# Patient Record
Sex: FEMALE | Race: WHITE | NOT HISPANIC OR LATINO | Employment: OTHER | URBAN - METROPOLITAN AREA
[De-identification: names, ages, dates, MRNs, and addresses within clinical notes are randomized per-mention and may not be internally consistent; named-entity substitution may affect disease eponyms.]

---

## 2017-03-09 ENCOUNTER — HOSPITAL ENCOUNTER (OUTPATIENT)
Dept: OTHER | Facility: HOSPITAL | Age: 67
Discharge: HOME | End: 2017-03-09
Attending: ANESTHESIOLOGY | Admitting: ANESTHESIOLOGY

## 2017-03-22 ENCOUNTER — HOSPITAL ENCOUNTER (OUTPATIENT)
Dept: OTHER | Facility: HOSPITAL | Age: 67
Setting detail: SPECIMEN
Discharge: HOME | End: 2017-03-22
Attending: PODIATRIST | Admitting: PODIATRIST

## 2017-04-03 ENCOUNTER — HOSPITAL ENCOUNTER (OUTPATIENT)
Dept: OTHER | Facility: HOSPITAL | Age: 67
Setting detail: SPECIMEN
Discharge: HOME | End: 2017-04-03
Attending: PODIATRIST | Admitting: PODIATRIST

## 2017-04-03 LAB
BACTERIA SPEC AEROBE CULT: NORMAL
GRAM STN SPEC: NORMAL
Lab: NORMAL
MICRO REPORT STATUS: NORMAL
SPECIMEN SOURCE: NORMAL

## 2019-06-07 ENCOUNTER — TRANSCRIBE ORDERS (OUTPATIENT)
Dept: ADMINISTRATIVE | Facility: HOSPITAL | Age: 69
End: 2019-06-07

## 2019-06-07 DIAGNOSIS — Z82.5 FAMILY HISTORY OF ASTHMA: ICD-10-CM

## 2019-06-07 DIAGNOSIS — R06.09 DOE (DYSPNEA ON EXERTION): Primary | ICD-10-CM

## 2019-06-07 DIAGNOSIS — F17.200 SMOKER: ICD-10-CM

## 2019-06-07 DIAGNOSIS — I35.8 AORTIC HEART MURMUR: ICD-10-CM

## 2019-06-21 ENCOUNTER — HOSPITAL ENCOUNTER (OUTPATIENT)
Dept: CARDIOLOGY | Facility: HOSPITAL | Age: 69
Discharge: HOME | End: 2019-06-21

## 2019-06-21 ENCOUNTER — HOSPITAL ENCOUNTER (OUTPATIENT)
Dept: RESPIRATORY THERAPY | Facility: HOSPITAL | Age: 69
Discharge: HOME | End: 2019-06-21
Admitting: NURSE PRACTITIONER

## 2019-06-21 VITALS
BODY MASS INDEX: 49.51 KG/M2 | WEIGHT: 290 LBS | HEIGHT: 64 IN | SYSTOLIC BLOOD PRESSURE: 125 MMHG | DIASTOLIC BLOOD PRESSURE: 78 MMHG

## 2019-06-21 VITALS
WEIGHT: 290 LBS | BODY MASS INDEX: 49.51 KG/M2 | RESPIRATION RATE: 18 BRPM | OXYGEN SATURATION: 99 % | HEIGHT: 64 IN | HEART RATE: 67 BPM

## 2019-06-21 DIAGNOSIS — F17.200 SMOKER: ICD-10-CM

## 2019-06-21 DIAGNOSIS — Z82.5 FAMILY HISTORY OF ASTHMA: ICD-10-CM

## 2019-06-21 DIAGNOSIS — R06.09 DOE (DYSPNEA ON EXERTION): ICD-10-CM

## 2019-06-21 DIAGNOSIS — I35.8 AORTIC HEART MURMUR: ICD-10-CM

## 2019-06-21 LAB
BH CV ECHO MEAS - ACS: 1.4 CM
BH CV ECHO MEAS - AO MAX PG (FULL): 62.5 MMHG
BH CV ECHO MEAS - AO MAX PG: 67.9 MMHG
BH CV ECHO MEAS - AO MEAN PG (FULL): 32.7 MMHG
BH CV ECHO MEAS - AO MEAN PG: 35.9 MMHG
BH CV ECHO MEAS - AO ROOT AREA (BSA CORRECTED): 1.3
BH CV ECHO MEAS - AO ROOT AREA: 7.1 CM^2
BH CV ECHO MEAS - AO ROOT DIAM: 3 CM
BH CV ECHO MEAS - AO V2 MAX: 412.1 CM/SEC
BH CV ECHO MEAS - AO V2 MEAN: 269.8 CM/SEC
BH CV ECHO MEAS - AO V2 VTI: 100.8 CM
BH CV ECHO MEAS - ASC AORTA: 3 CM
BH CV ECHO MEAS - AVA(I,A): 1.3 CM^2
BH CV ECHO MEAS - AVA(I,D): 1.3 CM^2
BH CV ECHO MEAS - AVA(V,A): 1.1 CM^2
BH CV ECHO MEAS - AVA(V,D): 1.1 CM^2
BH CV ECHO MEAS - BSA(HAYCOCK): 2.5 M^2
BH CV ECHO MEAS - BSA: 2.3 M^2
BH CV ECHO MEAS - BZI_BMI: 49.8 KILOGRAMS/M^2
BH CV ECHO MEAS - BZI_METRIC_HEIGHT: 162.6 CM
BH CV ECHO MEAS - BZI_METRIC_WEIGHT: 131.5 KG
BH CV ECHO MEAS - EDV(CUBED): 172.9 ML
BH CV ECHO MEAS - EDV(MOD-SP4): 87.2 ML
BH CV ECHO MEAS - EDV(TEICH): 151.8 ML
BH CV ECHO MEAS - EF(CUBED): 78.4 %
BH CV ECHO MEAS - EF(MOD-BP): 68 %
BH CV ECHO MEAS - EF(MOD-SP4): 67.9 %
BH CV ECHO MEAS - EF(TEICH): 70 %
BH CV ECHO MEAS - ESV(CUBED): 37.4 ML
BH CV ECHO MEAS - ESV(MOD-SP4): 28 ML
BH CV ECHO MEAS - ESV(TEICH): 45.5 ML
BH CV ECHO MEAS - FS: 40 %
BH CV ECHO MEAS - IVS/LVPW: 1.2
BH CV ECHO MEAS - IVSD: 1.4 CM
BH CV ECHO MEAS - LA DIMENSION(2D): 4.1 CM
BH CV ECHO MEAS - LV DIASTOLIC VOL/BSA (35-75): 38.1 ML/M^2
BH CV ECHO MEAS - LV MASS(C)D: 301.8 GRAMS
BH CV ECHO MEAS - LV MASS(C)DI: 131.7 GRAMS/M^2
BH CV ECHO MEAS - LV MAX PG: 5.4 MMHG
BH CV ECHO MEAS - LV MEAN PG: 3.3 MMHG
BH CV ECHO MEAS - LV SYSTOLIC VOL/BSA (12-30): 12.2 ML/M^2
BH CV ECHO MEAS - LV V1 MAX: 116.5 CM/SEC
BH CV ECHO MEAS - LV V1 MEAN: 86.3 CM/SEC
BH CV ECHO MEAS - LV V1 VTI: 33.8 CM
BH CV ECHO MEAS - LVIDD: 5.6 CM
BH CV ECHO MEAS - LVIDS: 3.3 CM
BH CV ECHO MEAS - LVOT AREA: 3.8 CM^2
BH CV ECHO MEAS - LVOT DIAM: 2.2 CM
BH CV ECHO MEAS - LVPWD: 1.1 CM
BH CV ECHO MEAS - MV A MAX VEL: 150.8 CM/SEC
BH CV ECHO MEAS - MV DEC SLOPE: 459.2 CM/SEC^2
BH CV ECHO MEAS - MV DEC TIME: 0.26 SEC
BH CV ECHO MEAS - MV E MAX VEL: 120.7 CM/SEC
BH CV ECHO MEAS - MV E/A: 0.8
BH CV ECHO MEAS - MV MAX PG: 11.7 MMHG
BH CV ECHO MEAS - MV MEAN PG: 4.6 MMHG
BH CV ECHO MEAS - MV V2 MAX: 171.3 CM/SEC
BH CV ECHO MEAS - MV V2 MEAN: 100.8 CM/SEC
BH CV ECHO MEAS - MV V2 VTI: 42.3 CM
BH CV ECHO MEAS - MVA(VTI): 3.1 CM^2
BH CV ECHO MEAS - PA ACC TIME: 0.08 SEC
BH CV ECHO MEAS - PA MAX PG (FULL): 1.5 MMHG
BH CV ECHO MEAS - PA MAX PG: 3.5 MMHG
BH CV ECHO MEAS - PA MEAN PG (FULL): 1 MMHG
BH CV ECHO MEAS - PA MEAN PG: 2.1 MMHG
BH CV ECHO MEAS - PA PR(ACCEL): 43.7 MMHG
BH CV ECHO MEAS - PA V2 MAX: 93.5 CM/SEC
BH CV ECHO MEAS - PA V2 MEAN: 68.8 CM/SEC
BH CV ECHO MEAS - PA V2 VTI: 25.1 CM
BH CV ECHO MEAS - PULM A REVS DUR: 0.11 SEC
BH CV ECHO MEAS - PULM A REVS VEL: 33.3 CM/SEC
BH CV ECHO MEAS - PULM DIAS VEL: 42.8 CM/SEC
BH CV ECHO MEAS - PULM S/D: 1.7
BH CV ECHO MEAS - PULM SYS VEL: 71.6 CM/SEC
BH CV ECHO MEAS - PVA(I,A): 5.5 CM^2
BH CV ECHO MEAS - PVA(I,D): 5.5 CM^2
BH CV ECHO MEAS - PVA(V,A): 5.1 CM^2
BH CV ECHO MEAS - PVA(V,D): 5.1 CM^2
BH CV ECHO MEAS - QP/QS: 1.1
BH CV ECHO MEAS - RAP SYSTOLE: 3 MMHG
BH CV ECHO MEAS - RV MAX PG: 2 MMHG
BH CV ECHO MEAS - RV MEAN PG: 1.1 MMHG
BH CV ECHO MEAS - RV V1 MAX: 70 CM/SEC
BH CV ECHO MEAS - RV V1 MEAN: 48.4 CM/SEC
BH CV ECHO MEAS - RV V1 VTI: 20.6 CM
BH CV ECHO MEAS - RVDD: 2.6 CM
BH CV ECHO MEAS - RVOT AREA: 6.8 CM^2
BH CV ECHO MEAS - RVOT DIAM: 2.9 CM
BH CV ECHO MEAS - RVSP: 35.7 MMHG
BH CV ECHO MEAS - SI(AO): 311 ML/M^2
BH CV ECHO MEAS - SI(CUBED): 59.2 ML/M^2
BH CV ECHO MEAS - SI(LVOT): 56.9 ML/M^2
BH CV ECHO MEAS - SI(MOD-SP4): 25.8 ML/M^2
BH CV ECHO MEAS - SI(TEICH): 46.4 ML/M^2
BH CV ECHO MEAS - SV(AO): 712.3 ML
BH CV ECHO MEAS - SV(CUBED): 135.5 ML
BH CV ECHO MEAS - SV(LVOT): 130.3 ML
BH CV ECHO MEAS - SV(MOD-SP4): 59.2 ML
BH CV ECHO MEAS - SV(RVOT): 138.8 ML
BH CV ECHO MEAS - SV(TEICH): 106.3 ML
BH CV ECHO MEAS - TR MAX VEL: 285.8 CM/SEC

## 2019-06-21 PROCEDURE — 93306 TTE W/DOPPLER COMPLETE: CPT | Performed by: INTERNAL MEDICINE

## 2019-06-21 PROCEDURE — 94729 DIFFUSING CAPACITY: CPT

## 2019-06-21 PROCEDURE — 94726 PLETHYSMOGRAPHY LUNG VOLUMES: CPT

## 2019-06-21 PROCEDURE — 93306 TTE W/DOPPLER COMPLETE: CPT

## 2019-06-21 PROCEDURE — 94060 EVALUATION OF WHEEZING: CPT

## 2019-06-21 RX ORDER — ALBUTEROL SULFATE 2.5 MG/3ML
2.5 SOLUTION RESPIRATORY (INHALATION) ONCE
Status: DISCONTINUED | OUTPATIENT
Start: 2019-06-21 | End: 2019-06-22 | Stop reason: HOSPADM

## 2019-06-21 RX ORDER — ALBUTEROL SULFATE 2.5 MG/3ML
2.5 SOLUTION RESPIRATORY (INHALATION) ONCE
Status: COMPLETED | OUTPATIENT
Start: 2019-06-21 | End: 2019-06-21

## 2019-06-21 RX ADMIN — ALBUTEROL SULFATE 2.5 MG: 2.5 SOLUTION RESPIRATORY (INHALATION) at 11:12

## 2019-07-22 ENCOUNTER — OFFICE VISIT (OUTPATIENT)
Dept: CARDIOLOGY | Facility: CLINIC | Age: 69
End: 2019-07-22

## 2019-07-22 VITALS
HEART RATE: 65 BPM | WEIGHT: 293 LBS | SYSTOLIC BLOOD PRESSURE: 140 MMHG | DIASTOLIC BLOOD PRESSURE: 82 MMHG | BODY MASS INDEX: 50.38 KG/M2 | OXYGEN SATURATION: 95 %

## 2019-07-22 DIAGNOSIS — R06.09 DYSPNEA ON EXERTION: Primary | ICD-10-CM

## 2019-07-22 DIAGNOSIS — E78.5 DYSLIPIDEMIA: ICD-10-CM

## 2019-07-22 DIAGNOSIS — R73.03 PREDIABETES: ICD-10-CM

## 2019-07-22 DIAGNOSIS — I10 ESSENTIAL HYPERTENSION: ICD-10-CM

## 2019-07-22 DIAGNOSIS — E66.01 MORBIDLY OBESE (HCC): ICD-10-CM

## 2019-07-22 DIAGNOSIS — I38 ACUTE SYSTOLIC CONGESTIVE HEART FAILURE DUE TO VALVULAR DISEASE (HCC): ICD-10-CM

## 2019-07-22 DIAGNOSIS — I50.21 ACUTE SYSTOLIC CONGESTIVE HEART FAILURE DUE TO VALVULAR DISEASE (HCC): ICD-10-CM

## 2019-07-22 PROCEDURE — 99204 OFFICE O/P NEW MOD 45 MIN: CPT | Performed by: INTERNAL MEDICINE

## 2019-07-22 PROCEDURE — 93000 ELECTROCARDIOGRAM COMPLETE: CPT | Performed by: INTERNAL MEDICINE

## 2019-07-22 NOTE — PROGRESS NOTES
Subjective:     Encounter Date:07/22/2019      Patient ID: Claudia Rust is a 68 y.o. female.    Chief Complaint: New patient here for evaluation of dyspnea  History of Present Illness see below      Past Medical History:  Past Medical History:   Diagnosis Date   • Asthma    • COPD (chronic obstructive pulmonary disease) (CMS/HCC)      Past Surgical History:  Past Surgical History:   Procedure Laterality Date   • CARDIAC CATHETERIZATION  2009   • CYSTOCELE REPAIR  1984   • FOOT SURGERY     • GALLBLADDER SURGERY  2002   • VAGINAL HYSTERECTOMY  1972   • WRIST SURGERY  2011      Allergies:  Allergies   Allergen Reactions   • Adhesive Tape Unknown (See Comments)   • Butorphanol Unknown (See Comments)   • Garlic Unknown (See Comments)   • Tetanus-Diphtheria Toxoids Td Unknown (See Comments)   • Aloe Itching   • Bee Pollen Unknown (See Comments)   • Latex Unknown (See Comments)   • Macadamia Nut Oil Unknown (See Comments)   • Tuberculin Unknown (See Comments)   • Wasp Venom Unknown (See Comments)     Home Meds:  Current Meds:     Current Outpatient Medications:   •  albuterol (PROVENTIL) (2.5 MG/3ML) 0.083% nebulizer solution, Inhale 2.5 mg., Disp: , Rfl:   •  albuterol sulfate HFA (PROVENTIL HFA) 108 (90 Base) MCG/ACT inhaler, Inhale 2 puffs., Disp: , Rfl:   •  aspirin 81 MG tablet, Take 81 mg by mouth Daily., Disp: , Rfl:   •  B Complex Vitamins (B COMPLEX PO), Take  by mouth., Disp: , Rfl:   •  Cholecalciferol (VITAMIN D-1000 MAX ST) 1000 units tablet, Take 2,000 Units by mouth Daily., Disp: , Rfl:   •  diphenhydrAMINE (BENADRYL) 25 mg capsule, BENADRYL 25 MG ORAL TABLET, Disp: , Rfl:   •  FLUoxetine (PROzac) 60 MG tablet, TAKE 1 TABLET BY MOUTH EVERY DAY, Disp: , Rfl:   •  fluticasone (FLONASE) 50 MCG/ACT nasal spray, INHALE 1 SPRAY IN EACH NOSTRIL 2 TIMES DAILY, Disp: , Rfl:   •  furosemide (LASIX) 40 MG tablet, TAKE 1 TABLET BY MOUTH TWICE A DAY, Disp: , Rfl:   •  gabapentin (NEURONTIN) 400 MG capsule, Take 400  mg by mouth 3 (Three) Times a Day., Disp: , Rfl:   •  HYDROcodone-acetaminophen (NORCO)  MG per tablet, TAKE 1 TABLET BY MOUTH 2 (TWO) TIMES DAILY FOR 30 DAYS, Disp: , Rfl:   •  hydrOXYzine pamoate (VISTARIL) 25 MG capsule, VISTARIL 25 MG CAPS, Disp: , Rfl:   •  levothyroxine (SYNTHROID, LEVOTHROID) 100 MCG tablet, TAKE 1 TABLET BY MOUTH EVERY DAY, Disp: , Rfl:   •  meclizine (ANTIVERT) 25 MG tablet, Take 25 mg by mouth., Disp: , Rfl:   •  MULTIPLE VITAMINS PO, Take  by mouth., Disp: , Rfl:   •  ondansetron ODT (ZOFRAN-ODT) 4 MG disintegrating tablet, TAKE 1-2 TABLETS BY MOUTH EVERY 8 HOURS AS NEEDED FOR NAUSEA, Disp: , Rfl:   •  pantoprazole (PROTONIX) 20 MG EC tablet, PANTOPRAZOLE SODIUM TBEC, Disp: , Rfl:   •  polyethylene glycol (MIRALAX) powder, Take 17 g by mouth., Disp: , Rfl:   •  potassium chloride (KLOR-CON) 20 MEQ CR tablet, TAKE 1 TABLET BY MOUTH TWICE A DAY, Disp: , Rfl:   •  rizatriptan MLT (MAXALT-MLT) 10 MG disintegrating tablet, TAKE 1 TABLET BY MOUTH ONCE AS NEEDED FOR MIGRAINE , MAY REPEAT ONCE IN 2HRS-MAX 30MG/24 HRS, Disp: , Rfl:   •  simvastatin (ZOCOR) 20 MG tablet, Take 20 mg by mouth Daily., Disp: , Rfl:   •  tiZANidine (ZANAFLEX) 2 MG tablet, TAKE 1 TO 2 TABLETS BY MOUTH EVERY 8 HOURS AS NEEDED, Disp: , Rfl:   Social History:   Social History     Tobacco Use   • Smoking status: Current Every Day Smoker     Types: Cigarettes   • Smokeless tobacco: Never Used   • Tobacco comment: patient is trying to quit, has patch on    Substance Use Topics   • Alcohol use: No     Frequency: Never      Family History:  History reviewed. No pertinent family history.     The following portions of the patient's history were reviewed and updated as appropriate: allergies, current medications, past family history, past medical history, past social history, past surgical history and problem list.    Review of Systems   Constitution: Positive for malaise/fatigue.   Cardiovascular: Positive for chest pain and  leg swelling. Negative for palpitations and syncope.   Respiratory: Positive for shortness of breath.    Skin: Negative for rash.   Gastrointestinal: Positive for nausea. Negative for vomiting.   Neurological: Positive for dizziness and light-headedness. Negative for numbness.         ECG 12 Lead  Date/Time: 7/22/2019 3:14 PM  Performed by: Jose Levi MD  Authorized by: Jose Levi MD   Comparison: compared with previous ECG   Comparison to previous ECG: EKG done today reviewed by me shows sinus rhythm at the rate of 60 bpm with low QRS voltage in precordial leads, prolonged QTC at 472 ms new compared to old EKG                 Objective:     Physical Exam   /82 (BP Location: Left arm, Patient Position: Sitting, Cuff Size: Large Adult)   Pulse 65   Wt 133 kg (293 lb 8 oz)   SpO2 95%   BMI 50.38 kg/m²   General:  Appears in no acute distress  Eyes: Sclera is anicteric,  conjunctiva is clear   HEENT:  No JVD. Thyroid not visibly enlarged. No mucosal pallor or cyanosis  Respiratory: Respirations regular and unlabored at rest.  Bilaterally good breath sounds, with good air entry in all fields. No crackles, rubs or wheezes auscultated  Cardiovascular: S1,S2 Regular rate and rhythm.,  2/6 systolic ejection murmur at the base, no  rub or gallop auscultated. No carotid bruits. DP/PT pulses    . No pretibial pitting edema  Gastrointestinal: Abdomen soft, flat, non tender. Bowel sounds present. No hepatosplenomegaly. No ascites  Musculoskeletal:  No abnormal movements  Extremities: No digital clubbing or cyanosis  Skin: Color pink. Skin warm and dry to touch. No rashes  No xanthoma  Neuro: Alert and awake, no lateralizing deficits appreciated    Lab Review:       Assessment:          Diagnosis Plan   1. Dyspnea on exertion  Stress Test With Myocardial Perfusion One Day    BNP   2. Acute systolic congestive heart failure due to valvular disease (CMS/HCC)  Stress Test With Myocardial  Perfusion One Day    BNP   3. Essential hypertension  Stress Test With Myocardial Perfusion One Day    BNP   4. Dyslipidemia  Stress Test With Myocardial Perfusion One Day    BNP   5. Prediabetes  Stress Test With Myocardial Perfusion One Day    BNP   6. Morbidly obese (CMS/HCC)  Stress Test With Myocardial Perfusion One Day    BNP      CC : New patient for dyspnea evaluate    HPI  :  This is a 68-year-old with PMH of    # Hyperlipidemia,  Hypertension  #COPD  #Prediabetes  # Hypothyroidism, Rheumatoid Arthritis, spinal stenosis, hiatal hernia, chronic depression, bipolar, GERD, IBS, chronic migraine, vertigo, herniated disc, compression lumbar fractures  #Allergies/intolerance to Stadol  #Hysterectomy, bladder reconstruction, cholecystectomy, right hand surgery  #Family history of strokes and grandfather.  Mother's cancer, father on  # Active cigarette smoker trying to quit       Here for evaluation of shortness of breath.  Patient has been noticing shortness of breath for last few months and has been progressively worse and now has progressively worsening fatigue and chronic dizziness.  Patient reportedly had syncope for years ago and had her right arm crushed requiring surgery.  Patient states that she has chronic vertigo.  Patient's arterial blood pressure is 140/82 heart rate 65 O2 sat of 95% on room air      Assessment:  Shortness of breath, dyspnea on exertion  Aortic stenosis  Chronic congestive heart failure probably due to valvular heart disease and aortic stenosis  Essential hypertension  Dyslipidemia  Prediabetes  Morbid obesity    Plan:      Counseled on diet exercise weight loss  We will check Lexiscan Cardiolite since patient cannot walk due to dyspnea risk benefits alternatives except  Patient has echo showing severe left ear will consider CHRISTIANO  We will check a BNP level  We will follow-up after testing and consider further evaluation treatment  Counseled on smoking cessation  Reviewed EKG results  with patient  Plan:

## 2019-07-30 ENCOUNTER — HOSPITAL ENCOUNTER (OUTPATIENT)
Dept: CARDIOLOGY | Facility: HOSPITAL | Age: 69
Discharge: HOME | End: 2019-07-30

## 2019-07-30 ENCOUNTER — LAB (OUTPATIENT)
Dept: LAB | Facility: HOSPITAL | Age: 69
End: 2019-07-30

## 2019-07-30 DIAGNOSIS — I50.21 ACUTE SYSTOLIC CONGESTIVE HEART FAILURE DUE TO VALVULAR DISEASE (HCC): ICD-10-CM

## 2019-07-30 DIAGNOSIS — R73.03 PREDIABETES: ICD-10-CM

## 2019-07-30 DIAGNOSIS — I10 ESSENTIAL HYPERTENSION: ICD-10-CM

## 2019-07-30 DIAGNOSIS — R06.09 DYSPNEA ON EXERTION: ICD-10-CM

## 2019-07-30 DIAGNOSIS — E78.5 DYSLIPIDEMIA: ICD-10-CM

## 2019-07-30 DIAGNOSIS — I38 ACUTE SYSTOLIC CONGESTIVE HEART FAILURE DUE TO VALVULAR DISEASE (HCC): ICD-10-CM

## 2019-07-30 DIAGNOSIS — E66.01 MORBIDLY OBESE (HCC): ICD-10-CM

## 2019-07-30 LAB — BNP SERPL-MCNC: 72 PG/ML

## 2019-07-30 PROCEDURE — 78452 HT MUSCLE IMAGE SPECT MULT: CPT

## 2019-07-30 PROCEDURE — A9500 TC99M SESTAMIBI: HCPCS | Performed by: INTERNAL MEDICINE

## 2019-07-30 PROCEDURE — 0 TECHNETIUM SESTAMIBI: Performed by: INTERNAL MEDICINE

## 2019-07-30 PROCEDURE — 36415 COLL VENOUS BLD VENIPUNCTURE: CPT

## 2019-07-30 PROCEDURE — 25010000002 REGADENOSON 0.4 MG/5ML SOLUTION: Performed by: INTERNAL MEDICINE

## 2019-07-30 PROCEDURE — 93017 CV STRESS TEST TRACING ONLY: CPT

## 2019-07-30 PROCEDURE — 93016 CV STRESS TEST SUPVJ ONLY: CPT | Performed by: INTERNAL MEDICINE

## 2019-07-30 PROCEDURE — 83880 ASSAY OF NATRIURETIC PEPTIDE: CPT

## 2019-07-30 RX ADMIN — TECHNETIUM TC 99M SESTAMIBI 1 DOSE: 1 INJECTION INTRAVENOUS at 13:30

## 2019-07-30 RX ADMIN — TECHNETIUM TC 99M SESTAMIBI 1 DOSE: 1 INJECTION INTRAVENOUS at 12:10

## 2019-07-30 RX ADMIN — REGADENOSON 0.4 MG: 0.08 INJECTION, SOLUTION INTRAVENOUS at 13:30

## 2019-08-01 LAB
BH CV NUCLEAR PRIOR STUDY: 3
BH CV STRESS COMMENTS STAGE 1: NORMAL
BH CV STRESS DOSE REGADENOSON STAGE 1: 0.4
BH CV STRESS DURATION MIN STAGE 1: 0
BH CV STRESS DURATION SEC STAGE 1: 10
BH CV STRESS PROTOCOL 1: NORMAL
BH CV STRESS RECOVERY BP: NORMAL MMHG
BH CV STRESS RECOVERY HR: 68 BPM
BH CV STRESS STAGE 1: 1
MAXIMAL PREDICTED HEART RATE: 152 BPM
PERCENT MAX PREDICTED HR: 48.03 %
STRESS BASELINE BP: NORMAL MMHG
STRESS BASELINE HR: 66 BPM
STRESS PERCENT HR: 57 %
STRESS POST PEAK BP: NORMAL MMHG
STRESS POST PEAK HR: 73 BPM
STRESS TARGET HR: 129 BPM

## 2019-08-01 PROCEDURE — 93018 CV STRESS TEST I&R ONLY: CPT | Performed by: INTERNAL MEDICINE

## 2019-08-01 PROCEDURE — 78452 HT MUSCLE IMAGE SPECT MULT: CPT | Performed by: INTERNAL MEDICINE

## 2019-08-02 ENCOUNTER — OFFICE VISIT (OUTPATIENT)
Dept: CARDIOLOGY | Facility: CLINIC | Age: 69
End: 2019-08-02

## 2019-08-02 VITALS
OXYGEN SATURATION: 96 % | HEART RATE: 70 BPM | HEIGHT: 64 IN | DIASTOLIC BLOOD PRESSURE: 85 MMHG | BODY MASS INDEX: 49.34 KG/M2 | WEIGHT: 289 LBS | SYSTOLIC BLOOD PRESSURE: 137 MMHG

## 2019-08-02 DIAGNOSIS — R94.39 ABNORMAL NUCLEAR STRESS TEST: ICD-10-CM

## 2019-08-02 DIAGNOSIS — E66.01 MORBIDLY OBESE (HCC): ICD-10-CM

## 2019-08-02 DIAGNOSIS — E78.5 DYSLIPIDEMIA: ICD-10-CM

## 2019-08-02 DIAGNOSIS — R06.09 DYSPNEA ON EXERTION: Primary | ICD-10-CM

## 2019-08-02 DIAGNOSIS — R73.03 PREDIABETES: ICD-10-CM

## 2019-08-02 DIAGNOSIS — I35.0 NONRHEUMATIC AORTIC VALVE STENOSIS: ICD-10-CM

## 2019-08-02 DIAGNOSIS — I10 ESSENTIAL HYPERTENSION: ICD-10-CM

## 2019-08-02 PROCEDURE — 99213 OFFICE O/P EST LOW 20 MIN: CPT | Performed by: INTERNAL MEDICINE

## 2019-08-02 RX ORDER — ISOSORBIDE MONONITRATE 30 MG/1
30 TABLET, EXTENDED RELEASE ORAL EVERY MORNING
Qty: 90 TABLET | Refills: 3 | Status: SHIPPED | OUTPATIENT
Start: 2019-08-02 | End: 2019-08-05

## 2019-08-02 RX ORDER — METOPROLOL SUCCINATE 25 MG/1
25 TABLET, EXTENDED RELEASE ORAL DAILY
Qty: 30 TABLET | Refills: 11 | Status: SHIPPED | OUTPATIENT
Start: 2019-08-02 | End: 2020-05-26

## 2019-08-02 NOTE — PROGRESS NOTES
Subjective:     Encounter Date:08/02/2019      Patient ID: Claudia Rust is a 68 y.o. female.    Chief Complaint: Stress test follow-up  History of Present Illness see below      Past Medical History:  Past Medical History:   Diagnosis Date   • Acute congestive heart failure (CMS/HCC)    • Asthma    • COPD (chronic obstructive pulmonary disease) (CMS/HCC)    • Dyslipidemia    • Hypertension    • Morbid obesity (CMS/HCC)      Past Surgical History:  Past Surgical History:   Procedure Laterality Date   • CARDIAC CATHETERIZATION  2009   • CYSTOCELE REPAIR  1984   • FOOT SURGERY     • GALLBLADDER SURGERY  2002   • VAGINAL HYSTERECTOMY  1972   • WRIST SURGERY  2011      Allergies:  Allergies   Allergen Reactions   • Adhesive Tape Unknown (See Comments)   • Butorphanol Unknown (See Comments)   • Garlic Unknown (See Comments)   • Tetanus-Diphtheria Toxoids Td Unknown (See Comments)   • Aloe Itching   • Bee Pollen Unknown (See Comments)   • Latex Unknown (See Comments)   • Macadamia Nut Oil Unknown (See Comments)   • Tuberculin Unknown (See Comments)   • Wasp Venom Unknown (See Comments)     Home Meds:  Current Meds:     Current Outpatient Medications:   •  albuterol (PROVENTIL) (2.5 MG/3ML) 0.083% nebulizer solution, Inhale 2.5 mg., Disp: , Rfl:   •  albuterol sulfate HFA (PROVENTIL HFA) 108 (90 Base) MCG/ACT inhaler, Inhale 2 puffs., Disp: , Rfl:   •  aspirin 81 MG tablet, Take 81 mg by mouth Daily., Disp: , Rfl:   •  B Complex Vitamins (B COMPLEX PO), Take  by mouth., Disp: , Rfl:   •  Cholecalciferol (VITAMIN D-1000 MAX ST) 1000 units tablet, Take 2,000 Units by mouth Daily., Disp: , Rfl:   •  diphenhydrAMINE (BENADRYL) 25 mg capsule, BENADRYL 25 MG ORAL TABLET, Disp: , Rfl:   •  FLUoxetine (PROzac) 60 MG tablet, TAKE 1 TABLET BY MOUTH EVERY DAY, Disp: , Rfl:   •  fluticasone (FLONASE) 50 MCG/ACT nasal spray, INHALE 1 SPRAY IN EACH NOSTRIL 2 TIMES DAILY, Disp: , Rfl:   •  furosemide (LASIX) 40 MG tablet, TAKE 1  TABLET BY MOUTH TWICE A DAY, Disp: , Rfl:   •  gabapentin (NEURONTIN) 400 MG capsule, Take 400 mg by mouth 3 (Three) Times a Day., Disp: , Rfl:   •  HYDROcodone-acetaminophen (NORCO)  MG per tablet, TAKE 1 TABLET BY MOUTH 2 (TWO) TIMES DAILY FOR 30 DAYS, Disp: , Rfl:   •  hydrOXYzine pamoate (VISTARIL) 25 MG capsule, VISTARIL 25 MG CAPS, Disp: , Rfl:   •  levothyroxine (SYNTHROID, LEVOTHROID) 100 MCG tablet, TAKE 1 TABLET BY MOUTH EVERY DAY, Disp: , Rfl:   •  meclizine (ANTIVERT) 25 MG tablet, Take 25 mg by mouth., Disp: , Rfl:   •  MULTIPLE VITAMINS PO, Take  by mouth., Disp: , Rfl:   •  ondansetron ODT (ZOFRAN-ODT) 4 MG disintegrating tablet, TAKE 1-2 TABLETS BY MOUTH EVERY 8 HOURS AS NEEDED FOR NAUSEA, Disp: , Rfl:   •  pantoprazole (PROTONIX) 20 MG EC tablet, PANTOPRAZOLE SODIUM TBEC, Disp: , Rfl:   •  polyethylene glycol (MIRALAX) powder, Take 17 g by mouth., Disp: , Rfl:   •  potassium chloride (KLOR-CON) 20 MEQ CR tablet, TAKE 1 TABLET BY MOUTH TWICE A DAY, Disp: , Rfl:   •  rizatriptan MLT (MAXALT-MLT) 10 MG disintegrating tablet, TAKE 1 TABLET BY MOUTH ONCE AS NEEDED FOR MIGRAINE , MAY REPEAT ONCE IN 2HRS-MAX 30MG/24 HRS, Disp: , Rfl:   •  simvastatin (ZOCOR) 20 MG tablet, Take 20 mg by mouth Daily., Disp: , Rfl:   •  tiZANidine (ZANAFLEX) 2 MG tablet, 2 at night, Disp: , Rfl:   Social History:   Social History     Tobacco Use   • Smoking status: Current Every Day Smoker     Types: Cigarettes   • Smokeless tobacco: Never Used   • Tobacco comment: patient is trying to quit, has patch on    Substance Use Topics   • Alcohol use: No     Frequency: Never      Family History:  History reviewed. No pertinent family history.     The following portions of the patient's history were reviewed and updated as appropriate: allergies, current medications, past family history, past medical history, past social history, past surgical history and problem list.    Review of Systems   Cardiovascular: Positive for chest  "pain and leg swelling.   Respiratory: Positive for shortness of breath.    Neurological: Positive for light-headedness.       Procedures       Objective:     Physical Exam   /85 (BP Location: Left arm, Patient Position: Sitting, Cuff Size: Large Adult)   Pulse 70   Ht 162.6 cm (64\")   Wt 131 kg (289 lb)   SpO2 96%   BMI 49.61 kg/m²   General:  Appears in no acute distress  Eyes: Sclera is anicteric,  conjunctiva is clear   HEENT:  No JVD. Thyroid not visibly enlarged. No mucosal pallor or cyanosis  Respiratory: Respirations regular and unlabored at rest.  Bilaterally good breath sounds, with good air entry in all fields. No crackles, rubs or wheezes auscultated  Cardiovascular: S1,S2 Regular rate and rhythm. No murmur, rub or gallop auscultated. No carotid bruits. DP/PT pulses    . No pretibial pitting edema  Gastrointestinal: Abdomen soft, flat, non tender. Bowel sounds present. No hepatosplenomegaly. No ascites  Musculoskeletal:  No abnormal movements  Extremities: No digital clubbing or cyanosis  Skin: Color pink. Skin warm and dry to touch. No rashes  No xanthoma  Neuro: Alert and awake, no lateralizing deficits appreciated    Lab Review:       Assessment:          Diagnosis Plan   1. Dyspnea on exertion     2. Abnormal nuclear stress test     3. Nonrheumatic aortic valve stenosis     4. Prediabetes     5. Dyslipidemia     6. Essential hypertension     7. Morbidly obese (CMS/HCC)         CC : Stress test follow-up    HPI  :  This is a 68-year-old with PMH of    # Hyperlipidemia,  Hypertension  #COPD  #Prediabetes  # Hypothyroidism, Rheumatoid Arthritis, spinal stenosis, hiatal hernia, chronic depression, bipolar, GERD, IBS, chronic migraine, vertigo, herniated disc, compression lumbar fractures  #Allergies/intolerance to Stadol  #Hysterectomy, bladder reconstruction, cholecystectomy, right hand surgery  #Family history of strokes and grandfather.  Mother's cancer, father on  # Active cigarette smoker " trying to quit       Here for evaluation of shortness of breath.  Patient has been noticing shortness of breath for last few months and has been progressively worse and now has progressively worsening fatigue and chronic dizziness.  Patient reportedly had syncope for years ago and had her right arm crushed requiring surgery.  Patient states that she has chronic vertigo.  Patient's arterial blood pressure is 140/82 heart rate 65 O2 sat of 95% on room air      Assessment:  Shortness of breath, dyspnea on exertion, abnormal stress test  Aortic stenosis  Chronic congestive heart failure probably due to valvular heart disease and aortic stenosis  Essential hypertension  Dyslipidemia  Prediabetes  Morbid obesity    Plan:  We will proceed with cardiac catheterization   risk benefits alternatives except  Patient has echo showing severe aortic stenosis  We will follow-up after testing and consider further evaluation treatment  Counseled on smoking cessation  Reviewed stress test results with patient       Plan:

## 2019-08-05 ENCOUNTER — TELEPHONE (OUTPATIENT)
Dept: CARDIOLOGY | Facility: CLINIC | Age: 69
End: 2019-08-05

## 2019-08-05 NOTE — TELEPHONE ENCOUNTER
Pt seen last week, called said she was started on isosorbide. C/O severe headache.  Tylenol not helping.  Pt wants to take her migraine Rx pill Maxalt.   Is that okay, sense she took Isosorbide this morning. ?   Please advise regarding Isosorbide

## 2019-08-13 RX ORDER — TIZANIDINE 4 MG/1
4 TABLET ORAL EVERY 8 HOURS PRN
COMMUNITY

## 2019-08-13 RX ORDER — HYDROCODONE BITARTRATE AND ACETAMINOPHEN 10; 325 MG/1; MG/1
1 TABLET ORAL EVERY 6 HOURS PRN
COMMUNITY
End: 2020-02-16

## 2019-08-13 RX ORDER — PROMETHAZINE HYDROCHLORIDE 25 MG/1
25 TABLET ORAL EVERY 6 HOURS PRN
COMMUNITY
End: 2021-08-06 | Stop reason: ALTCHOICE

## 2019-08-13 RX ORDER — DIPHENHYDRAMINE HCL 50 MG
50 CAPSULE ORAL
COMMUNITY
End: 2021-08-06 | Stop reason: ALTCHOICE

## 2019-08-13 RX ORDER — MAGNESIUM HYDROXIDE/ALUMINUM HYDROXICE/SIMETHICONE 120; 1200; 1200 MG/30ML; MG/30ML; MG/30ML
15 SUSPENSION ORAL EVERY 6 HOURS PRN
COMMUNITY
End: 2023-01-07

## 2019-08-13 RX ORDER — PANTOPRAZOLE SODIUM 40 MG/1
40 TABLET, DELAYED RELEASE ORAL EVERY EVENING
COMMUNITY

## 2019-08-13 RX ORDER — LANOLIN ALCOHOL/MO/W.PET/CERES
1000 CREAM (GRAM) TOPICAL DAILY
COMMUNITY
End: 2020-12-17 | Stop reason: SDUPTHER

## 2019-08-13 RX ORDER — ACETAMINOPHEN 325 MG/1
650 TABLET ORAL EVERY 6 HOURS PRN
Status: ON HOLD | COMMUNITY
End: 2021-01-20

## 2019-08-14 ENCOUNTER — HOSPITAL ENCOUNTER (OUTPATIENT)
Facility: HOSPITAL | Age: 69
Setting detail: HOSPITAL OUTPATIENT SURGERY
Discharge: HOME | End: 2019-08-14
Attending: INTERNAL MEDICINE | Admitting: INTERNAL MEDICINE

## 2019-08-14 ENCOUNTER — LAB (OUTPATIENT)
Dept: LAB | Facility: HOSPITAL | Age: 69
End: 2019-08-14

## 2019-08-14 ENCOUNTER — HOSPITAL ENCOUNTER (OUTPATIENT)
Dept: GENERAL RADIOLOGY | Facility: HOSPITAL | Age: 69
Setting detail: HOSPITAL OUTPATIENT SURGERY
Discharge: HOME | End: 2019-08-14

## 2019-08-14 VITALS
BODY MASS INDEX: 48.85 KG/M2 | RESPIRATION RATE: 18 BRPM | TEMPERATURE: 98.2 F | HEIGHT: 64 IN | HEART RATE: 58 BPM | OXYGEN SATURATION: 94 % | DIASTOLIC BLOOD PRESSURE: 88 MMHG | SYSTOLIC BLOOD PRESSURE: 163 MMHG | WEIGHT: 286.16 LBS

## 2019-08-14 DIAGNOSIS — E66.01 MORBIDLY OBESE (HCC): ICD-10-CM

## 2019-08-14 DIAGNOSIS — R94.39 ABNORMAL NUCLEAR STRESS TEST: ICD-10-CM

## 2019-08-14 DIAGNOSIS — R06.09 DYSPNEA ON EXERTION: ICD-10-CM

## 2019-08-14 DIAGNOSIS — I35.0 NONRHEUMATIC AORTIC VALVE STENOSIS: ICD-10-CM

## 2019-08-14 DIAGNOSIS — R73.03 PREDIABETES: ICD-10-CM

## 2019-08-14 DIAGNOSIS — E78.5 DYSLIPIDEMIA: ICD-10-CM

## 2019-08-14 DIAGNOSIS — I10 ESSENTIAL HYPERTENSION: ICD-10-CM

## 2019-08-14 LAB
ANION GAP SERPL CALCULATED.3IONS-SCNC: 14.5 MMOL/L (ref 5–15)
BUN BLD-MCNC: 8 MG/DL (ref 8–20)
BUN/CREAT SERPL: 8.9 (ref 5.4–26.2)
CALCIUM SPEC-SCNC: 8.9 MG/DL (ref 8.9–10.3)
CHLORIDE SERPL-SCNC: 103 MMOL/L (ref 101–111)
CO2 SERPL-SCNC: 24 MMOL/L (ref 22–32)
CREAT BLD-MCNC: 0.9 MG/DL (ref 0.4–1)
DEPRECATED RDW RBC AUTO: 44.2 FL (ref 37–54)
ERYTHROCYTE [DISTWIDTH] IN BLOOD BY AUTOMATED COUNT: 14.2 % (ref 12.3–15.4)
GFR SERPL CREATININE-BSD FRML MDRD: 62 ML/MIN/1.73
GLUCOSE BLD-MCNC: 103 MG/DL (ref 65–99)
HCT VFR BLD AUTO: 41.8 % (ref 34–46.6)
HGB BLD-MCNC: 13.9 G/DL (ref 12–15.9)
INR PPP: 1.09 (ref 0.9–1.1)
MCH RBC QN AUTO: 29.8 PG (ref 26.6–33)
MCHC RBC AUTO-ENTMCNC: 33.3 G/DL (ref 31.5–35.7)
MCV RBC AUTO: 89.4 FL (ref 79–97)
PLATELET # BLD AUTO: 209 10*3/MM3 (ref 140–450)
PMV BLD AUTO: 9 FL (ref 6–12)
POTASSIUM BLD-SCNC: 4.5 MMOL/L (ref 3.6–5.1)
PROTHROMBIN TIME: 11.1 SECONDS (ref 9.6–11.7)
RBC # BLD AUTO: 4.68 10*6/MM3 (ref 3.77–5.28)
SODIUM BLD-SCNC: 137 MMOL/L (ref 136–144)
WBC NRBC COR # BLD: 7 10*3/MM3 (ref 3.4–10.8)

## 2019-08-14 PROCEDURE — 25010000002 MIDAZOLAM PER 1 MG: Performed by: INTERNAL MEDICINE

## 2019-08-14 PROCEDURE — C1769 GUIDE WIRE: HCPCS | Performed by: INTERNAL MEDICINE

## 2019-08-14 PROCEDURE — 85027 COMPLETE CBC AUTOMATED: CPT

## 2019-08-14 PROCEDURE — 99153 MOD SED SAME PHYS/QHP EA: CPT | Performed by: INTERNAL MEDICINE

## 2019-08-14 PROCEDURE — 93567 NJX CAR CTH SPRVLV AORTGRPHY: CPT | Performed by: INTERNAL MEDICINE

## 2019-08-14 PROCEDURE — 99152 MOD SED SAME PHYS/QHP 5/>YRS: CPT | Performed by: INTERNAL MEDICINE

## 2019-08-14 PROCEDURE — 85610 PROTHROMBIN TIME: CPT

## 2019-08-14 PROCEDURE — 80048 BASIC METABOLIC PNL TOTAL CA: CPT

## 2019-08-14 PROCEDURE — 71046 X-RAY EXAM CHEST 2 VIEWS: CPT

## 2019-08-14 PROCEDURE — 0 IOPAMIDOL PER 1 ML: Performed by: INTERNAL MEDICINE

## 2019-08-14 PROCEDURE — C1894 INTRO/SHEATH, NON-LASER: HCPCS | Performed by: INTERNAL MEDICINE

## 2019-08-14 PROCEDURE — 25010000002 FENTANYL CITRATE (PF) 100 MCG/2ML SOLUTION: Performed by: INTERNAL MEDICINE

## 2019-08-14 PROCEDURE — C1887 CATHETER, GUIDING: HCPCS | Performed by: INTERNAL MEDICINE

## 2019-08-14 PROCEDURE — 93460 R&L HRT ART/VENTRICLE ANGIO: CPT | Performed by: INTERNAL MEDICINE

## 2019-08-14 PROCEDURE — 36415 COLL VENOUS BLD VENIPUNCTURE: CPT

## 2019-08-14 RX ORDER — SODIUM CHLORIDE 9 MG/ML
100 INJECTION, SOLUTION INTRAVENOUS CONTINUOUS
Status: CANCELLED | OUTPATIENT
Start: 2019-08-14

## 2019-08-14 RX ORDER — SODIUM CHLORIDE 9 MG/ML
100 INJECTION, SOLUTION INTRAVENOUS CONTINUOUS
Status: DISCONTINUED | OUTPATIENT
Start: 2019-08-14 | End: 2019-08-15 | Stop reason: HOSPADM

## 2019-08-14 RX ORDER — SODIUM CHLORIDE 9 MG/ML
250 INJECTION, SOLUTION INTRAVENOUS ONCE AS NEEDED
Status: CANCELLED | OUTPATIENT
Start: 2019-08-14

## 2019-08-14 RX ORDER — MIDAZOLAM HYDROCHLORIDE 1 MG/ML
INJECTION INTRAMUSCULAR; INTRAVENOUS AS NEEDED
Status: DISCONTINUED | OUTPATIENT
Start: 2019-08-14 | End: 2019-08-14 | Stop reason: HOSPADM

## 2019-08-14 RX ORDER — ATROPINE SULFATE 1 MG/ML
.5-1 INJECTION, SOLUTION INTRAMUSCULAR; INTRAVENOUS; SUBCUTANEOUS
Status: CANCELLED | OUTPATIENT
Start: 2019-08-14

## 2019-08-14 RX ORDER — FENTANYL CITRATE 50 UG/ML
INJECTION, SOLUTION INTRAMUSCULAR; INTRAVENOUS AS NEEDED
Status: DISCONTINUED | OUTPATIENT
Start: 2019-08-14 | End: 2019-08-14 | Stop reason: HOSPADM

## 2019-08-14 RX ORDER — METOPROLOL SUCCINATE 25 MG/1
25 TABLET, EXTENDED RELEASE ORAL
Status: DISCONTINUED | OUTPATIENT
Start: 2019-08-14 | End: 2019-08-15 | Stop reason: HOSPADM

## 2019-08-14 RX ADMIN — SODIUM CHLORIDE 100 ML/HR: 900 INJECTION, SOLUTION INTRAVENOUS at 11:43

## 2019-08-14 RX ADMIN — METOPROLOL SUCCINATE 25 MG: 25 TABLET, EXTENDED RELEASE ORAL at 17:08

## 2019-08-14 NOTE — DISCHARGE INSTRUCTIONS
Post Cath Instructions    Prescriptions given?  ***   Admission Meds Returned? ***  Medication Sheets given? ***   Smoking Cessation Info Given ***  Education Booklet *** given? ***    Call  ***’s office to schedule a follow up appointment in *** days/weeks at ***.  Specific Physician Instructions: ***    1) Drink plenty of fluids for the next 24 hours.  This helps to eliminate the dye used in your procedure through urination.  You may resume a normal diet; however, try to avoid foods that would cause gas or constipation.    2) Sedative medication given to you during your catheterization may decrease your judgement and reaction time for up to 24-48 hours.  Therefore:  a. DO NOT drive or operate hazardous machinery (48 hours)  b. DO NOT consume alcoholic beverages  c. DO NOT make any important/legal decisions  d. Have someone stay with you for at least 24 hours    3) To allow proper healing and prevent bleeding, the following activities are to be strictly avoided for the next 24-48 hours:  a. Excessive bending at wound site  b. Straining (anything that would tense up muscles around the affected puncture site)  c. Lifting objects greater than 5 pounds, pushing, or pulling for 5 days  i. For Arm Cases:  1. No flexing at the puncture site, such as hammering, golfing, bowling, or swinging any objects  ii. For Groin Cases:  1. Refrain from sexual activity  2. Refrain from running or vigorous walking  3. No prolonged sitting or standing  4. Limit stair climbing as much as possible    4) Keep the puncture site clean and dry.  You may remove the dressing tomorrow and replace it with a band-aid for at least one additional day.  Gently clean the site with mild soap and water.  No scrubbing/rubbing and lightly pat the area dry.  Showers are acceptable; however, avoid submerging in water (tub baths, hot tubs, swimming pools, dishwater, etc…) for at least one week.  The site should be completely healed before resuming these  activities to reduce the risk of infection.  Check the site often.  Watch for signs and symptoms of infection and notify your physician if any of the following occur:  a. Bleeding or an increase in swelling at the puncture site  b. Fever  c. Increased soreness around puncture site  d. Foul odor or significant drainage from the puncture site  e. Swelling, redness, or warmth at the puncture site    **A bruise or small “pea sized” lump under the skin at the puncture site is not unusual.  This should disappear within 3-4 weeks.**  5) CONTACT YOUR PHYSICIAN OR CALL 911 IF YOU EXPERIENCE ANY OF THE FOLLOWING:  a. Increased angina (chest pain) or frequent sensations of pressure, burning, pain, or other discomfort in the chest, arm, jaws, or stomach  b. Lightheadedness, dizziness, faint feeling, sweating, or difficulty breathing  c. Odd sensation changes like numbness, tingling, coldness, or pain in the arm or leg in which the catheter was inserted  d. Limb in which the catheter was inserted becomes pale/bluish in color    IMPORTANT:  Although this occurs very rarely, if you should develop bright red or excessive bleeding, feel a “pop” inside at the insertion site, or notice a sudden increase in swelling larger than a walnut, you should call 911.  Hold continuous firm pressure to the access site until emergency personnel arrive.  It is best if someone else can do this for you.

## 2019-08-29 ENCOUNTER — APPOINTMENT (OUTPATIENT)
Dept: CARDIAC REHAB | Facility: HOSPITAL | Age: 69
End: 2019-08-29

## 2019-08-29 ENCOUNTER — TRANSCRIBE ORDERS (OUTPATIENT)
Dept: CARDIAC REHAB | Facility: HOSPITAL | Age: 69
End: 2019-08-29

## 2019-08-29 DIAGNOSIS — I38 VALVULAR DISEASE: Primary | ICD-10-CM

## 2019-08-29 DIAGNOSIS — R06.09 DYSPNEA ON EXERTION: ICD-10-CM

## 2019-09-03 ENCOUNTER — APPOINTMENT (OUTPATIENT)
Dept: CARDIAC REHAB | Facility: HOSPITAL | Age: 69
End: 2019-09-03

## 2019-09-05 ENCOUNTER — APPOINTMENT (OUTPATIENT)
Dept: CARDIAC REHAB | Facility: HOSPITAL | Age: 69
End: 2019-09-05

## 2019-09-10 ENCOUNTER — APPOINTMENT (OUTPATIENT)
Dept: CARDIAC REHAB | Facility: HOSPITAL | Age: 69
End: 2019-09-10

## 2019-09-12 ENCOUNTER — APPOINTMENT (OUTPATIENT)
Dept: CARDIAC REHAB | Facility: HOSPITAL | Age: 69
End: 2019-09-12

## 2019-09-12 RX ORDER — GABAPENTIN 600 MG/1
400 TABLET ORAL DAILY
COMMUNITY
Start: 2019-07-30 | End: 2022-01-14 | Stop reason: SDUPTHER

## 2019-09-13 ENCOUNTER — OFFICE VISIT (OUTPATIENT)
Dept: CARDIOLOGY | Facility: CLINIC | Age: 69
End: 2019-09-13

## 2019-09-13 VITALS
WEIGHT: 284 LBS | DIASTOLIC BLOOD PRESSURE: 76 MMHG | HEIGHT: 64 IN | OXYGEN SATURATION: 96 % | HEART RATE: 60 BPM | SYSTOLIC BLOOD PRESSURE: 125 MMHG | BODY MASS INDEX: 48.49 KG/M2

## 2019-09-13 DIAGNOSIS — I11.0 HYPERTENSIVE HEART DISEASE WITH CHRONIC DIASTOLIC CONGESTIVE HEART FAILURE (HCC): Primary | ICD-10-CM

## 2019-09-13 DIAGNOSIS — I50.32 HYPERTENSIVE HEART DISEASE WITH CHRONIC DIASTOLIC CONGESTIVE HEART FAILURE (HCC): Primary | ICD-10-CM

## 2019-09-13 DIAGNOSIS — E66.01 MORBIDLY OBESE (HCC): Chronic | ICD-10-CM

## 2019-09-13 DIAGNOSIS — I10 ESSENTIAL HYPERTENSION: Chronic | ICD-10-CM

## 2019-09-13 DIAGNOSIS — R73.03 PREDIABETES: Chronic | ICD-10-CM

## 2019-09-13 DIAGNOSIS — E78.5 DYSLIPIDEMIA: Chronic | ICD-10-CM

## 2019-09-13 DIAGNOSIS — I35.0 NONRHEUMATIC AORTIC VALVE STENOSIS: Chronic | ICD-10-CM

## 2019-09-13 DIAGNOSIS — R09.89 BILATERAL CAROTID BRUITS: ICD-10-CM

## 2019-09-13 PROCEDURE — 99214 OFFICE O/P EST MOD 30 MIN: CPT | Performed by: INTERNAL MEDICINE

## 2019-09-13 NOTE — PROGRESS NOTES
Subjective:     Encounter Date:09/13/2019      Patient ID: Claudia Rust is a 69 y.o. female.    Chief Complaint: Follow-up for hypertensive cardiovascular disease with CHF, aortic stenosis  History of Present Illness See below      Past Medical History:  Past Medical History:   Diagnosis Date   • Acute congestive heart failure (CMS/HCC)    • Asthma    • Bipolar 1 disorder (CMS/[facility])    • COPD (chronic obstructive pulmonary disease) (CMS/[facility])    • Depression    • Dyslipidemia    • Fibromyalgia    • Hypertension    • Hypothyroid    • IBS (irritable bowel syndrome)    • Migraines    • Morbid obesity (CMS/[facility])    • Neuropathy    • Sleep apnea    • Spinal stenosis    • Vertigo      Past Surgical History:  Past Surgical History:   Procedure Laterality Date   • CARDIAC CATHETERIZATION  2009   • CARDIAC CATHETERIZATION N/A 8/14/2019    Procedure: Left Heart Cath;  Surgeon: Jose Levi MD;  Location: [facility] CATH INVASIVE LOCATION;  Service: Cardiovascular   • CARDIAC CATHETERIZATION N/A 8/14/2019    Procedure: Right Heart Cath;  Surgeon: Jose Levi MD;  Location: [facility] CATH INVASIVE LOCATION;  Service: Cardiovascular   • CARDIAC CATHETERIZATION N/A 8/14/2019    Procedure: Aortic root aortogram;  Surgeon: Jose Levi MD;  Location: [facility] CATH INVASIVE LOCATION;  Service: Cardiovascular   • CYSTOCELE REPAIR  1984   • FOOT SURGERY     • GALLBLADDER SURGERY  2002   • VAGINAL HYSTERECTOMY  1972   • WRIST SURGERY  2011      Allergies:  Allergies   Allergen Reactions   • Adhesive Tape Unknown (See Comments)     hives   • Butorphanol Unknown (See Comments)     unknown   • Garlic Unknown (See Comments)   • Tetanus-Diphtheria Toxoids Td Unknown (See Comments)   • Aloe Itching   • Bee Pollen Unknown (See Comments)   • Latex Unknown (See Comments)   • Macadamia Nut Oil Unknown (See Comments)   • Tuberculin Unknown (See Comments)   • Wasp Venom Unknown (See Comments)     Home  Meds:  Current Meds:     Current Outpatient Medications:   •  acetaminophen (TYLENOL) 325 MG tablet, Take 650 mg by mouth Every 6 (Six) Hours As Needed for Mild Pain ., Disp: , Rfl:   •  albuterol (PROVENTIL) (2.5 MG/3ML) 0.083% nebulizer solution, Inhale 2.5 mg., Disp: , Rfl:   •  albuterol sulfate HFA (PROVENTIL HFA) 108 (90 Base) MCG/ACT inhaler, Inhale 2 puffs., Disp: , Rfl:   •  aluminum-magnesium hydroxide-simethicone (MAALOX/MYLANTA) 200-200-20 MG/5ML suspension, Take 15 mL by mouth Every 6 (Six) Hours As Needed for Indigestion or Heartburn., Disp: , Rfl:   •  aspirin 81 MG tablet, Take 81 mg by mouth Daily., Disp: , Rfl:   •  B Complex Vitamins (B COMPLEX PO), Take  by mouth., Disp: , Rfl:   •  Calcium Carbonate ([employer] CALCIUM) 1500 (600 Ca) MG tablet, Take 600 mg by mouth Daily., Disp: , Rfl:   •  Cholecalciferol (VITAMIN D-1000 MAX ST) 1000 units tablet, Take 2,000 Units by mouth Daily., Disp: , Rfl:   •  diphenhydrAMINE (BENADRYL) 50 MG capsule, Take 50 mg by mouth every night at bedtime., Disp: , Rfl:   •  FLUoxetine (PROzac) 60 MG tablet, TAKE 1 TABLET BY MOUTH EVERY DAY, Disp: , Rfl:   •  fluticasone (FLONASE) 50 MCG/ACT nasal spray, INHALE 1 SPRAY IN EACH NOSTRIL 2 TIMES DAILY, Disp: , Rfl:   •  furosemide (LASIX) 40 MG tablet, TAKE 1 TABLET BY MOUTH TWICE A DAY, Disp: , Rfl:   •  gabapentin (NEURONTIN) 400 MG capsule, TAKE 1 CAPSULE BY MOUTH THREE TIMES A DAY, Disp: , Rfl:   •  levothyroxine (SYNTHROID, LEVOTHROID) 100 MCG tablet, TAKE 1 TABLET BY MOUTH EVERY DAY, Disp: , Rfl:   •  meclizine (ANTIVERT) 25 MG tablet, Take 25 mg by mouth 3 (Three) Times a Day As Needed., Disp: , Rfl:   •  metoprolol succinate XL (TOPROL-XL) 25 MG 24 hr tablet, Take 1 tablet by mouth Daily., Disp: 30 tablet, Rfl: 11  •  MULTIPLE VITAMINS PO, Take  by mouth., Disp: , Rfl:   •  ondansetron ODT (ZOFRAN-ODT) 4 MG disintegrating tablet, TAKE 1-2 TABLETS BY MOUTH EVERY 8 HOURS AS NEEDED FOR NAUSEA, Disp: , Rfl:   •   pantoprazole (PROTONIX) 40 MG EC tablet, Take 40 mg by mouth Daily., Disp: , Rfl:   •  polyethylene glycol (MIRALAX) powder, Take 17 g by mouth Daily As Needed., Disp: , Rfl:   •  potassium chloride (KLOR-CON) 20 MEQ CR tablet, TAKE 1 TABLET BY MOUTH TWICE A DAY, Disp: , Rfl:   •  promethazine (PHENERGAN) 25 MG tablet, Take 25 mg by mouth Every 6 (Six) Hours As Needed for Nausea or Vomiting., Disp: , Rfl:   •  rizatriptan MLT (MAXALT-MLT) 10 MG disintegrating tablet, TAKE 1 TABLET BY MOUTH ONCE AS NEEDED FOR MIGRAINE , MAY REPEAT ONCE IN 2HRS-MAX 30MG/24 HRS, Disp: , Rfl:   •  simvastatin (ZOCOR) 20 MG tablet, Take 20 mg by mouth Daily., Disp: , Rfl:   •  tiZANidine (ZANAFLEX) 4 MG tablet, Take 4 mg by mouth Every 6 (Six) Hours As Needed for Muscle Spasms., Disp: , Rfl:   •  vitamin B-12 (CYANOCOBALAMIN) 1000 MCG tablet, Take 1,000 mcg by mouth Daily., Disp: , Rfl:   •  HYDROcodone-acetaminophen (NORCO)  MG per tablet, Take 1 tablet by mouth Every 6 (Six) Hours As Needed for Moderate Pain ., Disp: , Rfl:   Social History:   Social History     Tobacco Use   • Smoking status: Current Every Day Smoker     Types: Cigarettes   • Smokeless tobacco: Never Used   • Tobacco comment: patient is trying to quit, has patch on    Substance Use Topics   • Alcohol use: No     Frequency: Never      Family History:  History reviewed. No pertinent family history.     The following portions of the patient's history were reviewed and updated as appropriate: allergies, current medications, past family history, past medical history, past social history, past surgical history and problem list.    Review of Systems   Cardiovascular: Positive for chest pain and leg swelling. Negative for palpitations.   Respiratory: Positive for shortness of breath.    Neurological: Positive for dizziness and light-headedness. Negative for numbness.       Procedures EKG from 7/22/2019 reveals sinus rhythm with a rate of 60 bpm     Objective:      "Physical Exam   /76 (BP Location: Left arm, Patient Position: Sitting, Cuff Size: Large Adult)   Pulse 60   Ht 162.6 cm (64\")   Wt 129 kg (284 lb)   SpO2 96%   BMI 48.75 kg/m²   General:  Appears in no acute distress  Eyes: Sclera is anicteric,  conjunctiva is clear   HEENT:  No JVD. Thyroid not visibly enlarged. No mucosal pallor or cyanosis  Respiratory: Respirations regular and unlabored at rest.  Bilaterally good breath sounds, with good air entry in all fields. No crackles, rubs or wheezes auscultated  Cardiovascular: S1,S2 Regular rate and rhythm.  Her 6 systolic ejection murmur at the base, no  rub or gallop auscultated.  Bilateral carotid bruits++, 1+ edema with stasis changes.  Gastrointestinal: Abdomen soft, flat, non tender. Bowel sounds present. No hepatosplenomegaly. No ascites  Musculoskeletal:  No abnormal movements  Extremities: No digital clubbing or cyanosis  Skin: Stasis changes seen bilateral shin  Neuro: Alert and awake, no lateralizing deficits appreciated    Lab Reviewed:       Assessment:          Diagnosis Plan   1. Hypertensive heart disease with chronic diastolic congestive heart failure (CMS/HCC)  Duplex Carotid Ultrasound CAR    Comprehensive Metabolic Panel   2. Nonrheumatic aortic valve stenosis  Duplex Carotid Ultrasound CAR    Comprehensive Metabolic Panel   3. Essential hypertension  Duplex Carotid Ultrasound CAR    Comprehensive Metabolic Panel   4. Morbidly obese (CMS/HCC)  Duplex Carotid Ultrasound CAR    Comprehensive Metabolic Panel   5. Prediabetes  Duplex Carotid Ultrasound CAR    Comprehensive Metabolic Panel   6. Dyslipidemia  Duplex Carotid Ultrasound CAR    Comprehensive Metabolic Panel   7. Bilateral carotid bruits  Duplex Carotid Ultrasound CAR    Comprehensive Metabolic Panel       CC : Follow-up for hypertensive cardiovascular disease with CHF, aortic stenosis    HPI  :  This is a 69-year-old with PMH of    #Lower heart disease with moderate to severe " aortic stenosis  Cardiac cath 8/14/2019 revealing moderate left ear with elevated LVEDP, moderate pulmonary hypertension mildly dilated proximal aorta  # Hyperlipidemia,  Hypertension  #COPD, KARLY on CPAP  #Prediabetes  # Hypothyroidism, Rheumatoid Arthritis, spinal stenosis, hiatal hernia, chronic depression, bipolar, GERD, IBS, chronic migraine, vertigo, herniated disc, compression lumbar fractures  #Allergies/intolerance to Stadol  #Hysterectomy, bladder reconstruction, cholecystectomy, right hand surgery  #Family history of strokes and grandfather.  Mother's cancer, father on  # Active cigarette smoker trying to quit       Here for Follow-up.  Patient has been noticing shortness of breath for last few months and has been progressively worse and now has progressively worsening fatigue and chronic dizziness.  Patient reportedly had syncope for years ago and had her right arm crushed requiring surgery.  Patient states that she has chronic vertigo.  Patient's arterial blood pressure is 125/76, heart rate 60, O2 sat of 96% on room air.  Results from 5/10/2019 revealed cholesterol 160 HDL 49 LDL 74, triglycerides 185    Assessment:  Shortness of breath, dyspnea on exertion  Aortic stenosis  Chronic congestive heart failure  due to valvular heart disease and aortic stenosis hypertensive cardiovascular disease essential hypertension/hypertensive cardiovascular disease   dyslipidemia  Prediabetes  Morbid obesity  Carotid bruit    Plan:  Advised patient to increase Lasix for 1 week  It is concerned that she gets dehydrated when she increases her diuretics.  Continues to have edema and dyspnea  Daily weights   risk benefits alternatives explained  Patient has echo showing severe aortic stenosis but catheter showing moderate left ear we will continue to monitor  We will check CMP before visit  Reviewed labs and EKG results with patient and   Counseled on smoking cessation  Spent 40 minutes face-to-face time with  patient and family explaining CHF aortic stenosis carotid bruit, daily weights, fluid restriction.  counselled on diet exercise weight loss  May need to repeat echo in 6 months we will follow-up and consider the same     Plan:

## 2019-09-17 ENCOUNTER — APPOINTMENT (OUTPATIENT)
Dept: CARDIAC REHAB | Facility: HOSPITAL | Age: 69
End: 2019-09-17

## 2019-09-19 ENCOUNTER — APPOINTMENT (OUTPATIENT)
Dept: CARDIAC REHAB | Facility: HOSPITAL | Age: 69
End: 2019-09-19

## 2019-09-24 ENCOUNTER — APPOINTMENT (OUTPATIENT)
Dept: CARDIAC REHAB | Facility: HOSPITAL | Age: 69
End: 2019-09-24

## 2019-09-26 ENCOUNTER — APPOINTMENT (OUTPATIENT)
Dept: CARDIAC REHAB | Facility: HOSPITAL | Age: 69
End: 2019-09-26

## 2019-10-01 ENCOUNTER — APPOINTMENT (OUTPATIENT)
Dept: CARDIAC REHAB | Facility: HOSPITAL | Age: 69
End: 2019-10-01

## 2019-10-03 ENCOUNTER — APPOINTMENT (OUTPATIENT)
Dept: CARDIAC REHAB | Facility: HOSPITAL | Age: 69
End: 2019-10-03

## 2019-10-08 ENCOUNTER — APPOINTMENT (OUTPATIENT)
Dept: CARDIAC REHAB | Facility: HOSPITAL | Age: 69
End: 2019-10-08

## 2020-02-15 ENCOUNTER — APPOINTMENT (OUTPATIENT)
Dept: CT IMAGING | Facility: HOSPITAL | Age: 70
End: 2020-02-15

## 2020-02-15 ENCOUNTER — APPOINTMENT (OUTPATIENT)
Dept: GENERAL RADIOLOGY | Facility: HOSPITAL | Age: 70
End: 2020-02-15

## 2020-02-15 ENCOUNTER — HOSPITAL ENCOUNTER (OUTPATIENT)
Facility: HOSPITAL | Age: 70
Setting detail: OBSERVATION
Discharge: HOME | End: 2020-02-17
Attending: EMERGENCY MEDICINE | Admitting: INTERNAL MEDICINE

## 2020-02-15 DIAGNOSIS — N39.0 ACUTE UTI: Primary | ICD-10-CM

## 2020-02-15 DIAGNOSIS — J01.90 ACUTE SINUSITIS, RECURRENCE NOT SPECIFIED, UNSPECIFIED LOCATION: ICD-10-CM

## 2020-02-15 DIAGNOSIS — R41.82 ALTERED MENTAL STATUS, UNSPECIFIED ALTERED MENTAL STATUS TYPE: ICD-10-CM

## 2020-02-15 LAB
ALBUMIN SERPL-MCNC: 3.9 G/DL (ref 3.5–5.2)
ALBUMIN/GLOB SERPL: 1.3 G/DL
ALP SERPL-CCNC: 89 U/L (ref 39–117)
ALT SERPL W P-5'-P-CCNC: 16 U/L (ref 1–33)
AMMONIA BLD-SCNC: 21 UMOL/L (ref 11–51)
ANION GAP SERPL CALCULATED.3IONS-SCNC: 13 MMOL/L (ref 5–15)
ARTERIAL PATENCY WRIST A: POSITIVE
AST SERPL-CCNC: 21 U/L (ref 1–32)
ATMOSPHERIC PRESS: ABNORMAL MM[HG]
BASE EXCESS BLDA CALC-SCNC: 2.3 MMOL/L (ref 0–3)
BASOPHILS # BLD AUTO: 0.1 10*3/MM3 (ref 0–0.2)
BASOPHILS NFR BLD AUTO: 0.7 % (ref 0–1.5)
BDY SITE: ABNORMAL
BILIRUB SERPL-MCNC: 0.3 MG/DL (ref 0.2–1.2)
BUN BLD-MCNC: 10 MG/DL (ref 8–23)
BUN/CREAT SERPL: 11.2 (ref 7–25)
CALCIUM SPEC-SCNC: 8.8 MG/DL (ref 8.6–10.5)
CHLORIDE SERPL-SCNC: 97 MMOL/L (ref 98–107)
CO2 BLDA-SCNC: 27.8 MMOL/L (ref 22–29)
CO2 SERPL-SCNC: 25 MMOL/L (ref 22–29)
CREAT BLD-MCNC: 0.89 MG/DL (ref 0.57–1)
DEPRECATED RDW RBC AUTO: 43.3 FL (ref 37–54)
EOSINOPHIL # BLD AUTO: 0.2 10*3/MM3 (ref 0–0.4)
EOSINOPHIL NFR BLD AUTO: 2.1 % (ref 0.3–6.2)
ERYTHROCYTE [DISTWIDTH] IN BLOOD BY AUTOMATED COUNT: 13.6 % (ref 12.3–15.4)
FLUAV SUBTYP SPEC NAA+PROBE: NOT DETECTED
FLUBV RNA ISLT QL NAA+PROBE: NOT DETECTED
GFR SERPL CREATININE-BSD FRML MDRD: 63 ML/MIN/1.73
GLOBULIN UR ELPH-MCNC: 3 GM/DL
GLUCOSE BLD-MCNC: 103 MG/DL (ref 65–99)
GLUCOSE BLDC GLUCOMTR-MCNC: 95 MG/DL (ref 70–105)
HCO3 BLDA-SCNC: 26.6 MMOL/L (ref 21–28)
HCT VFR BLD AUTO: 37.8 % (ref 34–46.6)
HEMODILUTION: NO
HGB BLD-MCNC: 13.1 G/DL (ref 12–15.9)
HOROWITZ INDEX BLD+IHG-RTO: 21 %
LYMPHOCYTES # BLD AUTO: 3.4 10*3/MM3 (ref 0.7–3.1)
LYMPHOCYTES NFR BLD AUTO: 36.8 % (ref 19.6–45.3)
MCH RBC QN AUTO: 31.8 PG (ref 26.6–33)
MCHC RBC AUTO-ENTMCNC: 34.7 G/DL (ref 31.5–35.7)
MCV RBC AUTO: 91.7 FL (ref 79–97)
MODALITY: ABNORMAL
MONOCYTES # BLD AUTO: 1.2 10*3/MM3 (ref 0.1–0.9)
MONOCYTES NFR BLD AUTO: 13.4 % (ref 5–12)
NEUTROPHILS # BLD AUTO: 4.3 10*3/MM3 (ref 1.7–7)
NEUTROPHILS NFR BLD AUTO: 47 % (ref 42.7–76)
NRBC BLD AUTO-RTO: 0.1 /100 WBC (ref 0–0.2)
NT-PROBNP SERPL-MCNC: 346.1 PG/ML (ref 5–900)
PCO2 BLDA: 39 MM HG (ref 35–48)
PH BLDA: 7.44 PH UNITS (ref 7.35–7.45)
PLATELET # BLD AUTO: 186 10*3/MM3 (ref 140–450)
PMV BLD AUTO: 8.8 FL (ref 6–12)
PO2 BLDA: 75 MM HG (ref 83–108)
POTASSIUM BLD-SCNC: 3.7 MMOL/L (ref 3.5–5.2)
PROT SERPL-MCNC: 6.9 G/DL (ref 6–8.5)
RBC # BLD AUTO: 4.12 10*6/MM3 (ref 3.77–5.28)
SAO2 % BLDCOA: 95.4 % (ref 94–98)
SODIUM BLD-SCNC: 135 MMOL/L (ref 136–145)
TROPONIN T SERPL-MCNC: <0.01 NG/ML (ref 0–0.03)
WBC NRBC COR # BLD: 9.2 10*3/MM3 (ref 3.4–10.8)

## 2020-02-15 PROCEDURE — 87086 URINE CULTURE/COLONY COUNT: CPT | Performed by: EMERGENCY MEDICINE

## 2020-02-15 PROCEDURE — 36600 WITHDRAWAL OF ARTERIAL BLOOD: CPT

## 2020-02-15 PROCEDURE — 70450 CT HEAD/BRAIN W/O DYE: CPT

## 2020-02-15 PROCEDURE — 83036 HEMOGLOBIN GLYCOSYLATED A1C: CPT | Performed by: NURSE PRACTITIONER

## 2020-02-15 PROCEDURE — 83880 ASSAY OF NATRIURETIC PEPTIDE: CPT | Performed by: EMERGENCY MEDICINE

## 2020-02-15 PROCEDURE — 82140 ASSAY OF AMMONIA: CPT | Performed by: EMERGENCY MEDICINE

## 2020-02-15 PROCEDURE — 96361 HYDRATE IV INFUSION ADD-ON: CPT

## 2020-02-15 PROCEDURE — 82962 GLUCOSE BLOOD TEST: CPT

## 2020-02-15 PROCEDURE — 87502 INFLUENZA DNA AMP PROBE: CPT | Performed by: EMERGENCY MEDICINE

## 2020-02-15 PROCEDURE — 71045 X-RAY EXAM CHEST 1 VIEW: CPT

## 2020-02-15 PROCEDURE — 87077 CULTURE AEROBIC IDENTIFY: CPT | Performed by: EMERGENCY MEDICINE

## 2020-02-15 PROCEDURE — 81001 URINALYSIS AUTO W/SCOPE: CPT | Performed by: EMERGENCY MEDICINE

## 2020-02-15 PROCEDURE — 71250 CT THORAX DX C-: CPT

## 2020-02-15 PROCEDURE — 80053 COMPREHEN METABOLIC PANEL: CPT | Performed by: EMERGENCY MEDICINE

## 2020-02-15 PROCEDURE — 87186 SC STD MICRODIL/AGAR DIL: CPT | Performed by: EMERGENCY MEDICINE

## 2020-02-15 PROCEDURE — 87040 BLOOD CULTURE FOR BACTERIA: CPT | Performed by: EMERGENCY MEDICINE

## 2020-02-15 PROCEDURE — 82803 BLOOD GASES ANY COMBINATION: CPT

## 2020-02-15 PROCEDURE — 99285 EMERGENCY DEPT VISIT HI MDM: CPT

## 2020-02-15 PROCEDURE — 93005 ELECTROCARDIOGRAM TRACING: CPT | Performed by: EMERGENCY MEDICINE

## 2020-02-15 PROCEDURE — 84484 ASSAY OF TROPONIN QUANT: CPT | Performed by: EMERGENCY MEDICINE

## 2020-02-15 PROCEDURE — 96374 THER/PROPH/DIAG INJ IV PUSH: CPT

## 2020-02-15 PROCEDURE — 85025 COMPLETE CBC W/AUTO DIFF WBC: CPT | Performed by: EMERGENCY MEDICINE

## 2020-02-16 PROBLEM — N39.0 ACUTE UTI: Status: ACTIVE | Noted: 2020-02-16

## 2020-02-16 LAB
BACTERIA UR QL AUTO: ABNORMAL /HPF
BILIRUB UR QL STRIP: NEGATIVE
CLARITY UR: CLEAR
COLOR UR: YELLOW
GLUCOSE BLDC GLUCOMTR-MCNC: 102 MG/DL (ref 70–105)
GLUCOSE BLDC GLUCOMTR-MCNC: 125 MG/DL (ref 70–105)
GLUCOSE BLDC GLUCOMTR-MCNC: 125 MG/DL (ref 70–105)
GLUCOSE BLDC GLUCOMTR-MCNC: 128 MG/DL (ref 70–105)
GLUCOSE UR STRIP-MCNC: NEGATIVE MG/DL
HGB UR QL STRIP.AUTO: NEGATIVE
HYALINE CASTS UR QL AUTO: ABNORMAL /LPF
KETONES UR QL STRIP: NEGATIVE
LEUKOCYTE ESTERASE UR QL STRIP.AUTO: ABNORMAL
NITRITE UR QL STRIP: POSITIVE
PH UR STRIP.AUTO: 6 [PH] (ref 5–8)
PROT UR QL STRIP: NEGATIVE
RBC # UR: ABNORMAL /HPF
REF LAB TEST METHOD: ABNORMAL
SP GR UR STRIP: 1.01 (ref 1–1.03)
SQUAMOUS #/AREA URNS HPF: ABNORMAL /HPF
UROBILINOGEN UR QL STRIP: ABNORMAL
WBC UR QL AUTO: ABNORMAL /HPF

## 2020-02-16 PROCEDURE — 96375 TX/PRO/DX INJ NEW DRUG ADDON: CPT

## 2020-02-16 PROCEDURE — G0378 HOSPITAL OBSERVATION PER HR: HCPCS

## 2020-02-16 PROCEDURE — 96361 HYDRATE IV INFUSION ADD-ON: CPT

## 2020-02-16 PROCEDURE — 96374 THER/PROPH/DIAG INJ IV PUSH: CPT

## 2020-02-16 PROCEDURE — 94799 UNLISTED PULMONARY SVC/PX: CPT

## 2020-02-16 PROCEDURE — 25010000002 ONDANSETRON PER 1 MG: Performed by: NURSE PRACTITIONER

## 2020-02-16 PROCEDURE — 94640 AIRWAY INHALATION TREATMENT: CPT

## 2020-02-16 PROCEDURE — 25010000002 CEFTRIAXONE PER 250 MG: Performed by: EMERGENCY MEDICINE

## 2020-02-16 PROCEDURE — 82962 GLUCOSE BLOOD TEST: CPT

## 2020-02-16 PROCEDURE — 99219 PR INITIAL OBSERVATION CARE/DAY 50 MINUTES: CPT | Performed by: HOSPITALIST

## 2020-02-16 RX ORDER — LANOLIN ALCOHOL/MO/W.PET/CERES
1000 CREAM (GRAM) TOPICAL DAILY
Status: DISCONTINUED | OUTPATIENT
Start: 2020-02-16 | End: 2020-02-17 | Stop reason: HOSPADM

## 2020-02-16 RX ORDER — TIZANIDINE 4 MG/1
2 TABLET ORAL EVERY 8 HOURS PRN
Status: DISCONTINUED | OUTPATIENT
Start: 2020-02-16 | End: 2020-02-17 | Stop reason: HOSPADM

## 2020-02-16 RX ORDER — NICOTINE POLACRILEX 4 MG
15 LOZENGE BUCCAL
Status: DISCONTINUED | OUTPATIENT
Start: 2020-02-16 | End: 2020-02-17 | Stop reason: HOSPADM

## 2020-02-16 RX ORDER — DEXTROSE MONOHYDRATE 25 G/50ML
25 INJECTION, SOLUTION INTRAVENOUS
Status: DISCONTINUED | OUTPATIENT
Start: 2020-02-16 | End: 2020-02-17 | Stop reason: HOSPADM

## 2020-02-16 RX ORDER — METOPROLOL SUCCINATE 25 MG/1
25 TABLET, EXTENDED RELEASE ORAL DAILY
Status: DISCONTINUED | OUTPATIENT
Start: 2020-02-16 | End: 2020-02-17 | Stop reason: HOSPADM

## 2020-02-16 RX ORDER — MULTIPLE VITAMINS W/ MINERALS TAB 9MG-400MCG
1 TAB ORAL DAILY
COMMUNITY

## 2020-02-16 RX ORDER — MECLIZINE HYDROCHLORIDE 25 MG/1
25 TABLET ORAL 3 TIMES DAILY PRN
Status: DISCONTINUED | OUTPATIENT
Start: 2020-02-16 | End: 2020-02-17 | Stop reason: HOSPADM

## 2020-02-16 RX ORDER — MULTIPLE VITAMINS W/ MINERALS TAB 2148-113
1 TAB ORAL DAILY
Status: DISCONTINUED | OUTPATIENT
Start: 2020-02-16 | End: 2020-02-17 | Stop reason: HOSPADM

## 2020-02-16 RX ORDER — SUMATRIPTAN 25 MG/1
25 TABLET, FILM COATED ORAL DAILY PRN
Status: DISCONTINUED | OUTPATIENT
Start: 2020-02-16 | End: 2020-02-17 | Stop reason: HOSPADM

## 2020-02-16 RX ORDER — VITAMIN B COMPLEX
1 CAPSULE ORAL DAILY
COMMUNITY

## 2020-02-16 RX ORDER — CALCIUM CARBONATE 200(500)MG
2 TABLET,CHEWABLE ORAL DAILY PRN
Status: DISCONTINUED | OUTPATIENT
Start: 2020-02-16 | End: 2020-02-17 | Stop reason: HOSPADM

## 2020-02-16 RX ORDER — FLUTICASONE PROPIONATE 50 MCG
1 SPRAY, SUSPENSION (ML) NASAL DAILY
Status: DISCONTINUED | OUTPATIENT
Start: 2020-02-16 | End: 2020-02-17 | Stop reason: HOSPADM

## 2020-02-16 RX ORDER — GUAIFENESIN/DEXTROMETHORPHAN 100-10MG/5
5 SYRUP ORAL EVERY 4 HOURS PRN
Status: DISCONTINUED | OUTPATIENT
Start: 2020-02-16 | End: 2020-02-17 | Stop reason: HOSPADM

## 2020-02-16 RX ORDER — FLUOXETINE HYDROCHLORIDE 20 MG/1
60 CAPSULE ORAL DAILY
Status: DISCONTINUED | OUTPATIENT
Start: 2020-02-16 | End: 2020-02-17 | Stop reason: HOSPADM

## 2020-02-16 RX ORDER — ALBUTEROL SULFATE 2.5 MG/3ML
2.5 SOLUTION RESPIRATORY (INHALATION) EVERY 6 HOURS PRN
Status: DISCONTINUED | OUTPATIENT
Start: 2020-02-16 | End: 2020-02-17 | Stop reason: HOSPADM

## 2020-02-16 RX ORDER — ONDANSETRON 4 MG/1
4 TABLET, FILM COATED ORAL EVERY 6 HOURS PRN
Status: DISCONTINUED | OUTPATIENT
Start: 2020-02-16 | End: 2020-02-17 | Stop reason: HOSPADM

## 2020-02-16 RX ORDER — FLUTICASONE PROPIONATE 50 MCG
2 SPRAY, SUSPENSION (ML) NASAL 2 TIMES DAILY PRN
COMMUNITY
End: 2021-08-06 | Stop reason: ALTCHOICE

## 2020-02-16 RX ORDER — ASPIRIN 81 MG/1
81 TABLET ORAL DAILY
Status: DISCONTINUED | OUTPATIENT
Start: 2020-02-16 | End: 2020-02-17 | Stop reason: HOSPADM

## 2020-02-16 RX ORDER — RIZATRIPTAN BENZOATE 10 MG/1
10 TABLET, ORALLY DISINTEGRATING ORAL ONCE AS NEEDED
COMMUNITY

## 2020-02-16 RX ORDER — PANTOPRAZOLE SODIUM 40 MG/1
40 TABLET, DELAYED RELEASE ORAL EVERY MORNING
Status: DISCONTINUED | OUTPATIENT
Start: 2020-02-16 | End: 2020-02-17 | Stop reason: HOSPADM

## 2020-02-16 RX ORDER — PROMETHAZINE HYDROCHLORIDE 25 MG/1
25 TABLET ORAL EVERY 6 HOURS PRN
Status: DISCONTINUED | OUTPATIENT
Start: 2020-02-16 | End: 2020-02-17 | Stop reason: HOSPADM

## 2020-02-16 RX ORDER — DIPHENHYDRAMINE HCL 25 MG
25 TABLET ORAL EVERY 6 HOURS PRN
Status: DISCONTINUED | OUTPATIENT
Start: 2020-02-16 | End: 2020-02-17 | Stop reason: HOSPADM

## 2020-02-16 RX ORDER — ONDANSETRON 2 MG/ML
4 INJECTION INTRAMUSCULAR; INTRAVENOUS EVERY 6 HOURS PRN
Status: DISCONTINUED | OUTPATIENT
Start: 2020-02-16 | End: 2020-02-17 | Stop reason: HOSPADM

## 2020-02-16 RX ORDER — LEVOTHYROXINE SODIUM 0.1 MG/1
100 TABLET ORAL
Status: DISCONTINUED | OUTPATIENT
Start: 2020-02-16 | End: 2020-02-17 | Stop reason: HOSPADM

## 2020-02-16 RX ORDER — SODIUM CHLORIDE 9 MG/ML
100 INJECTION, SOLUTION INTRAVENOUS CONTINUOUS
Status: DISCONTINUED | OUTPATIENT
Start: 2020-02-16 | End: 2020-02-17 | Stop reason: HOSPADM

## 2020-02-16 RX ORDER — ACETAMINOPHEN 650 MG/1
650 SUPPOSITORY RECTAL EVERY 4 HOURS PRN
Status: DISCONTINUED | OUTPATIENT
Start: 2020-02-16 | End: 2020-02-17 | Stop reason: HOSPADM

## 2020-02-16 RX ORDER — ATORVASTATIN CALCIUM 10 MG/1
10 TABLET, FILM COATED ORAL DAILY
Status: DISCONTINUED | OUTPATIENT
Start: 2020-02-16 | End: 2020-02-17 | Stop reason: HOSPADM

## 2020-02-16 RX ORDER — BENZONATATE 100 MG/1
100 CAPSULE ORAL 3 TIMES DAILY PRN
Status: DISCONTINUED | OUTPATIENT
Start: 2020-02-16 | End: 2020-02-17 | Stop reason: HOSPADM

## 2020-02-16 RX ORDER — ACETAMINOPHEN 325 MG/1
650 TABLET ORAL EVERY 4 HOURS PRN
Status: DISCONTINUED | OUTPATIENT
Start: 2020-02-16 | End: 2020-02-17 | Stop reason: HOSPADM

## 2020-02-16 RX ORDER — GABAPENTIN 400 MG/1
400 CAPSULE ORAL 3 TIMES DAILY
Status: DISCONTINUED | OUTPATIENT
Start: 2020-02-16 | End: 2020-02-17 | Stop reason: HOSPADM

## 2020-02-16 RX ORDER — ACETAMINOPHEN 160 MG/5ML
650 SOLUTION ORAL EVERY 4 HOURS PRN
Status: DISCONTINUED | OUTPATIENT
Start: 2020-02-16 | End: 2020-02-17 | Stop reason: HOSPADM

## 2020-02-16 RX ORDER — IPRATROPIUM BROMIDE AND ALBUTEROL SULFATE 2.5; .5 MG/3ML; MG/3ML
3 SOLUTION RESPIRATORY (INHALATION)
Status: DISCONTINUED | OUTPATIENT
Start: 2020-02-16 | End: 2020-02-17 | Stop reason: HOSPADM

## 2020-02-16 RX ORDER — MAGNESIUM HYDROXIDE/ALUMINUM HYDROXICE/SIMETHICONE 120; 1200; 1200 MG/30ML; MG/30ML; MG/30ML
15 SUSPENSION ORAL EVERY 6 HOURS PRN
Status: DISCONTINUED | OUTPATIENT
Start: 2020-02-16 | End: 2020-02-17 | Stop reason: HOSPADM

## 2020-02-16 RX ADMIN — GUAIFENESIN AND DEXTROMETHORPHAN 5 ML: 100; 10 SYRUP ORAL at 22:06

## 2020-02-16 RX ADMIN — FLUOXETINE 60 MG: 20 CAPSULE ORAL at 08:11

## 2020-02-16 RX ADMIN — BENZONATATE 100 MG: 100 CAPSULE ORAL at 05:48

## 2020-02-16 RX ADMIN — ATORVASTATIN CALCIUM 10 MG: 10 TABLET, FILM COATED ORAL at 08:10

## 2020-02-16 RX ADMIN — CEFTRIAXONE SODIUM 1 G: 10 INJECTION, POWDER, FOR SOLUTION INTRAVENOUS at 01:04

## 2020-02-16 RX ADMIN — BENZONATATE 100 MG: 100 CAPSULE ORAL at 17:28

## 2020-02-16 RX ADMIN — LEVOTHYROXINE SODIUM 100 MCG: 100 TABLET ORAL at 05:48

## 2020-02-16 RX ADMIN — PANTOPRAZOLE SODIUM 40 MG: 40 TABLET, DELAYED RELEASE ORAL at 08:14

## 2020-02-16 RX ADMIN — BENZONATATE 100 MG: 100 CAPSULE ORAL at 14:38

## 2020-02-16 RX ADMIN — MULTIPLE VITAMINS W/ MINERALS TAB 1 TABLET: TAB at 08:10

## 2020-02-16 RX ADMIN — CYANOCOBALAMIN TAB 1000 MCG 1000 MCG: 1000 TAB at 08:11

## 2020-02-16 RX ADMIN — BENZONATATE 100 MG: 100 CAPSULE ORAL at 22:08

## 2020-02-16 RX ADMIN — GABAPENTIN 400 MG: 400 CAPSULE ORAL at 17:26

## 2020-02-16 RX ADMIN — METOPROLOL SUCCINATE 25 MG: 25 TABLET, EXTENDED RELEASE ORAL at 08:10

## 2020-02-16 RX ADMIN — ASPIRIN 81 MG: 81 TABLET, DELAYED RELEASE ORAL at 08:11

## 2020-02-16 RX ADMIN — GABAPENTIN 400 MG: 400 CAPSULE ORAL at 20:24

## 2020-02-16 RX ADMIN — ONDANSETRON 4 MG: 2 INJECTION INTRAMUSCULAR; INTRAVENOUS at 15:04

## 2020-02-16 RX ADMIN — IPRATROPIUM BROMIDE AND ALBUTEROL SULFATE 3 ML: .5; 3 SOLUTION RESPIRATORY (INHALATION) at 06:39

## 2020-02-16 RX ADMIN — SODIUM CHLORIDE 100 ML/HR: 0.9 INJECTION, SOLUTION INTRAVENOUS at 01:59

## 2020-02-16 RX ADMIN — SODIUM CHLORIDE 1000 ML: 900 INJECTION, SOLUTION INTRAVENOUS at 01:04

## 2020-02-16 RX ADMIN — GABAPENTIN 400 MG: 400 CAPSULE ORAL at 08:11

## 2020-02-16 RX ADMIN — IPRATROPIUM BROMIDE AND ALBUTEROL SULFATE 3 ML: .5; 3 SOLUTION RESPIRATORY (INHALATION) at 23:21

## 2020-02-16 NOTE — PLAN OF CARE
Problem: Patient Care Overview  Goal: Plan of Care Review  Outcome: Ongoing (interventions implemented as appropriate)  Flowsheets (Taken 2/16/2020 0257)  Progress: no change  Plan of Care Reviewed With: patient  Outcome Summary: Pt refusing fluids due to IV placement. Pt has no complaints of pain but complains of a dry hacky cough.  Goal: Individualization and Mutuality  Outcome: Ongoing (interventions implemented as appropriate)  Goal: Discharge Needs Assessment  Outcome: Ongoing (interventions implemented as appropriate)  Goal: Interprofessional Rounds/Family Conf  Outcome: Ongoing (interventions implemented as appropriate)     Problem: Fall Risk (Adult)  Goal: Identify Related Risk Factors and Signs and Symptoms  Outcome: Ongoing (interventions implemented as appropriate)  Goal: Absence of Fall  Outcome: Ongoing (interventions implemented as appropriate)

## 2020-02-16 NOTE — PROGRESS NOTES
Continued Stay Note  ISAAK Rush     Patient Name: Claudia Rust  MRN: [id number]  Today's Date: 2/16/2020    Admit Date: 2/15/2020    Discharge Plan     Row Name 02/16/20 1500       Plan    Plan  Anticipate routine home.            Elena Martinez RN

## 2020-02-16 NOTE — ED NOTES
Patient reports frequent productive cough and congestion. Spouse also reports increased confusion x 3 hours. Patient also reports feeling like tongue feels thick causing slurred speech. Speech seems normal at this time.      Nyla Cottrell RN  02/15/20 8229

## 2020-02-16 NOTE — ED PROVIDER NOTES
Subjective   69-year-old female presents with productive cough, intermittent shortness of air and wheezing for 2 days, subjective fevers.  Patient had a mechanical fall this morning the bathroom but denies hitting her head.  Patient is taking over-the-counter Delsym and Mucinex.  Patient also complains of fatigue difficulty concentrating with perceived confusion per spouse over the past 3 hours.  No focal weakness or numbness, no headache or dizziness.  Family was concerned regarding slurred speech which he tells me is present during my interview.  Patient's tongue is dry but speech is clear discernible and appropriate.          Review of Systems   Constitutional: Positive for fatigue and fever.   HENT: Positive for congestion.    Respiratory: Positive for cough, shortness of breath and wheezing.    Gastrointestinal: Positive for nausea.   Neurological:        As per HPI   Psychiatric/Behavioral: Positive for confusion.   All other systems reviewed and are negative.      Past Medical History:   Diagnosis Date   • Acute congestive heart failure (CMS/HCC)    • Asthma    • Bipolar 1 disorder (CMS/HCC)    • COPD (chronic obstructive pulmonary disease) (CMS/HCC)    • Depression    • Dyslipidemia    • Fibromyalgia    • Hypertension    • Hypothyroid    • IBS (irritable bowel syndrome)    • Migraines    • Morbid obesity (CMS/HCC)    • Neuropathy    • Sleep apnea    • Spinal stenosis    • Vertigo        Allergies   Allergen Reactions   • Adhesive Tape Unknown (See Comments)     hives   • Butorphanol Unknown (See Comments)     unknown   • Garlic Unknown (See Comments)   • Tetanus-Diphtheria Toxoids Td Unknown (See Comments)   • Aloe Itching   • Bee Pollen Unknown (See Comments)   • Latex Unknown (See Comments)   • Macadamia Nut Oil Unknown (See Comments)   • Tuberculin Unknown (See Comments)   • Wasp Venom Unknown (See Comments)       Past Surgical History:   Procedure Laterality Date   • CARDIAC CATHETERIZATION  2009   •  CARDIAC CATHETERIZATION N/A 8/14/2019    Procedure: Left Heart Cath;  Surgeon: Jose Levi MD;  Location: [facility] CATH INVASIVE LOCATION;  Service: Cardiovascular   • CARDIAC CATHETERIZATION N/A 8/14/2019    Procedure: Right Heart Cath;  Surgeon: Jose Levi MD;  Location: [facility] CATH INVASIVE LOCATION;  Service: Cardiovascular   • CARDIAC CATHETERIZATION N/A 8/14/2019    Procedure: Aortic root aortogram;  Surgeon: Jose Levi MD;  Location: [facility] CATH INVASIVE LOCATION;  Service: Cardiovascular   • CYSTOCELE REPAIR  1984   • FOOT SURGERY     • GALLBLADDER SURGERY  2002   • VAGINAL HYSTERECTOMY  1972   • WRIST SURGERY  2011       No family history on file.    Social History     Socioeconomic History   • Marital status:      Spouse name: Not on file   • Number of children: Not on file   • Years of education: Not on file   • Highest education level: Not on file   Tobacco Use   • Smoking status: Current Every Day Smoker     Types: Cigarettes   • Smokeless tobacco: Never Used   • Tobacco comment: patient is trying to quit, has patch on    Substance and Sexual Activity   • Alcohol use: No     Frequency: Never           Objective   Physical Exam   Constitutional: She is oriented to person, place, and time. She appears well-developed and well-nourished.   HENT:   Head: Normocephalic and atraumatic.   Mildly dry mucous membranes   Eyes: Pupils are equal, round, and reactive to light. EOM are normal.   Neck: Normal range of motion. Neck supple.   Cardiovascular: Normal rate, regular rhythm and intact distal pulses.   3/6 murmur   Pulmonary/Chest: Effort normal and breath sounds normal.   Abdominal: Soft. Bowel sounds are normal. She exhibits no distension. There is no tenderness.   Musculoskeletal: Normal range of motion.   Bilateral lower extremity lymphedema without cellulitis, chronic per patient   Neurological: She is alert and oriented to person, place, and time. She has  normal strength. No cranial nerve deficit or sensory deficit.   No drift, gait at baseline, patient is walker/cane dependent.   Skin: Skin is warm and dry. Capillary refill takes less than 2 seconds.   Psychiatric: She has a normal mood and affect. Her behavior is normal.       Procedures           ED Course  ED Course as of Feb 16 0030   Sun Feb 16, 2020   0020 EKG interpretation: Normal sinus rhythm, rate 63, no acute ST elevations    [JR]      ED Course User Index  [JR] Darius Mcadams MD                                           [facility]  Number of Diagnoses or Management Options  Acute sinusitis, recurrence not specified, unspecified location:   Acute UTI:   Altered mental status, unspecified altered mental status type:   Diagnosis management comments: Results for orders placed or performed during the hospital encounter of 02/15/20  -Influenza Antigen, Rapid - Swab, Nasopharynx       Result                      Value             Ref Range           Influenza A PCR             Not Detected      Not Detected        Influenza B PCR             Not Detected      Not Detected   -Comprehensive Metabolic Panel       Result                      Value             Ref Range           Glucose                     103 (H)           65 - 99 mg/dL       BUN                         10                8 - 23 mg/dL        Creatinine                  0.89              0.57 - 1.00 *       Sodium                      135 (L)           136 - 145 mm*       Potassium                   3.7               3.5 - 5.2 mm*       Chloride                    97 (L)            98 - 107 mmo*       CO2                         25.0              22.0 - 29.0 *       Calcium                     8.8               8.6 - 10.5 m*       Total Protein               6.9               6.0 - 8.5 g/*       Albumin                     3.90              3.50 - 5.20 *       ALT (SGPT)                  16                1 - 33 U/L          AST (SGOT)                  21                 1 - 32 U/L          Alkaline Phosphatase        89                39 - 117 U/L        Total Bilirubin             0.3               0.2 - 1.2 mg*       eGFR Non  Amer       63                >60 mL/min/1*       Globulin                    3.0               gm/dL               A/G Ratio                   1.3               g/dL                BUN/Creatinine Ratio        11.2              7.0 - 25.0          Anion Gap                   13.0              5.0 - 15.0 m*  -Urinalysis With Culture If Indicated - Urine, Clean Catch       Result                      Value             Ref Range           Color, UA                   Yellow            Yellow, Straw       Appearance, UA              Clear             Clear               pH, UA                      6.0               5.0 - 8.0           Specific [place], UA        1.011             1.005 - 1.030       Glucose, UA                 Negative          Negative            Ketones, UA                 Negative          Negative            Bilirubin, UA               Negative          Negative            Blood, UA                   Negative          Negative            Protein, UA                 Negative          Negative            Leuk Esterase, UA           Trace (A)         Negative            Nitrite, UA                 Positive (A)      Negative            Urobilinogen, UA            0.2 E.U./dL       0.2 - 1.0 E.*  -Troponin       Result                      Value             Ref Range           Troponin T                  <0.010            0.000 - 0.03*  -BNP       Result                      Value             Ref Range           proBNP                      346.1             5.0 - 900.0 *  -Ammonia       Result                      Value             Ref Range           Ammonia                     21                11 - 51 umol*  -CBC Auto Differential       Result                      Value             Ref Range           WBC                          9.20              3.40 - 10.80*       RBC                         4.12              3.77 - 5.28 *       Hemoglobin                  13.1              12.0 - 15.9 *       Hematocrit                  37.8              34.0 - 46.6 %       MCV                         91.7              79.0 - 97.0 *       MCH                         31.8              26.6 - 33.0 *       MCHC                        34.7              31.5 - 35.7 *       RDW                         13.6              12.3 - 15.4 %       RDW-SD                      43.3              37.0 - 54.0 *       MPV                         8.8               6.0 - 12.0 fL       Platelets                   186               140 - 450 10*       Neutrophil %                47.0              42.7 - 76.0 %       Lymphocyte %                36.8              19.6 - 45.3 %       Monocyte %                  13.4 (H)          5.0 - 12.0 %        Eosinophil %                2.1               0.3 - 6.2 %         Basophil %                  0.7               0.0 - 1.5 %         Neutrophils, Absolute       4.30              1.70 - 7.00 *       Lymphocytes, Absolute       3.40 (H)          0.70 - 3.10 *       Monocytes, Absolute         1.20 (H)          0.10 - 0.90 *       Eosinophils, Absolute       0.20              0.00 - 0.40 *       Basophils, Absolute         0.10              0.00 - 0.20 *       nRBC                        0.1               0.0 - 0.2 /1*  -Blood Gas, Arterial       Result                      Value             Ref Range           Site                        Right Radial                          Thiago's Test                Positive                              pH, Arterial                7.441             7.350 - 7.45*       pCO2, Arterial              39.0              35.0 - 48.0 *       pO2, Arterial               75.0 (L)          83.0 - 108.0*       HCO3, Arterial              26.6              21.0 - 28.0 *       Base Excess, Arterial       2.3                0.0 - 3.0 mm*       O2 Saturation, Arterial     95.4              94.0 - 98.0 %       CO2 Content                 27.8              22 - 29 mmol*       Barometric Pressure fo*                                           Modality                    Room Air                              FIO2                        21                %                   Hemodilution                No                               -Urinalysis, Microscopic Only - Urine, Clean Catch       Result                      Value             Ref Range           RBC, UA                     0-2 (A)           None Seen /H*       WBC, UA                     6-12 (A)          None Seen /H*       Bacteria, UA                3+ (A)            None Seen /H*       Squamous Epithelial Ce*     0-2               None Seen, 0*       Hyaline Casts, UA           None Seen         None Seen /L*       Methodology                                                   Automated Microscopy  -POC Glucose Once       Result                      Value             Ref Range           Glucose                     95                70 - 105 mg/*  Ct Head Without Contrast    Result Date: 2/15/2020  1. No acute intracranial process. MRI is more sensitive for the detection of acute nonhemorrhagic infarct. 2. Minor volume loss. Atherosclerosis. 3. Paranasal sinus disease. Fluid could represent acute sinusitis.  Electronically signed by:  James Barnes M.D.  2/15/2020 9:46 PM    Ct Chest Without Contrast    Result Date: 2/15/2020  No acute cardiopulmonary abnormality. There is some scattered linear atelectasis of the lungs but no consolidation or other acute abnormality. Electronically signed by:  Denver Warren M.D.  2/15/2020 9:48 PM      Patient appears well, vital signs stable.   tells me confusion has recurred in the emergency department.  Patient lucid at present, is very anxious about going home and verbalized earlier in the day she thought she was  going to die.  Will admit for IV antibiotics and observation.       Amount and/or Complexity of Data Reviewed  Clinical lab tests: reviewed  Tests in the radiology section of CPT®: reviewed        Final diagnoses:   Acute UTI   Altered mental status, unspecified altered mental status type   Acute sinusitis, recurrence not specified, unspecified location            Darius Mcadams MD  02/16/20 0030

## 2020-02-16 NOTE — H&P
[facility]      Patient Name: Claudia Rust  : 1950  MRN: [id number]  Primary Care Physician: Mary Wilde APRN  Date of admission: 2/15/2020    Patient Care Team:  Mary Wilde APRN as PCP - General  Mary Wilde APRN as PCP - Family Medicine          Subjective   History Present Illness     Chief Complaint:   Chief Complaint   Patient presents with   • Altered Mental Status       Ms. Rust is a 69 y.o.  presents to [facility] complaining of altered mental status.         69-year-old female presents to the ER with a chief complaint of not feeling well and altered mental status noted by her .  The patient's  also thought she had some slurred speech but it was not appreciated by the emergency room physician nor at time of interview.  The patient reports yesterday was the worst day of her life because she was having so much difficulty getting around.  She normally has immobility secondary to chronic pain, need for cane or walker, and morbid obesity.  However, she reports yesterday she was having significantly more trouble.  She has had upper respiratory symptoms x3 to 4 days and thought that was what was making her so miserable.  She did not have any noted dysuria but had noticed increased urinary frequency.      Review of Systems   Constitution: Negative for chills and fever.   HENT: Positive for congestion and hoarse voice.    Cardiovascular: Negative for chest pain.   Respiratory: Negative for cough and shortness of breath.    Gastrointestinal: Negative for abdominal pain, nausea and vomiting.   Genitourinary: Positive for frequency. Negative for dysuria.   Neurological: Positive for difficulty with concentration. Negative for dizziness.   All other systems reviewed and are negative.          Personal History     Past Medical History:   Past Medical History:   Diagnosis Date   • Acute congestive heart failure (CMS/HCC)    •  Asthma    • Bipolar 1 disorder (CMS/[facility])    • COPD (chronic obstructive pulmonary disease) (CMS/[facility])    • Depression    • Dyslipidemia    • Fibromyalgia    • Hypertension    • Hypothyroid    • IBS (irritable bowel syndrome)    • Migraines    • Morbid obesity (CMS/[facility])    • Neuropathy    • Sleep apnea    • Spinal stenosis    • Vertigo        Surgical History:      Past Surgical History:   Procedure Laterality Date   • CARDIAC CATHETERIZATION  2009   • CARDIAC CATHETERIZATION N/A 8/14/2019    Procedure: Left Heart Cath;  Surgeon: Jose Levi MD;  Location: [facility] CATH INVASIVE LOCATION;  Service: Cardiovascular   • CARDIAC CATHETERIZATION N/A 8/14/2019    Procedure: Right Heart Cath;  Surgeon: Jose Levi MD;  Location: [facility] CATH INVASIVE LOCATION;  Service: Cardiovascular   • CARDIAC CATHETERIZATION N/A 8/14/2019    Procedure: Aortic root aortogram;  Surgeon: Jose Levi MD;  Location: [facility] CATH INVASIVE LOCATION;  Service: Cardiovascular   • CYSTOCELE REPAIR  1984   • FOOT SURGERY     • GALLBLADDER SURGERY  2002   • VAGINAL HYSTERECTOMY  1972   • WRIST SURGERY  2011           Family History: family history is not on file. Otherwise pertinent FHx was reviewed and unremarkable.     Social History:  reports that she has been smoking cigarettes. She has never used smokeless tobacco. She reports that she does not drink alcohol.      Medications:  Prior to Admission medications    Medication Sig Start Date End Date Taking? Authorizing Provider   acetaminophen (TYLENOL) 325 MG tablet Take 650 mg by mouth Every 6 (Six) Hours As Needed for Mild Pain .    ProviderKrissy MD   albuterol (PROVENTIL) (2.5 MG/3ML) 0.083% nebulizer solution Inhale 2.5 mg. 2/3/15   Krissy Hodgson MD   albuterol sulfate HFA (PROVENTIL HFA) 108 (90 Base) MCG/ACT inhaler Inhale 2 puffs. 2/3/15 5/9/20  Krissy Hodgson MD   aluminum-magnesium hydroxide-simethicone (MAALOX/MYLANTA)  200-200-20 MG/5ML suspension Take 15 mL by mouth Every 6 (Six) Hours As Needed for Indigestion or Heartburn.    Krissy Hodgson MD   aspirin 81 MG tablet Take 81 mg by mouth Daily. 2/3/15   Krissy Hodgson MD   Calcium Carbonate (CVS CALCIUM) 1500 (600 Ca) MG tablet Take 600 mg by mouth Daily.    Krissy Hodgson MD   Cholecalciferol (VITAMIN D-1000 MAX ST) 1000 units tablet Take 2,000 Units by mouth Daily. 9/16/16   Krissy Hodgson MD   diphenhydrAMINE (BENADRYL) 50 MG capsule Take 50 mg by mouth every night at bedtime.    Krissy Hodgson MD   FLUoxetine (PROzac) 60 MG tablet TAKE 1 TABLET BY MOUTH EVERY DAY 4/19/19   Krissy Hodgson MD   furosemide (LASIX) 40 MG tablet TAKE 1 TABLET BY MOUTH TWICE A DAY 2/5/15   Krissy Hodgson MD   gabapentin (NEURONTIN) 400 MG capsule TAKE 1 CAPSULE BY MOUTH THREE TIMES A DAY 7/30/19   Krissy Hodgson MD   HYDROcodone-acetaminophen (NORCO)  MG per tablet Take 1 tablet by mouth Every 6 (Six) Hours As Needed for Moderate Pain .    Krissy Hodgson MD   levothyroxine (SYNTHROID, LEVOTHROID) 100 MCG tablet TAKE 1 TABLET BY MOUTH EVERY DAY 7/30/15   Krissy Hodgson MD   meclizine (ANTIVERT) 25 MG tablet Take 25 mg by mouth 3 (Three) Times a Day As Needed. 7/30/15   Krissy Hodgson MD   metoprolol succinate XL (TOPROL-XL) 25 MG 24 hr tablet Take 1 tablet by mouth Daily. 8/2/19   Jose Levi MD   MULTIPLE VITAMINS PO Take  by mouth.    Krissy Hodgson MD   ondansetron ODT (ZOFRAN-ODT) 4 MG disintegrating tablet TAKE 1-2 TABLETS BY MOUTH EVERY 8 HOURS AS NEEDED FOR NAUSEA 10/26/17   Krissy Hodgson MD   pantoprazole (PROTONIX) 40 MG EC tablet Take 40 mg by mouth Daily.    Krissy Hodgson MD   polyethylene glycol (MIRALAX) powder Take 17 g by mouth Daily As Needed. 11/9/16   Krissy Hodgson MD   potassium chloride (KLOR-CON) 20 MEQ CR tablet TAKE 1 TABLET BY MOUTH TWICE A DAY  7/30/15   Krissy Hodgson MD   promethazine (PHENERGAN) 25 MG tablet Take 25 mg by mouth Every 6 (Six) Hours As Needed for Nausea or Vomiting.    Krissy Hodgson MD   rizatriptan MLT (MAXALT-MLT) 10 MG disintegrating tablet TAKE 1 TABLET BY MOUTH ONCE AS NEEDED FOR MIGRAINE , MAY REPEAT ONCE IN 2HRS-MAX 30MG/24 HRS 3/12/19   Krissy Hodgson MD   simvastatin (ZOCOR) 20 MG tablet Take 20 mg by mouth Daily. 2/5/15   Krsisy Hodgson MD   tiZANidine (ZANAFLEX) 4 MG tablet Take 4 mg by mouth Every 6 (Six) Hours As Needed for Muscle Spasms.    Krissy Hodgson MD   vitamin B-12 (CYANOCOBALAMIN) 1000 MCG tablet Take 1,000 mcg by mouth Daily.    Krissy Hodgson MD   B Complex Vitamins (B COMPLEX PO) Take  by mouth.  2/16/20  Krissy Hodgson MD   fluticasone (FLONASE) 50 MCG/ACT nasal spray INHALE 1 SPRAY IN EACH NOSTRIL 2 TIMES DAILY 7/30/15 2/16/20  Krissy Hodgson MD       Allergies:    Allergies   Allergen Reactions   • Adhesive Tape Unknown (See Comments)     hives   • Butorphanol Unknown (See Comments)     unknown   • Garlic Unknown (See Comments)   • Tetanus-Diphtheria Toxoids Td Unknown (See Comments)   • Aloe Itching   • Bee Pollen Unknown (See Comments)   • Latex Unknown (See Comments)   • Macadamia Nut Oil Unknown (See Comments)   • Tuberculin Unknown (See Comments)   • Wasp Venom Unknown (See Comments)       Objective   Objective     Vital Signs  Temp:  [98.9 °F (37.2 °C)] 98.9 °F (37.2 °C)  Heart Rate:  [61-65] 62  Resp:  [20] 20  BP: (136-160)/() 136/75  SpO2:  [94 %-97 %] 95 %  on   ;      Body mass index is 49.72 kg/m².    Physical Exam   Constitutional: She is oriented to person, place, and time. She appears well-developed.   Morbidly obese   HENT:   Head: Normocephalic and atraumatic.   Eyes: Pupils are equal, round, and reactive to light. Conjunctivae and EOM are normal. No scleral icterus.   Neck: Normal range of motion. Neck supple. No JVD present. No  tracheal deviation present. No thyromegaly present.   Cardiovascular: Normal rate, regular rhythm and intact distal pulses.   Murmur heard.  Grade 3/6 systolic murmur   Pulmonary/Chest: Effort normal and breath sounds normal. No respiratory distress.   Abdominal: Soft. Bowel sounds are normal. She exhibits no distension. There is no tenderness.   Musculoskeletal: Normal range of motion. She exhibits no edema.   Lymphadenopathy:     She has no cervical adenopathy.   Neurological: She is alert and oriented to person, place, and time. No cranial nerve deficit or sensory deficit. She exhibits normal muscle tone. Coordination normal.   Patient is alert and oriented at time of interview with clear speech and normal thought process   Skin: Skin is warm. Capillary refill takes less than 2 seconds.   Psychiatric: She has a normal mood and affect. Her behavior is normal. Judgment and thought content normal.   Nursing note and vitals reviewed.      Results Review:  I have personally reviewed most recent cardiac tracings, lab results and radiology images and interpretations and agree with findings, most notably: UTI.    Results from last 7 days   Lab Units 02/15/20  2313   WBC 10*3/mm3 9.20   HEMOGLOBIN g/dL 13.1   HEMATOCRIT % 37.8   PLATELETS 10*3/mm3 186     Results from last 7 days   Lab Units 02/15/20  2313   SODIUM mmol/L 135*   POTASSIUM mmol/L 3.7   CHLORIDE mmol/L 97*   CO2 mmol/L 25.0   BUN mg/dL 10   CREATININE mg/dL 0.89   GLUCOSE mg/dL 103*   CALCIUM mg/dL 8.8   ALT (SGPT) U/L 16   AST (SGOT) U/L 21   TROPONIN T ng/mL <0.010   PROBNP pg/mL 346.1     Estimated Creatinine Clearance: 80.2 mL/min (by C-G formula based on SCr of 0.89 mg/dL).  Brief Urine Lab Results  (Last result in the past 365 days)      Color   Clarity   Blood   Leuk Est   Nitrite   Protein   CREAT   Urine HCG        02/15/20 2353 Yellow Clear Negative Trace Positive Negative               Microbiology Results (last 10 days)     Procedure Component  Value - Date/Time    Influenza Antigen, Rapid - Swab, Nasopharynx [[id number]]  (Normal) Collected:  02/15/20 2248    Lab Status:  Final result Specimen:  Swab from Nasopharynx Updated:  02/15/20 2321     Influenza A PCR Not Detected     Influenza B PCR Not Detected          ECG/EMG Results (most recent)     Procedure Component Value Units Date/Time    ECG 12 Lead [[id number]] Collected:  02/15/20 2250     Updated:  02/15/20 2251    Narrative:       HEART RATE= 63  bpm  RR Interval= 972  ms  MA Interval= 168  ms  P Horizontal Axis= 7  deg  P Front Axis= 30  deg  QRSD Interval= 94  ms  QT Interval= 484  ms  QRS Axis= 19  deg  T Wave Axis= 50  deg  - OTHERWISE NORMAL ECG -  Sinus rhythm  Atrial premature complex  Electronically Signed By:   Date and Time of Study: 2020-02-15 22:50:09               Results for orders placed during the hospital encounter of 06/21/19   Adult Transthoracic Echo Complete W/ Cont if Necessary Per Protocol    Narrative · Left ventricular wall thickness is consistent with mild concentric   hypertrophy.  · Left ventricular systolic function is normal.  · Mild tricuspid valve regurgitation is present.  · Mild mitral valve regurgitation is present  · Moderate to severe aortic valve stenosis is present.  · Mild aortic valve regurgitation is present.  · Left ventricular diastolic dysfunction (grade I) consistent with   impaired relaxation.  · There is a discrepancy in the findings to assess the severity of the   aortic stenosis between the Doppler gradients and calculated aortic valve   area. If clinically indicated consider a CHRISTIANO to further assess the   severity of the aortic stenosis.          Ct Head Without Contrast    Result Date: 2/15/2020  1. No acute intracranial process. MRI is more sensitive for the detection of acute nonhemorrhagic infarct. 2. Minor volume loss. Atherosclerosis. 3. Paranasal sinus disease. Fluid could represent acute sinusitis.  Electronically signed by:  James ACEVEDO  CONCETTA Barnes  2/15/2020 9:46 PM    Ct Chest Without Contrast    Result Date: 2/15/2020  No acute cardiopulmonary abnormality. There is some scattered linear atelectasis of the lungs but no consolidation or other acute abnormality. Electronically signed by:  Denver Warren M.D.  2/15/2020 9:48 PM        Estimated Creatinine Clearance: 80.2 mL/min (by C-G formula based on SCr of 0.89 mg/dL).    Assessment/Plan   Assessment/Plan       Active Hospital Problems    Diagnosis  POA   • Acute UTI [N39.0]  Yes      Resolved Hospital Problems   No resolved problems to display.     UTI, acute: Continue IV Rocephin    Altered mental status, improving--likely secondary to metabolic encephalopathy related to UTI; hyponatremia; dehydration: Monitor neuro status    Hyponatremia, mild, sodium 135: IV fluids; repeat BMP    Asthma with COPD: Continue albuterol mini nebs; continue Anoro Ellipta substitution    GERD, chronic: Continue Maalox    Continue aspirin    Dietary supplementation, chronic: Continue calcium; hold vitamin B vitamin; continue multivitamin     depression, chronic: Continue Prozac    Allergies, chronic: Continue Flonase; continue Benadryl as needed    Migraine headaches: Continue Imitrex substitution for Maxalt    HLD, chronic: Continue Zocor substitution atorvastatin    Hypertension, chronic: Continue metoprolol, Lasix as needed    Hypothyroidism, chronic: Continue Synthroid    Chronic pain with fibromyalgia: Continue gabapentin, Zanaflex    Chronic constipation: Continue MiraLAX      VTE Prophylaxis -   Mechanical Order History:     None      Pharmalogical Order History:     Ordered     Dose Route Frequency Stop    02/16/20 0056  enoxaparin (LOVENOX) syringe 40 mg      40 mg SC Every 24 Hours --          CODE STATUS:    Code Status and Medical Interventions:   Ordered at: 02/16/20 0056     Code Status:    CPR     Medical Interventions (Level of Support Prior to Arrest):    Full       Admission Status:  I believe  this patient meets observation criteria.      I discussed the patient's findings and my recommendations with patient.        Electronically signed by GRISELDA Jaramillo, 02/16/20, 1:01 AM.  [facility] Hospitalist Team    Hospitalist / Attending note.    Patient seen and examined, chart reviewed, agree with above. Patient coming in with confusion now resolved, workup including ct scan head without contrast came out negative. UA revealing likely UTI. Pt received iv fluids and iv antibiotics. Following this asked to admit the patient for further care.     Vital reviewed.  Chest ... Bilateral entry, NVB.  CVS .... s1s2 no murmur.  Abdomen ... Soft non tender gut sound audible.    Impression  Acute metabolic encephalopathy   Acute UTI    Plan  Continue iv rocephin  Follow urine culture  Monitor clinical progress.  Can be discharge am based on clinical progress.     Suly worthington MD.

## 2020-02-17 VITALS
SYSTOLIC BLOOD PRESSURE: 131 MMHG | HEIGHT: 64 IN | OXYGEN SATURATION: 94 % | RESPIRATION RATE: 16 BRPM | WEIGHT: 276.6 LBS | TEMPERATURE: 98.3 F | DIASTOLIC BLOOD PRESSURE: 75 MMHG | BODY MASS INDEX: 47.22 KG/M2 | HEART RATE: 66 BPM

## 2020-02-17 LAB
BACTERIA SPEC AEROBE CULT: ABNORMAL
GLUCOSE BLDC GLUCOMTR-MCNC: 131 MG/DL (ref 70–105)
HBA1C MFR BLD: 5.2 % (ref 3.5–5.6)

## 2020-02-17 PROCEDURE — 99217 PR OBSERVATION CARE DISCHARGE MANAGEMENT: CPT | Performed by: INTERNAL MEDICINE

## 2020-02-17 PROCEDURE — 82962 GLUCOSE BLOOD TEST: CPT

## 2020-02-17 PROCEDURE — 25010000002 ONDANSETRON PER 1 MG: Performed by: NURSE PRACTITIONER

## 2020-02-17 PROCEDURE — 25010000002 CEFTRIAXONE PER 250 MG: Performed by: NURSE PRACTITIONER

## 2020-02-17 PROCEDURE — 94799 UNLISTED PULMONARY SVC/PX: CPT

## 2020-02-17 PROCEDURE — 96376 TX/PRO/DX INJ SAME DRUG ADON: CPT

## 2020-02-17 PROCEDURE — G0378 HOSPITAL OBSERVATION PER HR: HCPCS

## 2020-02-17 RX ORDER — CEFDINIR 300 MG/1
300 CAPSULE ORAL 2 TIMES DAILY
Qty: 10 CAPSULE | Refills: 0 | Status: SHIPPED | OUTPATIENT
Start: 2020-02-17 | End: 2020-02-22

## 2020-02-17 RX ADMIN — GABAPENTIN 400 MG: 400 CAPSULE ORAL at 08:41

## 2020-02-17 RX ADMIN — ONDANSETRON 4 MG: 2 INJECTION INTRAMUSCULAR; INTRAVENOUS at 08:41

## 2020-02-17 RX ADMIN — MULTIPLE VITAMINS W/ MINERALS TAB 1 TABLET: TAB at 08:41

## 2020-02-17 RX ADMIN — ASPIRIN 81 MG: 81 TABLET, DELAYED RELEASE ORAL at 08:41

## 2020-02-17 RX ADMIN — FLUOXETINE 60 MG: 20 CAPSULE ORAL at 08:41

## 2020-02-17 RX ADMIN — IPRATROPIUM BROMIDE AND ALBUTEROL SULFATE 3 ML: .5; 3 SOLUTION RESPIRATORY (INHALATION) at 06:42

## 2020-02-17 RX ADMIN — PANTOPRAZOLE SODIUM 40 MG: 40 TABLET, DELAYED RELEASE ORAL at 06:04

## 2020-02-17 RX ADMIN — METOPROLOL SUCCINATE 25 MG: 25 TABLET, EXTENDED RELEASE ORAL at 08:41

## 2020-02-17 RX ADMIN — GUAIFENESIN AND DEXTROMETHORPHAN 5 ML: 100; 10 SYRUP ORAL at 06:04

## 2020-02-17 RX ADMIN — BENZONATATE 100 MG: 100 CAPSULE ORAL at 06:04

## 2020-02-17 RX ADMIN — LEVOTHYROXINE SODIUM 100 MCG: 100 TABLET ORAL at 06:04

## 2020-02-17 RX ADMIN — CYANOCOBALAMIN TAB 1000 MCG 1000 MCG: 1000 TAB at 08:41

## 2020-02-17 RX ADMIN — CEFTRIAXONE SODIUM 1 G: 10 INJECTION, POWDER, FOR SOLUTION INTRAVENOUS at 00:48

## 2020-02-17 RX ADMIN — ATORVASTATIN CALCIUM 10 MG: 10 TABLET, FILM COATED ORAL at 08:41

## 2020-02-17 NOTE — PLAN OF CARE
Patient being discharged home. Spouse at bedside and transferring patient home. No needs noted at this time

## 2020-02-17 NOTE — PLAN OF CARE
Problem: Patient Care Overview  Goal: Plan of Care Review  Outcome: Ongoing (interventions implemented as appropriate)  Flowsheets  Taken 2/17/2020 0231  Progress: improving  Outcome Summary: patient alert and oriented through out the night, patient still have a dry cough and PRN medication given. on IV abx.  Taken 2/16/2020 1920  Plan of Care Reviewed With: patient  Goal: Individualization and Mutuality  Outcome: Ongoing (interventions implemented as appropriate)  Goal: Discharge Needs Assessment  Outcome: Ongoing (interventions implemented as appropriate)  Goal: Interprofessional Rounds/Family Conf  Outcome: Ongoing (interventions implemented as appropriate)     Problem: Fall Risk (Adult)  Goal: Identify Related Risk Factors and Signs and Symptoms  Outcome: Ongoing (interventions implemented as appropriate)  Goal: Absence of Fall  Outcome: Ongoing (interventions implemented as appropriate)

## 2020-02-17 NOTE — DISCHARGE SUMMARY
[facility]   DISCHARGE SUMMARY    Patient Name: Claudia Rust  : 1950  MRN: [id number]    Date of Admission: 2/15/2020  Date of Discharge:  2020    Primary Care Physician: Mary Wilde APRN    Consults     Date and Time Order Name Status Description    2020 0027 Inpatient Hospitalist Consult Completed           Hospital Course     Presenting Problem:   Acute UTI [N39.0]  Acute sinusitis, recurrence not specified, unspecified location [J01.90]  Altered mental status, unspecified altered mental status type [R41.82]    Active Hospital Problems:  No notes have been filed under this hospital service.  Service: Hospitalist      Resolved Hospital Problems:  No notes have been filed under this hospital service.  Service: Hospitalist        Hospital Course:  Claudia Rust is a 69 y.o. female     Rocephin ws started.  Her mental function improved quickly. No focal weakness.  Urine Culture revealed E Coli, sensitive to most antibiotics except Cefazolin.  She is eating good walking well, everything to be close to her baseline.  She is discharged with 5 more days course of Omnicef.  She needs to be followed by PCP within a wekk.      Discharge Follow Up Recommendations for labs/diagnostics:  PCP      Day of Discharge     HPI: 69-year-old female presents to the ER with a chief complaint of not feeling well and altered mental status noted by her .  The patient's  also thought she had some slurred speech but it was not appreciated by the emergency room physician nor at time of interview.  The patient reports a day before the admission was the worst day of her life because she was having so much difficulty getting around.  She normally has immobility secondary to chronic pain, need for cane or walker, and morbid obesity.  However, she reports yesterday she was having significantly more trouble.  She has had upper respiratory symptoms x3 to 4 days and thought that was what was making her so  miserable.  She did not have any noted dysuria but had noticed increased urinary frequency.         Vital Signs:   Temp:  [98.3 °F (36.8 °C)] 98.3 °F (36.8 °C)  Heart Rate:  [62-66] 66  Resp:  [16-18] 16  BP: (130-144)/(70-84) 131/75     Physical Exam:    She is alert, no distress  Heart sound normal  ;lung sound clear  Abdomen - soft not tender  Mental alert, no focal neuro deficit  She is obese    Pertinent  and/or Most Recent Results     Results from last 7 days   Lab Units 02/15/20  2313   WBC 10*3/mm3 9.20   HEMOGLOBIN g/dL 13.1   HEMATOCRIT % 37.8   PLATELETS 10*3/mm3 186   SODIUM mmol/L 135*   POTASSIUM mmol/L 3.7   CHLORIDE mmol/L 97*   CO2 mmol/L 25.0   BUN mg/dL 10   CREATININE mg/dL 0.89   GLUCOSE mg/dL 103*   CALCIUM mg/dL 8.8     Results from last 7 days   Lab Units 02/15/20  2313   BILIRUBIN mg/dL 0.3   ALK PHOS U/L 89   ALT (SGPT) U/L 16   AST (SGOT) U/L 21           Invalid input(s): TG, LDLCALC, LDLREALC  Results from last 7 days   Lab Units 02/15/20  2313   HEMOGLOBIN A1C % 5.2   PROBNP pg/mL 346.1   TROPONIN T ng/mL <0.010       Brief Urine Lab Results  (Last result in the past 365 days)      Color   Clarity   Blood   Leuk Est   Nitrite   Protein   CREAT   Urine HCG        02/15/20 2353 Yellow Clear Negative Trace Positive Negative               Microbiology Results Abnormal     Procedure Component Value - Date/Time    Urine Culture - Urine, Urine, Clean Catch [[id number]]  (Abnormal)  (Susceptibility) Collected:  02/15/20 2353    Lab Status:  Final result Specimen:  Urine, Clean Catch Updated:  02/17/20 0300     Urine Culture >100,000 CFU/mL Enterobacter cloacae complex    Susceptibility      Enterobacter cloacae complex     RICKY     Cefazolin Resistant     Cefepime Susceptible     Ceftazidime Susceptible     Ceftriaxone Susceptible     Gentamicin Susceptible     Levofloxacin Susceptible     Nitrofurantoin Susceptible     Piperacillin + Tazobactam Susceptible     Tetracycline Susceptible      Trimethoprim + Sulfamethoxazole Susceptible                    Blood Culture - Blood, Arm, Right [[id number]] Collected:  02/15/20 2313    Lab Status:  Preliminary result Specimen:  Blood from Arm, Right Updated:  02/16/20 2330     Blood Culture No growth at 24 hours    Blood Culture - Blood, Arm, Left [[id number]] Collected:  02/15/20 2305    Lab Status:  Preliminary result Specimen:  Blood from Arm, Left Updated:  02/16/20 2330     Blood Culture No growth at 24 hours    Influenza Antigen, Rapid - Swab, Nasopharynx [[id number]]  (Normal) Collected:  02/15/20 2248    Lab Status:  Final result Specimen:  Swab from Nasopharynx Updated:  02/15/20 2321     Influenza A PCR Not Detected     Influenza B PCR Not Detected          Ct Head Without Contrast    Result Date: 2/15/2020  Impression: 1. No acute intracranial process. MRI is more sensitive for the detection of acute nonhemorrhagic infarct. 2. Minor volume loss. Atherosclerosis. 3. Paranasal sinus disease. Fluid could represent acute sinusitis.  Electronically signed by:  James Barnes M.D.  2/15/2020 9:46 PM    Ct Chest Without Contrast    Result Date: 2/15/2020  Impression: No acute cardiopulmonary abnormality. There is some scattered linear atelectasis of the lungs but no consolidation or other acute abnormality. Electronically signed by:  Denver Wraren M.D.  2/15/2020 9:48 PM    Xr Chest 1 View    Result Date: 2/16/2020  Impression: Probably no acute infiltrate is seen. Probably no significant interval change is identified when compared with 08/14/2019 study, allowing for technique differences.  Electronically Signed By-Dr. Fernando Diaz MD On:2/16/2020 6:49 AM This report was finalized on [id number] by Dr. Fernando Diaz MD.                Results for orders placed during the hospital encounter of 06/21/19   Adult Transthoracic Echo Complete W/ Cont if Necessary Per Protocol    Narrative · Left ventricular wall thickness is consistent with mild  concentric   hypertrophy.  · Left ventricular systolic function is normal.  · Mild tricuspid valve regurgitation is present.  · Mild mitral valve regurgitation is present  · Moderate to severe aortic valve stenosis is present.  · Mild aortic valve regurgitation is present.  · Left ventricular diastolic dysfunction (grade I) consistent with   impaired relaxation.  · There is a discrepancy in the findings to assess the severity of the   aortic stenosis between the Doppler gradients and calculated aortic valve   area. If clinically indicated consider a CHRISTIANO to further assess the   severity of the aortic stenosis.           Order Current Status    Blood Culture - Blood, Arm, Left Preliminary result    Blood Culture - Blood, Arm, Right Preliminary result        Discharge Details        Discharge Medications      New Medications      Instructions Start Date   cefdinir 300 MG capsule  Commonly known as:  OMNICEF   300 mg, Oral, 2 Times Daily         Continue These Medications      Instructions Start Date   acetaminophen 325 MG tablet  Commonly known as:  TYLENOL   650 mg, Oral, Every 6 Hours PRN      albuterol (2.5 MG/3ML) 0.083% nebulizer solution  Commonly known as:  PROVENTIL   2.5 mg, Inhalation, Every 6 Hours PRN      PROVENTIL  (90 Base) MCG/ACT inhaler  Generic drug:  albuterol sulfate HFA   2 puffs, Inhalation      aluminum-magnesium hydroxide-simethicone 200-200-20 MG/5ML suspension  Commonly known as:  MAALOX/MYLANTA   15 mL, Oral, Every 6 Hours PRN      ANORO ELLIPTA 62.5-25 MCG/INH aerosol powder  inhaler  Generic drug:  umeclidinium-vilanterol   1 puff, Inhalation, Daily - RT      aspirin 81 MG tablet   81 mg, Oral, Daily      [facility] CALCIUM 1500 (600 Ca) MG tablet  Generic drug:  Calcium Carbonate   600 mg, Oral, Daily      diphenhydrAMINE 50 MG capsule  Commonly known as:  BENADRYL   50 mg, Oral, Every Night at Bedtime      FLUoxetine 60 MG tablet  Commonly known as:  PROzac   60 mg, Oral, Daily         fluticasone 50 MCG/ACT nasal spray  Commonly known as:  FLONASE   1 spray, Nasal, Daily      furosemide 40 MG tablet  Commonly known as:  LASIX   40 mg, Oral, Daily      gabapentin 400 MG capsule  Commonly known as:  NEURONTIN   400 mg, Oral, 3 Times Daily      KLOR-CON 20 MEQ CR tablet  Generic drug:  potassium chloride   20 mEq, Oral, 2 Times Daily      levothyroxine 100 MCG tablet  Commonly known as:  SYNTHROID, LEVOTHROID   100 mcg, Oral, Daily      meclizine 25 MG tablet  Commonly known as:  ANTIVERT   25 mg, Oral, 3 Times Daily PRN      metoprolol succinate XL 25 MG 24 hr tablet  Commonly known as:  TOPROL-XL   25 mg, Oral, Daily      multivitamin with minerals tablet tablet   1 tablet, Oral, Daily      pantoprazole 40 MG EC tablet  Commonly known as:  PROTONIX   40 mg, Oral, Daily      polyethylene glycol powder  Commonly known as:  MIRALAX   17 g, Oral, Daily PRN      promethazine 25 MG tablet  Commonly known as:  PHENERGAN   25 mg, Oral, Every 6 Hours PRN      rizatriptan MLT 10 MG disintegrating tablet  Commonly known as:  MAXALT-MLT   10 mg, Oral, Once As Needed, May repeat in 2 hours if needed       simvastatin 20 MG tablet  Commonly known as:  ZOCOR   20 mg, Oral, Daily      tiZANidine 4 MG tablet  Commonly known as:  ZANAFLEX   2 mg, Oral, Every 8 Hours PRN      vitamin b complex capsule capsule   1 capsule, Oral, Daily      vitamin B-12 1000 MCG tablet  Commonly known as:  CYANOCOBALAMIN   1,000 mcg, Oral, Daily      VITAMIN D-1000 MAX ST 25 MCG (1000 UT) tablet  Generic drug:  Cholecalciferol   2,000 Units, Oral, Daily             Allergies   Allergen Reactions   • Adhesive Tape Unknown (See Comments)     hives   • Butorphanol Unknown (See Comments)     unknown   • Garlic Unknown (See Comments)   • Tetanus-Diphtheria Toxoids Td Unknown (See Comments)   • Aloe Itching   • Bee Pollen Unknown (See Comments)   • Latex Unknown (See Comments)   • Macadamia Nut Oil Unknown (See Comments)   • Tuberculin  Unknown (See Comments)   • Wasp Venom Unknown (See Comments)         Discharge Disposition:  Home or Self Care    Diet:  Hospital:No active diet order        Discharge Activity:   Activity Instructions     As tolerated                 CODE STATUS:    Code Status and Medical Interventions:   Ordered at: 02/16/20 0056     Code Status:    CPR     Medical Interventions (Level of Support Prior to Arrest):    Full         Future Appointments   Date Time Provider Department Center   3/20/2020 11:50 AM Jose Levi MD [facility]           Time spent on Discharge including face to face service:  35 minutes    Electronically signed by Alberto Mojica MD, 02/17/20, 6:26 PM.

## 2020-02-18 ENCOUNTER — HOSPITAL ENCOUNTER (EMERGENCY)
Facility: HOSPITAL | Age: 70
Discharge: HOME | End: 2020-02-18
Attending: EMERGENCY MEDICINE | Admitting: EMERGENCY MEDICINE

## 2020-02-18 ENCOUNTER — READMISSION MANAGEMENT (OUTPATIENT)
Dept: CALL CENTER | Facility: HOSPITAL | Age: 70
End: 2020-02-18

## 2020-02-18 ENCOUNTER — APPOINTMENT (OUTPATIENT)
Dept: GENERAL RADIOLOGY | Facility: HOSPITAL | Age: 70
End: 2020-02-18

## 2020-02-18 VITALS
RESPIRATION RATE: 16 BRPM | SYSTOLIC BLOOD PRESSURE: 132 MMHG | TEMPERATURE: 98.3 F | DIASTOLIC BLOOD PRESSURE: 59 MMHG | OXYGEN SATURATION: 94 % | HEART RATE: 67 BPM | BODY MASS INDEX: 47.65 KG/M2 | HEIGHT: 65 IN | WEIGHT: 286 LBS

## 2020-02-18 DIAGNOSIS — R06.00 DYSPNEA, UNSPECIFIED TYPE: Primary | ICD-10-CM

## 2020-02-18 DIAGNOSIS — R53.1 WEAKNESS: ICD-10-CM

## 2020-02-18 DIAGNOSIS — I50.9 ACUTE ON CHRONIC CONGESTIVE HEART FAILURE, UNSPECIFIED HEART FAILURE TYPE (HCC): ICD-10-CM

## 2020-02-18 LAB
ANION GAP SERPL CALCULATED.3IONS-SCNC: 11 MMOL/L (ref 5–15)
BASOPHILS # BLD AUTO: 0.1 10*3/MM3 (ref 0–0.2)
BASOPHILS NFR BLD AUTO: 0.9 % (ref 0–1.5)
BUN BLD-MCNC: 7 MG/DL (ref 8–23)
BUN/CREAT SERPL: 10.3 (ref 7–25)
CALCIUM SPEC-SCNC: 8.9 MG/DL (ref 8.6–10.5)
CHLORIDE SERPL-SCNC: 101 MMOL/L (ref 98–107)
CO2 SERPL-SCNC: 26 MMOL/L (ref 22–29)
CREAT BLD-MCNC: 0.68 MG/DL (ref 0.57–1)
DEPRECATED RDW RBC AUTO: 42.9 FL (ref 37–54)
EOSINOPHIL # BLD AUTO: 0 10*3/MM3 (ref 0–0.4)
EOSINOPHIL NFR BLD AUTO: 0.4 % (ref 0.3–6.2)
ERYTHROCYTE [DISTWIDTH] IN BLOOD BY AUTOMATED COUNT: 13.2 % (ref 12.3–15.4)
FLUAV SUBTYP SPEC NAA+PROBE: NOT DETECTED
FLUBV RNA ISLT QL NAA+PROBE: NOT DETECTED
GFR SERPL CREATININE-BSD FRML MDRD: 86 ML/MIN/1.73
GLUCOSE BLD-MCNC: 137 MG/DL (ref 65–99)
HCT VFR BLD AUTO: 36.9 % (ref 34–46.6)
HGB BLD-MCNC: 12.4 G/DL (ref 12–15.9)
LYMPHOCYTES # BLD AUTO: 1.5 10*3/MM3 (ref 0.7–3.1)
LYMPHOCYTES NFR BLD AUTO: 20.2 % (ref 19.6–45.3)
MCH RBC QN AUTO: 30.8 PG (ref 26.6–33)
MCHC RBC AUTO-ENTMCNC: 33.7 G/DL (ref 31.5–35.7)
MCV RBC AUTO: 91.5 FL (ref 79–97)
MONOCYTES # BLD AUTO: 0.6 10*3/MM3 (ref 0.1–0.9)
MONOCYTES NFR BLD AUTO: 8.3 % (ref 5–12)
NEUTROPHILS # BLD AUTO: 5.1 10*3/MM3 (ref 1.7–7)
NEUTROPHILS NFR BLD AUTO: 70.2 % (ref 42.7–76)
NRBC BLD AUTO-RTO: 0.1 /100 WBC (ref 0–0.2)
NT-PROBNP SERPL-MCNC: 1271 PG/ML (ref 5–900)
PLATELET # BLD AUTO: 205 10*3/MM3 (ref 140–450)
PMV BLD AUTO: 8.9 FL (ref 6–12)
POTASSIUM BLD-SCNC: 3.8 MMOL/L (ref 3.5–5.2)
RBC # BLD AUTO: 4.04 10*6/MM3 (ref 3.77–5.28)
SODIUM BLD-SCNC: 138 MMOL/L (ref 136–145)
TROPONIN T SERPL-MCNC: <0.01 NG/ML (ref 0–0.03)
WBC NRBC COR # BLD: 7.3 10*3/MM3 (ref 3.4–10.8)

## 2020-02-18 PROCEDURE — 85025 COMPLETE CBC W/AUTO DIFF WBC: CPT | Performed by: EMERGENCY MEDICINE

## 2020-02-18 PROCEDURE — 87502 INFLUENZA DNA AMP PROBE: CPT | Performed by: EMERGENCY MEDICINE

## 2020-02-18 PROCEDURE — 83880 ASSAY OF NATRIURETIC PEPTIDE: CPT | Performed by: EMERGENCY MEDICINE

## 2020-02-18 PROCEDURE — 84484 ASSAY OF TROPONIN QUANT: CPT | Performed by: EMERGENCY MEDICINE

## 2020-02-18 PROCEDURE — 99283 EMERGENCY DEPT VISIT LOW MDM: CPT

## 2020-02-18 PROCEDURE — 80048 BASIC METABOLIC PNL TOTAL CA: CPT | Performed by: EMERGENCY MEDICINE

## 2020-02-18 PROCEDURE — 71045 X-RAY EXAM CHEST 1 VIEW: CPT

## 2020-02-18 RX ORDER — SODIUM CHLORIDE 0.9 % (FLUSH) 0.9 %
10 SYRINGE (ML) INJECTION AS NEEDED
Status: DISCONTINUED | OUTPATIENT
Start: 2020-02-18 | End: 2020-02-18 | Stop reason: HOSPADM

## 2020-02-18 NOTE — OUTREACH NOTE
Prep Survey      Responses   Facility patient discharged from?  [facility]   Is patient eligible?  Yes   Discharge diagnosis  UTI   Does the patient have one of the following disease processes/diagnoses(primary or secondary)?  Other   Does the patient have Home health ordered?  No   Is there a DME ordered?  No   Comments regarding appointments  see AVS   Prep survey completed?  Yes          Kaci Mora RN

## 2020-02-20 ENCOUNTER — READMISSION MANAGEMENT (OUTPATIENT)
Dept: CALL CENTER | Facility: HOSPITAL | Age: 70
End: 2020-02-20

## 2020-02-20 LAB
BACTERIA SPEC AEROBE CULT: NORMAL
BACTERIA SPEC AEROBE CULT: NORMAL

## 2020-02-20 NOTE — OUTREACH NOTE
Medical Week 1 Survey      Responses   Facility patient discharged from?  [facility]   Does the patient have one of the following disease processes/diagnoses(primary or secondary)?  Other   Is there a successful TCM telephone encounter documented?  No   Week 1 attempt successful?  No   Rescheduled  Revoked   Revoke  Decline to participate [NO ANSWER, NO VM]          Aggie Petty LPN

## 2020-03-06 ENCOUNTER — LAB (OUTPATIENT)
Dept: LAB | Facility: HOSPITAL | Age: 70
End: 2020-03-06

## 2020-03-06 ENCOUNTER — HOSPITAL ENCOUNTER (OUTPATIENT)
Dept: CARDIOLOGY | Facility: HOSPITAL | Age: 70
Discharge: HOME | End: 2020-03-06
Admitting: INTERNAL MEDICINE

## 2020-03-06 DIAGNOSIS — E78.5 DYSLIPIDEMIA: Chronic | ICD-10-CM

## 2020-03-06 DIAGNOSIS — E78.5 DYSLIPIDEMIA: ICD-10-CM

## 2020-03-06 DIAGNOSIS — R73.03 PREDIABETES: ICD-10-CM

## 2020-03-06 DIAGNOSIS — R73.03 PREDIABETES: Chronic | ICD-10-CM

## 2020-03-06 DIAGNOSIS — I11.0 HYPERTENSIVE HEART DISEASE WITH CHRONIC DIASTOLIC CONGESTIVE HEART FAILURE (HCC): ICD-10-CM

## 2020-03-06 DIAGNOSIS — I50.32 HYPERTENSIVE HEART DISEASE WITH CHRONIC DIASTOLIC CONGESTIVE HEART FAILURE (HCC): ICD-10-CM

## 2020-03-06 DIAGNOSIS — I35.0 NONRHEUMATIC AORTIC VALVE STENOSIS: Chronic | ICD-10-CM

## 2020-03-06 DIAGNOSIS — R09.89 BILATERAL CAROTID BRUITS: ICD-10-CM

## 2020-03-06 DIAGNOSIS — I10 ESSENTIAL HYPERTENSION: Chronic | ICD-10-CM

## 2020-03-06 DIAGNOSIS — I10 ESSENTIAL HYPERTENSION: ICD-10-CM

## 2020-03-06 DIAGNOSIS — I35.0 NONRHEUMATIC AORTIC VALVE STENOSIS: ICD-10-CM

## 2020-03-06 DIAGNOSIS — E66.01 MORBIDLY OBESE (HCC): ICD-10-CM

## 2020-03-06 DIAGNOSIS — E66.01 MORBIDLY OBESE (HCC): Chronic | ICD-10-CM

## 2020-03-06 LAB
ALBUMIN SERPL-MCNC: 3.9 G/DL (ref 3.5–5.2)
ALBUMIN/GLOB SERPL: 1.4 G/DL
ALP SERPL-CCNC: 85 U/L (ref 39–117)
ALT SERPL W P-5'-P-CCNC: 20 U/L (ref 1–33)
ANION GAP SERPL CALCULATED.3IONS-SCNC: 13 MMOL/L (ref 5–15)
AST SERPL-CCNC: 24 U/L (ref 1–32)
BH CV XLRA MEAS LEFT DIST CCA EDV: -21.2 CM/SEC
BH CV XLRA MEAS LEFT DIST CCA PSV: -58.8 CM/SEC
BH CV XLRA MEAS LEFT DIST ICA EDV: -22.3 CM/SEC
BH CV XLRA MEAS LEFT DIST ICA PSV: -68.8 CM/SEC
BH CV XLRA MEAS LEFT ICA/CCA RATIO: 1.1
BH CV XLRA MEAS LEFT PROX CCA EDV: 18 CM/SEC
BH CV XLRA MEAS LEFT PROX CCA PSV: 64.3 CM/SEC
BH CV XLRA MEAS LEFT PROX ECA PSV: -81.4 CM/SEC
BH CV XLRA MEAS LEFT PROX ICA EDV: -12.3 CM/SEC
BH CV XLRA MEAS LEFT PROX ICA PSV: -33.4 CM/SEC
BH CV XLRA MEAS LEFT PROX SCLA PSV: -81.8 CM/SEC
BH CV XLRA MEAS LEFT VERTEBRAL A PSV: -45.1 CM/SEC
BH CV XLRA MEAS RIGHT DIST CCA EDV: 14.3 CM/SEC
BH CV XLRA MEAS RIGHT DIST CCA PSV: 54.7 CM/SEC
BH CV XLRA MEAS RIGHT DIST ICA EDV: -21.9 CM/SEC
BH CV XLRA MEAS RIGHT DIST ICA PSV: -72 CM/SEC
BH CV XLRA MEAS RIGHT ICA/CCA RATIO: 1.3
BH CV XLRA MEAS RIGHT PROX CCA EDV: 14.9 CM/SEC
BH CV XLRA MEAS RIGHT PROX CCA PSV: 54.7 CM/SEC
BH CV XLRA MEAS RIGHT PROX ECA PSV: -91.9 CM/SEC
BH CV XLRA MEAS RIGHT PROX ICA EDV: -13.7 CM/SEC
BH CV XLRA MEAS RIGHT PROX ICA PSV: -46 CM/SEC
BH CV XLRA MEAS RIGHT PROX SCLA PSV: -103 CM/SEC
BH CV XLRA MEAS RIGHT VERTEBRAL A PSV: -47 CM/SEC
BILIRUB SERPL-MCNC: 0.4 MG/DL (ref 0.2–1.2)
BUN BLD-MCNC: 12 MG/DL (ref 8–23)
BUN/CREAT SERPL: 11.9 (ref 7–25)
CALCIUM SPEC-SCNC: 9.1 MG/DL (ref 8.6–10.5)
CHLORIDE SERPL-SCNC: 99 MMOL/L (ref 98–107)
CO2 SERPL-SCNC: 28 MMOL/L (ref 22–29)
CREAT BLD-MCNC: 1.01 MG/DL (ref 0.57–1)
GFR SERPL CREATININE-BSD FRML MDRD: 54 ML/MIN/1.73
GLOBULIN UR ELPH-MCNC: 2.7 GM/DL
GLUCOSE BLD-MCNC: 112 MG/DL (ref 65–99)
POTASSIUM BLD-SCNC: 4.4 MMOL/L (ref 3.5–5.2)
PROT SERPL-MCNC: 6.6 G/DL (ref 6–8.5)
SODIUM BLD-SCNC: 140 MMOL/L (ref 136–145)

## 2020-03-06 PROCEDURE — 80053 COMPREHEN METABOLIC PANEL: CPT

## 2020-03-06 PROCEDURE — 93880 EXTRACRANIAL BILAT STUDY: CPT

## 2020-03-06 PROCEDURE — 36415 COLL VENOUS BLD VENIPUNCTURE: CPT

## 2020-04-24 ENCOUNTER — TELEMEDICINE - AUDIO (OUTPATIENT)
Dept: CARDIOLOGY | Facility: CLINIC | Age: 70
End: 2020-04-24

## 2020-04-24 VITALS
WEIGHT: 290 LBS | BODY MASS INDEX: 48.32 KG/M2 | SYSTOLIC BLOOD PRESSURE: 143 MMHG | HEART RATE: 61 BPM | HEIGHT: 65 IN | DIASTOLIC BLOOD PRESSURE: 65 MMHG

## 2020-04-24 DIAGNOSIS — R06.09 DYSPNEA ON EXERTION: ICD-10-CM

## 2020-04-24 DIAGNOSIS — I10 ESSENTIAL HYPERTENSION: ICD-10-CM

## 2020-04-24 DIAGNOSIS — I50.32 HYPERTENSIVE HEART DISEASE WITH CHRONIC DIASTOLIC CONGESTIVE HEART FAILURE (HCC): Primary | ICD-10-CM

## 2020-04-24 DIAGNOSIS — E78.5 DYSLIPIDEMIA: ICD-10-CM

## 2020-04-24 DIAGNOSIS — I11.0 HYPERTENSIVE HEART DISEASE WITH CHRONIC DIASTOLIC CONGESTIVE HEART FAILURE (HCC): Primary | ICD-10-CM

## 2020-04-24 DIAGNOSIS — R73.03 PREDIABETES: ICD-10-CM

## 2020-04-24 DIAGNOSIS — E66.01 MORBIDLY OBESE (HCC): ICD-10-CM

## 2020-04-24 DIAGNOSIS — I35.0 NONRHEUMATIC AORTIC VALVE STENOSIS: ICD-10-CM

## 2020-04-24 PROCEDURE — 99213 OFFICE O/P EST LOW 20 MIN: CPT | Performed by: INTERNAL MEDICINE

## 2020-04-24 NOTE — PROGRESS NOTES
You have chosen to receive care through a video visit. Do you consent to use a video visit for your medical care today? Yes    Subjective:     Encounter Date:04/24/2020      Patient ID: Claudia Rust is a 69 y.o. female.    Chief Complaint :Follow-up for hypertensive cardiovascular disease with CHF, aortic stenosis  History of Present Illness      This is a 69-year-old with PMH of    #Valvular heart disease with moderate to severe aortic stenosis  Cardiac cath 8/14/2019 revealing moderate left   with elevated LVEDP, moderate pulmonary hypertension mildly dilated proximal aorta  # Hyperlipidemia,  Hypertension  #COPD, KARLY on CPAP  #Prediabetes  # Hypothyroidism, Rheumatoid Arthritis, spinal stenosis, hiatal hernia, chronic depression, bipolar, GERD, IBS, chronic migraine, vertigo, herniated disc, compression lumbar fractures  #Allergies/intolerance to Stadol  #Hysterectomy, bladder reconstruction, cholecystectomy, right hand surgery  #Family history of strokes and grandfather.  Mother's cancer, father on  # Active cigarette smoker trying to quit       Here for Follow-up.  Patient has been noticing dyspnea on exertion class II, relieved with rest.  Patient is complaining of intermittent edema with no aggravating or relieving factors.  Has occasional dizziness patient has chronic vertigo.  Denies any loss of consciousness chest pain.    Patient reportedly had syncope for years ago and had her right arm crushed requiring surgery. Patient was in the hospital in February and subsequently went to urgent care center March 6.  Patient has been dealing with knee pain.  Patient's arterial blood pressure is 143/65.  Results from 5/10/2019 revealed cholesterol 160 HDL 49 LDL 74, triglycerides 185.  Labs from 5/10/2019 reveal hemoglobin A1c of five-point 6 repeat on 2/15/2020 was 5.2.  Cholesterol 5/10/2019 was 160 HDL 49 triglycerides 185 LDL 74.  Patient was recently admitted labs from 2/18/2020 reveal proBNP of 1271.  Labs  from 3/6/2020 ER visit revealed normal CMP and negative flu  Carotid Dopplers 9/13/2019 normal    Assessment:  Shortness of breath, dyspnea on exertion  Aortic stenosis  Chronic congestive heart failure  due to valvular heart disease and aortic stenosis hypertensive cardiovascular disease essential hypertension/hypertensive cardiovascular disease   dyslipidemia  Prediabetes  Morbid obesity  Carotid bruit    Plan:    Reviewed labs with patient  Counseled on CHF care  Daily weights, fluid restrictions and avoiding NSAIDs   risk benefits alternatives explained  Patient has echo showing severe aortic stenosis but catheter showing moderate left ear we will continue to monitor  Reviewed   EKG results with patient and   Counseled on smoking cessation  Spent 21 minutes time with patient and family explaining CHF aortic stenosis carotid bruit, daily weights, fluid restriction.  counselled on diet exercise weight loss  May need to repeat echo in in few months we will follow-up and consider the same  Today's visit was done with telephone visit due to coronavirus pandemic.  Patient initially was going to video visit but had technical difficulties, for change to video visit   This visit has been rescheduled as a phone visit to comply with patient safety concerns in accordance with CDC recommendations. Total time of discussion was 21 minutes.        Assessment:          Diagnosis Plan   1. Hypertensive heart disease with chronic diastolic congestive heart failure (CMS/HCC)     2. Nonrheumatic aortic valve stenosis     3. Essential hypertension     4. Morbidly obese (CMS/[facility])     5. Prediabetes     6. Dyslipidemia     7. Dyspnea on exertion            Plan:         Past Medical History:  Past Medical History:   Diagnosis Date   • Acute congestive heart failure (CMS/[facility])    • Asthma    • Bipolar 1 disorder (CMS/HCC)    • COPD (chronic obstructive pulmonary disease) (CMS/[facility])    • Depression    • Dyslipidemia    •  Fibromyalgia    • Hypertension    • Hypothyroid    • IBS (irritable bowel syndrome)    • Migraines    • Morbid obesity (CMS/HCC)    • Neuropathy    • Sleep apnea    • Spinal stenosis    • Vertigo      Past Surgical History:  Past Surgical History:   Procedure Laterality Date   • CARDIAC CATHETERIZATION  2009   • CARDIAC CATHETERIZATION N/A 8/14/2019    Procedure: Left Heart Cath;  Surgeon: Jose Levi MD;  Location:  [facility] CATH INVASIVE LOCATION;  Service: Cardiovascular   • CARDIAC CATHETERIZATION N/A 8/14/2019    Procedure: Right Heart Cath;  Surgeon: Jose Levi MD;  Location:  [facility] CATH INVASIVE LOCATION;  Service: Cardiovascular   • CARDIAC CATHETERIZATION N/A 8/14/2019    Procedure: Aortic root aortogram;  Surgeon: Jose Levi MD;  Location: [facility] CATH INVAS[facility];  Service: Cardiovascular   • CYSTOCELE REPAIR  1984   • FOOT SURGERY     • GALLBLADDER SURGERY  2002   • VAGINAL HYSTERECTOMY  1972   • WRIST SURGERY  2011      Allergies:  Allergies   Allergen Reactions   • Adhesive Tape Unknown (See Comments)     hives   • Butorphanol Unknown (See Comments)     unknown   • Garlic Unknown (See Comments)   • Tetanus-Diphtheria Toxoids Td Unknown (See Comments)   • Aloe Itching   • Bee Pollen Unknown (See Comments)   • Latex Unknown (See Comments)   • Macadamia Nut Oil Unknown (See Comments)   • Tuberculin Unknown (See Comments)   • Wasp Venom Unknown (See Comments)     Home Meds:  Current Meds:     Current Outpatient Medications:   •  acetaminophen (TYLENOL) 325 MG tablet, Take 650 mg by mouth Every 6 (Six) Hours As Needed for Mild Pain ., Disp: , Rfl:   •  albuterol (PROVENTIL) (2.5 MG/3ML) 0.083% nebulizer solution, Inhale 2.5 mg Every 6 (Six) Hours As Needed., Disp: , Rfl:   •  albuterol sulfate HFA (PROVENTIL HFA) 108 (90 Base) MCG/ACT inhaler, Inhale 2 puffs., Disp: , Rfl:   •  aluminum-magnesium hydroxide-simethicone (MAALOX/MYLANTA) 200-200-20 MG/5ML  suspension, Take 15 mL by mouth Every 6 (Six) Hours As Needed for Indigestion or Heartburn., Disp: , Rfl:   •  aspirin 81 MG tablet, Take 81 mg by mouth Daily., Disp: , Rfl:   •  B Complex Vitamins (VITAMIN B COMPLEX) capsule capsule, Take 1 capsule by mouth Daily., Disp: , Rfl:   •  Calcium Carbonate ([facility] CALCIUM) 1500 (600 Ca) MG tablet, Take 600 mg by mouth Daily., Disp: , Rfl:   •  Cholecalciferol (VITAMIN D-1000 MAX ST) 1000 units tablet, Take 2,000 Units by mouth Daily., Disp: , Rfl:   •  diphenhydrAMINE (BENADRYL) 50 MG capsule, Take 50 mg by mouth every night at bedtime., Disp: , Rfl:   •  FLUoxetine (PROzac) 60 MG tablet, Take 60 mg by mouth Daily., Disp: , Rfl:   •  fluticasone (FLONASE) 50 MCG/ACT nasal spray, 1 spray into the nostril(s) as directed by provider Daily., Disp: , Rfl:   •  furosemide (LASIX) 40 MG tablet, Take 40 mg by mouth Daily., Disp: , Rfl:   •  gabapentin (NEURONTIN) 400 MG capsule, Take 400 mg by mouth 3 (Three) Times a Day., Disp: , Rfl:   •  levothyroxine (SYNTHROID, LEVOTHROID) 100 MCG tablet, Take 100 mcg by mouth Daily., Disp: , Rfl:   •  meclizine (ANTIVERT) 25 MG tablet, Take 25 mg by mouth 3 (Three) Times a Day As Needed., Disp: , Rfl:   •  metoprolol succinate XL (TOPROL-XL) 25 MG 24 hr tablet, Take 1 tablet by mouth Daily., Disp: 30 tablet, Rfl: 11  •  Multiple Vitamins-Minerals (MULTIVITAMIN WITH MINERALS) tablet tablet, Take 1 tablet by mouth Daily., Disp: , Rfl:   •  pantoprazole (PROTONIX) 40 MG EC tablet, Take 40 mg by mouth Daily., Disp: , Rfl:   •  polyethylene glycol (MIRALAX) powder, Take 17 g by mouth Daily As Needed., Disp: , Rfl:   •  potassium chloride (KLOR-CON) 20 MEQ CR tablet, Take 20 mEq by mouth 2 (Two) Times a Day., Disp: , Rfl:   •  promethazine (PHENERGAN) 25 MG tablet, Take 25 mg by mouth Every 6 (Six) Hours As Needed for Nausea or Vomiting., Disp: , Rfl:   •  rizatriptan MLT (MAXALT-MLT) 10 MG disintegrating tablet, Take 10 mg by mouth 1 (One) Time As  Needed for Migraine. May repeat in 2 hours if needed, Disp: , Rfl:   •  simvastatin (ZOCOR) 20 MG tablet, Take 20 mg by mouth Daily., Disp: , Rfl:   •  tiZANidine (ZANAFLEX) 4 MG tablet, Take 2 mg by mouth Every 8 (Eight) Hours As Needed for Muscle Spasms., Disp: , Rfl:   •  umeclidinium-vilanterol (ANORO ELLIPTA) 62.5-25 MCG/INH aerosol powder  inhaler, Inhale 1 puff Daily., Disp: , Rfl:   •  vitamin B-12 (CYANOCOBALAMIN) 1000 MCG tablet, Take 1,000 mcg by mouth Daily., Disp: , Rfl:   Social History:   Social History     Tobacco Use   • Smoking status: Current Every Day Smoker     Types: Cigarettes   • Smokeless tobacco: Never Used   • Tobacco comment: patient is trying to quit, has patch on    Substance Use Topics   • Alcohol use: No     Frequency: Never      Family History:  No family history on file.     The following portions of the patient's history were reviewed and updated as appropriate: allergies, current medications, past family history, past medical history, past social history, past surgical history and problem list.      Review of Systems   Constitution: Negative for weight gain and weight loss.   HENT: Negative for nosebleeds.    Cardiovascular: Positive for leg swelling. Negative for chest pain, dyspnea on exertion, near-syncope, palpitations and syncope.   Respiratory: Positive for shortness of breath. Negative for cough and hemoptysis.    Endocrine: Negative for cold intolerance, heat intolerance, polydipsia and polyphagia.   Hematologic/Lymphatic: Does not bruise/bleed easily.   Skin: Negative for rash.   Musculoskeletal: Negative for arthritis and back pain.   Gastrointestinal: Negative for diarrhea, hematochezia, melena, nausea and vomiting.   Genitourinary: Negative for dysuria and hematuria.   Neurological: Positive for dizziness. Negative for numbness and seizures.   Psychiatric/Behavioral: Negative for altered mental status.     Comprehensive review of systems were reviewed and all others  "review of systems were found to be negative other than HPI    Procedures EKG done 2/15/2020 reviewed by me shows sinus rhythm at the rate of 63 bpm with PACs       Objective:     Physical Exam  /65 (BP Location: Left arm, Patient Position: Sitting, Cuff Size: Large Adult)   Pulse 61   Ht 165.1 cm (65\")   Wt 132 kg (290 lb)   BMI 48.26 kg/m²       Lab Reviewed:                  "

## 2020-05-26 RX ORDER — METOPROLOL SUCCINATE 25 MG/1
TABLET, EXTENDED RELEASE ORAL
Qty: 90 TABLET | Refills: 1 | Status: SHIPPED | OUTPATIENT
Start: 2020-05-26 | End: 2020-11-17

## 2020-11-17 RX ORDER — METOPROLOL SUCCINATE 25 MG/1
TABLET, EXTENDED RELEASE ORAL
Qty: 90 TABLET | Refills: 1 | Status: ON HOLD | OUTPATIENT
Start: 2020-11-17 | End: 2021-01-20

## 2020-12-04 ENCOUNTER — OFFICE VISIT (OUTPATIENT)
Dept: CARDIOLOGY | Facility: CLINIC | Age: 70
End: 2020-12-04

## 2020-12-04 VITALS
WEIGHT: 292 LBS | DIASTOLIC BLOOD PRESSURE: 80 MMHG | SYSTOLIC BLOOD PRESSURE: 151 MMHG | TEMPERATURE: 96.9 F | BODY MASS INDEX: 48.65 KG/M2 | HEART RATE: 63 BPM | HEIGHT: 65 IN | OXYGEN SATURATION: 95 %

## 2020-12-04 DIAGNOSIS — R09.89 BILATERAL CAROTID BRUITS: ICD-10-CM

## 2020-12-04 DIAGNOSIS — E66.01 MORBIDLY OBESE (HCC): ICD-10-CM

## 2020-12-04 DIAGNOSIS — R73.03 PREDIABETES: ICD-10-CM

## 2020-12-04 DIAGNOSIS — I35.0 NONRHEUMATIC AORTIC VALVE STENOSIS: ICD-10-CM

## 2020-12-04 DIAGNOSIS — I11.0 HYPERTENSIVE HEART DISEASE WITH CHRONIC DIASTOLIC CONGESTIVE HEART FAILURE (HCC): ICD-10-CM

## 2020-12-04 DIAGNOSIS — I50.32 HYPERTENSIVE HEART DISEASE WITH CHRONIC DIASTOLIC CONGESTIVE HEART FAILURE (HCC): ICD-10-CM

## 2020-12-04 DIAGNOSIS — E78.5 DYSLIPIDEMIA: ICD-10-CM

## 2020-12-04 DIAGNOSIS — I50.32 CHRONIC HEART FAILURE WITH PRESERVED EJECTION FRACTION (HFPEF) (HCC): Primary | ICD-10-CM

## 2020-12-04 DIAGNOSIS — I10 ESSENTIAL HYPERTENSION: ICD-10-CM

## 2020-12-04 PROCEDURE — 99214 OFFICE O/P EST MOD 30 MIN: CPT | Performed by: INTERNAL MEDICINE

## 2020-12-04 PROCEDURE — 93000 ELECTROCARDIOGRAM COMPLETE: CPT | Performed by: INTERNAL MEDICINE

## 2020-12-04 NOTE — PROGRESS NOTES
Subjective:     Encounter Date:12/04/2020      Patient ID: Claudia Rust is a 70 y.o. female.    Chief Complaint : Follow-up for hypertensive cardiovascular disease and CHF and AS.  History of Present Illness      This is a 70-year-old with PMH of    #Valvular heart disease with moderate to severe aortic stenosis  Cardiac cath 8/14/2019 revealing moderate left   with elevated LVEDP, moderate pulmonary hypertension mildly dilated proximal aorta  # Hyperlipidemia,  Hypertension  #COPD, KARLY on CPAP  #Prediabetes  # Hypothyroidism, Rheumatoid Arthritis, spinal stenosis, hiatal hernia, chronic depression, bipolar, GERD, IBS, chronic migraine, vertigo, herniated disc, compression lumbar fractures  #Allergies/intolerance to Stadol  #Hysterectomy, bladder reconstruction, cholecystectomy, right hand surgery  #Family history of strokes and grandfather.  Mother's cancer, father on  # Active cigarette smoker trying to quit       Here for Follow-up.  Patient is complaining of dyspnea on exertion relieved with rest with no aggravating or relieving factors.  Has leg edema has gained 7 to 8 pounds.  Patient's arterial blood pressure is 151/80, heart rate 63, O2 sat of 95% on room air.  Results from 5/10/2019 revealed cholesterol 160 HDL 49 LDL 74, triglycerides 185.  Labs from 5/10/2019 reveal hemoglobin A1c of five-point 6 repeat on 2/15/2020 was 5.2.  Cholesterol 5/10/2019 was 160 HDL 49 triglycerides 185 LDL 74.  labs from 2/18/2020 reveal proBNP of 1271.  Labs 5/29/2020 revealed normal CBC Chem-7, A1c 5.6, cholesterol 154, triglycerides 110, HDL 50, LDL 84.  BNP from 2/18/2020 was elevated at 1271.  Carotid Dopplers 9/13/2019 normal    Assessment:  Shortness of breath, dyspnea on exertion  Aortic stenosis  Chronic congestive heart failure  due to valvular heart disease and aortic stenosis hypertensive cardiovascular disease essential hypertension/hypertensive cardiovascular disease   dyslipidemia  Prediabetes  Morbid  obesity  Carotid bruit    Plan:    Reviewed EKG and lab results with patient  We will check an echocardiogram to assess failure and aortic stenosis.  Reviewed CHF care with patient including daily weights fluid restrictions and avoiding NSAIDs.  Counseled on smoking cessation.  We will continue medical management with aspirin, furosemide, ACL, metoprolol, simvastatin as tolerated  We will check BNP level    Assessment:          Diagnosis Plan   1. Chronic heart failure with preserved ejection fraction (HFpEF) (CMS/[facility])  Adult Transthoracic Echo Complete W/ Cont if Necessary Per Protocol    BNP   2. Hypertensive heart disease with chronic diastolic congestive heart failure (CMS/[facility])  Adult Transthoracic Echo Complete W/ Cont if Necessary Per Protocol    BNP   3. Nonrheumatic aortic valve stenosis  Adult Transthoracic Echo Complete W/ Cont if Necessary Per Protocol    BNP   4. Essential hypertension  Adult Transthoracic Echo Complete W/ Cont if Necessary Per Protocol    BNP   5. Morbidly obese (CMS/[facility])  Adult Transthoracic Echo Complete W/ Cont if Necessary Per Protocol    BNP   6. Prediabetes  Adult Transthoracic Echo Complete W/ Cont if Necessary Per Protocol    BNP   7. Dyslipidemia  Adult Transthoracic Echo Complete W/ Cont if Necessary Per Protocol    BNP   8. Bilateral carotid bruits  Adult Transthoracic Echo Complete W/ Cont if Necessary Per Protocol    BNP          Plan:         Past Medical History:  Past Medical History:   Diagnosis Date   • Acute congestive heart failure (CMS/[facility])    • Asthma    • Bipolar 1 disorder (CMS/[facility])    • COPD (chronic obstructive pulmonary disease) (CMS/[facility])    • Depression    • Dyslipidemia    • Fibromyalgia    • Hypertension    • Hypothyroid    • IBS (irritable bowel syndrome)    • Migraines    • Morbid obesity (CMS/[facility])    • Neuropathy    • Sleep apnea    • Spinal stenosis    • Vertigo      Past Surgical History:  Past Surgical History:   Procedure Laterality Date   • CARDIAC  CATHETERIZATION  2009   • CARDIAC CATHETERIZATION N/A 8/14/2019    Procedure: Left Heart Cath;  Surgeon: Jose Levi MD;  Location: [facility] CATH INVASIVE LOCATION;  Service: Cardiovascular   • CARDIAC CATHETERIZATION N/A 8/14/2019    Procedure: Right Heart Cath;  Surgeon: Jose Levi MD;  Location: [facility] CATH INVASIVE LOCATION;  Service: Cardiovascular   • CARDIAC CATHETERIZATION N/A 8/14/2019    Procedure: Aortic root aortogram;  Surgeon: Jose Levi MD;  Location: [facility] CATH INVASIVE LOCATION;  Service: Cardiovascular   • CYSTOCELE REPAIR  1984   • FOOT SURGERY     • GALLBLADDER SURGERY  2002   • VAGINAL HYSTERECTOMY  1972   • WRIST SURGERY  2011      Allergies:  Allergies   Allergen Reactions   • Adhesive Tape Unknown (See Comments)     hives   • Butorphanol Unknown (See Comments)     unknown   • Garlic Unknown (See Comments)   • Tetanus-Diphtheria Toxoids Td Unknown (See Comments)   • Aloe Itching   • Bee Pollen Unknown (See Comments)   • Latex Unknown (See Comments)   • Macadamia Nut Oil Unknown (See Comments)   • Tuberculin Unknown (See Comments)   • Wasp Venom Unknown (See Comments)     Home Meds:  Current Meds:     Current Outpatient Medications:   •  acetaminophen (TYLENOL) 325 MG tablet, Take 650 mg by mouth Every 6 (Six) Hours As Needed for Mild Pain ., Disp: , Rfl:   •  albuterol (PROVENTIL) (2.5 MG/3ML) 0.083% nebulizer solution, Inhale 2.5 mg Every 6 (Six) Hours As Needed., Disp: , Rfl:   •  aluminum-magnesium hydroxide-simethicone (MAALOX/MYLANTA) 200-200-20 MG/5ML suspension, Take 15 mL by mouth Every 6 (Six) Hours As Needed for Indigestion or Heartburn., Disp: , Rfl:   •  aspirin 81 MG tablet, Take 81 mg by mouth Daily., Disp: , Rfl:   •  B Complex Vitamins (VITAMIN B COMPLEX) capsule capsule, Take 1 capsule by mouth Daily., Disp: , Rfl:   •  Calcium Carbonate ([employer] CALCIUM) 1500 (600 Ca) MG tablet, Take 600 mg by mouth Daily., Disp: , Rfl:   •   Cholecalciferol (VITAMIN D-1000 MAX ST) 1000 units tablet, Take 2,000 Units by mouth Daily., Disp: , Rfl:   •  diphenhydrAMINE (BENADRYL) 50 MG capsule, Take 50 mg by mouth every night at bedtime., Disp: , Rfl:   •  FLUoxetine (PROzac) 60 MG tablet, Take 60 mg by mouth Daily., Disp: , Rfl:   •  fluticasone (FLONASE) 50 MCG/ACT nasal spray, 1 spray into the nostril(s) as directed by provider Daily., Disp: , Rfl:   •  furosemide (LASIX) 40 MG tablet, Take 40 mg by mouth Daily., Disp: , Rfl:   •  gabapentin (NEURONTIN) 600 MG tablet, Take 400 mg by mouth 3 (Three) Times a Day., Disp: , Rfl:   •  levothyroxine (SYNTHROID, LEVOTHROID) 100 MCG tablet, Take 100 mcg by mouth Daily., Disp: , Rfl:   •  meclizine (ANTIVERT) 25 MG tablet, Take 25 mg by mouth 3 (Three) Times a Day As Needed., Disp: , Rfl:   •  Multiple Vitamins-Minerals (MULTIVITAMIN WITH MINERALS) tablet tablet, Take 1 tablet by mouth Daily., Disp: , Rfl:   •  pantoprazole (PROTONIX) 40 MG EC tablet, Take 40 mg by mouth Daily., Disp: , Rfl:   •  potassium chloride (KLOR-CON) 20 MEQ CR tablet, Take 20 mEq by mouth 2 (Two) Times a Day., Disp: , Rfl:   •  promethazine (PHENERGAN) 25 MG tablet, Take 25 mg by mouth Every 6 (Six) Hours As Needed for Nausea or Vomiting., Disp: , Rfl:   •  rizatriptan MLT (MAXALT-MLT) 10 MG disintegrating tablet, Take 10 mg by mouth 1 (One) Time As Needed for Migraine. May repeat in 2 hours if needed, Disp: , Rfl:   •  simvastatin (ZOCOR) 20 MG tablet, Take 20 mg by mouth Daily., Disp: , Rfl:   •  tiZANidine (ZANAFLEX) 4 MG tablet, Take 2 mg by mouth Every 8 (Eight) Hours As Needed for Muscle Spasms., Disp: , Rfl:   •  vitamin B-12 (CYANOCOBALAMIN) 1000 MCG tablet, Take 1,000 mcg by mouth Daily., Disp: , Rfl:   •  metoprolol succinate XL (TOPROL-XL) 25 MG 24 hr tablet, TAKE 1 TABLET BY MOUTH EVERY DAY, Disp: 90 tablet, Rfl: 1  •  polyethylene glycol (MIRALAX) powder, Take 17 g by mouth Daily As Needed., Disp: , Rfl:   Social History:  "  Social History     Tobacco Use   • Smoking status: Current Every Day Smoker     Types: Cigarettes   • Smokeless tobacco: Never Used   Substance Use Topics   • Alcohol use: No     Frequency: Never      Family History:  History reviewed. No pertinent family history.     The following portions of the patient's history were reviewed and updated as appropriate: allergies, current medications, past family history, past medical history, past social history, past surgical history and problem list.      Review of Systems   Constitution: Negative for malaise/fatigue.   Cardiovascular: Negative for chest pain, leg swelling and palpitations.   Respiratory: Negative for shortness of breath.    Skin: Negative for rash.   Neurological: Negative for dizziness, light-headedness and numbness.     All other systems are negative      ECG 12 Lead    Date/Time: 12/4/2020 3:23 PM  Performed by: Jose Levi MD  Authorized by: Jose Levi MD   Comparison: compared with previous ECG from 2/15/2020  Comparison to previous ECG: EKG done today reviewed by me shows sinus rhythm with rate of 61 bpm with nonspecific ST-T changes, no new change compared to EKG from 2/15/2020                 Objective:     Physical Exam  /80   Pulse 63   Temp 96.9 °F (36.1 °C)   Ht 165.1 cm (65\")   Wt 132 kg (292 lb)   SpO2 95%   BMI 48.59 kg/m²   General:  Appears in no acute distress pleasant obese  Eyes: Sclera is anicteric,  conjunctiva is clear   HEENT:  No JVD. Thyroid not visibly enlarged. No mucosal pallor or cyanosis  Respiratory: Respirations regular and unlabored at rest.  There to auscultation  Cardiovascular: S1,S2 Regular rate and rhythm.  3/6 systolic ejection murmur at the base, no  rub or gallop auscultated. No pretibial pitting edema  Gastrointestinal: Abdomen obese, soft  Musculoskeletal:  No abnormal movements  Extremities: No digital clubbing or cyanosis  Skin: Color pink. Skin warm and dry to touch. No " rashes  No xanthoma  Neuro: Alert and awake, no lateralizing deficits appreciated  Physical exam reviewed    Lab Reviewed:

## 2020-12-11 ENCOUNTER — HOSPITAL ENCOUNTER (OUTPATIENT)
Dept: CARDIOLOGY | Facility: HOSPITAL | Age: 70
Discharge: HOME | End: 2020-12-11
Admitting: INTERNAL MEDICINE

## 2020-12-11 ENCOUNTER — TELEPHONE (OUTPATIENT)
Dept: CARDIOLOGY | Facility: CLINIC | Age: 70
End: 2020-12-11

## 2020-12-11 VITALS
SYSTOLIC BLOOD PRESSURE: 130 MMHG | HEIGHT: 65 IN | BODY MASS INDEX: 48.65 KG/M2 | WEIGHT: 292 LBS | DIASTOLIC BLOOD PRESSURE: 75 MMHG

## 2020-12-11 DIAGNOSIS — R09.89 BILATERAL CAROTID BRUITS: ICD-10-CM

## 2020-12-11 DIAGNOSIS — I35.0 NONRHEUMATIC AORTIC VALVE STENOSIS: ICD-10-CM

## 2020-12-11 DIAGNOSIS — E78.5 DYSLIPIDEMIA: ICD-10-CM

## 2020-12-11 DIAGNOSIS — I10 ESSENTIAL HYPERTENSION: ICD-10-CM

## 2020-12-11 DIAGNOSIS — I11.0 HYPERTENSIVE HEART DISEASE WITH CHRONIC DIASTOLIC CONGESTIVE HEART FAILURE (HCC): ICD-10-CM

## 2020-12-11 DIAGNOSIS — I50.32 HYPERTENSIVE HEART DISEASE WITH CHRONIC DIASTOLIC CONGESTIVE HEART FAILURE (HCC): ICD-10-CM

## 2020-12-11 DIAGNOSIS — R73.03 PREDIABETES: ICD-10-CM

## 2020-12-11 DIAGNOSIS — I50.32 CHRONIC HEART FAILURE WITH PRESERVED EJECTION FRACTION (HFPEF) (HCC): ICD-10-CM

## 2020-12-11 DIAGNOSIS — E66.01 MORBIDLY OBESE (HCC): ICD-10-CM

## 2020-12-11 LAB
BH CV ECHO MEAS - AI DEC SLOPE: 281.5 CM/SEC^2
BH CV ECHO MEAS - AI DEC TIME: 1.5 SEC
BH CV ECHO MEAS - AI MAX PG: 67.7 MMHG
BH CV ECHO MEAS - AI MAX VEL: 411.2 CM/SEC
BH CV ECHO MEAS - AI P1/2T: 427.9 MSEC
BH CV ECHO MEAS - AO MAX PG (FULL): 71.4 MMHG
BH CV ECHO MEAS - AO MAX PG: 74.2 MMHG
BH CV ECHO MEAS - AO MEAN PG (FULL): 35.6 MMHG
BH CV ECHO MEAS - AO MEAN PG: 37.1 MMHG
BH CV ECHO MEAS - AO ROOT AREA (BSA CORRECTED): 1.1
BH CV ECHO MEAS - AO ROOT AREA: 4.9 CM^2
BH CV ECHO MEAS - AO ROOT DIAM: 2.5 CM
BH CV ECHO MEAS - AO V2 MAX: 429.3 CM/SEC
BH CV ECHO MEAS - AO V2 MEAN: 287.9 CM/SEC
BH CV ECHO MEAS - AO V2 VTI: 108.6 CM
BH CV ECHO MEAS - ASC AORTA: 3 CM
BH CV ECHO MEAS - AVA(I,A): 0.61 CM^2
BH CV ECHO MEAS - AVA(I,D): 0.61 CM^2
BH CV ECHO MEAS - AVA(V,A): 0.51 CM^2
BH CV ECHO MEAS - AVA(V,D): 0.51 CM^2
BH CV ECHO MEAS - BSA(HAYCOCK): 2.5 M^2
BH CV ECHO MEAS - BSA: 2.3 M^2
BH CV ECHO MEAS - BZI_BMI: 48.6 KILOGRAMS/M^2
BH CV ECHO MEAS - BZI_METRIC_HEIGHT: 165.1 CM
BH CV ECHO MEAS - BZI_METRIC_WEIGHT: 132.4 KG
BH CV ECHO MEAS - EDV(CUBED): 113 ML
BH CV ECHO MEAS - EDV(MOD-SP4): 33.7 ML
BH CV ECHO MEAS - EDV(TEICH): 109.3 ML
BH CV ECHO MEAS - EF(CUBED): 90.6 %
BH CV ECHO MEAS - EF(MOD-SP4): 72.4 %
BH CV ECHO MEAS - EF(TEICH): 85.2 %
BH CV ECHO MEAS - ESV(CUBED): 10.7 ML
BH CV ECHO MEAS - ESV(MOD-SP4): 9.3 ML
BH CV ECHO MEAS - ESV(TEICH): 16.2 ML
BH CV ECHO MEAS - FS: 54.5 %
BH CV ECHO MEAS - IVS/LVPW: 1.3
BH CV ECHO MEAS - IVSD: 1.4 CM
BH CV ECHO MEAS - LA DIMENSION: 4.8 CM
BH CV ECHO MEAS - LA/AO: 1.9
BH CV ECHO MEAS - LV DIASTOLIC VOL/BSA (35-75): 14.5 ML/M^2
BH CV ECHO MEAS - LV MASS(C)D: 224 GRAMS
BH CV ECHO MEAS - LV MASS(C)DI: 96.4 GRAMS/M^2
BH CV ECHO MEAS - LV MAX PG: 2.8 MMHG
BH CV ECHO MEAS - LV MEAN PG: 1.5 MMHG
BH CV ECHO MEAS - LV SYSTOLIC VOL/BSA (12-30): 4 ML/M^2
BH CV ECHO MEAS - LV V1 MAX: 84 CM/SEC
BH CV ECHO MEAS - LV V1 MEAN: 57.3 CM/SEC
BH CV ECHO MEAS - LV V1 VTI: 25.6 CM
BH CV ECHO MEAS - LVIDD: 4.8 CM
BH CV ECHO MEAS - LVIDS: 2.2 CM
BH CV ECHO MEAS - LVOT AREA: 2.6 CM^2
BH CV ECHO MEAS - LVOT DIAM: 1.8 CM
BH CV ECHO MEAS - LVPWD: 1 CM
BH CV ECHO MEAS - MV MAX PG: 8.6 MMHG
BH CV ECHO MEAS - MV MEAN PG: 2.6 MMHG
BH CV ECHO MEAS - MV V2 MAX: 146.8 CM/SEC
BH CV ECHO MEAS - MV V2 MEAN: 73 CM/SEC
BH CV ECHO MEAS - MV V2 VTI: 35.9 CM
BH CV ECHO MEAS - MVA(VTI): 1.8 CM^2
BH CV ECHO MEAS - RAP SYSTOLE: 3 MMHG
BH CV ECHO MEAS - RVDD: 2.8 CM
BH CV ECHO MEAS - RVSP: 36.8 MMHG
BH CV ECHO MEAS - SI(AO): 228.4 ML/M^2
BH CV ECHO MEAS - SI(CUBED): 44 ML/M^2
BH CV ECHO MEAS - SI(LVOT): 28.5 ML/M^2
BH CV ECHO MEAS - SI(MOD-SP4): 10.5 ML/M^2
BH CV ECHO MEAS - SI(TEICH): 40.1 ML/M^2
BH CV ECHO MEAS - SV(AO): 530.6 ML
BH CV ECHO MEAS - SV(CUBED): 102.3 ML
BH CV ECHO MEAS - SV(LVOT): 66.2 ML
BH CV ECHO MEAS - SV(MOD-SP4): 24.4 ML
BH CV ECHO MEAS - SV(TEICH): 93.1 ML
BH CV ECHO MEAS - TR MAX VEL: 289.6 CM/SEC
MAXIMAL PREDICTED HEART RATE: 150 BPM
STRESS TARGET HR: 128 BPM

## 2020-12-11 PROCEDURE — 93306 TTE W/DOPPLER COMPLETE: CPT | Performed by: INTERNAL MEDICINE

## 2020-12-11 PROCEDURE — 93306 TTE W/DOPPLER COMPLETE: CPT

## 2020-12-17 ENCOUNTER — OFFICE VISIT (OUTPATIENT)
Dept: CARDIOLOGY | Facility: CLINIC | Age: 70
End: 2020-12-17

## 2020-12-17 VITALS
HEIGHT: 65 IN | OXYGEN SATURATION: 97 % | TEMPERATURE: 97.1 F | DIASTOLIC BLOOD PRESSURE: 82 MMHG | WEIGHT: 291 LBS | BODY MASS INDEX: 48.48 KG/M2 | SYSTOLIC BLOOD PRESSURE: 133 MMHG | HEART RATE: 65 BPM

## 2020-12-17 DIAGNOSIS — E78.5 DYSLIPIDEMIA: ICD-10-CM

## 2020-12-17 DIAGNOSIS — I35.0 AORTIC STENOSIS, SEVERE: Primary | ICD-10-CM

## 2020-12-17 DIAGNOSIS — E66.01 MORBIDLY OBESE (HCC): ICD-10-CM

## 2020-12-17 DIAGNOSIS — I50.32 CHRONIC HEART FAILURE WITH PRESERVED EJECTION FRACTION (HFPEF) (HCC): ICD-10-CM

## 2020-12-17 DIAGNOSIS — I10 ESSENTIAL HYPERTENSION: ICD-10-CM

## 2020-12-17 DIAGNOSIS — I25.119 CORONARY ARTERY DISEASE INVOLVING NATIVE CORONARY ARTERY OF NATIVE HEART WITH ANGINA PECTORIS (HCC): ICD-10-CM

## 2020-12-17 DIAGNOSIS — R73.03 PREDIABETES: ICD-10-CM

## 2020-12-17 PROCEDURE — 99214 OFFICE O/P EST MOD 30 MIN: CPT | Performed by: INTERNAL MEDICINE

## 2020-12-17 NOTE — PROGRESS NOTES
Subjective:     Encounter Date:12/17/2020      Patient ID: Claudia Rust is a 70 y.o. female.    Chief Complaint : Follow-up for abnormal echo, history of CHF and aortic stenosis  History of Present Illness        This is a 70-year-old with PMH of    #Valvular heart disease with moderate to severe aortic stenosis  Cardiac cath 8/14/2019 revealing moderate left   with elevated LVEDP, moderate pulmonary hypertension mildly dilated proximal aorta  # Hyperlipidemia,  Hypertension  #COPD, KARLY on CPAP  #Prediabetes  # Hypothyroidism, Rheumatoid Arthritis, spinal stenosis, hiatal hernia, chronic depression, bipolar, GERD, IBS, chronic migraine, vertigo, herniated disc, compression lumbar fractures  #Allergies/intolerance to Stadol  #Hysterectomy, bladder reconstruction, cholecystectomy, right hand surgery  #Family history of strokes and grandfather.  Mother's cancer, father on  # Active cigarette smoker trying to quit       Here for Follow-up.  Patient was recently seen with a complaint of dyspnea on exertion edema and weight gain.  Underwent an echocardiogram which is revealing severe aortic stenosis on 2/11/2020 is here for follow-up.  Patient's arterial blood pressure is 133/82, heart rate 65, O2 sat of 97% on room air.  Results from 5/10/2019 revealed cholesterol 160 HDL 49 LDL 74, triglycerides 185.  Labs from 5/10/2019 reveal hemoglobin A1c of five-point 6 repeat on 2/15/2020 was 5.2.  Cholesterol 5/10/2019 was 160 HDL 49 triglycerides 185 LDL 74.  labs from 2/18/2020 reveal proBNP of 1271.  Labs 5/29/2020 revealed normal CBC Chem-7, A1c 5.6, cholesterol 154, triglycerides 110, HDL 50, LDL 84.  BNP from 2/18/2020 was elevated at 1271.  12/4/2020 reveal hemoglobin A1c of 5.7, cholesterol 180, triglycerides 257, HDL 42, LDL 87, CMP with a glucose of 129  Carotid Dopplers 9/13/2019 normal.  Echocardiogram 2/11/2020 is revealing severe aortic stenosis    Assessment:  Shortness of breath, dyspnea on exertion  Aortic  stenosis, severe  Chronic congestive heart failure  due to valvular heart disease and aortic stenosis hypertensive cardiovascular disease essential hypertension/hypertensive cardiovascular disease   dyslipidemia  Prediabetes  Morbid obesity  Carotid bruit    Plan:    Reviewed echo results with patient and family.  We will schedule coronary arteriogram and CT surgery consultation for valve replacement surgery.  Reviewed CHF care with patient including daily weights fluid restrictions and avoiding NSAIDs.  Counseled on smoking cessation.  We will continue medical management with aspirin, furosemide, ACL, metoprolol, simvastatin as tolerated  Discussed at length with patient and family about aortic stenosis, various therapeutic options including surgery.        Assessment:          Diagnosis Plan   1. Aortic stenosis, severe  COVID PRE-OP / PRE-PROCEDURE SCREENING ORDER (NO ISOLATION) - Swab, Nasopharynx    Case Request Cath Lab: Left Heart Cath    CBC (No Diff)    Basic Metabolic Panel    Protime-INR    XR Chest 2 View    Ambulatory Referral to Cardiothoracic Surgery   2. Chronic heart failure with preserved ejection fraction (HFpEF) (CMS/HCC)  COVID PRE-OP / PRE-PROCEDURE SCREENING ORDER (NO ISOLATION) - Swab, Nasopharynx    Case Request Cath Lab: Left Heart Cath    CBC (No Diff)    Basic Metabolic Panel    Protime-INR    XR Chest 2 View   3. Essential hypertension  COVID PRE-OP / PRE-PROCEDURE SCREENING ORDER (NO ISOLATION) - Swab, Nasopharynx    Case Request Cath Lab: Left Heart Cath    CBC (No Diff)    Basic Metabolic Panel    Protime-INR    XR Chest 2 View   4. Morbidly obese (CMS/HCC)  COVID PRE-OP / PRE-PROCEDURE SCREENING ORDER (NO ISOLATION) - Swab, Nasopharynx    Case Request Cath Lab: Left Heart Cath    CBC (No Diff)    Basic Metabolic Panel    Protime-INR    XR Chest 2 View   5. Prediabetes  COVID PRE-OP / PRE-PROCEDURE SCREENING ORDER (NO ISOLATION) - Swab, Nasopharynx    Case Request Cath Lab: Left Heart  Cath    CBC (No Diff)    Basic Metabolic Panel    Protime-INR    XR Chest 2 View   6. Dyslipidemia  COVID PRE-OP / PRE-PROCEDURE SCREENING ORDER (NO ISOLATION) - Swab, Nasopharynx    Case Request Cath Lab: Left Heart Cath    CBC (No Diff)    Basic Metabolic Panel    Protime-INR    XR Chest 2 View   7. Coronary artery disease involving native coronary artery of native heart with angina pectoris (CMS/[facility])            Plan:         Past Medical History:  Past Medical History:   Diagnosis Date   • Acute congestive heart failure (CMS/[facility])    • Asthma    • Bipolar 1 disorder (CMS/[facility])    • COPD (chronic obstructive pulmonary disease) (CMS/[facility])    • Depression    • Dyslipidemia    • Fibromyalgia    • Hypertension    • Hypothyroid    • IBS (irritable bowel syndrome)    • Migraines    • Morbid obesity (CMS/[facility])    • Neuropathy    • Sleep apnea    • Spinal stenosis    • Vertigo      Past Surgical History:  Past Surgical History:   Procedure Laterality Date   • CARDIAC CATHETERIZATION  2009   • CARDIAC CATHETERIZATION N/A 8/14/2019    Procedure: Left Heart Cath;  Surgeon: Jose Levi MD;  Location: [facility] CATH INVASIVE LOCATION;  Service: Cardiovascular   • CARDIAC CATHETERIZATION N/A 8/14/2019    Procedure: Right Heart Cath;  Surgeon: Jose Levi MD;  Location: [facility] CATH INVASIVE LOCATION;  Service: Cardiovascular   • CARDIAC CATHETERIZATION N/A 8/14/2019    Procedure: Aortic root aortogram;  Surgeon: Jose Levi MD;  Location: [facility] CATH INVASIVE LOCATION;  Service: Cardiovascular   • CYSTOCELE REPAIR  1984   • FOOT SURGERY     • GALLBLADDER SURGERY  2002   • VAGINAL HYSTERECTOMY  1972   • WRIST SURGERY  2011      Allergies:  Allergies   Allergen Reactions   • Adhesive Tape Unknown (See Comments)     hives   • Butorphanol Unknown (See Comments)     unknown   • Garlic Unknown (See Comments)   • Tetanus-Diphtheria Toxoids Td Unknown (See Comments)   • Aloe Itching   • Bee Pollen  Unknown (See Comments)   • Latex Unknown (See Comments)   • Macadamia Nut Oil Unknown (See Comments)   • Tuberculin Unknown (See Comments)   • Wasp Venom Unknown (See Comments)     Home Meds:  Current Meds:     Current Outpatient Medications:   •  acetaminophen (TYLENOL) 325 MG tablet, Take 650 mg by mouth Every 6 (Six) Hours As Needed for Mild Pain ., Disp: , Rfl:   •  albuterol (PROVENTIL) (2.5 MG/3ML) 0.083% nebulizer solution, Inhale 2.5 mg Every 6 (Six) Hours As Needed., Disp: , Rfl:   •  aluminum-magnesium hydroxide-simethicone (MAALOX/MYLANTA) 200-200-20 MG/5ML suspension, Take 15 mL by mouth Every 6 (Six) Hours As Needed for Indigestion or Heartburn., Disp: , Rfl:   •  aspirin 81 MG tablet, Take 81 mg by mouth Daily., Disp: , Rfl:   •  B Complex Vitamins (VITAMIN B COMPLEX) capsule capsule, Take 1 capsule by mouth Daily., Disp: , Rfl:   •  Calcium Carbonate (CVS CALCIUM) 1500 (600 Ca) MG tablet, Take 600 mg by mouth Daily., Disp: , Rfl:   •  Cholecalciferol (VITAMIN D-1000 MAX ST) 1000 units tablet, Take 2,000 Units by mouth Daily., Disp: , Rfl:   •  diphenhydrAMINE (BENADRYL) 50 MG capsule, Take 50 mg by mouth every night at bedtime., Disp: , Rfl:   •  FLUoxetine (PROzac) 60 MG tablet, Take 60 mg by mouth Daily., Disp: , Rfl:   •  fluticasone (FLONASE) 50 MCG/ACT nasal spray, 1 spray into the nostril(s) as directed by provider Daily., Disp: , Rfl:   •  furosemide (LASIX) 40 MG tablet, Take 40 mg by mouth Daily., Disp: , Rfl:   •  gabapentin (NEURONTIN) 600 MG tablet, Take 400 mg by mouth 3 (Three) Times a Day., Disp: , Rfl:   •  levothyroxine (SYNTHROID, LEVOTHROID) 100 MCG tablet, Take 100 mcg by mouth Daily., Disp: , Rfl:   •  meclizine (ANTIVERT) 25 MG tablet, Take 25 mg by mouth 3 (Three) Times a Day As Needed., Disp: , Rfl:   •  metoprolol succinate XL (TOPROL-XL) 25 MG 24 hr tablet, TAKE 1 TABLET BY MOUTH EVERY DAY, Disp: 90 tablet, Rfl: 1  •  Multiple Vitamins-Minerals (MULTIVITAMIN WITH MINERALS)  "tablet tablet, Take 1 tablet by mouth Daily., Disp: , Rfl:   •  pantoprazole (PROTONIX) 40 MG EC tablet, Take 40 mg by mouth Daily., Disp: , Rfl:   •  polyethylene glycol (MIRALAX) powder, Take 17 g by mouth Daily As Needed., Disp: , Rfl:   •  potassium chloride (KLOR-CON) 20 MEQ CR tablet, Take 20 mEq by mouth 2 (Two) Times a Day., Disp: , Rfl:   •  rizatriptan MLT (MAXALT-MLT) 10 MG disintegrating tablet, Take 10 mg by mouth 1 (One) Time As Needed for Migraine. May repeat in 2 hours if needed, Disp: , Rfl:   •  simvastatin (ZOCOR) 20 MG tablet, Take 20 mg by mouth Daily., Disp: , Rfl:   •  tiZANidine (ZANAFLEX) 4 MG tablet, Take 2 mg by mouth Every 8 (Eight) Hours As Needed for Muscle Spasms., Disp: , Rfl:   •  promethazine (PHENERGAN) 25 MG tablet, Take 25 mg by mouth Every 6 (Six) Hours As Needed for Nausea or Vomiting., Disp: , Rfl:   Social History:   Social History     Tobacco Use   • Smoking status: Current Every Day Smoker     Types: Cigarettes   • Smokeless tobacco: Never Used   Substance Use Topics   • Alcohol use: No     Frequency: Never      Family History:  History reviewed. No pertinent family history.     The following portions of the patient's history were reviewed and updated as appropriate: allergies, current medications, past family history, past medical history, past social history, past surgical history and problem list.      Review of Systems   Constitution: Positive for malaise/fatigue.   Cardiovascular: Positive for leg swelling. Negative for chest pain and palpitations.   Respiratory: Positive for shortness of breath.    Skin: Negative for rash.   Neurological: Positive for dizziness and light-headedness. Negative for numbness.     All other systems are negative    Procedures       Objective:     Physical Exam  /82   Pulse 65   Temp 97.1 °F (36.2 °C)   Ht 165.1 cm (65\")   Wt 132 kg (291 lb)   SpO2 97%   BMI 48.42 kg/m²   General:  Appears in no acute distress  Eyes: Sclera is " anicteric,  conjunctiva is clear   HEENT:  No JVD. Thyroid not visibly enlarged. No mucosal pallor or cyanosis  Respiratory: Respirations regular and unlabored at rest.  Bilaterally good breath sounds, with good air entry in all fields. No crackles, rubs or wheezes auscultated  Cardiovascular: S1,S2 Regular rate and rhythm.  3/6 systolic ejection murmur ,no  rub or gallop auscultated. No pretibial pitting edema  Gastrointestinal: Abdomen soft, flat, non tender. Bowel sounds present.   Musculoskeletal:  No abnormal movements  Extremities: No digital clubbing or cyanosis  Skin: Color pink. Skin warm and dry to touch. No rashes  No xanthoma  Neuro: Alert and awake, no lateralizing deficits appreciated    Lab Reviewed:

## 2021-01-14 ENCOUNTER — OFFICE VISIT (OUTPATIENT)
Dept: CARDIAC SURGERY | Facility: CLINIC | Age: 71
End: 2021-01-14

## 2021-01-14 VITALS
HEIGHT: 64 IN | SYSTOLIC BLOOD PRESSURE: 132 MMHG | BODY MASS INDEX: 49.6 KG/M2 | RESPIRATION RATE: 20 BRPM | TEMPERATURE: 97.1 F | DIASTOLIC BLOOD PRESSURE: 79 MMHG | WEIGHT: 290.5 LBS | OXYGEN SATURATION: 93 % | HEART RATE: 61 BPM

## 2021-01-14 DIAGNOSIS — I10 ESSENTIAL HYPERTENSION: Chronic | ICD-10-CM

## 2021-01-14 DIAGNOSIS — J44.9 CHRONIC OBSTRUCTIVE PULMONARY DISEASE, UNSPECIFIED COPD TYPE (HCC): ICD-10-CM

## 2021-01-14 DIAGNOSIS — E66.01 MORBIDLY OBESE (HCC): Primary | Chronic | ICD-10-CM

## 2021-01-14 DIAGNOSIS — M06.9 RHEUMATOID ARTHRITIS, INVOLVING UNSPECIFIED SITE, UNSPECIFIED WHETHER RHEUMATOID FACTOR PRESENT (HCC): ICD-10-CM

## 2021-01-14 DIAGNOSIS — K21.9 GASTROESOPHAGEAL REFLUX DISEASE, UNSPECIFIED WHETHER ESOPHAGITIS PRESENT: ICD-10-CM

## 2021-01-14 DIAGNOSIS — I50.32 CHRONIC HEART FAILURE WITH PRESERVED EJECTION FRACTION (HFPEF) (HCC): ICD-10-CM

## 2021-01-14 DIAGNOSIS — R94.39 ABNORMAL NUCLEAR STRESS TEST: Chronic | ICD-10-CM

## 2021-01-14 DIAGNOSIS — I35.0 NONRHEUMATIC AORTIC VALVE STENOSIS: Chronic | ICD-10-CM

## 2021-01-14 DIAGNOSIS — I35.0 AORTIC STENOSIS, SEVERE: ICD-10-CM

## 2021-01-14 DIAGNOSIS — E78.5 DYSLIPIDEMIA: Chronic | ICD-10-CM

## 2021-01-14 PROCEDURE — 99204 OFFICE O/P NEW MOD 45 MIN: CPT | Performed by: THORACIC SURGERY (CARDIOTHORACIC VASCULAR SURGERY)

## 2021-01-14 RX ORDER — ONDANSETRON 4 MG/1
4 TABLET, ORALLY DISINTEGRATING ORAL EVERY 8 HOURS PRN
Status: ON HOLD | COMMUNITY
Start: 2021-01-06 | End: 2021-01-20

## 2021-01-16 NOTE — PROGRESS NOTES
1/16/2021    Seen on 1/14/2021    Chief Complaint     Dyspnea of exertion, evaluation of aortic stenosis    History of Present Illness:       Dear Dr. Levi, Jose Allison* and Colleagues,  It was nice to see Claudia LA Rust in consultation at your request. She is a 70 y.o. female with a history of multiple medical problems including COPD, obesity, rheumatoid arthritis, hypertension, prediabetes, bipolar disorder who has developed progressive symptoms of shortness of breath and fatigue.  She has poor mobility because of hip and knee problems and excessive weight.  The shortness of breath has become more progressive over the last few months and she fatigues now very easily and she has noted a decline.  She has not been recently edema in the lower extremities and weight gain.  At that time she had a 2D echocardiogram that showed severe aortic stenosis with a peak gradient of 84 mmHg, mean gradient of 40 mmHg and a peak velocity of 4.6 m/s.  In the right sternal border a peak velocity of 5 m/s and the peak gradient 95 mmHg with a mean gradient of 48 mmHg.  She has mild aortic regurgitation.  She has mild mitral regurgitation and the aorta appears normal in size.  She is an active cigarette smoker and she is trying to quit.  She has COPD and she uses inhalers.  She has history of UTIs.  She has rheumatoid arthritis but she is not taking immunotherapy as far as I can find in the chart or she can tell me. She denies orthopnea, PND, syncope, TIA or chest pain.  She has no family history of aneurysms, dissections or connective tissue disorders.  She is here for a surgical opinion.      Patient Active Problem List   Diagnosis   • Morbidly obese (CMS/HCC)   • Dyspnea on exertion   • Abnormal nuclear stress test   • Nonrheumatic aortic valve stenosis   • Prediabetes   • Dyslipidemia   • Essential hypertension   • Acute UTI   • Bipolar disorder (CMS/HCC)   • COPD (chronic obstructive pulmonary disease) (CMS/HCC)   • High  triglycerides   • Gastroesophageal reflux disease   • Rheumatoid arthritis (CMS/[facility])   • Aortic stenosis, severe   • Chronic heart failure with preserved ejection fraction (HFpEF) (CMS/[facility])       Past Medical History:   Diagnosis Date   • Acute congestive heart failure (CMS/[facility])    • Asthma    • Bipolar 1 disorder (CMS/[facility])    • COPD (chronic obstructive pulmonary disease) (CMS/[facility])    • Depression    • Dyslipidemia    • Fibromyalgia    • Hypertension    • Hypothyroid    • IBS (irritable bowel syndrome)    • Migraines    • Morbid obesity (CMS/[facility])    • Neuropathy    • Sleep apnea    • Spinal stenosis    • Vertigo        Past Surgical History:   Procedure Laterality Date   • CARDIAC CATHETERIZATION  2009   • CARDIAC CATHETERIZATION N/A 8/14/2019    Procedure: Left Heart Cath;  Surgeon: Jose Levi MD;  Location:  [facility] CATH INVASIVE LOCATION;  Service: Cardiovascular   • CARDIAC CATHETERIZATION N/A 8/14/2019    Procedure: Right Heart Cath;  Surgeon: Jose Levi MD;  Location:  [facility] CATH INVASIVE LOCATION;  Service: Cardiovascular   • CARDIAC CATHETERIZATION N/A 8/14/2019    Procedure: Aortic root aortogram;  Surgeon: Jose Levi MD;  Location: [facility] CATH INVASIVE LOCATION;  Service: Cardiovascular   • CYSTOCELE REPAIR  1984   • FOOT SURGERY     • GALLBLADDER SURGERY  2002   • VAGINAL HYSTERECTOMY  1972   • WRIST SURGERY  2011       Allergies   Allergen Reactions   • Adhesive Tape Unknown (See Comments)     hives   • Butorphanol Unknown (See Comments)     unknown   • Garlic Unknown (See Comments)   • Tetanus-Diphtheria Toxoids Td Unknown (See Comments)   • Aloe Itching   • Bee Pollen Unknown (See Comments)   • Latex Unknown (See Comments)   • Macadamia Nut Oil Unknown (See Comments)   • Tuberculin Unknown (See Comments)   • Wasp Venom Unknown (See Comments)         Current Outpatient Medications:   •  acetaminophen (TYLENOL) 325 MG tablet, Take 650 mg by mouth Every 6 (Six)  Hours As Needed for Mild Pain ., Disp: , Rfl:   •  albuterol (PROVENTIL) (2.5 MG/3ML) 0.083% nebulizer solution, Inhale 2.5 mg Every 6 (Six) Hours As Needed., Disp: , Rfl:   •  aluminum-magnesium hydroxide-simethicone (MAALOX/MYLANTA) 200-200-20 MG/5ML suspension, Take 15 mL by mouth Every 6 (Six) Hours As Needed for Indigestion or Heartburn., Disp: , Rfl:   •  aspirin 81 MG tablet, Take 81 mg by mouth Daily., Disp: , Rfl:   •  B Complex Vitamins (VITAMIN B COMPLEX) capsule capsule, Take 1 capsule by mouth Daily., Disp: , Rfl:   •  Calcium Carbonate ([employer] CALCIUM) 1500 (600 Ca) MG tablet, Take 600 mg by mouth Daily., Disp: , Rfl:   •  Cholecalciferol (VITAMIN D-1000 MAX ST) 1000 units tablet, Take 2,000 Units by mouth Daily., Disp: , Rfl:   •  diphenhydrAMINE (BENADRYL) 50 MG capsule, Take 50 mg by mouth every night at bedtime., Disp: , Rfl:   •  FLUoxetine (PROzac) 60 MG tablet, Take 60 mg by mouth Daily., Disp: , Rfl:   •  fluticasone (FLONASE) 50 MCG/ACT nasal spray, 1 spray into the nostril(s) as directed by provider Daily., Disp: , Rfl:   •  furosemide (LASIX) 40 MG tablet, Take 40 mg by mouth Daily., Disp: , Rfl:   •  gabapentin (NEURONTIN) 600 MG tablet, Take 400 mg by mouth 3 (Three) Times a Day., Disp: , Rfl:   •  levothyroxine (SYNTHROID, LEVOTHROID) 100 MCG tablet, Take 100 mcg by mouth Daily., Disp: , Rfl:   •  meclizine (ANTIVERT) 25 MG tablet, Take 25 mg by mouth 3 (Three) Times a Day As Needed., Disp: , Rfl:   •  metoprolol succinate XL (TOPROL-XL) 25 MG 24 hr tablet, TAKE 1 TABLET BY MOUTH EVERY DAY, Disp: 90 tablet, Rfl: 1  •  Multiple Vitamins-Minerals (MULTIVITAMIN WITH MINERALS) tablet tablet, Take 1 tablet by mouth Daily., Disp: , Rfl:   •  ondansetron ODT (ZOFRAN-ODT) 4 MG disintegrating tablet, TAKE 1 2 TABLETS BY MOUTH EVERY 8 HOURS AS NEEDED FOR NAUSEA, Disp: , Rfl:   •  pantoprazole (PROTONIX) 40 MG EC tablet, Take 40 mg by mouth Daily., Disp: , Rfl:   •  polyethylene glycol (MIRALAX)  powder, Take 17 g by mouth Daily As Needed., Disp: , Rfl:   •  potassium chloride (KLOR-CON) 20 MEQ CR tablet, Take 20 mEq by mouth 2 (Two) Times a Day., Disp: , Rfl:   •  promethazine (PHENERGAN) 25 MG tablet, Take 25 mg by mouth Every 6 (Six) Hours As Needed for Nausea or Vomiting., Disp: , Rfl:   •  rizatriptan MLT (MAXALT-MLT) 10 MG disintegrating tablet, Take 10 mg by mouth 1 (One) Time As Needed for Migraine. May repeat in 2 hours if needed, Disp: , Rfl:   •  simvastatin (ZOCOR) 20 MG tablet, Take 20 mg by mouth Daily., Disp: , Rfl:   •  tiZANidine (ZANAFLEX) 4 MG tablet, Take 2 mg by mouth Every 8 (Eight) Hours As Needed for Muscle Spasms., Disp: , Rfl:     Social History     Socioeconomic History   • Marital status:      Spouse name: Not on file   • Number of children: Not on file   • Years of education: Not on file   • Highest education level: Not on file   Tobacco Use   • Smoking status: Current Every Day Smoker     Types: Cigarettes   • Smokeless tobacco: Never Used   • Tobacco comment: half pack per day   Substance and Sexual Activity   • Alcohol use: No     Frequency: Never     FMH:  As HPI.  Her father had a stroke.  Coronary artery disease is unknown    Review of Systems   Constitutional: Positive for fatigue.   Respiratory: Positive for shortness of breath. Negative for wheezing.    Cardiovascular: Positive for leg swelling. Negative for chest pain and palpitations.   Genitourinary: Negative for dysuria and flank pain.   Musculoskeletal: Positive for gait problem.   Neurological: Positive for weakness. Negative for dizziness and numbness.   All other systems reviewed and are negative.      Physical Exam:    Vital Signs:  Weight: 132 kg (290 lb 8 oz)   Body mass index is 49.86 kg/m².  Temp: 97.1 °F (36.2 °C)   Heart Rate: 61   BP: 132/79     Constitutional:       Appearance: Well-developed.   Eyes:      Conjunctiva/sclera: Conjunctivae normal.      Pupils: Pupils are equal, round, and reactive  to light.   HENT:      Head: Normocephalic and atraumatic.      Nose: Nose normal.   Neck:      Musculoskeletal: Normal range of motion and neck supple.      Thyroid: No thyromegaly.      Vascular: No JVD.      Lymphadenopathy: No cervical adenopathy.   Pulmonary:      Effort: Pulmonary effort is normal.      Breath sounds: Normal breath sounds. No rales.   Cardiovascular:      PMI at left midclavicular line. Normal rate. Regular rhythm.      Murmurs: There is a harsh midsystolic murmur at the URSB, radiating to the neck.      No gallop. No rub.   Pulses:     No decreased pulses.      Carotid: 3+ bilaterally.     Radial: 2+ bilaterally.     Dorsalis pedis: 1+ bilaterally.  Edema:     Peripheral edema present.     Ankle: bilateral 1+ edema of the ankle.  Abdominal:      General: Bowel sounds are normal. There is no distension.      Palpations: Abdomen is soft. There is no abdominal mass.      Tenderness: There is no abdominal tenderness.   Musculoskeletal: Normal range of motion.         General: No tenderness or deformity.   Skin:     General: Skin is warm and dry.      Findings: No erythema or rash.   Neurological:      Mental Status: Alert and oriented to person, place, and time.      Deep Tendon Reflexes: Reflexes are normal and symmetric.   Psychiatric:         Behavior: Behavior normal.          Assessment:     Problems Addressed this Visit        Other    Morbidly obese (CMS/[facility]) - Primary (Chronic)    Abnormal nuclear stress test (Chronic)    Nonrheumatic aortic valve stenosis (Chronic)    Dyslipidemia (Chronic)    Essential hypertension (Chronic)    COPD (chronic obstructive pulmonary disease) (CMS/HCC)    Gastroesophageal reflux disease    Rheumatoid arthritis (CMS/HCC)    Aortic stenosis, severe    Chronic heart failure with preserved ejection fraction (HFpEF) (CMS/[facility])      Diagnoses       Codes Comments    Morbidly obese (CMS/HCC)    -  Primary ICD-10-CM: E66.01  ICD-9-CM: 278.01     Nonrheumatic aortic  valve stenosis     ICD-10-CM: I35.0  ICD-9-CM: 424.1     Abnormal nuclear stress test     ICD-10-CM: R94.39  ICD-9-CM: 794.39     Dyslipidemia     ICD-10-CM: E78.5  ICD-9-CM: 272.4     Essential hypertension     ICD-10-CM: I10  ICD-9-CM: 401.9     Chronic obstructive pulmonary disease, unspecified COPD type (CMS/[facility])     ICD-10-CM: J44.9  ICD-9-CM: 496     Gastroesophageal reflux disease, unspecified whether esophagitis present     ICD-10-CM: K21.9  ICD-9-CM: 530.81     Rheumatoid arthritis, involving unspecified site, unspecified whether rheumatoid factor present (CMS/[facility])     ICD-10-CM: M06.9  ICD-9-CM: 714.0     Aortic stenosis, severe     ICD-10-CM: I35.0  ICD-9-CM: 424.1     Chronic heart failure with preserved ejection fraction (HFpEF) (CMS/[facility])     ICD-10-CM: I50.32  ICD-9-CM: 428.9           1. Nonrheumatic symptomatic aortic stenosis  2. Heart failure class II/III symptoms  3. Poor mobility  4. Rule out coronary artery disease  5. COPD and known severity  6. Morbid obesity  7. Rheumatoid arthritis    Recommendation/Plan:     1. She has severe aortic stenosis which is nonrheumatic degenerative.  Her heart failure symptoms are most likely related to it.  Symptoms most likely are worse because she is not able to ambulate normally.  Her STS surgical risk is borderline, on the lower end of low therefore I will wait for the cardiac cath to further evaluate her risk.  Due to age, I would favor SAVR or TAVR unless once pulmonary risk is calculated and the cath performed and use different results.  In that case I will further evaluate if TAVR is an option.  2. COPD, she will need PFTs and room air ABG.  To stratify the risk  3. She has moderate obesity and poor mobility secondary to lower extremity joint arthrosis  4. Heart failure class II/III symptoms are related to above  We will complete status, complete the STS risk evaluation and findings recommendation once all the studies are completed.  Explained the  patient the situation and she verbalized understanding    Thank you for allowing me to participate in her care.    Regards,    Olaf Mcgill M.D.

## 2021-01-18 ENCOUNTER — HOSPITAL ENCOUNTER (OUTPATIENT)
Dept: GENERAL RADIOLOGY | Facility: HOSPITAL | Age: 71
Discharge: HOME | End: 2021-01-18

## 2021-01-18 ENCOUNTER — LAB (OUTPATIENT)
Dept: LAB | Facility: HOSPITAL | Age: 71
End: 2021-01-18

## 2021-01-18 DIAGNOSIS — I35.0 NONRHEUMATIC AORTIC VALVE STENOSIS: ICD-10-CM

## 2021-01-18 DIAGNOSIS — R73.03 PREDIABETES: ICD-10-CM

## 2021-01-18 DIAGNOSIS — E78.5 DYSLIPIDEMIA: ICD-10-CM

## 2021-01-18 DIAGNOSIS — I10 ESSENTIAL HYPERTENSION: ICD-10-CM

## 2021-01-18 DIAGNOSIS — I50.32 CHRONIC HEART FAILURE WITH PRESERVED EJECTION FRACTION (HFPEF) (HCC): ICD-10-CM

## 2021-01-18 DIAGNOSIS — I35.0 AORTIC STENOSIS, SEVERE: ICD-10-CM

## 2021-01-18 DIAGNOSIS — I11.0 HYPERTENSIVE HEART DISEASE WITH CHRONIC DIASTOLIC CONGESTIVE HEART FAILURE (HCC): ICD-10-CM

## 2021-01-18 DIAGNOSIS — R09.89 BILATERAL CAROTID BRUITS: ICD-10-CM

## 2021-01-18 DIAGNOSIS — I50.32 HYPERTENSIVE HEART DISEASE WITH CHRONIC DIASTOLIC CONGESTIVE HEART FAILURE (HCC): ICD-10-CM

## 2021-01-18 DIAGNOSIS — E66.01 MORBIDLY OBESE (HCC): ICD-10-CM

## 2021-01-18 LAB
ANION GAP SERPL CALCULATED.3IONS-SCNC: 8.8 MMOL/L (ref 5–15)
BUN SERPL-MCNC: 9 MG/DL (ref 8–23)
BUN/CREAT SERPL: 10.7 (ref 7–25)
CALCIUM SPEC-SCNC: 8.8 MG/DL (ref 8.6–10.5)
CHLORIDE SERPL-SCNC: 103 MMOL/L (ref 98–107)
CO2 SERPL-SCNC: 28.2 MMOL/L (ref 22–29)
CREAT SERPL-MCNC: 0.84 MG/DL (ref 0.57–1)
DEPRECATED RDW RBC AUTO: 39.4 FL (ref 37–54)
ERYTHROCYTE [DISTWIDTH] IN BLOOD BY AUTOMATED COUNT: 11.9 % (ref 12.3–15.4)
GFR SERPL CREATININE-BSD FRML MDRD: 67 ML/MIN/1.73
GLUCOSE SERPL-MCNC: 95 MG/DL (ref 65–99)
HCT VFR BLD AUTO: 40.7 % (ref 34–46.6)
HGB BLD-MCNC: 13.7 G/DL (ref 12–15.9)
INR PPP: 1.05 (ref 0.93–1.1)
MCH RBC QN AUTO: 30.7 PG (ref 26.6–33)
MCHC RBC AUTO-ENTMCNC: 33.7 G/DL (ref 31.5–35.7)
MCV RBC AUTO: 91.3 FL (ref 79–97)
NT-PROBNP SERPL-MCNC: 611.8 PG/ML (ref 0–900)
PLATELET # BLD AUTO: 210 10*3/MM3 (ref 140–450)
PMV BLD AUTO: 11.9 FL (ref 6–12)
POTASSIUM SERPL-SCNC: 4.2 MMOL/L (ref 3.5–5.2)
PROTHROMBIN TIME: 11.5 SECONDS (ref 9.6–11.7)
RBC # BLD AUTO: 4.46 10*6/MM3 (ref 3.77–5.28)
SARS-COV-2 ORF1AB RESP QL NAA+PROBE: NOT DETECTED
SODIUM SERPL-SCNC: 140 MMOL/L (ref 136–145)
WBC # BLD AUTO: 7.5 10*3/MM3 (ref 3.4–10.8)

## 2021-01-18 PROCEDURE — U0004 COV-19 TEST NON-CDC HGH THRU: HCPCS

## 2021-01-18 PROCEDURE — 71046 X-RAY EXAM CHEST 2 VIEWS: CPT

## 2021-01-18 PROCEDURE — 83880 ASSAY OF NATRIURETIC PEPTIDE: CPT

## 2021-01-18 PROCEDURE — 85027 COMPLETE CBC AUTOMATED: CPT

## 2021-01-18 PROCEDURE — 85610 PROTHROMBIN TIME: CPT

## 2021-01-18 PROCEDURE — 80048 BASIC METABOLIC PNL TOTAL CA: CPT

## 2021-01-18 PROCEDURE — 36415 COLL VENOUS BLD VENIPUNCTURE: CPT

## 2021-01-18 PROCEDURE — C9803 HOPD COVID-19 SPEC COLLECT: HCPCS

## 2021-01-20 ENCOUNTER — HOSPITAL ENCOUNTER (OUTPATIENT)
Facility: HOSPITAL | Age: 71
Setting detail: HOSPITAL OUTPATIENT SURGERY
Discharge: HOME | End: 2021-01-20
Attending: INTERNAL MEDICINE | Admitting: INTERNAL MEDICINE

## 2021-01-20 VITALS
WEIGHT: 286.6 LBS | SYSTOLIC BLOOD PRESSURE: 136 MMHG | RESPIRATION RATE: 16 BRPM | HEART RATE: 70 BPM | OXYGEN SATURATION: 97 % | TEMPERATURE: 97.2 F | HEIGHT: 63 IN | BODY MASS INDEX: 50.78 KG/M2 | DIASTOLIC BLOOD PRESSURE: 86 MMHG

## 2021-01-20 DIAGNOSIS — E66.01 MORBIDLY OBESE (HCC): ICD-10-CM

## 2021-01-20 DIAGNOSIS — E78.5 DYSLIPIDEMIA: ICD-10-CM

## 2021-01-20 DIAGNOSIS — R73.03 PREDIABETES: ICD-10-CM

## 2021-01-20 DIAGNOSIS — I35.0 AORTIC STENOSIS, SEVERE: ICD-10-CM

## 2021-01-20 DIAGNOSIS — I10 ESSENTIAL HYPERTENSION: ICD-10-CM

## 2021-01-20 DIAGNOSIS — I50.32 CHRONIC HEART FAILURE WITH PRESERVED EJECTION FRACTION (HFPEF) (HCC): ICD-10-CM

## 2021-01-20 PROCEDURE — 25010000002 MIDAZOLAM PER 1 MG: Performed by: INTERNAL MEDICINE

## 2021-01-20 PROCEDURE — 0 IOPAMIDOL PER 1 ML: Performed by: INTERNAL MEDICINE

## 2021-01-20 PROCEDURE — 93454 CORONARY ARTERY ANGIO S&I: CPT | Performed by: INTERNAL MEDICINE

## 2021-01-20 PROCEDURE — C1769 GUIDE WIRE: HCPCS | Performed by: INTERNAL MEDICINE

## 2021-01-20 PROCEDURE — C1894 INTRO/SHEATH, NON-LASER: HCPCS | Performed by: INTERNAL MEDICINE

## 2021-01-20 PROCEDURE — 25010000002 FENTANYL CITRATE (PF) 100 MCG/2ML SOLUTION: Performed by: INTERNAL MEDICINE

## 2021-01-20 RX ORDER — SODIUM CHLORIDE 9 MG/ML
100 INJECTION, SOLUTION INTRAVENOUS CONTINUOUS
Status: DISCONTINUED | OUTPATIENT
Start: 2021-01-20 | End: 2021-01-20 | Stop reason: HOSPADM

## 2021-01-20 RX ORDER — SODIUM CHLORIDE 9 MG/ML
250 INJECTION, SOLUTION INTRAVENOUS ONCE AS NEEDED
Status: DISCONTINUED | OUTPATIENT
Start: 2021-01-20 | End: 2021-01-20 | Stop reason: HOSPADM

## 2021-01-20 RX ORDER — METOPROLOL SUCCINATE 25 MG/1
25 TABLET, EXTENDED RELEASE ORAL EVERY MORNING
COMMUNITY
End: 2021-04-13 | Stop reason: HOSPADM

## 2021-01-20 RX ORDER — HYDROCODONE BITARTRATE AND ACETAMINOPHEN 10; 325 MG/1; MG/1
1 TABLET ORAL DAILY
COMMUNITY
End: 2021-04-13 | Stop reason: HOSPADM

## 2021-01-20 RX ORDER — SODIUM CHLORIDE 9 MG/ML
30 INJECTION, SOLUTION INTRAVENOUS CONTINUOUS
Status: DISCONTINUED | OUTPATIENT
Start: 2021-01-20 | End: 2021-01-20 | Stop reason: HOSPADM

## 2021-01-20 RX ORDER — FENTANYL CITRATE 50 UG/ML
INJECTION, SOLUTION INTRAMUSCULAR; INTRAVENOUS AS NEEDED
Status: DISCONTINUED | OUTPATIENT
Start: 2021-01-20 | End: 2021-01-20 | Stop reason: HOSPADM

## 2021-01-20 RX ORDER — TIZANIDINE 4 MG/1
4 TABLET ORAL ONCE
Status: COMPLETED | OUTPATIENT
Start: 2021-01-20 | End: 2021-01-20

## 2021-01-20 RX ORDER — ACETAMINOPHEN 325 MG/1
650 TABLET ORAL EVERY 4 HOURS PRN
Status: DISCONTINUED | OUTPATIENT
Start: 2021-01-20 | End: 2021-01-20 | Stop reason: HOSPADM

## 2021-01-20 RX ORDER — ONDANSETRON 4 MG/1
4 TABLET, FILM COATED ORAL EVERY 8 HOURS PRN
COMMUNITY
End: 2022-03-11

## 2021-01-20 RX ORDER — GABAPENTIN 400 MG/1
400 CAPSULE ORAL ONCE
Status: COMPLETED | OUTPATIENT
Start: 2021-01-20 | End: 2021-01-20

## 2021-01-20 RX ORDER — LIDOCAINE HYDROCHLORIDE 20 MG/ML
INJECTION, SOLUTION INFILTRATION; PERINEURAL AS NEEDED
Status: DISCONTINUED | OUTPATIENT
Start: 2021-01-20 | End: 2021-01-20 | Stop reason: HOSPADM

## 2021-01-20 RX ORDER — MIDAZOLAM HYDROCHLORIDE 1 MG/ML
INJECTION INTRAMUSCULAR; INTRAVENOUS AS NEEDED
Status: DISCONTINUED | OUTPATIENT
Start: 2021-01-20 | End: 2021-01-20 | Stop reason: HOSPADM

## 2021-01-20 RX ADMIN — SODIUM CHLORIDE 30 ML/HR: 9 INJECTION, SOLUTION INTRAVENOUS at 10:37

## 2021-01-20 RX ADMIN — TIZANIDINE 4 MG: 4 TABLET ORAL at 14:46

## 2021-01-20 RX ADMIN — GABAPENTIN 400 MG: 400 CAPSULE ORAL at 14:46

## 2021-01-20 NOTE — DISCHARGE INSTRUCTIONS
Post Cath Instructions      Call Dr. Levi’s office to schedule a follow up appointment in 4 weeks at [phone].  Specific Physician Instructions:     1) Drink plenty of fluids for the next 24 hours.  This helps to eliminate the dye used in your procedure through urination.  You may resume a normal diet; however, try to avoid foods that would cause gas or constipation.    2) Sedative medication given to you during your catheterization may decrease your judgement and reaction time for up to 24-48 hours.  Therefore:  a. DO NOT drive or operate hazardous machinery (48 hours)  b. DO NOT consume alcoholic beverages  c. DO NOT make any important/legal decisions  d. Have someone stay with you for at least 24 hours    3) To allow proper healing and prevent bleeding, the following activities are to be strictly avoided for the next 24-48 hours:  a. Excessive bending at wound site  b. Straining (anything that would tense up muscles around the affected puncture site)  c. Lifting objects greater than 5 pounds, pushing, or pulling for 5 days  i. For Groin Cases:  1. Refrain from sexual activity  2. Refrain from running or vigorous walking  3. No prolonged sitting or standing  4. Limit stair climbing as much as possible    4) Keep the puncture site clean and dry.  You may remove the dressing tomorrow and replace it with a band-aid for at least one additional day.  Gently clean the site with mild soap and water.  No scrubbing/rubbing and lightly pat the area dry.  Showers are acceptable; however, avoid submerging in water (tub baths, hot tubs, swimming pools, dishwater, etc…) for at least one week.  The site should be completely healed before resuming these activities to reduce the risk of infection.  Check the site often.  Watch for signs and symptoms of infection and notify your physician if any of the following occur:  a. Bleeding or an increase in swelling at the puncture site  b. Fever  c. Increased soreness around  puncture site  d. Foul odor or significant drainage from the puncture site  e. Swelling, redness, or warmth at the puncture site    **A bruise or small “pea sized” lump under the skin at the puncture site is not unusual.  This should disappear within 3-4 weeks.**  5) CONTACT YOUR PHYSICIAN OR CALL 911 IF YOU EXPERIENCE ANY OF THE FOLLOWING:  a. Increased angina (chest pain) or frequent sensations of pressure, burning, pain, or other discomfort in the chest, arm, jaws, or stomach  b. Lightheadedness, dizziness, faint feeling, sweating, or difficulty breathing  c. Odd sensation changes like numbness, tingling, coldness, or pain in the arm or leg in which the catheter was inserted  d. Limb in which the catheter was inserted becomes pale/bluish in color    IMPORTANT:  Although this occurs very rarely, if you should develop bright red or excessive bleeding, feel a “pop” inside at the insertion site, or notice a sudden increase in swelling larger than a walnut, you should call 911.  Hold continuous firm pressure to the access site until emergency personnel arrive.  It is best if someone else can do this for you.

## 2021-01-20 NOTE — NURSING NOTE
Addended by: CHRIS WHALEY on: 4/24/2017 10:59 AM     Modules accepted: Orders     Pt asked me to call Dr. Levi because she cannot lay flat any longer her back is hurting.  Pt is also requesting pain medication for her back pain.  New orders received.

## 2021-01-20 NOTE — H&P
Patient Care Team:  Mary Wilde APRN as PCP - General  Mary Wilde APRN as PCP - Family Medicine  Jose Levi MD as Consulting Physician (Cardiology)    Chief complaint here for cardiac cath.    Subjective   Patient has severe aortic stenosis with failure here for coronary arteriogram for preaortic valve replacement surgery.    History of Present Illness  This is a 70-year-old with PMH of     #Valvular heart disease with moderate to severe aortic stenosis  Cardiac cath 8/14/2019 revealing moderate left   with elevated LVEDP, moderate pulmonary hypertension mildly dilated proximal aorta  # Hyperlipidemia,  Hypertension  #COPD, KARLY on CPAP  #Prediabetes  # Hypothyroidism, Rheumatoid Arthritis, spinal stenosis, hiatal hernia, chronic depression, bipolar, GERD, IBS, chronic migraine, vertigo, herniated disc, compression lumbar fractures  #Allergies/intolerance to Stadol  #Hysterectomy, bladder reconstruction, cholecystectomy, right hand surgery  #Family history of strokes and grandfather.  Mother's cancer, father on  # Active cigarette smoker trying to quit         Here for Follow-up.  Patient was recently seen with a complaint of dyspnea on exertion edema and weight gain.  Underwent an echocardiogram which is revealing severe aortic stenosis on 2/11/2020 is here for follow-up.  Results from 5/10/2019 revealed cholesterol 160 HDL 49 LDL 74, triglycerides 185.  Labs from 5/10/2019 reveal hemoglobin A1c of five-point 6 repeat on 2/15/2020 was 5.2.  Cholesterol 5/10/2019 was 160 HDL 49 triglycerides 185 LDL 74.  labs from 2/18/2020 reveal proBNP of 1271.  Labs 5/29/2020 revealed normal CBC Chem-7, A1c 5.6, cholesterol 154, triglycerides 110, HDL 50, LDL 84.  BNP from 2/18/2020 was elevated at 1271.  12/4/2020 reveal hemoglobin A1c of 5.7, cholesterol 180, triglycerides 257, HDL 42, LDL 87, CMP with a glucose of 129  Carotid Dopplers 9/13/2019 normal.  Echocardiogram 2/11/2020 is revealing severe  aortic stenosis     Assessment:  Shortness of breath, dyspnea on exertion  Aortic stenosis, severe  Chronic congestive heart failure  due to valvular heart disease and aortic stenosis hypertensive cardiovascular disease essential hypertension/hypertensive cardiovascular disease   dyslipidemia  Prediabetes  Morbid obesity  Carotid bruit     Plan:     Reviewed echo results with patient and family.  We will schedule coronary arteriogram and CT surgery consultation for valve replacement surgery.  Patient was seen by CT VS .  Reviewed CHF care with patient including daily weights fluid restrictions and avoiding NSAIDs.  Counseled on smoking cessation.  We will continue medical management with aspirin, furosemide, ACL, metoprolol, simvastatin as tolerated  Discussed at length with patient and family about aortic stenosis, various therapeutic options including surgery.           Past Medical History:   Diagnosis Date   • Acute congestive heart failure (CMS/HCC)    • Asthma    • Bipolar 1 disorder (CMS/HCC)    • COPD (chronic obstructive pulmonary disease) (CMS/[facility])    • Depression    • Dyslipidemia    • Fibromyalgia    • Hypertension    • Hypothyroid    • IBS (irritable bowel syndrome)    • Migraines    • Morbid obesity (CMS/[facility])    • Neuropathy    • Sleep apnea    • Spinal stenosis    • Vertigo      Past Surgical History:   Procedure Laterality Date   • CARDIAC CATHETERIZATION  2009   • CARDIAC CATHETERIZATION N/A 8/14/2019    Procedure: Left Heart Cath;  Surgeon: Jose Levi MD;  Location: [facility] CATH INVASIVE LOCATION;  Service: Cardiovascular   • CARDIAC CATHETERIZATION N/A 8/14/2019    Procedure: Right Heart Cath;  Surgeon: Jose Levi MD;  Location: [facility] CATH INVASIVE LOCATION;  Service: Cardiovascular   • CARDIAC CATHETERIZATION N/A 8/14/2019    Procedure: Aortic root aortogram;  Surgeon: Jose Levi MD;  Location: [facility] CATH INVASIVE LOCATION;  Service:  Cardiovascular   • CYSTOCELE REPAIR  1984   • FOOT SURGERY     • GALLBLADDER SURGERY  2002   • VAGINAL HYSTERECTOMY  1972   • WRIST SURGERY  2011     No family history on file.  Social History     Tobacco Use   • Smoking status: Current Every Day Smoker     Types: Cigarettes   • Smokeless tobacco: Never Used   • Tobacco comment: half pack per day   Substance Use Topics   • Alcohol use: No     Frequency: Never   • Drug use: Not on file     E-cigarette/Vaping     E-cigarette/Vaping Substances     Medications Prior to Admission   Medication Sig Dispense Refill Last Dose   • acetaminophen (TYLENOL) 325 MG tablet Take 650 mg by mouth Every 6 (Six) Hours As Needed for Mild Pain .      • albuterol (PROVENTIL) (2.5 MG/3ML) 0.083% nebulizer solution Inhale 2.5 mg Every 6 (Six) Hours As Needed.      • aluminum-magnesium hydroxide-simethicone (MAALOX/MYLANTA) 200-200-20 MG/5ML suspension Take 15 mL by mouth Every 6 (Six) Hours As Needed for Indigestion or Heartburn.      • aspirin 81 MG tablet Take 81 mg by mouth Daily.      • B Complex Vitamins (VITAMIN B COMPLEX) capsule capsule Take 1 capsule by mouth Daily.      • Calcium Carbonate ([employer] CALCIUM) 1500 (600 Ca) MG tablet Take 600 mg by mouth Daily.      • Cholecalciferol (VITAMIN D-1000 MAX ST) 1000 units tablet Take 2,000 Units by mouth Daily.      • diphenhydrAMINE (BENADRYL) 50 MG capsule Take 50 mg by mouth every night at bedtime.      • FLUoxetine (PROzac) 60 MG tablet Take 60 mg by mouth Daily.      • fluticasone (FLONASE) 50 MCG/ACT nasal spray 1 spray into the nostril(s) as directed by provider Daily.      • furosemide (LASIX) 40 MG tablet Take 40 mg by mouth Daily.      • gabapentin (NEURONTIN) 600 MG tablet Take 400 mg by mouth 3 (Three) Times a Day.      • levothyroxine (SYNTHROID, LEVOTHROID) 100 MCG tablet Take 100 mcg by mouth Daily.      • meclizine (ANTIVERT) 25 MG tablet Take 25 mg by mouth 3 (Three) Times a Day As Needed.      • metoprolol succinate XL  "(TOPROL-XL) 25 MG 24 hr tablet TAKE 1 TABLET BY MOUTH EVERY DAY 90 tablet 1    • Multiple Vitamins-Minerals (MULTIVITAMIN WITH MINERALS) tablet tablet Take 1 tablet by mouth Daily.      • ondansetron ODT (ZOFRAN-ODT) 4 MG disintegrating tablet TAKE 1 2 TABLETS BY MOUTH EVERY 8 HOURS AS NEEDED FOR NAUSEA      • pantoprazole (PROTONIX) 40 MG EC tablet Take 40 mg by mouth Daily.      • polyethylene glycol (MIRALAX) powder Take 17 g by mouth Daily As Needed.      • potassium chloride (KLOR-CON) 20 MEQ CR tablet Take 20 mEq by mouth 2 (Two) Times a Day.      • promethazine (PHENERGAN) 25 MG tablet Take 25 mg by mouth Every 6 (Six) Hours As Needed for Nausea or Vomiting.      • rizatriptan MLT (MAXALT-MLT) 10 MG disintegrating tablet Take 10 mg by mouth 1 (One) Time As Needed for Migraine. May repeat in 2 hours if needed      • simvastatin (ZOCOR) 20 MG tablet Take 20 mg by mouth Daily.      • tiZANidine (ZANAFLEX) 4 MG tablet Take 2 mg by mouth Every 8 (Eight) Hours As Needed for Muscle Spasms.        Allergies:  Adhesive tape, Butorphanol, Garlic, Tetanus-diphtheria toxoids td, Aloe, Bee pollen, Latex, Macadamia nut oil, Tuberculin, and Wasp venom    Objective      Vital Signs  Temp:  [97.2 °F (36.2 °C)] 97.2 °F (36.2 °C)  Heart Rate:  [58] 58  Resp:  [20] 20  BP: (147)/(70) 147/70    Physical Exam  Physical Exam   /70 (BP Location: Right arm, Patient Position: Lying)   Pulse 58   Temp 97.2 °F (36.2 °C) (Infrared)   Resp 20   Ht 159.3 cm (62.7\")   Wt 130 kg (286 lb 9.6 oz)   SpO2 98%   BMI 51.26 kg/m²   General:  Appears in no acute distress  Eyes: Sclera is anicteric,  conjunctiva is clear   HEENT:  No JVD. Thyroid not visibly enlarged. No mucosal pallor or cyanosis  Respiratory: Respirations regular and unlabored at rest.  Bilaterally good breath sounds, with good air entry in all fields. No crackles, rubs or wheezes auscultated  Cardiovascular: S1,S2 Regular rate and rhythm.  3/6 systolic ejection murmur " no  rub or gallop auscultated.  . No pretibial pitting edema  Gastrointestinal: Abdomen nondistended, soft  Musculoskeletal:  No abnormal movements  Extremities: No digital clubbing or cyanosis  Skin: Color pink. Skin warm and dry to touch. No rashes  No xanthoma  Neuro: Alert and awake, no lateralizing deficits appreciated  Results Review:    I reviewed the patient's new clinical results.      Assessment/Plan       Prediabetes    Dyslipidemia    Essential hypertension    Aortic stenosis, severe    Chronic heart failure with preserved ejection fraction (HFpEF) (CMS/[facility])    Morbidly obese (CMS/[facility])      Assessment & Plan    I discussed the patients findings and my recommendations with patient    Jose Levi MD  01/20/21  09:29 EST

## 2021-01-28 ENCOUNTER — TRANSCRIBE ORDERS (OUTPATIENT)
Dept: ADMINISTRATIVE | Facility: HOSPITAL | Age: 71
End: 2021-01-28

## 2021-01-28 ENCOUNTER — TELEPHONE (OUTPATIENT)
Dept: CARDIAC SURGERY | Facility: CLINIC | Age: 71
End: 2021-01-28

## 2021-01-28 DIAGNOSIS — Z01.818 OTHER SPECIFIED PRE-OPERATIVE EXAMINATION: Primary | ICD-10-CM

## 2021-01-28 NOTE — TELEPHONE ENCOUNTER
Spoke with patient, gave pt K[facility] Rush Scheduling number to schedule PFT at her convenience       ----- Message from Nieves Ruiz sent at 1/28/2021  9:44 AM EST -----  Pt still hasn't heard from pulmonary about an appt. She said she was told to call you back today if she hadn't heard from them. [phone]     Thanks

## 2021-02-05 ENCOUNTER — LAB (OUTPATIENT)
Dept: LAB | Facility: HOSPITAL | Age: 71
End: 2021-02-05

## 2021-02-05 DIAGNOSIS — Z01.818 OTHER SPECIFIED PRE-OPERATIVE EXAMINATION: ICD-10-CM

## 2021-02-05 LAB — SARS-COV-2 ORF1AB RESP QL NAA+PROBE: NOT DETECTED

## 2021-02-05 PROCEDURE — C9803 HOPD COVID-19 SPEC COLLECT: HCPCS

## 2021-02-05 PROCEDURE — U0004 COV-19 TEST NON-CDC HGH THRU: HCPCS

## 2021-02-08 ENCOUNTER — HOSPITAL ENCOUNTER (OUTPATIENT)
Dept: RESPIRATORY THERAPY | Facility: HOSPITAL | Age: 71
Discharge: HOME | End: 2021-02-08
Admitting: THORACIC SURGERY (CARDIOTHORACIC VASCULAR SURGERY)

## 2021-02-08 DIAGNOSIS — J44.9 CHRONIC OBSTRUCTIVE PULMONARY DISEASE, UNSPECIFIED COPD TYPE (HCC): ICD-10-CM

## 2021-02-08 LAB
ARTERIAL PATENCY WRIST A: POSITIVE
ATMOSPHERIC PRESS: ABNORMAL MM[HG]
BASE EXCESS BLDA CALC-SCNC: 1.4 MMOL/L (ref 0–3)
BDY SITE: ABNORMAL
CO2 BLDA-SCNC: 27.1 MMOL/L (ref 22–29)
HCO3 BLDA-SCNC: 25.8 MMOL/L (ref 21–28)
HEMODILUTION: NO
INHALED O2 CONCENTRATION: 21 %
MODALITY: ABNORMAL
PCO2 BLDA: 39.5 MM HG (ref 35–48)
PH BLDA: 7.42 PH UNITS (ref 7.35–7.45)
PO2 BLDA: 80.1 MM HG (ref 83–108)
SAO2 % BLDCOA: 96 % (ref 94–98)

## 2021-02-08 PROCEDURE — 94729 DIFFUSING CAPACITY: CPT

## 2021-02-08 PROCEDURE — 63710000001 ALBUTEROL SULFATE HFA 108 (90 BASE) MCG/ACT AEROSOL SOLUTION 6.7 G INHALER: Performed by: THORACIC SURGERY (CARDIOTHORACIC VASCULAR SURGERY)

## 2021-02-08 PROCEDURE — 82803 BLOOD GASES ANY COMBINATION: CPT

## 2021-02-08 PROCEDURE — 94726 PLETHYSMOGRAPHY LUNG VOLUMES: CPT

## 2021-02-08 PROCEDURE — A9270 NON-COVERED ITEM OR SERVICE: HCPCS | Performed by: THORACIC SURGERY (CARDIOTHORACIC VASCULAR SURGERY)

## 2021-02-08 PROCEDURE — 94060 EVALUATION OF WHEEZING: CPT

## 2021-02-08 PROCEDURE — 36600 WITHDRAWAL OF ARTERIAL BLOOD: CPT

## 2021-02-08 RX ORDER — ALBUTEROL SULFATE 90 UG/1
2 AEROSOL, METERED RESPIRATORY (INHALATION) ONCE
Status: COMPLETED | OUTPATIENT
Start: 2021-02-08 | End: 2021-02-08

## 2021-02-08 RX ADMIN — ALBUTEROL SULFATE 2 PUFF: 90 AEROSOL, METERED RESPIRATORY (INHALATION) at 08:58

## 2021-02-15 ENCOUNTER — TELEPHONE (OUTPATIENT)
Dept: CARDIAC SURGERY | Facility: CLINIC | Age: 71
End: 2021-02-15

## 2021-02-15 NOTE — TELEPHONE ENCOUNTER
Pt calling for results of PFTs completed earlier this month.  Still no official read.  I have reached out to Dr. Reyes for them to be read.  Surgery scheduling pending those results.  Pt aware.

## 2021-02-16 ENCOUNTER — TELEPHONE (OUTPATIENT)
Dept: CARDIAC SURGERY | Facility: CLINIC | Age: 71
End: 2021-02-16

## 2021-02-16 NOTE — TELEPHONE ENCOUNTER
----- Message from SAAR Guerin sent at 2/15/2021  1:12 PM EST -----  Can we check up on PFT reading.  I asked Dr. Reyes today to read them.  She is pending surgery scheduling on this test.

## 2021-02-16 NOTE — TELEPHONE ENCOUNTER
Called 's office spoke with Cele, transferred to Mikael. Per Mikael,  was given pt's pft to read last week, he will text  and see if he can read it today.

## 2021-02-19 ENCOUNTER — TELEPHONE (OUTPATIENT)
Dept: CARDIAC SURGERY | Facility: CLINIC | Age: 71
End: 2021-02-19

## 2021-02-19 ENCOUNTER — PREP FOR SURGERY (OUTPATIENT)
Dept: OTHER | Facility: HOSPITAL | Age: 71
End: 2021-02-19

## 2021-02-19 DIAGNOSIS — I35.0 AORTIC VALVE STENOSIS, ETIOLOGY OF CARDIAC VALVE DISEASE UNSPECIFIED: Primary | ICD-10-CM

## 2021-02-19 DIAGNOSIS — R79.1 ABNORMAL COAGULATION PROFILE: ICD-10-CM

## 2021-02-19 DIAGNOSIS — I11.0 HYPERTENSIVE HEART DISEASE WITH HEART FAILURE (HCC): ICD-10-CM

## 2021-02-19 DIAGNOSIS — E11.9 TYPE 2 DIABETES MELLITUS WITHOUT COMPLICATIONS (HCC): ICD-10-CM

## 2021-02-19 RX ORDER — CHLORHEXIDINE GLUCONATE 500 MG/1
1 CLOTH TOPICAL EVERY 12 HOURS PRN
Status: CANCELLED | OUTPATIENT
Start: 2021-02-19

## 2021-02-19 RX ORDER — CHLORHEXIDINE GLUCONATE 0.12 MG/ML
15 RINSE ORAL EVERY 12 HOURS
Status: CANCELLED | OUTPATIENT
Start: 2021-02-19 | End: 2021-02-20

## 2021-02-19 RX ORDER — CHLORHEXIDINE GLUCONATE 0.12 MG/ML
15 RINSE ORAL ONCE
Status: CANCELLED | OUTPATIENT
Start: 2021-02-19 | End: 2021-02-19

## 2021-02-19 NOTE — TELEPHONE ENCOUNTER
Per , pft look ok, recommends proceeding with SAVR.    Spoke with pt, advised pt of 's recommendation for surgical AVR, pt verbalized understanding, this was agreeable to the patient. Pt is ready to schedule surgery. Advised pt, surgery scheduler Malathi would be reaching out to her to set up date and time.

## 2021-02-22 ENCOUNTER — TELEPHONE (OUTPATIENT)
Dept: CARDIAC SURGERY | Facility: CLINIC | Age: 71
End: 2021-02-22

## 2021-02-22 NOTE — TELEPHONE ENCOUNTER
Spoke to patient with PAT and surgery times.  PAT is on 3- with an 0800 arrival time with all testing to follow.  Surgery is scheduled for 3- at 0730 with an 0500 arrival time. She expressed a verbal understanding of these instructions.  She was instructed to call the office with any further questions.

## 2021-03-16 ENCOUNTER — HOSPITAL ENCOUNTER (OUTPATIENT)
Dept: GENERAL RADIOLOGY | Facility: HOSPITAL | Age: 71
Discharge: HOME | End: 2021-03-16

## 2021-03-16 ENCOUNTER — LAB (OUTPATIENT)
Dept: LAB | Facility: HOSPITAL | Age: 71
End: 2021-03-16

## 2021-03-16 ENCOUNTER — APPOINTMENT (OUTPATIENT)
Dept: PREADMISSION TESTING | Facility: HOSPITAL | Age: 71
End: 2021-03-16

## 2021-03-16 ENCOUNTER — HOSPITAL ENCOUNTER (OUTPATIENT)
Dept: CARDIOLOGY | Facility: HOSPITAL | Age: 71
Discharge: HOME | End: 2021-03-16

## 2021-03-16 VITALS
HEART RATE: 59 BPM | OXYGEN SATURATION: 95 % | WEIGHT: 283.38 LBS | BODY MASS INDEX: 48.38 KG/M2 | SYSTOLIC BLOOD PRESSURE: 122 MMHG | RESPIRATION RATE: 20 BRPM | TEMPERATURE: 98.4 F | HEIGHT: 64 IN | DIASTOLIC BLOOD PRESSURE: 71 MMHG

## 2021-03-16 DIAGNOSIS — I35.0 AORTIC VALVE STENOSIS, ETIOLOGY OF CARDIAC VALVE DISEASE UNSPECIFIED: ICD-10-CM

## 2021-03-16 DIAGNOSIS — E11.9 TYPE 2 DIABETES MELLITUS WITHOUT COMPLICATIONS (HCC): ICD-10-CM

## 2021-03-16 DIAGNOSIS — I11.0 HYPERTENSIVE HEART DISEASE WITH HEART FAILURE (HCC): ICD-10-CM

## 2021-03-16 DIAGNOSIS — R79.1 ABNORMAL COAGULATION PROFILE: ICD-10-CM

## 2021-03-16 LAB
ABO GROUP BLD: NORMAL
ALBUMIN SERPL-MCNC: 3.9 G/DL (ref 3.5–5.2)
ALBUMIN/GLOB SERPL: 1.7 G/DL
ALP SERPL-CCNC: 76 U/L (ref 39–117)
ALT SERPL W P-5'-P-CCNC: 22 U/L (ref 1–33)
ANION GAP SERPL CALCULATED.3IONS-SCNC: 8 MMOL/L (ref 5–15)
APTT PPP: 25.3 SECONDS (ref 24–31)
AST SERPL-CCNC: 23 U/L (ref 1–32)
BASOPHILS # BLD AUTO: 0 10*3/MM3 (ref 0–0.2)
BASOPHILS NFR BLD AUTO: 0.7 % (ref 0–1.5)
BILIRUB SERPL-MCNC: 0.3 MG/DL (ref 0–1.2)
BILIRUB UR QL STRIP: NEGATIVE
BLD GP AB SCN SERPL QL: NEGATIVE
BUN SERPL-MCNC: 10 MG/DL (ref 8–23)
BUN/CREAT SERPL: 12.5 (ref 7–25)
CALCIUM SPEC-SCNC: 8.9 MG/DL (ref 8.6–10.5)
CHLORIDE SERPL-SCNC: 103 MMOL/L (ref 98–107)
CLARITY UR: CLEAR
CLOSE TME COLL+ADP + EPINEP PNL BLD: 97 % (ref 86–100)
CO2 SERPL-SCNC: 28 MMOL/L (ref 22–29)
COLOR UR: YELLOW
CREAT SERPL-MCNC: 0.8 MG/DL (ref 0.57–1)
DEPRECATED RDW RBC AUTO: 44.6 FL (ref 37–54)
EOSINOPHIL # BLD AUTO: 0.6 10*3/MM3 (ref 0–0.4)
EOSINOPHIL NFR BLD AUTO: 9 % (ref 0.3–6.2)
ERYTHROCYTE [DISTWIDTH] IN BLOOD BY AUTOMATED COUNT: 13.7 % (ref 12.3–15.4)
GFR SERPL CREATININE-BSD FRML MDRD: 71 ML/MIN/1.73
GLOBULIN UR ELPH-MCNC: 2.3 GM/DL
GLUCOSE SERPL-MCNC: 91 MG/DL (ref 65–99)
GLUCOSE UR STRIP-MCNC: NEGATIVE MG/DL
HBA1C MFR BLD: 5.7 % (ref 3.5–5.6)
HCT VFR BLD AUTO: 41 % (ref 34–46.6)
HGB BLD-MCNC: 13.7 G/DL (ref 12–15.9)
HGB UR QL STRIP.AUTO: NEGATIVE
INR PPP: 1.01 (ref 0.93–1.1)
KETONES UR QL STRIP: NEGATIVE
LEUKOCYTE ESTERASE UR QL STRIP.AUTO: NEGATIVE
LYMPHOCYTES # BLD AUTO: 2.9 10*3/MM3 (ref 0.7–3.1)
LYMPHOCYTES NFR BLD AUTO: 41.6 % (ref 19.6–45.3)
MAGNESIUM SERPL-MCNC: 2 MG/DL (ref 1.6–2.4)
MCH RBC QN AUTO: 31.1 PG (ref 26.6–33)
MCHC RBC AUTO-ENTMCNC: 33.4 G/DL (ref 31.5–35.7)
MCV RBC AUTO: 93.1 FL (ref 79–97)
MONOCYTES # BLD AUTO: 0.6 10*3/MM3 (ref 0.1–0.9)
MONOCYTES NFR BLD AUTO: 8.7 % (ref 5–12)
MRSA DNA SPEC QL NAA+PROBE: NORMAL
NEUTROPHILS NFR BLD AUTO: 2.8 10*3/MM3 (ref 1.7–7)
NEUTROPHILS NFR BLD AUTO: 40 % (ref 42.7–76)
NITRITE UR QL STRIP: NEGATIVE
NRBC BLD AUTO-RTO: 0.2 /100 WBC (ref 0–0.2)
NT-PROBNP SERPL-MCNC: 733.6 PG/ML (ref 0–900)
PH UR STRIP.AUTO: 7.5 [PH] (ref 5–8)
PLATELET # BLD AUTO: 187 10*3/MM3 (ref 140–450)
PMV BLD AUTO: 9.6 FL (ref 6–12)
POTASSIUM SERPL-SCNC: 4.1 MMOL/L (ref 3.5–5.2)
PROT SERPL-MCNC: 6.2 G/DL (ref 6–8.5)
PROT UR QL STRIP: NEGATIVE
PROTHROMBIN TIME: 11.1 SECONDS (ref 9.6–11.7)
RBC # BLD AUTO: 4.41 10*6/MM3 (ref 3.77–5.28)
RH BLD: POSITIVE
SARS-COV-2 ORF1AB RESP QL NAA+PROBE: NOT DETECTED
SODIUM SERPL-SCNC: 139 MMOL/L (ref 136–145)
SP GR UR STRIP: 1.01 (ref 1–1.03)
T&S EXPIRATION DATE: NORMAL
UROBILINOGEN UR QL STRIP: NORMAL
WBC # BLD AUTO: 7 10*3/MM3 (ref 3.4–10.8)

## 2021-03-16 PROCEDURE — 86901 BLOOD TYPING SEROLOGIC RH(D): CPT

## 2021-03-16 PROCEDURE — U0005 INFEC AGEN DETEC AMPLI PROBE: HCPCS

## 2021-03-16 PROCEDURE — 71046 X-RAY EXAM CHEST 2 VIEWS: CPT

## 2021-03-16 PROCEDURE — 80053 COMPREHEN METABOLIC PANEL: CPT

## 2021-03-16 PROCEDURE — 81003 URINALYSIS AUTO W/O SCOPE: CPT

## 2021-03-16 PROCEDURE — 86900 BLOOD TYPING SEROLOGIC ABO: CPT

## 2021-03-16 PROCEDURE — 85025 COMPLETE CBC W/AUTO DIFF WBC: CPT

## 2021-03-16 PROCEDURE — 93010 ELECTROCARDIOGRAM REPORT: CPT | Performed by: INTERNAL MEDICINE

## 2021-03-16 PROCEDURE — C9803 HOPD COVID-19 SPEC COLLECT: HCPCS

## 2021-03-16 PROCEDURE — U0004 COV-19 TEST NON-CDC HGH THRU: HCPCS

## 2021-03-16 PROCEDURE — 83880 ASSAY OF NATRIURETIC PEPTIDE: CPT

## 2021-03-16 PROCEDURE — 36415 COLL VENOUS BLD VENIPUNCTURE: CPT

## 2021-03-16 PROCEDURE — 83735 ASSAY OF MAGNESIUM: CPT

## 2021-03-16 PROCEDURE — 93005 ELECTROCARDIOGRAM TRACING: CPT | Performed by: NURSE PRACTITIONER

## 2021-03-16 PROCEDURE — 94799 UNLISTED PULMONARY SVC/PX: CPT

## 2021-03-16 PROCEDURE — 85610 PROTHROMBIN TIME: CPT

## 2021-03-16 PROCEDURE — 83036 HEMOGLOBIN GLYCOSYLATED A1C: CPT

## 2021-03-16 PROCEDURE — 87641 MR-STAPH DNA AMP PROBE: CPT

## 2021-03-16 PROCEDURE — 86850 RBC ANTIBODY SCREEN: CPT

## 2021-03-16 PROCEDURE — 85576 BLOOD PLATELET AGGREGATION: CPT

## 2021-03-16 PROCEDURE — 86923 COMPATIBILITY TEST ELECTRIC: CPT

## 2021-03-16 PROCEDURE — 85730 THROMBOPLASTIN TIME PARTIAL: CPT

## 2021-03-16 RX ORDER — LANOLIN ALCOHOL/MO/W.PET/CERES
1000 CREAM (GRAM) TOPICAL DAILY
COMMUNITY

## 2021-03-18 ENCOUNTER — APPOINTMENT (OUTPATIENT)
Dept: GENERAL RADIOLOGY | Facility: HOSPITAL | Age: 71
End: 2021-03-18

## 2021-03-18 ENCOUNTER — HOSPITAL ENCOUNTER (INPATIENT)
Facility: HOSPITAL | Age: 71
LOS: 26 days | Discharge: NURSING, REHAB OR LONG-TERM CARE | End: 2021-04-13
Attending: THORACIC SURGERY (CARDIOTHORACIC VASCULAR SURGERY) | Admitting: THORACIC SURGERY (CARDIOTHORACIC VASCULAR SURGERY)

## 2021-03-18 ENCOUNTER — ANESTHESIA (OUTPATIENT)
Dept: PERIOP | Facility: HOSPITAL | Age: 71
End: 2021-03-18

## 2021-03-18 ENCOUNTER — ANESTHESIA EVENT (OUTPATIENT)
Dept: PERIOP | Facility: HOSPITAL | Age: 71
End: 2021-03-18

## 2021-03-18 DIAGNOSIS — I35.0 AORTIC VALVE STENOSIS, ETIOLOGY OF CARDIAC VALVE DISEASE UNSPECIFIED: ICD-10-CM

## 2021-03-18 DIAGNOSIS — M06.9 RHEUMATOID ARTHRITIS, INVOLVING UNSPECIFIED SITE, UNSPECIFIED WHETHER RHEUMATOID FACTOR PRESENT (HCC): Primary | ICD-10-CM

## 2021-03-18 DIAGNOSIS — J44.9 CHRONIC OBSTRUCTIVE PULMONARY DISEASE, UNSPECIFIED COPD TYPE (HCC): ICD-10-CM

## 2021-03-18 DIAGNOSIS — J96.01 ACUTE RESPIRATORY FAILURE WITH HYPOXIA (HCC): ICD-10-CM

## 2021-03-18 DIAGNOSIS — I82.621 ACUTE DEEP VEIN THROMBOSIS (DVT) OF BRACHIAL VEIN OF RIGHT UPPER EXTREMITY (HCC): ICD-10-CM

## 2021-03-18 DIAGNOSIS — Z95.2 S/P AVR: ICD-10-CM

## 2021-03-18 LAB
ACT BLD: 103 SECONDS (ref 89–137)
ACT BLD: 120 SECONDS (ref 89–137)
ACT BLD: 378 SECONDS (ref 89–137)
ACT BLD: 422 SECONDS (ref 89–137)
ACT BLD: 423 SECONDS (ref 89–137)
ALBUMIN SERPL-MCNC: 3.5 G/DL (ref 3.5–5.2)
ANION GAP SERPL CALCULATED.3IONS-SCNC: 10 MMOL/L (ref 5–15)
APTT PPP: 28.8 SECONDS (ref 24–31)
APTT PPP: 28.8 SECONDS (ref 24–31)
ARTERIAL PATENCY WRIST A: ABNORMAL
ARTERIAL PATENCY WRIST A: NEGATIVE
ATMOSPHERIC PRESS: ABNORMAL MM[HG]
BASE DEFICIT: -2.7 MEQ/LITER
BASE DEFICIT: ABNORMAL
BASE EXCESS BLDA CALC-SCNC: -1 MMOL/L (ref 0–3)
BASE EXCESS BLDA CALC-SCNC: -1.1 MMOL/L (ref 0–3)
BASE EXCESS BLDA CALC-SCNC: -1.2 MMOL/L (ref 0–3)
BASE EXCESS BLDA CALC-SCNC: -1.3 MMOL/L (ref 0–3)
BASE EXCESS BLDA CALC-SCNC: -1.4 MMOL/L (ref 0–3)
BASE EXCESS BLDA CALC-SCNC: -1.4 MMOL/L (ref 0–3)
BASE EXCESS BLDA CALC-SCNC: -1.6 MMOL/L (ref 0–3)
BASE EXCESS BLDA CALC-SCNC: -1.8 MMOL/L (ref 0–3)
BASE EXCESS BLDA CALC-SCNC: -1.9 MMOL/L (ref 0–3)
BASE EXCESS BLDA CALC-SCNC: -1.9 MMOL/L (ref 0–3)
BASE EXCESS BLDA CALC-SCNC: -2 MMOL/L (ref 0–3)
BASE EXCESS BLDA CALC-SCNC: -2.1 MMOL/L (ref 0–3)
BASE EXCESS BLDA CALC-SCNC: -2.1 MMOL/L (ref 0–3)
BASE EXCESS BLDA CALC-SCNC: -2.7 MMOL/L (ref 0–3)
BASE EXCESS BLDA CALC-SCNC: 4 MMOL/L (ref 0–3)
BASE EXCESS BLDA CALC-SCNC: 5 MMOL/L (ref 0–3)
BASE EXCESS BLDV CALC-SCNC: ABNORMAL MMOL/L
BASOPHILS # BLD AUTO: 0 10*3/MM3 (ref 0–0.2)
BASOPHILS NFR BLD AUTO: 0.5 % (ref 0–1.5)
BDY SITE: ABNORMAL
BH BB BLOOD EXPIRATION DATE: NORMAL
BH BB BLOOD TYPE BARCODE: 5100
BH BB DISPENSE STATUS: NORMAL
BH BB PRODUCT CODE: NORMAL
BH BB UNIT NUMBER: NORMAL
BUN SERPL-MCNC: 10 MG/DL (ref 8–23)
BUN/CREAT SERPL: 10.5 (ref 7–25)
CA-I BLDA-SCNC: 0.94 MMOL/L (ref 1.15–1.33)
CA-I BLDA-SCNC: 0.96 MMOL/L (ref 1.15–1.33)
CA-I BLDA-SCNC: 0.97 MMOL/L (ref 1.15–1.33)
CA-I BLDA-SCNC: 0.98 MMOL/L (ref 1.15–1.33)
CA-I BLDA-SCNC: 0.99 MMOL/L (ref 1.15–1.33)
CA-I BLDA-SCNC: 1.01 MMOL/L (ref 1.15–1.33)
CA-I BLDA-SCNC: 1.02 MMOL/L (ref 1.12–1.32)
CA-I BLDA-SCNC: 1.06 MMOL/L (ref 1.15–1.33)
CA-I BLDA-SCNC: 1.07 MMOL/L (ref 1.12–1.32)
CA-I BLDA-SCNC: 1.07 MMOL/L (ref 1.12–1.32)
CA-I BLDA-SCNC: 1.14 MMOL/L (ref 1.15–1.33)
CA-I BLDA-SCNC: 1.18 MMOL/L (ref 1.15–1.33)
CA-I BLDA-SCNC: 1.2 MMOL/L (ref 1.12–1.32)
CA-I BLDA-SCNC: 1.31 MMOL/L (ref 1.12–1.32)
CA-I SERPL ISE-MCNC: 1.2 MMOL/L (ref 1.2–1.3)
CALCIUM SPEC-SCNC: 8.4 MG/DL (ref 8.6–10.5)
CHLORIDE SERPL-SCNC: 106 MMOL/L (ref 98–107)
CLOSE TME COLL+ADP + EPINEP PNL BLD: 96 % (ref 86–100)
CO2 BLDA-SCNC: 23.6 MMOL/L (ref 22–29)
CO2 BLDA-SCNC: 23.8 MMOL/L (ref 22–29)
CO2 BLDA-SCNC: 24.1 MMOL/L (ref 22–29)
CO2 BLDA-SCNC: 24.1 MMOL/L (ref 22–29)
CO2 BLDA-SCNC: 24.2 MMOL/L (ref 22–29)
CO2 BLDA-SCNC: 24.3 MMOL/L (ref 22–29)
CO2 BLDA-SCNC: 24.5 MMOL/L (ref 22–29)
CO2 BLDA-SCNC: 24.6 MMOL/L (ref 22–29)
CO2 BLDA-SCNC: 24.6 MMOL/L (ref 22–29)
CO2 BLDA-SCNC: 24.7 MMOL/L (ref 22–29)
CO2 BLDA-SCNC: 24.9 MMOL/L (ref 22–29)
CO2 BLDA-SCNC: 25.4 MMOL/L (ref 22–29)
CO2 BLDA-SCNC: 26.8 MMOL/L (ref 22–29)
CO2 BLDA-SCNC: 28.2 MMOL/L (ref 22–29)
CO2 BLDA-SCNC: 31 MMOL/L (ref 23–27)
CO2 BLDA-SCNC: 32 MMOL/L (ref 23–27)
CO2 CONTENT VENOUS: ABNORMAL
CO2 SERPL-SCNC: 24 MMOL/L (ref 22–29)
CREAT SERPL-MCNC: 0.95 MG/DL (ref 0.57–1)
CROSSMATCH INTERPRETATION: NORMAL
CROSSMATCH INTERPRETATION: NORMAL
DEPRECATED RDW RBC AUTO: 43.3 FL (ref 37–54)
DEPRECATED RDW RBC AUTO: 44.2 FL (ref 37–54)
EOSINOPHIL # BLD AUTO: 0.4 10*3/MM3 (ref 0–0.4)
EOSINOPHIL NFR BLD AUTO: 3.8 % (ref 0.3–6.2)
ERYTHROCYTE [DISTWIDTH] IN BLOOD BY AUTOMATED COUNT: 13.5 % (ref 12.3–15.4)
ERYTHROCYTE [DISTWIDTH] IN BLOOD BY AUTOMATED COUNT: 13.6 % (ref 12.3–15.4)
FIBRINOGEN PPP-MCNC: 139 MG/DL (ref 210–450)
GFR SERPL CREATININE-BSD FRML MDRD: 58 ML/MIN/1.73
GLUCOSE BLDC GLUCOMTR-MCNC: 104 MG/DL (ref 70–105)
GLUCOSE BLDC GLUCOMTR-MCNC: 106 MG/DL (ref 70–105)
GLUCOSE BLDC GLUCOMTR-MCNC: 108 MG/DL (ref 70–105)
GLUCOSE BLDC GLUCOMTR-MCNC: 108 MG/DL (ref 70–105)
GLUCOSE BLDC GLUCOMTR-MCNC: 114 MG/DL (ref 74–100)
GLUCOSE BLDC GLUCOMTR-MCNC: 114 MG/DL (ref 74–100)
GLUCOSE BLDC GLUCOMTR-MCNC: 125 MG/DL (ref 74–100)
GLUCOSE BLDC GLUCOMTR-MCNC: 128 MG/DL (ref 70–105)
GLUCOSE BLDC GLUCOMTR-MCNC: 128 MG/DL (ref 70–105)
GLUCOSE BLDC GLUCOMTR-MCNC: 131 MG/DL (ref 74–100)
GLUCOSE BLDC GLUCOMTR-MCNC: 131 MG/DL (ref 74–100)
GLUCOSE BLDC GLUCOMTR-MCNC: 133 MG/DL (ref 74–100)
GLUCOSE BLDC GLUCOMTR-MCNC: 133 MG/DL (ref 74–100)
GLUCOSE BLDC GLUCOMTR-MCNC: 136 MG/DL (ref 74–100)
GLUCOSE BLDC GLUCOMTR-MCNC: 142 MG/DL (ref 74–100)
GLUCOSE BLDC GLUCOMTR-MCNC: 142 MG/DL (ref 74–100)
GLUCOSE BLDC GLUCOMTR-MCNC: 143 MG/DL (ref 70–105)
GLUCOSE BLDC GLUCOMTR-MCNC: 143 MG/DL (ref 74–100)
GLUCOSE BLDC GLUCOMTR-MCNC: 144 MG/DL (ref 70–105)
GLUCOSE BLDC GLUCOMTR-MCNC: 144 MG/DL (ref 74–100)
GLUCOSE BLDC GLUCOMTR-MCNC: 145 MG/DL (ref 74–100)
GLUCOSE BLDC GLUCOMTR-MCNC: 145 MG/DL (ref 74–100)
GLUCOSE BLDC GLUCOMTR-MCNC: 147 MG/DL (ref 74–100)
GLUCOSE BLDC GLUCOMTR-MCNC: 147 MG/DL (ref 74–100)
GLUCOSE SERPL-MCNC: 114 MG/DL (ref 65–99)
HCO3 BLDA-SCNC: 22.5 MMOL/L (ref 21–28)
HCO3 BLDA-SCNC: 22.7 MMOL/L (ref 21–28)
HCO3 BLDA-SCNC: 23 MMOL/L (ref 21–28)
HCO3 BLDA-SCNC: 23.2 MMOL/L (ref 21–28)
HCO3 BLDA-SCNC: 23.3 MMOL/L (ref 21–28)
HCO3 BLDA-SCNC: 23.4 MMOL/L (ref 21–28)
HCO3 BLDA-SCNC: 23.6 MMOL/L (ref 21–28)
HCO3 BLDA-SCNC: 23.6 MMOL/L (ref 21–28)
HCO3 BLDA-SCNC: 24.1 MMOL/L (ref 21–28)
HCO3 BLDA-SCNC: 25.2 MMOL/L (ref 21–28)
HCO3 BLDA-SCNC: 26.4 MMOL/L (ref 21–28)
HCO3 BLDA-SCNC: 29.6 MMOL/L (ref 22–26)
HCO3 BLDA-SCNC: 30.4 MMOL/L (ref 22–26)
HCO3 BLDA-SCNC: 30.5 MMOL/L (ref 22–26)
HCO3 BLDA-SCNC: 30.7 MMOL/L (ref 22–26)
HCO3 BLDV-SCNC: 29.8 MMOL/L (ref 23–28)
HCO3 MIXED: 23.3 MMOL/L (ref 21–29)
HCT VFR BLD AUTO: 26.5 % (ref 34–46.6)
HCT VFR BLD AUTO: 29.5 % (ref 34–46.6)
HCT VFR BLDA CALC: 27 % (ref 38–51)
HCT VFR BLDA CALC: 28 % (ref 38–51)
HCT VFR BLDA CALC: 28 % (ref 38–51)
HCT VFR BLDA CALC: 30 % (ref 38–51)
HCT VFR BLDA CALC: 32 % (ref 38–51)
HCT VFR BLDA CALC: 32 % (ref 38–51)
HCT VFR BLDA CALC: 33 % (ref 38–51)
HCT VFR BLDA CALC: 34 % (ref 38–51)
HCT VFR BLDA CALC: 36 % (ref 38–51)
HCT VFR BLDA CALC: 36 % (ref 38–51)
HEMODILUTION: NO
HEMODILUTION: YES
HGB BLD-MCNC: 8.9 G/DL (ref 12–15.9)
HGB BLD-MCNC: 9.8 G/DL (ref 12–15.9)
HGB BLDA-MCNC: 10.2 G/DL (ref 12–17)
HGB BLDA-MCNC: 10.8 G/DL (ref 12–17)
HGB BLDA-MCNC: 11 G/DL (ref 12–17)
HGB BLDA-MCNC: 11.2 G/DL (ref 12–17)
HGB BLDA-MCNC: 11.2 G/DL (ref 12–17)
HGB BLDA-MCNC: 11.3 G/DL (ref 12–17)
HGB BLDA-MCNC: 11.3 G/DL (ref 12–17)
HGB BLDA-MCNC: 11.4 G/DL (ref 12–17)
HGB BLDA-MCNC: 11.5 G/DL (ref 12–17)
HGB BLDA-MCNC: 11.5 G/DL (ref 12–17)
HGB BLDA-MCNC: 11.7 G/DL (ref 12–17)
HGB BLDA-MCNC: 12.1 G/DL (ref 12–17)
HGB BLDA-MCNC: 12.2 G/DL (ref 12–17)
HGB BLDA-MCNC: 9.2 G/DL (ref 12–17)
HGB BLDA-MCNC: 9.5 G/DL (ref 12–17)
HGB BLDA-MCNC: 9.5 G/DL (ref 12–17)
INHALED O2 CONCENTRATION: 40 %
INHALED O2 CONCENTRATION: 50 %
INHALED O2 CONCENTRATION: 60 %
INHALED O2 CONCENTRATION: 60 %
INHALED O2 CONCENTRATION: 70 %
INR PPP: 1.21 (ref 0.93–1.1)
INR PPP: 1.37 (ref 0.93–1.1)
LYMPHOCYTES # BLD AUTO: 2 10*3/MM3 (ref 0.7–3.1)
LYMPHOCYTES NFR BLD AUTO: 18.8 % (ref 19.6–45.3)
MCH RBC QN AUTO: 31.1 PG (ref 26.6–33)
MCH RBC QN AUTO: 31.1 PG (ref 26.6–33)
MCHC RBC AUTO-ENTMCNC: 33.3 G/DL (ref 31.5–35.7)
MCHC RBC AUTO-ENTMCNC: 33.6 G/DL (ref 31.5–35.7)
MCV RBC AUTO: 92.7 FL (ref 79–97)
MCV RBC AUTO: 93.2 FL (ref 79–97)
MODALITY: ABNORMAL
MONOCYTES # BLD AUTO: 0.6 10*3/MM3 (ref 0.1–0.9)
MONOCYTES NFR BLD AUTO: 5.8 % (ref 5–12)
NEUTROPHILS NFR BLD AUTO: 7.6 10*3/MM3 (ref 1.7–7)
NEUTROPHILS NFR BLD AUTO: 71.1 % (ref 42.7–76)
NRBC BLD AUTO-RTO: 0.1 /100 WBC (ref 0–0.2)
O2 SATURATION MIXED: 68.4 %
PCO2 BLDA: 36.1 MM HG (ref 35–48)
PCO2 BLDA: 36.5 MM HG (ref 35–48)
PCO2 BLDA: 36.6 MM HG (ref 35–48)
PCO2 BLDA: 36.7 MM HG (ref 35–48)
PCO2 BLDA: 37.9 MM HG (ref 35–48)
PCO2 BLDA: 38.6 MM HG (ref 35–48)
PCO2 BLDA: 38.7 MM HG (ref 35–48)
PCO2 BLDA: 38.8 MM HG (ref 35–48)
PCO2 BLDA: 39.4 MM HG (ref 35–48)
PCO2 BLDA: 43 MM HG (ref 35–48)
PCO2 BLDA: 44.7 MM HG (ref 35–48)
PCO2 BLDA: 50.9 MM HG (ref 35–45)
PCO2 BLDA: 51 MM HG (ref 35–45)
PCO2 BLDA: 53 MM HG (ref 35–48)
PCO2 BLDA: 55.6 MM HG (ref 35–48)
PCO2 BLDA: 55.7 MM HG (ref 35–45)
PCO2 BLDA: 59.6 MM HG (ref 35–45)
PCO2 BLDV: 61.7 MM HG (ref 41–51)
PCO2 MIXED: 44.3 MMHG (ref 35–51)
PEEP RESPIRATORY: 5 CM[H2O]
PEEP RESPIRATORY: 7 CM[H2O]
PH BLDA: 7.29 PH UNITS (ref 7.35–7.45)
PH BLDA: 7.29 PH UNITS (ref 7.35–7.45)
PH BLDA: 7.32 PH UNITS (ref 7.35–7.45)
PH BLDA: 7.34 PH UNITS (ref 7.35–7.45)
PH BLDA: 7.35 PH UNITS (ref 7.35–7.45)
PH BLDA: 7.35 PH UNITS (ref 7.35–7.45)
PH BLDA: 7.37 PH UNITS (ref 7.35–7.45)
PH BLDA: 7.38 PH UNITS (ref 7.35–7.45)
PH BLDA: 7.39 PH UNITS (ref 7.35–7.45)
PH BLDA: 7.39 PH UNITS (ref 7.35–7.45)
PH BLDA: 7.4 PH UNITS (ref 7.35–7.45)
PH BLDA: 7.4 PH UNITS (ref 7.35–7.45)
PH BLDA: 7.41 PH UNITS (ref 7.35–7.45)
PH BLDA: 7.41 PH UNITS (ref 7.35–7.45)
PH BLDV: 7.29 PH UNITS (ref 7.31–7.41)
PH MIXED: 7.33 PH UNITS (ref 7.32–7.45)
PHOSPHATE SERPL-MCNC: 2.6 MG/DL (ref 2.5–4.5)
PLATELET # BLD AUTO: 113 10*3/MM3 (ref 140–450)
PLATELET # BLD AUTO: 113 10*3/MM3 (ref 140–450)
PLATELET # BLD AUTO: 114 10*3/MM3 (ref 140–450)
PMV BLD AUTO: 8.9 FL (ref 6–12)
PMV BLD AUTO: 9.1 FL (ref 6–12)
PO2 BLDA: 302 MM HG (ref 80–105)
PO2 BLDA: 313 MM HG (ref 80–105)
PO2 BLDA: 363 MM HG (ref 80–105)
PO2 BLDA: 372 MM HG (ref 80–105)
PO2 BLDA: 68.8 MM HG (ref 83–108)
PO2 BLDA: 69.6 MM HG (ref 83–108)
PO2 BLDA: 71.1 MM HG (ref 83–108)
PO2 BLDA: 71.3 MM HG (ref 83–108)
PO2 BLDA: 71.7 MM HG (ref 83–108)
PO2 BLDA: 73 MM HG (ref 83–108)
PO2 BLDA: 73.6 MM HG (ref 83–108)
PO2 BLDA: 75.7 MM HG (ref 83–108)
PO2 BLDA: 76.3 MM HG (ref 83–108)
PO2 BLDA: 77.6 MM HG (ref 83–108)
PO2 BLDA: 78.6 MM HG (ref 83–108)
PO2 BLDA: 84.5 MM HG (ref 83–108)
PO2 BLDA: 92.3 MM HG (ref 83–108)
PO2 BLDV: 49 MM HG (ref 35–42)
PO2 MIXED: 38.4 MMHG
POTASSIUM BLDA-SCNC: 3.7 MMOL/L (ref 3.5–4.5)
POTASSIUM BLDA-SCNC: 3.8 MMOL/L (ref 3.5–4.5)
POTASSIUM BLDA-SCNC: 3.8 MMOL/L (ref 3.5–4.9)
POTASSIUM BLDA-SCNC: 3.8 MMOL/L (ref 3.5–4.9)
POTASSIUM BLDA-SCNC: 3.9 MMOL/L (ref 3.5–4.5)
POTASSIUM BLDA-SCNC: 4 MMOL/L (ref 3.5–4.5)
POTASSIUM BLDA-SCNC: 4.1 MMOL/L (ref 3.5–4.5)
POTASSIUM BLDA-SCNC: 4.2 MMOL/L (ref 3.5–4.9)
POTASSIUM BLDA-SCNC: 4.3 MMOL/L (ref 3.5–4.9)
POTASSIUM BLDA-SCNC: 4.6 MMOL/L (ref 3.5–4.9)
POTASSIUM SERPL-SCNC: 4 MMOL/L (ref 3.5–5.2)
PROTHROMBIN TIME: 13.2 SECONDS (ref 9.6–11.7)
PROTHROMBIN TIME: 14.8 SECONDS (ref 9.6–11.7)
PSV: 10 CMH2O
PSV: 10 CMH2O
QT INTERVAL: 516 MS
RBC # BLD AUTO: 2.86 10*6/MM3 (ref 3.77–5.28)
RBC # BLD AUTO: 3.16 10*6/MM3 (ref 3.77–5.28)
RESPIRATORY RATE: 16
RESPIRATORY RATE: 16
RESPIRATORY RATE: 18
SAO2 % BLDCOA: 100 % (ref 95–98)
SAO2 % BLDCOA: 91.7 % (ref 94–98)
SAO2 % BLDCOA: 92.1 % (ref 94–98)
SAO2 % BLDCOA: 93.5 % (ref 94–98)
SAO2 % BLDCOA: 93.7 % (ref 94–98)
SAO2 % BLDCOA: 94 % (ref 94–98)
SAO2 % BLDCOA: 94 % (ref 94–98)
SAO2 % BLDCOA: 94.3 % (ref 94–98)
SAO2 % BLDCOA: 94.4 % (ref 94–98)
SAO2 % BLDCOA: 95.2 % (ref 94–98)
SAO2 % BLDCOA: 95.3 % (ref 94–98)
SAO2 % BLDCOA: 95.4 % (ref 94–98)
SAO2 % BLDCOA: 96.2 % (ref 94–98)
SAO2 % BLDCOA: 97 % (ref 94–98)
SAO2 % BLDCOV: 32 % (ref 95–99)
SET MECH RESP RATE: 16
SODIUM BLD-SCNC: 135 MMOL/L (ref 138–146)
SODIUM BLD-SCNC: 137 MMOL/L (ref 138–146)
SODIUM BLD-SCNC: 137 MMOL/L (ref 138–146)
SODIUM BLD-SCNC: 138 MMOL/L (ref 138–146)
SODIUM BLD-SCNC: 138 MMOL/L (ref 138–146)
SODIUM BLD-SCNC: 140 MMOL/L (ref 138–146)
SODIUM BLD-SCNC: 140 MMOL/L (ref 138–146)
SODIUM BLD-SCNC: 141 MMOL/L (ref 138–146)
SODIUM BLD-SCNC: 142 MMOL/L (ref 138–146)
SODIUM BLD-SCNC: 143 MMOL/L (ref 138–146)
SODIUM BLD-SCNC: 143 MMOL/L (ref 138–146)
SODIUM SERPL-SCNC: 140 MMOL/L (ref 136–145)
UNIT  ABO: NORMAL
UNIT  RH: NORMAL
VENTILATOR MODE: ABNORMAL
VENTILATOR MODE: AC
VT ON VENT VENT: 600 ML
WBC # BLD AUTO: 10.7 10*3/MM3 (ref 3.4–10.8)
WBC # BLD AUTO: 9.7 10*3/MM3 (ref 3.4–10.8)

## 2021-03-18 PROCEDURE — 82803 BLOOD GASES ANY COMBINATION: CPT

## 2021-03-18 PROCEDURE — 84295 ASSAY OF SERUM SODIUM: CPT

## 2021-03-18 PROCEDURE — 85610 PROTHROMBIN TIME: CPT | Performed by: THORACIC SURGERY (CARDIOTHORACIC VASCULAR SURGERY)

## 2021-03-18 PROCEDURE — 33405 REPLACEMENT AORTIC VALVE OPN: CPT | Performed by: THORACIC SURGERY (CARDIOTHORACIC VASCULAR SURGERY)

## 2021-03-18 PROCEDURE — 94003 VENT MGMT INPAT SUBQ DAY: CPT

## 2021-03-18 PROCEDURE — 25010000002 MAGNESIUM SULFATE IN D5W 1G/100ML (PREMIX) 1-5 GM/100ML-% SOLUTION: Performed by: NURSE PRACTITIONER

## 2021-03-18 PROCEDURE — 25010000003 CEFAZOLIN PER 500 MG: Performed by: ANESTHESIOLOGY

## 2021-03-18 PROCEDURE — 93318 ECHO TRANSESOPHAGEAL INTRAOP: CPT | Performed by: ANESTHESIOLOGY

## 2021-03-18 PROCEDURE — 0B9F8ZX DRAINAGE OF RIGHT LOWER LUNG LOBE, VIA NATURAL OR ARTIFICIAL OPENING ENDOSCOPIC, DIAGNOSTIC: ICD-10-PCS | Performed by: INTERNAL MEDICINE

## 2021-03-18 PROCEDURE — 80069 RENAL FUNCTION PANEL: CPT | Performed by: NURSE PRACTITIONER

## 2021-03-18 PROCEDURE — 25010000002 MAGNESIUM SULFATE PER 500 MG OF MAGNESIUM: Performed by: ANESTHESIOLOGY

## 2021-03-18 PROCEDURE — 85014 HEMATOCRIT: CPT

## 2021-03-18 PROCEDURE — 25010000002 CEFAZOLIN PER 500 MG: Performed by: NURSE PRACTITIONER

## 2021-03-18 PROCEDURE — C1729 CATH, DRAINAGE: HCPCS | Performed by: THORACIC SURGERY (CARDIOTHORACIC VASCULAR SURGERY)

## 2021-03-18 PROCEDURE — 82330 ASSAY OF CALCIUM: CPT

## 2021-03-18 PROCEDURE — 5A1955Z RESPIRATORY VENTILATION, GREATER THAN 96 CONSECUTIVE HOURS: ICD-10-PCS | Performed by: INTERNAL MEDICINE

## 2021-03-18 PROCEDURE — 85018 HEMOGLOBIN: CPT

## 2021-03-18 PROCEDURE — 80051 ELECTROLYTE PANEL: CPT

## 2021-03-18 PROCEDURE — P9012 CRYOPRECIPITATE EACH UNIT: HCPCS

## 2021-03-18 PROCEDURE — 84132 ASSAY OF SERUM POTASSIUM: CPT

## 2021-03-18 PROCEDURE — 93010 ELECTROCARDIOGRAM REPORT: CPT | Performed by: INTERNAL MEDICINE

## 2021-03-18 PROCEDURE — 94799 UNLISTED PULMONARY SVC/PX: CPT

## 2021-03-18 PROCEDURE — C1889 IMPLANT/INSERT DEVICE, NOC: HCPCS | Performed by: THORACIC SURGERY (CARDIOTHORACIC VASCULAR SURGERY)

## 2021-03-18 PROCEDURE — 25010000003 POTASSIUM CHLORIDE 10 MEQ/100ML SOLUTION: Performed by: NURSE PRACTITIONER

## 2021-03-18 PROCEDURE — 88305 TISSUE EXAM BY PATHOLOGIST: CPT | Performed by: THORACIC SURGERY (CARDIOTHORACIC VASCULAR SURGERY)

## 2021-03-18 PROCEDURE — 02RF0KZ REPLACEMENT OF AORTIC VALVE WITH NONAUTOLOGOUS TISSUE SUBSTITUTE, OPEN APPROACH: ICD-10-PCS | Performed by: THORACIC SURGERY (CARDIOTHORACIC VASCULAR SURGERY)

## 2021-03-18 PROCEDURE — 25010000002 HEPARIN (PORCINE) PER 1000 UNITS: Performed by: THORACIC SURGERY (CARDIOTHORACIC VASCULAR SURGERY)

## 2021-03-18 PROCEDURE — 85730 THROMBOPLASTIN TIME PARTIAL: CPT | Performed by: NURSE PRACTITIONER

## 2021-03-18 PROCEDURE — 25010000002 ONDANSETRON PER 1 MG: Performed by: NURSE PRACTITIONER

## 2021-03-18 PROCEDURE — 71045 X-RAY EXAM CHEST 1 VIEW: CPT

## 2021-03-18 PROCEDURE — 85576 BLOOD PLATELET AGGREGATION: CPT | Performed by: THORACIC SURGERY (CARDIOTHORACIC VASCULAR SURGERY)

## 2021-03-18 PROCEDURE — 82962 GLUCOSE BLOOD TEST: CPT

## 2021-03-18 PROCEDURE — 25010000002 MIDAZOLAM PER 1 MG: Performed by: ANESTHESIOLOGY

## 2021-03-18 PROCEDURE — 85730 THROMBOPLASTIN TIME PARTIAL: CPT | Performed by: THORACIC SURGERY (CARDIOTHORACIC VASCULAR SURGERY)

## 2021-03-18 PROCEDURE — C1751 CATH, INF, PER/CENT/MIDLINE: HCPCS | Performed by: ANESTHESIOLOGY

## 2021-03-18 PROCEDURE — 85384 FIBRINOGEN ACTIVITY: CPT | Performed by: THORACIC SURGERY (CARDIOTHORACIC VASCULAR SURGERY)

## 2021-03-18 PROCEDURE — 94640 AIRWAY INHALATION TREATMENT: CPT

## 2021-03-18 PROCEDURE — 3E080GC INTRODUCTION OF OTHER THERAPEUTIC SUBSTANCE INTO HEART, OPEN APPROACH: ICD-10-PCS | Performed by: THORACIC SURGERY (CARDIOTHORACIC VASCULAR SURGERY)

## 2021-03-18 PROCEDURE — 85049 AUTOMATED PLATELET COUNT: CPT | Performed by: THORACIC SURGERY (CARDIOTHORACIC VASCULAR SURGERY)

## 2021-03-18 PROCEDURE — 99223 1ST HOSP IP/OBS HIGH 75: CPT | Performed by: INTERNAL MEDICINE

## 2021-03-18 PROCEDURE — 85347 COAGULATION TIME ACTIVATED: CPT

## 2021-03-18 PROCEDURE — 0BH17EZ INSERTION OF ENDOTRACHEAL AIRWAY INTO TRACHEA, VIA NATURAL OR ARTIFICIAL OPENING: ICD-10-PCS | Performed by: INTERNAL MEDICINE

## 2021-03-18 PROCEDURE — 25010000002 PROTAMINE SULFATE PER 10 MG: Performed by: ANESTHESIOLOGY

## 2021-03-18 PROCEDURE — 5A1221Z PERFORMANCE OF CARDIAC OUTPUT, CONTINUOUS: ICD-10-PCS | Performed by: THORACIC SURGERY (CARDIOTHORACIC VASCULAR SURGERY)

## 2021-03-18 PROCEDURE — 85610 PROTHROMBIN TIME: CPT | Performed by: NURSE PRACTITIONER

## 2021-03-18 PROCEDURE — 25010000002 HEPARIN (PORCINE) PER 1000 UNITS: Performed by: ANESTHESIOLOGY

## 2021-03-18 PROCEDURE — 88311 DECALCIFY TISSUE: CPT | Performed by: THORACIC SURGERY (CARDIOTHORACIC VASCULAR SURGERY)

## 2021-03-18 PROCEDURE — B24BZZ4 ULTRASONOGRAPHY OF HEART WITH AORTA, TRANSESOPHAGEAL: ICD-10-PCS | Performed by: THORACIC SURGERY (CARDIOTHORACIC VASCULAR SURGERY)

## 2021-03-18 PROCEDURE — 85027 COMPLETE CBC AUTOMATED: CPT | Performed by: THORACIC SURGERY (CARDIOTHORACIC VASCULAR SURGERY)

## 2021-03-18 PROCEDURE — C1713 ANCHOR/SCREW BN/BN,TIS/BN: HCPCS | Performed by: THORACIC SURGERY (CARDIOTHORACIC VASCULAR SURGERY)

## 2021-03-18 PROCEDURE — 25010000002 FENTANYL CITRATE (PF) 250 MCG/5ML SOLUTION: Performed by: ANESTHESIOLOGY

## 2021-03-18 PROCEDURE — 85025 COMPLETE CBC W/AUTO DIFF WBC: CPT | Performed by: NURSE PRACTITIONER

## 2021-03-18 PROCEDURE — 82330 ASSAY OF CALCIUM: CPT | Performed by: NURSE PRACTITIONER

## 2021-03-18 PROCEDURE — 82947 ASSAY GLUCOSE BLOOD QUANT: CPT

## 2021-03-18 PROCEDURE — 93005 ELECTROCARDIOGRAM TRACING: CPT | Performed by: NURSE PRACTITIONER

## 2021-03-18 PROCEDURE — 36430 TRANSFUSION BLD/BLD COMPNT: CPT

## 2021-03-18 PROCEDURE — 86927 PLASMA FRESH FROZEN: CPT

## 2021-03-18 PROCEDURE — 94002 VENT MGMT INPAT INIT DAY: CPT

## 2021-03-18 PROCEDURE — 63710000001 INSULIN REGULAR HUMAN PER 5 UNITS: Performed by: ANESTHESIOLOGY

## 2021-03-18 DEVICE — SS SUTURE, 3 PER SLEEVE
Type: IMPLANTABLE DEVICE | Site: STERNUM | Status: FUNCTIONAL
Brand: MYO/WIRE II

## 2021-03-18 DEVICE — INCISIONLINE PLEDGET TFLN SFT LG: Type: IMPLANTABLE DEVICE | Site: CHEST | Status: FUNCTIONAL

## 2021-03-18 DEVICE — VLV PERICARD PERIMOUNT MAGNA EASE 23: Type: IMPLANTABLE DEVICE | Site: CHEST | Status: FUNCTIONAL

## 2021-03-18 DEVICE — SS SUTURE, 6 PER SLEEVE
Type: IMPLANTABLE DEVICE | Site: STERNUM | Status: FUNCTIONAL
Brand: MYO/WIRE II

## 2021-03-18 DEVICE — WAX,BONE,NATURAL
Type: IMPLANTABLE DEVICE | Site: STERNUM | Status: FUNCTIONAL
Brand: MEDLINE INDUSTRIES

## 2021-03-18 DEVICE — DEV CONTRL TISS STRATAFIX SPIRAL MNCRYL UD 3/0 PLS 30CM: Type: IMPLANTABLE DEVICE | Site: CHEST | Status: FUNCTIONAL

## 2021-03-18 RX ORDER — NITROGLYCERIN 10 MG/100ML
10-50 INJECTION INTRAVENOUS CONTINUOUS PRN
Status: DISCONTINUED | OUTPATIENT
Start: 2021-03-18 | End: 2021-03-20

## 2021-03-18 RX ORDER — POLYETHYLENE GLYCOL 3350 17 G/17G
17 POWDER, FOR SOLUTION ORAL 2 TIMES DAILY
Status: DISCONTINUED | OUTPATIENT
Start: 2021-03-19 | End: 2021-03-28

## 2021-03-18 RX ORDER — AMINOCAPROIC ACID 250 MG/ML
INJECTION, SOLUTION INTRAVENOUS AS NEEDED
Status: DISCONTINUED | OUTPATIENT
Start: 2021-03-18 | End: 2021-03-18 | Stop reason: SURG

## 2021-03-18 RX ORDER — POTASSIUM CHLORIDE 1.5 G/1.77G
20 POWDER, FOR SOLUTION ORAL AS NEEDED
Status: DISCONTINUED | OUTPATIENT
Start: 2021-03-19 | End: 2021-04-13 | Stop reason: HOSPADM

## 2021-03-18 RX ORDER — NOREPINEPHRINE BIT/0.9 % NACL 8 MG/250ML
INFUSION BOTTLE (ML) INTRAVENOUS CONTINUOUS PRN
Status: DISCONTINUED | OUTPATIENT
Start: 2021-03-18 | End: 2021-03-18

## 2021-03-18 RX ORDER — VECURONIUM BROMIDE 1 MG/ML
INJECTION, POWDER, LYOPHILIZED, FOR SOLUTION INTRAVENOUS AS NEEDED
Status: DISCONTINUED | OUTPATIENT
Start: 2021-03-18 | End: 2021-03-18 | Stop reason: SURG

## 2021-03-18 RX ORDER — SODIUM CHLORIDE 9 MG/ML
INJECTION, SOLUTION INTRAVENOUS CONTINUOUS PRN
Status: DISCONTINUED | OUTPATIENT
Start: 2021-03-18 | End: 2021-03-18 | Stop reason: SURG

## 2021-03-18 RX ORDER — NOREPINEPHRINE BIT/0.9 % NACL 8 MG/250ML
.02-.06 INFUSION BOTTLE (ML) INTRAVENOUS CONTINUOUS PRN
Status: DISCONTINUED | OUTPATIENT
Start: 2021-03-18 | End: 2021-03-20

## 2021-03-18 RX ORDER — ACETAMINOPHEN 650 MG/1
650 SUPPOSITORY RECTAL EVERY 4 HOURS PRN
Status: DISCONTINUED | OUTPATIENT
Start: 2021-03-19 | End: 2021-04-13 | Stop reason: HOSPADM

## 2021-03-18 RX ORDER — ONDANSETRON 2 MG/ML
4 INJECTION INTRAMUSCULAR; INTRAVENOUS EVERY 6 HOURS PRN
Status: DISCONTINUED | OUTPATIENT
Start: 2021-03-18 | End: 2021-04-13 | Stop reason: HOSPADM

## 2021-03-18 RX ORDER — ACETAMINOPHEN 160 MG/5ML
650 SOLUTION ORAL EVERY 6 HOURS
Status: DISPENSED | OUTPATIENT
Start: 2021-03-18 | End: 2021-03-19

## 2021-03-18 RX ORDER — HYDROCODONE BITARTRATE AND ACETAMINOPHEN 5; 325 MG/1; MG/1
1 TABLET ORAL EVERY 4 HOURS PRN
Status: DISPENSED | OUTPATIENT
Start: 2021-03-18 | End: 2021-04-05

## 2021-03-18 RX ORDER — IPRATROPIUM BROMIDE AND ALBUTEROL SULFATE 2.5; .5 MG/3ML; MG/3ML
3 SOLUTION RESPIRATORY (INHALATION) EVERY 4 HOURS PRN
Status: DISCONTINUED | OUTPATIENT
Start: 2021-03-18 | End: 2021-04-13 | Stop reason: HOSPADM

## 2021-03-18 RX ORDER — PANTOPRAZOLE SODIUM 40 MG/1
40 TABLET, DELAYED RELEASE ORAL
Status: DISCONTINUED | OUTPATIENT
Start: 2021-03-19 | End: 2021-03-22

## 2021-03-18 RX ORDER — ASPIRIN 81 MG/1
81 TABLET ORAL DAILY
Status: DISCONTINUED | OUTPATIENT
Start: 2021-03-19 | End: 2021-04-13 | Stop reason: HOSPADM

## 2021-03-18 RX ORDER — LEVOTHYROXINE SODIUM 0.1 MG/1
100 TABLET ORAL
Status: DISCONTINUED | OUTPATIENT
Start: 2021-03-18 | End: 2021-03-18

## 2021-03-18 RX ORDER — ACETAMINOPHEN 650 MG/1
650 SUPPOSITORY RECTAL EVERY 6 HOURS
Status: ACTIVE | OUTPATIENT
Start: 2021-03-18 | End: 2021-03-19

## 2021-03-18 RX ORDER — CEFAZOLIN SODIUM 1 G/3ML
INJECTION, POWDER, FOR SOLUTION INTRAMUSCULAR; INTRAVENOUS AS NEEDED
Status: DISCONTINUED | OUTPATIENT
Start: 2021-03-18 | End: 2021-03-18 | Stop reason: SURG

## 2021-03-18 RX ORDER — HEPARIN SODIUM 1000 [USP'U]/ML
INJECTION, SOLUTION INTRAVENOUS; SUBCUTANEOUS AS NEEDED
Status: DISCONTINUED | OUTPATIENT
Start: 2021-03-18 | End: 2021-03-18 | Stop reason: SURG

## 2021-03-18 RX ORDER — SODIUM CHLORIDE 9 MG/ML
30 INJECTION, SOLUTION INTRAVENOUS CONTINUOUS
Status: DISPENSED | OUTPATIENT
Start: 2021-03-18 | End: 2021-03-21

## 2021-03-18 RX ORDER — PANTOPRAZOLE SODIUM 40 MG/10ML
40 INJECTION, POWDER, LYOPHILIZED, FOR SOLUTION INTRAVENOUS ONCE
Status: COMPLETED | OUTPATIENT
Start: 2021-03-18 | End: 2021-03-18

## 2021-03-18 RX ORDER — NICARDIPINE HYDROCHLORIDE 2.5 MG/ML
INJECTION INTRAVENOUS AS NEEDED
Status: DISCONTINUED | OUTPATIENT
Start: 2021-03-18 | End: 2021-03-18 | Stop reason: SURG

## 2021-03-18 RX ORDER — NITROGLYCERIN 5 MG/ML
INJECTION, SOLUTION INTRAVENOUS AS NEEDED
Status: DISCONTINUED | OUTPATIENT
Start: 2021-03-18 | End: 2021-03-18

## 2021-03-18 RX ORDER — NITROGLYCERIN 10 MG/100ML
INJECTION INTRAVENOUS CONTINUOUS PRN
Status: DISCONTINUED | OUTPATIENT
Start: 2021-03-18 | End: 2021-03-18

## 2021-03-18 RX ORDER — PANTOPRAZOLE SODIUM 40 MG/10ML
40 INJECTION, POWDER, LYOPHILIZED, FOR SOLUTION INTRAVENOUS ONCE
Status: DISCONTINUED | OUTPATIENT
Start: 2021-03-18 | End: 2021-03-18

## 2021-03-18 RX ORDER — ACETAMINOPHEN 325 MG/1
650 TABLET ORAL EVERY 6 HOURS
Status: DISPENSED | OUTPATIENT
Start: 2021-03-18 | End: 2021-03-19

## 2021-03-18 RX ORDER — ALBUMIN, HUMAN INJ 5% 5 %
12.5 SOLUTION INTRAVENOUS AS NEEDED
Status: DISCONTINUED | OUTPATIENT
Start: 2021-03-18 | End: 2021-03-20

## 2021-03-18 RX ORDER — POTASSIUM CHLORIDE 7.45 MG/ML
10 INJECTION INTRAVENOUS
Status: DISCONTINUED | OUTPATIENT
Start: 2021-03-18 | End: 2021-04-13 | Stop reason: HOSPADM

## 2021-03-18 RX ORDER — XYLITOL/YERBA SANTA
2 AEROSOL, SPRAY WITH PUMP (ML) MUCOUS MEMBRANE EVERY 4 HOURS PRN
Status: DISCONTINUED | OUTPATIENT
Start: 2021-03-18 | End: 2021-03-20

## 2021-03-18 RX ORDER — CHLORHEXIDINE GLUCONATE 500 MG/1
1 CLOTH TOPICAL EVERY 12 HOURS PRN
Status: DISCONTINUED | OUTPATIENT
Start: 2021-03-18 | End: 2021-03-18 | Stop reason: HOSPADM

## 2021-03-18 RX ORDER — MAGNESIUM SULFATE 1 G/100ML
1 INJECTION INTRAVENOUS EVERY 8 HOURS
Status: COMPLETED | OUTPATIENT
Start: 2021-03-18 | End: 2021-03-19

## 2021-03-18 RX ORDER — ATORVASTATIN CALCIUM 40 MG/1
40 TABLET, FILM COATED ORAL NIGHTLY
Status: DISCONTINUED | OUTPATIENT
Start: 2021-03-19 | End: 2021-04-13 | Stop reason: HOSPADM

## 2021-03-18 RX ORDER — ACETAMINOPHEN 650 MG
TABLET, EXTENDED RELEASE ORAL AS NEEDED
Status: DISCONTINUED | OUTPATIENT
Start: 2021-03-18 | End: 2021-03-18 | Stop reason: HOSPADM

## 2021-03-18 RX ORDER — CHLORHEXIDINE GLUCONATE 0.12 MG/ML
15 RINSE ORAL ONCE
Status: DISCONTINUED | OUTPATIENT
Start: 2021-03-18 | End: 2021-03-18 | Stop reason: HOSPADM

## 2021-03-18 RX ORDER — FENTANYL CITRATE 50 UG/ML
INJECTION, SOLUTION INTRAMUSCULAR; INTRAVENOUS AS NEEDED
Status: DISCONTINUED | OUTPATIENT
Start: 2021-03-18 | End: 2021-03-18 | Stop reason: SURG

## 2021-03-18 RX ORDER — DEXMEDETOMIDINE HYDROCHLORIDE 4 UG/ML
.2-1.5 INJECTION, SOLUTION INTRAVENOUS
Status: DISCONTINUED | OUTPATIENT
Start: 2021-03-18 | End: 2021-03-19

## 2021-03-18 RX ORDER — ACETAMINOPHEN 160 MG/5ML
650 SOLUTION ORAL EVERY 4 HOURS PRN
Status: DISCONTINUED | OUTPATIENT
Start: 2021-03-19 | End: 2021-04-13 | Stop reason: HOSPADM

## 2021-03-18 RX ORDER — CHLORHEXIDINE GLUCONATE 0.12 MG/ML
15 RINSE ORAL EVERY 12 HOURS
Status: DISCONTINUED | OUTPATIENT
Start: 2021-03-18 | End: 2021-04-13 | Stop reason: HOSPADM

## 2021-03-18 RX ORDER — LEVOTHYROXINE SODIUM 0.1 MG/1
100 TABLET ORAL
Status: DISCONTINUED | OUTPATIENT
Start: 2021-03-19 | End: 2021-04-13 | Stop reason: HOSPADM

## 2021-03-18 RX ORDER — CEFAZOLIN SODIUM IN 0.9 % NACL 3 G/100 ML
3 INTRAVENOUS SOLUTION, PIGGYBACK (ML) INTRAVENOUS
Status: COMPLETED | OUTPATIENT
Start: 2021-03-18 | End: 2021-03-18

## 2021-03-18 RX ORDER — MAGNESIUM HYDROXIDE 1200 MG/15ML
LIQUID ORAL AS NEEDED
Status: DISCONTINUED | OUTPATIENT
Start: 2021-03-18 | End: 2021-03-18 | Stop reason: HOSPADM

## 2021-03-18 RX ORDER — ACETAMINOPHEN 325 MG/1
650 TABLET ORAL EVERY 4 HOURS PRN
Status: DISCONTINUED | OUTPATIENT
Start: 2021-03-19 | End: 2021-04-13 | Stop reason: HOSPADM

## 2021-03-18 RX ORDER — DOCUSATE SODIUM 100 MG/1
100 CAPSULE, LIQUID FILLED ORAL 2 TIMES DAILY
Status: DISCONTINUED | OUTPATIENT
Start: 2021-03-19 | End: 2021-03-24

## 2021-03-18 RX ORDER — HYDROMORPHONE HCL 110MG/55ML
0.5 PATIENT CONTROLLED ANALGESIA SYRINGE INTRAVENOUS
Status: DISPENSED | OUTPATIENT
Start: 2021-03-18 | End: 2021-03-25

## 2021-03-18 RX ORDER — MIDAZOLAM HYDROCHLORIDE 1 MG/ML
INJECTION INTRAMUSCULAR; INTRAVENOUS AS NEEDED
Status: DISCONTINUED | OUTPATIENT
Start: 2021-03-18 | End: 2021-03-18 | Stop reason: SURG

## 2021-03-18 RX ORDER — SENNA PLUS 8.6 MG/1
2 TABLET ORAL NIGHTLY
Status: DISCONTINUED | OUTPATIENT
Start: 2021-03-19 | End: 2021-03-28

## 2021-03-18 RX ORDER — POTASSIUM CHLORIDE 20 MEQ/1
20 TABLET, EXTENDED RELEASE ORAL AS NEEDED
Status: DISCONTINUED | OUTPATIENT
Start: 2021-03-19 | End: 2021-04-13 | Stop reason: HOSPADM

## 2021-03-18 RX ORDER — NALOXONE HCL 0.4 MG/ML
0.4 VIAL (ML) INJECTION
Status: DISCONTINUED | OUTPATIENT
Start: 2021-03-18 | End: 2021-04-13 | Stop reason: HOSPADM

## 2021-03-18 RX ORDER — ASPIRIN 325 MG
325 TABLET ORAL ONCE
Status: COMPLETED | OUTPATIENT
Start: 2021-03-18 | End: 2021-03-18

## 2021-03-18 RX ORDER — BUDESONIDE 0.5 MG/2ML
0.5 INHALANT ORAL
Status: DISCONTINUED | OUTPATIENT
Start: 2021-03-18 | End: 2021-03-31

## 2021-03-18 RX ADMIN — NICARDIPINE HYDROCHLORIDE 1 MG: 2.5 INJECTION INTRAVENOUS at 07:49

## 2021-03-18 RX ADMIN — FENTANYL CITRATE 250 MCG: 0.05 INJECTION, SOLUTION INTRAMUSCULAR; INTRAVENOUS at 08:54

## 2021-03-18 RX ADMIN — POTASSIUM CHLORIDE 10 MEQ: 7.46 INJECTION, SOLUTION INTRAVENOUS at 18:33

## 2021-03-18 RX ADMIN — DEXMEDETOMIDINE HYDROCHLORIDE 0.4 MCG/HR: 100 INJECTION, SOLUTION INTRAVENOUS at 07:18

## 2021-03-18 RX ADMIN — MAGNESIUM SULFATE 1 G: 1 INJECTION INTRAVENOUS at 16:05

## 2021-03-18 RX ADMIN — SODIUM CHLORIDE 2.5 MG/HR: 9 INJECTION, SOLUTION INTRAVENOUS at 10:39

## 2021-03-18 RX ADMIN — FENTANYL CITRATE 250 MCG: 0.05 INJECTION, SOLUTION INTRAMUSCULAR; INTRAVENOUS at 09:18

## 2021-03-18 RX ADMIN — VECURONIUM BROMIDE 2 MG: 10 INJECTION, POWDER, LYOPHILIZED, FOR SOLUTION INTRAVENOUS at 08:08

## 2021-03-18 RX ADMIN — HEPARIN SODIUM 30000 UNITS: 1000 INJECTION, SOLUTION INTRAVENOUS; SUBCUTANEOUS at 07:58

## 2021-03-18 RX ADMIN — ONDANSETRON 4 MG: 2 INJECTION INTRAMUSCULAR; INTRAVENOUS at 23:28

## 2021-03-18 RX ADMIN — CHLORHEXIDINE GLUCONATE 15 ML: 1.2 RINSE ORAL at 21:52

## 2021-03-18 RX ADMIN — AMINOCAPROIC ACID 10 G: 250 INJECTION, SOLUTION INTRAVENOUS at 07:20

## 2021-03-18 RX ADMIN — SODIUM CHLORIDE: 0.9 INJECTION, SOLUTION INTRAVENOUS at 06:44

## 2021-03-18 RX ADMIN — SODIUM CHLORIDE 30 ML/HR: 9 INJECTION, SOLUTION INTRAVENOUS at 12:00

## 2021-03-18 RX ADMIN — CEFAZOLIN SODIUM 2 G: 10 INJECTION, POWDER, FOR SOLUTION INTRAVENOUS at 14:58

## 2021-03-18 RX ADMIN — ONDANSETRON 4 MG: 2 INJECTION INTRAMUSCULAR; INTRAVENOUS at 14:58

## 2021-03-18 RX ADMIN — CEFAZOLIN SODIUM 2 G: 10 INJECTION, POWDER, FOR SOLUTION INTRAVENOUS at 23:28

## 2021-03-18 RX ADMIN — VECURONIUM BROMIDE 12 MG: 10 INJECTION, POWDER, LYOPHILIZED, FOR SOLUTION INTRAVENOUS at 06:59

## 2021-03-18 RX ADMIN — FENTANYL CITRATE 250 MCG: 0.05 INJECTION, SOLUTION INTRAMUSCULAR; INTRAVENOUS at 08:07

## 2021-03-18 RX ADMIN — NICARDIPINE HYDROCHLORIDE 1 MG: 2.5 INJECTION INTRAVENOUS at 07:47

## 2021-03-18 RX ADMIN — BUDESONIDE 0.5 MG: 0.5 SUSPENSION RESPIRATORY (INHALATION) at 18:35

## 2021-03-18 RX ADMIN — MAGNESIUM SULFATE HEPTAHYDRATE 2 G: 500 INJECTION, SOLUTION INTRAMUSCULAR; INTRAVENOUS at 08:54

## 2021-03-18 RX ADMIN — SODIUM CHLORIDE 2 UNITS/HR: 900 INJECTION INTRAVENOUS at 07:18

## 2021-03-18 RX ADMIN — FENTANYL CITRATE 250 MCG: 0.05 INJECTION, SOLUTION INTRAMUSCULAR; INTRAVENOUS at 09:46

## 2021-03-18 RX ADMIN — IPRATROPIUM BROMIDE AND ALBUTEROL SULFATE 3 ML: 2.5; .5 SOLUTION RESPIRATORY (INHALATION) at 15:22

## 2021-03-18 RX ADMIN — ACETAMINOPHEN ORAL SOLUTION 649.6 MG: 650 SOLUTION ORAL at 21:50

## 2021-03-18 RX ADMIN — PANTOPRAZOLE SODIUM 40 MG: 40 INJECTION, POWDER, FOR SOLUTION INTRAVENOUS at 16:05

## 2021-03-18 RX ADMIN — CEFAZOLIN 3 G: 1 INJECTION, POWDER, FOR SOLUTION INTRAMUSCULAR; INTRAVENOUS; PARENTERAL at 06:59

## 2021-03-18 RX ADMIN — AMINOCAPROIC ACID 10 G: 250 INJECTION, SOLUTION INTRAVENOUS at 09:22

## 2021-03-18 RX ADMIN — Medication 0.01 MCG/KG/MIN: at 07:44

## 2021-03-18 RX ADMIN — ASPIRIN 325 MG ORAL TABLET 325 MG: 325 PILL ORAL at 15:52

## 2021-03-18 RX ADMIN — MUPIROCIN 1 APPLICATION: 20 OINTMENT TOPICAL at 21:50

## 2021-03-18 RX ADMIN — METOPROLOL TARTRATE 12.5 MG: 25 TABLET, FILM COATED ORAL at 06:22

## 2021-03-18 RX ADMIN — FENTANYL CITRATE 500 MCG: 0.05 INJECTION, SOLUTION INTRAMUSCULAR; INTRAVENOUS at 06:44

## 2021-03-18 RX ADMIN — MIDAZOLAM 6 MG: 1 INJECTION INTRAMUSCULAR; INTRAVENOUS at 06:44

## 2021-03-18 RX ADMIN — PROTAMINE SULFATE 300 MG: 10 INJECTION, SOLUTION INTRAVENOUS at 09:07

## 2021-03-18 RX ADMIN — CEFAZOLIN 2 G: 1 INJECTION, POWDER, FOR SOLUTION INTRAMUSCULAR; INTRAVENOUS at 09:15

## 2021-03-18 RX ADMIN — NITROGLYCERIN 40 MCG/MIN: 10 INJECTION INTRAVENOUS at 07:18

## 2021-03-18 NOTE — ANESTHESIA PROCEDURE NOTES
Central Line      Patient reassessed immediately prior to procedure    Patient location during procedure: OR  Start time: 3/18/2021 7:03 AM  Stop Time:3/18/2021 7:10 AM  Indications: vascular access and MD/Surgeon request  Staff  Anesthesiologist: Adrian Page MD  Preanesthetic Checklist  Completed: patient identified, IV checked, site marked, risks and benefits discussed, surgical consent, monitors and equipment checked, pre-op evaluation and timeout performed  Central Line Prep  Sterile Tech:cap, gloves, gown, mask and sterile barriers  Prep: chloraprep  Patient monitoring: blood pressure monitoring, continuous pulse oximetry and EKG  Central Line Procedure  Laterality:right  Location:internal jugular  Catheter Type:triple lumen and Cordis  Catheter Size:8.5 Fr  Guidance:ultrasound guided  PROCEDURE NOTE/ULTRASOUND INTERPRETATION.  Using ultrasound guidance the potential vascular sites for insertion of the catheter were visualized to determine the patency of the vessel to be used for vascular access.  After selecting the appropriate site for insertion, the needle was visualized under ultrasound being inserted into the internal jugular vein, followed by ultrasound confirmation of wire and catheter placement. There were no abnormalities seen on ultrasound; an image was taken; and the patient tolerated the procedure with no complications. Images: still images obtained, printed/placed on chart  Assessment  Post procedure:biopatch applied, line sutured and occlusive dressing applied  Assessement:blood return through all ports, free fluid flow and Ashkan Test  Complications:no  Patient Tolerance:patient tolerated the procedure well with no apparent complications

## 2021-03-18 NOTE — ANESTHESIA PROCEDURE NOTES
Airway  Urgency: elective    Date/Time: 3/18/2021 7:00 AM  Airway not difficult    General Information and Staff    Patient location during procedure: OR  Anesthesiologist: Adrian Page MD    Indications and Patient Condition  Indications for airway management: airway protection    Preoxygenated: yes  MILS maintained throughout  Mask difficulty assessment: 1 - vent by mask    Final Airway Details  Final airway type: endotracheal airway      Successful airway: ETT  Cuffed: yes   Successful intubation technique: direct laryngoscopy  Endotracheal tube insertion site: oral  Blade: Gena  Blade size: 4  ETT size (mm): 7.5  Cormack-Lehane Classification: grade IIa - partial view of glottis  Placement verified by: chest auscultation and capnometry   Measured from: gums  ETT/EBT to gums (cm): 23  Number of attempts at approach: 1  Assessment: lips, teeth, and gum same as pre-op and atraumatic intubation

## 2021-03-18 NOTE — ANESTHESIA PROCEDURE NOTES
Arterial Line      Patient location during procedure: OR  Start time: 3/18/2021 6:50 AM  Stop Time:3/18/2021 6:55 AM       Line placed for hemodynamic monitoring.  Performed By   Anesthesiologist: Adrian Page MD  Preanesthetic Checklist  Completed: patient identified, IV checked, site marked, risks and benefits discussed, surgical consent, monitors and equipment checked, pre-op evaluation and timeout performed  Arterial Line Prep   Sterile Tech: cap, gloves, gown, mask and sterile barriers  Prep: ChloraPrep  Patient monitoring: blood pressure monitoring, continuous pulse oximetry and EKG  Arterial Line Procedure   Laterality:left  Location:  radial artery  Catheter size: 20 G   Guidance: landmark technique and palpation technique  Number of attempts: 1  Successful placement: yes  Post Assessment   Dressing Type: secured with tape and wrist guard applied.   Complications no  Circ/Move/Sens Assessment: normal.   Patient Tolerance: patient tolerated the procedure well with no apparent complications

## 2021-03-18 NOTE — ANESTHESIA PROCEDURE NOTES
Central Line      Patient reassessed immediately prior to procedure    Patient location during procedure: OR  Start time: 3/18/2021 7:10 AM  Stop Time:3/18/2021 7:15 AM  Indications: vascular access and MD/Surgeon request  Staff  Anesthesiologist: Adrian Page MD  Preanesthetic Checklist  Completed: patient identified, IV checked, site marked, risks and benefits discussed, surgical consent, monitors and equipment checked, pre-op evaluation and timeout performed  Central Line Prep  Sterile Tech:cap, gloves, gown, mask and sterile barriers  Prep: chloraprep  Patient monitoring: blood pressure monitoring, continuous pulse oximetry and EKG  Central Line Procedure  Laterality:right  Location:internal jugular  Catheter Type:[place]-Greer  Assessment  Post procedure:biopatch applied, line sutured and occlusive dressing applied  Assessement:blood return through all ports, free fluid flow and Ashkan Test  Complications:no  Patient Tolerance:patient tolerated the procedure well with no apparent complications

## 2021-03-18 NOTE — ANESTHESIA PREPROCEDURE EVALUATION
Anesthesia Evaluation     Patient summary reviewed and Nursing notes reviewed   history of anesthetic complications:  NPO Solid Status: > 6 hours  NPO Liquid Status: > 6 hours           Airway   Mallampati: II  TM distance: >3 FB  Neck ROM: full  No difficulty expected  Dental - normal exam     Pulmonary - normal exam    breath sounds clear to auscultation  (+) COPD, asthma,shortness of breath,   Cardiovascular - normal exam    ECG reviewed  Rhythm: regular  Rate: normal    (+) hypertension, valvular problems/murmurs AS and murmur, CHF , PVD, hyperlipidemia,       Neuro/Psych  (+) headaches, dizziness/light headedness, psychiatric history,     GI/Hepatic/Renal/Endo    (+) obesity, morbid obesity, hiatal hernia, GERD,      Musculoskeletal     Abdominal  - normal exam    Abdomen: soft.  Bowel sounds: normal.   Substance History - negative use     OB/GYN negative ob/gyn ROS         Other   arthritis,    history of cancer                    Anesthesia Plan    ASA 4     general     intravenous induction   Postoperative Plan: Expected vent after surgery  Anesthetic plan, all risks, benefits, and alternatives have been provided, discussed and informed consent has been obtained with: patient.  Use of blood products discussed with patient .

## 2021-03-18 NOTE — ANESTHESIA POSTPROCEDURE EVALUATION
Patient: Claudia Rust    Procedure Summary     Date: 03/18/21 Room / Location: [facility]    Anesthesia Start: 0644 Anesthesia Stop: 1030    Procedure: AORTIC VALVE REPLACEMENT (N/A Chest) Diagnosis:       Aortic valve stenosis, etiology of cardiac valve disease unspecified      (Aortic valve stenosis, etiology of cardiac valve disease unspecified [I35.0])    Surgeons: Olaf Mcgill MD Provider: Adrian Page MD    Anesthesia Type: general ASA Status: 4          Anesthesia Type: general    Vitals  Vitals Value Taken Time   BP     Temp     Pulse 80 03/18/21 1041   Resp     SpO2     Vitals shown include unvalidated device data.        Post Anesthesia Care and Evaluation    Patient location during evaluation: ICU  Patient participation: complete - patient cannot participate  Level of consciousness: obtunded/minimal responses  Pain scale: See nurse's notes for pain score.  Pain management: adequate  Airway patency: patent  Anesthetic complications: No anesthetic complications  PONV Status: none  Cardiovascular status: acceptable  Respiratory status: acceptable  Hydration status: acceptable    Comments: Patient seen and examined postoperatively; vital signs stable; SpO2 greater than or equal to 90%; cardiopulmonary status stable; nausea/vomiting adequately controlled; pain adequately controlled; no apparent anesthesia complications; patient discharged from anesthesia care when discharge criteria were met

## 2021-03-18 NOTE — ANESTHESIA PROCEDURE NOTES
Preanesthesia Checklist:  Patient identified, IV assessed, risks and benefits discussed, monitors and equipment assessed, procedure being performed at surgeon's request and anesthesia consent obtained.    General Procedure Information  CHRISTIANO Placed for monitoring purposes only -- This is not a diagnostic CHRISTIANO

## 2021-03-19 ENCOUNTER — APPOINTMENT (OUTPATIENT)
Dept: GENERAL RADIOLOGY | Facility: HOSPITAL | Age: 71
End: 2021-03-19

## 2021-03-19 LAB
ALBUMIN SERPL-MCNC: 3.9 G/DL (ref 3.5–5.2)
ANION GAP SERPL CALCULATED.3IONS-SCNC: 11 MMOL/L (ref 5–15)
ARTERIAL PATENCY WRIST A: ABNORMAL
ATMOSPHERIC PRESS: ABNORMAL MM[HG]
BASE DEFICIT: -2.6 MEQ/LITER
BASE EXCESS BLDA CALC-SCNC: -1.5 MMOL/L (ref 0–3)
BASE EXCESS BLDA CALC-SCNC: -1.9 MMOL/L (ref 0–3)
BASE EXCESS BLDA CALC-SCNC: -2 MMOL/L (ref 0–3)
BASE EXCESS BLDA CALC-SCNC: -2 MMOL/L (ref 0–3)
BASE EXCESS BLDA CALC-SCNC: -2.2 MMOL/L (ref 0–3)
BASE EXCESS BLDA CALC-SCNC: -2.3 MMOL/L (ref 0–3)
BASE EXCESS BLDA CALC-SCNC: -2.6 MMOL/L (ref 0–3)
BASE EXCESS BLDA CALC-SCNC: -3.3 MMOL/L (ref 0–3)
BASE EXCESS BLDA CALC-SCNC: -3.5 MMOL/L (ref 0–3)
BASOPHILS # BLD AUTO: 0 10*3/MM3 (ref 0–0.2)
BASOPHILS NFR BLD AUTO: 0.3 % (ref 0–1.5)
BDY SITE: ABNORMAL
BH BB BLOOD EXPIRATION DATE: NORMAL
BH BB BLOOD TYPE BARCODE: 5100
BH BB DISPENSE STATUS: NORMAL
BH BB PRODUCT CODE: NORMAL
BH BB UNIT NUMBER: NORMAL
BUN SERPL-MCNC: 13 MG/DL (ref 8–23)
BUN/CREAT SERPL: 15.9 (ref 7–25)
CA-I BLDA-SCNC: 0.96 MMOL/L (ref 1.15–1.33)
CA-I BLDA-SCNC: 0.96 MMOL/L (ref 1.15–1.33)
CA-I BLDA-SCNC: 1.04 MMOL/L (ref 1.15–1.33)
CA-I SERPL ISE-MCNC: 1.19 MMOL/L (ref 1.2–1.3)
CALCIUM SPEC-SCNC: 8.4 MG/DL (ref 8.6–10.5)
CHLORIDE SERPL-SCNC: 111 MMOL/L (ref 98–107)
CO2 BLDA-SCNC: 22.6 MMOL/L (ref 22–29)
CO2 BLDA-SCNC: 22.9 MMOL/L (ref 22–29)
CO2 BLDA-SCNC: 23.3 MMOL/L (ref 22–29)
CO2 BLDA-SCNC: 23.5 MMOL/L (ref 22–29)
CO2 BLDA-SCNC: 24.4 MMOL/L (ref 22–29)
CO2 BLDA-SCNC: 25.2 MMOL/L (ref 22–29)
CO2 BLDA-SCNC: 25.6 MMOL/L (ref 22–29)
CO2 BLDA-SCNC: 25.7 MMOL/L (ref 22–29)
CO2 BLDA-SCNC: 26 MMOL/L (ref 22–29)
CO2 SERPL-SCNC: 21 MMOL/L (ref 22–29)
CREAT SERPL-MCNC: 0.82 MG/DL (ref 0.57–1)
D-LACTATE SERPL-SCNC: 1.7 MMOL/L (ref 0.5–2)
DEPRECATED RDW RBC AUTO: 42.9 FL (ref 37–54)
EOSINOPHIL # BLD AUTO: 0 10*3/MM3 (ref 0–0.4)
EOSINOPHIL NFR BLD AUTO: 0.2 % (ref 0.3–6.2)
ERYTHROCYTE [DISTWIDTH] IN BLOOD BY AUTOMATED COUNT: 13.5 % (ref 12.3–15.4)
GFR SERPL CREATININE-BSD FRML MDRD: 69 ML/MIN/1.73
GLUCOSE BLDC GLUCOMTR-MCNC: 111 MG/DL (ref 70–105)
GLUCOSE BLDC GLUCOMTR-MCNC: 122 MG/DL (ref 74–100)
GLUCOSE BLDC GLUCOMTR-MCNC: 122 MG/DL (ref 74–100)
GLUCOSE BLDC GLUCOMTR-MCNC: 126 MG/DL (ref 74–100)
GLUCOSE BLDC GLUCOMTR-MCNC: 131 MG/DL (ref 70–105)
GLUCOSE BLDC GLUCOMTR-MCNC: 137 MG/DL (ref 74–100)
GLUCOSE BLDC GLUCOMTR-MCNC: 138 MG/DL (ref 70–105)
GLUCOSE BLDC GLUCOMTR-MCNC: 139 MG/DL (ref 74–100)
GLUCOSE BLDC GLUCOMTR-MCNC: 139 MG/DL (ref 74–100)
GLUCOSE BLDC GLUCOMTR-MCNC: 141 MG/DL (ref 70–105)
GLUCOSE BLDC GLUCOMTR-MCNC: 141 MG/DL (ref 74–100)
GLUCOSE BLDC GLUCOMTR-MCNC: 145 MG/DL (ref 70–105)
GLUCOSE BLDC GLUCOMTR-MCNC: 149 MG/DL (ref 70–105)
GLUCOSE SERPL-MCNC: 119 MG/DL (ref 65–99)
HCO3 BLDA-SCNC: 21.5 MMOL/L (ref 21–28)
HCO3 BLDA-SCNC: 21.7 MMOL/L (ref 21–28)
HCO3 BLDA-SCNC: 22.2 MMOL/L (ref 21–28)
HCO3 BLDA-SCNC: 23.3 MMOL/L (ref 21–28)
HCO3 BLDA-SCNC: 23.7 MMOL/L (ref 21–28)
HCO3 BLDA-SCNC: 24.2 MMOL/L (ref 21–28)
HCO3 BLDA-SCNC: 24.2 MMOL/L (ref 21–28)
HCO3 BLDA-SCNC: 24.5 MMOL/L (ref 21–28)
HCO3 MIXED: 22.4 MMOL/L (ref 21–29)
HCT VFR BLD AUTO: 31.4 % (ref 34–46.6)
HCT VFR BLDA CALC: 30 % (ref 38–51)
HCT VFR BLDA CALC: 32 % (ref 38–51)
HCT VFR BLDA CALC: 32 % (ref 38–51)
HEMODILUTION: NO
HEMODILUTION: YES
HGB BLD-MCNC: 10.5 G/DL (ref 12–15.9)
HGB BLDA-MCNC: 10.1 G/DL (ref 12–17)
HGB BLDA-MCNC: 10.8 G/DL (ref 12–17)
HGB BLDA-MCNC: 10.9 G/DL (ref 12–17)
INHALED O2 CONCENTRATION: 40 %
INHALED O2 CONCENTRATION: 44 %
INHALED O2 CONCENTRATION: 44 %
INHALED O2 CONCENTRATION: 50 %
INR PPP: 1.09 (ref 0.93–1.1)
LAB AP CASE REPORT: NORMAL
LYMPHOCYTES # BLD AUTO: 0.8 10*3/MM3 (ref 0.7–3.1)
LYMPHOCYTES NFR BLD AUTO: 7.2 % (ref 19.6–45.3)
MAGNESIUM SERPL-MCNC: 2.5 MG/DL (ref 1.6–2.4)
MCH RBC QN AUTO: 30.7 PG (ref 26.6–33)
MCHC RBC AUTO-ENTMCNC: 33.4 G/DL (ref 31.5–35.7)
MCV RBC AUTO: 92.1 FL (ref 79–97)
MODALITY: ABNORMAL
MONOCYTES # BLD AUTO: 0.9 10*3/MM3 (ref 0.1–0.9)
MONOCYTES NFR BLD AUTO: 7.9 % (ref 5–12)
NEUTROPHILS NFR BLD AUTO: 84.4 % (ref 42.7–76)
NEUTROPHILS NFR BLD AUTO: 9.5 10*3/MM3 (ref 1.7–7)
NRBC BLD AUTO-RTO: 0 /100 WBC (ref 0–0.2)
O2 SATURATION MIXED: 60.3 %
PATH REPORT.FINAL DX SPEC: NORMAL
PATH REPORT.GROSS SPEC: NORMAL
PCO2 BLDA: 34.2 MM HG (ref 35–48)
PCO2 BLDA: 35.3 MM HG (ref 35–48)
PCO2 BLDA: 38.5 MM HG (ref 35–48)
PCO2 BLDA: 38.9 MM HG (ref 35–48)
PCO2 BLDA: 46.3 MM HG (ref 35–48)
PCO2 BLDA: 46.8 MM HG (ref 35–48)
PCO2 BLDA: 49.2 MM HG (ref 35–48)
PCO2 BLDA: 50 MM HG (ref 35–48)
PCO2 MIXED: 36.9 MMHG (ref 35–51)
PEEP RESPIRATORY: 5 CM[H2O]
PH BLDA: 7.28 PH UNITS (ref 7.35–7.45)
PH BLDA: 7.3 PH UNITS (ref 7.35–7.45)
PH BLDA: 7.32 PH UNITS (ref 7.35–7.45)
PH BLDA: 7.33 PH UNITS (ref 7.35–7.45)
PH BLDA: 7.36 PH UNITS (ref 7.35–7.45)
PH BLDA: 7.39 PH UNITS (ref 7.35–7.45)
PH BLDA: 7.41 PH UNITS (ref 7.35–7.45)
PH BLDA: 7.41 PH UNITS (ref 7.35–7.45)
PH MIXED: 7.39 PH UNITS (ref 7.32–7.45)
PHOSPHATE SERPL-MCNC: 3.3 MG/DL (ref 2.5–4.5)
PLATELET # BLD AUTO: 86 10*3/MM3 (ref 140–450)
PMV BLD AUTO: 9.9 FL (ref 6–12)
PO2 BLDA: 62.3 MM HG (ref 83–108)
PO2 BLDA: 64.7 MM HG (ref 83–108)
PO2 BLDA: 65.3 MM HG (ref 83–108)
PO2 BLDA: 67.1 MM HG (ref 83–108)
PO2 BLDA: 67.1 MM HG (ref 83–108)
PO2 BLDA: 67.8 MM HG (ref 83–108)
PO2 BLDA: 68.1 MM HG (ref 83–108)
PO2 BLDA: 72.2 MM HG (ref 83–108)
PO2 MIXED: 31.4 MMHG
POTASSIUM BLDA-SCNC: 3.3 MMOL/L (ref 3.5–4.5)
POTASSIUM BLDA-SCNC: 3.4 MMOL/L (ref 3.5–4.5)
POTASSIUM BLDA-SCNC: 3.6 MMOL/L (ref 3.5–4.5)
POTASSIUM SERPL-SCNC: 3.6 MMOL/L (ref 3.5–5.2)
POTASSIUM SERPL-SCNC: 3.7 MMOL/L (ref 3.5–5.2)
PROTHROMBIN TIME: 11.9 SECONDS (ref 9.6–11.7)
PSV: 10 CMH2O
PSV: 8 CMH2O
QT INTERVAL: 434 MS
RBC # BLD AUTO: 3.41 10*6/MM3 (ref 3.77–5.28)
RESPIRATORY RATE: 18
RESPIRATORY RATE: 22
RESPIRATORY RATE: 28
SAO2 % BLDCOA: 90.8 % (ref 94–98)
SAO2 % BLDCOA: 91.5 % (ref 94–98)
SAO2 % BLDCOA: 91.6 % (ref 94–98)
SAO2 % BLDCOA: 91.6 % (ref 94–98)
SAO2 % BLDCOA: 91.9 % (ref 94–98)
SAO2 % BLDCOA: 92 % (ref 94–98)
SAO2 % BLDCOA: 92.3 % (ref 94–98)
SAO2 % BLDCOA: 93.3 % (ref 94–98)
SET MECH RESP RATE: 28
SODIUM BLD-SCNC: 143 MMOL/L (ref 138–146)
SODIUM BLD-SCNC: 144 MMOL/L (ref 138–146)
SODIUM BLD-SCNC: 144 MMOL/L (ref 138–146)
SODIUM SERPL-SCNC: 143 MMOL/L (ref 136–145)
UNIT  ABO: NORMAL
UNIT  RH: NORMAL
VENTILATOR MODE: ABNORMAL
VT ON VENT VENT: 600 ML
WBC # BLD AUTO: 11.2 10*3/MM3 (ref 3.4–10.8)

## 2021-03-19 PROCEDURE — 25010000002 ONDANSETRON PER 1 MG: Performed by: NURSE PRACTITIONER

## 2021-03-19 PROCEDURE — 99024 POSTOP FOLLOW-UP VISIT: CPT | Performed by: NURSE PRACTITIONER

## 2021-03-19 PROCEDURE — 84132 ASSAY OF SERUM POTASSIUM: CPT | Performed by: THORACIC SURGERY (CARDIOTHORACIC VASCULAR SURGERY)

## 2021-03-19 PROCEDURE — 25010000002 CEFAZOLIN PER 500 MG: Performed by: NURSE PRACTITIONER

## 2021-03-19 PROCEDURE — 93010 ELECTROCARDIOGRAM REPORT: CPT | Performed by: INTERNAL MEDICINE

## 2021-03-19 PROCEDURE — 80069 RENAL FUNCTION PANEL: CPT | Performed by: NURSE PRACTITIONER

## 2021-03-19 PROCEDURE — 99232 SBSQ HOSP IP/OBS MODERATE 35: CPT | Performed by: INTERNAL MEDICINE

## 2021-03-19 PROCEDURE — 94799 UNLISTED PULMONARY SVC/PX: CPT

## 2021-03-19 PROCEDURE — 82962 GLUCOSE BLOOD TEST: CPT

## 2021-03-19 PROCEDURE — 93005 ELECTROCARDIOGRAM TRACING: CPT | Performed by: NURSE PRACTITIONER

## 2021-03-19 PROCEDURE — 25010000002 ALBUMIN HUMAN 5% PER 50 ML: Performed by: NURSE PRACTITIONER

## 2021-03-19 PROCEDURE — 97165 OT EVAL LOW COMPLEX 30 MIN: CPT

## 2021-03-19 PROCEDURE — 80051 ELECTROLYTE PANEL: CPT

## 2021-03-19 PROCEDURE — 85025 COMPLETE CBC W/AUTO DIFF WBC: CPT | Performed by: NURSE PRACTITIONER

## 2021-03-19 PROCEDURE — 83735 ASSAY OF MAGNESIUM: CPT | Performed by: NURSE PRACTITIONER

## 2021-03-19 PROCEDURE — 25010000002 MAGNESIUM SULFATE IN D5W 1G/100ML (PREMIX) 1-5 GM/100ML-% SOLUTION: Performed by: NURSE PRACTITIONER

## 2021-03-19 PROCEDURE — 94003 VENT MGMT INPAT SUBQ DAY: CPT

## 2021-03-19 PROCEDURE — 25010000002 CALCIUM GLUCONATE 2-0.675 GM/100ML-% SOLUTION: Performed by: NURSE PRACTITIONER

## 2021-03-19 PROCEDURE — 82330 ASSAY OF CALCIUM: CPT | Performed by: NURSE PRACTITIONER

## 2021-03-19 PROCEDURE — 85018 HEMOGLOBIN: CPT

## 2021-03-19 PROCEDURE — P9041 ALBUMIN (HUMAN),5%, 50ML: HCPCS | Performed by: NURSE PRACTITIONER

## 2021-03-19 PROCEDURE — 71045 X-RAY EXAM CHEST 1 VIEW: CPT

## 2021-03-19 PROCEDURE — 25010000002 FUROSEMIDE PER 20 MG: Performed by: THORACIC SURGERY (CARDIOTHORACIC VASCULAR SURGERY)

## 2021-03-19 PROCEDURE — 25010000003 POTASSIUM CHLORIDE 10 MEQ/100ML SOLUTION: Performed by: NURSE PRACTITIONER

## 2021-03-19 PROCEDURE — 97110 THERAPEUTIC EXERCISES: CPT

## 2021-03-19 PROCEDURE — 83605 ASSAY OF LACTIC ACID: CPT

## 2021-03-19 PROCEDURE — 82330 ASSAY OF CALCIUM: CPT

## 2021-03-19 PROCEDURE — 25010000002 HYDROMORPHONE PER 4 MG: Performed by: NURSE PRACTITIONER

## 2021-03-19 PROCEDURE — 97161 PT EVAL LOW COMPLEX 20 MIN: CPT

## 2021-03-19 PROCEDURE — 82803 BLOOD GASES ANY COMBINATION: CPT

## 2021-03-19 PROCEDURE — 85610 PROTHROMBIN TIME: CPT | Performed by: NURSE PRACTITIONER

## 2021-03-19 PROCEDURE — 97530 THERAPEUTIC ACTIVITIES: CPT

## 2021-03-19 RX ORDER — FUROSEMIDE 10 MG/ML
40 INJECTION INTRAMUSCULAR; INTRAVENOUS ONCE
Status: COMPLETED | OUTPATIENT
Start: 2021-03-19 | End: 2021-03-19

## 2021-03-19 RX ORDER — FLUOXETINE HYDROCHLORIDE 20 MG/1
60 CAPSULE ORAL DAILY
Status: DISCONTINUED | OUTPATIENT
Start: 2021-03-19 | End: 2021-04-06

## 2021-03-19 RX ORDER — ARFORMOTEROL TARTRATE 15 UG/2ML
15 SOLUTION RESPIRATORY (INHALATION)
Status: DISCONTINUED | OUTPATIENT
Start: 2021-03-19 | End: 2021-04-13 | Stop reason: HOSPADM

## 2021-03-19 RX ORDER — CALCIUM GLUCONATE 20 MG/ML
2 INJECTION, SOLUTION INTRAVENOUS ONCE
Status: COMPLETED | OUTPATIENT
Start: 2021-03-19 | End: 2021-03-19

## 2021-03-19 RX ORDER — MECLIZINE HYDROCHLORIDE 25 MG/1
25 TABLET ORAL 3 TIMES DAILY PRN
Status: DISCONTINUED | OUTPATIENT
Start: 2021-03-19 | End: 2021-04-13 | Stop reason: HOSPADM

## 2021-03-19 RX ORDER — IPRATROPIUM BROMIDE AND ALBUTEROL SULFATE 2.5; .5 MG/3ML; MG/3ML
3 SOLUTION RESPIRATORY (INHALATION)
Status: DISCONTINUED | OUTPATIENT
Start: 2021-03-19 | End: 2021-03-22

## 2021-03-19 RX ADMIN — ONDANSETRON 4 MG: 2 INJECTION INTRAMUSCULAR; INTRAVENOUS at 21:36

## 2021-03-19 RX ADMIN — BUDESONIDE 0.5 MG: 0.5 SUSPENSION RESPIRATORY (INHALATION) at 06:40

## 2021-03-19 RX ADMIN — MUPIROCIN 1 APPLICATION: 20 OINTMENT TOPICAL at 20:30

## 2021-03-19 RX ADMIN — MAGNESIUM SULFATE 1 G: 1 INJECTION INTRAVENOUS at 10:29

## 2021-03-19 RX ADMIN — ALBUMIN HUMAN 12.5 G: 0.05 INJECTION, SOLUTION INTRAVENOUS at 00:46

## 2021-03-19 RX ADMIN — LEVOTHYROXINE SODIUM 100 MCG: 0.1 TABLET ORAL at 16:38

## 2021-03-19 RX ADMIN — ACETAMINOPHEN ORAL SOLUTION 649.6 MG: 650 SOLUTION ORAL at 04:54

## 2021-03-19 RX ADMIN — SENNOSIDES 2 TABLET: 8.6 TABLET, FILM COATED ORAL at 20:30

## 2021-03-19 RX ADMIN — HYDROMORPHONE HYDROCHLORIDE 0.25 MG: 2 INJECTION, SOLUTION INTRAMUSCULAR; INTRAVENOUS; SUBCUTANEOUS at 02:58

## 2021-03-19 RX ADMIN — MUPIROCIN 1 APPLICATION: 20 OINTMENT TOPICAL at 13:25

## 2021-03-19 RX ADMIN — CEFAZOLIN SODIUM 2 G: 10 INJECTION, POWDER, FOR SOLUTION INTRAVENOUS at 06:15

## 2021-03-19 RX ADMIN — HYDROCODONE BITARTRATE AND ACETAMINOPHEN 1 TABLET: 5; 325 TABLET ORAL at 20:31

## 2021-03-19 RX ADMIN — CEFAZOLIN SODIUM 2 G: 10 INJECTION, POWDER, FOR SOLUTION INTRAVENOUS at 16:38

## 2021-03-19 RX ADMIN — FUROSEMIDE 40 MG: 10 INJECTION, SOLUTION INTRAMUSCULAR; INTRAVENOUS at 07:26

## 2021-03-19 RX ADMIN — CHLORHEXIDINE GLUCONATE 15 ML: 1.2 RINSE ORAL at 13:25

## 2021-03-19 RX ADMIN — HYDROCODONE BITARTRATE AND ACETAMINOPHEN 1 TABLET: 5; 325 TABLET ORAL at 12:37

## 2021-03-19 RX ADMIN — IPRATROPIUM BROMIDE AND ALBUTEROL SULFATE 3 ML: 2.5; .5 SOLUTION RESPIRATORY (INHALATION) at 19:18

## 2021-03-19 RX ADMIN — CALCIUM GLUCONATE 2 G: 20 INJECTION, SOLUTION INTRAVENOUS at 10:29

## 2021-03-19 RX ADMIN — POTASSIUM CHLORIDE 20 MEQ: 1500 TABLET, EXTENDED RELEASE ORAL at 20:31

## 2021-03-19 RX ADMIN — ARFORMOTEROL TARTRATE 15 MCG: 15 SOLUTION RESPIRATORY (INHALATION) at 19:18

## 2021-03-19 RX ADMIN — ARFORMOTEROL TARTRATE 15 MCG: 15 SOLUTION RESPIRATORY (INHALATION) at 11:55

## 2021-03-19 RX ADMIN — MAGNESIUM SULFATE 1 G: 1 INJECTION INTRAVENOUS at 00:46

## 2021-03-19 RX ADMIN — ASPIRIN 81 MG: 81 TABLET, COATED ORAL at 13:23

## 2021-03-19 RX ADMIN — POLYETHYLENE GLYCOL 3350 17 G: 17 POWDER, FOR SOLUTION ORAL at 20:30

## 2021-03-19 RX ADMIN — CHLORHEXIDINE GLUCONATE 15 ML: 1.2 RINSE ORAL at 20:30

## 2021-03-19 RX ADMIN — BUDESONIDE 0.5 MG: 0.5 SUSPENSION RESPIRATORY (INHALATION) at 19:18

## 2021-03-19 RX ADMIN — DOCUSATE SODIUM 100 MG: 100 CAPSULE, LIQUID FILLED ORAL at 20:30

## 2021-03-19 RX ADMIN — IPRATROPIUM BROMIDE AND ALBUTEROL SULFATE 3 ML: 2.5; .5 SOLUTION RESPIRATORY (INHALATION) at 11:56

## 2021-03-19 RX ADMIN — POTASSIUM CHLORIDE 10 MEQ: 7.46 INJECTION, SOLUTION INTRAVENOUS at 07:26

## 2021-03-19 RX ADMIN — FLUOXETINE 60 MG: 20 CAPSULE ORAL at 13:23

## 2021-03-19 RX ADMIN — ATORVASTATIN CALCIUM 40 MG: 40 TABLET, FILM COATED ORAL at 20:30

## 2021-03-19 RX ADMIN — IPRATROPIUM BROMIDE AND ALBUTEROL SULFATE 3 ML: 2.5; .5 SOLUTION RESPIRATORY (INHALATION) at 14:59

## 2021-03-19 RX ADMIN — CEFAZOLIN SODIUM 2 G: 10 INJECTION, POWDER, FOR SOLUTION INTRAVENOUS at 22:44

## 2021-03-19 RX ADMIN — ACETAMINOPHEN 650 MG: 325 TABLET ORAL at 16:38

## 2021-03-20 ENCOUNTER — APPOINTMENT (OUTPATIENT)
Dept: GENERAL RADIOLOGY | Facility: HOSPITAL | Age: 71
End: 2021-03-20

## 2021-03-20 LAB
ANION GAP SERPL CALCULATED.3IONS-SCNC: 12 MMOL/L (ref 5–15)
ANION GAP SERPL CALCULATED.3IONS-SCNC: 9 MMOL/L (ref 5–15)
ARTERIAL PATENCY WRIST A: ABNORMAL
ARTERIAL PATENCY WRIST A: ABNORMAL
ATMOSPHERIC PRESS: ABNORMAL MM[HG]
ATMOSPHERIC PRESS: ABNORMAL MM[HG]
BASE EXCESS BLDA CALC-SCNC: -0.5 MMOL/L (ref 0–3)
BASE EXCESS BLDA CALC-SCNC: -1 MMOL/L (ref 0–3)
BASOPHILS # BLD AUTO: 0 10*3/MM3 (ref 0–0.2)
BASOPHILS NFR BLD AUTO: 0.3 % (ref 0–1.5)
BDY SITE: ABNORMAL
BDY SITE: ABNORMAL
BUN SERPL-MCNC: 17 MG/DL (ref 8–23)
BUN SERPL-MCNC: 19 MG/DL (ref 8–23)
BUN/CREAT SERPL: 25.8 (ref 7–25)
BUN/CREAT SERPL: 27.5 (ref 7–25)
CALCIUM SPEC-SCNC: 9 MG/DL (ref 8.6–10.5)
CALCIUM SPEC-SCNC: 9.2 MG/DL (ref 8.6–10.5)
CHLORIDE SERPL-SCNC: 106 MMOL/L (ref 98–107)
CHLORIDE SERPL-SCNC: 107 MMOL/L (ref 98–107)
CO2 BLDA-SCNC: 26.2 MMOL/L (ref 22–29)
CO2 BLDA-SCNC: 26.5 MMOL/L (ref 22–29)
CO2 SERPL-SCNC: 21 MMOL/L (ref 22–29)
CO2 SERPL-SCNC: 24 MMOL/L (ref 22–29)
CREAT SERPL-MCNC: 0.66 MG/DL (ref 0.57–1)
CREAT SERPL-MCNC: 0.69 MG/DL (ref 0.57–1)
DEPRECATED RDW RBC AUTO: 46.4 FL (ref 37–54)
EOSINOPHIL # BLD AUTO: 0 10*3/MM3 (ref 0–0.4)
EOSINOPHIL NFR BLD AUTO: 0.2 % (ref 0.3–6.2)
ERYTHROCYTE [DISTWIDTH] IN BLOOD BY AUTOMATED COUNT: 14.2 % (ref 12.3–15.4)
GFR SERPL CREATININE-BSD FRML MDRD: 84 ML/MIN/1.73
GFR SERPL CREATININE-BSD FRML MDRD: 89 ML/MIN/1.73
GLUCOSE BLDC GLUCOMTR-MCNC: 106 MG/DL (ref 70–105)
GLUCOSE BLDC GLUCOMTR-MCNC: 114 MG/DL (ref 70–105)
GLUCOSE BLDC GLUCOMTR-MCNC: 116 MG/DL (ref 70–105)
GLUCOSE BLDC GLUCOMTR-MCNC: 120 MG/DL (ref 70–105)
GLUCOSE BLDC GLUCOMTR-MCNC: 140 MG/DL (ref 70–105)
GLUCOSE SERPL-MCNC: 112 MG/DL (ref 65–99)
GLUCOSE SERPL-MCNC: 142 MG/DL (ref 65–99)
HCO3 BLDA-SCNC: 24.9 MMOL/L (ref 21–28)
HCO3 BLDA-SCNC: 25.1 MMOL/L (ref 21–28)
HCT VFR BLD AUTO: 32.1 % (ref 34–46.6)
HEMODILUTION: NO
HEMODILUTION: NO
HGB BLD-MCNC: 10.6 G/DL (ref 12–15.9)
INHALED O2 CONCENTRATION: <21 %
INHALED O2 CONCENTRATION: <21 %
LYMPHOCYTES # BLD AUTO: 1.3 10*3/MM3 (ref 0.7–3.1)
LYMPHOCYTES NFR BLD AUTO: 8.7 % (ref 19.6–45.3)
MCH RBC QN AUTO: 30.8 PG (ref 26.6–33)
MCHC RBC AUTO-ENTMCNC: 32.9 G/DL (ref 31.5–35.7)
MCV RBC AUTO: 93.5 FL (ref 79–97)
MODALITY: ABNORMAL
MODALITY: ABNORMAL
MONOCYTES # BLD AUTO: 1.4 10*3/MM3 (ref 0.1–0.9)
MONOCYTES NFR BLD AUTO: 9.6 % (ref 5–12)
NEUTROPHILS NFR BLD AUTO: 11.9 10*3/MM3 (ref 1.7–7)
NEUTROPHILS NFR BLD AUTO: 81.2 % (ref 42.7–76)
NRBC BLD AUTO-RTO: 0.1 /100 WBC (ref 0–0.2)
PCO2 BLDA: 43 MM HG (ref 35–48)
PCO2 BLDA: 46.5 MM HG (ref 35–48)
PH BLDA: 7.34 PH UNITS (ref 7.35–7.45)
PH BLDA: 7.37 PH UNITS (ref 7.35–7.45)
PLATELET # BLD AUTO: 90 10*3/MM3 (ref 140–450)
PMV BLD AUTO: 10 FL (ref 6–12)
PO2 BLDA: 62.3 MM HG (ref 83–108)
PO2 BLDA: 65.2 MM HG (ref 83–108)
POTASSIUM SERPL-SCNC: 4 MMOL/L (ref 3.5–5.2)
POTASSIUM SERPL-SCNC: 4.7 MMOL/L (ref 3.5–5.2)
QT INTERVAL: 419 MS
QT INTERVAL: 507 MS
RBC # BLD AUTO: 3.44 10*6/MM3 (ref 3.77–5.28)
SAO2 % BLDCOA: 89.8 % (ref 94–98)
SAO2 % BLDCOA: 91.8 % (ref 94–98)
SODIUM SERPL-SCNC: 139 MMOL/L (ref 136–145)
SODIUM SERPL-SCNC: 140 MMOL/L (ref 136–145)
WBC # BLD AUTO: 14.6 10*3/MM3 (ref 3.4–10.8)

## 2021-03-20 PROCEDURE — 71045 X-RAY EXAM CHEST 1 VIEW: CPT

## 2021-03-20 PROCEDURE — 94799 UNLISTED PULMONARY SVC/PX: CPT

## 2021-03-20 PROCEDURE — 25010000002 ONDANSETRON PER 1 MG: Performed by: NURSE PRACTITIONER

## 2021-03-20 PROCEDURE — 82803 BLOOD GASES ANY COMBINATION: CPT

## 2021-03-20 PROCEDURE — 97112 NEUROMUSCULAR REEDUCATION: CPT

## 2021-03-20 PROCEDURE — 82962 GLUCOSE BLOOD TEST: CPT

## 2021-03-20 PROCEDURE — 25010000002 MAGNESIUM SULFATE 2 GM/50ML SOLUTION: Performed by: NURSE PRACTITIONER

## 2021-03-20 PROCEDURE — 97110 THERAPEUTIC EXERCISES: CPT

## 2021-03-20 PROCEDURE — 99024 POSTOP FOLLOW-UP VISIT: CPT | Performed by: NURSE PRACTITIONER

## 2021-03-20 PROCEDURE — 93005 ELECTROCARDIOGRAM TRACING: CPT | Performed by: NURSE PRACTITIONER

## 2021-03-20 PROCEDURE — 85025 COMPLETE CBC W/AUTO DIFF WBC: CPT | Performed by: INTERNAL MEDICINE

## 2021-03-20 PROCEDURE — 99231 SBSQ HOSP IP/OBS SF/LOW 25: CPT | Performed by: INTERNAL MEDICINE

## 2021-03-20 PROCEDURE — 94660 CPAP INITIATION&MGMT: CPT

## 2021-03-20 PROCEDURE — 80048 BASIC METABOLIC PNL TOTAL CA: CPT | Performed by: NURSE PRACTITIONER

## 2021-03-20 PROCEDURE — 97530 THERAPEUTIC ACTIVITIES: CPT

## 2021-03-20 PROCEDURE — 25010000002 ENOXAPARIN PER 10 MG: Performed by: NURSE PRACTITIONER

## 2021-03-20 RX ORDER — NICOTINE POLACRILEX 4 MG
15 LOZENGE BUCCAL
Status: DISCONTINUED | OUTPATIENT
Start: 2021-03-20 | End: 2021-04-13 | Stop reason: HOSPADM

## 2021-03-20 RX ORDER — POTASSIUM CHLORIDE 20 MEQ/1
20 TABLET, EXTENDED RELEASE ORAL 2 TIMES DAILY WITH MEALS
Status: DISCONTINUED | OUTPATIENT
Start: 2021-03-20 | End: 2021-03-20

## 2021-03-20 RX ORDER — CHLORHEXIDINE GLUCONATE 500 MG/1
1 CLOTH TOPICAL EVERY 12 HOURS PRN
Status: DISCONTINUED | OUTPATIENT
Start: 2021-03-20 | End: 2021-04-13 | Stop reason: HOSPADM

## 2021-03-20 RX ORDER — INSULIN LISPRO 100 [IU]/ML
0-9 INJECTION, SOLUTION INTRAVENOUS; SUBCUTANEOUS AS NEEDED
Status: DISCONTINUED | OUTPATIENT
Start: 2021-03-21 | End: 2021-03-22

## 2021-03-20 RX ORDER — POTASSIUM CHLORIDE 20 MEQ/1
20 TABLET, EXTENDED RELEASE ORAL
Status: DISPENSED | OUTPATIENT
Start: 2021-03-20 | End: 2021-03-21

## 2021-03-20 RX ORDER — BUMETANIDE 0.25 MG/ML
1 INJECTION INTRAMUSCULAR; INTRAVENOUS EVERY 8 HOURS
Status: DISCONTINUED | OUTPATIENT
Start: 2021-03-20 | End: 2021-03-21

## 2021-03-20 RX ORDER — INSULIN LISPRO 100 [IU]/ML
0-9 INJECTION, SOLUTION INTRAVENOUS; SUBCUTANEOUS
Status: DISCONTINUED | OUTPATIENT
Start: 2021-03-21 | End: 2021-03-22

## 2021-03-20 RX ORDER — DEXTROSE MONOHYDRATE 25 G/50ML
25 INJECTION, SOLUTION INTRAVENOUS
Status: DISCONTINUED | OUTPATIENT
Start: 2021-03-20 | End: 2021-04-13 | Stop reason: HOSPADM

## 2021-03-20 RX ORDER — GABAPENTIN 100 MG/1
200 CAPSULE ORAL EVERY 12 HOURS SCHEDULED
Status: DISCONTINUED | OUTPATIENT
Start: 2021-03-20 | End: 2021-04-13 | Stop reason: HOSPADM

## 2021-03-20 RX ORDER — SCOLOPAMINE TRANSDERMAL SYSTEM 1 MG/1
1 PATCH, EXTENDED RELEASE TRANSDERMAL
Status: DISCONTINUED | OUTPATIENT
Start: 2021-03-20 | End: 2021-03-28

## 2021-03-20 RX ORDER — BUMETANIDE 0.25 MG/ML
1 INJECTION INTRAMUSCULAR; INTRAVENOUS EVERY 12 HOURS
Status: DISCONTINUED | OUTPATIENT
Start: 2021-03-20 | End: 2021-03-20

## 2021-03-20 RX ORDER — CHLORHEXIDINE GLUCONATE 0.12 MG/ML
15 RINSE ORAL EVERY 12 HOURS
Status: DISCONTINUED | OUTPATIENT
Start: 2021-03-20 | End: 2021-03-20 | Stop reason: SDUPTHER

## 2021-03-20 RX ORDER — MAGNESIUM SULFATE HEPTAHYDRATE 40 MG/ML
2 INJECTION, SOLUTION INTRAVENOUS ONCE
Status: COMPLETED | OUTPATIENT
Start: 2021-03-20 | End: 2021-03-20

## 2021-03-20 RX ADMIN — MUPIROCIN 1 APPLICATION: 20 OINTMENT TOPICAL at 08:02

## 2021-03-20 RX ADMIN — ARFORMOTEROL TARTRATE 15 MCG: 15 SOLUTION RESPIRATORY (INHALATION) at 18:41

## 2021-03-20 RX ADMIN — SENNOSIDES 2 TABLET: 8.6 TABLET, FILM COATED ORAL at 20:02

## 2021-03-20 RX ADMIN — DOCUSATE SODIUM 100 MG: 100 CAPSULE, LIQUID FILLED ORAL at 20:02

## 2021-03-20 RX ADMIN — BUDESONIDE 0.5 MG: 0.5 SUSPENSION RESPIRATORY (INHALATION) at 08:09

## 2021-03-20 RX ADMIN — LEVOTHYROXINE SODIUM 100 MCG: 0.1 TABLET ORAL at 14:29

## 2021-03-20 RX ADMIN — SCOPALAMINE 1 PATCH: 1 PATCH, EXTENDED RELEASE TRANSDERMAL at 10:27

## 2021-03-20 RX ADMIN — IPRATROPIUM BROMIDE AND ALBUTEROL SULFATE 3 ML: 2.5; .5 SOLUTION RESPIRATORY (INHALATION) at 18:41

## 2021-03-20 RX ADMIN — CHLORHEXIDINE GLUCONATE 15 ML: 1.2 RINSE ORAL at 10:09

## 2021-03-20 RX ADMIN — METOPROLOL TARTRATE 12.5 MG: 25 TABLET, FILM COATED ORAL at 10:08

## 2021-03-20 RX ADMIN — IPRATROPIUM BROMIDE AND ALBUTEROL SULFATE 3 ML: 2.5; .5 SOLUTION RESPIRATORY (INHALATION) at 16:01

## 2021-03-20 RX ADMIN — BUMETANIDE 1 MG: 0.25 INJECTION, SOLUTION INTRAMUSCULAR; INTRAVENOUS at 10:08

## 2021-03-20 RX ADMIN — MUPIROCIN 1 APPLICATION: 20 OINTMENT TOPICAL at 20:03

## 2021-03-20 RX ADMIN — HYDROCODONE BITARTRATE AND ACETAMINOPHEN 1 TABLET: 5; 325 TABLET ORAL at 12:15

## 2021-03-20 RX ADMIN — POTASSIUM CHLORIDE 20 MEQ: 1500 TABLET, EXTENDED RELEASE ORAL at 19:11

## 2021-03-20 RX ADMIN — PANTOPRAZOLE SODIUM 40 MG: 40 TABLET, DELAYED RELEASE ORAL at 05:13

## 2021-03-20 RX ADMIN — POLYETHYLENE GLYCOL 3350 17 G: 17 POWDER, FOR SOLUTION ORAL at 20:03

## 2021-03-20 RX ADMIN — ASPIRIN 81 MG: 81 TABLET, COATED ORAL at 08:01

## 2021-03-20 RX ADMIN — HYDROCODONE BITARTRATE AND ACETAMINOPHEN 1 TABLET: 5; 325 TABLET ORAL at 08:01

## 2021-03-20 RX ADMIN — ONDANSETRON 4 MG: 2 INJECTION INTRAMUSCULAR; INTRAVENOUS at 04:06

## 2021-03-20 RX ADMIN — METOPROLOL TARTRATE 12.5 MG: 25 TABLET, FILM COATED ORAL at 20:02

## 2021-03-20 RX ADMIN — MAGNESIUM SULFATE HEPTAHYDRATE 2 G: 40 INJECTION, SOLUTION INTRAVENOUS at 14:29

## 2021-03-20 RX ADMIN — FLUOXETINE 60 MG: 20 CAPSULE ORAL at 08:01

## 2021-03-20 RX ADMIN — ENOXAPARIN SODIUM 40 MG: 40 INJECTION SUBCUTANEOUS at 20:01

## 2021-03-20 RX ADMIN — BUDESONIDE 0.5 MG: 0.5 SUSPENSION RESPIRATORY (INHALATION) at 18:41

## 2021-03-20 RX ADMIN — ONDANSETRON 4 MG: 2 INJECTION INTRAMUSCULAR; INTRAVENOUS at 10:08

## 2021-03-20 RX ADMIN — ATORVASTATIN CALCIUM 40 MG: 40 TABLET, FILM COATED ORAL at 20:02

## 2021-03-20 RX ADMIN — BUMETANIDE 1 MG: 0.25 INJECTION, SOLUTION INTRAMUSCULAR; INTRAVENOUS at 19:11

## 2021-03-20 RX ADMIN — CHLORHEXIDINE GLUCONATE 15 ML: 1.2 RINSE ORAL at 20:01

## 2021-03-20 RX ADMIN — ARFORMOTEROL TARTRATE 15 MCG: 15 SOLUTION RESPIRATORY (INHALATION) at 08:09

## 2021-03-20 RX ADMIN — POLYETHYLENE GLYCOL 3350 17 G: 17 POWDER, FOR SOLUTION ORAL at 10:09

## 2021-03-20 RX ADMIN — POTASSIUM CHLORIDE 20 MEQ: 1500 TABLET, EXTENDED RELEASE ORAL at 10:08

## 2021-03-20 RX ADMIN — GABAPENTIN 200 MG: 100 CAPSULE ORAL at 20:02

## 2021-03-20 RX ADMIN — MECLIZINE HYDROCHLORIDE 25 MG: 25 TABLET ORAL at 08:36

## 2021-03-20 RX ADMIN — GABAPENTIN 200 MG: 100 CAPSULE ORAL at 10:08

## 2021-03-20 RX ADMIN — IPRATROPIUM BROMIDE AND ALBUTEROL SULFATE 3 ML: 2.5; .5 SOLUTION RESPIRATORY (INHALATION) at 08:08

## 2021-03-20 RX ADMIN — HYDROCODONE BITARTRATE AND ACETAMINOPHEN 1 TABLET: 5; 325 TABLET ORAL at 01:15

## 2021-03-20 RX ADMIN — IPRATROPIUM BROMIDE AND ALBUTEROL SULFATE 3 ML: 2.5; .5 SOLUTION RESPIRATORY (INHALATION) at 11:54

## 2021-03-20 RX ADMIN — DOCUSATE SODIUM 100 MG: 100 CAPSULE, LIQUID FILLED ORAL at 08:01

## 2021-03-21 ENCOUNTER — APPOINTMENT (OUTPATIENT)
Dept: CARDIOLOGY | Facility: HOSPITAL | Age: 71
End: 2021-03-21

## 2021-03-21 ENCOUNTER — APPOINTMENT (OUTPATIENT)
Dept: GENERAL RADIOLOGY | Facility: HOSPITAL | Age: 71
End: 2021-03-21

## 2021-03-21 LAB
ANION GAP SERPL CALCULATED.3IONS-SCNC: 11 MMOL/L (ref 5–15)
ARTERIAL PATENCY WRIST A: ABNORMAL
ATMOSPHERIC PRESS: ABNORMAL MM[HG]
BASE EXCESS BLDA CALC-SCNC: -0.5 MMOL/L (ref 0–3)
BASE EXCESS BLDA CALC-SCNC: -1.1 MMOL/L (ref 0–3)
BASE EXCESS BLDA CALC-SCNC: -1.3 MMOL/L (ref 0–3)
BASE EXCESS BLDA CALC-SCNC: 1 MMOL/L (ref 0–3)
BDY SITE: ABNORMAL
BH CV ECHO MEAS - ACS: 1.7 CM
BH CV ECHO MEAS - AO MAX PG (FULL): 8 MMHG
BH CV ECHO MEAS - AO MAX PG: 22.2 MMHG
BH CV ECHO MEAS - AO MEAN PG (FULL): 6.2 MMHG
BH CV ECHO MEAS - AO MEAN PG: 12 MMHG
BH CV ECHO MEAS - AO ROOT AREA (BSA CORRECTED): 1.2
BH CV ECHO MEAS - AO ROOT AREA: 6.1 CM^2
BH CV ECHO MEAS - AO ROOT DIAM: 2.8 CM
BH CV ECHO MEAS - AO V2 MAX: 235.6 CM/SEC
BH CV ECHO MEAS - AO V2 MEAN: 159.9 CM/SEC
BH CV ECHO MEAS - AO V2 VTI: 41.5 CM
BH CV ECHO MEAS - AORTIC HR: 74.6 BPM
BH CV ECHO MEAS - AORTIC R-R: 0.8 SEC
BH CV ECHO MEAS - AVA(I,A): 1.8 CM^2
BH CV ECHO MEAS - AVA(I,D): 1.8 CM^2
BH CV ECHO MEAS - AVA(V,A): 1.8 CM^2
BH CV ECHO MEAS - AVA(V,D): 1.8 CM^2
BH CV ECHO MEAS - BSA(HAYCOCK): 2.5 M^2
BH CV ECHO MEAS - BSA: 2.3 M^2
BH CV ECHO MEAS - BZI_BMI: 50.1 KILOGRAMS/M^2
BH CV ECHO MEAS - BZI_METRIC_HEIGHT: 162.6 CM
BH CV ECHO MEAS - BZI_METRIC_WEIGHT: 132.4 KG
BH CV ECHO MEAS - CI(AO): 8.3 L/MIN/M^2
BH CV ECHO MEAS - CI(LVOT): 2.4 L/MIN/M^2
BH CV ECHO MEAS - CO(AO): 19 L/MIN
BH CV ECHO MEAS - CO(LVOT): 5.6 L/MIN
BH CV ECHO MEAS - EDV(CUBED): 92.6 ML
BH CV ECHO MEAS - EDV(MOD-SP4): 64.2 ML
BH CV ECHO MEAS - EDV(TEICH): 93.6 ML
BH CV ECHO MEAS - EF(CUBED): 70 %
BH CV ECHO MEAS - EF(MOD-BP): 65 %
BH CV ECHO MEAS - EF(MOD-SP4): 65 %
BH CV ECHO MEAS - EF(TEICH): 61.8 %
BH CV ECHO MEAS - ESV(CUBED): 27.8 ML
BH CV ECHO MEAS - ESV(MOD-SP4): 22.5 ML
BH CV ECHO MEAS - ESV(TEICH): 35.8 ML
BH CV ECHO MEAS - FS: 33.1 %
BH CV ECHO MEAS - IVS/LVPW: 1.1
BH CV ECHO MEAS - IVSD: 1.3 CM
BH CV ECHO MEAS - LA DIMENSION(2D): 2.7 CM
BH CV ECHO MEAS - LV DIASTOLIC VOL/BSA (35-75): 27.9 ML/M^2
BH CV ECHO MEAS - LV MASS(C)D: 203 GRAMS
BH CV ECHO MEAS - LV MASS(C)DI: 88.4 GRAMS/M^2
BH CV ECHO MEAS - LV MAX PG: 14.3 MMHG
BH CV ECHO MEAS - LV MEAN PG: 5.7 MMHG
BH CV ECHO MEAS - LV SYSTOLIC VOL/BSA (12-30): 9.8 ML/M^2
BH CV ECHO MEAS - LV V1 MAX: 182.2 CM/SEC
BH CV ECHO MEAS - LV V1 MEAN: 115.3 CM/SEC
BH CV ECHO MEAS - LV V1 VTI: 32.5 CM
BH CV ECHO MEAS - LVIDD: 4.5 CM
BH CV ECHO MEAS - LVIDS: 3 CM
BH CV ECHO MEAS - LVOT AREA: 2.3 CM^2
BH CV ECHO MEAS - LVOT DIAM: 1.7 CM
BH CV ECHO MEAS - LVPWD: 1.2 CM
BH CV ECHO MEAS - MV A MAX VEL: 119.6 CM/SEC
BH CV ECHO MEAS - MV DEC SLOPE: 399.3 CM/SEC^2
BH CV ECHO MEAS - MV DEC TIME: 0.25 SEC
BH CV ECHO MEAS - MV E MAX VEL: 97.9 CM/SEC
BH CV ECHO MEAS - MV E/A: 0.82
BH CV ECHO MEAS - MV MAX PG: 5.1 MMHG
BH CV ECHO MEAS - MV MEAN PG: 2.3 MMHG
BH CV ECHO MEAS - MV V2 MAX: 113.2 CM/SEC
BH CV ECHO MEAS - MV V2 MEAN: 69.7 CM/SEC
BH CV ECHO MEAS - MV V2 VTI: 31.8 CM
BH CV ECHO MEAS - MVA(VTI): 2.3 CM^2
BH CV ECHO MEAS - PA MAX PG (FULL): 0.82 MMHG
BH CV ECHO MEAS - PA MAX PG: 2.3 MMHG
BH CV ECHO MEAS - PA V2 MAX: 76 CM/SEC
BH CV ECHO MEAS - PVA(V,A): 5.1 CM^2
BH CV ECHO MEAS - PVA(V,D): 5.1 CM^2
BH CV ECHO MEAS - QP/QS: 1.2
BH CV ECHO MEAS - RAP SYSTOLE: 8 MMHG
BH CV ECHO MEAS - RV MAX PG: 1.5 MMHG
BH CV ECHO MEAS - RV MEAN PG: 0.79 MMHG
BH CV ECHO MEAS - RV V1 MAX: 61.2 CM/SEC
BH CV ECHO MEAS - RV V1 MEAN: 42.3 CM/SEC
BH CV ECHO MEAS - RV V1 VTI: 13.5 CM
BH CV ECHO MEAS - RVDD: 3.1 CM
BH CV ECHO MEAS - RVOT AREA: 6.3 CM^2
BH CV ECHO MEAS - RVOT DIAM: 2.8 CM
BH CV ECHO MEAS - RVSP: 59.7 MMHG
BH CV ECHO MEAS - SI(AO): 111 ML/M^2
BH CV ECHO MEAS - SI(CUBED): 28.2 ML/M^2
BH CV ECHO MEAS - SI(LVOT): 32.4 ML/M^2
BH CV ECHO MEAS - SI(MOD-SP4): 18.2 ML/M^2
BH CV ECHO MEAS - SI(TEICH): 25.2 ML/M^2
BH CV ECHO MEAS - SV(AO): 254.9 ML
BH CV ECHO MEAS - SV(CUBED): 64.9 ML
BH CV ECHO MEAS - SV(LVOT): 74.4 ML
BH CV ECHO MEAS - SV(MOD-SP4): 41.7 ML
BH CV ECHO MEAS - SV(RVOT): 85.7 ML
BH CV ECHO MEAS - SV(TEICH): 57.9 ML
BH CV ECHO MEAS - TR MAX VEL: 355.6 CM/SEC
BUN SERPL-MCNC: 22 MG/DL (ref 8–23)
BUN/CREAT SERPL: 30.1 (ref 7–25)
CA-I SERPL ISE-MCNC: 1.19 MMOL/L (ref 1.2–1.3)
CA-I SERPL ISE-MCNC: 1.22 MMOL/L (ref 1.2–1.3)
CALCIUM SPEC-SCNC: 9.1 MG/DL (ref 8.6–10.5)
CHLORIDE SERPL-SCNC: 106 MMOL/L (ref 98–107)
CO2 BLDA-SCNC: 24.3 MMOL/L (ref 22–29)
CO2 BLDA-SCNC: 25 MMOL/L (ref 22–29)
CO2 BLDA-SCNC: 26.5 MMOL/L (ref 22–29)
CO2 BLDA-SCNC: 27.8 MMOL/L (ref 22–29)
CO2 SERPL-SCNC: 23 MMOL/L (ref 22–29)
CREAT SERPL-MCNC: 0.73 MG/DL (ref 0.57–1)
DEPRECATED RDW RBC AUTO: 47.3 FL (ref 37–54)
ERYTHROCYTE [DISTWIDTH] IN BLOOD BY AUTOMATED COUNT: 14.3 % (ref 12.3–15.4)
GFR SERPL CREATININE-BSD FRML MDRD: 79 ML/MIN/1.73
GLUCOSE BLDC GLUCOMTR-MCNC: 110 MG/DL (ref 70–105)
GLUCOSE BLDC GLUCOMTR-MCNC: 122 MG/DL (ref 70–105)
GLUCOSE BLDC GLUCOMTR-MCNC: 128 MG/DL (ref 70–105)
GLUCOSE BLDC GLUCOMTR-MCNC: 149 MG/DL (ref 70–105)
GLUCOSE SERPL-MCNC: 133 MG/DL (ref 65–99)
HCO3 BLDA-SCNC: 23.2 MMOL/L (ref 21–28)
HCO3 BLDA-SCNC: 23.8 MMOL/L (ref 21–28)
HCO3 BLDA-SCNC: 25.2 MMOL/L (ref 21–28)
HCO3 BLDA-SCNC: 26.4 MMOL/L (ref 21–28)
HCT VFR BLD AUTO: 33.6 % (ref 34–46.6)
HEMODILUTION: NO
HGB BLD-MCNC: 11.1 G/DL (ref 12–15.9)
INHALED O2 CONCENTRATION: 100 %
MAGNESIUM SERPL-MCNC: 1.9 MG/DL (ref 1.6–2.4)
MAGNESIUM SERPL-MCNC: 1.9 MG/DL (ref 1.6–2.4)
MAGNESIUM SERPL-MCNC: 2.2 MG/DL (ref 1.6–2.4)
MCH RBC QN AUTO: 30.7 PG (ref 26.6–33)
MCHC RBC AUTO-ENTMCNC: 33 G/DL (ref 31.5–35.7)
MCV RBC AUTO: 92.9 FL (ref 79–97)
MODALITY: ABNORMAL
NT-PROBNP SERPL-MCNC: 3115 PG/ML (ref 0–900)
PCO2 BLDA: 36.7 MM HG (ref 35–48)
PCO2 BLDA: 39.6 MM HG (ref 35–48)
PCO2 BLDA: 44.4 MM HG (ref 35–48)
PCO2 BLDA: 44.6 MM HG (ref 35–48)
PH BLDA: 7.36 PH UNITS (ref 7.35–7.45)
PH BLDA: 7.38 PH UNITS (ref 7.35–7.45)
PH BLDA: 7.39 PH UNITS (ref 7.35–7.45)
PH BLDA: 7.41 PH UNITS (ref 7.35–7.45)
PLATELET # BLD AUTO: 121 10*3/MM3 (ref 140–450)
PMV BLD AUTO: 10.2 FL (ref 6–12)
PO2 BLDA: 53.5 MM HG (ref 83–108)
PO2 BLDA: 55.1 MM HG (ref 83–108)
PO2 BLDA: 74.5 MM HG (ref 83–108)
PO2 BLDA: 88.6 MM HG (ref 83–108)
POTASSIUM SERPL-SCNC: 3.7 MMOL/L (ref 3.5–5.2)
POTASSIUM SERPL-SCNC: 4.5 MMOL/L (ref 3.5–5.2)
POTASSIUM SERPL-SCNC: 4.6 MMOL/L (ref 3.5–5.2)
RBC # BLD AUTO: 3.62 10*6/MM3 (ref 3.77–5.28)
SAO2 % BLDCOA: 87 % (ref 94–98)
SAO2 % BLDCOA: 87.5 % (ref 94–98)
SAO2 % BLDCOA: 94.1 % (ref 94–98)
SAO2 % BLDCOA: 96.9 % (ref 94–98)
SODIUM SERPL-SCNC: 140 MMOL/L (ref 136–145)
WBC # BLD AUTO: 17.2 10*3/MM3 (ref 3.4–10.8)

## 2021-03-21 PROCEDURE — 84132 ASSAY OF SERUM POTASSIUM: CPT | Performed by: NURSE PRACTITIONER

## 2021-03-21 PROCEDURE — 99231 SBSQ HOSP IP/OBS SF/LOW 25: CPT | Performed by: INTERNAL MEDICINE

## 2021-03-21 PROCEDURE — 25010000002 ONDANSETRON PER 1 MG: Performed by: NURSE PRACTITIONER

## 2021-03-21 PROCEDURE — 93306 TTE W/DOPPLER COMPLETE: CPT

## 2021-03-21 PROCEDURE — 99024 POSTOP FOLLOW-UP VISIT: CPT | Performed by: NURSE PRACTITIONER

## 2021-03-21 PROCEDURE — 83880 ASSAY OF NATRIURETIC PEPTIDE: CPT | Performed by: THORACIC SURGERY (CARDIOTHORACIC VASCULAR SURGERY)

## 2021-03-21 PROCEDURE — 25010000002 ENOXAPARIN PER 10 MG: Performed by: NURSE PRACTITIONER

## 2021-03-21 PROCEDURE — 94799 UNLISTED PULMONARY SVC/PX: CPT

## 2021-03-21 PROCEDURE — 94660 CPAP INITIATION&MGMT: CPT

## 2021-03-21 PROCEDURE — 83735 ASSAY OF MAGNESIUM: CPT | Performed by: NURSE PRACTITIONER

## 2021-03-21 PROCEDURE — 82962 GLUCOSE BLOOD TEST: CPT

## 2021-03-21 PROCEDURE — 80048 BASIC METABOLIC PNL TOTAL CA: CPT | Performed by: NURSE PRACTITIONER

## 2021-03-21 PROCEDURE — 85027 COMPLETE CBC AUTOMATED: CPT | Performed by: NURSE PRACTITIONER

## 2021-03-21 PROCEDURE — 93306 TTE W/DOPPLER COMPLETE: CPT | Performed by: INTERNAL MEDICINE

## 2021-03-21 PROCEDURE — 82803 BLOOD GASES ANY COMBINATION: CPT

## 2021-03-21 PROCEDURE — 82330 ASSAY OF CALCIUM: CPT | Performed by: NURSE PRACTITIONER

## 2021-03-21 PROCEDURE — 71045 X-RAY EXAM CHEST 1 VIEW: CPT

## 2021-03-21 RX ORDER — POTASSIUM CHLORIDE 20 MEQ/1
20 TABLET, EXTENDED RELEASE ORAL
Status: DISCONTINUED | OUTPATIENT
Start: 2021-03-21 | End: 2021-04-09

## 2021-03-21 RX ORDER — BUMETANIDE 0.25 MG/ML
1 INJECTION INTRAMUSCULAR; INTRAVENOUS ONCE
Status: COMPLETED | OUTPATIENT
Start: 2021-03-21 | End: 2021-03-21

## 2021-03-21 RX ADMIN — POTASSIUM CHLORIDE 20 MEQ: 1500 TABLET, EXTENDED RELEASE ORAL at 23:58

## 2021-03-21 RX ADMIN — METOPROLOL TARTRATE 12.5 MG: 25 TABLET, FILM COATED ORAL at 10:51

## 2021-03-21 RX ADMIN — BUDESONIDE 0.5 MG: 0.5 SUSPENSION RESPIRATORY (INHALATION) at 20:43

## 2021-03-21 RX ADMIN — BUMETANIDE 1 MG: 0.25 INJECTION, SOLUTION INTRAMUSCULAR; INTRAVENOUS at 01:54

## 2021-03-21 RX ADMIN — BUMETANIDE 1 MG/HR: 0.25 INJECTION INTRAMUSCULAR; INTRAVENOUS at 11:33

## 2021-03-21 RX ADMIN — IPRATROPIUM BROMIDE AND ALBUTEROL SULFATE 3 ML: 2.5; .5 SOLUTION RESPIRATORY (INHALATION) at 20:37

## 2021-03-21 RX ADMIN — IPRATROPIUM BROMIDE AND ALBUTEROL SULFATE 3 ML: 2.5; .5 SOLUTION RESPIRATORY (INHALATION) at 12:39

## 2021-03-21 RX ADMIN — DOCUSATE SODIUM 100 MG: 100 CAPSULE, LIQUID FILLED ORAL at 21:25

## 2021-03-21 RX ADMIN — GABAPENTIN 200 MG: 100 CAPSULE ORAL at 21:25

## 2021-03-21 RX ADMIN — HYDROCODONE BITARTRATE AND ACETAMINOPHEN 1 TABLET: 5; 325 TABLET ORAL at 10:51

## 2021-03-21 RX ADMIN — ASPIRIN 81 MG: 81 TABLET, COATED ORAL at 10:51

## 2021-03-21 RX ADMIN — ATORVASTATIN CALCIUM 40 MG: 40 TABLET, FILM COATED ORAL at 21:25

## 2021-03-21 RX ADMIN — FLUOXETINE 60 MG: 20 CAPSULE ORAL at 10:50

## 2021-03-21 RX ADMIN — MUPIROCIN 1 APPLICATION: 20 OINTMENT TOPICAL at 10:52

## 2021-03-21 RX ADMIN — ARFORMOTEROL TARTRATE 15 MCG: 15 SOLUTION RESPIRATORY (INHALATION) at 20:49

## 2021-03-21 RX ADMIN — POLYETHYLENE GLYCOL 3350 17 G: 17 POWDER, FOR SOLUTION ORAL at 21:25

## 2021-03-21 RX ADMIN — ARFORMOTEROL TARTRATE 15 MCG: 15 SOLUTION RESPIRATORY (INHALATION) at 07:34

## 2021-03-21 RX ADMIN — IPRATROPIUM BROMIDE AND ALBUTEROL SULFATE 3 ML: 2.5; .5 SOLUTION RESPIRATORY (INHALATION) at 07:34

## 2021-03-21 RX ADMIN — SENNOSIDES 2 TABLET: 8.6 TABLET, FILM COATED ORAL at 21:25

## 2021-03-21 RX ADMIN — BUDESONIDE 0.5 MG: 0.5 SUSPENSION RESPIRATORY (INHALATION) at 07:34

## 2021-03-21 RX ADMIN — POTASSIUM CHLORIDE 20 MEQ: 1500 TABLET, EXTENDED RELEASE ORAL at 10:51

## 2021-03-21 RX ADMIN — IPRATROPIUM BROMIDE AND ALBUTEROL SULFATE 3 ML: 2.5; .5 SOLUTION RESPIRATORY (INHALATION) at 15:33

## 2021-03-21 RX ADMIN — ENOXAPARIN SODIUM 40 MG: 40 INJECTION SUBCUTANEOUS at 10:50

## 2021-03-21 RX ADMIN — CHLORHEXIDINE GLUCONATE 15 ML: 1.2 RINSE ORAL at 21:25

## 2021-03-21 RX ADMIN — GABAPENTIN 200 MG: 100 CAPSULE ORAL at 10:50

## 2021-03-21 RX ADMIN — ENOXAPARIN SODIUM 40 MG: 40 INJECTION SUBCUTANEOUS at 21:25

## 2021-03-21 RX ADMIN — MUPIROCIN 1 APPLICATION: 20 OINTMENT TOPICAL at 21:25

## 2021-03-21 RX ADMIN — PANTOPRAZOLE SODIUM 40 MG: 40 TABLET, DELAYED RELEASE ORAL at 05:32

## 2021-03-21 RX ADMIN — METOPROLOL TARTRATE 12.5 MG: 25 TABLET, FILM COATED ORAL at 21:25

## 2021-03-21 RX ADMIN — BUMETANIDE 1 MG: 0.25 INJECTION, SOLUTION INTRAMUSCULAR; INTRAVENOUS at 10:52

## 2021-03-21 RX ADMIN — ONDANSETRON 4 MG: 2 INJECTION INTRAMUSCULAR; INTRAVENOUS at 14:19

## 2021-03-21 RX ADMIN — CHLORHEXIDINE GLUCONATE 15 ML: 1.2 RINSE ORAL at 10:49

## 2021-03-21 RX ADMIN — POLYETHYLENE GLYCOL 3350 17 G: 17 POWDER, FOR SOLUTION ORAL at 10:52

## 2021-03-21 RX ADMIN — DOCUSATE SODIUM 100 MG: 100 CAPSULE, LIQUID FILLED ORAL at 10:51

## 2021-03-22 ENCOUNTER — APPOINTMENT (OUTPATIENT)
Dept: CARDIOLOGY | Facility: HOSPITAL | Age: 71
End: 2021-03-22

## 2021-03-22 ENCOUNTER — APPOINTMENT (OUTPATIENT)
Dept: CT IMAGING | Facility: HOSPITAL | Age: 71
End: 2021-03-22

## 2021-03-22 ENCOUNTER — APPOINTMENT (OUTPATIENT)
Dept: GENERAL RADIOLOGY | Facility: HOSPITAL | Age: 71
End: 2021-03-22

## 2021-03-22 LAB
ANION GAP SERPL CALCULATED.3IONS-SCNC: 11 MMOL/L (ref 5–15)
ARTERIAL PATENCY WRIST A: ABNORMAL
ATMOSPHERIC PRESS: ABNORMAL MM[HG]
BASE EXCESS BLDA CALC-SCNC: 3.6 MMOL/L (ref 0–3)
BASE EXCESS BLDA CALC-SCNC: 4 MMOL/L (ref 0–3)
BASE EXCESS BLDA CALC-SCNC: 4.8 MMOL/L (ref 0–3)
BASE EXCESS BLDA CALC-SCNC: 5.2 MMOL/L (ref 0–3)
BDY SITE: ABNORMAL
BH CV LOWER VASCULAR LEFT COMMON FEMORAL AUGMENT: NORMAL
BH CV LOWER VASCULAR LEFT COMMON FEMORAL COMPETENT: NORMAL
BH CV LOWER VASCULAR LEFT COMMON FEMORAL COMPRESS: NORMAL
BH CV LOWER VASCULAR LEFT COMMON FEMORAL PHASIC: NORMAL
BH CV LOWER VASCULAR LEFT COMMON FEMORAL SPONT: NORMAL
BH CV LOWER VASCULAR LEFT DISTAL FEMORAL SPONT: NORMAL
BH CV LOWER VASCULAR LEFT GASTRONEMIUS COMPRESS: NORMAL
BH CV LOWER VASCULAR LEFT GREATER SAPH AK COMPRESS: NORMAL
BH CV LOWER VASCULAR LEFT GREATER SAPH BK COMPRESS: NORMAL
BH CV LOWER VASCULAR LEFT LESSER SAPH COMPRESS: NORMAL
BH CV LOWER VASCULAR LEFT MID FEMORAL AUGMENT: NORMAL
BH CV LOWER VASCULAR LEFT MID FEMORAL COMPETENT: NORMAL
BH CV LOWER VASCULAR LEFT MID FEMORAL COMPRESS: NORMAL
BH CV LOWER VASCULAR LEFT MID FEMORAL PHASIC: NORMAL
BH CV LOWER VASCULAR LEFT MID FEMORAL SPONT: NORMAL
BH CV LOWER VASCULAR LEFT POPLITEAL AUGMENT: NORMAL
BH CV LOWER VASCULAR LEFT POPLITEAL COMPETENT: NORMAL
BH CV LOWER VASCULAR LEFT POPLITEAL COMPRESS: NORMAL
BH CV LOWER VASCULAR LEFT POPLITEAL PHASIC: NORMAL
BH CV LOWER VASCULAR LEFT POPLITEAL SPONT: NORMAL
BH CV LOWER VASCULAR LEFT POSTERIOR TIBIAL COMPRESS: NORMAL
BH CV LOWER VASCULAR LEFT PROXIMAL FEMORAL COMPRESS: NORMAL
BH CV LOWER VASCULAR LEFT SAPHENOFEMORAL JUNCTION COMPRESS: NORMAL
BH CV LOWER VASCULAR RIGHT COMMON FEMORAL AUGMENT: NORMAL
BH CV LOWER VASCULAR RIGHT COMMON FEMORAL COMPETENT: NORMAL
BH CV LOWER VASCULAR RIGHT COMMON FEMORAL COMPRESS: NORMAL
BH CV LOWER VASCULAR RIGHT COMMON FEMORAL PHASIC: NORMAL
BH CV LOWER VASCULAR RIGHT COMMON FEMORAL SPONT: NORMAL
BH CV LOWER VASCULAR RIGHT DISTAL FEMORAL COMPRESS: NORMAL
BH CV LOWER VASCULAR RIGHT GASTRONEMIUS COMPRESS: NORMAL
BH CV LOWER VASCULAR RIGHT GREATER SAPH AK COMPRESS: NORMAL
BH CV LOWER VASCULAR RIGHT GREATER SAPH BK COMPRESS: NORMAL
BH CV LOWER VASCULAR RIGHT LESSER SAPH COMPRESS: NORMAL
BH CV LOWER VASCULAR RIGHT MID FEMORAL AUGMENT: NORMAL
BH CV LOWER VASCULAR RIGHT MID FEMORAL COMPETENT: NORMAL
BH CV LOWER VASCULAR RIGHT MID FEMORAL COMPRESS: NORMAL
BH CV LOWER VASCULAR RIGHT MID FEMORAL PHASIC: NORMAL
BH CV LOWER VASCULAR RIGHT MID FEMORAL SPONT: NORMAL
BH CV LOWER VASCULAR RIGHT PERONEAL COMPRESS: NORMAL
BH CV LOWER VASCULAR RIGHT POPLITEAL AUGMENT: NORMAL
BH CV LOWER VASCULAR RIGHT POPLITEAL COMPETENT: NORMAL
BH CV LOWER VASCULAR RIGHT POPLITEAL COMPRESS: NORMAL
BH CV LOWER VASCULAR RIGHT POPLITEAL PHASIC: NORMAL
BH CV LOWER VASCULAR RIGHT POPLITEAL SPONT: NORMAL
BH CV LOWER VASCULAR RIGHT POSTERIOR TIBIAL COMPRESS: NORMAL
BH CV LOWER VASCULAR RIGHT PROXIMAL FEMORAL COMPRESS: NORMAL
BH CV LOWER VASCULAR RIGHT SAPHENOFEMORAL JUNCTION COMPRESS: NORMAL
BUN SERPL-MCNC: 25 MG/DL (ref 8–23)
BUN/CREAT SERPL: 34.2 (ref 7–25)
CA-I SERPL ISE-MCNC: 1.15 MMOL/L (ref 1.2–1.3)
CALCIUM SPEC-SCNC: 8.8 MG/DL (ref 8.6–10.5)
CHLORIDE SERPL-SCNC: 105 MMOL/L (ref 98–107)
CO2 BLDA-SCNC: 29.5 MMOL/L (ref 22–29)
CO2 BLDA-SCNC: 30.3 MMOL/L (ref 22–29)
CO2 BLDA-SCNC: 31.3 MMOL/L (ref 22–29)
CO2 BLDA-SCNC: 31.7 MMOL/L (ref 22–29)
CO2 SERPL-SCNC: 29 MMOL/L (ref 22–29)
CREAT SERPL-MCNC: 0.73 MG/DL (ref 0.57–1)
DEPRECATED RDW RBC AUTO: 43.8 FL (ref 37–54)
ERYTHROCYTE [DISTWIDTH] IN BLOOD BY AUTOMATED COUNT: 13.7 % (ref 12.3–15.4)
GFR SERPL CREATININE-BSD FRML MDRD: 79 ML/MIN/1.73
GLUCOSE BLDC GLUCOMTR-MCNC: 119 MG/DL (ref 70–105)
GLUCOSE BLDC GLUCOMTR-MCNC: 123 MG/DL (ref 70–105)
GLUCOSE SERPL-MCNC: 122 MG/DL (ref 65–99)
HCO3 BLDA-SCNC: 28.2 MMOL/L (ref 21–28)
HCO3 BLDA-SCNC: 28.9 MMOL/L (ref 21–28)
HCO3 BLDA-SCNC: 29.9 MMOL/L (ref 21–28)
HCO3 BLDA-SCNC: 30.3 MMOL/L (ref 21–28)
HCT VFR BLD AUTO: 32.4 % (ref 34–46.6)
HEMODILUTION: NO
HGB BLD-MCNC: 10.8 G/DL (ref 12–15.9)
INHALED O2 CONCENTRATION: 100 %
MAGNESIUM SERPL-MCNC: 1.9 MG/DL (ref 1.6–2.4)
MCH RBC QN AUTO: 30.9 PG (ref 26.6–33)
MCHC RBC AUTO-ENTMCNC: 33.3 G/DL (ref 31.5–35.7)
MCV RBC AUTO: 92.9 FL (ref 79–97)
MODALITY: ABNORMAL
PCO2 BLDA: 40.3 MM HG (ref 35–48)
PCO2 BLDA: 45.6 MM HG (ref 35–48)
PCO2 BLDA: 46 MM HG (ref 35–48)
PCO2 BLDA: 46.1 MM HG (ref 35–48)
PEEP RESPIRATORY: 10 CM[H2O]
PEEP RESPIRATORY: 5 CM[H2O]
PEEP RESPIRATORY: 5 CM[H2O]
PEEP RESPIRATORY: 8 CM[H2O]
PH BLDA: 7.41 PH UNITS (ref 7.35–7.45)
PH BLDA: 7.42 PH UNITS (ref 7.35–7.45)
PH BLDA: 7.43 PH UNITS (ref 7.35–7.45)
PH BLDA: 7.45 PH UNITS (ref 7.35–7.45)
PLATELET # BLD AUTO: 134 10*3/MM3 (ref 140–450)
PMV BLD AUTO: 10 FL (ref 6–12)
PO2 BLDA: 46.7 MM HG (ref 83–108)
PO2 BLDA: 48.4 MM HG (ref 83–108)
PO2 BLDA: 51.9 MM HG (ref 83–108)
PO2 BLDA: 56.5 MM HG (ref 83–108)
POTASSIUM SERPL-SCNC: 3.7 MMOL/L (ref 3.5–5.2)
POTASSIUM SERPL-SCNC: 3.7 MMOL/L (ref 3.5–5.2)
PSV: 1525 CMH2O
RBC # BLD AUTO: 3.49 10*6/MM3 (ref 3.77–5.28)
RESPIRATORY RATE: 18
RESPIRATORY RATE: 22
SAO2 % BLDCOA: 82.1 % (ref 94–98)
SAO2 % BLDCOA: 84.4 % (ref 94–98)
SAO2 % BLDCOA: 86.6 % (ref 94–98)
SAO2 % BLDCOA: 90.3 % (ref 94–98)
SODIUM SERPL-SCNC: 145 MMOL/L (ref 136–145)
VENTILATOR MODE: ABNORMAL
VT ON VENT VENT: 500 ML
VT ON VENT VENT: 550 ML
WBC # BLD AUTO: 14.7 10*3/MM3 (ref 3.4–10.8)

## 2021-03-22 PROCEDURE — 25010000002 EPOPROSTENOL PER 0.5 MG: Performed by: NURSE PRACTITIONER

## 2021-03-22 PROCEDURE — 83735 ASSAY OF MAGNESIUM: CPT | Performed by: NURSE PRACTITIONER

## 2021-03-22 PROCEDURE — 84132 ASSAY OF SERUM POTASSIUM: CPT | Performed by: NURSE PRACTITIONER

## 2021-03-22 PROCEDURE — 82803 BLOOD GASES ANY COMBINATION: CPT

## 2021-03-22 PROCEDURE — 82330 ASSAY OF CALCIUM: CPT | Performed by: NURSE PRACTITIONER

## 2021-03-22 PROCEDURE — 94799 UNLISTED PULMONARY SVC/PX: CPT

## 2021-03-22 PROCEDURE — 71275 CT ANGIOGRAPHY CHEST: CPT

## 2021-03-22 PROCEDURE — 25010000002 PROPOFOL 10 MG/ML EMULSION

## 2021-03-22 PROCEDURE — 94002 VENT MGMT INPAT INIT DAY: CPT

## 2021-03-22 PROCEDURE — 02HV33Z INSERTION OF INFUSION DEVICE INTO SUPERIOR VENA CAVA, PERCUTANEOUS APPROACH: ICD-10-PCS | Performed by: INTERNAL MEDICINE

## 2021-03-22 PROCEDURE — C1751 CATH, INF, PER/CENT/MIDLINE: HCPCS

## 2021-03-22 PROCEDURE — 85027 COMPLETE CBC AUTOMATED: CPT | Performed by: NURSE PRACTITIONER

## 2021-03-22 PROCEDURE — 25010000002 MAGNESIUM SULFATE 2 GM/50ML SOLUTION: Performed by: NURSE PRACTITIONER

## 2021-03-22 PROCEDURE — 0 IOPAMIDOL PER 1 ML: Performed by: THORACIC SURGERY (CARDIOTHORACIC VASCULAR SURGERY)

## 2021-03-22 PROCEDURE — 25010000002 CALCIUM GLUCONATE 2-0.675 GM/100ML-% SOLUTION: Performed by: NURSE PRACTITIONER

## 2021-03-22 PROCEDURE — 99233 SBSQ HOSP IP/OBS HIGH 50: CPT | Performed by: INTERNAL MEDICINE

## 2021-03-22 PROCEDURE — 71045 X-RAY EXAM CHEST 1 VIEW: CPT

## 2021-03-22 PROCEDURE — 25010000002 PROPOFOL 10 MG/ML EMULSION: Performed by: INTERNAL MEDICINE

## 2021-03-22 PROCEDURE — 93970 EXTREMITY STUDY: CPT

## 2021-03-22 PROCEDURE — 82962 GLUCOSE BLOOD TEST: CPT

## 2021-03-22 PROCEDURE — 99024 POSTOP FOLLOW-UP VISIT: CPT | Performed by: NURSE PRACTITIONER

## 2021-03-22 PROCEDURE — 74018 RADEX ABDOMEN 1 VIEW: CPT

## 2021-03-22 PROCEDURE — 25010000002 PHENYLEPHRINE 10 MG/ML SOLUTION: Performed by: INTERNAL MEDICINE

## 2021-03-22 PROCEDURE — 25010000002 HYDROMORPHONE PER 4 MG: Performed by: NURSE PRACTITIONER

## 2021-03-22 PROCEDURE — 25010000002 ENOXAPARIN PER 10 MG: Performed by: NURSE PRACTITIONER

## 2021-03-22 PROCEDURE — 94640 AIRWAY INHALATION TREATMENT: CPT

## 2021-03-22 PROCEDURE — 80048 BASIC METABOLIC PNL TOTAL CA: CPT | Performed by: NURSE PRACTITIONER

## 2021-03-22 RX ORDER — PHENYLEPHRINE HCL IN 0.9% NACL 0.5 MG/5ML
.5-3 SYRINGE (ML) INTRAVENOUS
Status: DISCONTINUED | OUTPATIENT
Start: 2021-03-22 | End: 2021-03-30

## 2021-03-22 RX ORDER — EPOPROSTENOL SODIUM 1.5 MG/1
50 INJECTION, POWDER, FOR SOLUTION INTRAVENOUS CONTINUOUS
Status: DISCONTINUED | OUTPATIENT
Start: 2021-03-22 | End: 2021-03-26

## 2021-03-22 RX ORDER — CALCIUM GLUCONATE 20 MG/ML
2 INJECTION, SOLUTION INTRAVENOUS ONCE
Status: DISCONTINUED | OUTPATIENT
Start: 2021-03-22 | End: 2021-03-29 | Stop reason: SDUPTHER

## 2021-03-22 RX ORDER — SODIUM CHLORIDE 0.9 % (FLUSH) 0.9 %
10 SYRINGE (ML) INJECTION AS NEEDED
Status: DISCONTINUED | OUTPATIENT
Start: 2021-03-22 | End: 2021-03-29

## 2021-03-22 RX ORDER — LANSOPRAZOLE
30 KIT EVERY MORNING
Status: DISCONTINUED | OUTPATIENT
Start: 2021-03-23 | End: 2021-04-10

## 2021-03-22 RX ORDER — INSULIN LISPRO 100 [IU]/ML
0-9 INJECTION, SOLUTION INTRAVENOUS; SUBCUTANEOUS AS NEEDED
Status: DISCONTINUED | OUTPATIENT
Start: 2021-03-22 | End: 2021-03-26

## 2021-03-22 RX ORDER — SODIUM CHLORIDE 0.9 % (FLUSH) 0.9 %
10 SYRINGE (ML) INJECTION EVERY 12 HOURS SCHEDULED
Status: DISCONTINUED | OUTPATIENT
Start: 2021-03-22 | End: 2021-03-29

## 2021-03-22 RX ORDER — PROPOFOL 10 MG/ML
VIAL (ML) INTRAVENOUS
Status: COMPLETED
Start: 2021-03-22 | End: 2021-03-22

## 2021-03-22 RX ORDER — PROPOFOL 10 MG/ML
110 VIAL (ML) INTRAVENOUS ONCE
Status: COMPLETED | OUTPATIENT
Start: 2021-03-22 | End: 2021-03-22

## 2021-03-22 RX ORDER — DEXMEDETOMIDINE HYDROCHLORIDE 4 UG/ML
.2-1.5 INJECTION, SOLUTION INTRAVENOUS
Status: DISCONTINUED | OUTPATIENT
Start: 2021-03-22 | End: 2021-03-30

## 2021-03-22 RX ORDER — MAGNESIUM SULFATE HEPTAHYDRATE 40 MG/ML
2 INJECTION, SOLUTION INTRAVENOUS ONCE
Status: COMPLETED | OUTPATIENT
Start: 2021-03-22 | End: 2021-03-22

## 2021-03-22 RX ORDER — IPRATROPIUM BROMIDE AND ALBUTEROL SULFATE 2.5; .5 MG/3ML; MG/3ML
3 SOLUTION RESPIRATORY (INHALATION)
Status: DISCONTINUED | OUTPATIENT
Start: 2021-03-22 | End: 2021-04-13 | Stop reason: HOSPADM

## 2021-03-22 RX ORDER — CALCIUM GLUCONATE 20 MG/ML
2 INJECTION, SOLUTION INTRAVENOUS ONCE
Status: COMPLETED | OUTPATIENT
Start: 2021-03-22 | End: 2021-03-22

## 2021-03-22 RX ORDER — INSULIN LISPRO 100 [IU]/ML
0-9 INJECTION, SOLUTION INTRAVENOUS; SUBCUTANEOUS EVERY 6 HOURS SCHEDULED
Status: DISCONTINUED | OUTPATIENT
Start: 2021-03-22 | End: 2021-03-26

## 2021-03-22 RX ORDER — SODIUM CHLORIDE 0.9 % (FLUSH) 0.9 %
20 SYRINGE (ML) INJECTION AS NEEDED
Status: DISCONTINUED | OUTPATIENT
Start: 2021-03-22 | End: 2021-03-29

## 2021-03-22 RX ADMIN — ACETAMINOPHEN ORAL SOLUTION 650 MG: 650 SOLUTION ORAL at 17:04

## 2021-03-22 RX ADMIN — MUPIROCIN 1 APPLICATION: 20 OINTMENT TOPICAL at 20:28

## 2021-03-22 RX ADMIN — GABAPENTIN 200 MG: 100 CAPSULE ORAL at 08:54

## 2021-03-22 RX ADMIN — PHENYLEPHRINE HYDROCHLORIDE 0.5 MCG/KG/MIN: 10 INJECTION INTRAVENOUS at 11:52

## 2021-03-22 RX ADMIN — POTASSIUM CHLORIDE 20 MEQ: 1500 TABLET, EXTENDED RELEASE ORAL at 13:43

## 2021-03-22 RX ADMIN — DEXMEDETOMIDINE HYDROCHLORIDE 0.5 MCG/KG/HR: 100 INJECTION, SOLUTION INTRAVENOUS at 13:15

## 2021-03-22 RX ADMIN — ENOXAPARIN SODIUM 40 MG: 40 INJECTION SUBCUTANEOUS at 08:54

## 2021-03-22 RX ADMIN — BUMETANIDE 1 MG/HR: 0.25 INJECTION INTRAMUSCULAR; INTRAVENOUS at 11:53

## 2021-03-22 RX ADMIN — Medication 10 ML: at 20:27

## 2021-03-22 RX ADMIN — IPRATROPIUM BROMIDE AND ALBUTEROL SULFATE 3 ML: 2.5; .5 SOLUTION RESPIRATORY (INHALATION) at 14:50

## 2021-03-22 RX ADMIN — DEXMEDETOMIDINE HYDROCHLORIDE 1 MCG/KG/HR: 100 INJECTION, SOLUTION INTRAVENOUS at 16:28

## 2021-03-22 RX ADMIN — Medication 10 ML: at 13:45

## 2021-03-22 RX ADMIN — IPRATROPIUM BROMIDE AND ALBUTEROL SULFATE 3 ML: 2.5; .5 SOLUTION RESPIRATORY (INHALATION) at 06:35

## 2021-03-22 RX ADMIN — DOCUSATE SODIUM 100 MG: 100 CAPSULE, LIQUID FILLED ORAL at 08:54

## 2021-03-22 RX ADMIN — DEXMEDETOMIDINE HYDROCHLORIDE 1.5 MCG/KG/HR: 100 INJECTION, SOLUTION INTRAVENOUS at 19:12

## 2021-03-22 RX ADMIN — PROPOFOL 30 MG: 10 INJECTION, EMULSION INTRAVENOUS at 09:33

## 2021-03-22 RX ADMIN — METOPROLOL TARTRATE 12.5 MG: 25 TABLET, FILM COATED ORAL at 20:25

## 2021-03-22 RX ADMIN — ASPIRIN 81 MG: 81 TABLET, COATED ORAL at 08:54

## 2021-03-22 RX ADMIN — BUDESONIDE 0.5 MG: 0.5 SUSPENSION RESPIRATORY (INHALATION) at 20:44

## 2021-03-22 RX ADMIN — PROPOFOL 20 MG: 10 INJECTION, EMULSION INTRAVENOUS at 09:54

## 2021-03-22 RX ADMIN — METOPROLOL TARTRATE 12.5 MG: 25 TABLET, FILM COATED ORAL at 08:54

## 2021-03-22 RX ADMIN — CHLORHEXIDINE GLUCONATE 15 ML: 1.2 RINSE ORAL at 20:24

## 2021-03-22 RX ADMIN — LEVOTHYROXINE SODIUM 100 MCG: 0.1 TABLET ORAL at 17:05

## 2021-03-22 RX ADMIN — ARFORMOTEROL TARTRATE 15 MCG: 15 SOLUTION RESPIRATORY (INHALATION) at 20:50

## 2021-03-22 RX ADMIN — POTASSIUM CHLORIDE 20 MEQ: 1500 TABLET, EXTENDED RELEASE ORAL at 05:50

## 2021-03-22 RX ADMIN — IPRATROPIUM BROMIDE AND ALBUTEROL SULFATE 3 ML: 2.5; .5 SOLUTION RESPIRATORY (INHALATION) at 10:38

## 2021-03-22 RX ADMIN — EPOPROSTENOL 50 NG/KG/MIN: 1.5 INJECTION, POWDER, LYOPHILIZED, FOR SOLUTION INTRAVENOUS at 23:11

## 2021-03-22 RX ADMIN — IPRATROPIUM BROMIDE AND ALBUTEROL SULFATE 3 ML: 2.5; .5 SOLUTION RESPIRATORY (INHALATION) at 23:48

## 2021-03-22 RX ADMIN — HYDROMORPHONE HYDROCHLORIDE 0.5 MG: 2 INJECTION, SOLUTION INTRAMUSCULAR; INTRAVENOUS; SUBCUTANEOUS at 17:38

## 2021-03-22 RX ADMIN — Medication 10 ML: at 13:44

## 2021-03-22 RX ADMIN — MAGNESIUM SULFATE HEPTAHYDRATE 2 G: 40 INJECTION, SOLUTION INTRAVENOUS at 08:54

## 2021-03-22 RX ADMIN — DEXMEDETOMIDINE HYDROCHLORIDE 1.2 MCG/KG/HR: 100 INJECTION, SOLUTION INTRAVENOUS at 21:39

## 2021-03-22 RX ADMIN — ATORVASTATIN CALCIUM 40 MG: 40 TABLET, FILM COATED ORAL at 20:25

## 2021-03-22 RX ADMIN — BUDESONIDE 0.5 MG: 0.5 SUSPENSION RESPIRATORY (INHALATION) at 06:40

## 2021-03-22 RX ADMIN — PROPOFOL 20 MG: 10 INJECTION, EMULSION INTRAVENOUS at 09:58

## 2021-03-22 RX ADMIN — EPOPROSTENOL 50 NG/KG/MIN: 1.5 INJECTION, POWDER, LYOPHILIZED, FOR SOLUTION INTRAVENOUS at 10:15

## 2021-03-22 RX ADMIN — PROPOFOL 110 MG: 10 INJECTION, EMULSION INTRAVENOUS at 10:02

## 2021-03-22 RX ADMIN — ENOXAPARIN SODIUM 40 MG: 40 INJECTION SUBCUTANEOUS at 20:24

## 2021-03-22 RX ADMIN — POTASSIUM CHLORIDE 20 MEQ: 1500 TABLET, EXTENDED RELEASE ORAL at 09:00

## 2021-03-22 RX ADMIN — IPRATROPIUM BROMIDE AND ALBUTEROL SULFATE 3 ML: 2.5; .5 SOLUTION RESPIRATORY (INHALATION) at 20:38

## 2021-03-22 RX ADMIN — CHLORHEXIDINE GLUCONATE 15 ML: 1.2 RINSE ORAL at 08:55

## 2021-03-22 RX ADMIN — MUPIROCIN 1 APPLICATION: 20 OINTMENT TOPICAL at 08:55

## 2021-03-22 RX ADMIN — POTASSIUM CHLORIDE 20 MEQ: 1500 TABLET, EXTENDED RELEASE ORAL at 17:06

## 2021-03-22 RX ADMIN — PROPOFOL 20 MCG/KG/MIN: 10 INJECTION, EMULSION INTRAVENOUS at 11:52

## 2021-03-22 RX ADMIN — IOPAMIDOL 100 ML: 755 INJECTION, SOLUTION INTRAVENOUS at 18:36

## 2021-03-22 RX ADMIN — PANTOPRAZOLE SODIUM 40 MG: 40 TABLET, DELAYED RELEASE ORAL at 05:24

## 2021-03-22 RX ADMIN — FLUOXETINE 60 MG: 20 CAPSULE ORAL at 08:54

## 2021-03-22 RX ADMIN — PROPOFOL 20 MG: 10 INJECTION, EMULSION INTRAVENOUS at 10:00

## 2021-03-22 RX ADMIN — CALCIUM GLUCONATE 2 G: 20 INJECTION, SOLUTION INTRAVENOUS at 08:53

## 2021-03-22 RX ADMIN — POLYETHYLENE GLYCOL 3350 17 G: 17 POWDER, FOR SOLUTION ORAL at 08:56

## 2021-03-22 RX ADMIN — GABAPENTIN 200 MG: 100 CAPSULE ORAL at 20:25

## 2021-03-23 ENCOUNTER — APPOINTMENT (OUTPATIENT)
Dept: GENERAL RADIOLOGY | Facility: HOSPITAL | Age: 71
End: 2021-03-23

## 2021-03-23 LAB
ANION GAP SERPL CALCULATED.3IONS-SCNC: 10 MMOL/L (ref 5–15)
ANION GAP SERPL CALCULATED.3IONS-SCNC: 12 MMOL/L (ref 5–15)
ANION GAP SERPL CALCULATED.3IONS-SCNC: 14 MMOL/L (ref 5–15)
ARTERIAL PATENCY WRIST A: ABNORMAL
ARTERIAL PATENCY WRIST A: POSITIVE
ATMOSPHERIC PRESS: ABNORMAL MM[HG]
B PARAPERT DNA SPEC QL NAA+PROBE: NOT DETECTED
B PERT DNA SPEC QL NAA+PROBE: NOT DETECTED
BASE EXCESS BLDA CALC-SCNC: 6.6 MMOL/L (ref 0–3)
BASE EXCESS BLDA CALC-SCNC: 8.2 MMOL/L (ref 0–3)
BASE EXCESS BLDC CALC-SCNC: 6.6 MMOL/L (ref -2–2)
BASOPHILS # BLD AUTO: 0.1 10*3/MM3 (ref 0–0.2)
BASOPHILS NFR BLD AUTO: 0.8 % (ref 0–1.5)
BDY SITE: ABNORMAL
BUN SERPL-MCNC: 30 MG/DL (ref 8–23)
BUN SERPL-MCNC: 34 MG/DL (ref 8–23)
BUN SERPL-MCNC: 34 MG/DL (ref 8–23)
BUN/CREAT SERPL: 31.6 (ref 7–25)
BUN/CREAT SERPL: 35.8 (ref 7–25)
BUN/CREAT SERPL: 37 (ref 7–25)
C PNEUM DNA NPH QL NAA+NON-PROBE: NOT DETECTED
CA-I SERPL ISE-MCNC: 1.13 MMOL/L (ref 1.2–1.3)
CA-I SERPL ISE-MCNC: 1.14 MMOL/L (ref 1.2–1.3)
CA-I SERPL ISE-MCNC: 1.15 MMOL/L (ref 1.2–1.3)
CA-I SERPL ISE-MCNC: 1.15 MMOL/L (ref 1.2–1.3)
CALCIUM SPEC-SCNC: 8.7 MG/DL (ref 8.6–10.5)
CALCIUM SPEC-SCNC: 8.8 MG/DL (ref 8.6–10.5)
CALCIUM SPEC-SCNC: 8.9 MG/DL (ref 8.6–10.5)
CHLORIDE SERPL-SCNC: 111 MMOL/L (ref 98–107)
CHLORIDE SERPL-SCNC: 113 MMOL/L (ref 98–107)
CHLORIDE SERPL-SCNC: 113 MMOL/L (ref 98–107)
CO2 BLDA-SCNC: 32.3 MMOL/L (ref 22–29)
CO2 BLDA-SCNC: 32.4 MMOL/L (ref 22–29)
CO2 BLDA-SCNC: 33.1 MMOL/L (ref 22–29)
CO2 SERPL-SCNC: 29 MMOL/L (ref 22–29)
CO2 SERPL-SCNC: 30 MMOL/L (ref 22–29)
CO2 SERPL-SCNC: 31 MMOL/L (ref 22–29)
CREAT SERPL-MCNC: 0.92 MG/DL (ref 0.57–1)
CREAT SERPL-MCNC: 0.95 MG/DL (ref 0.57–1)
CREAT SERPL-MCNC: 0.95 MG/DL (ref 0.57–1)
CRP SERPL-MCNC: 41.25 MG/DL (ref 0–0.5)
DEPRECATED RDW RBC AUTO: 44.6 FL (ref 37–54)
DEPRECATED RDW RBC AUTO: 45.5 FL (ref 37–54)
EOSINOPHIL # BLD AUTO: 0 10*3/MM3 (ref 0–0.4)
EOSINOPHIL NFR BLD AUTO: 0.4 % (ref 0.3–6.2)
ERYTHROCYTE [DISTWIDTH] IN BLOOD BY AUTOMATED COUNT: 13.8 % (ref 12.3–15.4)
ERYTHROCYTE [DISTWIDTH] IN BLOOD BY AUTOMATED COUNT: 13.9 % (ref 12.3–15.4)
ERYTHROCYTE [SEDIMENTATION RATE] IN BLOOD: 92 MM/HR (ref 0–30)
FLUAV SUBTYP SPEC NAA+PROBE: NOT DETECTED
FLUBV RNA ISLT QL NAA+PROBE: NOT DETECTED
GFR SERPL CREATININE-BSD FRML MDRD: 58 ML/MIN/1.73
GFR SERPL CREATININE-BSD FRML MDRD: 58 ML/MIN/1.73
GFR SERPL CREATININE-BSD FRML MDRD: 60 ML/MIN/1.73
GLUCOSE BLDC GLUCOMTR-MCNC: 120 MG/DL (ref 70–105)
GLUCOSE BLDC GLUCOMTR-MCNC: 127 MG/DL (ref 70–105)
GLUCOSE BLDC GLUCOMTR-MCNC: 140 MG/DL (ref 70–105)
GLUCOSE BLDC GLUCOMTR-MCNC: 157 MG/DL (ref 70–105)
GLUCOSE SERPL-MCNC: 127 MG/DL (ref 65–99)
GLUCOSE SERPL-MCNC: 127 MG/DL (ref 65–99)
GLUCOSE SERPL-MCNC: 138 MG/DL (ref 65–99)
HADV DNA SPEC NAA+PROBE: NOT DETECTED
HCO3 BLDA-SCNC: 31 MMOL/L (ref 21–28)
HCO3 BLDA-SCNC: 31.3 MMOL/L (ref 21–28)
HCO3 BLDC-SCNC: 31.7 MMOL/L (ref 22–26)
HCOV 229E RNA SPEC QL NAA+PROBE: NOT DETECTED
HCOV HKU1 RNA SPEC QL NAA+PROBE: NOT DETECTED
HCOV NL63 RNA SPEC QL NAA+PROBE: NOT DETECTED
HCOV OC43 RNA SPEC QL NAA+PROBE: NOT DETECTED
HCT VFR BLD AUTO: 30.1 % (ref 34–46.6)
HCT VFR BLD AUTO: 30.6 % (ref 34–46.6)
HEMODILUTION: NO
HEMODILUTION: NO
HGB BLD-MCNC: 10.1 G/DL (ref 12–15.9)
HGB BLD-MCNC: 9.7 G/DL (ref 12–15.9)
HMPV RNA NPH QL NAA+NON-PROBE: NOT DETECTED
HPIV1 RNA SPEC QL NAA+PROBE: NOT DETECTED
HPIV2 RNA SPEC QL NAA+PROBE: NOT DETECTED
HPIV3 RNA NPH QL NAA+PROBE: NOT DETECTED
HPIV4 P GENE NPH QL NAA+PROBE: NOT DETECTED
INHALED O2 CONCENTRATION: 100 %
INR PPP: 1.21 (ref 0.93–1.1)
LYMPHOCYTES # BLD AUTO: 1.2 10*3/MM3 (ref 0.7–3.1)
LYMPHOCYTES NFR BLD AUTO: 10.5 % (ref 19.6–45.3)
M PNEUMO IGG SER IA-ACNC: NOT DETECTED
MAGNESIUM SERPL-MCNC: 2.2 MG/DL (ref 1.6–2.4)
MAGNESIUM SERPL-MCNC: 2.2 MG/DL (ref 1.6–2.4)
MAGNESIUM SERPL-MCNC: 2.3 MG/DL (ref 1.6–2.4)
MCH RBC QN AUTO: 30.3 PG (ref 26.6–33)
MCH RBC QN AUTO: 30.6 PG (ref 26.6–33)
MCHC RBC AUTO-ENTMCNC: 32.4 G/DL (ref 31.5–35.7)
MCHC RBC AUTO-ENTMCNC: 33 G/DL (ref 31.5–35.7)
MCV RBC AUTO: 92.7 FL (ref 79–97)
MCV RBC AUTO: 93.4 FL (ref 79–97)
MODALITY: ABNORMAL
MONOCYTES # BLD AUTO: 1.4 10*3/MM3 (ref 0.1–0.9)
MONOCYTES NFR BLD AUTO: 12.8 % (ref 5–12)
NEUTROPHILS NFR BLD AUTO: 75.5 % (ref 42.7–76)
NEUTROPHILS NFR BLD AUTO: 8.4 10*3/MM3 (ref 1.7–7)
NOTE: ABNORMAL
NRBC BLD AUTO-RTO: 0.1 /100 WBC (ref 0–0.2)
PCO2 BLDA: 36.4 MM HG (ref 35–48)
PCO2 BLDA: 42.5 MM HG (ref 35–48)
PCO2 BLDC: 47.4 MM HG (ref 26–40)
PEEP RESPIRATORY: 7 CM[H2O]
PEEP RESPIRATORY: 8 CM[H2O]
PEEP RESPIRATORY: 8 CM[H2O]
PH BLDA: 7.47 PH UNITS (ref 7.35–7.45)
PH BLDA: 7.54 PH UNITS (ref 7.35–7.45)
PH BLDC: 7.43 PH UNITS (ref 7.27–7.47)
PHOSPHATE SERPL-MCNC: 3.2 MG/DL (ref 2.5–4.5)
PLATELET # BLD AUTO: 138 10*3/MM3 (ref 140–450)
PLATELET # BLD AUTO: 150 10*3/MM3 (ref 140–450)
PMV BLD AUTO: 10.1 FL (ref 6–12)
PMV BLD AUTO: 10.2 FL (ref 6–12)
PO2 BLDA: 52.9 MM HG (ref 83–108)
PO2 BLDA: 65.1 MM HG (ref 83–108)
PO2 BLDC: 30.7 MM HG (ref 40–65)
POTASSIUM SERPL-SCNC: 3.3 MMOL/L (ref 3.5–5.2)
POTASSIUM SERPL-SCNC: 3.3 MMOL/L (ref 3.5–5.2)
POTASSIUM SERPL-SCNC: 3.4 MMOL/L (ref 3.5–5.2)
POTASSIUM SERPL-SCNC: 3.9 MMOL/L (ref 3.5–5.2)
PROTHROMBIN TIME: 13.2 SECONDS (ref 9.6–11.7)
RBC # BLD AUTO: 3.22 10*6/MM3 (ref 3.77–5.28)
RBC # BLD AUTO: 3.3 10*6/MM3 (ref 3.77–5.28)
RESPIRATORY RATE: 22
RESPIRATORY RATE: 22
RHINOVIRUS RNA SPEC NAA+PROBE: NOT DETECTED
RSV RNA NPH QL NAA+NON-PROBE: NOT DETECTED
SAO2 % BLDC FROM PO2: 59.9 % (ref 95–99)
SAO2 % BLDCOA: 90.9 % (ref 94–98)
SAO2 % BLDCOA: 93.6 % (ref 94–98)
SARS-COV-2 RNA NPH QL NAA+NON-PROBE: NOT DETECTED
SODIUM SERPL-SCNC: 154 MMOL/L (ref 136–145)
SODIUM SERPL-SCNC: 154 MMOL/L (ref 136–145)
SODIUM SERPL-SCNC: 155 MMOL/L (ref 136–145)
VENTILATOR MODE: ABNORMAL
VT ON VENT VENT: 500 ML
WBC # BLD AUTO: 11.1 10*3/MM3 (ref 3.4–10.8)
WBC # BLD AUTO: 11.1 10*3/MM3 (ref 3.4–10.8)

## 2021-03-23 PROCEDURE — 82803 BLOOD GASES ANY COMBINATION: CPT

## 2021-03-23 PROCEDURE — 94799 UNLISTED PULMONARY SVC/PX: CPT

## 2021-03-23 PROCEDURE — 83735 ASSAY OF MAGNESIUM: CPT | Performed by: NURSE PRACTITIONER

## 2021-03-23 PROCEDURE — 94003 VENT MGMT INPAT SUBQ DAY: CPT

## 2021-03-23 PROCEDURE — 0202U NFCT DS 22 TRGT SARS-COV-2: CPT | Performed by: INTERNAL MEDICINE

## 2021-03-23 PROCEDURE — 93005 ELECTROCARDIOGRAM TRACING: CPT | Performed by: INTERNAL MEDICINE

## 2021-03-23 PROCEDURE — 80048 BASIC METABOLIC PNL TOTAL CA: CPT | Performed by: INTERNAL MEDICINE

## 2021-03-23 PROCEDURE — 36600 WITHDRAWAL OF ARTERIAL BLOOD: CPT

## 2021-03-23 PROCEDURE — 25010000002 PROPOFOL 10 MG/ML EMULSION

## 2021-03-23 PROCEDURE — 85652 RBC SED RATE AUTOMATED: CPT | Performed by: NURSE PRACTITIONER

## 2021-03-23 PROCEDURE — 99024 POSTOP FOLLOW-UP VISIT: CPT | Performed by: NURSE PRACTITIONER

## 2021-03-23 PROCEDURE — 25010000002 EPOPROSTENOL PER 0.5 MG: Performed by: NURSE PRACTITIONER

## 2021-03-23 PROCEDURE — 86140 C-REACTIVE PROTEIN: CPT | Performed by: NURSE PRACTITIONER

## 2021-03-23 PROCEDURE — 80048 BASIC METABOLIC PNL TOTAL CA: CPT | Performed by: NURSE PRACTITIONER

## 2021-03-23 PROCEDURE — 25010000002 ENOXAPARIN PER 10 MG: Performed by: NURSE PRACTITIONER

## 2021-03-23 PROCEDURE — 25010000003 LIDOCAINE 1 % SOLUTION

## 2021-03-23 PROCEDURE — 85025 COMPLETE CBC W/AUTO DIFF WBC: CPT | Performed by: NURSE PRACTITIONER

## 2021-03-23 PROCEDURE — 86038 ANTINUCLEAR ANTIBODIES: CPT | Performed by: NURSE PRACTITIONER

## 2021-03-23 PROCEDURE — 85027 COMPLETE CBC AUTOMATED: CPT | Performed by: NURSE PRACTITIONER

## 2021-03-23 PROCEDURE — 84132 ASSAY OF SERUM POTASSIUM: CPT | Performed by: NURSE PRACTITIONER

## 2021-03-23 PROCEDURE — 99233 SBSQ HOSP IP/OBS HIGH 50: CPT | Performed by: INTERNAL MEDICINE

## 2021-03-23 PROCEDURE — 82330 ASSAY OF CALCIUM: CPT | Performed by: NURSE PRACTITIONER

## 2021-03-23 PROCEDURE — 71045 X-RAY EXAM CHEST 1 VIEW: CPT

## 2021-03-23 PROCEDURE — 84100 ASSAY OF PHOSPHORUS: CPT

## 2021-03-23 PROCEDURE — 85610 PROTHROMBIN TIME: CPT | Performed by: INTERNAL MEDICINE

## 2021-03-23 PROCEDURE — 93010 ELECTROCARDIOGRAM REPORT: CPT | Performed by: INTERNAL MEDICINE

## 2021-03-23 PROCEDURE — 82962 GLUCOSE BLOOD TEST: CPT

## 2021-03-23 PROCEDURE — 80069 RENAL FUNCTION PANEL: CPT | Performed by: INTERNAL MEDICINE

## 2021-03-23 RX ORDER — DEXTROSE MONOHYDRATE 50 MG/ML
125 INJECTION, SOLUTION INTRAVENOUS CONTINUOUS
Status: DISPENSED | OUTPATIENT
Start: 2021-03-23 | End: 2021-03-24

## 2021-03-23 RX ORDER — PROPOFOL 10 MG/ML
VIAL (ML) INTRAVENOUS
Status: COMPLETED
Start: 2021-03-23 | End: 2021-03-23

## 2021-03-23 RX ORDER — LIDOCAINE HYDROCHLORIDE 10 MG/ML
INJECTION, SOLUTION INFILTRATION; PERINEURAL
Status: COMPLETED
Start: 2021-03-23 | End: 2021-03-23

## 2021-03-23 RX ADMIN — PROPOFOL 15 MCG/KG/MIN: 10 INJECTION, EMULSION INTRAVENOUS at 09:45

## 2021-03-23 RX ADMIN — Medication 10 ML: at 21:15

## 2021-03-23 RX ADMIN — BUDESONIDE 0.5 MG: 0.5 SUSPENSION RESPIRATORY (INHALATION) at 18:28

## 2021-03-23 RX ADMIN — ATORVASTATIN CALCIUM 40 MG: 40 TABLET, FILM COATED ORAL at 21:12

## 2021-03-23 RX ADMIN — ENOXAPARIN SODIUM 40 MG: 40 INJECTION SUBCUTANEOUS at 08:14

## 2021-03-23 RX ADMIN — Medication 10 ML: at 08:15

## 2021-03-23 RX ADMIN — IPRATROPIUM BROMIDE AND ALBUTEROL SULFATE 3 ML: 2.5; .5 SOLUTION RESPIRATORY (INHALATION) at 11:37

## 2021-03-23 RX ADMIN — LEVOTHYROXINE SODIUM 100 MCG: 0.1 TABLET ORAL at 16:30

## 2021-03-23 RX ADMIN — CHLORHEXIDINE GLUCONATE 15 ML: 1.2 RINSE ORAL at 21:11

## 2021-03-23 RX ADMIN — LANSOPRAZOLE 30 MG: KIT at 06:25

## 2021-03-23 RX ADMIN — ARFORMOTEROL TARTRATE 15 MCG: 15 SOLUTION RESPIRATORY (INHALATION) at 06:43

## 2021-03-23 RX ADMIN — BUDESONIDE 0.5 MG: 0.5 SUSPENSION RESPIRATORY (INHALATION) at 06:43

## 2021-03-23 RX ADMIN — IPRATROPIUM BROMIDE AND ALBUTEROL SULFATE 3 ML: 2.5; .5 SOLUTION RESPIRATORY (INHALATION) at 04:11

## 2021-03-23 RX ADMIN — METOPROLOL TARTRATE 12.5 MG: 25 TABLET, FILM COATED ORAL at 08:15

## 2021-03-23 RX ADMIN — DEXMEDETOMIDINE HYDROCHLORIDE 1 MCG/KG/HR: 100 INJECTION, SOLUTION INTRAVENOUS at 01:32

## 2021-03-23 RX ADMIN — EPOPROSTENOL 50 NG/KG/MIN: 1.5 INJECTION, POWDER, LYOPHILIZED, FOR SOLUTION INTRAVENOUS at 17:59

## 2021-03-23 RX ADMIN — IPRATROPIUM BROMIDE AND ALBUTEROL SULFATE 3 ML: 2.5; .5 SOLUTION RESPIRATORY (INHALATION) at 22:01

## 2021-03-23 RX ADMIN — IPRATROPIUM BROMIDE AND ALBUTEROL SULFATE 3 ML: 2.5; .5 SOLUTION RESPIRATORY (INHALATION) at 15:10

## 2021-03-23 RX ADMIN — IPRATROPIUM BROMIDE AND ALBUTEROL SULFATE 3 ML: 2.5; .5 SOLUTION RESPIRATORY (INHALATION) at 18:28

## 2021-03-23 RX ADMIN — CHLORHEXIDINE GLUCONATE 15 ML: 1.2 RINSE ORAL at 08:13

## 2021-03-23 RX ADMIN — Medication 10 ML: at 08:13

## 2021-03-23 RX ADMIN — DEXMEDETOMIDINE HYDROCHLORIDE 0.5 MCG/KG/HR: 100 INJECTION, SOLUTION INTRAVENOUS at 08:13

## 2021-03-23 RX ADMIN — POTASSIUM CHLORIDE 20 MEQ: 1.5 POWDER, FOR SOLUTION ORAL at 21:12

## 2021-03-23 RX ADMIN — GABAPENTIN 200 MG: 100 CAPSULE ORAL at 21:12

## 2021-03-23 RX ADMIN — IPRATROPIUM BROMIDE AND ALBUTEROL SULFATE 3 ML: 2.5; .5 SOLUTION RESPIRATORY (INHALATION) at 06:43

## 2021-03-23 RX ADMIN — FLUOXETINE 60 MG: 20 CAPSULE ORAL at 08:15

## 2021-03-23 RX ADMIN — GABAPENTIN 200 MG: 100 CAPSULE ORAL at 08:14

## 2021-03-23 RX ADMIN — EPOPROSTENOL 50 NG/KG/MIN: 1.5 INJECTION, POWDER, LYOPHILIZED, FOR SOLUTION INTRAVENOUS at 09:30

## 2021-03-23 RX ADMIN — ENOXAPARIN SODIUM 40 MG: 40 INJECTION SUBCUTANEOUS at 21:11

## 2021-03-23 RX ADMIN — MUPIROCIN 1 APPLICATION: 20 OINTMENT TOPICAL at 08:16

## 2021-03-23 RX ADMIN — POTASSIUM CHLORIDE 20 MEQ: 1.5 POWDER, FOR SOLUTION ORAL at 19:05

## 2021-03-23 RX ADMIN — ASPIRIN 81 MG: 81 TABLET, COATED ORAL at 08:15

## 2021-03-23 RX ADMIN — POLYETHYLENE GLYCOL 3350 17 G: 17 POWDER, FOR SOLUTION ORAL at 08:15

## 2021-03-23 RX ADMIN — POTASSIUM CHLORIDE 20 MEQ: 1.5 POWDER, FOR SOLUTION ORAL at 01:43

## 2021-03-23 RX ADMIN — POTASSIUM CHLORIDE 20 MEQ: 1.5 POWDER, FOR SOLUTION ORAL at 04:46

## 2021-03-23 RX ADMIN — DEXTROSE MONOHYDRATE 125 ML/HR: 50 INJECTION, SOLUTION INTRAVENOUS at 17:58

## 2021-03-23 RX ADMIN — SCOPALAMINE 1 PATCH: 1 PATCH, EXTENDED RELEASE TRANSDERMAL at 11:08

## 2021-03-23 RX ADMIN — DEXMEDETOMIDINE HYDROCHLORIDE 1 MCG/KG/HR: 100 INJECTION, SOLUTION INTRAVENOUS at 17:58

## 2021-03-23 RX ADMIN — LIDOCAINE HYDROCHLORIDE: 10 INJECTION, SOLUTION INFILTRATION; PERINEURAL at 10:21

## 2021-03-23 RX ADMIN — DEXMEDETOMIDINE HYDROCHLORIDE 0.8 MCG/KG/HR: 100 INJECTION, SOLUTION INTRAVENOUS at 13:44

## 2021-03-23 RX ADMIN — BUMETANIDE 1 MG/HR: 0.25 INJECTION INTRAMUSCULAR; INTRAVENOUS at 06:25

## 2021-03-23 RX ADMIN — ARFORMOTEROL TARTRATE 15 MCG: 15 SOLUTION RESPIRATORY (INHALATION) at 18:28

## 2021-03-24 ENCOUNTER — APPOINTMENT (OUTPATIENT)
Dept: GENERAL RADIOLOGY | Facility: HOSPITAL | Age: 71
End: 2021-03-24

## 2021-03-24 LAB
ALBUMIN SERPL-MCNC: 2.5 G/DL (ref 3.5–5.2)
ANA SER QL: NEGATIVE
ANION GAP SERPL CALCULATED.3IONS-SCNC: 9 MMOL/L (ref 5–15)
ANION GAP SERPL CALCULATED.3IONS-SCNC: 9 MMOL/L (ref 5–15)
ARTERIAL PATENCY WRIST A: ABNORMAL
ATMOSPHERIC PRESS: ABNORMAL MM[HG]
ATMOSPHERIC PRESS: ABNORMAL MM[HG]
BASE DEFICIT: 3.7 MEQ/LITER
BASE EXCESS BLDA CALC-SCNC: 3.3 MMOL/L (ref 0–3)
BASE EXCESS BLDA CALC-SCNC: 3.9 MMOL/L (ref 0–3)
BASE EXCESS BLDA CALC-SCNC: 7.3 MMOL/L (ref 0–3)
BDY SITE: ABNORMAL
BUN SERPL-MCNC: 33 MG/DL (ref 8–23)
BUN SERPL-MCNC: 35 MG/DL (ref 8–23)
BUN/CREAT SERPL: 37.1 (ref 7–25)
BUN/CREAT SERPL: 40.2 (ref 7–25)
CA-I SERPL ISE-MCNC: 1.14 MMOL/L (ref 1.2–1.3)
CA-I SERPL ISE-MCNC: 1.15 MMOL/L (ref 1.2–1.3)
CA-I SERPL ISE-MCNC: 1.16 MMOL/L (ref 1.2–1.3)
CALCIUM SPEC-SCNC: 8.5 MG/DL (ref 8.6–10.5)
CALCIUM SPEC-SCNC: 8.8 MG/DL (ref 8.6–10.5)
CHLORIDE SERPL-SCNC: 113 MMOL/L (ref 98–107)
CHLORIDE SERPL-SCNC: 113 MMOL/L (ref 98–107)
CO2 BLDA-SCNC: 28.5 MMOL/L (ref 22–29)
CO2 BLDA-SCNC: 29.3 MMOL/L (ref 22–29)
CO2 BLDA-SCNC: 32 MMOL/L (ref 22–29)
CO2 SERPL-SCNC: 30 MMOL/L (ref 22–29)
CO2 SERPL-SCNC: 30 MMOL/L (ref 22–29)
CREAT SERPL-MCNC: 0.87 MG/DL (ref 0.57–1)
CREAT SERPL-MCNC: 0.89 MG/DL (ref 0.57–1)
DEPRECATED RDW RBC AUTO: 47.3 FL (ref 37–54)
ERYTHROCYTE [DISTWIDTH] IN BLOOD BY AUTOMATED COUNT: 14.5 % (ref 12.3–15.4)
GFR SERPL CREATININE-BSD FRML MDRD: 63 ML/MIN/1.73
GFR SERPL CREATININE-BSD FRML MDRD: 64 ML/MIN/1.73
GLUCOSE BLDC GLUCOMTR-MCNC: 139 MG/DL (ref 70–105)
GLUCOSE BLDC GLUCOMTR-MCNC: 146 MG/DL (ref 70–105)
GLUCOSE BLDC GLUCOMTR-MCNC: 146 MG/DL (ref 70–105)
GLUCOSE BLDC GLUCOMTR-MCNC: 164 MG/DL (ref 70–105)
GLUCOSE BLDC GLUCOMTR-MCNC: 169 MG/DL (ref 70–105)
GLUCOSE SERPL-MCNC: 134 MG/DL (ref 65–99)
GLUCOSE SERPL-MCNC: 134 MG/DL (ref 65–99)
HCO3 BLDA-SCNC: 27.4 MMOL/L (ref 21–28)
HCO3 BLDA-SCNC: 30.8 MMOL/L (ref 21–28)
HCO3 MIXED: 28 MMOL/L (ref 21–29)
HCT VFR BLD AUTO: 27.9 % (ref 34–46.6)
HEMODILUTION: NO
HGB BLD-MCNC: 9.4 G/DL (ref 12–15.9)
INHALED O2 CONCENTRATION: 100 %
MAGNESIUM SERPL-MCNC: 2.5 MG/DL (ref 1.6–2.4)
MCH RBC QN AUTO: 31 PG (ref 26.6–33)
MCHC RBC AUTO-ENTMCNC: 33.5 G/DL (ref 31.5–35.7)
MCV RBC AUTO: 92.5 FL (ref 79–97)
MODALITY: ABNORMAL
O2 SATURATION MIXED: 54.6 %
PCO2 BLDA: 35.9 MM HG (ref 35–48)
PCO2 BLDA: 38.5 MM HG (ref 35–48)
PCO2 MIXED: 42.3 MMHG (ref 35–51)
PEEP RESPIRATORY: 10 CM[H2O]
PEEP RESPIRATORY: 10 CM[H2O]
PEEP RESPIRATORY: 7 CM[H2O]
PH BLDA: 7.49 PH UNITS (ref 7.35–7.45)
PH BLDA: 7.51 PH UNITS (ref 7.35–7.45)
PH MIXED: 7.43 PH UNITS (ref 7.32–7.45)
PHOSPHATE SERPL-MCNC: 2.9 MG/DL (ref 2.5–4.5)
PHOSPHATE SERPL-MCNC: 3.3 MG/DL (ref 2.5–4.5)
PLATELET # BLD AUTO: 127 10*3/MM3 (ref 140–450)
PMV BLD AUTO: 9.9 FL (ref 6–12)
PO2 BLDA: 51.4 MM HG (ref 83–108)
PO2 BLDA: 73.2 MM HG (ref 83–108)
PO2 MIXED: 28.1 MMHG
POTASSIUM SERPL-SCNC: 3.6 MMOL/L (ref 3.5–5.2)
POTASSIUM SERPL-SCNC: 3.7 MMOL/L (ref 3.5–5.2)
POTASSIUM SERPL-SCNC: 4 MMOL/L (ref 3.5–5.2)
POTASSIUM SERPL-SCNC: 4.5 MMOL/L (ref 3.5–5.2)
RBC # BLD AUTO: 3.01 10*6/MM3 (ref 3.77–5.28)
RESPIRATORY RATE: 22
RESPIRATORY RATE: 22
SAO2 % BLDCOA: 89.3 % (ref 94–98)
SAO2 % BLDCOA: 95.8 % (ref 94–98)
SARS-COV-2 ORF1AB RESP QL NAA+PROBE: NOT DETECTED
SET MECH RESP RATE: 22
SODIUM SERPL-SCNC: 152 MMOL/L (ref 136–145)
SODIUM SERPL-SCNC: 152 MMOL/L (ref 136–145)
TRIGL SERPL-MCNC: 156 MG/DL (ref 0–150)
VENTILATOR MODE: ABNORMAL
VT ON VENT VENT: 500 ML
VT ON VENT VENT: 500 ML
WBC # BLD AUTO: 10.5 10*3/MM3 (ref 3.4–10.8)

## 2021-03-24 PROCEDURE — U0005 INFEC AGEN DETEC AMPLI PROBE: HCPCS | Performed by: INTERNAL MEDICINE

## 2021-03-24 PROCEDURE — 82330 ASSAY OF CALCIUM: CPT | Performed by: NURSE PRACTITIONER

## 2021-03-24 PROCEDURE — 84132 ASSAY OF SERUM POTASSIUM: CPT | Performed by: NURSE PRACTITIONER

## 2021-03-24 PROCEDURE — 80048 BASIC METABOLIC PNL TOTAL CA: CPT | Performed by: NURSE PRACTITIONER

## 2021-03-24 PROCEDURE — 94799 UNLISTED PULMONARY SVC/PX: CPT

## 2021-03-24 PROCEDURE — 25010000002 ENOXAPARIN PER 10 MG: Performed by: NURSE PRACTITIONER

## 2021-03-24 PROCEDURE — 85027 COMPLETE CBC AUTOMATED: CPT | Performed by: NURSE PRACTITIONER

## 2021-03-24 PROCEDURE — 71045 X-RAY EXAM CHEST 1 VIEW: CPT

## 2021-03-24 PROCEDURE — 82803 BLOOD GASES ANY COMBINATION: CPT

## 2021-03-24 PROCEDURE — 84100 ASSAY OF PHOSPHORUS: CPT

## 2021-03-24 PROCEDURE — 94003 VENT MGMT INPAT SUBQ DAY: CPT

## 2021-03-24 PROCEDURE — 25010000002 METHYLPREDNISOLONE PER 40 MG: Performed by: INTERNAL MEDICINE

## 2021-03-24 PROCEDURE — 83735 ASSAY OF MAGNESIUM: CPT | Performed by: NURSE PRACTITIONER

## 2021-03-24 PROCEDURE — 63710000001 INSULIN LISPRO (HUMAN) PER 5 UNITS: Performed by: NURSE PRACTITIONER

## 2021-03-24 PROCEDURE — 84478 ASSAY OF TRIGLYCERIDES: CPT | Performed by: THORACIC SURGERY (CARDIOTHORACIC VASCULAR SURGERY)

## 2021-03-24 PROCEDURE — 25010000002 EPOPROSTENOL PER 0.5 MG: Performed by: NURSE PRACTITIONER

## 2021-03-24 PROCEDURE — 99232 SBSQ HOSP IP/OBS MODERATE 35: CPT | Performed by: INTERNAL MEDICINE

## 2021-03-24 PROCEDURE — 82962 GLUCOSE BLOOD TEST: CPT

## 2021-03-24 PROCEDURE — 99024 POSTOP FOLLOW-UP VISIT: CPT | Performed by: NURSE PRACTITIONER

## 2021-03-24 PROCEDURE — U0004 COV-19 TEST NON-CDC HGH THRU: HCPCS | Performed by: INTERNAL MEDICINE

## 2021-03-24 RX ORDER — METHYLPREDNISOLONE SODIUM SUCCINATE 40 MG/ML
20 INJECTION, POWDER, LYOPHILIZED, FOR SOLUTION INTRAMUSCULAR; INTRAVENOUS EVERY 12 HOURS
Status: DISCONTINUED | OUTPATIENT
Start: 2021-03-24 | End: 2021-03-27

## 2021-03-24 RX ADMIN — Medication 10 ML: at 20:42

## 2021-03-24 RX ADMIN — IPRATROPIUM BROMIDE AND ALBUTEROL SULFATE 3 ML: 2.5; .5 SOLUTION RESPIRATORY (INHALATION) at 18:40

## 2021-03-24 RX ADMIN — EPOPROSTENOL 50 NG/KG/MIN: 1.5 INJECTION, POWDER, LYOPHILIZED, FOR SOLUTION INTRAVENOUS at 02:56

## 2021-03-24 RX ADMIN — METHYLPREDNISOLONE SODIUM SUCCINATE 20 MG: 40 INJECTION, POWDER, FOR SOLUTION INTRAMUSCULAR; INTRAVENOUS at 10:10

## 2021-03-24 RX ADMIN — METHYLPREDNISOLONE SODIUM SUCCINATE 20 MG: 40 INJECTION, POWDER, FOR SOLUTION INTRAMUSCULAR; INTRAVENOUS at 23:45

## 2021-03-24 RX ADMIN — BUDESONIDE 0.5 MG: 0.5 SUSPENSION RESPIRATORY (INHALATION) at 18:40

## 2021-03-24 RX ADMIN — LEVOTHYROXINE SODIUM 100 MCG: 0.1 TABLET ORAL at 14:02

## 2021-03-24 RX ADMIN — IPRATROPIUM BROMIDE AND ALBUTEROL SULFATE 3 ML: 2.5; .5 SOLUTION RESPIRATORY (INHALATION) at 14:38

## 2021-03-24 RX ADMIN — GABAPENTIN 200 MG: 100 CAPSULE ORAL at 08:25

## 2021-03-24 RX ADMIN — ASPIRIN 81 MG: 81 TABLET, COATED ORAL at 08:25

## 2021-03-24 RX ADMIN — DEXMEDETOMIDINE HYDROCHLORIDE 0.8 MCG/KG/HR: 100 INJECTION, SOLUTION INTRAVENOUS at 13:57

## 2021-03-24 RX ADMIN — ARFORMOTEROL TARTRATE 15 MCG: 15 SOLUTION RESPIRATORY (INHALATION) at 06:52

## 2021-03-24 RX ADMIN — DEXMEDETOMIDINE HYDROCHLORIDE 1 MCG/KG/HR: 100 INJECTION, SOLUTION INTRAVENOUS at 02:15

## 2021-03-24 RX ADMIN — GABAPENTIN 200 MG: 100 CAPSULE ORAL at 20:41

## 2021-03-24 RX ADMIN — POTASSIUM CHLORIDE 20 MEQ: 1500 TABLET, EXTENDED RELEASE ORAL at 08:24

## 2021-03-24 RX ADMIN — Medication 10 ML: at 08:26

## 2021-03-24 RX ADMIN — ENOXAPARIN SODIUM 40 MG: 40 INJECTION SUBCUTANEOUS at 20:40

## 2021-03-24 RX ADMIN — POLYETHYLENE GLYCOL 3350 17 G: 17 POWDER, FOR SOLUTION ORAL at 20:41

## 2021-03-24 RX ADMIN — ARFORMOTEROL TARTRATE 15 MCG: 15 SOLUTION RESPIRATORY (INHALATION) at 18:40

## 2021-03-24 RX ADMIN — POTASSIUM CHLORIDE 20 MEQ: 1500 TABLET, EXTENDED RELEASE ORAL at 12:29

## 2021-03-24 RX ADMIN — ATORVASTATIN CALCIUM 40 MG: 40 TABLET, FILM COATED ORAL at 20:41

## 2021-03-24 RX ADMIN — Medication 10 ML: at 20:41

## 2021-03-24 RX ADMIN — DOCUSATE SODIUM 100 MG: 50 LIQUID ORAL at 21:13

## 2021-03-24 RX ADMIN — IPRATROPIUM BROMIDE AND ALBUTEROL SULFATE 3 ML: 2.5; .5 SOLUTION RESPIRATORY (INHALATION) at 02:05

## 2021-03-24 RX ADMIN — SENNOSIDES 2 TABLET: 8.6 TABLET, FILM COATED ORAL at 20:40

## 2021-03-24 RX ADMIN — DEXMEDETOMIDINE HYDROCHLORIDE 0.8 MCG/KG/HR: 100 INJECTION, SOLUTION INTRAVENOUS at 17:41

## 2021-03-24 RX ADMIN — IPRATROPIUM BROMIDE AND ALBUTEROL SULFATE 3 ML: 2.5; .5 SOLUTION RESPIRATORY (INHALATION) at 11:29

## 2021-03-24 RX ADMIN — POLYETHYLENE GLYCOL 3350 17 G: 17 POWDER, FOR SOLUTION ORAL at 08:25

## 2021-03-24 RX ADMIN — ENOXAPARIN SODIUM 40 MG: 40 INJECTION SUBCUTANEOUS at 08:25

## 2021-03-24 RX ADMIN — DEXMEDETOMIDINE HYDROCHLORIDE 0.8 MCG/KG/HR: 100 INJECTION, SOLUTION INTRAVENOUS at 10:09

## 2021-03-24 RX ADMIN — POTASSIUM CHLORIDE 20 MEQ: 1500 TABLET, EXTENDED RELEASE ORAL at 17:40

## 2021-03-24 RX ADMIN — BUDESONIDE 0.5 MG: 0.5 SUSPENSION RESPIRATORY (INHALATION) at 06:52

## 2021-03-24 RX ADMIN — LANSOPRAZOLE 30 MG: KIT at 06:57

## 2021-03-24 RX ADMIN — Medication 10 ML: at 08:24

## 2021-03-24 RX ADMIN — HYDROCODONE BITARTRATE AND ACETAMINOPHEN 1 TABLET: 5; 325 TABLET ORAL at 20:51

## 2021-03-24 RX ADMIN — EPOPROSTENOL 50 NG/KG/MIN: 1.5 INJECTION, POWDER, LYOPHILIZED, FOR SOLUTION INTRAVENOUS at 14:42

## 2021-03-24 RX ADMIN — INSULIN LISPRO 2 UNITS: 100 INJECTION, SOLUTION INTRAVENOUS; SUBCUTANEOUS at 17:42

## 2021-03-24 RX ADMIN — IPRATROPIUM BROMIDE AND ALBUTEROL SULFATE 3 ML: 2.5; .5 SOLUTION RESPIRATORY (INHALATION) at 23:02

## 2021-03-24 RX ADMIN — DEXMEDETOMIDINE HYDROCHLORIDE 1 MCG/KG/HR: 100 INJECTION, SOLUTION INTRAVENOUS at 05:38

## 2021-03-24 RX ADMIN — DEXMEDETOMIDINE HYDROCHLORIDE 0.8 MCG/KG/HR: 100 INJECTION, SOLUTION INTRAVENOUS at 23:04

## 2021-03-24 RX ADMIN — CHLORHEXIDINE GLUCONATE 15 ML: 1.2 RINSE ORAL at 08:25

## 2021-03-24 RX ADMIN — FLUOXETINE 60 MG: 20 CAPSULE ORAL at 08:24

## 2021-03-24 RX ADMIN — METOPROLOL TARTRATE 12.5 MG: 25 TABLET, FILM COATED ORAL at 20:40

## 2021-03-24 RX ADMIN — IPRATROPIUM BROMIDE AND ALBUTEROL SULFATE 3 ML: 2.5; .5 SOLUTION RESPIRATORY (INHALATION) at 06:52

## 2021-03-24 RX ADMIN — POTASSIUM CHLORIDE 20 MEQ: 1.5 POWDER, FOR SOLUTION ORAL at 06:57

## 2021-03-24 RX ADMIN — CHLORHEXIDINE GLUCONATE 15 ML: 1.2 RINSE ORAL at 20:41

## 2021-03-25 ENCOUNTER — APPOINTMENT (OUTPATIENT)
Dept: CARDIOLOGY | Facility: HOSPITAL | Age: 71
End: 2021-03-25

## 2021-03-25 ENCOUNTER — APPOINTMENT (OUTPATIENT)
Dept: GENERAL RADIOLOGY | Facility: HOSPITAL | Age: 71
End: 2021-03-25

## 2021-03-25 LAB
ALBUMIN SERPL-MCNC: 2.4 G/DL (ref 3.5–5.2)
ANION GAP SERPL CALCULATED.3IONS-SCNC: 10 MMOL/L (ref 5–15)
ARTERIAL PATENCY WRIST A: ABNORMAL
ATMOSPHERIC PRESS: ABNORMAL MM[HG]
B PARAPERT DNA SPEC QL NAA+PROBE: NOT DETECTED
B PERT DNA SPEC QL NAA+PROBE: NOT DETECTED
BASE DEFICIT: -10 MEQ/LITER
BASE EXCESS BLDA CALC-SCNC: -9.1 MMOL/L (ref 0–3)
BASE EXCESS BLDA CALC-SCNC: 1.6 MMOL/L (ref 0–3)
BASE EXCESS BLDA CALC-SCNC: 3 MMOL/L (ref 0–3)
BASE EXCESS BLDA CALC-SCNC: 3.8 MMOL/L (ref 0–3)
BDY SITE: ABNORMAL
BUN SERPL-MCNC: 41 MG/DL (ref 8–23)
BUN/CREAT SERPL: 48.2 (ref 7–25)
C PNEUM DNA NPH QL NAA+NON-PROBE: NOT DETECTED
CA-I SERPL ISE-MCNC: 1.14 MMOL/L (ref 1.2–1.3)
CA-I SERPL ISE-MCNC: 1.16 MMOL/L (ref 1.2–1.3)
CA-I SERPL ISE-MCNC: 1.17 MMOL/L (ref 1.2–1.3)
CALCIUM SPEC-SCNC: 8.6 MG/DL (ref 8.6–10.5)
CHLORIDE SERPL-SCNC: 115 MMOL/L (ref 98–107)
CO2 BLDA-SCNC: 15.7 MMOL/L (ref 22–29)
CO2 BLDA-SCNC: 27 MMOL/L (ref 22–29)
CO2 BLDA-SCNC: 27.6 MMOL/L (ref 22–29)
CO2 BLDA-SCNC: 28.2 MMOL/L (ref 22–29)
CO2 SERPL-SCNC: 25 MMOL/L (ref 22–29)
CREAT SERPL-MCNC: 0.85 MG/DL (ref 0.57–1)
DEPRECATED RDW RBC AUTO: 47.7 FL (ref 37–54)
ERYTHROCYTE [DISTWIDTH] IN BLOOD BY AUTOMATED COUNT: 14.6 % (ref 12.3–15.4)
FLUAV SUBTYP SPEC NAA+PROBE: NOT DETECTED
FLUBV RNA ISLT QL NAA+PROBE: NOT DETECTED
GFR SERPL CREATININE-BSD FRML MDRD: 66 ML/MIN/1.73
GLUCOSE BLDC GLUCOMTR-MCNC: 156 MG/DL (ref 70–105)
GLUCOSE BLDC GLUCOMTR-MCNC: 162 MG/DL (ref 74–100)
GLUCOSE BLDC GLUCOMTR-MCNC: 164 MG/DL (ref 70–105)
GLUCOSE BLDC GLUCOMTR-MCNC: 174 MG/DL (ref 70–105)
GLUCOSE BLDC GLUCOMTR-MCNC: 181 MG/DL (ref 70–105)
GLUCOSE SERPL-MCNC: 165 MG/DL (ref 65–99)
HADV DNA SPEC NAA+PROBE: NOT DETECTED
HCO3 BLDA-SCNC: 26 MMOL/L (ref 21–28)
HCO3 BLDA-SCNC: 26.6 MMOL/L (ref 21–28)
HCO3 BLDA-SCNC: 26.9 MMOL/L (ref 21–28)
HCO3 MIXED: 14.9 MMOL/L (ref 21–29)
HCOV 229E RNA SPEC QL NAA+PROBE: NOT DETECTED
HCOV HKU1 RNA SPEC QL NAA+PROBE: NOT DETECTED
HCOV NL63 RNA SPEC QL NAA+PROBE: NOT DETECTED
HCOV OC43 RNA SPEC QL NAA+PROBE: NOT DETECTED
HCT VFR BLD AUTO: 26.3 % (ref 34–46.6)
HEMODILUTION: NO
HGB BLD-MCNC: 8.8 G/DL (ref 12–15.9)
HMPV RNA NPH QL NAA+NON-PROBE: NOT DETECTED
HPIV1 RNA SPEC QL NAA+PROBE: NOT DETECTED
HPIV2 RNA SPEC QL NAA+PROBE: NOT DETECTED
HPIV3 RNA NPH QL NAA+PROBE: NOT DETECTED
HPIV4 P GENE NPH QL NAA+PROBE: NOT DETECTED
INHALED O2 CONCENTRATION: 100 %
INHALED O2 CONCENTRATION: 100 %
INHALED O2 CONCENTRATION: 60 %
INHALED O2 CONCENTRATION: 70 %
M PNEUMO IGG SER IA-ACNC: NOT DETECTED
MAGNESIUM SERPL-MCNC: 2.5 MG/DL (ref 1.6–2.4)
MAGNESIUM SERPL-MCNC: 2.6 MG/DL (ref 1.6–2.4)
MAGNESIUM SERPL-MCNC: 2.7 MG/DL (ref 1.6–2.4)
MCH RBC QN AUTO: 31.1 PG (ref 26.6–33)
MCHC RBC AUTO-ENTMCNC: 33.4 G/DL (ref 31.5–35.7)
MCV RBC AUTO: 92.9 FL (ref 79–97)
MODALITY: ABNORMAL
O2 SATURATION MIXED: 66.8 %
PCO2 BLDA: 32.2 MM HG (ref 35–48)
PCO2 BLDA: 33 MM HG (ref 35–48)
PCO2 BLDA: 44.4 MM HG (ref 35–48)
PCO2 MIXED: 24.5 MMHG (ref 35–51)
PEEP RESPIRATORY: 10 CM[H2O]
PEEP RESPIRATORY: 8 CM[H2O]
PH BLDA: 7.39 PH UNITS (ref 7.35–7.45)
PH BLDA: 7.5 PH UNITS (ref 7.35–7.45)
PH BLDA: 7.53 PH UNITS (ref 7.35–7.45)
PH MIXED: 7.39 PH UNITS (ref 7.32–7.45)
PHOSPHATE SERPL-MCNC: 4 MG/DL (ref 2.5–4.5)
PLATELET # BLD AUTO: 98 10*3/MM3 (ref 140–450)
PMV BLD AUTO: 10.1 FL (ref 6–12)
PO2 BLDA: 173.7 MM HG (ref 83–108)
PO2 BLDA: 81.4 MM HG (ref 83–108)
PO2 BLDA: 99.6 MM HG (ref 83–108)
PO2 MIXED: 34 MMHG
POTASSIUM SERPL-SCNC: 4.3 MMOL/L (ref 3.5–5.2)
POTASSIUM SERPL-SCNC: 4.3 MMOL/L (ref 3.5–5.2)
POTASSIUM SERPL-SCNC: 4.4 MMOL/L (ref 3.5–5.2)
POTASSIUM SERPL-SCNC: 4.6 MMOL/L (ref 3.5–5.2)
RBC # BLD AUTO: 2.83 10*6/MM3 (ref 3.77–5.28)
RESPIRATORY RATE: 22
RHINOVIRUS RNA SPEC NAA+PROBE: NOT DETECTED
RSV RNA NPH QL NAA+NON-PROBE: NOT DETECTED
SAO2 % BLDCOA: 97.1 % (ref 94–98)
SAO2 % BLDCOA: 98.5 % (ref 94–98)
SAO2 % BLDCOA: 99.5 % (ref 94–98)
SET MECH RESP RATE: 22
SODIUM SERPL-SCNC: 150 MMOL/L (ref 136–145)
VENTILATOR MODE: ABNORMAL
VT ON VENT VENT: 500 ML
WBC # BLD AUTO: 12.9 10*3/MM3 (ref 3.4–10.8)

## 2021-03-25 PROCEDURE — 87102 FUNGUS ISOLATION CULTURE: CPT | Performed by: INTERNAL MEDICINE

## 2021-03-25 PROCEDURE — 87205 SMEAR GRAM STAIN: CPT | Performed by: INTERNAL MEDICINE

## 2021-03-25 PROCEDURE — 82330 ASSAY OF CALCIUM: CPT | Performed by: NURSE PRACTITIONER

## 2021-03-25 PROCEDURE — 85027 COMPLETE CBC AUTOMATED: CPT | Performed by: NURSE PRACTITIONER

## 2021-03-25 PROCEDURE — 82803 BLOOD GASES ANY COMBINATION: CPT

## 2021-03-25 PROCEDURE — 99232 SBSQ HOSP IP/OBS MODERATE 35: CPT | Performed by: INTERNAL MEDICINE

## 2021-03-25 PROCEDURE — 25010000002 METHYLPREDNISOLONE PER 40 MG: Performed by: INTERNAL MEDICINE

## 2021-03-25 PROCEDURE — 83735 ASSAY OF MAGNESIUM: CPT | Performed by: NURSE PRACTITIONER

## 2021-03-25 PROCEDURE — 93312 ECHO TRANSESOPHAGEAL: CPT | Performed by: INTERNAL MEDICINE

## 2021-03-25 PROCEDURE — 94799 UNLISTED PULMONARY SVC/PX: CPT

## 2021-03-25 PROCEDURE — 87206 SMEAR FLUORESCENT/ACID STAI: CPT | Performed by: INTERNAL MEDICINE

## 2021-03-25 PROCEDURE — 25010000002 ENOXAPARIN PER 10 MG: Performed by: NURSE PRACTITIONER

## 2021-03-25 PROCEDURE — 87798 DETECT AGENT NOS DNA AMP: CPT | Performed by: THORACIC SURGERY (CARDIOTHORACIC VASCULAR SURGERY)

## 2021-03-25 PROCEDURE — 94003 VENT MGMT INPAT SUBQ DAY: CPT

## 2021-03-25 PROCEDURE — 87496 CYTOMEG DNA AMP PROBE: CPT | Performed by: INTERNAL MEDICINE

## 2021-03-25 PROCEDURE — 87116 MYCOBACTERIA CULTURE: CPT | Performed by: INTERNAL MEDICINE

## 2021-03-25 PROCEDURE — 93312 ECHO TRANSESOPHAGEAL: CPT

## 2021-03-25 PROCEDURE — 63710000001 INSULIN LISPRO (HUMAN) PER 5 UNITS: Performed by: NURSE PRACTITIONER

## 2021-03-25 PROCEDURE — 25010000002 ONDANSETRON PER 1 MG: Performed by: NURSE PRACTITIONER

## 2021-03-25 PROCEDURE — 87071 CULTURE AEROBIC QUANT OTHER: CPT | Performed by: INTERNAL MEDICINE

## 2021-03-25 PROCEDURE — 84132 ASSAY OF SERUM POTASSIUM: CPT | Performed by: NURSE PRACTITIONER

## 2021-03-25 PROCEDURE — 88108 CYTOPATH CONCENTRATE TECH: CPT | Performed by: INTERNAL MEDICINE

## 2021-03-25 PROCEDURE — 99024 POSTOP FOLLOW-UP VISIT: CPT | Performed by: NURSE PRACTITIONER

## 2021-03-25 PROCEDURE — 80069 RENAL FUNCTION PANEL: CPT | Performed by: INTERNAL MEDICINE

## 2021-03-25 PROCEDURE — 0100U HC BIOFIRE FILMARRAY RESP PANEL 2: CPT | Performed by: INTERNAL MEDICINE

## 2021-03-25 PROCEDURE — 93325 DOPPLER ECHO COLOR FLOW MAPG: CPT | Performed by: INTERNAL MEDICINE

## 2021-03-25 PROCEDURE — 25010000002 PROPOFOL 10 MG/ML EMULSION: Performed by: THORACIC SURGERY (CARDIOTHORACIC VASCULAR SURGERY)

## 2021-03-25 PROCEDURE — 82962 GLUCOSE BLOOD TEST: CPT

## 2021-03-25 PROCEDURE — 87252 VIRUS INOCULATION TISSUE: CPT | Performed by: INTERNAL MEDICINE

## 2021-03-25 PROCEDURE — 93325 DOPPLER ECHO COLOR FLOW MAPG: CPT

## 2021-03-25 PROCEDURE — B24BZZ4 ULTRASONOGRAPHY OF HEART WITH AORTA, TRANSESOPHAGEAL: ICD-10-PCS | Performed by: THORACIC SURGERY (CARDIOTHORACIC VASCULAR SURGERY)

## 2021-03-25 PROCEDURE — 71045 X-RAY EXAM CHEST 1 VIEW: CPT

## 2021-03-25 PROCEDURE — 25010000002 HYDROMORPHONE PER 4 MG: Performed by: NURSE PRACTITIONER

## 2021-03-25 RX ORDER — BUMETANIDE 0.25 MG/ML
2 INJECTION INTRAMUSCULAR; INTRAVENOUS EVERY 12 HOURS
Status: DISCONTINUED | OUTPATIENT
Start: 2021-03-25 | End: 2021-03-28

## 2021-03-25 RX ORDER — LIDOCAINE HYDROCHLORIDE 20 MG/ML
INJECTION, SOLUTION INFILTRATION; PERINEURAL AS NEEDED
Status: DISCONTINUED | OUTPATIENT
Start: 2021-03-25 | End: 2021-04-13 | Stop reason: HOSPADM

## 2021-03-25 RX ORDER — LIDOCAINE 50 MG/G
OINTMENT TOPICAL AS NEEDED
Status: DISCONTINUED | OUTPATIENT
Start: 2021-03-25 | End: 2021-04-13 | Stop reason: HOSPADM

## 2021-03-25 RX ORDER — DEXTROSE MONOHYDRATE 50 MG/ML
100 INJECTION, SOLUTION INTRAVENOUS CONTINUOUS
Status: DISCONTINUED | OUTPATIENT
Start: 2021-03-25 | End: 2021-03-30

## 2021-03-25 RX ADMIN — Medication 10 ML: at 21:00

## 2021-03-25 RX ADMIN — POLYETHYLENE GLYCOL 3350 17 G: 17 POWDER, FOR SOLUTION ORAL at 15:45

## 2021-03-25 RX ADMIN — CHLORHEXIDINE GLUCONATE 15 ML: 1.2 RINSE ORAL at 09:35

## 2021-03-25 RX ADMIN — DOCUSATE SODIUM 100 MG: 50 LIQUID ORAL at 21:10

## 2021-03-25 RX ADMIN — ASPIRIN 81 MG: 81 TABLET, COATED ORAL at 09:35

## 2021-03-25 RX ADMIN — IPRATROPIUM BROMIDE AND ALBUTEROL SULFATE 3 ML: 2.5; .5 SOLUTION RESPIRATORY (INHALATION) at 20:19

## 2021-03-25 RX ADMIN — DOCUSATE SODIUM 100 MG: 50 LIQUID ORAL at 09:35

## 2021-03-25 RX ADMIN — INSULIN LISPRO 2 UNITS: 100 INJECTION, SOLUTION INTRAVENOUS; SUBCUTANEOUS at 23:54

## 2021-03-25 RX ADMIN — HYDROCODONE BITARTRATE AND ACETAMINOPHEN 1 TABLET: 5; 325 TABLET ORAL at 21:11

## 2021-03-25 RX ADMIN — LEVOTHYROXINE SODIUM 100 MCG: 0.1 TABLET ORAL at 15:34

## 2021-03-25 RX ADMIN — BUDESONIDE 0.5 MG: 0.5 SUSPENSION RESPIRATORY (INHALATION) at 06:39

## 2021-03-25 RX ADMIN — CHLORHEXIDINE GLUCONATE 15 ML: 1.2 RINSE ORAL at 21:12

## 2021-03-25 RX ADMIN — Medication 10 ML: at 11:33

## 2021-03-25 RX ADMIN — BUDESONIDE 0.5 MG: 0.5 SUSPENSION RESPIRATORY (INHALATION) at 20:19

## 2021-03-25 RX ADMIN — DEXTROSE MONOHYDRATE 100 ML/HR: 50 INJECTION, SOLUTION INTRAVENOUS at 12:13

## 2021-03-25 RX ADMIN — GABAPENTIN 200 MG: 100 CAPSULE ORAL at 09:34

## 2021-03-25 RX ADMIN — GABAPENTIN 200 MG: 100 CAPSULE ORAL at 21:11

## 2021-03-25 RX ADMIN — DEXMEDETOMIDINE HYDROCHLORIDE 0.6 MCG/KG/HR: 100 INJECTION, SOLUTION INTRAVENOUS at 02:50

## 2021-03-25 RX ADMIN — METHYLPREDNISOLONE SODIUM SUCCINATE 20 MG: 40 INJECTION, POWDER, FOR SOLUTION INTRAMUSCULAR; INTRAVENOUS at 23:54

## 2021-03-25 RX ADMIN — POLYETHYLENE GLYCOL 3350 17 G: 17 POWDER, FOR SOLUTION ORAL at 21:10

## 2021-03-25 RX ADMIN — SENNOSIDES 2 TABLET: 8.6 TABLET, FILM COATED ORAL at 21:11

## 2021-03-25 RX ADMIN — METOPROLOL TARTRATE 12.5 MG: 25 TABLET, FILM COATED ORAL at 09:35

## 2021-03-25 RX ADMIN — BUMETANIDE 2 MG: 0.25 INJECTION INTRAMUSCULAR; INTRAVENOUS at 12:31

## 2021-03-25 RX ADMIN — ENOXAPARIN SODIUM 40 MG: 40 INJECTION SUBCUTANEOUS at 09:34

## 2021-03-25 RX ADMIN — PROPOFOL 10 MCG/KG/MIN: 10 INJECTION, EMULSION INTRAVENOUS at 10:51

## 2021-03-25 RX ADMIN — METOPROLOL TARTRATE 12.5 MG: 25 TABLET, FILM COATED ORAL at 21:12

## 2021-03-25 RX ADMIN — HYDROMORPHONE HYDROCHLORIDE 0.5 MG: 2 INJECTION, SOLUTION INTRAMUSCULAR; INTRAVENOUS; SUBCUTANEOUS at 08:25

## 2021-03-25 RX ADMIN — ONDANSETRON 4 MG: 2 INJECTION INTRAMUSCULAR; INTRAVENOUS at 21:12

## 2021-03-25 RX ADMIN — IPRATROPIUM BROMIDE AND ALBUTEROL SULFATE 3 ML: 2.5; .5 SOLUTION RESPIRATORY (INHALATION) at 14:33

## 2021-03-25 RX ADMIN — INSULIN LISPRO 2 UNITS: 100 INJECTION, SOLUTION INTRAVENOUS; SUBCUTANEOUS at 11:32

## 2021-03-25 RX ADMIN — IPRATROPIUM BROMIDE AND ALBUTEROL SULFATE 3 ML: 2.5; .5 SOLUTION RESPIRATORY (INHALATION) at 23:38

## 2021-03-25 RX ADMIN — DEXMEDETOMIDINE HYDROCHLORIDE 0.8 MCG/KG/HR: 100 INJECTION, SOLUTION INTRAVENOUS at 16:46

## 2021-03-25 RX ADMIN — HYDROMORPHONE HYDROCHLORIDE 0.5 MG: 2 INJECTION, SOLUTION INTRAMUSCULAR; INTRAVENOUS; SUBCUTANEOUS at 00:05

## 2021-03-25 RX ADMIN — DEXMEDETOMIDINE HYDROCHLORIDE 0.8 MCG/KG/HR: 100 INJECTION, SOLUTION INTRAVENOUS at 12:09

## 2021-03-25 RX ADMIN — ARFORMOTEROL TARTRATE 15 MCG: 15 SOLUTION RESPIRATORY (INHALATION) at 20:19

## 2021-03-25 RX ADMIN — ARFORMOTEROL TARTRATE 15 MCG: 15 SOLUTION RESPIRATORY (INHALATION) at 06:39

## 2021-03-25 RX ADMIN — POTASSIUM CHLORIDE 20 MEQ: 1500 TABLET, EXTENDED RELEASE ORAL at 17:00

## 2021-03-25 RX ADMIN — ATORVASTATIN CALCIUM 40 MG: 40 TABLET, FILM COATED ORAL at 21:11

## 2021-03-25 RX ADMIN — INSULIN LISPRO 2 UNITS: 100 INJECTION, SOLUTION INTRAVENOUS; SUBCUTANEOUS at 00:05

## 2021-03-25 RX ADMIN — METHYLPREDNISOLONE SODIUM SUCCINATE 20 MG: 40 INJECTION, POWDER, FOR SOLUTION INTRAMUSCULAR; INTRAVENOUS at 11:32

## 2021-03-25 RX ADMIN — DEXMEDETOMIDINE HYDROCHLORIDE 0.8 MCG/KG/HR: 100 INJECTION, SOLUTION INTRAVENOUS at 07:38

## 2021-03-25 RX ADMIN — IPRATROPIUM BROMIDE AND ALBUTEROL SULFATE 3 ML: 2.5; .5 SOLUTION RESPIRATORY (INHALATION) at 04:00

## 2021-03-25 RX ADMIN — FLUOXETINE 60 MG: 20 CAPSULE ORAL at 09:35

## 2021-03-25 RX ADMIN — INSULIN LISPRO 2 UNITS: 100 INJECTION, SOLUTION INTRAVENOUS; SUBCUTANEOUS at 17:00

## 2021-03-25 RX ADMIN — POTASSIUM CHLORIDE 20 MEQ: 1500 TABLET, EXTENDED RELEASE ORAL at 13:56

## 2021-03-25 RX ADMIN — ENOXAPARIN SODIUM 40 MG: 40 INJECTION SUBCUTANEOUS at 21:12

## 2021-03-25 RX ADMIN — DEXMEDETOMIDINE HYDROCHLORIDE 1 MCG/KG/HR: 100 INJECTION, SOLUTION INTRAVENOUS at 20:53

## 2021-03-25 RX ADMIN — IPRATROPIUM BROMIDE AND ALBUTEROL SULFATE 3 ML: 2.5; .5 SOLUTION RESPIRATORY (INHALATION) at 06:39

## 2021-03-25 RX ADMIN — Medication 10 ML: at 12:32

## 2021-03-25 RX ADMIN — INSULIN LISPRO 2 UNITS: 100 INJECTION, SOLUTION INTRAVENOUS; SUBCUTANEOUS at 06:21

## 2021-03-25 RX ADMIN — LANSOPRAZOLE 30 MG: KIT at 06:22

## 2021-03-26 ENCOUNTER — APPOINTMENT (OUTPATIENT)
Dept: GENERAL RADIOLOGY | Facility: HOSPITAL | Age: 71
End: 2021-03-26

## 2021-03-26 ENCOUNTER — APPOINTMENT (OUTPATIENT)
Dept: CARDIOLOGY | Facility: HOSPITAL | Age: 71
End: 2021-03-26

## 2021-03-26 LAB
ALBUMIN SERPL-MCNC: 2.6 G/DL (ref 3.5–5.2)
ANION GAP SERPL CALCULATED.3IONS-SCNC: 13 MMOL/L (ref 5–15)
ARTERIAL PATENCY WRIST A: ABNORMAL
AT III PPP CHRO-ACNC: 97 % (ref 75–120)
ATMOSPHERIC PRESS: ABNORMAL MM[HG]
BASE EXCESS BLDA CALC-SCNC: -0.2 MMOL/L (ref 0–3)
BASE EXCESS BLDA CALC-SCNC: -0.4 MMOL/L (ref 0–3)
BASE EXCESS BLDA CALC-SCNC: -0.8 MMOL/L (ref 0–3)
BASE EXCESS BLDA CALC-SCNC: 0.3 MMOL/L (ref 0–3)
BDY SITE: ABNORMAL
BH CV UPPER VENOUS LEFT AXILLARY AUGMENT: NORMAL
BH CV UPPER VENOUS LEFT AXILLARY COMPETENT: NORMAL
BH CV UPPER VENOUS LEFT AXILLARY COMPRESS: NORMAL
BH CV UPPER VENOUS LEFT AXILLARY PHASIC: NORMAL
BH CV UPPER VENOUS LEFT AXILLARY SPONT: NORMAL
BH CV UPPER VENOUS LEFT BASILIC FOREARM COMPRESS: NORMAL
BH CV UPPER VENOUS LEFT BASILIC UPPER COMPRESS: NORMAL
BH CV UPPER VENOUS LEFT BRACHIAL COMPRESS: NORMAL
BH CV UPPER VENOUS LEFT CEPHALIC FOREARM COMPRESS: NORMAL
BH CV UPPER VENOUS LEFT CEPHALIC UPPER COMPRESS: NORMAL
BH CV UPPER VENOUS LEFT INTERNAL JUGULAR AUGMENT: NORMAL
BH CV UPPER VENOUS LEFT INTERNAL JUGULAR COMPETENT: NORMAL
BH CV UPPER VENOUS LEFT INTERNAL JUGULAR COMPRESS: NORMAL
BH CV UPPER VENOUS LEFT INTERNAL JUGULAR PHASIC: NORMAL
BH CV UPPER VENOUS LEFT INTERNAL JUGULAR SPONT: NORMAL
BH CV UPPER VENOUS LEFT RADIAL COMPRESS: NORMAL
BH CV UPPER VENOUS LEFT SUBCLAVIAN AUGMENT: NORMAL
BH CV UPPER VENOUS LEFT SUBCLAVIAN COMPETENT: NORMAL
BH CV UPPER VENOUS LEFT SUBCLAVIAN COMPRESS: NORMAL
BH CV UPPER VENOUS LEFT SUBCLAVIAN PHASIC: NORMAL
BH CV UPPER VENOUS LEFT SUBCLAVIAN SPONT: NORMAL
BH CV UPPER VENOUS LEFT ULNAR COMPRESS: NORMAL
BH CV UPPER VENOUS RIGHT AXILLARY AUGMENT: NORMAL
BH CV UPPER VENOUS RIGHT AXILLARY COLOR: 1
BH CV UPPER VENOUS RIGHT AXILLARY COMPETENT: NORMAL
BH CV UPPER VENOUS RIGHT AXILLARY COMPRESS: NORMAL
BH CV UPPER VENOUS RIGHT AXILLARY PHASIC: NORMAL
BH CV UPPER VENOUS RIGHT AXILLARY SPONT: NORMAL
BH CV UPPER VENOUS RIGHT AXILLARY THROMBUS: NORMAL
BH CV UPPER VENOUS RIGHT BASILIC FOREARM COMPRESS: NORMAL
BH CV UPPER VENOUS RIGHT BASILIC UPPER COMPRESS: NORMAL
BH CV UPPER VENOUS RIGHT BRACHIAL COLOR: 1
BH CV UPPER VENOUS RIGHT BRACHIAL COMPRESS: NORMAL
BH CV UPPER VENOUS RIGHT BRACHIAL THROMBUS: NORMAL
BH CV UPPER VENOUS RIGHT CEPHALIC FOREARM COMPRESS: NORMAL
BH CV UPPER VENOUS RIGHT CEPHALIC UPPER COMPRESS: NORMAL
BH CV UPPER VENOUS RIGHT RADIAL COMPRESS: NORMAL
BH CV UPPER VENOUS RIGHT SUBCLAVIAN AUGMENT: NORMAL
BH CV UPPER VENOUS RIGHT SUBCLAVIAN COLOR: 1
BH CV UPPER VENOUS RIGHT SUBCLAVIAN COMPETENT: NORMAL
BH CV UPPER VENOUS RIGHT SUBCLAVIAN COMPRESS: NORMAL
BH CV UPPER VENOUS RIGHT SUBCLAVIAN PHASIC: NORMAL
BH CV UPPER VENOUS RIGHT SUBCLAVIAN SPONT: NORMAL
BH CV UPPER VENOUS RIGHT SUBCLAVIAN THROMBUS: NORMAL
BH CV UPPER VENOUS RIGHT ULNAR COMPRESS: NORMAL
BUN SERPL-MCNC: 49 MG/DL (ref 8–23)
BUN/CREAT SERPL: 46.7 (ref 7–25)
CA-I SERPL ISE-MCNC: 1.14 MMOL/L (ref 1.2–1.3)
CA-I SERPL ISE-MCNC: 1.15 MMOL/L (ref 1.2–1.3)
CALCIUM SPEC-SCNC: 8.2 MG/DL (ref 8.6–10.5)
CHLORIDE SERPL-SCNC: 110 MMOL/L (ref 98–107)
CO2 BLDA-SCNC: 24.5 MMOL/L (ref 22–29)
CO2 BLDA-SCNC: 24.6 MMOL/L (ref 22–29)
CO2 BLDA-SCNC: 24.9 MMOL/L (ref 22–29)
CO2 BLDA-SCNC: 25.8 MMOL/L (ref 22–29)
CO2 SERPL-SCNC: 24 MMOL/L (ref 22–29)
CREAT SERPL-MCNC: 1.05 MG/DL (ref 0.57–1)
DEPRECATED RDW RBC AUTO: 48.6 FL (ref 37–54)
ERYTHROCYTE [DISTWIDTH] IN BLOOD BY AUTOMATED COUNT: 14.8 % (ref 12.3–15.4)
GFR SERPL CREATININE-BSD FRML MDRD: 52 ML/MIN/1.73
GLUCOSE BLDC GLUCOMTR-MCNC: 142 MG/DL (ref 70–105)
GLUCOSE BLDC GLUCOMTR-MCNC: 177 MG/DL (ref 70–105)
GLUCOSE BLDC GLUCOMTR-MCNC: 182 MG/DL (ref 70–105)
GLUCOSE BLDC GLUCOMTR-MCNC: 187 MG/DL (ref 70–105)
GLUCOSE BLDC GLUCOMTR-MCNC: 187 MG/DL (ref 70–105)
GLUCOSE SERPL-MCNC: 182 MG/DL (ref 65–99)
HCO3 BLDA-SCNC: 23.4 MMOL/L (ref 21–28)
HCO3 BLDA-SCNC: 23.5 MMOL/L (ref 21–28)
HCO3 BLDA-SCNC: 23.8 MMOL/L (ref 21–28)
HCO3 BLDA-SCNC: 24.7 MMOL/L (ref 21–28)
HCT VFR BLD AUTO: 27.9 % (ref 34–46.6)
HCYS SERPL-MCNC: 8.7 UMOL/L (ref 0–15)
HEMODILUTION: NO
HGB BLD-MCNC: 9 G/DL (ref 12–15.9)
INHALED O2 CONCENTRATION: 70 %
MAGNESIUM SERPL-MCNC: 2.6 MG/DL (ref 1.6–2.4)
MAGNESIUM SERPL-MCNC: 2.6 MG/DL (ref 1.6–2.4)
MCH RBC QN AUTO: 30 PG (ref 26.6–33)
MCHC RBC AUTO-ENTMCNC: 32.2 G/DL (ref 31.5–35.7)
MCV RBC AUTO: 93.1 FL (ref 79–97)
MODALITY: ABNORMAL
PCO2 BLDA: 35 MM HG (ref 35–48)
PCO2 BLDA: 35.4 MM HG (ref 35–48)
PCO2 BLDA: 36.1 MM HG (ref 35–48)
PCO2 BLDA: 38 MM HG (ref 35–48)
PEEP RESPIRATORY: 10 CM[H2O]
PEEP RESPIRATORY: 10 CM[H2O]
PEEP RESPIRATORY: 8 CM[H2O]
PEEP RESPIRATORY: 8 CM[H2O]
PH BLDA: 7.42 PH UNITS (ref 7.35–7.45)
PH BLDA: 7.42 PH UNITS (ref 7.35–7.45)
PH BLDA: 7.43 PH UNITS (ref 7.35–7.45)
PH BLDA: 7.44 PH UNITS (ref 7.35–7.45)
PHOSPHATE SERPL-MCNC: 4.8 MG/DL (ref 2.5–4.5)
PLATELET # BLD AUTO: 61 10*3/MM3 (ref 140–450)
PMV BLD AUTO: 9.9 FL (ref 6–12)
PO2 BLDA: 59 MM HG (ref 83–108)
PO2 BLDA: 71.4 MM HG (ref 83–108)
PO2 BLDA: 71.9 MM HG (ref 83–108)
PO2 BLDA: 81.9 MM HG (ref 83–108)
POTASSIUM SERPL-SCNC: 3.9 MMOL/L (ref 3.5–5.2)
POTASSIUM SERPL-SCNC: 3.9 MMOL/L (ref 3.5–5.2)
POTASSIUM SERPL-SCNC: 4.1 MMOL/L (ref 3.5–5.2)
PROT C ACT/NOR PPP: 117 % (ref 70–130)
RBC # BLD AUTO: 2.99 10*6/MM3 (ref 3.77–5.28)
RESPIRATORY RATE: 22
SAO2 % BLDCOA: 90.9 % (ref 94–98)
SAO2 % BLDCOA: 94.8 % (ref 94–98)
SAO2 % BLDCOA: 94.9 % (ref 94–98)
SAO2 % BLDCOA: 96.3 % (ref 94–98)
SODIUM SERPL-SCNC: 147 MMOL/L (ref 136–145)
VENTILATOR MODE: ABNORMAL
VT ON VENT VENT: 500 ML
WBC # BLD AUTO: 14.5 10*3/MM3 (ref 3.4–10.8)

## 2021-03-26 PROCEDURE — 85613 RUSSELL VIPER VENOM DILUTED: CPT | Performed by: THORACIC SURGERY (CARDIOTHORACIC VASCULAR SURGERY)

## 2021-03-26 PROCEDURE — 84132 ASSAY OF SERUM POTASSIUM: CPT | Performed by: NURSE PRACTITIONER

## 2021-03-26 PROCEDURE — 80069 RENAL FUNCTION PANEL: CPT | Performed by: INTERNAL MEDICINE

## 2021-03-26 PROCEDURE — 85300 ANTITHROMBIN III ACTIVITY: CPT | Performed by: THORACIC SURGERY (CARDIOTHORACIC VASCULAR SURGERY)

## 2021-03-26 PROCEDURE — 85305 CLOT INHIBIT PROT S TOTAL: CPT | Performed by: THORACIC SURGERY (CARDIOTHORACIC VASCULAR SURGERY)

## 2021-03-26 PROCEDURE — 85705 THROMBOPLASTIN INHIBITION: CPT | Performed by: THORACIC SURGERY (CARDIOTHORACIC VASCULAR SURGERY)

## 2021-03-26 PROCEDURE — 82330 ASSAY OF CALCIUM: CPT | Performed by: NURSE PRACTITIONER

## 2021-03-26 PROCEDURE — 25010000002 CALCIUM GLUCONATE 2-0.675 GM/100ML-% SOLUTION: Performed by: NURSE PRACTITIONER

## 2021-03-26 PROCEDURE — 94799 UNLISTED PULMONARY SVC/PX: CPT

## 2021-03-26 PROCEDURE — 82803 BLOOD GASES ANY COMBINATION: CPT

## 2021-03-26 PROCEDURE — 85670 THROMBIN TIME PLASMA: CPT | Performed by: THORACIC SURGERY (CARDIOTHORACIC VASCULAR SURGERY)

## 2021-03-26 PROCEDURE — 85220 BLOOC CLOT FACTOR V TEST: CPT | Performed by: THORACIC SURGERY (CARDIOTHORACIC VASCULAR SURGERY)

## 2021-03-26 PROCEDURE — 25010000002 METHYLPREDNISOLONE PER 40 MG

## 2021-03-26 PROCEDURE — 99024 POSTOP FOLLOW-UP VISIT: CPT | Performed by: NURSE PRACTITIONER

## 2021-03-26 PROCEDURE — 85303 CLOT INHIBIT PROT C ACTIVITY: CPT | Performed by: THORACIC SURGERY (CARDIOTHORACIC VASCULAR SURGERY)

## 2021-03-26 PROCEDURE — 85027 COMPLETE CBC AUTOMATED: CPT | Performed by: NURSE PRACTITIONER

## 2021-03-26 PROCEDURE — 83090 ASSAY OF HOMOCYSTEINE: CPT | Performed by: THORACIC SURGERY (CARDIOTHORACIC VASCULAR SURGERY)

## 2021-03-26 PROCEDURE — 85306 CLOT INHIBIT PROT S FREE: CPT | Performed by: THORACIC SURGERY (CARDIOTHORACIC VASCULAR SURGERY)

## 2021-03-26 PROCEDURE — 86147 CARDIOLIPIN ANTIBODY EA IG: CPT | Performed by: THORACIC SURGERY (CARDIOTHORACIC VASCULAR SURGERY)

## 2021-03-26 PROCEDURE — 82962 GLUCOSE BLOOD TEST: CPT

## 2021-03-26 PROCEDURE — 83735 ASSAY OF MAGNESIUM: CPT | Performed by: NURSE PRACTITIONER

## 2021-03-26 PROCEDURE — 25010000002 FONDAPARINUX PER 0.5 MG: Performed by: NURSE PRACTITIONER

## 2021-03-26 PROCEDURE — 85041 AUTOMATED RBC COUNT: CPT | Performed by: THORACIC SURGERY (CARDIOTHORACIC VASCULAR SURGERY)

## 2021-03-26 PROCEDURE — 94003 VENT MGMT INPAT SUBQ DAY: CPT

## 2021-03-26 PROCEDURE — 63710000001 INSULIN LISPRO (HUMAN) PER 5 UNITS: Performed by: NURSE PRACTITIONER

## 2021-03-26 PROCEDURE — 85302 CLOT INHIBIT PROT C ANTIGEN: CPT | Performed by: THORACIC SURGERY (CARDIOTHORACIC VASCULAR SURGERY)

## 2021-03-26 PROCEDURE — 86022 PLATELET ANTIBODIES: CPT | Performed by: INTERNAL MEDICINE

## 2021-03-26 PROCEDURE — 82955 ASSAY OF G6PD ENZYME: CPT | Performed by: THORACIC SURGERY (CARDIOTHORACIC VASCULAR SURGERY)

## 2021-03-26 PROCEDURE — 99233 SBSQ HOSP IP/OBS HIGH 50: CPT | Performed by: INTERNAL MEDICINE

## 2021-03-26 PROCEDURE — 93970 EXTREMITY STUDY: CPT

## 2021-03-26 PROCEDURE — 25010000002 PHENYLEPHRINE 10 MG/ML SOLUTION: Performed by: INTERNAL MEDICINE

## 2021-03-26 PROCEDURE — 63710000001 INSULIN LISPRO (HUMAN) PER 5 UNITS: Performed by: THORACIC SURGERY (CARDIOTHORACIC VASCULAR SURGERY)

## 2021-03-26 PROCEDURE — 71045 X-RAY EXAM CHEST 1 VIEW: CPT

## 2021-03-26 PROCEDURE — 25010000002 METHYLPREDNISOLONE PER 40 MG: Performed by: INTERNAL MEDICINE

## 2021-03-26 PROCEDURE — 85732 THROMBOPLASTIN TIME PARTIAL: CPT | Performed by: THORACIC SURGERY (CARDIOTHORACIC VASCULAR SURGERY)

## 2021-03-26 PROCEDURE — 03HY32Z INSERTION OF MONITORING DEVICE INTO UPPER ARTERY, PERCUTANEOUS APPROACH: ICD-10-PCS | Performed by: NURSE PRACTITIONER

## 2021-03-26 RX ORDER — METHYLPREDNISOLONE SODIUM SUCCINATE 40 MG/ML
INJECTION, POWDER, LYOPHILIZED, FOR SOLUTION INTRAMUSCULAR; INTRAVENOUS
Status: COMPLETED
Start: 2021-03-26 | End: 2021-03-26

## 2021-03-26 RX ORDER — CALCIUM GLUCONATE 20 MG/ML
2 INJECTION, SOLUTION INTRAVENOUS ONCE
Status: COMPLETED | OUTPATIENT
Start: 2021-03-26 | End: 2021-03-26

## 2021-03-26 RX ORDER — AMOXICILLIN AND CLAVULANATE POTASSIUM 500; 125 MG/1; MG/1
1 TABLET, FILM COATED ORAL EVERY 8 HOURS SCHEDULED
Status: DISCONTINUED | OUTPATIENT
Start: 2021-03-26 | End: 2021-03-30

## 2021-03-26 RX ORDER — INSULIN LISPRO 100 [IU]/ML
0-14 INJECTION, SOLUTION INTRAVENOUS; SUBCUTANEOUS AS NEEDED
Status: DISCONTINUED | OUTPATIENT
Start: 2021-03-26 | End: 2021-03-28

## 2021-03-26 RX ORDER — FONDAPARINUX SODIUM 10 MG/.8ML
10 INJECTION SUBCUTANEOUS EVERY 24 HOURS
Status: DISCONTINUED | OUTPATIENT
Start: 2021-03-26 | End: 2021-03-27

## 2021-03-26 RX ORDER — LACTULOSE 10 G/15ML
30 SOLUTION ORAL DAILY
Status: DISCONTINUED | OUTPATIENT
Start: 2021-03-26 | End: 2021-03-26

## 2021-03-26 RX ORDER — FONDAPARINUX SODIUM 2.5 MG/.5ML
2.5 INJECTION SUBCUTANEOUS
Status: DISCONTINUED | OUTPATIENT
Start: 2021-03-26 | End: 2021-03-26

## 2021-03-26 RX ORDER — BISACODYL 10 MG
10 SUPPOSITORY, RECTAL RECTAL DAILY PRN
Status: DISCONTINUED | OUTPATIENT
Start: 2021-03-26 | End: 2021-04-13 | Stop reason: HOSPADM

## 2021-03-26 RX ORDER — INSULIN LISPRO 100 [IU]/ML
0-14 INJECTION, SOLUTION INTRAVENOUS; SUBCUTANEOUS EVERY 6 HOURS SCHEDULED
Status: DISCONTINUED | OUTPATIENT
Start: 2021-03-26 | End: 2021-03-28

## 2021-03-26 RX ADMIN — DEXMEDETOMIDINE HYDROCHLORIDE 0.6 MCG/KG/HR: 100 INJECTION, SOLUTION INTRAVENOUS at 22:33

## 2021-03-26 RX ADMIN — INSULIN LISPRO 3 UNITS: 100 INJECTION, SOLUTION INTRAVENOUS; SUBCUTANEOUS at 17:17

## 2021-03-26 RX ADMIN — DEXMEDETOMIDINE HYDROCHLORIDE 0.6 MCG/KG/HR: 100 INJECTION, SOLUTION INTRAVENOUS at 17:18

## 2021-03-26 RX ADMIN — CHLORHEXIDINE GLUCONATE 15 ML: 1.2 RINSE ORAL at 20:49

## 2021-03-26 RX ADMIN — INSULIN LISPRO 2 UNITS: 100 INJECTION, SOLUTION INTRAVENOUS; SUBCUTANEOUS at 11:32

## 2021-03-26 RX ADMIN — AMOXICILLIN AND CLAVULANATE POTASSIUM 500 MG: 500; 125 TABLET, FILM COATED ORAL at 22:34

## 2021-03-26 RX ADMIN — IPRATROPIUM BROMIDE AND ALBUTEROL SULFATE 3 ML: 2.5; .5 SOLUTION RESPIRATORY (INHALATION) at 10:37

## 2021-03-26 RX ADMIN — DOCUSATE SODIUM 100 MG: 50 LIQUID ORAL at 20:49

## 2021-03-26 RX ADMIN — IPRATROPIUM BROMIDE AND ALBUTEROL SULFATE 3 ML: 2.5; .5 SOLUTION RESPIRATORY (INHALATION) at 15:17

## 2021-03-26 RX ADMIN — BUMETANIDE 2 MG: 0.25 INJECTION INTRAMUSCULAR; INTRAVENOUS at 12:34

## 2021-03-26 RX ADMIN — Medication 10 ML: at 08:03

## 2021-03-26 RX ADMIN — POLYETHYLENE GLYCOL 3350 17 G: 17 POWDER, FOR SOLUTION ORAL at 20:43

## 2021-03-26 RX ADMIN — ACETAMINOPHEN ORAL SOLUTION 650 MG: 650 SOLUTION ORAL at 08:01

## 2021-03-26 RX ADMIN — POLYETHYLENE GLYCOL 3350 17 G: 17 POWDER, FOR SOLUTION ORAL at 08:03

## 2021-03-26 RX ADMIN — ATORVASTATIN CALCIUM 40 MG: 40 TABLET, FILM COATED ORAL at 20:43

## 2021-03-26 RX ADMIN — SCOPALAMINE 1 PATCH: 1 PATCH, EXTENDED RELEASE TRANSDERMAL at 10:30

## 2021-03-26 RX ADMIN — DOCUSATE SODIUM 100 MG: 50 LIQUID ORAL at 08:02

## 2021-03-26 RX ADMIN — FLUOXETINE 60 MG: 20 CAPSULE ORAL at 08:01

## 2021-03-26 RX ADMIN — SENNOSIDES 2 TABLET: 8.6 TABLET, FILM COATED ORAL at 20:43

## 2021-03-26 RX ADMIN — BUDESONIDE 0.5 MG: 0.5 SUSPENSION RESPIRATORY (INHALATION) at 18:30

## 2021-03-26 RX ADMIN — BUDESONIDE 0.5 MG: 0.5 SUSPENSION RESPIRATORY (INHALATION) at 06:55

## 2021-03-26 RX ADMIN — LEVOTHYROXINE SODIUM 100 MCG: 0.1 TABLET ORAL at 14:45

## 2021-03-26 RX ADMIN — Medication 10 ML: at 20:50

## 2021-03-26 RX ADMIN — DEXMEDETOMIDINE HYDROCHLORIDE 1 MCG/KG/HR: 100 INJECTION, SOLUTION INTRAVENOUS at 04:07

## 2021-03-26 RX ADMIN — HYDROCODONE BITARTRATE AND ACETAMINOPHEN 1 TABLET: 5; 325 TABLET ORAL at 18:05

## 2021-03-26 RX ADMIN — POTASSIUM CHLORIDE 20 MEQ: 1500 TABLET, EXTENDED RELEASE ORAL at 11:33

## 2021-03-26 RX ADMIN — CALCIUM GLUCONATE 2 G: 20 INJECTION, SOLUTION INTRAVENOUS at 10:14

## 2021-03-26 RX ADMIN — POTASSIUM CHLORIDE 20 MEQ: 1500 TABLET, EXTENDED RELEASE ORAL at 08:01

## 2021-03-26 RX ADMIN — GABAPENTIN 200 MG: 100 CAPSULE ORAL at 20:49

## 2021-03-26 RX ADMIN — METHYLPREDNISOLONE SODIUM SUCCINATE 20 MG: 40 INJECTION, POWDER, FOR SOLUTION INTRAMUSCULAR; INTRAVENOUS at 22:34

## 2021-03-26 RX ADMIN — POTASSIUM CHLORIDE 20 MEQ: 1500 TABLET, EXTENDED RELEASE ORAL at 17:18

## 2021-03-26 RX ADMIN — BISACODYL 10 MG: 10 SUPPOSITORY RECTAL at 12:34

## 2021-03-26 RX ADMIN — DEXMEDETOMIDINE HYDROCHLORIDE 0.6 MCG/KG/HR: 100 INJECTION, SOLUTION INTRAVENOUS at 12:10

## 2021-03-26 RX ADMIN — FONDAPARINUX SODIUM 10 MG: 10 INJECTION, SOLUTION SUBCUTANEOUS at 13:48

## 2021-03-26 RX ADMIN — DEXMEDETOMIDINE HYDROCHLORIDE 1 MCG/KG/HR: 100 INJECTION, SOLUTION INTRAVENOUS at 00:00

## 2021-03-26 RX ADMIN — IPRATROPIUM BROMIDE AND ALBUTEROL SULFATE 3 ML: 2.5; .5 SOLUTION RESPIRATORY (INHALATION) at 03:19

## 2021-03-26 RX ADMIN — CHLORHEXIDINE GLUCONATE 15 ML: 1.2 RINSE ORAL at 08:02

## 2021-03-26 RX ADMIN — METOPROLOL TARTRATE 12.5 MG: 25 TABLET, FILM COATED ORAL at 08:00

## 2021-03-26 RX ADMIN — IPRATROPIUM BROMIDE AND ALBUTEROL SULFATE 3 ML: 2.5; .5 SOLUTION RESPIRATORY (INHALATION) at 23:16

## 2021-03-26 RX ADMIN — LANSOPRAZOLE 30 MG: KIT at 06:16

## 2021-03-26 RX ADMIN — BUMETANIDE 2 MG: 0.25 INJECTION INTRAMUSCULAR; INTRAVENOUS at 00:00

## 2021-03-26 RX ADMIN — ARFORMOTEROL TARTRATE 15 MCG: 15 SOLUTION RESPIRATORY (INHALATION) at 18:30

## 2021-03-26 RX ADMIN — ARFORMOTEROL TARTRATE 15 MCG: 15 SOLUTION RESPIRATORY (INHALATION) at 06:55

## 2021-03-26 RX ADMIN — IPRATROPIUM BROMIDE AND ALBUTEROL SULFATE 3 ML: 2.5; .5 SOLUTION RESPIRATORY (INHALATION) at 18:30

## 2021-03-26 RX ADMIN — GABAPENTIN 200 MG: 100 CAPSULE ORAL at 08:01

## 2021-03-26 RX ADMIN — Medication 10 ML: at 20:49

## 2021-03-26 RX ADMIN — IPRATROPIUM BROMIDE AND ALBUTEROL SULFATE 3 ML: 2.5; .5 SOLUTION RESPIRATORY (INHALATION) at 06:55

## 2021-03-26 RX ADMIN — ASPIRIN 81 MG: 81 TABLET, COATED ORAL at 08:01

## 2021-03-26 RX ADMIN — AMOXICILLIN AND CLAVULANATE POTASSIUM 500 MG: 500; 125 TABLET, FILM COATED ORAL at 13:48

## 2021-03-26 RX ADMIN — INSULIN LISPRO 2 UNITS: 100 INJECTION, SOLUTION INTRAVENOUS; SUBCUTANEOUS at 06:16

## 2021-03-26 RX ADMIN — DEXMEDETOMIDINE HYDROCHLORIDE 1 MCG/KG/HR: 100 INJECTION, SOLUTION INTRAVENOUS at 06:45

## 2021-03-26 RX ADMIN — PHENYLEPHRINE HYDROCHLORIDE 0.5 MCG/KG/MIN: 10 INJECTION INTRAVENOUS at 09:50

## 2021-03-26 RX ADMIN — METHYLPREDNISOLONE SODIUM SUCCINATE 20 MG: 40 INJECTION, POWDER, FOR SOLUTION INTRAMUSCULAR; INTRAVENOUS at 10:14

## 2021-03-27 ENCOUNTER — APPOINTMENT (OUTPATIENT)
Dept: GENERAL RADIOLOGY | Facility: HOSPITAL | Age: 71
End: 2021-03-27

## 2021-03-27 LAB
ABO GROUP BLD: NORMAL
ALBUMIN SERPL-MCNC: 2.5 G/DL (ref 3.5–5.2)
ANION GAP SERPL CALCULATED.3IONS-SCNC: 12 MMOL/L (ref 5–15)
ARTERIAL PATENCY WRIST A: ABNORMAL
ARTERIAL PATENCY WRIST A: POSITIVE
ATMOSPHERIC PRESS: ABNORMAL MM[HG]
ATMOSPHERIC PRESS: ABNORMAL MM[HG]
BACTERIA SPEC AEROBE CULT: NORMAL
BASE EXCESS BLDA CALC-SCNC: -0.5 MMOL/L (ref 0–3)
BASE EXCESS BLDA CALC-SCNC: 0 MMOL/L (ref 0–3)
BDY SITE: ABNORMAL
BDY SITE: ABNORMAL
BLD GP AB SCN SERPL QL: NEGATIVE
BUN SERPL-MCNC: 48 MG/DL (ref 8–23)
BUN/CREAT SERPL: 58.5 (ref 7–25)
CALCIUM SPEC-SCNC: 7.9 MG/DL (ref 8.6–10.5)
CARDIOLIPIN IGG SER IA-ACNC: <9 GPL U/ML (ref 0–14)
CARDIOLIPIN IGM SER IA-ACNC: 12 MPL U/ML (ref 0–12)
CHLORIDE SERPL-SCNC: 109 MMOL/L (ref 98–107)
CO2 BLDA-SCNC: 24.8 MMOL/L (ref 22–29)
CO2 BLDA-SCNC: 25.3 MMOL/L (ref 22–29)
CO2 SERPL-SCNC: 23 MMOL/L (ref 22–29)
CREAT SERPL-MCNC: 0.82 MG/DL (ref 0.57–1)
DEPRECATED RDW RBC AUTO: 47.7 FL (ref 37–54)
ERYTHROCYTE [DISTWIDTH] IN BLOOD BY AUTOMATED COUNT: 14.6 % (ref 12.3–15.4)
GFR SERPL CREATININE-BSD FRML MDRD: 69 ML/MIN/1.73
GLUCOSE BLDC GLUCOMTR-MCNC: 173 MG/DL (ref 70–105)
GLUCOSE BLDC GLUCOMTR-MCNC: 193 MG/DL (ref 70–105)
GLUCOSE BLDC GLUCOMTR-MCNC: 197 MG/DL (ref 74–100)
GLUCOSE BLDC GLUCOMTR-MCNC: 207 MG/DL (ref 70–105)
GLUCOSE SERPL-MCNC: 206 MG/DL (ref 65–99)
GRAM STN SPEC: NORMAL
HCO3 BLDA-SCNC: 23.8 MMOL/L (ref 21–28)
HCO3 BLDA-SCNC: 24.1 MMOL/L (ref 21–28)
HCT VFR BLD AUTO: 26.5 % (ref 34–46.6)
HEMODILUTION: NO
HEMODILUTION: NO
HGB BLD-MCNC: 8.6 G/DL (ref 12–15.9)
INHALED O2 CONCENTRATION: 70 %
INHALED O2 CONCENTRATION: 85 %
MCH RBC QN AUTO: 30.2 PG (ref 26.6–33)
MCHC RBC AUTO-ENTMCNC: 32.5 G/DL (ref 31.5–35.7)
MCV RBC AUTO: 92.8 FL (ref 79–97)
MODALITY: ABNORMAL
MODALITY: ABNORMAL
PCO2 BLDA: 34 MM HG (ref 35–48)
PCO2 BLDA: 38.2 MM HG (ref 35–48)
PEEP RESPIRATORY: 10 CM[H2O]
PEEP RESPIRATORY: 10 CM[H2O]
PH BLDA: 7.41 PH UNITS (ref 7.35–7.45)
PH BLDA: 7.45 PH UNITS (ref 7.35–7.45)
PHOSPHATE SERPL-MCNC: 4.2 MG/DL (ref 2.5–4.5)
PLATELET # BLD AUTO: 48 10*3/MM3 (ref 140–450)
PMV BLD AUTO: 11 FL (ref 6–12)
PO2 BLDA: 102.1 MM HG (ref 83–108)
PO2 BLDA: 76 MM HG (ref 83–108)
POTASSIUM SERPL-SCNC: 4.1 MMOL/L (ref 3.5–5.2)
RBC # BLD AUTO: 2.86 10*6/MM3 (ref 3.77–5.28)
RESPIRATORY RATE: 22
RESPIRATORY RATE: 22
RH BLD: POSITIVE
SAO2 % BLDCOA: 95.8 % (ref 94–98)
SAO2 % BLDCOA: 97.9 % (ref 94–98)
SODIUM SERPL-SCNC: 144 MMOL/L (ref 136–145)
T&S EXPIRATION DATE: NORMAL
VENTILATOR MODE: ABNORMAL
VENTILATOR MODE: ABNORMAL
VT ON VENT VENT: 500 ML
VT ON VENT VENT: 500 ML
WBC # BLD AUTO: 15.9 10*3/MM3 (ref 3.4–10.8)

## 2021-03-27 PROCEDURE — 25010000002 FONDAPARINUX PER 0.5 MG: Performed by: THORACIC SURGERY (CARDIOTHORACIC VASCULAR SURGERY)

## 2021-03-27 PROCEDURE — 94799 UNLISTED PULMONARY SVC/PX: CPT

## 2021-03-27 PROCEDURE — 36430 TRANSFUSION BLD/BLD COMPNT: CPT

## 2021-03-27 PROCEDURE — 82962 GLUCOSE BLOOD TEST: CPT

## 2021-03-27 PROCEDURE — 25010000002 HYDRALAZINE PER 20 MG: Performed by: INTERNAL MEDICINE

## 2021-03-27 PROCEDURE — 99233 SBSQ HOSP IP/OBS HIGH 50: CPT | Performed by: INTERNAL MEDICINE

## 2021-03-27 PROCEDURE — 36600 WITHDRAWAL OF ARTERIAL BLOOD: CPT

## 2021-03-27 PROCEDURE — P9016 RBC LEUKOCYTES REDUCED: HCPCS

## 2021-03-27 PROCEDURE — 63710000001 INSULIN LISPRO (HUMAN) PER 5 UNITS: Performed by: THORACIC SURGERY (CARDIOTHORACIC VASCULAR SURGERY)

## 2021-03-27 PROCEDURE — 71045 X-RAY EXAM CHEST 1 VIEW: CPT

## 2021-03-27 PROCEDURE — 86923 COMPATIBILITY TEST ELECTRIC: CPT

## 2021-03-27 PROCEDURE — 82803 BLOOD GASES ANY COMBINATION: CPT

## 2021-03-27 PROCEDURE — 86850 RBC ANTIBODY SCREEN: CPT | Performed by: THORACIC SURGERY (CARDIOTHORACIC VASCULAR SURGERY)

## 2021-03-27 PROCEDURE — 80069 RENAL FUNCTION PANEL: CPT | Performed by: INTERNAL MEDICINE

## 2021-03-27 PROCEDURE — 86901 BLOOD TYPING SEROLOGIC RH(D): CPT | Performed by: THORACIC SURGERY (CARDIOTHORACIC VASCULAR SURGERY)

## 2021-03-27 PROCEDURE — 25010000002 METHYLPREDNISOLONE PER 40 MG: Performed by: THORACIC SURGERY (CARDIOTHORACIC VASCULAR SURGERY)

## 2021-03-27 PROCEDURE — 85027 COMPLETE CBC AUTOMATED: CPT | Performed by: NURSE PRACTITIONER

## 2021-03-27 PROCEDURE — 99024 POSTOP FOLLOW-UP VISIT: CPT | Performed by: THORACIC SURGERY (CARDIOTHORACIC VASCULAR SURGERY)

## 2021-03-27 PROCEDURE — 86900 BLOOD TYPING SEROLOGIC ABO: CPT

## 2021-03-27 PROCEDURE — 86900 BLOOD TYPING SEROLOGIC ABO: CPT | Performed by: THORACIC SURGERY (CARDIOTHORACIC VASCULAR SURGERY)

## 2021-03-27 RX ORDER — AMLODIPINE BESYLATE 5 MG/1
5 TABLET ORAL ONCE
Status: COMPLETED | OUTPATIENT
Start: 2021-03-27 | End: 2021-03-27

## 2021-03-27 RX ORDER — AMLODIPINE BESYLATE 5 MG/1
5 TABLET ORAL
Status: DISCONTINUED | OUTPATIENT
Start: 2021-03-28 | End: 2021-03-30

## 2021-03-27 RX ORDER — METHYLPREDNISOLONE SODIUM SUCCINATE 40 MG/ML
40 INJECTION, POWDER, LYOPHILIZED, FOR SOLUTION INTRAMUSCULAR; INTRAVENOUS EVERY 8 HOURS
Status: DISCONTINUED | OUTPATIENT
Start: 2021-03-27 | End: 2021-03-31

## 2021-03-27 RX ORDER — HYDRALAZINE HYDROCHLORIDE 20 MG/ML
10 INJECTION INTRAMUSCULAR; INTRAVENOUS EVERY 6 HOURS PRN
Status: DISCONTINUED | OUTPATIENT
Start: 2021-03-27 | End: 2021-04-13 | Stop reason: HOSPADM

## 2021-03-27 RX ORDER — BUMETANIDE 0.25 MG/ML
2 INJECTION INTRAMUSCULAR; INTRAVENOUS ONCE
Status: COMPLETED | OUTPATIENT
Start: 2021-03-27 | End: 2021-03-27

## 2021-03-27 RX ORDER — FONDAPARINUX SODIUM 5 MG/.4ML
5 INJECTION SUBCUTANEOUS EVERY 24 HOURS
Status: DISCONTINUED | OUTPATIENT
Start: 2021-03-27 | End: 2021-04-06

## 2021-03-27 RX ADMIN — DEXMEDETOMIDINE HYDROCHLORIDE 0.6 MCG/KG/HR: 100 INJECTION, SOLUTION INTRAVENOUS at 06:30

## 2021-03-27 RX ADMIN — IPRATROPIUM BROMIDE AND ALBUTEROL SULFATE 3 ML: 2.5; .5 SOLUTION RESPIRATORY (INHALATION) at 03:05

## 2021-03-27 RX ADMIN — CHLORHEXIDINE GLUCONATE 15 ML: 1.2 RINSE ORAL at 08:54

## 2021-03-27 RX ADMIN — FLUOXETINE 60 MG: 20 CAPSULE ORAL at 08:55

## 2021-03-27 RX ADMIN — AMOXICILLIN AND CLAVULANATE POTASSIUM 500 MG: 500; 125 TABLET, FILM COATED ORAL at 06:31

## 2021-03-27 RX ADMIN — INSULIN LISPRO 3 UNITS: 100 INJECTION, SOLUTION INTRAVENOUS; SUBCUTANEOUS at 12:02

## 2021-03-27 RX ADMIN — INSULIN LISPRO 5 UNITS: 100 INJECTION, SOLUTION INTRAVENOUS; SUBCUTANEOUS at 06:31

## 2021-03-27 RX ADMIN — LEVOTHYROXINE SODIUM 100 MCG: 0.1 TABLET ORAL at 14:43

## 2021-03-27 RX ADMIN — LANSOPRAZOLE 30 MG: KIT at 06:31

## 2021-03-27 RX ADMIN — BUMETANIDE 2 MG: 0.25 INJECTION INTRAMUSCULAR; INTRAVENOUS at 12:01

## 2021-03-27 RX ADMIN — AMOXICILLIN AND CLAVULANATE POTASSIUM 500 MG: 500; 125 TABLET, FILM COATED ORAL at 22:37

## 2021-03-27 RX ADMIN — AMLODIPINE BESYLATE 5 MG: 5 TABLET ORAL at 22:37

## 2021-03-27 RX ADMIN — ARFORMOTEROL TARTRATE 15 MCG: 15 SOLUTION RESPIRATORY (INHALATION) at 07:55

## 2021-03-27 RX ADMIN — AMOXICILLIN AND CLAVULANATE POTASSIUM 500 MG: 500; 125 TABLET, FILM COATED ORAL at 14:43

## 2021-03-27 RX ADMIN — POTASSIUM CHLORIDE 20 MEQ: 1500 TABLET, EXTENDED RELEASE ORAL at 17:41

## 2021-03-27 RX ADMIN — METHYLPREDNISOLONE SODIUM SUCCINATE 40 MG: 40 INJECTION, POWDER, FOR SOLUTION INTRAMUSCULAR; INTRAVENOUS at 16:36

## 2021-03-27 RX ADMIN — INSULIN LISPRO 5 UNITS: 100 INJECTION, SOLUTION INTRAVENOUS; SUBCUTANEOUS at 17:45

## 2021-03-27 RX ADMIN — ATORVASTATIN CALCIUM 40 MG: 40 TABLET, FILM COATED ORAL at 20:14

## 2021-03-27 RX ADMIN — BUMETANIDE 2 MG: 0.25 INJECTION INTRAMUSCULAR; INTRAVENOUS at 01:24

## 2021-03-27 RX ADMIN — GABAPENTIN 200 MG: 100 CAPSULE ORAL at 20:14

## 2021-03-27 RX ADMIN — HYDROCODONE BITARTRATE AND ACETAMINOPHEN 1 TABLET: 5; 325 TABLET ORAL at 03:14

## 2021-03-27 RX ADMIN — ARFORMOTEROL TARTRATE 15 MCG: 15 SOLUTION RESPIRATORY (INHALATION) at 18:45

## 2021-03-27 RX ADMIN — GABAPENTIN 200 MG: 100 CAPSULE ORAL at 08:55

## 2021-03-27 RX ADMIN — HYDROCODONE BITARTRATE AND ACETAMINOPHEN 1 TABLET: 5; 325 TABLET ORAL at 08:55

## 2021-03-27 RX ADMIN — FONDAPARINUX SODIUM 5 MG: 5 INJECTION, SOLUTION SUBCUTANEOUS at 12:04

## 2021-03-27 RX ADMIN — Medication 10 ML: at 08:56

## 2021-03-27 RX ADMIN — IPRATROPIUM BROMIDE AND ALBUTEROL SULFATE 3 ML: 2.5; .5 SOLUTION RESPIRATORY (INHALATION) at 23:12

## 2021-03-27 RX ADMIN — HYDROCODONE BITARTRATE AND ACETAMINOPHEN 1 TABLET: 5; 325 TABLET ORAL at 14:43

## 2021-03-27 RX ADMIN — IPRATROPIUM BROMIDE AND ALBUTEROL SULFATE 3 ML: 2.5; .5 SOLUTION RESPIRATORY (INHALATION) at 07:46

## 2021-03-27 RX ADMIN — Medication 10 ML: at 20:18

## 2021-03-27 RX ADMIN — POTASSIUM CHLORIDE 20 MEQ: 1500 TABLET, EXTENDED RELEASE ORAL at 08:54

## 2021-03-27 RX ADMIN — METHYLPREDNISOLONE SODIUM SUCCINATE 40 MG: 40 INJECTION, POWDER, FOR SOLUTION INTRAMUSCULAR; INTRAVENOUS at 08:54

## 2021-03-27 RX ADMIN — BUMETANIDE 2 MG: 0.25 INJECTION INTRAMUSCULAR; INTRAVENOUS at 16:36

## 2021-03-27 RX ADMIN — HYDRALAZINE HYDROCHLORIDE 10 MG: 20 INJECTION INTRAMUSCULAR; INTRAVENOUS at 22:51

## 2021-03-27 RX ADMIN — DEXMEDETOMIDINE HYDROCHLORIDE 0.6 MCG/KG/HR: 100 INJECTION, SOLUTION INTRAVENOUS at 12:02

## 2021-03-27 RX ADMIN — DEXMEDETOMIDINE HYDROCHLORIDE 0.6 MCG/KG/HR: 100 INJECTION, SOLUTION INTRAVENOUS at 16:35

## 2021-03-27 RX ADMIN — ASPIRIN 81 MG: 81 TABLET, COATED ORAL at 08:55

## 2021-03-27 RX ADMIN — IPRATROPIUM BROMIDE AND ALBUTEROL SULFATE 3 ML: 2.5; .5 SOLUTION RESPIRATORY (INHALATION) at 18:45

## 2021-03-27 RX ADMIN — IPRATROPIUM BROMIDE AND ALBUTEROL SULFATE 3 ML: 2.5; .5 SOLUTION RESPIRATORY (INHALATION) at 11:17

## 2021-03-27 RX ADMIN — BUDESONIDE 0.5 MG: 0.5 SUSPENSION RESPIRATORY (INHALATION) at 07:53

## 2021-03-27 RX ADMIN — DEXMEDETOMIDINE HYDROCHLORIDE 1.5 MCG/KG/HR: 100 INJECTION, SOLUTION INTRAVENOUS at 22:51

## 2021-03-27 RX ADMIN — IPRATROPIUM BROMIDE AND ALBUTEROL SULFATE 3 ML: 2.5; .5 SOLUTION RESPIRATORY (INHALATION) at 15:32

## 2021-03-27 RX ADMIN — BUDESONIDE 0.5 MG: 0.5 SUSPENSION RESPIRATORY (INHALATION) at 18:45

## 2021-03-27 RX ADMIN — Medication 10 ML: at 20:17

## 2021-03-27 RX ADMIN — CHLORHEXIDINE GLUCONATE 15 ML: 1.2 RINSE ORAL at 20:14

## 2021-03-27 RX ADMIN — HYDROCODONE BITARTRATE AND ACETAMINOPHEN 1 TABLET: 5; 325 TABLET ORAL at 19:30

## 2021-03-27 RX ADMIN — POTASSIUM CHLORIDE 20 MEQ: 1500 TABLET, EXTENDED RELEASE ORAL at 12:01

## 2021-03-27 RX ADMIN — DEXMEDETOMIDINE HYDROCHLORIDE 1.5 MCG/KG/HR: 100 INJECTION, SOLUTION INTRAVENOUS at 20:14

## 2021-03-28 ENCOUNTER — APPOINTMENT (OUTPATIENT)
Dept: GENERAL RADIOLOGY | Facility: HOSPITAL | Age: 71
End: 2021-03-28

## 2021-03-28 LAB
ALBUMIN SERPL-MCNC: 2.7 G/DL (ref 3.5–5.2)
ANION GAP SERPL CALCULATED.3IONS-SCNC: 13 MMOL/L (ref 5–15)
ARTERIAL PATENCY WRIST A: POSITIVE
ATMOSPHERIC PRESS: ABNORMAL MM[HG]
BASE EXCESS BLDA CALC-SCNC: 3.6 MMOL/L (ref 0–3)
BDY SITE: ABNORMAL
BH BB BLOOD EXPIRATION DATE: NORMAL
BH BB BLOOD TYPE BARCODE: 5100
BH BB DISPENSE STATUS: NORMAL
BH BB PRODUCT CODE: NORMAL
BH BB UNIT NUMBER: NORMAL
BUN SERPL-MCNC: 45 MG/DL (ref 8–23)
BUN/CREAT SERPL: 57.7 (ref 7–25)
CALCIUM SPEC-SCNC: 8.1 MG/DL (ref 8.6–10.5)
CHLORIDE SERPL-SCNC: 111 MMOL/L (ref 98–107)
CO2 BLDA-SCNC: 27.2 MMOL/L (ref 22–29)
CO2 SERPL-SCNC: 23 MMOL/L (ref 22–29)
CREAT SERPL-MCNC: 0.78 MG/DL (ref 0.57–1)
CROSSMATCH INTERPRETATION: NORMAL
DEPRECATED RDW RBC AUTO: 49.4 FL (ref 37–54)
ERYTHROCYTE [DISTWIDTH] IN BLOOD BY AUTOMATED COUNT: 15.3 % (ref 12.3–15.4)
FACT V ACT/NOR PPP: 91 % (ref 70–150)
G6PD BLD QN: 336 U/10E12 RBC (ref 127–427)
GFR SERPL CREATININE-BSD FRML MDRD: 73 ML/MIN/1.73
GLUCOSE BLDC GLUCOMTR-MCNC: 197 MG/DL (ref 70–105)
GLUCOSE BLDC GLUCOMTR-MCNC: 204 MG/DL (ref 70–105)
GLUCOSE SERPL-MCNC: 293 MG/DL (ref 65–99)
HCO3 BLDA-SCNC: 26.2 MMOL/L (ref 21–28)
HCT VFR BLD AUTO: 33.9 % (ref 34–46.6)
HEMODILUTION: NO
HGB BLD-MCNC: 11.3 G/DL (ref 12–15.9)
INHALED O2 CONCENTRATION: 70 %
MCH RBC QN AUTO: 30.4 PG (ref 26.6–33)
MCHC RBC AUTO-ENTMCNC: 33.2 G/DL (ref 31.5–35.7)
MCV RBC AUTO: 91.3 FL (ref 79–97)
MODALITY: ABNORMAL
PCO2 BLDA: 32.3 MM HG (ref 35–48)
PEEP RESPIRATORY: 10 CM[H2O]
PF4 HEPARIN CMPLX IGG SERPL IA: 3.08 OD (ref 0–0.4)
PH BLDA: 7.52 PH UNITS (ref 7.35–7.45)
PHOSPHATE SERPL-MCNC: 2.6 MG/DL (ref 2.5–4.5)
PLATELET # BLD AUTO: 55 10*3/MM3 (ref 140–450)
PMV BLD AUTO: 11.7 FL (ref 6–12)
PO2 BLDA: 63.5 MM HG (ref 83–108)
POTASSIUM SERPL-SCNC: 3.9 MMOL/L (ref 3.5–5.2)
PROT C AG ACT/NOR PPP IA: 110 % (ref 60–150)
PROT S ACT/NOR PPP: 99 % (ref 63–140)
PROT S AG ACT/NOR PPP IA: 98 % (ref 60–150)
PROT S FREE AG ACT/NOR PPP IA: 124 % (ref 57–157)
RBC # BLD AUTO: 2.99 X10E6/UL (ref 3.77–5.28)
RBC # BLD AUTO: 3.72 10*6/MM3 (ref 3.77–5.28)
RESPIRATORY RATE: 22
SAO2 % BLDCOA: 94.2 % (ref 94–98)
SODIUM SERPL-SCNC: 147 MMOL/L (ref 136–145)
UNIT  ABO: NORMAL
UNIT  RH: NORMAL
VENTILATOR MODE: ABNORMAL
VT ON VENT VENT: 500 ML
WBC # BLD AUTO: 21.7 10*3/MM3 (ref 3.4–10.8)

## 2021-03-28 PROCEDURE — 25010000002 FONDAPARINUX PER 0.5 MG: Performed by: THORACIC SURGERY (CARDIOTHORACIC VASCULAR SURGERY)

## 2021-03-28 PROCEDURE — 25010000002 PROPOFOL 10 MG/ML EMULSION: Performed by: THORACIC SURGERY (CARDIOTHORACIC VASCULAR SURGERY)

## 2021-03-28 PROCEDURE — 63710000001 INSULIN LISPRO (HUMAN) PER 5 UNITS: Performed by: INTERNAL MEDICINE

## 2021-03-28 PROCEDURE — 94799 UNLISTED PULMONARY SVC/PX: CPT

## 2021-03-28 PROCEDURE — 99024 POSTOP FOLLOW-UP VISIT: CPT | Performed by: THORACIC SURGERY (CARDIOTHORACIC VASCULAR SURGERY)

## 2021-03-28 PROCEDURE — 80069 RENAL FUNCTION PANEL: CPT | Performed by: INTERNAL MEDICINE

## 2021-03-28 PROCEDURE — 99233 SBSQ HOSP IP/OBS HIGH 50: CPT | Performed by: INTERNAL MEDICINE

## 2021-03-28 PROCEDURE — 82803 BLOOD GASES ANY COMBINATION: CPT

## 2021-03-28 PROCEDURE — 25010000002 MORPHINE PER 10 MG: Performed by: STUDENT IN AN ORGANIZED HEALTH CARE EDUCATION/TRAINING PROGRAM

## 2021-03-28 PROCEDURE — 71045 X-RAY EXAM CHEST 1 VIEW: CPT

## 2021-03-28 PROCEDURE — 36600 WITHDRAWAL OF ARTERIAL BLOOD: CPT

## 2021-03-28 PROCEDURE — 82962 GLUCOSE BLOOD TEST: CPT

## 2021-03-28 PROCEDURE — 25010000002 METHYLPREDNISOLONE PER 40 MG: Performed by: THORACIC SURGERY (CARDIOTHORACIC VASCULAR SURGERY)

## 2021-03-28 PROCEDURE — 63710000001 INSULIN LISPRO (HUMAN) PER 5 UNITS: Performed by: THORACIC SURGERY (CARDIOTHORACIC VASCULAR SURGERY)

## 2021-03-28 PROCEDURE — 85027 COMPLETE CBC AUTOMATED: CPT | Performed by: NURSE PRACTITIONER

## 2021-03-28 RX ORDER — NYSTATIN 100000 [USP'U]/G
POWDER TOPICAL EVERY 12 HOURS SCHEDULED
Status: DISCONTINUED | OUTPATIENT
Start: 2021-03-28 | End: 2021-04-13 | Stop reason: HOSPADM

## 2021-03-28 RX ORDER — BUMETANIDE 0.25 MG/ML
2 INJECTION INTRAMUSCULAR; INTRAVENOUS 3 TIMES DAILY
Status: DISCONTINUED | OUTPATIENT
Start: 2021-03-28 | End: 2021-03-29

## 2021-03-28 RX ORDER — QUETIAPINE FUMARATE 25 MG/1
25 TABLET, FILM COATED ORAL NIGHTLY
Status: DISCONTINUED | OUTPATIENT
Start: 2021-03-28 | End: 2021-04-06

## 2021-03-28 RX ORDER — MORPHINE SULFATE 4 MG/ML
4 INJECTION, SOLUTION INTRAMUSCULAR; INTRAVENOUS ONCE
Status: COMPLETED | OUTPATIENT
Start: 2021-03-28 | End: 2021-03-28

## 2021-03-28 RX ORDER — LORAZEPAM 2 MG/ML
1 INJECTION INTRAMUSCULAR
Status: DISCONTINUED | OUTPATIENT
Start: 2021-03-28 | End: 2021-03-28

## 2021-03-28 RX ORDER — INSULIN LISPRO 100 [IU]/ML
0-24 INJECTION, SOLUTION INTRAVENOUS; SUBCUTANEOUS AS NEEDED
Status: DISCONTINUED | OUTPATIENT
Start: 2021-03-28 | End: 2021-04-10

## 2021-03-28 RX ORDER — INSULIN LISPRO 100 [IU]/ML
0-24 INJECTION, SOLUTION INTRAVENOUS; SUBCUTANEOUS EVERY 6 HOURS SCHEDULED
Status: DISCONTINUED | OUTPATIENT
Start: 2021-03-28 | End: 2021-04-10

## 2021-03-28 RX ADMIN — BUDESONIDE 0.5 MG: 0.5 SUSPENSION RESPIRATORY (INHALATION) at 06:32

## 2021-03-28 RX ADMIN — ATORVASTATIN CALCIUM 40 MG: 40 TABLET, FILM COATED ORAL at 21:40

## 2021-03-28 RX ADMIN — HYDROCODONE BITARTRATE AND ACETAMINOPHEN 1 TABLET: 5; 325 TABLET ORAL at 13:32

## 2021-03-28 RX ADMIN — IPRATROPIUM BROMIDE AND ALBUTEROL SULFATE 3 ML: 2.5; .5 SOLUTION RESPIRATORY (INHALATION) at 10:47

## 2021-03-28 RX ADMIN — Medication 10 ML: at 08:21

## 2021-03-28 RX ADMIN — DEXMEDETOMIDINE HYDROCHLORIDE 1.5 MCG/KG/HR: 100 INJECTION, SOLUTION INTRAVENOUS at 03:22

## 2021-03-28 RX ADMIN — PROPOFOL 20 MCG/KG/MIN: 10 INJECTION, EMULSION INTRAVENOUS at 19:10

## 2021-03-28 RX ADMIN — FLUOXETINE 60 MG: 20 CAPSULE ORAL at 08:19

## 2021-03-28 RX ADMIN — HYDROCODONE BITARTRATE AND ACETAMINOPHEN 1 TABLET: 5; 325 TABLET ORAL at 17:32

## 2021-03-28 RX ADMIN — POTASSIUM CHLORIDE 20 MEQ: 1500 TABLET, EXTENDED RELEASE ORAL at 08:19

## 2021-03-28 RX ADMIN — NYSTATIN 1 APPLICATION: 100000 POWDER TOPICAL at 21:40

## 2021-03-28 RX ADMIN — INSULIN LISPRO 8 UNITS: 100 INJECTION, SOLUTION INTRAVENOUS; SUBCUTANEOUS at 06:29

## 2021-03-28 RX ADMIN — Medication 10 ML: at 21:40

## 2021-03-28 RX ADMIN — LEVOTHYROXINE SODIUM 100 MCG: 0.1 TABLET ORAL at 06:02

## 2021-03-28 RX ADMIN — PROPOFOL 5 MCG/KG/MIN: 10 INJECTION, EMULSION INTRAVENOUS at 09:31

## 2021-03-28 RX ADMIN — INSULIN LISPRO 3 UNITS: 100 INJECTION, SOLUTION INTRAVENOUS; SUBCUTANEOUS at 00:04

## 2021-03-28 RX ADMIN — HYDROCODONE BITARTRATE AND ACETAMINOPHEN 1 TABLET: 5; 325 TABLET ORAL at 08:19

## 2021-03-28 RX ADMIN — IPRATROPIUM BROMIDE AND ALBUTEROL SULFATE 3 ML: 2.5; .5 SOLUTION RESPIRATORY (INHALATION) at 15:07

## 2021-03-28 RX ADMIN — IPRATROPIUM BROMIDE AND ALBUTEROL SULFATE 3 ML: 2.5; .5 SOLUTION RESPIRATORY (INHALATION) at 23:34

## 2021-03-28 RX ADMIN — DEXMEDETOMIDINE HYDROCHLORIDE 1.5 MCG/KG/HR: 100 INJECTION, SOLUTION INTRAVENOUS at 06:12

## 2021-03-28 RX ADMIN — MORPHINE SULFATE 4 MG: 4 INJECTION INTRAVENOUS at 04:16

## 2021-03-28 RX ADMIN — METHYLPREDNISOLONE SODIUM SUCCINATE 40 MG: 40 INJECTION, POWDER, FOR SOLUTION INTRAMUSCULAR; INTRAVENOUS at 00:33

## 2021-03-28 RX ADMIN — INSULIN LISPRO 4 UNITS: 100 INJECTION, SOLUTION INTRAVENOUS; SUBCUTANEOUS at 17:33

## 2021-03-28 RX ADMIN — IPRATROPIUM BROMIDE AND ALBUTEROL SULFATE 3 ML: 2.5; .5 SOLUTION RESPIRATORY (INHALATION) at 03:10

## 2021-03-28 RX ADMIN — NYSTATIN: 100000 POWDER TOPICAL at 18:08

## 2021-03-28 RX ADMIN — HYDROCODONE BITARTRATE AND ACETAMINOPHEN 1 TABLET: 5; 325 TABLET ORAL at 04:16

## 2021-03-28 RX ADMIN — IPRATROPIUM BROMIDE AND ALBUTEROL SULFATE 3 ML: 2.5; .5 SOLUTION RESPIRATORY (INHALATION) at 18:38

## 2021-03-28 RX ADMIN — AMOXICILLIN AND CLAVULANATE POTASSIUM 500 MG: 500; 125 TABLET, FILM COATED ORAL at 21:44

## 2021-03-28 RX ADMIN — POTASSIUM CHLORIDE 20 MEQ: 1.5 POWDER, FOR SOLUTION ORAL at 08:19

## 2021-03-28 RX ADMIN — ASPIRIN 81 MG: 81 TABLET, COATED ORAL at 08:19

## 2021-03-28 RX ADMIN — GABAPENTIN 200 MG: 100 CAPSULE ORAL at 21:40

## 2021-03-28 RX ADMIN — GABAPENTIN 200 MG: 100 CAPSULE ORAL at 08:19

## 2021-03-28 RX ADMIN — BUDESONIDE 0.5 MG: 0.5 SUSPENSION RESPIRATORY (INHALATION) at 18:38

## 2021-03-28 RX ADMIN — DEXMEDETOMIDINE HYDROCHLORIDE 1.5 MCG/KG/HR: 100 INJECTION, SOLUTION INTRAVENOUS at 01:12

## 2021-03-28 RX ADMIN — METHYLPREDNISOLONE SODIUM SUCCINATE 40 MG: 40 INJECTION, POWDER, FOR SOLUTION INTRAMUSCULAR; INTRAVENOUS at 08:20

## 2021-03-28 RX ADMIN — BUMETANIDE 2 MG: 0.25 INJECTION INTRAMUSCULAR; INTRAVENOUS at 21:40

## 2021-03-28 RX ADMIN — POTASSIUM CHLORIDE 20 MEQ: 1500 TABLET, EXTENDED RELEASE ORAL at 17:32

## 2021-03-28 RX ADMIN — AMOXICILLIN AND CLAVULANATE POTASSIUM 500 MG: 500; 125 TABLET, FILM COATED ORAL at 13:32

## 2021-03-28 RX ADMIN — CHLORHEXIDINE GLUCONATE 15 ML: 1.2 RINSE ORAL at 08:20

## 2021-03-28 RX ADMIN — AMOXICILLIN AND CLAVULANATE POTASSIUM 500 MG: 500; 125 TABLET, FILM COATED ORAL at 06:02

## 2021-03-28 RX ADMIN — QUETIAPINE FUMARATE 25 MG: 25 TABLET ORAL at 21:40

## 2021-03-28 RX ADMIN — IPRATROPIUM BROMIDE AND ALBUTEROL SULFATE 3 ML: 2.5; .5 SOLUTION RESPIRATORY (INHALATION) at 06:28

## 2021-03-28 RX ADMIN — INSULIN LISPRO 8 UNITS: 100 INJECTION, SOLUTION INTRAVENOUS; SUBCUTANEOUS at 12:24

## 2021-03-28 RX ADMIN — CHLORHEXIDINE GLUCONATE 15 ML: 1.2 RINSE ORAL at 21:41

## 2021-03-28 RX ADMIN — DEXMEDETOMIDINE HYDROCHLORIDE 1 MCG/KG/HR: 100 INJECTION, SOLUTION INTRAVENOUS at 08:20

## 2021-03-28 RX ADMIN — METHYLPREDNISOLONE SODIUM SUCCINATE 40 MG: 40 INJECTION, POWDER, FOR SOLUTION INTRAMUSCULAR; INTRAVENOUS at 17:32

## 2021-03-28 RX ADMIN — BUMETANIDE 2 MG: 0.25 INJECTION INTRAMUSCULAR; INTRAVENOUS at 17:32

## 2021-03-28 RX ADMIN — HYDROCODONE BITARTRATE AND ACETAMINOPHEN 1 TABLET: 5; 325 TABLET ORAL at 00:33

## 2021-03-28 RX ADMIN — FONDAPARINUX SODIUM 5 MG: 5 INJECTION, SOLUTION SUBCUTANEOUS at 12:25

## 2021-03-28 RX ADMIN — ARFORMOTEROL TARTRATE 15 MCG: 15 SOLUTION RESPIRATORY (INHALATION) at 06:35

## 2021-03-28 RX ADMIN — BUMETANIDE 2 MG: 0.25 INJECTION INTRAMUSCULAR; INTRAVENOUS at 08:19

## 2021-03-28 RX ADMIN — BUMETANIDE 2 MG: 0.25 INJECTION INTRAMUSCULAR; INTRAVENOUS at 00:33

## 2021-03-28 RX ADMIN — POTASSIUM CHLORIDE 20 MEQ: 1500 TABLET, EXTENDED RELEASE ORAL at 13:33

## 2021-03-28 RX ADMIN — LANSOPRAZOLE 30 MG: KIT at 06:02

## 2021-03-28 RX ADMIN — ARFORMOTEROL TARTRATE 15 MCG: 15 SOLUTION RESPIRATORY (INHALATION) at 18:38

## 2021-03-29 ENCOUNTER — ANESTHESIA EVENT (OUTPATIENT)
Dept: CARDIOVASCULAR ICU | Facility: HOSPITAL | Age: 71
End: 2021-03-29

## 2021-03-29 ENCOUNTER — APPOINTMENT (OUTPATIENT)
Dept: GENERAL RADIOLOGY | Facility: HOSPITAL | Age: 71
End: 2021-03-29

## 2021-03-29 ENCOUNTER — ANESTHESIA (OUTPATIENT)
Dept: CARDIOVASCULAR ICU | Facility: HOSPITAL | Age: 71
End: 2021-03-29

## 2021-03-29 LAB
ALBUMIN SERPL-MCNC: 2.7 G/DL (ref 3.5–5.2)
ANION GAP SERPL CALCULATED.3IONS-SCNC: 12 MMOL/L (ref 5–15)
APTT SCREEN TO CONFIRM RATIO: 1.01 RATIO (ref 0–1.4)
ARTERIAL PATENCY WRIST A: POSITIVE
ATMOSPHERIC PRESS: ABNORMAL MM[HG]
BASE EXCESS BLDA CALC-SCNC: 2.9 MMOL/L (ref 0–3)
BDY SITE: ABNORMAL
BUN SERPL-MCNC: 42 MG/DL (ref 8–23)
BUN/CREAT SERPL: 60.9 (ref 7–25)
CALCIUM SPEC-SCNC: 8.3 MG/DL (ref 8.6–10.5)
CHLORIDE SERPL-SCNC: 114 MMOL/L (ref 98–107)
CMV DNA SPEC QL NAA+PROBE: NEGATIVE
CO2 BLDA-SCNC: 28.2 MMOL/L (ref 22–29)
CO2 SERPL-SCNC: 26 MMOL/L (ref 22–29)
CONFIRM APTT/NORMAL: 43.8 SEC (ref 0–55)
CREAT SERPL-MCNC: 0.69 MG/DL (ref 0.57–1)
DEPRECATED RDW RBC AUTO: 50.3 FL (ref 37–54)
DRVVT SCREEN TO CONFIRM RATIO: 1.2 RATIO (ref 0.8–1.2)
ERYTHROCYTE [DISTWIDTH] IN BLOOD BY AUTOMATED COUNT: 15.4 % (ref 12.3–15.4)
GFR SERPL CREATININE-BSD FRML MDRD: 84 ML/MIN/1.73
GLUCOSE BLDC GLUCOMTR-MCNC: 192 MG/DL (ref 70–105)
GLUCOSE BLDC GLUCOMTR-MCNC: 206 MG/DL (ref 70–105)
GLUCOSE BLDC GLUCOMTR-MCNC: 206 MG/DL (ref 70–105)
GLUCOSE BLDC GLUCOMTR-MCNC: 220 MG/DL (ref 70–105)
GLUCOSE BLDC GLUCOMTR-MCNC: 268 MG/DL (ref 70–105)
GLUCOSE SERPL-MCNC: 250 MG/DL (ref 65–99)
HCO3 BLDA-SCNC: 27 MMOL/L (ref 21–28)
HCT VFR BLD AUTO: 33.4 % (ref 34–46.6)
HEMODILUTION: NO
HGB BLD-MCNC: 10.8 G/DL (ref 12–15.9)
INHALED O2 CONCENTRATION: 70 %
LA 2 SCREEN W REFLEX-IMP: ABNORMAL
LAB AP CASE REPORT: NORMAL
LAB AP DIAGNOSIS COMMENT: NORMAL
MCH RBC QN AUTO: 29.7 PG (ref 26.6–33)
MCHC RBC AUTO-ENTMCNC: 32.4 G/DL (ref 31.5–35.7)
MCV RBC AUTO: 91.8 FL (ref 79–97)
MIXING DRVVT: 42.4 SEC (ref 0–40.4)
MODALITY: ABNORMAL
P JIROVECII DNA # SPEC NAA+PROBE: NEGATIVE {COPIES}/ML
PATH REPORT.FINAL DX SPEC: NORMAL
PATH REPORT.GROSS SPEC: NORMAL
PCO2 BLDA: 38.5 MM HG (ref 35–48)
PEEP RESPIRATORY: 10 CM[H2O]
PH BLDA: 7.45 PH UNITS (ref 7.35–7.45)
PHOSPHATE SERPL-MCNC: 3 MG/DL (ref 2.5–4.5)
PLATELET # BLD AUTO: 73 10*3/MM3 (ref 140–450)
PMV BLD AUTO: 11.6 FL (ref 6–12)
PO2 BLDA: 68.7 MM HG (ref 83–108)
POTASSIUM SERPL-SCNC: 3.9 MMOL/L (ref 3.5–5.2)
RBC # BLD AUTO: 3.64 10*6/MM3 (ref 3.77–5.28)
RESPIRATORY RATE: 22
SAO2 % BLDCOA: 94.3 % (ref 94–98)
SCREEN APTT: 33.2 SEC (ref 0–51.9)
SCREEN DRVVT: 51.7 SEC (ref 0–47)
SODIUM SERPL-SCNC: 152 MMOL/L (ref 136–145)
SPECIMEN SOURCE: NORMAL
SPECIMEN SOURCE: NORMAL
THROMBIN TIME: 14.1 SEC (ref 0–23)
TRIGL SERPL-MCNC: 165 MG/DL (ref 0–150)
VENTILATOR MODE: ABNORMAL
VT ON VENT VENT: 500 ML
WBC # BLD AUTO: 26.3 10*3/MM3 (ref 3.4–10.8)

## 2021-03-29 PROCEDURE — 36600 WITHDRAWAL OF ARTERIAL BLOOD: CPT

## 2021-03-29 PROCEDURE — 02HV33Z INSERTION OF INFUSION DEVICE INTO SUPERIOR VENA CAVA, PERCUTANEOUS APPROACH: ICD-10-PCS | Performed by: INTERNAL MEDICINE

## 2021-03-29 PROCEDURE — 99232 SBSQ HOSP IP/OBS MODERATE 35: CPT | Performed by: INTERNAL MEDICINE

## 2021-03-29 PROCEDURE — 63710000001 INSULIN LISPRO (HUMAN) PER 5 UNITS: Performed by: INTERNAL MEDICINE

## 2021-03-29 PROCEDURE — 63710000001 INSULIN REGULAR HUMAN PER 5 UNITS: Performed by: INTERNAL MEDICINE

## 2021-03-29 PROCEDURE — 25010000002 PROPOFOL 10 MG/ML EMULSION: Performed by: THORACIC SURGERY (CARDIOTHORACIC VASCULAR SURGERY)

## 2021-03-29 PROCEDURE — 82803 BLOOD GASES ANY COMBINATION: CPT

## 2021-03-29 PROCEDURE — 94760 N-INVAS EAR/PLS OXIMETRY 1: CPT

## 2021-03-29 PROCEDURE — 99024 POSTOP FOLLOW-UP VISIT: CPT | Performed by: NURSE PRACTITIONER

## 2021-03-29 PROCEDURE — 82962 GLUCOSE BLOOD TEST: CPT

## 2021-03-29 PROCEDURE — 85027 COMPLETE CBC AUTOMATED: CPT | Performed by: NURSE PRACTITIONER

## 2021-03-29 PROCEDURE — 80069 RENAL FUNCTION PANEL: CPT | Performed by: INTERNAL MEDICINE

## 2021-03-29 PROCEDURE — 84478 ASSAY OF TRIGLYCERIDES: CPT | Performed by: THORACIC SURGERY (CARDIOTHORACIC VASCULAR SURGERY)

## 2021-03-29 PROCEDURE — 94799 UNLISTED PULMONARY SVC/PX: CPT

## 2021-03-29 PROCEDURE — 71045 X-RAY EXAM CHEST 1 VIEW: CPT

## 2021-03-29 PROCEDURE — 25010000002 METHYLPREDNISOLONE PER 40 MG: Performed by: THORACIC SURGERY (CARDIOTHORACIC VASCULAR SURGERY)

## 2021-03-29 PROCEDURE — 03HY33Z INSERTION OF INFUSION DEVICE INTO UPPER ARTERY, PERCUTANEOUS APPROACH: ICD-10-PCS | Performed by: INTERNAL MEDICINE

## 2021-03-29 PROCEDURE — 25010000002 CALCIUM GLUCONATE-NACL 1-0.675 GM/50ML-% SOLUTION: Performed by: INTERNAL MEDICINE

## 2021-03-29 PROCEDURE — C1751 CATH, INF, PER/CENT/MIDLINE: HCPCS | Performed by: ANESTHESIOLOGY

## 2021-03-29 RX ORDER — CALCIUM GLUCONATE 20 MG/ML
1 INJECTION, SOLUTION INTRAVENOUS EVERY 12 HOURS
Status: COMPLETED | OUTPATIENT
Start: 2021-03-29 | End: 2021-03-29

## 2021-03-29 RX ORDER — BUMETANIDE 0.25 MG/ML
1 INJECTION INTRAMUSCULAR; INTRAVENOUS EVERY 12 HOURS
Status: DISCONTINUED | OUTPATIENT
Start: 2021-03-29 | End: 2021-03-30

## 2021-03-29 RX ADMIN — LEVOTHYROXINE SODIUM 100 MCG: 0.1 TABLET ORAL at 06:19

## 2021-03-29 RX ADMIN — PROPOFOL 30 MCG/KG/MIN: 10 INJECTION, EMULSION INTRAVENOUS at 21:30

## 2021-03-29 RX ADMIN — PROPOFOL 30 MCG/KG/MIN: 10 INJECTION, EMULSION INTRAVENOUS at 11:42

## 2021-03-29 RX ADMIN — AMOXICILLIN AND CLAVULANATE POTASSIUM 500 MG: 500; 125 TABLET, FILM COATED ORAL at 15:26

## 2021-03-29 RX ADMIN — GABAPENTIN 200 MG: 100 CAPSULE ORAL at 20:13

## 2021-03-29 RX ADMIN — INSULIN LISPRO 12 UNITS: 100 INJECTION, SOLUTION INTRAVENOUS; SUBCUTANEOUS at 06:31

## 2021-03-29 RX ADMIN — METOPROLOL TARTRATE 12.5 MG: 25 TABLET, FILM COATED ORAL at 12:22

## 2021-03-29 RX ADMIN — Medication 10 ML: at 08:11

## 2021-03-29 RX ADMIN — ARFORMOTEROL TARTRATE 15 MCG: 15 SOLUTION RESPIRATORY (INHALATION) at 06:45

## 2021-03-29 RX ADMIN — METHYLPREDNISOLONE SODIUM SUCCINATE 40 MG: 40 INJECTION, POWDER, FOR SOLUTION INTRAMUSCULAR; INTRAVENOUS at 08:08

## 2021-03-29 RX ADMIN — NYSTATIN 1 APPLICATION: 100000 POWDER TOPICAL at 20:16

## 2021-03-29 RX ADMIN — METHYLPREDNISOLONE SODIUM SUCCINATE 40 MG: 40 INJECTION, POWDER, FOR SOLUTION INTRAMUSCULAR; INTRAVENOUS at 01:00

## 2021-03-29 RX ADMIN — BUDESONIDE 0.5 MG: 0.5 SUSPENSION RESPIRATORY (INHALATION) at 06:45

## 2021-03-29 RX ADMIN — INSULIN HUMAN 2 UNITS: 100 INJECTION, SOLUTION PARENTERAL at 18:00

## 2021-03-29 RX ADMIN — CALCIUM GLUCONATE 1 G: 20 INJECTION, SOLUTION INTRAVENOUS at 20:14

## 2021-03-29 RX ADMIN — IPRATROPIUM BROMIDE AND ALBUTEROL SULFATE 3 ML: 2.5; .5 SOLUTION RESPIRATORY (INHALATION) at 15:15

## 2021-03-29 RX ADMIN — GABAPENTIN 200 MG: 100 CAPSULE ORAL at 08:08

## 2021-03-29 RX ADMIN — IPRATROPIUM BROMIDE AND ALBUTEROL SULFATE 3 ML: 2.5; .5 SOLUTION RESPIRATORY (INHALATION) at 18:10

## 2021-03-29 RX ADMIN — INSULIN LISPRO 8 UNITS: 100 INJECTION, SOLUTION INTRAVENOUS; SUBCUTANEOUS at 18:01

## 2021-03-29 RX ADMIN — IPRATROPIUM BROMIDE AND ALBUTEROL SULFATE 3 ML: 2.5; .5 SOLUTION RESPIRATORY (INHALATION) at 22:35

## 2021-03-29 RX ADMIN — POTASSIUM CHLORIDE 20 MEQ: 1500 TABLET, EXTENDED RELEASE ORAL at 18:00

## 2021-03-29 RX ADMIN — PROPOFOL 30 MCG/KG/MIN: 10 INJECTION, EMULSION INTRAVENOUS at 07:17

## 2021-03-29 RX ADMIN — BUDESONIDE 0.5 MG: 0.5 SUSPENSION RESPIRATORY (INHALATION) at 18:10

## 2021-03-29 RX ADMIN — AMOXICILLIN AND CLAVULANATE POTASSIUM 500 MG: 500; 125 TABLET, FILM COATED ORAL at 22:28

## 2021-03-29 RX ADMIN — METOPROLOL TARTRATE 12.5 MG: 25 TABLET, FILM COATED ORAL at 20:13

## 2021-03-29 RX ADMIN — AMLODIPINE BESYLATE 5 MG: 5 TABLET ORAL at 08:08

## 2021-03-29 RX ADMIN — BUMETANIDE 1 MG: 0.25 INJECTION INTRAMUSCULAR; INTRAVENOUS at 20:13

## 2021-03-29 RX ADMIN — LANSOPRAZOLE 30 MG: KIT at 06:19

## 2021-03-29 RX ADMIN — CALCIUM GLUCONATE 1 G: 20 INJECTION, SOLUTION INTRAVENOUS at 08:08

## 2021-03-29 RX ADMIN — ATORVASTATIN CALCIUM 40 MG: 40 TABLET, FILM COATED ORAL at 20:13

## 2021-03-29 RX ADMIN — BUMETANIDE 1 MG: 0.25 INJECTION INTRAMUSCULAR; INTRAVENOUS at 08:08

## 2021-03-29 RX ADMIN — PROPOFOL 30 MCG/KG/MIN: 10 INJECTION, EMULSION INTRAVENOUS at 14:40

## 2021-03-29 RX ADMIN — CHLORHEXIDINE GLUCONATE 15 ML: 1.2 RINSE ORAL at 20:13

## 2021-03-29 RX ADMIN — QUETIAPINE FUMARATE 25 MG: 25 TABLET ORAL at 20:14

## 2021-03-29 RX ADMIN — PROPOFOL 30 MCG/KG/MIN: 10 INJECTION, EMULSION INTRAVENOUS at 17:59

## 2021-03-29 RX ADMIN — IPRATROPIUM BROMIDE AND ALBUTEROL SULFATE 3 ML: 2.5; .5 SOLUTION RESPIRATORY (INHALATION) at 06:45

## 2021-03-29 RX ADMIN — METHYLPREDNISOLONE SODIUM SUCCINATE 40 MG: 40 INJECTION, POWDER, FOR SOLUTION INTRAMUSCULAR; INTRAVENOUS at 18:00

## 2021-03-29 RX ADMIN — ARFORMOTEROL TARTRATE 15 MCG: 15 SOLUTION RESPIRATORY (INHALATION) at 18:10

## 2021-03-29 RX ADMIN — INSULIN LISPRO 8 UNITS: 100 INJECTION, SOLUTION INTRAVENOUS; SUBCUTANEOUS at 12:22

## 2021-03-29 RX ADMIN — AMOXICILLIN AND CLAVULANATE POTASSIUM 500 MG: 500; 125 TABLET, FILM COATED ORAL at 06:19

## 2021-03-29 RX ADMIN — ASPIRIN 81 MG: 81 TABLET, COATED ORAL at 08:08

## 2021-03-29 RX ADMIN — POTASSIUM CHLORIDE 20 MEQ: 1500 TABLET, EXTENDED RELEASE ORAL at 12:22

## 2021-03-29 RX ADMIN — INSULIN LISPRO 4 UNITS: 100 INJECTION, SOLUTION INTRAVENOUS; SUBCUTANEOUS at 00:59

## 2021-03-29 RX ADMIN — FLUOXETINE 60 MG: 20 CAPSULE ORAL at 08:08

## 2021-03-29 RX ADMIN — POTASSIUM CHLORIDE 20 MEQ: 1500 TABLET, EXTENDED RELEASE ORAL at 08:08

## 2021-03-29 RX ADMIN — IPRATROPIUM BROMIDE AND ALBUTEROL SULFATE 3 ML: 2.5; .5 SOLUTION RESPIRATORY (INHALATION) at 04:03

## 2021-03-29 RX ADMIN — NYSTATIN: 100000 POWDER TOPICAL at 11:43

## 2021-03-29 RX ADMIN — PROPOFOL 20 MCG/KG/MIN: 10 INJECTION, EMULSION INTRAVENOUS at 02:35

## 2021-03-29 RX ADMIN — IPRATROPIUM BROMIDE AND ALBUTEROL SULFATE 3 ML: 2.5; .5 SOLUTION RESPIRATORY (INHALATION) at 10:31

## 2021-03-30 ENCOUNTER — APPOINTMENT (OUTPATIENT)
Dept: GENERAL RADIOLOGY | Facility: HOSPITAL | Age: 71
End: 2021-03-30

## 2021-03-30 LAB
ALBUMIN SERPL-MCNC: 2.7 G/DL (ref 3.5–5.2)
ALBUMIN/GLOB SERPL: 1 G/DL
ALP SERPL-CCNC: 174 U/L (ref 39–117)
ALT SERPL W P-5'-P-CCNC: 40 U/L (ref 1–33)
ANION GAP SERPL CALCULATED.3IONS-SCNC: 12 MMOL/L (ref 5–15)
ARTERIAL PATENCY WRIST A: ABNORMAL
ARTERIAL PATENCY WRIST A: ABNORMAL
AST SERPL-CCNC: 34 U/L (ref 1–32)
ATMOSPHERIC PRESS: ABNORMAL MM[HG]
ATMOSPHERIC PRESS: ABNORMAL MM[HG]
BASE EXCESS BLDA CALC-SCNC: 3.8 MMOL/L (ref 0–3)
BASE EXCESS BLDA CALC-SCNC: 4.2 MMOL/L (ref 0–3)
BDY SITE: ABNORMAL
BDY SITE: ABNORMAL
BILIRUB SERPL-MCNC: 0.6 MG/DL (ref 0–1.2)
BUN SERPL-MCNC: 42 MG/DL (ref 8–23)
BUN/CREAT SERPL: 65.6 (ref 7–25)
CA-I SERPL ISE-MCNC: 1.2 MMOL/L (ref 1.2–1.3)
CALCIUM SPEC-SCNC: 8.7 MG/DL (ref 8.6–10.5)
CHLORIDE SERPL-SCNC: 108 MMOL/L (ref 98–107)
CK SERPL-CCNC: 45 U/L (ref 20–180)
CO2 BLDA-SCNC: 28.7 MMOL/L (ref 22–29)
CO2 BLDA-SCNC: 29.5 MMOL/L (ref 22–29)
CO2 SERPL-SCNC: 24 MMOL/L (ref 22–29)
CREAT SERPL-MCNC: 0.64 MG/DL (ref 0.57–1)
DEPRECATED RDW RBC AUTO: 49 FL (ref 37–54)
ERYTHROCYTE [DISTWIDTH] IN BLOOD BY AUTOMATED COUNT: 15.2 % (ref 12.3–15.4)
GFR SERPL CREATININE-BSD FRML MDRD: 92 ML/MIN/1.73
GLOBULIN UR ELPH-MCNC: 2.7 GM/DL
GLUCOSE BLDC GLUCOMTR-MCNC: 210 MG/DL (ref 70–105)
GLUCOSE BLDC GLUCOMTR-MCNC: 211 MG/DL (ref 70–105)
GLUCOSE BLDC GLUCOMTR-MCNC: 275 MG/DL (ref 70–105)
GLUCOSE SERPL-MCNC: 299 MG/DL (ref 65–99)
HCO3 BLDA-SCNC: 27.6 MMOL/L (ref 21–28)
HCO3 BLDA-SCNC: 28.3 MMOL/L (ref 21–28)
HCT VFR BLD AUTO: 32.2 % (ref 34–46.6)
HEMODILUTION: NO
HEMODILUTION: NO
HGB BLD-MCNC: 10.5 G/DL (ref 12–15.9)
INHALED O2 CONCENTRATION: 50 %
INHALED O2 CONCENTRATION: 60 %
INSPIRATORY TIME: 1
IRON 24H UR-MRATE: 49 MCG/DL (ref 37–145)
MAGNESIUM SERPL-MCNC: 2.3 MG/DL (ref 1.6–2.4)
MCH RBC QN AUTO: 29.8 PG (ref 26.6–33)
MCHC RBC AUTO-ENTMCNC: 32.6 G/DL (ref 31.5–35.7)
MCV RBC AUTO: 91.6 FL (ref 79–97)
MODALITY: ABNORMAL
MODALITY: ABNORMAL
PCO2 BLDA: 37.8 MM HG (ref 35–48)
PCO2 BLDA: 39.5 MM HG (ref 35–48)
PEEP RESPIRATORY: 10 CM[H2O]
PEEP RESPIRATORY: 9 CM[H2O]
PH BLDA: 7.46 PH UNITS (ref 7.35–7.45)
PH BLDA: 7.47 PH UNITS (ref 7.35–7.45)
PHOSPHATE SERPL-MCNC: 3 MG/DL (ref 2.5–4.5)
PLATELET # BLD AUTO: 75 10*3/MM3 (ref 140–450)
PMV BLD AUTO: 10.8 FL (ref 6–12)
PO2 BLDA: 61.8 MM HG (ref 83–108)
PO2 BLDA: 64.4 MM HG (ref 83–108)
POTASSIUM SERPL-SCNC: 4.1 MMOL/L (ref 3.5–5.2)
PROT SERPL-MCNC: 5.4 G/DL (ref 6–8.5)
RBC # BLD AUTO: 3.51 10*6/MM3 (ref 3.77–5.28)
RESPIRATORY RATE: 22
RESPIRATORY RATE: 22
SAO2 % BLDCOA: 92.6 % (ref 94–98)
SAO2 % BLDCOA: 93.6 % (ref 94–98)
SODIUM SERPL-SCNC: 144 MMOL/L (ref 136–145)
VENTILATOR MODE: ABNORMAL
VENTILATOR MODE: ABNORMAL
VT ON VENT VENT: 500 ML
VT ON VENT VENT: 500 ML
WBC # BLD AUTO: 27.7 10*3/MM3 (ref 3.4–10.8)

## 2021-03-30 PROCEDURE — 80053 COMPREHEN METABOLIC PANEL: CPT | Performed by: INTERNAL MEDICINE

## 2021-03-30 PROCEDURE — 25010000002 PROPOFOL 10 MG/ML EMULSION: Performed by: THORACIC SURGERY (CARDIOTHORACIC VASCULAR SURGERY)

## 2021-03-30 PROCEDURE — 63710000001 INSULIN REGULAR HUMAN PER 5 UNITS: Performed by: INTERNAL MEDICINE

## 2021-03-30 PROCEDURE — 87070 CULTURE OTHR SPECIMN AEROBIC: CPT | Performed by: INTERNAL MEDICINE

## 2021-03-30 PROCEDURE — 94799 UNLISTED PULMONARY SVC/PX: CPT

## 2021-03-30 PROCEDURE — 83735 ASSAY OF MAGNESIUM: CPT | Performed by: INTERNAL MEDICINE

## 2021-03-30 PROCEDURE — 82962 GLUCOSE BLOOD TEST: CPT

## 2021-03-30 PROCEDURE — 82550 ASSAY OF CK (CPK): CPT | Performed by: INTERNAL MEDICINE

## 2021-03-30 PROCEDURE — 85027 COMPLETE CBC AUTOMATED: CPT | Performed by: NURSE PRACTITIONER

## 2021-03-30 PROCEDURE — 25010000002 CHLOROTHIAZIDE PER 500 MG: Performed by: INTERNAL MEDICINE

## 2021-03-30 PROCEDURE — 94003 VENT MGMT INPAT SUBQ DAY: CPT

## 2021-03-30 PROCEDURE — 63710000001 INSULIN GLARGINE PER 5 UNITS: Performed by: INTERNAL MEDICINE

## 2021-03-30 PROCEDURE — 87205 SMEAR GRAM STAIN: CPT | Performed by: INTERNAL MEDICINE

## 2021-03-30 PROCEDURE — 99232 SBSQ HOSP IP/OBS MODERATE 35: CPT | Performed by: INTERNAL MEDICINE

## 2021-03-30 PROCEDURE — 83540 ASSAY OF IRON: CPT | Performed by: INTERNAL MEDICINE

## 2021-03-30 PROCEDURE — 99024 POSTOP FOLLOW-UP VISIT: CPT | Performed by: NURSE PRACTITIONER

## 2021-03-30 PROCEDURE — 82330 ASSAY OF CALCIUM: CPT | Performed by: INTERNAL MEDICINE

## 2021-03-30 PROCEDURE — 82803 BLOOD GASES ANY COMBINATION: CPT

## 2021-03-30 PROCEDURE — 63710000001 INSULIN LISPRO (HUMAN) PER 5 UNITS: Performed by: INTERNAL MEDICINE

## 2021-03-30 PROCEDURE — 84100 ASSAY OF PHOSPHORUS: CPT | Performed by: INTERNAL MEDICINE

## 2021-03-30 PROCEDURE — 94760 N-INVAS EAR/PLS OXIMETRY 1: CPT

## 2021-03-30 PROCEDURE — 25010000002 METHYLPREDNISOLONE PER 40 MG: Performed by: THORACIC SURGERY (CARDIOTHORACIC VASCULAR SURGERY)

## 2021-03-30 PROCEDURE — 71045 X-RAY EXAM CHEST 1 VIEW: CPT

## 2021-03-30 PROCEDURE — 25010000002 FONDAPARINUX PER 0.5 MG: Performed by: THORACIC SURGERY (CARDIOTHORACIC VASCULAR SURGERY)

## 2021-03-30 RX ORDER — ACETYLCYSTEINE 200 MG/ML
3 SOLUTION ORAL; RESPIRATORY (INHALATION)
Status: DISCONTINUED | OUTPATIENT
Start: 2021-03-30 | End: 2021-04-07

## 2021-03-30 RX ORDER — BUMETANIDE 0.25 MG/ML
1 INJECTION INTRAMUSCULAR; INTRAVENOUS EVERY 8 HOURS
Status: DISCONTINUED | OUTPATIENT
Start: 2021-03-30 | End: 2021-03-31

## 2021-03-30 RX ORDER — HYDRALAZINE HYDROCHLORIDE 25 MG/1
25 TABLET, FILM COATED ORAL EVERY 8 HOURS SCHEDULED
Status: DISCONTINUED | OUTPATIENT
Start: 2021-03-30 | End: 2021-04-10

## 2021-03-30 RX ORDER — INSULIN GLARGINE 100 [IU]/ML
15 INJECTION, SOLUTION SUBCUTANEOUS EVERY 12 HOURS SCHEDULED
Status: DISCONTINUED | OUTPATIENT
Start: 2021-03-30 | End: 2021-03-31

## 2021-03-30 RX ORDER — HYDRALAZINE HYDROCHLORIDE 25 MG/1
25 TABLET, FILM COATED ORAL EVERY 8 HOURS SCHEDULED
Status: DISCONTINUED | OUTPATIENT
Start: 2021-03-30 | End: 2021-03-30

## 2021-03-30 RX ADMIN — IPRATROPIUM BROMIDE AND ALBUTEROL SULFATE 3 ML: 2.5; .5 SOLUTION RESPIRATORY (INHALATION) at 14:50

## 2021-03-30 RX ADMIN — METHYLPREDNISOLONE SODIUM SUCCINATE 40 MG: 40 INJECTION, POWDER, FOR SOLUTION INTRAMUSCULAR; INTRAVENOUS at 00:01

## 2021-03-30 RX ADMIN — HYDRALAZINE HYDROCHLORIDE 25 MG: 25 TABLET ORAL at 21:20

## 2021-03-30 RX ADMIN — PROPOFOL 20 MCG/KG/MIN: 10 INJECTION, EMULSION INTRAVENOUS at 13:18

## 2021-03-30 RX ADMIN — IPRATROPIUM BROMIDE AND ALBUTEROL SULFATE 3 ML: 2.5; .5 SOLUTION RESPIRATORY (INHALATION) at 03:08

## 2021-03-30 RX ADMIN — NYSTATIN 1 APPLICATION: 100000 POWDER TOPICAL at 21:20

## 2021-03-30 RX ADMIN — ATORVASTATIN CALCIUM 40 MG: 40 TABLET, FILM COATED ORAL at 20:06

## 2021-03-30 RX ADMIN — POTASSIUM CHLORIDE 20 MEQ: 1500 TABLET, EXTENDED RELEASE ORAL at 18:12

## 2021-03-30 RX ADMIN — INSULIN LISPRO 8 UNITS: 100 INJECTION, SOLUTION INTRAVENOUS; SUBCUTANEOUS at 13:03

## 2021-03-30 RX ADMIN — INSULIN LISPRO 8 UNITS: 100 INJECTION, SOLUTION INTRAVENOUS; SUBCUTANEOUS at 00:01

## 2021-03-30 RX ADMIN — HYDROCODONE BITARTRATE AND ACETAMINOPHEN 1 TABLET: 5; 325 TABLET ORAL at 02:43

## 2021-03-30 RX ADMIN — IPRATROPIUM BROMIDE AND ALBUTEROL SULFATE 3 ML: 2.5; .5 SOLUTION RESPIRATORY (INHALATION) at 19:15

## 2021-03-30 RX ADMIN — PROPOFOL 30 MCG/KG/MIN: 10 INJECTION, EMULSION INTRAVENOUS at 02:48

## 2021-03-30 RX ADMIN — FLUOXETINE 60 MG: 20 CAPSULE ORAL at 08:08

## 2021-03-30 RX ADMIN — BUDESONIDE 0.5 MG: 0.5 SUSPENSION RESPIRATORY (INHALATION) at 19:15

## 2021-03-30 RX ADMIN — CHLOROTHIAZIDE SODIUM 250 MG: 500 INJECTION, POWDER, LYOPHILIZED, FOR SOLUTION INTRAVENOUS at 11:16

## 2021-03-30 RX ADMIN — BUMETANIDE 1 MG: 0.25 INJECTION INTRAMUSCULAR; INTRAVENOUS at 08:07

## 2021-03-30 RX ADMIN — BUMETANIDE 1 MG: 0.25 INJECTION INTRAMUSCULAR; INTRAVENOUS at 16:21

## 2021-03-30 RX ADMIN — PROPOFOL 20 MCG/KG/MIN: 10 INJECTION, EMULSION INTRAVENOUS at 14:26

## 2021-03-30 RX ADMIN — INSULIN HUMAN 2 UNITS: 100 INJECTION, SOLUTION PARENTERAL at 08:07

## 2021-03-30 RX ADMIN — IPRATROPIUM BROMIDE AND ALBUTEROL SULFATE 3 ML: 2.5; .5 SOLUTION RESPIRATORY (INHALATION) at 23:23

## 2021-03-30 RX ADMIN — IPRATROPIUM BROMIDE AND ALBUTEROL SULFATE 3 ML: 2.5; .5 SOLUTION RESPIRATORY (INHALATION) at 10:50

## 2021-03-30 RX ADMIN — ACETYLCYSTEINE 3 ML: 200 SOLUTION ORAL; RESPIRATORY (INHALATION) at 10:50

## 2021-03-30 RX ADMIN — LEVOTHYROXINE SODIUM 100 MCG: 0.1 TABLET ORAL at 06:15

## 2021-03-30 RX ADMIN — IPRATROPIUM BROMIDE AND ALBUTEROL SULFATE 3 ML: 2.5; .5 SOLUTION RESPIRATORY (INHALATION) at 06:43

## 2021-03-30 RX ADMIN — ACETYLCYSTEINE 3 ML: 200 SOLUTION ORAL; RESPIRATORY (INHALATION) at 19:16

## 2021-03-30 RX ADMIN — INSULIN GLARGINE 15 UNITS: 100 INJECTION, SOLUTION SUBCUTANEOUS at 11:17

## 2021-03-30 RX ADMIN — CHLORHEXIDINE GLUCONATE 15 ML: 1.2 RINSE ORAL at 20:05

## 2021-03-30 RX ADMIN — ASPIRIN 81 MG: 81 TABLET, COATED ORAL at 08:08

## 2021-03-30 RX ADMIN — HYDROCODONE BITARTRATE AND ACETAMINOPHEN 1 TABLET: 5; 325 TABLET ORAL at 20:06

## 2021-03-30 RX ADMIN — POTASSIUM CHLORIDE 20 MEQ: 1500 TABLET, EXTENDED RELEASE ORAL at 13:04

## 2021-03-30 RX ADMIN — METHYLPREDNISOLONE SODIUM SUCCINATE 40 MG: 40 INJECTION, POWDER, FOR SOLUTION INTRAMUSCULAR; INTRAVENOUS at 08:07

## 2021-03-30 RX ADMIN — METOPROLOL TARTRATE 12.5 MG: 25 TABLET, FILM COATED ORAL at 20:05

## 2021-03-30 RX ADMIN — INSULIN HUMAN 2 UNITS: 100 INJECTION, SOLUTION PARENTERAL at 16:21

## 2021-03-30 RX ADMIN — PROPOFOL 30 MCG/KG/MIN: 10 INJECTION, EMULSION INTRAVENOUS at 06:11

## 2021-03-30 RX ADMIN — AMOXICILLIN AND CLAVULANATE POTASSIUM 500 MG: 500; 125 TABLET, FILM COATED ORAL at 06:11

## 2021-03-30 RX ADMIN — GABAPENTIN 200 MG: 100 CAPSULE ORAL at 08:08

## 2021-03-30 RX ADMIN — ARFORMOTEROL TARTRATE 15 MCG: 15 SOLUTION RESPIRATORY (INHALATION) at 19:15

## 2021-03-30 RX ADMIN — ARFORMOTEROL TARTRATE 15 MCG: 15 SOLUTION RESPIRATORY (INHALATION) at 06:43

## 2021-03-30 RX ADMIN — PROPOFOL 30 MCG/KG/MIN: 10 INJECTION, EMULSION INTRAVENOUS at 19:27

## 2021-03-30 RX ADMIN — METOPROLOL TARTRATE 12.5 MG: 25 TABLET, FILM COATED ORAL at 08:08

## 2021-03-30 RX ADMIN — INSULIN GLARGINE 15 UNITS: 100 INJECTION, SOLUTION SUBCUTANEOUS at 20:06

## 2021-03-30 RX ADMIN — QUETIAPINE FUMARATE 25 MG: 25 TABLET ORAL at 20:06

## 2021-03-30 RX ADMIN — INSULIN LISPRO 12 UNITS: 100 INJECTION, SOLUTION INTRAVENOUS; SUBCUTANEOUS at 06:11

## 2021-03-30 RX ADMIN — HYDRALAZINE HYDROCHLORIDE 25 MG: 25 TABLET ORAL at 16:21

## 2021-03-30 RX ADMIN — INSULIN LISPRO 8 UNITS: 100 INJECTION, SOLUTION INTRAVENOUS; SUBCUTANEOUS at 18:12

## 2021-03-30 RX ADMIN — METHYLPREDNISOLONE SODIUM SUCCINATE 40 MG: 40 INJECTION, POWDER, FOR SOLUTION INTRAMUSCULAR; INTRAVENOUS at 16:21

## 2021-03-30 RX ADMIN — LANSOPRAZOLE 30 MG: KIT at 06:12

## 2021-03-30 RX ADMIN — BUDESONIDE 0.5 MG: 0.5 SUSPENSION RESPIRATORY (INHALATION) at 06:43

## 2021-03-30 RX ADMIN — AMLODIPINE BESYLATE 5 MG: 5 TABLET ORAL at 08:08

## 2021-03-30 RX ADMIN — POTASSIUM CHLORIDE 20 MEQ: 1500 TABLET, EXTENDED RELEASE ORAL at 07:55

## 2021-03-30 RX ADMIN — FONDAPARINUX SODIUM 5 MG: 5 INJECTION, SOLUTION SUBCUTANEOUS at 13:18

## 2021-03-30 RX ADMIN — GABAPENTIN 200 MG: 100 CAPSULE ORAL at 20:06

## 2021-03-30 RX ADMIN — NYSTATIN: 100000 POWDER TOPICAL at 08:09

## 2021-03-30 RX ADMIN — INSULIN HUMAN 2 UNITS: 100 INJECTION, SOLUTION PARENTERAL at 00:00

## 2021-03-31 ENCOUNTER — APPOINTMENT (OUTPATIENT)
Dept: GENERAL RADIOLOGY | Facility: HOSPITAL | Age: 71
End: 2021-03-31

## 2021-03-31 LAB
ALBUMIN SERPL-MCNC: 2.6 G/DL (ref 3.5–5.2)
ANION GAP SERPL CALCULATED.3IONS-SCNC: 9 MMOL/L (ref 5–15)
ARTERIAL PATENCY WRIST A: ABNORMAL
ARTERIAL PATENCY WRIST A: ABNORMAL
ATMOSPHERIC PRESS: ABNORMAL MM[HG]
ATMOSPHERIC PRESS: ABNORMAL MM[HG]
BASE EXCESS BLDA CALC-SCNC: 3.5 MMOL/L (ref 0–3)
BASE EXCESS BLDA CALC-SCNC: 3.6 MMOL/L (ref 0–3)
BDY SITE: ABNORMAL
BDY SITE: ABNORMAL
BUN SERPL-MCNC: 46 MG/DL (ref 8–23)
BUN/CREAT SERPL: 74.2 (ref 7–25)
CALCIUM SPEC-SCNC: 8.6 MG/DL (ref 8.6–10.5)
CHLORIDE SERPL-SCNC: 105 MMOL/L (ref 98–107)
CO2 BLDA-SCNC: 28.7 MMOL/L (ref 22–29)
CO2 BLDA-SCNC: 28.9 MMOL/L (ref 22–29)
CO2 SERPL-SCNC: 27 MMOL/L (ref 22–29)
CREAT SERPL-MCNC: 0.62 MG/DL (ref 0.57–1)
DEPRECATED RDW RBC AUTO: 48.6 FL (ref 37–54)
ERYTHROCYTE [DISTWIDTH] IN BLOOD BY AUTOMATED COUNT: 15.1 % (ref 12.3–15.4)
GFR SERPL CREATININE-BSD FRML MDRD: 95 ML/MIN/1.73
GLUCOSE BLDC GLUCOMTR-MCNC: 235 MG/DL (ref 70–105)
GLUCOSE BLDC GLUCOMTR-MCNC: 253 MG/DL (ref 70–105)
GLUCOSE BLDC GLUCOMTR-MCNC: 264 MG/DL (ref 70–105)
GLUCOSE BLDC GLUCOMTR-MCNC: 272 MG/DL (ref 70–105)
GLUCOSE SERPL-MCNC: 251 MG/DL (ref 65–99)
HCO3 BLDA-SCNC: 27.5 MMOL/L (ref 21–28)
HCO3 BLDA-SCNC: 27.7 MMOL/L (ref 21–28)
HCT VFR BLD AUTO: 31.3 % (ref 34–46.6)
HEMODILUTION: NO
HEMODILUTION: NO
HGB BLD-MCNC: 10.2 G/DL (ref 12–15.9)
INHALED O2 CONCENTRATION: 50 %
INHALED O2 CONCENTRATION: 50 %
INSPIRATORY TIME: 1
MAGNESIUM SERPL-MCNC: 2.3 MG/DL (ref 1.6–2.4)
MCH RBC QN AUTO: 29.8 PG (ref 26.6–33)
MCHC RBC AUTO-ENTMCNC: 32.6 G/DL (ref 31.5–35.7)
MCV RBC AUTO: 91.3 FL (ref 79–97)
MODALITY: ABNORMAL
MODALITY: ABNORMAL
PCO2 BLDA: 38.9 MM HG (ref 35–48)
PCO2 BLDA: 39 MM HG (ref 35–48)
PEEP RESPIRATORY: 7 CM[H2O]
PEEP RESPIRATORY: 9 CM[H2O]
PH BLDA: 7.46 PH UNITS (ref 7.35–7.45)
PH BLDA: 7.46 PH UNITS (ref 7.35–7.45)
PHOSPHATE SERPL-MCNC: 3.8 MG/DL (ref 2.5–4.5)
PLATELET # BLD AUTO: 87 10*3/MM3 (ref 140–450)
PMV BLD AUTO: 11.3 FL (ref 6–12)
PO2 BLDA: 63.1 MM HG (ref 83–108)
PO2 BLDA: 66.5 MM HG (ref 83–108)
POTASSIUM SERPL-SCNC: 4 MMOL/L (ref 3.5–5.2)
POTASSIUM SERPL-SCNC: 4.2 MMOL/L (ref 3.5–5.2)
RBC # BLD AUTO: 3.43 10*6/MM3 (ref 3.77–5.28)
RESPIRATORY RATE: 2
RESPIRATORY RATE: 22
SAO2 % BLDCOA: 93 % (ref 94–98)
SAO2 % BLDCOA: 93.9 % (ref 94–98)
SODIUM SERPL-SCNC: 141 MMOL/L (ref 136–145)
VENTILATOR MODE: ABNORMAL
VENTILATOR MODE: ABNORMAL
VT ON VENT VENT: 500 ML
VT ON VENT VENT: 500 ML
WBC # BLD AUTO: 24 10*3/MM3 (ref 3.4–10.8)

## 2021-03-31 PROCEDURE — 94003 VENT MGMT INPAT SUBQ DAY: CPT

## 2021-03-31 PROCEDURE — 63710000001 INSULIN LISPRO (HUMAN) PER 5 UNITS: Performed by: INTERNAL MEDICINE

## 2021-03-31 PROCEDURE — 63710000001 PREDNISONE PER 1 MG: Performed by: INTERNAL MEDICINE

## 2021-03-31 PROCEDURE — 63710000001 INSULIN LISPRO (HUMAN) PER 5 UNITS: Performed by: NURSE PRACTITIONER

## 2021-03-31 PROCEDURE — 82962 GLUCOSE BLOOD TEST: CPT

## 2021-03-31 PROCEDURE — 94799 UNLISTED PULMONARY SVC/PX: CPT

## 2021-03-31 PROCEDURE — 83735 ASSAY OF MAGNESIUM: CPT | Performed by: INTERNAL MEDICINE

## 2021-03-31 PROCEDURE — 99024 POSTOP FOLLOW-UP VISIT: CPT | Performed by: NURSE PRACTITIONER

## 2021-03-31 PROCEDURE — 25010000002 METHYLPREDNISOLONE PER 40 MG: Performed by: THORACIC SURGERY (CARDIOTHORACIC VASCULAR SURGERY)

## 2021-03-31 PROCEDURE — 63710000001 INSULIN GLARGINE PER 5 UNITS: Performed by: INTERNAL MEDICINE

## 2021-03-31 PROCEDURE — 99232 SBSQ HOSP IP/OBS MODERATE 35: CPT | Performed by: INTERNAL MEDICINE

## 2021-03-31 PROCEDURE — 71045 X-RAY EXAM CHEST 1 VIEW: CPT

## 2021-03-31 PROCEDURE — 94760 N-INVAS EAR/PLS OXIMETRY 1: CPT

## 2021-03-31 PROCEDURE — 25010000002 PROPOFOL 10 MG/ML EMULSION: Performed by: THORACIC SURGERY (CARDIOTHORACIC VASCULAR SURGERY)

## 2021-03-31 PROCEDURE — 63710000001 PREDNISONE PER 5 MG: Performed by: INTERNAL MEDICINE

## 2021-03-31 PROCEDURE — 63710000001 INSULIN REGULAR HUMAN PER 5 UNITS: Performed by: INTERNAL MEDICINE

## 2021-03-31 PROCEDURE — 82803 BLOOD GASES ANY COMBINATION: CPT

## 2021-03-31 PROCEDURE — 80069 RENAL FUNCTION PANEL: CPT | Performed by: INTERNAL MEDICINE

## 2021-03-31 PROCEDURE — 85027 COMPLETE CBC AUTOMATED: CPT | Performed by: NURSE PRACTITIONER

## 2021-03-31 PROCEDURE — 25010000002 CHLOROTHIAZIDE PER 500 MG: Performed by: INTERNAL MEDICINE

## 2021-03-31 PROCEDURE — 84132 ASSAY OF SERUM POTASSIUM: CPT | Performed by: INTERNAL MEDICINE

## 2021-03-31 PROCEDURE — 25010000002 FONDAPARINUX PER 0.5 MG: Performed by: THORACIC SURGERY (CARDIOTHORACIC VASCULAR SURGERY)

## 2021-03-31 RX ORDER — BUMETANIDE 0.25 MG/ML
2 INJECTION INTRAMUSCULAR; INTRAVENOUS EVERY 12 HOURS
Status: DISCONTINUED | OUTPATIENT
Start: 2021-03-31 | End: 2021-03-31

## 2021-03-31 RX ORDER — MIDAZOLAM HYDROCHLORIDE 1 MG/ML
1 INJECTION INTRAMUSCULAR; INTRAVENOUS
Status: DISCONTINUED | OUTPATIENT
Start: 2021-03-31 | End: 2021-04-06

## 2021-03-31 RX ORDER — INSULIN GLARGINE 100 [IU]/ML
20 INJECTION, SOLUTION SUBCUTANEOUS EVERY 12 HOURS SCHEDULED
Status: DISCONTINUED | OUTPATIENT
Start: 2021-04-01 | End: 2021-04-09

## 2021-03-31 RX ORDER — INSULIN LISPRO 100 [IU]/ML
5 INJECTION, SOLUTION INTRAVENOUS; SUBCUTANEOUS
Status: DISCONTINUED | OUTPATIENT
Start: 2021-03-31 | End: 2021-04-01

## 2021-03-31 RX ORDER — BUDESONIDE 0.5 MG/2ML
1 INHALANT ORAL
Status: DISCONTINUED | OUTPATIENT
Start: 2021-03-31 | End: 2021-04-13 | Stop reason: HOSPADM

## 2021-03-31 RX ORDER — MIDAZOLAM HYDROCHLORIDE 1 MG/ML
2 INJECTION INTRAMUSCULAR; INTRAVENOUS
Status: DISCONTINUED | OUTPATIENT
Start: 2021-03-31 | End: 2021-04-06

## 2021-03-31 RX ADMIN — PROPOFOL 30 MCG/KG/MIN: 10 INJECTION, EMULSION INTRAVENOUS at 04:53

## 2021-03-31 RX ADMIN — INSULIN LISPRO 5 UNITS: 100 INJECTION, SOLUTION INTRAVENOUS; SUBCUTANEOUS at 17:35

## 2021-03-31 RX ADMIN — FONDAPARINUX SODIUM 5 MG: 5 INJECTION, SOLUTION SUBCUTANEOUS at 12:24

## 2021-03-31 RX ADMIN — HYDRALAZINE HYDROCHLORIDE 25 MG: 25 TABLET ORAL at 21:22

## 2021-03-31 RX ADMIN — PROPOFOL 15 MCG/KG/MIN: 10 INJECTION, EMULSION INTRAVENOUS at 13:03

## 2021-03-31 RX ADMIN — INSULIN LISPRO 12 UNITS: 100 INJECTION, SOLUTION INTRAVENOUS; SUBCUTANEOUS at 12:23

## 2021-03-31 RX ADMIN — INSULIN GLARGINE 15 UNITS: 100 INJECTION, SOLUTION SUBCUTANEOUS at 08:50

## 2021-03-31 RX ADMIN — INSULIN HUMAN 2 UNITS: 100 INJECTION, SOLUTION PARENTERAL at 08:50

## 2021-03-31 RX ADMIN — BUDESONIDE 0.5 MG: 0.5 SUSPENSION RESPIRATORY (INHALATION) at 20:08

## 2021-03-31 RX ADMIN — ARFORMOTEROL TARTRATE 15 MCG: 15 SOLUTION RESPIRATORY (INHALATION) at 20:07

## 2021-03-31 RX ADMIN — PROPOFOL 15 MCG/KG/MIN: 10 INJECTION, EMULSION INTRAVENOUS at 21:22

## 2021-03-31 RX ADMIN — NYSTATIN: 100000 POWDER TOPICAL at 09:07

## 2021-03-31 RX ADMIN — POTASSIUM CHLORIDE 20 MEQ: 1500 TABLET, EXTENDED RELEASE ORAL at 12:23

## 2021-03-31 RX ADMIN — IPRATROPIUM BROMIDE AND ALBUTEROL SULFATE 3 ML: 2.5; .5 SOLUTION RESPIRATORY (INHALATION) at 02:59

## 2021-03-31 RX ADMIN — ACETYLCYSTEINE 4 ML: 200 SOLUTION ORAL; RESPIRATORY (INHALATION) at 20:08

## 2021-03-31 RX ADMIN — PROPOFOL 30 MCG/KG/MIN: 10 INJECTION, EMULSION INTRAVENOUS at 07:53

## 2021-03-31 RX ADMIN — INSULIN LISPRO 8 UNITS: 100 INJECTION, SOLUTION INTRAVENOUS; SUBCUTANEOUS at 17:35

## 2021-03-31 RX ADMIN — HYDROCODONE BITARTRATE AND ACETAMINOPHEN 1 TABLET: 5; 325 TABLET ORAL at 23:51

## 2021-03-31 RX ADMIN — CHLORHEXIDINE GLUCONATE 15 ML: 1.2 RINSE ORAL at 08:50

## 2021-03-31 RX ADMIN — METHYLPREDNISOLONE SODIUM SUCCINATE 40 MG: 40 INJECTION, POWDER, FOR SOLUTION INTRAMUSCULAR; INTRAVENOUS at 08:50

## 2021-03-31 RX ADMIN — LEVOTHYROXINE SODIUM 100 MCG: 0.1 TABLET ORAL at 05:44

## 2021-03-31 RX ADMIN — IPRATROPIUM BROMIDE AND ALBUTEROL SULFATE 3 ML: 2.5; .5 SOLUTION RESPIRATORY (INHALATION) at 11:00

## 2021-03-31 RX ADMIN — IPRATROPIUM BROMIDE AND ALBUTEROL SULFATE 3 ML: 2.5; .5 SOLUTION RESPIRATORY (INHALATION) at 06:44

## 2021-03-31 RX ADMIN — IPRATROPIUM BROMIDE AND ALBUTEROL SULFATE 3 ML: 2.5; .5 SOLUTION RESPIRATORY (INHALATION) at 23:11

## 2021-03-31 RX ADMIN — HYDROCODONE BITARTRATE AND ACETAMINOPHEN 1 TABLET: 5; 325 TABLET ORAL at 13:51

## 2021-03-31 RX ADMIN — POTASSIUM CHLORIDE 20 MEQ: 1500 TABLET, EXTENDED RELEASE ORAL at 17:08

## 2021-03-31 RX ADMIN — HYDRALAZINE HYDROCHLORIDE 25 MG: 25 TABLET ORAL at 13:51

## 2021-03-31 RX ADMIN — GABAPENTIN 200 MG: 100 CAPSULE ORAL at 20:17

## 2021-03-31 RX ADMIN — ASPIRIN 81 MG: 81 TABLET, COATED ORAL at 08:50

## 2021-03-31 RX ADMIN — INSULIN GLARGINE 15 UNITS: 100 INJECTION, SOLUTION SUBCUTANEOUS at 20:17

## 2021-03-31 RX ADMIN — BUMETANIDE 1 MG: 0.25 INJECTION INTRAMUSCULAR; INTRAVENOUS at 00:21

## 2021-03-31 RX ADMIN — HYDRALAZINE HYDROCHLORIDE 25 MG: 25 TABLET ORAL at 05:44

## 2021-03-31 RX ADMIN — CHLORHEXIDINE GLUCONATE 15 ML: 1.2 RINSE ORAL at 20:16

## 2021-03-31 RX ADMIN — POTASSIUM CHLORIDE 20 MEQ: 1500 TABLET, EXTENDED RELEASE ORAL at 17:21

## 2021-03-31 RX ADMIN — METHYLPREDNISOLONE SODIUM SUCCINATE 40 MG: 40 INJECTION, POWDER, FOR SOLUTION INTRAMUSCULAR; INTRAVENOUS at 00:41

## 2021-03-31 RX ADMIN — ACETYLCYSTEINE 3 ML: 200 SOLUTION ORAL; RESPIRATORY (INHALATION) at 06:44

## 2021-03-31 RX ADMIN — HYDROCODONE BITARTRATE AND ACETAMINOPHEN 1 TABLET: 5; 325 TABLET ORAL at 17:21

## 2021-03-31 RX ADMIN — ARFORMOTEROL TARTRATE 15 MCG: 15 SOLUTION RESPIRATORY (INHALATION) at 06:44

## 2021-03-31 RX ADMIN — LANSOPRAZOLE 30 MG: KIT at 06:00

## 2021-03-31 RX ADMIN — METOPROLOL TARTRATE 12.5 MG: 25 TABLET, FILM COATED ORAL at 08:50

## 2021-03-31 RX ADMIN — METOPROLOL TARTRATE 12.5 MG: 25 TABLET, FILM COATED ORAL at 20:17

## 2021-03-31 RX ADMIN — PREDNISONE 30 MG: 20 TABLET ORAL at 12:23

## 2021-03-31 RX ADMIN — NYSTATIN: 100000 POWDER TOPICAL at 20:38

## 2021-03-31 RX ADMIN — PROPOFOL 30 MCG/KG/MIN: 10 INJECTION, EMULSION INTRAVENOUS at 00:04

## 2021-03-31 RX ADMIN — IPRATROPIUM BROMIDE AND ALBUTEROL SULFATE 3 ML: 2.5; .5 SOLUTION RESPIRATORY (INHALATION) at 20:08

## 2021-03-31 RX ADMIN — BUDESONIDE 0.5 MG: 0.5 SUSPENSION RESPIRATORY (INHALATION) at 06:44

## 2021-03-31 RX ADMIN — HYDROCODONE BITARTRATE AND ACETAMINOPHEN 1 TABLET: 5; 325 TABLET ORAL at 09:06

## 2021-03-31 RX ADMIN — IPRATROPIUM BROMIDE AND ALBUTEROL SULFATE 3 ML: 2.5; .5 SOLUTION RESPIRATORY (INHALATION) at 15:07

## 2021-03-31 RX ADMIN — ATORVASTATIN CALCIUM 40 MG: 40 TABLET, FILM COATED ORAL at 20:17

## 2021-03-31 RX ADMIN — QUETIAPINE FUMARATE 25 MG: 25 TABLET ORAL at 20:17

## 2021-03-31 RX ADMIN — DOCUSATE SODIUM 100 MG: 50 LIQUID ORAL at 08:50

## 2021-03-31 RX ADMIN — INSULIN HUMAN 2 UNITS: 100 INJECTION, SOLUTION PARENTERAL at 00:41

## 2021-03-31 RX ADMIN — DOCUSATE SODIUM 100 MG: 50 LIQUID ORAL at 20:16

## 2021-03-31 RX ADMIN — FLUOXETINE 60 MG: 20 CAPSULE ORAL at 08:50

## 2021-03-31 RX ADMIN — BUMETANIDE 0.5 MG/HR: 0.25 INJECTION INTRAMUSCULAR; INTRAVENOUS at 09:07

## 2021-03-31 RX ADMIN — INSULIN LISPRO 12 UNITS: 100 INJECTION, SOLUTION INTRAVENOUS; SUBCUTANEOUS at 00:41

## 2021-03-31 RX ADMIN — GABAPENTIN 200 MG: 100 CAPSULE ORAL at 08:50

## 2021-03-31 RX ADMIN — POTASSIUM CHLORIDE 20 MEQ: 1500 TABLET, EXTENDED RELEASE ORAL at 08:50

## 2021-03-31 RX ADMIN — INSULIN LISPRO 12 UNITS: 100 INJECTION, SOLUTION INTRAVENOUS; SUBCUTANEOUS at 05:50

## 2021-03-31 RX ADMIN — CHLOROTHIAZIDE SODIUM 250 MG: 500 INJECTION, POWDER, LYOPHILIZED, FOR SOLUTION INTRAVENOUS at 07:55

## 2021-04-01 ENCOUNTER — APPOINTMENT (OUTPATIENT)
Dept: GENERAL RADIOLOGY | Facility: HOSPITAL | Age: 71
End: 2021-04-01

## 2021-04-01 LAB
ALBUMIN SERPL-MCNC: 2.7 G/DL (ref 3.5–5.2)
ANION GAP SERPL CALCULATED.3IONS-SCNC: 9 MMOL/L (ref 5–15)
ARTERIAL PATENCY WRIST A: ABNORMAL
ATMOSPHERIC PRESS: ABNORMAL MM[HG]
BACTERIA SPEC RESP CULT: ABNORMAL
BASE EXCESS BLDA CALC-SCNC: 4.6 MMOL/L (ref 0–3)
BDY SITE: ABNORMAL
BUN SERPL-MCNC: 45 MG/DL (ref 8–23)
BUN/CREAT SERPL: 78.9 (ref 7–25)
CALCIUM SPEC-SCNC: 8 MG/DL (ref 8.6–10.5)
CHLORIDE SERPL-SCNC: 103 MMOL/L (ref 98–107)
CO2 BLDA-SCNC: 30.9 MMOL/L (ref 22–29)
CO2 SERPL-SCNC: 30 MMOL/L (ref 22–29)
CREAT SERPL-MCNC: 0.57 MG/DL (ref 0.57–1)
DEPRECATED RDW RBC AUTO: 46.8 FL (ref 37–54)
ERYTHROCYTE [DISTWIDTH] IN BLOOD BY AUTOMATED COUNT: 14.7 % (ref 12.3–15.4)
GFR SERPL CREATININE-BSD FRML MDRD: 105 ML/MIN/1.73
GLUCOSE BLDC GLUCOMTR-MCNC: 145 MG/DL (ref 70–105)
GLUCOSE BLDC GLUCOMTR-MCNC: 177 MG/DL (ref 70–105)
GLUCOSE BLDC GLUCOMTR-MCNC: 228 MG/DL (ref 70–105)
GLUCOSE BLDC GLUCOMTR-MCNC: 276 MG/DL (ref 70–105)
GLUCOSE BLDC GLUCOMTR-MCNC: 86 MG/DL (ref 70–105)
GLUCOSE SERPL-MCNC: 184 MG/DL (ref 65–99)
GRAM STN SPEC: ABNORMAL
HCO3 BLDA-SCNC: 29.5 MMOL/L (ref 21–28)
HCT VFR BLD AUTO: 32.3 % (ref 34–46.6)
HEMODILUTION: NO
HGB BLD-MCNC: 10.6 G/DL (ref 12–15.9)
INHALED O2 CONCENTRATION: 50 %
MAGNESIUM SERPL-MCNC: 2 MG/DL (ref 1.6–2.4)
MCH RBC QN AUTO: 29.6 PG (ref 26.6–33)
MCHC RBC AUTO-ENTMCNC: 32.9 G/DL (ref 31.5–35.7)
MCV RBC AUTO: 90.2 FL (ref 79–97)
MODALITY: ABNORMAL
PCO2 BLDA: 44.6 MM HG (ref 35–48)
PEEP RESPIRATORY: 7 CM[H2O]
PH BLDA: 7.43 PH UNITS (ref 7.35–7.45)
PHOSPHATE SERPL-MCNC: 3.7 MG/DL (ref 2.5–4.5)
PLATELET # BLD AUTO: 102 10*3/MM3 (ref 140–450)
PMV BLD AUTO: 11 FL (ref 6–12)
PO2 BLDA: 68 MM HG (ref 83–108)
POTASSIUM SERPL-SCNC: 3.3 MMOL/L (ref 3.5–5.2)
POTASSIUM SERPL-SCNC: 4.5 MMOL/L (ref 3.5–5.2)
RBC # BLD AUTO: 3.58 10*6/MM3 (ref 3.77–5.28)
RESPIRATORY RATE: 22
SAO2 % BLDCOA: 93.6 % (ref 94–98)
SODIUM SERPL-SCNC: 142 MMOL/L (ref 136–145)
TRIGL SERPL-MCNC: 149 MG/DL (ref 0–150)
VENTILATOR MODE: ABNORMAL
VT ON VENT VENT: 500 ML
WBC # BLD AUTO: 23.2 10*3/MM3 (ref 3.4–10.8)

## 2021-04-01 PROCEDURE — 63710000001 INSULIN LISPRO (HUMAN) PER 5 UNITS: Performed by: INTERNAL MEDICINE

## 2021-04-01 PROCEDURE — 74018 RADEX ABDOMEN 1 VIEW: CPT

## 2021-04-01 PROCEDURE — 25010000002 PROPOFOL 10 MG/ML EMULSION: Performed by: THORACIC SURGERY (CARDIOTHORACIC VASCULAR SURGERY)

## 2021-04-01 PROCEDURE — 99024 POSTOP FOLLOW-UP VISIT: CPT | Performed by: NURSE PRACTITIONER

## 2021-04-01 PROCEDURE — 71045 X-RAY EXAM CHEST 1 VIEW: CPT

## 2021-04-01 PROCEDURE — 82962 GLUCOSE BLOOD TEST: CPT

## 2021-04-01 PROCEDURE — 63710000001 PREDNISONE PER 1 MG: Performed by: INTERNAL MEDICINE

## 2021-04-01 PROCEDURE — 85027 COMPLETE CBC AUTOMATED: CPT | Performed by: NURSE PRACTITIONER

## 2021-04-01 PROCEDURE — 63710000001 PREDNISONE PER 5 MG: Performed by: INTERNAL MEDICINE

## 2021-04-01 PROCEDURE — 94799 UNLISTED PULMONARY SVC/PX: CPT

## 2021-04-01 PROCEDURE — 84478 ASSAY OF TRIGLYCERIDES: CPT | Performed by: THORACIC SURGERY (CARDIOTHORACIC VASCULAR SURGERY)

## 2021-04-01 PROCEDURE — 63710000001 INSULIN GLARGINE PER 5 UNITS: Performed by: NURSE PRACTITIONER

## 2021-04-01 PROCEDURE — 99232 SBSQ HOSP IP/OBS MODERATE 35: CPT | Performed by: INTERNAL MEDICINE

## 2021-04-01 PROCEDURE — 80069 RENAL FUNCTION PANEL: CPT | Performed by: INTERNAL MEDICINE

## 2021-04-01 PROCEDURE — 83735 ASSAY OF MAGNESIUM: CPT | Performed by: INTERNAL MEDICINE

## 2021-04-01 PROCEDURE — 25010000002 FONDAPARINUX PER 0.5 MG: Performed by: THORACIC SURGERY (CARDIOTHORACIC VASCULAR SURGERY)

## 2021-04-01 PROCEDURE — 94003 VENT MGMT INPAT SUBQ DAY: CPT

## 2021-04-01 PROCEDURE — 82803 BLOOD GASES ANY COMBINATION: CPT

## 2021-04-01 PROCEDURE — 63710000001 INSULIN LISPRO (HUMAN) PER 5 UNITS: Performed by: NURSE PRACTITIONER

## 2021-04-01 PROCEDURE — 94760 N-INVAS EAR/PLS OXIMETRY 1: CPT

## 2021-04-01 PROCEDURE — 84132 ASSAY OF SERUM POTASSIUM: CPT | Performed by: THORACIC SURGERY (CARDIOTHORACIC VASCULAR SURGERY)

## 2021-04-01 RX ORDER — BUMETANIDE 0.25 MG/ML
1 INJECTION INTRAMUSCULAR; INTRAVENOUS EVERY 8 HOURS
Status: DISCONTINUED | OUTPATIENT
Start: 2021-04-01 | End: 2021-04-02

## 2021-04-01 RX ORDER — INSULIN LISPRO 100 [IU]/ML
5 INJECTION, SOLUTION INTRAVENOUS; SUBCUTANEOUS EVERY 6 HOURS
Status: DISCONTINUED | OUTPATIENT
Start: 2021-04-01 | End: 2021-04-09 | Stop reason: DRUGHIGH

## 2021-04-01 RX ORDER — POTASSIUM CHLORIDE 1.5 G/1.77G
40 POWDER, FOR SOLUTION ORAL EVERY 4 HOURS
Status: COMPLETED | OUTPATIENT
Start: 2021-04-01 | End: 2021-04-01

## 2021-04-01 RX ORDER — DIPHENHYDRAMINE HYDROCHLORIDE AND LIDOCAINE HYDROCHLORIDE AND ALUMINUM HYDROXIDE AND MAGNESIUM HYDRO
5 KIT EVERY 6 HOURS
Status: DISCONTINUED | OUTPATIENT
Start: 2021-04-01 | End: 2021-04-10

## 2021-04-01 RX ADMIN — DIPHENHYDRAMINE HYDROCHLORIDE AND LIDOCAINE HYDROCHLORIDE AND ALUMINUM HYDROXIDE AND MAGNESIUM HYDRO 5 ML: KIT at 12:17

## 2021-04-01 RX ADMIN — ACETYLCYSTEINE 3 ML: 200 SOLUTION ORAL; RESPIRATORY (INHALATION) at 18:39

## 2021-04-01 RX ADMIN — PROPOFOL 25 MCG/KG/MIN: 10 INJECTION, EMULSION INTRAVENOUS at 03:18

## 2021-04-01 RX ADMIN — QUETIAPINE FUMARATE 25 MG: 25 TABLET ORAL at 22:35

## 2021-04-01 RX ADMIN — LEVOTHYROXINE SODIUM 100 MCG: 0.1 TABLET ORAL at 05:15

## 2021-04-01 RX ADMIN — FLUOXETINE 60 MG: 20 CAPSULE ORAL at 09:50

## 2021-04-01 RX ADMIN — BUDESONIDE 0.5 MG: 0.5 SUSPENSION RESPIRATORY (INHALATION) at 18:38

## 2021-04-01 RX ADMIN — IPRATROPIUM BROMIDE AND ALBUTEROL SULFATE 3 ML: 2.5; .5 SOLUTION RESPIRATORY (INHALATION) at 18:37

## 2021-04-01 RX ADMIN — GABAPENTIN 200 MG: 100 CAPSULE ORAL at 22:34

## 2021-04-01 RX ADMIN — IPRATROPIUM BROMIDE AND ALBUTEROL SULFATE 3 ML: 2.5; .5 SOLUTION RESPIRATORY (INHALATION) at 10:47

## 2021-04-01 RX ADMIN — IPRATROPIUM BROMIDE AND ALBUTEROL SULFATE 3 ML: 2.5; .5 SOLUTION RESPIRATORY (INHALATION) at 06:20

## 2021-04-01 RX ADMIN — POTASSIUM CHLORIDE 20 MEQ: 1500 TABLET, EXTENDED RELEASE ORAL at 17:12

## 2021-04-01 RX ADMIN — POTASSIUM CHLORIDE 40 MEQ: 1.5 POWDER, FOR SOLUTION ORAL at 09:47

## 2021-04-01 RX ADMIN — ARFORMOTEROL TARTRATE 15 MCG: 15 SOLUTION RESPIRATORY (INHALATION) at 06:20

## 2021-04-01 RX ADMIN — ATORVASTATIN CALCIUM 40 MG: 40 TABLET, FILM COATED ORAL at 22:36

## 2021-04-01 RX ADMIN — METOPROLOL TARTRATE 12.5 MG: 25 TABLET, FILM COATED ORAL at 09:49

## 2021-04-01 RX ADMIN — DOCUSATE SODIUM 100 MG: 50 LIQUID ORAL at 09:50

## 2021-04-01 RX ADMIN — ACETYLCYSTEINE 3 ML: 200 SOLUTION ORAL; RESPIRATORY (INHALATION) at 06:20

## 2021-04-01 RX ADMIN — ASPIRIN 81 MG: 81 TABLET, COATED ORAL at 09:49

## 2021-04-01 RX ADMIN — HYDROCODONE BITARTRATE AND ACETAMINOPHEN 1 TABLET: 5; 325 TABLET ORAL at 16:49

## 2021-04-01 RX ADMIN — INSULIN GLARGINE 20 UNITS: 100 INJECTION, SOLUTION SUBCUTANEOUS at 09:46

## 2021-04-01 RX ADMIN — BUDESONIDE 1 MG: 0.5 SUSPENSION RESPIRATORY (INHALATION) at 06:20

## 2021-04-01 RX ADMIN — DOCUSATE SODIUM 100 MG: 50 LIQUID ORAL at 22:35

## 2021-04-01 RX ADMIN — FONDAPARINUX SODIUM 5 MG: 5 INJECTION, SOLUTION SUBCUTANEOUS at 12:19

## 2021-04-01 RX ADMIN — CHLORHEXIDINE GLUCONATE 15 ML: 1.2 RINSE ORAL at 21:39

## 2021-04-01 RX ADMIN — HYDRALAZINE HYDROCHLORIDE 25 MG: 25 TABLET ORAL at 05:15

## 2021-04-01 RX ADMIN — INSULIN LISPRO 4 UNITS: 100 INJECTION, SOLUTION INTRAVENOUS; SUBCUTANEOUS at 05:35

## 2021-04-01 RX ADMIN — IPRATROPIUM BROMIDE AND ALBUTEROL SULFATE 3 ML: 2.5; .5 SOLUTION RESPIRATORY (INHALATION) at 23:23

## 2021-04-01 RX ADMIN — INSULIN LISPRO 8 UNITS: 100 INJECTION, SOLUTION INTRAVENOUS; SUBCUTANEOUS at 00:04

## 2021-04-01 RX ADMIN — IPRATROPIUM BROMIDE AND ALBUTEROL SULFATE 3 ML: 2.5; .5 SOLUTION RESPIRATORY (INHALATION) at 14:42

## 2021-04-01 RX ADMIN — GABAPENTIN 200 MG: 100 CAPSULE ORAL at 09:49

## 2021-04-01 RX ADMIN — NYSTATIN: 100000 POWDER TOPICAL at 09:50

## 2021-04-01 RX ADMIN — ARFORMOTEROL TARTRATE 15 MCG: 15 SOLUTION RESPIRATORY (INHALATION) at 18:37

## 2021-04-01 RX ADMIN — CHLORHEXIDINE GLUCONATE 15 ML: 1.2 RINSE ORAL at 09:50

## 2021-04-01 RX ADMIN — POTASSIUM CHLORIDE 20 MEQ: 1.5 POWDER, FOR SOLUTION ORAL at 05:15

## 2021-04-01 RX ADMIN — LANSOPRAZOLE 30 MG: KIT at 09:49

## 2021-04-01 RX ADMIN — INSULIN LISPRO 5 UNITS: 100 INJECTION, SOLUTION INTRAVENOUS; SUBCUTANEOUS at 12:17

## 2021-04-01 RX ADMIN — INSULIN LISPRO 5 UNITS: 100 INJECTION, SOLUTION INTRAVENOUS; SUBCUTANEOUS at 17:15

## 2021-04-01 RX ADMIN — POTASSIUM CHLORIDE 40 MEQ: 1.5 POWDER, FOR SOLUTION ORAL at 12:17

## 2021-04-01 RX ADMIN — INSULIN LISPRO 12 UNITS: 100 INJECTION, SOLUTION INTRAVENOUS; SUBCUTANEOUS at 18:06

## 2021-04-01 RX ADMIN — BUMETANIDE 1 MG: 0.25 INJECTION INTRAMUSCULAR; INTRAVENOUS at 15:33

## 2021-04-01 RX ADMIN — IPRATROPIUM BROMIDE AND ALBUTEROL SULFATE 3 ML: 2.5; .5 SOLUTION RESPIRATORY (INHALATION) at 03:27

## 2021-04-01 RX ADMIN — PROPOFOL 20 MCG/KG/MIN: 10 INJECTION, EMULSION INTRAVENOUS at 09:50

## 2021-04-01 RX ADMIN — DIPHENHYDRAMINE HYDROCHLORIDE AND LIDOCAINE HYDROCHLORIDE AND ALUMINUM HYDROXIDE AND MAGNESIUM HYDRO 5 ML: KIT at 17:12

## 2021-04-01 RX ADMIN — INSULIN GLARGINE 20 UNITS: 100 INJECTION, SOLUTION SUBCUTANEOUS at 21:05

## 2021-04-01 RX ADMIN — NYSTATIN: 100000 POWDER TOPICAL at 21:40

## 2021-04-01 RX ADMIN — PREDNISONE 30 MG: 20 TABLET ORAL at 09:49

## 2021-04-01 RX ADMIN — HYDRALAZINE HYDROCHLORIDE 25 MG: 25 TABLET ORAL at 22:36

## 2021-04-01 NOTE — PROGRESS NOTES
Cardiology Progress Note    Patient Identification:  Name: Claudia Rust  Age: 70 y.o.  Sex: female  :  1950  MRN: [id number]                 Follow Up / Chief Complaint: Follow-up for valvular heart disease status post AVR    Interval History: Patient underwent #23 magna pericardial prosthesis AVR 3/18/2021  3/22/2021.  Events noted.  Patient is intubated on IV Olegario-Synephrine and Bumex.  3/23/2021.  Patient continues to intubated  3/24/2021 patient continues to be intubated and mechanically ventilated.  3/25/2021 patient continues to be intubated and mechanically ventilated.  Underwent a CHRISTIANO which showed normal LV systolic function and good aortic valve function.  3/26/2021.  Patient continues to be intubated and mechanically ventilated.  He is lightly sedated.  Patient underwent bronchoscopy 3/25/2021.  Patient was getting tachycardic was given 12.5 of metoprolol developed hypotension requiring Olegario-Synephrine.  3/29/2021: Continues to be intubated and mechanically ventilated.  3/30/2021.  Patient continues to be intubated and mechanically ventilated.  3/31/2021: Patient continues to be intubated and mechanically ventilated.  Nephrology has started on IV Bumex drip.  21: Patient is intubated and is on weaning trials.  Bumex has been discontinued.       Subjective: Patient seen and examined.  Chart reviewed.  Labs reviewed.  Discussed with RN taking care of patient.      Objective: Sodium is 152--> 144-> 141.  BUN/creatinine went up to 49/1.05-> 42/0.69-> 42/0.64-> 46/0.60.  Hemoglobin is 10.1--> 10.8--> 9.0-> 10.8-> 10.5-> 10.2-> 10.6    This is a 70-year-old with PMH of     #Valvular heart disease with moderate to severe aortic stenosis  Cardiac cath 2021 revealing nonobstructive CAD  # Hyperlipidemia,    # Hypertension  # COPD, KARLY on CPAP  # Prediabetes  # Hypothyroidism, Rheumatoid Arthritis, spinal stenosis, hiatal hernia, chronic depression, bipolar, GERD, IBS, chronic migraine, vertigo,  herniated disc, compression lumbar fractures  #Allergies/intolerance to Stadol  # Hysterectomy, bladder reconstruction, cholecystectomy, right hand surgery  # Family history of strokes and grandfather.  Mother's cancer, father on  # Active cigarette smoker trying to quit         Here for aortic valve replacement.  Patient was recently seen with a complaint of dyspnea on exertion edema and weight gain.  Patient was brought in for elective aortic valve replacement.  Patient underwent aortic valve replacement 3/18/2021 with #23 magna pericardial prosthesis.  Blood pressure is high is on IV Cardene and IV nitroglycerin.  Currently intubated sedated chest tube present Venegas catheter present.  Underlying rhythm is flatline with AV paced rhythm.  Underwent an echocardiogram which is revealing severe aortic stenosis on 2/11/2020   Underwent cardiac cath 1/20/2021 which revealed no obstructive disease.  Carotid Dopplers 9/13/2019 normal.  Echocardiogram 2/11/2020 is revealing severe aortic stenosis  Work-up here on 3/16/2021 revealed proBNP 733, normal CMP, A1c 5.7, normal CBC, chest x-ray with no acute abnormality.  EKG 3/16/2021 reviewed by me shows sinus rhythm with a rate of 60 bpm with QT of 507 ms     Assessment:  Shortness of breath, dyspnea on exertion  Aortic stenosis, severe, status post AVR with #23 magna pericardial prosthesis 3/18/2021  Chronic congestive heart failure  due to valvular heart disease and aortic stenosis hypertensive cardiovascular disease essential hypertension/hypertensive cardiovascular disease   dyslipidemia  Prediabetes  Morbid obesity  Carotid bruit, normal Dopplers  Cigarette smoker     Plan:     Routine post CABG care  Monitor telemetry, currently in sinus rhythm  Patient has history of smoking we will continue to monitor pulmonary status closely.  Events noted patient was intubated 3/21/2021 for respiratory failure.  Continue to monitor pulmonary status and continue mechanical ventilation  "as needed.  Patient is on weaning trial currently on CPAP we will continue to monitor closely.  And wean off ventilator as tolerated.  Follow-up with pulmonary  Chest x-ray 4 /1/2021   1. Stable lines and support tubes.  2. No pneumothorax.  3. No change in bibasilar multifocal airspace disease.    Patient's IV Bumex has been discontinued.  CVP is low.  Monitor electrolytes and renal function  Replete electrolytes as needed  Patient sodium was going up, patient was given free water and has improved.  Appreciated nephrology input.  Monitor for routine expected post surgery blood loss anemia  Patient underwent transesophageal echo 3/25/2021 which revealed normal LV systolic function normal chamber sizes.  Normal aortic valve placement and no significant valvular heart disease.  Continue to monitor blood pressure closely patient developed hypotension requiring Olegario-Synephrine with 12.5 of metoprolol.  Now blood pressure is going up we will restart metoprolol and monitor blood pressure closely.  Will follow up with CT VS for routine post CABG care  Discussed with RN taking care of patient to coordinate care      Past Medical History:  Past Medical History:   Diagnosis Date   • Acute congestive heart failure (CMS/HCC)    • Allergies    • Anxiety    • Arthritis    • Asthma    • Bilateral leg edema    • Bipolar 1 disorder (CMS/HCC)    • Bulging lumbar disc     X3   • Cancer (CMS/HCC)     states had \"cancerous tumor during pregnancy and had to have hysterectomy\"   • Cataract     bilateral   • Cellulitis 03/2021    bilateral legs   • Compression fracture of vertebral column (CMS/HCC)     LUMBAR   • COPD (chronic obstructive pulmonary disease) (CMS/HCC)    • Delayed emergence from anesthesia    • Depression    • Depression    • Dyslipidemia    • Dyspnea on exertion    • Fibromyalgia    • GERD (gastroesophageal reflux disease)    • Hiatal hernia    • Hyperlipidemia    • Hypertension    • Hypothyroid     HYPOTHYROID   • IBS " (irritable bowel syndrome)    • Migraines    • Morbid obesity (CMS/[facility])    • Neuropathy    • Panic attacks    • Peripheral edema    • PONV (postoperative nausea and vomiting)    • Poor mobility     rolling walker   • Prediabetes    • PVD (peripheral vascular disease) (CMS/[facility])    • Rheumatoid aortitis    • Spinal stenosis    • Spinal stenosis    • Vertigo    • Vitamin D deficiency      Past Surgical History:  Past Surgical History:   Procedure Laterality Date   • BRONCHOSCOPY N/A 3/25/2021    Procedure: BRONCHOSCOPY AT BEDSIDE WITH BRONCHIOALVEOLAR LAVAGE;  Surgeon: Glenn Reyes MD;  Location: [facility] ENDOSCOPY;  Service: Pulmonary;  Laterality: N/A;  PNA   • CARDIAC CATHETERIZATION  2009   • CARDIAC CATHETERIZATION N/A 8/14/2019    Procedure: Left Heart Cath;  Surgeon: Jose Levi MD;  Location: [facility] CATH INVASIVE LOCATION;  Service: Cardiovascular   • CARDIAC CATHETERIZATION N/A 8/14/2019    Procedure: Right Heart Cath;  Surgeon: Jose Levi MD;  Location: [facility] CATH INVASIVE LOCATION;  Service: Cardiovascular   • CARDIAC CATHETERIZATION N/A 8/14/2019    Procedure: Aortic root aortogram;  Surgeon: Jose Levi MD;  Location: [facility] CATH INVASIVE LOCATION;  Service: Cardiovascular   • CARDIAC CATHETERIZATION N/A 1/20/2021    Procedure: Left Heart Cath;  Surgeon: Jose Levi MD;  Location: [facility] CATH INVASIVE LOCATION;  Service: Cardiovascular;  Laterality: N/A;   • COLONOSCOPY     • CYSTOCELE REPAIR  1984   • ELBOW ARTHROPLASTY  2011   • ENDOSCOPY     • FOOT SURGERY     • GALLBLADDER SURGERY  2002   • TONSILLECTOMY     • VAGINAL HYSTERECTOMY  1972        Social History:   Social History     Tobacco Use   • Smoking status: Current Every Day Smoker     Packs/day: 1.00     Types: Cigarettes   • Smokeless tobacco: Never Used   • Tobacco comment: decrease now and do not smoke dos   Substance Use Topics   • Alcohol use: Yes     Comment: socially      Family  "History:  History reviewed. No pertinent family history.       Allergies:  Allergies   Allergen Reactions   • Adhesive Tape Unknown (See Comments)     hives   • Butorphanol Other (See Comments)     Chest pain difficulty breathing throat swelling   • Garlic Other (See Comments)     Chest pain difficulty breathing throat swells   • Tetanus-Diphtheria Toxoids Td Other (See Comments)     Dizziness,  vomiting   • Aloe Itching   • Bee Venom Other (See Comments)     Swelling eyes and lips hand swelling   • Latex Itching   • Macadamia Nut Oil Other (See Comments)     Chest pain difficulty breathing throat swelling   • Tuberculin Unknown (See Comments)     reactor   • Wasp Venom Other (See Comments)     Swelling eyes lips and hand     Scheduled Meds:  acetylcysteine, 3 mL, BID - RT  arformoterol, 15 mcg, BID - RT  aspirin, 81 mg, Daily  atorvastatin, 40 mg, Nightly  budesonide, 1 mg, BID - RT  bumetanide, 1 mg, Q8H  chlorhexidine, 15 mL, Q12H  docusate, 100 mg, BID  First Mouthwash (Magic Mouthwash), 5 mL, Q6H  FLUoxetine, 60 mg, Daily  fondaparinux, 5 mg, Q24H  gabapentin, 200 mg, Q12H  hydrALAZINE, 25 mg, Q8H  insulin glargine, 20 Units, Q12H  insulin lispro, 0-24 Units, Q6H  insulin lispro, 5 Units, Q6H  ipratropium-albuterol, 3 mL, Q4H - RT  lansoprazole, 30 mg, QAM  levothyroxine, 100 mcg, Q AM  metoprolol tartrate, 12.5 mg, Q12H  nystatin, , Q12H  potassium chloride, 20 mEq, TID With Meals  predniSONE, 30 mg, Daily With Breakfast  QUEtiapine, 25 mg, Nightly          Review of Systems:   Review of Systems   Unable to perform ROS: intubated              Constitutional:  Temp:  [97.5 °F (36.4 °C)-99 °F (37.2 °C)] 97.6 °F (36.4 °C)  Heart Rate:  [64-79] 65  Resp:  [22-31] 22  Arterial Line BP: ()/(43-70) 118/50  FiO2 (%):  [50 %] 50 %    Physical Exam   /63   Pulse 65   Temp 97.6 °F (36.4 °C) (Oral)   Resp 22   Ht 162.6 cm (64\")   Wt 124 kg (272 lb 4.3 oz)   SpO2 96%   BMI 46.73 kg/m²   General: Patient " intubated sedated  Eyes: Sclera is anicteric,  conjunctiva is clear   HEENT:  No JVD. Thyroid not visibly enlarged. No mucosal pallor or cyanosis  Respiratory: Mechanically ventilated.  Right base wheezing.  Cardiovascular: S1,S2 Regular rate and rhythm.  Sternotomy is healing well.  Gastrointestinal: Abdomen nondistended, soft  Musculoskeletal:  No abnormal movements  Extremities: Patient has chronic bilateral lower extremity edema continues to have 1+ edema.  Skin: Stasis changes seen bilateral shin.  Neuro: Alert and awake, no lateralizing deficits appreciated    INTAKE AND OUTPUT:    Intake/Output Summary (Last 24 hours) at 4/1/2021 1432  Last data filed at 4/1/2021 0600  Gross per 24 hour   Intake 2078 ml   Output 2725 ml   Net -647 ml       Cardiographics  Telemetry: Sinus rhythm    ECG:   ECG 12 Lead   Final Result   HEART RATE= 80  bpm   RR Interval= 752  ms   MA Interval= 161  ms   P Horizontal Axis= 0  deg   P Front Axis= 22  deg   QRSD Interval= 80  ms   QT Interval= 419  ms   QRS Axis= 39  deg   T Wave Axis= 35  deg   - NORMAL ECG -   Sinus rhythm   When compared with ECG of 19-Mar-2021 4:30:10,   Significant repolarization change   Electronically Signed By: Segun Rucker ([facility]) 20-Mar-2021 10:06:29   Date and Time of Study: 2021-03-20 05:15:54      ECG 12 Lead   Final Result   HEART RATE= 62  bpm   RR Interval= 984  ms   MA Interval= 165  ms   P Horizontal Axis= 13  deg   P Front Axis= 32  deg   QRSD Interval= 76  ms   QT Interval= 434  ms   QRS Axis= 43  deg   T Wave Axis= 82  deg   - ABNORMAL ECG -   Sinus rhythm   Atrial premature complex   ST elevation, consider inferior injury   When compared with ECG of 18-Mar-2021 11:04:47,   No significant change   Electronically Signed By: Segun Rucker ([facility]) 19-Mar-2021 16:48:51   Date and Time of Study: 2021-03-19 04:30:10      ECG 12 Lead   Final Result   HEART RATE= 56  bpm   RR Interval= 1072  ms   MA Interval= 163  ms   P Horizontal Axis= -22  deg   P  Front Axis= -9  deg   QRSD Interval= 88  ms   QT Interval= 516  ms   QRS Axis= 40  deg   T Wave Axis= 43  deg   - OTHERWISE NORMAL ECG -   Sinus bradycardia   When compared with ECG of 16-Mar-2021 8:51:36,   Significant repolarization change   Electronically Signed By: Segun Rucker ([facility]) 18-Mar-2021 17:48:15   Date and Time of Study: 2021-03-18 11:04:47      ECG 12 Lead    (Results Pending)     I have personally reviewed EKG    Echocardiogram: Results for orders placed during the hospital encounter of 03/18/21    Adult Transesophageal Echo (CHRISTIANO) W/ Cont if Necessary Per Protocol (Bedside)    Interpretation Summary  CHRISTIANO   procedure note    Procedure:  CHRISTIANO    Indication:   Valvular heart disease s/p AVR, respiratory failure    Date of procedure  : 3/25/2021    :  Dr. Jose Levi    Description of procedure:    Under conscious sedation given by anesthesiology and local anesthesia, a CHRISTIANO probe was advanced into the esophagus and into the stomach 2D, M-mode, color Doppler images were obtained..  Patient tolerated procedure well complications well.    Complications: none    Results:    Normal LV size and contractility LV contractility, EF of 60%  Normal RV size  Normal left atrial size, normal right atrial size, left atrial appendage without thrombus.  Aortic valve, mitral valve, tricuspid valve appears structurally normal, mild TR seen.  No vegetation seen  No pericardial effusion seen.  Proximal aorta appears normal in size.  Mild aortic plaque seen      Recommendations/plan:    Clinical correlation recommended      Lab Review   I have reviewed the labs  Results from last 7 days   Lab Units 03/30/21  0425   CK TOTAL U/L 45     Results from last 7 days   Lab Units 04/01/21  0409   MAGNESIUM mg/dL 2.0     Results from last 7 days   Lab Units 04/01/21  0409   SODIUM mmol/L 142   POTASSIUM mmol/L 3.3*   BUN mg/dL 45*   CREATININE mg/dL 0.57   CALCIUM mg/dL 8.0*         Results from last 7 days   Lab Units  "04/01/21  0409 03/31/21  0352 03/30/21  0425   WBC 10*3/mm3 23.20* 24.00* 27.70*   HEMOGLOBIN g/dL 10.6* 10.2* 10.5*   HEMATOCRIT % 32.3* 31.3* 32.2*   PLATELETS 10*3/mm3 102* 87* 75*           RADIOLOGY:  Imaging Results (Last 24 Hours)     Procedure Component Value Units Date/Time    XR Chest 1 View [[id number]] Collected: 04/01/21 0733     Updated: 04/01/21 0737    Narrative:         DATE OF EXAM:   4/1/2021 6:23 AM     PROCEDURE:   XR CHEST 1 VW-     INDICATIONS:   Hypoxia; M06.9-Rheumatoid arthritis, unspecified; I35.0-Nonrheumatic  aortic (valve) stenosis; J96.01-Acute respiratory failure with hypoxia;  J44.9-Chronic obstructive pulmonary disease, unspecified     COMPARISON:  No Comparisons Available     TECHNIQUE:   Portable chest radiograph.     FINDINGS:    Stable endotracheal tube above the davi. Esophagogastric tube below  the diaphragm. Left IJ central venous catheter tip is at the cavoatrial  junction. Postsurgical changes of sternotomy and valve replacement. No  pneumothorax. Multifocal bibasilar airspace disease not significantly  changed.       Impression:      1. Stable lines and support tubes.  2. No pneumothorax.  3. No change in bibasilar multifocal airspace disease.     Electronically Signed By-Brett Long MD On:4/1/2021 7:35 AM  This report was finalized on [id number] by  Brett Long MD.                )4/1/2021  Jose Levi MD      [facility] Dragon/Transcription:   \"Dictated utilizing Dragon dictation\".   "

## 2021-04-01 NOTE — PROGRESS NOTES
NEPHROLOGY PROGRESS NOTE------[facility]    [facility]  [phone]  Robert Hammer MD      Patient Care Team:  Mary Wilde APRN as PCP - General  Mary Wilde APRN as PCP - Family Medicine  Jose Levi MD as Consulting Physician (Cardiology)  Yovani Lerma MD as Consulting Physician (Nephrology)      Provider:  Robert Hammer MD  Patient Name: Claudia Rust  :  1950    SUBJECTIVE:    F/U ARF/JERROD/HYPERNATREMIA    SEDATED AND INTUBATED ON VENT    Medication:  acetylcysteine, 3 mL, Nebulization, BID - RT  arformoterol, 15 mcg, Nebulization, BID - RT  aspirin, 81 mg, Oral, Daily  atorvastatin, 40 mg, Oral, Nightly  budesonide, 1 mg, Nebulization, BID - RT  chlorhexidine, 15 mL, Mouth/Throat, Q12H  docusate, 100 mg, Oral, BID  FLUoxetine, 60 mg, Oral, Daily  fondaparinux, 5 mg, Subcutaneous, Q24H  gabapentin, 200 mg, Oral, Q12H  hydrALAZINE, 25 mg, Nasogastric, Q8H  insulin glargine, 20 Units, Subcutaneous, Q12H  insulin lispro, 0-24 Units, Subcutaneous, Q6H  insulin lispro, 5 Units, Subcutaneous, TID With Meals  ipratropium-albuterol, 3 mL, Nebulization, Q4H - RT  lansoprazole, 30 mg, Oral, QAM  levothyroxine, 100 mcg, Oral, Q AM  metoprolol tartrate, 12.5 mg, Oral, Q12H  nystatin, , Topical, Q12H  potassium chloride, 20 mEq, Oral, TID With Meals  potassium chloride, 40 mEq, Nasogastric, Q4H  predniSONE, 30 mg, Oral, Daily With Breakfast  QUEtiapine, 25 mg, Oral, Nightly      bumetanide, 0.5 mg/hr, Last Rate: 0.5 mg/hr (21 2130)  propofol, 5-30 mcg/kg/min, Last Rate: 15 mcg/kg/min (21 0613)        OBJECTIVE    Vital Sign Min/Max for last 24 hours  Temp  Min: 97.5 °F (36.4 °C)  Max: 99 °F (37.2 °C)   No data recorded   Pulse  Min: 64  Max: 79   Resp  Min: 23  Max: 31   SpO2  Min: 94 %  Max: 99 %   No data recorded   Weight  Min: 124 kg (272 lb 4.3 oz)  Max: 124 kg (272 lb 4.3 oz)     Flowsheet Rows      First  "Filed Value   Admission Height  162.6 cm (64\") Documented at 03/18/2021 2000   Admission Weight  129 kg (284 lb) Documented at 03/18/2021 2000          No intake/output data recorded.  I/O last 3 completed shifts:  In: 6052.3 [I.V.:826.3; Other:2900; NG/GT:2326]  Out: 6325 [Urine:6325]    Physical Exam: CVP=8  General Appearance: SEDATED AND INTUBATED  Head: normocephalic, without obvious abnormality and atraumatic +ETT/NGT INTACT  Eyes: conjunctivae and sclerae normal and no icterus  Neck: supple +JVD  Lungs: +FEW SCATTERED RHONCHI AND FINE BIBASILAR CRACKLES  Heart: regular rhythm & normal rate and normal S1, S2 +NARGIS  Chest: Wall no abnormalities observed  Abdomen: normal bowel sounds and soft non-tender +OBESITY  Extremities: 1+ BILAT LE EDEMA, no cyanosis and no redness  Skin: no bleeding, bruising or rash, turgor normal, color normal and no lesions noted  Neurologic: SEDATED    Labs:    WBC WBC   Date Value Ref Range Status   04/01/2021 23.20 (H) 3.40 - 10.80 10*3/mm3 Final   03/31/2021 24.00 (H) 3.40 - 10.80 10*3/mm3 Final   03/30/2021 27.70 (H) 3.40 - 10.80 10*3/mm3 Final      HGB Hemoglobin   Date Value Ref Range Status   04/01/2021 10.6 (L) 12.0 - 15.9 g/dL Final   03/31/2021 10.2 (L) 12.0 - 15.9 g/dL Final   03/30/2021 10.5 (L) 12.0 - 15.9 g/dL Final      HCT Hematocrit   Date Value Ref Range Status   04/01/2021 32.3 (L) 34.0 - 46.6 % Final   03/31/2021 31.3 (L) 34.0 - 46.6 % Final   03/30/2021 32.2 (L) 34.0 - 46.6 % Final      Platlets No results found for: LABPLAT   MCV MCV   Date Value Ref Range Status   04/01/2021 90.2 79.0 - 97.0 fL Final   03/31/2021 91.3 79.0 - 97.0 fL Final   03/30/2021 91.6 79.0 - 97.0 fL Final          Sodium Sodium   Date Value Ref Range Status   04/01/2021 142 136 - 145 mmol/L Final   03/31/2021 141 136 - 145 mmol/L Final   03/30/2021 144 136 - 145 mmol/L Final      Potassium Potassium   Date Value Ref Range Status   04/01/2021 3.3 (L) 3.5 - 5.2 mmol/L Final   03/31/2021 4.0 " 3.5 - 5.2 mmol/L Final   03/31/2021 4.2 3.5 - 5.2 mmol/L Final   03/30/2021 4.1 3.5 - 5.2 mmol/L Final      Chloride Chloride   Date Value Ref Range Status   04/01/2021 103 98 - 107 mmol/L Final   03/31/2021 105 98 - 107 mmol/L Final   03/30/2021 108 (H) 98 - 107 mmol/L Final      CO2 CO2   Date Value Ref Range Status   04/01/2021 30.0 (H) 22.0 - 29.0 mmol/L Final   03/31/2021 27.0 22.0 - 29.0 mmol/L Final   03/30/2021 24.0 22.0 - 29.0 mmol/L Final      BUN BUN   Date Value Ref Range Status   04/01/2021 45 (H) 8 - 23 mg/dL Final   03/31/2021 46 (H) 8 - 23 mg/dL Final   03/30/2021 42 (H) 8 - 23 mg/dL Final      Creatinine Creatinine   Date Value Ref Range Status   04/01/2021 0.57 0.57 - 1.00 mg/dL Final   03/31/2021 0.62 0.57 - 1.00 mg/dL Final   03/30/2021 0.64 0.57 - 1.00 mg/dL Final      Calcium Calcium   Date Value Ref Range Status   04/01/2021 8.0 (L) 8.6 - 10.5 mg/dL Final   03/31/2021 8.6 8.6 - 10.5 mg/dL Final   03/30/2021 8.7 8.6 - 10.5 mg/dL Final      PO4 No components found for: PO4   Albumin Albumin   Date Value Ref Range Status   04/01/2021 2.70 (L) 3.50 - 5.20 g/dL Final   03/31/2021 2.60 (L) 3.50 - 5.20 g/dL Final   03/30/2021 2.70 (L) 3.50 - 5.20 g/dL Final      Magnesium Magnesium   Date Value Ref Range Status   04/01/2021 2.0 1.6 - 2.4 mg/dL Final   03/31/2021 2.3 1.6 - 2.4 mg/dL Final   03/30/2021 2.3 1.6 - 2.4 mg/dL Final      Uric Acid No components found for: URIC ACID     Imaging Results (Last 72 Hours)     Procedure Component Value Units Date/Time    XR Chest 1 View [[id number]] Resulted: 04/01/21 0623     Updated: 04/01/21 0646    XR Chest 1 View [[id number]] Collected: 03/31/21 0803     Updated: 03/31/21 0808    Narrative:      DATE OF EXAM:  3/31/2021 5:06 AM     PROCEDURE:  XR CHEST 1 VW-     INDICATIONS:  Hypoxia; M06.9-Rheumatoid arthritis, unspecified; I35.0-Nonrheumatic  aortic (valve) stenosis; J96.01-Acute respiratory failure with hypoxia;  J44.9-Chronic obstructive pulmonary disease,  unspecified     COMPARISON:  3/30/2021     TECHNIQUE:   Single radiographic view of the chest was obtained.     FINDINGS:  Sternotomy wires overlie the midline. ET tube is unchanged. Left IJ  central line is unchanged. Prosthetic heart valve again noted. There is  slight improvement in aeration in the bases bilaterally. The appearance  suggests pneumonia or less likely edema with small amounts of pleural  fluid. Heart size and mediastinal contour appear stable. Pulmonary  vascularity appears unchanged.       Impression:         1. Slight improvement in aeration in the bases suggesting improving  infiltrates with small amounts of pleural fluid.  2. Cardiomegaly.     Electronically Signed By-Perez Reddy MD On:3/31/2021 8:06 AM  This report was finalized on [id number] by  Perez Reddy MD.    XR Chest 1 View [[id number]] Collected: 03/30/21 0752     Updated: 03/30/21 0756    Narrative:      DATE OF EXAM:  3/30/2021 5:40 AM     PROCEDURE:  XR CHEST 1 VW-     INDICATIONS:  Hypoxia, COPD, hypertension, tobacco abuse.      COMPARISON:  3/29/2021.     TECHNIQUE:   Single radiographic view of the chest was obtained.     FINDINGS:  The heart is enlarged with postsurgical changes apparent. There is a  stable left internal jugular line and endotracheal tube in place. The  Dobbhoff feeding tube tip projects out of the field-of-view, but below  the hemidiaphragms. There is bilateral mixed interstitial/airspace  disease with dense consolidation in the lung bases similar to  yesterday's exam. This can be seen with multifocal pneumonia or  pulmonary edema. Trace pleural effusions are suspected.  There is no  pneumothorax.       Impression:      No interval change from yesterday's study. The lungs remain abnormal  felt to represent either multifocal pneumonia or pulmonary edema.     Electronically Signed By-Suhas Donald MD On:3/30/2021 7:53 AM  This report was finalized on [id number] by  Suhas Dnoald MD.    XR Chest 1 View  [[id number]] Collected: 03/29/21 1459     Updated: 03/29/21 1503    Narrative:      DATE OF EXAM:  3/29/2021 2:56 PM     PROCEDURE:  XR CHEST 1 VW-     INDICATIONS:  Central line placement, uterine cancer, aortic valve stenosis, hypoxia,  congestive heart failure.      COMPARISON:  3/29/2021 at 0441 hours     TECHNIQUE:   Single radiographic view of the chest was obtained.     FINDINGS:  There has been placement of a left internal jugular line with the tip in  good position terminating in the superior vena cava. There is no  pneumothorax. There is a right internal jugular sheath in place which  appears kinked at the skin surface. There is a stable endotracheal tube  in place. The Dobbhoff feeding tube tip projects out of the  field-of-view, but below the hemidiaphragms. The heart is enlarged with  previous cardiac valvular surgery. There is unchanged bilateral mixed  interstitial/airspace disease with no moderate to large pleural  effusions.  Pulmonary edema or multifocal pneumonia are both in the  differential diagnosis.       Impression:         1. Interval placement of a left internal jugular line with the tip  terminating the superior vena cava. There is no pneumothorax.  2. Unchanged bilateral mixed interstitial/airspace disease which can be  seen with multifocal pneumonia or pulmonary edema.     Electronically Signed By-Suhas Donald MD On:3/29/2021 3:01 PM  This report was finalized on [id number] by  Suhas Donald MD.          Results for orders placed during the hospital encounter of 03/18/21    XR Chest 1 View    Narrative  DATE OF EXAM:  3/29/2021 2:56 PM    PROCEDURE:  XR CHEST 1 VW-    INDICATIONS:  Central line placement, uterine cancer, aortic valve stenosis, hypoxia,  congestive heart failure.    COMPARISON:  3/29/2021 at 0441 hours    TECHNIQUE:  Single radiographic view of the chest was obtained.    FINDINGS:  There has been placement of a left internal jugular line with the tip in  good position  terminating in the superior vena cava. There is no  pneumothorax. There is a right internal jugular sheath in place which  appears kinked at the skin surface. There is a stable endotracheal tube  in place. The Dobbhoff feeding tube tip projects out of the  field-of-view, but below the hemidiaphragms. The heart is enlarged with  previous cardiac valvular surgery. There is unchanged bilateral mixed  interstitial/airspace disease with no moderate to large pleural  effusions.  Pulmonary edema or multifocal pneumonia are both in the  differential diagnosis.    Impression  1. Interval placement of a left internal jugular line with the tip  terminating the superior vena cava. There is no pneumothorax.  2. Unchanged bilateral mixed interstitial/airspace disease which can be  seen with multifocal pneumonia or pulmonary edema.    Electronically Signed By-Suhas Donald MD On:3/29/2021 3:01 PM  This report was finalized on [id number] by  Suhas Donald MD.      XR Chest 1 View    Narrative  DATE OF EXAM:  3/31/2021 5:06 AM    PROCEDURE:  XR CHEST 1 VW-    INDICATIONS:  Hypoxia; M06.9-Rheumatoid arthritis, unspecified; I35.0-Nonrheumatic  aortic (valve) stenosis; J96.01-Acute respiratory failure with hypoxia;  J44.9-Chronic obstructive pulmonary disease, unspecified    COMPARISON:  3/30/2021    TECHNIQUE:  Single radiographic view of the chest was obtained.    FINDINGS:  Sternotomy wires overlie the midline. ET tube is unchanged. Left IJ  central line is unchanged. Prosthetic heart valve again noted. There is  slight improvement in aeration in the bases bilaterally. The appearance  suggests pneumonia or less likely edema with small amounts of pleural  fluid. Heart size and mediastinal contour appear stable. Pulmonary  vascularity appears unchanged.    Impression  1. Slight improvement in aeration in the bases suggesting improving  infiltrates with small amounts of pleural fluid.  2. Cardiomegaly.    Electronically Signed By-Perez  MD Barry On:3/31/2021 8:06 AM  This report was finalized on [id number] by  Perez Reddy MD.      XR Chest 1 View    Narrative  DATE OF EXAM:  3/30/2021 5:40 AM    PROCEDURE:  XR CHEST 1 VW-    INDICATIONS:  Hypoxia, COPD, hypertension, tobacco abuse.    COMPARISON:  3/29/2021.    TECHNIQUE:  Single radiographic view of the chest was obtained.    FINDINGS:  The heart is enlarged with postsurgical changes apparent. There is a  stable left internal jugular line and endotracheal tube in place. The  Dobbhoff feeding tube tip projects out of the field-of-view, but below  the hemidiaphragms. There is bilateral mixed interstitial/airspace  disease with dense consolidation in the lung bases similar to  yesterday's exam. This can be seen with multifocal pneumonia or  pulmonary edema. Trace pleural effusions are suspected.  There is no  pneumothorax.    Impression  No interval change from yesterday's study. The lungs remain abnormal  felt to represent either multifocal pneumonia or pulmonary edema.    Electronically Signed By-Suhas Donald MD On:3/30/2021 7:53 AM  This report was finalized on [id number] by  Suhas Donald MD.      Results for orders placed during the hospital encounter of 03/18/21    Duplex Venous Upper Extremity - Bilateral CAR    Interpretation Summary  · Acute right upper extremity deep vein thrombosis noted in the subclavian, axillary and brachial. This represents catheter associated deep vein thrombosis.  · All other vessels appear normal.        ASSESSMENT / PLAN      Aortic valve stenosis    Rheumatoid arthritis (CMS/HCC)    1. ARF/JERROD------Nonoliguric ATN. Venegas in place. Creatinine normal. BUN still up. Follow with ongoing diuretic exposure. No NSAIDs or IV dye    2. HYPERNATREMIA------Better.  Free water flushes with TFs    3. HYPOALBUMINEMIA-----on TFs.     4. 3RD SPACED EDEMA------Better. D/C Bumex gtt and switch back to intermittent Bumex    5. CAD S/P AVR-----per Cardiology and CT Surgery.  S/P CHRISTIANO    6. OBESITY    7. ACUTE HYPOXIC RESPIRATORY FAILURE------On vent per Pulmonary    8. RUE DVT    9. DMII-------Glucometers, SSI    10. ANEMIA------H/H stable    11. SEPSIS/LEUKOCYTOSIS------Antibiotics per Pulmonary/CC    12. THROMBOCYTOPENIA    13. HYPOCALCEMIA-----Replaced    14. HYPERLIPIDEMIA------On Statin.      15. HYPOTHYROIDISM------On Sythroid    16. GERD/PUD PROPHYLAXIS------Prevacid per NGT    17. HTN-------D/C Amlodipine b/c of edema. Add scheduled Hydralazine    Robert Hammer MD  [facility]  [phone]  04/01/21  07:18 EDT

## 2021-04-01 NOTE — PLAN OF CARE
Problem: Adult Inpatient Plan of Care  Goal: Plan of Care Review  Outcome: Ongoing, Progressing  Goal: Patient-Specific Goal (Individualized)  Outcome: Ongoing, Progressing  Goal: Absence of Hospital-Acquired Illness or Injury  Outcome: Ongoing, Progressing  Intervention: Identify and Manage Fall Risk  Recent Flowsheet Documentation  Taken 4/1/2021 0400 by Joyce Anderson RN  Safety Promotion/Fall Prevention: safety round/check completed  Taken 4/1/2021 0300 by Joyce Anderson RN  Safety Promotion/Fall Prevention: safety round/check completed  Taken 4/1/2021 0100 by Joyce Anderson RN  Safety Promotion/Fall Prevention: safety round/check completed  Taken 4/1/2021 0000 by Joyce Anderson RN  Safety Promotion/Fall Prevention: safety round/check completed  Taken 3/31/2021 2300 by Joyce Anderson RN  Safety Promotion/Fall Prevention: safety round/check completed  Taken 3/31/2021 2100 by Joyce Anderson RN  Safety Promotion/Fall Prevention: safety round/check completed  Taken 3/31/2021 2000 by Joyce Anderson RN  Safety Promotion/Fall Prevention: safety round/check completed  Taken 3/31/2021 1900 by Joyce Anderson RN  Safety Promotion/Fall Prevention: safety round/check completed  Intervention: Prevent Skin Injury  Recent Flowsheet Documentation  Taken 4/1/2021 0400 by Joyce Anderson RN  Body Position: position maintained  Taken 4/1/2021 0300 by Joyce Anderson RN  Body Position:   turned   lower extremity elevated, left   lower extremity elevated, right   neutral body alignment   neutral head position   tilted, right  Taken 4/1/2021 0100 by Joyce Anderson RN  Body Position:   turned   lower extremity elevated, left   lower extremity elevated, right   neutral body alignment   neutral head position   supine  Taken 4/1/2021 0000 by Joyce Anderson RN  Body Position: position maintained  Taken 3/31/2021 2300 by Joyce Anderson RN  Body Position:   turned   lower extremity elevated, left    lower extremity elevated, right   neutral body alignment   neutral head position   tilted, left  Taken 3/31/2021 2100 by Joyce Anderson RN  Body Position:   turned   lower extremity elevated, left   lower extremity elevated, right   neutral body alignment   neutral head position   tilted, right  Taken 3/31/2021 2000 by Joyce Anderson RN  Body Position: position maintained  Taken 3/31/2021 1900 by Joyce Anderson RN  Body Position:   turned   lower extremity elevated, left   lower extremity elevated, right   neutral body alignment   neutral head position   supine  Intervention: Prevent and Manage VTE (venous thromboembolism) Risk  Recent Flowsheet Documentation  Taken 4/1/2021 0400 by Joyce Anderson RN  VTE Prevention/Management:   bilateral   sequential compression devices on  Taken 4/1/2021 0000 by Joyce Anderson RN  VTE Prevention/Management:   bilateral   sequential compression devices on  Taken 3/31/2021 2000 by Joyce Anderson RN  VTE Prevention/Management:   bilateral   sequential compression devices on  Intervention: Prevent Infection  Recent Flowsheet Documentation  Taken 4/1/2021 0400 by Joyce Anderson RN  Infection Prevention:   cohorting utilized   environmental surveillance performed   equipment surfaces disinfected   hand hygiene promoted   personal protective equipment utilized   rest/sleep promoted   single patient room provided   visitors restricted/screened  Taken 4/1/2021 0000 by Joyce Anderson RN  Infection Prevention:   cohorting utilized   environmental surveillance performed   equipment surfaces disinfected   hand hygiene promoted   personal protective equipment utilized   rest/sleep promoted   single patient room provided   visitors restricted/screened  Taken 3/31/2021 2000 by Joyce Anderson RN  Infection Prevention:   cohorting utilized   environmental surveillance performed   equipment surfaces disinfected   hand hygiene promoted   personal protective equipment  utilized   rest/sleep promoted   single patient room provided   visitors restricted/screened  Goal: Optimal Comfort and Wellbeing  Outcome: Ongoing, Progressing  Intervention: Provide Person-Centered Care  Recent Flowsheet Documentation  Taken 4/1/2021 0400 by Joyce Anderson RN  Trust Relationship/Rapport:   care explained   choices provided   emotional support provided   reassurance provided   thoughts/feelings acknowledged  Taken 4/1/2021 0000 by Joyce Anderson RN  Trust Relationship/Rapport:   care explained   choices provided   emotional support provided   reassurance provided   thoughts/feelings acknowledged  Taken 3/31/2021 2000 by Joyce Anderson RN  Trust Relationship/Rapport:   care explained   choices provided   emotional support provided   reassurance provided   thoughts/feelings acknowledged  Goal: Readiness for Transition of Care  Outcome: Ongoing, Progressing     Problem: Skin Injury Risk Increased  Goal: Skin Health and Integrity  Outcome: Ongoing, Progressing  Intervention: Optimize Skin Protection  Recent Flowsheet Documentation  Taken 4/1/2021 0400 by Joyce Anderson RN  Pressure Reduction Techniques:   frequent weight shift encouraged   heels elevated off bed   pressure points protected   weight shift assistance provided  Pressure Reduction Devices:   alternating pressure pump (ADD)   foam padding utilized   heel offloading device utilized   positioning supports utilized   pressure-redistributing mattress utilized   specialty bed utilized  Skin Protection:   incontinence pads utilized   silicone foam dressing in place   skin sealant/moisture barrier applied   transparent dressing maintained   tubing/devices free from skin contact  Taken 4/1/2021 0300 by Joyce Anderson RN  Head of Bed (HOB): HOB at 30 degrees  Taken 4/1/2021 0100 by Joyce Anderson RN  Head of Bed (HOB): HOB at 30 degrees  Taken 4/1/2021 0000 by Joyce Anderson RN  Pressure Reduction Techniques:   frequent weight  shift encouraged   heels elevated off bed   pressure points protected   weight shift assistance provided  Pressure Reduction Devices:   alternating pressure pump (ADD)   foam padding utilized   heel offloading device utilized   positioning supports utilized   pressure-redistributing mattress utilized   specialty bed utilized  Skin Protection:   incontinence pads utilized   silicone foam dressing in place   skin sealant/moisture barrier applied   transparent dressing maintained   tubing/devices free from skin contact  Taken 3/31/2021 2300 by Joyce Anderson RN  Head of Bed ([facility]): HOB at 20-30 degrees  Taken 3/31/2021 2100 by Joyce Anderson RN  Head of Bed ([facility]): HOB at 30 degrees  Taken 3/31/2021 2000 by Joyce Anderson RN  Pressure Reduction Techniques:   frequent weight shift encouraged   heels elevated off bed   pressure points protected   weight shift assistance provided  Pressure Reduction Devices:   alternating pressure pump (ADD)   foam padding utilized   heel offloading device utilized   positioning supports utilized   pressure-redistributing mattress utilized   specialty bed utilized  Skin Protection:   incontinence pads utilized   silicone foam dressing in place   skin sealant/moisture barrier applied   transparent dressing maintained   tubing/devices free from skin contact  Taken 3/31/2021 1900 by Joyce Anderson RN  Head of Bed ([facility]): HOB at 30 degrees  Intervention: Promote and Optimize Oral Intake  Recent Flowsheet Documentation  Taken 4/1/2021 0400 by Joyce Anderson RN  Oral Nutrition Promotion: medicated  Taken 4/1/2021 0000 by Joyce Anderson RN  Oral Nutrition Promotion: medicated  Taken 3/31/2021 2000 by Joyce Anderson RN  Oral Nutrition Promotion: medicated     Problem: Fall Injury Risk  Goal: Absence of Fall and Fall-Related Injury  Outcome: Ongoing, Progressing  Intervention: Identify and Manage Contributors to Fall Injury Risk  Recent Flowsheet Documentation  Taken 4/1/2021  0400 by Joyce Anderson RN  Medication Review/Management: medications reviewed  Taken 4/1/2021 0000 by Joyce Anderson RN  Medication Review/Management: medications reviewed  Taken 3/31/2021 2000 by Joyce Anderson RN  Medication Review/Management: medications reviewed  Intervention: Promote Injury-Free Environment  Recent Flowsheet Documentation  Taken 4/1/2021 0400 by Joyce Anderson RN  Safety Promotion/Fall Prevention: safety round/check completed  Taken 4/1/2021 0300 by Joyce Anderson RN  Safety Promotion/Fall Prevention: safety round/check completed  Taken 4/1/2021 0100 by Joyce Anderson RN  Safety Promotion/Fall Prevention: safety round/check completed  Taken 4/1/2021 0000 by Joyce Anderson RN  Safety Promotion/Fall Prevention: safety round/check completed  Taken 3/31/2021 2300 by Joyce Anderson RN  Safety Promotion/Fall Prevention: safety round/check completed  Taken 3/31/2021 2100 by Joyce Anderson RN  Safety Promotion/Fall Prevention: safety round/check completed  Taken 3/31/2021 2000 by Joyce Anderson RN  Safety Promotion/Fall Prevention: safety round/check completed  Taken 3/31/2021 1900 by Joyce Anderson RN  Safety Promotion/Fall Prevention: safety round/check completed     Problem: Respiratory Compromise  Goal: Optimal Oxygenation and Ventilation  Outcome: Ongoing, Progressing  Intervention: Manage Pneumothorax Effects  Recent Flowsheet Documentation  Taken 4/1/2021 0613 by Joyce Anderson RN  Administration (IS): unable to perform  Taken 4/1/2021 0532 by Joyce Anderson RN  Administration (IS): unable to perform  Taken 4/1/2021 0400 by Joyce Anderson RN  Chest Tube Safety:   all connections secured   all tubing connections taped   suction checked  Taken 4/1/2021 0354 by Joyce Anderson RN  Administration (IS): unable to perform  Taken 4/1/2021 0307 by Joyec Anderson RN  Administration (IS): unable to perform  Taken 4/1/2021 0233 by Joyce Anderson  RN  Administration (IS): unable to perform  Taken 4/1/2021 0107 by Joyce Anderson RN  Administration (IS): unable to perform  Taken 4/1/2021 0000 by Joyce Anderson RN  Chest Tube Safety:   all connections secured   all tubing connections taped   suction checked  Taken 3/31/2021 2358 by Joyce Anderson RN  Administration (IS): unable to perform  Taken 3/31/2021 2254 by Joyce Anderson RN  Administration (IS): unable to perform  Taken 3/31/2021 2152 by Joyce Anderson RN  Administration (IS): unable to perform  Taken 3/31/2021 2056 by Joyce Anderson RN  Administration (IS): unable to perform  Taken 3/31/2021 2008 by Joyce Anderson RN  Administration (IS): unable to perform  Taken 3/31/2021 2000 by Joyce Anderson RN  Chest Tube Safety:   all connections secured   all tubing connections taped   suction checked  Taken 3/31/2021 1934 by Joyce Anderson RN  Administration (IS): unable to perform     Problem: Activity Intolerance (Cardiovascular Surgery)  Goal: Improved Activity Tolerance  Outcome: Ongoing, Progressing  Intervention: Optimize Tolerance for Activity  Recent Flowsheet Documentation  Taken 4/1/2021 0400 by Joyce Anderson RN  Environmental Support: calm environment promoted  Taken 4/1/2021 0000 by Joyce Anderson RN  Environmental Support:   calm environment promoted   distractions minimized  Taken 3/31/2021 2000 by Joyce Anderson RN  Environmental Support:   calm environment promoted   rest periods encouraged     Problem: Adjustment to Surgery (Cardiovascular Surgery)  Goal: Optimal Coping with Heart Surgery  Outcome: Ongoing, Progressing  Intervention: Support Patient/Family Psychosocial Response to Major Surgery  Recent Flowsheet Documentation  Taken 4/1/2021 0400 by Joyce Anderson RN  Supportive Measures: relaxation techniques promoted  Family/Support System Care:   involvement promoted   presence promoted  Taken 4/1/2021 0000 by Joyce Anderson RN  Supportive Measures:  relaxation techniques promoted  Family/Support System Care:   involvement promoted   presence promoted  Taken 3/31/2021 2000 by Joyce Anderson RN  Supportive Measures: relaxation techniques promoted  Family/Support System Care:   involvement promoted   presence promoted     Problem: Bleeding (Cardiovascular Surgery)  Goal: Absence of Bleeding  Outcome: Ongoing, Progressing  Intervention: Monitor and Manage Bleeding  Recent Flowsheet Documentation  Taken 4/1/2021 0400 by Joyce Anderson RN  Bleeding Management: dressing monitored  Taken 4/1/2021 0000 by Joyce Anderson RN  Bleeding Management: dressing monitored  Taken 3/31/2021 2000 by Joyce Anderson RN  Bleeding Management: dressing monitored     Problem: Bowel Elimination Impaired (Cardiovascular Surgery)  Goal: Effective Bowel Elimination  Outcome: Ongoing, Progressing  Intervention: Promote Effective Bowel Elimination  Recent Flowsheet Documentation  Taken 4/1/2021 0000 by Joyce Anderson RN  Bowel Elimination Management: hygiene measures promoted  Bowel Elimination Promotion: adequate fluid intake promoted  Taken 3/31/2021 2000 by Joyce Anderson RN  Bowel Elimination Management: hygiene measures promoted  Bowel Elimination Promotion: adequate fluid intake promoted     Problem: Cardiac Function Impaired (Cardiovascular Surgery)  Goal: Effective Cardiac Function  Outcome: Ongoing, Progressing  Intervention: Optimize Cardiac Output and Blood Flow  Recent Flowsheet Documentation  Taken 4/1/2021 0400 by Joyce Anderson RN  Stabilization Measures: legs elevated  Dysrhythmia Management: pacing wires maintained  Taken 4/1/2021 0000 by Joyce Anderson RN  Stabilization Measures: legs elevated  Dysrhythmia Management: pacing wires maintained  Taken 3/31/2021 2000 by Joyce Anderson RN  Stabilization Measures: legs elevated  Dysrhythmia Management: pacing wires maintained     Problem: Cerebral Tissue Perfusion Risk (Cardiovascular Surgery)  Goal:  Effective Cerebral Perfusion  Outcome: Ongoing, Progressing  Intervention: Protect and Optimize Cerebral Perfusion  Recent Flowsheet Documentation  Taken 4/1/2021 0400 by Joyce Anderson RN  Glycemic Management: blood glucose monitoring  Sensory Stimulation Regulation:   care clustered   lighting decreased   quiet environment promoted  Taken 4/1/2021 0000 by Joyce Anderson RN  Glycemic Management: blood glucose monitoring  Sensory Stimulation Regulation:   care clustered   lighting decreased   quiet environment promoted  Taken 3/31/2021 2000 by Joyce Anderson RN  Glycemic Management: blood glucose monitoring  Sensory Stimulation Regulation:   care clustered   lighting decreased   television on     Problem: Fluid Imbalance (Cardiovascular Surgery)  Goal: Fluid Balance  Outcome: Ongoing, Progressing  Intervention: Monitor and Manage Fluid Balance  Recent Flowsheet Documentation  Taken 4/1/2021 0400 by Joyce Anderson RN  Fluid/Electrolyte Management: fluids provided  Taken 4/1/2021 0000 by Joyce Anderson RN  Fluid/Electrolyte Management: fluids provided  Taken 3/31/2021 2000 by Joyce Anderson RN  Fluid/Electrolyte Management: fluids provided     Problem: Infection (Cardiovascular Surgery)  Goal: Absence of Infection Signs and Symptoms  Outcome: Ongoing, Progressing  Intervention: Prevent or Manage Infection  Recent Flowsheet Documentation  Taken 4/1/2021 0400 by Joyce Anderson RN  Glycemic Management: blood glucose monitoring  Infection Prevention:   cohorting utilized   environmental surveillance performed   equipment surfaces disinfected   hand hygiene promoted   personal protective equipment utilized   rest/sleep promoted   single patient room provided   visitors restricted/screened  Taken 4/1/2021 0000 by Joyce Anderson RN  Glycemic Management: blood glucose monitoring  Infection Prevention:   cohorting utilized   environmental surveillance performed   equipment surfaces disinfected   hand  hygiene promoted   personal protective equipment utilized   rest/sleep promoted   single patient room provided   visitors restricted/screened  Taken 3/31/2021 2000 by Joyce Anderson RN  Glycemic Management: blood glucose monitoring  Infection Prevention:   cohorting utilized   environmental surveillance performed   equipment surfaces disinfected   hand hygiene promoted   personal protective equipment utilized   rest/sleep promoted   single patient room provided   visitors restricted/screened     Problem: Pain (Cardiovascular Surgery)  Goal: Acceptable Pain Control  Outcome: Ongoing, Progressing  Intervention: Prevent or Manage Pain  Recent Flowsheet Documentation  Taken 4/1/2021 0000 by Joyce Anderson RN  Pain Management Interventions: see MAR     Problem: Postoperative Nausea and Vomiting (Cardiovascular Surgery)  Goal: Nausea and Vomiting Relief  Outcome: Ongoing, Progressing     Problem: Postoperative Urinary Retention (Cardiovascular Surgery)  Goal: Effective Urinary Elimination  Outcome: Ongoing, Progressing  Intervention: Monitor and Manage Urinary Retention  Recent Flowsheet Documentation  Taken 4/1/2021 0400 by Joyce Anderson RN  Urinary Elimination Promotion:   catheter patency maintained   positioned for ease of voiding  Taken 4/1/2021 0000 by Joyce Anderson RN  Urinary Elimination Promotion:   catheter patency maintained   positioned for ease of voiding  Taken 3/31/2021 2000 by Joyce Anderson RN  Urinary Elimination Promotion:   catheter patency maintained   positioned for ease of voiding     Problem: Respiratory Compromise (Cardiovascular Surgery)  Goal: Effective Oxygenation and Ventilation  Outcome: Ongoing, Progressing  Intervention: Optimize Oxygenation and Ventilation  Recent Flowsheet Documentation  Taken 4/1/2021 0400 by Joyce Anderson RN  Chest Tube Safety:   all connections secured   all tubing connections taped   suction checked  Taken 4/1/2021 0000 by Joyce Anderson  RN  Chest Tube Safety:   all connections secured   all tubing connections taped   suction checked  Taken 3/31/2021 2000 by Joyce Anderson RN  Chest Tube Safety:   all connections secured   all tubing connections taped   suction checked     Problem: Communication Impairment (Mechanical Ventilation, Invasive)  Goal: Effective Communication  Outcome: Ongoing, Progressing  Intervention: Ensure Effective Communication  Recent Flowsheet Documentation  Taken 4/1/2021 0400 by Joyce Anderson RN  Communication Enhancement Strategies: repetition utilized  Taken 4/1/2021 0000 by Joyce Anderson RN  Communication Enhancement Strategies: repetition utilized  Taken 3/31/2021 2000 by Joyce Anderson RN  Communication Enhancement Strategies:   one-step directions provided   repetition utilized     Problem: Device-Related Complication Risk (Mechanical Ventilation, Invasive)  Goal: Optimal Device Function  Outcome: Ongoing, Progressing  Intervention: Optimize Device Care and Function  Recent Flowsheet Documentation  Taken 4/1/2021 0400 by Joyce Anderson RN  Aspiration Precautions:   oral hygiene care promoted   respiratory status monitored   tube feeding placement verified  Airway Safety Measures:   mask at bedside   manual resuscitator at bedside   manual resuscitator/mask/valve in room   suction at bedside  Taken 4/1/2021 0000 by Joyce Anderson RN  Aspiration Precautions:   oral hygiene care promoted   respiratory status monitored   tube feeding placement verified  Airway Safety Measures:   mask at bedside   manual resuscitator at bedside   manual resuscitator/mask/valve in room   suction at bedside  Taken 3/31/2021 2000 by Joyce Anderson RN  Aspiration Precautions:   oral hygiene care promoted   respiratory status monitored   tube feeding placement verified  Airway Safety Measures:   mask at bedside   manual resuscitator at bedside   manual resuscitator/mask/valve in room   suction at bedside     Problem:  Inability to Wean (Mechanical Ventilation, Invasive)  Goal: Mechanical Ventilation Liberation  Outcome: Ongoing, Progressing  Intervention: Promote Extubation and Mechanical Ventilation Liberation  Recent Flowsheet Documentation  Taken 4/1/2021 0400 by Joyce Anderson RN  Environmental Support: calm environment promoted  Medication Review/Management: medications reviewed  Taken 4/1/2021 0000 by Joyce Anderson RN  Environmental Support:   calm environment promoted   distractions minimized  Medication Review/Management: medications reviewed  Taken 3/31/2021 2000 by Joyce Anderson RN  Environmental Support:   calm environment promoted   rest periods encouraged  Sleep/Rest Enhancement:   awakenings minimized   noise level reduced   relaxation techniques promoted  Medication Review/Management: medications reviewed     Problem: Nutrition Impairment (Mechanical Ventilation, Invasive)  Goal: Optimal Nutrition Delivery  Outcome: Ongoing, Progressing  Intervention: Optimize Nutrition Delivery  Recent Flowsheet Documentation  Taken 4/1/2021 0400 by Joyce Anderson RN  Nutrition Support Management: weight trending reviewed  Taken 4/1/2021 0000 by Joyce Anderson RN  Nutrition Support Management: weight trending reviewed  Taken 3/31/2021 2000 by Joyce Anderson RN  Nutrition Support Management: weight trending reviewed     Problem: Skin and Tissue Injury (Mechanical Ventilation, Invasive)  Goal: Absence of Device-Related Skin and Tissue Injury  Outcome: Ongoing, Progressing  Intervention: Maintain Skin and Tissue Health  Recent Flowsheet Documentation  Taken 4/1/2021 0400 by Joyce Anderson RN  Device Skin Pressure Protection:   absorbent pad utilized/changed   positioning supports utilized   pressure points protected  Taken 4/1/2021 0000 by Joyce Anderson RN  Device Skin Pressure Protection:   absorbent pad utilized/changed   positioning supports utilized   pressure points protected  Taken 3/31/2021 2000 by  Bridges, Joyce, RN  Device Skin Pressure Protection:   absorbent pad utilized/changed   positioning supports utilized   pressure points protected     Problem: Ventilator-Induced Lung Injury (Mechanical Ventilation, Invasive)  Goal: Absence of Ventilator-Induced Lung Injury  Outcome: Ongoing, Progressing  Intervention: Facilitate Lung-Protection Measures  Recent Flowsheet Documentation  Taken 3/31/2021 2000 by Joyce Anderson RN  Lung Protection Measures:   fluid excess minimized   low inspiratory pressure provided   low tidal volume provided   lung compliance monitored   optimal PEEP applied   plateau/inspiratory pressure monitored   ventilator synchrony promoted   ventilator waveforms monitored  Intervention: Prevent Ventilator-Associated Pneumonia  Recent Flowsheet Documentation  Taken 4/1/2021 0400 by Joyce Anderson RN  Oral Care: swabbed with antiseptic solution  Taken 4/1/2021 0300 by Joyce Anderson RN  Head of Bed (HOB): HOB at 30 degrees  Taken 4/1/2021 0100 by Joyce Anderson RN  Head of Bed (HOB): HOB at 30 degrees  Taken 4/1/2021 0000 by Joyce Anderson RN  Oral Care: swabbed with antiseptic solution  Taken 3/31/2021 2300 by Joyce Anderson RN  Head of Bed (HOB): HOB at 20-30 degrees  Taken 3/31/2021 2100 by Joyce Anderson RN  Head of Bed (HOB): HOB at 30 degrees  Taken 3/31/2021 2000 by Joyce Anderson RN  VAP Prevention Bundle:   HOB elevation maintained   oral care regularly provided   readiness to extubate assessed   sedation interruption performed   spontaneous breathing trial performed   stress ulcer prophylaxis provided   vent circuit breaks minimized   VTE prophylaxis provided  VAP Prevention Contraindications: no/minimum sedation required  VAP Prevention Measures: completed  Oral Care: swabbed with antiseptic solution  Taken 3/31/2021 1900 by Joyce Anderson RN  Head of Bed (HOB): HOB at 30 degrees     Problem: Aspiration (Enteral Nutrition)  Goal: Absence of Aspiration  Signs and Symptoms  Outcome: Ongoing, Progressing  Intervention: Minimize Aspiration Risk  Recent Flowsheet Documentation  Taken 4/1/2021 0400 by Joyce Anderson RN  Oral Care: swabbed with antiseptic solution  Taken 4/1/2021 0300 by Joyce Anderson RN  Head of Bed (HOB): HOB at 30 degrees  Taken 4/1/2021 0100 by Joyce Anderson RN  Head of Bed (HOB): HOB at 30 degrees  Taken 4/1/2021 0000 by Joyce Anderson RN  Oral Care: swabbed with antiseptic solution  Taken 3/31/2021 2300 by Joyce Anderson RN  Head of Bed (HOB): HOB at 20-30 degrees  Taken 3/31/2021 2100 by Joyce Anderson RN  Head of Bed (HOB): HOB at 30 degrees  Taken 3/31/2021 2000 by Joyce Anderson RN  Oral Care: swabbed with antiseptic solution  Taken 3/31/2021 1900 by Joyce Anderson RN  Head of Bed (HOB): HOB at 30 degrees     Problem: Device-Related Complication Risk (Enteral Nutrition)  Goal: Safe, Effective Therapy Delivery  Outcome: Ongoing, Progressing  Intervention: Prevent Feeding-Related Adverse Events  Recent Flowsheet Documentation  Taken 4/1/2021 0400 by Joyce Anderson RN  Enteral Feeding Safety: placement checked  Taken 4/1/2021 0000 by Joyce Anderson RN  Enteral Feeding Safety: placement checked  Taken 3/31/2021 2000 by Joyce Anderson RN  Enteral Feeding Safety: placement checked     Problem: Feeding Intolerance (Enteral Nutrition)  Goal: Feeding Tolerance  Outcome: Ongoing, Progressing  Intervention: Prevent and Manage Feeding Intolerance  Recent Flowsheet Documentation  Taken 4/1/2021 0400 by Joyce Anderson RN  Nutrition Support Management: weight trending reviewed  Taken 4/1/2021 0000 by Joyce Anderson RN  Nutrition Support Management: weight trending reviewed  Taken 3/31/2021 2000 by Joyce Anderson RN  Nutrition Support Management: weight trending reviewed     Problem: Restraint, Nonbehavioral (Nonviolent)  Goal: Discontinuation Criteria Achieved  Outcome: Ongoing, Progressing  Intervention: Implement  Least-restrictive Safety Strategies  Recent Flowsheet Documentation  Taken 4/1/2021 0400 by Joyce Anderson RN  Medical Device Protection:   IV pole/bag removed from visual field   torso covered   tubing secured  Diversional Activities: television  Taken 4/1/2021 0000 by Joyce Anderson RN  Medical Device Protection:   IV pole/bag removed from visual field   torso covered   tubing secured  Diversional Activities: television  Taken 3/31/2021 2000 by Joyce Anderson RN  Medical Device Protection:   IV pole/bag removed from visual field   torso covered   tubing secured  Diversional Activities: television  Goal: Personal Dignity and Safety Maintained  Outcome: Ongoing, Progressing  Intervention: Protect Dignity, Rights, and Personal Wellbeing  Recent Flowsheet Documentation  Taken 4/1/2021 0400 by Joyce Anderson RN  Trust Relationship/Rapport:   care explained   choices provided   emotional support provided   reassurance provided   thoughts/feelings acknowledged  Taken 4/1/2021 0000 by Joyce Anderson RN  Trust Relationship/Rapport:   care explained   choices provided   emotional support provided   reassurance provided   thoughts/feelings acknowledged  Taken 3/31/2021 2000 by Joyce Anderson RN  Trust Relationship/Rapport:   care explained   choices provided   emotional support provided   reassurance provided   thoughts/feelings acknowledged  Intervention: Protect Skin and Joint Integrity  Recent Flowsheet Documentation  Taken 4/1/2021 0400 by Joyce Anderson RN  Body Position: position maintained  Taken 4/1/2021 0300 by Joyce Anderson RN  Body Position:   turned   lower extremity elevated, left   lower extremity elevated, right   neutral body alignment   neutral head position   tilted, right  Taken 4/1/2021 0100 by Joyce Anderson RN  Body Position:   turned   lower extremity elevated, left   lower extremity elevated, right   neutral body alignment   neutral head position   supine  Taken 4/1/2021  0000 by Joyce Anderson RN  Body Position: position maintained  Range of Motion: ROM (range of motion) performed  Taken 3/31/2021 2300 by Joyce Anderson RN  Body Position:   turned   lower extremity elevated, left   lower extremity elevated, right   neutral body alignment   neutral head position   tilted, left  Taken 3/31/2021 2100 by Joyce Anderson RN  Body Position:   turned   lower extremity elevated, left   lower extremity elevated, right   neutral body alignment   neutral head position   tilted, right  Taken 3/31/2021 2000 by Joyce Anderson RN  Body Position: position maintained  Range of Motion: ROM (range of motion) performed  Taken 3/31/2021 1900 by Joyce Anderson RN  Body Position:   turned   lower extremity elevated, left   lower extremity elevated, right   neutral body alignment   neutral head position   supine   Goal Outcome Evaluation:

## 2021-04-01 NOTE — PROGRESS NOTES
" LOS: 14 days   Patient Care Team:  Mary Wilde APRN as PCP - General  Mary Wilde APRN as PCP - Family Medicine  Jose Levi MD as Consulting Physician (Cardiology)  Yovani Lerma MD as Consulting Physician (Nephrology)    Chief Complaint: post op fu    Subjective  Intubated and sedated    Vital Signs  Temp:  [97.5 °F (36.4 °C)-99 °F (37.2 °C)] 97.8 °F (36.6 °C)  Heart Rate:  [64-79] 67  Resp:  [23-31] 24  Arterial Line BP: (104-156)/(50-70) 130/63  FiO2 (%):  [50 %] 50 %  Body mass index is 46.73 kg/m².    Intake/Output Summary (Last 24 hours) at 4/1/2021 0911  Last data filed at 4/1/2021 0600  Gross per 24 hour   Intake 3481 ml   Output 4925 ml   Net -1444 ml     No intake/output data recorded.          03/30/21  0600 03/31/21  0600 04/01/21  0600   Weight: 123 kg (270 lb 15.1 oz) 125 kg (276 lb 3.8 oz) 124 kg (272 lb 4.3 oz)         Objective:  General Appearance:  Comfortable.    Vital signs: (most recent): Blood pressure 136/63, pulse 67, temperature 97.8 °F (36.6 °C), temperature source Oral, resp. rate 24, height 162.6 cm (64\"), weight 124 kg (272 lb 4.3 oz), SpO2 97 %, not currently breastfeeding.    Output: Producing urine and producing stool.    Lungs:  Normal effort and normal respiratory rate.  (FiO2 50%, PEEP 7)  Heart: Normal rate.  Regular rhythm.  S1 normal and S2 normal.  No friction rub.   Abdomen: Abdomen is soft and non-distended.  (Tolerating tube feeds).  Hypoactive bowel sounds.     Extremities: There is no dependent edema.    Pulses: Distal pulses are intact.    Skin:  Warm and dry.  (Sternal dressing dry and intact)          Results Review:        WBC WBC   Date Value Ref Range Status   04/01/2021 23.20 (H) 3.40 - 10.80 10*3/mm3 Final   03/31/2021 24.00 (H) 3.40 - 10.80 10*3/mm3 Final   03/30/2021 27.70 (H) 3.40 - 10.80 10*3/mm3 Final      HGB Hemoglobin   Date Value Ref Range Status   04/01/2021 10.6 (L) 12.0 - 15.9 g/dL Final   03/31/2021 10.2 (L) 12.0 - 15.9 " g/dL Final   03/30/2021 10.5 (L) 12.0 - 15.9 g/dL Final      HCT Hematocrit   Date Value Ref Range Status   04/01/2021 32.3 (L) 34.0 - 46.6 % Final   03/31/2021 31.3 (L) 34.0 - 46.6 % Final   03/30/2021 32.2 (L) 34.0 - 46.6 % Final      Platelets Platelets   Date Value Ref Range Status   04/01/2021 102 (L) 140 - 450 10*3/mm3 Final   03/31/2021 87 (L) 140 - 450 10*3/mm3 Final   03/30/2021 75 (L) 140 - 450 10*3/mm3 Final        PT/INR:  No results found for: PROTIME/No results found for: INR    Sodium Sodium   Date Value Ref Range Status   04/01/2021 142 136 - 145 mmol/L Final   03/31/2021 141 136 - 145 mmol/L Final   03/30/2021 144 136 - 145 mmol/L Final      Potassium Potassium   Date Value Ref Range Status   04/01/2021 3.3 (L) 3.5 - 5.2 mmol/L Final   03/31/2021 4.0 3.5 - 5.2 mmol/L Final   03/31/2021 4.2 3.5 - 5.2 mmol/L Final   03/30/2021 4.1 3.5 - 5.2 mmol/L Final      Chloride Chloride   Date Value Ref Range Status   04/01/2021 103 98 - 107 mmol/L Final   03/31/2021 105 98 - 107 mmol/L Final   03/30/2021 108 (H) 98 - 107 mmol/L Final      Bicarbonate CO2   Date Value Ref Range Status   04/01/2021 30.0 (H) 22.0 - 29.0 mmol/L Final   03/31/2021 27.0 22.0 - 29.0 mmol/L Final   03/30/2021 24.0 22.0 - 29.0 mmol/L Final      BUN BUN   Date Value Ref Range Status   04/01/2021 45 (H) 8 - 23 mg/dL Final   03/31/2021 46 (H) 8 - 23 mg/dL Final   03/30/2021 42 (H) 8 - 23 mg/dL Final      Creatinine Creatinine   Date Value Ref Range Status   04/01/2021 0.57 0.57 - 1.00 mg/dL Final   03/31/2021 0.62 0.57 - 1.00 mg/dL Final   03/30/2021 0.64 0.57 - 1.00 mg/dL Final      Calcium Calcium   Date Value Ref Range Status   04/01/2021 8.0 (L) 8.6 - 10.5 mg/dL Final   03/31/2021 8.6 8.6 - 10.5 mg/dL Final   03/30/2021 8.7 8.6 - 10.5 mg/dL Final      Magnesium Magnesium   Date Value Ref Range Status   04/01/2021 2.0 1.6 - 2.4 mg/dL Final   03/31/2021 2.3 1.6 - 2.4 mg/dL Final   03/30/2021 2.3 1.6 - 2.4 mg/dL Final      Troponin No  results found for: TROPONIN   BNP No results found for: BNP         acetylcysteine, 3 mL, Nebulization, BID - RT  arformoterol, 15 mcg, Nebulization, BID - RT  aspirin, 81 mg, Oral, Daily  atorvastatin, 40 mg, Oral, Nightly  budesonide, 1 mg, Nebulization, BID - RT  bumetanide, 1 mg, Intravenous, Q8H  chlorhexidine, 15 mL, Mouth/Throat, Q12H  docusate, 100 mg, Oral, BID  FLUoxetine, 60 mg, Oral, Daily  fondaparinux, 5 mg, Subcutaneous, Q24H  gabapentin, 200 mg, Oral, Q12H  hydrALAZINE, 25 mg, Nasogastric, Q8H  insulin glargine, 20 Units, Subcutaneous, Q12H  insulin lispro, 0-24 Units, Subcutaneous, Q6H  insulin lispro, 5 Units, Subcutaneous, TID With Meals  ipratropium-albuterol, 3 mL, Nebulization, Q4H - RT  lansoprazole, 30 mg, Oral, QAM  levothyroxine, 100 mcg, Oral, Q AM  metoprolol tartrate, 12.5 mg, Oral, Q12H  nystatin, , Topical, Q12H  potassium chloride, 20 mEq, Oral, TID With Meals  potassium chloride, 40 mEq, Nasogastric, Q4H  predniSONE, 30 mg, Oral, Daily With Breakfast  QUEtiapine, 25 mg, Oral, Nightly      propofol, 5-30 mcg/kg/min, Last Rate: 20 mcg/kg/min (21 0655)        PRN Meds:.•  acetaminophen **OR** acetaminophen **OR** acetaminophen  •  bisacodyl  •  Chlorhexidine Gluconate Cloth  •  dextrose  •  dextrose  •  glucagon (human recombinant)  •  hydrALAZINE  •  HYDROcodone-acetaminophen  •  insulin lispro **AND** insulin lispro  •  ipratropium-albuterol  •  lidocaine  •  lidocaine  •  meclizine  •  midazolam  •  midazolam  •  [] HYDROmorphone **AND** naloxone  •  ondansetron  •  potassium chloride **OR** potassium chloride  •  potassium chloride **OR** potassium chloride    Patient Active Problem List   Diagnosis Code   • Morbidly obese (CMS/[facility]) E66.01   • Dyspnea on exertion R06.00   • Abnormal nuclear stress test R94.39   • Nonrheumatic aortic valve stenosis I35.0   • Prediabetes R73.03   • Dyslipidemia E78.5   • Essential hypertension I10   • Acute UTI N39.0   • Bipolar disorder  (CMS/[facility]) F31.9   • COPD (chronic obstructive pulmonary disease) (CMS/HCC) J44.9   • High triglycerides E78.1   • Gastroesophageal reflux disease K21.9   • Rheumatoid arthritis (CMS/HCC) M06.9   • Aortic stenosis, severe I35.0   • Chronic heart failure with preserved ejection fraction (HFpEF) (CMS/HCC) I50.32   • Aortic valve stenosis I35.0       Assessment & Plan    Severe aortic stenosis--s/p tissue AVR (Artem)----POD #14  Postop acute hypoxic resp failure--re-intubated 3/22, CHRISTIANO normal LV/RV fxn, hypertrophic LV  Postop TCP/HIT positive-- Arixtra  Right subclavian/axillary/brachial DVT-- Arixtra, keep at 5 mg dose for now per Dr. Mcgill 3/30/21  Chronic HFpEF--maximize meds prior to d/c  COPD--nebs, pulmicort  Rheumatoid arthritis--not on medication per patient, SONYA negative  HTN--stable  HLD--statin  Bipolar disorder--home Prozac  Obesity stage 3  PVD  KARLY  Vertigo--home Antivert  Tobacco abuse  Postop leukocytosis, likely r/t atelectasis--aggressive pulm toileting, bronch 3/25 with negative cultures, on Augmentin and steroid     O2 sats 98% on 50% FiO2, will decrease to 40%, speak with pulmonary about CPAP trial for exercise today.  CXR looks mildly worsened but CVP 8 and negative 4 lbs/24  hours, renal decreased to pulse dose Bumex.      SARA Haney  04/01/21  09:11 EDT

## 2021-04-01 NOTE — PROGRESS NOTES
"Nutrition Services    Patient Name: Claudia Rust  YOB: 1950  MRN: [id number]  Admission date: 3/18/2021      PPE Documentation        PPE Worn By Provider RD did not enter room for this encounter   PPE Worn By Patient  -     PROGRESS NOTE      Encounter Information: Tube feed check on. Impact Peptide 1.5 documented at 65 mL/hr.        Labs (reviewed below): -Hypernatremia-> Resolved  -hypokalemia  -elevated BUN       GI Function:  -residuals WNL  -Last BM documented on 3/30 (x2 days ago)       Nutrition Intervention: Continue current TF rate as ordered, free water flush per nephrology        PO Diet/Supplements: NPO Diet   EN Prescription: Impact Peptide 1.5 at 65 mL/hr + 60 mL/hr water flush (flush per nephrology)       Intake/Output:   Intake/Output Summary (Last 24 hours) at 4/1/2021 1003  Last data filed at 4/1/2021 0600  Gross per 24 hour   Intake 3481 ml   Output 4925 ml   Net -1444 ml            Height: Height: 162.6 cm (64\")   Weight: Weight: 124 kg (272 lb 4.3 oz) (04/01/21 0600)   BMI: Body mass index is 46.73 kg/m².     Results from last 7 days   Lab Units 04/01/21  0409 03/31/21  1215 03/31/21  0352 03/30/21  0425   SODIUM mmol/L 142  --  141 144   POTASSIUM mmol/L 3.3* 4.0 4.2 4.1   CHLORIDE mmol/L 103  --  105 108*   CO2 mmol/L 30.0*  --  27.0 24.0   BUN mg/dL 45*  --  46* 42*   CREATININE mg/dL 0.57  --  0.62 0.64   CALCIUM mg/dL 8.0*  --  8.6 8.7   BILIRUBIN mg/dL  --   --   --  0.6   ALK PHOS U/L  --   --   --  174*   ALT (SGPT) U/L  --   --   --  40*   AST (SGOT) U/L  --   --   --  34*   GLUCOSE mg/dL 184*  --  251* 299*     Results from last 7 days   Lab Units 04/01/21  0409 03/31/21  0352 03/30/21  0425   MAGNESIUM mg/dL 2.0 2.3 2.3   PHOSPHORUS mg/dL 3.7 3.8 3.0   HEMOGLOBIN g/dL 10.6* 10.2* 10.5*   HEMATOCRIT % 32.3* 31.3* 32.2*   TRIGLYCERIDES mg/dL 149  --   --      COVID19   Date Value Ref Range Status   03/24/2021 Not Detected Not Detected - Ref. Range Final     Lab Results "   Component Value Date    HGBA1C 5.7 (H) 03/16/2021       Vitals:    04/01/21 0728   BP:    Pulse:    Resp:    Temp: 97.8 °F (36.6 °C)   SpO2:        RD to follow up per protocol.    Electronically signed by:  Jordyn Strickland RD  04/01/21 10:03 EDT

## 2021-04-01 NOTE — PROGRESS NOTES
"PULMONARY CRITICAL CARE Progress  NOTE      PATIENT IDENTIFICATION:  Name: Claudia Rust  MRN: [id number]  :  1950     Age: 70 y.o.  Sex: female    DATE OF Note:  2021   Referring Physician: Olaf Mcgill MD                  Subjective:   On vent sedated but arousable and follow commands  Tolerating  tube feed no nausea or vomiting,   no change in bowel habit,   Good urine out put,  no new  skin rash or itching.  Minimal secretions    Objective:  tMax 24 hrs: Temp (24hrs), Av.1 °F (36.7 °C), Min:97.5 °F (36.4 °C), Max:99 °F (37.2 °C)      Vitals Ranges:   Temp:  [97.5 °F (36.4 °C)-99 °F (37.2 °C)] 97.8 °F (36.6 °C)  Heart Rate:  [64-79] 67  Resp:  [22-31] 22  Arterial Line BP: (117-156)/(54-70) 130/63  FiO2 (%):  [50 %] 50 %    Intake and Output Last 3 Shifts:   I/O last 3 completed shifts:  In: 6052.3 [I.V.:826.3; Other:2900; NG/GT:2326]  Out: 6325 [Urine:6325]    Exam:  /63   Pulse 67   Temp 97.8 °F (36.6 °C) (Oral)   Resp 22   Ht 162.6 cm (64\")   Wt 124 kg (272 lb 4.3 oz)   SpO2 95%   BMI 46.73 kg/m²     General Appearance:  AA on vent    HEENT:  Normocephalic, without obvious abnormality, Conjunctiva/corneas clear,.  Normal external ear canals, Nares normal, no drainage     Neck:  Supple, symmetrical, trachea midline. No JVD.  Lungs /Chest wall:   Bilateral basal rhonchi, respirations unlabored symmetrical wall movement.     Heart:  Regular rate and rhythm, systolic murmur PMI left sternal border  Abdomen: Soft, non-tender, no masses, no organomegaly.    Extremities: + edema no clubbing or Cyanosis        Medications:    Current Facility-Administered Medications:   •  acetaminophen (TYLENOL) tablet 650 mg, 650 mg, Oral, Q4H PRN **OR** acetaminophen (TYLENOL) 160 MG/5ML solution 650 mg, 650 mg, Oral, Q4H PRN, 650 mg at 21 0801 **OR** acetaminophen (TYLENOL) suppository 650 mg, 650 mg, Rectal, Q4H PRN, Janna Alberto, APRN  •  acetylcysteine (MUCOMYST) 20 % " nebulizer solution 3 mL, 3 mL, Nebulization, BID - RT, Shanell Hernandez MD, Stopped at 04/01/21 0631  •  arformoterol (BROVANA) nebulizer solution 15 mcg, 15 mcg, Nebulization, BID - RT, Shanell Hernandez MD, Stopped at 04/01/21 0631  •  aspirin EC tablet 81 mg, 81 mg, Oral, Daily, Satterly-German, Janna L, APRN, 81 mg at 04/01/21 0949  •  atorvastatin (LIPITOR) tablet 40 mg, 40 mg, Oral, Nightly, Satterly-German, Janna L, APRN, 40 mg at 03/31/21 2017  •  bisacodyl (DULCOLAX) suppository 10 mg, 10 mg, Rectal, Daily PRN, Satterly-German, Janna L, APRN, 10 mg at 03/26/21 1234  •  budesonide (PULMICORT) nebulizer solution 1 mg, 1 mg, Nebulization, BID - RT, Shanell Hernandez MD, Stopped at 04/01/21 0631  •  bumetanide (BUMEX) injection 1 mg, 1 mg, Intravenous, Q8H, Paola Hammer MD  •  chlorhexidine (PERIDEX) 0.12 % solution 15 mL, 15 mL, Mouth/Throat, Q12H, Sattermc-German, Janna L, APRN, 15 mL at 04/01/21 0950  •  Chlorhexidine Gluconate Cloth 2 % pads 1 application, 1 application, Topical, Q12H PRN, Monika Conn APRN  •  dextrose (D50W) 25 g/ 50mL Intravenous Solution 25 g, 25 g, Intravenous, Q15 Min PRN, Olaf Mcgill MD  •  dextrose (GLUTOSE) oral gel 15 g, 15 g, Oral, Q15 Min PRN, Olaf Mcgill MD  •  docusate (COLACE) 50 MG/5ML liquid 100 mg, 100 mg, Oral, BID, Satterly-German, Janna L, APRN, 100 mg at 04/01/21 0950  •  FLUoxetine (PROzac) capsule 60 mg, 60 mg, Oral, Daily, Belkis Loving, SARA, 60 mg at 04/01/21 0950  •  fondaparinux (ARIXTRA) injection 5 mg, 5 mg, Subcutaneous, Q24H, Olaf Mcgill MD, 5 mg at 03/31/21 1224  •  gabapentin (NEURONTIN) capsule 200 mg, 200 mg, Oral, Q12H, Janna Alberto APRN, 200 mg at 04/01/21 0949  •  glucagon (human recombinant) (GLUCAGEN DIAGNOSTIC) injection 1 mg, 1 mg, Subcutaneous, Q15 Min PRN, Olaf Mcgill MD  •  hydrALAZINE (APRESOLINE) injection 10 mg, 10 mg, Intravenous, Q6H PRN, Juan Jose Skleton MD, 10 mg at  21 2251  •  hydrALAZINE (APRESOLINE) tablet 25 mg, 25 mg, Nasogastric, Q8H, Paola Hammer MD, 25 mg at 21 0515  •  HYDROcodone-acetaminophen (NORCO) 5-325 MG per tablet 1 tablet, 1 tablet, Oral, Q4H PRN, Janna Alberto, APRN, 1 tablet at 21 2351  •  insulin glargine (LANTUS, SEMGLEE) injection 20 Units, 20 Units, Subcutaneous, Q12H, Gilles Brady, APRN, 20 Units at 21 0946  •  insulin lispro (ADMELOG) injection 0-24 Units, 0-24 Units, Subcutaneous, Q6H, 4 Units at 21 0535 **AND** insulin lispro (ADMELOG) injection 0-24 Units, 0-24 Units, Subcutaneous, PRN, Glenn Reyes MD  •  insulin lispro (ADMELOG) injection 5 Units, 5 Units, Subcutaneous, TID With Meals, Belkis Loving, APRN, 5 Units at 21 1735  •  ipratropium-albuterol (DUO-NEB) nebulizer solution 3 mL, 3 mL, Nebulization, Q4H PRN, Janna Alberto, APRN, 3 mL at 21 1522  •  ipratropium-albuterol (DUO-NEB) nebulizer solution 3 mL, 3 mL, Nebulization, Q4H - RT, Day, Lorna, APRN, 3 mL at 21 1047  •  lansoprazole (FIRST) oral suspension 30 mg, 30 mg, Oral, QAM, Day, Lorna, APRN, 30 mg at 21 0949  •  levothyroxine (SYNTHROID, LEVOTHROID) tablet 100 mcg, 100 mcg, Oral, Q AM, Olaf Mcgill MD, 100 mcg at 21 0515  •  lidocaine (XYLOCAINE) 2% injection, , , PRN, DrawGlenn MD, 4 mL at 21 1439  •  lidocaine (XYLOCAINE) 5 % ointment, , , PRN, Glenn Reyes MD, 1 application at 21 1439  •  meclizine (ANTIVERT) tablet 25 mg, 25 mg, Oral, TID PRN, Belkis Loving, SARA, 25 mg at 21 0836  •  metoprolol tartrate (LOPRESSOR) half tablet 12.5 mg, 12.5 mg, Oral, Q12H, Jose Levi MD, 12.5 mg at 21 0949  •  midazolam (VERSED) injection 1 mg, 1 mg, Intravenous, Q2H PRN, Shanell Hernandez MD  •  midazolam (VERSED) injection 2 mg, 2 mg, Intravenous, Q2H PRN, Shanell Hernandez MD  •  [] HYDROmorphone (DILAUDID) injection 0.5 mg, 0.5 mg,  Intravenous, Q2H PRN, 0.5 mg at 03/25/21 0825 **AND** naloxone (NARCAN) injection 0.4 mg, 0.4 mg, Intravenous, Q5 Min PRN, Satterly-German, Janna L, APRN  •  nystatin (MYCOSTATIN) powder, , Topical, Q12H, DrawGlenn MD, Given at 04/01/21 0950  •  ondansetron (ZOFRAN) injection 4 mg, 4 mg, Intravenous, Q6H PRN, Satterly-German, Janna L, APRN, 4 mg at 03/25/21 2112  •  potassium chloride (K-DUR,KLOR-CON) CR tablet 20 mEq, 20 mEq, Oral, PRN, 20 mEq at 03/22/21 0550 **OR** potassium chloride (KLOR-CON) packet 20 mEq, 20 mEq, Oral, PRN, Satterly-German, Janna L, APRN, 20 mEq at 04/01/21 0515  •  potassium chloride (K-DUR,KLOR-CON) CR tablet 20 mEq, 20 mEq, Oral, TID With Meals, Sattermc-German, Janna L, APRN, 20 mEq at 03/31/21 1721  •  potassium chloride (KLOR-CON) packet 40 mEq, 40 mEq, Nasogastric, Q4H, Paola Hammer MD, 40 mEq at 04/01/21 0947  •  potassium chloride 10 mEq in 100 mL IVPB, 10 mEq, Intravenous, Q1H PRN **OR** potassium chloride 10 mEq in 100 mL IVPB, 10 mEq, Intravenous, Q1H PRN, Satterly-German, Janna L, APRN, Last Rate: 200 mL/hr at 03/19/21 0726, 10 mEq at 03/19/21 0726  •  predniSONE (DELTASONE) tablet 30 mg, 30 mg, Oral, Daily With Breakfast, Shanell Hernandez MD, 30 mg at 04/01/21 0949  •  propofol (DIPRIVAN) infusion 10 mg/mL 100 mL, 5-30 mcg/kg/min, Intravenous, Titrated, Olaf Mcgill MD, Last Rate: 15.24 mL/hr at 04/01/21 0950, 20 mcg/kg/min at 04/01/21 0950  •  QUEtiapine (SEROquel) tablet 25 mg, 25 mg, Oral, Nightly, Day, Lorna, APRN, 25 mg at 03/31/21 2017    Data Review:  All labs (24hrs):   Recent Results (from the past 24 hour(s))   POC Glucose Once    Collection Time: 03/31/21 12:06 PM    Specimen: Blood   Result Value Ref Range    Glucose 272 (H) 70 - 105 mg/dL   Blood Gas, Arterial -    Collection Time: 03/31/21 12:13 PM    Specimen: Arterial Blood   Result Value Ref Range    Site Arterial Line     Thiago's Test N/A     pH, Arterial 7.460 (H) 7.350 -  7.450 pH units    pCO2, Arterial 38.9 35.0 - 48.0 mm Hg    pO2, Arterial 63.1 (L) 83.0 - 108.0 mm Hg    HCO3, Arterial 27.7 21.0 - 28.0 mmol/L    Base Excess, Arterial 3.6 (H) 0.0 - 3.0 mmol/L    O2 Saturation, Arterial 93.0 (L) 94.0 - 98.0 %    CO2 Content 28.9 22 - 29 mmol/L    Barometric Pressure for Blood Gas      Modality Adult Vent     FIO2 50 %    Ventilator Mode ;AC     Set Tidal Volume 500     PEEP 7     Hemodilution No     Respiratory Rate 2    Potassium    Collection Time: 03/31/21 12:15 PM    Specimen: Blood   Result Value Ref Range    Potassium 4.0 3.5 - 5.2 mmol/L   POC Glucose Once    Collection Time: 03/31/21  5:15 PM    Specimen: Blood   Result Value Ref Range    Glucose 235 (H) 70 - 105 mg/dL   POC Glucose Once    Collection Time: 04/01/21 12:01 AM    Specimen: Blood   Result Value Ref Range    Glucose 228 (H) 70 - 105 mg/dL   Blood Gas, Arterial -    Collection Time: 04/01/21  3:25 AM    Specimen: Arterial Blood   Result Value Ref Range    Site Arterial Line     Thiago's Test N/A     pH, Arterial 7.429 7.350 - 7.450 pH units    pCO2, Arterial 44.6 35.0 - 48.0 mm Hg    pO2, Arterial 68.0 (L) 83.0 - 108.0 mm Hg    HCO3, Arterial 29.5 (H) 21.0 - 28.0 mmol/L    Base Excess, Arterial 4.6 (H) 0.0 - 3.0 mmol/L    O2 Saturation, Arterial 93.6 (L) 94.0 - 98.0 %    CO2 Content 30.9 (H) 22 - 29 mmol/L    Barometric Pressure for Blood Gas      Modality Adult Vent     FIO2 50 %    Ventilator Mode ;AC     Set Tidal Volume 500     PEEP 7     Hemodilution No     Respiratory Rate 22    CBC (No Diff)    Collection Time: 04/01/21  4:09 AM    Specimen: Blood   Result Value Ref Range    WBC 23.20 (H) 3.40 - 10.80 10*3/mm3    RBC 3.58 (L) 3.77 - 5.28 10*6/mm3    Hemoglobin 10.6 (L) 12.0 - 15.9 g/dL    Hematocrit 32.3 (L) 34.0 - 46.6 %    MCV 90.2 79.0 - 97.0 fL    MCH 29.6 26.6 - 33.0 pg    MCHC 32.9 31.5 - 35.7 g/dL    RDW 14.7 12.3 - 15.4 %    RDW-SD 46.8 37.0 - 54.0 fl    MPV 11.0 6.0 - 12.0 fL    Platelets 102 (L)  140 - 450 10*3/mm3   Renal Function Panel    Collection Time: 04/01/21  4:09 AM    Specimen: Blood   Result Value Ref Range    Glucose 184 (H) 65 - 99 mg/dL    BUN 45 (H) 8 - 23 mg/dL    Creatinine 0.57 0.57 - 1.00 mg/dL    Sodium 142 136 - 145 mmol/L    Potassium 3.3 (L) 3.5 - 5.2 mmol/L    Chloride 103 98 - 107 mmol/L    CO2 30.0 (H) 22.0 - 29.0 mmol/L    Calcium 8.0 (L) 8.6 - 10.5 mg/dL    Albumin 2.70 (L) 3.50 - 5.20 g/dL    Phosphorus 3.7 2.5 - 4.5 mg/dL    Anion Gap 9.0 5.0 - 15.0 mmol/L    BUN/Creatinine Ratio 78.9 (H) 7.0 - 25.0    eGFR Non African Amer 105 >60 mL/min/1.73   Magnesium    Collection Time: 04/01/21  4:09 AM    Specimen: Blood   Result Value Ref Range    Magnesium 2.0 1.6 - 2.4 mg/dL   Triglycerides    Collection Time: 04/01/21  4:09 AM    Specimen: Blood   Result Value Ref Range    Triglycerides 149 0 - 150 mg/dL   POC Glucose Once    Collection Time: 04/01/21  5:14 AM    Specimen: Blood   Result Value Ref Range    Glucose 177 (H) 70 - 105 mg/dL        Imaging:  XR Chest 1 View  Narrative:    DATE OF EXAM:   4/1/2021 6:23 AM     PROCEDURE:   XR CHEST 1 VW-     INDICATIONS:   Hypoxia; M06.9-Rheumatoid arthritis, unspecified; I35.0-Nonrheumatic  aortic (valve) stenosis; J96.01-Acute respiratory failure with hypoxia;  J44.9-Chronic obstructive pulmonary disease, unspecified     COMPARISON:  No Comparisons Available     TECHNIQUE:   Portable chest radiograph.     FINDINGS:    Stable endotracheal tube above the davi. Esophagogastric tube below  the diaphragm. Left IJ central venous catheter tip is at the cavoatrial  junction. Postsurgical changes of sternotomy and valve replacement. No  pneumothorax. Multifocal bibasilar airspace disease not significantly  changed.     Impression: 1. Stable lines and support tubes.  2. No pneumothorax.  3. No change in bibasilar multifocal airspace disease.     Electronically Signed By-Brett Long MD On:4/1/2021 7:35 AM  This report was finalized on  [id number] by  Brett Long MD.       ASSESSMENT:  Acute hypoxic respiratory failure required intubation 03/22/2021 patient was requiring 100% oxygen on AVAPS   There is diffuse alveolar disease groundglass opacities suspicious for Covid infection     Arpin-Greer placed by cardiac surgery PA pressure 32/14 however patient was on nebulized Flolan at that time     Aortic valve stenosis, status post tissue replacement  Luminary hypertension, severe  COPD  KARLY  Cor pulmonale  Tobacco abuse  Upper airway infection with moderate amount of mucopurulent secretions noted on bronchoscopy 3/25/2021  Febrile illness   Interstitial pneumonitis  Acute thrombocytopenia  Catheter associated DVT right upper extremity subclavian, axillary, and brachial   hypernatremia  Upper arm left DVT  DM  Uremia    PLAN:  Decrease sedation   Sputum culture  Ventilator management wean PEEP down to 7 will try CPAP 16/7  PT/OT  Hemodynamically stable off pressor support  Wean steroids down  Echocardiogram reviewed  Bronchodilator  Inhaled corticosteroids  Electrolytes/ glycemic control  DVT prophylaxis arixtra,   GI prophylaxis.    Total Critical care time in direct medical management (34) minutes  Shanell Hernandez MD. D, ABSM.     4/1/2021  10:51 EDT

## 2021-04-02 ENCOUNTER — APPOINTMENT (OUTPATIENT)
Dept: GENERAL RADIOLOGY | Facility: HOSPITAL | Age: 71
End: 2021-04-02

## 2021-04-02 LAB
ALBUMIN SERPL-MCNC: 2.6 G/DL (ref 3.5–5.2)
ALBUMIN/GLOB SERPL: 1.1 G/DL
ALP SERPL-CCNC: 134 U/L (ref 39–117)
ALT SERPL W P-5'-P-CCNC: 35 U/L (ref 1–33)
ANION GAP SERPL CALCULATED.3IONS-SCNC: 9 MMOL/L (ref 5–15)
ARTERIAL PATENCY WRIST A: ABNORMAL
ARTERIAL PATENCY WRIST A: ABNORMAL
AST SERPL-CCNC: 31 U/L (ref 1–32)
ATMOSPHERIC PRESS: ABNORMAL MM[HG]
ATMOSPHERIC PRESS: ABNORMAL MM[HG]
BASE EXCESS BLDA CALC-SCNC: 4.6 MMOL/L (ref 0–3)
BASE EXCESS BLDA CALC-SCNC: 8.5 MMOL/L (ref 0–3)
BDY SITE: ABNORMAL
BDY SITE: ABNORMAL
BILIRUB SERPL-MCNC: 0.5 MG/DL (ref 0–1.2)
BUN SERPL-MCNC: 47 MG/DL (ref 8–23)
BUN/CREAT SERPL: 95.9 (ref 7–25)
CA-I SERPL ISE-MCNC: 1.22 MMOL/L (ref 1.2–1.3)
CALCIUM SPEC-SCNC: 8.7 MG/DL (ref 8.6–10.5)
CHLORIDE SERPL-SCNC: 105 MMOL/L (ref 98–107)
CO2 BLDA-SCNC: 30.4 MMOL/L (ref 22–29)
CO2 BLDA-SCNC: 32.9 MMOL/L (ref 22–29)
CO2 SERPL-SCNC: 29 MMOL/L (ref 22–29)
CREAT SERPL-MCNC: 0.49 MG/DL (ref 0.57–1)
DEPRECATED RDW RBC AUTO: 46.4 FL (ref 37–54)
ERYTHROCYTE [DISTWIDTH] IN BLOOD BY AUTOMATED COUNT: 14.5 % (ref 12.3–15.4)
GFR SERPL CREATININE-BSD FRML MDRD: 125 ML/MIN/1.73
GLOBULIN UR ELPH-MCNC: 2.3 GM/DL
GLUCOSE BLDC GLUCOMTR-MCNC: 120 MG/DL (ref 70–105)
GLUCOSE BLDC GLUCOMTR-MCNC: 120 MG/DL (ref 70–105)
GLUCOSE BLDC GLUCOMTR-MCNC: 121 MG/DL (ref 70–105)
GLUCOSE BLDC GLUCOMTR-MCNC: 233 MG/DL (ref 70–105)
GLUCOSE SERPL-MCNC: 126 MG/DL (ref 65–99)
HCO3 BLDA-SCNC: 29.1 MMOL/L (ref 21–28)
HCO3 BLDA-SCNC: 31.7 MMOL/L (ref 21–28)
HCT VFR BLD AUTO: 31.6 % (ref 34–46.6)
HEMODILUTION: NO
HEMODILUTION: NO
HGB BLD-MCNC: 10.5 G/DL (ref 12–15.9)
INHALED O2 CONCENTRATION: 40 %
INHALED O2 CONCENTRATION: 45 %
MAGNESIUM SERPL-MCNC: 2.2 MG/DL (ref 1.6–2.4)
MCH RBC QN AUTO: 30.2 PG (ref 26.6–33)
MCHC RBC AUTO-ENTMCNC: 33.3 G/DL (ref 31.5–35.7)
MCV RBC AUTO: 90.7 FL (ref 79–97)
MODALITY: ABNORMAL
MODALITY: ABNORMAL
PCO2 BLDA: 37.8 MM HG (ref 35–48)
PCO2 BLDA: 41.8 MM HG (ref 35–48)
PEEP RESPIRATORY: 7 CM[H2O]
PEEP RESPIRATORY: 7 CM[H2O]
PH BLDA: 7.45 PH UNITS (ref 7.35–7.45)
PH BLDA: 7.53 PH UNITS (ref 7.35–7.45)
PHOSPHATE SERPL-MCNC: 4 MG/DL (ref 2.5–4.5)
PLATELET # BLD AUTO: 95 10*3/MM3 (ref 140–450)
PMV BLD AUTO: 10.5 FL (ref 6–12)
PO2 BLDA: 70.8 MM HG (ref 83–108)
PO2 BLDA: 76.5 MM HG (ref 83–108)
POTASSIUM SERPL-SCNC: 3.7 MMOL/L (ref 3.5–5.2)
PROT SERPL-MCNC: 4.9 G/DL (ref 6–8.5)
PSV: 16 CMH2O
RBC # BLD AUTO: 3.48 10*6/MM3 (ref 3.77–5.28)
RESPIRATORY RATE: 22
SAO2 % BLDCOA: 95.7 % (ref 94–98)
SAO2 % BLDCOA: 95.8 % (ref 94–98)
SODIUM SERPL-SCNC: 143 MMOL/L (ref 136–145)
VENTILATOR MODE: ABNORMAL
VENTILATOR MODE: ABNORMAL
VT ON VENT VENT: 589 ML
WBC # BLD AUTO: 21.2 10*3/MM3 (ref 3.4–10.8)

## 2021-04-02 PROCEDURE — 80053 COMPREHEN METABOLIC PANEL: CPT | Performed by: INTERNAL MEDICINE

## 2021-04-02 PROCEDURE — 25010000002 FONDAPARINUX PER 0.5 MG: Performed by: THORACIC SURGERY (CARDIOTHORACIC VASCULAR SURGERY)

## 2021-04-02 PROCEDURE — 63710000001 PREDNISONE PER 5 MG: Performed by: INTERNAL MEDICINE

## 2021-04-02 PROCEDURE — 63710000001 PREDNISONE PER 1 MG: Performed by: INTERNAL MEDICINE

## 2021-04-02 PROCEDURE — 71045 X-RAY EXAM CHEST 1 VIEW: CPT

## 2021-04-02 PROCEDURE — 83735 ASSAY OF MAGNESIUM: CPT | Performed by: INTERNAL MEDICINE

## 2021-04-02 PROCEDURE — 85027 COMPLETE CBC AUTOMATED: CPT | Performed by: NURSE PRACTITIONER

## 2021-04-02 PROCEDURE — 82330 ASSAY OF CALCIUM: CPT | Performed by: INTERNAL MEDICINE

## 2021-04-02 PROCEDURE — 82962 GLUCOSE BLOOD TEST: CPT

## 2021-04-02 PROCEDURE — 25010000002 ONDANSETRON PER 1 MG: Performed by: NURSE PRACTITIONER

## 2021-04-02 PROCEDURE — 94799 UNLISTED PULMONARY SVC/PX: CPT

## 2021-04-02 PROCEDURE — 63710000001 INSULIN GLARGINE PER 5 UNITS: Performed by: NURSE PRACTITIONER

## 2021-04-02 PROCEDURE — 82803 BLOOD GASES ANY COMBINATION: CPT

## 2021-04-02 PROCEDURE — 94003 VENT MGMT INPAT SUBQ DAY: CPT

## 2021-04-02 PROCEDURE — 63710000001 INSULIN LISPRO (HUMAN) PER 5 UNITS: Performed by: NURSE PRACTITIONER

## 2021-04-02 PROCEDURE — 99232 SBSQ HOSP IP/OBS MODERATE 35: CPT | Performed by: INTERNAL MEDICINE

## 2021-04-02 PROCEDURE — 25010000002 PROPOFOL 10 MG/ML EMULSION: Performed by: THORACIC SURGERY (CARDIOTHORACIC VASCULAR SURGERY)

## 2021-04-02 PROCEDURE — 99024 POSTOP FOLLOW-UP VISIT: CPT | Performed by: NURSE PRACTITIONER

## 2021-04-02 PROCEDURE — 63710000001 INSULIN LISPRO (HUMAN) PER 5 UNITS: Performed by: INTERNAL MEDICINE

## 2021-04-02 PROCEDURE — 84100 ASSAY OF PHOSPHORUS: CPT | Performed by: INTERNAL MEDICINE

## 2021-04-02 RX ORDER — BUMETANIDE 1 MG/1
1 TABLET ORAL 3 TIMES DAILY
Status: DISCONTINUED | OUTPATIENT
Start: 2021-04-02 | End: 2021-04-10

## 2021-04-02 RX ADMIN — ACETYLCYSTEINE 3 ML: 200 SOLUTION ORAL; RESPIRATORY (INHALATION) at 18:33

## 2021-04-02 RX ADMIN — DOCUSATE SODIUM 100 MG: 50 LIQUID ORAL at 09:14

## 2021-04-02 RX ADMIN — ARFORMOTEROL TARTRATE 15 MCG: 15 SOLUTION RESPIRATORY (INHALATION) at 18:40

## 2021-04-02 RX ADMIN — BUMETANIDE 1 MG: 0.25 INJECTION INTRAMUSCULAR; INTRAVENOUS at 01:33

## 2021-04-02 RX ADMIN — INSULIN LISPRO 5 UNITS: 100 INJECTION, SOLUTION INTRAVENOUS; SUBCUTANEOUS at 11:17

## 2021-04-02 RX ADMIN — POTASSIUM CHLORIDE 20 MEQ: 1500 TABLET, EXTENDED RELEASE ORAL at 09:13

## 2021-04-02 RX ADMIN — DIPHENHYDRAMINE HYDROCHLORIDE AND LIDOCAINE HYDROCHLORIDE AND ALUMINUM HYDROXIDE AND MAGNESIUM HYDRO 5 ML: KIT at 05:24

## 2021-04-02 RX ADMIN — DIPHENHYDRAMINE HYDROCHLORIDE AND LIDOCAINE HYDROCHLORIDE AND ALUMINUM HYDROXIDE AND MAGNESIUM HYDRO 5 ML: KIT at 23:57

## 2021-04-02 RX ADMIN — BUDESONIDE 1 MG: 0.5 SUSPENSION RESPIRATORY (INHALATION) at 06:29

## 2021-04-02 RX ADMIN — INSULIN GLARGINE 20 UNITS: 100 INJECTION, SOLUTION SUBCUTANEOUS at 20:07

## 2021-04-02 RX ADMIN — CHLORHEXIDINE GLUCONATE 15 ML: 1.2 RINSE ORAL at 09:13

## 2021-04-02 RX ADMIN — METOPROLOL TARTRATE 12.5 MG: 25 TABLET, FILM COATED ORAL at 20:07

## 2021-04-02 RX ADMIN — BUDESONIDE 1 MG: 0.5 SUSPENSION RESPIRATORY (INHALATION) at 18:45

## 2021-04-02 RX ADMIN — IPRATROPIUM BROMIDE AND ALBUTEROL SULFATE 3 ML: 2.5; .5 SOLUTION RESPIRATORY (INHALATION) at 18:33

## 2021-04-02 RX ADMIN — CHLORHEXIDINE GLUCONATE 15 ML: 1.2 RINSE ORAL at 20:07

## 2021-04-02 RX ADMIN — BUMETANIDE 1 MG: 0.25 INJECTION INTRAMUSCULAR; INTRAVENOUS at 09:17

## 2021-04-02 RX ADMIN — LANSOPRAZOLE 30 MG: KIT at 06:27

## 2021-04-02 RX ADMIN — DIPHENHYDRAMINE HYDROCHLORIDE AND LIDOCAINE HYDROCHLORIDE AND ALUMINUM HYDROXIDE AND MAGNESIUM HYDRO 5 ML: KIT at 11:17

## 2021-04-02 RX ADMIN — IPRATROPIUM BROMIDE AND ALBUTEROL SULFATE 3 ML: 2.5; .5 SOLUTION RESPIRATORY (INHALATION) at 22:05

## 2021-04-02 RX ADMIN — LEVOTHYROXINE SODIUM 100 MCG: 0.1 TABLET ORAL at 05:24

## 2021-04-02 RX ADMIN — PREDNISONE 30 MG: 20 TABLET ORAL at 09:14

## 2021-04-02 RX ADMIN — GABAPENTIN 200 MG: 100 CAPSULE ORAL at 09:14

## 2021-04-02 RX ADMIN — DOCUSATE SODIUM 100 MG: 50 LIQUID ORAL at 20:07

## 2021-04-02 RX ADMIN — IPRATROPIUM BROMIDE AND ALBUTEROL SULFATE 3 ML: 2.5; .5 SOLUTION RESPIRATORY (INHALATION) at 03:19

## 2021-04-02 RX ADMIN — INSULIN LISPRO 5 UNITS: 100 INJECTION, SOLUTION INTRAVENOUS; SUBCUTANEOUS at 23:57

## 2021-04-02 RX ADMIN — ATORVASTATIN CALCIUM 40 MG: 40 TABLET, FILM COATED ORAL at 20:07

## 2021-04-02 RX ADMIN — POTASSIUM CHLORIDE 20 MEQ: 1500 TABLET, EXTENDED RELEASE ORAL at 11:17

## 2021-04-02 RX ADMIN — ASPIRIN 81 MG: 81 TABLET, COATED ORAL at 09:13

## 2021-04-02 RX ADMIN — FONDAPARINUX SODIUM 5 MG: 5 INJECTION, SOLUTION SUBCUTANEOUS at 13:31

## 2021-04-02 RX ADMIN — HYDRALAZINE HYDROCHLORIDE 25 MG: 25 TABLET ORAL at 05:24

## 2021-04-02 RX ADMIN — PROPOFOL 10 MCG/KG/MIN: 10 INJECTION, EMULSION INTRAVENOUS at 03:18

## 2021-04-02 RX ADMIN — IPRATROPIUM BROMIDE AND ALBUTEROL SULFATE 3 ML: 2.5; .5 SOLUTION RESPIRATORY (INHALATION) at 15:00

## 2021-04-02 RX ADMIN — GABAPENTIN 200 MG: 100 CAPSULE ORAL at 20:07

## 2021-04-02 RX ADMIN — NYSTATIN: 100000 POWDER TOPICAL at 21:42

## 2021-04-02 RX ADMIN — INSULIN LISPRO 5 UNITS: 100 INJECTION, SOLUTION INTRAVENOUS; SUBCUTANEOUS at 06:26

## 2021-04-02 RX ADMIN — HYDRALAZINE HYDROCHLORIDE 25 MG: 25 TABLET ORAL at 21:25

## 2021-04-02 RX ADMIN — ACETYLCYSTEINE 3 ML: 200 SOLUTION ORAL; RESPIRATORY (INHALATION) at 06:30

## 2021-04-02 RX ADMIN — DIPHENHYDRAMINE HYDROCHLORIDE AND LIDOCAINE HYDROCHLORIDE AND ALUMINUM HYDROXIDE AND MAGNESIUM HYDRO 5 ML: KIT at 17:00

## 2021-04-02 RX ADMIN — BUMETANIDE 1 MG: 1 TABLET ORAL at 16:59

## 2021-04-02 RX ADMIN — FLUOXETINE 60 MG: 20 CAPSULE ORAL at 09:15

## 2021-04-02 RX ADMIN — ARFORMOTEROL TARTRATE 15 MCG: 15 SOLUTION RESPIRATORY (INHALATION) at 06:29

## 2021-04-02 RX ADMIN — BUMETANIDE 1 MG: 1 TABLET ORAL at 21:24

## 2021-04-02 RX ADMIN — ONDANSETRON 4 MG: 2 INJECTION INTRAMUSCULAR; INTRAVENOUS at 13:56

## 2021-04-02 RX ADMIN — DIPHENHYDRAMINE HYDROCHLORIDE AND LIDOCAINE HYDROCHLORIDE AND ALUMINUM HYDROXIDE AND MAGNESIUM HYDRO 5 ML: KIT at 01:35

## 2021-04-02 RX ADMIN — METOPROLOL TARTRATE 12.5 MG: 25 TABLET, FILM COATED ORAL at 09:17

## 2021-04-02 RX ADMIN — IPRATROPIUM BROMIDE AND ALBUTEROL SULFATE 3 ML: 2.5; .5 SOLUTION RESPIRATORY (INHALATION) at 06:29

## 2021-04-02 RX ADMIN — INSULIN LISPRO 5 UNITS: 100 INJECTION, SOLUTION INTRAVENOUS; SUBCUTANEOUS at 01:35

## 2021-04-02 RX ADMIN — INSULIN LISPRO 8 UNITS: 100 INJECTION, SOLUTION INTRAVENOUS; SUBCUTANEOUS at 17:00

## 2021-04-02 RX ADMIN — INSULIN LISPRO 5 UNITS: 100 INJECTION, SOLUTION INTRAVENOUS; SUBCUTANEOUS at 17:00

## 2021-04-02 RX ADMIN — INSULIN GLARGINE 20 UNITS: 100 INJECTION, SOLUTION SUBCUTANEOUS at 09:15

## 2021-04-02 RX ADMIN — QUETIAPINE FUMARATE 25 MG: 25 TABLET ORAL at 20:07

## 2021-04-02 RX ADMIN — PROPOFOL 5 MCG/KG/MIN: 10 INJECTION, EMULSION INTRAVENOUS at 18:47

## 2021-04-02 RX ADMIN — POTASSIUM CHLORIDE 20 MEQ: 1500 TABLET, EXTENDED RELEASE ORAL at 17:00

## 2021-04-02 RX ADMIN — NYSTATIN: 100000 POWDER TOPICAL at 09:17

## 2021-04-02 RX ADMIN — IPRATROPIUM BROMIDE AND ALBUTEROL SULFATE 3 ML: 2.5; .5 SOLUTION RESPIRATORY (INHALATION) at 11:04

## 2021-04-02 NOTE — PROGRESS NOTES
Cardiology Progress Note    Patient Identification:  Name: Claudia Rust  Age: 70 y.o.  Sex: female  :  1950  MRN: [id number]                 Follow Up / Chief Complaint: Follow-up for valvular heart disease status post AVR    Interval History: Patient underwent #23 magna pericardial prosthesis AVR 3/18/2021  3/22/2021.  Events noted.  Patient is intubated on IV Olegario-Synephrine and Bumex.  3/23/2021.  Patient continues to intubated  3/24/2021 patient continues to be intubated and mechanically ventilated.  3/25/2021 patient continues to be intubated and mechanically ventilated.  Underwent a CHRISTIANO which showed normal LV systolic function and good aortic valve function.  3/26/2021.  Patient continues to be intubated and mechanically ventilated.  He is lightly sedated.  Patient underwent bronchoscopy 3/25/2021.  Patient was getting tachycardic was given 12.5 of metoprolol developed hypotension requiring Olegario-Synephrine.  HIT antibody positive  3/29/2021: Continues to be intubated and mechanically ventilated.  3/30/2021.  Patient continues to be intubated and mechanically ventilated.  3/31/2021: Patient continues to be intubated and mechanically ventilated.  Nephrology has started on IV Bumex drip.  21: Patient is intubated and is on weaning trials.  Bumex drip has been discontinued.  2021 : Patient intubated on weaning trials.  On IV Bumex     Subjective: Patient seen and examined.  Chart reviewed.  Labs reviewed.  Discussed with RN taking care of patient.      Objective: Sodium is 152--> 144-> 141.  BUN/creatinine went up to 49/1.05-> 42/0.69-> 42/0.64-> 46/0.60-> 47/0.49.  Hemoglobin is 10.1--> 10.8--> 9.0-> 10.8-> 10.5-> 10.2-> 10.6-> 10.6.  Platelet count 95    This is a 70-year-old with PMH of     #Valvular heart disease with moderate to severe aortic stenosis  Cardiac cath 2021 revealing nonobstructive CAD  # Hyperlipidemia,    # Hypertension  # COPD, KARLY on CPAP  # Prediabetes  #  Hypothyroidism, Rheumatoid Arthritis, spinal stenosis, hiatal hernia, chronic depression, bipolar, GERD, IBS, chronic migraine, vertigo, herniated disc, compression lumbar fractures  #Allergies/intolerance to Stadol  # Hysterectomy, bladder reconstruction, cholecystectomy, right hand surgery  # Family history of strokes and grandfather.  Mother's cancer, father on  # Active cigarette smoker trying to quit         Here for aortic valve replacement.  Patient was recently seen with a complaint of dyspnea on exertion edema and weight gain.  Patient was brought in for elective aortic valve replacement.  Patient underwent aortic valve replacement 3/18/2021 with #23 magna pericardial prosthesis.  Blood pressure is high is on IV Cardene and IV nitroglycerin.  Currently intubated sedated chest tube present Venegas catheter present.  Underlying rhythm is flatline with AV paced rhythm.  Underwent an echocardiogram which is revealing severe aortic stenosis on 2/11/2020   Underwent cardiac cath 1/20/2021 which revealed no obstructive disease.  Carotid Dopplers 9/13/2019 normal.  Echocardiogram 2/11/2020 is revealing severe aortic stenosis  Work-up here on 3/16/2021 revealed proBNP 733, normal CMP, A1c 5.7, normal CBC, chest x-ray with no acute abnormality.  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  Events noted patient was intubated 3/21/2021 for respiratory failure.  Continue to monitor pulmonary status and continue mechanical ventilation as needed.  Patient is on weaning trial currently on CPAP we will continue to monitor closely.  And wean off ventilator as tolerated.  Follow-up with pulmonary  Chest x-ray 4 /2/2021   Mild improvement septal thickening compared to previous study.  Multifocal airspace opacities are present, question multifocal  pneumonia.  Endotracheal tube tip terminates about 1.5 cm from the davi, consider  pulling it back about 2 cm.     Electronically Signed By-Birgit Wilkins MD On:4/2/2021 7:26 AM    Patient's IV Bumex is being switched to p.o. Bumex 1 mg 3 times daily  I's and O's are -1.444  Monitor electrolytes and renal function  Replete electrolytes as needed  Patient sodium was going up, patient was given free water and has improved.  Appreciated nephrology input.  Monitor for routine expected post surgery blood loss anemia  Patient underwent transesophageal echo 3/25/2021 which revealed normal LV systolic function normal chamber sizes.  Normal aortic valve placement and no significant valvular heart disease.  Continue to monitor blood pressure closely patient developed hypotension requiring Olegario-Synephrine with 12.5 of metoprolol.  Now blood pressure is going up we will restart metoprolol and monitor blood pressure closely.  Will follow up with CT VS for routine post CABG care and HIT  Discussed with RN taking care of patient to coordinate care  On-call cardiologist will follow-up over the weekend we will see back on Monday.    Past Medical History:  Past Medical History:   Diagnosis Date   • Acute congestive heart failure (CMS/HCC)    • Allergies    • Anxiety    • Arthritis    • Asthma    • Bilateral leg edema    • Bipolar 1 disorder (CMS/HCC)    • Bulging lumbar disc     X3   • Cancer (CMS/HCC)     states had \"cancerous tumor during pregnancy " "and had to have hysterectomy\"   • Cataract     bilateral   • Cellulitis 03/2021    bilateral legs   • Compression fracture of vertebral column (CMS/[facility])     LUMBAR   • COPD (chronic obstructive pulmonary disease) (CMS/[facility])    • Delayed emergence from anesthesia    • Depression    • Depression    • Dyslipidemia    • Dyspnea on exertion    • Fibromyalgia    • GERD (gastroesophageal reflux disease)    • Hiatal hernia    • Hyperlipidemia    • Hypertension    • Hypothyroid     HYPOTHYROID   • IBS (irritable bowel syndrome)    • Migraines    • Morbid obesity (CMS/[facility])    • Neuropathy    • Panic attacks    • Peripheral edema    • PONV (postoperative nausea and vomiting)    • Poor mobility     rolling walker   • Prediabetes    • PVD (peripheral vascular disease) (CMS/[facility])    • Rheumatoid aortitis    • Spinal stenosis    • Spinal stenosis    • Vertigo    • Vitamin D deficiency      Past Surgical History:  Past Surgical History:   Procedure Laterality Date   • BRONCHOSCOPY N/A 3/25/2021    Procedure: BRONCHOSCOPY AT BEDSIDE WITH BRONCHIOALVEOLAR LAVAGE;  Surgeon: Glenn Reyes MD;  Location: [facility] ENDOSCOPY;  Service: Pulmonary;  Laterality: N/A;  PNA   • CARDIAC CATHETERIZATION  2009   • CARDIAC CATHETERIZATION N/A 8/14/2019    Procedure: Left Heart Cath;  Surgeon: Jose Levi MD;  Location: [facility] CATH INVASIVE LOCATION;  Service: Cardiovascular   • CARDIAC CATHETERIZATION N/A 8/14/2019    Procedure: Right Heart Cath;  Surgeon: Jose Levi MD;  Location: [facility] CATH INVASIVE LOCATION;  Service: Cardiovascular   • CARDIAC CATHETERIZATION N/A 8/14/2019    Procedure: Aortic root aortogram;  Surgeon: Jose Lvei MD;  Location: [facility] CATH INVASIVE LOCATION;  Service: Cardiovascular   • CARDIAC CATHETERIZATION N/A 1/20/2021    Procedure: Left Heart Cath;  Surgeon: Jose Levi MD;  Location: [facility] CATH INVASIVE LOCATION;  Service: Cardiovascular;  Laterality: N/A;   • " COLONOSCOPY     • CYSTOCELE REPAIR  1984   • ELBOW ARTHROPLASTY  2011   • ENDOSCOPY     • FOOT SURGERY     • GALLBLADDER SURGERY  2002   • TONSILLECTOMY     • VAGINAL HYSTERECTOMY  1972        Social History:   Social History     Tobacco Use   • Smoking status: Current Every Day Smoker     Packs/day: 1.00     Types: Cigarettes   • Smokeless tobacco: Never Used   • Tobacco comment: decrease now and do not smoke dos   Substance Use Topics   • Alcohol use: Yes     Comment: socially      Family History:  History reviewed. No pertinent family history.       Allergies:  Allergies   Allergen Reactions   • Adhesive Tape Unknown (See Comments)     hives   • Butorphanol Other (See Comments)     Chest pain difficulty breathing throat swelling   • Garlic Other (See Comments)     Chest pain difficulty breathing throat swells   • Tetanus-Diphtheria Toxoids Td Other (See Comments)     Dizziness,  vomiting   • Aloe Itching   • Bee Venom Other (See Comments)     Swelling eyes and lips hand swelling   • Latex Itching   • Macadamia Nut Oil Other (See Comments)     Chest pain difficulty breathing throat swelling   • Tuberculin Unknown (See Comments)     reactor   • Wasp Venom Other (See Comments)     Swelling eyes lips and hand     Scheduled Meds:  acetylcysteine, 3 mL, BID - RT  arformoterol, 15 mcg, BID - RT  aspirin, 81 mg, Daily  atorvastatin, 40 mg, Nightly  budesonide, 1 mg, BID - RT  bumetanide, 1 mg, TID  chlorhexidine, 15 mL, Q12H  docusate, 100 mg, BID  First Mouthwash (Magic Mouthwash), 5 mL, Q6H  FLUoxetine, 60 mg, Daily  fondaparinux, 5 mg, Q24H  gabapentin, 200 mg, Q12H  hydrALAZINE, 25 mg, Q8H  insulin glargine, 20 Units, Q12H  insulin lispro, 0-24 Units, Q6H  insulin lispro, 5 Units, Q6H  ipratropium-albuterol, 3 mL, Q4H - RT  lansoprazole, 30 mg, QAM  levothyroxine, 100 mcg, Q AM  metoprolol tartrate, 12.5 mg, Q12H  nystatin, , Q12H  potassium chloride, 20 mEq, TID With Meals  predniSONE, 30 mg, Daily With  "Breakfast  QUEtiapine, 25 mg, Nightly          Review of Systems:   Review of Systems   Unable to perform ROS: intubated              Constitutional:  Temp:  [97.6 °F (36.4 °C)-98.7 °F (37.1 °C)] 98.5 °F (36.9 °C)  Heart Rate:  [62-81] 70  Resp:  [22] 22  BP: (102-106)/(46-56) 106/48  Arterial Line BP: ()/(39-78) 115/56  FiO2 (%):  [40 %-45 %] 40 %    Physical Exam   /48   Pulse 70   Temp 98.5 °F (36.9 °C) (Oral)   Resp 22   Ht 162.6 cm (64\")   Wt 123 kg (271 lb 9.7 oz)   SpO2 96%   BMI 46.62 kg/m²   General: Patient intubated sedated  Eyes: Sclera is anicteric,  conjunctiva is clear   HEENT:  No JVD. Thyroid not visibly enlarged. No mucosal pallor or cyanosis  Respiratory: Mechanically ventilated.  Right base wheezing.  Cardiovascular: S1,S2 Regular rate and rhythm.  Sternotomy is healing well.  Gastrointestinal: Abdomen nondistended, soft  Musculoskeletal:  No abnormal movements  Extremities: Patient has chronic bilateral lower extremity edema continues to have 1+ edema.  Skin: Stasis changes seen bilateral shin.  Neuro: Alert and awake, no lateralizing deficits appreciated    INTAKE AND OUTPUT:    Intake/Output Summary (Last 24 hours) at 4/2/2021 1217  Last data filed at 4/2/2021 0500  Gross per 24 hour   Intake 2410 ml   Output 2700 ml   Net -290 ml       Cardiographics  Telemetry: Sinus rhythm    ECG:   ECG 12 Lead   Final Result   HEART RATE= 80  bpm   RR Interval= 752  ms   WA Interval= 161  ms   P Horizontal Axis= 0  deg   P Front Axis= 22  deg   QRSD Interval= 80  ms   QT Interval= 419  ms   QRS Axis= 39  deg   T Wave Axis= 35  deg   - NORMAL ECG -   Sinus rhythm   When compared with ECG of 19-Mar-2021 4:30:10,   Significant repolarization change   Electronically Signed By: Segun Rucker ([facility]) 20-Mar-2021 10:06:29   Date and Time of Study: 2021-03-20 05:15:54      ECG 12 Lead   Final Result   HEART RATE= 62  bpm   RR Interval= 984  ms   WA Interval= 165  ms   P Horizontal Axis= 13  deg "   P Front Axis= 32  deg   QRSD Interval= 76  ms   QT Interval= 434  ms   QRS Axis= 43  deg   T Wave Axis= 82  deg   - ABNORMAL ECG -   Sinus rhythm   Atrial premature complex   ST elevation, consider inferior injury   When compared with ECG of 18-Mar-2021 11:04:47,   No significant change   Electronically Signed By: Segun Rucker ([facility]) 19-Mar-2021 16:48:51   Date and Time of Study: 2021-03-19 04:30:10      ECG 12 Lead   Final Result   HEART RATE= 56  bpm   RR Interval= 1072  ms   CA Interval= 163  ms   P Horizontal Axis= -22  deg   P Front Axis= -9  deg   QRSD Interval= 88  ms   QT Interval= 516  ms   QRS Axis= 40  deg   T Wave Axis= 43  deg   - OTHERWISE NORMAL ECG -   Sinus bradycardia   When compared with ECG of 16-Mar-2021 8:51:36,   Significant repolarization change   Electronically Signed By: Segun Rucker ([facility]) 18-Mar-2021 17:48:15   Date and Time of Study: 2021-03-18 11:04:47      ECG 12 Lead    (Results Pending)     I have personally reviewed EKG    Echocardiogram: Results for orders placed during the hospital encounter of 03/18/21    Adult Transesophageal Echo (CHRISTIANO) W/ Cont if Necessary Per Protocol (Bedside)    Interpretation Summary  CHRISTIANO   procedure note    Procedure:  CHRISTIANO    Indication:   Valvular heart disease s/p AVR, respiratory failure    Date of procedure  : 3/25/2021    :  Dr. Jose Levi    Description of procedure:    Under conscious sedation given by anesthesiology and local anesthesia, a CHRISTIANO probe was advanced into the esophagus and into the stomach 2D, M-mode, color Doppler images were obtained..  Patient tolerated procedure well complications well.    Complications: none    Results:    Normal LV size and contractility LV contractility, EF of 60%  Normal RV size  Normal left atrial size, normal right atrial size, left atrial appendage without thrombus.  Aortic valve, mitral valve, tricuspid valve appears structurally normal, mild TR seen.  No vegetation seen  No pericardial  effusion seen.  Proximal aorta appears normal in size.  Mild aortic plaque seen      Recommendations/plan:    Clinical correlation recommended      Lab Review   I have reviewed the labs  Results from last 7 days   Lab Units 03/30/21  0425   CK TOTAL U/L 45     Results from last 7 days   Lab Units 04/02/21  0514   MAGNESIUM mg/dL 2.2     Results from last 7 days   Lab Units 04/02/21  0514   SODIUM mmol/L 143   POTASSIUM mmol/L 3.7   BUN mg/dL 47*   CREATININE mg/dL 0.49*   CALCIUM mg/dL 8.7         Results from last 7 days   Lab Units 04/02/21  0514 04/01/21  0409 03/31/21  0352   WBC 10*3/mm3 21.20* 23.20* 24.00*   HEMOGLOBIN g/dL 10.5* 10.6* 10.2*   HEMATOCRIT % 31.6* 32.3* 31.3*   PLATELETS 10*3/mm3 95* 102* 87*           RADIOLOGY:  Imaging Results (Last 24 Hours)     Procedure Component Value Units Date/Time    XR Chest 1 View [[id number]] Collected: 04/02/21 0724     Updated: 04/02/21 0728    Narrative:      DATE OF EXAM:  4/2/2021 5:00 AM     PROCEDURE:  XR CHEST 1 VW-     INDICATIONS:  Hypoxia; M06.9-Rheumatoid arthritis, unspecified; I35.0-Nonrheumatic  aortic (valve) stenosis; J96.01-Acute respiratory failure with hypoxia;  J44.9-Chronic obstructive pulmonary disease, unspecified     COMPARISON:  04/01/2021     TECHNIQUE:   Single radiographic view of the chest was obtained.     FINDINGS:  Endotracheal tube tip terminates 1.5 cm from the davi. Esophagogastric  tube is past GE junction. Left internal jugular catheter terminates in  the superior vena cava. There are midline sternotomy wires. There is a  prosthetic aortic valve. Heart is enlarged. Multifocal airspace opacity  lower lobe predominant with small left moderate size right pleural  effusion suspected. Mild improvement in septal thickening compared to  previous study.       Impression:      Mild improvement septal thickening compared to previous study.  Multifocal airspace opacities are present, question multifocal  pneumonia.  Endotracheal tube tip  "terminates about 1.5 cm from the davi, consider  pulling it back about 2 cm.     Electronically Signed By-Birgit Wilkins MD On:4/2/2021 7:26 AM  This report was finalized on [id number] by  Birgit Wilkins MD.    XR Abdomen KUB [[id number]] Collected: 04/01/21 2211     Updated: 04/01/21 2214    Narrative:      DATE OF EXAM:  4/1/2021 9:32 PM     PROCEDURE:  XR ABDOMEN KUB-     INDICATIONS:  ng tube placement; M06.9-Rheumatoid arthritis, unspecified;  I35.0-Nonrheumatic aortic (valve) stenosis; J96.01-Acute respiratory  failure with hypoxia; J44.9-Chronic obstructive pulmonary disease,  unspecified     COMPARISON:  03/22/2021     TECHNIQUE:   Single radiographic view of the abdomen was obtained.     FINDINGS:  The feeding tube passes below the diaphragm, with its tip in the area of  the upper body of the stomach, similar to the prior exam. Technique is  suboptimal for lung evaluation.       Impression:      1. Feeding tube tip is in the stomach.      Electronically Signed By-Sol Javed MD On:4/1/2021 10:12 PM  This report was finalized on [id number] by  Sol Javed MD.                )4/2/2021  MD DANIEL Lamas [employer] Website/Transcription:   \"Dictated utilizing Dragon dictation\".   "

## 2021-04-02 NOTE — PLAN OF CARE
Problem: Adult Inpatient Plan of Care  Goal: Plan of Care Review  Outcome: Ongoing, Progressing  Flowsheets (Taken 4/2/2021 1336)  Progress: improving  Plan of Care Reviewed With:   patient   spouse  Outcome Summary: Still weaning vent settings. Able to CPAP again today. Going to go for 4hrs and then check an ABG. Propofol gtt off while CPAPing. VS stable. Will continue to monitor.  Goal: Patient-Specific Goal (Individualized)  Outcome: Ongoing, Progressing  Goal: Absence of Hospital-Acquired Illness or Injury  Outcome: Ongoing, Progressing  Intervention: Identify and Manage Fall Risk  Recent Flowsheet Documentation  Taken 4/2/2021 1300 by João Joaquin RN  Safety Promotion/Fall Prevention: safety round/check completed  Taken 4/2/2021 1200 by João Joaquin RN  Safety Promotion/Fall Prevention:   activity supervised   clutter free environment maintained   assistive device/personal items within reach   fall prevention program maintained   muscle strengthening facilitated   nonskid shoes/slippers when out of bed   room organization consistent   safety round/check completed  Taken 4/2/2021 1100 by João Joaquin RN  Safety Promotion/Fall Prevention: safety round/check completed  Taken 4/2/2021 1000 by João Joaquin RN  Safety Promotion/Fall Prevention: safety round/check completed  Taken 4/2/2021 0900 by João Joaquin RN  Safety Promotion/Fall Prevention: safety round/check completed  Taken 4/2/2021 0800 by João Joaquin RN  Safety Promotion/Fall Prevention:   activity supervised   assistive device/personal items within reach   clutter free environment maintained   fall prevention program maintained   muscle strengthening facilitated   nonskid shoes/slippers when out of bed   room organization consistent   safety round/check completed  Taken 4/2/2021 0700 by João Joaquin RN  Safety Promotion/Fall Prevention: safety round/check completed  Intervention: Prevent Skin Injury  Recent Flowsheet Documentation  Taken  4/2/2021 1200 by João Joaquin RN  Body Position: supine  Taken 4/2/2021 0800 by João Joaquin RN  Body Position:   turned   tilted, right  Intervention: Prevent and Manage VTE (venous thromboembolism) Risk  Recent Flowsheet Documentation  Taken 4/2/2021 0800 by João Joaquin RN  VTE Prevention/Management:   bilateral   sequential compression devices on  Goal: Optimal Comfort and Wellbeing  Outcome: Ongoing, Progressing  Intervention: Provide Person-Centered Care  Recent Flowsheet Documentation  Taken 4/2/2021 1200 by João Joaquin RN  Trust Relationship/Rapport:   care explained   choices provided  Taken 4/2/2021 0800 by João Joaquin RN  Trust Relationship/Rapport:   care explained   choices provided  Goal: Readiness for Transition of Care  Outcome: Ongoing, Progressing   Goal Outcome Evaluation:  Plan of Care Reviewed With: patient, spouse  Progress: improving  Outcome Summary: Still weaning vent settings. Able to CPAP again today. Going to go for 4hrs and then check an ABG. Propofol gtt off while CPAPing. VS stable. Will continue to monitor.

## 2021-04-02 NOTE — PROGRESS NOTES
"PULMONARY CRITICAL CARE Progress  NOTE      PATIENT IDENTIFICATION:  Name: Claudia Rust  MRN: [id number]  :  1950     Age: 70 y.o.  Sex: female    DATE OF Note:  2021   Referring Physician: Olaf Mcgill MD                  Subjective:   On vent sedated but arousable and follow commands  Tolerating pressure support trial  Lightly sedated with propofol  Tolerating tube feeds at goal  No changes bowel habits  No new rash or lesions    Minimal secretions    Objective:  tMax 24 hrs: Temp (24hrs), Av.1 °F (36.7 °C), Min:97.6 °F (36.4 °C), Max:98.7 °F (37.1 °C)      Vitals Ranges:   Temp:  [97.6 °F (36.4 °C)-98.7 °F (37.1 °C)] 98.6 °F (37 °C)  Heart Rate:  [62-81] 71  Resp:  [22] 22  BP: (102-106)/(46-56) 106/48  Arterial Line BP: ()/(39-78) 124/52  FiO2 (%):  [45 %-50 %] 45 %    Intake and Output Last 3 Shifts:   I/O last 3 completed shifts:  In: 4488 [I.V.:495; Other:1960; NG/GT:3]  Out: 4975 [Urine:4975]    Exam:  /48   Pulse 71   Temp 98.6 °F (37 °C) (Axillary)   Resp 22   Ht 162.6 cm (64\")   Wt 123 kg (271 lb 9.7 oz)   SpO2 93%   BMI 46.62 kg/m²     General Appearance: On vent with light sedation  HEENT:  Normocephalic, without obvious abnormality, Conjunctiva/corneas clear.  Normal external ear canals, Nares normal, no drainage.  Oral ET tube  Neck:  Supple, symmetrical, trachea midline. No JVD.  Lungs /Chest wall:   Mild scattered rhonchi, no wheezing, respirations unlabored symmetrical wall movement.     Heart: Sinus rhythm, no murmur or gallop  Abdomen: Soft, non-distended, active bowel sounds  Extremities: + Generalized edema, no clubbing or Cyanosis        Medications:    Current Facility-Administered Medications:   •  acetaminophen (TYLENOL) tablet 650 mg, 650 mg, Oral, Q4H PRN **OR** acetaminophen (TYLENOL) 160 MG/5ML solution 650 mg, 650 mg, Oral, Q4H PRN, 650 mg at 21 0801 **OR** acetaminophen (TYLENOL) suppository 650 mg, 650 mg, Rectal, Q4H PRN, " Janna Alberto, APRN  •  acetylcysteine (MUCOMYST) 20 % nebulizer solution 3 mL, 3 mL, Nebulization, BID - RT, Shanell Hernandez MD, 3 mL at 04/02/21 0630  •  arformoterol (BROVANA) nebulizer solution 15 mcg, 15 mcg, Nebulization, BID - RT, Shanell Hernandez MD, Stopped at 04/02/21 0631  •  aspirin EC tablet 81 mg, 81 mg, Oral, Daily, SatterJanna Grove, APRN, 81 mg at 04/02/21 0913  •  atorvastatin (LIPITOR) tablet 40 mg, 40 mg, Oral, Nightly, Janna Alberto APRN, 40 mg at 04/01/21 2236  •  bisacodyl (DULCOLAX) suppository 10 mg, 10 mg, Rectal, Daily PRN, Janna Alberto APRN, 10 mg at 03/26/21 1234  •  budesonide (PULMICORT) nebulizer solution 1 mg, 1 mg, Nebulization, BID - RT, Shanell Hernandez MD, Stopped at 04/02/21 0632  •  bumetanide (BUMEX) tablet 1 mg, 1 mg, Nasogastric, TID, Paola Hammer MD  •  chlorhexidine (PERIDEX) 0.12 % solution 15 mL, 15 mL, Mouth/Throat, Q12H, Janna Alberto APRN, 15 mL at 04/02/21 0913  •  Chlorhexidine Gluconate Cloth 2 % pads 1 application, 1 application, Topical, Q12H PRN, Monika Conn APRN  •  dextrose (D50W) 25 g/ 50mL Intravenous Solution 25 g, 25 g, Intravenous, Q15 Min PRN, Olaf Mcgill MD  •  dextrose (GLUTOSE) oral gel 15 g, 15 g, Oral, Q15 Min PRN, Olaf Mcgill MD  •  docusate (COLACE) 50 MG/5ML liquid 100 mg, 100 mg, Oral, BID, SatterJanna Grove APRN, 100 mg at 04/02/21 0914  •  First Mouthwash (Magic Mouthwash) 5 mL, 5 mL, Swish & Spit, Q6H, Shanell Hernandez MD, 5 mL at 04/02/21 0524  •  FLUoxetine (PROzac) capsule 60 mg, 60 mg, Oral, Daily, Belkis Loving APRN, 60 mg at 04/02/21 0915  •  fondaparinux (ARIXTRA) injection 5 mg, 5 mg, Subcutaneous, Q24H, Olaf Mcgill MD, 5 mg at 04/01/21 1219  •  gabapentin (NEURONTIN) capsule 200 mg, 200 mg, Oral, Q12H, Janna Alberto APRN, 200 mg at 04/02/21 0914  •  glucagon (human recombinant) (GLUCAGEN DIAGNOSTIC)  injection 1 mg, 1 mg, Subcutaneous, Q15 Min PRN, Olaf Mcgill MD  •  hydrALAZINE (APRESOLINE) injection 10 mg, 10 mg, Intravenous, Q6H PRN, Juan Jose Skelton MD, 10 mg at 03/27/21 2251  •  hydrALAZINE (APRESOLINE) tablet 25 mg, 25 mg, Nasogastric, Q8H, Paola Hammer MD, 25 mg at 04/02/21 0524  •  HYDROcodone-acetaminophen (NORCO) 5-325 MG per tablet 1 tablet, 1 tablet, Oral, Q4H PRN, Janna Alberto, APRN, 1 tablet at 04/01/21 1649  •  insulin glargine (LANTUS, SEMGLEE) injection 20 Units, 20 Units, Subcutaneous, Q12H, Gilles Brady, APRN, 20 Units at 04/02/21 0915  •  insulin lispro (ADMELOG) injection 0-24 Units, 0-24 Units, Subcutaneous, Q6H, 12 Units at 04/01/21 1806 **AND** insulin lispro (ADMELOG) injection 0-24 Units, 0-24 Units, Subcutaneous, PRN, Glenn Reyes MD  •  insulin lispro (ADMELOG) injection 5 Units, 5 Units, Subcutaneous, Q6H, Belkis Loving, APRN, 5 Units at 04/02/21 0626  •  ipratropium-albuterol (DUO-NEB) nebulizer solution 3 mL, 3 mL, Nebulization, Q4H PRN, Janna Alberto, APRN, 3 mL at 03/18/21 1522  •  ipratropium-albuterol (DUO-NEB) nebulizer solution 3 mL, 3 mL, Nebulization, Q4H - RT, Day, Lorna, APRN, Stopped at 04/02/21 0632  •  lansoprazole (FIRST) oral suspension 30 mg, 30 mg, Oral, QAM, Day, Lorna, APRN, 30 mg at 04/02/21 0627  •  levothyroxine (SYNTHROID, LEVOTHROID) tablet 100 mcg, 100 mcg, Oral, Q AM, Olaf Mcgill MD, 100 mcg at 04/02/21 0524  •  lidocaine (XYLOCAINE) 2% injection, , , PRN, Glenn Reyes MD, 4 mL at 03/25/21 1439  •  lidocaine (XYLOCAINE) 5 % ointment, , , PRN, Glenn Reyes MD, 1 application at 03/25/21 1439  •  meclizine (ANTIVERT) tablet 25 mg, 25 mg, Oral, TID PRN, Belkis Loving APRN, 25 mg at 03/20/21 0836  •  metoprolol tartrate (LOPRESSOR) half tablet 12.5 mg, 12.5 mg, Oral, Q12H, Jose Levi MD, 12.5 mg at 04/02/21 0917  •  midazolam (VERSED) injection 1 mg, 1 mg, Intravenous, Q2H PRN, Mary,  MD Shanell  •  midazolam (VERSED) injection 2 mg, 2 mg, Intravenous, Q2H PRN, Shanell Hernandez MD  •  [] HYDROmorphone (DILAUDID) injection 0.5 mg, 0.5 mg, Intravenous, Q2H PRN, 0.5 mg at 21 0825 **AND** naloxone (NARCAN) injection 0.4 mg, 0.4 mg, Intravenous, Q5 Min PRN, Satterly-German, Janna L, APRN  •  nystatin (MYCOSTATIN) powder, , Topical, Q12H, Draw, MD Glenn, Given at 21 0917  •  ondansetron (ZOFRAN) injection 4 mg, 4 mg, Intravenous, Q6H PRN, Satterly-German, Janna L, APRN, 4 mg at 21 2112  •  potassium chloride (K-DUR,KLOR-CON) CR tablet 20 mEq, 20 mEq, Oral, PRN, 20 mEq at 21 0550 **OR** potassium chloride (KLOR-CON) packet 20 mEq, 20 mEq, Oral, PRN, Satterly-German, Janna L, APRN, 20 mEq at 21 0515  •  potassium chloride (K-DUR,KLOR-CON) CR tablet 20 mEq, 20 mEq, Oral, TID With Meals, Satterly-German, Janna L, APRN, 20 mEq at 21 0913  •  potassium chloride 10 mEq in 100 mL IVPB, 10 mEq, Intravenous, Q1H PRN **OR** potassium chloride 10 mEq in 100 mL IVPB, 10 mEq, Intravenous, Q1H PRN, Satterly-German, Janna L, APRN, Last Rate: 200 mL/hr at 21 0726, 10 mEq at 21 0726  •  predniSONE (DELTASONE) tablet 30 mg, 30 mg, Oral, Daily With Breakfast, Shanell Hernandez MD, 30 mg at 21 0914  •  propofol (DIPRIVAN) infusion 10 mg/mL 100 mL, 5-30 mcg/kg/min, Intravenous, Titrated, Olaf Mcgill MD, Stopped at 21 0630  •  QUEtiapine (SEROquel) tablet 25 mg, 25 mg, Oral, Nightly, Day, Lorna, APRN, 25 mg at 21 6753    Data Review:  All labs (24hrs):   Recent Results (from the past 24 hour(s))   POC Glucose Once    Collection Time: 21 11:18 AM    Specimen: Blood   Result Value Ref Range    Glucose 145 (H) 70 - 105 mg/dL   Potassium    Collection Time: 21  4:27 PM    Specimen: Arm, Left; Blood   Result Value Ref Range    Potassium 4.5 3.5 - 5.2 mmol/L   POC Glucose Once    Collection Time: 21  5:57 PM    Specimen:  Blood   Result Value Ref Range    Glucose 276 (H) 70 - 105 mg/dL   POC Glucose Once    Collection Time: 04/01/21 11:38 PM    Specimen: Blood   Result Value Ref Range    Glucose 86 70 - 105 mg/dL   Blood Gas, Arterial -    Collection Time: 04/02/21  4:25 AM    Specimen: Arterial Blood   Result Value Ref Range    Site Arterial Line     Thiago's Test N/A     pH, Arterial 7.532 (H) 7.350 - 7.450 pH units    pCO2, Arterial 37.8 35.0 - 48.0 mm Hg    pO2, Arterial 70.8 (L) 83.0 - 108.0 mm Hg    HCO3, Arterial 31.7 (H) 21.0 - 28.0 mmol/L    Base Excess, Arterial 8.5 (H) 0.0 - 3.0 mmol/L    O2 Saturation, Arterial 95.8 94.0 - 98.0 %    CO2 Content 32.9 (H) 22 - 29 mmol/L    Barometric Pressure for Blood Gas      Modality Adult Vent     FIO2 45 %    Ventilator Mode ;AC     Set Tidal Volume 589     PEEP 7     Hemodilution No     Respiratory Rate 22    CBC (No Diff)    Collection Time: 04/02/21  5:14 AM    Specimen: Blood   Result Value Ref Range    WBC 21.20 (H) 3.40 - 10.80 10*3/mm3    RBC 3.48 (L) 3.77 - 5.28 10*6/mm3    Hemoglobin 10.5 (L) 12.0 - 15.9 g/dL    Hematocrit 31.6 (L) 34.0 - 46.6 %    MCV 90.7 79.0 - 97.0 fL    MCH 30.2 26.6 - 33.0 pg    MCHC 33.3 31.5 - 35.7 g/dL    RDW 14.5 12.3 - 15.4 %    RDW-SD 46.4 37.0 - 54.0 fl    MPV 10.5 6.0 - 12.0 fL    Platelets 95 (L) 140 - 450 10*3/mm3   Comprehensive Metabolic Panel    Collection Time: 04/02/21  5:14 AM    Specimen: Blood   Result Value Ref Range    Glucose 126 (H) 65 - 99 mg/dL    BUN 47 (H) 8 - 23 mg/dL    Creatinine 0.49 (L) 0.57 - 1.00 mg/dL    Sodium 143 136 - 145 mmol/L    Potassium 3.7 3.5 - 5.2 mmol/L    Chloride 105 98 - 107 mmol/L    CO2 29.0 22.0 - 29.0 mmol/L    Calcium 8.7 8.6 - 10.5 mg/dL    Total Protein 4.9 (L) 6.0 - 8.5 g/dL    Albumin 2.60 (L) 3.50 - 5.20 g/dL    ALT (SGPT) 35 (H) 1 - 33 U/L    AST (SGOT) 31 1 - 32 U/L    Alkaline Phosphatase 134 (H) 39 - 117 U/L    Total Bilirubin 0.5 0.0 - 1.2 mg/dL    eGFR Non African Amer 125 >60 mL/min/1.73     Globulin 2.3 gm/dL    A/G Ratio 1.1 g/dL    BUN/Creatinine Ratio 95.9 (H) 7.0 - 25.0    Anion Gap 9.0 5.0 - 15.0 mmol/L   Magnesium    Collection Time: 04/02/21  5:14 AM    Specimen: Blood   Result Value Ref Range    Magnesium 2.2 1.6 - 2.4 mg/dL   Phosphorus    Collection Time: 04/02/21  5:14 AM    Specimen: Blood   Result Value Ref Range    Phosphorus 4.0 2.5 - 4.5 mg/dL   Calcium, Ionized    Collection Time: 04/02/21  5:14 AM    Specimen: Blood   Result Value Ref Range    Ionized Calcium 1.22 1.20 - 1.30 mmol/L   POC Glucose Once    Collection Time: 04/02/21  5:14 AM    Specimen: Blood   Result Value Ref Range    Glucose 120 (H) 70 - 105 mg/dL        Imaging:  XR Chest 1 View  Narrative: DATE OF EXAM:  4/2/2021 5:00 AM     PROCEDURE:  XR CHEST 1 VW-     INDICATIONS:  Hypoxia; M06.9-Rheumatoid arthritis, unspecified; I35.0-Nonrheumatic  aortic (valve) stenosis; J96.01-Acute respiratory failure with hypoxia;  J44.9-Chronic obstructive pulmonary disease, unspecified     COMPARISON:  04/01/2021     TECHNIQUE:   Single radiographic view of the chest was obtained.     FINDINGS:  Endotracheal tube tip terminates 1.5 cm from the davi. Esophagogastric  tube is past GE junction. Left internal jugular catheter terminates in  the superior vena cava. There are midline sternotomy wires. There is a  prosthetic aortic valve. Heart is enlarged. Multifocal airspace opacity  lower lobe predominant with small left moderate size right pleural  effusion suspected. Mild improvement in septal thickening compared to  previous study.     Impression: Mild improvement septal thickening compared to previous study.  Multifocal airspace opacities are present, question multifocal  pneumonia.  Endotracheal tube tip terminates about 1.5 cm from the davi, consider  pulling it back about 2 cm.     Electronically Signed By-Birgit Wilkins MD On:4/2/2021 7:26 AM  This report was finalized on [id number] by  Birgit Wilkins MD.       ASSESSMENT:  Acute  hypoxic respiratory failure required intubation 03/22/2021 patient was requiring 100% oxygen on AVAPS   There is diffuse alveolar disease groundglass opacities suspicious for Covid infection     Greer-Greer placed by cardiac surgery PA pressure 32/14 however patient was on nebulized Flolan at that time     Aortic valve stenosis, status post tissue replacement  Luminary hypertension, severe  COPD  KARLY  Cor pulmonale  Tobacco abuse  Upper airway infection with moderate amount of mucopurulent secretions noted on bronchoscopy 3/25/2021  Febrile illness   Interstitial pneumonitis  Acute thrombocytopenia  Catheter associated DVT right upper extremity subclavian, axillary, and brachial   hypernatremia  Upper arm left DVT  DM  Uremia    PLAN:  Decrease sedation   Sputum culture  Ventilator management wean PEEP down to 7 will try CPAP 16/7 as tolerated  PT/OT  Hemodynamically stable off pressor support  Wean steroids down  Echocardiogram reviewed  Bronchodilator  Inhaled corticosteroids  Electrolytes/ glycemic control  DVT prophylaxis arixtra,   GI prophylaxis.    Tolerating tube feeds at goal  Will attempt pressure support trial in the morning and hopefully extubate       4/2/2021  10:28 EDT     I personally have examined  and interviewed the patient. I have reviewed the history, data, problems, assessment and plan with our NP.  Critical care time in direct medical management (  34 ) minutes  Electronically signed by Shanell Hernandez MD, D,ABS, 04/02/21, 10:09 PM EDT.

## 2021-04-02 NOTE — PROGRESS NOTES
NEPHROLOGY PROGRESS NOTE------[facility]    [facility]  [phone]  Robert Hammer MD      Patient Care Team:  Mary Wilde APRN as PCP - General  Mary Wilde APRN as PCP - Family Medicine  Jose Levi MD as Consulting Physician (Cardiology)  Yovani Lerma MD as Consulting Physician (Nephrology)      Provider:  Robert Hammer MD  Patient Name: Claudia Rust  :  1950    SUBJECTIVE:    F/U ARF/JERROD/HYPERNATREMIA    SEDATED AND INTUBATED ON VENT    Medication:  acetylcysteine, 3 mL, Nebulization, BID - RT  arformoterol, 15 mcg, Nebulization, BID - RT  aspirin, 81 mg, Oral, Daily  atorvastatin, 40 mg, Oral, Nightly  budesonide, 1 mg, Nebulization, BID - RT  bumetanide, 1 mg, Intravenous, Q8H  chlorhexidine, 15 mL, Mouth/Throat, Q12H  docusate, 100 mg, Oral, BID  First Mouthwash (Magic Mouthwash), 5 mL, Swish & Spit, Q6H  FLUoxetine, 60 mg, Oral, Daily  fondaparinux, 5 mg, Subcutaneous, Q24H  gabapentin, 200 mg, Oral, Q12H  hydrALAZINE, 25 mg, Nasogastric, Q8H  insulin glargine, 20 Units, Subcutaneous, Q12H  insulin lispro, 0-24 Units, Subcutaneous, Q6H  insulin lispro, 5 Units, Subcutaneous, Q6H  ipratropium-albuterol, 3 mL, Nebulization, Q4H - RT  lansoprazole, 30 mg, Oral, QAM  levothyroxine, 100 mcg, Oral, Q AM  metoprolol tartrate, 12.5 mg, Oral, Q12H  nystatin, , Topical, Q12H  potassium chloride, 20 mEq, Oral, TID With Meals  predniSONE, 30 mg, Oral, Daily With Breakfast  QUEtiapine, 25 mg, Oral, Nightly      propofol, 5-30 mcg/kg/min, Last Rate: Stopped (21 0630)        OBJECTIVE    Vital Sign Min/Max for last 24 hours  Temp  Min: 97.6 °F (36.4 °C)  Max: 98.7 °F (37.1 °C)   BP  Min: 102/48  Max: 106/48   Pulse  Min: 62  Max: 81   Resp  Min: 22  Max: 22   SpO2  Min: 93 %  Max: 100 %   No data recorded   Weight  Min: 123 kg (271 lb 9.7 oz)  Max: 123 kg (271 lb 9.7 oz)     Flowsheet Rows      First Filed Value  "  Admission Height  162.6 cm (64\") Documented at 03/18/2021 2000   Admission Weight  129 kg (284 lb) Documented at 03/18/2021 2000          No intake/output data recorded.  I/O last 3 completed shifts:  In: 4488 [I.V.:495; Other:1960; NG/GT:2033]  Out: 4975 [Urine:4975]    Physical Exam: CVP=7-9  General Appearance: SEDATED AND INTUBATED  Head: normocephalic, without obvious abnormality and atraumatic +ETT/NGT INTACT  Eyes: conjunctivae and sclerae normal and no icterus  Neck: supple. NO GROSS JVD NOW  Lungs: +FEW SCATTERED RHONCHI AND FINE BIBASILAR CRACKLES  Heart: regular rhythm & normal rate and normal S1, S2 +NARGIS  Chest: Wall no abnormalities observed  Abdomen: normal bowel sounds and soft non-tender +OBESITY  Extremities: +TRACE/1+ DIFFUSE BILAT LE EDEMA, no cyanosis and no redness  Skin: no bleeding, bruising or rash, turgor normal, color normal and no lesions noted  Neurologic: SEDATED    Labs:    WBC WBC   Date Value Ref Range Status   04/02/2021 21.20 (H) 3.40 - 10.80 10*3/mm3 Final   04/01/2021 23.20 (H) 3.40 - 10.80 10*3/mm3 Final   03/31/2021 24.00 (H) 3.40 - 10.80 10*3/mm3 Final      HGB Hemoglobin   Date Value Ref Range Status   04/02/2021 10.5 (L) 12.0 - 15.9 g/dL Final   04/01/2021 10.6 (L) 12.0 - 15.9 g/dL Final   03/31/2021 10.2 (L) 12.0 - 15.9 g/dL Final      HCT Hematocrit   Date Value Ref Range Status   04/02/2021 31.6 (L) 34.0 - 46.6 % Final   04/01/2021 32.3 (L) 34.0 - 46.6 % Final   03/31/2021 31.3 (L) 34.0 - 46.6 % Final      Platlets No results found for: LABPLAT   MCV MCV   Date Value Ref Range Status   04/02/2021 90.7 79.0 - 97.0 fL Final   04/01/2021 90.2 79.0 - 97.0 fL Final   03/31/2021 91.3 79.0 - 97.0 fL Final          Sodium Sodium   Date Value Ref Range Status   04/02/2021 143 136 - 145 mmol/L Final   04/01/2021 142 136 - 145 mmol/L Final   03/31/2021 141 136 - 145 mmol/L Final      Potassium Potassium   Date Value Ref Range Status   04/02/2021 3.7 3.5 - 5.2 mmol/L Final     " Comment:     Slight hemolysis detected by analyzer. Results may be affected.   04/01/2021 4.5 3.5 - 5.2 mmol/L Final     Comment:     Result checked    04/01/2021 3.3 (L) 3.5 - 5.2 mmol/L Final   03/31/2021 4.0 3.5 - 5.2 mmol/L Final   03/31/2021 4.2 3.5 - 5.2 mmol/L Final      Chloride Chloride   Date Value Ref Range Status   04/02/2021 105 98 - 107 mmol/L Final   04/01/2021 103 98 - 107 mmol/L Final   03/31/2021 105 98 - 107 mmol/L Final      CO2 CO2   Date Value Ref Range Status   04/02/2021 29.0 22.0 - 29.0 mmol/L Final   04/01/2021 30.0 (H) 22.0 - 29.0 mmol/L Final   03/31/2021 27.0 22.0 - 29.0 mmol/L Final      BUN BUN   Date Value Ref Range Status   04/02/2021 47 (H) 8 - 23 mg/dL Final   04/01/2021 45 (H) 8 - 23 mg/dL Final   03/31/2021 46 (H) 8 - 23 mg/dL Final      Creatinine Creatinine   Date Value Ref Range Status   04/02/2021 0.49 (L) 0.57 - 1.00 mg/dL Final   04/01/2021 0.57 0.57 - 1.00 mg/dL Final   03/31/2021 0.62 0.57 - 1.00 mg/dL Final      Calcium Calcium   Date Value Ref Range Status   04/02/2021 8.7 8.6 - 10.5 mg/dL Final   04/01/2021 8.0 (L) 8.6 - 10.5 mg/dL Final   03/31/2021 8.6 8.6 - 10.5 mg/dL Final      PO4 No components found for: PO4   Albumin Albumin   Date Value Ref Range Status   04/02/2021 2.60 (L) 3.50 - 5.20 g/dL Final   04/01/2021 2.70 (L) 3.50 - 5.20 g/dL Final   03/31/2021 2.60 (L) 3.50 - 5.20 g/dL Final      Magnesium Magnesium   Date Value Ref Range Status   04/02/2021 2.2 1.6 - 2.4 mg/dL Final   04/01/2021 2.0 1.6 - 2.4 mg/dL Final   03/31/2021 2.3 1.6 - 2.4 mg/dL Final      Uric Acid No components found for: URIC ACID     Imaging Results (Last 72 Hours)     Procedure Component Value Units Date/Time    XR Chest 1 View [[id number]] Collected: 04/02/21 0724     Updated: 04/02/21 0728    Narrative:      DATE OF EXAM:  4/2/2021 5:00 AM     PROCEDURE:  XR CHEST 1 VW-     INDICATIONS:  Hypoxia; M06.9-Rheumatoid arthritis, unspecified; I35.0-Nonrheumatic  aortic (valve) stenosis;  J96.01-Acute respiratory failure with hypoxia;  J44.9-Chronic obstructive pulmonary disease, unspecified     COMPARISON:  04/01/2021     TECHNIQUE:   Single radiographic view of the chest was obtained.     FINDINGS:  Endotracheal tube tip terminates 1.5 cm from the davi. Esophagogastric  tube is past GE junction. Left internal jugular catheter terminates in  the superior vena cava. There are midline sternotomy wires. There is a  prosthetic aortic valve. Heart is enlarged. Multifocal airspace opacity  lower lobe predominant with small left moderate size right pleural  effusion suspected. Mild improvement in septal thickening compared to  previous study.       Impression:      Mild improvement septal thickening compared to previous study.  Multifocal airspace opacities are present, question multifocal  pneumonia.  Endotracheal tube tip terminates about 1.5 cm from the davi, consider  pulling it back about 2 cm.     Electronically Signed By-Birgit Wilkins MD On:4/2/2021 7:26 AM  This report was finalized on [id number] by  Birgit Wilkins MD.    XR Abdomen KUB [[id number]] Collected: 04/01/21 2211     Updated: 04/01/21 2214    Narrative:      DATE OF EXAM:  4/1/2021 9:32 PM     PROCEDURE:  XR ABDOMEN KUB-     INDICATIONS:  ng tube placement; M06.9-Rheumatoid arthritis, unspecified;  I35.0-Nonrheumatic aortic (valve) stenosis; J96.01-Acute respiratory  failure with hypoxia; J44.9-Chronic obstructive pulmonary disease,  unspecified     COMPARISON:  03/22/2021     TECHNIQUE:   Single radiographic view of the abdomen was obtained.     FINDINGS:  The feeding tube passes below the diaphragm, with its tip in the area of  the upper body of the stomach, similar to the prior exam. Technique is  suboptimal for lung evaluation.       Impression:      1. Feeding tube tip is in the stomach.      Electronically Signed By-Sol Javed MD On:4/1/2021 10:12 PM  This report was finalized on [id number] by  Sol Javed MD.    XR  Chest 1 View [[id number]] Collected: 04/01/21 0733     Updated: 04/01/21 0737    Narrative:         DATE OF EXAM:   4/1/2021 6:23 AM     PROCEDURE:   XR CHEST 1 VW-     INDICATIONS:   Hypoxia; M06.9-Rheumatoid arthritis, unspecified; I35.0-Nonrheumatic  aortic (valve) stenosis; J96.01-Acute respiratory failure with hypoxia;  J44.9-Chronic obstructive pulmonary disease, unspecified     COMPARISON:  No Comparisons Available     TECHNIQUE:   Portable chest radiograph.     FINDINGS:    Stable endotracheal tube above the davi. Esophagogastric tube below  the diaphragm. Left IJ central venous catheter tip is at the cavoatrial  junction. Postsurgical changes of sternotomy and valve replacement. No  pneumothorax. Multifocal bibasilar airspace disease not significantly  changed.       Impression:      1. Stable lines and support tubes.  2. No pneumothorax.  3. No change in bibasilar multifocal airspace disease.     Electronically Signed By-Brett Long MD On:4/1/2021 7:35 AM  This report was finalized on [id number] by  Brett Long MD.    XR Chest 1 View [[id number]] Collected: 03/31/21 0803     Updated: 03/31/21 0808    Narrative:      DATE OF EXAM:  3/31/2021 5:06 AM     PROCEDURE:  XR CHEST 1 VW-     INDICATIONS:  Hypoxia; M06.9-Rheumatoid arthritis, unspecified; I35.0-Nonrheumatic  aortic (valve) stenosis; J96.01-Acute respiratory failure with hypoxia;  J44.9-Chronic obstructive pulmonary disease, unspecified     COMPARISON:  3/30/2021     TECHNIQUE:   Single radiographic view of the chest was obtained.     FINDINGS:  Sternotomy wires overlie the midline. ET tube is unchanged. Left IJ  central line is unchanged. Prosthetic heart valve again noted. There is  slight improvement in aeration in the bases bilaterally. The appearance  suggests pneumonia or less likely edema with small amounts of pleural  fluid. Heart size and mediastinal contour appear stable. Pulmonary  vascularity appears unchanged.       Impression:          1. Slight improvement in aeration in the bases suggesting improving  infiltrates with small amounts of pleural fluid.  2. Cardiomegaly.     Electronically Signed By-Perez Reddy MD On:3/31/2021 8:06 AM  This report was finalized on [id number] by  Perez Reddy MD.          Results for orders placed during the hospital encounter of 03/18/21    XR Abdomen KUB    Narrative  DATE OF EXAM:  4/1/2021 9:32 PM    PROCEDURE:  XR ABDOMEN KUB-    INDICATIONS:  ng tube placement; M06.9-Rheumatoid arthritis, unspecified;  I35.0-Nonrheumatic aortic (valve) stenosis; J96.01-Acute respiratory  failure with hypoxia; J44.9-Chronic obstructive pulmonary disease,  unspecified    COMPARISON:  03/22/2021    TECHNIQUE:  Single radiographic view of the abdomen was obtained.    FINDINGS:  The feeding tube passes below the diaphragm, with its tip in the area of  the upper body of the stomach, similar to the prior exam. Technique is  suboptimal for lung evaluation.    Impression  1. Feeding tube tip is in the stomach.    Electronically Signed By-Sol Javed MD On:4/1/2021 10:12 PM  This report was finalized on [id number] by  Sol Javed MD.      XR Chest 1 View    Narrative  DATE OF EXAM:  4/2/2021 5:00 AM    PROCEDURE:  XR CHEST 1 VW-    INDICATIONS:  Hypoxia; M06.9-Rheumatoid arthritis, unspecified; I35.0-Nonrheumatic  aortic (valve) stenosis; J96.01-Acute respiratory failure with hypoxia;  J44.9-Chronic obstructive pulmonary disease, unspecified    COMPARISON:  04/01/2021    TECHNIQUE:  Single radiographic view of the chest was obtained.    FINDINGS:  Endotracheal tube tip terminates 1.5 cm from the davi. Esophagogastric  tube is past GE junction. Left internal jugular catheter terminates in  the superior vena cava. There are midline sternotomy wires. There is a  prosthetic aortic valve. Heart is enlarged. Multifocal airspace opacity  lower lobe predominant with small left moderate size right pleural  effusion suspected.  Mild improvement in septal thickening compared to  previous study.    Impression  Mild improvement septal thickening compared to previous study.  Multifocal airspace opacities are present, question multifocal  pneumonia.  Endotracheal tube tip terminates about 1.5 cm from the davi, consider  pulling it back about 2 cm.    Electronically Signed By-Birgit Wilkins MD On:4/2/2021 7:26 AM  This report was finalized on [id number] by  Birgit Wilkins MD.      XR Chest 1 View    Narrative  DATE OF EXAM:  4/1/2021 6:23 AM    PROCEDURE:  XR CHEST 1 VW-    INDICATIONS:  Hypoxia; M06.9-Rheumatoid arthritis, unspecified; I35.0-Nonrheumatic  aortic (valve) stenosis; J96.01-Acute respiratory failure with hypoxia;  J44.9-Chronic obstructive pulmonary disease, unspecified    COMPARISON:  No Comparisons Available    TECHNIQUE:  Portable chest radiograph.    FINDINGS:  Stable endotracheal tube above the davi. Esophagogastric tube below  the diaphragm. Left IJ central venous catheter tip is at the cavoatrial  junction. Postsurgical changes of sternotomy and valve replacement. No  pneumothorax. Multifocal bibasilar airspace disease not significantly  changed.    Impression  1. Stable lines and support tubes.  2. No pneumothorax.  3. No change in bibasilar multifocal airspace disease.    Electronically Signed By-Brett Long MD On:4/1/2021 7:35 AM  This report was finalized on [id number] by  Brett Long MD.      Results for orders placed during the hospital encounter of 03/18/21    Duplex Venous Upper Extremity - Bilateral CAR    Interpretation Summary  · Acute right upper extremity deep vein thrombosis noted in the subclavian, axillary and brachial. This represents catheter associated deep vein thrombosis.  · All other vessels appear normal.        ASSESSMENT / PLAN      Aortic valve stenosis    Rheumatoid arthritis (CMS/HCC)    1. ARF/JERROD------Nonoliguric ATN. Venegas in place. Creatinine normal. BUN still up. Follow with ongoing  diuretic exposure. No NSAIDs or IV dye    2. HYPERNATREMIA------Better.  Free water flushes with TFs    3. HYPOALBUMINEMIA-----on TFs.     4. 3RD SPACED EDEMA------Change to Bumex per tube today    5. CAD S/P AVR-----per Cardiology and CT Surgery. S/P CHRISTIANO    6. OBESITY    7. ACUTE HYPOXIC RESPIRATORY FAILURE------On vent per Pulmonary    8. RUE DVT    9. DMII-------Glucometers, SSI    10. ANEMIA------H/H stable    11. SEPSIS/LEUKOCYTOSIS------Antibiotics per Pulmonary/CC    12. THROMBOCYTOPENIA    13. HYPOCALCEMIA-----Replaced    14. HYPERLIPIDEMIA------On Statin.      15. HYPOTHYROIDISM------On Sythroid    16. GERD/PUD PROPHYLAXIS------Prevacid per NGT    17. HTN-------D/C Amlodipine b/c of edema. Added scheduled Hydralazine    18. METABOLIC ALKALOSIS-----Back down Bumex today    Robert Hammer MD  [facility]  [phone]  04/02/21  08:05 EDT

## 2021-04-02 NOTE — PROGRESS NOTES
"Nutrition Services    Patient Name: Claudia Rust  YOB: 1950  MRN: [id number]  Admission date: 3/18/2021    Comments:    EN Prescription: Impact Peptide 1.5 at 65 mL/hr + 60 mL/hr water flush, some kcal from propofol    PPE Documentation        PPE Worn By Provider Surgical mask, eye protection   PPE Worn By Patient  Pt not wearing PPE, family member in room wearing mask     CLINICAL NUTRITION ASSESSMENT       Reason for Assessment Follow up protocol    3/22/21: TF consult      H&P:      Past Medical History:   Diagnosis Date   • Acute congestive heart failure (CMS/HCC)    • Allergies    • Anxiety    • Arthritis    • Asthma    • Bilateral leg edema    • Bipolar 1 disorder (CMS/HCC)    • Bulging lumbar disc     X3   • Cancer (CMS/HCC)     states had \"cancerous tumor during pregnancy and had to have hysterectomy\"   • Cataract     bilateral   • Cellulitis 03/2021    bilateral legs   • Compression fracture of vertebral column (CMS/HCC)     LUMBAR   • COPD (chronic obstructive pulmonary disease) (CMS/HCC)    • Delayed emergence from anesthesia    • Depression    • Depression    • Dyslipidemia    • Dyspnea on exertion    • Fibromyalgia    • GERD (gastroesophageal reflux disease)    • Hiatal hernia    • Hyperlipidemia    • Hypertension    • Hypothyroid     HYPOTHYROID   • IBS (irritable bowel syndrome)    • Migraines    • Morbid obesity (CMS/HCC)    • Neuropathy    • Panic attacks    • Peripheral edema    • PONV (postoperative nausea and vomiting)    • Poor mobility     rolling walker   • Prediabetes    • PVD (peripheral vascular disease) (CMS/[facility])    • Rheumatoid aortitis    • Spinal stenosis    • Spinal stenosis    • Vertigo    • Vitamin D deficiency        Past Surgical History:   Procedure Laterality Date   • BRONCHOSCOPY N/A 3/25/2021    Procedure: BRONCHOSCOPY AT BEDSIDE WITH BRONCHIOALVEOLAR LAVAGE;  Surgeon: Glenn Reyes MD;  Location: [facility] ENDOSCOPY;  Service: Pulmonary;  Laterality: N/A;  PNA   • " CARDIAC CATHETERIZATION  2009   • CARDIAC CATHETERIZATION N/A 8/14/2019    Procedure: Left Heart Cath;  Surgeon: Jose Levi MD;  Location:  [facility] CATH INVASIVE LOCATION;  Service: Cardiovascular   • CARDIAC CATHETERIZATION N/A 8/14/2019    Procedure: Right Heart Cath;  Surgeon: Jose Levi MD;  Location:  [facility] CATH INVASIVE LOCATION;  Service: Cardiovascular   • CARDIAC CATHETERIZATION N/A 8/14/2019    Procedure: Aortic root aortogram;  Surgeon: Jose Levi MD;  Location:  [facility] CATH INVASIVE LOCATION;  Service: Cardiovascular   • CARDIAC CATHETERIZATION N/A 1/20/2021    Procedure: Left Heart Cath;  Surgeon: Jose Levi MD;  Location: [facility] CATH INVASIVE LOCATION;  Service: Cardiovascular;  Laterality: N/A;   • COLONOSCOPY     • CYSTOCELE REPAIR  1984   • ELBOW ARTHROPLASTY  2011   • ENDOSCOPY     • FOOT SURGERY     • GALLBLADDER SURGERY  2002   • TONSILLECTOMY     • VAGINAL HYSTERECTOMY  1972        Current Problems:   Aortic valve stenosis  -status post tissue replacement 3/18   -cardiothoracic surgery & cardiology following    Acute hypoxic resp failure   -intubated 3/22     Interstitial pneumonitis    Post-op TCP/HIT positive    COPD    KARLY    Cor pulmonale    Tobacco abuse    Per pulmonary  -there is diffuse alveolar disease groundglass opacities suspicious for Covid infection->multiple tests negative    JERROD, Third spacing/edema  -nephrology following     Nutrition/Diet History         Narrative     4/2: Patient discussed during AM rounds, remains intubated on TF. RD visited at bedside, nsg had just turned off propofol, TF infusing at goal    3/30: Patient discussed during AM rounds, remains intubated on TF. RD visited at bedside, intubated/sedated & TF at goal    3/26: Patient discussed during AM rounds, agreed will decrease free water flushes from 200 mL/hr to 100 mL/hr given serum Na+ 147. RD visited at bedside, family member in room with no  "questions/concerns r/t nutrition at this time. Pt intubated/sedated, TF infusing    3/24: Patient discussed during AM rounds with critical care team. COVID test send out pending. Plan for bronch today and CHRISTIANO tomorrow. TF have not been started, just some free water given per tube. S/w NP Belkis via secure chat, okay with starting TF after bronch, notified RN João of current orders.    3/22: Visited patient today who was intubated. Visualized vent setting & medication drips. RN in room and discussed tube feeding. DHT already in place. RN reports Dr. Mcgill does not want TFs to start till the AM. Orders in place when needed.      Functional Status Prior to recent intubation PT recommending home health.      Food Allergies Garlic   Macadamia Nut      Factors Affecting   Nutritional Intake Compromised airway     Anthropometrics        Current Height, Weight Height: 162.6 cm (64\")  Weight: 123 kg (271 lb 9.7 oz) (04/02/21 0400)        Admit Height, Weight     Flowsheet Rows      First Filed Value   Admission Height  162.6 cm (64\") Documented at 03/18/2021 2000   Admission Weight  129 kg (284 lb) Documented at 03/18/2021 2000             Ideal Body Weight (IBW) 54.5 kg/120 lb    % Ideal Body Weight 226%        Usual Body Weight UTD   % Usual Body Weight UTD   Wt Change Observation Current Admission:  4/2: CBW consistent with last encounter  3/30: CBW trending down, now negative 1 Liter since admission  3/26: CBW consistent with admit wt  3/24: 10 lb wt change since admit (3.5% change), currently negative 5.6 Liters    PTA:  3/22: Wt fairly stable x 3 years.    Weight Hx    Wt Readings from Last 30 Encounters:   04/02/21 0400 123 kg (271 lb 9.7 oz)   04/01/21 0600 124 kg (272 lb 4.3 oz)   03/31/21 0600 125 kg (276 lb 3.8 oz)   03/30/21 0600 123 kg (270 lb 15.1 oz)   03/29/21 0600 123 kg (270 lb 4.5 oz)   03/27/21 0600 127 kg (280 lb 3.3 oz)   03/26/21 0416 129 kg (284 lb 2.8 oz)   03/25/21 0600 127 kg (279 lb 1.6 oz) "   03/25/21 0426 127 kg (279 lb 1.6 oz)   03/24/21 0540 125 kg (274 lb 14.6 oz)   03/23/21 0600 124 kg (273 lb 2.4 oz)   03/22/21 0543 129 kg (283 lb 11.7 oz)   03/22/21 0500 129 kg (283 lb 11.7 oz)   03/21/21 1352 132 kg (292 lb)   03/21/21 0800 133 kg (292 lb 8.8 oz)   03/20/21 0600 130 kg (287 lb 0.6 oz)   03/19/21 0430 132 kg (291 lb 0.1 oz)   03/18/21 2000 129 kg (284 lb)   03/16/21 1104 129 kg (283 lb 6 oz)   01/20/21 0921 130 kg (286 lb 9.6 oz)   01/14/21 1130 132 kg (290 lb 8 oz)   12/17/20 1239 132 kg (291 lb)   12/11/20 1301 132 kg (292 lb)   12/04/20 1106 132 kg (292 lb)   04/24/20 1000 132 kg (290 lb)   02/18/20 0809 130 kg (286 lb)   02/16/20 0210 125 kg (276 lb 9.6 oz)   02/15/20 2223 131 kg (289 lb 11 oz)   09/13/19 1020 129 kg (284 lb)   08/14/19 1121 130 kg (286 lb 2.5 oz)   08/02/19 1213 131 kg (289 lb)   07/22/19 1427 133 kg (293 lb 8 oz)   06/21/19 1141 132 kg (290 lb)   06/21/19 1044 132 kg (290 lb)   03/08/18 1032 127 kg (280 lb)   09/14/16 1337 (!) 136 kg (300 lb 12.8 oz)   07/18/16 1533 (!) 138 kg (305 lb)   06/22/16 1438 (!) 136 kg (300 lb 3.2 oz)   03/24/16 1304 (!) 139 kg (306 lb)   01/26/16 0943 136 kg (300 lb)        BMI kg/m2 Body mass index is 46.62 kg/m².     Labs/Medications         Pertinent Labs -Elevated BUN  -Low Cr  -Elevated alk phos, LFTs   Results from last 7 days   Lab Units 04/02/21  0514 04/01/21  1627 04/01/21  0409 03/31/21  0352 03/30/21  0425   SODIUM mmol/L 143  --  142 141 144   POTASSIUM mmol/L 3.7 4.5 3.3* 4.2 4.1   CHLORIDE mmol/L 105  --  103 105 108*   CO2 mmol/L 29.0  --  30.0* 27.0 24.0   BUN mg/dL 47*  --  45* 46* 42*   CREATININE mg/dL 0.49*  --  0.57 0.62 0.64   CALCIUM mg/dL 8.7  --  8.0* 8.6 8.7   BILIRUBIN mg/dL 0.5  --   --   --  0.6   ALK PHOS U/L 134*  --   --   --  174*   ALT (SGPT) U/L 35*  --   --   --  40*   AST (SGOT) U/L 31  --   --   --  34*   GLUCOSE mg/dL 126*  --  184* 251* 299*     Results from last 7 days   Lab Units 04/02/21  0514  "04/01/21  0409 03/31/21  0352   MAGNESIUM mg/dL 2.2 2.0 2.3   PHOSPHORUS mg/dL 4.0 3.7 3.8   HEMOGLOBIN g/dL 10.5* 10.6* 10.2*   HEMATOCRIT % 31.6* 32.3* 31.3*   TRIGLYCERIDES mg/dL  --  149  --      COVID19   Date Value Ref Range Status   03/24/2021 Not Detected Not Detected - Ref. Range Final     Lab Results   Component Value Date    HGBA1C 5.7 (H) 03/16/2021         Pertinent Medications Aspirin, lipitor, bumex, peridex, colace, magic mouthwash, prozac, arixtra, neurontin, lantus, admelog, prevacid, synthroid, K-DUR scheduled, predinsone, seroquel, propofol (off), norco PRN     Physical Findings        Overall Physical   Appearance, MSA 4/2: visually appears well nourished, edematous  3/30: visually appears well nourished  3/26: visually appears well nourished and edematous  3/24: RD did not visually observe this encounter -- will monitor COVID-19 results and plan to complete NFPE at next encounter (tomorrow or 3/25)  3/22: Patient visually appears well nourished at visit.    -  Edema  2+ Dependent, Generalized  3+ in right arm & hand  2+ BLE     Gastrointestinal 2 BMs documented on 3/30 (x3 days ago) - on colace, may need other Rx if no BM today     Tubes NG      Oral/Mouth Cavity No known chewing or swallowing issues prior to intubation      Skin Midline sternal incision incision        Estimated/Assessed Needs    Re-estimated 3/30/21    Energy Requirements    Height for Calculation  Height: 162.6 cm (64\")   Weight for Calculation 123 kg (270 lb) - CBW   Method for Estimation  [facility] 2010 (11.9 V in L/min, 37.1 Tmax)    EST Needs (kcal/day) 2060 kcals/day       Protein Requirements    Weight for Calculation 54.6 kg (120 lb) - IBW   EST Protein Needs (g/kg) 2.5 g/kg    EST Daily Needs (g/day) 136 g/day       Fluid Requirements     Estimated Needs (mL/day) 1400 -1900 mL r/t CHF dx per CTS        Fluid Deficit    Current Na Level (mEq/L) -   Desired Na Level (mEq/L) -   Estimated Fluid Deficit (L)  - "     Current Nutrition Orders & Evaluation of Received Nutrient/Fluid Intake       Oral Nutrition     Current PO Diet NPO Diet   Supplement -   PO Evaluation     % PO Intake 3/24 to present (4/2): 0% - NPO    3/22: 0% of meals documented prior to intubation    -  Enteral Nutrition    Enteral Route NGT   TF Modular -   TF Delivery Method Continuous   Current Ordered TF  Impact Peptide 1.5 at 65 mL/hr   Current Ordered H2O flush  60 mL/hr H2O flush per nephrology    TF Observation  Visually observed TF infusing per above order   EN Evaluation     TF Changes Continue current EN regimen    TF Residual WNL    TF Tolerance No reported s/sx of intolerance    Average EN Delivered -       Parenteral Nutrition     TPN Route -   Current Ordered TPN VOL  -   Dextrose (g/kcals)  -   Amino Acid (g/kcals) -   Lipids (mL/Concentration/FREQ)  -   MVI Frequency  -   Trace Element Frequency  -   TPN Observation  -    TPN Evaluation    Total # Days on TPN -   -  Clinical Course    Nutrition Course Details PO Diet     3/18 NPO  3/19 CLD  3/20 Consistent Carb  3/21 to present (4/2) NPO    Nutrition Support   TF started 3/24  Conservatively advanced d/t multiple procedures, possible risk of RFS  TF reached goal 3/27 to present (4/2)     Nutritional Risk Screening        NRS-2002 Score   -       Nutrition Diagnosis         Nutrition Dx Problem 1 Inadequate oral intake r/t respiratory failure requiring intubation AEB NPO diet order      Nutrition Dx Problem 2 -       Intervention Goal         Intervention Goal(s) Patient will tolerate EN      Nutrition Intervention        RD/Tech Action Continue current EN regimen     Nutrition Prescription          Diet Prescription NPO    Supplement Prescription -   -  Enteral Prescription Impact Peptide 1.5 at 65 mL/hr  (x 22 hrs to assume interruptions for ADLs) which provides 2145 kcals (103%), 134 kcals (99%), and 1101 mL free water from TF + 60 mL/hr H2O flush (x22 hrs = 1320 mL/day) = 2421 mL free  H2O+Rx flushes, IVF.    Propofol currently off    Specialized formula used as patient is recently post-op and will likely benefit from immune modulating formula to promote healing.        TPN Prescription -     Monitor/Evaluation        Monitor EN tolerance/ability to advance, I/Os, Labs, BM, Weight, Skin and Medication changes      Electronically signed by:  Jordyn Strickland RD  04/02/21 09:18 EDT

## 2021-04-02 NOTE — PLAN OF CARE
OT re-eval attempted, however pt is vented and not following commands. Not appropriate for OT this date.    Catalina Rushing, OTD, OTR

## 2021-04-02 NOTE — PROGRESS NOTES
" LOS: 15 days   Patient Care Team:  Mary Wilde APRN as PCP - General  Mary Wilde APRN as PCP - Family Medicine  Jose Levi MD as Consulting Physician (Cardiology)  Yovani Lerma MD as Consulting Physician (Nephrology)    Chief Complaint: post op fu    Subjective  Opens eyes to stimuli, nods head appropriately    Vital Signs  Temp:  [97.6 °F (36.4 °C)-98.7 °F (37.1 °C)] 98.6 °F (37 °C)  Heart Rate:  [62-81] 71  Resp:  [22] 22  BP: (102-106)/(46-56) 106/48  Arterial Line BP: ()/(39-78) 124/52  FiO2 (%):  [45 %-50 %] 45 %  Body mass index is 46.62 kg/m².    Intake/Output Summary (Last 24 hours) at 4/2/2021 0926  Last data filed at 4/2/2021 0500  Gross per 24 hour   Intake 2410 ml   Output 2700 ml   Net -290 ml     No intake/output data recorded.          03/31/21  0600 04/01/21  0600 04/02/21  0400   Weight: 125 kg (276 lb 3.8 oz) 124 kg (272 lb 4.3 oz) 123 kg (271 lb 9.7 oz)         Objective:  General Appearance:  Comfortable.    Vital signs: (most recent): Blood pressure 106/48, pulse 71, temperature 98.6 °F (37 °C), temperature source Axillary, resp. rate 22, height 162.6 cm (64\"), weight 123 kg (271 lb 9.7 oz), SpO2 93 %, not currently breastfeeding.    Lungs:  Breath sounds clear to auscultation.  (FiO2 45%)  Heart: Normal rate.  Regular rhythm.  S1 normal and S2 normal.    Abdomen: Abdomen is soft and non-distended.    Extremities: There is dependent edema (right hand 2+, BLE 1+).    Skin:  Warm and dry.  (Sternal incision well approximated without erythema, edema, or drainage)            Results Review:        WBC WBC   Date Value Ref Range Status   04/02/2021 21.20 (H) 3.40 - 10.80 10*3/mm3 Final   04/01/2021 23.20 (H) 3.40 - 10.80 10*3/mm3 Final   03/31/2021 24.00 (H) 3.40 - 10.80 10*3/mm3 Final      HGB Hemoglobin   Date Value Ref Range Status   04/02/2021 10.5 (L) 12.0 - 15.9 g/dL Final   04/01/2021 10.6 (L) 12.0 - 15.9 g/dL Final   03/31/2021 10.2 (L) 12.0 - 15.9 g/dL " Final      HCT Hematocrit   Date Value Ref Range Status   04/02/2021 31.6 (L) 34.0 - 46.6 % Final   04/01/2021 32.3 (L) 34.0 - 46.6 % Final   03/31/2021 31.3 (L) 34.0 - 46.6 % Final      Platelets Platelets   Date Value Ref Range Status   04/02/2021 95 (L) 140 - 450 10*3/mm3 Final   04/01/2021 102 (L) 140 - 450 10*3/mm3 Final   03/31/2021 87 (L) 140 - 450 10*3/mm3 Final        PT/INR:  No results found for: PROTIME/No results found for: INR    Sodium Sodium   Date Value Ref Range Status   04/02/2021 143 136 - 145 mmol/L Final   04/01/2021 142 136 - 145 mmol/L Final   03/31/2021 141 136 - 145 mmol/L Final      Potassium Potassium   Date Value Ref Range Status   04/02/2021 3.7 3.5 - 5.2 mmol/L Final     Comment:     Slight hemolysis detected by analyzer. Results may be affected.   04/01/2021 4.5 3.5 - 5.2 mmol/L Final     Comment:     Result checked    04/01/2021 3.3 (L) 3.5 - 5.2 mmol/L Final   03/31/2021 4.0 3.5 - 5.2 mmol/L Final   03/31/2021 4.2 3.5 - 5.2 mmol/L Final      Chloride Chloride   Date Value Ref Range Status   04/02/2021 105 98 - 107 mmol/L Final   04/01/2021 103 98 - 107 mmol/L Final   03/31/2021 105 98 - 107 mmol/L Final      Bicarbonate CO2   Date Value Ref Range Status   04/02/2021 29.0 22.0 - 29.0 mmol/L Final   04/01/2021 30.0 (H) 22.0 - 29.0 mmol/L Final   03/31/2021 27.0 22.0 - 29.0 mmol/L Final      BUN BUN   Date Value Ref Range Status   04/02/2021 47 (H) 8 - 23 mg/dL Final   04/01/2021 45 (H) 8 - 23 mg/dL Final   03/31/2021 46 (H) 8 - 23 mg/dL Final      Creatinine Creatinine   Date Value Ref Range Status   04/02/2021 0.49 (L) 0.57 - 1.00 mg/dL Final   04/01/2021 0.57 0.57 - 1.00 mg/dL Final   03/31/2021 0.62 0.57 - 1.00 mg/dL Final      Calcium Calcium   Date Value Ref Range Status   04/02/2021 8.7 8.6 - 10.5 mg/dL Final   04/01/2021 8.0 (L) 8.6 - 10.5 mg/dL Final   03/31/2021 8.6 8.6 - 10.5 mg/dL Final      Magnesium Magnesium   Date Value Ref Range Status   04/02/2021 2.2 1.6 - 2.4 mg/dL  Final   2021 2.0 1.6 - 2.4 mg/dL Final   2021 2.3 1.6 - 2.4 mg/dL Final      Troponin No results found for: TROPONIN   BNP No results found for: BNP         acetylcysteine, 3 mL, Nebulization, BID - RT  arformoterol, 15 mcg, Nebulization, BID - RT  aspirin, 81 mg, Oral, Daily  atorvastatin, 40 mg, Oral, Nightly  budesonide, 1 mg, Nebulization, BID - RT  bumetanide, 1 mg, Intravenous, Q8H  chlorhexidine, 15 mL, Mouth/Throat, Q12H  docusate, 100 mg, Oral, BID  First Mouthwash (Magic Mouthwash), 5 mL, Swish & Spit, Q6H  FLUoxetine, 60 mg, Oral, Daily  fondaparinux, 5 mg, Subcutaneous, Q24H  gabapentin, 200 mg, Oral, Q12H  hydrALAZINE, 25 mg, Nasogastric, Q8H  insulin glargine, 20 Units, Subcutaneous, Q12H  insulin lispro, 0-24 Units, Subcutaneous, Q6H  insulin lispro, 5 Units, Subcutaneous, Q6H  ipratropium-albuterol, 3 mL, Nebulization, Q4H - RT  lansoprazole, 30 mg, Oral, QAM  levothyroxine, 100 mcg, Oral, Q AM  metoprolol tartrate, 12.5 mg, Oral, Q12H  nystatin, , Topical, Q12H  potassium chloride, 20 mEq, Oral, TID With Meals  predniSONE, 30 mg, Oral, Daily With Breakfast  QUEtiapine, 25 mg, Oral, Nightly      propofol, 5-30 mcg/kg/min, Last Rate: Stopped (21 0630)        PRN Meds:.•  acetaminophen **OR** acetaminophen **OR** acetaminophen  •  bisacodyl  •  Chlorhexidine Gluconate Cloth  •  dextrose  •  dextrose  •  glucagon (human recombinant)  •  hydrALAZINE  •  HYDROcodone-acetaminophen  •  insulin lispro **AND** insulin lispro  •  ipratropium-albuterol  •  lidocaine  •  lidocaine  •  meclizine  •  midazolam  •  midazolam  •  [] HYDROmorphone **AND** naloxone  •  ondansetron  •  potassium chloride **OR** potassium chloride  •  potassium chloride **OR** potassium chloride    Patient Active Problem List   Diagnosis Code   • Morbidly obese (CMS/[facility]) E66.01   • Dyspnea on exertion R06.00   • Abnormal nuclear stress test R94.39   • Nonrheumatic aortic valve stenosis I35.0   • Prediabetes  R73.03   • Dyslipidemia E78.5   • Essential hypertension I10   • Acute UTI N39.0   • Bipolar disorder (CMS/HCC) F31.9   • COPD (chronic obstructive pulmonary disease) (CMS/HCC) J44.9   • High triglycerides E78.1   • Gastroesophageal reflux disease K21.9   • Rheumatoid arthritis (CMS/HCC) M06.9   • Aortic stenosis, severe I35.0   • Chronic heart failure with preserved ejection fraction (HFpEF) (CMS/HCC) I50.32   • Aortic valve stenosis I35.0       Assessment & Plan    Severe aortic stenosis--s/p tissue AVR (Artem)----POD #15  Postop acute hypoxic resp failure--re-intubated 3/22, CHRISTIANO normal LV/RV fxn, hypertrophic LV  Postop TCP/HIT positive-- Arixtra  Right subclavian/axillary/brachial DVT-- Arixtra, keep at 5 mg dose for now per Dr. Mcgill 3/30/21  Chronic HFpEF--maximize meds prior to d/c  COPD--nebs, pulmicort  Rheumatoid arthritis--not on medication per patient, SONYA negative  HTN--stable  HLD--statin  Bipolar disorder--home Prozac  Obesity stage 3  PVD  KARLY, Cor pulmonale  Vertigo--home Antivert  Tobacco abuse  Postop leukocytosis, likely r/t atelectasis--aggressive pulm toileting, bronch 3/25 with negative cultures, completed 5 days of Augmentin and weaning steroids    Metabolic alkalosis today, CVP 11, diuretics per real.  PaO2 continues to improve, wean down FiO2, wean trial again today.      SARA Haney  04/02/21  09:26 EDT

## 2021-04-03 ENCOUNTER — APPOINTMENT (OUTPATIENT)
Dept: GENERAL RADIOLOGY | Facility: HOSPITAL | Age: 71
End: 2021-04-03

## 2021-04-03 LAB
ALBUMIN SERPL-MCNC: 2.7 G/DL (ref 3.5–5.2)
ALBUMIN/GLOB SERPL: 1.2 G/DL
ALP SERPL-CCNC: 132 U/L (ref 39–117)
ALT SERPL W P-5'-P-CCNC: 31 U/L (ref 1–33)
ANION GAP SERPL CALCULATED.3IONS-SCNC: 8 MMOL/L (ref 5–15)
ARTERIAL PATENCY WRIST A: ABNORMAL
ARTERIAL PATENCY WRIST A: ABNORMAL
ARTERIAL PATENCY WRIST A: POSITIVE
AST SERPL-CCNC: 25 U/L (ref 1–32)
ATMOSPHERIC PRESS: ABNORMAL MM[HG]
BASE EXCESS BLDA CALC-SCNC: 5.5 MMOL/L (ref 0–3)
BASE EXCESS BLDA CALC-SCNC: 5.6 MMOL/L (ref 0–3)
BASE EXCESS BLDA CALC-SCNC: 6.4 MMOL/L (ref 0–3)
BDY SITE: ABNORMAL
BILIRUB SERPL-MCNC: 0.5 MG/DL (ref 0–1.2)
BUN SERPL-MCNC: 48 MG/DL (ref 8–23)
BUN/CREAT SERPL: 94.1 (ref 7–25)
CA-I SERPL ISE-MCNC: 1.21 MMOL/L (ref 1.2–1.3)
CALCIUM SPEC-SCNC: 8.8 MG/DL (ref 8.6–10.5)
CHLORIDE SERPL-SCNC: 104 MMOL/L (ref 98–107)
CO2 BLDA-SCNC: 30.8 MMOL/L (ref 22–29)
CO2 BLDA-SCNC: 31.5 MMOL/L (ref 22–29)
CO2 BLDA-SCNC: 31.6 MMOL/L (ref 22–29)
CO2 SERPL-SCNC: 30 MMOL/L (ref 22–29)
CREAT SERPL-MCNC: 0.51 MG/DL (ref 0.57–1)
DEPRECATED RDW RBC AUTO: 47.7 FL (ref 37–54)
ERYTHROCYTE [DISTWIDTH] IN BLOOD BY AUTOMATED COUNT: 14.8 % (ref 12.3–15.4)
GFR SERPL CREATININE-BSD FRML MDRD: 119 ML/MIN/1.73
GLOBULIN UR ELPH-MCNC: 2.2 GM/DL
GLUCOSE BLDC GLUCOMTR-MCNC: 113 MG/DL (ref 70–105)
GLUCOSE BLDC GLUCOMTR-MCNC: 142 MG/DL (ref 70–105)
GLUCOSE BLDC GLUCOMTR-MCNC: 164 MG/DL (ref 70–105)
GLUCOSE BLDC GLUCOMTR-MCNC: 189 MG/DL (ref 70–105)
GLUCOSE BLDC GLUCOMTR-MCNC: 190 MG/DL (ref 70–105)
GLUCOSE BLDC GLUCOMTR-MCNC: 216 MG/DL (ref 70–105)
GLUCOSE SERPL-MCNC: 151 MG/DL (ref 65–99)
HCO3 BLDA-SCNC: 29.6 MMOL/L (ref 21–28)
HCO3 BLDA-SCNC: 30.1 MMOL/L (ref 21–28)
HCO3 BLDA-SCNC: 30.4 MMOL/L (ref 21–28)
HCT VFR BLD AUTO: 30.5 % (ref 34–46.6)
HEMODILUTION: NO
HGB BLD-MCNC: 10.2 G/DL (ref 12–15.9)
INHALED O2 CONCENTRATION: 40 %
MAGNESIUM SERPL-MCNC: 2.3 MG/DL (ref 1.6–2.4)
MCH RBC QN AUTO: 30.4 PG (ref 26.6–33)
MCHC RBC AUTO-ENTMCNC: 33.5 G/DL (ref 31.5–35.7)
MCV RBC AUTO: 90.8 FL (ref 79–97)
MODALITY: ABNORMAL
PCO2 BLDA: 40.2 MM HG (ref 35–48)
PCO2 BLDA: 40.3 MM HG (ref 35–48)
PCO2 BLDA: 42.8 MM HG (ref 35–48)
PEEP RESPIRATORY: 7 CM[H2O]
PEEP RESPIRATORY: 7 CM[H2O]
PH BLDA: 7.46 PH UNITS (ref 7.35–7.45)
PH BLDA: 7.47 PH UNITS (ref 7.35–7.45)
PH BLDA: 7.49 PH UNITS (ref 7.35–7.45)
PHOSPHATE SERPL-MCNC: 3.8 MG/DL (ref 2.5–4.5)
PLATELET # BLD AUTO: 95 10*3/MM3 (ref 140–450)
PMV BLD AUTO: 10.3 FL (ref 6–12)
PO2 BLDA: 56.7 MM HG (ref 83–108)
PO2 BLDA: 70.9 MM HG (ref 83–108)
PO2 BLDA: 74.1 MM HG (ref 83–108)
POTASSIUM SERPL-SCNC: 4 MMOL/L (ref 3.5–5.2)
PROT SERPL-MCNC: 4.9 G/DL (ref 6–8.5)
RBC # BLD AUTO: 3.36 10*6/MM3 (ref 3.77–5.28)
RESPIRATORY RATE: 22
SAO2 % BLDCOA: 90.9 % (ref 94–98)
SAO2 % BLDCOA: 95.2 % (ref 94–98)
SAO2 % BLDCOA: 95.3 % (ref 94–98)
SODIUM SERPL-SCNC: 142 MMOL/L (ref 136–145)
VENTILATOR MODE: ABNORMAL
VENTILATOR MODE: ABNORMAL
VT ON VENT VENT: 500 ML
WBC # BLD AUTO: 19.1 10*3/MM3 (ref 3.4–10.8)

## 2021-04-03 PROCEDURE — 80053 COMPREHEN METABOLIC PANEL: CPT | Performed by: INTERNAL MEDICINE

## 2021-04-03 PROCEDURE — 83735 ASSAY OF MAGNESIUM: CPT | Performed by: INTERNAL MEDICINE

## 2021-04-03 PROCEDURE — 94799 UNLISTED PULMONARY SVC/PX: CPT

## 2021-04-03 PROCEDURE — 63710000001 PREDNISONE PER 1 MG: Performed by: INTERNAL MEDICINE

## 2021-04-03 PROCEDURE — 63710000001 INSULIN GLARGINE PER 5 UNITS: Performed by: NURSE PRACTITIONER

## 2021-04-03 PROCEDURE — 84100 ASSAY OF PHOSPHORUS: CPT | Performed by: INTERNAL MEDICINE

## 2021-04-03 PROCEDURE — 94003 VENT MGMT INPAT SUBQ DAY: CPT

## 2021-04-03 PROCEDURE — 99233 SBSQ HOSP IP/OBS HIGH 50: CPT | Performed by: INTERNAL MEDICINE

## 2021-04-03 PROCEDURE — 99024 POSTOP FOLLOW-UP VISIT: CPT | Performed by: THORACIC SURGERY (CARDIOTHORACIC VASCULAR SURGERY)

## 2021-04-03 PROCEDURE — 25010000002 PROPOFOL 10 MG/ML EMULSION: Performed by: THORACIC SURGERY (CARDIOTHORACIC VASCULAR SURGERY)

## 2021-04-03 PROCEDURE — 82803 BLOOD GASES ANY COMBINATION: CPT

## 2021-04-03 PROCEDURE — 85027 COMPLETE CBC AUTOMATED: CPT | Performed by: NURSE PRACTITIONER

## 2021-04-03 PROCEDURE — 63710000001 INSULIN LISPRO (HUMAN) PER 5 UNITS: Performed by: INTERNAL MEDICINE

## 2021-04-03 PROCEDURE — 36600 WITHDRAWAL OF ARTERIAL BLOOD: CPT

## 2021-04-03 PROCEDURE — 25010000002 FONDAPARINUX PER 0.5 MG: Performed by: THORACIC SURGERY (CARDIOTHORACIC VASCULAR SURGERY)

## 2021-04-03 PROCEDURE — 25010000002 CHLOROTHIAZIDE PER 500 MG: Performed by: INTERNAL MEDICINE

## 2021-04-03 PROCEDURE — 82962 GLUCOSE BLOOD TEST: CPT

## 2021-04-03 PROCEDURE — 63710000001 INSULIN LISPRO (HUMAN) PER 5 UNITS: Performed by: NURSE PRACTITIONER

## 2021-04-03 PROCEDURE — 82330 ASSAY OF CALCIUM: CPT | Performed by: INTERNAL MEDICINE

## 2021-04-03 PROCEDURE — 71045 X-RAY EXAM CHEST 1 VIEW: CPT

## 2021-04-03 RX ADMIN — QUETIAPINE FUMARATE 25 MG: 25 TABLET ORAL at 20:10

## 2021-04-03 RX ADMIN — ACETYLCYSTEINE 4 ML: 200 SOLUTION ORAL; RESPIRATORY (INHALATION) at 06:32

## 2021-04-03 RX ADMIN — IPRATROPIUM BROMIDE AND ALBUTEROL SULFATE 3 ML: 2.5; .5 SOLUTION RESPIRATORY (INHALATION) at 19:20

## 2021-04-03 RX ADMIN — BUMETANIDE 1 MG: 1 TABLET ORAL at 09:09

## 2021-04-03 RX ADMIN — IPRATROPIUM BROMIDE AND ALBUTEROL SULFATE 3 ML: 2.5; .5 SOLUTION RESPIRATORY (INHALATION) at 14:04

## 2021-04-03 RX ADMIN — INSULIN LISPRO 5 UNITS: 100 INJECTION, SOLUTION INTRAVENOUS; SUBCUTANEOUS at 17:14

## 2021-04-03 RX ADMIN — INSULIN LISPRO 8 UNITS: 100 INJECTION, SOLUTION INTRAVENOUS; SUBCUTANEOUS at 12:25

## 2021-04-03 RX ADMIN — IPRATROPIUM BROMIDE AND ALBUTEROL SULFATE 3 ML: 2.5; .5 SOLUTION RESPIRATORY (INHALATION) at 22:56

## 2021-04-03 RX ADMIN — METOPROLOL TARTRATE 12.5 MG: 25 TABLET, FILM COATED ORAL at 20:10

## 2021-04-03 RX ADMIN — ARFORMOTEROL TARTRATE 15 MCG: 15 SOLUTION RESPIRATORY (INHALATION) at 06:32

## 2021-04-03 RX ADMIN — INSULIN LISPRO 5 UNITS: 100 INJECTION, SOLUTION INTRAVENOUS; SUBCUTANEOUS at 06:05

## 2021-04-03 RX ADMIN — INSULIN GLARGINE 20 UNITS: 100 INJECTION, SOLUTION SUBCUTANEOUS at 20:11

## 2021-04-03 RX ADMIN — ARFORMOTEROL TARTRATE 15 MCG: 15 SOLUTION RESPIRATORY (INHALATION) at 19:35

## 2021-04-03 RX ADMIN — BUMETANIDE 1 MG: 1 TABLET ORAL at 20:10

## 2021-04-03 RX ADMIN — DIPHENHYDRAMINE HYDROCHLORIDE AND LIDOCAINE HYDROCHLORIDE AND ALUMINUM HYDROXIDE AND MAGNESIUM HYDRO 5 ML: KIT at 06:05

## 2021-04-03 RX ADMIN — DIPHENHYDRAMINE HYDROCHLORIDE AND LIDOCAINE HYDROCHLORIDE AND ALUMINUM HYDROXIDE AND MAGNESIUM HYDRO 5 ML: KIT at 17:14

## 2021-04-03 RX ADMIN — POTASSIUM CHLORIDE 20 MEQ: 1.5 POWDER, FOR SOLUTION ORAL at 12:38

## 2021-04-03 RX ADMIN — LANSOPRAZOLE 30 MG: KIT at 06:12

## 2021-04-03 RX ADMIN — BUDESONIDE 1 MG: 0.5 SUSPENSION RESPIRATORY (INHALATION) at 06:32

## 2021-04-03 RX ADMIN — HYDROCODONE BITARTRATE AND ACETAMINOPHEN 1 TABLET: 5; 325 TABLET ORAL at 17:14

## 2021-04-03 RX ADMIN — DOCUSATE SODIUM 100 MG: 50 LIQUID ORAL at 20:10

## 2021-04-03 RX ADMIN — BUDESONIDE 1 MG: 0.5 SUSPENSION RESPIRATORY (INHALATION) at 19:29

## 2021-04-03 RX ADMIN — HYDRALAZINE HYDROCHLORIDE 25 MG: 25 TABLET ORAL at 14:37

## 2021-04-03 RX ADMIN — NYSTATIN: 100000 POWDER TOPICAL at 09:09

## 2021-04-03 RX ADMIN — INSULIN LISPRO 4 UNITS: 100 INJECTION, SOLUTION INTRAVENOUS; SUBCUTANEOUS at 17:13

## 2021-04-03 RX ADMIN — DIPHENHYDRAMINE HYDROCHLORIDE AND LIDOCAINE HYDROCHLORIDE AND ALUMINUM HYDROXIDE AND MAGNESIUM HYDRO 5 ML: KIT at 12:38

## 2021-04-03 RX ADMIN — IPRATROPIUM BROMIDE AND ALBUTEROL SULFATE 3 ML: 2.5; .5 SOLUTION RESPIRATORY (INHALATION) at 11:54

## 2021-04-03 RX ADMIN — CHLORHEXIDINE GLUCONATE 15 ML: 1.2 RINSE ORAL at 20:10

## 2021-04-03 RX ADMIN — PREDNISONE 30 MG: 20 TABLET ORAL at 09:07

## 2021-04-03 RX ADMIN — HYDRALAZINE HYDROCHLORIDE 25 MG: 25 TABLET ORAL at 20:12

## 2021-04-03 RX ADMIN — HYDRALAZINE HYDROCHLORIDE 25 MG: 25 TABLET ORAL at 06:05

## 2021-04-03 RX ADMIN — FLUOXETINE 60 MG: 20 CAPSULE ORAL at 09:07

## 2021-04-03 RX ADMIN — BUMETANIDE 1 MG: 1 TABLET ORAL at 17:03

## 2021-04-03 RX ADMIN — GABAPENTIN 200 MG: 100 CAPSULE ORAL at 09:08

## 2021-04-03 RX ADMIN — ACETYLCYSTEINE 3 ML: 200 SOLUTION ORAL; RESPIRATORY (INHALATION) at 19:20

## 2021-04-03 RX ADMIN — HYDROCODONE BITARTRATE AND ACETAMINOPHEN 1 TABLET: 5; 325 TABLET ORAL at 09:07

## 2021-04-03 RX ADMIN — IPRATROPIUM BROMIDE AND ALBUTEROL SULFATE 3 ML: 2.5; .5 SOLUTION RESPIRATORY (INHALATION) at 03:07

## 2021-04-03 RX ADMIN — PROPOFOL 3.02 MCG/KG/MIN: 10 INJECTION, EMULSION INTRAVENOUS at 05:01

## 2021-04-03 RX ADMIN — FONDAPARINUX SODIUM 5 MG: 5 INJECTION, SOLUTION SUBCUTANEOUS at 12:38

## 2021-04-03 RX ADMIN — POTASSIUM CHLORIDE 20 MEQ: 1500 TABLET, EXTENDED RELEASE ORAL at 09:08

## 2021-04-03 RX ADMIN — CHLORHEXIDINE GLUCONATE 15 ML: 1.2 RINSE ORAL at 09:08

## 2021-04-03 RX ADMIN — NYSTATIN: 100000 POWDER TOPICAL at 20:12

## 2021-04-03 RX ADMIN — METOPROLOL TARTRATE 12.5 MG: 25 TABLET, FILM COATED ORAL at 09:08

## 2021-04-03 RX ADMIN — IPRATROPIUM BROMIDE AND ALBUTEROL SULFATE 3 ML: 2.5; .5 SOLUTION RESPIRATORY (INHALATION) at 06:32

## 2021-04-03 RX ADMIN — CHLOROTHIAZIDE SODIUM 250 MG: 500 INJECTION, POWDER, LYOPHILIZED, FOR SOLUTION INTRAVENOUS at 12:47

## 2021-04-03 RX ADMIN — ASPIRIN 81 MG: 81 TABLET, COATED ORAL at 09:08

## 2021-04-03 RX ADMIN — DOCUSATE SODIUM 100 MG: 50 LIQUID ORAL at 09:08

## 2021-04-03 RX ADMIN — ATORVASTATIN CALCIUM 40 MG: 40 TABLET, FILM COATED ORAL at 20:11

## 2021-04-03 RX ADMIN — LEVOTHYROXINE SODIUM 100 MCG: 0.1 TABLET ORAL at 06:05

## 2021-04-03 RX ADMIN — INSULIN LISPRO 5 UNITS: 100 INJECTION, SOLUTION INTRAVENOUS; SUBCUTANEOUS at 12:26

## 2021-04-03 RX ADMIN — POTASSIUM CHLORIDE 20 MEQ: 1500 TABLET, EXTENDED RELEASE ORAL at 17:14

## 2021-04-03 RX ADMIN — GABAPENTIN 200 MG: 100 CAPSULE ORAL at 20:10

## 2021-04-03 RX ADMIN — INSULIN GLARGINE 20 UNITS: 100 INJECTION, SOLUTION SUBCUTANEOUS at 09:09

## 2021-04-03 NOTE — PLAN OF CARE
Problem: Communication Impairment (Mechanical Ventilation, Invasive)  Goal: Effective Communication  Outcome: Ongoing, Progressing  Intervention: Ensure Effective Communication  Flowsheets (Taken 4/3/2021 8500)  Communication Enhancement Strategies:   call light answered in person   family/caregiver assisted with communication   healthcare instructions adapted   written communication utilized     Problem: Device-Related Complication Risk (Mechanical Ventilation, Invasive)  Goal: Optimal Device Function  Outcome: Ongoing, Progressing     Problem: Nutrition Impairment (Mechanical Ventilation, Invasive)  Goal: Optimal Nutrition Delivery  Outcome: Ongoing, Progressing   Goal Outcome Evaluation:        Outcome Summary: pt AOX3. On 15 L high flow nasal canula. Sinus rhythm blood pressure stable. Central line and chest tubes removed. Does well mobilization with 2 person assist. Only needs two people because of the lines. Pt poorly motivated with IS.

## 2021-04-03 NOTE — NURSING NOTE
Weaning trial attempted today. 6 am CPAP trial started. At 11 am weaning parameters were assessed (ABG/NIF). Pt placed on lower pressure support requirements for 2 hours. She was then placed of T-piece for 2 hours. After 2 hours ABG collected and was unsatisfactory for extubation. Pulmonologist notified and ordered to place pt back on vent settings.  at bedside and updated. Pt showed disappointment, but was educated on the reason for the decision. Family expressed understanding.

## 2021-04-03 NOTE — PROGRESS NOTES
"PULMONARY CRITICAL CARE Progress  NOTE      PATIENT IDENTIFICATION:  Name: Claudia Rust  MRN: [id number]  :  1950     Age: 70 y.o.  Sex: female    DATE OF Note:  4/3/2021   Referring Physician: Olaf Mcgill MD                  Subjective:   On vent, sedation off   Tolerating pressure support trial  Tolerating tube feeds at goal  Patient denies chest pain or abdominal pain  No pressors   No changes bowel habits  No new rash or lesions    Minimal secretions    Objective:  tMax 24 hrs: Temp (24hrs), Av.6 °F (36.4 °C), Min:97.4 °F (36.3 °C), Max:97.8 °F (36.6 °C)      Vitals Ranges:   Temp:  [97.4 °F (36.3 °C)-97.8 °F (36.6 °C)] 97.4 °F (36.3 °C)  Heart Rate:  [64-79] 68  Resp:  [22-27] 22  BP: (113-122)/(56-60) 122/60  Arterial Line BP: ()/(48-62) 121/60  FiO2 (%):  [40 %] 40 %    Intake and Output Last 3 Shifts:   I/O last 3 completed shifts:  In: 3804.8 [I.V.:221.8; Other:1735; NG/GT:1848]  Out: 4600 [Urine:4600]    Exam:  /60   Pulse 68   Temp 97.4 °F (36.3 °C) (Oral)   Resp 22   Ht 162.6 cm (64\")   Wt 125 kg (275 lb 12.7 oz)   SpO2 96%   BMI 47.34 kg/m²     General Appearance: On vent.  Awake with sedation off  HEENT:  Normocephalic, without obvious abnormality, Conjunctiva/corneas clear.  Normal external ear canals, Nares normal, no drainage.  Oral ET tube.  Right nare Dobbhoff tube  Neck:  Supple, symmetrical, trachea midline. No JVD.  Left IJ central line  Lungs /Chest wall:   Mild scattered rhonchi, no wheezing, no crackles, diminished bases, respirations unlabored symmetrical wall movement.   Midsternal dressing intact  Heart: Sinus rhythm, no murmur or gallop  Abdomen: Soft, non-distended, active bowel sounds.  No rebound or guarding.  Morbid body habitus  Extremities: + Generalized edema, no clubbing or cyanosis        Medications:    Current Facility-Administered Medications:   •  acetaminophen (TYLENOL) tablet 650 mg, 650 mg, Oral, Q4H PRN **OR** acetaminophen " (TYLENOL) 160 MG/5ML solution 650 mg, 650 mg, Oral, Q4H PRN, 650 mg at 03/26/21 0801 **OR** acetaminophen (TYLENOL) suppository 650 mg, 650 mg, Rectal, Q4H PRN, SatterClem Groveitha L, APRN  •  acetylcysteine (MUCOMYST) 20 % nebulizer solution 3 mL, 3 mL, Nebulization, BID - RT, Shanell Hernandez MD, 4 mL at 04/03/21 0632  •  arformoterol (BROVANA) nebulizer solution 15 mcg, 15 mcg, Nebulization, BID - RT, Shanell Hernandez MD, 15 mcg at 04/03/21 0632  •  aspirin EC tablet 81 mg, 81 mg, Oral, Daily, Sattermc-Janna Porter, APRN, 81 mg at 04/03/21 0908  •  atorvastatin (LIPITOR) tablet 40 mg, 40 mg, Oral, Nightly, Janna Alberto, APRN, 40 mg at 04/02/21 2007  •  bisacodyl (DULCOLAX) suppository 10 mg, 10 mg, Rectal, Daily PRN, Alejandro-Geovany Portera ARIELLE, APRN, 10 mg at 03/26/21 1234  •  budesonide (PULMICORT) nebulizer solution 1 mg, 1 mg, Nebulization, BID - RT, Shanell Hernandez MD, 1 mg at 04/03/21 0632  •  bumetanide (BUMEX) tablet 1 mg, 1 mg, Nasogastric, TID, Paola Hammer MD, 1 mg at 04/03/21 0909  •  chlorhexidine (PERIDEX) 0.12 % solution 15 mL, 15 mL, Mouth/Throat, Q12H, Janna Alberto APRN, 15 mL at 04/03/21 0908  •  Chlorhexidine Gluconate Cloth 2 % pads 1 application, 1 application, Topical, Q12H PRN, Monika Conn APRN  •  dextrose (D50W) 25 g/ 50mL Intravenous Solution 25 g, 25 g, Intravenous, Q15 Min PRN, Olaf Mcgill MD  •  dextrose (GLUTOSE) oral gel 15 g, 15 g, Oral, Q15 Min PRN, Olaf Mcgill MD  •  docusate (COLACE) 50 MG/5ML liquid 100 mg, 100 mg, Oral, BID, Janna Alberto APRN, 100 mg at 04/03/21 0908  •  First Mouthwash (Magic Mouthwash) 5 mL, 5 mL, Swish & Spit, Q6H, Shanell Hernandez MD, 5 mL at 04/03/21 1238  •  FLUoxetine (PROzac) capsule 60 mg, 60 mg, Oral, Daily, Belkis Loving, SARA, 60 mg at 04/03/21 0907  •  fondaparinux (ARIXTRA) injection 5 mg, 5 mg, Subcutaneous, Q24H, Olaf Mcgill MD, 5 mg at 04/03/21  1238  •  gabapentin (NEURONTIN) capsule 200 mg, 200 mg, Oral, Q12H, Janna Alberto, APRN, 200 mg at 04/03/21 0908  •  glucagon (human recombinant) (GLUCAGEN DIAGNOSTIC) injection 1 mg, 1 mg, Subcutaneous, Q15 Min PRN, Olaf Mcgill MD  •  hydrALAZINE (APRESOLINE) injection 10 mg, 10 mg, Intravenous, Q6H PRN, Juan Jose Skelton MD, 10 mg at 03/27/21 2251  •  hydrALAZINE (APRESOLINE) tablet 25 mg, 25 mg, Nasogastric, Q8H, Paola Hammer MD, 25 mg at 04/03/21 1437  •  HYDROcodone-acetaminophen (NORCO) 5-325 MG per tablet 1 tablet, 1 tablet, Oral, Q4H PRN, Janna Alberto APRN, 1 tablet at 04/03/21 0907  •  insulin glargine (LANTUS, SEMGLEE) injection 20 Units, 20 Units, Subcutaneous, Q12H, Gilles Brady APRN, 20 Units at 04/03/21 0909  •  insulin lispro (ADMELOG) injection 0-24 Units, 0-24 Units, Subcutaneous, Q6H, 8 Units at 04/03/21 1225 **AND** insulin lispro (ADMELOG) injection 0-24 Units, 0-24 Units, Subcutaneous, PRN, Glenn Reyes MD  •  insulin lispro (ADMELOG) injection 5 Units, 5 Units, Subcutaneous, Q6H, Belkis Loving, APRN, 5 Units at 04/03/21 1226  •  ipratropium-albuterol (DUO-NEB) nebulizer solution 3 mL, 3 mL, Nebulization, Q4H PRN, Janna Alberto APRN, 3 mL at 03/18/21 1522  •  ipratropium-albuterol (DUO-NEB) nebulizer solution 3 mL, 3 mL, Nebulization, Q4H - RT, Day, Lorna, APRN, 3 mL at 04/03/21 1404  •  lansoprazole (FIRST) oral suspension 30 mg, 30 mg, Oral, QAM, Day, CAMERON RothmanN, 30 mg at 04/03/21 0612  •  levothyroxine (SYNTHROID, LEVOTHROID) tablet 100 mcg, 100 mcg, Oral, Q AM, Olaf Mcgill MD, 100 mcg at 04/03/21 0605  •  lidocaine (XYLOCAINE) 2% injection, , , PRN, Glenn Reyes MD, 4 mL at 03/25/21 1439  •  lidocaine (XYLOCAINE) 5 % ointment, , , PRN, Glenn Reyes MD, 1 application at 03/25/21 1439  •  meclizine (ANTIVERT) tablet 25 mg, 25 mg, Oral, TID PRN, Belkis Loving APRN, 25 mg at 03/20/21 0836  •  metoprolol tartrate  (LOPRESSOR) half tablet 12.5 mg, 12.5 mg, Oral, Q12H, Jose Levi MD, 12.5 mg at 21 0908  •  midazolam (VERSED) injection 1 mg, 1 mg, Intravenous, Q2H PRN, Shanell Hernandez MD  •  midazolam (VERSED) injection 2 mg, 2 mg, Intravenous, Q2H PRN, Shanell Hernandez MD  •  [] HYDROmorphone (DILAUDID) injection 0.5 mg, 0.5 mg, Intravenous, Q2H PRN, 0.5 mg at 21 0825 **AND** naloxone (NARCAN) injection 0.4 mg, 0.4 mg, Intravenous, Q5 Min PRN, Satterly-German, Janna L, APRN  •  nystatin (MYCOSTATIN) powder, , Topical, Q12H, Draw, MD Glenn, Given at 21 0909  •  ondansetron (ZOFRAN) injection 4 mg, 4 mg, Intravenous, Q6H PRN, Satterly-German, Janna L, APRN, 4 mg at 21 1356  •  potassium chloride (K-DUR,KLOR-CON) CR tablet 20 mEq, 20 mEq, Oral, PRN, 20 mEq at 21 0550 **OR** potassium chloride (KLOR-CON) packet 20 mEq, 20 mEq, Oral, PRN, Satterly-German, Janna L, APRN, 20 mEq at 21 1238  •  potassium chloride (K-DUR,KLOR-CON) CR tablet 20 mEq, 20 mEq, Oral, TID With Meals, Satterly-German, Janna L, APRN, 20 mEq at 21 0908  •  potassium chloride 10 mEq in 100 mL IVPB, 10 mEq, Intravenous, Q1H PRN **OR** potassium chloride 10 mEq in 100 mL IVPB, 10 mEq, Intravenous, Q1H PRN, Satterly-German, Janna L, APRN, Last Rate: 200 mL/hr at 21 0726, 10 mEq at 21 0726  •  predniSONE (DELTASONE) tablet 30 mg, 30 mg, Oral, Daily With Breakfast, Shanell Hernandez MD, 30 mg at 21 0907  •  propofol (DIPRIVAN) infusion 10 mg/mL 100 mL, 5-30 mcg/kg/min, Intravenous, Titrated, Olaf Mcgill MD, Stopped at 21 0550  •  QUEtiapine (SEROquel) tablet 25 mg, 25 mg, Oral, Nightly, Day, CAMERON RothmanN, 25 mg at 21    Data Review:  All labs (24hrs):   Recent Results (from the past 24 hour(s))   POC Glucose Once    Collection Time: 21  4:49 PM    Specimen: Blood   Result Value Ref Range    Glucose 233 (H) 70 - 105 mg/dL   POC Glucose Once     Collection Time: 04/02/21 11:47 PM    Specimen: Blood   Result Value Ref Range    Glucose 120 (H) 70 - 105 mg/dL   CBC (No Diff)    Collection Time: 04/03/21  3:48 AM    Specimen: Blood   Result Value Ref Range    WBC 19.10 (H) 3.40 - 10.80 10*3/mm3    RBC 3.36 (L) 3.77 - 5.28 10*6/mm3    Hemoglobin 10.2 (L) 12.0 - 15.9 g/dL    Hematocrit 30.5 (L) 34.0 - 46.6 %    MCV 90.8 79.0 - 97.0 fL    MCH 30.4 26.6 - 33.0 pg    MCHC 33.5 31.5 - 35.7 g/dL    RDW 14.8 12.3 - 15.4 %    RDW-SD 47.7 37.0 - 54.0 fl    MPV 10.3 6.0 - 12.0 fL    Platelets 95 (L) 140 - 450 10*3/mm3   Comprehensive Metabolic Panel    Collection Time: 04/03/21  3:48 AM    Specimen: Blood   Result Value Ref Range    Glucose 151 (H) 65 - 99 mg/dL    BUN 48 (H) 8 - 23 mg/dL    Creatinine 0.51 (L) 0.57 - 1.00 mg/dL    Sodium 142 136 - 145 mmol/L    Potassium 4.0 3.5 - 5.2 mmol/L    Chloride 104 98 - 107 mmol/L    CO2 30.0 (H) 22.0 - 29.0 mmol/L    Calcium 8.8 8.6 - 10.5 mg/dL    Total Protein 4.9 (L) 6.0 - 8.5 g/dL    Albumin 2.70 (L) 3.50 - 5.20 g/dL    ALT (SGPT) 31 1 - 33 U/L    AST (SGOT) 25 1 - 32 U/L    Alkaline Phosphatase 132 (H) 39 - 117 U/L    Total Bilirubin 0.5 0.0 - 1.2 mg/dL    eGFR Non African Amer 119 >60 mL/min/1.73    Globulin 2.2 gm/dL    A/G Ratio 1.2 g/dL    BUN/Creatinine Ratio 94.1 (H) 7.0 - 25.0    Anion Gap 8.0 5.0 - 15.0 mmol/L   Magnesium    Collection Time: 04/03/21  3:48 AM    Specimen: Blood   Result Value Ref Range    Magnesium 2.3 1.6 - 2.4 mg/dL   Phosphorus    Collection Time: 04/03/21  3:48 AM    Specimen: Blood   Result Value Ref Range    Phosphorus 3.8 2.5 - 4.5 mg/dL   Calcium, Ionized    Collection Time: 04/03/21  3:48 AM    Specimen: Blood   Result Value Ref Range    Ionized Calcium 1.21 1.20 - 1.30 mmol/L   Blood Gas, Arterial -    Collection Time: 04/03/21  3:49 AM    Specimen: Arterial Blood   Result Value Ref Range    Site Arterial Line     Thiago's Test Positive     pH, Arterial 7.486 (H) 7.350 - 7.450 pH units     pCO2, Arterial 40.2 35.0 - 48.0 mm Hg    pO2, Arterial 70.9 (L) 83.0 - 108.0 mm Hg    HCO3, Arterial 30.4 (H) 21.0 - 28.0 mmol/L    Base Excess, Arterial 6.4 (H) 0.0 - 3.0 mmol/L    O2 Saturation, Arterial 95.2 94.0 - 98.0 %    CO2 Content 31.6 (H) 22 - 29 mmol/L    Barometric Pressure for Blood Gas      Modality Adult Vent     FIO2 40 %    Ventilator Mode ;AC     Set Tidal Volume 500     PEEP 7     Hemodilution No     Respiratory Rate 22    POC Glucose Once    Collection Time: 04/03/21  5:39 AM    Specimen: Blood   Result Value Ref Range    Glucose 142 (H) 70 - 105 mg/dL   Blood Gas, Arterial -    Collection Time: 04/03/21 11:45 AM    Specimen: Arterial Blood   Result Value Ref Range    Site Arterial Line     Thiago's Test N/A     pH, Arterial 7.455 (H) 7.350 - 7.450 pH units    pCO2, Arterial 42.8 35.0 - 48.0 mm Hg    pO2, Arterial 74.1 (L) 83.0 - 108.0 mm Hg    HCO3, Arterial 30.1 (H) 21.0 - 28.0 mmol/L    Base Excess, Arterial 5.6 (H) 0.0 - 3.0 mmol/L    O2 Saturation, Arterial 95.3 94.0 - 98.0 %    CO2 Content 31.5 (H) 22 - 29 mmol/L    Barometric Pressure for Blood Gas      Modality Adult Vent     FIO2 40 %    Ventilator Mode ;CPAP/PS     PEEP 7     Hemodilution No    POC Glucose Once    Collection Time: 04/03/21 12:30 PM    Specimen: Blood   Result Value Ref Range    Glucose 216 (H) 70 - 105 mg/dL        Imaging:  XR Chest 1 View  Narrative: DATE OF EXAM:  4/3/2021 5:02 AM     PROCEDURE:  XR CHEST 1 VW-     INDICATIONS:  Hypoxia, heart disease, acute respiratory failure, COPD.      COMPARISON:  04/02/2020.     TECHNIQUE:   Single radiographic view of the chest was obtained.     FINDINGS:  There is a stable endotracheal tube and left internal jugular line in  place. The Dobbhoff feeding tube tip projects out of the field-of-view.  The heart is enlarged with postsurgical changes apparent. There is a  stable left basilar pleural effusion. There is bilateral mixed  interstitial/airspace disease with no significant  interval change. I  would favor multifocal pneumonia over pulmonary edema.  There is no  pneumothorax.     Impression: No interval change from yesterday's study with abnormalities as  described above.     Electronically Signed By-Suhas Donald MD On:4/3/2021 9:53 AM  This report was finalized on [id number] by  Suhas Donald MD.       ASSESSMENT:  Acute hypoxic respiratory failure required intubation 03/22/2021 patient was requiring 100% oxygen on AVAPS  Aortic valve stenosis, status post tissue replacement  Luminary hypertension, severe  COPD  KARLY  Cor pulmonale  Tobacco abuse  Upper airway infection with moderate amount of mucopurulent secretions noted on bronchoscopy 3/25/2021  Febrile illness   Interstitial pneumonitis  Acute thrombocytopenia  Catheter associated DVT right upper extremity subclavian, axillary, and brachial   hypernatremia  Upper arm left DVT  DM  Uremia    PLAN:  Sedation off, calm and cooperative  Sputum culture with Candida, likely contaminant  Ventilator management  Weaned PEEP down to 7, pressure support trial 16/7 as tolerated and advance to T-piece trials as tolerated  Has been difficult to wean due to poor respiratory effort  Hemodynamically stable off pressor support  Wean steroids down  Echocardiogram reviewed  Bronchodilator  Inhaled corticosteroids  Electrolytes/ glycemic control  DVT prophylaxis arixtra,   GI prophylaxis.    Tolerating tube feeds at goal  Tolerating pressure support trial well.  Will advance to T-piece trials    Nephrology following  Cardiology following  CV surgery admitting    PT/OT evaluation and treatment as appropriate       4/3/2021  15:00 EDT     I personally have examined  and interviewed the patient. I have reviewed the history, data, problems, assessment and plan with our NP.  Critical care time in direct medical management ( 36  ) minutes  Electronically signed by Shanell Hernandez MD, D,ABS, 04/03/21, 7:46 PM EDT.

## 2021-04-03 NOTE — PLAN OF CARE
Goal Outcome Evaluation:   Patient remains stable overnight patient will have cpap trial in the morning

## 2021-04-03 NOTE — PROGRESS NOTES
"Cardiology    Patient Care Team:  Mary Wilde APRN as PCP - General  Mary Wilde APRN as PCP - Family Medicine  Jose Levi MD as Consulting Physician (Cardiology)  Yovani Lerma MD as Consulting Physician (Nephrology)        CHIEF COMPLAINT: Shortness of breath    ADMITTING DIAGNOSES: Aortic stenosis    SUBJECTIVE: Doing well on alternating CPAP and this is controlled.  Still intubated.    Review of Symptoms:  Constitutional: Patient afebrile no chills or unexpected weight changes  Respiratory: No cough, no wheezing or dyspnea  Cardiovascular: No chest pain, palpitations, dyspnea, orthopnea and no edema  Gastrointestinal: No nausea, vomiting, constipation or diarrhea.  No melena or dark stools    All other systems reviewed and are negative     PAST MEDICAL HISTORY:   Past Medical History:   Diagnosis Date   • Acute congestive heart failure (CMS/[facility])    • Allergies    • Anxiety    • Arthritis    • Asthma    • Bilateral leg edema    • Bipolar 1 disorder (CMS/[facility])    • Bulging lumbar disc     X3   • Cancer (CMS/[facility])     states had \"cancerous tumor during pregnancy and had to have hysterectomy\"   • Cataract     bilateral   • Cellulitis 03/2021    bilateral legs   • Compression fracture of vertebral column (CMS/[facility])     LUMBAR   • COPD (chronic obstructive pulmonary disease) (CMS/[facility])    • Delayed emergence from anesthesia    • Depression    • Depression    • Dyslipidemia    • Dyspnea on exertion    • Fibromyalgia    • GERD (gastroesophageal reflux disease)    • Hiatal hernia    • Hyperlipidemia    • Hypertension    • Hypothyroid     HYPOTHYROID   • IBS (irritable bowel syndrome)    • Migraines    • Morbid obesity (CMS/[facility])    • Neuropathy    • Panic attacks    • Peripheral edema    • PONV (postoperative nausea and vomiting)    • Poor mobility     rolling walker   • Prediabetes    • PVD (peripheral vascular disease) (CMS/[facility])    • Rheumatoid aortitis    • Spinal stenosis    • Spinal stenosis  "   • Vertigo    • Vitamin D deficiency        ALLERGIES:   Allergies   Allergen Reactions   • Adhesive Tape Unknown (See Comments)     hives   • Butorphanol Other (See Comments)     Chest pain difficulty breathing throat swelling   • Garlic Other (See Comments)     Chest pain difficulty breathing throat swells   • Tetanus-Diphtheria Toxoids Td Other (See Comments)     Dizziness,  vomiting   • Aloe Itching   • Bee Venom Other (See Comments)     Swelling eyes and lips hand swelling   • Latex Itching   • Macadamia Nut Oil Other (See Comments)     Chest pain difficulty breathing throat swelling   • Tuberculin Unknown (See Comments)     reactor   • Wasp Venom Other (See Comments)     Swelling eyes lips and hand         PAST SURGICAL HISTORY:   Past Surgical History:   Procedure Laterality Date   • BRONCHOSCOPY N/A 3/25/2021    Procedure: BRONCHOSCOPY AT BEDSIDE WITH BRONCHIOALVEOLAR LAVAGE;  Surgeon: Glenn Reyes MD;  Location: [facility] ENDOSCOPY;  Service: Pulmonary;  Laterality: N/A;  PNA   • CARDIAC CATHETERIZATION  2009   • CARDIAC CATHETERIZATION N/A 8/14/2019    Procedure: Left Heart Cath;  Surgeon: Jose Levi MD;  Location: [facility] CATH INVASIVE LOCATION;  Service: Cardiovascular   • CARDIAC CATHETERIZATION N/A 8/14/2019    Procedure: Right Heart Cath;  Surgeon: Jose Levi MD;  Location: [facility] CATH INVASIVE LOCATION;  Service: Cardiovascular   • CARDIAC CATHETERIZATION N/A 8/14/2019    Procedure: Aortic root aortogram;  Surgeon: Jose Levi MD;  Location: [facility] CATH INVASIVE LOCATION;  Service: Cardiovascular   • CARDIAC CATHETERIZATION N/A 1/20/2021    Procedure: Left Heart Cath;  Surgeon: Jsoe Levi MD;  Location: [facility] CATH INVASIVE LOCATION;  Service: Cardiovascular;  Laterality: N/A;   • COLONOSCOPY     • CYSTOCELE REPAIR  1984   • ELBOW ARTHROPLASTY  2011   • ENDOSCOPY     • FOOT SURGERY     • GALLBLADDER SURGERY  2002   • TONSILLECTOMY     • VAGINAL  HYSTERECTOMY  1972          FAMILY HISTORY:   History reviewed. No pertinent family history.      SOCIAL HISTORY:   Social History     Socioeconomic History   • Marital status:      Spouse name: Not on file   • Number of children: Not on file   • Years of education: Not on file   • Highest education level: Not on file   Tobacco Use   • Smoking status: Current Every Day Smoker     Packs/day: 1.00     Types: Cigarettes   • Smokeless tobacco: Never Used   • Tobacco comment: decrease now and do not smoke dos   Substance and Sexual Activity   • Alcohol use: Yes     Comment: socially   • Drug use: Never   • Sexual activity: Defer       CURRENT MEDICATIONS:     Current Facility-Administered Medications:   •  acetaminophen (TYLENOL) tablet 650 mg, 650 mg, Oral, Q4H PRN **OR** acetaminophen (TYLENOL) 160 MG/5ML solution 650 mg, 650 mg, Oral, Q4H PRN, 650 mg at 03/26/21 0801 **OR** acetaminophen (TYLENOL) suppository 650 mg, 650 mg, Rectal, Q4H PRN, Sattermc-Janna Porter, APRN  •  acetylcysteine (MUCOMYST) 20 % nebulizer solution 3 mL, 3 mL, Nebulization, BID - RT, Shanell Hernandez MD, 4 mL at 04/03/21 0632  •  arformoterol (BROVANA) nebulizer solution 15 mcg, 15 mcg, Nebulization, BID - RT, Shanell Hernandez MD, 15 mcg at 04/03/21 0632  •  aspirin EC tablet 81 mg, 81 mg, Oral, Daily, Satterly-Janna Porter APRN, 81 mg at 04/03/21 0908  •  atorvastatin (LIPITOR) tablet 40 mg, 40 mg, Oral, Nightly, Sattermc-Clem Porteritha L, APRN, 40 mg at 04/02/21 2007  •  bisacodyl (DULCOLAX) suppository 10 mg, 10 mg, Rectal, Daily PRN, Janna Alberto APRN, 10 mg at 03/26/21 1234  •  budesonide (PULMICORT) nebulizer solution 1 mg, 1 mg, Nebulization, BID - RT, Shanell Hernandez MD, 1 mg at 04/03/21 0632  •  bumetanide (BUMEX) tablet 1 mg, 1 mg, Nasogastric, TID, Paola Hammer MD, 1 mg at 04/03/21 0909  •  chlorhexidine (PERIDEX) 0.12 % solution 15 mL, 15 mL, Mouth/Throat, Q12H, \[facility] [place] [facility]\""-German,  Janna GUZMÁN APRN, 15 mL at 04/03/21 0908  •  Chlorhexidine Gluconate Cloth 2 % pads 1 application, 1 application, Topical, Q12H PRN, Monika Conn APRN  •  chlorothiazide (DIURIL) 250mg in 100mL NS IVPB, 250 mg, Intravenous, Once, Paola Hammer MD  •  dextrose (D50W) 25 g/ 50mL Intravenous Solution 25 g, 25 g, Intravenous, Q15 Min PRN, Olaf Mcgill MD  •  dextrose (GLUTOSE) oral gel 15 g, 15 g, Oral, Q15 Min PRN, Olaf Mcgill MD  •  docusate (COLACE) 50 MG/5ML liquid 100 mg, 100 mg, Oral, BID, Janna Alberto APRN, 100 mg at 04/03/21 0908  •  First Mouthwash (Magic Mouthwash) 5 mL, 5 mL, Swish & Spit, Q6H, Shanell Hernandez MD, 5 mL at 04/03/21 0605  •  FLUoxetine (PROzac) capsule 60 mg, 60 mg, Oral, Daily, Belkis Loving APRN, 60 mg at 04/03/21 0907  •  fondaparinux (ARIXTRA) injection 5 mg, 5 mg, Subcutaneous, Q24H, Olaf Mcgill MD, 5 mg at 04/02/21 1331  •  gabapentin (NEURONTIN) capsule 200 mg, 200 mg, Oral, Q12H, Janna Alberto APRN, 200 mg at 04/03/21 0908  •  glucagon (human recombinant) (GLUCAGEN DIAGNOSTIC) injection 1 mg, 1 mg, Subcutaneous, Q15 Min PRN, Olaf Mcgill MD  •  hydrALAZINE (APRESOLINE) injection 10 mg, 10 mg, Intravenous, Q6H PRN, Juan Jose Skelton MD, 10 mg at 03/27/21 2251  •  hydrALAZINE (APRESOLINE) tablet 25 mg, 25 mg, Nasogastric, Q8H, Paola Hammer MD, 25 mg at 04/03/21 0605  •  HYDROcodone-acetaminophen (NORCO) 5-325 MG per tablet 1 tablet, 1 tablet, Oral, Q4H PRN, Janna Alberto, APRN, 1 tablet at 04/03/21 0907  •  insulin glargine (LANTUS, SEMGLEE) injection 20 Units, 20 Units, Subcutaneous, Q12H, Gilles Brady, APRN, 20 Units at 04/03/21 0909  •  insulin lispro (ADMELOG) injection 0-24 Units, 0-24 Units, Subcutaneous, Q6H, 8 Units at 04/02/21 1700 **AND** insulin lispro (ADMELOG) injection 0-24 Units, 0-24 Units, Subcutaneous, PRN, Amy, MD Glenn  •  insulin lispro (ADMELOG) injection 5 Units, 5  Units, Subcutaneous, Q6H, Belkis Loving, APRN, 5 Units at 21 0605  •  ipratropium-albuterol (DUO-NEB) nebulizer solution 3 mL, 3 mL, Nebulization, Q4H PRN, Janna Alberto, APRN, 3 mL at 21 1522  •  ipratropium-albuterol (DUO-NEB) nebulizer solution 3 mL, 3 mL, Nebulization, Q4H - RT, Day, Lorna, APRN, 3 mL at 21 0632  •  lansoprazole (FIRST) oral suspension 30 mg, 30 mg, Oral, QAM, Day, Lorna, APRN, 30 mg at 21 0612  •  levothyroxine (SYNTHROID, LEVOTHROID) tablet 100 mcg, 100 mcg, Oral, Q AM, Olaf Mcgill MD, 100 mcg at 21 0605  •  lidocaine (XYLOCAINE) 2% injection, , , PRN, Glenn Reyes MD, 4 mL at 21 1439  •  lidocaine (XYLOCAINE) 5 % ointment, , , PRN, Glenn Reyes MD, 1 application at 21 1439  •  meclizine (ANTIVERT) tablet 25 mg, 25 mg, Oral, TID PRN, Belkis Loving, APRN, 25 mg at 21 0836  •  metoprolol tartrate (LOPRESSOR) half tablet 12.5 mg, 12.5 mg, Oral, Q12H, Jose Levi MD, 12.5 mg at 21 0908  •  midazolam (VERSED) injection 1 mg, 1 mg, Intravenous, Q2H PRN, Shanell Hernandez MD  •  midazolam (VERSED) injection 2 mg, 2 mg, Intravenous, Q2H PRN, Shanell Hernandez MD  •  [] HYDROmorphone (DILAUDID) injection 0.5 mg, 0.5 mg, Intravenous, Q2H PRN, 0.5 mg at 21 0825 **AND** naloxone (NARCAN) injection 0.4 mg, 0.4 mg, Intravenous, Q5 Min PRN, Satterly-German, Janna L, APRN  •  nystatin (MYCOSTATIN) powder, , Topical, Q12H, Draw, MD Glenn, Given at 21 0909  •  ondansetron (ZOFRAN) injection 4 mg, 4 mg, Intravenous, Q6H PRN, Sattermc-German, Janna L, APRN, 4 mg at 21 1356  •  potassium chloride (K-DUR,KLOR-CON) CR tablet 20 mEq, 20 mEq, Oral, PRN, 20 mEq at 21 0550 **OR** potassium chloride (KLOR-CON) packet 20 mEq, 20 mEq, Oral, PRN, Sattermc-German, Janna L, APRN, 20 mEq at 21 0515  •  potassium chloride (K-DUR,KLOR-CON) CR tablet 20 mEq, 20 mEq, Oral, TID With Meals,  aJnna Alberto, APRN, 20 mEq at 04/03/21 0908  •  potassium chloride 10 mEq in 100 mL IVPB, 10 mEq, Intravenous, Q1H PRN **OR** potassium chloride 10 mEq in 100 mL IVPB, 10 mEq, Intravenous, Q1H PRN, Janna Alberto APRN, Last Rate: 200 mL/hr at 03/19/21 0726, 10 mEq at 03/19/21 0726  •  predniSONE (DELTASONE) tablet 30 mg, 30 mg, Oral, Daily With Breakfast, Shanell Hernandez MD, 30 mg at 04/03/21 0907  •  propofol (DIPRIVAN) infusion 10 mg/mL 100 mL, 5-30 mcg/kg/min, Intravenous, Titrated, Olaf Mcgill MD, Stopped at 04/03/21 0550  •  QUEtiapine (SEROquel) tablet 25 mg, 25 mg, Oral, Nightly, Day, Lorna, APRN, 25 mg at 04/02/21 2007      DIAGNOSTIC DATA:     I reviewed the patient's new clinical results.    Lab Results (last 24 hours)     Procedure Component Value Units Date/Time    POC Glucose Once [[id number]]  (Abnormal) Collected: 04/03/21 0539    Specimen: Blood Updated: 04/03/21 0540     Glucose 142 mg/dL      Comment: Serial Number: [id number]:  [id number]       Calcium, Ionized [[id number]]  (Normal) Collected: 04/03/21 0348    Specimen: Blood Updated: 04/03/21 0421     Ionized Calcium 1.21 mmol/L     Comprehensive Metabolic Panel [[id number]]  (Abnormal) Collected: 04/03/21 0348    Specimen: Blood Updated: 04/03/21 0417     Glucose 151 mg/dL      BUN 48 mg/dL      Creatinine 0.51 mg/dL      Sodium 142 mmol/L      Potassium 4.0 mmol/L      Chloride 104 mmol/L      CO2 30.0 mmol/L      Calcium 8.8 mg/dL      Total Protein 4.9 g/dL      Albumin 2.70 g/dL      ALT (SGPT) 31 U/L      AST (SGOT) 25 U/L      Alkaline Phosphatase 132 U/L      Total Bilirubin 0.5 mg/dL      eGFR Non African Amer 119 mL/min/1.73      Globulin 2.2 gm/dL      A/G Ratio 1.2 g/dL      BUN/Creatinine Ratio 94.1     Anion Gap 8.0 mmol/L     Narrative:      GFR Normal >60  Chronic Kidney Disease <60  Kidney Failure <15      Phosphorus [[id number]]  (Normal) Collected: 04/03/21 0348    Specimen: Blood Updated:  04/03/21 0416     Phosphorus 3.8 mg/dL     Magnesium [[id number]]  (Normal) Collected: 04/03/21 0348    Specimen: Blood Updated: 04/03/21 0416     Magnesium 2.3 mg/dL     CBC (No Diff) [[id number]]  (Abnormal) Collected: 04/03/21 0348    Specimen: Blood Updated: 04/03/21 0353     WBC 19.10 10*3/mm3      RBC 3.36 10*6/mm3      Hemoglobin 10.2 g/dL      Hematocrit 30.5 %      MCV 90.8 fL      MCH 30.4 pg      MCHC 33.5 g/dL      RDW 14.8 %      RDW-SD 47.7 fl      MPV 10.3 fL      Platelets 95 10*3/mm3     Blood Gas, Arterial - [[id number]]  (Abnormal) Collected: 04/03/21 0349    Specimen: Arterial Blood Updated: 04/03/21 0352     Site Arterial Line     Thiago's Test Positive     pH, Arterial 7.486 pH units      pCO2, Arterial 40.2 mm Hg      pO2, Arterial 70.9 mm Hg      HCO3, Arterial 30.4 mmol/L      Base Excess, Arterial 6.4 mmol/L      Comment: Serial Number: [id number]:  [id number]        O2 Saturation, Arterial 95.2 %      CO2 Content 31.6 mmol/L      Barometric Pressure for Blood Gas --     Comment: N/A        Modality Adult Vent     FIO2 40 %      Ventilator Mode ;AC     Set Tidal Volume 500     PEEP 7     Hemodilution No     Respiratory Rate 22    POC Glucose Once [[id number]]  (Abnormal) Collected: 04/02/21 2347    Specimen: Blood Updated: 04/02/21 2348     Glucose 120 mg/dL      Comment: Serial Number: [id number]:  [id number]       POC Glucose Once [[id number]]  (Abnormal) Collected: 04/02/21 1649    Specimen: Blood Updated: 04/02/21 1650     Glucose 233 mg/dL      Comment: Serial Number: [id number]:  [id number]       Blood Gas, Arterial - [[id number]]  (Abnormal) Collected: 04/02/21 1447    Specimen: Arterial Blood Updated: 04/02/21 1454     Site Arterial Line     Thiago's Test N/A     pH, Arterial 7.450 pH units      pCO2, Arterial 41.8 mm Hg      pO2, Arterial 76.5 mm Hg      HCO3, Arterial 29.1 mmol/L      Base Excess, Arterial 4.6 mmol/L      Comment: Serial Number: [id number]:  [id number]        O2  Saturation, Arterial 95.7 %      CO2 Content 30.4 mmol/L      Barometric Pressure for Blood Gas --     Comment: N/A        Modality Adult Vent     FIO2 40 %      Ventilator Mode ;CPAP/PS     PEEP 7     PSV 16 cmH2O      Hemodilution No          Imaging Results (Last 24 Hours)     Procedure Component Value Units Date/Time    XR Chest 1 View [[id number]] Collected: 04/03/21 0952     Updated: 04/03/21 0955    Narrative:      DATE OF EXAM:  4/3/2021 5:02 AM     PROCEDURE:  XR CHEST 1 VW-     INDICATIONS:  Hypoxia, heart disease, acute respiratory failure, COPD.      COMPARISON:  04/02/2020.     TECHNIQUE:   Single radiographic view of the chest was obtained.     FINDINGS:  There is a stable endotracheal tube and left internal jugular line in  place. The Dobbhoff feeding tube tip projects out of the field-of-view.  The heart is enlarged with postsurgical changes apparent. There is a  stable left basilar pleural effusion. There is bilateral mixed  interstitial/airspace disease with no significant interval change. I  would favor multifocal pneumonia over pulmonary edema.  There is no  pneumothorax.       Impression:      No interval change from yesterday's study with abnormalities as  described above.     Electronically Signed By-Suhas Donald MD On:4/3/2021 9:53 AM  This report was finalized on [id number] by  Suhas Donald MD.          Xray reviewed personally by physician.      ECG reviewed personally by physician  ECG/EMG Results (most recent)     Procedure Component Value Units Date/Time    ECG 12 Lead [[id number]] Collected: 03/18/21 1104     Updated: 03/18/21 1748     QT Interval 516 ms     Narrative:      HEART RATE= 56  bpm  RR Interval= 1072  ms  WY Interval= 163  ms  P Horizontal Axis= -22  deg  P Front Axis= -9  deg  QRSD Interval= 88  ms  QT Interval= 516  ms  QRS Axis= 40  deg  T Wave Axis= 43  deg  - OTHERWISE NORMAL ECG -  Sinus bradycardia  When compared with ECG of 16-Mar-2021 8:51:36,  Significant  repolarization change  Electronically Signed By: Segun Rucker ([facility]) 18-Mar-2021 17:48:15  Date and Time of Study: 2021-03-18 11:04:47    ECG 12 Lead [[id number]] Collected: 03/19/21 0430     Updated: 03/19/21 1649     QT Interval 434 ms     Narrative:      HEART RATE= 62  bpm  RR Interval= 984  ms  AR Interval= 165  ms  P Horizontal Axis= 13  deg  P Front Axis= 32  deg  QRSD Interval= 76  ms  QT Interval= 434  ms  QRS Axis= 43  deg  T Wave Axis= 82  deg  - ABNORMAL ECG -  Sinus rhythm  Atrial premature complex  ST elevation, consider inferior injury  When compared with ECG of 18-Mar-2021 11:04:47,  No significant change  Electronically Signed By: Segun Rucker ([facility]) 19-Mar-2021 16:48:51  Date and Time of Study: 2021-03-19 04:30:10    ECG 12 Lead [[id number]] Collected: 03/20/21 0515     Updated: 03/20/21 1006     QT Interval 419 ms     Narrative:      HEART RATE= 80  bpm  RR Interval= 752  ms  AR Interval= 161  ms  P Horizontal Axis= 0  deg  P Front Axis= 22  deg  QRSD Interval= 80  ms  QT Interval= 419  ms  QRS Axis= 39  deg  T Wave Axis= 35  deg  - NORMAL ECG -  Sinus rhythm  When compared with ECG of 19-Mar-2021 4:30:10,  Significant repolarization change  Electronically Signed By: Segun Rucker ([facility]) 20-Mar-2021 10:06:29  Date and Time of Study: 2021-03-20 05:15:54    Adult Transthoracic Echo Complete w/ Color, Spectral and Contrast if Necessary Per Protocol [[id number]] Collected: 03/21/21 1327     Updated: 03/21/21 1619     BSA 2.3 m^2      LVOT diam 1.7 cm      LVOT area 2.3 cm^2       CV ECHO BAN - BZI_BMI 50.1 kilograms/m^2       CV ECHO BAN - BSA(HAYCOCK) 2.5 m^2       CV ECHO BAN - BZI_METRIC_WEIGHT 132.4 kg       CV ECHO BAN - BZI_METRIC_HEIGHT 162.6 cm      LV V1 max PG 14.3 mmHg      LV V1 mean PG 5.7 mmHg      LV V1 max 182.2 cm/sec      LV V1 mean 115.3 cm/sec      LV V1 VTI 32.5 cm      SV(LVOT) 74.4 ml      SI(LVOT) 32.4 ml/m^2      RVIDd 3.1 cm      IVSd 1.3 cm      LVIDd 4.5 cm       LVIDs 3.0 cm      LVPWd 1.2 cm      IVS/LVPW 1.1     FS 33.1 %      EDV(Teich) 93.6 ml      ESV(Teich) 35.8 ml      EF(Teich) 61.8 %      EDV(cubed) 92.6 ml      ESV(cubed) 27.8 ml      EF(cubed) 70.0 %      LV mass(C)d 203.0 grams      LV mass(C)dI 88.4 grams/m^2      SV(Teich) 57.9 ml      SI(Teich) 25.2 ml/m^2      SV(cubed) 64.9 ml      SI(cubed) 28.2 ml/m^2      Ao root diam 2.8 cm      Ao root area 6.1 cm^2      ACS 1.7 cm      RVOT diam 2.8 cm      RVOT area 6.3 cm^2      EDV(MOD-sp4) 64.2 ml      ESV(MOD-sp4) 22.5 ml      EF(MOD-sp4) 65.0 %      SV(MOD-sp4) 41.7 ml      SI(MOD-sp4) 18.2 ml/m^2      Ao root area (BSA corrected) 1.2     LV Calderon Vol (BSA corrected) 27.9 ml/m^2      LV Sys Vol (BSA corrected) 9.8 ml/m^2      Aortic R-R 0.8 sec      Aortic HR 74.6 BPM      MV E max kranthi 97.9 cm/sec      MV A max kranthi 119.6 cm/sec      MV E/A 0.82     MV V2 max 113.2 cm/sec      MV max PG 5.1 mmHg      MV V2 mean 69.7 cm/sec      MV mean PG 2.3 mmHg      MV V2 VTI 31.8 cm      MVA(VTI) 2.3 cm^2      MV dec slope 399.3 cm/sec^2      MV dec time 0.25 sec      Ao pk kranthi 235.6 cm/sec      Ao max PG 22.2 mmHg      Ao max PG (full) 8.0 mmHg      Ao V2 mean 159.9 cm/sec      Ao mean PG 12.0 mmHg      Ao mean PG (full) 6.2 mmHg      Ao V2 VTI 41.5 cm      MARCOS(I,A) 1.8 cm^2      MARCOS(I,D) 1.8 cm^2      MARCOS(V,A) 1.8 cm^2      MARCOS(V,D) 1.8 cm^2      CO(Ao) 19.0 l/min      CI(Ao) 8.3 l/min/m^2      SV(Ao) 254.9 ml      SI(Ao) 111.0 ml/m^2      CO(LVOT) 5.6 l/min      CI(LVOT) 2.4 l/min/m^2      SV(RVOT) 85.7 ml      PA V2 max 76.0 cm/sec      PA max PG 2.3 mmHg      PA max PG (full) 0.82 mmHg      BH CV ECHO BAN - PVA(V,A) 5.1 cm^2      BH CV ECHO BAN - PVA(V,D) 5.1 cm^2      RV V1 max PG 1.5 mmHg      RV V1 mean PG 0.79 mmHg      RV V1 max 61.2 cm/sec      RV V1 mean 42.3 cm/sec      RV V1 VTI 13.5 cm      TR max kranthi 355.6 cm/sec      RVSP(TR) 59.7 mmHg      RAP systole 8.0 mmHg      Qp/Qs 1.2     EF(MOD-bp) 65.0 %      LA  dimension(2D) 2.7 cm     Narrative:      · Left ventricular wall thickness is consistent with mild concentric   hypertrophy.  · Estimated left ventricular EF was in agreement with the calculated left   ventricular EF. Left ventricular ejection fraction appears to be 61 - 65%.   Left ventricular systolic function is normal.  · Estimated right ventricular systolic pressure from tricuspid   regurgitation is markedly elevated (>55 mmHg).  · Severe pulmonary hypertension is present.  · Left ventricular diastolic function is consistent with (grade II w/high   LAP) pseudonormalization.  · There is a bioprosthetic aortic valve present.  · Mild aortic valve stenosis is present.  · The right atrial cavity is moderately dilated.       Adult Transesophageal Echo (CHRISTIANO) W/ Cont if Necessary Per Protocol (Bedside) [[id number]] Collected: 03/25/21 1051     Updated: 03/25/21 1643    Narrative:          CHRISTIANO   procedure note    Procedure:  CHRISTIANO    Indication:   Valvular heart disease s/p AVR, respiratory failure    Date of procedure  : 3/25/2021    :  Dr. Jose Levi     Description of procedure:    Under conscious sedation given by anesthesiology and local anesthesia, a   CHRISTIANO probe was advanced into the esophagus and into the stomach 2D, M-mode,   color Doppler images were obtained..  Patient tolerated procedure well   complications well.    Complications: none    Results:    Normal LV size and contractility LV contractility, EF of 60%  Normal RV size  Normal left atrial size, normal right atrial size, left atrial appendage   without thrombus.  Aortic valve, mitral valve, tricuspid valve appears structurally normal,   mild TR seen.  No vegetation seen  No pericardial effusion seen.  Proximal aorta appears normal in size.  Mild aortic plaque seen      Recommendations/plan:    Clinical correlation recommended                VITAL SIGNS: Temp:  [97.8 °F (36.6 °C)-98.5 °F (36.9 °C)] 97.8 °F (36.6 °C)  Heart Rate:  [64-79]  "71  Resp:  [22-27] 27  BP: (113-122)/(56-60) 122/60  Arterial Line BP: ()/(48-62) 121/60  FiO2 (%):  [40 %] 40 %   Flowsheet Rows      First Filed Value   Admission Height  162.6 cm (64\") Documented at 03/18/2021 2000   Admission Weight  129 kg (284 lb) Documented at 03/18/2021 2000        Physical exam  Constitutional: well-nourished, and appears stated age in no acute distress  PERRL: Conjunctiva clear, no pallor, anicteric  HENMT: normocephalic, normal dentition, no cyanosis or pallor  Neck:no bruits, or thrills and bilateral normal carotid upstroke. Normal jugular venous pressure  Cardiovascular: No parasternal heaves an non-displaced focal PMI. Normal rate and rhythm: no rub, gallop, murmur or click and normal S1 and S2; no lower or upper extremity edema.   Lungs: unlabored, no wheezing with no rales or rhonchi on auscultation.  Extremities: Warm, no clubbing, cyanosis, or edema. Full and equal peripheral pulses in extremities with no bruits appreciated.   Abdomen: soft, non-tender, non-distended  Musculoskeletal: no joint tenderness or swelling and no erythema  Skin: Warm and dry, non-erythematous   Neuro:alert and normal affect. Oriented to time, place and person.     ASSESSMENT AND PLAN:   Aortic stenosis, severe, status post AVR with #23 magna pericardial prosthesis 3/18/2021  Congestive heart failure due to diastolic dysfunction in the setting of valvular heart disease  Hypertension  Dyslipidemia  Carotid bruit with normal Dopplers    Routine post CABG care.  Monitor telemetry current sinus rhythm patient has a history of smoking.  We will monitor pulmonary status closely.  Patient still intubated and on a weaning trial.      Vince Quinones MD  04/03/21  10:06 EDT          "

## 2021-04-03 NOTE — PROGRESS NOTES
Status post AVR, postop day #16.    CV stable.  Oxygenating well.  Still intubated because of weak ventilatory effort.  Pulmonary service continues to slowly wean her CPAP.  She is currently still undergoing work/rest periods alternating between CPAP and assist-control.  Tolerating tube feeds.  Nonoliguric.  BUN/creatinine are fine.  Leukocytosis but she is currently on prednisone.  No obvious evidence of infection.    I explained and updated the situation to the patient's  who is at the bedside.  He was agreeable.

## 2021-04-03 NOTE — PROGRESS NOTES
"Nutrition Services    Patient Name: Claudia Rust  YOB: 1950  MRN: [id number]  Admission date: 3/18/2021      PPE Documentation        PPE Worn By Provider RD did not enter room for this encounter   PPE Worn By Patient  -     PROGRESS NOTE      Encounter Information: Tube feed check on. Impact Peptide 1.5 documented at goal 65 mL/hr. Propofol off.       Labs (reviewed below): -Hypernatremia-> Resolved, stable with free water flushes  -elevated BUN  -low Cr       GI Function:  -residuals WNL  -Last BM documented on 3/30 (x4 days ago) - getting scheduled colace, sent secure message to REE Hernandez       Nutrition Intervention: Continue current TF rate as ordered, free water flush per nephrology        PO Diet/Supplements: NPO Diet   EN Prescription: Impact Peptide 1.5 at 65 mL/hr + 60 mL/hr water flush (flush per nephrology)       Intake/Output:   Intake/Output Summary (Last 24 hours) at 4/3/2021 0840  Last data filed at 4/3/2021 0423  Gross per 24 hour   Intake 2526.83 ml   Output 2700 ml   Net -173.17 ml            Height: Height: 162.6 cm (64\")   Weight: Weight: 125 kg (275 lb 12.7 oz) (04/03/21 0600)   BMI: Body mass index is 47.34 kg/m².     Results from last 7 days   Lab Units 04/03/21  0348 04/02/21  0514 04/01/21  1627 04/01/21  0409 03/30/21  0425   SODIUM mmol/L 142 143  --  142 144   POTASSIUM mmol/L 4.0 3.7 4.5 3.3* 4.1   CHLORIDE mmol/L 104 105  --  103 108*   CO2 mmol/L 30.0* 29.0  --  30.0* 24.0   BUN mg/dL 48* 47*  --  45* 42*   CREATININE mg/dL 0.51* 0.49*  --  0.57 0.64   CALCIUM mg/dL 8.8 8.7  --  8.0* 8.7   BILIRUBIN mg/dL 0.5 0.5  --   --  0.6   ALK PHOS U/L 132* 134*  --   --  174*   ALT (SGPT) U/L 31 35*  --   --  40*   AST (SGOT) U/L 25 31  --   --  34*   GLUCOSE mg/dL 151* 126*  --  184* 299*     Results from last 7 days   Lab Units 04/03/21  0348 04/02/21  0514 04/01/21  0409   MAGNESIUM mg/dL 2.3 2.2 2.0   PHOSPHORUS mg/dL 3.8 4.0 3.7   HEMOGLOBIN g/dL 10.2* 10.5* 10.6* "   HEMATOCRIT % 30.5* 31.6* 32.3*   TRIGLYCERIDES mg/dL  --   --  149     COVID19   Date Value Ref Range Status   03/24/2021 Not Detected Not Detected - Ref. Range Final     Lab Results   Component Value Date    HGBA1C 5.7 (H) 03/16/2021       Vitals:    04/03/21 0639   BP:    Pulse: 71   Resp: 27   Temp:    SpO2: 99%       RD to follow up per protocol.    Electronically signed by:  Jordyn Strickland RD  04/03/21 08:40 EDT

## 2021-04-04 ENCOUNTER — APPOINTMENT (OUTPATIENT)
Dept: GENERAL RADIOLOGY | Facility: HOSPITAL | Age: 71
End: 2021-04-04

## 2021-04-04 LAB
ALBUMIN SERPL-MCNC: 2.6 G/DL (ref 3.5–5.2)
ANION GAP SERPL CALCULATED.3IONS-SCNC: 9 MMOL/L (ref 5–15)
ARTERIAL PATENCY WRIST A: ABNORMAL
ARTERIAL PATENCY WRIST A: ABNORMAL
ATMOSPHERIC PRESS: ABNORMAL MM[HG]
ATMOSPHERIC PRESS: ABNORMAL MM[HG]
BASE EXCESS BLDA CALC-SCNC: 6.6 MMOL/L (ref 0–3)
BASE EXCESS BLDA CALC-SCNC: 6.8 MMOL/L (ref 0–3)
BDY SITE: ABNORMAL
BDY SITE: ABNORMAL
BUN SERPL-MCNC: 46 MG/DL (ref 8–23)
BUN/CREAT SERPL: 100 (ref 7–25)
CALCIUM SPEC-SCNC: 8.7 MG/DL (ref 8.6–10.5)
CHLORIDE SERPL-SCNC: 104 MMOL/L (ref 98–107)
CO2 BLDA-SCNC: 30.8 MMOL/L (ref 22–29)
CO2 BLDA-SCNC: 32.1 MMOL/L (ref 22–29)
CO2 SERPL-SCNC: 28 MMOL/L (ref 22–29)
CREAT SERPL-MCNC: 0.46 MG/DL (ref 0.57–1)
DEPRECATED RDW RBC AUTO: 47.3 FL (ref 37–54)
ERYTHROCYTE [DISTWIDTH] IN BLOOD BY AUTOMATED COUNT: 14.9 % (ref 12.3–15.4)
GFR SERPL CREATININE-BSD FRML MDRD: 134 ML/MIN/1.73
GLUCOSE BLDC GLUCOMTR-MCNC: 105 MG/DL (ref 70–105)
GLUCOSE BLDC GLUCOMTR-MCNC: 128 MG/DL (ref 70–105)
GLUCOSE BLDC GLUCOMTR-MCNC: 137 MG/DL (ref 70–105)
GLUCOSE BLDC GLUCOMTR-MCNC: 159 MG/DL (ref 70–105)
GLUCOSE SERPL-MCNC: 132 MG/DL (ref 65–99)
HCO3 BLDA-SCNC: 29.7 MMOL/L (ref 21–28)
HCO3 BLDA-SCNC: 30.9 MMOL/L (ref 21–28)
HCT VFR BLD AUTO: 30.4 % (ref 34–46.6)
HEMODILUTION: NO
HEMODILUTION: NO
HGB BLD-MCNC: 10 G/DL (ref 12–15.9)
INHALED O2 CONCENTRATION: 40 %
INHALED O2 CONCENTRATION: 40 %
MCH RBC QN AUTO: 30.2 PG (ref 26.6–33)
MCHC RBC AUTO-ENTMCNC: 33 G/DL (ref 31.5–35.7)
MCV RBC AUTO: 91.5 FL (ref 79–97)
MODALITY: ABNORMAL
MODALITY: ABNORMAL
PCO2 BLDA: 35.5 MM HG (ref 35–48)
PCO2 BLDA: 41.1 MM HG (ref 35–48)
PEEP RESPIRATORY: 5 CM[H2O]
PEEP RESPIRATORY: 5 CM[H2O]
PH BLDA: 7.48 PH UNITS (ref 7.35–7.45)
PH BLDA: 7.53 PH UNITS (ref 7.35–7.45)
PHOSPHATE SERPL-MCNC: 3.6 MG/DL (ref 2.5–4.5)
PLATELET # BLD AUTO: 112 10*3/MM3 (ref 140–450)
PMV BLD AUTO: 11.2 FL (ref 6–12)
PO2 BLDA: 67.1 MM HG (ref 83–108)
PO2 BLDA: 75.7 MM HG (ref 83–108)
POTASSIUM SERPL-SCNC: 3.6 MMOL/L (ref 3.5–5.2)
POTASSIUM SERPL-SCNC: 3.8 MMOL/L (ref 3.5–5.2)
PSV: 10 CMH2O
RBC # BLD AUTO: 3.32 10*6/MM3 (ref 3.77–5.28)
RESPIRATORY RATE: 22
SAO2 % BLDCOA: 95.1 % (ref 94–98)
SAO2 % BLDCOA: 96 % (ref 94–98)
SODIUM SERPL-SCNC: 141 MMOL/L (ref 136–145)
TRIGL SERPL-MCNC: 132 MG/DL (ref 0–150)
VENTILATOR MODE: ABNORMAL
VENTILATOR MODE: ABNORMAL
VT ON VENT VENT: 500 ML
WBC # BLD AUTO: 19.5 10*3/MM3 (ref 3.4–10.8)

## 2021-04-04 PROCEDURE — 84478 ASSAY OF TRIGLYCERIDES: CPT | Performed by: THORACIC SURGERY (CARDIOTHORACIC VASCULAR SURGERY)

## 2021-04-04 PROCEDURE — 63710000001 INSULIN LISPRO (HUMAN) PER 5 UNITS: Performed by: NURSE PRACTITIONER

## 2021-04-04 PROCEDURE — 63710000001 INSULIN GLARGINE PER 5 UNITS: Performed by: NURSE PRACTITIONER

## 2021-04-04 PROCEDURE — 84132 ASSAY OF SERUM POTASSIUM: CPT | Performed by: INTERNAL MEDICINE

## 2021-04-04 PROCEDURE — 94799 UNLISTED PULMONARY SVC/PX: CPT

## 2021-04-04 PROCEDURE — 99233 SBSQ HOSP IP/OBS HIGH 50: CPT | Performed by: INTERNAL MEDICINE

## 2021-04-04 PROCEDURE — 63710000001 INSULIN LISPRO (HUMAN) PER 5 UNITS: Performed by: INTERNAL MEDICINE

## 2021-04-04 PROCEDURE — 71045 X-RAY EXAM CHEST 1 VIEW: CPT

## 2021-04-04 PROCEDURE — 63710000001 PREDNISONE PER 1 MG: Performed by: INTERNAL MEDICINE

## 2021-04-04 PROCEDURE — 82803 BLOOD GASES ANY COMBINATION: CPT

## 2021-04-04 PROCEDURE — 94003 VENT MGMT INPAT SUBQ DAY: CPT

## 2021-04-04 PROCEDURE — 63710000001 PREDNISONE PER 5 MG: Performed by: INTERNAL MEDICINE

## 2021-04-04 PROCEDURE — 80069 RENAL FUNCTION PANEL: CPT | Performed by: INTERNAL MEDICINE

## 2021-04-04 PROCEDURE — 85027 COMPLETE CBC AUTOMATED: CPT | Performed by: NURSE PRACTITIONER

## 2021-04-04 PROCEDURE — 25010000002 FONDAPARINUX PER 0.5 MG: Performed by: THORACIC SURGERY (CARDIOTHORACIC VASCULAR SURGERY)

## 2021-04-04 PROCEDURE — 82962 GLUCOSE BLOOD TEST: CPT

## 2021-04-04 RX ORDER — ACETAZOLAMIDE 250 MG/1
250 TABLET ORAL 2 TIMES DAILY
Status: COMPLETED | OUTPATIENT
Start: 2021-04-04 | End: 2021-04-04

## 2021-04-04 RX ORDER — BUMETANIDE 0.25 MG/ML
1 INJECTION INTRAMUSCULAR; INTRAVENOUS ONCE
Status: COMPLETED | OUTPATIENT
Start: 2021-04-04 | End: 2021-04-04

## 2021-04-04 RX ORDER — PREDNISONE 10 MG/1
10 TABLET ORAL
Status: COMPLETED | OUTPATIENT
Start: 2021-04-05 | End: 2021-04-09

## 2021-04-04 RX ADMIN — FLUOXETINE 60 MG: 20 CAPSULE ORAL at 08:21

## 2021-04-04 RX ADMIN — DOCUSATE SODIUM 100 MG: 50 LIQUID ORAL at 08:21

## 2021-04-04 RX ADMIN — POTASSIUM CHLORIDE 20 MEQ: 1.5 POWDER, FOR SOLUTION ORAL at 17:24

## 2021-04-04 RX ADMIN — QUETIAPINE FUMARATE 25 MG: 25 TABLET ORAL at 21:37

## 2021-04-04 RX ADMIN — POTASSIUM CHLORIDE 20 MEQ: 1.5 POWDER, FOR SOLUTION ORAL at 17:25

## 2021-04-04 RX ADMIN — ATORVASTATIN CALCIUM 40 MG: 40 TABLET, FILM COATED ORAL at 21:37

## 2021-04-04 RX ADMIN — POTASSIUM CHLORIDE 20 MEQ: 1.5 POWDER, FOR SOLUTION ORAL at 08:27

## 2021-04-04 RX ADMIN — INSULIN LISPRO 4 UNITS: 100 INJECTION, SOLUTION INTRAVENOUS; SUBCUTANEOUS at 17:24

## 2021-04-04 RX ADMIN — INSULIN LISPRO 5 UNITS: 100 INJECTION, SOLUTION INTRAVENOUS; SUBCUTANEOUS at 00:13

## 2021-04-04 RX ADMIN — DIPHENHYDRAMINE HYDROCHLORIDE AND LIDOCAINE HYDROCHLORIDE AND ALUMINUM HYDROXIDE AND MAGNESIUM HYDRO 5 ML: KIT at 00:13

## 2021-04-04 RX ADMIN — HYDROCODONE BITARTRATE AND ACETAMINOPHEN 1 TABLET: 5; 325 TABLET ORAL at 14:12

## 2021-04-04 RX ADMIN — GABAPENTIN 200 MG: 100 CAPSULE ORAL at 21:37

## 2021-04-04 RX ADMIN — BUDESONIDE 1 MG: 0.5 SUSPENSION RESPIRATORY (INHALATION) at 06:30

## 2021-04-04 RX ADMIN — ARFORMOTEROL TARTRATE 15 MCG: 15 SOLUTION RESPIRATORY (INHALATION) at 18:05

## 2021-04-04 RX ADMIN — GABAPENTIN 200 MG: 100 CAPSULE ORAL at 08:21

## 2021-04-04 RX ADMIN — IPRATROPIUM BROMIDE AND ALBUTEROL SULFATE 3 ML: 2.5; .5 SOLUTION RESPIRATORY (INHALATION) at 06:30

## 2021-04-04 RX ADMIN — BUDESONIDE 1 MG: 0.5 SUSPENSION RESPIRATORY (INHALATION) at 18:11

## 2021-04-04 RX ADMIN — INSULIN LISPRO 5 UNITS: 100 INJECTION, SOLUTION INTRAVENOUS; SUBCUTANEOUS at 17:25

## 2021-04-04 RX ADMIN — CHLORHEXIDINE GLUCONATE 15 ML: 1.2 RINSE ORAL at 21:37

## 2021-04-04 RX ADMIN — BUMETANIDE 1 MG: 1 TABLET ORAL at 17:25

## 2021-04-04 RX ADMIN — PREDNISONE 30 MG: 20 TABLET ORAL at 08:21

## 2021-04-04 RX ADMIN — LANSOPRAZOLE 30 MG: KIT at 05:31

## 2021-04-04 RX ADMIN — IPRATROPIUM BROMIDE AND ALBUTEROL SULFATE 3 ML: 2.5; .5 SOLUTION RESPIRATORY (INHALATION) at 14:30

## 2021-04-04 RX ADMIN — METOPROLOL TARTRATE 12.5 MG: 25 TABLET, FILM COATED ORAL at 21:37

## 2021-04-04 RX ADMIN — BUMETANIDE 1 MG: 1 TABLET ORAL at 08:21

## 2021-04-04 RX ADMIN — DIPHENHYDRAMINE HYDROCHLORIDE AND LIDOCAINE HYDROCHLORIDE AND ALUMINUM HYDROXIDE AND MAGNESIUM HYDRO 5 ML: KIT at 11:44

## 2021-04-04 RX ADMIN — IPRATROPIUM BROMIDE AND ALBUTEROL SULFATE 3 ML: 2.5; .5 SOLUTION RESPIRATORY (INHALATION) at 23:18

## 2021-04-04 RX ADMIN — BUMETANIDE 1 MG: 0.25 INJECTION INTRAMUSCULAR; INTRAVENOUS at 08:27

## 2021-04-04 RX ADMIN — BUMETANIDE 1 MG: 1 TABLET ORAL at 21:37

## 2021-04-04 RX ADMIN — CHLORHEXIDINE GLUCONATE 15 ML: 1.2 RINSE ORAL at 08:21

## 2021-04-04 RX ADMIN — INSULIN GLARGINE 20 UNITS: 100 INJECTION, SOLUTION SUBCUTANEOUS at 21:38

## 2021-04-04 RX ADMIN — IPRATROPIUM BROMIDE AND ALBUTEROL SULFATE 3 ML: 2.5; .5 SOLUTION RESPIRATORY (INHALATION) at 03:14

## 2021-04-04 RX ADMIN — HYDROCODONE BITARTRATE AND ACETAMINOPHEN 1 TABLET: 5; 325 TABLET ORAL at 09:25

## 2021-04-04 RX ADMIN — LEVOTHYROXINE SODIUM 100 MCG: 0.1 TABLET ORAL at 05:31

## 2021-04-04 RX ADMIN — DIPHENHYDRAMINE HYDROCHLORIDE AND LIDOCAINE HYDROCHLORIDE AND ALUMINUM HYDROXIDE AND MAGNESIUM HYDRO 5 ML: KIT at 05:31

## 2021-04-04 RX ADMIN — FONDAPARINUX SODIUM 5 MG: 5 INJECTION, SOLUTION SUBCUTANEOUS at 15:16

## 2021-04-04 RX ADMIN — POTASSIUM CHLORIDE 20 MEQ: 1.5 POWDER, FOR SOLUTION ORAL at 08:21

## 2021-04-04 RX ADMIN — ACETAMINOPHEN ORAL SOLUTION 649.6 MG: 650 SOLUTION ORAL at 05:32

## 2021-04-04 RX ADMIN — ACETYLCYSTEINE 3 ML: 200 SOLUTION ORAL; RESPIRATORY (INHALATION) at 18:17

## 2021-04-04 RX ADMIN — ACETAZOLAMIDE 250 MG: 250 TABLET ORAL at 21:37

## 2021-04-04 RX ADMIN — NYSTATIN: 100000 POWDER TOPICAL at 08:30

## 2021-04-04 RX ADMIN — ACETYLCYSTEINE 3 ML: 200 SOLUTION ORAL; RESPIRATORY (INHALATION) at 06:30

## 2021-04-04 RX ADMIN — NYSTATIN: 100000 POWDER TOPICAL at 22:16

## 2021-04-04 RX ADMIN — HYDRALAZINE HYDROCHLORIDE 25 MG: 25 TABLET ORAL at 21:37

## 2021-04-04 RX ADMIN — ASPIRIN 81 MG: 81 TABLET, COATED ORAL at 08:21

## 2021-04-04 RX ADMIN — DOCUSATE SODIUM 100 MG: 50 LIQUID ORAL at 21:37

## 2021-04-04 RX ADMIN — INSULIN LISPRO 5 UNITS: 100 INJECTION, SOLUTION INTRAVENOUS; SUBCUTANEOUS at 11:44

## 2021-04-04 RX ADMIN — ARFORMOTEROL TARTRATE 15 MCG: 15 SOLUTION RESPIRATORY (INHALATION) at 06:30

## 2021-04-04 RX ADMIN — METOPROLOL TARTRATE 12.5 MG: 25 TABLET, FILM COATED ORAL at 08:21

## 2021-04-04 RX ADMIN — ACETAZOLAMIDE 250 MG: 250 TABLET ORAL at 11:44

## 2021-04-04 RX ADMIN — IPRATROPIUM BROMIDE AND ALBUTEROL SULFATE 3 ML: 2.5; .5 SOLUTION RESPIRATORY (INHALATION) at 10:40

## 2021-04-04 NOTE — PLAN OF CARE
Goal Outcome Evaluation: tube feeds off @ 0400. No sedation required thru night. CVP flat 5. Pt comfortable thru night slept well . VSS . Will plan to CPAP again today and extubate? Pt communicates her understanding of plans for later this morning, Nods head in approval.

## 2021-04-04 NOTE — PROGRESS NOTES
NEPHROLOGY PROGRESS NOTE------[facility]    [facility]  [phone]  Robert Hammer MD      Patient Care Team:  Mary Wilde APRN as PCP - General  Mary Wilde APRN as PCP - Family Medicine  Jose Levi MD as Consulting Physician (Cardiology)  Yovani Lerma MD as Consulting Physician (Nephrology)      Provider:  Robert Hammer MD  Patient Name: Claudia Rust  :  1950    SUBJECTIVE:    F/U ARF/JERROD/HYPERNATREMIA    S/P extubation. Malaised. Thirsty. No SOB, CP, dysuria    Medication:  acetylcysteine, 3 mL, Nebulization, BID - RT  arformoterol, 15 mcg, Nebulization, BID - RT  aspirin, 81 mg, Oral, Daily  atorvastatin, 40 mg, Oral, Nightly  budesonide, 1 mg, Nebulization, BID - RT  bumetanide, 1 mg, Nasogastric, TID  chlorhexidine, 15 mL, Mouth/Throat, Q12H  docusate, 100 mg, Oral, BID  First Mouthwash (Magic Mouthwash), 5 mL, Swish & Spit, Q6H  FLUoxetine, 60 mg, Oral, Daily  fondaparinux, 5 mg, Subcutaneous, Q24H  gabapentin, 200 mg, Oral, Q12H  hydrALAZINE, 25 mg, Nasogastric, Q8H  insulin glargine, 20 Units, Subcutaneous, Q12H  insulin lispro, 0-24 Units, Subcutaneous, Q6H  insulin lispro, 5 Units, Subcutaneous, Q6H  ipratropium-albuterol, 3 mL, Nebulization, Q4H - RT  lansoprazole, 30 mg, Oral, QAM  levothyroxine, 100 mcg, Oral, Q AM  metoprolol tartrate, 12.5 mg, Oral, Q12H  nystatin, , Topical, Q12H  potassium chloride, 20 mEq, Oral, TID With Meals  predniSONE, 30 mg, Oral, Daily With Breakfast  QUEtiapine, 25 mg, Oral, Nightly      propofol, 5-30 mcg/kg/min, Last Rate: Stopped (21 0550)        OBJECTIVE    Vital Sign Min/Max for last 24 hours  Temp  Min: 97.4 °F (36.3 °C)  Max: 98 °F (36.7 °C)   No data recorded   Pulse  Min: 60  Max: 91   Resp  Min: 22  Max: 23   SpO2  Min: 91 %  Max: 100 %   No data recorded   Weight  Min: 123 kg (272 lb 0.8 oz)  Max: 123 kg (272 lb 0.8 oz)     Flowsheet Rows      First Filed  "Value   Admission Height  162.6 cm (64\") Documented at 03/18/2021 2000   Admission Weight  129 kg (284 lb) Documented at 03/18/2021 2000          No intake/output data recorded.  I/O last 3 completed shifts:  In: 4522.8 [I.V.:49.8; Other:2226; NG/GT:2247]  Out: 4600 [Urine:4600]    Physical Exam: CVP=7-9  General Appearance: NAD. Fatigued  Head: normocephalic, without obvious abnormality and atraumatic +NGT INTACT  Eyes: conjunctivae and sclerae normal and no icterus  Neck: supple. NO GROSS JVD NOW  Lungs: +FEW SCATTERED RHONCHI AND FINE BIBASILAR CRACKLES  Heart: regular rhythm & normal rate and normal S1, S2 +NARGIS  Chest: Wall no abnormalities observed  Abdomen: normal bowel sounds and soft non-tender +OBESITY  Extremities: +TRACE/1+ DIFFUSE BILAT LE EDEMA, no cyanosis and no redness  Skin: no bleeding, bruising or rash, turgor normal, color normal and no lesions noted  Neurologic: Alert and oriented without focal deficits    Labs:    WBC WBC   Date Value Ref Range Status   04/04/2021 19.50 (H) 3.40 - 10.80 10*3/mm3 Final   04/03/2021 19.10 (H) 3.40 - 10.80 10*3/mm3 Final   04/02/2021 21.20 (H) 3.40 - 10.80 10*3/mm3 Final      HGB Hemoglobin   Date Value Ref Range Status   04/04/2021 10.0 (L) 12.0 - 15.9 g/dL Final   04/03/2021 10.2 (L) 12.0 - 15.9 g/dL Final   04/02/2021 10.5 (L) 12.0 - 15.9 g/dL Final      HCT Hematocrit   Date Value Ref Range Status   04/04/2021 30.4 (L) 34.0 - 46.6 % Final   04/03/2021 30.5 (L) 34.0 - 46.6 % Final   04/02/2021 31.6 (L) 34.0 - 46.6 % Final      Platlets No results found for: LABPLAT   MCV MCV   Date Value Ref Range Status   04/04/2021 91.5 79.0 - 97.0 fL Final   04/03/2021 90.8 79.0 - 97.0 fL Final   04/02/2021 90.7 79.0 - 97.0 fL Final          Sodium Sodium   Date Value Ref Range Status   04/04/2021 141 136 - 145 mmol/L Final   04/03/2021 142 136 - 145 mmol/L Final   04/02/2021 143 136 - 145 mmol/L Final      Potassium Potassium   Date Value Ref Range Status   04/04/2021 3.6 " 3.5 - 5.2 mmol/L Final   04/03/2021 4.0 3.5 - 5.2 mmol/L Final   04/02/2021 3.7 3.5 - 5.2 mmol/L Final     Comment:     Slight hemolysis detected by analyzer. Results may be affected.   04/01/2021 4.5 3.5 - 5.2 mmol/L Final     Comment:     Result checked       Chloride Chloride   Date Value Ref Range Status   04/04/2021 104 98 - 107 mmol/L Final   04/03/2021 104 98 - 107 mmol/L Final   04/02/2021 105 98 - 107 mmol/L Final      CO2 CO2   Date Value Ref Range Status   04/04/2021 28.0 22.0 - 29.0 mmol/L Final   04/03/2021 30.0 (H) 22.0 - 29.0 mmol/L Final   04/02/2021 29.0 22.0 - 29.0 mmol/L Final      BUN BUN   Date Value Ref Range Status   04/04/2021 46 (H) 8 - 23 mg/dL Final   04/03/2021 48 (H) 8 - 23 mg/dL Final   04/02/2021 47 (H) 8 - 23 mg/dL Final      Creatinine Creatinine   Date Value Ref Range Status   04/04/2021 0.46 (L) 0.57 - 1.00 mg/dL Final   04/03/2021 0.51 (L) 0.57 - 1.00 mg/dL Final   04/02/2021 0.49 (L) 0.57 - 1.00 mg/dL Final      Calcium Calcium   Date Value Ref Range Status   04/04/2021 8.7 8.6 - 10.5 mg/dL Final   04/03/2021 8.8 8.6 - 10.5 mg/dL Final   04/02/2021 8.7 8.6 - 10.5 mg/dL Final      PO4 No components found for: PO4   Albumin Albumin   Date Value Ref Range Status   04/04/2021 2.60 (L) 3.50 - 5.20 g/dL Final   04/03/2021 2.70 (L) 3.50 - 5.20 g/dL Final   04/02/2021 2.60 (L) 3.50 - 5.20 g/dL Final      Magnesium Magnesium   Date Value Ref Range Status   04/03/2021 2.3 1.6 - 2.4 mg/dL Final   04/02/2021 2.2 1.6 - 2.4 mg/dL Final      Uric Acid No components found for: URIC ACID     Imaging Results (Last 72 Hours)     Procedure Component Value Units Date/Time    XR Chest 1 View [[id number]] Collected: 04/04/21 0805     Updated: 04/04/21 0809    Narrative:      EXAMINATION: XR CHEST 1 VW-     DATE OF EXAM: 4/4/2021 4:58 AM     INDICATION: Hypoxia; M06.9-Rheumatoid arthritis, unspecified;  I35.0-Nonrheumatic aortic (valve) stenosis; J96.01-Acute respiratory  failure with hypoxia;  J44.9-Chronic obstructive pulmonary disease,  unspecified.     COMPARISON: Chest radiograph dated 04/03/2021     TECHNIQUE: Portable AP view of the chest was obtained.     FINDINGS:  The endotracheal tube tip is 2.4 cm above the davi. There is a  left-sided central line with tip terminating over the mid SVC in  unchanged position. There is a feeding tube which courses below left  hemidiaphragm. There is stable cardiac megaly. There is aortic arch  athetotic calcification. Pulmonary vascularity appears within normal  limits. There is bibasilar airspace opacities likely representing  atelectasis with possible trace bilateral pleural effusions. There is a  prosthetic cardiac valve and temporary pacing wires. There is no  evidence of pneumothorax. Multilevel degenerative changes of the  thoracic spine.       Impression:      1. Support devices in expected position.  2. Unchanged bibasilar airspace opacities and likely small bilateral  layering pleural effusions.     Electronically Signed By-Olegario Arnold MD On:4/4/2021 8:07 AM  This report was finalized on [id number] by  Olegario Arnold MD.    XR Chest 1 View [[id number]] Collected: 04/03/21 0952     Updated: 04/03/21 0955    Narrative:      DATE OF EXAM:  4/3/2021 5:02 AM     PROCEDURE:  XR CHEST 1 VW-     INDICATIONS:  Hypoxia, heart disease, acute respiratory failure, COPD.      COMPARISON:  04/02/2020.     TECHNIQUE:   Single radiographic view of the chest was obtained.     FINDINGS:  There is a stable endotracheal tube and left internal jugular line in  place. The Dobbhoff feeding tube tip projects out of the field-of-view.  The heart is enlarged with postsurgical changes apparent. There is a  stable left basilar pleural effusion. There is bilateral mixed  interstitial/airspace disease with no significant interval change. I  would favor multifocal pneumonia over pulmonary edema.  There is no  pneumothorax.       Impression:      No interval change from  yesterday's study with abnormalities as  described above.     Electronically Signed By-Suhas Donald MD On:4/3/2021 9:53 AM  This report was finalized on [id number] by  Suhas Donald MD.    XR Chest 1 View [[id number]] Collected: 04/02/21 0724     Updated: 04/02/21 0728    Narrative:      DATE OF EXAM:  4/2/2021 5:00 AM     PROCEDURE:  XR CHEST 1 VW-     INDICATIONS:  Hypoxia; M06.9-Rheumatoid arthritis, unspecified; I35.0-Nonrheumatic  aortic (valve) stenosis; J96.01-Acute respiratory failure with hypoxia;  J44.9-Chronic obstructive pulmonary disease, unspecified     COMPARISON:  04/01/2021     TECHNIQUE:   Single radiographic view of the chest was obtained.     FINDINGS:  Endotracheal tube tip terminates 1.5 cm from the davi. Esophagogastric  tube is past GE junction. Left internal jugular catheter terminates in  the superior vena cava. There are midline sternotomy wires. There is a  prosthetic aortic valve. Heart is enlarged. Multifocal airspace opacity  lower lobe predominant with small left moderate size right pleural  effusion suspected. Mild improvement in septal thickening compared to  previous study.       Impression:      Mild improvement septal thickening compared to previous study.  Multifocal airspace opacities are present, question multifocal  pneumonia.  Endotracheal tube tip terminates about 1.5 cm from the davi, consider  pulling it back about 2 cm.     Electronically Signed By-Birgit Wilkins MD On:4/2/2021 7:26 AM  This report was finalized on [id number] by  Birgit Wilkins MD.    XR Abdomen KUB [[id number]] Collected: 04/01/21 2211     Updated: 04/01/21 2214    Narrative:      DATE OF EXAM:  4/1/2021 9:32 PM     PROCEDURE:  XR ABDOMEN KUB-     INDICATIONS:  ng tube placement; M06.9-Rheumatoid arthritis, unspecified;  I35.0-Nonrheumatic aortic (valve) stenosis; J96.01-Acute respiratory  failure with hypoxia; J44.9-Chronic obstructive pulmonary disease,  unspecified     COMPARISON:  03/22/2021      TECHNIQUE:   Single radiographic view of the abdomen was obtained.     FINDINGS:  The feeding tube passes below the diaphragm, with its tip in the area of  the upper body of the stomach, similar to the prior exam. Technique is  suboptimal for lung evaluation.       Impression:      1. Feeding tube tip is in the stomach.      Electronically Signed By-Sol Javed MD On:4/1/2021 10:12 PM  This report was finalized on [id number] by  Sol Javed MD.          Results for orders placed during the hospital encounter of 03/18/21    XR Chest 1 View    Narrative  EXAMINATION: XR CHEST 1 VW-    DATE OF EXAM: 4/4/2021 4:58 AM    INDICATION: Hypoxia; M06.9-Rheumatoid arthritis, unspecified;  I35.0-Nonrheumatic aortic (valve) stenosis; J96.01-Acute respiratory  failure with hypoxia; J44.9-Chronic obstructive pulmonary disease,  unspecified.    COMPARISON: Chest radiograph dated 04/03/2021    TECHNIQUE: Portable AP view of the chest was obtained.    FINDINGS:  The endotracheal tube tip is 2.4 cm above the davi. There is a  left-sided central line with tip terminating over the mid SVC in  unchanged position. There is a feeding tube which courses below left  hemidiaphragm. There is stable cardiac megaly. There is aortic arch  athetotic calcification. Pulmonary vascularity appears within normal  limits. There is bibasilar airspace opacities likely representing  atelectasis with possible trace bilateral pleural effusions. There is a  prosthetic cardiac valve and temporary pacing wires. There is no  evidence of pneumothorax. Multilevel degenerative changes of the  thoracic spine.    Impression  1. Support devices in expected position.  2. Unchanged bibasilar airspace opacities and likely small bilateral  layering pleural effusions.    Electronically Signed By-Olegario Arnold MD On:4/4/2021 8:07 AM  This report was finalized on [id number] by  Olegario Arnold MD.      XR Abdomen KUB    Narrative  DATE OF EXAM:  4/1/2021  9:32 PM    PROCEDURE:  XR ABDOMEN KUB-    INDICATIONS:  ng tube placement; M06.9-Rheumatoid arthritis, unspecified;  I35.0-Nonrheumatic aortic (valve) stenosis; J96.01-Acute respiratory  failure with hypoxia; J44.9-Chronic obstructive pulmonary disease,  unspecified    COMPARISON:  03/22/2021    TECHNIQUE:  Single radiographic view of the abdomen was obtained.    FINDINGS:  The feeding tube passes below the diaphragm, with its tip in the area of  the upper body of the stomach, similar to the prior exam. Technique is  suboptimal for lung evaluation.    Impression  1. Feeding tube tip is in the stomach.    Electronically Signed By-Sol Javed MD On:4/1/2021 10:12 PM  This report was finalized on [id number] by  Sol Javed MD.      XR Chest 1 View    Narrative  DATE OF EXAM:  4/3/2021 5:02 AM    PROCEDURE:  XR CHEST 1 VW-    INDICATIONS:  Hypoxia, heart disease, acute respiratory failure, COPD.    COMPARISON:  04/02/2020.    TECHNIQUE:  Single radiographic view of the chest was obtained.    FINDINGS:  There is a stable endotracheal tube and left internal jugular line in  place. The Dobbhoff feeding tube tip projects out of the field-of-view.  The heart is enlarged with postsurgical changes apparent. There is a  stable left basilar pleural effusion. There is bilateral mixed  interstitial/airspace disease with no significant interval change. I  would favor multifocal pneumonia over pulmonary edema.  There is no  pneumothorax.    Impression  No interval change from yesterday's study with abnormalities as  described above.    Electronically Signed By-Suhas Donald MD On:4/3/2021 9:53 AM  This report was finalized on [id number] by  Suhas Donald MD.      Results for orders placed during the hospital encounter of 03/18/21    Duplex Venous Upper Extremity - Bilateral CAR    Interpretation Summary  · Acute right upper extremity deep vein thrombosis noted in the subclavian, axillary and brachial. This represents  catheter associated deep vein thrombosis.  · All other vessels appear normal.        ASSESSMENT / PLAN      Aortic valve stenosis    Rheumatoid arthritis (CMS/HCC)    1. ARF/JERROD------Nonoliguric ATN. Venegas in place. Creatinine normal. BUN still up. Follow with ongoing diuretic exposure. No NSAIDs or IV dye    2. HYPERNATREMIA------Better.  Free water flushes with TFs. Encourage po water intake once swallow eval done and patient able to eat/drink    3. HYPOALBUMINEMIA-----on TFs.     4. 3RD SPACED EDEMA------On po Bumex. Significant contribution from chronic venous insufficiency/obesity    5. CAD S/P AVR-----per Cardiology and CT Surgery. S/P CHRISTIANO    6. OBESITY    7. ACUTE HYPOXIC RESPIRATORY FAILURE------On vent per Pulmonary    8. RUE DVT    9. DMII-------Glucometers, SSI    10. ANEMIA------H/H stable    11. SEPSIS/LEUKOCYTOSIS------Antibiotics per Pulmonary/CC    12. THROMBOCYTOPENIA    13. HYPOCALCEMIA-----Replaced    14. HYPERLIPIDEMIA------On Statin.      15. HYPOTHYROIDISM------On Sythroid    16. GERD/PUD PROPHYLAXIS------Prevacid per NGT    17. HTN-------BP good. D/C Amlodipine b/c of edema. Added scheduled Hydralazine    18. METABOLIC ALKALOSIS-----Give little Diamox today and follow    Robert Hammer MD  [facility]  [phone]  04/04/21  09:17 EDT

## 2021-04-04 NOTE — NURSING NOTE
Pt failed swallow study related to prolonged intubation after dysphagia screen was assessed. ST consulted. Tube feeds still infusing and pt tolerating ice chips at the time. Will continue to monitor.

## 2021-04-04 NOTE — PROGRESS NOTES
She is extubated this morning.  She says that her voice sucks.  Slow progress but making progress.

## 2021-04-04 NOTE — PLAN OF CARE
Problem: Adult Inpatient Plan of Care  Goal: Plan of Care Review  Outcome: Ongoing, Progressing  Flowsheets (Taken 4/4/2021 1816)  Progress: improving  Plan of Care Reviewed With:   patient   spouse  Outcome Summary: Pt extubated today on 5 L. Tolerated transition well. Hemodynamically stable.  at bedside and updated.Pt AOX3.   Goal Outcome Evaluation:  Plan of Care Reviewed With: patient, spouse  Progress: improving  Outcome Summary: Pt extubated today on 5 L. Tolerated transition well. Hemodynamically stable.  at bedside and updated.Pt AOX3.

## 2021-04-04 NOTE — PROGRESS NOTES
"PULMONARY CRITICAL CARE Progress  NOTE      PATIENT IDENTIFICATION:  Name: Claudia Rust  MRN: [id number]  :  1950     Age: 70 y.o.  Sex: female    DATE OF Note:  2021   Referring Physician: Olaf Mcgill MD                  Subjective:   Tolerated extubation this morning after good pressure support trials  Tolerating tube feeds at goal  Patient denies chest pain or abdominal pain  She reports she is very thirsty  No pressors   No changes bowel habits  No new rash or lesions    Minimal secretions    Objective:  tMax 24 hrs: Temp (24hrs), Av.7 °F (36.5 °C), Min:97.4 °F (36.3 °C), Max:98 °F (36.7 °C)      Vitals Ranges:   Temp:  [97.4 °F (36.3 °C)-98 °F (36.7 °C)] 98 °F (36.7 °C)  Heart Rate:  [60-91] 67  Resp:  [20-23] 20  Arterial Line BP: ()/(42-80) 118/51  FiO2 (%):  [40 %] 40 %    Intake and Output Last 3 Shifts:   I/O last 3 completed shifts:  In: 4522.8 [I.V.:49.8; Other:2226; NG/GT:2247]  Out: 4600 [Urine:4600]    Exam:  /60   Pulse 67   Temp 98 °F (36.7 °C) (Oral)   Resp 20   Ht 162.6 cm (64\")   Wt 123 kg (272 lb 0.8 oz)   SpO2 94%   BMI 46.70 kg/m²     General Appearance: Awake and alert  HEENT:  Normocephalic, without obvious abnormality, Conjunctiva/corneas clear.  Normal external ear canals, Nares normal, no drainage.  Right nare Dobbhoff tube  Neck:  Supple, symmetrical, trachea midline. No JVD.  Left IJ central line  Lungs /Chest wall:   Mild scattered rhonchi at times, no wheezing, no crackles, diminished bases, respirations unlabored symmetrical wall movement.   Midsternal dressing intact  Heart: Sinus rhythm, no murmur or gallop  Abdomen: Soft, non-distended, active bowel sounds.  No rebound or guarding.  Morbid body habitus  Extremities: + Generalized edema, no clubbing or cyanosis        Medications:    Current Facility-Administered Medications:   •  acetaminophen (TYLENOL) tablet 650 mg, 650 mg, Oral, Q4H PRN **OR** acetaminophen (TYLENOL) 160 MG/5ML " solution 650 mg, 650 mg, Oral, Q4H PRN, 649.6 mg at 04/04/21 0532 **OR** acetaminophen (TYLENOL) suppository 650 mg, 650 mg, Rectal, Q4H PRN, Janna Alberto, APRN  •  acetaZOLAMIDE (DIAMOX) tablet 250 mg, 250 mg, Nasogastric, BID, Paola Hammer MD  •  acetylcysteine (MUCOMYST) 20 % nebulizer solution 3 mL, 3 mL, Nebulization, BID - RT, Shanell Hernandez MD, 3 mL at 04/04/21 0630  •  arformoterol (BROVANA) nebulizer solution 15 mcg, 15 mcg, Nebulization, BID - RT, Shanell Hernandez MD, 15 mcg at 04/04/21 0630  •  aspirin EC tablet 81 mg, 81 mg, Oral, Daily, Janna Alberto APRN, 81 mg at 04/04/21 0821  •  atorvastatin (LIPITOR) tablet 40 mg, 40 mg, Oral, Nightly, Janna Alberto APRN, 40 mg at 04/03/21 2011  •  bisacodyl (DULCOLAX) suppository 10 mg, 10 mg, Rectal, Daily PRN, Janna Alberto APRN, 10 mg at 03/26/21 1234  •  budesonide (PULMICORT) nebulizer solution 1 mg, 1 mg, Nebulization, BID - RT, Shanell Hernandez MD, 1 mg at 04/04/21 0630  •  bumetanide (BUMEX) tablet 1 mg, 1 mg, Nasogastric, TID, Paola Hammer MD, 1 mg at 04/04/21 0821  •  chlorhexidine (PERIDEX) 0.12 % solution 15 mL, 15 mL, Mouth/Throat, Q12H, Janna Alberto APRN, 15 mL at 04/04/21 0821  •  Chlorhexidine Gluconate Cloth 2 % pads 1 application, 1 application, Topical, Q12H PRN, Monika Conn APRN  •  dextrose (D50W) 25 g/ 50mL Intravenous Solution 25 g, 25 g, Intravenous, Q15 Min PRN, Olaf Mcgill MD  •  dextrose (GLUTOSE) oral gel 15 g, 15 g, Oral, Q15 Min PRN, Olaf Mcgill MD  •  docusate (COLACE) 50 MG/5ML liquid 100 mg, 100 mg, Oral, BID, Janna Alberto APRN, 100 mg at 04/04/21 0821  •  First Mouthwash (Magic Mouthwash) 5 mL, 5 mL, Swish & Spit, Q6H, Shanell Hernandez MD, 5 mL at 04/04/21 0531  •  FLUoxetine (PROzac) capsule 60 mg, 60 mg, Oral, Daily, Belkis Loving APRN, 60 mg at 04/04/21 0821  •  fondaparinux (ARIXTRA)  injection 5 mg, 5 mg, Subcutaneous, Q24H, Olaf Mcgill MD, 5 mg at 04/03/21 1238  •  gabapentin (NEURONTIN) capsule 200 mg, 200 mg, Oral, Q12H, Janna Alberto APRN, 200 mg at 04/04/21 0821  •  glucagon (human recombinant) (GLUCAGEN DIAGNOSTIC) injection 1 mg, 1 mg, Subcutaneous, Q15 Min PRN, Olaf Mcgill MD  •  hydrALAZINE (APRESOLINE) injection 10 mg, 10 mg, Intravenous, Q6H PRN, Juan Jose Skelton MD, 10 mg at 03/27/21 2251  •  hydrALAZINE (APRESOLINE) tablet 25 mg, 25 mg, Nasogastric, Q8H, Paola Hammer MD, 25 mg at 04/03/21 2012  •  HYDROcodone-acetaminophen (NORCO) 5-325 MG per tablet 1 tablet, 1 tablet, Oral, Q4H PRN, Janna Alberto APRN, 1 tablet at 04/04/21 0925  •  insulin glargine (LANTUS, SEMGLEE) injection 20 Units, 20 Units, Subcutaneous, Q12H, Gilles Brady APRN, 20 Units at 04/03/21 2011  •  insulin lispro (ADMELOG) injection 0-24 Units, 0-24 Units, Subcutaneous, Q6H, 4 Units at 04/03/21 1713 **AND** insulin lispro (ADMELOG) injection 0-24 Units, 0-24 Units, Subcutaneous, PRN, Glenn Reyes MD  •  insulin lispro (ADMELOG) injection 5 Units, 5 Units, Subcutaneous, Q6H, Belkis Loving, APRN, 5 Units at 04/04/21 0013  •  ipratropium-albuterol (DUO-NEB) nebulizer solution 3 mL, 3 mL, Nebulization, Q4H PRN, Janna Alberto APRN, 3 mL at 03/18/21 1522  •  ipratropium-albuterol (DUO-NEB) nebulizer solution 3 mL, 3 mL, Nebulization, Q4H - RTKarlie Dawn, APRN, 3 mL at 04/04/21 1040  •  lansoprazole (FIRST) oral suspension 30 mg, 30 mg, Oral, QAM, Day, Lorna, APRN, 30 mg at 04/04/21 0531  •  levothyroxine (SYNTHROID, LEVOTHROID) tablet 100 mcg, 100 mcg, Oral, Q AM, Olaf Mcgill MD, 100 mcg at 04/04/21 0531  •  lidocaine (XYLOCAINE) 2% injection, , , PRN, Glenn Reyes MD, 4 mL at 03/25/21 1439  •  lidocaine (XYLOCAINE) 5 % ointment, , , PRN, Glenn Reyes MD, 1 application at 03/25/21 1439  •  meclizine (ANTIVERT) tablet 25 mg, 25 mg, Oral, TID PRN,  Belkis Loving, APRN, 25 mg at 21 0836  •  metoprolol tartrate (LOPRESSOR) half tablet 12.5 mg, 12.5 mg, Oral, Q12H, Jose Levi MD, 12.5 mg at 21 0821  •  midazolam (VERSED) injection 1 mg, 1 mg, Intravenous, Q2H PRN, Shanell Hernandez MD  •  midazolam (VERSED) injection 2 mg, 2 mg, Intravenous, Q2H PRN, Shanell Hernandez MD  •  [] HYDROmorphone (DILAUDID) injection 0.5 mg, 0.5 mg, Intravenous, Q2H PRN, 0.5 mg at 21 0825 **AND** naloxone (NARCAN) injection 0.4 mg, 0.4 mg, Intravenous, Q5 Min PRN, Satterly-German, Janna L, APRN  •  nystatin (MYCOSTATIN) powder, , Topical, Q12H, Glenn Reyes MD, Given at 21 0830  •  ondansetron (ZOFRAN) injection 4 mg, 4 mg, Intravenous, Q6H PRN, Satterly-German, Janna L, APRN, 4 mg at 21 1356  •  potassium chloride (K-DUR,KLOR-CON) CR tablet 20 mEq, 20 mEq, Oral, PRN, 20 mEq at 21 0550 **OR** potassium chloride (KLOR-CON) packet 20 mEq, 20 mEq, Oral, PRN, Satterly-German, Janna L, APRN, 20 mEq at 21 0827  •  potassium chloride (K-DUR,KLOR-CON) CR tablet 20 mEq, 20 mEq, Oral, TID With Meals, Satterly-German, Janna L, APRN, 20 mEq at 21 1714  •  potassium chloride 10 mEq in 100 mL IVPB, 10 mEq, Intravenous, Q1H PRN **OR** potassium chloride 10 mEq in 100 mL IVPB, 10 mEq, Intravenous, Q1H PRN, Satterly-German, Janna L, APRN, Last Rate: 200 mL/hr at 21 0726, 10 mEq at 21 0726  •  predniSONE (DELTASONE) tablet 30 mg, 30 mg, Oral, Daily With Breakfast, Shanell Hernandez MD, 30 mg at 21 0821  •  QUEtiapine (SEROquel) tablet 25 mg, 25 mg, Oral, Nightly, Day, Lorna, APRN, 25 mg at 21    Data Review:  All labs (24hrs):   Recent Results (from the past 24 hour(s))   Blood Gas, Arterial -    Collection Time: 21 11:45 AM    Specimen: Arterial Blood   Result Value Ref Range    Site Arterial Line     Thiago's Test N/A     pH, Arterial 7.455 (H) 7.350 - 7.450 pH units    pCO2, Arterial  42.8 35.0 - 48.0 mm Hg    pO2, Arterial 74.1 (L) 83.0 - 108.0 mm Hg    HCO3, Arterial 30.1 (H) 21.0 - 28.0 mmol/L    Base Excess, Arterial 5.6 (H) 0.0 - 3.0 mmol/L    O2 Saturation, Arterial 95.3 94.0 - 98.0 %    CO2 Content 31.5 (H) 22 - 29 mmol/L    Barometric Pressure for Blood Gas      Modality Adult Vent     FIO2 40 %    Ventilator Mode ;CPAP/PS     PEEP 7     Hemodilution No    POC Glucose Once    Collection Time: 04/03/21 12:30 PM    Specimen: Blood   Result Value Ref Range    Glucose 216 (H) 70 - 105 mg/dL   Blood Gas, Arterial -    Collection Time: 04/03/21  4:03 PM    Specimen: Arterial Blood   Result Value Ref Range    Site Right Radial     Thiago's Test N/A     pH, Arterial 7.474 (H) 7.350 - 7.450 pH units    pCO2, Arterial 40.3 35.0 - 48.0 mm Hg    pO2, Arterial 56.7 (L) 83.0 - 108.0 mm Hg    HCO3, Arterial 29.6 (H) 21.0 - 28.0 mmol/L    Base Excess, Arterial 5.5 (H) 0.0 - 3.0 mmol/L    O2 Saturation, Arterial 90.9 (L) 94.0 - 98.0 %    CO2 Content 30.8 (H) 22 - 29 mmol/L    Barometric Pressure for Blood Gas      Modality TTube     FIO2 40 %    Hemodilution No    POC Glucose Once    Collection Time: 04/03/21  5:08 PM    Specimen: Blood   Result Value Ref Range    Glucose 189 (H) 70 - 105 mg/dL   POC Glucose Once    Collection Time: 04/03/21  5:12 PM    Specimen: Blood   Result Value Ref Range    Glucose 190 (H) 70 - 105 mg/dL   POC Glucose Once    Collection Time: 04/03/21  7:44 PM    Specimen: Blood   Result Value Ref Range    Glucose 164 (H) 70 - 105 mg/dL   POC Glucose Once    Collection Time: 04/04/21 12:02 AM    Specimen: Blood   Result Value Ref Range    Glucose 137 (H) 70 - 105 mg/dL   CBC (No Diff)    Collection Time: 04/04/21  4:14 AM    Specimen: Blood   Result Value Ref Range    WBC 19.50 (H) 3.40 - 10.80 10*3/mm3    RBC 3.32 (L) 3.77 - 5.28 10*6/mm3    Hemoglobin 10.0 (L) 12.0 - 15.9 g/dL    Hematocrit 30.4 (L) 34.0 - 46.6 %    MCV 91.5 79.0 - 97.0 fL    MCH 30.2 26.6 - 33.0 pg    MCHC 33.0  31.5 - 35.7 g/dL    RDW 14.9 12.3 - 15.4 %    RDW-SD 47.3 37.0 - 54.0 fl    MPV 11.2 6.0 - 12.0 fL    Platelets 112 (L) 140 - 450 10*3/mm3   Renal Function Panel    Collection Time: 04/04/21  4:14 AM    Specimen: Blood   Result Value Ref Range    Glucose 132 (H) 65 - 99 mg/dL    BUN 46 (H) 8 - 23 mg/dL    Creatinine 0.46 (L) 0.57 - 1.00 mg/dL    Sodium 141 136 - 145 mmol/L    Potassium 3.6 3.5 - 5.2 mmol/L    Chloride 104 98 - 107 mmol/L    CO2 28.0 22.0 - 29.0 mmol/L    Calcium 8.7 8.6 - 10.5 mg/dL    Albumin 2.60 (L) 3.50 - 5.20 g/dL    Phosphorus 3.6 2.5 - 4.5 mg/dL    Anion Gap 9.0 5.0 - 15.0 mmol/L    BUN/Creatinine Ratio 100.0 (H) 7.0 - 25.0    eGFR Non African Amer 134 >60 mL/min/1.73   Triglycerides    Collection Time: 04/04/21  4:14 AM    Specimen: Blood   Result Value Ref Range    Triglycerides 132 0 - 150 mg/dL   Blood Gas, Arterial -    Collection Time: 04/04/21  4:16 AM    Specimen: Arterial Blood   Result Value Ref Range    Site Arterial Line     Thiago's Test N/A     pH, Arterial 7.529 (H) 7.350 - 7.450 pH units    pCO2, Arterial 35.5 35.0 - 48.0 mm Hg    pO2, Arterial 67.1 (L) 83.0 - 108.0 mm Hg    HCO3, Arterial 29.7 (H) 21.0 - 28.0 mmol/L    Base Excess, Arterial 6.6 (H) 0.0 - 3.0 mmol/L    O2 Saturation, Arterial 95.1 94.0 - 98.0 %    CO2 Content 30.8 (H) 22 - 29 mmol/L    Barometric Pressure for Blood Gas      Modality Adult Vent     FIO2 40 %    Ventilator Mode ;AC     Set Tidal Volume 500     PEEP 5     Hemodilution No     Respiratory Rate 22    Blood Gas, Arterial -    Collection Time: 04/04/21  7:24 AM    Specimen: Arterial Blood   Result Value Ref Range    Site Arterial Line     Thiago's Test N/A     pH, Arterial 7.484 (H) 7.350 - 7.450 pH units    pCO2, Arterial 41.1 35.0 - 48.0 mm Hg    pO2, Arterial 75.7 (L) 83.0 - 108.0 mm Hg    HCO3, Arterial 30.9 (H) 21.0 - 28.0 mmol/L    Base Excess, Arterial 6.8 (H) 0.0 - 3.0 mmol/L    O2 Saturation, Arterial 96.0 94.0 - 98.0 %    CO2 Content 32.1 (H)  22 - 29 mmol/L    Barometric Pressure for Blood Gas      Modality Adult Vent     FIO2 40 %    Ventilator Mode ;CPAP/PS     PEEP 5     PSV 10 cmH2O    Hemodilution No    POC Glucose Once    Collection Time: 04/04/21 11:02 AM    Specimen: Blood   Result Value Ref Range    Glucose 128 (H) 70 - 105 mg/dL        Imaging:  XR Chest 1 View  Narrative: EXAMINATION: XR CHEST 1 VW-     DATE OF EXAM: 4/4/2021 4:58 AM     INDICATION: Hypoxia; M06.9-Rheumatoid arthritis, unspecified;  I35.0-Nonrheumatic aortic (valve) stenosis; J96.01-Acute respiratory  failure with hypoxia; J44.9-Chronic obstructive pulmonary disease,  unspecified.     COMPARISON: Chest radiograph dated 04/03/2021     TECHNIQUE: Portable AP view of the chest was obtained.     FINDINGS:  The endotracheal tube tip is 2.4 cm above the davi. There is a  left-sided central line with tip terminating over the mid SVC in  unchanged position. There is a feeding tube which courses below left  hemidiaphragm. There is stable cardiac megaly. There is aortic arch  athetotic calcification. Pulmonary vascularity appears within normal  limits. There is bibasilar airspace opacities likely representing  atelectasis with possible trace bilateral pleural effusions. There is a  prosthetic cardiac valve and temporary pacing wires. There is no  evidence of pneumothorax. Multilevel degenerative changes of the  thoracic spine.     Impression: 1. Support devices in expected position.  2. Unchanged bibasilar airspace opacities and likely small bilateral  layering pleural effusions.     Electronically Signed By-Olegario Arnold MD On:4/4/2021 8:07 AM  This report was finalized on [id number] by  Olegario Arnold MD.       ASSESSMENT:  Acute hypoxic respiratory failure required intubation 03/22/2021 patient was requiring 100% oxygen on AVAPS  Aortic valve stenosis, status post tissue replacement  Luminary hypertension, severe  COPD  KARLY  Cor pulmonale  Tobacco abuse  Upper airway  infection with moderate amount of mucopurulent secretions noted on bronchoscopy 3/25/2021  Febrile illness   Interstitial pneumonitis  Acute thrombocytopenia  Catheter associated DVT right upper extremity subclavian, axillary, and brachial   hypernatremia  Upper arm left DVT  DM  Uremia    PLAN:  Extubated this morning after good pressure support trials  Titrating O2 to maintain a saturation of 91%  Sputum culture with Candida, likely contaminant  Hemodynamically stable off pressor support  Wean steroids down  Echocardiogram reviewed  Bronchodilator  Inhaled corticosteroids  Electrolytes/ glycemic control  DVT prophylaxis arixtra,   GI prophylaxis.    Tolerating tube feeds at goal  Keep feeding tube for now, patient is very hoarse post extubation.  SLP evaluation and recommendations pending    Nephrology following  Cardiology following  CV surgery admitting    PT/OT evaluation and treatment   Consult case management to start discharge planning.  Will likely need rehab due to severe deconditioning       4/4/2021  11:10 EDT     I personally have examined  and interviewed the patient. I have reviewed the history, data, problems, assessment and plan with our NP.  Critical care time in direct medical management (   ) minutes  Electronically signed by Shanell Hernandez MD, D,ABS, 04/04/21, 8:15 PM EDT.

## 2021-04-04 NOTE — PROGRESS NOTES
"Cardiology    Patient Care Team:  Mary Wilde APRN as PCP - General  Mary Wilde APRN as PCP - Family Medicine  Jose Levi MD as Consulting Physician (Cardiology)  Yovani Lerma MD as Consulting Physician (Nephrology)        CHIEF COMPLAINT: Shortness of breath    ADMITTING DIAGNOSES: Aortic stenosis    SUBJECTIVE: Still doing well      Review of Symptoms is unchanged:  Constitutional: Patient afebrile no chills or unexpected weight changes  Respiratory: No cough, no wheezing or dyspnea  Cardiovascular: No chest pain, palpitations, dyspnea, orthopnea and no edema  Gastrointestinal: No nausea, vomiting, constipation or diarrhea.  No melena or dark stools    All other systems reviewed and are negative     PAST MEDICAL HISTORY:   Past Medical History:   Diagnosis Date   • Acute congestive heart failure (CMS/[facility])    • Allergies    • Anxiety    • Arthritis    • Asthma    • Bilateral leg edema    • Bipolar 1 disorder (CMS/[facility])    • Bulging lumbar disc     X3   • Cancer (CMS/[facility])     states had \"cancerous tumor during pregnancy and had to have hysterectomy\"   • Cataract     bilateral   • Cellulitis 03/2021    bilateral legs   • Compression fracture of vertebral column (CMS/[facility])     LUMBAR   • COPD (chronic obstructive pulmonary disease) (CMS/[facility])    • Delayed emergence from anesthesia    • Depression    • Depression    • Dyslipidemia    • Dyspnea on exertion    • Fibromyalgia    • GERD (gastroesophageal reflux disease)    • Hiatal hernia    • Hyperlipidemia    • Hypertension    • Hypothyroid     HYPOTHYROID   • IBS (irritable bowel syndrome)    • Migraines    • Morbid obesity (CMS/[facility])    • Neuropathy    • Panic attacks    • Peripheral edema    • PONV (postoperative nausea and vomiting)    • Poor mobility     rolling walker   • Prediabetes    • PVD (peripheral vascular disease) (CMS/[facility])    • Rheumatoid aortitis    • Spinal stenosis    • Spinal stenosis    • Vertigo    • Vitamin D deficiency  "       ALLERGIES:   Allergies   Allergen Reactions   • Adhesive Tape Unknown (See Comments)     hives   • Butorphanol Other (See Comments)     Chest pain difficulty breathing throat swelling   • Garlic Other (See Comments)     Chest pain difficulty breathing throat swells   • Tetanus-Diphtheria Toxoids Td Other (See Comments)     Dizziness,  vomiting   • Aloe Itching   • Bee Venom Other (See Comments)     Swelling eyes and lips hand swelling   • Latex Itching   • Macadamia Nut Oil Other (See Comments)     Chest pain difficulty breathing throat swelling   • Tuberculin Unknown (See Comments)     reactor   • Wasp Venom Other (See Comments)     Swelling eyes lips and hand         PAST SURGICAL HISTORY:   Past Surgical History:   Procedure Laterality Date   • BRONCHOSCOPY N/A 3/25/2021    Procedure: BRONCHOSCOPY AT BEDSIDE WITH BRONCHIOALVEOLAR LAVAGE;  Surgeon: Glenn Reyes MD;  Location: [facility] ENDOSCOPY;  Service: Pulmonary;  Laterality: N/A;  PNA   • CARDIAC CATHETERIZATION  2009   • CARDIAC CATHETERIZATION N/A 8/14/2019    Procedure: Left Heart Cath;  Surgeon: Jose Levi MD;  Location: [facility] CATH INVASIVE LOCATION;  Service: Cardiovascular   • CARDIAC CATHETERIZATION N/A 8/14/2019    Procedure: Right Heart Cath;  Surgeon: Jose Levi MD;  Location: [facility] CATH INVASIVE LOCATION;  Service: Cardiovascular   • CARDIAC CATHETERIZATION N/A 8/14/2019    Procedure: Aortic root aortogram;  Surgeon: Jose Levi MD;  Location: [facility] CATH INVASIVE LOCATION;  Service: Cardiovascular   • CARDIAC CATHETERIZATION N/A 1/20/2021    Procedure: Left Heart Cath;  Surgeon: Jose Levi MD;  Location: [facility] CATH INVASIVE LOCATION;  Service: Cardiovascular;  Laterality: N/A;   • COLONOSCOPY     • CYSTOCELE REPAIR  1984   • ELBOW ARTHROPLASTY  2011   • ENDOSCOPY     • FOOT SURGERY     • GALLBLADDER SURGERY  2002   • TONSILLECTOMY     • VAGINAL HYSTERECTOMY  1972          FAMILY  HISTORY:   History reviewed. No pertinent family history.      SOCIAL HISTORY:   Social History     Socioeconomic History   • Marital status:      Spouse name: Not on file   • Number of children: Not on file   • Years of education: Not on file   • Highest education level: Not on file   Tobacco Use   • Smoking status: Current Every Day Smoker     Packs/day: 1.00     Types: Cigarettes   • Smokeless tobacco: Never Used   • Tobacco comment: decrease now and do not smoke dos   Substance and Sexual Activity   • Alcohol use: Yes     Comment: socially   • Drug use: Never   • Sexual activity: Defer       CURRENT MEDICATIONS:     Current Facility-Administered Medications:   •  acetaminophen (TYLENOL) tablet 650 mg, 650 mg, Oral, Q4H PRN **OR** acetaminophen (TYLENOL) 160 MG/5ML solution 650 mg, 650 mg, Oral, Q4H PRN, 649.6 mg at 04/04/21 0532 **OR** acetaminophen (TYLENOL) suppository 650 mg, 650 mg, Rectal, Q4H PRN, Satterly-German, Janna L, APRN  •  acetaZOLAMIDE (DIAMOX) tablet 250 mg, 250 mg, Nasogastric, BID, Paola Hammer MD  •  acetylcysteine (MUCOMYST) 20 % nebulizer solution 3 mL, 3 mL, Nebulization, BID - RT, Shanell Hernandez MD, 3 mL at 04/04/21 0630  •  arformoterol (BROVANA) nebulizer solution 15 mcg, 15 mcg, Nebulization, BID - RT, Shanell Hernandez MD, 15 mcg at 04/04/21 0630  •  aspirin EC tablet 81 mg, 81 mg, Oral, Daily, Satterly-German, Janna L, APRN, 81 mg at 04/04/21 0821  •  atorvastatin (LIPITOR) tablet 40 mg, 40 mg, Oral, Nightly, Satterly-German, Janna L, APRN, 40 mg at 04/03/21 2011  •  bisacodyl (DULCOLAX) suppository 10 mg, 10 mg, Rectal, Daily PRN, Satterly-German, Janna L, APRN, 10 mg at 03/26/21 1234  •  budesonide (PULMICORT) nebulizer solution 1 mg, 1 mg, Nebulization, BID - RT, Shanell Hernandez MD, 1 mg at 04/04/21 0630  •  bumetanide (BUMEX) tablet 1 mg, 1 mg, Nasogastric, TID, Paola Hammer MD, 1 mg at 04/04/21 0821  •  chlorhexidine (PERIDEX) 0.12  % solution 15 mL, 15 mL, Mouth/Throat, Q12H, Janna Alberto APRN, 15 mL at 04/04/21 0821  •  Chlorhexidine Gluconate Cloth 2 % pads 1 application, 1 application, Topical, Q12H PRN, Monika Conn APRN  •  dextrose (D50W) 25 g/ 50mL Intravenous Solution 25 g, 25 g, Intravenous, Q15 Min PRN, Olaf Mcgill MD  •  dextrose (GLUTOSE) oral gel 15 g, 15 g, Oral, Q15 Min PRN, Olaf Mcgill MD  •  docusate (COLACE) 50 MG/5ML liquid 100 mg, 100 mg, Oral, BID, Janna Alberto APRN, 100 mg at 04/04/21 0821  •  First Mouthwash (Magic Mouthwash) 5 mL, 5 mL, Swish & Spit, Q6H, Shanell Hernandez MD, 5 mL at 04/04/21 0531  •  FLUoxetine (PROzac) capsule 60 mg, 60 mg, Oral, Daily, Belkis Loving APRN, 60 mg at 04/04/21 0821  •  fondaparinux (ARIXTRA) injection 5 mg, 5 mg, Subcutaneous, Q24H, Olaf Mcgill MD, 5 mg at 04/03/21 1238  •  gabapentin (NEURONTIN) capsule 200 mg, 200 mg, Oral, Q12H, Janna Alberto APRN, 200 mg at 04/04/21 0821  •  glucagon (human recombinant) (GLUCAGEN DIAGNOSTIC) injection 1 mg, 1 mg, Subcutaneous, Q15 Min PRN, Olaf Mcgill MD  •  hydrALAZINE (APRESOLINE) injection 10 mg, 10 mg, Intravenous, Q6H PRN, Juan Jose Skelton MD, 10 mg at 03/27/21 2251  •  hydrALAZINE (APRESOLINE) tablet 25 mg, 25 mg, Nasogastric, Q8H, Paola Hammer MD, 25 mg at 04/03/21 2012  •  HYDROcodone-acetaminophen (NORCO) 5-325 MG per tablet 1 tablet, 1 tablet, Oral, Q4H PRN, Janna Alberto, APRN, 1 tablet at 04/03/21 1714  •  insulin glargine (LANTUS, SEMGLEE) injection 20 Units, 20 Units, Subcutaneous, Q12H, Gilles Brady, APRN, 20 Units at 04/03/21 2011  •  insulin lispro (ADMELOG) injection 0-24 Units, 0-24 Units, Subcutaneous, Q6H, 4 Units at 04/03/21 1713 **AND** insulin lispro (ADMELOG) injection 0-24 Units, 0-24 Units, Subcutaneous, PRN, Draw, MD Glenn  •  insulin lispro (ADMELOG) injection 5 Units, 5 Units, Subcutaneous, Q6H, Belkis Loving,  APRN, 5 Units at 21 0013  •  ipratropium-albuterol (DUO-NEB) nebulizer solution 3 mL, 3 mL, Nebulization, Q4H PRN, Janna Alberto APRN, 3 mL at 21 1522  •  ipratropium-albuterol (DUO-NEB) nebulizer solution 3 mL, 3 mL, Nebulization, Q4H - RT, Day, Lorna, APRN, 3 mL at 21 0630  •  lansoprazole (FIRST) oral suspension 30 mg, 30 mg, Oral, QAM, Day, Lorna, APRN, 30 mg at 21 0531  •  levothyroxine (SYNTHROID, LEVOTHROID) tablet 100 mcg, 100 mcg, Oral, Q AM, Olaf Mcgill MD, 100 mcg at 21 0531  •  lidocaine (XYLOCAINE) 2% injection, , , PRN, Glenn Reyes MD, 4 mL at 21 1439  •  lidocaine (XYLOCAINE) 5 % ointment, , , PRN, Glenn Reyes MD, 1 application at 21 1439  •  meclizine (ANTIVERT) tablet 25 mg, 25 mg, Oral, TID PRN, Belkis Loving, SARA, 25 mg at 21 0836  •  metoprolol tartrate (LOPRESSOR) half tablet 12.5 mg, 12.5 mg, Oral, Q12H, Jose Levi MD, 12.5 mg at 21 0821  •  midazolam (VERSED) injection 1 mg, 1 mg, Intravenous, Q2H PRN, Shanell Hernandez MD  •  midazolam (VERSED) injection 2 mg, 2 mg, Intravenous, Q2H PRN, Shanell Hernandez MD  •  [] HYDROmorphone (DILAUDID) injection 0.5 mg, 0.5 mg, Intravenous, Q2H PRN, 0.5 mg at 21 0825 **AND** naloxone (NARCAN) injection 0.4 mg, 0.4 mg, Intravenous, Q5 Min PRN, Satterly-German, Janna L, APRN  •  nystatin (MYCOSTATIN) powder, , Topical, Q12H, Draw, MD Glenn, Given at 21 0830  •  ondansetron (ZOFRAN) injection 4 mg, 4 mg, Intravenous, Q6H PRN, Satterly-German, Janna L, APRN, 4 mg at 21 1356  •  potassium chloride (K-DUR,KLOR-CON) CR tablet 20 mEq, 20 mEq, Oral, PRN, 20 mEq at 21 0550 **OR** potassium chloride (KLOR-CON) packet 20 mEq, 20 mEq, Oral, PRN, Satterly-German, Janna L, APRN, 20 mEq at 21 0827  •  potassium chloride (K-DUR,KLOR-CON) CR tablet 20 mEq, 20 mEq, Oral, TID With Meals, Satterly-German, Janna L, APRN, 20 mEq at 21  1714  •  potassium chloride 10 mEq in 100 mL IVPB, 10 mEq, Intravenous, Q1H PRN **OR** potassium chloride 10 mEq in 100 mL IVPB, 10 mEq, Intravenous, Q1H PRN, Janna Alberto, APRN, Last Rate: 200 mL/hr at 03/19/21 0726, 10 mEq at 03/19/21 0726  •  predniSONE (DELTASONE) tablet 30 mg, 30 mg, Oral, Daily With Breakfast, Shanell Hernandez MD, 30 mg at 04/04/21 0821  •  propofol (DIPRIVAN) infusion 10 mg/mL 100 mL, 5-30 mcg/kg/min, Intravenous, Titrated, lOaf Mcgill MD, Stopped at 04/03/21 0550  •  QUEtiapine (SEROquel) tablet 25 mg, 25 mg, Oral, Nightly, Day, Lorna, APRN, 25 mg at 04/03/21 2010      DIAGNOSTIC DATA:     I reviewed the patient's new clinical results.    Lab Results (last 24 hours)     Procedure Component Value Units Date/Time    Blood Gas, Arterial - [[id number]]  (Abnormal) Collected: 04/04/21 0724    Specimen: Arterial Blood Updated: 04/04/21 0726     Site Arterial Line     Thiago's Test N/A     pH, Arterial 7.484 pH units      pCO2, Arterial 41.1 mm Hg      pO2, Arterial 75.7 mm Hg      HCO3, Arterial 30.9 mmol/L      Base Excess, Arterial 6.8 mmol/L      Comment: Serial Number: [id number]:  [id number]        O2 Saturation, Arterial 96.0 %      CO2 Content 32.1 mmol/L      Barometric Pressure for Blood Gas --     Comment: N/A        Modality Adult Vent     FIO2 40 %      Ventilator Mode ;CPAP/PS     PEEP 5     PSV 10 cmH2O      Hemodilution No    Triglycerides [[id number]]  (Normal) Collected: 04/04/21 0414    Specimen: Blood Updated: 04/04/21 0517     Triglycerides 132 mg/dL     Renal Function Panel [[id number]]  (Abnormal) Collected: 04/04/21 0414    Specimen: Blood Updated: 04/04/21 0454     Glucose 132 mg/dL      BUN 46 mg/dL      Creatinine 0.46 mg/dL      Sodium 141 mmol/L      Potassium 3.6 mmol/L      Chloride 104 mmol/L      CO2 28.0 mmol/L      Calcium 8.7 mg/dL      Albumin 2.60 g/dL      Phosphorus 3.6 mg/dL      Anion Gap 9.0 mmol/L      BUN/Creatinine Ratio 100.0     eGFR Non   Amer 134 mL/min/1.73     Narrative:      GFR Normal >60  Chronic Kidney Disease <60  Kidney Failure <15      CBC (No Diff) [[id number]]  (Abnormal) Collected: 04/04/21 0414    Specimen: Blood Updated: 04/04/21 0425     WBC 19.50 10*3/mm3      RBC 3.32 10*6/mm3      Hemoglobin 10.0 g/dL      Hematocrit 30.4 %      MCV 91.5 fL      MCH 30.2 pg      MCHC 33.0 g/dL      RDW 14.9 %      RDW-SD 47.3 fl      MPV 11.2 fL      Platelets 112 10*3/mm3     Blood Gas, Arterial - [[id number]]  (Abnormal) Collected: 04/04/21 0416    Specimen: Arterial Blood Updated: 04/04/21 0418     Site Arterial Line     Thiago's Test N/A     pH, Arterial 7.529 pH units      pCO2, Arterial 35.5 mm Hg      pO2, Arterial 67.1 mm Hg      HCO3, Arterial 29.7 mmol/L      Base Excess, Arterial 6.6 mmol/L      Comment: Serial Number: [id number]:  [id number]        O2 Saturation, Arterial 95.1 %      CO2 Content 30.8 mmol/L      Barometric Pressure for Blood Gas --     Comment: N/A        Modality Adult Vent     FIO2 40 %      Ventilator Mode ;AC     Set Tidal Volume 500     PEEP 5     Hemodilution No     Respiratory Rate 22    POC Glucose Once [[id number]]  (Abnormal) Collected: 04/04/21 0002    Specimen: Blood Updated: 04/04/21 0003     Glucose 137 mg/dL      Comment: Serial Number: [id number]:  [id number]       POC Glucose Once [[id number]]  (Abnormal) Collected: 04/03/21 1944    Specimen: Blood Updated: 04/03/21 1945     Glucose 164 mg/dL      Comment: Serial Number: [id number]:  [id number]       POC Glucose Once [[id number]]  (Abnormal) Collected: 04/03/21 1712    Specimen: Blood Updated: 04/03/21 1714     Glucose 190 mg/dL      Comment: Serial Number: [id number]:  [id number]       POC Glucose Once [[id number]]  (Abnormal) Collected: 04/03/21 0906    Specimen: Blood Updated: 04/03/21 1713     Glucose 113 mg/dL      Comment: Serial Number: [id number]:  [id number]       POC Glucose Once [[id number]]  (Abnormal) Collected:  04/03/21 1708    Specimen: Blood Updated: 04/03/21 1710     Glucose 189 mg/dL      Comment: Serial Number: [id number]:  [id number]       Blood Gas, Arterial - [[id number]]  (Abnormal) Collected: 04/03/21 1603    Specimen: Arterial Blood Updated: 04/03/21 1607     Site Right Radial     Thiago's Test N/A     pH, Arterial 7.474 pH units      pCO2, Arterial 40.3 mm Hg      pO2, Arterial 56.7 mm Hg      HCO3, Arterial 29.6 mmol/L      Base Excess, Arterial 5.5 mmol/L      Comment: Serial Number: [id number]:  [id number]        O2 Saturation, Arterial 90.9 %      CO2 Content 30.8 mmol/L      Barometric Pressure for Blood Gas --     Comment: N/A        Modality TTube     FIO2 40 %      Hemodilution No    Blood Gas, Arterial - [[id number]]  (Abnormal) Collected: 04/03/21 1145    Specimen: Arterial Blood Updated: 04/03/21 1454     Site Arterial Line     Thiago's Test N/A     pH, Arterial 7.455 pH units      pCO2, Arterial 42.8 mm Hg      pO2, Arterial 74.1 mm Hg      HCO3, Arterial 30.1 mmol/L      Base Excess, Arterial 5.6 mmol/L      Comment: Serial Number: [id number]:  [id number]        O2 Saturation, Arterial 95.3 %      CO2 Content 31.5 mmol/L      Barometric Pressure for Blood Gas --     Comment: N/A        Modality Adult Vent     FIO2 40 %      Ventilator Mode ;CPAP/PS     PEEP 7     Hemodilution No          Imaging Results (Last 24 Hours)     Procedure Component Value Units Date/Time    XR Chest 1 View [[id number]] Collected: 04/04/21 0805     Updated: 04/04/21 0809    Narrative:      EXAMINATION: XR CHEST 1 VW-     DATE OF EXAM: 4/4/2021 4:58 AM     INDICATION: Hypoxia; M06.9-Rheumatoid arthritis, unspecified;  I35.0-Nonrheumatic aortic (valve) stenosis; J96.01-Acute respiratory  failure with hypoxia; J44.9-Chronic obstructive pulmonary disease,  unspecified.     COMPARISON: Chest radiograph dated 04/03/2021     TECHNIQUE: Portable AP view of the chest was obtained.     FINDINGS:  The endotracheal tube tip is 2.4 cm  above the davi. There is a  left-sided central line with tip terminating over the mid SVC in  unchanged position. There is a feeding tube which courses below left  hemidiaphragm. There is stable cardiac megaly. There is aortic arch  athetotic calcification. Pulmonary vascularity appears within normal  limits. There is bibasilar airspace opacities likely representing  atelectasis with possible trace bilateral pleural effusions. There is a  prosthetic cardiac valve and temporary pacing wires. There is no  evidence of pneumothorax. Multilevel degenerative changes of the  thoracic spine.       Impression:      1. Support devices in expected position.  2. Unchanged bibasilar airspace opacities and likely small bilateral  layering pleural effusions.     Electronically Signed By-Olegario Arnold MD On:4/4/2021 8:07 AM  This report was finalized on [id number] by  Olegario Arnold MD.          Xray reviewed personally by physician.      ECG reviewed personally by physician  ECG/EMG Results (most recent)     Procedure Component Value Units Date/Time    ECG 12 Lead [[id number]] Collected: 03/18/21 1104     Updated: 03/18/21 1748     QT Interval 516 ms     Narrative:      HEART RATE= 56  bpm  RR Interval= 1072  ms  HI Interval= 163  ms  P Horizontal Axis= -22  deg  P Front Axis= -9  deg  QRSD Interval= 88  ms  QT Interval= 516  ms  QRS Axis= 40  deg  T Wave Axis= 43  deg  - OTHERWISE NORMAL ECG -  Sinus bradycardia  When compared with ECG of 16-Mar-2021 8:51:36,  Significant repolarization change  Electronically Signed By: Segun Rucker ([facility]) 18-Mar-2021 17:48:15  Date and Time of Study: 2021-03-18 11:04:47    ECG 12 Lead [[id number]] Collected: 03/19/21 0430     Updated: 03/19/21 1649     QT Interval 434 ms     Narrative:      HEART RATE= 62  bpm  RR Interval= 984  ms  HI Interval= 165  ms  P Horizontal Axis= 13  deg  P Front Axis= 32  deg  QRSD Interval= 76  ms  QT Interval= 434  ms  QRS Axis= 43  deg  T Wave Axis= 82   deg  - ABNORMAL ECG -  Sinus rhythm  Atrial premature complex  ST elevation, consider inferior injury  When compared with ECG of 18-Mar-2021 11:04:47,  No significant change  Electronically Signed By: Segun Rucker ([facility]) 19-Mar-2021 16:48:51  Date and Time of Study: 2021-03-19 04:30:10    ECG 12 Lead [[id number]] Collected: 03/20/21 0515     Updated: 03/20/21 1006     QT Interval 419 ms     Narrative:      HEART RATE= 80  bpm  RR Interval= 752  ms  MD Interval= 161  ms  P Horizontal Axis= 0  deg  P Front Axis= 22  deg  QRSD Interval= 80  ms  QT Interval= 419  ms  QRS Axis= 39  deg  T Wave Axis= 35  deg  - NORMAL ECG -  Sinus rhythm  When compared with ECG of 19-Mar-2021 4:30:10,  Significant repolarization change  Electronically Signed By: Segun Rucker ([facility]) 20-Mar-2021 10:06:29  Date and Time of Study: 2021-03-20 05:15:54    Adult Transthoracic Echo Complete w/ Color, Spectral and Contrast if Necessary Per Protocol [[id number]] Collected: 03/21/21 1327     Updated: 03/21/21 1619     BSA 2.3 m^2      LVOT diam 1.7 cm      LVOT area 2.3 cm^2       CV ECHO BAN - BZI_BMI 50.1 kilograms/m^2      [facility] ECHO BAN - BSA(HAYCOCK) 2.5 m^2       CV ECHO BAN - BZI_METRIC_WEIGHT 132.4 kg       CV ECHO BAN - BZI_METRIC_HEIGHT 162.6 cm      LV V1 max PG 14.3 mmHg      LV V1 mean PG 5.7 mmHg      LV V1 max 182.2 cm/sec      LV V1 mean 115.3 cm/sec      LV V1 VTI 32.5 cm      SV(LVOT) 74.4 ml      SI(LVOT) 32.4 ml/m^2      RVIDd 3.1 cm      IVSd 1.3 cm      LVIDd 4.5 cm      LVIDs 3.0 cm      LVPWd 1.2 cm      IVS/LVPW 1.1     FS 33.1 %      EDV(Teich) 93.6 ml      ESV(Teich) 35.8 ml      EF(Teich) 61.8 %      EDV(cubed) 92.6 ml      ESV(cubed) 27.8 ml      EF(cubed) 70.0 %      LV mass(C)d 203.0 grams      LV mass(C)dI 88.4 grams/m^2      SV(Teich) 57.9 ml      SI(Teich) 25.2 ml/m^2      SV(cubed) 64.9 ml      SI(cubed) 28.2 ml/m^2      Ao root diam 2.8 cm      Ao root area 6.1 cm^2      ACS 1.7 cm      RVOT diam 2.8 cm       RVOT area 6.3 cm^2      EDV(MOD-sp4) 64.2 ml      ESV(MOD-sp4) 22.5 ml      EF(MOD-sp4) 65.0 %      SV(MOD-sp4) 41.7 ml      SI(MOD-sp4) 18.2 ml/m^2      Ao root area (BSA corrected) 1.2     LV Calderon Vol (BSA corrected) 27.9 ml/m^2      LV Sys Vol (BSA corrected) 9.8 ml/m^2      Aortic R-R 0.8 sec      Aortic HR 74.6 BPM      MV E max kranthi 97.9 cm/sec      MV A max kranthi 119.6 cm/sec      MV E/A 0.82     MV V2 max 113.2 cm/sec      MV max PG 5.1 mmHg      MV V2 mean 69.7 cm/sec      MV mean PG 2.3 mmHg      MV V2 VTI 31.8 cm      MVA(VTI) 2.3 cm^2      MV dec slope 399.3 cm/sec^2      MV dec time 0.25 sec      Ao pk kranthi 235.6 cm/sec      Ao max PG 22.2 mmHg      Ao max PG (full) 8.0 mmHg      Ao V2 mean 159.9 cm/sec      Ao mean PG 12.0 mmHg      Ao mean PG (full) 6.2 mmHg      Ao V2 VTI 41.5 cm      MARCOS(I,A) 1.8 cm^2      MARCOS(I,D) 1.8 cm^2      MARCOS(V,A) 1.8 cm^2      MARCOS(V,D) 1.8 cm^2      CO(Ao) 19.0 l/min      CI(Ao) 8.3 l/min/m^2      SV(Ao) 254.9 ml      SI(Ao) 111.0 ml/m^2      CO(LVOT) 5.6 l/min      CI(LVOT) 2.4 l/min/m^2      SV(RVOT) 85.7 ml      PA V2 max 76.0 cm/sec      PA max PG 2.3 mmHg      PA max PG (full) 0.82 mmHg      BH CV ECHO BAN - PVA(V,A) 5.1 cm^2      BH CV ECHO BAN - PVA(V,D) 5.1 cm^2      RV V1 max PG 1.5 mmHg      RV V1 mean PG 0.79 mmHg      RV V1 max 61.2 cm/sec      RV V1 mean 42.3 cm/sec      RV V1 VTI 13.5 cm      TR max kranthi 355.6 cm/sec      RVSP(TR) 59.7 mmHg      RAP systole 8.0 mmHg      Qp/Qs 1.2     EF(MOD-bp) 65.0 %      LA dimension(2D) 2.7 cm     Narrative:      · Left ventricular wall thickness is consistent with mild concentric   hypertrophy.  · Estimated left ventricular EF was in agreement with the calculated left   ventricular EF. Left ventricular ejection fraction appears to be 61 - 65%.   Left ventricular systolic function is normal.  · Estimated right ventricular systolic pressure from tricuspid   regurgitation is markedly elevated (>55 mmHg).  · Severe pulmonary  "hypertension is present.  · Left ventricular diastolic function is consistent with (grade II w/high   LAP) pseudonormalization.  · There is a bioprosthetic aortic valve present.  · Mild aortic valve stenosis is present.  · The right atrial cavity is moderately dilated.       Adult Transesophageal Echo (CHRISTIANO) W/ Cont if Necessary Per Protocol (Bedside) [[id number]] Collected: 03/25/21 1051     Updated: 03/25/21 1643    Narrative:          CHRISTIANO   procedure note    Procedure:  CHRISTIANO    Indication:   Valvular heart disease s/p AVR, respiratory failure    Date of procedure  : 3/25/2021    :  Dr. Jose Levi     Description of procedure:    Under conscious sedation given by anesthesiology and local anesthesia, a   CHRISTIANO probe was advanced into the esophagus and into the stomach 2D, M-mode,   color Doppler images were obtained..  Patient tolerated procedure well   complications well.    Complications: none    Results:    Normal LV size and contractility LV contractility, EF of 60%  Normal RV size  Normal left atrial size, normal right atrial size, left atrial appendage   without thrombus.  Aortic valve, mitral valve, tricuspid valve appears structurally normal,   mild TR seen.  No vegetation seen  No pericardial effusion seen.  Proximal aorta appears normal in size.  Mild aortic plaque seen      Recommendations/plan:    Clinical correlation recommended                VITAL SIGNS: Temp:  [97.4 °F (36.3 °C)-98 °F (36.7 °C)] 97.5 °F (36.4 °C)  Heart Rate:  [60-91] 73  Resp:  [22-23] 23  Arterial Line BP: ()/(42-80) 118/51  FiO2 (%):  [40 %] 40 %   Flowsheet Rows      First Filed Value   Admission Height  162.6 cm (64\") Documented at 03/18/2021 2000   Admission Weight  129 kg (284 lb) Documented at 03/18/2021 2000        Physical exam is unchanged  Constitutional: well-nourished, and appears stated age in no acute distress  PERRL: Conjunctiva clear, no pallor, anicteric  HENMT: normocephalic, normal dentition, no " cyanosis or pallor  Neck:no bruits, or thrills and bilateral normal carotid upstroke. Normal jugular venous pressure  Cardiovascular: No parasternal heaves an non-displaced focal PMI. Normal rate and rhythm: no rub, gallop, murmur or click and normal S1 and S2; no lower or upper extremity edema.   Lungs: unlabored, no wheezing with no rales or rhonchi on auscultation.  Extremities: Warm, no clubbing, cyanosis, or edema. Full and equal peripheral pulses in extremities with no bruits appreciated.   Abdomen: soft, non-tender, non-distended  Musculoskeletal: no joint tenderness or swelling and no erythema  Skin: Warm and dry, non-erythematous   Neuro:alert and normal affect. Oriented to time, place and person.     ASSESSMENT AND PLAN:   Aortic stenosis, severe, status post AVR with #23 magna pericardial prosthesis 3/18/2021  Congestive heart failure due to diastolic dysfunction in the setting of valvular heart disease  Hypertension  Dyslipidemia  Carotid bruit with normal Dopplers    Routine post CABG care.  Continue to monitor telemetry current sinus rhythm.  We will continue to monitor pulmonary status closely.        Vince Quinones MD  04/04/21  09:23 EDT

## 2021-04-04 NOTE — PROGRESS NOTES
"Nutrition Services    Patient Name: Claudia Rust  YOB: 1950  MRN: [id number]  Admission date: 3/18/2021      PPE Documentation        PPE Worn By Provider RD did not enter room for this encounter   PPE Worn By Patient  -     PROGRESS NOTE      Encounter Information: Tube feed check on. Patient extubated this AM. Impact Peptide 1.5 documented at goal 65 mL/hr.       Labs (reviewed below): -Hypernatremia-> Resolved, stable with free water flushes  -elevated BUN  -low Cr       GI Function:  -residuals WNL  -Last BM documented on 3/30 (x5 days ago) - getting scheduled colace, further bowel regimen per primary       Nutrition Intervention: Continue current TF rate as ordered, free water flush per nephrology   Will monitor feasibility of swallow eval when appropriate.       PO Diet/Supplements: NPO Diet   EN Prescription: Impact Peptide 1.5 at 65 mL/hr + 60 mL/hr water flush (flush per nephrology)       Intake/Output:   Intake/Output Summary (Last 24 hours) at 4/4/2021 0946  Last data filed at 4/4/2021 0358  Gross per 24 hour   Intake 3050 ml   Output 2900 ml   Net 150 ml            Height: Height: 162.6 cm (64\")   Weight: Weight: 123 kg (272 lb 0.8 oz) (04/04/21 0400)   BMI: Body mass index is 46.7 kg/m².     Results from last 7 days   Lab Units 04/04/21  0414 04/03/21  0348 04/02/21  0514 03/30/21  0425   SODIUM mmol/L 141 142 143 144   POTASSIUM mmol/L 3.6 4.0 3.7 4.1   CHLORIDE mmol/L 104 104 105 108*   CO2 mmol/L 28.0 30.0* 29.0 24.0   BUN mg/dL 46* 48* 47* 42*   CREATININE mg/dL 0.46* 0.51* 0.49* 0.64   CALCIUM mg/dL 8.7 8.8 8.7 8.7   BILIRUBIN mg/dL  --  0.5 0.5 0.6   ALK PHOS U/L  --  132* 134* 174*   ALT (SGPT) U/L  --  31 35* 40*   AST (SGOT) U/L  --  25 31 34*   GLUCOSE mg/dL 132* 151* 126* 299*     Results from last 7 days   Lab Units 04/04/21  0414 04/03/21  0348 04/02/21  0514 04/01/21  0409   MAGNESIUM mg/dL  --  2.3 2.2 2.0   PHOSPHORUS mg/dL 3.6 3.8 4.0 3.7   HEMOGLOBIN g/dL 10.0* 10.2* " 10.5* 10.6*   HEMATOCRIT % 30.4* 30.5* 31.6* 32.3*   TRIGLYCERIDES mg/dL 132  --   --  149     COVID19   Date Value Ref Range Status   03/24/2021 Not Detected Not Detected - Ref. Range Final     Lab Results   Component Value Date    HGBA1C 5.7 (H) 03/16/2021       Vitals:    04/04/21 0835   BP:    Pulse: 73   Resp:    Temp:    SpO2: 100%       RD to follow up per protocol.    Electronically signed by:  Jordyn Strickland RD  04/04/21 09:46 EDT

## 2021-04-05 ENCOUNTER — APPOINTMENT (OUTPATIENT)
Dept: GENERAL RADIOLOGY | Facility: HOSPITAL | Age: 71
End: 2021-04-05

## 2021-04-05 LAB
ALBUMIN SERPL-MCNC: 2.7 G/DL (ref 3.5–5.2)
ALBUMIN/GLOB SERPL: 1.1 G/DL
ALP SERPL-CCNC: 137 U/L (ref 39–117)
ALT SERPL W P-5'-P-CCNC: 44 U/L (ref 1–33)
ANION GAP SERPL CALCULATED.3IONS-SCNC: 9 MMOL/L (ref 5–15)
ARTERIAL PATENCY WRIST A: NORMAL
AST SERPL-CCNC: 52 U/L (ref 1–32)
ATMOSPHERIC PRESS: NORMAL MM[HG]
BASE EXCESS BLDA CALC-SCNC: 2 MMOL/L (ref 0–3)
BDY SITE: NORMAL
BILIRUB SERPL-MCNC: 0.5 MG/DL (ref 0–1.2)
BUN SERPL-MCNC: 43 MG/DL (ref 8–23)
BUN/CREAT SERPL: 93.5 (ref 7–25)
CA-I SERPL ISE-MCNC: 1.22 MMOL/L (ref 1.2–1.3)
CALCIUM SPEC-SCNC: 8.5 MG/DL (ref 8.6–10.5)
CHLORIDE SERPL-SCNC: 106 MMOL/L (ref 98–107)
CO2 BLDA-SCNC: 27.4 MMOL/L (ref 22–29)
CO2 SERPL-SCNC: 26 MMOL/L (ref 22–29)
CREAT SERPL-MCNC: 0.46 MG/DL (ref 0.57–1)
DEPRECATED RDW RBC AUTO: 49 FL (ref 37–54)
ERYTHROCYTE [DISTWIDTH] IN BLOOD BY AUTOMATED COUNT: 15.1 % (ref 12.3–15.4)
GFR SERPL CREATININE-BSD FRML MDRD: 134 ML/MIN/1.73
GLOBULIN UR ELPH-MCNC: 2.4 GM/DL
GLUCOSE BLDC GLUCOMTR-MCNC: 133 MG/DL (ref 70–105)
GLUCOSE BLDC GLUCOMTR-MCNC: 143 MG/DL (ref 70–105)
GLUCOSE BLDC GLUCOMTR-MCNC: 165 MG/DL (ref 70–105)
GLUCOSE SERPL-MCNC: 133 MG/DL (ref 65–99)
HCO3 BLDA-SCNC: 26.3 MMOL/L (ref 21–28)
HCT VFR BLD AUTO: 32.6 % (ref 34–46.6)
HEMODILUTION: NO
HGB BLD-MCNC: 10.7 G/DL (ref 12–15.9)
INHALED O2 CONCENTRATION: 100 %
INR PPP: 1.06 (ref 2–3)
MAGNESIUM SERPL-MCNC: 2.3 MG/DL (ref 1.6–2.4)
MCH RBC QN AUTO: 30.3 PG (ref 26.6–33)
MCHC RBC AUTO-ENTMCNC: 32.8 G/DL (ref 31.5–35.7)
MCV RBC AUTO: 92.3 FL (ref 79–97)
MODALITY: NORMAL
PCO2 BLDA: 38.7 MM HG (ref 35–48)
PH BLDA: 7.44 PH UNITS (ref 7.35–7.45)
PHOSPHATE SERPL-MCNC: 3.3 MG/DL (ref 2.5–4.5)
PLATELET # BLD AUTO: 134 10*3/MM3 (ref 140–450)
PMV BLD AUTO: 10.8 FL (ref 6–12)
PO2 BLDA: 97.7 MM HG (ref 83–108)
POTASSIUM SERPL-SCNC: 3.3 MMOL/L (ref 3.5–5.2)
PROT SERPL-MCNC: 5.1 G/DL (ref 6–8.5)
PROTHROMBIN TIME: 11.6 SECONDS (ref 19.4–28.5)
RBC # BLD AUTO: 3.53 10*6/MM3 (ref 3.77–5.28)
SAO2 % BLDCOA: 97.9 % (ref 94–98)
SODIUM SERPL-SCNC: 141 MMOL/L (ref 136–145)
VIRUS SPEC CULT: NORMAL
WBC # BLD AUTO: 22.2 10*3/MM3 (ref 3.4–10.8)

## 2021-04-05 PROCEDURE — 02HV33Z INSERTION OF INFUSION DEVICE INTO SUPERIOR VENA CAVA, PERCUTANEOUS APPROACH: ICD-10-PCS | Performed by: NURSE PRACTITIONER

## 2021-04-05 PROCEDURE — 85610 PROTHROMBIN TIME: CPT | Performed by: NURSE PRACTITIONER

## 2021-04-05 PROCEDURE — 80053 COMPREHEN METABOLIC PANEL: CPT | Performed by: INTERNAL MEDICINE

## 2021-04-05 PROCEDURE — 82803 BLOOD GASES ANY COMBINATION: CPT

## 2021-04-05 PROCEDURE — 71045 X-RAY EXAM CHEST 1 VIEW: CPT

## 2021-04-05 PROCEDURE — 99024 POSTOP FOLLOW-UP VISIT: CPT | Performed by: THORACIC SURGERY (CARDIOTHORACIC VASCULAR SURGERY)

## 2021-04-05 PROCEDURE — 63710000001 PREDNISONE PER 5 MG: Performed by: INTERNAL MEDICINE

## 2021-04-05 PROCEDURE — 97164 PT RE-EVAL EST PLAN CARE: CPT

## 2021-04-05 PROCEDURE — 94799 UNLISTED PULMONARY SVC/PX: CPT

## 2021-04-05 PROCEDURE — 63710000001 INSULIN LISPRO (HUMAN) PER 5 UNITS: Performed by: INTERNAL MEDICINE

## 2021-04-05 PROCEDURE — 92610 EVALUATE SWALLOWING FUNCTION: CPT

## 2021-04-05 PROCEDURE — 97112 NEUROMUSCULAR REEDUCATION: CPT

## 2021-04-05 PROCEDURE — 63710000001 INSULIN GLARGINE PER 5 UNITS: Performed by: NURSE PRACTITIONER

## 2021-04-05 PROCEDURE — 97535 SELF CARE MNGMENT TRAINING: CPT

## 2021-04-05 PROCEDURE — 82962 GLUCOSE BLOOD TEST: CPT

## 2021-04-05 PROCEDURE — 63710000001 INSULIN LISPRO (HUMAN) PER 5 UNITS: Performed by: NURSE PRACTITIONER

## 2021-04-05 PROCEDURE — 25010000002 ONDANSETRON PER 1 MG: Performed by: NURSE PRACTITIONER

## 2021-04-05 PROCEDURE — 82330 ASSAY OF CALCIUM: CPT | Performed by: INTERNAL MEDICINE

## 2021-04-05 PROCEDURE — 97168 OT RE-EVAL EST PLAN CARE: CPT

## 2021-04-05 PROCEDURE — C1751 CATH, INF, PER/CENT/MIDLINE: HCPCS

## 2021-04-05 PROCEDURE — 25010000002 FONDAPARINUX PER 0.5 MG: Performed by: THORACIC SURGERY (CARDIOTHORACIC VASCULAR SURGERY)

## 2021-04-05 PROCEDURE — 25010000003 POTASSIUM CHLORIDE 10 MEQ/100ML SOLUTION: Performed by: INTERNAL MEDICINE

## 2021-04-05 PROCEDURE — 83735 ASSAY OF MAGNESIUM: CPT | Performed by: INTERNAL MEDICINE

## 2021-04-05 PROCEDURE — 84100 ASSAY OF PHOSPHORUS: CPT | Performed by: INTERNAL MEDICINE

## 2021-04-05 PROCEDURE — 99232 SBSQ HOSP IP/OBS MODERATE 35: CPT | Performed by: INTERNAL MEDICINE

## 2021-04-05 PROCEDURE — 85027 COMPLETE CBC AUTOMATED: CPT | Performed by: NURSE PRACTITIONER

## 2021-04-05 RX ORDER — POLYETHYLENE GLYCOL 3350 17 G/17G
17 POWDER, FOR SOLUTION ORAL DAILY
Status: DISCONTINUED | OUTPATIENT
Start: 2021-04-05 | End: 2021-04-13 | Stop reason: HOSPADM

## 2021-04-05 RX ORDER — SODIUM CHLORIDE 0.9 % (FLUSH) 0.9 %
10 SYRINGE (ML) INJECTION EVERY 12 HOURS SCHEDULED
Status: DISCONTINUED | OUTPATIENT
Start: 2021-04-05 | End: 2021-04-13 | Stop reason: HOSPADM

## 2021-04-05 RX ORDER — SODIUM CHLORIDE 0.9 % (FLUSH) 0.9 %
10 SYRINGE (ML) INJECTION AS NEEDED
Status: DISCONTINUED | OUTPATIENT
Start: 2021-04-05 | End: 2021-04-13 | Stop reason: HOSPADM

## 2021-04-05 RX ORDER — POTASSIUM CHLORIDE 7.45 MG/ML
10 INJECTION INTRAVENOUS
Status: COMPLETED | OUTPATIENT
Start: 2021-04-05 | End: 2021-04-05

## 2021-04-05 RX ORDER — SODIUM CHLORIDE 0.9 % (FLUSH) 0.9 %
20 SYRINGE (ML) INJECTION AS NEEDED
Status: DISCONTINUED | OUTPATIENT
Start: 2021-04-05 | End: 2021-04-13 | Stop reason: HOSPADM

## 2021-04-05 RX ORDER — WARFARIN SODIUM 2 MG/1
2 TABLET ORAL
Status: COMPLETED | OUTPATIENT
Start: 2021-04-05 | End: 2021-04-05

## 2021-04-05 RX ADMIN — LANSOPRAZOLE 30 MG: KIT at 06:28

## 2021-04-05 RX ADMIN — FONDAPARINUX SODIUM 5 MG: 5 INJECTION, SOLUTION SUBCUTANEOUS at 16:01

## 2021-04-05 RX ADMIN — ARFORMOTEROL TARTRATE 15 MCG: 15 SOLUTION RESPIRATORY (INHALATION) at 06:30

## 2021-04-05 RX ADMIN — ACETYLCYSTEINE 3 ML: 200 SOLUTION ORAL; RESPIRATORY (INHALATION) at 06:30

## 2021-04-05 RX ADMIN — BUMETANIDE 1 MG: 1 TABLET ORAL at 16:01

## 2021-04-05 RX ADMIN — HYDRALAZINE HYDROCHLORIDE 25 MG: 25 TABLET ORAL at 21:26

## 2021-04-05 RX ADMIN — Medication 10 ML: at 21:31

## 2021-04-05 RX ADMIN — POTASSIUM CHLORIDE 20 MEQ: 1500 TABLET, EXTENDED RELEASE ORAL at 17:00

## 2021-04-05 RX ADMIN — DOCUSATE SODIUM 100 MG: 50 LIQUID ORAL at 21:26

## 2021-04-05 RX ADMIN — ATORVASTATIN CALCIUM 40 MG: 40 TABLET, FILM COATED ORAL at 21:29

## 2021-04-05 RX ADMIN — INSULIN LISPRO 5 UNITS: 100 INJECTION, SOLUTION INTRAVENOUS; SUBCUTANEOUS at 12:00

## 2021-04-05 RX ADMIN — FLUOXETINE 60 MG: 20 CAPSULE ORAL at 08:42

## 2021-04-05 RX ADMIN — HYDRALAZINE HYDROCHLORIDE 25 MG: 25 TABLET ORAL at 06:34

## 2021-04-05 RX ADMIN — LEVOTHYROXINE SODIUM 100 MCG: 0.1 TABLET ORAL at 05:06

## 2021-04-05 RX ADMIN — IPRATROPIUM BROMIDE AND ALBUTEROL SULFATE 3 ML: 2.5; .5 SOLUTION RESPIRATORY (INHALATION) at 11:08

## 2021-04-05 RX ADMIN — IPRATROPIUM BROMIDE AND ALBUTEROL SULFATE 3 ML: 2.5; .5 SOLUTION RESPIRATORY (INHALATION) at 06:30

## 2021-04-05 RX ADMIN — BUDESONIDE 1 MG: 0.5 SUSPENSION RESPIRATORY (INHALATION) at 06:30

## 2021-04-05 RX ADMIN — DIPHENHYDRAMINE HYDROCHLORIDE AND LIDOCAINE HYDROCHLORIDE AND ALUMINUM HYDROXIDE AND MAGNESIUM HYDRO 5 ML: KIT at 17:00

## 2021-04-05 RX ADMIN — PREDNISONE 10 MG: 10 TABLET ORAL at 08:43

## 2021-04-05 RX ADMIN — CHLORHEXIDINE GLUCONATE 15 ML: 1.2 RINSE ORAL at 08:43

## 2021-04-05 RX ADMIN — BUDESONIDE 1 MG: 0.5 SUSPENSION RESPIRATORY (INHALATION) at 19:35

## 2021-04-05 RX ADMIN — DIPHENHYDRAMINE HYDROCHLORIDE AND LIDOCAINE HYDROCHLORIDE AND ALUMINUM HYDROXIDE AND MAGNESIUM HYDRO 5 ML: KIT at 00:10

## 2021-04-05 RX ADMIN — ONDANSETRON 4 MG: 2 INJECTION INTRAMUSCULAR; INTRAVENOUS at 07:02

## 2021-04-05 RX ADMIN — POTASSIUM CHLORIDE 10 MEQ: 7.46 INJECTION, SOLUTION INTRAVENOUS at 10:58

## 2021-04-05 RX ADMIN — IPRATROPIUM BROMIDE AND ALBUTEROL SULFATE 3 ML: 2.5; .5 SOLUTION RESPIRATORY (INHALATION) at 23:19

## 2021-04-05 RX ADMIN — BUMETANIDE 1 MG: 1 TABLET ORAL at 21:27

## 2021-04-05 RX ADMIN — POTASSIUM CHLORIDE 20 MEQ: 1.5 POWDER, FOR SOLUTION ORAL at 06:50

## 2021-04-05 RX ADMIN — INSULIN GLARGINE 20 UNITS: 100 INJECTION, SOLUTION SUBCUTANEOUS at 21:27

## 2021-04-05 RX ADMIN — INSULIN LISPRO 4 UNITS: 100 INJECTION, SOLUTION INTRAVENOUS; SUBCUTANEOUS at 12:00

## 2021-04-05 RX ADMIN — INSULIN GLARGINE 20 UNITS: 100 INJECTION, SOLUTION SUBCUTANEOUS at 08:43

## 2021-04-05 RX ADMIN — CHLORHEXIDINE GLUCONATE 15 ML: 1.2 RINSE ORAL at 21:26

## 2021-04-05 RX ADMIN — ACETYLCYSTEINE 3 ML: 200 SOLUTION ORAL; RESPIRATORY (INHALATION) at 19:29

## 2021-04-05 RX ADMIN — POTASSIUM CHLORIDE 10 MEQ: 7.46 INJECTION, SOLUTION INTRAVENOUS at 12:00

## 2021-04-05 RX ADMIN — INSULIN LISPRO 5 UNITS: 100 INJECTION, SOLUTION INTRAVENOUS; SUBCUTANEOUS at 17:00

## 2021-04-05 RX ADMIN — NYSTATIN: 100000 POWDER TOPICAL at 08:44

## 2021-04-05 RX ADMIN — INSULIN LISPRO 5 UNITS: 100 INJECTION, SOLUTION INTRAVENOUS; SUBCUTANEOUS at 06:31

## 2021-04-05 RX ADMIN — METOPROLOL TARTRATE 12.5 MG: 25 TABLET, FILM COATED ORAL at 08:42

## 2021-04-05 RX ADMIN — POTASSIUM CHLORIDE 20 MEQ: 1500 TABLET, EXTENDED RELEASE ORAL at 11:00

## 2021-04-05 RX ADMIN — ARFORMOTEROL TARTRATE 15 MCG: 15 SOLUTION RESPIRATORY (INHALATION) at 19:29

## 2021-04-05 RX ADMIN — DOCUSATE SODIUM 100 MG: 50 LIQUID ORAL at 08:42

## 2021-04-05 RX ADMIN — METOPROLOL TARTRATE 12.5 MG: 25 TABLET, FILM COATED ORAL at 21:26

## 2021-04-05 RX ADMIN — BUMETANIDE 1 MG: 1 TABLET ORAL at 08:42

## 2021-04-05 RX ADMIN — DIPHENHYDRAMINE HYDROCHLORIDE AND LIDOCAINE HYDROCHLORIDE AND ALUMINUM HYDROXIDE AND MAGNESIUM HYDRO 5 ML: KIT at 11:00

## 2021-04-05 RX ADMIN — GABAPENTIN 200 MG: 100 CAPSULE ORAL at 08:43

## 2021-04-05 RX ADMIN — POTASSIUM CHLORIDE 20 MEQ: 1500 TABLET, EXTENDED RELEASE ORAL at 08:42

## 2021-04-05 RX ADMIN — HYDROCODONE BITARTRATE AND ACETAMINOPHEN 1 TABLET: 5; 325 TABLET ORAL at 09:56

## 2021-04-05 RX ADMIN — ASPIRIN 81 MG: 81 TABLET, COATED ORAL at 08:42

## 2021-04-05 RX ADMIN — QUETIAPINE FUMARATE 25 MG: 25 TABLET ORAL at 21:26

## 2021-04-05 RX ADMIN — GABAPENTIN 200 MG: 100 CAPSULE ORAL at 21:27

## 2021-04-05 RX ADMIN — HYDROCODONE BITARTRATE AND ACETAMINOPHEN 1 TABLET: 5; 325 TABLET ORAL at 01:17

## 2021-04-05 RX ADMIN — IPRATROPIUM BROMIDE AND ALBUTEROL SULFATE 3 ML: 2.5; .5 SOLUTION RESPIRATORY (INHALATION) at 03:31

## 2021-04-05 RX ADMIN — POLYETHYLENE GLYCOL 3350 17 G: 17 POWDER, FOR SOLUTION ORAL at 11:00

## 2021-04-05 RX ADMIN — WARFARIN 2 MG: 2 TABLET ORAL at 17:00

## 2021-04-05 RX ADMIN — NYSTATIN 1 APPLICATION: 100000 POWDER TOPICAL at 21:30

## 2021-04-05 NOTE — PLAN OF CARE
Goal Outcome Evaluation:  Plan of Care Reviewed With: spouse, patient     Outcome Summary: Re-eval completed on 69 yo female s/p extubation 4/4/2021.  Pt s/p AVR 3/18/2021 with extubation 3/19/2021 and requiring re-intubation 3/22/2021 due to resp status.  See Initial Eval for PLOF. Pt supine in bed upon OT/PT arrival. Patient with dobhoff feeding tube, 8L hiflow O2, moser, IV, and cardiac monitor limiting movemnt. Max A x2 for balance/safety during bed mobility. C/o pain with BUe movement. sits EOB approximately 15 minutes. initially sits up with CGA, but fatigues to requiring Min-Mod A for balance EOB.  Pt requires max verbal cues for sternal precautions. Spouse present and supportive, and aware of pt's funcitonal deficits. Pt adamant on returning home, but appears unsafe. OT recommends IP rehab at discharge. PPE: Gloves, mask, eyewear

## 2021-04-05 NOTE — THERAPY RE-EVALUATION
"Patient Name: Claudia Rust  : 1950    MRN: [id number]                              Today's Date: 2021       Admit Date: 3/18/2021    Visit Dx:     ICD-10-CM ICD-9-CM   1. Rheumatoid arthritis, involving unspecified site, unspecified whether rheumatoid factor present (CMS/HCC)  M06.9 714.0   2. Aortic valve stenosis, etiology of cardiac valve disease unspecified  I35.0 424.1   3. Acute respiratory failure with hypoxia (CMS/HCC)  J96.01 518.81   4. Chronic obstructive pulmonary disease, unspecified COPD type (CMS/HCC)  J44.9 496     Patient Active Problem List   Diagnosis   • Morbidly obese (CMS/HCC)   • Dyspnea on exertion   • Abnormal nuclear stress test   • Nonrheumatic aortic valve stenosis   • Prediabetes   • Dyslipidemia   • Essential hypertension   • Acute UTI   • Bipolar disorder (CMS/HCC)   • COPD (chronic obstructive pulmonary disease) (CMS/HCC)   • High triglycerides   • Gastroesophageal reflux disease   • Rheumatoid arthritis (CMS/HCC)   • Aortic stenosis, severe   • Chronic heart failure with preserved ejection fraction (HFpEF) (CMS/HCC)   • Aortic valve stenosis     Past Medical History:   Diagnosis Date   • Acute congestive heart failure (CMS/[facility])    • Allergies    • Anxiety    • Arthritis    • Asthma    • Bilateral leg edema    • Bipolar 1 disorder (CMS/HCC)    • Bulging lumbar disc     X3   • Cancer (CMS/HCC)     states had \"cancerous tumor during pregnancy and had to have hysterectomy\"   • Cataract     bilateral   • Cellulitis 2021    bilateral legs   • Compression fracture of vertebral column (CMS/HCC)     LUMBAR   • COPD (chronic obstructive pulmonary disease) (CMS/HCC)    • Delayed emergence from anesthesia    • Depression    • Depression    • Dyslipidemia    • Dyspnea on exertion    • Fibromyalgia    • GERD (gastroesophageal reflux disease)    • Hiatal hernia    • Hyperlipidemia    • Hypertension    • Hypothyroid     HYPOTHYROID   • IBS (irritable bowel syndrome)    • Migraines  "   • Morbid obesity (CMS/[facility])    • Neuropathy    • Panic attacks    • Peripheral edema    • PONV (postoperative nausea and vomiting)    • Poor mobility     rolling walker   • Prediabetes    • PVD (peripheral vascular disease) (CMS/[facility])    • Rheumatoid aortitis    • Spinal stenosis    • Spinal stenosis    • Vertigo    • Vitamin D deficiency      Past Surgical History:   Procedure Laterality Date   • AORTIC VALVE REPAIR/REPLACEMENT N/A 3/18/2021    Procedure: AORTIC VALVE REPLACEMENT;  Surgeon: Olaf Mcgill MD;  Location: [facility] CVOR;  Service: Cardiothoracic;  Laterality: N/A;   • BRONCHOSCOPY N/A 3/25/2021    Procedure: BRONCHOSCOPY AT BEDSIDE WITH BRONCHIOALVEOLAR LAVAGE;  Surgeon: Glenn Reyes MD;  Location: [facility] ENDOSCOPY;  Service: Pulmonary;  Laterality: N/A;  PNA   • CARDIAC CATHETERIZATION  2009   • CARDIAC CATHETERIZATION N/A 8/14/2019    Procedure: Left Heart Cath;  Surgeon: Jose Levi MD;  Location: [facility] CATH INVASIVE LOCATION;  Service: Cardiovascular   • CARDIAC CATHETERIZATION N/A 8/14/2019    Procedure: Right Heart Cath;  Surgeon: Jose Levi MD;  Location: [facility] CATH INVASIVE LOCATION;  Service: Cardiovascular   • CARDIAC CATHETERIZATION N/A 8/14/2019    Procedure: Aortic root aortogram;  Surgeon: Jose Levi MD;  Location: [facility] CATH INVASIVE LOCATION;  Service: Cardiovascular   • CARDIAC CATHETERIZATION N/A 1/20/2021    Procedure: Left Heart Cath;  Surgeon: Jose Levi MD;  Location: [facility] CATH INVASIVE LOCATION;  Service: Cardiovascular;  Laterality: N/A;   • COLONOSCOPY     • CYSTOCELE REPAIR  1984   • ELBOW ARTHROPLASTY  2011   • ENDOSCOPY     • FOOT SURGERY     • GALLBLADDER SURGERY  2002   • TONSILLECTOMY     • VAGINAL HYSTERECTOMY  1972     General Information     Row Name 04/05/21 1427          OT Time and Intention    Document Type  re-evaluation  -ES     Mode of Treatment  occupational therapy;co-treatment;physical therapy   -ES     Row Name 04/05/21 1427          General Information    Patient Profile Reviewed  yes  -ES     Row Name 04/05/21 1427          Occupational Profile    Reason for Services/Referral (Occupational Profile)  Re-eval completed on 69 yo female s/p extubation 4/4/2021.  Pt s/p AVR 3/18/2021 with extubation 3/19/2021 and requiring re-intubation 3/22/2021 due to resp status.  See Initial Eval for PLOF.  -ES     Row Name 04/05/21 1427          Cognition    Orientation Status (Cognition)  oriented x 3  -ES     Row Name 04/05/21 1427          Safety Issues, Functional Mobility    Safety Issues Affecting Function (Mobility)  insight into deficits/self-awareness;safety precautions follow-through/compliance;safety precaution awareness;problem-solving;judgment  -ES     Impairments Affecting Function (Mobility)  balance;cognition;postural/trunk control;endurance/activity tolerance;strength;shortness of breath;pain  -ES     Cognitive Impairments, Mobility Safety/Performance  attention  -ES       User Key  (r) = Recorded By, (t) = Taken By, (c) = Cosigned By    Initials Name Provider Type    ES Selam Adair OT Occupational Therapist          Mobility/ADL's     Row Name 04/05/21 1428          Bed Mobility    Bed Mobility  supine-sit;sit-supine;scooting/bridging  -ES     Scooting/Bridging [place] (Bed Mobility)  2 person assist;maximum assist (25% patient effort)  -ES     Supine-Sit [place] (Bed Mobility)  2 person assist;maximum assist (25% patient effort)  -ES     Sit-Supine [place] (Bed Mobility)  2 person assist;dependent (less than 25% patient effort)  -ES     Assistive Device (Bed Mobility)  draw sheet  -ES     Row Name 04/05/21 1428          Transfers    Bed-Chair [place] (Transfers)  unable to assess  -ES     Sit-Stand [place] (Transfers)  unable to assess  -ES     Row Name 04/05/21 1428          Activities of Daily Living    BADL Assessment/Intervention  upper body dressing;lower body  dressing;grooming  -ES     Row Name 04/05/21 1428          Lower Body Dressing Assessment/Training    [place] Level (Lower Body Dressing)  dependent (less than 25% patient effort)  -ES     Row Name 04/05/21 1428          Upper Body Dressing Assessment/Training    [place] Level (Upper Body Dressing)  maximum assist (25% patient effort)  -ES     Row Name 04/05/21 1428          Grooming Assessment/Training    [place] Level (Grooming)  maximum assist (25% patient effort)  -ES       User Key  (r) = Recorded By, (t) = Taken By, (c) = Cosigned By    Initials Name Provider Type     Selam Adair OT Occupational Therapist        Obj/Interventions     Row Name 04/05/21 1429          Sensory Assessment (Somatosensory)    Sensory Assessment (Somatosensory)  UE sensation intact  -[facility] Name 04/05/21 1429          Vision Assessment/Intervention    Visual Impairment/Limitations  corrective lenses full-time  -[facility] Name 04/05/21 1429          Range of Motion Comprehensive    General Range of Motion  upper extremity range of motion deficits identified  -ES     Comment, General Range of Motion  PROM WFL within sternal precautions  -[facility] Name 04/05/21 1429          Strength Comprehensive (MMT)    Comment, General Manual Muscle Testing (MMT) Assessment  BUE 2+/5, assessment limited by sternal precautions  -     Row Name 04/05/21 1429          Balance    Balance Assessment  sitting static balance;sitting dynamic balance  -ES     Static Sitting Balance  mild impairment  -ES     Dynamic Sitting Balance  mild impairment;moderate impairment  -ES       User Key  (r) = Recorded By, (t) = Taken By, (c) = Cosigned By    Initials Name Provider Type    ES Selam Adair OT Occupational Therapist        Goals/Plan     [facility] Name 04/05/21 1435          Toileting Goal 1 (OT)    Activity/Device (Toileting Goal 1, OT)  toileting skills, all  -ES     [place] Level/Cues Needed (Toileting Goal 1, OT)   moderate assist (50-74% patient effort)  -ES     Time Frame (Toileting Goal 1, OT)  2 weeks  -ES     Row Name 04/05/21 1435          Grooming Goal 1 (OT)    Activity/Device (Grooming Goal 1, OT)  grooming skills, all  -ES     [place] (Grooming Goal 1, OT)  minimum assist (75% or more patient effort)  -ES     Time Frame (Grooming Goal 1, OT)  2 weeks  -ES     Row Name 04/05/21 1435          Strength Goal 1 (OT)    Strength Goal 1 (OT)  Cardiac Rehab HEP With Mod i  -ES     Row Name 04/05/21 1435          Therapy Assessment/Plan (OT)    Planned Therapy Interventions (OT)  activity tolerance training;BADL retraining;functional balance retraining;manual therapy/joint mobilization;neuromuscular control/coordination retraining;occupation/activity based interventions;patient/caregiver education/training;ROM/therapeutic exercise;strengthening exercise;transfer/mobility retraining  -ES       User Key  (r) = Recorded By, (t) = Taken By, (c) = Cosigned By    Initials Name Provider Type    ES Selam Adair, ANITA Occupational Therapist        Clinical Impression     Row Name 04/05/21 1438          Pain Scale: Numbers Pre/Post-Treatment    Pretreatment Pain Rating  4/10  -ES     Posttreatment Pain Rating  4/10  -ES     Pain Location - Orientation  upper  -ES     Pain Location  chest;extremity  -ES     Row Name 04/05/21 1437          Plan of Care Review    Plan of Care Reviewed With  spouse;patient  -ES     Outcome Summary  Re-eval completed on 71 yo female s/p extubation 4/4/2021.  Pt s/p AVR 3/18/2021 with extubation 3/19/2021 and requiring re-intubation 3/22/2021 due to resp status.  See Initial Eval for PLOF. Pt supine in bed upon OT/PT arrival. Patient with dobhoff feeding tube, 8L hiflow O2, moser, IV, and cardiac monitor limiting movemnt. Max A x2 for balance/safety during bed mobility. C/o pain with BUe movement. sits EOB approximately 15 minutes. initially sits up with CGA, but fatigues to requiring Min-Mod A for  balance EOB.  Pt requires max verbal cues for sternal precautions. Spouse present and supportive, and aware of pt's funcitonal deficits. Pt adamant on returning home, but appears unsafe. OT recommends IP rehab at discharge. PPE: Gloves, mask, eyewear  -ES     Row Name 04/05/21 1430          Therapy Assessment/Plan (OT)    Rehab Potential (OT)  good, to achieve stated therapy goals  -ES     Therapy Frequency (OT)  5 times/wk  -ES     Row Name 04/05/21 1430          Therapy Plan Review/Discharge Plan (OT)    Anticipated Discharge Disposition (OT)  inpatient rehabilitation facility  -ES     Row Name 04/05/21 1430          Vital Signs    Pre Systolic BP Rehab  120  -ES     Pre Treatment Diastolic BP  88  -ES     Intra Systolic BP Rehab  74  -ES     Intra Treatment Diastolic BP  24 Drops as returned to supine  -ES     Post Systolic BP Rehab  100  -ES     Post Treatment Diastolic BP  60  -ES     Pre SpO2 (%)  88  -ES     O2 Delivery Pre Treatment  hi-flow 8L  -ES     Intra SpO2 (%)  94  -ES     O2 Delivery Intra Treatment  hi-flow  -ES     Post SpO2 (%)  91  -ES     O2 Delivery Post Treatment  hi-flow  -ES     Pre Patient Position  Supine  -ES     Intra Patient Position  Sitting  -ES     Post Patient Position  Supine  -ES     Row Name 04/05/21 1430          Positioning and Restraints    Pre-Treatment Position  in bed  -ES     Post Treatment Position  bed  -ES     In Bed  notified nsg;call light within reach;encouraged to call for assist;exit alarm on;with family/caregiver  -ES       User Key  (r) = Recorded By, (t) = Taken By, (c) = Cosigned By    Initials Name Provider Type     Selam Adair OT Occupational Therapist        Outcome Measures     Row Name 04/05/21 1436          How much help from another is currently needed...    Putting on and taking off regular lower body clothing?  1  -ES     Bathing (including washing, rinsing, and drying)  1  -ES     Toileting (which includes using toilet bed pan or urinal)  1   -ES     Putting on and taking off regular upper body clothing  2  -ES     Taking care of personal grooming (such as brushing teeth)  2  -ES     Eating meals  1  -ES     AM-PAC 6 Clicks Score (OT)  8  -ES     Row Name 04/05/21 1436          Functional Assessment    Outcome Measure Options  AM-PAC 6 Clicks Daily Activity (OT)  -ES       User Key  (r) = Recorded By, (t) = Taken By, (c) = Cosigned By    Initials Name Provider Type    Selam Suazo OT Occupational Therapist        Occupational Therapy Education                 Title: PT OT SLP Therapies (In Progress)     Topic: Occupational Therapy (Done)     Point: ADL training (Done)     Description:   Instruct learner(s) on proper safety adaptation and remediation techniques during self care or transfers.   Instruct in proper use of assistive devices.              Learning Progress Summary           Patient Acceptance, E,TB, VU,NR by ES at 4/5/2021 1437    Acceptance, E,TB, NR,DU by BP at 4/5/2021 1035    Acceptance, E, NR by BP at 4/2/2021 1048    Comment: pt intubated and sedated    Acceptance, E, NR by BP at 3/26/2021 1048    Comment: pt intubated and sedated    Acceptance, E, NR by BP at 3/24/2021 1438    Comment: pt intubated and sedated    Acceptance, E, NR by  at 3/22/2021 1527   Family Acceptance, E,TB, NR,DU by BP at 4/5/2021 1035    Acceptance, E, NR by BP at 4/2/2021 1048    Comment: pt intubated and sedated    Acceptance, E, NR by BP at 3/26/2021 1048    Comment: pt intubated and sedated                   Point: Home exercise program (Done)     Description:   Instruct learner(s) on appropriate technique for monitoring, assisting and/or progressing therapeutic exercises/activities.              Learning Progress Summary           Patient Acceptance, E,TB, VU,NR by ES at 4/5/2021 1437    Acceptance, E,TB, NR,DU by BP at 4/5/2021 1035    Acceptance, E, NR by BP at 4/2/2021 1048    Comment: pt intubated and sedated    Acceptance, E, NR by BP at  3/26/2021 1048    Comment: pt intubated and sedated    Acceptance, E, NR by BP at 3/24/2021 1438    Comment: pt intubated and sedated    Acceptance, E, NR by SM at 3/22/2021 1527   Family Acceptance, E,TB, NR,DU by BP at 4/5/2021 1035    Acceptance, E, NR by BP at 4/2/2021 1048    Comment: pt intubated and sedated    Acceptance, E, NR by BP at 3/26/2021 1048    Comment: pt intubated and sedated                   Point: Precautions (Done)     Description:   Instruct learner(s) on prescribed precautions during self-care and functional transfers.              Learning Progress Summary           Patient Acceptance, E,TB, VU,NR by ES at 4/5/2021 1437    Acceptance, E,TB, NR,DU by BP at 4/5/2021 1035    Acceptance, E, NR by BP at 4/2/2021 1048    Comment: pt intubated and sedated    Acceptance, E, NR by BP at 3/26/2021 1048    Comment: pt intubated and sedated    Acceptance, E, NR by BP at 3/24/2021 1438    Comment: pt intubated and sedated    Acceptance, E, NR by SM at 3/22/2021 1527   Family Acceptance, E,TB, NR,DU by BP at 4/5/2021 1035    Acceptance, E, NR by BP at 4/2/2021 1048    Comment: pt intubated and sedated    Acceptance, E, NR by BP at 3/26/2021 1048    Comment: pt intubated and sedated                   Point: Body mechanics (Done)     Description:   Instruct learner(s) on proper positioning and spine alignment during self-care, functional mobility activities and/or exercises.              Learning Progress Summary           Patient Acceptance, E,TB, VU,NR by ES at 4/5/2021 1437    Acceptance, E,TB, NR,DU by BP at 4/5/2021 1035    Acceptance, E, NR by BP at 4/2/2021 1048    Comment: pt intubated and sedated    Acceptance, E, NR by BP at 3/26/2021 1048    Comment: pt intubated and sedated    Acceptance, E, NR by BP at 3/24/2021 1438    Comment: pt intubated and sedated    Acceptance, E, NR by SM at 3/22/2021 1527    Acceptance, E,TB, VU by MP at 3/19/2021 1247   Family Acceptance, E,TB, NR,DU by BP at  4/5/2021 1035    Acceptance, E, NR by BP at 4/2/2021 1048    Comment: pt intubated and sedated    Acceptance, E, NR by BP at 3/26/2021 1048    Comment: pt intubated and sedated                               User Key     Initials Effective Dates Name Provider Type Discipline    MP 03/01/19 -  Zay Jean OT Occupational Therapist OT    ES 03/01/19 -  Selam Adair OT Occupational Therapist OT     06/08/20 -  João Joaquin, REE Registered Nurse Nurse     12/21/20 -  Rabia Morgan, RN Registered Nurse Nurse              OT Recommendation and Plan  Planned Therapy Interventions (OT): activity tolerance training, BADL retraining, functional balance retraining, manual therapy/joint mobilization, neuromuscular control/coordination retraining, occupation/activity based interventions, patient/caregiver education/training, ROM/therapeutic exercise, strengthening exercise, transfer/mobility retraining  Therapy Frequency (OT): 5 times/wk  Plan of Care Review  Plan of Care Reviewed With: spouse, patient  Outcome Summary: Re-eval completed on 71 yo female s/p extubation 4/4/2021.  Pt s/p AVR 3/18/2021 with extubation 3/19/2021 and requiring re-intubation 3/22/2021 due to resp status.  See Initial Eval for PLOF. Pt supine in bed upon OT/PT arrival. Patient with dobhoff feeding tube, 8L hiflow O2, moser, IV, and cardiac monitor limiting movemnt. Max A x2 for balance/safety during bed mobility. C/o pain with BUe movement. sits EOB approximately 15 minutes. initially sits up with CGA, but fatigues to requiring Min-Mod A for balance EOB.  Pt requires max verbal cues for sternal precautions. Spouse present and supportive, and aware of pt's funcitonal deficits. Pt adamant on returning home, but appears unsafe. OT recommends IP rehab at discharge. PPE: Gloves, mask, eyewear     Time Calculation:   Time Calculation- OT     Row Name 04/05/21 1436             Time Calculation- OT    OT Start Time  0902  -ES      OT Stop  Time  0927  -      OT Time Calculation (min)  25 min  -ES      Total Timed Code Minutes- OT  12 minute(s)  -ES      OT Received On  04/05/21  -ES      OT - Next Appointment  04/06/21  -ES      OT Goal Re-Cert Due Date  04/19/21  -ES        User Key  (r) = Recorded By, (t) = Taken By, (c) = Cosigned By    Initials Name Provider Type     Selam Adair OT Occupational Therapist        Therapy Charges for Today     Code Description Service Date Service Provider Modifiers Qty    [id number]  OT RE-EVAL 2 4/5/2021 Selam Adair OT GO 1    [id number]  OT SELF CARE/MGMT/TRAIN EA 15 MIN 4/5/2021 Selam Adair OT GO 1               Selam Adair OT  4/5/2021

## 2021-04-05 NOTE — PROGRESS NOTES
ICU Daily Progress Note        Aortic valve stenosis    Rheumatoid arthritis (CMS/[facility])      ASSESSMENT:  Acute hypoxic respiratory failure required intubation 03/22/2021 , extubated 4/4/21  Aortic valve stenosis, status post tissue replacement  Pulm hypertension, severe  COPD  KARLY  Cor pulmonale  Tobacco abuse  Upper airway infection with moderate amount of mucopurulent secretions noted on bronchoscopy 3/25/2021  Febrile illness   Interstitial pneumonitis  Acute thrombocytopenia  Catheter associated DVT right upper extremity subclavian, axillary, and brachial   hypernatremia  Upper arm left DVT  DM  Uremia     PLAN:    Titrating O2 to maintain a saturation of 91%  Sputum culture with Candida, likely contaminant  Hemodynamically stable off pressor support  Wean steroids down  Echocardiogram reviewed  Bronchodilator  Inhaled corticosteroids  Electrolytes/ glycemic control  DVT prophylaxis arixtra,   GI prophylaxis.     Tolerating tube feeds at goal  Keep feeding tube for now, patient is very hoarse post extubation.  SLP evaluation and recommendations pending     Nephrology following  Cardiology following  CV surgery admitting     PT/OT evaluation and treatment   Consult case management to start discharge planning.  Will likely need rehab due to severe deconditioning      LOS: 18 days         Vital signs for last 24 hours:  Vitals:    04/05/21 0729 04/05/21 0800 04/05/21 0900 04/05/21 1000   BP:       Pulse:  79 75 67   Resp:       Temp: 98.2 °F (36.8 °C)      TempSrc: Oral      SpO2:  92% 94% 90%   Weight:       Height:           Intake/Output last 3 shifts:  I/O last 3 completed shifts:  In: 3975 [Other:2022; NG/GT:1953]  Out: 3775 [Urine:3775]  Intake/Output this shift:  No intake/output data recorded.    Vent settings for last 24 hours:       Hemodynamic parameters for last 24 hours:  CVP:  [5 mmHg] 5 mmHg    Radiology  Imaging Results (Last 24 Hours)     Procedure Component Value Units Date/Time    XR Chest 1 View  [[id number]] Collected: 04/05/21 0724     Updated: 04/05/21 0728    Narrative:      Examination: XR CHEST 1 VW-     Date of Exam: 4/5/2021 5:00 AM     Indication: Hypoxia; M06.9-Rheumatoid arthritis, unspecified;  I35.0-Nonrheumatic aortic (valve) stenosis; J96.01-Acute respiratory  failure with hypoxia; J44.9-Chronic obstructive pulmonary disease,  unspecified.     Comparison: 4/4/2021.     Technique: Single radiographic view of the chest was obtained.     Findings:  There are diffuse bilateral mixed interstitial and airspace opacities in  both lungs. There is a stable left internal jugular central venous  catheter. Stable changes of prior median sternotomy and heart valve  replacement. Endotracheal tube has been removed. Stable feeding tube  coursing below the diaphragm. Stable bridging thoracic osteophytes  without acute osseous abnormality.       Impression:         1. Stable diffuse mixed interstitial and airspace disease.  2. Interval extubation.  3. Stable postoperative findings and stable left internal jugular  central venous catheter/feeding tube.     Electronically Signed By-Romain Gutierrez MD On:4/5/2021 7:26 AM  This report was finalized on [id number] by  Romain Gutierrez MD.          Labs:  Results from last 7 days   Lab Units 04/05/21  0501   WBC 10*3/mm3 22.20*   HEMOGLOBIN g/dL 10.7*   HEMATOCRIT % 32.6*   PLATELETS 10*3/mm3 134*     Results from last 7 days   Lab Units 04/05/21  0501   SODIUM mmol/L 141   POTASSIUM mmol/L 3.3*   CHLORIDE mmol/L 106   CO2 mmol/L 26.0   BUN mg/dL 43*   CREATININE mg/dL 0.46*   CALCIUM mg/dL 8.5*   BILIRUBIN mg/dL 0.5   ALK PHOS U/L 137*   ALT (SGPT) U/L 44*   AST (SGOT) U/L 52*   GLUCOSE mg/dL 133*     Results from last 7 days   Lab Units 04/05/21  0336   PH, ARTERIAL pH units 7.440   PO2 ART mm Hg 97.7   PCO2, ARTERIAL mm Hg 38.7   HCO3 ART mmol/L 26.3     Results from last 7 days   Lab Units 04/05/21  0501 04/04/21  0414 04/03/21  0348   ALBUMIN g/dL 2.70* 2.60*  2.70*     Results from last 7 days   Lab Units 21  0425   CK TOTAL U/L 45         Results from last 7 days   Lab Units 21  0501   MAGNESIUM mg/dL 2.3                   Meds:   SCHEDULE  acetylcysteine, 3 mL, Nebulization, BID - RT  arformoterol, 15 mcg, Nebulization, BID - RT  aspirin, 81 mg, Oral, Daily  atorvastatin, 40 mg, Oral, Nightly  budesonide, 1 mg, Nebulization, BID - RT  bumetanide, 1 mg, Nasogastric, TID  chlorhexidine, 15 mL, Mouth/Throat, Q12H  docusate, 100 mg, Oral, BID  First Mouthwash (Magic Mouthwash), 5 mL, Swish & Spit, Q6H  FLUoxetine, 60 mg, Oral, Daily  fondaparinux, 5 mg, Subcutaneous, Q24H  gabapentin, 200 mg, Oral, Q12H  hydrALAZINE, 25 mg, Nasogastric, Q8H  insulin glargine, 20 Units, Subcutaneous, Q12H  insulin lispro, 0-24 Units, Subcutaneous, Q6H  insulin lispro, 5 Units, Subcutaneous, Q6H  ipratropium-albuterol, 3 mL, Nebulization, Q4H - RT  lansoprazole, 30 mg, Oral, QAM  levothyroxine, 100 mcg, Oral, Q AM  metoprolol tartrate, 12.5 mg, Oral, Q12H  nystatin, , Topical, Q12H  potassium chloride, 20 mEq, Oral, TID With Meals  predniSONE, 10 mg, Oral, Daily With Breakfast  QUEtiapine, 25 mg, Oral, Nightly      Infusions     PRNs  •  acetaminophen **OR** acetaminophen **OR** acetaminophen  •  bisacodyl  •  Chlorhexidine Gluconate Cloth  •  dextrose  •  dextrose  •  glucagon (human recombinant)  •  hydrALAZINE  •  HYDROcodone-acetaminophen  •  insulin lispro **AND** insulin lispro  •  ipratropium-albuterol  •  lidocaine  •  lidocaine  •  meclizine  •  midazolam  •  midazolam  •  [] HYDROmorphone **AND** naloxone  •  ondansetron  •  potassium chloride **OR** potassium chloride  •  potassium chloride **OR** potassium chloride    Physical Exam:  Physical Exam  Cardiovascular:      Heart sounds: Murmur heard.     Pulmonary:      Breath sounds: Rhonchi present.         ROS  Review of Systems   Respiratory: Positive for cough.    Cardiovascular: Positive for leg swelling.          Critical Care Time greater than: 45 Minutes  Total time spent with patient greater than: 45 Minutes

## 2021-04-05 NOTE — PLAN OF CARE
Goal Outcome Evaluation:  Plan of Care Reviewed With: patient, spouse     Outcome Summary: Orders received following failure of post extubation swallow screen.  Patient is currently on 8L nasal cannula with dobhoff tube in place.  Clinical swallow evaluation completed with trials of ice chips, water by spoon, cup and straw.  Patient orally manipulates the ice chips before swallowing.  Patient takes 4 trials of water by spoon, 3 sips water by cup and 6 sips water by straw.  Patient requires assistance with holding the cup due to weakness.  Good labial seal is noted.  Patient holds bolus in oral cavity for 3-4 seconds before swallowing for trials presented by spoon, cup and straw.  Once swallow is initiated, there is no overt s/s of aspiration.  After the 3rd trial by straw, patient did appear a little SOB and wanted to wait a few before taking more trials.  Discussed with patient that due to her prolonged intubation, voice change and increased lethargy this date that no further trials were going to be given.  Patient also was not appropriate for a VFSS due to her increased lethargy.  Recommend patient continuing NPO status with sips and chips throughout the day.  ST will re-evaluate in AM.  She expressed understanding.

## 2021-04-05 NOTE — PROGRESS NOTES
S/P POD# 18 tissue AVR--Pagni  EF 60-65% (echo)    Subjective: Extubated yesterday. Resting in bed after working with PT.    Drips: None    CHRISTIANO (3/25)--no abnormal findings noted  Bronch (3/25)--mod mucopurulent secretions--cultures negative  CVP 5  Cxr: continued interstitial/airspace disease      Intake/Output Summary (Last 24 hours) at 4/5/2021 0946  Last data filed at 4/5/2021 0600  Gross per 24 hour   Intake 2325 ml   Output 2175 ml   Net 150 ml     Temp:  [98.2 °F (36.8 °C)-99.6 °F (37.6 °C)] 98.2 °F (36.8 °C)  Heart Rate:  [67-92] 78  Resp:  [16-22] 16  Arterial Line BP: (104-128)/(47-60) 128/58      Results from last 7 days   Lab Units 04/05/21  0501 04/04/21  0414   WBC 10*3/mm3 22.20* 19.50*   HEMOGLOBIN g/dL 10.7* 10.0*   HEMATOCRIT % 32.6* 30.4*   PLATELETS 10*3/mm3 134* 112*     Results from last 7 days   Lab Units 04/05/21  0501   CREATININE mg/dL 0.46*   POTASSIUM mmol/L 3.3*   SODIUM mmol/L 141   MAGNESIUM mg/dL 2.3   PHOSPHORUS mg/dL 3.3       Physical Exam:  Resting in bed,  at bedside  Tele:  SR 70's  diminished bilaterally, 90% 8L High Flow  Sternal incision healing well  Benign abd, + BM. TF infusing  + edema, chronic erythema of BLE noted    Assessment/Plan:  Principal Problem:    Aortic valve stenosis  Active Problems:    Rheumatoid arthritis (CMS/HCC)    Severe aortic stenosis--s/p tissue AVR (Pagni)  Postop acute hypoxic resp failure--re-intubated 3/22  Postop TCP/HIT positive-- Arixtra  Right subclavian/axillary/brachial DVT-- Arixtra  Chronic HFpEF--maximize meds prior to d/c  COPD--nebs, pulmicort  Rheumatoid arthritis--not on medication per patient  HTN--stable  HLD--statin  Bipolar disorder--home Prozac  Obesity stage 3  PVD  KARLY  Vertigo--home Antivert  Tobacco abuse--counseled on cessation  Postop leukocytosis, likely r/t atelectasis--aggressive pulm toileting  Post-op hypernatremia-- resolved      POD#18. Extubated yesterday. Now on 8L O2. Needs aggressive pulmonary toileting.  Consult speech for swallow eval. Monitor CVPs. DC pacing wires.    Routine care--as above  DC plan to be determined    CELE ULLOA, APRN  4/5/2021  09:46 EDT

## 2021-04-05 NOTE — PLAN OF CARE
Goal Outcome Evaluation:  Plan of Care Reviewed With: patient, spouse  Progress: improving  Outcome Summary: Pt POD18 after AVR. Extubated yesterday. On 8L HFNC. AV wires D/C'd. VS stable. Will continue to monitor.

## 2021-04-05 NOTE — PLAN OF CARE
Problem: Adult Inpatient Plan of Care  Goal: Plan of Care Review  Outcome: Ongoing, Progressing  Flowsheets  Taken 4/5/2021 2108  Outcome Summary: Re-eval completed on 71 yo female s/p extubation 4/4/2021.  Pt s/p AVR 3/18/2021 with extubation 3/19/2021 and requiring re-intubation 3/22/2021 due to resp status.  Pt presents with global weakness requiring assist x2 for bed mobility after prolonged intubation.  Pt able to sit EOB with CGA-modA pending fatigue and pain.  Not safe to attempt transfer due to hypotension and global weakness at this time.  Attempt transfer at next session if appropriate.  Pt will need extensive IP rehab to address deficits.  PT will follow 5x/week while at [facility].  PPE donned: mask, goggles, gloves.

## 2021-04-05 NOTE — PROGRESS NOTES
"NEPHROLOGY PROGRESS NOTE------[facility]    [facility]  [phone]  Robert Hammer MD      Patient Care Team:  Mary Wilde APRN as PCP - General  Mary Wilde APRN as PCP - Family Medicine  Jose Levi MD as Consulting Physician (Cardiology)  Yovani Lerma MD as Consulting Physician (Nephrology)      Provider:  Robert Hammer MD  Patient Name: Claudia Rust  :  1950    SUBJECTIVE:    F/U ARF/JERROD/HYPERNATREMIA    Weak and malaised. No angina. No dysuria.    Medication:  acetylcysteine, 3 mL, Nebulization, BID - RT  arformoterol, 15 mcg, Nebulization, BID - RT  aspirin, 81 mg, Oral, Daily  atorvastatin, 40 mg, Oral, Nightly  budesonide, 1 mg, Nebulization, BID - RT  bumetanide, 1 mg, Nasogastric, TID  chlorhexidine, 15 mL, Mouth/Throat, Q12H  docusate, 100 mg, Oral, BID  First Mouthwash (Magic Mouthwash), 5 mL, Swish & Spit, Q6H  FLUoxetine, 60 mg, Oral, Daily  fondaparinux, 5 mg, Subcutaneous, Q24H  gabapentin, 200 mg, Oral, Q12H  hydrALAZINE, 25 mg, Nasogastric, Q8H  insulin glargine, 20 Units, Subcutaneous, Q12H  insulin lispro, 0-24 Units, Subcutaneous, Q6H  insulin lispro, 5 Units, Subcutaneous, Q6H  ipratropium-albuterol, 3 mL, Nebulization, Q4H - RT  lansoprazole, 30 mg, Oral, QAM  levothyroxine, 100 mcg, Oral, Q AM  metoprolol tartrate, 12.5 mg, Oral, Q12H  nystatin, , Topical, Q12H  potassium chloride, 20 mEq, Oral, TID With Meals  predniSONE, 10 mg, Oral, Daily With Breakfast  QUEtiapine, 25 mg, Oral, Nightly           OBJECTIVE    Vital Sign Min/Max for last 24 hours  Temp  Min: 98 °F (36.7 °C)  Max: 99.6 °F (37.6 °C)   No data recorded   Pulse  Min: 67  Max: 92   Resp  Min: 16  Max: 22   SpO2  Min: 89 %  Max: 100 %   No data recorded   Weight  Min: 120 kg (265 lb 6.9 oz)  Max: 120 kg (265 lb 6.9 oz)     Flowsheet Rows      First Filed Value   Admission Height  162.6 cm (64\") Documented at 2021 2000 "   Admission Weight  129 kg (284 lb) Documented at 03/18/2021 2000          No intake/output data recorded.  I/O last 3 completed shifts:  In: 3975 [Other:2022; NG/GT:1953]  Out: 3775 [Urine:3775]    Physical Exam:    General Appearance: NAD. Fatigued  Head: normocephalic, without obvious abnormality and atraumatic +NGT INTACT  Eyes: conjunctivae and sclerae normal and no icterus  Neck: supple. NO GROSS JVD NOW  Lungs: +FEW SCATTERED RHONCHI AND FINE BIBASILAR CRACKLES  Heart: regular rhythm & normal rate and normal S1, S2 +NARGIS  Chest: Wall no abnormalities observed  Abdomen: normal bowel sounds and soft non-tender +OBESITY  Extremities: +TRACE/1+ DIFFUSE BILAT LE EDEMA, no cyanosis and no redness  Skin: no bleeding, bruising or rash, turgor normal, color normal and no lesions noted  Neurologic: Alert and oriented without focal deficits    Labs:    WBC WBC   Date Value Ref Range Status   04/05/2021 22.20 (H) 3.40 - 10.80 10*3/mm3 Final   04/04/2021 19.50 (H) 3.40 - 10.80 10*3/mm3 Final   04/03/2021 19.10 (H) 3.40 - 10.80 10*3/mm3 Final      HGB Hemoglobin   Date Value Ref Range Status   04/05/2021 10.7 (L) 12.0 - 15.9 g/dL Final   04/04/2021 10.0 (L) 12.0 - 15.9 g/dL Final   04/03/2021 10.2 (L) 12.0 - 15.9 g/dL Final      HCT Hematocrit   Date Value Ref Range Status   04/05/2021 32.6 (L) 34.0 - 46.6 % Final   04/04/2021 30.4 (L) 34.0 - 46.6 % Final   04/03/2021 30.5 (L) 34.0 - 46.6 % Final      Platlets No results found for: LABPLAT   MCV MCV   Date Value Ref Range Status   04/05/2021 92.3 79.0 - 97.0 fL Final   04/04/2021 91.5 79.0 - 97.0 fL Final   04/03/2021 90.8 79.0 - 97.0 fL Final          Sodium Sodium   Date Value Ref Range Status   04/05/2021 141 136 - 145 mmol/L Final   04/04/2021 141 136 - 145 mmol/L Final   04/03/2021 142 136 - 145 mmol/L Final      Potassium Potassium   Date Value Ref Range Status   04/05/2021 3.3 (L) 3.5 - 5.2 mmol/L Final     Comment:     Slight hemolysis detected by analyzer. Results  may be affected.   04/04/2021 3.8 3.5 - 5.2 mmol/L Final   04/04/2021 3.6 3.5 - 5.2 mmol/L Final   04/03/2021 4.0 3.5 - 5.2 mmol/L Final      Chloride Chloride   Date Value Ref Range Status   04/05/2021 106 98 - 107 mmol/L Final   04/04/2021 104 98 - 107 mmol/L Final   04/03/2021 104 98 - 107 mmol/L Final      CO2 CO2   Date Value Ref Range Status   04/05/2021 26.0 22.0 - 29.0 mmol/L Final   04/04/2021 28.0 22.0 - 29.0 mmol/L Final   04/03/2021 30.0 (H) 22.0 - 29.0 mmol/L Final      BUN BUN   Date Value Ref Range Status   04/05/2021 43 (H) 8 - 23 mg/dL Final   04/04/2021 46 (H) 8 - 23 mg/dL Final   04/03/2021 48 (H) 8 - 23 mg/dL Final      Creatinine Creatinine   Date Value Ref Range Status   04/05/2021 0.46 (L) 0.57 - 1.00 mg/dL Final   04/04/2021 0.46 (L) 0.57 - 1.00 mg/dL Final   04/03/2021 0.51 (L) 0.57 - 1.00 mg/dL Final      Calcium Calcium   Date Value Ref Range Status   04/05/2021 8.5 (L) 8.6 - 10.5 mg/dL Final   04/04/2021 8.7 8.6 - 10.5 mg/dL Final   04/03/2021 8.8 8.6 - 10.5 mg/dL Final      PO4 No components found for: PO4   Albumin Albumin   Date Value Ref Range Status   04/05/2021 2.70 (L) 3.50 - 5.20 g/dL Final   04/04/2021 2.60 (L) 3.50 - 5.20 g/dL Final   04/03/2021 2.70 (L) 3.50 - 5.20 g/dL Final      Magnesium Magnesium   Date Value Ref Range Status   04/05/2021 2.3 1.6 - 2.4 mg/dL Final   04/03/2021 2.3 1.6 - 2.4 mg/dL Final      Uric Acid No components found for: URIC ACID     Imaging Results (Last 72 Hours)     Procedure Component Value Units Date/Time    XR Chest 1 View [[id number]] Resulted: 04/05/21 0515     Updated: 04/05/21 0516    XR Chest 1 View [[id number]] Collected: 04/04/21 0805     Updated: 04/04/21 0809    Narrative:      EXAMINATION: XR CHEST 1 VW-     DATE OF EXAM: 4/4/2021 4:58 AM     INDICATION: Hypoxia; M06.9-Rheumatoid arthritis, unspecified;  I35.0-Nonrheumatic aortic (valve) stenosis; J96.01-Acute respiratory  failure with hypoxia; J44.9-Chronic obstructive pulmonary  disease,  unspecified.     COMPARISON: Chest radiograph dated 04/03/2021     TECHNIQUE: Portable AP view of the chest was obtained.     FINDINGS:  The endotracheal tube tip is 2.4 cm above the davi. There is a  left-sided central line with tip terminating over the mid SVC in  unchanged position. There is a feeding tube which courses below left  hemidiaphragm. There is stable cardiac megaly. There is aortic arch  athetotic calcification. Pulmonary vascularity appears within normal  limits. There is bibasilar airspace opacities likely representing  atelectasis with possible trace bilateral pleural effusions. There is a  prosthetic cardiac valve and temporary pacing wires. There is no  evidence of pneumothorax. Multilevel degenerative changes of the  thoracic spine.       Impression:      1. Support devices in expected position.  2. Unchanged bibasilar airspace opacities and likely small bilateral  layering pleural effusions.     Electronically Signed By-Olegario Arnold MD On:4/4/2021 8:07 AM  This report was finalized on [id number] by  Olegario Arnold MD.    XR Chest 1 View [[id number]] Collected: 04/03/21 0952     Updated: 04/03/21 0955    Narrative:      DATE OF EXAM:  4/3/2021 5:02 AM     PROCEDURE:  XR CHEST 1 VW-     INDICATIONS:  Hypoxia, heart disease, acute respiratory failure, COPD.      COMPARISON:  04/02/2020.     TECHNIQUE:   Single radiographic view of the chest was obtained.     FINDINGS:  There is a stable endotracheal tube and left internal jugular line in  place. The Dobbhoff feeding tube tip projects out of the field-of-view.  The heart is enlarged with postsurgical changes apparent. There is a  stable left basilar pleural effusion. There is bilateral mixed  interstitial/airspace disease with no significant interval change. I  would favor multifocal pneumonia over pulmonary edema.  There is no  pneumothorax.       Impression:      No interval change from yesterday's study with abnormalities  as  described above.     Electronically Signed By-Suhas Donald MD On:4/3/2021 9:53 AM  This report was finalized on [id number] by  Suhas Donald MD.          Results for orders placed during the hospital encounter of 03/18/21    XR Chest 1 View    Narrative  EXAMINATION: XR CHEST 1 VW-    DATE OF EXAM: 4/4/2021 4:58 AM    INDICATION: Hypoxia; M06.9-Rheumatoid arthritis, unspecified;  I35.0-Nonrheumatic aortic (valve) stenosis; J96.01-Acute respiratory  failure with hypoxia; J44.9-Chronic obstructive pulmonary disease,  unspecified.    COMPARISON: Chest radiograph dated 04/03/2021    TECHNIQUE: Portable AP view of the chest was obtained.    FINDINGS:  The endotracheal tube tip is 2.4 cm above the davi. There is a  left-sided central line with tip terminating over the mid SVC in  unchanged position. There is a feeding tube which courses below left  hemidiaphragm. There is stable cardiac megaly. There is aortic arch  athetotic calcification. Pulmonary vascularity appears within normal  limits. There is bibasilar airspace opacities likely representing  atelectasis with possible trace bilateral pleural effusions. There is a  prosthetic cardiac valve and temporary pacing wires. There is no  evidence of pneumothorax. Multilevel degenerative changes of the  thoracic spine.    Impression  1. Support devices in expected position.  2. Unchanged bibasilar airspace opacities and likely small bilateral  layering pleural effusions.    Electronically Signed By-Olegario Arnold MD On:4/4/2021 8:07 AM  This report was finalized on [id number] by  Olegario Arnold MD.      XR Abdomen KUB    Narrative  DATE OF EXAM:  4/1/2021 9:32 PM    PROCEDURE:  XR ABDOMEN KUB-    INDICATIONS:  ng tube placement; M06.9-Rheumatoid arthritis, unspecified;  I35.0-Nonrheumatic aortic (valve) stenosis; J96.01-Acute respiratory  failure with hypoxia; J44.9-Chronic obstructive pulmonary  disease,  unspecified    COMPARISON:  03/22/2021    TECHNIQUE:  Single radiographic view of the abdomen was obtained.    FINDINGS:  The feeding tube passes below the diaphragm, with its tip in the area of  the upper body of the stomach, similar to the prior exam. Technique is  suboptimal for lung evaluation.    Impression  1. Feeding tube tip is in the stomach.    Electronically Signed By-Sol Javed MD On:4/1/2021 10:12 PM  This report was finalized on [id number] by  Sol Javed MD.      XR Chest 1 View    Narrative  DATE OF EXAM:  4/3/2021 5:02 AM    PROCEDURE:  XR CHEST 1 VW-    INDICATIONS:  Hypoxia, heart disease, acute respiratory failure, COPD.    COMPARISON:  04/02/2020.    TECHNIQUE:  Single radiographic view of the chest was obtained.    FINDINGS:  There is a stable endotracheal tube and left internal jugular line in  place. The Dobbhoff feeding tube tip projects out of the field-of-view.  The heart is enlarged with postsurgical changes apparent. There is a  stable left basilar pleural effusion. There is bilateral mixed  interstitial/airspace disease with no significant interval change. I  would favor multifocal pneumonia over pulmonary edema.  There is no  pneumothorax.    Impression  No interval change from yesterday's study with abnormalities as  described above.    Electronically Signed By-Suhas Donald MD On:4/3/2021 9:53 AM  This report was finalized on [id number] by  Suhas Donald MD.      Results for orders placed during the hospital encounter of 03/18/21    Duplex Venous Upper Extremity - Bilateral CAR    Interpretation Summary  · Acute right upper extremity deep vein thrombosis noted in the subclavian, axillary and brachial. This represents catheter associated deep vein thrombosis.  · All other vessels appear normal.        ASSESSMENT / PLAN      Aortic valve stenosis    Rheumatoid arthritis (CMS/HCC)    1. ARF/JERROD------Nonoliguric ATN. Venegas in place. Creatinine normal. BUN still up.  Follow with ongoing diuretic exposure. No NSAIDs or IV dye    2. HYPERNATREMIA------Better.  Free water flushes with TFs. Encourage po water intake once swallow eval done and patient able to eat/drink    3. HYPOALBUMINEMIA-----on TFs.     4. 3RD SPACED EDEMA------On  Bumex per tube. Significant contribution from chronic venous insufficiency/obesity    5. CAD S/P AVR-----per Cardiology and CT Surgery. S/P CHRISTIANO    6. OBESITY    7. ACUTE HYPOXIC RESPIRATORY FAILURE------On vent per Pulmonary    8. RUE DVT    9. DMII-------Glucometers, SSI    10. ANEMIA------H/H stable    11. SEPSIS/LEUKOCYTOSIS------Antibiotics per Pulmonary/CC    12. THROMBOCYTOPENIA    13. HYPOCALCEMIA-----Replaced    14. HYPERLIPIDEMIA------On Statin.      15. HYPOTHYROIDISM------On Sythroid    16. GERD/PUD PROPHYLAXIS------Prevacid per NGT    17. HTN-------BP good. D/C Amlodipine b/c of edema. Added scheduled Hydralazine    18. METABOLIC ALKALOSIS-----Better    19. HYPOKALEMIA-----Replace IV and per tube    Robert Hammer MD  [facility]  [phone]  04/05/21  07:02 EDT

## 2021-04-05 NOTE — PROGRESS NOTES
Continued Stay Note  ISAAK Rush     Patient Name: Claudia Rust  MRN: [id number]  Today's Date: 4/5/2021    Admit Date: 3/18/2021    Discharge Plan     Row Name 04/05/21 1625       Plan    Plan  DC Plan: From home w/spouse. PT/OT pending.    Plan Comments  Extubate 4/4, 8 L hi flow, tube feeds        Chart only.             Julee Antony RN

## 2021-04-05 NOTE — CONSULTS
DL PICC placed to left basilic vein via US guidance without incident.PICC tip confirmation in SVC per orin ECG. Positive blood return and flushes easily. RN notified ok to use line at this time

## 2021-04-05 NOTE — THERAPY EVALUATION
"Acute Care - Speech Language Pathology   Swallow Initial Evaluation [facility]     Patient Name: Claudia Rust  : 1950  MRN: [id number]  Today's Date: 2021               Admit Date: 3/18/2021    Visit Dx:     ICD-10-CM ICD-9-CM   1. Rheumatoid arthritis, involving unspecified site, unspecified whether rheumatoid factor present (CMS/HCC)  M06.9 714.0   2. Aortic valve stenosis, etiology of cardiac valve disease unspecified  I35.0 424.1   3. Acute respiratory failure with hypoxia (CMS/HCC)  J96.01 518.81   4. Chronic obstructive pulmonary disease, unspecified COPD type (CMS/HCC)  J44.9 496     Patient Active Problem List   Diagnosis   • Morbidly obese (CMS/[facility])   • Dyspnea on exertion   • Abnormal nuclear stress test   • Nonrheumatic aortic valve stenosis   • Prediabetes   • Dyslipidemia   • Essential hypertension   • Acute UTI   • Bipolar disorder (CMS/[facility])   • COPD (chronic obstructive pulmonary disease) (CMS/[facility])   • High triglycerides   • Gastroesophageal reflux disease   • Rheumatoid arthritis (CMS/[facility])   • Aortic stenosis, severe   • Chronic heart failure with preserved ejection fraction (HFpEF) (CMS/HCC)   • Aortic valve stenosis     Past Medical History:   Diagnosis Date   • Acute congestive heart failure (CMS/[facility])    • Allergies    • Anxiety    • Arthritis    • Asthma    • Bilateral leg edema    • Bipolar 1 disorder (CMS/[facility])    • Bulging lumbar disc     X3   • Cancer (CMS/[facility])     states had \"cancerous tumor during pregnancy and had to have hysterectomy\"   • Cataract     bilateral   • Cellulitis 2021    bilateral legs   • Compression fracture of vertebral column (CMS/[facility])     LUMBAR   • COPD (chronic obstructive pulmonary disease) (CMS/[facility])    • Delayed emergence from anesthesia    • Depression    • Depression    • Dyslipidemia    • Dyspnea on exertion    • Fibromyalgia    • GERD (gastroesophageal reflux disease)    • Hiatal hernia    • Hyperlipidemia    • Hypertension    • Hypothyroid     " HYPOTHYROID   • IBS (irritable bowel syndrome)    • Migraines    • Morbid obesity (CMS/[facility])    • Neuropathy    • Panic attacks    • Peripheral edema    • PONV (postoperative nausea and vomiting)    • Poor mobility     rolling walker   • Prediabetes    • PVD (peripheral vascular disease) (CMS/[facility])    • Rheumatoid aortitis    • Spinal stenosis    • Spinal stenosis    • Vertigo    • Vitamin D deficiency      Past Surgical History:   Procedure Laterality Date   • AORTIC VALVE REPAIR/REPLACEMENT N/A 3/18/2021    Procedure: AORTIC VALVE REPLACEMENT;  Surgeon: Olaf Mcgill MD;  Location: [facility] CVOR;  Service: Cardiothoracic;  Laterality: N/A;   • BRONCHOSCOPY N/A 3/25/2021    Procedure: BRONCHOSCOPY AT BEDSIDE WITH BRONCHIOALVEOLAR LAVAGE;  Surgeon: Glenn Reyes MD;  Location: [facility] ENDOSCOPY;  Service: Pulmonary;  Laterality: N/A;  PNA   • CARDIAC CATHETERIZATION  2009   • CARDIAC CATHETERIZATION N/A 8/14/2019    Procedure: Left Heart Cath;  Surgeon: Jose Levi MD;  Location: [facility] CATH INVASIVE LOCATION;  Service: Cardiovascular   • CARDIAC CATHETERIZATION N/A 8/14/2019    Procedure: Right Heart Cath;  Surgeon: Jose Levi MD;  Location: [facility] CATH INVASIVE LOCATION;  Service: Cardiovascular   • CARDIAC CATHETERIZATION N/A 8/14/2019    Procedure: Aortic root aortogram;  Surgeon: Jose Levi MD;  Location: [facility] CATH INVASIVE LOCATION;  Service: Cardiovascular   • CARDIAC CATHETERIZATION N/A 1/20/2021    Procedure: Left Heart Cath;  Surgeon: Jose Levi MD;  Location: [facility] CATH INVASIVE LOCATION;  Service: Cardiovascular;  Laterality: N/A;   • COLONOSCOPY     • CYSTOCELE REPAIR  1984   • ELBOW ARTHROPLASTY  2011   • ENDOSCOPY     • FOOT SURGERY     • GALLBLADDER SURGERY  2002   • TONSILLECTOMY     • VAGINAL HYSTERECTOMY  1972        SWALLOW EVALUATION (last 72 hours)      SLP Adult Swallow Evaluation     Row Name 04/05/21 1300       Rehab Evaluation     Document Type  evaluation  -MM    Subjective Information  no complaints  -MM    Patient Observations  agree to therapy;lethargic;cooperative  -MM    Patient/Family/Caregiver Comments/Observations  Patient reports she has been very tired today and worn out.  Patient's vocal quality is weak and hoarse.  -MM    Patient Effort  good  -MM    Comment  Patient reports no difficulties eating prior to hospitalization.  -MM       General Information    Patient Profile Reviewed  yes  -MM    Pertinent History Of Current Problem  Patient initially presented to the hospital on for pre-admission testing prior to scheduled cardiac surgery.  She reported porr mobility die to SOB and fatigue.  Patient had AVR surgery on 3/18.  Patient had bronch on 3/25 with moderate amount of mucopurulent secretions suctioned.  Patient remained on ventilator following surgery until she was extubated on 4/4.  Patient is currently on 4L O2 via nasal cannula.  She does have a dobhoff tube in place for nutrition.    -MM    Current Method of Nutrition  NPO;nasogastric feedings  -MM    Prior Level of Function-Swallowing  no diet consistency restrictions  -MM    Plans/Goals Discussed with  patient;spouse/S.O.  -MM    Barriers to Rehab  none identified  -MM       Oral Motor Structure and Function    Dentition Assessment  -- dentures but no in place for eval  -MM    Secretion Management  WNL/WFL  -MM    Mucosal Quality  moist, healthy  -MM    Volitional Swallow  weak  -MM    Volitional Cough  weak  -MM       Oral Musculature and Cranial Nerve Assessment    Oral Motor General Assessment  generalized oral motor weakness  -MM       General Eating/Swallowing Observations    Respiratory Support Currently in Use  nasal cannula 5L  -MM    Eating/Swallowing Skills  fed by SLP  -MM    Positioning During Eating  upright 90 degree;upright in bed  -MM    Utensils Used  spoon;cup;straw  -MM    Consistencies Trialed  ice chips;thin liquids  -MM       Respiratory     Respiratory Status  WFL  -MM       Clinical Swallow Eval    Oral Prep Phase  WFL  -MM    Oral Transit  WFL  -MM    Oral Residue  WFL  -MM    Pharyngeal Phase  no overt signs/symptoms of pharyngeal impairment  -MM    Clinical Swallow Evaluation Summary  Clinical swallow evaluation completed with trials of ice chips, water by spoon, cup and straw.  Patient orally manipulates the ice chips before swallowing.  Patient takes 4 trials of water by spoon, 3 sips water by cup and 6 sips water by straw.  Patient requires assistance with holding the cup due to weakness.  Good labial seal is noted.  Patient holds bolus in oral cavity for 3-4 seconds before swallowing for trials presented by spoon, cup and straw.  Once swallow is initiated, there is no overt s/s of aspiration.  After the 3rd trial by straw, patient did appear a little SOB and wanted to wait a few before taking more trials.  Discussed with patient that due to her prolonged intubation, voice change and increased lethargy this date that no further trials were going to be given.  Discussed allowing patient to have sips of water throughout the day with re-evaluation to be completed tomorrow.  She expressed understanding.    -MM       Clinical Impression    SLP Swallowing Diagnosis  suspected pharyngeal dysphagia  -MM    Functional Impact  risk of aspiration/pneumonia;risk of malnutrition  -MM    Rehab Potential/Prognosis, Swallowing  good, to achieve stated therapy goals  -MM    Swallow Criteria for Skilled Therapeutic Interventions Met  demonstrates skilled criteria  -MM       Recommendations    Therapy Frequency (Swallow)  PRN  -MM    Predicted Duration Therapy Intervention (Days)  until discharge  -MM    SLP Diet Recommendation  -- sips and chips  -MM    Recommended Diagnostics  reassess via clinical swallow evaluation;reassess via VFSS ([facility])  -MM    Recommended Precautions and Strategies  upright posture during/after eating  -MM    Oral Care Recommendations  Oral  Care BID/PRN  -MM    SLP Rec. for Method of Medication Administration  meds via alternate route  -MM    Anticipated Discharge Disposition (SLP)  inpatient rehabilitation facility  -MM       Swallow Goals (SLP)    Oral Nutrition/Hydration Goal Selection (SLP)  oral nutrition/hydration, SLP goal 1;oral nutrition/hydration, SLP goal 2  -MM       Oral Nutrition/Hydration Goal 1 (SLP)    Oral Nutrition/Hydration Goal 1, SLP  Patient will be seen within 24-48 hours to re-assess swallow function with further recommendations to be given as indicated.  -MM    Time Frame (Oral Nutrition/Hydration Goal 1, SLP)  2 days  -MM       Oral Nutrition/Hydration Goal 2 (SLP)    Oral Nutrition/Hydration Goal 2, SLP  Patient will initiate a PO diet and tolerate with no complications from aspiration  -MM    Time Frame (Oral Nutrition/Hydration Goal 2, SLP)  by discharge  -MM      User Key  (r) = Recorded By, (t) = Taken By, (c) = Cosigned By    Initials Name Effective Dates    MM Cecilia Maxwell SLP 03/01/19 -           EDUCATION  The patient has been educated in the following areas:   NPO rationale.    SLP Recommendation and Plan  SLP Swallowing Diagnosis: suspected pharyngeal dysphagia  SLP Diet Recommendation:  (sips and chips)  Recommended Precautions and Strategies: upright posture during/after eating  SLP Rec. for Method of Medication Administration: meds via alternate route        Recommended Diagnostics: reassess via clinical swallow evaluation, reassess via VFSS ([facility])  Swallow Criteria for Skilled Therapeutic Interventions Met: demonstrates skilled criteria  Anticipated Discharge Disposition (SLP): inpatient rehabilitation facility  Rehab Potential/Prognosis, Swallowing: good, to achieve stated therapy goals  Therapy Frequency (Swallow): PRN  Predicted Duration Therapy Intervention (Days): until discharge                      Patient was not wearing a face mask during this therapy encounter. Therapist used appropriate personal  protective equipment including mask, eye protection and gloves.  Mask used was standard procedure mask. Appropriate PPE was worn during the entire therapy session. Hand hygiene was completed before and after therapy session. Patient is not in enhanced droplet precautions.         SLP GOALS     Row Name 04/05/21 1300       Oral Nutrition/Hydration Goal 1 (SLP)    Oral Nutrition/Hydration Goal 1, SLP  Patient will be seen within 24-48 hours to re-assess swallow function with further recommendations to be given as indicated.  -MM    Time Frame (Oral Nutrition/Hydration Goal 1, SLP)  2 days  -MM       Oral Nutrition/Hydration Goal 2 (SLP)    Oral Nutrition/Hydration Goal 2, SLP  Patient will initiate a PO diet and tolerate with no complications from aspiration  -MM    Time Frame (Oral Nutrition/Hydration Goal 2, SLP)  by discharge  -MM      User Key  (r) = Recorded By, (t) = Taken By, (c) = Cosigned By    Initials Name Provider Type    Cecilia Farley SLP Speech and Language Pathologist             Time Calculation:                OFELIA Arreola  4/5/2021

## 2021-04-05 NOTE — PROGRESS NOTES
"Nutrition Services    Patient Name: Claudia Rust  YOB: 1950  MRN: [id number]  Admission date: 3/18/2021      PPE Documentation        PPE Worn By Provider RD did not enter room for this encounter   PPE Worn By Patient  -     PROGRESS NOTE      Encounter Information: Tube feed check on. Patient remains extubated. Impact Peptide 1.5 documented at goal 65 mL/hr. SLP consulted.       Labs (reviewed below): -hypokalemia  -elevated BUN  -low Cr  -elevated alk phos, LFTs       GI Function:  -residuals WNL  -Last BM documented on 3/30 (x6 days ago) - getting scheduled colace, discussed in rounds and miralax added       Nutrition Intervention: Continue current TF rate as ordered, free water flush per nephrology   Will monitor results of swallow eval when appropriate.       PO Diet/Supplements: NPO Diet   EN Prescription: Impact Peptide 1.5 at 65 mL/hr + 60 mL/hr water flush (flush per nephrology)       Intake/Output:   Intake/Output Summary (Last 24 hours) at 4/5/2021 0920  Last data filed at 4/5/2021 0600  Gross per 24 hour   Intake 2325 ml   Output 2175 ml   Net 150 ml            Height: Height: 162.6 cm (64\")   Weight: Weight: 120 kg (265 lb 6.9 oz) (04/05/21 0522)   BMI: Body mass index is 45.56 kg/m².     Results from last 7 days   Lab Units 04/05/21  0501 04/04/21  1719 04/04/21  0414 04/03/21  0348 04/02/21  0514   SODIUM mmol/L 141  --  141 142 143   POTASSIUM mmol/L 3.3* 3.8 3.6 4.0 3.7   CHLORIDE mmol/L 106  --  104 104 105   CO2 mmol/L 26.0  --  28.0 30.0* 29.0   BUN mg/dL 43*  --  46* 48* 47*   CREATININE mg/dL 0.46*  --  0.46* 0.51* 0.49*   CALCIUM mg/dL 8.5*  --  8.7 8.8 8.7   BILIRUBIN mg/dL 0.5  --   --  0.5 0.5   ALK PHOS U/L 137*  --   --  132* 134*   ALT (SGPT) U/L 44*  --   --  31 35*   AST (SGOT) U/L 52*  --   --  25 31   GLUCOSE mg/dL 133*  --  132* 151* 126*     Results from last 7 days   Lab Units 04/05/21  0501 04/04/21  0414 04/03/21  0348 04/02/21  0514   MAGNESIUM mg/dL 2.3  --  " 2.3 2.2   PHOSPHORUS mg/dL 3.3 3.6 3.8 4.0   HEMOGLOBIN g/dL 10.7* 10.0* 10.2* 10.5*   HEMATOCRIT % 32.6* 30.4* 30.5* 31.6*   TRIGLYCERIDES mg/dL  --  132  --   --      COVID19   Date Value Ref Range Status   03/24/2021 Not Detected Not Detected - Ref. Range Final     Lab Results   Component Value Date    HGBA1C 5.7 (H) 03/16/2021       Vitals:    04/05/21 0729   BP:    Pulse:    Resp:    Temp: 98.2 °F (36.8 °C)   SpO2:        RD to follow up per protocol.    Electronically signed by:  Jordyn Strickland RD  04/05/21 09:20 EDT

## 2021-04-05 NOTE — PROGRESS NOTES
Cardiology Progress Note    Patient Identification:  Name: Claudia Rust  Age: 70 y.o.  Sex: female  :  1950  MRN: [id number]                 Follow Up / Chief Complaint: Follow-up for valvular heart disease status post AVR    Interval History: Patient underwent #23 magna pericardial prosthesis AVR 3/18/2021  3/22/2021.  Events noted.  Patient is intubated on IV Olegario-Synephrine and Bumex.  3/23/2021.  Patient continues to intubated  3/24/2021 patient continues to be intubated and mechanically ventilated.  3/25/2021 patient continues to be intubated and mechanically ventilated.  Underwent a CHRISTIANO which showed normal LV systolic function and good aortic valve function.  3/26/2021.  Patient continues to be intubated and mechanically ventilated.  He is lightly sedated.  Patient underwent bronchoscopy 3/25/2021.  Patient was getting tachycardic was given 12.5 of metoprolol developed hypotension requiring Olgeario-Synephrine.  HIT antibody positive  3/29/2021: Continues to be intubated and mechanically ventilated.  3/30/2021.  Patient continues to be intubated and mechanically ventilated.  3/31/2021: Patient continues to be intubated and mechanically ventilated.  Nephrology has started on IV Bumex drip.  21: Patient is intubated and is on weaning trials.  Bumex drip has been discontinued.  2021 : Patient intubated on weaning trials.  On IV Bumex  21 : Patient extubated.  Dobbhoff tube present.     Subjective: Patient seen and examined.  Chart reviewed.  Labs reviewed.  Discussed with RN taking care of patient.  Patient is intermittently confused.      Objective: Sodium is 152--> 144-> 141-141.  BUN/creatinine  49/1.05-> 42/0.69-> 42/0.64-> 46/0.60-> 47/0.49-> 43/0.46.  Hemoglobin is 10.1--> 10.8--> 9.0-> 10.8-> 10.5-> 10.2-> 10.6-> 10.6-> 10.7.  Platelet count 95-> 134.  White count elevated at 22.2.    History of present illness :    This is a 70-year-old with PMH of     #Valvular heart disease with  moderate to severe aortic stenosis  Cardiac cath 1/20/2021 revealing nonobstructive CAD  # Hyperlipidemia,    # Hypertension  # COPD, KARLY on CPAP  # Prediabetes  # Hypothyroidism, Rheumatoid Arthritis, spinal stenosis, hiatal hernia, chronic depression, bipolar, GERD, IBS, chronic migraine, vertigo, herniated disc, compression lumbar fractures  #Allergies/intolerance to Stadol  # Hysterectomy, bladder reconstruction, cholecystectomy, right hand surgery  # Family history of strokes and grandfather.  Mother's cancer, father on  # Active cigarette smoker trying to quit         Here for aortic valve replacement.  Patient was recently seen with a complaint of dyspnea on exertion edema and weight gain.  Patient was brought in for elective aortic valve replacement.  Patient underwent aortic valve replacement 3/18/2021 with #23 magna pericardial prosthesis.  Blood pressure is high is on IV Cardene and IV nitroglycerin.  Currently intubated sedated chest tube present Venegas catheter present.  Underlying rhythm is flatline with AV paced rhythm.  Underwent an echocardiogram which is revealing severe aortic stenosis on 2/11/2020   Underwent cardiac cath 1/20/2021 which revealed no obstructive disease.  Carotid Dopplers 9/13/2019 normal.  Echocardiogram 2/11/2020 is revealing severe aortic stenosis  Work-up here on 3/16/2021 revealed proBNP 733, normal CMP, A1c 5.7, normal CBC, chest x-ray with no acute abnormality.  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  Events noted patient was intubated 3/21/2021 for respiratory failure.  Extubated 4/4/2021.  Continue to monitor pulmonary status and follow-up with pulmonary.  Chest x-ray 4 /5/2021   1. Stable diffuse mixed interstitial and airspace disease.  2. Interval extubation.  3. Stable postoperative findings and stable left internal jugular  central venous catheter/feeding tube.    Patient's po Bumex 1 mg 3 times daily  Monitor electrolytes and renal function  Replete electrolytes as needed  Patient sodium was going up, patient was given free water and has improved.  Appreciated nephrology input.  Will follow up with nephrology.  Monitor for routine expected post surgery blood loss anemia  Patient underwent transesophageal echo 3/25/2021 which revealed normal LV systolic function normal chamber sizes.  Normal aortic valve placement and no significant valvular heart disease.  Continue to monitor blood pressure closely patient developed hypotension requiring Olegario-Synephrine with 12.5 of metoprolol.  Now blood pressure is going up we will restart metoprolol and monitor blood pressure closely.  Will follow up with CT VS for routine post CABG care and HIT  Discussed with RN taking care of patient to coordinate care  Discussed with patient's  by bedside.    Past Medical History:  Past Medical History:   Diagnosis Date   • Acute congestive heart failure (CMS/HCC)    • Allergies    • Anxiety    • Arthritis    • Asthma    • Bilateral leg edema    • Bipolar 1 disorder (CMS/HCC)    • Bulging lumbar disc     X3   • Cancer (CMS/HCC)     states had \"cancerous tumor during pregnancy and had to have hysterectomy\"   • Cataract     bilateral   • Cellulitis 03/2021    bilateral legs   • Compression fracture of vertebral column (CMS/HCC) "     LUMBAR   • COPD (chronic obstructive pulmonary disease) (CMS/[facility])    • Delayed emergence from anesthesia    • Depression    • Depression    • Dyslipidemia    • Dyspnea on exertion    • Fibromyalgia    • GERD (gastroesophageal reflux disease)    • Hiatal hernia    • Hyperlipidemia    • Hypertension    • Hypothyroid     HYPOTHYROID   • IBS (irritable bowel syndrome)    • Migraines    • Morbid obesity (CMS/[facility])    • Neuropathy    • Panic attacks    • Peripheral edema    • PONV (postoperative nausea and vomiting)    • Poor mobility     rolling walker   • Prediabetes    • PVD (peripheral vascular disease) (CMS/[facility])    • Rheumatoid aortitis    • Spinal stenosis    • Spinal stenosis    • Vertigo    • Vitamin D deficiency      Past Surgical History:  Past Surgical History:   Procedure Laterality Date   • BRONCHOSCOPY N/A 3/25/2021    Procedure: BRONCHOSCOPY AT BEDSIDE WITH BRONCHIOALVEOLAR LAVAGE;  Surgeon: Glenn Reyes MD;  Location: [facility] ENDOSCOPY;  Service: Pulmonary;  Laterality: N/A;  PNA   • CARDIAC CATHETERIZATION  2009   • CARDIAC CATHETERIZATION N/A 8/14/2019    Procedure: Left Heart Cath;  Surgeon: Jose Levi MD;  Location: [facility] CATH INVASIVE LOCATION;  Service: Cardiovascular   • CARDIAC CATHETERIZATION N/A 8/14/2019    Procedure: Right Heart Cath;  Surgeon: Jose Levi MD;  Location: [facility] CATH INVASIVE LOCATION;  Service: Cardiovascular   • CARDIAC CATHETERIZATION N/A 8/14/2019    Procedure: Aortic root aortogram;  Surgeon: Jose Levi MD;  Location: [facility] CATH INVASIVE LOCATION;  Service: Cardiovascular   • CARDIAC CATHETERIZATION N/A 1/20/2021    Procedure: Left Heart Cath;  Surgeon: Jose Levi MD;  Location: [facility] CATH INVASIVE LOCATION;  Service: Cardiovascular;  Laterality: N/A;   • COLONOSCOPY     • CYSTOCELE REPAIR  1984   • ELBOW ARTHROPLASTY  2011   • ENDOSCOPY     • FOOT SURGERY     • GALLBLADDER SURGERY  2002   • TONSILLECTOMY     •  VAGINAL HYSTERECTOMY  1972        Social History:   Social History     Tobacco Use   • Smoking status: Current Every Day Smoker     Packs/day: 1.00     Types: Cigarettes   • Smokeless tobacco: Never Used   • Tobacco comment: decrease now and do not smoke dos   Substance Use Topics   • Alcohol use: Yes     Comment: socially      Family History:  History reviewed. No pertinent family history.       Allergies:  Allergies   Allergen Reactions   • Adhesive Tape Unknown (See Comments)     hives   • Butorphanol Other (See Comments)     Chest pain difficulty breathing throat swelling   • Garlic Other (See Comments)     Chest pain difficulty breathing throat swells   • Tetanus-Diphtheria Toxoids Td Other (See Comments)     Dizziness,  vomiting   • Aloe Itching   • Bee Venom Other (See Comments)     Swelling eyes and lips hand swelling   • Latex Itching   • Macadamia Nut Oil Other (See Comments)     Chest pain difficulty breathing throat swelling   • Tuberculin Unknown (See Comments)     reactor   • Wasp Venom Other (See Comments)     Swelling eyes lips and hand     Scheduled Meds:  acetylcysteine, 3 mL, BID - RT  arformoterol, 15 mcg, BID - RT  aspirin, 81 mg, Daily  atorvastatin, 40 mg, Nightly  budesonide, 1 mg, BID - RT  bumetanide, 1 mg, TID  chlorhexidine, 15 mL, Q12H  docusate, 100 mg, BID  First Mouthwash (Magic Mouthwash), 5 mL, Q6H  FLUoxetine, 60 mg, Daily  fondaparinux, 5 mg, Q24H  gabapentin, 200 mg, Q12H  hydrALAZINE, 25 mg, Q8H  insulin glargine, 20 Units, Q12H  insulin lispro, 0-24 Units, Q6H  insulin lispro, 5 Units, Q6H  ipratropium-albuterol, 3 mL, Q4H - RT  lansoprazole, 30 mg, QAM  levothyroxine, 100 mcg, Q AM  metoprolol tartrate, 12.5 mg, Q12H  nystatin, , Q12H  potassium chloride, 20 mEq, TID With Meals  predniSONE, 10 mg, Daily With Breakfast  QUEtiapine, 25 mg, Nightly          Review of Systems:   Review of Systems   Unable to perform ROS: mental status change              Constitutional:  Temp:   "[98.2 °F (36.8 °C)-99.6 °F (37.6 °C)] 98.2 °F (36.8 °C)  Heart Rate:  [67-92] 78  Resp:  [16-22] 16  Arterial Line BP: (104-128)/(47-60) 128/58    Physical Exam   /60   Pulse 78   Temp 98.2 °F (36.8 °C) (Oral)   Resp 16   Ht 162.6 cm (64\")   Wt 120 kg (265 lb 6.9 oz)   SpO2 90%   BMI 45.56 kg/m²   General: Patient extubated, sitting in bed  Eyes: Sclera is anicteric,  conjunctiva is clear   HEENT:  No JVD.  Dobbhoff tube present.  Respiratory: Good air entry, nonlabored breathing.  Scattered rhonchi and wheezing.  Cardiovascular: S1,S2 Regular rate and rhythm.  Sternotomy is healing well.  Gastrointestinal: Abdomen nondistended, soft  Musculoskeletal:  No abnormal movements  Extremities: Patient has chronic bilateral lower extremity edema continues to have 1+ edema.  Skin: Stasis changes seen bilateral shin.  Neuro: Alert and awake, no lateralizing deficits appreciated    INTAKE AND OUTPUT:    Intake/Output Summary (Last 24 hours) at 4/5/2021 1009  Last data filed at 4/5/2021 0600  Gross per 24 hour   Intake 2325 ml   Output 2175 ml   Net 150 ml       Cardiographics  Telemetry: Sinus rhythm    ECG:   ECG 12 Lead   Final Result   HEART RATE= 80  bpm   RR Interval= 752  ms   HI Interval= 161  ms   P Horizontal Axis= 0  deg   P Front Axis= 22  deg   QRSD Interval= 80  ms   QT Interval= 419  ms   QRS Axis= 39  deg   T Wave Axis= 35  deg   - NORMAL ECG -   Sinus rhythm   When compared with ECG of 19-Mar-2021 4:30:10,   Significant repolarization change   Electronically Signed By: Segun Rucker ([facility]) 20-Mar-2021 10:06:29   Date and Time of Study: 2021-03-20 05:15:54      ECG 12 Lead   Final Result   HEART RATE= 62  bpm   RR Interval= 984  ms   HI Interval= 165  ms   P Horizontal Axis= 13  deg   P Front Axis= 32  deg   QRSD Interval= 76  ms   QT Interval= 434  ms   QRS Axis= 43  deg   T Wave Axis= 82  deg   - ABNORMAL ECG -   Sinus rhythm   Atrial premature complex   ST elevation, consider inferior injury "   When compared with ECG of 18-Mar-2021 11:04:47,   No significant change   Electronically Signed By: Segun Rucker ([facility]) 19-Mar-2021 16:48:51   Date and Time of Study: 2021-03-19 04:30:10      ECG 12 Lead   Final Result   HEART RATE= 56  bpm   RR Interval= 1072  ms   NE Interval= 163  ms   P Horizontal Axis= -22  deg   P Front Axis= -9  deg   QRSD Interval= 88  ms   QT Interval= 516  ms   QRS Axis= 40  deg   T Wave Axis= 43  deg   - OTHERWISE NORMAL ECG -   Sinus bradycardia   When compared with ECG of 16-Mar-2021 8:51:36,   Significant repolarization change   Electronically Signed By: Segun Rucker ([facility]) 18-Mar-2021 17:48:15   Date and Time of Study: 2021-03-18 11:04:47      ECG 12 Lead    (Results Pending)     I have personally reviewed EKG    Echocardiogram: Results for orders placed during the hospital encounter of 03/18/21    Adult Transesophageal Echo (CHRISTIANO) W/ Cont if Necessary Per Protocol (Bedside)    Interpretation Summary  CHRISTIANO   procedure note    Procedure:  CHRISTIANO    Indication:   Valvular heart disease s/p AVR, respiratory failure    Date of procedure  : 3/25/2021    :  Dr. Jose Levi    Description of procedure:    Under conscious sedation given by anesthesiology and local anesthesia, a CHRISTIANO probe was advanced into the esophagus and into the stomach 2D, M-mode, color Doppler images were obtained..  Patient tolerated procedure well complications well.    Complications: none    Results:    Normal LV size and contractility LV contractility, EF of 60%  Normal RV size  Normal left atrial size, normal right atrial size, left atrial appendage without thrombus.  Aortic valve, mitral valve, tricuspid valve appears structurally normal, mild TR seen.  No vegetation seen  No pericardial effusion seen.  Proximal aorta appears normal in size.  Mild aortic plaque seen      Recommendations/plan:    Clinical correlation recommended      Lab Review   I have reviewed the labs  Results from last 7 days   Lab  "Units 03/30/21  0425   CK TOTAL U/L 45     Results from last 7 days   Lab Units 04/05/21  0501   MAGNESIUM mg/dL 2.3     Results from last 7 days   Lab Units 04/05/21  0501   SODIUM mmol/L 141   POTASSIUM mmol/L 3.3*   BUN mg/dL 43*   CREATININE mg/dL 0.46*   CALCIUM mg/dL 8.5*         Results from last 7 days   Lab Units 04/05/21  0501 04/04/21  0414 04/03/21  0348   WBC 10*3/mm3 22.20* 19.50* 19.10*   HEMOGLOBIN g/dL 10.7* 10.0* 10.2*   HEMATOCRIT % 32.6* 30.4* 30.5*   PLATELETS 10*3/mm3 134* 112* 95*           RADIOLOGY:  Imaging Results (Last 24 Hours)     Procedure Component Value Units Date/Time    XR Chest 1 View [[id number]] Collected: 04/05/21 0724     Updated: 04/05/21 0728    Narrative:      Examination: XR CHEST 1 VW-     Date of Exam: 4/5/2021 5:00 AM     Indication: Hypoxia; M06.9-Rheumatoid arthritis, unspecified;  I35.0-Nonrheumatic aortic (valve) stenosis; J96.01-Acute respiratory  failure with hypoxia; J44.9-Chronic obstructive pulmonary disease,  unspecified.     Comparison: 4/4/2021.     Technique: Single radiographic view of the chest was obtained.     Findings:  There are diffuse bilateral mixed interstitial and airspace opacities in  both lungs. There is a stable left internal jugular central venous  catheter. Stable changes of prior median sternotomy and heart valve  replacement. Endotracheal tube has been removed. Stable feeding tube  coursing below the diaphragm. Stable bridging thoracic osteophytes  without acute osseous abnormality.       Impression:         1. Stable diffuse mixed interstitial and airspace disease.  2. Interval extubation.  3. Stable postoperative findings and stable left internal jugular  central venous catheter/feeding tube.     Electronically Signed By-Romain Gutierrez MD On:4/5/2021 7:26 AM  This report was finalized on [id number] by  Romain Gutierrez MD.                )4/5/2021  Jose Levi MD      EMR Dragon/Transcription:   \"Dictated utilizing Dragon " "dictation\".   "

## 2021-04-05 NOTE — THERAPY RE-EVALUATION
"Patient Name: Claudia Rust  : 1950    MRN: [id number]                              Today's Date: 2021       Admit Date: 3/18/2021    Visit Dx:     ICD-10-CM ICD-9-CM   1. Rheumatoid arthritis, involving unspecified site, unspecified whether rheumatoid factor present (CMS/HCC)  M06.9 714.0   2. Aortic valve stenosis, etiology of cardiac valve disease unspecified  I35.0 424.1   3. Acute respiratory failure with hypoxia (CMS/HCC)  J96.01 518.81   4. Chronic obstructive pulmonary disease, unspecified COPD type (CMS/HCC)  J44.9 496     Patient Active Problem List   Diagnosis   • Morbidly obese (CMS/HCC)   • Dyspnea on exertion   • Abnormal nuclear stress test   • Nonrheumatic aortic valve stenosis   • Prediabetes   • Dyslipidemia   • Essential hypertension   • Acute UTI   • Bipolar disorder (CMS/HCC)   • COPD (chronic obstructive pulmonary disease) (CMS/HCC)   • High triglycerides   • Gastroesophageal reflux disease   • Rheumatoid arthritis (CMS/HCC)   • Aortic stenosis, severe   • Chronic heart failure with preserved ejection fraction (HFpEF) (CMS/HCC)   • Aortic valve stenosis     Past Medical History:   Diagnosis Date   • Acute congestive heart failure (CMS/[facility])    • Allergies    • Anxiety    • Arthritis    • Asthma    • Bilateral leg edema    • Bipolar 1 disorder (CMS/HCC)    • Bulging lumbar disc     X3   • Cancer (CMS/HCC)     states had \"cancerous tumor during pregnancy and had to have hysterectomy\"   • Cataract     bilateral   • Cellulitis 2021    bilateral legs   • Compression fracture of vertebral column (CMS/HCC)     LUMBAR   • COPD (chronic obstructive pulmonary disease) (CMS/HCC)    • Delayed emergence from anesthesia    • Depression    • Depression    • Dyslipidemia    • Dyspnea on exertion    • Fibromyalgia    • GERD (gastroesophageal reflux disease)    • Hiatal hernia    • Hyperlipidemia    • Hypertension    • Hypothyroid     HYPOTHYROID   • IBS (irritable bowel syndrome)    • Migraines  "   • Morbid obesity (CMS/[facility])    • Neuropathy    • Panic attacks    • Peripheral edema    • PONV (postoperative nausea and vomiting)    • Poor mobility     rolling walker   • Prediabetes    • PVD (peripheral vascular disease) (CMS/[facility])    • Rheumatoid aortitis    • Spinal stenosis    • Spinal stenosis    • Vertigo    • Vitamin D deficiency      Past Surgical History:   Procedure Laterality Date   • BRONCHOSCOPY N/A 3/25/2021    Procedure: BRONCHOSCOPY AT BEDSIDE WITH BRONCHIOALVEOLAR LAVAGE;  Surgeon: Glenn Reyes MD;  Location: [facility] ENDOSCOPY;  Service: Pulmonary;  Laterality: N/A;  PNA   • CARDIAC CATHETERIZATION  2009   • CARDIAC CATHETERIZATION N/A 8/14/2019    Procedure: Left Heart Cath;  Surgeon: Jose Levi MD;  Location: [facility] CATH INVASIVE LOCATION;  Service: Cardiovascular   • CARDIAC CATHETERIZATION N/A 8/14/2019    Procedure: Right Heart Cath;  Surgeon: Jose Levi MD;  Location: [facility] CATH INVASIVE LOCATION;  Service: Cardiovascular   • CARDIAC CATHETERIZATION N/A 8/14/2019    Procedure: Aortic root aortogram;  Surgeon: Jose Levi MD;  Location: [facility] CATH INVASIVE LOCATION;  Service: Cardiovascular   • CARDIAC CATHETERIZATION N/A 1/20/2021    Procedure: Left Heart Cath;  Surgeon: Jose Levi MD;  Location: [facility] CATH INVASIVE LOCATION;  Service: Cardiovascular;  Laterality: N/A;   • COLONOSCOPY     • CYSTOCELE REPAIR  1984   • ELBOW ARTHROPLASTY  2011   • ENDOSCOPY     • FOOT SURGERY     • GALLBLADDER SURGERY  2002   • TONSILLECTOMY     • VAGINAL HYSTERECTOMY  1972     General Information     Row Name 04/05/21 1253          Physical Therapy Time and Intention    Document Type  re-evaluation  -HC     Mode of Treatment  physical therapy  -HC     Row Name 04/05/21 1253          General Information    Patient Profile Reviewed  yes  -HC     Existing Precautions/Restrictions  sternal;oxygen therapy device and L/min;fall  -HC     Row Name 04/05/21  1253          Living Environment    Lives With  spouse  -HC     Row Name 04/05/21 1253          Cognition    Orientation Status (Cognition)  oriented x 3  -HC     Row Name 04/05/21 1253          Safety Issues, Functional Mobility    Safety Issues Affecting Function (Mobility)  insight into deficits/self-awareness;judgment;safety precaution awareness;problem-solving  -     Impairments Affecting Function (Mobility)  balance;cognition;postural/trunk control;endurance/activity tolerance;strength;shortness of breath;pain  -HC       User Key  (r) = Recorded By, (t) = Taken By, (c) = Cosigned By    Initials Name Provider Type     Kelsey Becerril, PT Physical Therapist        Mobility     Row Name 04/05/21 1253          Bed Mobility    Bed Mobility  supine-sit;sit-supine;scooting/bridging  -     Scooting/Bridging [place] (Bed Mobility)  2 person assist;maximum assist (25% patient effort)  -     Supine-Sit [place] (Bed Mobility)  2 person assist;maximum assist (25% patient effort)  -     Sit-Supine [place] (Bed Mobility)  2 person assist;dependent (less than 25% patient effort)  -     Assistive Device (Bed Mobility)  draw sheet  -     Row Name 04/05/21 1253          Bed-Chair Transfer    Bed-Chair [place] (Transfers)  unable to assess  -     Row Name 04/05/21 1253          Sit-Stand Transfer    Sit-Stand [place] (Transfers)  unable to assess  -     Row Name 04/05/21 1253          Gait/Stairs (Locomotion)    [place] Level (Gait)  unable to assess  -     Comment (Gait/Stairs)  Cardiac level I  -       User Key  (r) = Recorded By, (t) = Taken By, (c) = Cosigned By    Initials Name Provider Type     Kelsey Becerril, PT Physical Therapist        Obj/Interventions     Row Name 04/05/21 1253          Range of Motion Comprehensive    Comment, General Range of Motion  PROM BLE WFL  -HC     Row Name 04/05/21 1253          Strength Comprehensive (MMT)    Comment, General Manual  Muscle Testing (MMT) Assessment  BLEs 3-/5  -     Row Name 04/05/21 1253          Balance    Balance Assessment  sitting static balance;sitting dynamic balance  -HC     Static Sitting Balance  mild impairment  -HC     Dynamic Sitting Balance  moderate impairment  -HC     Comment, Balance  Pt able to sit EOB x15 minutes with CGA-modA pending fatigue and pain  -HC       User Key  (r) = Recorded By, (t) = Taken By, (c) = Cosigned By    Initials Name Provider Type     Kelsey Becerril, PT Physical Therapist        Goals/Plan     Row Name 04/05/21 1257          Bed Mobility Goal 1 (PT)    Activity/Assistive Device (Bed Mobility Goal 1, PT)  sit to supine/supine to sit  -HC     [place] Level/Cues Needed (Bed Mobility Goal 1, PT)  minimum assist (75% or more patient effort)  -HC     Time Frame (Bed Mobility Goal 1, PT)  2 weeks  -     Row Name 04/05/21 1257          Transfer Goal 1 (PT)    Activity/Assistive Device (Transfer Goal 1, PT)  sit-to-stand/stand-to-sit;bed-to-chair/chair-to-bed  -HC     [place] Level/Cues Needed (Transfer Goal 1, PT)  moderate assist (50-74% patient effort)  -HC     Time Frame (Transfer Goal 1, PT)  2 weeks  -     Row Name 04/05/21 1257          Gait Training Goal 1 (PT)    Activity/Assistive Device (Gait Training Goal 1, PT)  gait (walking locomotion);assistive device use  -HC     [place] Level (Gait Training Goal 1, PT)  moderate assist (50-74% patient effort)  -HC     Distance (Gait Training Goal 1, PT)  25 ft  -HC     Time Frame (Gait Training Goal 1, PT)  2 weeks  -       User Key  (r) = Recorded By, (t) = Taken By, (c) = Cosigned By    Initials Name Provider Type     Kelsey Becerril, PT Physical Therapist        Clinical Impression     Row Name 04/05/21 1254          Pain    Additional Documentation  Pain Scale: Numbers Pre/Post-Treatment (Group)  -     Row Name 04/05/21 1254          Pain Scale: Numbers Pre/Post-Treatment    Pretreatment Pain Rating  4/10   -HC     Posttreatment Pain Rating  4/10  -HC     Pre/Posttreatment Pain Comment  chest discomfort  -     Row Name 04/05/21 1254          Plan of Care Review    Outcome Summary  Re-eval completed on 69 yo female s/p extubation 4/4/2021.  Pt s/p AVR 3/18/2021 with extubation 3/19/2021 and requiring re-intubation 3/22/2021 due to resp status.  -     Row Name 04/05/21 1254          Therapy Assessment/Plan (PT)    Patient/Family Therapy Goals Statement (PT)  increase strength  -HC     Rehab Potential (PT)  fair, will monitor progress closely  -HC     Criteria for Skilled Interventions Met (PT)  yes;skilled treatment is necessary  -HC     Predicted Duration of Therapy Intervention (PT)  until d/c  -     Row Name 04/05/21 1254          Vital Signs    Post Systolic BP Rehab  95  -HC     Post Treatment Diastolic BP  36  -HC     Posttreatment Heart Rate (beats/min)  66  -HC     Post SpO2 (%)  92  -     Row Name 04/05/21 1254          Positioning and Restraints    Pre-Treatment Position  in bed  -HC     Post Treatment Position  bed  -HC     In Bed  notified nsg;call light within reach  present  -HC       User Key  (r) = Recorded By, (t) = Taken By, (c) = Cosigned By    Initials Name Provider Type    HC Kelsey Becerril, PT Physical Therapist        Outcome Measures     Row Name 04/05/21 1257          How much help from another person do you currently need...    Turning from your back to your side while in flat bed without using bedrails?  2  -HC     Moving from lying on back to sitting on the side of a flat bed without bedrails?  2  -HC     Moving to and from a bed to a chair (including a wheelchair)?  2  -HC     Standing up from a chair using your arms (e.g., wheelchair, bedside chair)?  1  -HC     Climbing 3-5 steps with a railing?  1  -HC     To walk in hospital room?  1  -HC     AM-PAC 6 Clicks Score (PT)  9  -HC     Row Name 04/05/21 1258          Modified Herron Scale    Modified Herron Scale  5 - Severe  disability.  Bedridden, incontinent, and requiring constant nursing care and attention.  -     Row Name 04/05/21 1258          Functional Assessment    Outcome Measure Options  AM-PAC 6 Clicks Basic Mobility (PT);Modified [place]  -HC       User Key  (r) = Recorded By, (t) = Taken By, (c) = Cosigned By    Initials Name Provider Type    HC Kelsey Becerril, PT Physical Therapist        Physical Therapy Education                 Title: PT OT SLP Therapies (In Progress)     Topic: Physical Therapy (In Progress)     Point: Mobility training (In Progress)     Learning Progress Summary           Patient Acceptance, E, NR by HC at 4/5/2021 1258    Acceptance, E,TB, NR,DU by BP at 4/5/2021 1035    Acceptance, E, NR by BP at 4/2/2021 1048    Comment: pt intubated and sedated    Acceptance, E, NR by BP at 3/26/2021 1048    Comment: pt intubated and sedated    Acceptance, E, NR by BP at 3/24/2021 1438    Comment: pt intubated and sedated    Acceptance, E, NR by SM at 3/22/2021 1527    Acceptance, E, VU by CR at 3/19/2021 1312   Family Acceptance, E,TB, NR,DU by BP at 4/5/2021 1035    Acceptance, E, NR by BP at 4/2/2021 1048    Comment: pt intubated and sedated    Acceptance, E, NR by BP at 3/26/2021 1048    Comment: pt intubated and sedated   Significant Other Acceptance, E, VU by CR at 3/19/2021 1312                   Point: Home exercise program (Done)     Learning Progress Summary           Patient Acceptance, E,TB, NR,DU by BP at 4/5/2021 1035    Acceptance, E, NR by BP at 4/2/2021 1048    Comment: pt intubated and sedated    Acceptance, E, NR by BP at 3/26/2021 1048    Comment: pt intubated and sedated    Acceptance, E, NR by BP at 3/24/2021 1438    Comment: pt intubated and sedated    Acceptance, E, NR by SM at 3/22/2021 1527   Family Acceptance, E,TB, NR,DU by BP at 4/5/2021 1035    Acceptance, E, NR by BP at 4/2/2021 1048    Comment: pt intubated and sedated    Acceptance, E, NR by BP at 3/26/2021 1048    Comment: pt  intubated and sedated                   Point: Body mechanics (Done)     Learning Progress Summary           Patient Acceptance, E,TB, NR,DU by BP at 4/5/2021 1035    Acceptance, E, NR by BP at 4/2/2021 1048    Comment: pt intubated and sedated    Acceptance, E, NR by BP at 3/26/2021 1048    Comment: pt intubated and sedated    Acceptance, E, NR by BP at 3/24/2021 1438    Comment: pt intubated and sedated    Acceptance, E, NR by SM at 3/22/2021 1527   Family Acceptance, E,TB, NR,DU by BP at 4/5/2021 1035    Acceptance, E, NR by BP at 4/2/2021 1048    Comment: pt intubated and sedated    Acceptance, E, NR by BP at 3/26/2021 1048    Comment: pt intubated and sedated                   Point: Precautions (In Progress)     Learning Progress Summary           Patient Acceptance, E, NR by HC at 4/5/2021 1258    Acceptance, E,TB, NR,DU by BP at 4/5/2021 1035    Acceptance, E, NR by BP at 4/2/2021 1048    Comment: pt intubated and sedated    Acceptance, E, NR by BP at 3/26/2021 1048    Comment: pt intubated and sedated    Acceptance, E, NR by BP at 3/24/2021 1438    Comment: pt intubated and sedated    Acceptance, E, NR by SM at 3/22/2021 1527    Acceptance, E, VU by CR at 3/19/2021 1312   Family Acceptance, E,TB, NR,DU by BP at 4/5/2021 1035    Acceptance, E, NR by BP at 4/2/2021 1048    Comment: pt intubated and sedated    Acceptance, E, NR by BP at 3/26/2021 1048    Comment: pt intubated and sedated   Significant Other Acceptance, E, VU by CR at 3/19/2021 1312                               User Key     Initials Effective Dates Name Provider Type Discipline    HC 04/17/20 -  Kelsey Becerril, PT Physical Therapist PT    CR 03/01/19 -  Reyes, Carmela, PT Physical Therapist PT    BP 06/08/20 -  Potter, João, RN Registered Nurse Nurse     12/21/20 -  Rabia Morgan RN Registered Nurse Nurse              PT Recommendation and Plan  Planned Therapy Interventions (PT): balance training, bed mobility training, gait  training, neuromuscular re-education, ROM (range of motion), strengthening, patient/family education, postural re-education, transfer training, home exercise program  Outcome Summary: Re-eval completed on 69 yo female s/p extubation 4/4/2021.  Pt s/p AVR 3/18/2021 with extubation 3/19/2021 and requiring re-intubation 3/22/2021 due to resp status.  Pt presents with global weakness requiring assist x2 for bed mobility after prolonged intubation.  Pt able to sit EOB with CGA-modA pending fatigue and pain.  Not safe to attempt transfer due to hypotension and global weakness at this time.  Attempt transfer at next session if appropriate.  Pt will need extensive IP rehab to address deficits.  PT will follow 5x/week while at [facility].  PPE donned: mask, goggles, gloves.     Time Calculation:   PT Charges     Row Name 04/05/21 1301             Time Calculation    Start Time  0902  -      Stop Time  0927  -      Time Calculation (min)  25 min  -      PT Received On  04/05/21  -      PT - Next Appointment  04/06/21  -      PT Goal Re-Cert Due Date  04/19/21  -         Time Calculation- PT    Total Timed Code Minutes- PT  10 minute(s)  -        User Key  (r) = Recorded By, (t) = Taken By, (c) = Cosigned By    Initials Name Provider Type     Kelsey Becerril, PT Physical Therapist        Therapy Charges for Today     Code Description Service Date Service Provider Modifiers Qty    [id number]  PT RE-EVAL ESTABLISHED PLAN 2 4/5/2021 Kelsey Becerril, PT GP 1    [id number]  PT NEUROMUSC RE EDUCATION EA 15 MIN 4/5/2021 Kelsey Becerril, PT GP 1          PT G-Codes  Outcome Measure Options: AM-PAC 6 Clicks Basic Mobility (PT), Modified [place]  AM-PAC 6 Clicks Score (PT): 9  Modified Bruno Scale: 5 - Severe disability.  Bedridden, incontinent, and requiring constant nursing care and attention.    Kelsey Becerril, PT  4/5/2021

## 2021-04-06 ENCOUNTER — APPOINTMENT (OUTPATIENT)
Dept: GENERAL RADIOLOGY | Facility: HOSPITAL | Age: 71
End: 2021-04-06

## 2021-04-06 LAB
ALBUMIN SERPL-MCNC: 2.7 G/DL (ref 3.5–5.2)
ANION GAP SERPL CALCULATED.3IONS-SCNC: 8 MMOL/L (ref 5–15)
BUN SERPL-MCNC: 43 MG/DL (ref 8–23)
BUN/CREAT SERPL: 100 (ref 7–25)
CALCIUM SPEC-SCNC: 8.3 MG/DL (ref 8.6–10.5)
CHLORIDE SERPL-SCNC: 105 MMOL/L (ref 98–107)
CO2 SERPL-SCNC: 26 MMOL/L (ref 22–29)
CREAT SERPL-MCNC: 0.43 MG/DL (ref 0.57–1)
DEPRECATED RDW RBC AUTO: 49 FL (ref 37–54)
ERYTHROCYTE [DISTWIDTH] IN BLOOD BY AUTOMATED COUNT: 15.1 % (ref 12.3–15.4)
GFR SERPL CREATININE-BSD FRML MDRD: 145 ML/MIN/1.73
GLUCOSE BLDC GLUCOMTR-MCNC: 127 MG/DL (ref 70–105)
GLUCOSE BLDC GLUCOMTR-MCNC: 142 MG/DL (ref 70–105)
GLUCOSE BLDC GLUCOMTR-MCNC: 165 MG/DL (ref 70–105)
GLUCOSE BLDC GLUCOMTR-MCNC: 81 MG/DL (ref 70–105)
GLUCOSE SERPL-MCNC: 121 MG/DL (ref 65–99)
HCT VFR BLD AUTO: 29.3 % (ref 34–46.6)
HGB BLD-MCNC: 9.7 G/DL (ref 12–15.9)
INR PPP: 1.06 (ref 2–3)
MAGNESIUM SERPL-MCNC: 2.4 MG/DL (ref 1.6–2.4)
MCH RBC QN AUTO: 30 PG (ref 26.6–33)
MCHC RBC AUTO-ENTMCNC: 33 G/DL (ref 31.5–35.7)
MCV RBC AUTO: 91 FL (ref 79–97)
PHOSPHATE SERPL-MCNC: 3.1 MG/DL (ref 2.5–4.5)
PLATELET # BLD AUTO: 109 10*3/MM3 (ref 140–450)
PMV BLD AUTO: 10 FL (ref 6–12)
POTASSIUM SERPL-SCNC: 3.9 MMOL/L (ref 3.5–5.2)
PROTHROMBIN TIME: 11.6 SECONDS (ref 19.4–28.5)
RBC # BLD AUTO: 3.22 10*6/MM3 (ref 3.77–5.28)
SODIUM SERPL-SCNC: 139 MMOL/L (ref 136–145)
WBC # BLD AUTO: 18.2 10*3/MM3 (ref 3.4–10.8)

## 2021-04-06 PROCEDURE — 80069 RENAL FUNCTION PANEL: CPT | Performed by: INTERNAL MEDICINE

## 2021-04-06 PROCEDURE — 71045 X-RAY EXAM CHEST 1 VIEW: CPT

## 2021-04-06 PROCEDURE — 94799 UNLISTED PULMONARY SVC/PX: CPT

## 2021-04-06 PROCEDURE — 82962 GLUCOSE BLOOD TEST: CPT

## 2021-04-06 PROCEDURE — 63710000001 PREDNISONE PER 5 MG: Performed by: INTERNAL MEDICINE

## 2021-04-06 PROCEDURE — 99024 POSTOP FOLLOW-UP VISIT: CPT | Performed by: THORACIC SURGERY (CARDIOTHORACIC VASCULAR SURGERY)

## 2021-04-06 PROCEDURE — 63710000001 INSULIN LISPRO (HUMAN) PER 5 UNITS: Performed by: NURSE PRACTITIONER

## 2021-04-06 PROCEDURE — 97530 THERAPEUTIC ACTIVITIES: CPT

## 2021-04-06 PROCEDURE — 74230 X-RAY XM SWLNG FUNCJ C+: CPT

## 2021-04-06 PROCEDURE — 25010000002 CHLOROTHIAZIDE PER 500 MG: Performed by: INTERNAL MEDICINE

## 2021-04-06 PROCEDURE — 83735 ASSAY OF MAGNESIUM: CPT | Performed by: INTERNAL MEDICINE

## 2021-04-06 PROCEDURE — 63710000001 INSULIN LISPRO (HUMAN) PER 5 UNITS: Performed by: INTERNAL MEDICINE

## 2021-04-06 PROCEDURE — 63710000001 INSULIN GLARGINE PER 5 UNITS: Performed by: NURSE PRACTITIONER

## 2021-04-06 PROCEDURE — 85610 PROTHROMBIN TIME: CPT | Performed by: NURSE PRACTITIONER

## 2021-04-06 PROCEDURE — 99232 SBSQ HOSP IP/OBS MODERATE 35: CPT | Performed by: INTERNAL MEDICINE

## 2021-04-06 PROCEDURE — 92526 ORAL FUNCTION THERAPY: CPT

## 2021-04-06 PROCEDURE — 25010000002 ONDANSETRON PER 1 MG: Performed by: NURSE PRACTITIONER

## 2021-04-06 PROCEDURE — 92611 MOTION FLUOROSCOPY/SWALLOW: CPT

## 2021-04-06 PROCEDURE — 97110 THERAPEUTIC EXERCISES: CPT

## 2021-04-06 PROCEDURE — 85027 COMPLETE CBC AUTOMATED: CPT | Performed by: NURSE PRACTITIONER

## 2021-04-06 RX ORDER — WARFARIN SODIUM 2 MG/1
2 TABLET ORAL
Status: DISCONTINUED | OUTPATIENT
Start: 2021-04-06 | End: 2021-04-07

## 2021-04-06 RX ORDER — FLUOXETINE HYDROCHLORIDE 20 MG/1
20 CAPSULE ORAL DAILY
Status: DISCONTINUED | OUTPATIENT
Start: 2021-04-07 | End: 2021-04-13 | Stop reason: HOSPADM

## 2021-04-06 RX ADMIN — IPRATROPIUM BROMIDE AND ALBUTEROL SULFATE 3 ML: 2.5; .5 SOLUTION RESPIRATORY (INHALATION) at 11:06

## 2021-04-06 RX ADMIN — GABAPENTIN 200 MG: 100 CAPSULE ORAL at 08:49

## 2021-04-06 RX ADMIN — WARFARIN 2 MG: 2 TABLET ORAL at 18:06

## 2021-04-06 RX ADMIN — DOCUSATE SODIUM 100 MG: 50 LIQUID ORAL at 08:48

## 2021-04-06 RX ADMIN — BARIUM SULFATE 50 ML: 400 SUSPENSION ORAL at 13:04

## 2021-04-06 RX ADMIN — CHLORHEXIDINE GLUCONATE 15 ML: 1.2 RINSE ORAL at 08:48

## 2021-04-06 RX ADMIN — BUMETANIDE 1 MG: 1 TABLET ORAL at 17:58

## 2021-04-06 RX ADMIN — POLYETHYLENE GLYCOL 3350 17 G: 17 POWDER, FOR SOLUTION ORAL at 13:29

## 2021-04-06 RX ADMIN — Medication 10 ML: at 08:54

## 2021-04-06 RX ADMIN — CHLORHEXIDINE GLUCONATE 15 ML: 1.2 RINSE ORAL at 20:38

## 2021-04-06 RX ADMIN — BUDESONIDE 1 MG: 0.5 SUSPENSION RESPIRATORY (INHALATION) at 18:23

## 2021-04-06 RX ADMIN — ARFORMOTEROL TARTRATE 15 MCG: 15 SOLUTION RESPIRATORY (INHALATION) at 07:06

## 2021-04-06 RX ADMIN — LEVOTHYROXINE SODIUM 100 MCG: 0.1 TABLET ORAL at 06:22

## 2021-04-06 RX ADMIN — IPRATROPIUM BROMIDE AND ALBUTEROL SULFATE 3 ML: 2.5; .5 SOLUTION RESPIRATORY (INHALATION) at 07:06

## 2021-04-06 RX ADMIN — FLUOXETINE 60 MG: 20 CAPSULE ORAL at 08:49

## 2021-04-06 RX ADMIN — DIPHENHYDRAMINE HYDROCHLORIDE AND LIDOCAINE HYDROCHLORIDE AND ALUMINUM HYDROXIDE AND MAGNESIUM HYDRO 5 ML: KIT at 06:26

## 2021-04-06 RX ADMIN — NYSTATIN: 100000 POWDER TOPICAL at 08:54

## 2021-04-06 RX ADMIN — IPRATROPIUM BROMIDE AND ALBUTEROL SULFATE 3 ML: 2.5; .5 SOLUTION RESPIRATORY (INHALATION) at 03:41

## 2021-04-06 RX ADMIN — ACETYLCYSTEINE 3 ML: 200 SOLUTION ORAL; RESPIRATORY (INHALATION) at 07:06

## 2021-04-06 RX ADMIN — HYDRALAZINE HYDROCHLORIDE 25 MG: 25 TABLET ORAL at 06:22

## 2021-04-06 RX ADMIN — DIPHENHYDRAMINE HYDROCHLORIDE AND LIDOCAINE HYDROCHLORIDE AND ALUMINUM HYDROXIDE AND MAGNESIUM HYDRO 5 ML: KIT at 13:23

## 2021-04-06 RX ADMIN — DOCUSATE SODIUM 100 MG: 50 LIQUID ORAL at 20:38

## 2021-04-06 RX ADMIN — BUDESONIDE 1 MG: 0.5 SUSPENSION RESPIRATORY (INHALATION) at 07:06

## 2021-04-06 RX ADMIN — HYDRALAZINE HYDROCHLORIDE 25 MG: 25 TABLET ORAL at 21:00

## 2021-04-06 RX ADMIN — LANSOPRAZOLE 30 MG: KIT at 06:22

## 2021-04-06 RX ADMIN — POTASSIUM CHLORIDE 20 MEQ: 1.5 POWDER, FOR SOLUTION ORAL at 13:23

## 2021-04-06 RX ADMIN — NYSTATIN 1 APPLICATION: 100000 POWDER TOPICAL at 20:40

## 2021-04-06 RX ADMIN — ARFORMOTEROL TARTRATE 15 MCG: 15 SOLUTION RESPIRATORY (INHALATION) at 18:17

## 2021-04-06 RX ADMIN — POTASSIUM CHLORIDE 20 MEQ: 1.5 POWDER, FOR SOLUTION ORAL at 17:58

## 2021-04-06 RX ADMIN — CHLOROTHIAZIDE SODIUM 250 MG: 500 INJECTION, POWDER, LYOPHILIZED, FOR SOLUTION INTRAVENOUS at 08:50

## 2021-04-06 RX ADMIN — INSULIN GLARGINE 20 UNITS: 100 INJECTION, SOLUTION SUBCUTANEOUS at 08:48

## 2021-04-06 RX ADMIN — INSULIN LISPRO 5 UNITS: 100 INJECTION, SOLUTION INTRAVENOUS; SUBCUTANEOUS at 06:21

## 2021-04-06 RX ADMIN — PREDNISONE 10 MG: 10 TABLET ORAL at 08:49

## 2021-04-06 RX ADMIN — METOPROLOL TARTRATE 12.5 MG: 25 TABLET, FILM COATED ORAL at 20:48

## 2021-04-06 RX ADMIN — GABAPENTIN 200 MG: 100 CAPSULE ORAL at 20:38

## 2021-04-06 RX ADMIN — ACETYLCYSTEINE 3 ML: 200 SOLUTION ORAL; RESPIRATORY (INHALATION) at 18:29

## 2021-04-06 RX ADMIN — ACETAMINOPHEN ORAL SOLUTION 650 MG: 650 SOLUTION ORAL at 17:58

## 2021-04-06 RX ADMIN — BUMETANIDE 1 MG: 1 TABLET ORAL at 08:49

## 2021-04-06 RX ADMIN — INSULIN LISPRO 5 UNITS: 100 INJECTION, SOLUTION INTRAVENOUS; SUBCUTANEOUS at 13:23

## 2021-04-06 RX ADMIN — METOPROLOL TARTRATE 12.5 MG: 25 TABLET, FILM COATED ORAL at 08:48

## 2021-04-06 RX ADMIN — POTASSIUM CHLORIDE 20 MEQ: 1500 TABLET, EXTENDED RELEASE ORAL at 08:55

## 2021-04-06 RX ADMIN — BUMETANIDE 1 MG: 1 TABLET ORAL at 20:38

## 2021-04-06 RX ADMIN — ATORVASTATIN CALCIUM 40 MG: 40 TABLET, FILM COATED ORAL at 20:38

## 2021-04-06 RX ADMIN — DIPHENHYDRAMINE HYDROCHLORIDE AND LIDOCAINE HYDROCHLORIDE AND ALUMINUM HYDROXIDE AND MAGNESIUM HYDRO 5 ML: KIT at 00:34

## 2021-04-06 RX ADMIN — BARIUM SULFATE 183 ML: 960 POWDER, FOR SUSPENSION ORAL at 13:04

## 2021-04-06 RX ADMIN — INSULIN LISPRO 5 UNITS: 100 INJECTION, SOLUTION INTRAVENOUS; SUBCUTANEOUS at 00:38

## 2021-04-06 RX ADMIN — DIPHENHYDRAMINE HYDROCHLORIDE AND LIDOCAINE HYDROCHLORIDE AND ALUMINUM HYDROXIDE AND MAGNESIUM HYDRO 5 ML: KIT at 17:59

## 2021-04-06 RX ADMIN — Medication 10 ML: at 20:39

## 2021-04-06 RX ADMIN — ONDANSETRON 4 MG: 2 INJECTION INTRAMUSCULAR; INTRAVENOUS at 10:39

## 2021-04-06 RX ADMIN — INSULIN LISPRO 4 UNITS: 100 INJECTION, SOLUTION INTRAVENOUS; SUBCUTANEOUS at 13:29

## 2021-04-06 RX ADMIN — INSULIN GLARGINE 20 UNITS: 100 INJECTION, SOLUTION SUBCUTANEOUS at 20:49

## 2021-04-06 RX ADMIN — ASPIRIN 81 MG: 81 TABLET, COATED ORAL at 08:49

## 2021-04-06 NOTE — PROGRESS NOTES
Continued Stay Note   Victor Manuel     Patient Name: Claudia Rust  MRN: [id number]  Today's Date: 4/6/2021    Admit Date: 3/18/2021    Discharge Plan     Row Name 04/06/21 1604       Plan    Plan  Accepted to [facility] acute - pending bed. No pre-cert or PASRR required.    Plan Comments  Per [facility] liaison, pt is appropriate for [facility] acute - pending bed availability when medically ready to d/c. SW advised pt may be ready soon (next 24-48 hours) pending the remainder of her hospital course.     Phone communication or documentation only - no physical contact with patient or family.    Myriam Alvarez, NILE, W    Office: [phone]  Cell: [phone]  Fax: [phone]  E-mail: [email or web address]

## 2021-04-06 NOTE — DISCHARGE PLACEMENT REQUEST
"Claudia Mckoy (70 y.o. Female)     Date of Birth Social Security Number Address Home Phone MRN    [id number]  [id number] [facility] [place] [address] [phone] [id number]    Latter-day Marital Status          Quaker        Admission Date Admission Type Admitting Provider Attending Provider Department, Room/Bed    3/18/21 Elective Olaf Mcgill MD Pagni, Sebastian, MD [facility] CARDIOVASCULAR CARE UNIT, 2202/1    Discharge Date Discharge Disposition Discharge Destination                       Attending Provider: Olaf Mcgill MD    Allergies: Adhesive Tape, Butorphanol, Garlic, Tetanus-diphtheria Toxoids Td, Aloe, Bee Venom, Latex, Macadamia Nut Oil, Tuberculin, Wasp Venom    Isolation: None   Infection: None   Code Status: CPR    Ht: 162.6 cm (64\")   Wt: 122 kg (268 lb 4.8 oz)    Admission Cmt: None   Principal Problem: Aortic valve stenosis [I35.0] More...                 Active Insurance as of 3/18/2021     Primary Coverage     Payor Plan Insurance Group Employer/Plan Group    MEDICARE MEDICARE A & B      Payor Plan Address Payor Plan Phone Number Payor Plan Fax Number Effective Dates    [address] [phone]  6/1/2014 - None Entered    [facility] [id number]       Subscriber Name Subscriber Birth Date Member ID       CLAUDIA MCKOY 1950 [id number]           Secondary Coverage     Payor Plan Insurance Group Employer/Plan Group    [place]      Payor Plan Address Payor Plan Phone Number Payor Plan Fax Number Effective Dates    [address]   6/7/2019 - None Entered    [place] [address]       Subscriber Name Subscriber Birth Date Member ID       CLAUDIA MCKOY 1950 [id number]                 Emergency Contacts      (Rel.) Home Phone Work Phone Mobile Phone    Isma Mckoy (Spouse) [phone] -- [phone]    OVIDIO THURMAN (Daughter) [phone] -- --    Olivia Mara (Daughter) -- -- [phone] " "              History & Physical      Belkis Loving APRN at 03/16/21 1521     Attestation signed by Olaf Mcgill MD at 03/17/21 2013    agree                      Patient Care Team:  Mary Wilde APRN as PCP - General  Mary Wilde APRN as PCP - Family Medicine  Jose Levi MD as Consulting Physician (Cardiology)    Chief complaint I need heart surgery    Subjective     History of Present Illness: Patient is a 70 year old  female with known PMH of aortic stenosis, COPD, and morbid obesity. She presents today to pre-admission testing prior to scheduled cardiac surgery with Dr. Mcgill. She reports poor mobility due to shortness of breath and fatigue but states this is not worsening and her current normal. Patient previously saw Dr. Mcgill in office who reports patient's 2D echocardiogram showed severe aortic stenosis with a peak gradient of 84 mmHg, mean gradient of 40 mmHg and a peak velocity of 4.6 m/s.  In the right sternal border a peak velocity of 5 m/s and the peak gradient 95 mmHg with a mean gradient of 48 mmHg. She denies any fever, chills, cough, or rashes. Labs and studies reviewed with her and her . Plans for AVR with Dr. Mcgill on 3/18/21.    Past Medical History:   Diagnosis Date   • Acute congestive heart failure (CMS/HCC)    • Allergies    • Anxiety    • Arthritis    • Asthma    • Bilateral leg edema    • Bipolar 1 disorder (CMS/HCC)    • Bulging lumbar disc     X3   • Cancer (CMS/HCC)     states had \"cancerous tumor during pregnancy and had to have hysterectomy\"   • Cataract     bilateral   • Cellulitis 03/2021    bilateral legs   • Compression fracture of vertebral column (CMS/HCC)     LUMBAR   • COPD (chronic obstructive pulmonary disease) (CMS/HCC)    • Delayed emergence from anesthesia    • Depression    • Depression    • Dyslipidemia    • Dyspnea on exertion    • Fibromyalgia    • GERD (gastroesophageal reflux disease)    • Hiatal hernia    • " Hyperlipidemia    • Hypertension    • Hypothyroid     HYPOTHYROID   • IBS (irritable bowel syndrome)    • Migraines    • Morbid obesity (CMS/[facility])    • Neuropathy    • Panic attacks    • Peripheral edema    • PONV (postoperative nausea and vomiting)    • Poor mobility     rolling walker   • Prediabetes    • PVD (peripheral vascular disease) (CMS/[facility])    • Rheumatoid aortitis    • Spinal stenosis    • Spinal stenosis    • Vertigo    • Vitamin D deficiency      Past Surgical History:   Procedure Laterality Date   • CARDIAC CATHETERIZATION  2009   • CARDIAC CATHETERIZATION N/A 8/14/2019    Procedure: Left Heart Cath;  Surgeon: Jose Levi MD;  Location: [facility] CATH INVASIVE LOCATION;  Service: Cardiovascular   • CARDIAC CATHETERIZATION N/A 8/14/2019    Procedure: Right Heart Cath;  Surgeon: Jose Levi MD;  Location: [facility] CATH INVASIVE LOCATION;  Service: Cardiovascular   • CARDIAC CATHETERIZATION N/A 8/14/2019    Procedure: Aortic root aortogram;  Surgeon: Jose Levi MD;  Location: [facility] CATH INVASIVE LOCATION;  Service: Cardiovascular   • CARDIAC CATHETERIZATION N/A 1/20/2021    Procedure: Left Heart Cath;  Surgeon: Jose Levi MD;  Location: [facility] CATH INVASIVE LOCATION;  Service: Cardiovascular;  Laterality: N/A;   • CYSTOCELE REPAIR  1984   • ELBOW ARTHROPLASTY  2011   • FOOT SURGERY     • GALLBLADDER SURGERY  2002   • TONSILLECTOMY     • VAGINAL HYSTERECTOMY  1972     No family history on file.  Social History     Tobacco Use   • Smoking status: Current Every Day Smoker     Packs/day: 1.00     Types: Cigarettes   • Smokeless tobacco: Never Used   • Tobacco comment: decrease now and do not smoke dos   Substance Use Topics   • Alcohol use: Yes     Comment: socially   • Drug use: Never       Objective      Vital Signs  There is no height or weight on file to calculate BMI.  No intake or output data in the 24 hours ending 03/16/21 1521  No intake/output data  recorded.       Physical Exam  Constitutional:       Appearance: She is obese.   Eyes:      Extraocular Movements: Extraocular movements intact.      Pupils: Pupils are equal, round, and reactive to light.   Cardiovascular:      Rate and Rhythm: Normal rate and regular rhythm.   Pulmonary:      Effort: Pulmonary effort is normal.   Abdominal:      Palpations: Abdomen is soft.   Musculoskeletal:         General: Normal range of motion.   Skin:     General: Skin is warm and dry.   Neurological:      Mental Status: She is alert and oriented to person, place, and time.   Psychiatric:         Mood and Affect: Mood normal.         Behavior: Behavior normal.         Thought Content: Thought content normal.         Judgment: Judgment normal.         Results Review:   I reviewed the patient's new clinical results.    WBC WBC   Date Value Ref Range Status   03/16/2021 7.00 3.40 - 10.80 10*3/mm3 Final      HGB Hemoglobin   Date Value Ref Range Status   03/16/2021 13.7 12.0 - 15.9 g/dL Final      HCT Hematocrit   Date Value Ref Range Status   03/16/2021 41.0 34.0 - 46.6 % Final      Platlets No results found for: LABPLAT     PT/INR:      Protime   Date Value Ref Range Status   03/16/2021 11.1 9.6 - 11.7 Seconds Final   /  INR   Date Value Ref Range Status   03/16/2021 1.01 0.93 - 1.10 Final       Sodium Sodium   Date Value Ref Range Status   03/16/2021 139 136 - 145 mmol/L Final      Potassium Potassium   Date Value Ref Range Status   03/16/2021 4.1 3.5 - 5.2 mmol/L Final      Chloride Chloride   Date Value Ref Range Status   03/16/2021 103 98 - 107 mmol/L Final      Bicarbonate No results found for: PLASMABICARB   BUN BUN   Date Value Ref Range Status   03/16/2021 10 8 - 23 mg/dL Final      Creatinine Creatinine   Date Value Ref Range Status   03/16/2021 0.80 0.57 - 1.00 mg/dL Final      Calcium Calcium   Date Value Ref Range Status   03/16/2021 8.9 8.6 - 10.5 mg/dL Final      Magnesium Magnesium   Date Value Ref Range Status    03/16/2021 2.0 1.6 - 2.4 mg/dL Final        pH No results found for: PHART   pO2 No results found for: PO2ART   pCO2 No results found for: FDZ5THH   HCO3 No results found for: BGR4WTB     Assessment/Plan     Patient Active Problem List   Diagnosis Code   • Morbidly obese (CMS/HCC) E66.01   • Dyspnea on exertion R06.00   • Abnormal nuclear stress test R94.39   • Nonrheumatic aortic valve stenosis I35.0   • Prediabetes R73.03   • Dyslipidemia E78.5   • Essential hypertension I10   • Acute UTI N39.0   • Bipolar disorder (CMS/HCC) F31.9   • COPD (chronic obstructive pulmonary disease) (CMS/HCC) J44.9   • High triglycerides E78.1   • Gastroesophageal reflux disease K21.9   • Rheumatoid arthritis (CMS/HCC) M06.9   • Aortic stenosis, severe I35.0   • Chronic heart failure with preserved ejection fraction (HFpEF) (CMS/HCC) I50.32   • Aortic valve stenosis I35.0       Assessment & Plan  Severe aortic stenosis  COPD  Rheumatoid arthritis  HTN  HLD  Bipolar disorder  Obesity stage 3  PVD  KARLY  Vertigo  Tobacco abuse    Plans for AVR with Dr. Mcgill on 3/18/21.    I discussed the patients findings and my recommendations with patient and family    CELE SARA ULLOA  03/16/21  15:21 EDT      Electronically signed by Olaf Mcgill MD at 03/17/21 2013       {Outbreak/Travel/Exposure Documentation......;  Question Available Choices Patient Response   COVID-19 Outbreak Screen:  Do you currently have a new onset of the following symptoms?        Fever/Chills, Cough, Shortness of air, Loss of taste or smell, No, Unknown  No (03/18/21 0611)   COVID-19 Outbreak Screen: In the last 14 days, have you had contact with anyone who is ill, has show any of the symptoms listed above and/or has been diagnosis with the 2019 Novel Coronavirus? This includes any immediate household members but excludes any patients with whom you have been in contact within your normal work duties wearing proper PPE, if you are a healthcare worker.  Yes, No,  Unknown              No (03/18/21 0611)   COVID-19 Outbreak Screen: Who was notified? Free text (not recorded)   Ebola Screening Outbreak Screen: Have you traveled to the [place] or [place] within the past 21 days?  Yes, No, Unknown (not recorded)   Ebola Screening Outbreak Screen: Do you have ANY of the following symptoms: Fever/Chills, Vomiting, Diarrhea, Fatigue, Headache, Muscle pain, Unexplained bleeding, Abdominal (stomach) pain, No, Unknown (not recorded)   Ebola Screening Outbreak Screen: Name of Person notified Free text (not recorded)   Travel Screen: Have you traveled in the last month? If so, to what country have you traveled? If [place] what state? Yes, No, Unknown  List of all countries  List of all States No (03/18/21 0611)  (not recorded)  (not recorded)   Infection Risk: Do you currently have the following symptoms?  (If cough is selected, the Tuberculosis Screen is performed.) Cough, Fever, Rash, No No (03/18/21 0611)   Tuberculosis Screen: Do you have any of the following Tuberculosis Risks?  · Have you lived or spent time with anyone who had or may have TB?  · Have you lived in or visited any of the following areas for more than one month: [place], [place], [place], [place] or [place], the [place] or [place]?  · Do you have HIV/AIDS?  · Have you lived in or worked in a nursing home, homeless shelter, correctional facility, or substance abuse treatment facility?   · No    If Yes do you have any of the following symptoms? Yes responses display to the right    If Yes, symptoms listed are:  Cough greater than or equal to 3 weeks, Loss of appetite, Unexplained weight loss, Night sweats, Bloody sputum or hemoptysis, Hoarseness, Fever, Fatigue, Chest pain, No (not recorded)  (not recorded)   Exposure Screen: Have you been exposed to any of these contagious diseases in the last month? Measles, Chickenpox, Meningitis, Pertussis, Whooping Cough, No No (03/18/21 0611)          Current Facility-Administered Medications   Medication Dose Route Frequency Provider Last Rate Last Admin   • acetaminophen (TYLENOL) tablet 650 mg  650 mg Oral Q4H PRN Janna Alberto APRN        Or   • acetaminophen (TYLENOL) 160 MG/5ML solution 650 mg  650 mg Oral Q4H PRN Janna Alberto APRN   649.6 mg at 04/04/21 0532    Or   • acetaminophen (TYLENOL) suppository 650 mg  650 mg Rectal Q4H PRN Janna Alberto APRN       • acetylcysteine (MUCOMYST) 20 % nebulizer solution 3 mL  3 mL Nebulization BID - RT Shanell Hernandez MD   3 mL at 04/06/21 0706   • arformoterol (BROVANA) nebulizer solution 15 mcg  15 mcg Nebulization BID - RT Shanell Hernandez MD   15 mcg at 04/06/21 0706   • aspirin EC tablet 81 mg  81 mg Oral Daily SatterJanna Grove APRN   81 mg at 04/06/21 0849   • atorvastatin (LIPITOR) tablet 40 mg  40 mg Oral Nightly Janna Alberto APRN   40 mg at 04/05/21 2129   • bisacodyl (DULCOLAX) suppository 10 mg  10 mg Rectal Daily PRN Janna Alberto APRN   10 mg at 03/26/21 1234   • budesonide (PULMICORT) nebulizer solution 1 mg  1 mg Nebulization BID - RT Shanell Hernandez MD   1 mg at 04/06/21 0706   • bumetanide (BUMEX) tablet 1 mg  1 mg Nasogastric TID Paola Hammer MD   1 mg at 04/06/21 0849   • chlorhexidine (PERIDEX) 0.12 % solution 15 mL  15 mL Mouth/Throat Q12H Janna Alberto APRN   15 mL at 04/06/21 0848   • Chlorhexidine Gluconate Cloth 2 % pads 1 application  1 application Topical Q12H PRN Monika Conn APRN       • dextrose (D50W) 25 g/ 50mL Intravenous Solution 25 g  25 g Intravenous Q15 Min PRN Olaf Mcgill MD       • dextrose (GLUTOSE) oral gel 15 g  15 g Oral Q15 Min PRN Olaf Mcgill MD       • docusate (COLACE) 50 MG/5ML liquid 100 mg  100 mg Oral BID Alejandro-Janna Porter APRN   100 mg at 04/06/21 0848   • First Mouthwash (Magic Mouthwash) 5 mL  5 mL Swish & Spit Q6H  Shanell Hernandez MD   5 mL at 04/06/21 0626   • [START ON 4/7/2021] FLUoxetine (PROzac) capsule 20 mg  20 mg Oral Daily Glenn Reyes MD       • gabapentin (NEURONTIN) capsule 200 mg  200 mg Oral Q12H Janna Alberto, APRN   200 mg at 04/06/21 0849   • glucagon (human recombinant) (GLUCAGEN DIAGNOSTIC) injection 1 mg  1 mg Subcutaneous Q15 Min PRN Olaf Mcgill MD       • hydrALAZINE (APRESOLINE) injection 10 mg  10 mg Intravenous Q6H PRN Juan Jose Skelton MD   10 mg at 03/27/21 2251   • hydrALAZINE (APRESOLINE) tablet 25 mg  25 mg Nasogastric Q8H Paola Hammer MD   25 mg at 04/06/21 0622   • insulin glargine (LANTUS, SEMGLEE) injection 20 Units  20 Units Subcutaneous Q12H Gilles Brady APRN   20 Units at 04/06/21 0848   • insulin lispro (ADMELOG) injection 0-24 Units  0-24 Units Subcutaneous Q6H Glenn Reyes MD   4 Units at 04/05/21 1200    And   • insulin lispro (ADMELOG) injection 0-24 Units  0-24 Units Subcutaneous PRN Glenn Reyes MD       • insulin lispro (ADMELOG) injection 5 Units  5 Units Subcutaneous Q6H Belkis Loving APRN   5 Units at 04/06/21 0621   • ipratropium-albuterol (DUO-NEB) nebulizer solution 3 mL  3 mL Nebulization Q4H PRN Janna Alberto APRN   3 mL at 03/18/21 1522   • ipratropium-albuterol (DUO-NEB) nebulizer solution 3 mL  3 mL Nebulization Q4H - RT Day, Lorna, APRN   3 mL at 04/06/21 1106   • lansoprazole (FIRST) oral suspension 30 mg  30 mg Oral QAM Day, Lorna, APRN   30 mg at 04/06/21 0622   • levothyroxine (SYNTHROID, LEVOTHROID) tablet 100 mcg  100 mcg Oral Q AM Olaf Mcgill MD   100 mcg at 04/06/21 0622   • lidocaine (XYLOCAINE) 2% injection    PRN Glenn Reyes MD   4 mL at 03/25/21 1439   • lidocaine (XYLOCAINE) 5 % ointment    PRN Glenn Reyes MD   1 application at 03/25/21 1439   • meclizine (ANTIVERT) tablet 25 mg  25 mg Oral TID PRN Belkis Loving APRN   25 mg at 03/20/21 0836   • metoprolol tartrate (LOPRESSOR) half tablet 12.5 mg   12.5 mg Oral Q12H Jose Levi MD   12.5 mg at 04/06/21 0848   • midazolam (VERSED) injection 1 mg  1 mg Intravenous Q2H PRN Shanell Hernandez MD       • midazolam (VERSED) injection 2 mg  2 mg Intravenous Q2H PRN Shanell Hernandez MD       • naloxone (NARCAN) injection 0.4 mg  0.4 mg Intravenous Q5 Min PRN Satterly-German Janna ARIELLE, APRN       • nystatin (MYCOSTATIN) powder   Topical Q12H Glenn Reyes MD   Given at 04/06/21 0854   • ondansetron (ZOFRAN) injection 4 mg  4 mg Intravenous Q6H PRN Sattermc-Janna Porter, APRN   4 mg at 04/06/21 1039   • polyethylene glycol (MIRALAX) packet 17 g  17 g Oral Daily Glenn Reyes MD   17 g at 04/05/21 1100   • potassium chloride (K-DUR,KLOR-CON) CR tablet 20 mEq  20 mEq Oral PRN Satterly-German, Janna L, APRN   20 mEq at 03/22/21 0550    Or   • potassium chloride (KLOR-CON) packet 20 mEq  20 mEq Oral PRN Sattermc-German, Janna L, APRN   20 mEq at 04/05/21 0650   • potassium chloride (K-DUR,KLOR-CON) CR tablet 20 mEq  20 mEq Oral TID With Meals Satterly-German, Janna L, APRN   20 mEq at 04/06/21 0855   • potassium chloride 10 mEq in 100 mL IVPB  10 mEq Intravenous Q1H PRN Satterly-German, Janna L, APRN        Or   • potassium chloride 10 mEq in 100 mL IVPB  10 mEq Intravenous Q1H PRN Satterly-German, Janna L, APRN 200 mL/hr at 03/19/21 0726 10 mEq at 03/19/21 0726   • predniSONE (DELTASONE) tablet 10 mg  10 mg Oral Daily With Breakfast Shanell Hernandez MD   10 mg at 04/06/21 0849   • sodium chloride 0.9 % flush 10 mL  10 mL Intravenous Q12H Belkis Loving APRN   10 mL at 04/06/21 0854   • sodium chloride 0.9 % flush 10 mL  10 mL Intravenous Q12H Belkis Loving APRN   10 mL at 04/06/21 0854   • sodium chloride 0.9 % flush 10 mL  10 mL Intravenous PRN Belkis Loving APRN       • sodium chloride 0.9 % flush 20 mL  20 mL Intravenous PRN Belkis Loving APRN       • warfarin (COUMADIN) tablet 2 mg  2 mg Oral Daily Belkis Loving APRN             Physician Progress Notes (most recent note)      Glenn Reyes MD at 04/06/21 1052          ICU Daily Progress Note        Aortic valve stenosis    Rheumatoid arthritis (CMS/HCC)      ASSESSMENT:  Acute hypoxic respiratory failure required intubation 03/22/2021 , extubated 4/4/21  Aortic valve stenosis, status post tissue replacement  Pulm hypertension, severe  COPD  KARLY  Cor pulmonale  Tobacco abuse  Upper airway infection with moderate amount of mucopurulent secretions noted on bronchoscopy 3/25/2021  Febrile illness   Interstitial pneumonitis  Acute thrombocytopenia  Catheter associated DVT right upper extremity subclavian, axillary, and brachial   hypernatremia  Upper arm left DVT  DM  Uremia     PLAN:    Titrating O2 to maintain a saturation of 91%  Sputum culture with Candida, likely contaminant  Hemodynamically stable off pressor support  Wean steroids down  Echocardiogram reviewed  Bronchodilator  Inhaled corticosteroids  Electrolytes/ glycemic control  DVT prophylaxis arixtra,   GI prophylaxis.     Tolerating tube feeds at goal  Keep feeding tube for now, patient is very hoarse post extubation.  SLP evaluation and recommendations pending     Nephrology following  Cardiology following  CV surgery admitting     PT/OT evaluation and treatment   Consult case management to start discharge planning.  Will likely need rehab due to severe deconditioning      LOS: 19 days         Vital signs for last 24 hours:  Vitals:    04/06/21 0800 04/06/21 0848 04/06/21 0900 04/06/21 1000   BP:  109/47     Pulse: 75 75 76 66   Resp:       Temp:       TempSrc:       SpO2: 94%  98% 98%   Weight:       Height:           Intake/Output last 3 shifts:  I/O last 3 completed shifts:  In: 4898 [P.O.:200; I.V.:413; Other:2105; NG/GT:2180]  Out: 3975 [Urine:3975]  Intake/Output this shift:  I/O this shift:  In: 60 [Other:60]  Out: -     Vent settings for last 24 hours:       Hemodynamic parameters for last 24 hours:  CVP:  [9 mmHg] 9  mmHg    Radiology  Imaging Results (Last 24 Hours)     Procedure Component Value Units Date/Time    XR Chest 1 View [[id number]] Collected: 04/06/21 0711     Updated: 04/06/21 0714    Narrative:      Examination: XR CHEST 1 VW-     Date of Exam: 4/6/2021 5:35 AM     Indication: Hypoxia; M06.9-Rheumatoid arthritis, unspecified;  I35.0-Nonrheumatic aortic (valve) stenosis; J96.01-Acute respiratory  failure with hypoxia; J44.9-Chronic obstructive pulmonary disease,  unspecified.     Comparison: 4/5/2021.     Technique: Single radiographic view of the chest was obtained.     Findings:  Feeding tube courses below the diaphragm. New left PICC terminates in  the upper SVC. Stable left internal jugular central venous catheter.  Stable evidence of prior median sternotomy and heart valve replacement.  There is stable diffuse mixed interstitial and airspace disease in both  lungs with stable small bilateral pleural effusions. There is no  evidence of pneumothorax or new lung opacity. The heart size is stable.  Pulmonary vasculature is completely obscured. There are no acute osseous  abnormalities.       Impression:         1. Stable mixed interstitial and airspace disease present throughout  both lungs.  2. Stable feeding tube, postoperative findings and left IJ CVC.  3. New left PICC terminates in the upper SVC.     Electronically Signed By-Romain Gutierrez MD On:4/6/2021 7:12 AM  This report was finalized on [id number] by  Romain Gutierrez MD.          Labs:  Results from last 7 days   Lab Units 04/06/21  0422   WBC 10*3/mm3 18.20*   HEMOGLOBIN g/dL 9.7*   HEMATOCRIT % 29.3*   PLATELETS 10*3/mm3 109*     Results from last 7 days   Lab Units 04/06/21  0422 04/05/21  0501   SODIUM mmol/L 139 141   POTASSIUM mmol/L 3.9 3.3*   CHLORIDE mmol/L 105 106   CO2 mmol/L 26.0 26.0   BUN mg/dL 43* 43*   CREATININE mg/dL 0.43* 0.46*   CALCIUM mg/dL 8.3* 8.5*   BILIRUBIN mg/dL  --  0.5   ALK PHOS U/L  --  137*   ALT (SGPT) U/L  --  44*    AST (SGOT) U/L  --  52*   GLUCOSE mg/dL 121* 133*     Results from last 7 days   Lab Units 04/05/21  0336   PH, ARTERIAL pH units 7.440   PO2 ART mm Hg 97.7   PCO2, ARTERIAL mm Hg 38.7   HCO3 ART mmol/L 26.3     Results from last 7 days   Lab Units 04/06/21  0422 04/05/21  0501 04/04/21  0414   ALBUMIN g/dL 2.70* 2.70* 2.60*             Results from last 7 days   Lab Units 04/06/21  0422   MAGNESIUM mg/dL 2.4     Results from last 7 days   Lab Units 04/06/21  0422 04/05/21  1152   INR  1.06* 1.06*               Meds:   SCHEDULE  acetylcysteine, 3 mL, Nebulization, BID - RT  arformoterol, 15 mcg, Nebulization, BID - RT  aspirin, 81 mg, Oral, Daily  atorvastatin, 40 mg, Oral, Nightly  budesonide, 1 mg, Nebulization, BID - RT  bumetanide, 1 mg, Nasogastric, TID  chlorhexidine, 15 mL, Mouth/Throat, Q12H  docusate, 100 mg, Oral, BID  First Mouthwash (Magic Mouthwash), 5 mL, Swish & Spit, Q6H  FLUoxetine, 60 mg, Oral, Daily  gabapentin, 200 mg, Oral, Q12H  hydrALAZINE, 25 mg, Nasogastric, Q8H  insulin glargine, 20 Units, Subcutaneous, Q12H  insulin lispro, 0-24 Units, Subcutaneous, Q6H  insulin lispro, 5 Units, Subcutaneous, Q6H  ipratropium-albuterol, 3 mL, Nebulization, Q4H - RT  lansoprazole, 30 mg, Oral, QAM  levothyroxine, 100 mcg, Oral, Q AM  metoprolol tartrate, 12.5 mg, Oral, Q12H  nystatin, , Topical, Q12H  polyethylene glycol, 17 g, Oral, Daily  potassium chloride, 20 mEq, Oral, TID With Meals  predniSONE, 10 mg, Oral, Daily With Breakfast  QUEtiapine, 25 mg, Oral, Nightly  sodium chloride, 10 mL, Intravenous, Q12H  sodium chloride, 10 mL, Intravenous, Q12H  warfarin, 2 mg, Oral, Daily      Infusions     PRNs  •  acetaminophen **OR** acetaminophen **OR** acetaminophen  •  bisacodyl  •  Chlorhexidine Gluconate Cloth  •  dextrose  •  dextrose  •  glucagon (human recombinant)  •  hydrALAZINE  •  insulin lispro **AND** insulin lispro  •  ipratropium-albuterol  •  lidocaine  •  lidocaine  •  meclizine  •   "midazolam  •  midazolam  •  [] HYDROmorphone **AND** naloxone  •  ondansetron  •  potassium chloride **OR** potassium chloride  •  potassium chloride **OR** potassium chloride  •  sodium chloride  •  sodium chloride    Physical Exam:  Physical Exam  Cardiovascular:      Heart sounds: Murmur heard.     Pulmonary:      Breath sounds: Rhonchi present.         ROS  Review of Systems   Respiratory: Positive for cough.    Cardiovascular: Positive for leg swelling.         Critical Care Time greater than: 45 Minutes  Total time spent with patient greater than: 45 Minutes    Electronically signed by Glenn Reyes MD at 21 1053          Consult Notes (most recent note)      Yovani Lerma MD at 21 1105              NEPHROLOGY CONSULTATION-----[facility] [facility]    [facility]  [phone]  Yovani Lerma MD    Patient Care Team:  Mary Wilde APRN as PCP - General  Mary Wilde APRN as PCP - Family Medicine  Jose Levi MD as Consulting Physician (Cardiology)    CC/REASON FOR CONSULTATION: Hypernatremia  PHYSICIAN REQUESTING CONSULTATION: Dr Mcgill    History of Present Illness  70 year old female who is s/p AVR, developed respiratory distress and re-intubated yesterday. She was started on bumex drip. Her cr increased from 0.7-->0.9, but her sodium increased to 154. She is off bumex drip now. Making urine. Had CT with dye as well yesterday. She remains intubated and sedated.    Review of Systems   Unable due to clinical condition.    Past Medical History:   Diagnosis Date   • Acute congestive heart failure (CMS/HCC)    • Allergies    • Anxiety    • Arthritis    • Asthma    • Bilateral leg edema    • Bipolar 1 disorder (CMS/HCC)    • Bulging lumbar disc     X3   • Cancer (CMS/HCC)     states had \"cancerous tumor during pregnancy and had to have hysterectomy\"   • Cataract     bilateral   • Cellulitis 2021    bilateral legs   • Compression fracture " of vertebral column (CMS/[facility])     LUMBAR   • COPD (chronic obstructive pulmonary disease) (CMS/[facility])    • Delayed emergence from anesthesia    • Depression    • Depression    • Dyslipidemia    • Dyspnea on exertion    • Fibromyalgia    • GERD (gastroesophageal reflux disease)    • Hiatal hernia    • Hyperlipidemia    • Hypertension    • Hypothyroid     HYPOTHYROID   • IBS (irritable bowel syndrome)    • Migraines    • Morbid obesity (CMS/[facility])    • Neuropathy    • Panic attacks    • Peripheral edema    • PONV (postoperative nausea and vomiting)    • Poor mobility     rolling walker   • Prediabetes    • PVD (peripheral vascular disease) (CMS/[facility])    • Rheumatoid aortitis    • Spinal stenosis    • Spinal stenosis    • Vertigo    • Vitamin D deficiency        Past Surgical History:   Procedure Laterality Date   • CARDIAC CATHETERIZATION  2009   • CARDIAC CATHETERIZATION N/A 8/14/2019    Procedure: Left Heart Cath;  Surgeon: Jose Levi MD;  Location: [facility] CATH INVASIVE LOCATION;  Service: Cardiovascular   • CARDIAC CATHETERIZATION N/A 8/14/2019    Procedure: Right Heart Cath;  Surgeon: Jose Levi MD;  Location: [facility] CATH INVASIVE LOCATION;  Service: Cardiovascular   • CARDIAC CATHETERIZATION N/A 8/14/2019    Procedure: Aortic root aortogram;  Surgeon: Jose Levi MD;  Location: [facility] CATH INVASIVE LOCATION;  Service: Cardiovascular   • CARDIAC CATHETERIZATION N/A 1/20/2021    Procedure: Left Heart Cath;  Surgeon: Jose Levi MD;  Location: [facility] CATH INVASIVE LOCATION;  Service: Cardiovascular;  Laterality: N/A;   • COLONOSCOPY     • CYSTOCELE REPAIR  1984   • ELBOW ARTHROPLASTY  2011   • ENDOSCOPY     • FOOT SURGERY     • GALLBLADDER SURGERY  2002   • TONSILLECTOMY     • VAGINAL HYSTERECTOMY  1972       History reviewed. No pertinent family history.    Social History     Tobacco Use   • Smoking status: Current Every Day Smoker     Packs/day: 1.00     Types:  Cigarettes   • Smokeless tobacco: Never Used   • Tobacco comment: decrease now and do not smoke dos   Substance Use Topics   • Alcohol use: Yes     Comment: socially   • Drug use: Never       Home Meds:   Medications Prior to Admission   Medication Sig Dispense Refill Last Dose   • aspirin 81 MG tablet Take 81 mg by mouth Every Night.   3/17/2021 at Unknown time   • diphenhydrAMINE (BENADRYL) 50 MG capsule Take 50 mg by mouth every night at bedtime.   3/17/2021 at Unknown time   • FLUoxetine (PROzac) 60 MG tablet Take 60 mg by mouth Every Morning.   3/17/2021 at Unknown time   • furosemide (LASIX) 40 MG tablet Take 40 mg by mouth 2 (Two) Times a Day.   3/17/2021 at Unknown time   • gabapentin (NEURONTIN) 600 MG tablet Take 400 mg by mouth 3 (Three) Times a Day.   3/17/2021 at Unknown time   • levothyroxine (SYNTHROID, LEVOTHROID) 100 MCG tablet Take 100 mcg by mouth Daily.   3/17/2021 at Unknown time   • meclizine (ANTIVERT) 25 MG tablet Take 25 mg by mouth 3 (Three) Times a Day As Needed.   Past Week at Unknown time   • metoprolol succinate XL (TOPROL-XL) 25 MG 24 hr tablet Take 25 mg by mouth Every Morning.   3/17/2021 at Unknown time   • pantoprazole (PROTONIX) 40 MG EC tablet Take 40 mg by mouth Every Evening.   3/17/2021 at Unknown time   • potassium chloride (KLOR-CON) 20 MEQ CR tablet Take 20 mEq by mouth 2 (Two) Times a Day.   3/17/2021 at Unknown time   • promethazine (PHENERGAN) 25 MG tablet Take 25 mg by mouth Every 6 (Six) Hours As Needed for Nausea or Vomiting. WITH MAXALT   Past Week at Unknown time   • rizatriptan MLT (MAXALT-MLT) 10 MG disintegrating tablet Take 10 mg by mouth 1 (One) Time As Needed for Migraine. May repeat in 2 hours if needed   Past Week at Unknown time   • simvastatin (ZOCOR) 20 MG tablet Take 20 mg by mouth Every Night.   3/17/2021 at Unknown time   • tiZANidine (ZANAFLEX) 4 MG tablet Take 4 mg by mouth Every 8 (Eight) Hours As Needed for Muscle Spasms. 8 MG NIGHTLY   3/17/2021  at Unknown time   • Acetaminophen (TYLENOL PO) Take 650 mg by mouth Every 4 (Four) Hours As Needed.      • albuterol (PROVENTIL) (2.5 MG/3ML) 0.083% nebulizer solution Inhale 2.5 mg Every 6 (Six) Hours As Needed. DURING WINTER IF NEEDED HASNT NEEDED      • aluminum-magnesium hydroxide-simethicone (MAALOX/MYLANTA) 200-200-20 MG/5ML suspension Take 15 mL by mouth Every 6 (Six) Hours As Needed for Indigestion or Heartburn.      • B Complex Vitamins (VITAMIN B COMPLEX) capsule capsule Take 1 capsule by mouth Daily. LAST DOSE 3/4   3/4/2021   • Calcium Carbonate (CALCIUM 500 PO) Take 1,500 mg by mouth Daily. LAST DOSE 3/4   3/4/2021   • Cholecalciferol (VITAMIN D-1000 MAX ST) 1000 units tablet Take 2,000 Units by mouth Every Night. LAST DOSE 3/4   3/4/2021   • fluticasone (FLONASE) 50 MCG/ACT nasal spray 2 sprays into the nostril(s) as directed by provider 2 (Two) Times a Day As Needed. USUALLY TAKE AM ONLY      • HYDROcodone-acetaminophen (NORCO)  MG per tablet Take 1 tablet by mouth Daily. Noon an prm      • Multiple Vitamins-Minerals (MULTIVITAMIN WITH MINERALS) tablet tablet Take 1 tablet by mouth Daily. LAST DOSE 3/4   3/4/2021   • ondansetron (ZOFRAN) 4 MG tablet Take 4 mg by mouth Every 8 (Eight) Hours As Needed for Nausea or Vomiting. WITH MAXALT      • polyethylene glycol (MIRALAX) powder Take 17 g by mouth Daily As Needed.      • vitamin B-12 (CYANOCOBALAMIN) 1000 MCG tablet Take 1,000 mcg by mouth Daily.   3/4/2021       Scheduled Meds:  arformoterol, 15 mcg, Nebulization, BID - RT  aspirin, 81 mg, Oral, Daily  atorvastatin, 40 mg, Oral, Nightly  budesonide, 0.5 mg, Nebulization, BID - RT  calcium gluconate, 2 g, Intravenous, Once  chlorhexidine, 15 mL, Mouth/Throat, Q12H  docusate sodium, 100 mg, Oral, BID  enoxaparin, 40 mg, Subcutaneous, Q12H  FLUoxetine, 60 mg, Oral, Daily  gabapentin, 200 mg, Oral, Q12H  insulin lispro, 0-9 Units, Subcutaneous, Q6H  ipratropium-albuterol, 3 mL, Nebulization, Q4H -  RT  lansoprazole, 30 mg, Oral, QAM  levothyroxine, 100 mcg, Oral, Q AM  metoprolol tartrate, 12.5 mg, Oral, Q12H  polyethylene glycol, 17 g, Oral, BID  potassium chloride, 20 mEq, Oral, TID With Meals  Scopolamine, 1 patch, Transdermal, Q72H  senna, 2 tablet, Oral, Nightly  sodium chloride, 10 mL, Intravenous, Q12H  sodium chloride, 10 mL, Intravenous, Q12H  sodium chloride, 10 mL, Intravenous, Q12H        Continuous Infusions:  bumetanide, 1 mg/hr, Last Rate: 1 mg/hr (03/23/21 0625)  dexmedetomidine, 0.2-1.5 mcg/kg/hr, Last Rate: 0.5 mcg/kg/hr (03/23/21 0813)  epoprostenol, 50 ng/kg/min (Ideal), Last Rate: 50 ng/kg/min (03/22/21 2311)  phenylephrine, 0.5-3 mcg/kg/min, Last Rate: Stopped (03/22/21 1940)  propofol, 5-50 mcg/kg/min, Last Rate: 15 mcg/kg/min (03/23/21 0945)        PRN Meds:  •  acetaminophen **OR** acetaminophen **OR** acetaminophen  •  Chlorhexidine Gluconate Cloth  •  dextrose  •  dextrose  •  glucagon (human recombinant)  •  HYDROcodone-acetaminophen  •  HYDROmorphone **AND** naloxone  •  insulin lispro **AND** insulin lispro  •  ipratropium-albuterol  •  meclizine  •  ondansetron  •  potassium chloride **OR** potassium chloride  •  potassium chloride **OR** potassium chloride  •  sodium chloride  •  sodium chloride    Allergies:  Adhesive tape, Butorphanol, Garlic, Tetanus-diphtheria toxoids td, Aloe, Bee venom, Latex, Macadamia nut oil, Tuberculin, and Wasp venom    OBJECTIVE    Vital Signs  Temp:  [98.1 °F (36.7 °C)-100.1 °F (37.8 °C)] 99.7 °F (37.6 °C)  Heart Rate:  [56-78] 60  Resp:  [22-32] 30  BP: (131-181)/(59-98) 131/59  Arterial Line BP: ()/(42-79) 113/57  FiO2 (%):  [100 %] 100 %    No intake/output data recorded.  I/O last 3 completed shifts:  In: 1233.6 [I.V.:1121.6; IV Piggyback:112]  Out: 6085 [Urine:6085]    Physical Exam:  General Appearance: Intubated/ sedated  Head: normocephalic, without obvious abnormality and atraumatic  Eyes: conjunctivae and sclerae normal and no  icterus  Neck: supple  Lungs: bilateral rhonchi  Heart: regular rhythm & normal rate and normal S1, S2  Chest Wall: no abnormalities observed  Abdomen: normal bowel sounds and soft  Extremities:+ edema, no cyanosis  Skin: no bleeding, bruising or rash  Neurologic: sedated    Results Review:    I reviewed the patient's new clinical results.    WBC WBC   Date Value Ref Range Status   03/23/2021 11.10 (H) 3.40 - 10.80 10*3/mm3 Final   03/22/2021 14.70 (H) 3.40 - 10.80 10*3/mm3 Final   03/21/2021 17.20 (H) 3.40 - 10.80 10*3/mm3 Final      HGB Hemoglobin   Date Value Ref Range Status   03/23/2021 10.1 (L) 12.0 - 15.9 g/dL Final   03/22/2021 10.8 (L) 12.0 - 15.9 g/dL Final   03/21/2021 11.1 (L) 12.0 - 15.9 g/dL Final      HCT Hematocrit   Date Value Ref Range Status   03/23/2021 30.6 (L) 34.0 - 46.6 % Final   03/22/2021 32.4 (L) 34.0 - 46.6 % Final   03/21/2021 33.6 (L) 34.0 - 46.6 % Final      Platlets No results found for: LABPLAT   MCV MCV   Date Value Ref Range Status   03/23/2021 92.7 79.0 - 97.0 fL Final   03/22/2021 92.9 79.0 - 97.0 fL Final   03/21/2021 92.9 79.0 - 97.0 fL Final          Sodium Sodium   Date Value Ref Range Status   03/23/2021 154 (H) 136 - 145 mmol/L Final   03/22/2021 145 136 - 145 mmol/L Final   03/21/2021 140 136 - 145 mmol/L Final   03/20/2021 139 136 - 145 mmol/L Final      Potassium Potassium   Date Value Ref Range Status   03/23/2021 3.4 (L) 3.5 - 5.2 mmol/L Final   03/23/2021 3.9 3.5 - 5.2 mmol/L Final   03/23/2021 3.4 (L) 3.5 - 5.2 mmol/L Final   03/22/2021 3.7 3.5 - 5.2 mmol/L Final   03/22/2021 3.7 3.5 - 5.2 mmol/L Final   03/21/2021 3.7 3.5 - 5.2 mmol/L Final   03/21/2021 4.5 3.5 - 5.2 mmol/L Final     Comment:     Slight hemolysis detected by analyzer. Results may be affected.   03/21/2021 4.6 3.5 - 5.2 mmol/L Final   03/20/2021 4.7 3.5 - 5.2 mmol/L Final      Chloride Chloride   Date Value Ref Range Status   03/23/2021 113 (H) 98 - 107 mmol/L Final   03/22/2021 105 98 - 107 mmol/L  Final   03/21/2021 106 98 - 107 mmol/L Final   03/20/2021 106 98 - 107 mmol/L Final      CO2 CO2   Date Value Ref Range Status   03/23/2021 31.0 (H) 22.0 - 29.0 mmol/L Final   03/22/2021 29.0 22.0 - 29.0 mmol/L Final   03/21/2021 23.0 22.0 - 29.0 mmol/L Final   03/20/2021 21.0 (L) 22.0 - 29.0 mmol/L Final      BUN BUN   Date Value Ref Range Status   03/23/2021 30 (H) 8 - 23 mg/dL Final   03/22/2021 25 (H) 8 - 23 mg/dL Final   03/21/2021 22 8 - 23 mg/dL Final   03/20/2021 19 8 - 23 mg/dL Final      Creatinine Creatinine   Date Value Ref Range Status   03/23/2021 0.95 0.57 - 1.00 mg/dL Final   03/22/2021 0.73 0.57 - 1.00 mg/dL Final   03/21/2021 0.73 0.57 - 1.00 mg/dL Final   03/20/2021 0.69 0.57 - 1.00 mg/dL Final      Calcium Calcium   Date Value Ref Range Status   03/23/2021 8.9 8.6 - 10.5 mg/dL Final   03/22/2021 8.8 8.6 - 10.5 mg/dL Final   03/21/2021 9.1 8.6 - 10.5 mg/dL Final   03/20/2021 9.0 8.6 - 10.5 mg/dL Final      PO4 No results found for: CAPO4   Albumin No results found for: ALBUMIN   Magnesium Magnesium   Date Value Ref Range Status   03/23/2021 2.2 1.6 - 2.4 mg/dL Final   03/23/2021 2.3 1.6 - 2.4 mg/dL Final   03/22/2021 1.9 1.6 - 2.4 mg/dL Final   03/21/2021 1.9 1.6 - 2.4 mg/dL Final   03/21/2021 1.9 1.6 - 2.4 mg/dL Final   03/21/2021 2.2 1.6 - 2.4 mg/dL Final      Uric Acid No results found for: URICACID       Imaging Results (Last 72 Hours)     Procedure Component Value Units Date/Time    XR Chest 1 View [[id number]] Collected: 03/23/21 0952     Updated: 03/23/21 0956    Narrative:      DATE OF EXAM:  3/23/2021 9:26 AM     PROCEDURE:  XR CHEST 1 VW-     INDICATIONS:  post op open heart; I35.0-Nonrheumatic aortic (valve) stenosis patient  on ventilator evaluate for pneumothorax     COMPARISON:  03/20/2021     TECHNIQUE:   Single radiographic AP view of the chest was obtained.     FINDINGS:  Single frontal view chest AP some reportable reveals the patient is  status post sternotomy and surgical heart  valve replacement. The heart  is mildly enlarged. Superior mediastinum is normal. A nasogastric  courses down the esophagus and stomach below diaphragm. Inferior tip the  endotracheal tube lies 3 cm above the davi. Proximal tip of the right  internal jugular central venous line catheter projects over the main  pulmonary outflow tract. Proximal tip of the right antecubital PICC  central venous line catheter projects over the superior vena cava. There  bilateral diffuse mild infiltrates unchanged since previous study. There  is a small atelectatic type infiltrates in the left lower lobe along  minor improved since previous study. No evidence of pneumothorax. No  evidence of pleural effusion.        Impression:      1 mild atelectatic infiltrate left lower lobe along the left lung base  might have improved since previous study.  2. No evidence of pneumothorax     Electronically Signed By-Gonzales Groves MD On:3/23/2021 9:54 AM  This report was finalized on [id number] by  Gonzales Groves MD.    CT Chest Pulmonary Embolism [[id number]] Collected: 03/22/21 1936     Updated: 03/22/21 1948    Narrative:         DATE OF EXAM:  3/22/2021 6:34 PM     PROCEDURE:  CT CHEST PULMONARY EMBOLISM-     INDICATIONS:   Aortic valvular stenosis, shortness of breath, questionable pulmonary  embolus.     COMPARISON:   CT of the chest performed on 02/15/2020.     TECHNIQUE:  Routine transaxial slices were obtained through the chest after the  intravenous administration of 100 mL of Isovue 370. Reconstructed  coronal and sagittal images were also obtained. Automated exposure  control and iterative construction methods were used.        FINDINGS:  There are no filling defects seen within the pulmonary arteries to  indicate acute pulmonary embolic disease. The patient has a small  layering bilateral pleural effusions which is an interval change from  the previous study. There is compressive atelectasis on the lower lobes.  There  has been interval development of dense areas of parenchymal  consolidation with air bronchograms in the lower lobes and also  groundglass densities scattered diffusely throughout the remaining  lungs. This can be seen with pulmonary edema or multifocal pneumonia.  There is a endotracheal tube in place. The tip of the tube is located  approximately 1.1 cm above the davi. There is an enteric tube in place  with the tip terminating in the distal stomach. The heart is enlarged.  The patient has had previous aortic valvular surgery. There are a few  prominent mediastinal and hilar lymph nodes which is likely due to  reactive hyperplasia. There are no acute findings seen beneath the  hemidiaphragms. There is a partially imaged benign cyst in the left  kidney. There are no suspicious osteolytic or sclerotic lesions within  the bony thorax.        Impression:         1. No acute pulmonary embolic disease.  2. Pulmonary edema versus multifocal pneumonia.  3. Small layering pleural effusions with compressive atelectasis on the  lower lobes.  4. Cardiomegaly with postsurgical changes.  5. Prominent mediastinal and hilar lymph nodes which is likely due to  reactive hyperplasia.     Electronically Signed By-Suhas Donald MD On:3/22/2021 7:46 PM  This report was finalized on [id number] by  Suhas Donald MD.    XR Abdomen KUB [[id number]] Collected: 03/22/21 1036     Updated: 03/22/21 1039    Narrative:      DATE OF EXAM:  3/22/2021 9:51 AM     PROCEDURE:  XR ABDOMEN KUB-     INDICATIONS:  dht insertion; I35.0-Nonrheumatic aortic (valve) stenosis     COMPARISON:  No Comparisons Available     TECHNIQUE:   Single radiographic view of the abdomen was obtained.     FINDINGS:  An enteric tube is present within the stomach. There is no evidence of  pneumatosis or free air. Evaluation is limited due to supine technique.  No dilated loops of bowel identified. No suspicious calcifications  identified. No significant stool burden  identified. No acute osseous  abnormality.        Impression:      Enteric tube within the stomach.     Electronically Signed By-Tana Sutherland MD On:3/22/2021 10:37 AM  This report was finalized on [id number] by  Tana Sutherland MD.    XR Chest 1 View [[id number]] Collected: 03/22/21 1019     Updated: 03/22/21 1032    Narrative:      Examination: XR CHEST 1 VW-     Date of Exam: 3/22/2021 9:51 AM     Indication: hypoxic resp failure, intubation; I35.0-Nonrheumatic aortic  (valve) stenosis.     Comparison: Radiograph 03/22/2021     Technique: 1 view of the chest      Findings:  An endotracheal tube is present with the tip in the mid to distal  trachea. Mild patchy airspace changes are present with a small amount of  pleural fluid present bilaterally. No pneumothorax. The heart size is  enlarged. Median sternotomy wires are present. An enteric tube is  present within the stomach. The pulmonary vasculature appears  indistinct.. No acute osseous abnormality identified.       Impression:      Mild pulmonary edema pattern with small bilateral pleural effusions,  mildly improved as compared to the previous study.     Electronically Signed By-Tana Sutherland MD On:3/22/2021 10:19 AM  This report was finalized on [id number] by  Tana Sutherland MD.    XR Chest 1 View [[id number]] Collected: 03/22/21 0850     Updated: 03/22/21 0854    Narrative:      Examination: XR CHEST 1 VW-     Date of Exam: 3/22/2021 8:37 AM     Indication: hypoxia; I35.0-Nonrheumatic aortic (valve) stenosis.     Comparison: Radiograph 03/21/2021, 03/20/2021     Technique: 1 view of the chest      Findings:  There is indistinctness of the pulmonary vasculature. Patchy airspace  opacities are present throughout the lungs bilaterally. The heart  appears enlarged. Median sternotomy wires are present. There is a  prosthetic valve in place. Small bilateral pleural effusions are  present. No acute osseous abnormality identified.       Impression:      Mild to  moderate pulmonary edema pattern with small bilateral pleural  effusions, similar as compared to the previous study.     Electronically Signed By-Tana Sutherland MD On:3/22/2021 8:52 AM  This report was finalized on [id number] by  Tana Sutherland MD.    XR Chest 1 View [[id number]] Collected: 03/21/21 0807     Updated: 03/21/21 0811    Narrative:      Examination: XR CHEST 1 VW-     Date of Exam: 3/21/2021 5:23 AM     Indication: Shortness of breath; I35.0-Nonrheumatic aortic (valve)  stenosis.     Comparison: 03/20/2021.     Technique: Single radiographic view of the chest was obtained.     Findings:  There is stable patchy bilateral airspace disease. Stable cardiomegaly  and small pleural effusions. There is evidence of prior median  sternotomy and heart valve replacement. No acute osseous abnormalities.  No new lung opacity. No pneumothorax.       Impression:      Stable cardiomegaly, postoperative findings, patchy bilateral airspace  disease and small pleural effusions.     Electronically Signed By-Romain Gutierrez MD On:3/21/2021 8:08 AM  This report was finalized on [id number] by  Romain Gutierrez MD.    XR Chest 1 View [[id number]] Collected: 03/20/21 1915     Updated: 03/20/21 1918    Narrative:      XR CHEST 1 VW-     Date of Exam: 3/20/2021 6:52 PM     Indication: central line removal; I35.0-Nonrheumatic aortic (valve)  stenosis.     Comparison: 03/20/2021 1:50 PM     Technique: A single view of the chest was obtained.     FINDINGS:      Interval the right internal jugular introducer sheath has been  removed.  Patient status post median sternotomy.  Cardiomegaly is  stable.  There is persistent pulmonary vascular congestion and patchy  bilateral airspace disease.  Bilateral pleural effusions are stable.   There is no evidence of pneumothorax.             Impression:            1.  Stable cardiomegaly and pulmonary vascular congestion.  2.  No change in patchy bilateral airspace disease.  3.  Interval removal  of right internal jugular introducer sheath.        Electronically Signed By-Zana Anaya MD On:3/20/2021 7:16 PM  This report was finalized on [id number] by  Zana Anaya MD.            Results for orders placed during the hospital encounter of 03/18/21    XR Chest 1 View    Narrative  DATE OF EXAM:  3/23/2021 9:26 AM    PROCEDURE:  XR CHEST 1 VW-    INDICATIONS:  post op open heart; I35.0-Nonrheumatic aortic (valve) stenosis patient  on ventilator evaluate for pneumothorax    COMPARISON:  03/20/2021    TECHNIQUE:  Single radiographic AP view of the chest was obtained.    FINDINGS:  Single frontal view chest AP some reportable reveals the patient is  status post sternotomy and surgical heart valve replacement. The heart  is mildly enlarged. Superior mediastinum is normal. A nasogastric  courses down the esophagus and stomach below diaphragm. Inferior tip the  endotracheal tube lies 3 cm above the davi. Proximal tip of the right  internal jugular central venous line catheter projects over the main  pulmonary outflow tract. Proximal tip of the right antecubital PICC  central venous line catheter projects over the superior vena cava. There  bilateral diffuse mild infiltrates unchanged since previous study. There  is a small atelectatic type infiltrates in the left lower lobe along  minor improved since previous study. No evidence of pneumothorax. No  evidence of pleural effusion.    Impression  1 mild atelectatic infiltrate left lower lobe along the left lung base  might have improved since previous study.  2. No evidence of pneumothorax    Electronically Signed By-Gonzales Groves MD On:3/23/2021 9:54 AM  This report was finalized on [id number] by  Gonzales Groves MD.      XR Abdomen KUB    Narrative  DATE OF EXAM:  3/22/2021 9:51 AM    PROCEDURE:  XR ABDOMEN KUB-    INDICATIONS:  dht insertion; I35.0-Nonrheumatic aortic (valve) stenosis    COMPARISON:  No Comparisons  Available    TECHNIQUE:  Single radiographic view of the abdomen was obtained.    FINDINGS:  An enteric tube is present within the stomach. There is no evidence of  pneumatosis or free air. Evaluation is limited due to supine technique.  No dilated loops of bowel identified. No suspicious calcifications  identified. No significant stool burden identified. No acute osseous  abnormality.    Impression  Enteric tube within the stomach.    Electronically Signed By-Tana Sutherland MD On:3/22/2021 10:37 AM  This report was finalized on [id number] by  Tana Sutherland MD.      XR Chest 1 View    Narrative  Examination: XR CHEST 1 VW-    Date of Exam: 3/22/2021 9:51 AM    Indication: hypoxic resp failure, intubation; I35.0-Nonrheumatic aortic  (valve) stenosis.    Comparison: Radiograph 03/22/2021    Technique: 1 view of the chest    Findings:  An endotracheal tube is present with the tip in the mid to distal  trachea. Mild patchy airspace changes are present with a small amount of  pleural fluid present bilaterally. No pneumothorax. The heart size is  enlarged. Median sternotomy wires are present. An enteric tube is  present within the stomach. The pulmonary vasculature appears  indistinct.. No acute osseous abnormality identified.    Impression  Mild pulmonary edema pattern with small bilateral pleural effusions,  mildly improved as compared to the previous study.    Electronically Signed By-Tana Sutherland MD On:3/22/2021 10:19 AM  This report was finalized on [id number] by  Tana Sutherland MD.        Results for orders placed during the hospital encounter of 03/18/21    Duplex Venous Lower Extremity - Bilateral CAR    Interpretation Summary  · Normal bilateral lower extremity venous duplex scan.      ASSESSMENT / PLAN      Aortic valve stenosis    · Hypernatremia--due to free water deficit, total of about 5.5 liters. Will start free water per DHT and 1L D5W  · JERROD--likely ATN in setting of acute resp failure/  contrasat  · Acute resp failure  · S/p AVR (POD 4)  · Morbid obesity  · HTN    Patient non oliguric  Replace free water  Diurese as needed  Monitor UOP, cr, lytes    I discussed the patients findings and my recommendations with nursing staff and consulting provider    Will follow along closely. Thank you for allowing us to see this patient in renal consultation.    [facility]  [phone]  MD Yovani Lorenzo MD  21  11:06 EDT              Electronically signed by Yovani Lerma MD at 21 1636          Physical Therapy Notes (most recent note)      Kelsey Becerril, PT at 21 1301  Version 1 of 1         Patient Name: Claudia Rust  : 1950    MRN: [id number]                              Today's Date: 2021       Admit Date: 3/18/2021    Visit Dx:     ICD-10-CM ICD-9-CM   1. Rheumatoid arthritis, involving unspecified site, unspecified whether rheumatoid factor present (CMS/HCC)  M06.9 714.0   2. Aortic valve stenosis, etiology of cardiac valve disease unspecified  I35.0 424.1   3. Acute respiratory failure with hypoxia (CMS/HCC)  J96.01 518.81   4. Chronic obstructive pulmonary disease, unspecified COPD type (CMS/HCC)  J44.9 496     Patient Active Problem List   Diagnosis   • Morbidly obese (CMS/[facility])   • Dyspnea on exertion   • Abnormal nuclear stress test   • Nonrheumatic aortic valve stenosis   • Prediabetes   • Dyslipidemia   • Essential hypertension   • Acute UTI   • Bipolar disorder (CMS/[facility])   • COPD (chronic obstructive pulmonary disease) (CMS/[facility])   • High triglycerides   • Gastroesophageal reflux disease   • Rheumatoid arthritis (CMS/[facility])   • Aortic stenosis, severe   • Chronic heart failure with preserved ejection fraction (HFpEF) (CMS/[facility])   • Aortic valve stenosis     Past Medical History:   Diagnosis Date   • Acute congestive heart failure (CMS/[facility])    • Allergies    • Anxiety    • Arthritis    • Asthma    • Bilateral leg edema    •  "Bipolar 1 disorder (CMS/[facility])    • Bulging lumbar disc     X3   • Cancer (CMS/[facility])     states had \"cancerous tumor during pregnancy and had to have hysterectomy\"   • Cataract     bilateral   • Cellulitis 03/2021    bilateral legs   • Compression fracture of vertebral column (CMS/[facility])     LUMBAR   • COPD (chronic obstructive pulmonary disease) (CMS/[facility])    • Delayed emergence from anesthesia    • Depression    • Depression    • Dyslipidemia    • Dyspnea on exertion    • Fibromyalgia    • GERD (gastroesophageal reflux disease)    • Hiatal hernia    • Hyperlipidemia    • Hypertension    • Hypothyroid     HYPOTHYROID   • IBS (irritable bowel syndrome)    • Migraines    • Morbid obesity (CMS/[facility])    • Neuropathy    • Panic attacks    • Peripheral edema    • PONV (postoperative nausea and vomiting)    • Poor mobility     rolling walker   • Prediabetes    • PVD (peripheral vascular disease) (CMS/[facility])    • Rheumatoid aortitis    • Spinal stenosis    • Spinal stenosis    • Vertigo    • Vitamin D deficiency      Past Surgical History:   Procedure Laterality Date   • BRONCHOSCOPY N/A 3/25/2021    Procedure: BRONCHOSCOPY AT BEDSIDE WITH BRONCHIOALVEOLAR LAVAGE;  Surgeon: Glenn Reyes MD;  Location: [facility] ENDOSCOPY;  Service: Pulmonary;  Laterality: N/A;  PNA   • CARDIAC CATHETERIZATION  2009   • CARDIAC CATHETERIZATION N/A 8/14/2019    Procedure: Left Heart Cath;  Surgeon: Jose Levi MD;  Location: [facility] CATH INVASIVE LOCATION;  Service: Cardiovascular   • CARDIAC CATHETERIZATION N/A 8/14/2019    Procedure: Right Heart Cath;  Surgeon: Jose Levi MD;  Location: [facility] CATH INVASIVE LOCATION;  Service: Cardiovascular   • CARDIAC CATHETERIZATION N/A 8/14/2019    Procedure: Aortic root aortogram;  Surgeon: Jose Levi MD;  Location: [facility] CATH INVASIVE LOCATION;  Service: Cardiovascular   • CARDIAC CATHETERIZATION N/A 1/20/2021    Procedure: Left Heart Cath;  Surgeon: Jose Levi" MD [place];  Location: [facility] INVASIVE LOCATION;  Service: Cardiovascular;  Laterality: N/A;   • COLONOSCOPY     • CYSTOCELE REPAIR  1984   • ELBOW ARTHROPLASTY  2011   • ENDOSCOPY     • FOOT SURGERY     • GALLBLADDER SURGERY  2002   • TONSILLECTOMY     • VAGINAL HYSTERECTOMY  1972     General Information     Row Name 04/05/21 1253          Physical Therapy Time and Intention    Document Type  re-evaluation  -     Mode of Treatment  physical therapy  -     Row Name 04/05/21 1253          General Information    Patient Profile Reviewed  yes  -     Existing Precautions/Restrictions  sternal;oxygen therapy device and L/min;fall  -     Row Name 04/05/21 1253          Living Environment    Lives With  spouse  -     Row Name 04/05/21 1253          Cognition    Orientation Status (Cognition)  oriented x 3  -     Row Name 04/05/21 1253          Safety Issues, Functional Mobility    Safety Issues Affecting Function (Mobility)  insight into deficits/self-awareness;judgment;safety precaution awareness;problem-solving  -     Impairments Affecting Function (Mobility)  balance;cognition;postural/trunk control;endurance/activity tolerance;strength;shortness of breath;pain  -       User Key  (r) = Recorded By, (t) = Taken By, (c) = Cosigned By    Initials Name Provider Type    HC Kelsey Becerril, PT Physical Therapist        Mobility     Row Name 04/05/21 1253          Bed Mobility    Bed Mobility  supine-sit;sit-supine;scooting/bridging  -     Scooting/Bridging [place] (Bed Mobility)  2 person assist;maximum assist (25% patient effort)  -     Supine-Sit [place] (Bed Mobility)  2 person assist;maximum assist (25% patient effort)  -     Sit-Supine [place] (Bed Mobility)  2 person assist;dependent (less than 25% patient effort)  -     Assistive Device (Bed Mobility)  draw sheet  -     Row Name 04/05/21 1253          Bed-Chair Transfer    Bed-Chair [place] (Transfers)  unable to  assess  -HC     Row Name 04/05/21 1253          Sit-Stand Transfer    Sit-Stand [place] (Transfers)  unable to assess  -     Row Name 04/05/21 1253          Gait/Stairs (Locomotion)    [place] Level (Gait)  unable to assess  -     Comment (Gait/Stairs)  Cardiac level I  -HC       User Key  (r) = Recorded By, (t) = Taken By, (c) = Cosigned By    Initials Name Provider Type     Kelsey Becerril, PT Physical Therapist        Obj/Interventions     Row Name 04/05/21 1253          Range of Motion Comprehensive    Comment, General Range of Motion  PROM BLE WFL  -HC     Row Name 04/05/21 1253          Strength Comprehensive (MMT)    Comment, General Manual Muscle Testing (MMT) Assessment  BLEs 3-/5  -     Row Name 04/05/21 1253          Balance    Balance Assessment  sitting static balance;sitting dynamic balance  -HC     Static Sitting Balance  mild impairment  -HC     Dynamic Sitting Balance  moderate impairment  -     Comment, Balance  Pt able to sit EOB x15 minutes with CGA-modA pending fatigue and pain  -       User Key  (r) = Recorded By, (t) = Taken By, (c) = Cosigned By    Initials Name Provider Type     Kelsey Becerril, PT Physical Therapist        Goals/Plan     Row Name 04/05/21 1257          Bed Mobility Goal 1 (PT)    Activity/Assistive Device (Bed Mobility Goal 1, PT)  sit to supine/supine to sit  -HC     [place] Level/Cues Needed (Bed Mobility Goal 1, PT)  minimum assist (75% or more patient effort)  -     Time Frame (Bed Mobility Goal 1, PT)  2 weeks  -     Row Name 04/05/21 1257          Transfer Goal 1 (PT)    Activity/Assistive Device (Transfer Goal 1, PT)  sit-to-stand/stand-to-sit;bed-to-chair/chair-to-bed  -HC     [place] Level/Cues Needed (Transfer Goal 1, PT)  moderate assist (50-74% patient effort)  -     Time Frame (Transfer Goal 1, PT)  2 weeks  -     Row Name 04/05/21 1257          Gait Training Goal 1 (PT)    Activity/Assistive Device (Gait Training  Goal 1, PT)  gait (walking locomotion);assistive device use  -     [place] Level (Gait Training Goal 1, PT)  moderate assist (50-74% patient effort)  -     Distance (Gait Training Goal 1, PT)  25 ft  -     Time Frame (Gait Training Goal 1, PT)  2 weeks  -       User Key  (r) = Recorded By, (t) = Taken By, (c) = Cosigned By    Initials Name Provider Type     Kelsey Becerril, PT Physical Therapist        Clinical Impression     Row Name 04/05/21 1254          Pain    Additional Documentation  Pain Scale: Numbers Pre/Post-Treatment (Group)  -     Row Name 04/05/21 1254          Pain Scale: Numbers Pre/Post-Treatment    Pretreatment Pain Rating  4/10  -HC     Posttreatment Pain Rating  4/10  -HC     Pre/Posttreatment Pain Comment  chest discomfort  -     Row Name 04/05/21 1254          Plan of Care Review    Outcome Summary  Re-eval completed on 71 yo female s/p extubation 4/4/2021.  Pt s/p AVR 3/18/2021 with extubation 3/19/2021 and requiring re-intubation 3/22/2021 due to resp status.  -     Row Name 04/05/21 1254          Therapy Assessment/Plan (PT)    Patient/Family Therapy Goals Statement (PT)  increase strength  -     Rehab Potential (PT)  fair, will monitor progress closely  -     Criteria for Skilled Interventions Met (PT)  yes;skilled treatment is necessary  -     Predicted Duration of Therapy Intervention (PT)  until d/c  -     Row Name 04/05/21 1254          Vital Signs    Post Systolic BP Rehab  95  -HC     Post Treatment Diastolic BP  36  -HC     Posttreatment Heart Rate (beats/min)  66  -HC     Post SpO2 (%)  92  -     Row Name 04/05/21 1254          Positioning and Restraints    Pre-Treatment Position  in bed  -     Post Treatment Position  bed  -HC     In Bed  notified nsg;call light within reach  present  -       User Key  (r) = Recorded By, (t) = Taken By, (c) = Cosigned By    Initials Name Provider Type     Kelsey Becerril, PT Physical Therapist         Outcome Measures     Row Name 04/05/21 1258          How much help from another person do you currently need...    Turning from your back to your side while in flat bed without using bedrails?  2  -HC     Moving from lying on back to sitting on the side of a flat bed without bedrails?  2  -HC     Moving to and from a bed to a chair (including a wheelchair)?  2  -HC     Standing up from a chair using your arms (e.g., wheelchair, bedside chair)?  1  -HC     Climbing 3-5 steps with a railing?  1  -HC     To walk in hospital room?  1  -HC     AM-PAC 6 Clicks Score (PT)  9  -HC     Row Name 04/05/21 1258          Modified [place] Scale    Modified [place] Scale  5 - Severe disability.  Bedridden, incontinent, and requiring constant nursing care and attention.  -     Row Name 04/05/21 1258          Functional Assessment    Outcome Measure Options  AM-PAC 6 Clicks Basic Mobility (PT);Modified [place]  -HC       User Key  (r) = Recorded By, (t) = Taken By, (c) = Cosigned By    Initials Name Provider Type    HC Kelsey Becerril, PT Physical Therapist        Physical Therapy Education                 Title: PT OT SLP Therapies (In Progress)     Topic: Physical Therapy (In Progress)     Point: Mobility training (In Progress)     Learning Progress Summary           Patient Acceptance, E, NR by  at 4/5/2021 1258    Acceptance, E,TB, NR,DU by BP at 4/5/2021 1035    Acceptance, E, NR by BP at 4/2/2021 1048    Comment: pt intubated and sedated    Acceptance, E, NR by BP at 3/26/2021 1048    Comment: pt intubated and sedated    Acceptance, E, NR by BP at 3/24/2021 1438    Comment: pt intubated and sedated    Acceptance, E, NR by SM at 3/22/2021 1527    Acceptance, E, VU by CR at 3/19/2021 1312   Family Acceptance, E,TB, NR,DU by BP at 4/5/2021 1035    Acceptance, E, NR by BP at 4/2/2021 1048    Comment: pt intubated and sedated    Acceptance, E, NR by BP at 3/26/2021 1048    Comment: pt intubated and sedated   Significant  Other Acceptance, E, VU by CR at 3/19/2021 1312                   Point: Home exercise program (Done)     Learning Progress Summary           Patient Acceptance, E,TB, NR,DU by BP at 4/5/2021 1035    Acceptance, E, NR by BP at 4/2/2021 1048    Comment: pt intubated and sedated    Acceptance, E, NR by BP at 3/26/2021 1048    Comment: pt intubated and sedated    Acceptance, E, NR by BP at 3/24/2021 1438    Comment: pt intubated and sedated    Acceptance, E, NR by SM at 3/22/2021 1527   Family Acceptance, E,TB, NR,DU by BP at 4/5/2021 1035    Acceptance, E, NR by BP at 4/2/2021 1048    Comment: pt intubated and sedated    Acceptance, E, NR by BP at 3/26/2021 1048    Comment: pt intubated and sedated                   Point: Body mechanics (Done)     Learning Progress Summary           Patient Acceptance, E,TB, NR,DU by BP at 4/5/2021 1035    Acceptance, E, NR by BP at 4/2/2021 1048    Comment: pt intubated and sedated    Acceptance, E, NR by BP at 3/26/2021 1048    Comment: pt intubated and sedated    Acceptance, E, NR by BP at 3/24/2021 1438    Comment: pt intubated and sedated    Acceptance, E, NR by SM at 3/22/2021 1527   Family Acceptance, E,TB, NR,DU by BP at 4/5/2021 1035    Acceptance, E, NR by BP at 4/2/2021 1048    Comment: pt intubated and sedated    Acceptance, E, NR by BP at 3/26/2021 1048    Comment: pt intubated and sedated                   Point: Precautions (In Progress)     Learning Progress Summary           Patient Acceptance, E, NR by HC at 4/5/2021 1258    Acceptance, E,TB, NR,DU by BP at 4/5/2021 1035    Acceptance, E, NR by BP at 4/2/2021 1048    Comment: pt intubated and sedated    Acceptance, E, NR by BP at 3/26/2021 1048    Comment: pt intubated and sedated    Acceptance, E, NR by BP at 3/24/2021 1438    Comment: pt intubated and sedated    Acceptance, E, NR by SM at 3/22/2021 1527    Acceptance, E, VU by CR at 3/19/2021 1312   Family Acceptance, E,TB, NR,DU by BP at 4/5/2021 1035     Acceptance, E, NR by BP at 4/2/2021 1048    Comment: pt intubated and sedated    Acceptance, E, NR by BP at 3/26/2021 1048    Comment: pt intubated and sedated   Significant Other Acceptance, E, VU by CR at 3/19/2021 1312                               User Key     Initials Effective Dates Name Provider Type Discipline    HC 04/17/20 -  Kelsey Becerril, PT Physical Therapist PT    CR 03/01/19 -  Reyes, Carmela, PT Physical Therapist PT    BP 06/08/20 -  João Joaquin, RN Registered Nurse Nurse     12/21/20 -  Rabia Morgan, RN Registered Nurse Nurse              PT Recommendation and Plan  Planned Therapy Interventions (PT): balance training, bed mobility training, gait training, neuromuscular re-education, ROM (range of motion), strengthening, patient/family education, postural re-education, transfer training, home exercise program  Outcome Summary: Re-eval completed on 71 yo female s/p extubation 4/4/2021.  Pt s/p AVR 3/18/2021 with extubation 3/19/2021 and requiring re-intubation 3/22/2021 due to resp status.  Pt presents with global weakness requiring assist x2 for bed mobility after prolonged intubation.  Pt able to sit EOB with CGA-modA pending fatigue and pain.  Not safe to attempt transfer due to hypotension and global weakness at this time.  Attempt transfer at next session if appropriate.  Pt will need extensive IP rehab to address deficits.  PT will follow 5x/week while at [facility].  PPE donned: mask, goggles, gloves.     Time Calculation:   PT Charges     Row Name 04/05/21 1301             Time Calculation    Start Time  0902  -HC      Stop Time  0927  -      Time Calculation (min)  25 min  -      PT Received On  04/05/21  -      PT - Next Appointment  04/06/21  -      PT Goal Re-Cert Due Date  04/19/21  -HC         Time Calculation- PT    Total Timed Code Minutes- PT  10 minute(s)  -HC        User Key  (r) = Recorded By, (t) = Taken By, (c) = Cosigned By    Initials Name Provider Type    HC  Kelsey Becerril, PT Physical Therapist        Therapy Charges for Today     Code Description Service Date Service Provider Modifiers Qty    54835911538 HC PT RE-EVAL ESTABLISHED PLAN 2 2021 Kelsey Becerril, PT GP 1    71264934653 HC PT NEUROMUSC RE EDUCATION EA 15 MIN 2021 Kelsey Becerril, PT GP 1          PT G-Codes  Outcome Measure Options: AM-PAC 6 Clicks Basic Mobility (PT), Modified [place]  AM-PAC 6 Clicks Score (PT): 9  Modified [place] Scale: 5 - Severe disability.  Bedridden, incontinent, and requiring constant nursing care and attention.    Kelsey Becerril PT  2021      Electronically signed by Kelsey Becerril, PT at 21 1301          Occupational Therapy Notes (most recent note)      Selam Adair OT at 21 1437          Patient Name: Claudia Rust  : 1950    MRN: [id number]                              Today's Date: 2021       Admit Date: 3/18/2021    Visit Dx:     ICD-10-CM ICD-9-CM   1. Rheumatoid arthritis, involving unspecified site, unspecified whether rheumatoid factor present (CMS/HCC)  M06.9 714.0   2. Aortic valve stenosis, etiology of cardiac valve disease unspecified  I35.0 424.1   3. Acute respiratory failure with hypoxia (CMS/HCC)  J96.01 518.81   4. Chronic obstructive pulmonary disease, unspecified COPD type (CMS/HCC)  J44.9 496     Patient Active Problem List   Diagnosis   • Morbidly obese (CMS/[facility])   • Dyspnea on exertion   • Abnormal nuclear stress test   • Nonrheumatic aortic valve stenosis   • Prediabetes   • Dyslipidemia   • Essential hypertension   • Acute UTI   • Bipolar disorder (CMS/[facility])   • COPD (chronic obstructive pulmonary disease) (CMS/[facility])   • High triglycerides   • Gastroesophageal reflux disease   • Rheumatoid arthritis (CMS/[facility])   • Aortic stenosis, severe   • Chronic heart failure with preserved ejection fraction (HFpEF) (CMS/[facility])   • Aortic valve stenosis     Past Medical History:   Diagnosis Date   • Acute congestive  "heart failure (CMS/[facility])    • Allergies    • Anxiety    • Arthritis    • Asthma    • Bilateral leg edema    • Bipolar 1 disorder (CMS/[facility])    • Bulging lumbar disc     X3   • Cancer (CMS/[facility])     states had \"cancerous tumor during pregnancy and had to have hysterectomy\"   • Cataract     bilateral   • Cellulitis 03/2021    bilateral legs   • Compression fracture of vertebral column (CMS/[facility])     LUMBAR   • COPD (chronic obstructive pulmonary disease) (CMS/[facility])    • Delayed emergence from anesthesia    • Depression    • Depression    • Dyslipidemia    • Dyspnea on exertion    • Fibromyalgia    • GERD (gastroesophageal reflux disease)    • Hiatal hernia    • Hyperlipidemia    • Hypertension    • Hypothyroid     HYPOTHYROID   • IBS (irritable bowel syndrome)    • Migraines    • Morbid obesity (CMS/[facility])    • Neuropathy    • Panic attacks    • Peripheral edema    • PONV (postoperative nausea and vomiting)    • Poor mobility     rolling walker   • Prediabetes    • PVD (peripheral vascular disease) (CMS/[facility])    • Rheumatoid aortitis    • Spinal stenosis    • Spinal stenosis    • Vertigo    • Vitamin D deficiency      Past Surgical History:   Procedure Laterality Date   • AORTIC VALVE REPAIR/REPLACEMENT N/A 3/18/2021    Procedure: AORTIC VALVE REPLACEMENT;  Surgeon: Olaf Mcgill MD;  Location: [facility] CVOR;  Service: Cardiothoracic;  Laterality: N/A;   • BRONCHOSCOPY N/A 3/25/2021    Procedure: BRONCHOSCOPY AT BEDSIDE WITH BRONCHIOALVEOLAR LAVAGE;  Surgeon: Glenn Reyes MD;  Location: [facility] ENDOSCOPY;  Service: Pulmonary;  Laterality: N/A;  PNA   • CARDIAC CATHETERIZATION  2009   • CARDIAC CATHETERIZATION N/A 8/14/2019    Procedure: Left Heart Cath;  Surgeon: Jose Levi MD;  Location: [facility] CATH INVASIVE LOCATION;  Service: Cardiovascular   • CARDIAC CATHETERIZATION N/A 8/14/2019    Procedure: Right Heart Cath;  Surgeon: Jose Levi MD;  Location: [facility] CATH INVASIVE LOCATION;  Service: " Cardiovascular   • CARDIAC CATHETERIZATION N/A 8/14/2019    Procedure: Aortic root aortogram;  Surgeon: Jose Levi MD;  Location:  [facility] CATH INVASIVE LOCATION;  Service: Cardiovascular   • CARDIAC CATHETERIZATION N/A 1/20/2021    Procedure: Left Heart Cath;  Surgeon: Jose Levi MD;  Location: [facility] CATH INVASIVE LOCATION;  Service: Cardiovascular;  Laterality: N/A;   • COLONOSCOPY     • CYSTOCELE REPAIR  1984   • ELBOW ARTHROPLASTY  2011   • ENDOSCOPY     • FOOT SURGERY     • GALLBLADDER SURGERY  2002   • TONSILLECTOMY     • VAGINAL HYSTERECTOMY  1972     General Information     Row Name 04/05/21 1427          OT Time and Intention    Document Type  re-evaluation  -ES     Mode of Treatment  occupational therapy;co-treatment;physical therapy  -ES     Row Name 04/05/21 1427          General Information    Patient Profile Reviewed  yes  -ES     Row Name 04/05/21 1427          Occupational Profile    Reason for Services/Referral (Occupational Profile)  Re-eval completed on 69 yo female s/p extubation 4/4/2021.  Pt s/p AVR 3/18/2021 with extubation 3/19/2021 and requiring re-intubation 3/22/2021 due to resp status.  See Initial Eval for PLOF.  -ES     Row Name 04/05/21 1427          Cognition    Orientation Status (Cognition)  oriented x 3  -ES     Row Name 04/05/21 1427          Safety Issues, Functional Mobility    Safety Issues Affecting Function (Mobility)  insight into deficits/self-awareness;safety precautions follow-through/compliance;safety precaution awareness;problem-solving;judgment  -ES     Impairments Affecting Function (Mobility)  balance;cognition;postural/trunk control;endurance/activity tolerance;strength;shortness of breath;pain  -ES     Cognitive Impairments, Mobility Safety/Performance  attention  -ES       User Key  (r) = Recorded By, (t) = Taken By, (c) = Cosigned By    Initials Name Provider Type    ES Selam Adair OT Occupational Therapist           Mobility/ADL's     Row Name 04/05/21 1428          Bed Mobility    Bed Mobility  supine-sit;sit-supine;scooting/bridging  -ES     Scooting/Bridging [place] (Bed Mobility)  2 person assist;maximum assist (25% patient effort)  -ES     Supine-Sit [place] (Bed Mobility)  2 person assist;maximum assist (25% patient effort)  -ES     Sit-Supine [place] (Bed Mobility)  2 person assist;dependent (less than 25% patient effort)  -ES     Assistive Device (Bed Mobility)  draw sheet  -ES     Row Name 04/05/21 1428          Transfers    Bed-Chair [place] (Transfers)  unable to assess  -ES     Sit-Stand [place] (Transfers)  unable to assess  -ES     Row Name 04/05/21 1428          Activities of Daily Living    BADL Assessment/Intervention  upper body dressing;lower body dressing;grooming  -ES     [facility] Name 04/05/21 1428          Lower Body Dressing Assessment/Training    [place] Level (Lower Body Dressing)  dependent (less than 25% patient effort)  -ES     Row Name 04/05/21 1428          Upper Body Dressing Assessment/Training    [place] Level (Upper Body Dressing)  maximum assist (25% patient effort)  -     Row Name 04/05/21 1428          Grooming Assessment/Training    [place] Level (Grooming)  maximum assist (25% patient effort)  -ES       User Key  (r) = Recorded By, (t) = Taken By, (c) = Cosigned By    Initials Name Provider Type     Selam Adair OT Occupational Therapist        Obj/Interventions     Row Name 04/05/21 1429          Sensory Assessment (Somatosensory)    Sensory Assessment (Somatosensory)  UE sensation intact  -[facility] Name 04/05/21 1429          Vision Assessment/Intervention    Visual Impairment/Limitations  corrective lenses full-time  -     Row Name 04/05/21 1429          Range of Motion Comprehensive    General Range of Motion  upper extremity range of motion deficits identified  -ES     Comment, General Range of Motion  PROM WFL within sternal  precautions  -ES     Row Name 04/05/21 1429          Strength Comprehensive (MMT)    Comment, General Manual Muscle Testing (MMT) Assessment  BUE 2+/5, assessment limited by sternal precautions  -ES     Row Name 04/05/21 1429          Balance    Balance Assessment  sitting static balance;sitting dynamic balance  -ES     Static Sitting Balance  mild impairment  -ES     Dynamic Sitting Balance  mild impairment;moderate impairment  -ES       User Key  (r) = Recorded By, (t) = Taken By, (c) = Cosigned By    Initials Name Provider Type    Selam Suazo OT Occupational Therapist        Goals/Plan     Row Name 04/05/21 1435          Toileting Goal 1 (OT)    Activity/Device (Toileting Goal 1, OT)  toileting skills, all  -ES     [place] Level/Cues Needed (Toileting Goal 1, OT)  moderate assist (50-74% patient effort)  -ES     Time Frame (Toileting Goal 1, OT)  2 weeks  -ES     Row Name 04/05/21 1435          Grooming Goal 1 (OT)    Activity/Device (Grooming Goal 1, OT)  grooming skills, all  -ES     [place] (Grooming Goal 1, OT)  minimum assist (75% or more patient effort)  -ES     Time Frame (Grooming Goal 1, OT)  2 weeks  -ES     Row Name 04/05/21 1435          Strength Goal 1 (OT)    Strength Goal 1 (OT)  Cardiac Rehab HEP With Mod i  -ES     Row Name 04/05/21 1435          Therapy Assessment/Plan (OT)    Planned Therapy Interventions (OT)  activity tolerance training;BADL retraining;functional balance retraining;manual therapy/joint mobilization;neuromuscular control/coordination retraining;occupation/activity based interventions;patient/caregiver education/training;ROM/therapeutic exercise;strengthening exercise;transfer/mobility retraining  -ES       User Key  (r) = Recorded By, (t) = Taken By, (c) = Cosigned By    Initials Name Provider Type    Selam Suazo OT Occupational Therapist        Clinical Impression     Row Name 04/05/21 1431          Pain Scale: Numbers Pre/Post-Treatment     Pretreatment Pain Rating  4/10  -ES     Posttreatment Pain Rating  4/10  -ES     Pain Location - Orientation  upper  -ES     Pain Location  chest;extremity  -ES     Row Name 04/05/21 1430          Plan of Care Review    Plan of Care Reviewed With  spouse;patient  -ES     Outcome Summary  Re-eval completed on 71 yo female s/p extubation 4/4/2021.  Pt s/p AVR 3/18/2021 with extubation 3/19/2021 and requiring re-intubation 3/22/2021 due to resp status.  See Initial Eval for PLOF. Pt supine in bed upon OT/PT arrival. Patient with dobhoff feeding tube, 8L hiflow O2, moser, IV, and cardiac monitor limiting movemnt. Max A x2 for balance/safety during bed mobility. C/o pain with BUe movement. sits EOB approximately 15 minutes. initially sits up with CGA, but fatigues to requiring Min-Mod A for balance EOB.  Pt requires max verbal cues for sternal precautions. Spouse present and supportive, and aware of pt's funcitonal deficits. Pt adamant on returning home, but appears unsafe. OT recommends IP rehab at discharge. PPE: Gloves, mask, eyewear  -ES     Row Name 04/05/21 1430          Therapy Assessment/Plan (OT)    Rehab Potential (OT)  good, to achieve stated therapy goals  -ES     Therapy Frequency (OT)  5 times/wk  -ES     Row Name 04/05/21 1430          Therapy Plan Review/Discharge Plan (OT)    Anticipated Discharge Disposition (OT)  inpatient rehabilitation facility  -ES     Row Name 04/05/21 1430          Vital Signs    Pre Systolic BP Rehab  120  -ES     Pre Treatment Diastolic BP  88  -ES     Intra Systolic BP Rehab  74  -ES     Intra Treatment Diastolic BP  24 Drops as returned to supine  -ES     Post Systolic BP Rehab  100  -ES     Post Treatment Diastolic BP  60  -ES     Pre SpO2 (%)  88  -ES     O2 Delivery Pre Treatment  hi-flow 8L  -ES     Intra SpO2 (%)  94  -ES     O2 Delivery Intra Treatment  hi-flow  -ES     Post SpO2 (%)  91  -ES     O2 Delivery Post Treatment  hi-flow  -ES     Pre Patient Position  Supine   -ES     Intra Patient Position  Sitting  -ES     Post Patient Position  Supine  -ES     Row Name 04/05/21 1430          Positioning and Restraints    Pre-Treatment Position  in bed  -ES     Post Treatment Position  bed  -ES     In Bed  notified nsg;call light within reach;encouraged to call for assist;exit alarm on;with family/caregiver  -ES       User Key  (r) = Recorded By, (t) = Taken By, (c) = Cosigned By    Initials Name Provider Type    eSlam Suazo OT Occupational Therapist        Outcome Measures     Row Name 04/05/21 1436          How much help from another is currently needed...    Putting on and taking off regular lower body clothing?  1  -ES     Bathing (including washing, rinsing, and drying)  1  -ES     Toileting (which includes using toilet bed pan or urinal)  1  -ES     Putting on and taking off regular upper body clothing  2  -ES     Taking care of personal grooming (such as brushing teeth)  2  -ES     Eating meals  1  -ES     AM-PAC 6 Clicks Score (OT)  8  -ES     Row Name 04/05/21 1436          Functional Assessment    Outcome Measure Options  AM-PAC 6 Clicks Daily Activity (OT)  -ES       User Key  (r) = Recorded By, (t) = Taken By, (c) = Cosigned By    Initials Name Provider Type    Selam Suazo OT Occupational Therapist        Occupational Therapy Education                 Title: PT OT SLP Therapies (In Progress)     Topic: Occupational Therapy (Done)     Point: ADL training (Done)     Description:   Instruct learner(s) on proper safety adaptation and remediation techniques during self care or transfers.   Instruct in proper use of assistive devices.              Learning Progress Summary           Patient Acceptance, E,TB, VU,NR by ES at 4/5/2021 1437    Acceptance, E,TB, NR,DU by BP at 4/5/2021 1035    Acceptance, E, NR by BP at 4/2/2021 1048    Comment: pt intubated and sedated    Acceptance, E, NR by BP at 3/26/2021 1048    Comment: pt intubated and sedated     Acceptance, E, NR by BP at 3/24/2021 1438    Comment: pt intubated and sedated    Acceptance, E, NR by SM at 3/22/2021 1527   Family Acceptance, E,TB, NR,DU by BP at 4/5/2021 1035    Acceptance, E, NR by BP at 4/2/2021 1048    Comment: pt intubated and sedated    Acceptance, E, NR by BP at 3/26/2021 1048    Comment: pt intubated and sedated                   Point: Home exercise program (Done)     Description:   Instruct learner(s) on appropriate technique for monitoring, assisting and/or progressing therapeutic exercises/activities.              Learning Progress Summary           Patient Acceptance, E,TB, VU,NR by ES at 4/5/2021 1437    Acceptance, E,TB, NR,DU by BP at 4/5/2021 1035    Acceptance, E, NR by BP at 4/2/2021 1048    Comment: pt intubated and sedated    Acceptance, E, NR by BP at 3/26/2021 1048    Comment: pt intubated and sedated    Acceptance, E, NR by BP at 3/24/2021 1438    Comment: pt intubated and sedated    Acceptance, E, NR by SM at 3/22/2021 1527   Family Acceptance, E,TB, NR,DU by BP at 4/5/2021 1035    Acceptance, E, NR by BP at 4/2/2021 1048    Comment: pt intubated and sedated    Acceptance, E, NR by BP at 3/26/2021 1048    Comment: pt intubated and sedated                   Point: Precautions (Done)     Description:   Instruct learner(s) on prescribed precautions during self-care and functional transfers.              Learning Progress Summary           Patient Acceptance, E,TB, VU,NR by ES at 4/5/2021 1437    Acceptance, E,TB, NR,DU by BP at 4/5/2021 1035    Acceptance, E, NR by BP at 4/2/2021 1048    Comment: pt intubated and sedated    Acceptance, E, NR by BP at 3/26/2021 1048    Comment: pt intubated and sedated    Acceptance, E, NR by BP at 3/24/2021 1438    Comment: pt intubated and sedated    Acceptance, E, NR by SM at 3/22/2021 1527   Family Acceptance, E,TB, NR,DU by BP at 4/5/2021 1035    Acceptance, E, NR by BP at 4/2/2021 1048    Comment: pt intubated and sedated     Acceptance, E, NR by BP at 3/26/2021 1048    Comment: pt intubated and sedated                   Point: Body mechanics (Done)     Description:   Instruct learner(s) on proper positioning and spine alignment during self-care, functional mobility activities and/or exercises.              Learning Progress Summary           Patient Acceptance, E,TB, VU,NR by  at 4/5/2021 1437    Acceptance, E,TB, NR,DU by BP at 4/5/2021 1035    Acceptance, E, NR by BP at 4/2/2021 1048    Comment: pt intubated and sedated    Acceptance, E, NR by BP at 3/26/2021 1048    Comment: pt intubated and sedated    Acceptance, E, NR by BP at 3/24/2021 1438    Comment: pt intubated and sedated    Acceptance, E, NR by  at 3/22/2021 1527    Acceptance, E,TB, VU by  at 3/19/2021 1247   Family Acceptance, E,TB, NR,DU by  at 4/5/2021 1035    Acceptance, E, NR by BP at 4/2/2021 1048    Comment: pt intubated and sedated    Acceptance, E, NR by BP at 3/26/2021 1048    Comment: pt intubated and sedated                               User Key     Initials Effective Dates Name Provider Type Discipline     03/01/19 -  Zay Jean OT Occupational Therapist OT     03/01/19 -  Selam Adair OT Occupational Therapist OT     06/08/20 -  João Joaquin, RN Registered Nurse Nurse     12/21/20 -  Rabia Morgan, RN Registered Nurse Nurse              OT Recommendation and Plan  Planned Therapy Interventions (OT): activity tolerance training, BADL retraining, functional balance retraining, manual therapy/joint mobilization, neuromuscular control/coordination retraining, occupation/activity based interventions, patient/caregiver education/training, ROM/therapeutic exercise, strengthening exercise, transfer/mobility retraining  Therapy Frequency (OT): 5 times/wk  Plan of Care Review  Plan of Care Reviewed With: spouse, patient  Outcome Summary: Re-eval completed on 71 yo female s/p extubation 4/4/2021.  Pt s/p AVR 3/18/2021 with extubation  3/19/2021 and requiring re-intubation 3/22/2021 due to resp status.  See Initial Eval for PLOF. Pt supine in bed upon OT/PT arrival. Patient with dobhoff feeding tube, 8L hiflow O2, moser, IV, and cardiac monitor limiting movemnt. Max A x2 for balance/safety during bed mobility. C/o pain with BUe movement. sits EOB approximately 15 minutes. initially sits up with CGA, but fatigues to requiring Min-Mod A for balance EOB.  Pt requires max verbal cues for sternal precautions. Spouse present and supportive, and aware of pt's funcitonal deficits. Pt adamant on returning home, but appears unsafe. OT recommends IP rehab at discharge. PPE: Gloves, mask, eyewear     Time Calculation:   Time Calculation- OT     Row Name 04/05/21 1436             Time Calculation- OT    OT Start Time  0902  -ES      OT Stop Time  0927  -ES      OT Time Calculation (min)  25 min  -ES      Total Timed Code Minutes- OT  12 minute(s)  -ES      OT Received On  04/05/21  -ES      OT - Next Appointment  04/06/21  -      OT Goal Re-Cert Due Date  04/19/21  -ES        User Key  (r) = Recorded By, (t) = Taken By, (c) = Cosigned By    Initials Name Provider Type    ES Selam Adair OT Occupational Therapist        Therapy Charges for Today     Code Description Service Date Service Provider Modifiers Qty    [id number]  OT RE-EVAL 2 4/5/2021 Selam Adair OT GO 1    [id number]  OT SELF CARE/MGMT/TRAIN EA 15 MIN 4/5/2021 Selam Adair OT GO 1               Selam Adair OT  4/5/2021    Electronically signed by Selam Adair OT at 04/05/21 1159

## 2021-04-06 NOTE — PLAN OF CARE
Pt presents with significant weakness and poor endurance limiting functional mobility.  As a result, Sukumar lift transfer and assessment completed this date.  Pt requiring depAx2 for rolling and able to tolerate transfer to chair using Sukumar with vital signs WFL before and after transfer.  Nursing staff can continue to use sukumar lift for transfer.  PT will continue to address deficits and attempt sit <-> stand transfer when appropriate.  Pt will need extensive IP rehab to address deficits.    Kelsey Becerril PT, DPT, [facility]

## 2021-04-06 NOTE — PLAN OF CARE
Pt participated in cardiac exercises and education in precautions from bed level. Pt had already returned to bed from am PT session and up to chair using kell lift, therefore remained bed level this session.       Plan/Recommendations:   Pt would benefit from Inpatient Rehabilitation placement at discharge from facility.   Pt desires Inpatient Rehabilitation placement at discharge. Pt cooperative; agreeable to therapeutic recommendations and plan of care.     Continued skilled OT to see 5x/wk while at [facility].

## 2021-04-06 NOTE — PROGRESS NOTES
Cardiology Progress Note    Patient Identification:  Name: Claudia Rust  Age: 70 y.o.  Sex: female  :  1950  MRN: [id number]                 Follow Up / Chief Complaint: Follow-up for valvular heart disease status post AVR    Interval History: Patient underwent #23 magna pericardial prosthesis AVR 3/18/2021  3/22/2021.  Events noted.  Patient is intubated on IV Olegario-Synephrine and Bumex.  3/23/2021.  Patient continues to intubated  3/24/2021 patient continues to be intubated and mechanically ventilated.  3/25/2021 patient continues to be intubated and mechanically ventilated.  Underwent a CHRISTIANO which showed normal LV systolic function and good aortic valve function.  3/26/2021.  Patient continues to be intubated and mechanically ventilated.  He is lightly sedated.  Patient underwent bronchoscopy 3/25/2021.  Patient was getting tachycardic was given 12.5 of metoprolol developed hypotension requiring Olegario-Synephrine.  HIT antibody positive  3/29/2021: Continues to be intubated and mechanically ventilated.  3/30/2021.  Patient continues to be intubated and mechanically ventilated.  3/31/2021: Patient continues to be intubated and mechanically ventilated.  Nephrology has started on IV Bumex drip.  21: Patient is intubated and is on weaning trials.  Bumex drip has been discontinued.  2021 : Patient intubated on weaning trials.  On IV Bumex  21 : Patient extubated.  Dobbhoff tube present.  2021 : Patient is sitting in chair intermittently confused without symptoms at the current time.     Subjective: Patient seen and examined.  Chart reviewed.  Labs reviewed.  Discussed with RN taking care of patient.  Patient is intermittently confused.      Objective: Sodium is 152--> 144-> 141-141.  BUN/creatinine  49/1.05-> 42/0.69-> 42/0.64-> 46/0.60-> 47/0.49-> 43/0.46--> 43/0.43.  Hemoglobin is 10.1--> 10.8--> 9.0-> 10.8-> 10.5-> 10.2-> 10.6-> 10.6-> 10.7-> 9.7.  Platelet count 95-> 134.  White count  elevated at 22.2.    History of present illness :    This is a 70-year-old with PMH of     #Valvular heart disease with moderate to severe aortic stenosis  Cardiac cath 1/20/2021 revealing nonobstructive CAD  # Hyperlipidemia,    # Hypertension  # COPD, KARLY on CPAP  # Prediabetes  # Hypothyroidism, Rheumatoid Arthritis, spinal stenosis, hiatal hernia, chronic depression, bipolar, GERD, IBS, chronic migraine, vertigo, herniated disc, compression lumbar fractures  #Allergies/intolerance to Stadol  # Hysterectomy, bladder reconstruction, cholecystectomy, right hand surgery  # Family history of strokes and grandfather.  Mother's cancer, father on  # Active cigarette smoker trying to quit         Here for aortic valve replacement.  Patient was recently seen with a complaint of dyspnea on exertion edema and weight gain.  Patient was brought in for elective aortic valve replacement.  Patient underwent aortic valve replacement 3/18/2021 with #23 magna pericardial prosthesis.  Blood pressure is high is on IV Cardene and IV nitroglycerin.  Currently intubated sedated chest tube present Venegas catheter present.  Underlying rhythm is flatline with AV paced rhythm.  Underwent an echocardiogram which is revealing severe aortic stenosis on 2/11/2020   Underwent cardiac cath 1/20/2021 which revealed no obstructive disease.  Carotid Dopplers 9/13/2019 normal.  Echocardiogram 2/11/2020 is revealing severe aortic stenosis  Work-up here on 3/16/2021 revealed proBNP 733, normal CMP, A1c 5.7, normal CBC, chest x-ray with no acute abnormality.  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  Events noted patient was intubated 3/21/2021 for respiratory failure.  Extubated 4/4/2021.  Continue to monitor pulmonary status and follow-up with pulmonary.  Chest x-ray 4 /5/2021   1. Stable diffuse mixed interstitial and airspace disease.  2. Interval extubation.  3. Stable postoperative findings and stable left internal jugular  central venous catheter/feeding tube.    Patient's po Bumex 1 mg 3 times daily  Monitor electrolytes and renal function  Replete electrolytes as needed  Patient sodium was going up, patient was given free water and has improved.  Appreciated nephrology input.  Will follow up with nephrology.  Monitor for routine expected post surgery blood loss anemia  Patient underwent transesophageal echo 3/25/2021 which revealed normal LV systolic function normal chamber sizes.  Normal aortic valve placement and no significant valvular heart disease.  Continue to monitor blood pressure closely patient developed hypotension requiring Olegario-Synephrine with 12.5 of metoprolol.  Now blood pressure is going up we will restart metoprolol and monitor blood pressure closely.  Will follow up with CT VS for routine post CABG care and HIT  Discussed with RN taking care of patient to coordinate care  Discussed with patient's  by bedside.    Past Medical History:  Past Medical History:   Diagnosis Date   • Acute congestive heart failure (CMS/HCC)    • Allergies    • Anxiety    • Arthritis    • Asthma    • Bilateral leg edema    • Bipolar 1 disorder (CMS/HCC)    • Bulging lumbar disc     X3   • Cancer (CMS/HCC)     states had \"cancerous tumor during pregnancy and had to have " "hysterectomy\"   • Cataract     bilateral   • Cellulitis 03/2021    bilateral legs   • Compression fracture of vertebral column (CMS/[facility])     LUMBAR   • COPD (chronic obstructive pulmonary disease) (CMS/[facility])    • Delayed emergence from anesthesia    • Depression    • Depression    • Dyslipidemia    • Dyspnea on exertion    • Fibromyalgia    • GERD (gastroesophageal reflux disease)    • Hiatal hernia    • Hyperlipidemia    • Hypertension    • Hypothyroid     HYPOTHYROID   • IBS (irritable bowel syndrome)    • Migraines    • Morbid obesity (CMS/[facility])    • Neuropathy    • Panic attacks    • Peripheral edema    • PONV (postoperative nausea and vomiting)    • Poor mobility     rolling walker   • Prediabetes    • PVD (peripheral vascular disease) (CMS/[facility])    • Rheumatoid aortitis    • Spinal stenosis    • Spinal stenosis    • Vertigo    • Vitamin D deficiency      Past Surgical History:  Past Surgical History:   Procedure Laterality Date   • AORTIC VALVE REPAIR/REPLACEMENT N/A 3/18/2021    Procedure: AORTIC VALVE REPLACEMENT;  Surgeon: Olaf Mcgill MD;  Location: [facility] CVOR;  Service: Cardiothoracic;  Laterality: N/A;   • BRONCHOSCOPY N/A 3/25/2021    Procedure: BRONCHOSCOPY AT BEDSIDE WITH BRONCHIOALVEOLAR LAVAGE;  Surgeon: Glenn Reyes MD;  Location: [facility] ENDOSCOPY;  Service: Pulmonary;  Laterality: N/A;  PNA   • CARDIAC CATHETERIZATION  2009   • CARDIAC CATHETERIZATION N/A 8/14/2019    Procedure: Left Heart Cath;  Surgeon: Jose Levi MD;  Location: [facility] CATH INVASIVE LOCATION;  Service: Cardiovascular   • CARDIAC CATHETERIZATION N/A 8/14/2019    Procedure: Right Heart Cath;  Surgeon: Jose Levi MD;  Location: [facility] CATH INVASIVE LOCATION;  Service: Cardiovascular   • CARDIAC CATHETERIZATION N/A 8/14/2019    Procedure: Aortic root aortogram;  Surgeon: Jose Levi MD;  Location: [facility] CATH INVASIVE LOCATION;  Service: Cardiovascular   • CARDIAC CATHETERIZATION N/A " 1/20/2021    Procedure: Left Heart Cath;  Surgeon: Jose Levi MD;  Location: [facility] CATH INVASIVE LOCATION;  Service: Cardiovascular;  Laterality: N/A;   • COLONOSCOPY     • CYSTOCELE REPAIR  1984   • ELBOW ARTHROPLASTY  2011   • ENDOSCOPY     • FOOT SURGERY     • GALLBLADDER SURGERY  2002   • TONSILLECTOMY     • VAGINAL HYSTERECTOMY  1972        Social History:   Social History     Tobacco Use   • Smoking status: Current Every Day Smoker     Packs/day: 1.00     Types: Cigarettes   • Smokeless tobacco: Never Used   • Tobacco comment: decrease now and do not smoke dos   Substance Use Topics   • Alcohol use: Yes     Comment: socially      Family History:  History reviewed. No pertinent family history.       Allergies:  Allergies   Allergen Reactions   • Adhesive Tape Unknown (See Comments)     hives   • Butorphanol Other (See Comments)     Chest pain difficulty breathing throat swelling   • Garlic Other (See Comments)     Chest pain difficulty breathing throat swells   • Tetanus-Diphtheria Toxoids Td Other (See Comments)     Dizziness,  vomiting   • Aloe Itching   • Bee Venom Other (See Comments)     Swelling eyes and lips hand swelling   • Latex Itching   • Macadamia Nut Oil Other (See Comments)     Chest pain difficulty breathing throat swelling   • Tuberculin Unknown (See Comments)     reactor   • Wasp Venom Other (See Comments)     Swelling eyes lips and hand     Scheduled Meds:  acetylcysteine, 3 mL, BID - RT  arformoterol, 15 mcg, BID - RT  aspirin, 81 mg, Daily  atorvastatin, 40 mg, Nightly  budesonide, 1 mg, BID - RT  bumetanide, 1 mg, TID  chlorhexidine, 15 mL, Q12H  chlorothiazide, 250 mg, Once  docusate, 100 mg, BID  First Mouthwash (Magic Mouthwash), 5 mL, Q6H  FLUoxetine, 60 mg, Daily  gabapentin, 200 mg, Q12H  hydrALAZINE, 25 mg, Q8H  insulin glargine, 20 Units, Q12H  insulin lispro, 0-24 Units, Q6H  insulin lispro, 5 Units, Q6H  ipratropium-albuterol, 3 mL, Q4H - RT  lansoprazole, 30 mg,  "QAM  levothyroxine, 100 mcg, Q AM  metoprolol tartrate, 12.5 mg, Q12H  nystatin, , Q12H  polyethylene glycol, 17 g, Daily  potassium chloride, 20 mEq, TID With Meals  predniSONE, 10 mg, Daily With Breakfast  QUEtiapine, 25 mg, Nightly  sodium chloride, 10 mL, Q12H  sodium chloride, 10 mL, Q12H          Review of Systems:   Review of Systems   Unable to perform ROS: mental status change              Constitutional:  Temp:  [97.4 °F (36.3 °C)-98.6 °F (37 °C)] 97.6 °F (36.4 °C)  Heart Rate:  [65-87] 75  Resp:  [20-23] 20  BP: (112-121)/(50-56) 121/56  Arterial Line BP: ()/(40-63) 115/54    Physical Exam   /56   Pulse 75   Temp 97.6 °F (36.4 °C) (Oral)   Resp 20   Ht 162.6 cm (64\")   Wt 122 kg (268 lb 4.8 oz)   SpO2 96%   BMI 46.05 kg/m²   General: Patient extubated, sitting in bed  Eyes: Sclera is anicteric,  conjunctiva is clear   HEENT:  No JVD.  Dobbhoff tube present.  Respiratory: Good air entry, nonlabored breathing.  Scattered rhonchi and wheezing.  Cardiovascular: S1,S2 Regular rate and rhythm.  Sternotomy is healing well.  Gastrointestinal: Abdomen nondistended, soft  Musculoskeletal:  No abnormal movements  Extremities: Patient has chronic bilateral lower extremity edema continues to have 1+ edema.  Skin: Stasis changes seen bilateral shin.  Neuro: Alert and awake, no lateralizing deficits appreciated    INTAKE AND OUTPUT:    Intake/Output Summary (Last 24 hours) at 4/6/2021 0839  Last data filed at 4/6/2021 0555  Gross per 24 hour   Intake 3372 ml   Output 2450 ml   Net 922 ml       Cardiographics  Telemetry: Sinus rhythm    ECG:   ECG 12 Lead   Preliminary Result   HEART RATE= 59  bpm   RR Interval= 1020  ms   OH Interval= 158  ms   P Horizontal Axis= 14  deg   P Front Axis= 25  deg   QRSD Interval= 91  ms   QT Interval= 652  ms   QRS Axis= 55  deg   T Wave Axis= 67  deg   - ABNORMAL ECG -   Sinus bradycardia   Prolonged QT interval   Electronically Signed By:    Date and Time of Study: " 2021-03-23 13:15:33      ECG 12 Lead   Final Result   HEART RATE= 80  bpm   RR Interval= 752  ms   NH Interval= 161  ms   P Horizontal Axis= 0  deg   P Front Axis= 22  deg   QRSD Interval= 80  ms   QT Interval= 419  ms   QRS Axis= 39  deg   T Wave Axis= 35  deg   - NORMAL ECG -   Sinus rhythm   When compared with ECG of 19-Mar-2021 4:30:10,   Significant repolarization change   Electronically Signed By: Segun Rucker ([facility] 20-Mar-2021 10:06:29   Date and Time of Study: 2021-03-20 05:15:54      ECG 12 Lead   Final Result   HEART RATE= 62  bpm   RR Interval= 984  ms   NH Interval= 165  ms   P Horizontal Axis= 13  deg   P Front Axis= 32  deg   QRSD Interval= 76  ms   QT Interval= 434  ms   QRS Axis= 43  deg   T Wave Axis= 82  deg   - ABNORMAL ECG -   Sinus rhythm   Atrial premature complex   ST elevation, consider inferior injury   When compared with ECG of 18-Mar-2021 11:04:47,   No significant change   Electronically Signed By: Segun Rucker ([facility] 19-Mar-2021 16:48:51   Date and Time of Study: 2021-03-19 04:30:10      ECG 12 Lead   Final Result   HEART RATE= 56  bpm   RR Interval= 1072  ms   NH Interval= 163  ms   P Horizontal Axis= -22  deg   P Front Axis= -9  deg   QRSD Interval= 88  ms   QT Interval= 516  ms   QRS Axis= 40  deg   T Wave Axis= 43  deg   - OTHERWISE NORMAL ECG -   Sinus bradycardia   When compared with ECG of 16-Mar-2021 8:51:36,   Significant repolarization change   Electronically Signed By: Segun Rucker ([facility] 18-Mar-2021 17:48:15   Date and Time of Study: 2021-03-18 11:04:47        I have personally reviewed EKG    Echocardiogram: Results for orders placed during the hospital encounter of 03/18/21    Adult Transesophageal Echo (CHRISTIANO) W/ Cont if Necessary Per Protocol (Bedside)    Interpretation Summary  CHRISTIANO   procedure note    Procedure:  CHRISTIANO    Indication:   Valvular heart disease s/p AVR, respiratory failure    Date of procedure  : 3/25/2021    :  Dr. Jose Sheldon  of procedure:    Under conscious sedation given by anesthesiology and local anesthesia, a CHRISTIANO probe was advanced into the esophagus and into the stomach 2D, M-mode, color Doppler images were obtained..  Patient tolerated procedure well complications well.    Complications: none    Results:    Normal LV size and contractility LV contractility, EF of 60%  Normal RV size  Normal left atrial size, normal right atrial size, left atrial appendage without thrombus.  Aortic valve, mitral valve, tricuspid valve appears structurally normal, mild TR seen.  No vegetation seen  No pericardial effusion seen.  Proximal aorta appears normal in size.  Mild aortic plaque seen      Recommendations/plan:    Clinical correlation recommended      Lab Review   I have reviewed the labs      Results from last 7 days   Lab Units 04/06/21  0422   MAGNESIUM mg/dL 2.4     Results from last 7 days   Lab Units 04/06/21  0422   SODIUM mmol/L 139   POTASSIUM mmol/L 3.9   BUN mg/dL 43*   CREATININE mg/dL 0.43*   CALCIUM mg/dL 8.3*         Results from last 7 days   Lab Units 04/06/21  0422 04/05/21  0501 04/04/21  0414   WBC 10*3/mm3 18.20* 22.20* 19.50*   HEMOGLOBIN g/dL 9.7* 10.7* 10.0*   HEMATOCRIT % 29.3* 32.6* 30.4*   PLATELETS 10*3/mm3 109* 134* 112*     Results from last 7 days   Lab Units 04/06/21  0422 04/05/21  1152   INR  1.06* 1.06*       RADIOLOGY:  Imaging Results (Last 24 Hours)     Procedure Component Value Units Date/Time    XR Chest 1 View [[id number]] Collected: 04/06/21 0711     Updated: 04/06/21 0714    Narrative:      Examination: XR CHEST 1 VW-     Date of Exam: 4/6/2021 5:35 AM     Indication: Hypoxia; M06.9-Rheumatoid arthritis, unspecified;  I35.0-Nonrheumatic aortic (valve) stenosis; J96.01-Acute respiratory  failure with hypoxia; J44.9-Chronic obstructive pulmonary disease,  unspecified.     Comparison: 4/5/2021.     Technique: Single radiographic view of the chest was obtained.     Findings:  Feeding tube courses below  "the diaphragm. New left PICC terminates in  the upper SVC. Stable left internal jugular central venous catheter.  Stable evidence of prior median sternotomy and heart valve replacement.  There is stable diffuse mixed interstitial and airspace disease in both  lungs with stable small bilateral pleural effusions. There is no  evidence of pneumothorax or new lung opacity. The heart size is stable.  Pulmonary vasculature is completely obscured. There are no acute osseous  abnormalities.       Impression:         1. Stable mixed interstitial and airspace disease present throughout  both lungs.  2. Stable feeding tube, postoperative findings and left IJ CVC.  3. New left PICC terminates in the upper SVC.     Electronically Signed By-Romain Gutierrez MD On:4/6/2021 7:12 AM  This report was finalized on [id number] by  Romain Gutierrez MD.                )4/6/2021  MD DANIEL Lamas KUBOO/Transcription:   \"Dictated utilizing Dragon dictation\".   "

## 2021-04-06 NOTE — PLAN OF CARE
Goal Outcome Evaluation:  Pt up in chair via kell lift for 3 hours. Pt passed video swallow study with nectar thick liquids and soft foods. Weaned to 6 L NC. Venegas catheter discontinued and pt has had some urine out in external cath. Left IJ discontinued and dressing is clean dry and intact. Pt spouse updated on plan of care.

## 2021-04-06 NOTE — PROGRESS NOTES
"Nutrition Services    Patient Name: Claudia Rust  YOB: 1950  MRN: [id number]  Admission date: 3/18/2021    Comments:    Patient now on an oral diet, transitioning tube feeds to run nocturnally to promote good PO intakes during the day. Will also add magic cups to L/D trays to promote calories through PO diet.     EN Prescription: Impact Peptide 1.5 at 85 mL/hr + 60 mL/hr water flush x 12 hrs at night.     PPE Documentation        PPE Worn By Provider Surgical mask, eye protection   PPE Worn By Patient  Patient not available at time of the visit      CLINICAL NUTRITION ASSESSMENT       Reason for Assessment Follow up protocol    3/22/21: TF consult      H&P:      Past Medical History:   Diagnosis Date   • Acute congestive heart failure (CMS/HCC)    • Allergies    • Anxiety    • Arthritis    • Asthma    • Bilateral leg edema    • Bipolar 1 disorder (CMS/HCC)    • Bulging lumbar disc     X3   • Cancer (CMS/[facility])     states had \"cancerous tumor during pregnancy and had to have hysterectomy\"   • Cataract     bilateral   • Cellulitis 03/2021    bilateral legs   • Compression fracture of vertebral column (CMS/[facility])     LUMBAR   • COPD (chronic obstructive pulmonary disease) (CMS/HCC)    • Delayed emergence from anesthesia    • Depression    • Depression    • Dyslipidemia    • Dyspnea on exertion    • Fibromyalgia    • GERD (gastroesophageal reflux disease)    • Hiatal hernia    • Hyperlipidemia    • Hypertension    • Hypothyroid     HYPOTHYROID   • IBS (irritable bowel syndrome)    • Migraines    • Morbid obesity (CMS/HCC)    • Neuropathy    • Panic attacks    • Peripheral edema    • PONV (postoperative nausea and vomiting)    • Poor mobility     rolling walker   • Prediabetes    • PVD (peripheral vascular disease) (CMS/[facility])    • Rheumatoid aortitis    • Spinal stenosis    • Spinal stenosis    • Vertigo    • Vitamin D deficiency        Past Surgical History:   Procedure Laterality Date   • AORTIC VALVE " REPAIR/REPLACEMENT N/A 3/18/2021    Procedure: AORTIC VALVE REPLACEMENT;  Surgeon: Olaf Mcgill MD;  Location: [facility];  Service: Cardiothoracic;  Laterality: N/A;   • BRONCHOSCOPY N/A 3/25/2021    Procedure: BRONCHOSCOPY AT BEDSIDE WITH BRONCHIOALVEOLAR LAVAGE;  Surgeon: Glenn Reyes MD;  Location: [facility] ENDOSCOPY;  Service: Pulmonary;  Laterality: N/A;  PNA   • CARDIAC CATHETERIZATION  2009   • CARDIAC CATHETERIZATION N/A 8/14/2019    Procedure: Left Heart Cath;  Surgeon: Jose Levi MD;  Location: [facility] CATH INVASIVE LOCATION;  Service: Cardiovascular   • CARDIAC CATHETERIZATION N/A 8/14/2019    Procedure: Right Heart Cath;  Surgeon: Jose Levi MD;  Location: [facility] CATH INVASIVE LOCATION;  Service: Cardiovascular   • CARDIAC CATHETERIZATION N/A 8/14/2019    Procedure: Aortic root aortogram;  Surgeon: Jose Levi MD;  Location: [facility] CATH INVASIVE LOCATION;  Service: Cardiovascular   • CARDIAC CATHETERIZATION N/A 1/20/2021    Procedure: Left Heart Cath;  Surgeon: Jose Levi MD;  Location: [facility] CATH INVASIVE LOCATION;  Service: Cardiovascular;  Laterality: N/A;   • COLONOSCOPY     • CYSTOCELE REPAIR  1984   • ELBOW ARTHROPLASTY  2011   • ENDOSCOPY     • FOOT SURGERY     • GALLBLADDER SURGERY  2002   • TONSILLECTOMY     • VAGINAL HYSTERECTOMY  1972        Current Problems:   Aortic valve stenosis  -status post tissue replacement 3/18   -cardiothoracic surgery & cardiology following    Acute hypoxic resp failure   -intubated 3/22   -extubated 4/4     Interstitial pneumonitis    Post-op TCP/HIT positive    COPD    KARLY    Cor pulmonale    Tobacco abuse    Per pulmonary  -there is diffuse alveolar disease groundglass opacities suspicious for Covid infection->multiple tests negative    JERROD, Third spacing/edema  -nephrology following    Diabetes Mellitus      Nutrition/Diet History         Narrative     4/6: Patient discussed during AM rounds,  "tolerating TF at goal rate. This afternoon patient passed VFSS and now on oral diet. Spoke with nursing who reports NGT remains in place given uncertainty if patient will be able to consume adequate PO intakes. Plans to change TFs to nocturnal feeds to promote PO intakes during the day. No BM noted still, RN notified post rounds. Patient not available x 2 visits today.     4/2: Patient discussed during AM rounds, remains intubated on TF. RD visited at bedside, nsg had just turned off propofol, TF infusing at goal    3/30: Patient discussed during AM rounds, remains intubated on TF. RD visited at bedside, intubated/sedated & TF at goal    3/26: Patient discussed during AM rounds, agreed will decrease free water flushes from 200 mL/hr to 100 mL/hr given serum Na+ 147. RD visited at bedside, family member in room with no questions/concerns r/t nutrition at this time. Pt intubated/sedated, TF infusing    3/24: Patient discussed during AM rounds with critical care team. COVID test send out pending. Plan for bronch today and CHRISTIANO tomorrow. TF have not been started, just some free water given per tube. S/w NP Belkis via secure chat, okay with starting TF after bronch, notified RN João of current orders.    3/22: Visited patient today who was intubated. Visualized vent setting & medication drips. RN in room and discussed tube feeding. DHT already in place. RN reports Dr. Mcgill does not want TFs to start till the AM. Orders in place when needed.      Functional Status Per PT notes 4/5 \"Pt presents with global weakness requiring assist x2 for bed mobility after prolonged intubation.  Pt able to sit EOB with CGA-modA pending fatigue and pain.  Not safe to attempt transfer due to hypotension and global weakness at this time.  Attempt transfer at next session if appropriate.  Pt will need extensive IP rehab to address deficits\"      Food Allergies Garlic   Macadamia Nut      Factors Affecting   Nutritional Intake Recent " "intubation  Acute medical complexity      Anthropometrics        Current Height, Weight Height: 162.6 cm (64\")  Weight: 122 kg (268 lb 4.8 oz) (04/06/21 0500)        Admit Height, Weight     Flowsheet Rows      First Filed Value   Admission Height  162.6 cm (64\") Documented at 03/18/2021 2000   Admission Weight  129 kg (284 lb) Documented at 03/18/2021 2000             Ideal Body Weight (IBW) 54.5 kg/120 lb    % Ideal Body Weight 226%        Usual Body Weight UTD   % Usual Body Weight UTD   Wt Change Observation Current Admission:  4/6: CBW trending back down, possibly r/t diuresis   4/2: CBW consistent with last encounter  3/30: CBW trending down, now negative 1 Liter since admission  3/26: CBW consistent with admit wt  3/24: 10 lb wt change since admit (3.5% change), currently negative 5.6 Liters    PTA:  3/22: Wt fairly stable x 3 years.    Weight Hx    Wt Readings from Last 30 Encounters:   04/06/21 0500 122 kg (268 lb 4.8 oz)   04/05/21 0522 120 kg (265 lb 6.9 oz)   04/04/21 0400 123 kg (272 lb 0.8 oz)   04/03/21 0600 125 kg (275 lb 12.7 oz)   04/02/21 0400 123 kg (271 lb 9.7 oz)   04/01/21 0600 124 kg (272 lb 4.3 oz)   03/31/21 0600 125 kg (276 lb 3.8 oz)   03/30/21 0600 123 kg (270 lb 15.1 oz)   03/29/21 0600 123 kg (270 lb 4.5 oz)   03/27/21 0600 127 kg (280 lb 3.3 oz)   03/26/21 0416 129 kg (284 lb 2.8 oz)   03/25/21 0600 127 kg (279 lb 1.6 oz)   03/25/21 0426 127 kg (279 lb 1.6 oz)   03/24/21 0540 125 kg (274 lb 14.6 oz)   03/23/21 0600 124 kg (273 lb 2.4 oz)   03/22/21 0543 129 kg (283 lb 11.7 oz)   03/22/21 0500 129 kg (283 lb 11.7 oz)   03/21/21 1352 132 kg (292 lb)   03/21/21 0800 133 kg (292 lb 8.8 oz)   03/20/21 0600 130 kg (287 lb 0.6 oz)   03/19/21 0430 132 kg (291 lb 0.1 oz)   03/18/21 2000 129 kg (284 lb)   03/16/21 1104 129 kg (283 lb 6 oz)   01/20/21 0921 130 kg (286 lb 9.6 oz)   01/14/21 1130 132 kg (290 lb 8 oz)   12/17/20 1239 132 kg (291 lb)   12/11/20 1301 132 kg (292 lb)   12/04/20 1106 " 132 kg (292 lb)   04/24/20 1000 132 kg (290 lb)   02/18/20 0809 130 kg (286 lb)   02/16/20 0210 125 kg (276 lb 9.6 oz)   02/15/20 2223 131 kg (289 lb 11 oz)   09/13/19 1020 129 kg (284 lb)   08/14/19 1121 130 kg (286 lb 2.5 oz)   08/02/19 1213 131 kg (289 lb)   07/22/19 1427 133 kg (293 lb 8 oz)   06/21/19 1141 132 kg (290 lb)   06/21/19 1044 132 kg (290 lb)   03/08/18 1032 127 kg (280 lb)   09/14/16 1337 (!) 136 kg (300 lb 12.8 oz)   07/18/16 1533 (!) 138 kg (305 lb)   06/22/16 1438 (!) 136 kg (300 lb 3.2 oz)   03/24/16 1304 (!) 139 kg (306 lb)   01/26/16 0943 136 kg (300 lb)        BMI kg/m2 Body mass index is 46.05 kg/m².     Labs/Medications         Pertinent Labs -Elevated BUN  -Low Cr   Results from last 7 days   Lab Units 04/06/21  0422 04/05/21  0501 04/04/21  1719 04/04/21  0414 04/03/21  0348 04/02/21  0514   SODIUM mmol/L 139 141  --  141 142 143   POTASSIUM mmol/L 3.9 3.3* 3.8 3.6 4.0 3.7   CHLORIDE mmol/L 105 106  --  104 104 105   CO2 mmol/L 26.0 26.0  --  28.0 30.0* 29.0   BUN mg/dL 43* 43*  --  46* 48* 47*   CREATININE mg/dL 0.43* 0.46*  --  0.46* 0.51* 0.49*   CALCIUM mg/dL 8.3* 8.5*  --  8.7 8.8 8.7   BILIRUBIN mg/dL  --  0.5  --   --  0.5 0.5   ALK PHOS U/L  --  137*  --   --  132* 134*   ALT (SGPT) U/L  --  44*  --   --  31 35*   AST (SGOT) U/L  --  52*  --   --  25 31   GLUCOSE mg/dL 121* 133*  --  132* 151* 126*     Results from last 7 days   Lab Units 04/06/21  0422 04/05/21  0501 04/04/21  0414 04/03/21  0348   MAGNESIUM mg/dL 2.4 2.3  --  2.3   PHOSPHORUS mg/dL 3.1 3.3 3.6 3.8   HEMOGLOBIN g/dL 9.7* 10.7* 10.0* 10.2*   HEMATOCRIT % 29.3* 32.6* 30.4* 30.5*   TRIGLYCERIDES mg/dL  --   --  132  --      COVID19   Date Value Ref Range Status   03/24/2021 Not Detected Not Detected - Ref. Range Final     Lab Results   Component Value Date    HGBA1C 5.7 (H) 03/16/2021         Pertinent Medications Aspirin, lipitor, bumex, peridex, colace, magic mouthwash, prozac, neurontin, lantus, admelog,  "prevacid, synthroid, K-DUR, miralax, predinsone, coumadin, zofran prn      Physical Findings        Overall Physical   Appearance, MSA 4/6: Unable to see at this visit   4/2: visually appears well nourished, edematous  3/30: visually appears well nourished  3/26: visually appears well nourished and edematous  3/24: RD did not visually observe this encounter -- will monitor COVID-19 results and plan to complete NFPE at next encounter (tomorrow or 3/25)  3/22: Patient visually appears well nourished at visit.    -  Edema  2+ Dependent, Generalized  2-3+ BUE   2-3+  BLE     Gastrointestinal 2 BMs documented on 3/30 (x7 days ago) -on colace/miralax- RN alerted      Tubes NGT      Oral/Mouth Cavity S/p VFSS on 4/6-> Noted need for mechanically altered diet.      Skin Midline sternal incision incision        Estimated/Assessed Needs    Re-estimated 4/6/21    Energy Requirements    Height for Calculation  Height: 162.6 cm (64\")   Weight for Calculation 122 kg (268 lb) - CBW   Method for Estimation  MSJ x 1.2 AF    EST Needs (kcal/day) 2074 kcals/day       Protein Requirements    Weight for Calculation 54.6 kg (120 lb) - IBW   EST Protein Needs (g/kg) 2.5 g/kg    EST Daily Needs (g/day) 136 g/day       Fluid Requirements     Estimated Needs (mL/day) 1400 -1900 mL r/t CHF dx per CTS        Fluid Deficit    Current Na Level (mEq/L) -   Desired Na Level (mEq/L) -   Estimated Fluid Deficit (L)  -     Current Nutrition Orders & Evaluation of Received Nutrient/Fluid Intake       Oral Nutrition     Current PO Diet Diet Texture, Cardiac, Diabetic/Consistent Carbs; Healthy Heart; Diabetic - Consistent Carb; Mechanical Ground; Thickened Liquids (Nectar), Full Feed - Nursing   Supplement -   PO Evaluation     % PO Intake 3/24 to present (4/6): 0% - NPO, diet just upgraded and no meals recorded yet     3/22: 0% of meals documented prior to intubation    -  Enteral Nutrition    Enteral Route NGT   TF Modular -   TF Delivery Method " Continuous   Current Ordered TF  Impact Peptide 1.5 at 65 mL/hr   Current Ordered H2O flush  60 mL/hr H2O flush per nephrology    TF Observation  Impact Peptide 1.5 documented at 65 mL/hr    EN Evaluation     TF Changes Changing TFs to nocturnal given on oral diet     TF Residual WNL    TF Tolerance No reported s/sx of intolerance    Average EN Delivered -       Parenteral Nutrition     TPN Route -   Current Ordered TPN VOL  -   Dextrose (g/kcals)  -   Amino Acid (g/kcals) -   Lipids (mL/Concentration/FREQ)  -   MVI Frequency  -   Trace Element Frequency  -   TPN Observation  -    TPN Evaluation    Total # Days on TPN -   -  Clinical Course    Nutrition Course Details PO Diet     3/18 NPO  3/19 CLD  3/20 Consistent Carb  3/21-4/5 NPO  4/6 ConCarb HH Mech Soft NTL     Nutrition Support   TF started 3/24  Conservatively advanced d/t multiple procedures, possible risk of RFS  TF reached goal 3/27 to present (4/6)     Nutritional Risk Screening        NRS-2002 Score   -       Nutrition Diagnosis         Nutrition Dx Problem 1 Predicted inadequate energy intakes r/t acute medical complexity including need for intubation AEB prolonged need for enteral nutrition,  PO diet just advanced, and physical deconditioning from critical care status.       Nutrition Dx Problem 2 -       Intervention Goal         Intervention Goal(s) Patient will tolerate EN     PO intakes will be at least 50% of meals      Nutrition Intervention        RD/Tech Action Changing EN to nocturnal feeds to promote PO intakes during the day     Liberalizing Diet to promote good PO intakes     Adding Magic cups to L/D trays to promote PO calories      Nutrition Prescription          Diet Prescription Consistent Carb Mech Soft NTL    Supplement Prescription Magic Cups L/D    -  Enteral Prescription Impact Peptide 1.5 at 85 mL/hr (x 12 hrs at night) which provides 1530 kcals (74%), 95 g protein (70%), and 785 mL free water from TF + 60 mL/hr H2O flush (x12  hrs = 720 mL/day) = 1505 mL free H2O+Rx flushes, IVF.    Specialized formula used as patient is recently post-op and will likely benefit from immune modulating formula to promote healing. May be able to transition soon to more standard formula, hesitant to make too many changes at this time given good tolerance of current formula & complicated acute care stay.        TPN Prescription -     Monitor/Evaluation        Monitor EN tolerance/ability to advance, I/Os, Labs, BM, Weight, Skin and Medication changes      Electronically signed by:  Cintia Bhatti RD  04/06/21 13:57 EDT

## 2021-04-06 NOTE — THERAPY TREATMENT NOTE
Subjective: Pt agreeable to therapeutic plan of care. Pt and RN stated pt had already been up to chair today with use of kell lift although pt agreeable to bed level exercises.   Cognition: A&O x 4. Increased processing time noted. Cognition requires further assessment.     Objective:     Bed Mobility: N/A or Not attempted.  Functional Transfers: N/A or Not attempted.  Functional Ambulation: N/A or Not attempted.    Phase 1 Cardiac Rehab Initiated    Precautions:   Mid-sternal incision; avoid scapular retraction and depression.   Cardiovascular impairment post-sx; encourage energy conservation strategies.  Pt able to recall 1/3 sternal precautions. Max cues for stating sternal precautions.     Therapeutic Exercises: 10 reps UE AROM and AAROM in supported position from bed level. Pt required AAROM with mod facilitation due to weakness in BUE. Max cues, demo, and max cues for motivation to complete HEP.  Increased time to complete exercises.     Pt also completed 10 reps of digit flexion and ext to address  strength and edema in hands.     Pain: 4 VAS abd  Education: Provided education on importance of mobility and skilled verbal / tactile cueing throughout intervention.     Assessment: Claudia Rust presents with ADL impairments below baseline abilities which indicate the need for continued skilled intervention while inpatient. Tolerating session today without incident. Will continue to follow and progress as tolerated.     Plan/Recommendations:   Pt would benefit from Inpatient Rehabilitation placement at discharge from facility.   Pt desires Inpatient Rehabilitation placement at discharge. Pt cooperative; agreeable to therapeutic recommendations and plan of care.     Post-Tx Position: Supine with HOB Elevated and Call light and personal items within reach. Nurse notified.   PPE: gloves, surgical mask, eyewear protection

## 2021-04-06 NOTE — THERAPY TREATMENT NOTE
Subjective: Pt agreeable to therapeutic plan of care.    Objective:     Bed mobility -rolling side to side for Sukumar lift placement depAx2  Transfers - use of Sukumar lift for transfer to chair due to significant weakness      Phase 1 Cardiac Rehab Initiated    Precautions:   Mid-sternal incision; avoid scapular retraction and depression.   Cardiovascular impairment post-sx; encourage energy conservation strategies.    Cardiac Level II    Pain: 2 VAS  Education: Provided education on importance of mobility and skilled verbal / tactile cueing throughout intervention.     Assessment: Claudia Rust presents with functional mobility impairments which indicate the need for skilled intervention. Tolerating session today without incident. Pt able to tolerate transfer to chair with use of Sukumar lift.  Vitals WNL before and after transfer.   Nursing staff can continue to utilize sukumar for transfers and PT will continue to address deficit and attempt sit <-> stand transfer when appropriate. Will continue to follow and progress as tolerated.     Plan/Recommendations:   Pt would benefit from Inpatient Rehabilitation placement at discharge from facility and requires no DME at discharge.   Pt desires Inpatient Rehabilitation placement at discharge. Pt cooperative; agreeable to therapeutic recommendations and plan of care.     Basic Mobility 6-click:  Rollin = Total, A lot = 2, A little = 3; 4 = None  Supine>Sit:   1 = Total, A lot = 2, A little = 3; 4 = None   Sit>Stand with arms:  1 = Total, A lot = 2, A little = 3; 4 = None  Bed>Chair:   1 = Total, A lot = 2, A little = 3; 4 = None  Ambulate in room:  1 = Total, A lot = 2, A little = 3; 4 = None  3-5 Steps with railin = Total, A lot = 2, A little = 3; 4 = None  Score: 6    Modified [place]: 5 = Severe disability (Requires constant nursing care and attention, bedridden, incontinent)    Post-Tx Position: Up in Chair, Alarms activated and Call light and personal  items within reach,  present  PPE: gloves, surgical mask, eyewear protection

## 2021-04-06 NOTE — PROGRESS NOTES
NEPHROLOGY PROGRESS NOTE------[facility]    [facility]  [phone]  Robert Hammer MD      Patient Care Team:  Mary Wilde APRN as PCP - General  Mary Wilde APRN as PCP - Family Medicine  Jose Levi MD as Consulting Physician (Cardiology)  Yovani Lerma MD as Consulting Physician (Nephrology)      Provider:  Robert Hammer MD  Patient Name: Claudia Rust  :  1950    SUBJECTIVE:    F/U ARF/JERROD/HYPERNATREMIA    No major SOB. No dysuria. No palpitations. Still with DHT    Medication:  acetylcysteine, 3 mL, Nebulization, BID - RT  arformoterol, 15 mcg, Nebulization, BID - RT  aspirin, 81 mg, Oral, Daily  atorvastatin, 40 mg, Oral, Nightly  budesonide, 1 mg, Nebulization, BID - RT  bumetanide, 1 mg, Nasogastric, TID  chlorhexidine, 15 mL, Mouth/Throat, Q12H  docusate, 100 mg, Oral, BID  First Mouthwash (Magic Mouthwash), 5 mL, Swish & Spit, Q6H  FLUoxetine, 60 mg, Oral, Daily  fondaparinux, 5 mg, Subcutaneous, Q24H  gabapentin, 200 mg, Oral, Q12H  hydrALAZINE, 25 mg, Nasogastric, Q8H  insulin glargine, 20 Units, Subcutaneous, Q12H  insulin lispro, 0-24 Units, Subcutaneous, Q6H  insulin lispro, 5 Units, Subcutaneous, Q6H  ipratropium-albuterol, 3 mL, Nebulization, Q4H - RT  lansoprazole, 30 mg, Oral, QAM  levothyroxine, 100 mcg, Oral, Q AM  metoprolol tartrate, 12.5 mg, Oral, Q12H  nystatin, , Topical, Q12H  polyethylene glycol, 17 g, Oral, Daily  potassium chloride, 20 mEq, Oral, TID With Meals  predniSONE, 10 mg, Oral, Daily With Breakfast  QUEtiapine, 25 mg, Oral, Nightly  sodium chloride, 10 mL, Intravenous, Q12H  sodium chloride, 10 mL, Intravenous, Q12H           OBJECTIVE    Vital Sign Min/Max for last 24 hours  Temp  Min: 97.4 °F (36.3 °C)  Max: 98.6 °F (37 °C)   BP  Min: 112/50  Max: 121/56   Pulse  Min: 65  Max: 87   Resp  Min: 20  Max: 23   SpO2  Min: 90 %  Max: 100 %   No data recorded   Weight  Min: 122 kg  "(268 lb 4.8 oz)  Max: 122 kg (268 lb 4.8 oz)     Flowsheet Rows      First Filed Value   Admission Height  162.6 cm (64\") Documented at 03/18/2021 2000   Admission Weight  129 kg (284 lb) Documented at 03/18/2021 2000          I/O this shift:  In: 843 [I.V.:120; Other:397; NG/GT:326]  Out: 950 [Urine:950]  I/O last 3 completed shifts:  In: 3604 [I.V.:243; Other:1589; NG/GT:1772]  Out: 2925 [Urine:2925]    Physical Exam:    General Appearance: NAD. Fatigued  Head: normocephalic, without obvious abnormality and atraumatic +NGT INTACT  Eyes: conjunctivae and sclerae normal and no icterus  Neck: supple. NO GROSS JVD NOW  Lungs: +FEW SCATTERED RHONCHI AND FINE BIBASILAR CRACKLES  Heart: regular rhythm & normal rate and normal S1, S2 +NARGIS  Chest: Wall no abnormalities observed  Abdomen: normal bowel sounds and soft non-tender +OBESITY  Extremities: +TRACE DIFFUSE BILAT LE EDEMA, no cyanosis and no redness  Skin: no bleeding, bruising or rash, turgor normal, color normal and no lesions noted  Neurologic: Alert and oriented without focal deficits    Labs:    WBC WBC   Date Value Ref Range Status   04/06/2021 18.20 (H) 3.40 - 10.80 10*3/mm3 Final   04/05/2021 22.20 (H) 3.40 - 10.80 10*3/mm3 Final   04/04/2021 19.50 (H) 3.40 - 10.80 10*3/mm3 Final      HGB Hemoglobin   Date Value Ref Range Status   04/06/2021 9.7 (L) 12.0 - 15.9 g/dL Final   04/05/2021 10.7 (L) 12.0 - 15.9 g/dL Final   04/04/2021 10.0 (L) 12.0 - 15.9 g/dL Final      HCT Hematocrit   Date Value Ref Range Status   04/06/2021 29.3 (L) 34.0 - 46.6 % Final   04/05/2021 32.6 (L) 34.0 - 46.6 % Final   04/04/2021 30.4 (L) 34.0 - 46.6 % Final      Platlets No results found for: LABPLAT   MCV MCV   Date Value Ref Range Status   04/06/2021 91.0 79.0 - 97.0 fL Final   04/05/2021 92.3 79.0 - 97.0 fL Final   04/04/2021 91.5 79.0 - 97.0 fL Final          Sodium Sodium   Date Value Ref Range Status   04/06/2021 139 136 - 145 mmol/L Final   04/05/2021 141 136 - 145 mmol/L " Final   04/04/2021 141 136 - 145 mmol/L Final      Potassium Potassium   Date Value Ref Range Status   04/06/2021 3.9 3.5 - 5.2 mmol/L Final   04/05/2021 3.3 (L) 3.5 - 5.2 mmol/L Final     Comment:     Slight hemolysis detected by analyzer. Results may be affected.   04/04/2021 3.8 3.5 - 5.2 mmol/L Final   04/04/2021 3.6 3.5 - 5.2 mmol/L Final      Chloride Chloride   Date Value Ref Range Status   04/06/2021 105 98 - 107 mmol/L Final   04/05/2021 106 98 - 107 mmol/L Final   04/04/2021 104 98 - 107 mmol/L Final      CO2 CO2   Date Value Ref Range Status   04/06/2021 26.0 22.0 - 29.0 mmol/L Final   04/05/2021 26.0 22.0 - 29.0 mmol/L Final   04/04/2021 28.0 22.0 - 29.0 mmol/L Final      BUN BUN   Date Value Ref Range Status   04/06/2021 43 (H) 8 - 23 mg/dL Final   04/05/2021 43 (H) 8 - 23 mg/dL Final   04/04/2021 46 (H) 8 - 23 mg/dL Final      Creatinine Creatinine   Date Value Ref Range Status   04/06/2021 0.43 (L) 0.57 - 1.00 mg/dL Final   04/05/2021 0.46 (L) 0.57 - 1.00 mg/dL Final   04/04/2021 0.46 (L) 0.57 - 1.00 mg/dL Final      Calcium Calcium   Date Value Ref Range Status   04/06/2021 8.3 (L) 8.6 - 10.5 mg/dL Final   04/05/2021 8.5 (L) 8.6 - 10.5 mg/dL Final   04/04/2021 8.7 8.6 - 10.5 mg/dL Final      PO4 No components found for: PO4   Albumin Albumin   Date Value Ref Range Status   04/06/2021 2.70 (L) 3.50 - 5.20 g/dL Final   04/05/2021 2.70 (L) 3.50 - 5.20 g/dL Final   04/04/2021 2.60 (L) 3.50 - 5.20 g/dL Final      Magnesium Magnesium   Date Value Ref Range Status   04/06/2021 2.4 1.6 - 2.4 mg/dL Final   04/05/2021 2.3 1.6 - 2.4 mg/dL Final      Uric Acid No components found for: URIC ACID     Imaging Results (Last 72 Hours)     Procedure Component Value Units Date/Time    XR Chest 1 View [[id number]] Resulted: 04/06/21 0615     Updated: 04/06/21 0616    XR Chest 1 View [[id number]] Collected: 04/05/21 0724     Updated: 04/05/21 0728    Narrative:      Examination: XR CHEST 1 VW-     Date of Exam: 4/5/2021  5:00 AM     Indication: Hypoxia; M06.9-Rheumatoid arthritis, unspecified;  I35.0-Nonrheumatic aortic (valve) stenosis; J96.01-Acute respiratory  failure with hypoxia; J44.9-Chronic obstructive pulmonary disease,  unspecified.     Comparison: 4/4/2021.     Technique: Single radiographic view of the chest was obtained.     Findings:  There are diffuse bilateral mixed interstitial and airspace opacities in  both lungs. There is a stable left internal jugular central venous  catheter. Stable changes of prior median sternotomy and heart valve  replacement. Endotracheal tube has been removed. Stable feeding tube  coursing below the diaphragm. Stable bridging thoracic osteophytes  without acute osseous abnormality.       Impression:         1. Stable diffuse mixed interstitial and airspace disease.  2. Interval extubation.  3. Stable postoperative findings and stable left internal jugular  central venous catheter/feeding tube.     Electronically Signed By-Romain Gutierrez MD On:4/5/2021 7:26 AM  This report was finalized on [id number] by  Romain Gutierrez MD.    XR Chest 1 View [[id number]] Collected: 04/04/21 0805     Updated: 04/04/21 0809    Narrative:      EXAMINATION: XR CHEST 1 VW-     DATE OF EXAM: 4/4/2021 4:58 AM     INDICATION: Hypoxia; M06.9-Rheumatoid arthritis, unspecified;  I35.0-Nonrheumatic aortic (valve) stenosis; J96.01-Acute respiratory  failure with hypoxia; J44.9-Chronic obstructive pulmonary disease,  unspecified.     COMPARISON: Chest radiograph dated 04/03/2021     TECHNIQUE: Portable AP view of the chest was obtained.     FINDINGS:  The endotracheal tube tip is 2.4 cm above the davi. There is a  left-sided central line with tip terminating over the mid SVC in  unchanged position. There is a feeding tube which courses below left  hemidiaphragm. There is stable cardiac megaly. There is aortic arch  athetotic calcification. Pulmonary vascularity appears within normal  limits. There is bibasilar  airspace opacities likely representing  atelectasis with possible trace bilateral pleural effusions. There is a  prosthetic cardiac valve and temporary pacing wires. There is no  evidence of pneumothorax. Multilevel degenerative changes of the  thoracic spine.       Impression:      1. Support devices in expected position.  2. Unchanged bibasilar airspace opacities and likely small bilateral  layering pleural effusions.     Electronically Signed By-Olegario rAnold MD On:4/4/2021 8:07 AM  This report was finalized on [id number] by  Olegario Arnold MD.          Results for orders placed during the hospital encounter of 03/18/21    XR Chest 1 View    Narrative  Examination: XR CHEST 1 VW-    Date of Exam: 4/5/2021 5:00 AM    Indication: Hypoxia; M06.9-Rheumatoid arthritis, unspecified;  I35.0-Nonrheumatic aortic (valve) stenosis; J96.01-Acute respiratory  failure with hypoxia; J44.9-Chronic obstructive pulmonary disease,  unspecified.    Comparison: 4/4/2021.    Technique: Single radiographic view of the chest was obtained.    Findings:  There are diffuse bilateral mixed interstitial and airspace opacities in  both lungs. There is a stable left internal jugular central venous  catheter. Stable changes of prior median sternotomy and heart valve  replacement. Endotracheal tube has been removed. Stable feeding tube  coursing below the diaphragm. Stable bridging thoracic osteophytes  without acute osseous abnormality.    Impression  1. Stable diffuse mixed interstitial and airspace disease.  2. Interval extubation.  3. Stable postoperative findings and stable left internal jugular  central venous catheter/feeding tube.    Electronically Signed By-Romain Gutierrez MD On:4/5/2021 7:26 AM  This report was finalized on [id number] by  Romain Gutierrez MD.      XR Chest 1 View    Narrative  EXAMINATION: XR CHEST 1 VW-    DATE OF EXAM: 4/4/2021 4:58 AM    INDICATION: Hypoxia; M06.9-Rheumatoid arthritis,  unspecified;  I35.0-Nonrheumatic aortic (valve) stenosis; J96.01-Acute respiratory  failure with hypoxia; J44.9-Chronic obstructive pulmonary disease,  unspecified.    COMPARISON: Chest radiograph dated 04/03/2021    TECHNIQUE: Portable AP view of the chest was obtained.    FINDINGS:  The endotracheal tube tip is 2.4 cm above the davi. There is a  left-sided central line with tip terminating over the mid SVC in  unchanged position. There is a feeding tube which courses below left  hemidiaphragm. There is stable cardiac megaly. There is aortic arch  athetotic calcification. Pulmonary vascularity appears within normal  limits. There is bibasilar airspace opacities likely representing  atelectasis with possible trace bilateral pleural effusions. There is a  prosthetic cardiac valve and temporary pacing wires. There is no  evidence of pneumothorax. Multilevel degenerative changes of the  thoracic spine.    Impression  1. Support devices in expected position.  2. Unchanged bibasilar airspace opacities and likely small bilateral  layering pleural effusions.    Electronically Signed By-Olegario Arnold MD On:4/4/2021 8:07 AM  This report was finalized on [id number] by  Olegario Arnold MD.      XR Abdomen KUB    Narrative  DATE OF EXAM:  4/1/2021 9:32 PM    PROCEDURE:  XR ABDOMEN KUB-    INDICATIONS:  ng tube placement; M06.9-Rheumatoid arthritis, unspecified;  I35.0-Nonrheumatic aortic (valve) stenosis; J96.01-Acute respiratory  failure with hypoxia; J44.9-Chronic obstructive pulmonary disease,  unspecified    COMPARISON:  03/22/2021    TECHNIQUE:  Single radiographic view of the abdomen was obtained.    FINDINGS:  The feeding tube passes below the diaphragm, with its tip in the area of  the upper body of the stomach, similar to the prior exam. Technique is  suboptimal for lung evaluation.    Impression  1. Feeding tube tip is in the stomach.    Electronically Signed By-Sol Javed MD On:4/1/2021 10:12 PM  This  report was finalized on [id number] by  Sol Javed MD.      Results for orders placed during the hospital encounter of 03/18/21    Duplex Venous Upper Extremity - Bilateral CAR    Interpretation Summary  · Acute right upper extremity deep vein thrombosis noted in the subclavian, axillary and brachial. This represents catheter associated deep vein thrombosis.  · All other vessels appear normal.        ASSESSMENT / PLAN      Aortic valve stenosis    Rheumatoid arthritis (CMS/HCC)    1. ARF/JERROD------Nonoliguric ATN. Venegas in place. Creatinine normal. BUN still up. Follow with ongoing diuretic exposure. No NSAIDs or IV dye    2. HYPERNATREMIA------Better.  Free water flushes with TFs. Encourage po water intake once swallow eval done and patient able to eat/drink    3. HYPOALBUMINEMIA-----on TFs.     4. 3RD SPACED EDEMA------On  Bumex per tube. Significant contribution from chronic venous insufficiency/obesit. Give IV Diuril x one and follow    5. CAD S/P AVR-----per Cardiology and CT Surgery. S/P CHRISTIANO    6. OBESITY    7. ACUTE HYPOXIC RESPIRATORY FAILURE------On vent per Pulmonary    8. RUE DVT    9. DMII-------Glucometers, SSI    10. ANEMIA------H/H stable    11. SEPSIS/LEUKOCYTOSIS------Antibiotics per Pulmonary/CC    12. THROMBOCYTOPENIA    13. HYPOCALCEMIA-----Replaced    14. HYPERLIPIDEMIA------On Statin.      15. HYPOTHYROIDISM------On Sythroid    16. GERD/PUD PROPHYLAXIS------Prevacid per NGT    17. HTN-------BP good. D/C Amlodipine b/c of edema. Added scheduled Hydralazine    18. METABOLIC ALKALOSIS-----Better    19. HYPOKALEMIA-----Replaced    Robert Hammer MD  [facility]  [phone]  04/06/21  06:52 EDT

## 2021-04-06 NOTE — PROGRESS NOTES
Continued Stay Note  ISAAK Rush     Patient Name: Claudia Rust  MRN: [id number]  Today's Date: 4/6/2021    Admit Date: 3/18/2021    Discharge Plan     Row Name 04/06/21 1317       Plan    Plan  DC Plan: Referral to [facility], if not available [place]. No precert or PASRR required.    Plan Comments  PT recommends inpt rehab. Begin feeding 4/6, Dobbhoff remains in place, clamped. Spoke to spouse, Isma and pt at bedside. Spouse knew where he wanted pt to go for rehab and pt agreed.        Met with patient in room wearing PPE: mask, goggles.      Maintained distance greater than six feet and spent less than 15 minutes in the room.                 Julee Antony RN

## 2021-04-06 NOTE — PLAN OF CARE
Goal Outcome Evaluation:      Pt participated in clinical swallow re-eval and VFSS to assess for dysphagia s/p prolonged intubation. Pt with hoarse vocal quality and low speaking volume. At bedside pt given ice chips, thins, puree, and mech soft. Poor mastication of soft solids without dentures at bedside and during VFSS but fxl mastication of soft solids when upper dentures placed. Delayed cough x1 at bedside with thins. VFSS indicated to objectively assess swallow function due to prolonged intubation and increased risk of silent aspiration.    During VFSS, pt given trials of thins, NTL, puree, and mech soft. Overall slow oral transit. Pt did not have dentures placed for VFSS so demonstrated poor chewing of soft solids (however fxl at bedside when wearing dentures). Deep penetration of thins in 2/3 trials during VFSS. No penetration or aspiration of NTL, puree, or mech soft.     REC: mech soft (pt must be wearing dentures), NTL, full feed. Meds to be given crushed/whole with applesauce. Will follow for dysphagia therapy and recommend repeat VFSS in 4-5 days to determine readiness for diet advancement. Discussed recs with pt and RN.

## 2021-04-06 NOTE — PROGRESS NOTES
ICU Daily Progress Note        Aortic valve stenosis    Rheumatoid arthritis (CMS/[facility])      ASSESSMENT:  Acute hypoxic respiratory failure required intubation 03/22/2021 , extubated 4/4/21  Aortic valve stenosis, status post tissue replacement  Pulm hypertension, severe  COPD  KARLY  Cor pulmonale  Tobacco abuse  Upper airway infection with moderate amount of mucopurulent secretions noted on bronchoscopy 3/25/2021  Febrile illness   Interstitial pneumonitis  Acute thrombocytopenia  Catheter associated DVT right upper extremity subclavian, axillary, and brachial   hypernatremia  Upper arm left DVT  DM  Uremia     PLAN:    Titrating O2 to maintain a saturation of 91%  Sputum culture with Candida, likely contaminant  Hemodynamically stable off pressor support  Wean steroids down  Echocardiogram reviewed  Bronchodilator  Inhaled corticosteroids  Electrolytes/ glycemic control  DVT prophylaxis arixtra,   GI prophylaxis.     Tolerating tube feeds at goal  Keep feeding tube for now, patient is very hoarse post extubation.  SLP evaluation and recommendations pending     Nephrology following  Cardiology following  CV surgery admitting     PT/OT evaluation and treatment   Consult case management to start discharge planning.  Will likely need rehab due to severe deconditioning      LOS: 19 days         Vital signs for last 24 hours:  Vitals:    04/06/21 0800 04/06/21 0848 04/06/21 0900 04/06/21 1000   BP:  109/47     Pulse: 75 75 76 66   Resp:       Temp:       TempSrc:       SpO2: 94%  98% 98%   Weight:       Height:           Intake/Output last 3 shifts:  I/O last 3 completed shifts:  In: 4898 [P.O.:200; I.V.:413; Other:2105; NG/GT:2180]  Out: 3975 [Urine:3975]  Intake/Output this shift:  I/O this shift:  In: 60 [Other:60]  Out: -     Vent settings for last 24 hours:       Hemodynamic parameters for last 24 hours:  CVP:  [9 mmHg] 9 mmHg    Radiology  Imaging Results (Last 24 Hours)     Procedure Component Value Units Date/Time     XR Chest 1 View [[id number]] Collected: 04/06/21 0711     Updated: 04/06/21 0714    Narrative:      Examination: XR CHEST 1 VW-     Date of Exam: 4/6/2021 5:35 AM     Indication: Hypoxia; M06.9-Rheumatoid arthritis, unspecified;  I35.0-Nonrheumatic aortic (valve) stenosis; J96.01-Acute respiratory  failure with hypoxia; J44.9-Chronic obstructive pulmonary disease,  unspecified.     Comparison: 4/5/2021.     Technique: Single radiographic view of the chest was obtained.     Findings:  Feeding tube courses below the diaphragm. New left PICC terminates in  the upper SVC. Stable left internal jugular central venous catheter.  Stable evidence of prior median sternotomy and heart valve replacement.  There is stable diffuse mixed interstitial and airspace disease in both  lungs with stable small bilateral pleural effusions. There is no  evidence of pneumothorax or new lung opacity. The heart size is stable.  Pulmonary vasculature is completely obscured. There are no acute osseous  abnormalities.       Impression:         1. Stable mixed interstitial and airspace disease present throughout  both lungs.  2. Stable feeding tube, postoperative findings and left IJ CVC.  3. New left PICC terminates in the upper SVC.     Electronically Signed By-Romain Gutierrez MD On:4/6/2021 7:12 AM  This report was finalized on [id number] by  Romain Gutierrez MD.          Labs:  Results from last 7 days   Lab Units 04/06/21  0422   WBC 10*3/mm3 18.20*   HEMOGLOBIN g/dL 9.7*   HEMATOCRIT % 29.3*   PLATELETS 10*3/mm3 109*     Results from last 7 days   Lab Units 04/06/21  0422 04/05/21  0501   SODIUM mmol/L 139 141   POTASSIUM mmol/L 3.9 3.3*   CHLORIDE mmol/L 105 106   CO2 mmol/L 26.0 26.0   BUN mg/dL 43* 43*   CREATININE mg/dL 0.43* 0.46*   CALCIUM mg/dL 8.3* 8.5*   BILIRUBIN mg/dL  --  0.5   ALK PHOS U/L  --  137*   ALT (SGPT) U/L  --  44*   AST (SGOT) U/L  --  52*   GLUCOSE mg/dL 121* 133*     Results from last 7 days   Lab Units  21  0336   PH, ARTERIAL pH units 7.440   PO2 ART mm Hg 97.7   PCO2, ARTERIAL mm Hg 38.7   HCO3 ART mmol/L 26.3     Results from last 7 days   Lab Units 21  0422 21  0501 21  0414   ALBUMIN g/dL 2.70* 2.70* 2.60*             Results from last 7 days   Lab Units 21  0422   MAGNESIUM mg/dL 2.4     Results from last 7 days   Lab Units 21  0422 21  1152   INR  1.06* 1.06*               Meds:   SCHEDULE  acetylcysteine, 3 mL, Nebulization, BID - RT  arformoterol, 15 mcg, Nebulization, BID - RT  aspirin, 81 mg, Oral, Daily  atorvastatin, 40 mg, Oral, Nightly  budesonide, 1 mg, Nebulization, BID - RT  bumetanide, 1 mg, Nasogastric, TID  chlorhexidine, 15 mL, Mouth/Throat, Q12H  docusate, 100 mg, Oral, BID  First Mouthwash (Magic Mouthwash), 5 mL, Swish & Spit, Q6H  FLUoxetine, 60 mg, Oral, Daily  gabapentin, 200 mg, Oral, Q12H  hydrALAZINE, 25 mg, Nasogastric, Q8H  insulin glargine, 20 Units, Subcutaneous, Q12H  insulin lispro, 0-24 Units, Subcutaneous, Q6H  insulin lispro, 5 Units, Subcutaneous, Q6H  ipratropium-albuterol, 3 mL, Nebulization, Q4H - RT  lansoprazole, 30 mg, Oral, QAM  levothyroxine, 100 mcg, Oral, Q AM  metoprolol tartrate, 12.5 mg, Oral, Q12H  nystatin, , Topical, Q12H  polyethylene glycol, 17 g, Oral, Daily  potassium chloride, 20 mEq, Oral, TID With Meals  predniSONE, 10 mg, Oral, Daily With Breakfast  QUEtiapine, 25 mg, Oral, Nightly  sodium chloride, 10 mL, Intravenous, Q12H  sodium chloride, 10 mL, Intravenous, Q12H  warfarin, 2 mg, Oral, Daily      Infusions     PRNs  •  acetaminophen **OR** acetaminophen **OR** acetaminophen  •  bisacodyl  •  Chlorhexidine Gluconate Cloth  •  dextrose  •  dextrose  •  glucagon (human recombinant)  •  hydrALAZINE  •  insulin lispro **AND** insulin lispro  •  ipratropium-albuterol  •  lidocaine  •  lidocaine  •  meclizine  •  midazolam  •  midazolam  •  [] HYDROmorphone **AND** naloxone  •  ondansetron  •  potassium  chloride **OR** potassium chloride  •  potassium chloride **OR** potassium chloride  •  sodium chloride  •  sodium chloride    Physical Exam:  Physical Exam  Cardiovascular:      Heart sounds: Murmur heard.     Pulmonary:      Breath sounds: Rhonchi present.         ROS  Review of Systems   Respiratory: Positive for cough.    Cardiovascular: Positive for leg swelling.         Critical Care Time greater than: 45 Minutes  Total time spent with patient greater than: 45 Minutes

## 2021-04-06 NOTE — MBS/VFSS/FEES
"Acute Care - Speech Language Pathology   Swallow Re-Evaluation [facility]     Patient Name: Claudia Rust  : 1950  MRN: [id number]  Today's Date: 2021               Admit Date: 3/18/2021    Visit Dx:     ICD-10-CM ICD-9-CM   1. Rheumatoid arthritis, involving unspecified site, unspecified whether rheumatoid factor present (CMS/HCC)  M06.9 714.0   2. Aortic valve stenosis, etiology of cardiac valve disease unspecified  I35.0 424.1   3. Acute respiratory failure with hypoxia (CMS/HCC)  J96.01 518.81   4. Chronic obstructive pulmonary disease, unspecified COPD type (CMS/HCC)  J44.9 496     Patient Active Problem List   Diagnosis   • Morbidly obese (CMS/[facility])   • Dyspnea on exertion   • Abnormal nuclear stress test   • Nonrheumatic aortic valve stenosis   • Prediabetes   • Dyslipidemia   • Essential hypertension   • Acute UTI   • Bipolar disorder (CMS/[facility])   • COPD (chronic obstructive pulmonary disease) (CMS/HCC)   • High triglycerides   • Gastroesophageal reflux disease   • Rheumatoid arthritis (CMS/[facility])   • Aortic stenosis, severe   • Chronic heart failure with preserved ejection fraction (HFpEF) (CMS/HCC)   • Aortic valve stenosis     Past Medical History:   Diagnosis Date   • Acute congestive heart failure (CMS/[facility])    • Allergies    • Anxiety    • Arthritis    • Asthma    • Bilateral leg edema    • Bipolar 1 disorder (CMS/[facility])    • Bulging lumbar disc     X3   • Cancer (CMS/[facility])     states had \"cancerous tumor during pregnancy and had to have hysterectomy\"   • Cataract     bilateral   • Cellulitis 2021    bilateral legs   • Compression fracture of vertebral column (CMS/[facility])     LUMBAR   • COPD (chronic obstructive pulmonary disease) (CMS/[facility])    • Delayed emergence from anesthesia    • Depression    • Depression    • Dyslipidemia    • Dyspnea on exertion    • Fibromyalgia    • GERD (gastroesophageal reflux disease)    • Hiatal hernia    • Hyperlipidemia    • Hypertension    • Hypothyroid     " HYPOTHYROID   • IBS (irritable bowel syndrome)    • Migraines    • Morbid obesity (CMS/[facility])    • Neuropathy    • Panic attacks    • Peripheral edema    • PONV (postoperative nausea and vomiting)    • Poor mobility     rolling walker   • Prediabetes    • PVD (peripheral vascular disease) (CMS/[facility])    • Rheumatoid aortitis    • Spinal stenosis    • Spinal stenosis    • Vertigo    • Vitamin D deficiency      Past Surgical History:   Procedure Laterality Date   • AORTIC VALVE REPAIR/REPLACEMENT N/A 3/18/2021    Procedure: AORTIC VALVE REPLACEMENT;  Surgeon: Olaf Mcgill MD;  Location: [facility] CVOR;  Service: Cardiothoracic;  Laterality: N/A;   • BRONCHOSCOPY N/A 3/25/2021    Procedure: BRONCHOSCOPY AT BEDSIDE WITH BRONCHIOALVEOLAR LAVAGE;  Surgeon: Glenn Reyes MD;  Location: [facility] ENDOSCOPY;  Service: Pulmonary;  Laterality: N/A;  PNA   • CARDIAC CATHETERIZATION  2009   • CARDIAC CATHETERIZATION N/A 8/14/2019    Procedure: Left Heart Cath;  Surgeon: Jose Levi MD;  Location: [facility] CATH INVASIVE LOCATION;  Service: Cardiovascular   • CARDIAC CATHETERIZATION N/A 8/14/2019    Procedure: Right Heart Cath;  Surgeon: Jose Levi MD;  Location: [facility] CATH INVASIVE LOCATION;  Service: Cardiovascular   • CARDIAC CATHETERIZATION N/A 8/14/2019    Procedure: Aortic root aortogram;  Surgeon: Jose Levi MD;  Location: [facility] CATH INVASIVE LOCATION;  Service: Cardiovascular   • CARDIAC CATHETERIZATION N/A 1/20/2021    Procedure: Left Heart Cath;  Surgeon: Jose Levi MD;  Location: [facility] CATH INVASIVE LOCATION;  Service: Cardiovascular;  Laterality: N/A;   • COLONOSCOPY     • CYSTOCELE REPAIR  1984   • ELBOW ARTHROPLASTY  2011   • ENDOSCOPY     • FOOT SURGERY     • GALLBLADDER SURGERY  2002   • TONSILLECTOMY     • VAGINAL HYSTERECTOMY  1972        SWALLOW EVALUATION (last 72 hours)      SLP Adult Swallow Evaluation     Row Name 04/06/21 1300          Document Type   re-evaluation  -    Subjective Information  no complaints  -    Patient Observations  alert;cooperative;agree to therapy  -    Patient/Family/Caregiver Comments/Observations  --    Patient Effort  good  -    Comment  --          Additional Documentation  Pain Scale: FACES Pre/Post-Treatment (Group)  -          Pain: FACES Scale, Pretreatment  0-->no hurt  -    Posttreatment Pain Rating  0-->no hurt  -       Clinical Swallow Evaluation Summary  Pt participated in clinical swallow re-eval to determine readiness for VFSS. Pt sitting up in chair following PT session.  at bedside. Pt with hoarse vocal quality and low speaking volume. At bedside pt given ice chips, thins, puree, and mech soft. Pt unable to feed herself d/t weakness. Pt reports she typically wears dentures to eat but were not placed at beginning of session. Poor mastication of soft solids without dentures. Upper dentures then placed and pt accepted another trial of mech soft with fxl mastication. Pt able to clear oral cavity independently. Delayed cough x1 at end of re-eval following drink of thins via straw. VFSS indicated to objectively assess swallow function due to prolonged intubation and increased risk of silent aspiration. VFSS planned for later this afternoon.  -          VFSS Summary  Pt participated in VFSS to objectively assess swallow function. Dentures were not placed for VFSS. During VFSS, pt given trials of thins via straw, NTL via spoon and straw, puree, and mech soft. Overall slow oral transit with all consistencies. Premature spillage with juice from mixed consistency, otherwise pt with fxl back of tongue control. Again pt with poor anterior mastication of soft solids (however fxl at bedside when wearing dentures) and basically swallowed single diced peach whole.     Deep penetration of thins in 2/3 trials during the swallow due to decreased hyolaryngeal elevation/excursion and epiglottic deflection. Cued throat  clear successful in clearing penetrated residue in laryngeal vestibule only 50% of the time. Noted appearance of penetration with NTL trial following trial of thins. However after re-watching slides, penetrated material is consistent with residue from previously penetrated thin liquids. No new penetration or aspiration of NTL, puree, or mech soft noted at any time. Mild pyriform sinus residue with thins that cleared with subsequent swallow.     REC: mech soft (pt must be wearing dentures), NTL, full feed. Will follow for dysphagia therapy and recommend repeat VFSS in 4-5 days to determine readiness for diet advancement. Discussed recs with pt and RN.   -          Pharyngeal Phase  impaired pharyngeal phase of swallowing  -    Penetration During the Swallow  thin liquids;secondary to reduced laryngeal elevation  -    Response to Penetration  response with cue only  -    Rosenbek's Scale  thin:;3--->level 3;nectar:;pudding/puree:;mixed:;1--->level 1  -    Pharyngeal Residue  thin liquids;pyriform sinuses;secondary to reduced hyolaryngeal excursion  -    Response to Residue  cleared residue with spontaneous subsequent swallow  -          Daily Summary of Progress (SLP)  progress toward functional goals is good  -    SLP Swallowing Diagnosis  mild;oral dysphagia;pharyngeal dysphagia  -    Functional Impact  risk of aspiration/pneumonia  -    Rehab Potential/Prognosis, Swallowing  good, to achieve stated therapy goals  -    Swallow Criteria for Skilled Therapeutic Interventions Met  demonstrates skilled criteria  -          Therapy Frequency (Swallow)  PRN  -    Predicted Duration Therapy Intervention (Days)  until discharge  -    SLP Diet Recommendation  ground;nectar thick liquids  -    Recommended Diagnostics  reassess via clinical swallow evaluation;reassess via [place]FSS [place]  -    Recommended Precautions and Strategies  upright posture during/after eating;small bites of food and sips of  liquid  -    Oral Care Recommendations  Oral Care BID/PRN  -    SLP Rec. for Method of Medication Administration  meds whole;meds crushed;with pudding or applesauce  -    Monitor for Signs of Aspiration  yes;notify SLP if any concerns  -    Anticipated Discharge Disposition (SLP)  inpatient rehabilitation facility  -          Oral Nutrition/Hydration Goal Selection (SLP)  oral nutrition/hydration, SLP goal 1;oral nutrition/hydration, SLP goal 2;oral nutrition/hydration, SLP goal (free text)  -          Oral Nutrition/Hydration Goal 1, SLP  Patient will be seen within 24-48 hours to re-assess swallow function with further recommendations to be given as indicated.  -    Time Frame (Oral Nutrition/Hydration Goal 1, SLP)  2 days  -    Barriers (Oral Nutrition/Hydration Goal 1, SLP)  completed today  -    Progress/Outcomes (Oral Nutrition/Hydration Goal 1, SLP)  goal met  -          Oral Nutrition/Hydration Goal 2, SLP  Patient will initiate a PO diet and tolerate with no complications from aspiration  -    Time Frame (Oral Nutrition/Hydration Goal 2, SLP)  by discharge  -    Barriers (Oral Nutrition/Hydration Goal 2, SLP)  placed on mech soft and NTL following VFSS today. Will cont to follow.  -          Oral Nutrition/Hydration Goal, SLP  Pt will participate in ongoing dx tx to determine tolerance of recommended diet, independence with strategies/precautions, and readiness for repeat VFSS/diet advancement.  -    Time Frame (Oral Nutrition/Hydration Goal, SLP)  1 week  -      User Key  (r) = Recorded By, (t) = Taken By, (c) = Cosigned By    Initials Name Effective Dates    Cecilia Farley, OFELIA 03/01/19 -     Joann Patel SLP 06/17/19 -           EDUCATION  The patient has been educated in the following areas:   Dysphagia (Swallowing Impairment) Modified Diet Instruction.    SLP Recommendation and Plan  SLP Swallowing Diagnosis: mild, oral dysphagia, pharyngeal dysphagia  SLP  Diet Recommendation: ground, nectar thick liquids  Recommended Precautions and Strategies: upright posture during/after eating, small bites of food and sips of liquid  SLP Rec. for Method of Medication Administration: meds whole, meds crushed, with pudding or applesauce     Monitor for Signs of Aspiration: yes, notify SLP if any concerns  Recommended Diagnostics: reassess via clinical swallow evaluation, reassess via VFSS ([facility]  Swallow Criteria for Skilled Therapeutic Interventions Met: demonstrates skilled criteria  Anticipated Discharge Disposition (SLP): inpatient rehabilitation facility  Rehab Potential/Prognosis, Swallowing: good, to achieve stated therapy goals  Therapy Frequency (Swallow): PRN  Predicted Duration Therapy Intervention (Days): until discharge     Daily Summary of Progress (SLP): progress toward functional goals is good                        SLP GOALS     Row Name 04/06/21 1300 04/05/21 1300          Oral Nutrition/Hydration Goal 1 (SLP)    Oral Nutrition/Hydration Goal 1, SLP  Patient will be seen within 24-48 hours to re-assess swallow function with further recommendations to be given as indicated.  -MF  Patient will be seen within 24-48 hours to re-assess swallow function with further recommendations to be given as indicated.  -MM     Time Frame (Oral Nutrition/Hydration Goal 1, SLP)  2 days  -MF  2 days  -MM     Barriers (Oral Nutrition/Hydration Goal 1, SLP)  completed today  -MF  --     Progress/Outcomes (Oral Nutrition/Hydration Goal 1, SLP)  goal met  -MF  --        Oral Nutrition/Hydration Goal 2 (SLP)    Oral Nutrition/Hydration Goal 2, SLP  Patient will initiate a PO diet and tolerate with no complications from aspiration  -MF  Patient will initiate a PO diet and tolerate with no complications from aspiration  -MM     Time Frame (Oral Nutrition/Hydration Goal 2, SLP)  by discharge  -MF  by discharge  -MM     Barriers (Oral Nutrition/Hydration Goal 2, SLP)  placed on [facility] soft and  NTL following VFSS today. Will cont to follow.  -MF  --        Oral Nutrition/Hydration Goal (SLP)    Oral Nutrition/Hydration Goal, SLP  Pt will participate in ongoing dx tx to determine tolerance of recommended diet, independence with strategies/precautions, and readiness for repeat VFSS/diet advancement.  -MF  --     Time Frame (Oral Nutrition/Hydration Goal, SLP)  1 week  -MF  --       User Key  (r) = Recorded By, (t) = Taken By, (c) = Cosigned By    Initials Name Provider Type    Cecilia Farley SLP Speech and Language Pathologist    Joann Patel SLP Speech and Language Pathologist         Patient was not wearing a face mask during this therapy encounter. Therapist used appropriate personal protective equipment including mask, eye protection and gloves.  Mask used was standard procedure mask. Appropriate PPE was worn during the entire therapy session. Hand hygiene was completed before and after therapy session. Patient is not in enhanced droplet precautions.                  Time Calculation:       Therapy Charges for Today     Code Description Service Date Service Provider Modifiers Qty    [id number]  ST TREATMENT SWALLOW 4 4/6/2021 Joann Klein SLP GN 1    [id number]  ST MOTION FLUORO EVAL SWALLOW 6 4/6/2021 Joann Klein SLP GN 1               OFELIA Carlson  4/6/2021

## 2021-04-06 NOTE — PROGRESS NOTES
S/P POD# 19 tissue AVR--Pagni  EF 60-65% (echo)    Subjective: Up to chair using lift with PT. Denies complaints this am.    Drips: None    CHRISTIANO (3/25)--no abnormal findings noted  Bronch (3/25)--mod mucopurulent secretions--cultures negative  CVP 5  Cxr: continued interstitial/airspace disease      Intake/Output Summary (Last 24 hours) at 4/6/2021 0940  Last data filed at 4/6/2021 0555  Gross per 24 hour   Intake 3372 ml   Output 2450 ml   Net 922 ml     Temp:  [97.4 °F (36.3 °C)-98.6 °F (37 °C)] 97.6 °F (36.4 °C)  Heart Rate:  [65-87] 75  Resp:  [20-23] 20  BP: (109-121)/(47-56) 109/47  Arterial Line BP: ()/(40-63) 115/54      Results from last 7 days   Lab Units 04/06/21  0422 04/05/21  1152 04/05/21  0501   WBC 10*3/mm3 18.20*  --  22.20*   HEMOGLOBIN g/dL 9.7*  --  10.7*   HEMATOCRIT % 29.3*  --  32.6*   PLATELETS 10*3/mm3 109*  --  134*   INR  1.06* 1.06*  --      Results from last 7 days   Lab Units 04/06/21  0422   CREATININE mg/dL 0.43*   POTASSIUM mmol/L 3.9   SODIUM mmol/L 139   MAGNESIUM mg/dL 2.4   PHOSPHORUS mg/dL 3.1       Physical Exam:  Up to chair,  at bedside  Tele:  SR 70's  diminished bilaterally, 97% 10L High Flow  Sternal incision healing well  Benign abd, + BM. TF infusing  + edema, chronic erythema of BLE noted    Assessment/Plan:  Principal Problem:    Aortic valve stenosis  Active Problems:    Rheumatoid arthritis (CMS/HCC)    Severe aortic stenosis--s/p tissue AVR (Pagni)  Postop acute hypoxic resp failure--re-intubated 3/22, extubated 4/4  Postop TCP/HIT positive-- Arixtra  Right subclavian/axillary/brachial DVT-- Arixtra  Chronic HFpEF--maximize meds prior to d/c  COPD--nebs, pulmicort  Rheumatoid arthritis--not on medication per patient  HTN--stable  HLD--statin  Bipolar disorder--home Prozac  Obesity stage 3  PVD  KARLY  Vertigo--home Antivert  Tobacco abuse--counseled on cessation  Postop leukocytosis, likely r/t atelectasis--aggressive pulm toileting  Post-op  hypernatremia-- resolved      POD#19. Patient up to chair this am using lift. No events overnight. Discussed with patient the importance of pulmonary toileting and mobilization. Speech therapy evaluated and recommends video swallow study to be completed today. Coumadin started for RUE DVT. PICC placed yesterday. DC central line.    Routine care--as above  DC plan to be determined    SARA MULLEN  4/6/2021  09:40 EDT

## 2021-04-07 ENCOUNTER — APPOINTMENT (OUTPATIENT)
Dept: GENERAL RADIOLOGY | Facility: HOSPITAL | Age: 71
End: 2021-04-07

## 2021-04-07 LAB
ALBUMIN SERPL-MCNC: 2.7 G/DL (ref 3.5–5.2)
ANION GAP SERPL CALCULATED.3IONS-SCNC: 8 MMOL/L (ref 5–15)
BUN SERPL-MCNC: 35 MG/DL (ref 8–23)
BUN/CREAT SERPL: 92.1 (ref 7–25)
CALCIUM SPEC-SCNC: 8.1 MG/DL (ref 8.6–10.5)
CHLORIDE SERPL-SCNC: 103 MMOL/L (ref 98–107)
CO2 SERPL-SCNC: 25 MMOL/L (ref 22–29)
CREAT SERPL-MCNC: 0.38 MG/DL (ref 0.57–1)
DEPRECATED RDW RBC AUTO: 48.1 FL (ref 37–54)
ERYTHROCYTE [DISTWIDTH] IN BLOOD BY AUTOMATED COUNT: 14.8 % (ref 12.3–15.4)
GFR SERPL CREATININE-BSD FRML MDRD: >150 ML/MIN/1.73
GLUCOSE BLDC GLUCOMTR-MCNC: 105 MG/DL (ref 70–105)
GLUCOSE BLDC GLUCOMTR-MCNC: 118 MG/DL (ref 70–105)
GLUCOSE BLDC GLUCOMTR-MCNC: 118 MG/DL (ref 70–105)
GLUCOSE BLDC GLUCOMTR-MCNC: 135 MG/DL (ref 70–105)
GLUCOSE BLDC GLUCOMTR-MCNC: 92 MG/DL (ref 70–105)
GLUCOSE SERPL-MCNC: 121 MG/DL (ref 65–99)
HCT VFR BLD AUTO: 29.5 % (ref 34–46.6)
HGB BLD-MCNC: 9.8 G/DL (ref 12–15.9)
INR PPP: 1.07 (ref 2–3)
MCH RBC QN AUTO: 30.1 PG (ref 26.6–33)
MCHC RBC AUTO-ENTMCNC: 33.2 G/DL (ref 31.5–35.7)
MCV RBC AUTO: 90.8 FL (ref 79–97)
PHOSPHATE SERPL-MCNC: 3.2 MG/DL (ref 2.5–4.5)
PLATELET # BLD AUTO: 103 10*3/MM3 (ref 140–450)
PMV BLD AUTO: 9.9 FL (ref 6–12)
POTASSIUM SERPL-SCNC: 3.8 MMOL/L (ref 3.5–5.2)
PROTHROMBIN TIME: 11.7 SECONDS (ref 19.4–28.5)
RBC # BLD AUTO: 3.25 10*6/MM3 (ref 3.77–5.28)
SODIUM SERPL-SCNC: 136 MMOL/L (ref 136–145)
WBC # BLD AUTO: 19.1 10*3/MM3 (ref 3.4–10.8)

## 2021-04-07 PROCEDURE — 94799 UNLISTED PULMONARY SVC/PX: CPT

## 2021-04-07 PROCEDURE — 85610 PROTHROMBIN TIME: CPT | Performed by: NURSE PRACTITIONER

## 2021-04-07 PROCEDURE — 25010000002 FONDAPARINUX PER 0.5 MG: Performed by: NURSE PRACTITIONER

## 2021-04-07 PROCEDURE — 71045 X-RAY EXAM CHEST 1 VIEW: CPT

## 2021-04-07 PROCEDURE — 63710000001 INSULIN GLARGINE PER 5 UNITS: Performed by: NURSE PRACTITIONER

## 2021-04-07 PROCEDURE — 63710000001 INSULIN LISPRO (HUMAN) PER 5 UNITS: Performed by: NURSE PRACTITIONER

## 2021-04-07 PROCEDURE — 63710000001 PREDNISONE PER 5 MG: Performed by: INTERNAL MEDICINE

## 2021-04-07 PROCEDURE — 99024 POSTOP FOLLOW-UP VISIT: CPT | Performed by: NURSE PRACTITIONER

## 2021-04-07 PROCEDURE — 97535 SELF CARE MNGMENT TRAINING: CPT

## 2021-04-07 PROCEDURE — 25010000002 ONDANSETRON PER 1 MG: Performed by: NURSE PRACTITIONER

## 2021-04-07 PROCEDURE — 85027 COMPLETE CBC AUTOMATED: CPT | Performed by: NURSE PRACTITIONER

## 2021-04-07 PROCEDURE — 97112 NEUROMUSCULAR REEDUCATION: CPT

## 2021-04-07 PROCEDURE — 80069 RENAL FUNCTION PANEL: CPT | Performed by: INTERNAL MEDICINE

## 2021-04-07 PROCEDURE — 92526 ORAL FUNCTION THERAPY: CPT

## 2021-04-07 PROCEDURE — 99232 SBSQ HOSP IP/OBS MODERATE 35: CPT | Performed by: INTERNAL MEDICINE

## 2021-04-07 PROCEDURE — 82962 GLUCOSE BLOOD TEST: CPT

## 2021-04-07 RX ORDER — WARFARIN SODIUM 5 MG/1
5 TABLET ORAL
Status: DISCONTINUED | OUTPATIENT
Start: 2021-04-07 | End: 2021-04-09

## 2021-04-07 RX ORDER — FONDAPARINUX SODIUM 5 MG/.4ML
5 INJECTION SUBCUTANEOUS
Status: DISCONTINUED | OUTPATIENT
Start: 2021-04-07 | End: 2021-04-10

## 2021-04-07 RX ADMIN — POLYETHYLENE GLYCOL 3350 17 G: 17 POWDER, FOR SOLUTION ORAL at 08:53

## 2021-04-07 RX ADMIN — FLUOXETINE 20 MG: 20 CAPSULE ORAL at 08:53

## 2021-04-07 RX ADMIN — Medication 10 ML: at 08:54

## 2021-04-07 RX ADMIN — IPRATROPIUM BROMIDE AND ALBUTEROL SULFATE 3 ML: 2.5; .5 SOLUTION RESPIRATORY (INHALATION) at 11:25

## 2021-04-07 RX ADMIN — ATORVASTATIN CALCIUM 40 MG: 40 TABLET, FILM COATED ORAL at 20:21

## 2021-04-07 RX ADMIN — Medication 20 ML: at 08:53

## 2021-04-07 RX ADMIN — BUDESONIDE 1 MG: 0.5 SUSPENSION RESPIRATORY (INHALATION) at 19:17

## 2021-04-07 RX ADMIN — POTASSIUM CHLORIDE 20 MEQ: 1500 TABLET, EXTENDED RELEASE ORAL at 13:32

## 2021-04-07 RX ADMIN — IPRATROPIUM BROMIDE AND ALBUTEROL SULFATE 3 ML: 2.5; .5 SOLUTION RESPIRATORY (INHALATION) at 22:42

## 2021-04-07 RX ADMIN — ASPIRIN 81 MG: 81 TABLET, COATED ORAL at 08:53

## 2021-04-07 RX ADMIN — INSULIN GLARGINE 20 UNITS: 100 INJECTION, SOLUTION SUBCUTANEOUS at 08:54

## 2021-04-07 RX ADMIN — DOCUSATE SODIUM 100 MG: 50 LIQUID ORAL at 20:21

## 2021-04-07 RX ADMIN — ACETYLCYSTEINE 3 ML: 200 SOLUTION ORAL; RESPIRATORY (INHALATION) at 06:57

## 2021-04-07 RX ADMIN — INSULIN LISPRO 5 UNITS: 100 INJECTION, SOLUTION INTRAVENOUS; SUBCUTANEOUS at 17:37

## 2021-04-07 RX ADMIN — IPRATROPIUM BROMIDE AND ALBUTEROL SULFATE 3 ML: 2.5; .5 SOLUTION RESPIRATORY (INHALATION) at 06:57

## 2021-04-07 RX ADMIN — LANSOPRAZOLE 30 MG: KIT at 06:04

## 2021-04-07 RX ADMIN — ACETAMINOPHEN ORAL SOLUTION 649.6 MG: 650 SOLUTION ORAL at 13:33

## 2021-04-07 RX ADMIN — BUMETANIDE 1 MG: 1 TABLET ORAL at 08:53

## 2021-04-07 RX ADMIN — BUMETANIDE 1 MG: 1 TABLET ORAL at 15:13

## 2021-04-07 RX ADMIN — HYDRALAZINE HYDROCHLORIDE 25 MG: 25 TABLET ORAL at 13:33

## 2021-04-07 RX ADMIN — HYDRALAZINE HYDROCHLORIDE 25 MG: 25 TABLET ORAL at 06:00

## 2021-04-07 RX ADMIN — ARFORMOTEROL TARTRATE 15 MCG: 15 SOLUTION RESPIRATORY (INHALATION) at 06:57

## 2021-04-07 RX ADMIN — IPRATROPIUM BROMIDE AND ALBUTEROL SULFATE 3 ML: 2.5; .5 SOLUTION RESPIRATORY (INHALATION) at 02:30

## 2021-04-07 RX ADMIN — ARFORMOTEROL TARTRATE 15 MCG: 15 SOLUTION RESPIRATORY (INHALATION) at 19:17

## 2021-04-07 RX ADMIN — BUMETANIDE 1 MG: 1 TABLET ORAL at 20:21

## 2021-04-07 RX ADMIN — ONDANSETRON 4 MG: 2 INJECTION INTRAMUSCULAR; INTRAVENOUS at 22:05

## 2021-04-07 RX ADMIN — ACETAMINOPHEN ORAL SOLUTION 649.6 MG: 650 SOLUTION ORAL at 08:53

## 2021-04-07 RX ADMIN — POTASSIUM CHLORIDE 20 MEQ: 1500 TABLET, EXTENDED RELEASE ORAL at 17:37

## 2021-04-07 RX ADMIN — LEVOTHYROXINE SODIUM 100 MCG: 0.1 TABLET ORAL at 06:03

## 2021-04-07 RX ADMIN — DIPHENHYDRAMINE HYDROCHLORIDE AND LIDOCAINE HYDROCHLORIDE AND ALUMINUM HYDROXIDE AND MAGNESIUM HYDRO 5 ML: KIT at 17:37

## 2021-04-07 RX ADMIN — PREDNISONE 10 MG: 10 TABLET ORAL at 08:53

## 2021-04-07 RX ADMIN — CHLORHEXIDINE GLUCONATE 15 ML: 1.2 RINSE ORAL at 08:52

## 2021-04-07 RX ADMIN — INSULIN LISPRO 5 UNITS: 100 INJECTION, SOLUTION INTRAVENOUS; SUBCUTANEOUS at 05:59

## 2021-04-07 RX ADMIN — GABAPENTIN 200 MG: 100 CAPSULE ORAL at 20:21

## 2021-04-07 RX ADMIN — GABAPENTIN 200 MG: 100 CAPSULE ORAL at 08:53

## 2021-04-07 RX ADMIN — INSULIN LISPRO 5 UNITS: 100 INJECTION, SOLUTION INTRAVENOUS; SUBCUTANEOUS at 13:33

## 2021-04-07 RX ADMIN — BUDESONIDE 1 MG: 0.5 SUSPENSION RESPIRATORY (INHALATION) at 06:57

## 2021-04-07 RX ADMIN — NYSTATIN: 100000 POWDER TOPICAL at 20:22

## 2021-04-07 RX ADMIN — WARFARIN 5 MG: 5 TABLET ORAL at 17:37

## 2021-04-07 RX ADMIN — METOPROLOL TARTRATE 12.5 MG: 25 TABLET, FILM COATED ORAL at 20:21

## 2021-04-07 RX ADMIN — FONDAPARINUX SODIUM 5 MG: 5 INJECTION, SOLUTION SUBCUTANEOUS at 12:09

## 2021-04-07 RX ADMIN — INSULIN LISPRO 5 UNITS: 100 INJECTION, SOLUTION INTRAVENOUS; SUBCUTANEOUS at 00:31

## 2021-04-07 RX ADMIN — IPRATROPIUM BROMIDE AND ALBUTEROL SULFATE 3 ML: 2.5; .5 SOLUTION RESPIRATORY (INHALATION) at 19:17

## 2021-04-07 RX ADMIN — HYDRALAZINE HYDROCHLORIDE 25 MG: 25 TABLET ORAL at 20:21

## 2021-04-07 RX ADMIN — METOPROLOL TARTRATE 12.5 MG: 25 TABLET, FILM COATED ORAL at 08:53

## 2021-04-07 RX ADMIN — NYSTATIN: 100000 POWDER TOPICAL at 10:00

## 2021-04-07 RX ADMIN — POTASSIUM CHLORIDE 20 MEQ: 1500 TABLET, EXTENDED RELEASE ORAL at 08:53

## 2021-04-07 RX ADMIN — DOCUSATE SODIUM 100 MG: 50 LIQUID ORAL at 08:53

## 2021-04-07 RX ADMIN — Medication 10 ML: at 20:22

## 2021-04-07 NOTE — PROGRESS NOTES
Cardiology Progress Note    Patient Identification:  Name: Claudia Rust  Age: 70 y.o.  Sex: female  :  1950  MRN: [id number]                 Follow Up / Chief Complaint: Follow-up for valvular heart disease status post AVR    Interval History: Patient underwent #23 magna pericardial prosthesis AVR 3/18/2021  3/22/2021.  Events noted.  Patient is intubated on IV Olegario-Synephrine and Bumex.  3/23/2021.  Patient continues to intubated  3/24/2021 patient continues to be intubated and mechanically ventilated.  3/25/2021 patient continues to be intubated and mechanically ventilated.  Underwent a CHRISTIANO which showed normal LV systolic function and good aortic valve function.  3/26/2021.  Patient continues to be intubated and mechanically ventilated.  He is lightly sedated.  Patient underwent bronchoscopy 3/25/2021.  Patient was getting tachycardic was given 12.5 of metoprolol developed hypotension requiring Olegario-Synephrine.  HIT antibody positive  3/29/2021: Continues to be intubated and mechanically ventilated.  3/30/2021.  Patient continues to be intubated and mechanically ventilated.  3/31/2021: Patient continues to be intubated and mechanically ventilated.  Nephrology has started on IV Bumex drip.  21: Patient is intubated and is on weaning trials.  Bumex drip has been discontinued.  2021 : Patient intubated on weaning trials.  On IV Bumex  21 : Patient extubated.  Dobbhoff tube present.  2021 : Patient is sitting in chair intermittently confused without symptoms at the current time.  2021 : Patient sitting in bed hemodynamically stable.  Dobbhoff tube is present.     Subjective: Patient seen and examined.  Chart reviewed.  Labs reviewed.  Discussed with RN taking care of patient.  Patient is intermittently confused.  Is complaining of mild discomfort cannot tell where the discomfort is.  Obtained history from .      Objective: Sodium is 152--> 144-> 141-141.  BUN/creatinine  49/1.05->  42/0.69-> 42/0.64-> 46/0.60-> 47/0.49-> 43/0.46--> 43/0.43-> 35/0.38..  Hemoglobin is 10.1--> 10.8--> 9.0-> 10.8-> 10.5-> 10.2-> 10.6-> 10.6-> 10.7-> 9.7-> 9.8.  Platelet count 95-> 134-> 3.  White count elevated at 22.2.    History of present illness :    This is a 70-year-old with PMH of     #Valvular heart disease with moderate to severe aortic stenosis  Cardiac cath 1/20/2021 revealing nonobstructive CAD  # Hyperlipidemia,    # Hypertension  # COPD, KARLY on CPAP  # Prediabetes  # Hypothyroidism, Rheumatoid Arthritis, spinal stenosis, hiatal hernia, chronic depression, bipolar, GERD, IBS, chronic migraine, vertigo, herniated disc, compression lumbar fractures  #Allergies/intolerance to Stadol  # Hysterectomy, bladder reconstruction, cholecystectomy, right hand surgery  # Family history of strokes and grandfather.  Mother's cancer, father on  # Active cigarette smoker trying to quit         Here for aortic valve replacement.  Patient was recently seen with a complaint of dyspnea on exertion edema and weight gain.  Patient was brought in for elective aortic valve replacement.  Patient underwent aortic valve replacement 3/18/2021 with #23 magna pericardial prosthesis.  Blood pressure is high is on IV Cardene and IV nitroglycerin.  Currently intubated sedated chest tube present Venegas catheter present.  Underlying rhythm is flatline with AV paced rhythm.  Underwent an echocardiogram which is revealing severe aortic stenosis on 2/11/2020   Underwent cardiac cath 1/20/2021 which revealed no obstructive disease.  Carotid Dopplers 9/13/2019 normal.  Echocardiogram 2/11/2020 is revealing severe aortic stenosis  Work-up here on 3/16/2021 revealed proBNP 733, normal CMP, A1c 5.7, normal CBC, chest x-ray with no acute abnormality.  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  Events noted patient was intubated 3/21/2021 for respiratory failure.  Extubated 4/4/2021.  Continue to monitor pulmonary status and follow-up with pulmonary.  Chest x-ray 4 /5/2021   1. Stable diffuse mixed interstitial and airspace disease.  2. Interval extubation.  3. Stable postoperative findings and stable left internal jugular  central venous catheter/feeding tube.    Patient's po Bumex 1 mg 3 times daily  Monitor electrolytes and renal function  Replete electrolytes as needed  Patient sodium was going up, patient was given free water and has improved.  Appreciated nephrology input.  Will follow up with nephrology.  Monitor for routine expected post surgery blood loss anemia  Patient underwent transesophageal echo 3/25/2021 which revealed normal LV systolic function normal chamber sizes.  Normal aortic valve placement and no significant valvular heart disease.  Continue to monitor blood pressure closely patient developed hypotension requiring Olegario-Synephrine with 12.5 of metoprolol.  Now blood pressure is going up we will restart metoprolol and monitor blood pressure closely.  Will follow up with CT VS for routine post CABG care and HIT, patient has been started on warfarin.  INR is 1.07.  Continue to load.  Discussed with RN taking care of patient to coordinate care  Discussed with patient's  by bedside.    Past Medical History:  Past Medical History:   Diagnosis Date   • Acute  "congestive heart failure (CMS/[facility])    • Allergies    • Anxiety    • Arthritis    • Asthma    • Bilateral leg edema    • Bipolar 1 disorder (CMS/[facility])    • Bulging lumbar disc     X3   • Cancer (CMS/[facility])     states had \"cancerous tumor during pregnancy and had to have hysterectomy\"   • Cataract     bilateral   • Cellulitis 03/2021    bilateral legs   • Compression fracture of vertebral column (CMS/[facility])     LUMBAR   • COPD (chronic obstructive pulmonary disease) (CMS/[facility])    • Delayed emergence from anesthesia    • Depression    • Depression    • Dyslipidemia    • Dyspnea on exertion    • Fibromyalgia    • GERD (gastroesophageal reflux disease)    • Hiatal hernia    • Hyperlipidemia    • Hypertension    • Hypothyroid     HYPOTHYROID   • IBS (irritable bowel syndrome)    • Migraines    • Morbid obesity (CMS/[facility])    • Neuropathy    • Panic attacks    • Peripheral edema    • PONV (postoperative nausea and vomiting)    • Poor mobility     rolling walker   • Prediabetes    • PVD (peripheral vascular disease) (CMS/[facility])    • Rheumatoid aortitis    • Spinal stenosis    • Spinal stenosis    • Vertigo    • Vitamin D deficiency      Past Surgical History:  Past Surgical History:   Procedure Laterality Date   • AORTIC VALVE REPAIR/REPLACEMENT N/A 3/18/2021    Procedure: AORTIC VALVE REPLACEMENT;  Surgeon: Olaf Mcgill MD;  Location: [facility] CVOR;  Service: Cardiothoracic;  Laterality: N/A;   • BRONCHOSCOPY N/A 3/25/2021    Procedure: BRONCHOSCOPY AT BEDSIDE WITH BRONCHIOALVEOLAR LAVAGE;  Surgeon: Glenn Reyes MD;  Location: [facility] ENDOSCOPY;  Service: Pulmonary;  Laterality: N/A;  PNA   • CARDIAC CATHETERIZATION  2009   • CARDIAC CATHETERIZATION N/A 8/14/2019    Procedure: Left Heart Cath;  Surgeon: Jose Levi MD;  Location: [facility] CATH INVASIVE LOCATION;  Service: Cardiovascular   • CARDIAC CATHETERIZATION N/A 8/14/2019    Procedure: Right Heart Cath;  Surgeon: Jose Levi MD;  Location: [facility] " CATH INVASIVE LOCATION;  Service: Cardiovascular   • CARDIAC CATHETERIZATION N/A 8/14/2019    Procedure: Aortic root aortogram;  Surgeon: Jose Levi MD;  Location: [facility] CATH INVASIVE LOCATION;  Service: Cardiovascular   • CARDIAC CATHETERIZATION N/A 1/20/2021    Procedure: Left Heart Cath;  Surgeon: Jose Levi MD;  Location: [facility] CATH INVASIVE LOCATION;  Service: Cardiovascular;  Laterality: N/A;   • COLONOSCOPY     • CYSTOCELE REPAIR  1984   • ELBOW ARTHROPLASTY  2011   • ENDOSCOPY     • FOOT SURGERY     • GALLBLADDER SURGERY  2002   • TONSILLECTOMY     • VAGINAL HYSTERECTOMY  1972        Social History:   Social History     Tobacco Use   • Smoking status: Current Every Day Smoker     Packs/day: 1.00     Types: Cigarettes   • Smokeless tobacco: Never Used   • Tobacco comment: decrease now and do not smoke dos   Substance Use Topics   • Alcohol use: Yes     Comment: socially      Family History:  History reviewed. No pertinent family history.       Allergies:  Allergies   Allergen Reactions   • Adhesive Tape Unknown (See Comments)     hives   • Butorphanol Other (See Comments)     Chest pain difficulty breathing throat swelling   • Garlic Other (See Comments)     Chest pain difficulty breathing throat swells   • Tetanus-Diphtheria Toxoids Td Other (See Comments)     Dizziness,  vomiting   • Aloe Itching   • Bee Venom Other (See Comments)     Swelling eyes and lips hand swelling   • Latex Itching   • Macadamia Nut Oil Other (See Comments)     Chest pain difficulty breathing throat swelling   • Tuberculin Unknown (See Comments)     reactor   • Wasp Venom Other (See Comments)     Swelling eyes lips and hand     Scheduled Meds:  acetylcysteine, 3 mL, BID - RT  arformoterol, 15 mcg, BID - RT  aspirin, 81 mg, Daily  atorvastatin, 40 mg, Nightly  budesonide, 1 mg, BID - RT  bumetanide, 1 mg, TID  chlorhexidine, 15 mL, Q12H  docusate, 100 mg, BID  First Mouthwash (Magic Mouthwash), 5 mL,  "Q6H  FLUoxetine, 20 mg, Daily  gabapentin, 200 mg, Q12H  hydrALAZINE, 25 mg, Q8H  insulin glargine, 20 Units, Q12H  insulin lispro, 0-24 Units, Q6H  insulin lispro, 5 Units, Q6H  ipratropium-albuterol, 3 mL, Q4H - RT  lansoprazole, 30 mg, QAM  levothyroxine, 100 mcg, Q AM  metoprolol tartrate, 12.5 mg, Q12H  nystatin, , Q12H  polyethylene glycol, 17 g, Daily  potassium chloride, 20 mEq, TID With Meals  predniSONE, 10 mg, Daily With Breakfast  sodium chloride, 10 mL, Q12H  sodium chloride, 10 mL, Q12H  warfarin, 2 mg, Daily          Review of Systems:   Review of Systems   Unable to perform ROS: mental status change              Constitutional:  Temp:  [97.2 °F (36.2 °C)-98.5 °F (36.9 °C)] 97.7 °F (36.5 °C)  Heart Rate:  [66-77] 75  Resp:  [18-22] 22  BP: (109-122)/(47-64) 121/64  Arterial Line BP: ()/(43-66) 121/53    Physical Exam   /64   Pulse 75   Temp 97.7 °F (36.5 °C) (Oral)   Resp 22   Ht 162.6 cm (64\")   Wt 121 kg (266 lb 15.6 oz)   SpO2 100%   BMI 45.83 kg/m²   General: Patient extubated, sitting in bed  Eyes: Sclera is anicteric,  conjunctiva is clear   HEENT:  No JVD.  Dobbhoff tube present.  Respiratory: Good air entry, nonlabored breathing.  Scattered rhonchi and wheezing.  Cardiovascular: S1,S2 Regular rate and rhythm.  Sternotomy is healing well.  Gastrointestinal: Abdomen nondistended, soft  Musculoskeletal:  No abnormal movements  Extremities: Patient has chronic bilateral lower extremity edema continues to have 1+ edema.  Skin: Stasis changes seen bilateral shin.  Neuro: Alert and awake, no lateralizing deficits appreciated    INTAKE AND OUTPUT:    Intake/Output Summary (Last 24 hours) at 4/7/2021 0840  Last data filed at 4/7/2021 0555  Gross per 24 hour   Intake 1229 ml   Output 2500 ml   Net -1271 ml       Cardiographics  Telemetry: Sinus rhythm    ECG:   ECG 12 Lead   Preliminary Result   HEART RATE= 59  bpm   RR Interval= 1020  ms   NH Interval= 158  ms   P Horizontal Axis= 14 "  deg   P Front Axis= 25  deg   QRSD Interval= 91  ms   QT Interval= 652  ms   QRS Axis= 55  deg   T Wave Axis= 67  deg   - ABNORMAL ECG -   Sinus bradycardia   Prolonged QT interval   Electronically Signed By:    Date and Time of Study: 2021-03-23 13:15:33      ECG 12 Lead   Final Result   HEART RATE= 80  bpm   RR Interval= 752  ms   NH Interval= 161  ms   P Horizontal Axis= 0  deg   P Front Axis= 22  deg   QRSD Interval= 80  ms   QT Interval= 419  ms   QRS Axis= 39  deg   T Wave Axis= 35  deg   - NORMAL ECG -   Sinus rhythm   When compared with ECG of 19-Mar-2021 4:30:10,   Significant repolarization change   Electronically Signed By: Segun Rucker ([facility]) 20-Mar-2021 10:06:29   Date and Time of Study: 2021-03-20 05:15:54      ECG 12 Lead   Final Result   HEART RATE= 62  bpm   RR Interval= 984  ms   NH Interval= 165  ms   P Horizontal Axis= 13  deg   P Front Axis= 32  deg   QRSD Interval= 76  ms   QT Interval= 434  ms   QRS Axis= 43  deg   T Wave Axis= 82  deg   - ABNORMAL ECG -   Sinus rhythm   Atrial premature complex   ST elevation, consider inferior injury   When compared with ECG of 18-Mar-2021 11:04:47,   No significant change   Electronically Signed By: Segun Rucker ([facility]) 19-Mar-2021 16:48:51   Date and Time of Study: 2021-03-19 04:30:10      ECG 12 Lead   Final Result   HEART RATE= 56  bpm   RR Interval= 1072  ms   NH Interval= 163  ms   P Horizontal Axis= -22  deg   P Front Axis= -9  deg   QRSD Interval= 88  ms   QT Interval= 516  ms   QRS Axis= 40  deg   T Wave Axis= 43  deg   - OTHERWISE NORMAL ECG -   Sinus bradycardia   When compared with ECG of 16-Mar-2021 8:51:36,   Significant repolarization change   Electronically Signed By: Segun Rucker ([facility]) 18-Mar-2021 17:48:15   Date and Time of Study: 2021-03-18 11:04:47        I have personally reviewed EKG    Echocardiogram: Results for orders placed during the hospital encounter of 03/18/21    Adult Transesophageal Echo (CHRISTIANO) W/ Cont if Necessary Per  Protocol (Bedside)    Interpretation Summary  CHRISTIANO   procedure note    Procedure:  CHRISTIANO    Indication:   Valvular heart disease s/p AVR, respiratory failure    Date of procedure  : 3/25/2021    :  Dr. Jose Levi    Description of procedure:    Under conscious sedation given by anesthesiology and local anesthesia, a CHRISTIANO probe was advanced into the esophagus and into the stomach 2D, M-mode, color Doppler images were obtained..  Patient tolerated procedure well complications well.    Complications: none    Results:    Normal LV size and contractility LV contractility, EF of 60%  Normal RV size  Normal left atrial size, normal right atrial size, left atrial appendage without thrombus.  Aortic valve, mitral valve, tricuspid valve appears structurally normal, mild TR seen.  No vegetation seen  No pericardial effusion seen.  Proximal aorta appears normal in size.  Mild aortic plaque seen      Recommendations/plan:    Clinical correlation recommended      Lab Review   I have reviewed the labs      Results from last 7 days   Lab Units 04/06/21  0422   MAGNESIUM mg/dL 2.4     Results from last 7 days   Lab Units 04/07/21  0450   SODIUM mmol/L 136   POTASSIUM mmol/L 3.8   BUN mg/dL 35*   CREATININE mg/dL 0.38*   CALCIUM mg/dL 8.1*         Results from last 7 days   Lab Units 04/07/21  0450 04/06/21  0422 04/05/21  0501   WBC 10*3/mm3 19.10* 18.20* 22.20*   HEMOGLOBIN g/dL 9.8* 9.7* 10.7*   HEMATOCRIT % 29.5* 29.3* 32.6*   PLATELETS 10*3/mm3 103* 109* 134*     Results from last 7 days   Lab Units 04/07/21  0450 04/06/21  0422 04/05/21  1152   INR  1.07* 1.06* 1.06*       RADIOLOGY:  Imaging Results (Last 24 Hours)     Procedure Component Value Units Date/Time    XR Chest 1 View [[id number]] Collected: 04/07/21 0732     Updated: 04/07/21 0737    Narrative:      DATE OF EXAM:  4/7/2021 3:25 AM     PROCEDURE:  XR CHEST 1 VW-     INDICATIONS:  Hypoxia.     COMPARISON:  Radiographs 4/6/2021, 4/5/2021, and 4/4/2021. CT  "3/22/2021.     TECHNIQUE:   Single radiographic AP view of the chest was obtained.     FINDINGS:  Lordotic positioning. Stable sternotomy wires, prosthetic heart valve,  partially imaged enteric tube, and left PICC. Interval removal of the  previously seen left IJ central venous catheter. Similar-appearing  basilar predominant opacification of both lungs. No pneumothorax.  Unchanged cardiomediastinal contours. No acute osseous abnormality is  identified.        Impression:      Interval removal of the previously seen left IJ central venous catheter.  Otherwise, no significant interval change.     Electronically Signed By-Olman Benz MD On:4/7/2021 7:35 AM  This report was finalized on [id number] by  Olman Benz MD.    FL Video Swallow With Speech Single Contrast [[id number]] Resulted: 04/06/21 1304     Updated: 04/06/21 1305                )4/7/2021  Jose Levi MD      [facility] [employer]/Transcription:   \"Dictated utilizing Dragon dictation\".   "

## 2021-04-07 NOTE — THERAPY TREATMENT NOTE
Subjective: Pt agreeable to therapeutic plan of care.    Objective:     Bed mobility - Max-A and Assist x 2  Transfers - Max-A and Assist x 2  Ambulation - unable due to weakness    Cardiac level II, pt needing verbal cues for reinforcement of sternal precautions with mobility    Pain: 6 VAS  Education: Provided education on importance of mobility and skilled verbal / tactile cueing throughout intervention.     Assessment: Claudia Rust presents with functional mobility impairments which indicate the need for skilled intervention. Tolerating session today without incident. Pt continuing to require maxAx2 for supine -> sit bed mobility this date.  Pt able to sit OEB x4 minutes with assist varying CGA-modA pending fatigue and proper positioning.  Attempted two squat pivot transfer to chair this date.  Successful second attempt with maxAx2 for safety.  Sukumar sling placed in chair for nursing staff to transfer pt back to bed.  Pt remains far from functional mobility baseline with global weakness and poor endurance.  Will continue to follow and progress as tolerated.     Plan/Recommendations:   Pt would benefit from Inpatient Rehabilitation placement at discharge from facility and requires no DME at discharge.   Pt desires Inpatient Rehabilitation placement at discharge. Pt cooperative; agreeable to therapeutic recommendations and plan of care.     Basic Mobility 6-click:  Rollin = Total, A lot = 2, A little = 3; 4 = None  Supine>Sit:   1 = Total, A lot = 2, A little = 3; 4 = None   Sit>Stand with arms:  1 = Total, A lot = 2, A little = 3; 4 = None  Bed>Chair:   1 = Total, A lot = 2, A little = 3; 4 = None  Ambulate in room:  1 = Total, A lot = 2, A little = 3; 4 = None  3-5 Steps with railin = Total, A lot = 2, A little = 3; 4 = None  Score: 10    Modified [place]: 5 = Severe disability (Requires constant nursing care and attention, bedridden, incontinent)    Post-Tx Position: Up in Chair, Alarms  activated and Call light and personal items within reach,  present  PPE: gloves, surgical mask, eyewear protection

## 2021-04-07 NOTE — PLAN OF CARE
Goal Outcome Evaluation:     Progress: improving  Outcome Summary: pt is on 6L HFNC and maintaining 02 sat above 90. However, if NC comes off or she lays flat for an extended period she desats to 80%. Pt was able to stand and pivot to the chair with PT today but required the lift to get back to bed. She reports feeling sore and tired. She is getting tylenol to help with pain. She had her swallow study yesterday and is getting nectar thinkened liquids. She hasn't had much of an appetite today, partially due to tiredness. Will attempt to eat more at dinner time.

## 2021-04-07 NOTE — PLAN OF CARE
Goal Outcome Evaluation:      Claudia Rust presents with ADL impairments below baseline abilities which indicate the need for continued skilled intervention while inpatient. Tolerating session today without incident. Able to transfer w/ assist of 3. Left kell pad under pt for nurses to use to return pt to bed. Pt sitting up self feeding w/ assist. Is functionally dependent & will require long term rehab at d/c to recover baseline ADL skill & mobility. Will continue to follow and progress as tolerated.

## 2021-04-07 NOTE — PROGRESS NOTES
NEPHROLOGY PROGRESS NOTE------[facility]    [facility]  [phone]  Robert Hammer MD      Patient Care Team:  Mary Wilde APRN as PCP - General  Mary Wilde APRN as PCP - Family Medicine  Jose Levi MD as Consulting Physician (Cardiology)  Yovani Lerma MD as Consulting Physician (Nephrology)      Provider:  Robert Hammer MD  Patient Name: Claudia Rust  :  1950    SUBJECTIVE:    F/U ARF/JERROD/HYPERNATREMIA    Weak and malaised. Still with DHT. No real SOB. No dysuria.    Medication:  acetylcysteine, 3 mL, Nebulization, BID - RT  arformoterol, 15 mcg, Nebulization, BID - RT  aspirin, 81 mg, Oral, Daily  atorvastatin, 40 mg, Oral, Nightly  budesonide, 1 mg, Nebulization, BID - RT  bumetanide, 1 mg, Nasogastric, TID  chlorhexidine, 15 mL, Mouth/Throat, Q12H  docusate, 100 mg, Oral, BID  First Mouthwash (Magic Mouthwash), 5 mL, Swish & Spit, Q6H  FLUoxetine, 20 mg, Oral, Daily  gabapentin, 200 mg, Oral, Q12H  hydrALAZINE, 25 mg, Nasogastric, Q8H  insulin glargine, 20 Units, Subcutaneous, Q12H  insulin lispro, 0-24 Units, Subcutaneous, Q6H  insulin lispro, 5 Units, Subcutaneous, Q6H  ipratropium-albuterol, 3 mL, Nebulization, Q4H - RT  lansoprazole, 30 mg, Oral, QAM  levothyroxine, 100 mcg, Oral, Q AM  metoprolol tartrate, 12.5 mg, Oral, Q12H  nystatin, , Topical, Q12H  polyethylene glycol, 17 g, Oral, Daily  potassium chloride, 20 mEq, Oral, TID With Meals  predniSONE, 10 mg, Oral, Daily With Breakfast  sodium chloride, 10 mL, Intravenous, Q12H  sodium chloride, 10 mL, Intravenous, Q12H  warfarin, 2 mg, Oral, Daily           OBJECTIVE    Vital Sign Min/Max for last 24 hours  Temp  Min: 97.2 °F (36.2 °C)  Max: 98.5 °F (36.9 °C)   BP  Min: 109/47  Max: 122/60   Pulse  Min: 66  Max: 77   Resp  Min: 18  Max: 22   SpO2  Min: 85 %  Max: 99 %   No data recorded   Weight  Min: 121 kg (266 lb 15.6 oz)  Max: 121 kg (266 lb 15.6  "oz)     Flowsheet Rows      First Filed Value   Admission Height  162.6 cm (64\") Documented at 03/18/2021 2000   Admission Weight  129 kg (284 lb) Documented at 03/18/2021 2000          I/O this shift:  In: 1079 [Other:598; NG/GT:481]  Out: 1300 [Urine:1300]  I/O last 3 completed shifts:  In: 3522 [P.O.:200; I.V.:413; Other:1508; NG/GT:1401]  Out: 3650 [Urine:3650]    Physical Exam:    General Appearance: NAD. Fatigued  Head: normocephalic, without obvious abnormality and atraumatic +NGT INTACT  Eyes: conjunctivae and sclerae normal and no icterus  Neck: supple. NO GROSS JVD NOW  Lungs: +FEW SCATTERED RHONCHI.NO CRACKLES AT PRESENT  Heart: regular rhythm & normal rate and normal S1, S2 +NARGIS  Chest: Wall no abnormalities observed  Abdomen: normal bowel sounds and soft non-tender +OBESITY  Extremities: +TRACE DIFFUSE BILAT LE EDEMA, no cyanosis and no redness  Skin: no bleeding, bruising or rash, turgor normal, color normal and no lesions noted  Neurologic: Alert and oriented without focal deficits    Labs:    WBC WBC   Date Value Ref Range Status   04/07/2021 19.10 (H) 3.40 - 10.80 10*3/mm3 Final   04/06/2021 18.20 (H) 3.40 - 10.80 10*3/mm3 Final   04/05/2021 22.20 (H) 3.40 - 10.80 10*3/mm3 Final      HGB Hemoglobin   Date Value Ref Range Status   04/07/2021 9.8 (L) 12.0 - 15.9 g/dL Final   04/06/2021 9.7 (L) 12.0 - 15.9 g/dL Final   04/05/2021 10.7 (L) 12.0 - 15.9 g/dL Final      HCT Hematocrit   Date Value Ref Range Status   04/07/2021 29.5 (L) 34.0 - 46.6 % Final   04/06/2021 29.3 (L) 34.0 - 46.6 % Final   04/05/2021 32.6 (L) 34.0 - 46.6 % Final      Platlets No results found for: LABPLAT   MCV MCV   Date Value Ref Range Status   04/07/2021 90.8 79.0 - 97.0 fL Final   04/06/2021 91.0 79.0 - 97.0 fL Final   04/05/2021 92.3 79.0 - 97.0 fL Final          Sodium Sodium   Date Value Ref Range Status   04/07/2021 136 136 - 145 mmol/L Final   04/06/2021 139 136 - 145 mmol/L Final   04/05/2021 141 136 - 145 mmol/L Final "      Potassium Potassium   Date Value Ref Range Status   04/07/2021 3.8 3.5 - 5.2 mmol/L Final   04/06/2021 3.9 3.5 - 5.2 mmol/L Final   04/05/2021 3.3 (L) 3.5 - 5.2 mmol/L Final     Comment:     Slight hemolysis detected by analyzer. Results may be affected.   04/04/2021 3.8 3.5 - 5.2 mmol/L Final      Chloride Chloride   Date Value Ref Range Status   04/07/2021 103 98 - 107 mmol/L Final   04/06/2021 105 98 - 107 mmol/L Final   04/05/2021 106 98 - 107 mmol/L Final      CO2 CO2   Date Value Ref Range Status   04/07/2021 25.0 22.0 - 29.0 mmol/L Final   04/06/2021 26.0 22.0 - 29.0 mmol/L Final   04/05/2021 26.0 22.0 - 29.0 mmol/L Final      BUN BUN   Date Value Ref Range Status   04/07/2021 35 (H) 8 - 23 mg/dL Final   04/06/2021 43 (H) 8 - 23 mg/dL Final   04/05/2021 43 (H) 8 - 23 mg/dL Final      Creatinine Creatinine   Date Value Ref Range Status   04/07/2021 0.38 (L) 0.57 - 1.00 mg/dL Final   04/06/2021 0.43 (L) 0.57 - 1.00 mg/dL Final   04/05/2021 0.46 (L) 0.57 - 1.00 mg/dL Final      Calcium Calcium   Date Value Ref Range Status   04/07/2021 8.1 (L) 8.6 - 10.5 mg/dL Final   04/06/2021 8.3 (L) 8.6 - 10.5 mg/dL Final   04/05/2021 8.5 (L) 8.6 - 10.5 mg/dL Final      PO4 No components found for: PO4   Albumin Albumin   Date Value Ref Range Status   04/07/2021 2.70 (L) 3.50 - 5.20 g/dL Final   04/06/2021 2.70 (L) 3.50 - 5.20 g/dL Final   04/05/2021 2.70 (L) 3.50 - 5.20 g/dL Final      Magnesium Magnesium   Date Value Ref Range Status   04/06/2021 2.4 1.6 - 2.4 mg/dL Final   04/05/2021 2.3 1.6 - 2.4 mg/dL Final      Uric Acid No components found for: URIC ACID     Imaging Results (Last 72 Hours)     Procedure Component Value Units Date/Time    XR Chest 1 View [[id number]] Resulted: 04/07/21 0444     Updated: 04/07/21 0445    FL Video Swallow With Speech Single Contrast [[id number]] Resulted: 04/06/21 1304     Updated: 04/06/21 1305    XR Chest 1 View [[id number]] Collected: 04/06/21 0711     Updated: 04/06/21 0714     Narrative:      Examination: XR CHEST 1 VW-     Date of Exam: 4/6/2021 5:35 AM     Indication: Hypoxia; M06.9-Rheumatoid arthritis, unspecified;  I35.0-Nonrheumatic aortic (valve) stenosis; J96.01-Acute respiratory  failure with hypoxia; J44.9-Chronic obstructive pulmonary disease,  unspecified.     Comparison: 4/5/2021.     Technique: Single radiographic view of the chest was obtained.     Findings:  Feeding tube courses below the diaphragm. New left PICC terminates in  the upper SVC. Stable left internal jugular central venous catheter.  Stable evidence of prior median sternotomy and heart valve replacement.  There is stable diffuse mixed interstitial and airspace disease in both  lungs with stable small bilateral pleural effusions. There is no  evidence of pneumothorax or new lung opacity. The heart size is stable.  Pulmonary vasculature is completely obscured. There are no acute osseous  abnormalities.       Impression:         1. Stable mixed interstitial and airspace disease present throughout  both lungs.  2. Stable feeding tube, postoperative findings and left IJ CVC.  3. New left PICC terminates in the upper SVC.     Electronically Signed By-Romain Gutierrez MD On:4/6/2021 7:12 AM  This report was finalized on [id number] by  Romain Gutierrez MD.    XR Chest 1 View [[id number]] Collected: 04/05/21 0724     Updated: 04/05/21 0728    Narrative:      Examination: XR CHEST 1 VW-     Date of Exam: 4/5/2021 5:00 AM     Indication: Hypoxia; M06.9-Rheumatoid arthritis, unspecified;  I35.0-Nonrheumatic aortic (valve) stenosis; J96.01-Acute respiratory  failure with hypoxia; J44.9-Chronic obstructive pulmonary disease,  unspecified.     Comparison: 4/4/2021.     Technique: Single radiographic view of the chest was obtained.     Findings:  There are diffuse bilateral mixed interstitial and airspace opacities in  both lungs. There is a stable left internal jugular central venous  catheter. Stable changes of prior median  sternotomy and heart valve  replacement. Endotracheal tube has been removed. Stable feeding tube  coursing below the diaphragm. Stable bridging thoracic osteophytes  without acute osseous abnormality.       Impression:         1. Stable diffuse mixed interstitial and airspace disease.  2. Interval extubation.  3. Stable postoperative findings and stable left internal jugular  central venous catheter/feeding tube.     Electronically Signed By-Romain Gutierrez MD On:4/5/2021 7:26 AM  This report was finalized on [id number] by  Romain Gutierrez MD.          Results for orders placed during the hospital encounter of 03/18/21    XR Chest 1 View    Narrative  Examination: XR CHEST 1 VW-    Date of Exam: 4/6/2021 5:35 AM    Indication: Hypoxia; M06.9-Rheumatoid arthritis, unspecified;  I35.0-Nonrheumatic aortic (valve) stenosis; J96.01-Acute respiratory  failure with hypoxia; J44.9-Chronic obstructive pulmonary disease,  unspecified.    Comparison: 4/5/2021.    Technique: Single radiographic view of the chest was obtained.    Findings:  Feeding tube courses below the diaphragm. New left PICC terminates in  the upper SVC. Stable left internal jugular central venous catheter.  Stable evidence of prior median sternotomy and heart valve replacement.  There is stable diffuse mixed interstitial and airspace disease in both  lungs with stable small bilateral pleural effusions. There is no  evidence of pneumothorax or new lung opacity. The heart size is stable.  Pulmonary vasculature is completely obscured. There are no acute osseous  abnormalities.    Impression  1. Stable mixed interstitial and airspace disease present throughout  both lungs.  2. Stable feeding tube, postoperative findings and left IJ CVC.  3. New left PICC terminates in the upper SVC.    Electronically Signed By-Romain Gutierrez MD On:4/6/2021 7:12 AM  This report was finalized on [id number] by  Romain Gutierrez MD.      XR Chest 1  View    Narrative  Examination: XR CHEST 1 VW-    Date of Exam: 4/5/2021 5:00 AM    Indication: Hypoxia; M06.9-Rheumatoid arthritis, unspecified;  I35.0-Nonrheumatic aortic (valve) stenosis; J96.01-Acute respiratory  failure with hypoxia; J44.9-Chronic obstructive pulmonary disease,  unspecified.    Comparison: 4/4/2021.    Technique: Single radiographic view of the chest was obtained.    Findings:  There are diffuse bilateral mixed interstitial and airspace opacities in  both lungs. There is a stable left internal jugular central venous  catheter. Stable changes of prior median sternotomy and heart valve  replacement. Endotracheal tube has been removed. Stable feeding tube  coursing below the diaphragm. Stable bridging thoracic osteophytes  without acute osseous abnormality.    Impression  1. Stable diffuse mixed interstitial and airspace disease.  2. Interval extubation.  3. Stable postoperative findings and stable left internal jugular  central venous catheter/feeding tube.    Electronically Signed By-Romain Gutierrez MD On:4/5/2021 7:26 AM  This report was finalized on [id number] by  Romain Gutierrez MD.      XR Chest 1 View    Narrative  EXAMINATION: XR CHEST 1 VW-    DATE OF EXAM: 4/4/2021 4:58 AM    INDICATION: Hypoxia; M06.9-Rheumatoid arthritis, unspecified;  I35.0-Nonrheumatic aortic (valve) stenosis; J96.01-Acute respiratory  failure with hypoxia; J44.9-Chronic obstructive pulmonary disease,  unspecified.    COMPARISON: Chest radiograph dated 04/03/2021    TECHNIQUE: Portable AP view of the chest was obtained.    FINDINGS:  The endotracheal tube tip is 2.4 cm above the davi. There is a  left-sided central line with tip terminating over the mid SVC in  unchanged position. There is a feeding tube which courses below left  hemidiaphragm. There is stable cardiac megaly. There is aortic arch  athetotic calcification. Pulmonary vascularity appears within normal  limits. There is bibasilar airspace opacities  likely representing  atelectasis with possible trace bilateral pleural effusions. There is a  prosthetic cardiac valve and temporary pacing wires. There is no  evidence of pneumothorax. Multilevel degenerative changes of the  thoracic spine.    Impression  1. Support devices in expected position.  2. Unchanged bibasilar airspace opacities and likely small bilateral  layering pleural effusions.    Electronically Signed By-Olegario Arnold MD On:4/4/2021 8:07 AM  This report was finalized on [id number] by  Olegario Arnold MD.      Results for orders placed during the hospital encounter of 03/18/21    Duplex Venous Upper Extremity - Bilateral CAR    Interpretation Summary  · Acute right upper extremity deep vein thrombosis noted in the subclavian, axillary and brachial. This represents catheter associated deep vein thrombosis.  · All other vessels appear normal.        ASSESSMENT / PLAN      Aortic valve stenosis    Rheumatoid arthritis (CMS/HCC)    1. ARF/JERROD------Nonoliguric ATN. Venegas in place. Creatinine normal. BUN down a little. Follow with ongoing diuretic exposure. No NSAIDs or IV dye    2. HYPERNATREMIA------Better.  Free water flushes with TFs. Encourage po water intake once swallow eval done and patient able to eat/drink    3. HYPOALBUMINEMIA-----on TFs.     4. 3RD SPACED EDEMA------On  Bumex per tube. Significant contribution from chronic venous insufficiency/obesit. S/P IV Diuril x one yesterday. Follow for need to add a little scheduled po Metolazone    5. CAD S/P AVR-----per Cardiology and CT Surgery. S/P CHRISTIANO    6. OBESITY    7. ACUTE HYPOXIC RESPIRATORY FAILURE------On vent per Pulmonary    8. RUE DVT    9. DMII-------Glucometers, SSI    10. ANEMIA------H/H stable    11. SEPSIS/LEUKOCYTOSIS------Antibiotics per Pulmonary/CC    12. THROMBOCYTOPENIA    13. HYPOCALCEMIA-----Replaced    14. HYPERLIPIDEMIA------On Statin.      15. HYPOTHYROIDISM------On Sythroid    16. GERD/PUD PROPHYLAXIS------Prevacid  per NGT    17. HTN-------BP good. D/C Amlodipine b/c of edema. Added scheduled Hydralazine    18. METABOLIC ALKALOSIS-----Better    19. HYPOKALEMIA-----Replaced    Robert Hammer MD  [facility]  [phone]  04/07/21  06:54 EDT

## 2021-04-07 NOTE — PROGRESS NOTES
S/P POD# 20 tissue AVR--Pagni  EF 60-65% (echo)    Subjective: Patient resting in bed after speech therapy session. Nsg reports she is exhausted from therapy. Patient nods to questions instead of verbalizing.     Drips: None    CHRISTIANO (3/25)--no abnormal findings noted  Bronch (3/25)--mod mucopurulent secretions--cultures negative  Cxr: continued interstitial/airspace disease      Intake/Output Summary (Last 24 hours) at 4/7/2021 0856  Last data filed at 4/7/2021 0555  Gross per 24 hour   Intake 1169 ml   Output 2500 ml   Net -1331 ml     Temp:  [97.2 °F (36.2 °C)-98.5 °F (36.9 °C)] 97.7 °F (36.5 °C)  Heart Rate:  [66-77] 75  Resp:  [18-22] 22  BP: (121-122)/(60-64) 121/64  Arterial Line BP: ()/(43-66) 121/53      Results from last 7 days   Lab Units 04/07/21  0450 04/06/21  0422 04/05/21  1152   WBC 10*3/mm3 19.10* 18.20*  --    HEMOGLOBIN g/dL 9.8* 9.7*  --    HEMATOCRIT % 29.5* 29.3*  --    PLATELETS 10*3/mm3 103* 109*  --    INR  1.07* 1.06* 1.06*     Results from last 7 days   Lab Units 04/07/21  0450 04/06/21  0422   CREATININE mg/dL 0.38* 0.43*   POTASSIUM mmol/L 3.8 3.9   SODIUM mmol/L 136 139   MAGNESIUM mg/dL  --  2.4   PHOSPHORUS mg/dL 3.2 3.1       Physical Exam:  Up to chair,  at bedside  Tele:  SR 70's  diminished bilaterally, 97% 8L High Flow  Sternal incision healing well  Benign abd, + BM. TF infusing  + edema, chronic erythema of BLE noted    Assessment/Plan:  Principal Problem:    Aortic valve stenosis  Active Problems:    Rheumatoid arthritis (CMS/HCC)    Severe aortic stenosis--s/p tissue AVR (Pagni)  Postop acute hypoxic resp failure--re-intubated 3/22, extubated 4/4  Postop TCP/HIT positive-- Arixtra  Right subclavian/axillary/brachial DVT-- Arixtra  Chronic HFpEF--maximize meds prior to d/c  COPD--nebs, pulmicort  Rheumatoid arthritis--not on medication per patient  HTN--stable  HLD--statin  Bipolar disorder--home Prozac  Obesity stage 3  PVD  KARLY  Vertigo--home Antivert  Tobacco  abuse--counseled on cessation  Postop leukocytosis, likely r/t atelectasis--aggressive pulm toileting  Post-op hypernatremia-- resolved      POD#20. Patient passed video swallow study. Speech therapy recommends mechanical soft and nectar thick liquids. Patient is severely deconditioned and needs aggressive physical and occupational therapies. Continue Coumadin for RUE DVT.    Routine care--as above  Plan for [facility] acute rehab at discharge.    CELE ULLOA, APRN  4/7/2021  08:56 EDT

## 2021-04-07 NOTE — THERAPY TREATMENT NOTE
"Acute Care - Speech Language Pathology   Swallow Treatment Note  Victor Manuel     Patient Name: Claudia Rust  : 1950  MRN: [id number]  Today's Date: 2021               Admit Date: 3/18/2021    Visit Dx:     ICD-10-CM ICD-9-CM   1. Rheumatoid arthritis, involving unspecified site, unspecified whether rheumatoid factor present (CMS/HCC)  M06.9 714.0   2. Aortic valve stenosis, etiology of cardiac valve disease unspecified  I35.0 424.1   3. Acute respiratory failure with hypoxia (CMS/HCC)  J96.01 518.81   4. Chronic obstructive pulmonary disease, unspecified COPD type (CMS/HCC)  J44.9 496     Patient Active Problem List   Diagnosis   • Morbidly obese (CMS/HCC)   • Dyspnea on exertion   • Abnormal nuclear stress test   • Nonrheumatic aortic valve stenosis   • Prediabetes   • Dyslipidemia   • Essential hypertension   • Acute UTI   • Bipolar disorder (CMS/HCC)   • COPD (chronic obstructive pulmonary disease) (CMS/HCC)   • High triglycerides   • Gastroesophageal reflux disease   • Rheumatoid arthritis (CMS/[facility])   • Aortic stenosis, severe   • Chronic heart failure with preserved ejection fraction (HFpEF) (CMS/HCC)   • Aortic valve stenosis     Past Medical History:   Diagnosis Date   • Acute congestive heart failure (CMS/[facility])    • Allergies    • Anxiety    • Arthritis    • Asthma    • Bilateral leg edema    • Bipolar 1 disorder (CMS/[facility])    • Bulging lumbar disc     X3   • Cancer (CMS/[facility])     states had \"cancerous tumor during pregnancy and had to have hysterectomy\"   • Cataract     bilateral   • Cellulitis 2021    bilateral legs   • Compression fracture of vertebral column (CMS/HCC)     LUMBAR   • COPD (chronic obstructive pulmonary disease) (CMS/[facility])    • Delayed emergence from anesthesia    • Depression    • Depression    • Dyslipidemia    • Dyspnea on exertion    • Fibromyalgia    • GERD (gastroesophageal reflux disease)    • Hiatal hernia    • Hyperlipidemia    • Hypertension    • Hypothyroid     " HYPOTHYROID   • IBS (irritable bowel syndrome)    • Migraines    • Morbid obesity (CMS/HCC)    • Neuropathy    • Panic attacks    • Peripheral edema    • PONV (postoperative nausea and vomiting)    • Poor mobility     rolling walker   • Prediabetes    • PVD (peripheral vascular disease) (CMS/[facility])    • Rheumatoid aortitis    • Spinal stenosis    • Spinal stenosis    • Vertigo    • Vitamin D deficiency      Past Surgical History:   Procedure Laterality Date   • AORTIC VALVE REPAIR/REPLACEMENT N/A 3/18/2021    Procedure: AORTIC VALVE REPLACEMENT;  Surgeon: Olaf Mcgill MD;  Location: [facility] CVOR;  Service: Cardiothoracic;  Laterality: N/A;   • BRONCHOSCOPY N/A 3/25/2021    Procedure: BRONCHOSCOPY AT BEDSIDE WITH BRONCHIOALVEOLAR LAVAGE;  Surgeon: Glenn Reyes MD;  Location: [facility] ENDOSCOPY;  Service: Pulmonary;  Laterality: N/A;  PNA   • CARDIAC CATHETERIZATION  2009   • CARDIAC CATHETERIZATION N/A 8/14/2019    Procedure: Left Heart Cath;  Surgeon: Jose Levi MD;  Location: [facility] CATH INVASIVE LOCATION;  Service: Cardiovascular   • CARDIAC CATHETERIZATION N/A 8/14/2019    Procedure: Right Heart Cath;  Surgeon: Jose Levi MD;  Location: [facility] CATH INVASIVE LOCATION;  Service: Cardiovascular   • CARDIAC CATHETERIZATION N/A 8/14/2019    Procedure: Aortic root aortogram;  Surgeon: Jose Levi MD;  Location: [facility] CATH INVASIVE LOCATION;  Service: Cardiovascular   • CARDIAC CATHETERIZATION N/A 1/20/2021    Procedure: Left Heart Cath;  Surgeon: Jose Levi MD;  Location: [facility] CATH INVASIVE LOCATION;  Service: Cardiovascular;  Laterality: N/A;   • COLONOSCOPY     • CYSTOCELE REPAIR  1984   • ELBOW ARTHROPLASTY  2011   • ENDOSCOPY     • FOOT SURGERY     • GALLBLADDER SURGERY  2002   • TONSILLECTOMY     • VAGINAL HYSTERECTOMY  1972            EDUCATION  The patient has been educated in the following areas:   Modified Diet Instruction.    SLP Recommendation and  Plan    SLP GOALS     Row Name 04/07/21 1000          Oral Nutrition/Hydration Goal 1, SLP  Patient will be seen within 24-48 hours to re-assess swallow function with further recommendations to be given as indicated.  (Pended)   -AF    Time Frame (Oral Nutrition/Hydration Goal 1, SLP)  2 days  (Pended)   -AF    Barriers (Oral Nutrition/Hydration Goal 1, SLP)  --    Progress/Outcomes (Oral Nutrition/Hydration Goal 1, SLP)  goal met  (Pended)   -AF          Oral Nutrition/Hydration Goal 2, SLP  Patient will initiate a PO diet and tolerate with no complications from aspiration  (Pended)   -AF    Time Frame (Oral Nutrition/Hydration Goal 2, SLP)  by discharge  (Pended)   -AF    Barriers (Oral Nutrition/Hydration Goal 2, SLP)  Pt seen this date for skilled dysphagia therapy at a meal. Pt's family member was present during session. Pt was repositioned to upright position in bed and pt put top denture in place prior to beginning meal. Pt seen eating oatmeal, diced fruit and drinking nectar thick apple juice. Pt indicated that she wanted to attempt to feed herself. Pt was able to hold cup independently and pull liquid from straw. Pt required assistance to hold spoon and bring it up to her mouth. ST modified spoon to make it easier to hold independently. Pt fatigued after ~3 bites and required assistance to continue meal. Pt only accepted a few more bites w/ assistance before pt requested a break. Pt educated on rationale for thickening liquid to reduce risk of penetration and aspiration of liquids. ST will continue to follow to ensure safety and tolerance of least restrictive diet for optimum nutrition.   (Pended)   -AF    Progress/Outcomes (Oral Nutrition/Hydration Goal 2, SLP)  continuing progress toward goal  (Pended)   -AF          Oral Nutrition/Hydration Goal, SLP  Pt will participate in ongoing dx tx to determine tolerance of recommended diet, independence with strategies/precautions, and readiness for repeat  VFSS/diet advancement.  (Pended)   -AF    Time Frame (Oral Nutrition/Hydration Goal, SLP)  1 week  (Pended)   -AF    Progress/Outcomes (Oral Nutrition/Hydration Goal, SLP)  continuing progress toward goal  (Pended)   -AF      User Key  (r) = Recorded By, (t) = Taken By, (c) = Cosigned By    Initials Name Provider Type    Cecilia Farley, SLP Speech and Language Pathologist    Joann Patel, SLP Speech and Language Pathologist    Mary Franklin, Speech Therapy Student Speech Therapy Student             Time Calculation:                Mary Ferrell, Speech Therapy Student  4/7/2021

## 2021-04-07 NOTE — PLAN OF CARE
Pt continuing to require maxAx2 for safety with bed mobility and able to sit EOB with CGA-modA pending fatigue and positioning.  Pt tolerated squat pivot transfer to chair this date with maxAx2 and kell sling pad left under pt for nursing staff to transfer pt back to bed when appropriate.  Pt remains far from functional mobility baseline with global weakness, poor endurance.  Pt will need extensive IP rehab.    Kelsey Becerril PT, DPT, GCS

## 2021-04-07 NOTE — PROGRESS NOTES
ICU Daily Progress Note        Aortic valve stenosis    Rheumatoid arthritis (CMS/[facility])      ASSESSMENT:  Acute hypoxic respiratory failure required intubation 03/22/2021 , extubated 4/4/21  Aortic valve stenosis, status post tissue replacement  Pulm hypertension, severe  COPD  KARLY  Cor pulmonale  Tobacco abuse  Upper airway infection with moderate amount of mucopurulent secretions noted on bronchoscopy 3/25/2021  Sputum culture with Candida, likely contaminant    Interstitial pneumonitis 2nd to aspiration   Acute thrombocytopenia  Catheter associated DVT right upper extremity subclavian, axillary, and brachial   hypernatremia  Upper arm left DVT  DM  Uremia     PLAN:    Titrating O2 to maintain a saturation of 91%    Anticoagulation currently on Arixtra and warfarin  Wean steroids down  Echocardiogram reviewed  Bronchodilator  Inhaled corticosteroids  Electrolytes/ glycemic control  DVT prophylaxis arixtra,   GI prophylaxis.     Oral diet     Nephrology following  Cardiology following  CV surgery admitting     PT/OT evaluation and treatment   Consult case management to start discharge planning.  Will likely need rehab due to severe deconditioning      LOS: 20 days         Vital signs for last 24 hours:  Vitals:    04/07/21 0657 04/07/21 0704 04/07/21 0800 04/07/21 0833   BP:       Pulse: 74 75 75    Resp: 22 22     Temp:    97.7 °F (36.5 °C)   TempSrc:    Oral   SpO2: 95% 100% 93%    Weight:       Height:           Intake/Output last 3 shifts:  I/O last 3 completed shifts:  In: 3322 [P.O.:200; I.V.:170; Other:1625; NG/GT:1327]  Out: 4200 [Urine:4200]  Intake/Output this shift:  I/O this shift:  In: 120 [Other:120]  Out: -     Vent settings for last 24 hours:       Hemodynamic parameters for last 24 hours:  CVP:  [9 mmHg] 9 mmHg    Radiology  Imaging Results (Last 24 Hours)     Procedure Component Value Units Date/Time    XR Chest 1 View [[id number]] Collected: 04/07/21 0732     Updated: 04/07/21 0737    Narrative:       DATE OF EXAM:  4/7/2021 3:25 AM     PROCEDURE:  XR CHEST 1 VW-     INDICATIONS:  Hypoxia.     COMPARISON:  Radiographs 4/6/2021, 4/5/2021, and 4/4/2021. CT 3/22/2021.     TECHNIQUE:   Single radiographic AP view of the chest was obtained.     FINDINGS:  Lordotic positioning. Stable sternotomy wires, prosthetic heart valve,  partially imaged enteric tube, and left PICC. Interval removal of the  previously seen left IJ central venous catheter. Similar-appearing  basilar predominant opacification of both lungs. No pneumothorax.  Unchanged cardiomediastinal contours. No acute osseous abnormality is  identified.        Impression:      Interval removal of the previously seen left IJ central venous catheter.  Otherwise, no significant interval change.     Electronically Signed By-Olman Benz MD On:4/7/2021 7:35 AM  This report was finalized on [id number] by  Olman Benz MD.          Labs:  Results from last 7 days   Lab Units 04/07/21  0450   WBC 10*3/mm3 19.10*   HEMOGLOBIN g/dL 9.8*   HEMATOCRIT % 29.5*   PLATELETS 10*3/mm3 103*     Results from last 7 days   Lab Units 04/07/21  0450 04/05/21  0501   SODIUM mmol/L 136 141   POTASSIUM mmol/L 3.8 3.3*   CHLORIDE mmol/L 103 106   CO2 mmol/L 25.0 26.0   BUN mg/dL 35* 43*   CREATININE mg/dL 0.38* 0.46*   CALCIUM mg/dL 8.1* 8.5*   BILIRUBIN mg/dL  --  0.5   ALK PHOS U/L  --  137*   ALT (SGPT) U/L  --  44*   AST (SGOT) U/L  --  52*   GLUCOSE mg/dL 121* 133*     Results from last 7 days   Lab Units 04/05/21  0336   PH, ARTERIAL pH units 7.440   PO2 ART mm Hg 97.7   PCO2, ARTERIAL mm Hg 38.7   HCO3 ART mmol/L 26.3     Results from last 7 days   Lab Units 04/07/21  0450 04/06/21  0422 04/05/21  0501   ALBUMIN g/dL 2.70* 2.70* 2.70*             Results from last 7 days   Lab Units 04/06/21  0422   MAGNESIUM mg/dL 2.4     Results from last 7 days   Lab Units 04/07/21  0450 04/06/21  0422 04/05/21  1152   INR  1.07* 1.06* 1.06*               Meds:    SCHEDULE  acetylcysteine, 3 mL, Nebulization, BID - RT  arformoterol, 15 mcg, Nebulization, BID - RT  aspirin, 81 mg, Oral, Daily  atorvastatin, 40 mg, Oral, Nightly  budesonide, 1 mg, Nebulization, BID - RT  bumetanide, 1 mg, Nasogastric, TID  chlorhexidine, 15 mL, Mouth/Throat, Q12H  docusate, 100 mg, Oral, BID  First Mouthwash (Magic Mouthwash), 5 mL, Swish & Spit, Q6H  FLUoxetine, 20 mg, Oral, Daily  fondaparinux, 5 mg, Subcutaneous, Q24H  gabapentin, 200 mg, Oral, Q12H  hydrALAZINE, 25 mg, Nasogastric, Q8H  insulin glargine, 20 Units, Subcutaneous, Q12H  insulin lispro, 0-24 Units, Subcutaneous, Q6H  insulin lispro, 5 Units, Subcutaneous, Q6H  ipratropium-albuterol, 3 mL, Nebulization, Q4H - RT  lansoprazole, 30 mg, Oral, QAM  levothyroxine, 100 mcg, Oral, Q AM  metoprolol tartrate, 12.5 mg, Oral, Q12H  nystatin, , Topical, Q12H  polyethylene glycol, 17 g, Oral, Daily  potassium chloride, 20 mEq, Oral, TID With Meals  predniSONE, 10 mg, Oral, Daily With Breakfast  sodium chloride, 10 mL, Intravenous, Q12H  sodium chloride, 10 mL, Intravenous, Q12H  warfarin, 5 mg, Oral, Daily      Infusions     PRNs  •  acetaminophen **OR** acetaminophen **OR** acetaminophen  •  bisacodyl  •  Chlorhexidine Gluconate Cloth  •  dextrose  •  dextrose  •  glucagon (human recombinant)  •  hydrALAZINE  •  insulin lispro **AND** insulin lispro  •  ipratropium-albuterol  •  lidocaine  •  lidocaine  •  meclizine  •  [] HYDROmorphone **AND** naloxone  •  ondansetron  •  potassium chloride **OR** potassium chloride  •  potassium chloride **OR** potassium chloride  •  sodium chloride  •  sodium chloride    Physical Exam:  Physical Exam  Cardiovascular:      Heart sounds: Murmur heard.     Pulmonary:      Breath sounds: Rhonchi present.         ROS  Review of Systems   Respiratory: Positive for cough.    Cardiovascular: Positive for leg swelling.         Critical Care Time greater than: 45 Minutes  Total time spent with patient  greater than: 45 Minutes

## 2021-04-07 NOTE — PROGRESS NOTES
"Nutrition Services    Patient Name: Claudia Rust  YOB: 1950  MRN: [id number]  Admission date: 3/18/2021      PPE Documentation        PPE Worn By Provider RD did not enter room for this encounter   PPE Worn By Patient  -     PROGRESS NOTE      Encounter Information: Tube feed + PO check on. Patient remains extubated. TF switched to nocturnal feeds. Requires assistance to be fed, only had a few bites of breakfast this AM per discussion in rounds       Labs (reviewed below): -elevated BUN  -low Cr       GI Function:  -residuals WNL  -Last BM documented on 3/30 (x8 days ago) - getting scheduled colace, miralax        Nutrition Intervention: Adjusted TF order so that comments and order match (order started for 7 AM to 7 PM, corrected this). Would recommend continuing nocturnal feeds at this time to help meet needs for healing and building back strength but promote intake during the day       PO Diet/Supplements: Diet Texture, Diabetic/Consistent Carbs; Diabetic - Consistent Carb; Mechanical Ground; Thickened Liquids (Nectar), Full Feed - Nursing   Magic cups at lunch and dinner meals     EN Prescription: Impact Peptide 1.5 at 85 mL/hr + 60 mL/hr water flush (flush per nephrology) x12 hrs (7 PM to 7 AM)       Intake/Output:   Intake/Output Summary (Last 24 hours) at 4/7/2021 0931  Last data filed at 4/7/2021 0555  Gross per 24 hour   Intake 1169 ml   Output 2500 ml   Net -1331 ml            Height: Height: 162.6 cm (64\")   Weight: Weight: 121 kg (266 lb 15.6 oz) (04/07/21 0555)   BMI: Body mass index is 45.83 kg/m².     Results from last 7 days   Lab Units 04/07/21  0450 04/06/21  0422 04/05/21  0501 04/03/21  0348 04/02/21  0514   SODIUM mmol/L 136 139 141 142 143   POTASSIUM mmol/L 3.8 3.9 3.3* 4.0 3.7   CHLORIDE mmol/L 103 105 106 104 105   CO2 mmol/L 25.0 26.0 26.0 30.0* 29.0   BUN mg/dL 35* 43* 43* 48* 47*   CREATININE mg/dL 0.38* 0.43* 0.46* 0.51* 0.49*   CALCIUM mg/dL 8.1* 8.3* 8.5* 8.8 8.7 "   BILIRUBIN mg/dL  --   --  0.5 0.5 0.5   ALK PHOS U/L  --   --  137* 132* 134*   ALT (SGPT) U/L  --   --  44* 31 35*   AST (SGOT) U/L  --   --  52* 25 31   GLUCOSE mg/dL 121* 121* 133* 151* 126*     Results from last 7 days   Lab Units 04/07/21  0450 04/06/21  0422 04/05/21  0501 04/04/21  0414 04/03/21  0348   MAGNESIUM mg/dL  --  2.4 2.3  --  2.3   PHOSPHORUS mg/dL 3.2 3.1 3.3 3.6 3.8   HEMOGLOBIN g/dL 9.8* 9.7* 10.7* 10.0* 10.2*   HEMATOCRIT % 29.5* 29.3* 32.6* 30.4* 30.5*   TRIGLYCERIDES mg/dL  --   --   --  132  --      COVID19   Date Value Ref Range Status   03/24/2021 Not Detected Not Detected - Ref. Range Final     Lab Results   Component Value Date    HGBA1C 5.7 (H) 03/16/2021       Vitals:    04/07/21 0833   BP:    Pulse:    Resp:    Temp: 97.7 °F (36.5 °C)   SpO2:        RD to follow up per protocol.    Electronically signed by:  Jordyn Strickland RD  04/07/21 09:31 EDT

## 2021-04-07 NOTE — THERAPY TREATMENT NOTE
Subjective: Pt agreeable to therapeutic plan of care.  Cognition: arousal/alertness: Listless and Other; limited arousal levels, nods off & needs frequent cues to open eyes.    Objective:     Bed Mobility: Mod-A and Assist x 2 plus 3rd for line mgmt.  Functional Transfers: Assist x 2, with adaptive equipment, with rolling walker and utilizing compensatory strategy  Functional Ambulation: N/A or Not attempted. using kell yesterday. Able to bear weight on legs this date & stood X2 EOB, on 2nd stand pt was able to come fully upright & transfer to chair.    Toileting and Feeding: Dependent  ADL Comments: external suction cath; PEG tube. Pt has been able to initiate a thickened liquid & full liquid diet but is not able to grasp cup & bring to mouth & needs adaptive handle & [place] assist to feed spoonfuls of food.    Lower Body Dressing: Dependent  ADL Position: supine  ADL Comments: don socks in supine    Pain: 0 VAS  Education: Provided education on importance of mobility and skilled verbal / tactile cueing throughout intervention.     Assessment: Claudia Rust presents with ADL impairments below baseline abilities which indicate the need for continued skilled intervention while inpatient. Tolerating session today without incident. Able to transfer w/ assist of 3. Left kell pad under pt for nurses to use to return pt to bed. Pt sitting up self feeding w/ assist. Is functionally dependent & will require long term rehab at d/c to recover baseline ADL skill & mobility. Will continue to follow and progress as tolerated.     Plan/Recommendations:   Pt would benefit from Inpatient Rehabilitation placement at discharge from facility.   Pt desires Inpatient Rehabilitation placement at discharge. Pt cooperative; agreeable to therapeutic recommendations and plan of care.     Modified [place]: 5 = Severe disability (Requires constant nursing care and attention, bedridden, incontinent)    Post-Tx Position: Up in Chair, Alarms  activated and Call light and personal items within reach. Seated  PPE: gloves, surgical mask, eyewear protection

## 2021-04-08 LAB
ALBUMIN SERPL-MCNC: 2.8 G/DL (ref 3.5–5.2)
ANION GAP SERPL CALCULATED.3IONS-SCNC: 6 MMOL/L (ref 5–15)
BUN SERPL-MCNC: 34 MG/DL (ref 8–23)
BUN/CREAT SERPL: 72.3 (ref 7–25)
CALCIUM SPEC-SCNC: 8.5 MG/DL (ref 8.6–10.5)
CHLORIDE SERPL-SCNC: 104 MMOL/L (ref 98–107)
CO2 SERPL-SCNC: 28 MMOL/L (ref 22–29)
CREAT SERPL-MCNC: 0.47 MG/DL (ref 0.57–1)
DEPRECATED RDW RBC AUTO: 47.3 FL (ref 37–54)
ERYTHROCYTE [DISTWIDTH] IN BLOOD BY AUTOMATED COUNT: 14.7 % (ref 12.3–15.4)
GFR SERPL CREATININE-BSD FRML MDRD: 131 ML/MIN/1.73
GLUCOSE BLDC GLUCOMTR-MCNC: 169 MG/DL (ref 70–105)
GLUCOSE BLDC GLUCOMTR-MCNC: 88 MG/DL (ref 70–105)
GLUCOSE BLDC GLUCOMTR-MCNC: 93 MG/DL (ref 70–105)
GLUCOSE SERPL-MCNC: 168 MG/DL (ref 65–99)
HCT VFR BLD AUTO: 31.5 % (ref 34–46.6)
HGB BLD-MCNC: 10.2 G/DL (ref 12–15.9)
INR PPP: 1.13 (ref 2–3)
MAGNESIUM SERPL-MCNC: 2.3 MG/DL (ref 1.6–2.4)
MCH RBC QN AUTO: 30.1 PG (ref 26.6–33)
MCHC RBC AUTO-ENTMCNC: 32.4 G/DL (ref 31.5–35.7)
MCV RBC AUTO: 93 FL (ref 79–97)
PHOSPHATE SERPL-MCNC: 3.6 MG/DL (ref 2.5–4.5)
PLATELET # BLD AUTO: 108 10*3/MM3 (ref 140–450)
PMV BLD AUTO: 10.2 FL (ref 6–12)
POTASSIUM SERPL-SCNC: 4.3 MMOL/L (ref 3.5–5.2)
PROTHROMBIN TIME: 12.4 SECONDS (ref 19.4–28.5)
RBC # BLD AUTO: 3.39 10*6/MM3 (ref 3.77–5.28)
SODIUM SERPL-SCNC: 138 MMOL/L (ref 136–145)
WBC # BLD AUTO: 16.2 10*3/MM3 (ref 3.4–10.8)

## 2021-04-08 PROCEDURE — 63710000001 PREDNISONE PER 5 MG: Performed by: INTERNAL MEDICINE

## 2021-04-08 PROCEDURE — 94799 UNLISTED PULMONARY SVC/PX: CPT

## 2021-04-08 PROCEDURE — 25010000002 FONDAPARINUX PER 0.5 MG: Performed by: NURSE PRACTITIONER

## 2021-04-08 PROCEDURE — 99024 POSTOP FOLLOW-UP VISIT: CPT | Performed by: THORACIC SURGERY (CARDIOTHORACIC VASCULAR SURGERY)

## 2021-04-08 PROCEDURE — 63710000001 INSULIN LISPRO (HUMAN) PER 5 UNITS: Performed by: INTERNAL MEDICINE

## 2021-04-08 PROCEDURE — 83735 ASSAY OF MAGNESIUM: CPT | Performed by: INTERNAL MEDICINE

## 2021-04-08 PROCEDURE — 25010000002 ONDANSETRON PER 1 MG: Performed by: NURSE PRACTITIONER

## 2021-04-08 PROCEDURE — 85027 COMPLETE CBC AUTOMATED: CPT | Performed by: NURSE PRACTITIONER

## 2021-04-08 PROCEDURE — 80069 RENAL FUNCTION PANEL: CPT | Performed by: INTERNAL MEDICINE

## 2021-04-08 PROCEDURE — 99024 POSTOP FOLLOW-UP VISIT: CPT | Performed by: NURSE PRACTITIONER

## 2021-04-08 PROCEDURE — 85610 PROTHROMBIN TIME: CPT | Performed by: NURSE PRACTITIONER

## 2021-04-08 PROCEDURE — 82962 GLUCOSE BLOOD TEST: CPT

## 2021-04-08 PROCEDURE — 63710000001 INSULIN GLARGINE PER 5 UNITS: Performed by: NURSE PRACTITIONER

## 2021-04-08 PROCEDURE — 97535 SELF CARE MNGMENT TRAINING: CPT

## 2021-04-08 PROCEDURE — 97112 NEUROMUSCULAR REEDUCATION: CPT

## 2021-04-08 PROCEDURE — 99232 SBSQ HOSP IP/OBS MODERATE 35: CPT | Performed by: INTERNAL MEDICINE

## 2021-04-08 PROCEDURE — 97530 THERAPEUTIC ACTIVITIES: CPT

## 2021-04-08 PROCEDURE — 63710000001 INSULIN LISPRO (HUMAN) PER 5 UNITS: Performed by: NURSE PRACTITIONER

## 2021-04-08 RX ORDER — METOLAZONE 2.5 MG/1
2.5 TABLET ORAL EVERY OTHER DAY
Status: DISCONTINUED | OUTPATIENT
Start: 2021-04-08 | End: 2021-04-13

## 2021-04-08 RX ORDER — BISACODYL 10 MG
10 SUPPOSITORY, RECTAL RECTAL DAILY
Status: DISCONTINUED | OUTPATIENT
Start: 2021-04-08 | End: 2021-04-12

## 2021-04-08 RX ADMIN — ACETAMINOPHEN ORAL SOLUTION 649.6 MG: 650 SOLUTION ORAL at 08:42

## 2021-04-08 RX ADMIN — PREDNISONE 10 MG: 10 TABLET ORAL at 09:49

## 2021-04-08 RX ADMIN — DIPHENHYDRAMINE HYDROCHLORIDE AND LIDOCAINE HYDROCHLORIDE AND ALUMINUM HYDROXIDE AND MAGNESIUM HYDRO 5 ML: KIT at 12:59

## 2021-04-08 RX ADMIN — IPRATROPIUM BROMIDE AND ALBUTEROL SULFATE 3 ML: 2.5; .5 SOLUTION RESPIRATORY (INHALATION) at 15:16

## 2021-04-08 RX ADMIN — FONDAPARINUX SODIUM 5 MG: 5 INJECTION, SOLUTION SUBCUTANEOUS at 08:44

## 2021-04-08 RX ADMIN — IPRATROPIUM BROMIDE AND ALBUTEROL SULFATE 3 ML: 2.5; .5 SOLUTION RESPIRATORY (INHALATION) at 22:59

## 2021-04-08 RX ADMIN — INSULIN LISPRO 4 UNITS: 100 INJECTION, SOLUTION INTRAVENOUS; SUBCUTANEOUS at 12:59

## 2021-04-08 RX ADMIN — BISACODYL 10 MG: 10 SUPPOSITORY RECTAL at 16:19

## 2021-04-08 RX ADMIN — BUDESONIDE 0.5 MG: 0.5 SUSPENSION RESPIRATORY (INHALATION) at 19:57

## 2021-04-08 RX ADMIN — IPRATROPIUM BROMIDE AND ALBUTEROL SULFATE 3 ML: 2.5; .5 SOLUTION RESPIRATORY (INHALATION) at 02:14

## 2021-04-08 RX ADMIN — POTASSIUM CHLORIDE 20 MEQ: 1500 TABLET, EXTENDED RELEASE ORAL at 08:43

## 2021-04-08 RX ADMIN — POLYETHYLENE GLYCOL 3350 17 G: 17 POWDER, FOR SOLUTION ORAL at 08:43

## 2021-04-08 RX ADMIN — ACETAMINOPHEN ORAL SOLUTION 650 MG: 650 SOLUTION ORAL at 00:35

## 2021-04-08 RX ADMIN — NYSTATIN: 100000 POWDER TOPICAL at 08:44

## 2021-04-08 RX ADMIN — FLUOXETINE 20 MG: 20 CAPSULE ORAL at 08:43

## 2021-04-08 RX ADMIN — GABAPENTIN 200 MG: 100 CAPSULE ORAL at 21:06

## 2021-04-08 RX ADMIN — IPRATROPIUM BROMIDE AND ALBUTEROL SULFATE 3 ML: 2.5; .5 SOLUTION RESPIRATORY (INHALATION) at 11:33

## 2021-04-08 RX ADMIN — Medication 10 ML: at 08:46

## 2021-04-08 RX ADMIN — Medication 10 ML: at 21:07

## 2021-04-08 RX ADMIN — Medication 20 ML: at 08:46

## 2021-04-08 RX ADMIN — ATORVASTATIN CALCIUM 40 MG: 40 TABLET, FILM COATED ORAL at 21:06

## 2021-04-08 RX ADMIN — POTASSIUM CHLORIDE 20 MEQ: 1500 TABLET, EXTENDED RELEASE ORAL at 12:59

## 2021-04-08 RX ADMIN — DOCUSATE SODIUM 100 MG: 50 LIQUID ORAL at 21:05

## 2021-04-08 RX ADMIN — BUMETANIDE 1 MG: 1 TABLET ORAL at 08:43

## 2021-04-08 RX ADMIN — MECLIZINE HYDROCHLORIDE 25 MG: 25 TABLET ORAL at 17:09

## 2021-04-08 RX ADMIN — LANSOPRAZOLE 30 MG: KIT at 06:07

## 2021-04-08 RX ADMIN — CHLORHEXIDINE GLUCONATE 15 ML: 1.2 RINSE ORAL at 21:05

## 2021-04-08 RX ADMIN — INSULIN GLARGINE 20 UNITS: 100 INJECTION, SOLUTION SUBCUTANEOUS at 08:58

## 2021-04-08 RX ADMIN — ACETAMINOPHEN ORAL SOLUTION 650 MG: 650 SOLUTION ORAL at 17:08

## 2021-04-08 RX ADMIN — ONDANSETRON 4 MG: 2 INJECTION INTRAMUSCULAR; INTRAVENOUS at 21:22

## 2021-04-08 RX ADMIN — ACETAMINOPHEN ORAL SOLUTION 649.6 MG: 650 SOLUTION ORAL at 21:05

## 2021-04-08 RX ADMIN — HYDRALAZINE HYDROCHLORIDE 25 MG: 25 TABLET ORAL at 13:00

## 2021-04-08 RX ADMIN — METOPROLOL TARTRATE 12.5 MG: 25 TABLET, FILM COATED ORAL at 08:43

## 2021-04-08 RX ADMIN — ASPIRIN 81 MG: 81 TABLET, COATED ORAL at 08:43

## 2021-04-08 RX ADMIN — BUMETANIDE 1 MG: 1 TABLET ORAL at 15:26

## 2021-04-08 RX ADMIN — CHLORHEXIDINE GLUCONATE 15 ML: 1.2 RINSE ORAL at 08:43

## 2021-04-08 RX ADMIN — POTASSIUM CHLORIDE 20 MEQ: 1500 TABLET, EXTENDED RELEASE ORAL at 17:09

## 2021-04-08 RX ADMIN — METOPROLOL TARTRATE 12.5 MG: 25 TABLET, FILM COATED ORAL at 21:06

## 2021-04-08 RX ADMIN — DOCUSATE SODIUM 100 MG: 50 LIQUID ORAL at 08:42

## 2021-04-08 RX ADMIN — WARFARIN 5 MG: 5 TABLET ORAL at 17:09

## 2021-04-08 RX ADMIN — INSULIN LISPRO 5 UNITS: 100 INJECTION, SOLUTION INTRAVENOUS; SUBCUTANEOUS at 12:59

## 2021-04-08 RX ADMIN — ARFORMOTEROL TARTRATE 15 MCG: 15 SOLUTION RESPIRATORY (INHALATION) at 20:03

## 2021-04-08 RX ADMIN — NYSTATIN: 100000 POWDER TOPICAL at 21:06

## 2021-04-08 RX ADMIN — ARFORMOTEROL TARTRATE 15 MCG: 15 SOLUTION RESPIRATORY (INHALATION) at 08:09

## 2021-04-08 RX ADMIN — HYDRALAZINE HYDROCHLORIDE 25 MG: 25 TABLET ORAL at 21:06

## 2021-04-08 RX ADMIN — METOLAZONE 2.5 MG: 2.5 TABLET ORAL at 08:43

## 2021-04-08 RX ADMIN — BUMETANIDE 1 MG: 1 TABLET ORAL at 21:06

## 2021-04-08 RX ADMIN — GABAPENTIN 200 MG: 100 CAPSULE ORAL at 08:43

## 2021-04-08 RX ADMIN — HYDRALAZINE HYDROCHLORIDE 25 MG: 25 TABLET ORAL at 06:07

## 2021-04-08 RX ADMIN — BUDESONIDE 1 MG: 0.5 SUSPENSION RESPIRATORY (INHALATION) at 08:14

## 2021-04-08 RX ADMIN — LEVOTHYROXINE SODIUM 100 MCG: 0.1 TABLET ORAL at 06:08

## 2021-04-08 NOTE — PLAN OF CARE
Pt demonstrating progress toward mobility goals.  Pt requiring maxAx2 for bed mobility and modAx2-maxAx2 for stand pivot transfer to chair.  Pt able to sit EOB with CGA-Sudhir for 5 minutes.  Pt needing verbal cues for motivation due to anxious feelings this date.  Pt remains far from mobility baseline with significant deficits in mobility and strength.  Pt will need extensive IP rehab.      Kelsey Becerril PT, DPT, GCS

## 2021-04-08 NOTE — PROGRESS NOTES
S/P POD# 21 tissue AVR--Pagni  EF 60-65% (echo)    Subjective: Patient up to chair eating breakfast. States she is feeling stronger today    Drips: None    CHRISTIANO (3/25)--no abnormal findings noted  Bronch (3/25)--mod mucopurulent secretions--cultures negative  Cxr: continued interstitial/airspace disease      Intake/Output Summary (Last 24 hours) at 4/8/2021 1030  Last data filed at 4/8/2021 0800  Gross per 24 hour   Intake 1341 ml   Output 850 ml   Net 491 ml     Temp:  [97.5 °F (36.4 °C)-98.4 °F (36.9 °C)] 98.4 °F (36.9 °C)  Heart Rate:  [65-79] 72  Resp:  [17-20] 18  Arterial Line BP: ()/(44-61) 126/61      Results from last 7 days   Lab Units 04/08/21  0623 04/07/21  0450 04/06/21  0422   WBC 10*3/mm3 16.20* 19.10* 18.20*   HEMOGLOBIN g/dL 10.2* 9.8* 9.7*   HEMATOCRIT % 31.5* 29.5* 29.3*   PLATELETS 10*3/mm3 108* 103* 109*   INR  1.13* 1.07* 1.06*     Results from last 7 days   Lab Units 04/08/21  0623   CREATININE mg/dL 0.47*   POTASSIUM mmol/L 4.3   SODIUM mmol/L 138   MAGNESIUM mg/dL 2.3   PHOSPHORUS mg/dL 3.6       Physical Exam:  Up to chair,  at bedside  Tele:  SR 60's  diminished bilaterally, 97% 5L High Flow  Sternal incision healing well  Benign abd, + BM. TF infusing  + edema, chronic erythema of BLE noted    Assessment/Plan:  Principal Problem:    Aortic valve stenosis  Active Problems:    Rheumatoid arthritis (CMS/HCC)    Severe aortic stenosis--s/p tissue AVR (Pagni)  Postop acute hypoxic resp failure--re-intubated 3/22, extubated 4/4  Postop TCP/HIT positive-- Arixtra  Right subclavian/axillary/brachial DVT-- Arixtra  Chronic HFpEF--maximize meds prior to d/c  COPD--nebs, pulmicort  Rheumatoid arthritis--not on medication per patient  HTN--stable  HLD--statin  Bipolar disorder--home Prozac  Obesity stage 3  PVD  KARLY  Vertigo--home Antivert  Tobacco abuse--counseled on cessation  Postop leukocytosis, likely r/t atelectasis--aggressive pulm toileting  Post-op hypernatremia--  resolved      POD#21. Patient's oxygen needs decreasing. Now on 5L High Flow. Last BM 1 week ago. Give suppository. Patient is severely deconditioned and needs aggressive physical and occupational therapies. Continue Coumadin for RUE DVT.    Routine care--as above  Plan for [facility] acute rehab at discharge.    CELE ULLOA, APRN  4/8/2021  10:30 EDT

## 2021-04-08 NOTE — PROGRESS NOTES
"Nutrition Services    Patient Name: Claudia Rust  YOB: 1950  MRN: [id number]  Admission date: 3/18/2021    Comments:    Patient now on an oral diet + nocturnal TF to promote good PO intakes during the day. Continue magic cups to L/D trays to promote calories through PO diet. Average meal intakes since diet advanced is around 25%.    EN Prescription: Impact Peptide 1.5 at 85 mL/hr + 60 mL/hr water flush x 12 hrs at night.     PPE Documentation        PPE Worn By Provider Surgical mask, eye protection   PPE Worn By Patient  Patient not wearing PPE, family member at bedside wearing mask     CLINICAL NUTRITION ASSESSMENT       Reason for Assessment Follow up protocol    3/22/21: TF consult      H&P:      Past Medical History:   Diagnosis Date   • Acute congestive heart failure (CMS/HCC)    • Allergies    • Anxiety    • Arthritis    • Asthma    • Bilateral leg edema    • Bipolar 1 disorder (CMS/HCC)    • Bulging lumbar disc     X3   • Cancer (CMS/[facility])     states had \"cancerous tumor during pregnancy and had to have hysterectomy\"   • Cataract     bilateral   • Cellulitis 03/2021    bilateral legs   • Compression fracture of vertebral column (CMS/[facility])     LUMBAR   • COPD (chronic obstructive pulmonary disease) (CMS/[facility])    • Delayed emergence from anesthesia    • Depression    • Depression    • Dyslipidemia    • Dyspnea on exertion    • Fibromyalgia    • GERD (gastroesophageal reflux disease)    • Hiatal hernia    • Hyperlipidemia    • Hypertension    • Hypothyroid     HYPOTHYROID   • IBS (irritable bowel syndrome)    • Migraines    • Morbid obesity (CMS/HCC)    • Neuropathy    • Panic attacks    • Peripheral edema    • PONV (postoperative nausea and vomiting)    • Poor mobility     rolling walker   • Prediabetes    • PVD (peripheral vascular disease) (CMS/[facility])    • Rheumatoid aortitis    • Spinal stenosis    • Spinal stenosis    • Vertigo    • Vitamin D deficiency        Past Surgical History:   Procedure " Laterality Date   • AORTIC VALVE REPAIR/REPLACEMENT N/A 3/18/2021    Procedure: AORTIC VALVE REPLACEMENT;  Surgeon: Olaf Mcgill MD;  Location: [facility] CVOR;  Service: Cardiothoracic;  Laterality: N/A;   • BRONCHOSCOPY N/A 3/25/2021    Procedure: BRONCHOSCOPY AT BEDSIDE WITH BRONCHIOALVEOLAR LAVAGE;  Surgeon: Glenn Reyes MD;  Location: [facility] ENDOSCOPY;  Service: Pulmonary;  Laterality: N/A;  PNA   • CARDIAC CATHETERIZATION  2009   • CARDIAC CATHETERIZATION N/A 8/14/2019    Procedure: Left Heart Cath;  Surgeon: Jose Levi MD;  Location: [facility] CATH INVASIVE LOCATION;  Service: Cardiovascular   • CARDIAC CATHETERIZATION N/A 8/14/2019    Procedure: Right Heart Cath;  Surgeon: Jose Levi MD;  Location: [facility] CATH INVASIVE LOCATION;  Service: Cardiovascular   • CARDIAC CATHETERIZATION N/A 8/14/2019    Procedure: Aortic root aortogram;  Surgeon: Jose Levi MD;  Location: [facility] CATH INVASIVE LOCATION;  Service: Cardiovascular   • CARDIAC CATHETERIZATION N/A 1/20/2021    Procedure: Left Heart Cath;  Surgeon: Jose Levi MD;  Location: [facility] CATH INVASIVE LOCATION;  Service: Cardiovascular;  Laterality: N/A;   • COLONOSCOPY     • CYSTOCELE REPAIR  1984   • ELBOW ARTHROPLASTY  2011   • ENDOSCOPY     • FOOT SURGERY     • GALLBLADDER SURGERY  2002   • TONSILLECTOMY     • VAGINAL HYSTERECTOMY  1972        Current Problems:   Aortic valve stenosis  -status post tissue replacement 3/18   -cardiothoracic surgery & cardiology following    Acute hypoxic resp failure   -intubated 3/22   -extubated 4/4     Interstitial pneumonitis    Post-op TCP/HIT positive    COPD    KARLY    Cor pulmonale    Tobacco abuse    Per pulmonary  -there is diffuse alveolar disease groundglass opacities suspicious for Covid infection->multiple tests negative    JERROD, Third spacing/edema  -nephrology following, managing diuretics    Diabetes Mellitus      Nutrition/Diet History         Narrative      4/8:Patient discussed during AM round with critical care team, continues on PO diet + TF. Patient needs assistance with feeding d/t weakness. RD visited at bedside, patient sitting up in chair at bedside visiting with family member. She reports she ate the most she has eaten in a long time this AM. Family member reports she ate bites of pudding, yogurt, applesauce, but did not like the eggs. Discussed NTL + mechanical soft diet to continue per ST and may re-eval swallow function as she gets stronger. Discussed continuation of supplemental EN at night to help meet needs and build strength, patient in agreement with. Patient confirmed she likes the magic cups, especially butter pecan, will continue as ONS.    4/6: Patient discussed during AM rounds, tolerating TF at goal rate. This afternoon patient passed VFSS and now on oral diet. Spoke with nursing who reports NGT remains in place given uncertainty if patient will be able to consume adequate PO intakes. Plans to change TFs to nocturnal feeds to promote PO intakes during the day. No BM noted still, RN notified post rounds. Patient not available x 2 visits today.     4/2: Patient discussed during AM rounds, remains intubated on TF. RD visited at bedside, nsg had just turned off propofol, TF infusing at goal    3/30: Patient discussed during AM rounds, remains intubated on TF. RD visited at bedside, intubated/sedated & TF at goal    3/26: Patient discussed during AM rounds, agreed will decrease free water flushes from 200 mL/hr to 100 mL/hr given serum Na+ 147. RD visited at bedside, family member in room with no questions/concerns r/t nutrition at this time. Pt intubated/sedated, TF infusing    3/24: Patient discussed during AM rounds with critical care team. COVID test send out pending. Plan for bronch today and CHRISTIANO tomorrow. TF have not been started, just some free water given per tube. S/w NP Belkis via secure chat, okay with starting TF after bronch,  "notified REE Moyer of current orders.    3/22: Visited patient today who was intubated. Visualized vent setting & medication drips. RN in room and discussed tube feeding. DHT already in place. RN reports Dr. Mcgill does not want TFs to start till the AM. Orders in place when needed.      Functional Status Per PT notes 4/5 \"Pt presents with global weakness requiring assist x2 for bed mobility after prolonged intubation.  Pt able to sit EOB with CGA-modA pending fatigue and pain.  Not safe to attempt transfer due to hypotension and global weakness at this time.  Attempt transfer at next session if appropriate.  Pt will need extensive IP rehab to address deficits\"      Food Allergies Garlic   Macadamia Nut      Factors Affecting   Nutritional Intake Recent intubation  Acute medical complexity      Anthropometrics        Current Height, Weight Height: 162.6 cm (64\")  Weight: 121 kg (267 lb 6.7 oz) (04/08/21 0500)        Admit Height, Weight     Flowsheet Rows      First Filed Value   Admission Height  162.6 cm (64\") Documented at 03/18/2021 2000   Admission Weight  129 kg (284 lb) Documented at 03/18/2021 2000             Ideal Body Weight (IBW) 54.5 kg/120 lb    % Ideal Body Weight 225%        Usual Body Weight UTD   % Usual Body Weight UTD   Wt Change Observation Current Admission:  4/8: CBW trending down some, likely r/t diuresis and some muscle atrophy d/t bedbound status   4/6: CBW trending back down, possibly r/t diuresis   4/2: CBW consistent with last encounter  3/30: CBW trending down, now negative 1 Liter since admission  3/26: CBW consistent with admit wt  3/24: 10 lb wt change since admit (3.5% change), currently negative 5.6 Liters    PTA:  3/22: Wt fairly stable x 3 years.    Weight Hx    Wt Readings from Last 30 Encounters:   04/08/21 0500 121 kg (267 lb 6.7 oz)   04/07/21 0555 121 kg (266 lb 15.6 oz)   04/06/21 0500 122 kg (268 lb 4.8 oz)   04/05/21 0522 120 kg (265 lb 6.9 oz)   04/04/21 0400 123 kg (272 " lb 0.8 oz)   04/03/21 0600 125 kg (275 lb 12.7 oz)   04/02/21 0400 123 kg (271 lb 9.7 oz)   04/01/21 0600 124 kg (272 lb 4.3 oz)   03/31/21 0600 125 kg (276 lb 3.8 oz)   03/30/21 0600 123 kg (270 lb 15.1 oz)   03/29/21 0600 123 kg (270 lb 4.5 oz)   03/27/21 0600 127 kg (280 lb 3.3 oz)   03/26/21 0416 129 kg (284 lb 2.8 oz)   03/25/21 0600 127 kg (279 lb 1.6 oz)   03/25/21 0426 127 kg (279 lb 1.6 oz)   03/24/21 0540 125 kg (274 lb 14.6 oz)   03/23/21 0600 124 kg (273 lb 2.4 oz)   03/22/21 0543 129 kg (283 lb 11.7 oz)   03/22/21 0500 129 kg (283 lb 11.7 oz)   03/21/21 1352 132 kg (292 lb)   03/21/21 0800 133 kg (292 lb 8.8 oz)   03/20/21 0600 130 kg (287 lb 0.6 oz)   03/19/21 0430 132 kg (291 lb 0.1 oz)   03/18/21 2000 129 kg (284 lb)   03/16/21 1104 129 kg (283 lb 6 oz)   01/20/21 0921 130 kg (286 lb 9.6 oz)   01/14/21 1130 132 kg (290 lb 8 oz)   12/17/20 1239 132 kg (291 lb)   12/11/20 1301 132 kg (292 lb)   12/04/20 1106 132 kg (292 lb)   04/24/20 1000 132 kg (290 lb)   02/18/20 0809 130 kg (286 lb)   02/16/20 0210 125 kg (276 lb 9.6 oz)   02/15/20 2223 131 kg (289 lb 11 oz)   09/13/19 1020 129 kg (284 lb)   08/14/19 1121 130 kg (286 lb 2.5 oz)   08/02/19 1213 131 kg (289 lb)   07/22/19 1427 133 kg (293 lb 8 oz)   06/21/19 1141 132 kg (290 lb)   06/21/19 1044 132 kg (290 lb)   03/08/18 1032 127 kg (280 lb)   09/14/16 1337 (!) 136 kg (300 lb 12.8 oz)   07/18/16 1533 (!) 138 kg (305 lb)   06/22/16 1438 (!) 136 kg (300 lb 3.2 oz)   03/24/16 1304 (!) 139 kg (306 lb)   01/26/16 0943 136 kg (300 lb)        BMI kg/m2 Body mass index is 45.9 kg/m².     Labs/Medications         Pertinent Labs -Elevated BUN  -Low Cr   Results from last 7 days   Lab Units 04/08/21  0623 04/07/21  0450 04/06/21  0422 04/05/21  0501 04/03/21  0348 04/02/21  0514   SODIUM mmol/L 138 136 139 141 142 143   POTASSIUM mmol/L 4.3 3.8 3.9 3.3* 4.0 3.7   CHLORIDE mmol/L 104 103 105 106 104 105   CO2 mmol/L 28.0 25.0 26.0 26.0 30.0* 29.0   BUN mg/dL 34*  35* 43* 43* 48* 47*   CREATININE mg/dL 0.47* 0.38* 0.43* 0.46* 0.51* 0.49*   CALCIUM mg/dL 8.5* 8.1* 8.3* 8.5* 8.8 8.7   BILIRUBIN mg/dL  --   --   --  0.5 0.5 0.5   ALK PHOS U/L  --   --   --  137* 132* 134*   ALT (SGPT) U/L  --   --   --  44* 31 35*   AST (SGOT) U/L  --   --   --  52* 25 31   GLUCOSE mg/dL 168* 121* 121* 133* 151* 126*     Results from last 7 days   Lab Units 04/08/21  0623 04/06/21  0422 04/05/21  0501 04/04/21  0414 04/03/21  0348   MAGNESIUM mg/dL 2.3 2.4 2.3  --   --    PHOSPHORUS mg/dL 3.6 3.1 3.3 3.6   < >   HEMOGLOBIN g/dL 10.2* 9.7* 10.7* 10.0*   < >   HEMATOCRIT % 31.5* 29.3* 32.6* 30.4*   < >   TRIGLYCERIDES mg/dL  --   --   --  132  --     < > = values in this interval not displayed.     COVID19   Date Value Ref Range Status   03/24/2021 Not Detected Not Detected - Ref. Range Final     Lab Results   Component Value Date    HGBA1C 5.7 (H) 03/16/2021         Pertinent Medications Aspirin, lipitor, dulcolax, bumex, peridex, colace, magic mouthwash, prozac, arixtra, neurontin, lantus, admelog, prevacid, synthroid, zaroxolyn, miralax, K-DUR, prednisone, coumadin     Physical Findings        Overall Physical   Appearance, MSA 4/8: Appears well nourished, fatigued, edematous    4/6: Unable to see at this visit   4/2: visually appears well nourished, edematous  3/30: visually appears well nourished  3/26: visually appears well nourished and edematous  3/24: RD did not visually observe this encounter -- will monitor COVID-19 results and plan to complete NFPE at next encounter (tomorrow or 3/25)  3/22: Patient visually appears well nourished at visit.    -  Edema  2+ Dependent, Generalized  2+ BUE  2-3+ BLE   Gastrointestinal 2 BMs documented on 3/30 (x9 days ago) -on colace/miralax, suppository to be given. RN and attending aware   Tubes NGT      Oral/Mouth Cavity S/p VFSS on 4/6-> Noted need for mechanically altered diet.      Skin Midline sternal incision incision        Estimated/Assessed  "Needs    Re-estimated 4/6/21    Energy Requirements    Height for Calculation  Height: 162.6 cm (64\")   Weight for Calculation 122 kg (268 lb) - CBW   Method for Estimation  MSJ x 1.2 AF    EST Needs (kcal/day) 2074 kcals/day       Protein Requirements    Weight for Calculation 54.6 kg (120 lb) - IBW   EST Protein Needs (g/kg) 2.5 g/kg    EST Daily Needs (g/day) 136 g/day       Fluid Requirements     Estimated Needs (mL/day) 1400 -1900 mL r/t CHF dx per CTS        Fluid Deficit    Current Na Level (mEq/L) -   Desired Na Level (mEq/L) -   Estimated Fluid Deficit (L)  -     Current Nutrition Orders & Evaluation of Received Nutrient/Fluid Intake       Oral Nutrition     Current PO Diet Diet Texture, Diabetic/Consistent Carbs; Diabetic - Consistent Carb; Mechanical Ground; Thickened Liquids (Nectar), Full Feed - Nursing   Supplement Magic cup at lunch/dinner meals   PO Evaluation     % PO Intake 4/8: 25% x4 meals eaten since last encounter    3/24 - 4/6: 0% - NPO, diet just upgraded and no meals recorded yet     3/22: 0% of meals documented prior to intubation    -  Enteral Nutrition    Enteral Route NGT   TF Modular -   TF Delivery Method Cyclic -> nocturnal feed, 7 PM to 7 AM   Current Ordered TF  Impact Peptide 1.5 at 85 mL/hr   Current Ordered H2O flush  60 mL/hr H2O flush per nephrology    TF Observation  TF documented overnight   EN Evaluation     TF Changes Continue nocturnal TF    TF Residual WNL    TF Tolerance No reported s/sx of intolerance    Average EN Delivered -       Parenteral Nutrition     TPN Route -   Current Ordered TPN VOL  -   Dextrose (g/kcals)  -   Amino Acid (g/kcals) -   Lipids (mL/Concentration/FREQ)  -   MVI Frequency  -   Trace Element Frequency  -   TPN Observation  -    TPN Evaluation    Total # Days on TPN -   -  Clinical Course    Nutrition Course Details PO Diet     3/18 NPO  3/19 CLD  3/20 Consistent Carb  3/21-4/5 NPO  4/6 ConCarb HH Mech Soft NTL   4/6 to present (4/8) Consistent " CHO, Mech Soft, NTL    Nutrition Support   TF started 3/24  Conservatively advanced d/t multiple procedures, possible risk of RFS  TF reached goal 3/27 to present (4/8)  Adjusted to nocturnal feeds 4/6 to present (4/8)     Nutritional Risk Screening        NRS-2002 Score   -       Nutrition Diagnosis         Nutrition Dx Problem 1 Inadequate oral intake r/t decreased appetite in the setting of acute illness AEB average meal intake of 25%    Nutrition Dx Problem 2 -       Intervention Goal         Intervention Goal(s) Patient will tolerate EN     PO intakes will be at least 50% of meals     Accepting of ONS     Nutrition Intervention        RD/Tech Action Continue nocturnal feeds to promote PO intakes during the day     Continue Magic cups to L/D trays to promote PO calories      Nutrition Prescription          Diet Prescription Consistent Carb, Mech Soft, NTL    Supplement Prescription Magic Cups at lunch and dinner meals   -  Enteral Prescription Impact Peptide 1.5 at 85 mL/hr (x 12 hrs at night) which provides 1530 kcals (74%), 95 g protein (70%), and 785 mL free water from TF + 60 mL/hr H2O flush (x12 hrs = 720 mL/day) = 1505 mL free H2O+Rx flushes, IVF.    Specialized formula used as patient is recently post-op and will likely benefit from immune modulating formula to promote healing. May be able to transition soon to more standard formula, hesitant to make too many changes at this time given good tolerance of current formula & complicated acute care stay.        TPN Prescription -     Monitor/Evaluation        Monitor EN tolerance/ability to advance, I/Os, Labs, BM, Weight, Skin and Medication changes      Electronically signed by:  Jordyn Strickland RD  04/08/21 09:31 EDT

## 2021-04-08 NOTE — PROGRESS NOTES
LOS: 21 days   Patient Care Team:  Mary Wilde APRN as PCP - General  Mary Wilde APRN as PCP - Family Medicine  Jose Levi MD as Consulting Physician (Cardiology)  Yovani Lerma MD as Consulting Physician (Nephrology)    Chief Complaint: Status post tissue AVR.  This morning admits to being depressed however she feels that she is getting better now that she is able to take her usual SSRI.    Subjective     Interval History:     Patient Complaints: Still feels rather weak  Patient Denies: No nausea or vomiting, postoperative soreness is well controlled but still present obviously.  Denies any significant shortness of breath any worse than usual.  History taken from: patient    Review of Systems:    All systems were reviewed and negative except for: Depression which she admits is getting better    Objective     Vital Signs  Temp:  [97.5 °F (36.4 °C)-98.4 °F (36.9 °C)] 98.4 °F (36.9 °C)  Heart Rate:  [65-79] 72  Resp:  [17-20] 18  Arterial Line BP: ()/(44-61) 126/61    Physical Exam:     General Appearance:    Alert, cooperative, in no acute distress,  at bedside.  Patient chronically ill-appearing   Head:    Normocephalic, without obvious abnormality, atraumatic   Eyes:            Conjunctivae and sclerae normal, no   icterus, no pallor, corneas clear, PERRLA   Throat:   No oral lesions, no thrush, oral mucosa moist   Neck:   No adenopathy, supple, trachea midline, no thyromegaly, no   carotid bruit, no JVD   Lungs:    Distant but adequate adventitial flow.  I do not appreciate any wheezes this morning but exam limited by patient body habitus.    Heart:    Regular rhythm and normal rate, normal S1 and S2, no            murmur, no gallop, no rub, no click   Chest Wall:    No abnormalities observed   Abdomen:     Normal bowel sounds, no masses, no organomegaly, soft        non-tender, non-distended, no guarding, no rebound                tenderness, massively obese    Rectal:     Deferred   Extremities:   Moves all extremities , no edema, no cyanosis, no             redness   Pulses:   Pulses palpable and equal bilaterally   Skin:   No bleeding, bruising or rash   Lymph nodes:   No palpable adenopathy   Neurologic:   Cranial nerves 2 - 12 grossly intact, sensation intact, DTR       present and equal bilaterally   Radiology:  XR Chest 1 View    Result Date: 4/7/2021  Interval removal of the previously seen left IJ central venous catheter. Otherwise, no significant interval change.  Electronically Signed By-Olman Benz MD On:4/7/2021 7:35 AM This report was finalized on [id number] by  Olman Bezn MD.    XR Chest 1 View    Result Date: 4/6/2021   1. Stable mixed interstitial and airspace disease present throughout both lungs. 2. Stable feeding tube, postoperative findings and left IJ CVC. 3. New left PICC terminates in the upper SVC.  Electronically Signed By-Romain Gutierrez MD On:4/6/2021 7:12 AM This report was finalized on [id number] by  Romain Gutierrez MD.    XR Chest 1 View    Result Date: 4/5/2021   1. Stable diffuse mixed interstitial and airspace disease. 2. Interval extubation. 3. Stable postoperative findings and stable left internal jugular central venous catheter/feeding tube.  Electronically Signed By-Romain Gutierrez MD On:4/5/2021 7:26 AM This report was finalized on [id number] by  Romain Gutierrez MD.    XR Chest 1 View    Result Date: 4/4/2021  1. Support devices in expected position. 2. Unchanged bibasilar airspace opacities and likely small bilateral layering pleural effusions.  Electronically Signed By-Olegario Arnold MD On:4/4/2021 8:07 AM This report was finalized on [id number] by  Olegario Arnold MD.    XR Chest 1 View    Result Date: 4/3/2021  No interval change from yesterday's study with abnormalities as described above.  Electronically Signed By-Suhas Donald MD On:4/3/2021 9:53 AM This report was finalized on [id number] by  uShas Donald  MD.    XR Chest 1 View    Result Date: 4/2/2021  Mild improvement septal thickening compared to previous study. Multifocal airspace opacities are present, question multifocal pneumonia. Endotracheal tube tip terminates about 1.5 cm from the davi, consider pulling it back about 2 cm.  Electronically Signed By-Birgit Wilkins MD On:4/2/2021 7:26 AM This report was finalized on [id number] by  Birgit Wilkins MD.    XR Chest 1 View    Result Date: 4/1/2021  1. Stable lines and support tubes. 2. No pneumothorax. 3. No change in bibasilar multifocal airspace disease.  Electronically Signed By-Brett Long MD On:4/1/2021 7:35 AM This report was finalized on [id number] by  Brett Long MD.    XR Abdomen KUB    Result Date: 4/1/2021  1. Feeding tube tip is in the stomach.  Electronically Signed By-Sol Javed MD On:4/1/2021 10:12 PM This report was finalized on [id number] by  Sol Javed MD.         Results Review:     I reviewed the patient's new clinical results.  I reviewed the patient's new imaging results and agree with the interpretation.    Medication Review:   Scheduled Meds:arformoterol, 15 mcg, Nebulization, BID - RT  aspirin, 81 mg, Oral, Daily  atorvastatin, 40 mg, Oral, Nightly  budesonide, 1 mg, Nebulization, BID - RT  bumetanide, 1 mg, Nasogastric, TID  chlorhexidine, 15 mL, Mouth/Throat, Q12H  docusate, 100 mg, Oral, BID  First Mouthwash (Magic Mouthwash), 5 mL, Swish & Spit, Q6H  FLUoxetine, 20 mg, Oral, Daily  fondaparinux, 5 mg, Subcutaneous, Q24H  gabapentin, 200 mg, Oral, Q12H  hydrALAZINE, 25 mg, Nasogastric, Q8H  insulin glargine, 20 Units, Subcutaneous, Q12H  insulin lispro, 0-24 Units, Subcutaneous, Q6H  insulin lispro, 5 Units, Subcutaneous, Q6H  ipratropium-albuterol, 3 mL, Nebulization, Q4H - RT  lansoprazole, 30 mg, Oral, QAM  levothyroxine, 100 mcg, Oral, Q AM  metOLazone, 2.5 mg, Nasogastric, Every Other Day  metoprolol tartrate, 12.5 mg, Oral, Q12H  nystatin, , Topical,  Q12H  polyethylene glycol, 17 g, Oral, Daily  potassium chloride, 20 mEq, Oral, TID With Meals  predniSONE, 10 mg, Oral, Daily With Breakfast  sodium chloride, 10 mL, Intravenous, Q12H  sodium chloride, 10 mL, Intravenous, Q12H  warfarin, 5 mg, Oral, Daily      Continuous Infusions:   PRN Meds:.•  acetaminophen **OR** acetaminophen **OR** acetaminophen  •  bisacodyl  •  Chlorhexidine Gluconate Cloth  •  dextrose  •  dextrose  •  glucagon (human recombinant)  •  hydrALAZINE  •  insulin lispro **AND** insulin lispro  •  ipratropium-albuterol  •  lidocaine  •  lidocaine  •  meclizine  •  [] HYDROmorphone **AND** naloxone  •  ondansetron  •  potassium chloride **OR** potassium chloride  •  potassium chloride **OR** potassium chloride  •  sodium chloride  •  sodium chloride    Labs:  Lab Results (last 24 hours)     Procedure Component Value Units Date/Time    Fungus Culture - Lavage, Lung, Right Lower Lobe [[id number]]  (Abnormal) Collected: 21 144    Specimen: Lavage from Lung, Right Lower Lobe Updated: 21 075     Fungus Culture Candida species    Renal Function Panel [[id number]]  (Abnormal) Collected: 21    Specimen: Blood Updated: 21     Glucose 168 mg/dL      BUN 34 mg/dL      Creatinine 0.47 mg/dL      Sodium 138 mmol/L      Potassium 4.3 mmol/L      Chloride 104 mmol/L      CO2 28.0 mmol/L      Calcium 8.5 mg/dL      Albumin 2.80 g/dL      Phosphorus 3.6 mg/dL      Anion Gap 6.0 mmol/L      BUN/Creatinine Ratio 72.3     eGFR Non African Amer 131 mL/min/1.73     Narrative:      GFR Normal >60  Chronic Kidney Disease <60  Kidney Failure <15      Magnesium [[id number]]  (Normal) Collected: 21    Specimen: Blood Updated: 21     Magnesium 2.3 mg/dL     Protime-INR [[id number]]  (Abnormal) Collected: 21    Specimen: Blood Updated: 21     Protime 12.4 Seconds      INR 1.13    CBC (No Diff) [[id number]]  (Abnormal) Collected: 21     Specimen: Blood Updated: 04/08/21 0629     WBC 16.20 10*3/mm3      RBC 3.39 10*6/mm3      Hemoglobin 10.2 g/dL      Hematocrit 31.5 %      MCV 93.0 fL      MCH 30.1 pg      MCHC 32.4 g/dL      RDW 14.7 %      RDW-SD 47.3 fl      MPV 10.2 fL      Platelets 108 10*3/mm3     POC Glucose Once [[id number]]  (Normal) Collected: 04/07/21 2334    Specimen: Blood Updated: 04/07/21 2335     Glucose 92 mg/dL      Comment: Serial Number: [id number]:  [id number]       POC Glucose Once [[id number]]  (Abnormal) Collected: 04/07/21 1733    Specimen: Blood Updated: 04/07/21 1734     Glucose 135 mg/dL      Comment: Serial Number: [id number]:  [id number]              Assessment/Plan       Aortic valve stenosis    Rheumatoid arthritis (CMS/HCC)  Severe aortic stenosis--s/p tissue AVR (Pagni)  Postop acute hypoxic resp failure--re-intubated 3/22, extubated 4/4  Postop TCP/HIT positive-- Arixtra  Right subclavian/axillary/brachial DVT-- Arixtra  Chronic HFpEF--maximize meds prior to d/c  COPD--nebs, pulmicort  Rheumatoid arthritis--not on medication per patient  HTN--stable  HLD--statin  Bipolar disorder--home Prozac  Obesity stage 3  PVD  KARLY  Vertigo--home Antivert  Tobacco abuse--counseled on cessation  Postop leukocytosis, likely r/t atelectasis--aggressive pulm toileting  Post-op hypernatremia-- resolved    Postoperative day #21.  Patient tolerating mechanical soft and nectar thickened liquid diet.  She does admit now that she is able to get her fluoxetine that her depression is improving daily.  She is tolerating many of her home medications taken either inputting or applesauce.  Still on some tube feeds at night but the hope is these can be discontinued today per surgery's recommendations.    Continue rest of postoperative care.  Plan for [facility] acute rehab at discharge.  Patient and 's questions answered.        Albina Mancilla DO  04/08/21  09:21 EDT

## 2021-04-08 NOTE — CONSULTS
Pt asleep so ed left at bedside with family: Cardiac Rehab brochure and post op exercise brochure and instructed on exercises. Instructed on at home walking program. Also given handouts on HTN, stress, HHD, cholesterol, triglycerides, and post op activity timeline guide.

## 2021-04-08 NOTE — PROGRESS NOTES
NEPHROLOGY PROGRESS NOTE------[facility]    [facility]  [phone]  Robert Hammer MD      Patient Care Team:  Mary Wilde APRN as PCP - General  Mary Wilde APRN as PCP - Family Medicine  Jose Levi MD as Consulting Physician (Cardiology)  Yovani Lerma MD as Consulting Physician (Nephrology)      Provider:  Robert Hammer MD  Patient Name: Claudia Rust  :  1950    SUBJECTIVE:    F/U ARF/JERROD/HYPERNATREMIA    Fatigued. No angina. No dysuria. No palpitations.     Medication:  arformoterol, 15 mcg, Nebulization, BID - RT  aspirin, 81 mg, Oral, Daily  atorvastatin, 40 mg, Oral, Nightly  budesonide, 1 mg, Nebulization, BID - RT  bumetanide, 1 mg, Nasogastric, TID  chlorhexidine, 15 mL, Mouth/Throat, Q12H  docusate, 100 mg, Oral, BID  First Mouthwash (Magic Mouthwash), 5 mL, Swish & Spit, Q6H  FLUoxetine, 20 mg, Oral, Daily  fondaparinux, 5 mg, Subcutaneous, Q24H  gabapentin, 200 mg, Oral, Q12H  hydrALAZINE, 25 mg, Nasogastric, Q8H  insulin glargine, 20 Units, Subcutaneous, Q12H  insulin lispro, 0-24 Units, Subcutaneous, Q6H  insulin lispro, 5 Units, Subcutaneous, Q6H  ipratropium-albuterol, 3 mL, Nebulization, Q4H - RT  lansoprazole, 30 mg, Oral, QAM  levothyroxine, 100 mcg, Oral, Q AM  metoprolol tartrate, 12.5 mg, Oral, Q12H  nystatin, , Topical, Q12H  polyethylene glycol, 17 g, Oral, Daily  potassium chloride, 20 mEq, Oral, TID With Meals  predniSONE, 10 mg, Oral, Daily With Breakfast  sodium chloride, 10 mL, Intravenous, Q12H  sodium chloride, 10 mL, Intravenous, Q12H  warfarin, 5 mg, Oral, Daily           OBJECTIVE    Vital Sign Min/Max for last 24 hours  Temp  Min: 97.5 °F (36.4 °C)  Max: 98.4 °F (36.9 °C)   No data recorded   Pulse  Min: 65  Max: 78   Resp  Min: 17  Max: 20   SpO2  Min: 90 %  Max: 100 %   No data recorded   Weight  Min: 121 kg (267 lb 6.7 oz)  Max: 121 kg (267 lb 6.7 oz)     Flowsheet Rows       "First Filed Value   Admission Height  162.6 cm (64\") Documented at 03/18/2021 2000   Admission Weight  129 kg (284 lb) Documented at 03/18/2021 2000          No intake/output data recorded.  I/O last 3 completed shifts:  In: 2500 [P.O.:540; I.V.:30; Other:818; NG/GT:1112]  Out: 2050 [Urine:2050]    Physical Exam:    General Appearance: NAD. Fatigued  Head: normocephalic, without obvious abnormality and atraumatic +NGT INTACT  Eyes: conjunctivae and sclerae normal and no icterus  Neck: supple. NO GROSS JVD NOW  Lungs: +FEW SCATTERED RHONCHI.NO CRACKLES AT PRESENT  Heart: regular rhythm & normal rate and normal S1, S2 +NARGIS  Chest: Wall no abnormalities observed  Abdomen: normal bowel sounds and soft non-tender +OBESITY  Extremities: +TRACE DIFFUSE BILAT LE EDEMA, no cyanosis and no redness  Skin: no bleeding, bruising or rash, turgor normal, color normal and no lesions noted  Neurologic: Alert and oriented without focal deficits    Labs:    WBC WBC   Date Value Ref Range Status   04/08/2021 16.20 (H) 3.40 - 10.80 10*3/mm3 Final   04/07/2021 19.10 (H) 3.40 - 10.80 10*3/mm3 Final   04/06/2021 18.20 (H) 3.40 - 10.80 10*3/mm3 Final      HGB Hemoglobin   Date Value Ref Range Status   04/08/2021 10.2 (L) 12.0 - 15.9 g/dL Final   04/07/2021 9.8 (L) 12.0 - 15.9 g/dL Final   04/06/2021 9.7 (L) 12.0 - 15.9 g/dL Final      HCT Hematocrit   Date Value Ref Range Status   04/08/2021 31.5 (L) 34.0 - 46.6 % Final   04/07/2021 29.5 (L) 34.0 - 46.6 % Final   04/06/2021 29.3 (L) 34.0 - 46.6 % Final      Platlets No results found for: LABPLAT   MCV MCV   Date Value Ref Range Status   04/08/2021 93.0 79.0 - 97.0 fL Final   04/07/2021 90.8 79.0 - 97.0 fL Final   04/06/2021 91.0 79.0 - 97.0 fL Final          Sodium Sodium   Date Value Ref Range Status   04/08/2021 138 136 - 145 mmol/L Final   04/07/2021 136 136 - 145 mmol/L Final   04/06/2021 139 136 - 145 mmol/L Final      Potassium Potassium   Date Value Ref Range Status   04/08/2021 4.3 " 3.5 - 5.2 mmol/L Final   04/07/2021 3.8 3.5 - 5.2 mmol/L Final   04/06/2021 3.9 3.5 - 5.2 mmol/L Final      Chloride Chloride   Date Value Ref Range Status   04/08/2021 104 98 - 107 mmol/L Final   04/07/2021 103 98 - 107 mmol/L Final   04/06/2021 105 98 - 107 mmol/L Final      CO2 CO2   Date Value Ref Range Status   04/08/2021 28.0 22.0 - 29.0 mmol/L Final   04/07/2021 25.0 22.0 - 29.0 mmol/L Final   04/06/2021 26.0 22.0 - 29.0 mmol/L Final      BUN BUN   Date Value Ref Range Status   04/08/2021 34 (H) 8 - 23 mg/dL Final   04/07/2021 35 (H) 8 - 23 mg/dL Final   04/06/2021 43 (H) 8 - 23 mg/dL Final      Creatinine Creatinine   Date Value Ref Range Status   04/08/2021 0.47 (L) 0.57 - 1.00 mg/dL Final   04/07/2021 0.38 (L) 0.57 - 1.00 mg/dL Final   04/06/2021 0.43 (L) 0.57 - 1.00 mg/dL Final      Calcium Calcium   Date Value Ref Range Status   04/08/2021 8.5 (L) 8.6 - 10.5 mg/dL Final   04/07/2021 8.1 (L) 8.6 - 10.5 mg/dL Final   04/06/2021 8.3 (L) 8.6 - 10.5 mg/dL Final      PO4 No components found for: PO4   Albumin Albumin   Date Value Ref Range Status   04/08/2021 2.80 (L) 3.50 - 5.20 g/dL Final   04/07/2021 2.70 (L) 3.50 - 5.20 g/dL Final   04/06/2021 2.70 (L) 3.50 - 5.20 g/dL Final      Magnesium Magnesium   Date Value Ref Range Status   04/08/2021 2.3 1.6 - 2.4 mg/dL Final   04/06/2021 2.4 1.6 - 2.4 mg/dL Final      Uric Acid No components found for: URIC ACID     Imaging Results (Last 72 Hours)     Procedure Component Value Units Date/Time    XR Chest 1 View [[id number]] Collected: 04/07/21 0732     Updated: 04/07/21 0737    Narrative:      DATE OF EXAM:  4/7/2021 3:25 AM     PROCEDURE:  XR CHEST 1 VW-     INDICATIONS:  Hypoxia.     COMPARISON:  Radiographs 4/6/2021, 4/5/2021, and 4/4/2021. CT 3/22/2021.     TECHNIQUE:   Single radiographic AP view of the chest was obtained.     FINDINGS:  Lordotic positioning. Stable sternotomy wires, prosthetic heart valve,  partially imaged enteric tube, and left PICC.  Interval removal of the  previously seen left IJ central venous catheter. Similar-appearing  basilar predominant opacification of both lungs. No pneumothorax.  Unchanged cardiomediastinal contours. No acute osseous abnormality is  identified.        Impression:      Interval removal of the previously seen left IJ central venous catheter.  Otherwise, no significant interval change.     Electronically Signed By-Olman Benz MD On:4/7/2021 7:35 AM  This report was finalized on [id number] by  Olman Benz MD.    FL Video Swallow With Speech Single Contrast [[id number]] Resulted: 04/06/21 1304     Updated: 04/06/21 1305    XR Chest 1 View [[id number]] Collected: 04/06/21 0711     Updated: 04/06/21 0714    Narrative:      Examination: XR CHEST 1 VW-     Date of Exam: 4/6/2021 5:35 AM     Indication: Hypoxia; M06.9-Rheumatoid arthritis, unspecified;  I35.0-Nonrheumatic aortic (valve) stenosis; J96.01-Acute respiratory  failure with hypoxia; J44.9-Chronic obstructive pulmonary disease,  unspecified.     Comparison: 4/5/2021.     Technique: Single radiographic view of the chest was obtained.     Findings:  Feeding tube courses below the diaphragm. New left PICC terminates in  the upper SVC. Stable left internal jugular central venous catheter.  Stable evidence of prior median sternotomy and heart valve replacement.  There is stable diffuse mixed interstitial and airspace disease in both  lungs with stable small bilateral pleural effusions. There is no  evidence of pneumothorax or new lung opacity. The heart size is stable.  Pulmonary vasculature is completely obscured. There are no acute osseous  abnormalities.       Impression:         1. Stable mixed interstitial and airspace disease present throughout  both lungs.  2. Stable feeding tube, postoperative findings and left IJ CVC.  3. New left PICC terminates in the upper SVC.     Electronically Signed By-Romain Gutierrez MD On:4/6/2021 7:12 AM  This report was finalized on  [id number] by  Romain Gutierrez MD.          Results for orders placed during the hospital encounter of 03/18/21    XR Chest 1 View    Narrative  DATE OF EXAM:  4/7/2021 3:25 AM    PROCEDURE:  XR CHEST 1 VW-    INDICATIONS:  Hypoxia.    COMPARISON:  Radiographs 4/6/2021, 4/5/2021, and 4/4/2021. CT 3/22/2021.    TECHNIQUE:  Single radiographic AP view of the chest was obtained.    FINDINGS:  Lordotic positioning. Stable sternotomy wires, prosthetic heart valve,  partially imaged enteric tube, and left PICC. Interval removal of the  previously seen left IJ central venous catheter. Similar-appearing  basilar predominant opacification of both lungs. No pneumothorax.  Unchanged cardiomediastinal contours. No acute osseous abnormality is  identified.    Impression  Interval removal of the previously seen left IJ central venous catheter.  Otherwise, no significant interval change.    Electronically Signed By-Olman Benz MD On:4/7/2021 7:35 AM  This report was finalized on [id number] by  Olman Benz MD.      XR Chest 1 View    Narrative  Examination: XR CHEST 1 VW-    Date of Exam: 4/6/2021 5:35 AM    Indication: Hypoxia; M06.9-Rheumatoid arthritis, unspecified;  I35.0-Nonrheumatic aortic (valve) stenosis; J96.01-Acute respiratory  failure with hypoxia; J44.9-Chronic obstructive pulmonary disease,  unspecified.    Comparison: 4/5/2021.    Technique: Single radiographic view of the chest was obtained.    Findings:  Feeding tube courses below the diaphragm. New left PICC terminates in  the upper SVC. Stable left internal jugular central venous catheter.  Stable evidence of prior median sternotomy and heart valve replacement.  There is stable diffuse mixed interstitial and airspace disease in both  lungs with stable small bilateral pleural effusions. There is no  evidence of pneumothorax or new lung opacity. The heart size is stable.  Pulmonary vasculature is completely obscured. There are no acute  osseous  abnormalities.    Impression  1. Stable mixed interstitial and airspace disease present throughout  both lungs.  2. Stable feeding tube, postoperative findings and left IJ CVC.  3. New left PICC terminates in the upper SVC.    Electronically Signed By-Romain Gutierrez MD On:4/6/2021 7:12 AM  This report was finalized on [id number] by  Romain Gutierrez MD.      XR Chest 1 View    Narrative  Examination: XR CHEST 1 VW-    Date of Exam: 4/5/2021 5:00 AM    Indication: Hypoxia; M06.9-Rheumatoid arthritis, unspecified;  I35.0-Nonrheumatic aortic (valve) stenosis; J96.01-Acute respiratory  failure with hypoxia; J44.9-Chronic obstructive pulmonary disease,  unspecified.    Comparison: 4/4/2021.    Technique: Single radiographic view of the chest was obtained.    Findings:  There are diffuse bilateral mixed interstitial and airspace opacities in  both lungs. There is a stable left internal jugular central venous  catheter. Stable changes of prior median sternotomy and heart valve  replacement. Endotracheal tube has been removed. Stable feeding tube  coursing below the diaphragm. Stable bridging thoracic osteophytes  without acute osseous abnormality.    Impression  1. Stable diffuse mixed interstitial and airspace disease.  2. Interval extubation.  3. Stable postoperative findings and stable left internal jugular  central venous catheter/feeding tube.    Electronically Signed By-Romain Gutierrez MD On:4/5/2021 7:26 AM  This report was finalized on [id number] by  Romain Gutierrez MD.      Results for orders placed during the hospital encounter of 03/18/21    Duplex Venous Upper Extremity - Bilateral CAR    Interpretation Summary  · Acute right upper extremity deep vein thrombosis noted in the subclavian, axillary and brachial. This represents catheter associated deep vein thrombosis.  · All other vessels appear normal.        ASSESSMENT / PLAN      Aortic valve stenosis    Rheumatoid arthritis (CMS/HCC)    1.  ARF/JERROD------Nonoliguric ATN. Venegas in place. Creatinine normal. BUN down a little. Follow with ongoing diuretic exposure. No NSAIDs or IV dye    2. HYPERNATREMIA------Better.  Free water flushes with TFs. Encourage po water intake once swallow eval done and patient able to eat/drink    3. HYPOALBUMINEMIA-----on TFs.     4. 3RD SPACED EDEMA------On  Bumex per tube. Significant contribution from chronic venous insufficiency/obesit. Add QOD Metolazone today    5. CAD S/P AVR-----per Cardiology and CT Surgery. S/P CHRISTIANO    6. OBESITY    7. ACUTE HYPOXIC RESPIRATORY FAILURE------S/P extubation. Per Pulmonary    8. RUE DVT    9. DMII-------Glucometers, SSI    10. ANEMIA------H/H stable    11. SEPSIS/LEUKOCYTOSIS------Antibiotics per Pulmonary/CC    12. THROMBOCYTOPENIA    13. HYPOCALCEMIA-----Replaced    14. HYPERLIPIDEMIA------On Statin.      15. HYPOTHYROIDISM------On Sythroid    16. GERD/PUD PROPHYLAXIS------Prevacid per NGT    17. HTN-------BP good. D/C Amlodipine b/c of edema. Added scheduled Hydralazine    18. METABOLIC ALKALOSIS-----Better    19. HYPOKALEMIA-----Replaced    Robert Hammer MD  [facility]  [phone]  04/08/21  07:08 EDT

## 2021-04-08 NOTE — PROGRESS NOTES
ICU Daily Progress Note        Aortic valve stenosis    Rheumatoid arthritis (CMS/Formerly [facility])      ASSESSMENT:  Acute hypoxic respiratory failure required intubation 03/22/2021 , extubated 4/4/21  Aortic valve stenosis, status post tissue replacement  Pulm hypertension, severe  COPD  KARLY  Cor pulmonale  Tobacco abuse  Upper airway infection with moderate amount of mucopurulent secretions noted on bronchoscopy 3/25/2021  Sputum culture with Candida, likely contaminant    Interstitial pneumonitis 2nd to aspiration   Acute thrombocytopenia  Catheter associated DVT right upper extremity subclavian, axillary, and brachial   hypernatremia  Upper arm left DVT  DM  Uremia     PLAN:    wean O2 to maintain a saturation of 91%    Anticoagulation currently on Arixtra and warfarin  Wean steroids down  Echocardiogram reviewed  Bronchodilator  Inhaled corticosteroids  Electrolytes/ glycemic control  DVT prophylaxis arixtra,   GI prophylaxis.     Oral diet     Nephrology following  Cardiology following  CV surgery admitting     PT/OT evaluation and treatment   Consult case management to start discharge planning.  Will likely need rehab due to severe deconditioning      LOS: 21 days         Vital signs for last 24 hours:  Vitals:    04/08/21 0800 04/08/21 0809 04/08/21 0814 04/08/21 0823   BP:       Pulse: 70 70 68 72   Resp: 17 18 18    Temp: 98.4 °F (36.9 °C)      TempSrc: Oral      SpO2: 99% 96% 100% 100%   Weight:       Height:           Intake/Output last 3 shifts:  I/O last 3 completed shifts:  In: 2500 [P.O.:540; I.V.:30; Other:818; NG/GT:1112]  Out: 2150 [Urine:2150]  Intake/Output this shift:  I/O this shift:  In: 495 [P.O.:360; Other:100; NG/GT:35]  Out: -     Vent settings for last 24 hours:       Hemodynamic parameters for last 24 hours:       Radiology  Imaging Results (Last 24 Hours)     ** No results found for the last 24 hours. **          Labs:  Results from last 7 days   Lab Units 04/08/21  0623   WBC 10*3/mm3 16.20*    HEMOGLOBIN g/dL 10.2*   HEMATOCRIT % 31.5*   PLATELETS 10*3/mm3 108*     Results from last 7 days   Lab Units 04/08/21  0623 04/05/21  0501   SODIUM mmol/L 138 141   POTASSIUM mmol/L 4.3 3.3*   CHLORIDE mmol/L 104 106   CO2 mmol/L 28.0 26.0   BUN mg/dL 34* 43*   CREATININE mg/dL 0.47* 0.46*   CALCIUM mg/dL 8.5* 8.5*   BILIRUBIN mg/dL  --  0.5   ALK PHOS U/L  --  137*   ALT (SGPT) U/L  --  44*   AST (SGOT) U/L  --  52*   GLUCOSE mg/dL 168* 133*     Results from last 7 days   Lab Units 04/05/21  0336   PH, ARTERIAL pH units 7.440   PO2 ART mm Hg 97.7   PCO2, ARTERIAL mm Hg 38.7   HCO3 ART mmol/L 26.3     Results from last 7 days   Lab Units 04/08/21  0623 04/07/21  0450 04/06/21  0422   ALBUMIN g/dL 2.80* 2.70* 2.70*             Results from last 7 days   Lab Units 04/08/21  0623   MAGNESIUM mg/dL 2.3     Results from last 7 days   Lab Units 04/08/21  0623 04/07/21  0450 04/06/21  0422   INR  1.13* 1.07* 1.06*               Meds:   SCHEDULE  arformoterol, 15 mcg, Nebulization, BID - RT  aspirin, 81 mg, Oral, Daily  atorvastatin, 40 mg, Oral, Nightly  bisacodyl, 10 mg, Rectal, Daily  budesonide, 1 mg, Nebulization, BID - RT  bumetanide, 1 mg, Nasogastric, TID  chlorhexidine, 15 mL, Mouth/Throat, Q12H  docusate, 100 mg, Oral, BID  First Mouthwash (Magic Mouthwash), 5 mL, Swish & Spit, Q6H  FLUoxetine, 20 mg, Oral, Daily  fondaparinux, 5 mg, Subcutaneous, Q24H  gabapentin, 200 mg, Oral, Q12H  hydrALAZINE, 25 mg, Nasogastric, Q8H  insulin glargine, 20 Units, Subcutaneous, Q12H  insulin lispro, 0-24 Units, Subcutaneous, Q6H  insulin lispro, 5 Units, Subcutaneous, Q6H  ipratropium-albuterol, 3 mL, Nebulization, Q4H - RT  lansoprazole, 30 mg, Oral, QAM  levothyroxine, 100 mcg, Oral, Q AM  metOLazone, 2.5 mg, Nasogastric, Every Other Day  metoprolol tartrate, 12.5 mg, Oral, Q12H  nystatin, , Topical, Q12H  polyethylene glycol, 17 g, Oral, Daily  potassium chloride, 20 mEq, Oral, TID With Meals  predniSONE, 10 mg, Oral,  Daily With Breakfast  sodium chloride, 10 mL, Intravenous, Q12H  sodium chloride, 10 mL, Intravenous, Q12H  warfarin, 5 mg, Oral, Daily      Infusions     PRNs  •  acetaminophen **OR** acetaminophen **OR** acetaminophen  •  bisacodyl  •  Chlorhexidine Gluconate Cloth  •  dextrose  •  dextrose  •  glucagon (human recombinant)  •  hydrALAZINE  •  insulin lispro **AND** insulin lispro  •  ipratropium-albuterol  •  lidocaine  •  lidocaine  •  meclizine  •  [] HYDROmorphone **AND** naloxone  •  ondansetron  •  potassium chloride **OR** potassium chloride  •  potassium chloride **OR** potassium chloride  •  sodium chloride  •  sodium chloride    Physical Exam:  Physical Exam  Cardiovascular:      Heart sounds: Murmur heard.     Pulmonary:      Breath sounds: Rhonchi present.         ROS  Review of Systems   Respiratory: Positive for cough.    Cardiovascular: Positive for leg swelling.         Critical Care Time greater than: 45 Minutes  Total time spent with patient greater than: 45 Minutes

## 2021-04-08 NOTE — THERAPY TREATMENT NOTE
Subjective: Pt agreeable to therapeutic plan of care.  Pt reports anxious feelings and being worried about mobility this date.  Pt educated on importance of mobility and assist with digestion for feeding while up in chair.    Objective:     Bed mobility - Max-A and Assist x 2   Sitting EOB: pt able to sit with CGA-Sudhir x5 minutes   Transfers - Mod-A, Max-A and Assist x 2  Ambulation - unable to attempt due to weakness     Cardiac level II    Pain: 5 VAS  Education: Provided education on importance of mobility and skilled verbal / tactile cueing throughout intervention.     Assessment: Claudia Rust presents with functional mobility impairments which indicate the need for skilled intervention. Tolerating session today without incident. Pt able to sit EOB with CGA-Sudhir this date.  Pt needing increased verbal cues for encouragement due to anxious feelings.  Pt able to reach full upright position for transfer to chair this date with increased ability to bear weight through BLEs and take steps to pivot to chair.  Pt demonstrating progress toward mobility goals. Will continue to follow and progress as tolerated.     Plan/Recommendations:   Pt would benefit from Inpatient Rehabilitation placement at discharge from facility and requires no DME at discharge.   Pt desires Inpatient Rehabilitation placement at discharge. Pt cooperative; agreeable to therapeutic recommendations and plan of care.     Basic Mobility 6-click:  Rollin = Total, A lot = 2, A little = 3; 4 = None  Supine>Sit:   1 = Total, A lot = 2, A little = 3; 4 = None   Sit>Stand with arms:  1 = Total, A lot = 2, A little = 3; 4 = None  Bed>Chair:   1 = Total, A lot = 2, A little = 3; 4 = None  Ambulate in room:  1 = Total, A lot = 2, A little = 3; 4 = None  3-5 Steps with railin = Total, A lot = 2, A little = 3; 4 = None  Score: 10    Modified Bruno: 5 = Severe disability (Requires constant nursing care and attention, bedridden,  incontinent)    Post-Tx Position: Up in Chair and Call light and personal items within reach,  present  PPE: gloves, surgical mask, eyewear protection

## 2021-04-08 NOTE — THERAPY TREATMENT NOTE
Subjective: Pt agreeable to therapeutic plan of care.  Cognition: safety/judgement: fair    Objective:     Bed Mobility: Max-A and Assist x 2  Functional Transfers: Max-A and Assist x 2  Functional Ambulation: N/A or Not attempted.    Feeding: Max-A  ADL Position: supported sitting  ADL Comments: spouse completes most feeding secondary to patient fatigue. Pt is able to demo hand-mouth coordination this date, but activity tolerance is too poor to sustain.     Grooming: Dependent  ADL Position: supported sitting  ADL Comments: limited by pain and activity tolerance    Pain: 4 VAS  Education: Provided education on importance of mobility and skilled verbal / tactile cueing throughout intervention.     Assessment: Claudia Rust presents with ADL impairments below baseline abilities which indicate the need for continued skilled intervention while inpatient.  Pt supine in bed when OT/PT arrive.  5L HF O2 and continues to have Cara.  Bed mobility Max A x2. Sits EOB without physical assist approx 5 minutes before completing Max A x2 transfer to chair.  Pt tolerates WB through BLE well, and takes 2-3 short side-steps to chair. Additional stand with max A x2 for repositioning in chair.  OT returned later in AM to assess self feeding.  Pt is able to demo hand-mouth coordination this date, but activity tolerance is too poor to sustain. Pt unable to complete grooming this date secondary to fatigue.  Patient is a high risk of injurious falls and unsafe to return to prior living environment at this time. Tolerating session today without incident. Will continue to follow and progress as tolerated.     Plan/Recommendations:   Pt would benefit from Inpatient Rehabilitation placement at discharge from facility.   Pt desires Inpatient Rehabilitation placement at discharge. Pt cooperative; agreeable to therapeutic recommendations and plan of care.     Post-Tx Position: Up in Chair, Alarms activated and Call light and personal items  within reach  PPE: gloves, surgical mask, eyewear protection

## 2021-04-08 NOTE — PROGRESS NOTES
Cardiology Progress Note    Patient Identification:  Name: Claudia Rust  Age: 70 y.o.  Sex: female  :  1950  MRN: [id number]                 Follow Up / Chief Complaint: Follow-up for valvular heart disease status post AVR    Interval History: Patient underwent #23 magna pericardial prosthesis AVR 3/18/2021  3/22/2021.  Events noted.  Patient is intubated on IV Olegario-Synephrine and Bumex.  3/23/2021.  Patient continues to intubated  3/24/2021 patient continues to be intubated and mechanically ventilated.  3/25/2021 patient continues to be intubated and mechanically ventilated.  Underwent a CHRISTIANO which showed normal LV systolic function and good aortic valve function.  3/26/2021.  Patient continues to be intubated and mechanically ventilated.  He is lightly sedated.  Patient underwent bronchoscopy 3/25/2021.  Patient was getting tachycardic was given 12.5 of metoprolol developed hypotension requiring Olegario-Synephrine.  HIT antibody positive  3/29/2021: Continues to be intubated and mechanically ventilated.  3/30/2021.  Patient continues to be intubated and mechanically ventilated.  3/31/2021: Patient continues to be intubated and mechanically ventilated.  Nephrology has started on IV Bumex drip.  21: Patient is intubated and is on weaning trials.  Bumex drip has been discontinued.  2021 : Patient intubated on weaning trials.  On IV Bumex  21 : Patient extubated.  Dobbhoff tube present.  2021 : Patient is sitting in chair intermittently confused without symptoms at the current time.  2021 : Patient sitting in bed hemodynamically stable.  Dobbhoff tube is present.  2021 : Patient was feeling depressed yesterday was given her antidepressants is feeling a whole lot better and mental status is whole lot better today.     Subjective: Patient seen and examined.  Chart reviewed.  Labs reviewed.  Discussed with RN taking care of patient.  Patient is more awake denies any chest pain shortness of  breath.  States that she is feeling better.      Objective: Sodium is 152--> 144-> 141-141.  BUN/creatinine  49/1.05-> 42/0.69-> 42/0.64-> 46/0.60-> 47/0.49-> 43/0.46--> 43/0.43-> 35/0.38-> 34/0.47..  Hemoglobin is 10.1--> 10.8--> 9.0-> 10.8-> 10.5-> 10.2-> 10.6-> 10.6-> 10.7-> 9.7-> 9.8-> 10.2.  Platelet count 95-> 134-> 108.  White count elevated at 22.2-> 16.2.    History of present illness :    This is a 70-year-old with PMH of     #Valvular heart disease with moderate to severe aortic stenosis  Cardiac cath 1/20/2021 revealing nonobstructive CAD  # Hyperlipidemia,    # Hypertension  # COPD, KARLY on CPAP  # Prediabetes  # Hypothyroidism, Rheumatoid Arthritis, spinal stenosis, hiatal hernia, chronic depression, bipolar, GERD, IBS, chronic migraine, vertigo, herniated disc, compression lumbar fractures  #Allergies/intolerance to Stadol  # Hysterectomy, bladder reconstruction, cholecystectomy, right hand surgery  # Family history of strokes and grandfather.  Mother's cancer, father on  # Active cigarette smoker trying to quit         Here for aortic valve replacement.  Patient was recently seen with a complaint of dyspnea on exertion edema and weight gain.  Patient was brought in for elective aortic valve replacement.  Patient underwent aortic valve replacement 3/18/2021 with #23 magna pericardial prosthesis.  Blood pressure is high is on IV Cardene and IV nitroglycerin.  Currently intubated sedated chest tube present Venegas catheter present.  Underlying rhythm is flatline with AV paced rhythm.  Underwent an echocardiogram which is revealing severe aortic stenosis on 2/11/2020   Underwent cardiac cath 1/20/2021 which revealed no obstructive disease.  Carotid Dopplers 9/13/2019 normal.  Echocardiogram 2/11/2020 is revealing severe aortic stenosis  Work-up here on 3/16/2021 revealed proBNP 733, normal CMP, A1c 5.7, normal CBC, chest x-ray with no acute abnormality.  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  Events noted patient was intubated 3/21/2021 for respiratory failure.  Extubated 4/4/2021.  Continue to monitor pulmonary status and follow-up with pulmonary.  Chest x-ray 4 /5/2021   1. Stable diffuse mixed interstitial and airspace disease.  2. Interval extubation.  3. Stable postoperative findings and stable left internal jugular  central venous catheter/feeding tube.    Patient's po Bumex 1 mg 3 times daily  Monitor electrolytes and renal function  Replete electrolytes as needed  Patient sodium was going up, patient was given free water and has improved.  Appreciated nephrology input.  Will follow up with nephrology.  Monitor for routine expected post surgery blood loss anemia  Patient underwent transesophageal echo 3/25/2021 which revealed normal LV systolic function normal chamber sizes.  Normal aortic valve placement and no significant valvular heart disease.  Continue to monitor blood pressure closely patient developed hypotension requiring Olegario-Synephrine with 12.5 of metoprolol.  Now blood pressure is going up we will restart metoprolol and monitor blood pressure closely.  Will follow up with CT VS for routine post CABG care and HIT, patient has been started on warfarin.  INR is 1.13,.  Continue to load to keep INR between 2 and 3..  Discussed with  "RN taking care of patient to coordinate care  Discussed with patient's  by bedside.    Past Medical History:  Past Medical History:   Diagnosis Date   • Acute congestive heart failure (CMS/Formerly [facility])    • Allergies    • Anxiety    • Arthritis    • Asthma    • Bilateral leg edema    • Bipolar 1 disorder (CMS/HCC)    • Bulging lumbar disc     X3   • Cancer (CMS/[facility]ly [facility])     states had \"cancerous tumor during pregnancy and had to have hysterectomy\"   • Cataract     bilateral   • Cellulitis 03/2021    bilateral legs   • Compression fracture of vertebral column (CMS/Formerly [facility])     LUMBAR   • COPD (chronic obstructive pulmonary disease) (CMS/Formerly [facility])    • Delayed emergence from anesthesia    • Depression    • Depression    • Dyslipidemia    • Dyspnea on exertion    • Fibromyalgia    • GERD (gastroesophageal reflux disease)    • Hiatal hernia    • Hyperlipidemia    • Hypertension    • Hypothyroid     HYPOTHYROID   • IBS (irritable bowel syndrome)    • Migraines    • Morbid obesity (CMS/[facility])    • Neuropathy    • Panic attacks    • Peripheral edema    • PONV (postoperative nausea and vomiting)    • Poor mobility     rolling walker   • Prediabetes    • PVD (peripheral vascular disease) (CMS/[facility])    • Rheumatoid aortitis    • Spinal stenosis    • Spinal stenosis    • Vertigo    • Vitamin D deficiency      Past Surgical History:  Past Surgical History:   Procedure Laterality Date   • AORTIC VALVE REPAIR/REPLACEMENT N/A 3/18/2021    Procedure: AORTIC VALVE REPLACEMENT;  Surgeon: Olaf Mcgill MD;  Location: [facility] CVOR;  Service: Cardiothoracic;  Laterality: N/A;   • BRONCHOSCOPY N/A 3/25/2021    Procedure: BRONCHOSCOPY AT BEDSIDE WITH BRONCHIOALVEOLAR LAVAGE;  Surgeon: Glenn Reyes MD;  Location: [facility] ENDOSCOPY;  Service: Pulmonary;  Laterality: N/A;  PNA   • CARDIAC CATHETERIZATION  2009   • CARDIAC CATHETERIZATION N/A 8/14/2019    Procedure: Left Heart Cath;  Surgeon: oJse Levi MD;  Location: [facility] CATH INVASIVE " LOCATION;  Service: Cardiovascular   • CARDIAC CATHETERIZATION N/A 8/14/2019    Procedure: Right Heart Cath;  Surgeon: Jose Levi MD;  Location: [facility] CATH INVASIVE LOCATION;  Service: Cardiovascular   • CARDIAC CATHETERIZATION N/A 8/14/2019    Procedure: Aortic root aortogram;  Surgeon: Jose Levi MD;  Location: [facility] CATH INVASIVE LOCATION;  Service: Cardiovascular   • CARDIAC CATHETERIZATION N/A 1/20/2021    Procedure: Left Heart Cath;  Surgeon: Jose Levi MD;  Location: [facility] CATH INVASIVE LOCATION;  Service: Cardiovascular;  Laterality: N/A;   • COLONOSCOPY     • CYSTOCELE REPAIR  1984   • ELBOW ARTHROPLASTY  2011   • ENDOSCOPY     • FOOT SURGERY     • GALLBLADDER SURGERY  2002   • TONSILLECTOMY     • VAGINAL HYSTERECTOMY  1972        Social History:   Social History     Tobacco Use   • Smoking status: Current Every Day Smoker     Packs/day: 1.00     Types: Cigarettes   • Smokeless tobacco: Never Used   • Tobacco comment: decrease now and do not smoke dos   Substance Use Topics   • Alcohol use: Yes     Comment: socially      Family History:  History reviewed. No pertinent family history.       Allergies:  Allergies   Allergen Reactions   • Adhesive Tape Unknown (See Comments)     hives   • Butorphanol Other (See Comments)     Chest pain difficulty breathing throat swelling   • Garlic Other (See Comments)     Chest pain difficulty breathing throat swells   • Tetanus-Diphtheria Toxoids Td Other (See Comments)     Dizziness,  vomiting   • Aloe Itching   • Bee Venom Other (See Comments)     Swelling eyes and lips hand swelling   • Latex Itching   • Macadamia Nut Oil Other (See Comments)     Chest pain difficulty breathing throat swelling   • Tuberculin Unknown (See Comments)     reactor   • Wasp Venom Other (See Comments)     Swelling eyes lips and hand     Scheduled Meds:  arformoterol, 15 mcg, BID - RT  aspirin, 81 mg, Daily  atorvastatin, 40 mg,  "Nightly  budesonide, 1 mg, BID - RT  bumetanide, 1 mg, TID  chlorhexidine, 15 mL, Q12H  docusate, 100 mg, BID  First Mouthwash (Magic Mouthwash), 5 mL, Q6H  FLUoxetine, 20 mg, Daily  fondaparinux, 5 mg, Q24H  gabapentin, 200 mg, Q12H  hydrALAZINE, 25 mg, Q8H  insulin glargine, 20 Units, Q12H  insulin lispro, 0-24 Units, Q6H  insulin lispro, 5 Units, Q6H  ipratropium-albuterol, 3 mL, Q4H - RT  lansoprazole, 30 mg, QAM  levothyroxine, 100 mcg, Q AM  metOLazone, 2.5 mg, Every Other Day  metoprolol tartrate, 12.5 mg, Q12H  nystatin, , Q12H  polyethylene glycol, 17 g, Daily  potassium chloride, 20 mEq, TID With Meals  predniSONE, 10 mg, Daily With Breakfast  sodium chloride, 10 mL, Q12H  sodium chloride, 10 mL, Q12H  warfarin, 5 mg, Daily          Review of Systems:   Review of Systems   Constitutional: Negative for chills and fever.   Cardiovascular: Negative for chest pain and palpitations.   Respiratory: Negative for cough and shortness of breath.    Gastrointestinal: Negative for nausea and vomiting.              Constitutional:  Temp:  [97.5 °F (36.4 °C)-98.4 °F (36.9 °C)] 98.4 °F (36.9 °C)  Heart Rate:  [65-79] 72  Resp:  [17-20] 18  Arterial Line BP: ()/(44-61) 126/61    Physical Exam   /64   Pulse 72   Temp 98.4 °F (36.9 °C) (Oral)   Resp 18   Ht 162.6 cm (64\")   Wt 121 kg (267 lb 6.7 oz)   SpO2 100%   BMI 45.90 kg/m²   General: Patient is sitting in chair in no acute distress  Eyes: Sclera is anicteric,  conjunctiva is clear   HEENT:  No JVD.  Dobbhoff tube present.  Respiratory: Good air entry, nonlabored breathing.  Scattered rhonchi and wheezing.  Cardiovascular: S1,S2 Regular rate and rhythm.  Sternotomy is healing well.  Gastrointestinal: Abdomen nondistended, soft  Musculoskeletal:  No abnormal movements  Extremities: Patient has chronic bilateral lower extremity edema continues to have 1+ edema.  Skin: Stasis changes seen bilateral shin.  Neuro: Alert and awake, no lateralizing deficits " appreciated    INTAKE AND OUTPUT:    Intake/Output Summary (Last 24 hours) at 4/8/2021 0953  Last data filed at 4/8/2021 0800  Gross per 24 hour   Intake 1401 ml   Output 850 ml   Net 551 ml       Cardiographics  Telemetry: Sinus rhythm    ECG:   ECG 12 Lead   Preliminary Result   HEART RATE= 59  bpm   RR Interval= 1020  ms   IL Interval= 158  ms   P Horizontal Axis= 14  deg   P Front Axis= 25  deg   QRSD Interval= 91  ms   QT Interval= 652  ms   QRS Axis= 55  deg   T Wave Axis= 67  deg   - ABNORMAL ECG -   Sinus bradycardia   Prolonged QT interval   Electronically Signed By:    Date and Time of Study: 2021-03-23 13:15:33      ECG 12 Lead   Final Result   HEART RATE= 80  bpm   RR Interval= 752  ms   IL Interval= 161  ms   P Horizontal Axis= 0  deg   P Front Axis= 22  deg   QRSD Interval= 80  ms   QT Interval= 419  ms   QRS Axis= 39  deg   T Wave Axis= 35  deg   - NORMAL ECG -   Sinus rhythm   When compared with ECG of 19-Mar-2021 4:30:10,   Significant repolarization change   Electronically Signed By: Segun Rucker ([facility]) 20-Mar-2021 10:06:29   Date and Time of Study: 2021-03-20 05:15:54      ECG 12 Lead   Final Result   HEART RATE= 62  bpm   RR Interval= 984  ms   IL Interval= 165  ms   P Horizontal Axis= 13  deg   P Front Axis= 32  deg   QRSD Interval= 76  ms   QT Interval= 434  ms   QRS Axis= 43  deg   T Wave Axis= 82  deg   - ABNORMAL ECG -   Sinus rhythm   Atrial premature complex   ST elevation, consider inferior injury   When compared with ECG of 18-Mar-2021 11:04:47,   No significant change   Electronically Signed By: Segun Rucker ([facility]) 19-Mar-2021 16:48:51   Date and Time of Study: 2021-03-19 04:30:10      ECG 12 Lead   Final Result   HEART RATE= 56  bpm   RR Interval= 1072  ms   IL Interval= 163  ms   P Horizontal Axis= -22  deg   P Front Axis= -9  deg   QRSD Interval= 88  ms   QT Interval= 516  ms   QRS Axis= 40  deg   T Wave Axis= 43  deg   - OTHERWISE NORMAL ECG -   Sinus bradycardia   When compared  with ECG of 16-Mar-2021 8:51:36,   Significant repolarization change   Electronically Signed By: Segun Rucker ([facility]) 18-Mar-2021 17:48:15   Date and Time of Study: 2021-03-18 11:04:47        I have personally reviewed EKG    Echocardiogram: Results for orders placed during the hospital encounter of 03/18/21    Adult Transesophageal Echo (CHRISTIANO) W/ Cont if Necessary Per Protocol (Bedside)    Interpretation Summary  CHRISTIANO   procedure note    Procedure:  CHRISTIANO    Indication:   Valvular heart disease s/p AVR, respiratory failure    Date of procedure  : 3/25/2021    :  Dr. Jose Levi    Description of procedure:    Under conscious sedation given by anesthesiology and local anesthesia, a CHRISTIANO probe was advanced into the esophagus and into the stomach 2D, M-mode, color Doppler images were obtained..  Patient tolerated procedure well complications well.    Complications: none    Results:    Normal LV size and contractility LV contractility, EF of 60%  Normal RV size  Normal left atrial size, normal right atrial size, left atrial appendage without thrombus.  Aortic valve, mitral valve, tricuspid valve appears structurally normal, mild TR seen.  No vegetation seen  No pericardial effusion seen.  Proximal aorta appears normal in size.  Mild aortic plaque seen      Recommendations/plan:    Clinical correlation recommended      Lab Review   I have reviewed the labs      Results from last 7 days   Lab Units 04/08/21  0623   MAGNESIUM mg/dL 2.3     Results from last 7 days   Lab Units 04/08/21  0623   SODIUM mmol/L 138   POTASSIUM mmol/L 4.3   BUN mg/dL 34*   CREATININE mg/dL 0.47*   CALCIUM mg/dL 8.5*         Results from last 7 days   Lab Units 04/08/21  0623 04/07/21  0450 04/06/21  0422   WBC 10*3/mm3 16.20* 19.10* 18.20*   HEMOGLOBIN g/dL 10.2* 9.8* 9.7*   HEMATOCRIT % 31.5* 29.5* 29.3*   PLATELETS 10*3/mm3 108* 103* 109*     Results from last 7 days   Lab Units 04/08/21  0623 04/07/21  0450 04/06/21  0422   INR   "1.13* 1.07* 1.06*       RADIOLOGY:  Imaging Results (Last 24 Hours)     ** No results found for the last 24 hours. **                )4/8/2021  MD DANIEL Lamas/Transcription:   \"Dictated utilizing Dragon dictation\".   "

## 2021-04-08 NOTE — PLAN OF CARE
Claudia TOVAR Walker presents with ADL impairments below baseline abilities which indicate the need for continued skilled intervention while inpatient.  Pt supine in bed when OT/PT arrive.  5L HF O2 and continues to have [place].  Bed mobility Max A x2. Sits EOB without physical assist approx 5 minutes before completing Max A x2 transfer to chair.  Pt tolerates WB through BLE well, and takes 2-3 short side-steps to chair. Additional stand with max A x2 for repositioning in chair.  OT returned later in AM to assess self feeding.  Pt is able to demo hand-mouth coordination this date, but activity tolerance is too poor to sustain. Pt unable to complete grooming this date secondary to fatigue.  Patient is a high risk of injurious falls and unsafe to return to prior living environment at this time. Tolerating session today without incident. Will continue to follow and progress as tolerated.

## 2021-04-08 NOTE — PLAN OF CARE
Goal Outcome Evaluation:  Plan of Care Reviewed With: patient, spouse  Progress: improving  Outcome Summary: O2 weaned to 4L HF NC currently. A-line, PICC, dobhoff, and external catheter remain in place. Pt tires easily but is somewhat optimistic about her progress. Pt refused suppository today d/t increasing cramping-education provided by RN. Will follup up this evening. VSS at this time. Will continue to monitor.

## 2021-04-09 LAB
ALBUMIN SERPL-MCNC: 2.7 G/DL (ref 3.5–5.2)
ANION GAP SERPL CALCULATED.3IONS-SCNC: 10 MMOL/L (ref 5–15)
ARTERIAL PATENCY WRIST A: ABNORMAL
ATMOSPHERIC PRESS: ABNORMAL MM[HG]
BACTERIA UR QL AUTO: ABNORMAL /HPF
BASE EXCESS BLDA CALC-SCNC: -4.3 MMOL/L (ref 0–3)
BDY SITE: ABNORMAL
BILIRUB UR QL STRIP: NEGATIVE
BUN SERPL-MCNC: 22 MG/DL (ref 8–23)
BUN/CREAT SERPL: 50 (ref 7–25)
CA-I BLDA-SCNC: 1.15 MMOL/L (ref 1.15–1.33)
CALCIUM SPEC-SCNC: 8.3 MG/DL (ref 8.6–10.5)
CHLORIDE SERPL-SCNC: 98 MMOL/L (ref 98–107)
CLARITY UR: ABNORMAL
CO2 BLDA-SCNC: 23.2 MMOL/L (ref 22–29)
CO2 SERPL-SCNC: 27 MMOL/L (ref 22–29)
COLOR UR: YELLOW
CREAT SERPL-MCNC: 0.44 MG/DL (ref 0.57–1)
DEPRECATED RDW RBC AUTO: 46.8 FL (ref 37–54)
ERYTHROCYTE [DISTWIDTH] IN BLOOD BY AUTOMATED COUNT: 14.7 % (ref 12.3–15.4)
GFR SERPL CREATININE-BSD FRML MDRD: 141 ML/MIN/1.73
GLUCOSE BLDC GLUCOMTR-MCNC: 102 MG/DL (ref 70–105)
GLUCOSE BLDC GLUCOMTR-MCNC: 117 MG/DL (ref 70–105)
GLUCOSE BLDC GLUCOMTR-MCNC: 121 MG/DL (ref 70–105)
GLUCOSE BLDC GLUCOMTR-MCNC: 189 MG/DL (ref 74–100)
GLUCOSE BLDC GLUCOMTR-MCNC: 189 MG/DL (ref 74–100)
GLUCOSE BLDC GLUCOMTR-MCNC: 68 MG/DL (ref 70–105)
GLUCOSE BLDC GLUCOMTR-MCNC: 75 MG/DL (ref 70–105)
GLUCOSE SERPL-MCNC: 96 MG/DL (ref 65–99)
GLUCOSE UR STRIP-MCNC: NEGATIVE MG/DL
HCO3 BLDA-SCNC: 21.9 MMOL/L (ref 21–28)
HCT VFR BLD AUTO: 30.8 % (ref 34–46.6)
HCT VFR BLDA CALC: 43 % (ref 38–51)
HEMODILUTION: YES
HGB BLD-MCNC: 10.1 G/DL (ref 12–15.9)
HGB BLDA-MCNC: 14.7 G/DL (ref 12–17)
HGB UR QL STRIP.AUTO: NEGATIVE
HYALINE CASTS UR QL AUTO: ABNORMAL /LPF
INHALED O2 CONCENTRATION: 70 %
INR PPP: 1.65 (ref 2–3)
KETONES UR QL STRIP: NEGATIVE
LEUKOCYTE ESTERASE UR QL STRIP.AUTO: ABNORMAL
MAGNESIUM SERPL-MCNC: 2 MG/DL (ref 1.6–2.4)
MCH RBC QN AUTO: 29.9 PG (ref 26.6–33)
MCHC RBC AUTO-ENTMCNC: 32.7 G/DL (ref 31.5–35.7)
MCV RBC AUTO: 91.5 FL (ref 79–97)
MODALITY: ABNORMAL
NITRITE UR QL STRIP: NEGATIVE
NT-PROBNP SERPL-MCNC: 631.5 PG/ML (ref 0–900)
PCO2 BLDA: 43.2 MM HG (ref 35–48)
PEEP RESPIRATORY: 8 CM[H2O]
PH BLDA: 7.31 PH UNITS (ref 7.35–7.45)
PH UR STRIP.AUTO: 6.5 [PH] (ref 5–8)
PHOSPHATE SERPL-MCNC: 3.7 MG/DL (ref 2.5–4.5)
PLATELET # BLD AUTO: 92 10*3/MM3 (ref 140–450)
PMV BLD AUTO: 10.3 FL (ref 6–12)
PO2 BLDA: 95.2 MM HG (ref 83–108)
POTASSIUM BLDA-SCNC: 4.1 MMOL/L (ref 3.5–4.5)
POTASSIUM SERPL-SCNC: 3.2 MMOL/L (ref 3.5–5.2)
PROT UR QL STRIP: NEGATIVE
PROTHROMBIN TIME: 17.7 SECONDS (ref 19.4–28.5)
RBC # BLD AUTO: 3.36 10*6/MM3 (ref 3.77–5.28)
RBC # UR: ABNORMAL /HPF
REF LAB TEST METHOD: ABNORMAL
RESPIRATORY RATE: 18
SAO2 % BLDCOA: 96.7 % (ref 94–98)
SODIUM BLD-SCNC: 140 MMOL/L (ref 138–146)
SODIUM SERPL-SCNC: 135 MMOL/L (ref 136–145)
SP GR UR STRIP: 1.01 (ref 1–1.03)
SQUAMOUS #/AREA URNS HPF: ABNORMAL /HPF
UROBILINOGEN UR QL STRIP: ABNORMAL
VENTILATOR MODE: ABNORMAL
VT ON VENT VENT: 700 ML
WBC # BLD AUTO: 16.1 10*3/MM3 (ref 3.4–10.8)
WBC UR QL AUTO: ABNORMAL /HPF

## 2021-04-09 PROCEDURE — 97112 NEUROMUSCULAR REEDUCATION: CPT

## 2021-04-09 PROCEDURE — 82962 GLUCOSE BLOOD TEST: CPT

## 2021-04-09 PROCEDURE — 25010000002 ONDANSETRON PER 1 MG: Performed by: NURSE PRACTITIONER

## 2021-04-09 PROCEDURE — 80051 ELECTROLYTE PANEL: CPT

## 2021-04-09 PROCEDURE — 63710000001 INSULIN GLARGINE PER 5 UNITS: Performed by: NURSE PRACTITIONER

## 2021-04-09 PROCEDURE — 87086 URINE CULTURE/COLONY COUNT: CPT | Performed by: NURSE PRACTITIONER

## 2021-04-09 PROCEDURE — 87077 CULTURE AEROBIC IDENTIFY: CPT | Performed by: NURSE PRACTITIONER

## 2021-04-09 PROCEDURE — 94760 N-INVAS EAR/PLS OXIMETRY 1: CPT

## 2021-04-09 PROCEDURE — 82330 ASSAY OF CALCIUM: CPT

## 2021-04-09 PROCEDURE — 99232 SBSQ HOSP IP/OBS MODERATE 35: CPT | Performed by: INTERNAL MEDICINE

## 2021-04-09 PROCEDURE — 94799 UNLISTED PULMONARY SVC/PX: CPT

## 2021-04-09 PROCEDURE — 25010000002 FONDAPARINUX PER 0.5 MG: Performed by: NURSE PRACTITIONER

## 2021-04-09 PROCEDURE — 82803 BLOOD GASES ANY COMBINATION: CPT

## 2021-04-09 PROCEDURE — 85018 HEMOGLOBIN: CPT

## 2021-04-09 PROCEDURE — 80069 RENAL FUNCTION PANEL: CPT | Performed by: INTERNAL MEDICINE

## 2021-04-09 PROCEDURE — 83735 ASSAY OF MAGNESIUM: CPT | Performed by: INTERNAL MEDICINE

## 2021-04-09 PROCEDURE — 85610 PROTHROMBIN TIME: CPT | Performed by: NURSE PRACTITIONER

## 2021-04-09 PROCEDURE — 63710000001 PREDNISONE PER 5 MG: Performed by: INTERNAL MEDICINE

## 2021-04-09 PROCEDURE — 87186 SC STD MICRODIL/AGAR DIL: CPT | Performed by: NURSE PRACTITIONER

## 2021-04-09 PROCEDURE — 92526 ORAL FUNCTION THERAPY: CPT

## 2021-04-09 PROCEDURE — 83880 ASSAY OF NATRIURETIC PEPTIDE: CPT | Performed by: INTERNAL MEDICINE

## 2021-04-09 PROCEDURE — 81001 URINALYSIS AUTO W/SCOPE: CPT | Performed by: NURSE PRACTITIONER

## 2021-04-09 PROCEDURE — 99024 POSTOP FOLLOW-UP VISIT: CPT | Performed by: NURSE PRACTITIONER

## 2021-04-09 PROCEDURE — 97530 THERAPEUTIC ACTIVITIES: CPT

## 2021-04-09 PROCEDURE — 99024 POSTOP FOLLOW-UP VISIT: CPT | Performed by: THORACIC SURGERY (CARDIOTHORACIC VASCULAR SURGERY)

## 2021-04-09 PROCEDURE — 85027 COMPLETE CBC AUTOMATED: CPT | Performed by: NURSE PRACTITIONER

## 2021-04-09 RX ORDER — WARFARIN SODIUM 3 MG/1
3 TABLET ORAL
Status: DISCONTINUED | OUTPATIENT
Start: 2021-04-09 | End: 2021-04-10

## 2021-04-09 RX ORDER — CALCIUM CARBONATE 200(500)MG
2 TABLET,CHEWABLE ORAL 3 TIMES DAILY PRN
Status: DISCONTINUED | OUTPATIENT
Start: 2021-04-09 | End: 2021-04-13 | Stop reason: HOSPADM

## 2021-04-09 RX ORDER — INSULIN GLARGINE 100 [IU]/ML
15 INJECTION, SOLUTION SUBCUTANEOUS EVERY 12 HOURS SCHEDULED
Status: DISCONTINUED | OUTPATIENT
Start: 2021-04-09 | End: 2021-04-10

## 2021-04-09 RX ORDER — POTASSIUM CHLORIDE 1.5 G/1.77G
20 POWDER, FOR SOLUTION ORAL
Status: DISCONTINUED | OUTPATIENT
Start: 2021-04-10 | End: 2021-04-10

## 2021-04-09 RX ADMIN — NYSTATIN: 100000 POWDER TOPICAL at 08:43

## 2021-04-09 RX ADMIN — METOPROLOL TARTRATE 12.5 MG: 25 TABLET, FILM COATED ORAL at 19:43

## 2021-04-09 RX ADMIN — ACETAMINOPHEN 650 MG: 325 TABLET, FILM COATED ORAL at 19:53

## 2021-04-09 RX ADMIN — PREDNISONE 10 MG: 10 TABLET ORAL at 08:43

## 2021-04-09 RX ADMIN — LANSOPRAZOLE 30 MG: KIT at 06:25

## 2021-04-09 RX ADMIN — FLUOXETINE 20 MG: 20 CAPSULE ORAL at 08:43

## 2021-04-09 RX ADMIN — BUMETANIDE 1 MG: 1 TABLET ORAL at 15:24

## 2021-04-09 RX ADMIN — IPRATROPIUM BROMIDE AND ALBUTEROL SULFATE 3 ML: 2.5; .5 SOLUTION RESPIRATORY (INHALATION) at 17:59

## 2021-04-09 RX ADMIN — METOPROLOL TARTRATE 12.5 MG: 25 TABLET, FILM COATED ORAL at 08:44

## 2021-04-09 RX ADMIN — ARFORMOTEROL TARTRATE 15 MCG: 15 SOLUTION RESPIRATORY (INHALATION) at 07:05

## 2021-04-09 RX ADMIN — ASPIRIN 81 MG: 81 TABLET, COATED ORAL at 08:43

## 2021-04-09 RX ADMIN — POTASSIUM CHLORIDE 20 MEQ: 1500 TABLET, EXTENDED RELEASE ORAL at 18:11

## 2021-04-09 RX ADMIN — ATORVASTATIN CALCIUM 40 MG: 40 TABLET, FILM COATED ORAL at 19:43

## 2021-04-09 RX ADMIN — BUDESONIDE 1 MG: 0.5 SUSPENSION RESPIRATORY (INHALATION) at 07:05

## 2021-04-09 RX ADMIN — CALCIUM CARBONATE 2 TABLET: 500 TABLET, CHEWABLE ORAL at 18:08

## 2021-04-09 RX ADMIN — DIPHENHYDRAMINE HYDROCHLORIDE AND LIDOCAINE HYDROCHLORIDE AND ALUMINUM HYDROXIDE AND MAGNESIUM HYDRO 5 ML: KIT at 11:45

## 2021-04-09 RX ADMIN — SODIUM CHLORIDE 250 ML: 9 INJECTION, SOLUTION INTRAVENOUS at 04:16

## 2021-04-09 RX ADMIN — POTASSIUM CHLORIDE 20 MEQ: 1500 TABLET, EXTENDED RELEASE ORAL at 11:45

## 2021-04-09 RX ADMIN — DOCUSATE SODIUM 100 MG: 50 LIQUID ORAL at 08:43

## 2021-04-09 RX ADMIN — LEVOTHYROXINE SODIUM 100 MCG: 0.1 TABLET ORAL at 06:25

## 2021-04-09 RX ADMIN — IPRATROPIUM BROMIDE AND ALBUTEROL SULFATE 3 ML: 2.5; .5 SOLUTION RESPIRATORY (INHALATION) at 10:37

## 2021-04-09 RX ADMIN — Medication 10 ML: at 08:44

## 2021-04-09 RX ADMIN — WARFARIN 3 MG: 3 TABLET ORAL at 18:11

## 2021-04-09 RX ADMIN — ARFORMOTEROL TARTRATE 15 MCG: 15 SOLUTION RESPIRATORY (INHALATION) at 17:59

## 2021-04-09 RX ADMIN — HYDRALAZINE HYDROCHLORIDE 25 MG: 25 TABLET ORAL at 13:40

## 2021-04-09 RX ADMIN — POTASSIUM CHLORIDE 20 MEQ: 1500 TABLET, EXTENDED RELEASE ORAL at 08:43

## 2021-04-09 RX ADMIN — POTASSIUM CHLORIDE 20 MEQ: 1.5 POWDER, FOR SOLUTION ORAL at 08:44

## 2021-04-09 RX ADMIN — ONDANSETRON 4 MG: 2 INJECTION INTRAMUSCULAR; INTRAVENOUS at 09:22

## 2021-04-09 RX ADMIN — GABAPENTIN 200 MG: 100 CAPSULE ORAL at 19:43

## 2021-04-09 RX ADMIN — INSULIN GLARGINE 20 UNITS: 100 INJECTION, SOLUTION SUBCUTANEOUS at 08:49

## 2021-04-09 RX ADMIN — BUMETANIDE 1 MG: 1 TABLET ORAL at 08:44

## 2021-04-09 RX ADMIN — IPRATROPIUM BROMIDE AND ALBUTEROL SULFATE 3 ML: 2.5; .5 SOLUTION RESPIRATORY (INHALATION) at 07:05

## 2021-04-09 RX ADMIN — POTASSIUM CHLORIDE 20 MEQ: 1.5 POWDER, FOR SOLUTION ORAL at 11:46

## 2021-04-09 RX ADMIN — FONDAPARINUX SODIUM 5 MG: 5 INJECTION, SOLUTION SUBCUTANEOUS at 08:43

## 2021-04-09 RX ADMIN — NYSTATIN: 100000 POWDER TOPICAL at 19:45

## 2021-04-09 RX ADMIN — BUDESONIDE 1 MG: 0.5 SUSPENSION RESPIRATORY (INHALATION) at 17:59

## 2021-04-09 RX ADMIN — BUMETANIDE 1 MG: 1 TABLET ORAL at 19:43

## 2021-04-09 RX ADMIN — CHLORHEXIDINE GLUCONATE 15 ML: 1.2 RINSE ORAL at 08:43

## 2021-04-09 RX ADMIN — GABAPENTIN 200 MG: 100 CAPSULE ORAL at 08:44

## 2021-04-09 RX ADMIN — DIPHENHYDRAMINE HYDROCHLORIDE AND LIDOCAINE HYDROCHLORIDE AND ALUMINUM HYDROXIDE AND MAGNESIUM HYDRO 5 ML: KIT at 23:10

## 2021-04-09 RX ADMIN — HYDRALAZINE HYDROCHLORIDE 25 MG: 25 TABLET ORAL at 23:09

## 2021-04-09 RX ADMIN — DIPHENHYDRAMINE HYDROCHLORIDE AND LIDOCAINE HYDROCHLORIDE AND ALUMINUM HYDROXIDE AND MAGNESIUM HYDRO 5 ML: KIT at 18:08

## 2021-04-09 RX ADMIN — INSULIN GLARGINE 15 UNITS: 100 INJECTION, SOLUTION SUBCUTANEOUS at 19:45

## 2021-04-09 RX ADMIN — IPRATROPIUM BROMIDE AND ALBUTEROL SULFATE 3 ML: 2.5; .5 SOLUTION RESPIRATORY (INHALATION) at 22:37

## 2021-04-09 NOTE — PROGRESS NOTES
Cardiology Progress Note    Patient Identification:  Name: Claudia Rust  Age: 70 y.o.  Sex: female  :  1950  MRN: [id number]                 Follow Up / Chief Complaint: Follow-up for valvular heart disease status post AVR    Interval History: Patient underwent #23 magna pericardial prosthesis AVR 3/18/2021  3/22/2021.  Events noted.  Patient is intubated on IV Olegario-Synephrine and Bumex.  3/23/2021.  Patient continues to intubated  3/24/2021 patient continues to be intubated and mechanically ventilated.  3/25/2021 patient continues to be intubated and mechanically ventilated.  Underwent a CHRISTIANO which showed normal LV systolic function and good aortic valve function.  3/26/2021.  Patient continues to be intubated and mechanically ventilated.  He is lightly sedated.  Patient underwent bronchoscopy 3/25/2021.  Patient was getting tachycardic was given 12.5 of metoprolol developed hypotension requiring Olegario-Synephrine.  HIT antibody positive  3/29/2021: Continues to be intubated and mechanically ventilated.  3/30/2021.  Patient continues to be intubated and mechanically ventilated.  3/31/2021: Patient continues to be intubated and mechanically ventilated.  Nephrology has started on IV Bumex drip.  21: Patient is intubated and is on weaning trials.  Bumex drip has been discontinued.  2021 : Patient intubated on weaning trials.  On IV Bumex  21 : Patient extubated.  Dobbhoff tube present.  2021 : Patient is sitting in chair intermittently confused without symptoms at the current time.  2021 : Patient sitting in bed hemodynamically stable.  Dobbhoff tube is present.  2021 : Patient was feeling depressed yesterday was given her antidepressants is feeling a whole lot better and mental status is whole lot better today.  2021 : Patient is sitting in chair and hemodynamically stable.     Subjective: Patient seen and examined.  Chart reviewed.  Labs reviewed.  Discussed with RN taking care of  patient.  Patient is more awake denies any chest pain shortness of breath.  States that she is feeling better.      Objective: Sodium is 152--> 144-> 141-141.  BUN/creatinine  49/1.05-> 42/0.69-> 42/0.64-> 46/0.60-> 47/0.49-> 43/0.46--> 43/0.43-> 35/0.38-> 34/0.47-> 22/0.44..  Hemoglobin is 10.1--> 10.8--> 9.0-> 10.8-> 10.5-> 10.2-> 10.6-> 10.6-> 10.7-> 9.7-> 9.8-> 10.2-> 10.1.  Platelet count 95-> 134-> 108-> 92. white count elevated at 22.2-> 16.2-> 16.1.    History of present illness :    This is a 70-year-old with PMH of     #Valvular heart disease with moderate to severe aortic stenosis  Cardiac cath 1/20/2021 revealing nonobstructive CAD  # Hyperlipidemia,    # Hypertension  # COPD, KARLY on CPAP  # Prediabetes  # Hypothyroidism, Rheumatoid Arthritis, spinal stenosis, hiatal hernia, chronic depression, bipolar, GERD, IBS, chronic migraine, vertigo, herniated disc, compression lumbar fractures  #Allergies/intolerance to Stadol  # Hysterectomy, bladder reconstruction, cholecystectomy, right hand surgery  # Family history of strokes and grandfather.  Mother's cancer, father on  # Active cigarette smoker trying to quit         Here for aortic valve replacement.  Patient was recently seen with a complaint of dyspnea on exertion edema and weight gain.  Patient was brought in for elective aortic valve replacement.  Patient underwent aortic valve replacement 3/18/2021 with #23 magna pericardial prosthesis.  Blood pressure is high is on IV Cardene and IV nitroglycerin.  Currently intubated sedated chest tube present Venegas catheter present.  Underlying rhythm is flatline with AV paced rhythm.  Underwent an echocardiogram which is revealing severe aortic stenosis on 2/11/2020   Underwent cardiac cath 1/20/2021 which revealed no obstructive disease.  Carotid Dopplers 9/13/2019 normal.  Echocardiogram 2/11/2020 is revealing severe aortic stenosis  Work-up here on 3/16/2021 revealed proBNP 733, normal CMP, A1c 5.7, normal  CBC, chest x-ray with no acute abnormality.  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  Events noted patient was intubated 3/21/2021 for respiratory failure.  Extubated 4/4/2021.  Continue to monitor pulmonary status and follow-up with pulmonary.  Chest x-ray 4 /5/2021   1. Stable diffuse mixed interstitial and airspace disease.  2. Interval extubation.  3. Stable postoperative findings and stable left internal jugular  central venous catheter/feeding tube.    Patient's po Bumex 1 mg 3 times daily  Monitor electrolytes and renal function  Replete electrolytes as needed  Patient sodium was going up, patient was given free water and has improved.  Appreciated nephrology input.  Will follow up with nephrology.  Monitor for routine expected post surgery blood loss anemia  Patient underwent transesophageal echo 3/25/2021 which revealed normal LV systolic function normal chamber sizes.  Normal aortic valve placement and no significant valvular heart disease.  Continue to monitor blood pressure closely patient developed hypotension requiring Olegario-Synephrine with 12.5 of metoprolol.  Now blood pressure is going up we will restart metoprolol and monitor blood pressure closely.  Will follow up with CT VS for routine post CABG care and HIT, patient has been  "started on warfarin.  INR is 1.65,.  Continue to load to keep INR between 2 and 3..  Discussed with RN taking care of patient to coordinate care  Discussed with patient's  by bedside.    Past Medical History:  Past Medical History:   Diagnosis Date   • Acute congestive heart failure (CMS/[facility])    • Allergies    • Anxiety    • Arthritis    • Asthma    • Bilateral leg edema    • Bipolar 1 disorder (CMS/[facility])    • Bulging lumbar disc     X3   • Cancer (CMS/[facility])     states had \"cancerous tumor during pregnancy and had to have hysterectomy\"   • Cataract     bilateral   • Cellulitis 03/2021    bilateral legs   • Compression fracture of vertebral column (CMS/HCC)     LUMBAR   • COPD (chronic obstructive pulmonary disease) (CMS/[facility])    • Delayed emergence from anesthesia    • Depression    • Depression    • Dyslipidemia    • Dyspnea on exertion    • Fibromyalgia    • GERD (gastroesophageal reflux disease)    • Hiatal hernia    • Hyperlipidemia    • Hypertension    • Hypothyroid     HYPOTHYROID   • IBS (irritable bowel syndrome)    • Migraines    • Morbid obesity (CMS/[facility])    • Neuropathy    • Panic attacks    • Peripheral edema    • PONV (postoperative nausea and vomiting)    • Poor mobility     rolling walker   • Prediabetes    • PVD (peripheral vascular disease) (CMS/[facility])    • Rheumatoid aortitis    • Spinal stenosis    • Spinal stenosis    • Vertigo    • Vitamin D deficiency      Past Surgical History:  Past Surgical History:   Procedure Laterality Date   • AORTIC VALVE REPAIR/REPLACEMENT N/A 3/18/2021    Procedure: AORTIC VALVE REPLACEMENT;  Surgeon: Olaf Mcgill MD;  Location: [facility] CVOR;  Service: Cardiothoracic;  Laterality: N/A;   • BRONCHOSCOPY N/A 3/25/2021    Procedure: BRONCHOSCOPY AT BEDSIDE WITH BRONCHIOALVEOLAR LAVAGE;  Surgeon: Glenn Reyes MD;  Location: [facility] ENDOSCOPY;  Service: Pulmonary;  Laterality: N/A;  PNA   • CARDIAC CATHETERIZATION  2009   • CARDIAC CATHETERIZATION N/A 8/14/2019    " Procedure: Left Heart Cath;  Surgeon: Jose Levi MD;  Location:  [facility] CATH INVASIVE LOCATION;  Service: Cardiovascular   • CARDIAC CATHETERIZATION N/A 8/14/2019    Procedure: Right Heart Cath;  Surgeon: Jose Levi MD;  Location:  [facility] CATH INVASIVE LOCATION;  Service: Cardiovascular   • CARDIAC CATHETERIZATION N/A 8/14/2019    Procedure: Aortic root aortogram;  Surgeon: Jose Levi MD;  Location:  [facility] CATH INVASIVE LOCATION;  Service: Cardiovascular   • CARDIAC CATHETERIZATION N/A 1/20/2021    Procedure: Left Heart Cath;  Surgeon: Jose Levi MD;  Location: [facility] CATH INVASIVE LOCATION;  Service: Cardiovascular;  Laterality: N/A;   • COLONOSCOPY     • CYSTOCELE REPAIR  1984   • ELBOW ARTHROPLASTY  2011   • ENDOSCOPY     • FOOT SURGERY     • GALLBLADDER SURGERY  2002   • TONSILLECTOMY     • VAGINAL HYSTERECTOMY  1972        Social History:   Social History     Tobacco Use   • Smoking status: Current Every Day Smoker     Packs/day: 1.00     Types: Cigarettes   • Smokeless tobacco: Never Used   • Tobacco comment: decrease now and do not smoke dos   Substance Use Topics   • Alcohol use: Yes     Comment: socially      Family History:  History reviewed. No pertinent family history.       Allergies:  Allergies   Allergen Reactions   • Adhesive Tape Unknown (See Comments)     hives   • Butorphanol Other (See Comments)     Chest pain difficulty breathing throat swelling   • Garlic Other (See Comments)     Chest pain difficulty breathing throat swells   • Tetanus-Diphtheria Toxoids Td Other (See Comments)     Dizziness,  vomiting   • Aloe Itching   • Bee Venom Other (See Comments)     Swelling eyes and lips hand swelling   • Latex Itching   • Macadamia Nut Oil Other (See Comments)     Chest pain difficulty breathing throat swelling   • Tuberculin Unknown (See Comments)     reactor   • Wasp Venom Other (See Comments)     Swelling eyes lips and hand     Scheduled  "Meds:  arformoterol, 15 mcg, BID - RT  aspirin, 81 mg, Daily  atorvastatin, 40 mg, Nightly  bisacodyl, 10 mg, Daily  budesonide, 1 mg, BID - RT  bumetanide, 1 mg, TID  chlorhexidine, 15 mL, Q12H  docusate, 100 mg, BID  First Mouthwash (Magic Mouthwash), 5 mL, Q6H  FLUoxetine, 20 mg, Daily  fondaparinux, 5 mg, Q24H  gabapentin, 200 mg, Q12H  hydrALAZINE, 25 mg, Q8H  insulin glargine, 20 Units, Q12H  insulin lispro, 0-24 Units, Q6H  insulin lispro, 5 Units, Q6H  ipratropium-albuterol, 3 mL, Q4H - RT  lansoprazole, 30 mg, QAM  levothyroxine, 100 mcg, Q AM  metOLazone, 2.5 mg, Every Other Day  metoprolol tartrate, 12.5 mg, Q12H  nystatin, , Q12H  polyethylene glycol, 17 g, Daily  potassium chloride, 20 mEq, TID With Meals  predniSONE, 10 mg, Daily With Breakfast  sodium chloride, 10 mL, Q12H  sodium chloride, 10 mL, Q12H  warfarin, 5 mg, Daily          Review of Systems:   Review of Systems   Constitutional: Negative for chills and fever.   Cardiovascular: Negative for chest pain and palpitations.   Respiratory: Negative for cough and shortness of breath.    Gastrointestinal: Negative for nausea and vomiting.              Constitutional:  Temp:  [97.6 °F (36.4 °C)-98.9 °F (37.2 °C)] 98.9 °F (37.2 °C)  Heart Rate:  [61-74] 66  Resp:  [16-21] 16  Arterial Line BP: ()/() 93/30    Physical Exam   /64   Pulse 66   Temp 98.9 °F (37.2 °C) (Oral)   Resp 16   Ht 162.6 cm (64\")   Wt 121 kg (267 lb 13.7 oz)   SpO2 95%   BMI 45.98 kg/m²   General: Patient is sitting in chair in no acute distress  Eyes: Sclera is anicteric,  conjunctiva is clear   HEENT:  No JVD.  Dobbhoff tube present.  Respiratory: Good air entry, nonlabored breathing.  Scattered rhonchi and wheezing.  Cardiovascular: S1,S2 Regular rate and rhythm.  Sternotomy is healing well.  Gastrointestinal: Abdomen nondistended, soft  Musculoskeletal:  No abnormal movements  Extremities: Patient has chronic bilateral lower extremity edema continues to " have 1+ edema.  Skin: Stasis changes seen bilateral shin.  Neuro: Alert and awake, no lateralizing deficits appreciated    INTAKE AND OUTPUT:    Intake/Output Summary (Last 24 hours) at 4/9/2021 0839  Last data filed at 4/9/2021 0600  Gross per 24 hour   Intake 635 ml   Output 1575 ml   Net -940 ml       Cardiographics  Telemetry: Sinus rhythm    ECG:   ECG 12 Lead   Preliminary Result   HEART RATE= 59  bpm   RR Interval= 1020  ms   IL Interval= 158  ms   P Horizontal Axis= 14  deg   P Front Axis= 25  deg   QRSD Interval= 91  ms   QT Interval= 652  ms   QRS Axis= 55  deg   T Wave Axis= 67  deg   - ABNORMAL ECG -   Sinus bradycardia   Prolonged QT interval   Electronically Signed By:    Date and Time of Study: 2021-03-23 13:15:33      ECG 12 Lead   Final Result   HEART RATE= 80  bpm   RR Interval= 752  ms   IL Interval= 161  ms   P Horizontal Axis= 0  deg   P Front Axis= 22  deg   QRSD Interval= 80  ms   QT Interval= 419  ms   QRS Axis= 39  deg   T Wave Axis= 35  deg   - NORMAL ECG -   Sinus rhythm   When compared with ECG of 19-Mar-2021 4:30:10,   Significant repolarization change   Electronically Signed By: Segun Rucker ([facility] 20-Mar-2021 10:06:29   Date and Time of Study: 2021-03-20 05:15:54      ECG 12 Lead   Final Result   HEART RATE= 62  bpm   RR Interval= 984  ms   IL Interval= 165  ms   P Horizontal Axis= 13  deg   P Front Axis= 32  deg   QRSD Interval= 76  ms   QT Interval= 434  ms   QRS Axis= 43  deg   T Wave Axis= 82  deg   - ABNORMAL ECG -   Sinus rhythm   Atrial premature complex   ST elevation, consider inferior injury   When compared with ECG of 18-Mar-2021 11:04:47,   No significant change   Electronically Signed By: Segun Rucker ([facility] 19-Mar-2021 16:48:51   Date and Time of Study: 2021-03-19 04:30:10      ECG 12 Lead   Final Result   HEART RATE= 56  bpm   RR Interval= 1072  ms   IL Interval= 163  ms   P Horizontal Axis= -22  deg   P Front Axis= -9  deg   QRSD Interval= 88  ms   QT Interval= 516   ms   QRS Axis= 40  deg   T Wave Axis= 43  deg   - OTHERWISE NORMAL ECG -   Sinus bradycardia   When compared with ECG of 16-Mar-2021 8:51:36,   Significant repolarization change   Electronically Signed By: Segun Rucker ([facility]) 18-Mar-2021 17:48:15   Date and Time of Study: 2021-03-18 11:04:47        I have personally reviewed EKG    Echocardiogram: Results for orders placed during the hospital encounter of 03/18/21    Adult Transesophageal Echo (CHRISTIANO) W/ Cont if Necessary Per Protocol (Bedside)    Interpretation Summary  CHRISTIANO   procedure note    Procedure:  CHRISTIANO    Indication:   Valvular heart disease s/p AVR, respiratory failure    Date of procedure  : 3/25/2021    :  Dr. Jose Levi    Description of procedure:    Under conscious sedation given by anesthesiology and local anesthesia, a CHRISTIANO probe was advanced into the esophagus and into the stomach 2D, M-mode, color Doppler images were obtained..  Patient tolerated procedure well complications well.    Complications: none    Results:    Normal LV size and contractility LV contractility, EF of 60%  Normal RV size  Normal left atrial size, normal right atrial size, left atrial appendage without thrombus.  Aortic valve, mitral valve, tricuspid valve appears structurally normal, mild TR seen.  No vegetation seen  No pericardial effusion seen.  Proximal aorta appears normal in size.  Mild aortic plaque seen      Recommendations/plan:    Clinical correlation recommended      Lab Review   I have reviewed the labs      Results from last 7 days   Lab Units 04/09/21  0419   MAGNESIUM mg/dL 2.0     Results from last 7 days   Lab Units 04/09/21  0624   SODIUM mmol/L 135*   POTASSIUM mmol/L 3.2*   BUN mg/dL 22   CREATININE mg/dL 0.44*   CALCIUM mg/dL 8.3*         Results from last 7 days   Lab Units 04/09/21  0418 04/08/21  0623 04/07/21  0450   WBC 10*3/mm3 16.10* 16.20* 19.10*   HEMOGLOBIN g/dL 10.1* 10.2* 9.8*   HEMATOCRIT % 30.8* 31.5* 29.5*   PLATELETS 10*3/mm3  "92* 108* 103*     Results from last 7 days   Lab Units 04/09/21  0418 04/08/21  0623 04/07/21  0450   INR  1.65* 1.13* 1.07*       RADIOLOGY:  Imaging Results (Last 24 Hours)     ** No results found for the last 24 hours. **                )4/9/2021  MD DANIEL Lamason/Transcription:   \"Dictated utilizing Dragon dictation\".   "

## 2021-04-09 NOTE — PROGRESS NOTES
Continued Stay Note  ISAAK Rush     Patient Name: Claudia Rust  MRN: [id number]  Today's Date: 4/9/2021    Admit Date: 3/18/2021    Discharge Plan     Row Name 04/09/21 0952       Plan    Plan  D/C Plan: [facility] Acute accepted pending bed availability. No precert or PASRR required.    Plan Comments  CM spoke to ARMEN Saldivar who anticipates d/c Monday or Tuesday. CM updated [facility] liaison. Barrier to D/C: 4L HF O2, a-line, dobhoff in place, awaiting BM.          Expected Discharge Date and Time     Expected Discharge Date Expected Discharge Time    Apr 13, 2021             Gabriella Schultz

## 2021-04-09 NOTE — THERAPY TREATMENT NOTE
"Acute Care - Speech Language Pathology   Swallow Treatment Note  Victor Manuel     Patient Name: Claudia Rust  : 1950  MRN: [id number]  Today's Date: 2021               Admit Date: 3/18/2021    Visit Dx:     ICD-10-CM ICD-9-CM   1. Rheumatoid arthritis, involving unspecified site, unspecified whether rheumatoid factor present (CMS/HCC)  M06.9 714.0   2. Aortic valve stenosis, etiology of cardiac valve disease unspecified  I35.0 424.1   3. Acute respiratory failure with hypoxia (CMS/HCC)  J96.01 518.81   4. Chronic obstructive pulmonary disease, unspecified COPD type (CMS/HCC)  J44.9 496     Patient Active Problem List   Diagnosis   • Morbidly obese (CMS/HCC)   • Dyspnea on exertion   • Abnormal nuclear stress test   • Nonrheumatic aortic valve stenosis   • Prediabetes   • Dyslipidemia   • Essential hypertension   • Acute UTI   • Bipolar disorder (CMS/[facility])   • COPD (chronic obstructive pulmonary disease) (CMS/HCC)   • High triglycerides   • Gastroesophageal reflux disease   • Rheumatoid arthritis (CMS/[facility])   • Aortic stenosis, severe   • Chronic heart failure with preserved ejection fraction (HFpEF) (CMS/HCC)   • Aortic valve stenosis     Past Medical History:   Diagnosis Date   • Acute congestive heart failure (CMS/[facility])    • Allergies    • Anxiety    • Arthritis    • Asthma    • Bilateral leg edema    • Bipolar 1 disorder (CMS/[facility])    • Bulging lumbar disc     X3   • Cancer (CMS/[facility])     states had \"cancerous tumor during pregnancy and had to have hysterectomy\"   • Cataract     bilateral   • Cellulitis 2021    bilateral legs   • Compression fracture of vertebral column (CMS/HCC)     LUMBAR   • COPD (chronic obstructive pulmonary disease) (CMS/[facility])    • Delayed emergence from anesthesia    • Depression    • Depression    • Dyslipidemia    • Dyspnea on exertion    • Fibromyalgia    • GERD (gastroesophageal reflux disease)    • Hiatal hernia    • Hyperlipidemia    • Hypertension    • Hypothyroid     " HYPOTHYROID   • IBS (irritable bowel syndrome)    • Migraines    • Morbid obesity (CMS/[facility])    • Neuropathy    • Panic attacks    • Peripheral edema    • PONV (postoperative nausea and vomiting)    • Poor mobility     rolling walker   • Prediabetes    • PVD (peripheral vascular disease) (CMS/[facility])    • Rheumatoid aortitis    • Spinal stenosis    • Spinal stenosis    • Vertigo    • Vitamin D deficiency      Past Surgical History:   Procedure Laterality Date   • AORTIC VALVE REPAIR/REPLACEMENT N/A 3/18/2021    Procedure: AORTIC VALVE REPLACEMENT;  Surgeon: Olaf Mcgill MD;  Location: [facility] CVOR;  Service: Cardiothoracic;  Laterality: N/A;   • BRONCHOSCOPY N/A 3/25/2021    Procedure: BRONCHOSCOPY AT BEDSIDE WITH BRONCHIOALVEOLAR LAVAGE;  Surgeon: Glenn Reyes MD;  Location: [facility] ENDOSCOPY;  Service: Pulmonary;  Laterality: N/A;  PNA   • CARDIAC CATHETERIZATION  2009   • CARDIAC CATHETERIZATION N/A 8/14/2019    Procedure: Left Heart Cath;  Surgeon: Jose Levi MD;  Location: [facility] CATH INVASIVE LOCATION;  Service: Cardiovascular   • CARDIAC CATHETERIZATION N/A 8/14/2019    Procedure: Right Heart Cath;  Surgeon: Jose eLvi MD;  Location: [facility] CATH INVASIVE LOCATION;  Service: Cardiovascular   • CARDIAC CATHETERIZATION N/A 8/14/2019    Procedure: Aortic root aortogram;  Surgeon: Jose Levi MD;  Location: [facility] CATH INVASIVE LOCATION;  Service: Cardiovascular   • CARDIAC CATHETERIZATION N/A 1/20/2021    Procedure: Left Heart Cath;  Surgeon: Jose Levi MD;  Location: [facility] CATH INVASIVE LOCATION;  Service: Cardiovascular;  Laterality: N/A;   • COLONOSCOPY     • CYSTOCELE REPAIR  1984   • ELBOW ARTHROPLASTY  2011   • ENDOSCOPY     • FOOT SURGERY     • GALLBLADDER SURGERY  2002   • TONSILLECTOMY     • VAGINAL HYSTERECTOMY  1972       Patient was not wearing a face mask during this therapy encounter. Therapist used appropriate personal protective equipment  "including mask, eye protection and gloves.  Mask used was standard procedure mask. Appropriate PPE was worn during the entire therapy session. Hand hygiene was completed before and after therapy session. Patient is not in enhanced droplet precautions.     Skilled ST intervention conducted this date targeting dysphagia.  Pt alert, oriented, pleasant and amenable to treatment.  denies pain.  Pt on 4 liters/min via nasal cannula during session. Pt currently prescribed a Mechanical Soft and Nectar Thick liquids           EDUCATION  The patient has been educated in the following areas:   Modified Diet Instruction, VFSS results/recommendations, Risks of aspiration, ST goals/POC and Safe swallow positioning          SLP GOALS     Row Name 04/09/21 1300                Oral Nutrition/Hydration Goal 2, SLP  Patient will initiate a PO diet and tolerate with no complications from aspiration  -MC    Barriers (Oral Nutrition/Hydration Goal 2, SLP)  Pt assessed at lunch meal, amenable to liquids only, endorsing fatigue. RN confirms pt had an \"eventful night\" due to multiple BMs and pt c/o minimal sleep. Clinical tolerance of NTL tea by straw as self consumed with no s/s aspiration. Pt expressed distaste for current diet but acknowledged awareness when counseled regrding rationale & VFSS results & is amenable to comply.  Inquiries addressed from pt &  regarding PO items compliant with current diet including magic cup compliant, regular ice cream/milk shake not correct viscosity d/t oral cavity temperatures.   -MC    Progress/Outcomes (Oral Nutrition/Hydration Goal 2, SLP)  goal met;goal ongoing;good progress toward goal  -MC          Oral Nutrition/Hydration Goal, SLP  Pt will participate in ongoing dx tx to determine tolerance of recommended diet, independence with strategies/precautions, and readiness for repeat VFSS/diet advancement.  -MC    Barriers (Oral Nutrition/Hydration Goal, SLP)  Pt remains prescribed a mechanical " soft diet with thin liquids as supported by VFSS results on Tuesday of this week. Pt would benefit from repeat objective swallow exam within ~1 week of previous VFSS and prior to advancing diet (while in house or at next level of care). Will continue to follow during admission with further recs pending hospital course.   -MC    Progress/Outcomes (Oral Nutrition/Hydration Goal, SLP)  continuing progress toward goal  -MC      User Key  (r) = Recorded By, (t) = Taken By, (c) = Cosigned By    Initials Name Provider Type    Galina Hanna, SLP Speech and Language Pathologist            Galina Byrd, OFELIA  4/9/2021

## 2021-04-09 NOTE — PLAN OF CARE
Pt presents with weakness and fatigue today limiting functional mobility.  Attempted two sit <-> stand transfers however unable to reach upright standing and pt exhibiting bilateral knee buckling requiring bilateral knee blocking.  Not safe to attempt transfer to chair.  Pt needing increased assist for nelson care.  Pt will need extensive IP rehab.  PPE donned: mask, goggles, gloves    Kelsey Becerril PT, DPT, GCS

## 2021-04-09 NOTE — PROGRESS NOTES
S/P POD# 22 tissue AVR--Pagni  EF 60-65% (echo)    Subjective: Patient resting in bed. States she is worn out after having a BM last night.    Drips: None    CHRISTIANO (3/25)--no abnormal findings noted  Bronch (3/25)--mod mucopurulent secretions--cultures negative  Cxr: continued interstitial/airspace disease      Intake/Output Summary (Last 24 hours) at 4/9/2021 1017  Last data filed at 4/9/2021 0800  Gross per 24 hour   Intake 580 ml   Output 1575 ml   Net -995 ml     Temp:  [97.6 °F (36.4 °C)-98.9 °F (37.2 °C)] 98.9 °F (37.2 °C)  Heart Rate:  [61-74] 67  Resp:  [15-21] 15  Arterial Line BP: ()/() 115/37      Results from last 7 days   Lab Units 04/09/21  0418 04/08/21  0623 04/07/21  0450   WBC 10*3/mm3 16.10* 16.20* 19.10*   HEMOGLOBIN g/dL 10.1* 10.2* 9.8*   HEMATOCRIT % 30.8* 31.5* 29.5*   PLATELETS 10*3/mm3 92* 108* 103*   INR  1.65* 1.13* 1.07*     Results from last 7 days   Lab Units 04/09/21  0624 04/09/21  0419   CREATININE mg/dL 0.44*  --    POTASSIUM mmol/L 3.2*  --    SODIUM mmol/L 135*  --    MAGNESIUM mg/dL  --  2.0   PHOSPHORUS mg/dL 3.7  --        Physical Exam:  Up to chair,  at bedside  Tele:  SR 60's  diminished bilaterally, 99% 5L High Flow  Sternal incision healing well  Benign abd, + BM. TF infusing  + edema, chronic erythema of BLE noted    Assessment/Plan:  Principal Problem:    Aortic valve stenosis  Active Problems:    Rheumatoid arthritis (CMS/HCC)    Severe aortic stenosis--s/p tissue AVR (Pagni)  Postop acute hypoxic resp failure--re-intubated 3/22, extubated 4/4  Postop TCP/HIT positive-- Arixtra  Right subclavian/axillary/brachial DVT-- Arixtra  Chronic HFpEF--maximize meds prior to d/c  COPD--nebs, pulmicort  Rheumatoid arthritis--not on medication per patient  HTN--stable  HLD--statin  Bipolar disorder--home Prozac  Obesity stage 3  PVD  KARLY  Vertigo--home Antivert  Tobacco abuse--counseled on cessation  Postop leukocytosis, likely r/t atelectasis--aggressive pulm  toileting  Post-op hypernatremia-- resolved      POD#22. Supplemental oxygen continues to be weaned. Turned to 4L this am. Continue Coumadin for RUE DVT. Decrease Coumadin to 3mg today. Patient is severely deconditioned and needs aggressive physical and occupational therapies.     Routine care--as above  Plan for [facility] at discharge.    CEEL ULLOA, SARA  4/9/2021  10:17 EDT

## 2021-04-09 NOTE — PROGRESS NOTES
"Nutrition Services    Patient Name: Claudia Rust  YOB: 1950  MRN: [id number]  Admission date: 3/18/2021      PPE Documentation        PPE Worn By Provider RD did not enter room for this encounter   PPE Worn By Patient  -     PROGRESS NOTE      Encounter Information: Tube feed + PO check on. Patient remains extubated. TF switched to nocturnal feeds however pt refused feeds overnight, had several BMs after no BM x9 days. Still only eating about 25% of meals. Plans for repeat VFSS next week       Labs (reviewed below): -hyponatremia  -hypokalemia  -low Cr       GI Function:  -residuals WNL  -Last BM documented on 4/9       Nutrition Intervention: Would recommend continuing nocturnal feeds at this time to help meet needs for healing and building back strength but promote intake during the day       PO Diet/Supplements: Diet Texture, Diabetic/Consistent Carbs; Diabetic - Consistent Carb; Mechanical Ground; Thickened Liquids (Nectar), Full Feed - Nursing   Magic cups at lunch and dinner meals     EN Prescription: Impact Peptide 1.5 at 85 mL/hr + 60 mL/hr water flush (flush per nephrology) x12 hrs (7 PM to 7 AM)       Intake/Output:   Intake/Output Summary (Last 24 hours) at 4/9/2021 0949  Last data filed at 4/9/2021 0800  Gross per 24 hour   Intake 975 ml   Output 1575 ml   Net -600 ml            Height: Height: 162.6 cm (64\")   Weight: Weight: 121 kg (267 lb 13.7 oz) (04/09/21 0415)   BMI: Body mass index is 45.98 kg/m².     Results from last 7 days   Lab Units 04/09/21  0624 04/08/21  0623 04/07/21  0450 04/05/21  0501 04/03/21  0348   SODIUM mmol/L 135* 138 136 141 142   POTASSIUM mmol/L 3.2* 4.3 3.8 3.3* 4.0   CHLORIDE mmol/L 98 104 103 106 104   CO2 mmol/L 27.0 28.0 25.0 26.0 30.0*   BUN mg/dL 22 34* 35* 43* 48*   CREATININE mg/dL 0.44* 0.47* 0.38* 0.46* 0.51*   CALCIUM mg/dL 8.3* 8.5* 8.1* 8.5* 8.8   BILIRUBIN mg/dL  --   --   --  0.5 0.5   ALK PHOS U/L  --   --   --  137* 132*   ALT (SGPT) U/L  --  "  --   --  44* 31   AST (SGOT) U/L  --   --   --  52* 25   GLUCOSE mg/dL 96 168* 121* 133* 151*     Results from last 7 days   Lab Units 04/09/21 0624 04/09/21 0419 04/09/21 0418 04/08/21 0623 04/06/21 0422 04/04/21 0414   MAGNESIUM mg/dL  --  2.0  --  2.3 2.4  --    PHOSPHORUS mg/dL 3.7  --   --  3.6 3.1 3.6   HEMOGLOBIN g/dL  --   --  10.1* 10.2* 9.7* 10.0*   HEMATOCRIT %  --   --  30.8* 31.5* 29.3* 30.4*   TRIGLYCERIDES mg/dL  --   --   --   --   --  132     COVID19   Date Value Ref Range Status   03/24/2021 Not Detected Not Detected - Ref. Range Final     Lab Results   Component Value Date    HGBA1C 5.7 (H) 03/16/2021       Vitals:    04/09/21 0800   BP:    Pulse: 67   Resp: 15   Temp:    SpO2: 94%       RD to follow up per protocol.    Electronically signed by:  Jordyn Strickland RD  04/09/21 09:49 EDT

## 2021-04-09 NOTE — THERAPY TREATMENT NOTE
Subjective: Pt agreeable to therapeutic plan of care.    Objective:     Bed mobility -rolling side <-> side with modAx2, supine <-> sit with maxAx2  Sitting EOB with bilateral knee blocking and Sudhir  Transfers - attempted two sit <-> stand transfers however pt with bilateral knee buckling and unable to reach full upright standing even with maxAx2.  Not safe to attempt stand pivot transfer due to weakness this date  Ambulation - unable due to weakness    Cardiac Level III (as of yesterday treatment session)    Pain: 5 VAS generalized discomfort   Education: Provided education on importance of mobility and skilled verbal / tactile cueing throughout intervention.     Assessment: Claudia Rust presents with functional mobility impairments which indicate the need for skilled intervention. Tolerating session today without incident. Pt with increased weakness this date due to fatigue from pt report of multiple episodes of bowel incontinence last night requiring assist.  Attempted two sit <-> stand transfers however pt unable to reach upright standing.  Not safe to attempt transfer to chair.  Will continue to follow and progress as tolerated.     Plan/Recommendations:   Pt would benefit from Inpatient Rehabilitation placement at discharge from facility and requires no DME at discharge.   Pt desires Inpatient Rehabilitation placement at discharge. Pt cooperative; agreeable to therapeutic recommendations and plan of care.     Basic Mobility 6-click:  Rollin = Total, A lot = 2, A little = 3; 4 = None  Supine>Sit:   1 = Total, A lot = 2, A little = 3; 4 = None   Sit>Stand with arms:  1 = Total, A lot = 2, A little = 3; 4 = None  Bed>Chair:   1 = Total, A lot = 2, A little = 3; 4 = None  Ambulate in room:  1 = Total, A lot = 2, A little = 3; 4 = None  3-5 Steps with railin = Total, A lot = 2, A little = 3; 4 = None  Score: 8    Modified Bruno: 5 = Severe disability (Requires constant nursing care and  attention, bedridden, incontinent)    Post-Tx Position: Supine with HOB Elevated and Call light and personal items within reach, RN and nursing staff present   PPE: gloves, surgical mask, eyewear protection

## 2021-04-09 NOTE — PROGRESS NOTES
NEPHROLOGY PROGRESS NOTE------[facility]    [facility]  [phone]  Yovani Lerma MD      Patient Care Team:  Mary Wilde APRN as PCP - General  Mary Wilde APRN as PCP - Family Medicine  Jose Levi MD as Consulting Physician (Cardiology)  Yovani Lerma MD as Consulting Physician (Nephrology)      Provider:  Yovani Lerma MD  Patient Name: Claudia Rust  :  1950    SUBJECTIVE:    F/U JERROD/hypernatremia  No chest pain or SOA.   DHT in place    Medication:  arformoterol, 15 mcg, Nebulization, BID - RT  aspirin, 81 mg, Oral, Daily  atorvastatin, 40 mg, Oral, Nightly  bisacodyl, 10 mg, Rectal, Daily  budesonide, 1 mg, Nebulization, BID - RT  bumetanide, 1 mg, Nasogastric, TID  chlorhexidine, 15 mL, Mouth/Throat, Q12H  docusate, 100 mg, Oral, BID  First Mouthwash (Magic Mouthwash), 5 mL, Swish & Spit, Q6H  FLUoxetine, 20 mg, Oral, Daily  fondaparinux, 5 mg, Subcutaneous, Q24H  gabapentin, 200 mg, Oral, Q12H  hydrALAZINE, 25 mg, Nasogastric, Q8H  insulin glargine, 20 Units, Subcutaneous, Q12H  insulin lispro, 0-24 Units, Subcutaneous, Q6H  insulin lispro, 5 Units, Subcutaneous, Q6H  ipratropium-albuterol, 3 mL, Nebulization, Q4H - RT  lansoprazole, 30 mg, Oral, QAM  levothyroxine, 100 mcg, Oral, Q AM  metOLazone, 2.5 mg, Nasogastric, Every Other Day  metoprolol tartrate, 12.5 mg, Oral, Q12H  nystatin, , Topical, Q12H  polyethylene glycol, 17 g, Oral, Daily  potassium chloride, 20 mEq, Oral, TID With Meals  predniSONE, 10 mg, Oral, Daily With Breakfast  sodium chloride, 10 mL, Intravenous, Q12H  sodium chloride, 10 mL, Intravenous, Q12H  warfarin, 5 mg, Oral, Daily           OBJECTIVE    Vital Sign Min/Max for last 24 hours  Temp  Min: 97.6 °F (36.4 °C)  Max: 98.4 °F (36.9 °C)   No data recorded   Pulse  Min: 61  Max: 79   Resp  Min: 16  Max: 21   SpO2  Min: 87 %  Max: 100 %   No data recorded   Weight  Min: 121 kg (267 lb 13.7 oz)   "Max: 121 kg (267 lb 13.7 oz)     Flowsheet Rows      First Filed Value   Admission Height  162.6 cm (64\") Documented at 03/18/2021 2000   Admission Weight  129 kg (284 lb) Documented at 03/18/2021 2000          I/O this shift:  In: -   Out: 700 [Urine:700]  I/O last 3 completed shifts:  In: 2156 [P.O.:1140; I.V.:30; Other:320; NG/GT:666]  Out: 1725 [Urine:1725]    Physical Exam:    General Appearance: NAD. Fatigued  Head: normocephalic, without obvious abnormality and atraumatic +NGT INTACT  Eyes: conjunctivae and sclerae normal and no icterus  Neck: supple. NO GROSS JVD NOW  Lungs: +FEW SCATTERED RHONCHI.NO CRACKLES AT PRESENT  Heart: regular rhythm & normal rate and normal S1, S2 +NARGIS  Chest: Wall no abnormalities observed  Abdomen: normal bowel sounds and soft non-tender +OBESITY  Extremities: +TRACE DIFFUSE BILAT LE EDEMA, no cyanosis and no redness  Skin: no bleeding, bruising or rash, turgor normal, color normal and no lesions noted  Neurologic: Alert and oriented without focal deficits    Labs:    WBC WBC   Date Value Ref Range Status   04/09/2021 16.10 (H) 3.40 - 10.80 10*3/mm3 Final   04/08/2021 16.20 (H) 3.40 - 10.80 10*3/mm3 Final   04/07/2021 19.10 (H) 3.40 - 10.80 10*3/mm3 Final      HGB Hemoglobin   Date Value Ref Range Status   04/09/2021 10.1 (L) 12.0 - 15.9 g/dL Final   04/08/2021 10.2 (L) 12.0 - 15.9 g/dL Final   04/07/2021 9.8 (L) 12.0 - 15.9 g/dL Final      HCT Hematocrit   Date Value Ref Range Status   04/09/2021 30.8 (L) 34.0 - 46.6 % Final   04/08/2021 31.5 (L) 34.0 - 46.6 % Final   04/07/2021 29.5 (L) 34.0 - 46.6 % Final      Platlets No results found for: LABPLAT   MCV MCV   Date Value Ref Range Status   04/09/2021 91.5 79.0 - 97.0 fL Final   04/08/2021 93.0 79.0 - 97.0 fL Final   04/07/2021 90.8 79.0 - 97.0 fL Final          Sodium Sodium   Date Value Ref Range Status   04/08/2021 138 136 - 145 mmol/L Final   04/07/2021 136 136 - 145 mmol/L Final      Potassium Potassium   Date Value Ref " Range Status   04/08/2021 4.3 3.5 - 5.2 mmol/L Final   04/07/2021 3.8 3.5 - 5.2 mmol/L Final      Chloride Chloride   Date Value Ref Range Status   04/08/2021 104 98 - 107 mmol/L Final   04/07/2021 103 98 - 107 mmol/L Final      CO2 CO2   Date Value Ref Range Status   04/08/2021 28.0 22.0 - 29.0 mmol/L Final   04/07/2021 25.0 22.0 - 29.0 mmol/L Final      BUN BUN   Date Value Ref Range Status   04/08/2021 34 (H) 8 - 23 mg/dL Final   04/07/2021 35 (H) 8 - 23 mg/dL Final      Creatinine Creatinine   Date Value Ref Range Status   04/08/2021 0.47 (L) 0.57 - 1.00 mg/dL Final   04/07/2021 0.38 (L) 0.57 - 1.00 mg/dL Final      Calcium Calcium   Date Value Ref Range Status   04/08/2021 8.5 (L) 8.6 - 10.5 mg/dL Final   04/07/2021 8.1 (L) 8.6 - 10.5 mg/dL Final      PO4 No components found for: PO4   Albumin Albumin   Date Value Ref Range Status   04/08/2021 2.80 (L) 3.50 - 5.20 g/dL Final   04/07/2021 2.70 (L) 3.50 - 5.20 g/dL Final      Magnesium Magnesium   Date Value Ref Range Status   04/09/2021 2.0 1.6 - 2.4 mg/dL Final   04/08/2021 2.3 1.6 - 2.4 mg/dL Final      Uric Acid No components found for: URIC ACID     Imaging Results (Last 72 Hours)     Procedure Component Value Units Date/Time    XR Chest 1 View [[id number]] Collected: 04/07/21 0732     Updated: 04/07/21 0737    Narrative:      DATE OF EXAM:  4/7/2021 3:25 AM     PROCEDURE:  XR CHEST 1 VW-     INDICATIONS:  Hypoxia.     COMPARISON:  Radiographs 4/6/2021, 4/5/2021, and 4/4/2021. CT 3/22/2021.     TECHNIQUE:   Single radiographic AP view of the chest was obtained.     FINDINGS:  Lordotic positioning. Stable sternotomy wires, prosthetic heart valve,  partially imaged enteric tube, and left PICC. Interval removal of the  previously seen left IJ central venous catheter. Similar-appearing  basilar predominant opacification of both lungs. No pneumothorax.  Unchanged cardiomediastinal contours. No acute osseous abnormality is  identified.        Impression:       Interval removal of the previously seen left IJ central venous catheter.  Otherwise, no significant interval change.     Electronically Signed By-Olman Benz MD On:4/7/2021 7:35 AM  This report was finalized on [id number] by  Olman Benz MD.    FL Video Swallow With Speech Single Contrast [[id number]] Resulted: 04/06/21 1304     Updated: 04/06/21 1305          Results for orders placed during the hospital encounter of 03/18/21    XR Chest 1 View    Narrative  DATE OF EXAM:  4/7/2021 3:25 AM    PROCEDURE:  XR CHEST 1 VW-    INDICATIONS:  Hypoxia.    COMPARISON:  Radiographs 4/6/2021, 4/5/2021, and 4/4/2021. CT 3/22/2021.    TECHNIQUE:  Single radiographic AP view of the chest was obtained.    FINDINGS:  Lordotic positioning. Stable sternotomy wires, prosthetic heart valve,  partially imaged enteric tube, and left PICC. Interval removal of the  previously seen left IJ central venous catheter. Similar-appearing  basilar predominant opacification of both lungs. No pneumothorax.  Unchanged cardiomediastinal contours. No acute osseous abnormality is  identified.    Impression  Interval removal of the previously seen left IJ central venous catheter.  Otherwise, no significant interval change.    Electronically Signed By-Olman Benz MD On:4/7/2021 7:35 AM  This report was finalized on [id number] by  Olman Benz MD.      XR Chest 1 View    Narrative  Examination: XR CHEST 1 VW-    Date of Exam: 4/6/2021 5:35 AM    Indication: Hypoxia; M06.9-Rheumatoid arthritis, unspecified;  I35.0-Nonrheumatic aortic (valve) stenosis; J96.01-Acute respiratory  failure with hypoxia; J44.9-Chronic obstructive pulmonary disease,  unspecified.    Comparison: 4/5/2021.    Technique: Single radiographic view of the chest was obtained.    Findings:  Feeding tube courses below the diaphragm. New left PICC terminates in  the upper SVC. Stable left internal jugular central venous catheter.  Stable evidence of prior median sternotomy and  heart valve replacement.  There is stable diffuse mixed interstitial and airspace disease in both  lungs with stable small bilateral pleural effusions. There is no  evidence of pneumothorax or new lung opacity. The heart size is stable.  Pulmonary vasculature is completely obscured. There are no acute osseous  abnormalities.    Impression  1. Stable mixed interstitial and airspace disease present throughout  both lungs.  2. Stable feeding tube, postoperative findings and left IJ CVC.  3. New left PICC terminates in the upper SVC.    Electronically Signed By-Romain Gutierrez MD On:4/6/2021 7:12 AM  This report was finalized on [id number] by  Romain Gutierrez MD.      XR Chest 1 View    Narrative  Examination: XR CHEST 1 VW-    Date of Exam: 4/5/2021 5:00 AM    Indication: Hypoxia; M06.9-Rheumatoid arthritis, unspecified;  I35.0-Nonrheumatic aortic (valve) stenosis; J96.01-Acute respiratory  failure with hypoxia; J44.9-Chronic obstructive pulmonary disease,  unspecified.    Comparison: 4/4/2021.    Technique: Single radiographic view of the chest was obtained.    Findings:  There are diffuse bilateral mixed interstitial and airspace opacities in  both lungs. There is a stable left internal jugular central venous  catheter. Stable changes of prior median sternotomy and heart valve  replacement. Endotracheal tube has been removed. Stable feeding tube  coursing below the diaphragm. Stable bridging thoracic osteophytes  without acute osseous abnormality.    Impression  1. Stable diffuse mixed interstitial and airspace disease.  2. Interval extubation.  3. Stable postoperative findings and stable left internal jugular  central venous catheter/feeding tube.    Electronically Signed By-Romain Gutierrez MD On:4/5/2021 7:26 AM  This report was finalized on [id number] by  Romain Gutierrez MD.      Results for orders placed during the hospital encounter of 03/18/21    Duplex Venous Upper Extremity - Bilateral  CAR    Interpretation Summary  · Acute right upper extremity deep vein thrombosis noted in the subclavian, axillary and brachial. This represents catheter associated deep vein thrombosis.  · All other vessels appear normal.        ASSESSMENT / PLAN      Aortic valve stenosis    Rheumatoid arthritis (CMS/HCC)    1. ARF/JERROD------Nonoliguric ATN. Venegas in place. Follow with ongoing diuretic exposure. No NSAIDs or IV dye    2. HYPERNATREMIA------Better.  Free water flushes with TFs. Encourage po water intake once swallow eval done and patient able to eat/drink    3. HYPOALBUMINEMIA-----on TFs.     4. 3RD SPACED EDEMA------On  Bumex per tube. Significant contribution from chronic venous insufficiency/obesit. Add QOD Metolazone today    5. CAD S/P AVR-----per Cardiology and CT Surgery. S/P CHRISTIANO    6. OBESITY    7. ACUTE HYPOXIC RESPIRATORY FAILURE------S/P extubation. Per Pulmonary    8. RUE DVT    9. DMII-------Glucometers, SSI    10. ANEMIA------H/H stable    11. SEPSIS/LEUKOCYTOSIS------Antibiotics per Pulmonary/CC    12. THROMBOCYTOPENIA    13. HYPOCALCEMIA-----Replaced    14. HYPERLIPIDEMIA------On Statin.      15. HYPOTHYROIDISM------On Sythroid    16. GERD/PUD PROPHYLAXIS------Prevacid per NGT    17. HTN-------BP good. D/C Amlodipine b/c of edema. Added scheduled Hydralazine    18. METABOLIC ALKALOSIS-----Better    19. HYPOKALEMIA-----Replaced    Cr stable, non oliguric  Lytes OK  Continue bumex     Yovani Lerma MD  [facility]  [phone]  04/09/21  06:59 EDT

## 2021-04-09 NOTE — PROGRESS NOTES
ICU Daily Progress Note        Aortic valve stenosis    Rheumatoid arthritis (CMS/[facility])      ASSESSMENT:  Acute hypoxic respiratory failure required intubation 03/22/2021 , extubated 4/4/21  Aortic valve stenosis, status post tissue replacement  Pulm hypertension, severe  COPD  KARLY  Cor pulmonale  Tobacco abuse  Upper airway infection with moderate amount of mucopurulent secretions noted on bronchoscopy 3/25/2021  Sputum culture with Candida, likely contaminant    Interstitial pneumonitis 2nd to aspiration   Acute thrombocytopenia  Catheter associated DVT right upper extremity subclavian, axillary, and brachial   hypernatremia  Upper arm left DVT  DM  Uremia     PLAN:    Wean O2 to maintain a saturation of 91%, currently 5 L high flow    Anticoagulation currently on Arixtra and warfarin  Weaned steroids to discontinue  Echocardiogram reviewed  Bronchodilator  Inhaled corticosteroids  Electrolytes/ glycemic control  DVT prophylaxis arixtra,   GI prophylaxis.     Oral diet     Nephrology following  Cardiology following  CV surgery admitting     PT/OT evaluation and treatment   Consult case management to start discharge planning.  Will likely need rehab due to severe deconditioning      LOS: 22 days         Vital signs for last 24 hours:  Vitals:    04/09/21 0746 04/09/21 0800 04/09/21 1037 04/09/21 1042   BP:       Pulse:  67 61    Resp:  15 16 16   Temp: 98.9 °F (37.2 °C)      TempSrc: Oral      SpO2:  94%     Weight:       Height:           Intake/Output last 3 shifts:  I/O last 3 completed shifts:  In: 1646 [P.O.:840; Other:140; NG/GT:666]  Out: 2075 [Urine:2075]  Intake/Output this shift:  I/O this shift:  In: 340 [P.O.:240; Other:100]  Out: -     Vent settings for last 24 hours:       Hemodynamic parameters for last 24 hours:       Radiology  Imaging Results (Last 24 Hours)     ** No results found for the last 24 hours. **          Labs:  Results from last 7 days   Lab Units 04/09/21  0418   WBC 10*3/mm3 16.10*    HEMOGLOBIN g/dL 10.1*   HEMATOCRIT % 30.8*   PLATELETS 10*3/mm3 92*     Results from last 7 days   Lab Units 04/09/21  0624 04/05/21  0501   SODIUM mmol/L 135* 141   POTASSIUM mmol/L 3.2* 3.3*   CHLORIDE mmol/L 98 106   CO2 mmol/L 27.0 26.0   BUN mg/dL 22 43*   CREATININE mg/dL 0.44* 0.46*   CALCIUM mg/dL 8.3* 8.5*   BILIRUBIN mg/dL  --  0.5   ALK PHOS U/L  --  137*   ALT (SGPT) U/L  --  44*   AST (SGOT) U/L  --  52*   GLUCOSE mg/dL 96 133*     Results from last 7 days   Lab Units 04/05/21  0336   PH, ARTERIAL pH units 7.440   PO2 ART mm Hg 97.7   PCO2, ARTERIAL mm Hg 38.7   HCO3 ART mmol/L 26.3     Results from last 7 days   Lab Units 04/09/21  0624 04/08/21  0623 04/07/21  0450   ALBUMIN g/dL 2.70* 2.80* 2.70*             Results from last 7 days   Lab Units 04/09/21  0419   MAGNESIUM mg/dL 2.0     Results from last 7 days   Lab Units 04/09/21  0418 04/08/21  0623 04/07/21  0450   INR  1.65* 1.13* 1.07*               Meds:   SCHEDULE  arformoterol, 15 mcg, Nebulization, BID - RT  aspirin, 81 mg, Oral, Daily  atorvastatin, 40 mg, Oral, Nightly  bisacodyl, 10 mg, Rectal, Daily  budesonide, 1 mg, Nebulization, BID - RT  bumetanide, 1 mg, Nasogastric, TID  chlorhexidine, 15 mL, Mouth/Throat, Q12H  docusate, 100 mg, Oral, BID  First Mouthwash (Magic Mouthwash), 5 mL, Swish & Spit, Q6H  FLUoxetine, 20 mg, Oral, Daily  fondaparinux, 5 mg, Subcutaneous, Q24H  gabapentin, 200 mg, Oral, Q12H  hydrALAZINE, 25 mg, Nasogastric, Q8H  insulin glargine, 15 Units, Subcutaneous, Q12H  insulin lispro, 0-24 Units, Subcutaneous, Q6H  ipratropium-albuterol, 3 mL, Nebulization, Q4H - RT  lansoprazole, 30 mg, Oral, QAM  levothyroxine, 100 mcg, Oral, Q AM  metOLazone, 2.5 mg, Nasogastric, Every Other Day  metoprolol tartrate, 12.5 mg, Oral, Q12H  nystatin, , Topical, Q12H  polyethylene glycol, 17 g, Oral, Daily  potassium chloride, 20 mEq, Oral, TID With Meals  sodium chloride, 10 mL, Intravenous, Q12H  sodium chloride, 10 mL,  Intravenous, Q12H  warfarin, 3 mg, Oral, Daily      Infusions     PRNs  •  acetaminophen **OR** acetaminophen **OR** acetaminophen  •  bisacodyl  •  Chlorhexidine Gluconate Cloth  •  dextrose  •  dextrose  •  glucagon (human recombinant)  •  hydrALAZINE  •  insulin lispro **AND** insulin lispro  •  ipratropium-albuterol  •  lidocaine  •  lidocaine  •  meclizine  •  [] HYDROmorphone **AND** naloxone  •  ondansetron  •  potassium chloride **OR** potassium chloride  •  potassium chloride **OR** potassium chloride  •  sodium chloride  •  sodium chloride    Physical Exam:  Physical Exam  HENT:      Head: Normocephalic and atraumatic.   Cardiovascular:      Rate and Rhythm: Normal rate.      Heart sounds: No murmur heard.     Pulmonary:      Breath sounds: Rhonchi present. No wheezing or rales.   Abdominal:      General: There is no distension.      Tenderness: There is no abdominal tenderness. There is no guarding or rebound.   Musculoskeletal:         General: Swelling present.      Cervical back: Normal range of motion.      Right lower leg: Edema present.      Left lower leg: Edema present.   Skin:     General: Skin is warm and dry.      Comments: Surgical incision healing well   Neurological:      Mental Status: She is oriented to person, place, and time.         ROS  Review of Systems   Constitutional: Positive for fatigue.   Respiratory: Positive for cough.    Cardiovascular: Positive for leg swelling.   Gastrointestinal: Negative for abdominal distention and abdominal pain.         Critical Care Time greater than: 45 Minutes  Total time spent with patient greater than: 45 Minutes

## 2021-04-10 LAB
ALBUMIN SERPL-MCNC: 2.8 G/DL (ref 3.5–5.2)
ANION GAP SERPL CALCULATED.3IONS-SCNC: 8 MMOL/L (ref 5–15)
BUN SERPL-MCNC: 22 MG/DL (ref 8–23)
BUN/CREAT SERPL: ABNORMAL
CALCIUM SPEC-SCNC: 8.4 MG/DL (ref 8.6–10.5)
CHLORIDE SERPL-SCNC: 99 MMOL/L (ref 98–107)
CO2 SERPL-SCNC: 28 MMOL/L (ref 22–29)
CREAT SERPL-MCNC: <0.47 MG/DL (ref 0.57–1)
DEPRECATED RDW RBC AUTO: 48.6 FL (ref 37–54)
ERYTHROCYTE [DISTWIDTH] IN BLOOD BY AUTOMATED COUNT: 15.2 % (ref 12.3–15.4)
GLUCOSE BLDC GLUCOMTR-MCNC: 81 MG/DL (ref 70–105)
GLUCOSE BLDC GLUCOMTR-MCNC: 88 MG/DL (ref 70–105)
GLUCOSE SERPL-MCNC: 72 MG/DL (ref 65–99)
HCT VFR BLD AUTO: 31.4 % (ref 34–46.6)
HGB BLD-MCNC: 10.3 G/DL (ref 12–15.9)
INR PPP: 3.7 (ref 2–3)
MCH RBC QN AUTO: 30.3 PG (ref 26.6–33)
MCHC RBC AUTO-ENTMCNC: 32.9 G/DL (ref 31.5–35.7)
MCV RBC AUTO: 92.1 FL (ref 79–97)
PHOSPHATE SERPL-MCNC: 4.2 MG/DL (ref 2.5–4.5)
PLATELET # BLD AUTO: 92 10*3/MM3 (ref 140–450)
PMV BLD AUTO: 9.4 FL (ref 6–12)
POTASSIUM SERPL-SCNC: 4 MMOL/L (ref 3.5–5.2)
PROTHROMBIN TIME: 38.2 SECONDS (ref 19.4–28.5)
RBC # BLD AUTO: 3.41 10*6/MM3 (ref 3.77–5.28)
SODIUM SERPL-SCNC: 135 MMOL/L (ref 136–145)
WBC # BLD AUTO: 15.4 10*3/MM3 (ref 3.4–10.8)

## 2021-04-10 PROCEDURE — 82962 GLUCOSE BLOOD TEST: CPT

## 2021-04-10 PROCEDURE — 80069 RENAL FUNCTION PANEL: CPT | Performed by: INTERNAL MEDICINE

## 2021-04-10 PROCEDURE — 94799 UNLISTED PULMONARY SVC/PX: CPT

## 2021-04-10 PROCEDURE — 63710000001 INSULIN GLARGINE PER 5 UNITS: Performed by: NURSE PRACTITIONER

## 2021-04-10 PROCEDURE — 85610 PROTHROMBIN TIME: CPT | Performed by: NURSE PRACTITIONER

## 2021-04-10 PROCEDURE — 25010000002 CEFTRIAXONE PER 250 MG: Performed by: INTERNAL MEDICINE

## 2021-04-10 PROCEDURE — 99232 SBSQ HOSP IP/OBS MODERATE 35: CPT | Performed by: INTERNAL MEDICINE

## 2021-04-10 PROCEDURE — 94760 N-INVAS EAR/PLS OXIMETRY 1: CPT

## 2021-04-10 PROCEDURE — 85027 COMPLETE CBC AUTOMATED: CPT | Performed by: NURSE PRACTITIONER

## 2021-04-10 RX ORDER — DOCUSATE SODIUM 100 MG/1
100 CAPSULE, LIQUID FILLED ORAL 2 TIMES DAILY
Status: DISCONTINUED | OUTPATIENT
Start: 2021-04-10 | End: 2021-04-13 | Stop reason: HOSPADM

## 2021-04-10 RX ORDER — INSULIN GLARGINE 100 [IU]/ML
10 INJECTION, SOLUTION SUBCUTANEOUS EVERY MORNING
Status: DISCONTINUED | OUTPATIENT
Start: 2021-04-11 | End: 2021-04-11

## 2021-04-10 RX ORDER — BUMETANIDE 1 MG/1
1 TABLET ORAL
Status: DISCONTINUED | OUTPATIENT
Start: 2021-04-10 | End: 2021-04-13

## 2021-04-10 RX ORDER — INSULIN LISPRO 100 [IU]/ML
0-14 INJECTION, SOLUTION INTRAVENOUS; SUBCUTANEOUS AS NEEDED
Status: DISCONTINUED | OUTPATIENT
Start: 2021-04-10 | End: 2021-04-13 | Stop reason: HOSPADM

## 2021-04-10 RX ORDER — WARFARIN SODIUM 2 MG/1
2 TABLET ORAL
Status: DISCONTINUED | OUTPATIENT
Start: 2021-04-10 | End: 2021-04-11

## 2021-04-10 RX ORDER — PHENAZOPYRIDINE HYDROCHLORIDE 200 MG/1
200 TABLET, FILM COATED ORAL ONCE
Status: COMPLETED | OUTPATIENT
Start: 2021-04-10 | End: 2021-04-10

## 2021-04-10 RX ORDER — PANTOPRAZOLE SODIUM 40 MG/1
40 TABLET, DELAYED RELEASE ORAL EVERY MORNING
Status: DISCONTINUED | OUTPATIENT
Start: 2021-04-10 | End: 2021-04-13 | Stop reason: HOSPADM

## 2021-04-10 RX ORDER — PHENAZOPYRIDINE HYDROCHLORIDE 200 MG/1
200 TABLET, FILM COATED ORAL
Status: DISCONTINUED | OUTPATIENT
Start: 2021-04-10 | End: 2021-04-12

## 2021-04-10 RX ORDER — HYDRALAZINE HYDROCHLORIDE 10 MG/1
10 TABLET, FILM COATED ORAL EVERY 8 HOURS SCHEDULED
Status: DISCONTINUED | OUTPATIENT
Start: 2021-04-10 | End: 2021-04-13 | Stop reason: HOSPADM

## 2021-04-10 RX ORDER — WARFARIN SODIUM 2 MG/1
2 TABLET ORAL
Status: DISCONTINUED | OUTPATIENT
Start: 2021-04-11 | End: 2021-04-10

## 2021-04-10 RX ORDER — INSULIN LISPRO 100 [IU]/ML
0-14 INJECTION, SOLUTION INTRAVENOUS; SUBCUTANEOUS
Status: DISCONTINUED | OUTPATIENT
Start: 2021-04-10 | End: 2021-04-13 | Stop reason: HOSPADM

## 2021-04-10 RX ORDER — POTASSIUM CHLORIDE 750 MG/1
20 TABLET, FILM COATED, EXTENDED RELEASE ORAL
Status: DISCONTINUED | OUTPATIENT
Start: 2021-04-10 | End: 2021-04-13 | Stop reason: HOSPADM

## 2021-04-10 RX ADMIN — NYSTATIN: 100000 POWDER TOPICAL at 09:17

## 2021-04-10 RX ADMIN — BUDESONIDE 0.5 MG: 0.5 SUSPENSION RESPIRATORY (INHALATION) at 20:36

## 2021-04-10 RX ADMIN — POTASSIUM CHLORIDE 20 MEQ: 750 TABLET, EXTENDED RELEASE ORAL at 17:08

## 2021-04-10 RX ADMIN — IPRATROPIUM BROMIDE AND ALBUTEROL SULFATE 3 ML: 2.5; .5 SOLUTION RESPIRATORY (INHALATION) at 23:13

## 2021-04-10 RX ADMIN — ACETAMINOPHEN 650 MG: 325 TABLET, FILM COATED ORAL at 09:13

## 2021-04-10 RX ADMIN — FLUOXETINE 20 MG: 20 CAPSULE ORAL at 09:13

## 2021-04-10 RX ADMIN — DOCUSATE SODIUM 100 MG: 100 CAPSULE, LIQUID FILLED ORAL at 09:13

## 2021-04-10 RX ADMIN — Medication 10 ML: at 09:14

## 2021-04-10 RX ADMIN — IPRATROPIUM BROMIDE AND ALBUTEROL SULFATE 3 ML: 2.5; .5 SOLUTION RESPIRATORY (INHALATION) at 06:59

## 2021-04-10 RX ADMIN — ACETAMINOPHEN 650 MG: 325 TABLET, FILM COATED ORAL at 13:39

## 2021-04-10 RX ADMIN — METOPROLOL TARTRATE 12.5 MG: 25 TABLET, FILM COATED ORAL at 21:15

## 2021-04-10 RX ADMIN — ACETAMINOPHEN 650 MG: 325 TABLET, FILM COATED ORAL at 02:49

## 2021-04-10 RX ADMIN — PHENAZOPYRIDINE HYDROCHLORIDE 200 MG: 200 TABLET ORAL at 17:08

## 2021-04-10 RX ADMIN — GABAPENTIN 200 MG: 100 CAPSULE ORAL at 21:15

## 2021-04-10 RX ADMIN — LEVOTHYROXINE SODIUM 100 MCG: 0.1 TABLET ORAL at 05:30

## 2021-04-10 RX ADMIN — Medication 10 ML: at 09:16

## 2021-04-10 RX ADMIN — Medication 10 ML: at 21:16

## 2021-04-10 RX ADMIN — NYSTATIN 1 APPLICATION: 100000 POWDER TOPICAL at 21:17

## 2021-04-10 RX ADMIN — POLYETHYLENE GLYCOL 3350 17 G: 17 POWDER, FOR SOLUTION ORAL at 09:11

## 2021-04-10 RX ADMIN — WARFARIN 2 MG: 2 TABLET ORAL at 17:10

## 2021-04-10 RX ADMIN — ARFORMOTEROL TARTRATE 15 MCG: 15 SOLUTION RESPIRATORY (INHALATION) at 06:59

## 2021-04-10 RX ADMIN — POTASSIUM CHLORIDE 20 MEQ: 750 TABLET, EXTENDED RELEASE ORAL at 12:32

## 2021-04-10 RX ADMIN — ARFORMOTEROL TARTRATE 15 MCG: 15 SOLUTION RESPIRATORY (INHALATION) at 20:36

## 2021-04-10 RX ADMIN — INSULIN GLARGINE 15 UNITS: 100 INJECTION, SOLUTION SUBCUTANEOUS at 09:18

## 2021-04-10 RX ADMIN — IPRATROPIUM BROMIDE AND ALBUTEROL SULFATE 3 ML: 2.5; .5 SOLUTION RESPIRATORY (INHALATION) at 04:22

## 2021-04-10 RX ADMIN — BUMETANIDE 1 MG: 1 TABLET ORAL at 09:13

## 2021-04-10 RX ADMIN — DIPHENHYDRAMINE HYDROCHLORIDE AND LIDOCAINE HYDROCHLORIDE AND ALUMINUM HYDROXIDE AND MAGNESIUM HYDRO 5 ML: KIT at 05:30

## 2021-04-10 RX ADMIN — IPRATROPIUM BROMIDE AND ALBUTEROL SULFATE 3 ML: 2.5; .5 SOLUTION RESPIRATORY (INHALATION) at 14:51

## 2021-04-10 RX ADMIN — ATORVASTATIN CALCIUM 40 MG: 40 TABLET, FILM COATED ORAL at 21:15

## 2021-04-10 RX ADMIN — METOLAZONE 2.5 MG: 2.5 TABLET ORAL at 09:14

## 2021-04-10 RX ADMIN — BUMETANIDE 1 MG: 1 TABLET ORAL at 17:08

## 2021-04-10 RX ADMIN — BUDESONIDE 0.5 MG: 0.5 SUSPENSION RESPIRATORY (INHALATION) at 06:59

## 2021-04-10 RX ADMIN — PHENAZOPYRIDINE HYDROCHLORIDE 200 MG: 200 TABLET ORAL at 22:12

## 2021-04-10 RX ADMIN — METOPROLOL TARTRATE 12.5 MG: 25 TABLET, FILM COATED ORAL at 09:13

## 2021-04-10 RX ADMIN — CEFTRIAXONE SODIUM 1 G: 1 INJECTION, POWDER, FOR SOLUTION INTRAMUSCULAR; INTRAVENOUS at 16:48

## 2021-04-10 RX ADMIN — IPRATROPIUM BROMIDE AND ALBUTEROL SULFATE 3 ML: 2.5; .5 SOLUTION RESPIRATORY (INHALATION) at 20:36

## 2021-04-10 RX ADMIN — HYDRALAZINE HYDROCHLORIDE 10 MG: 10 TABLET, FILM COATED ORAL at 15:41

## 2021-04-10 RX ADMIN — IPRATROPIUM BROMIDE AND ALBUTEROL SULFATE 3 ML: 2.5; .5 SOLUTION RESPIRATORY (INHALATION) at 10:44

## 2021-04-10 RX ADMIN — HYDRALAZINE HYDROCHLORIDE 10 MG: 10 TABLET, FILM COATED ORAL at 21:16

## 2021-04-10 RX ADMIN — DOCUSATE SODIUM 100 MG: 100 CAPSULE, LIQUID FILLED ORAL at 21:16

## 2021-04-10 RX ADMIN — PANTOPRAZOLE SODIUM 40 MG: 40 TABLET, DELAYED RELEASE ORAL at 09:14

## 2021-04-10 RX ADMIN — CHLORHEXIDINE GLUCONATE 15 ML: 1.2 RINSE ORAL at 09:11

## 2021-04-10 RX ADMIN — HYDRALAZINE HYDROCHLORIDE 25 MG: 25 TABLET ORAL at 05:30

## 2021-04-10 RX ADMIN — CHLORHEXIDINE GLUCONATE 15 ML: 1.2 RINSE ORAL at 21:15

## 2021-04-10 RX ADMIN — POTASSIUM CHLORIDE 20 MEQ: 750 TABLET, EXTENDED RELEASE ORAL at 09:13

## 2021-04-10 RX ADMIN — ASPIRIN 81 MG: 81 TABLET, COATED ORAL at 09:13

## 2021-04-10 RX ADMIN — GABAPENTIN 200 MG: 100 CAPSULE ORAL at 09:13

## 2021-04-10 NOTE — PROGRESS NOTES
Cardiology Progress Note    Patient Identification:  Name: Claudia Rust  Age: 70 y.o.  Sex: female  :  1950  MRN: [id number]                 Follow Up / Chief Complaint: Follow-up for valvular heart disease status post AVR    Interval History: Patient underwent #23 magna pericardial prosthesis AVR 3/18/2021  3/22/2021.  Events noted.  Patient is intubated on IV Olegario-Synephrine and Bumex.  3/23/2021.  Patient continues to intubated  3/24/2021 patient continues to be intubated and mechanically ventilated.  3/25/2021 patient continues to be intubated and mechanically ventilated.  Underwent a CHRISTIANO which showed normal LV systolic function and good aortic valve function.  3/26/2021.  Patient continues to be intubated and mechanically ventilated.  He is lightly sedated.  Patient underwent bronchoscopy 3/25/2021.  Patient was getting tachycardic was given 12.5 of metoprolol developed hypotension requiring Olegario-Synephrine.  HIT antibody positive  3/29/2021: Continues to be intubated and mechanically ventilated.  3/30/2021.  Patient continues to be intubated and mechanically ventilated.  3/31/2021: Patient continues to be intubated and mechanically ventilated.  Nephrology has started on IV Bumex drip.  21: Patient is intubated and is on weaning trials.  Bumex drip has been discontinued.  2021 : Patient intubated on weaning trials.  On IV Bumex  21 : Patient extubated.  Dobbhoff tube present.  2021 : Patient is sitting in chair intermittently confused without symptoms at the current time.  2021 : Patient sitting in bed hemodynamically stable.  Dobbhoff tube is present.  2021 : Patient was feeling depressed yesterday was given her antidepressants is feeling a whole lot better and mental status is whole lot better today.  2021 : Patient is sitting in chair and hemodynamically stable.  4/10/2021     Subjective: Patient seen and examined.  Chart reviewed.  Labs reviewed.  Discussed with RN  taking care of patient.  Patient is more awake denies any chest pain shortness of breath.  States that she is feeling better.      Objective: Sodium is 152--> 144-> 141-141.  BUN/creatinine  49/1.05-> 42/0.69-> 42/0.64-> 46/0.60-> 47/0.49-> 43/0.46--> 43/0.43-> 35/0.38-> 34/0.47-> 22/0.44-> 22/0.47..  Hemoglobin is 10.1--> 10.8--> 9.0-> 10.8-> 10.5-> 10.2-> 10.6-> 10.6-> 10.7-> 9.7-> 9.8-> 10.2-> 10.1-> 10 point.  Platelet count 95-> 134-> 108-> 92. white count elevated at 22.2-> 16.2-> 16.1-> 15.4.    History of present illness :    This is a 70-year-old with PMH      #Valvular heart disease with moderate to severe aortic stenosis  Cardiac cath 1/20/2021 revealing nonobstructive CAD  # Hyperlipidemia,    # Hypertension  # COPD, KARLY on CPAP  # Prediabetes  # Hypothyroidism, Rheumatoid Arthritis, spinal stenosis, hiatal hernia, chronic depression, bipolar, GERD, IBS, chronic migraine, vertigo, herniated disc, compression lumbar fractures  #Allergies/intolerance to Stadol  # Hysterectomy, bladder reconstruction, cholecystectomy, right hand surgery  # Family history of strokes and grandfather.  Mother's cancer, father on  # Active cigarette smoker trying to quit         Here for aortic valve replacement.  Patient was recently seen with a complaint of dyspnea on exertion edema and weight gain.  Patient was brought in for elective aortic valve replacement.  Patient underwent aortic valve replacement 3/18/2021 with #23 magna pericardial prosthesis.  Blood pressure is high is on IV Cardene and IV nitroglycerin.  Currently intubated sedated chest tube present Venegas catheter present.  Underlying rhythm is flatline with AV paced rhythm.  Underwent an echocardiogram which is revealing severe aortic stenosis on 2/11/2020   Underwent cardiac cath 1/20/2021 which revealed no obstructive disease.  Carotid Dopplers 9/13/2019 normal.  Echocardiogram 2/11/2020 is revealing severe aortic stenosis  Work-up here on 3/16/2021 revealed  proBNP 733, normal CMP, A1c 5.7, normal CBC, chest x-ray with no acute abnormality.  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  Events noted patient was intubated 3/21/2021 for respiratory failure.  Extubated 4/4/2021.  Continue to monitor pulmonary status and follow-up with pulmonary.  Chest x-ray 4 /5/2021   1. Stable diffuse mixed interstitial and airspace disease.  2. Interval extubation.  3. Stable postoperative findings and stable left internal jugular  central venous catheter/feeding tube.    Patient's po Bumex 1 mg 3 times daily  Monitor electrolytes and renal function  Replete electrolytes as needed  Patient sodium was going up, patient was given free water and has improved.  Appreciated nephrology input.  Will follow up with nephrology.  Monitor for routine expected post surgery blood loss anemia  Patient underwent transesophageal echo 3/25/2021 which revealed normal LV systolic function normal chamber sizes.  Normal aortic valve placement and no significant valvular heart disease.  Continue to monitor blood pressure closely patient developed hypotension requiring Olegario-Synephrine with 12.5 of metoprolol.  Now blood pressure is going up we will restart metoprolol and monitor blood pressure closely.  Will follow up with CT VS for routine post  "CABG care and HIT, patient has been started on warfarin.  INR is 1.65,.  Continue to load to keep INR between 2 and 3..  Discussed with RN taking care of patient to coordinate care  Discussed with patient's  by bedside.   is requesting wheelchair ramp and left.  Will defer it to the primary admitting team to prescribe    Past Medical History:  Past Medical History:   Diagnosis Date   • Acute congestive heart failure (CMS/[facility])    • Allergies    • Anxiety    • Arthritis    • Asthma    • Bilateral leg edema    • Bipolar 1 disorder (CMS/[facility])    • Bulging lumbar disc     X3   • Cancer (CMS/[facility])     states had \"cancerous tumor during pregnancy and had to have hysterectomy\"   • Cataract     bilateral   • Cellulitis 03/2021    bilateral legs   • Compression fracture of vertebral column (CMS/[facility])     LUMBAR   • COPD (chronic obstructive pulmonary disease) (CMS/[facility])    • Delayed emergence from anesthesia    • Depression    • Depression    • Dyslipidemia    • Dyspnea on exertion    • Fibromyalgia    • GERD (gastroesophageal reflux disease)    • Hiatal hernia    • Hyperlipidemia    • Hypertension    • Hypothyroid     HYPOTHYROID   • IBS (irritable bowel syndrome)    • Migraines    • Morbid obesity (CMS/[facility])    • Neuropathy    • Panic attacks    • Peripheral edema    • PONV (postoperative nausea and vomiting)    • Poor mobility     rolling walker   • Prediabetes    • PVD (peripheral vascular disease) (CMS/[facility])    • Rheumatoid aortitis    • Spinal stenosis    • Spinal stenosis    • Vertigo    • Vitamin D deficiency      Past Surgical History:  Past Surgical History:   Procedure Laterality Date   • AORTIC VALVE REPAIR/REPLACEMENT N/A 3/18/2021    Procedure: AORTIC VALVE REPLACEMENT;  Surgeon: Olaf Mcgill MD;  Location: [facility];  Service: Cardiothoracic;  Laterality: N/A;   • BRONCHOSCOPY N/A 3/25/2021    Procedure: BRONCHOSCOPY AT BEDSIDE WITH BRONCHIOALVEOLAR LAVAGE;  Surgeon: Glenn Reyes MD;  Location: [facility]" [facility];  Service: Pulmonary;  Laterality: N/A;  PNA   • CARDIAC CATHETERIZATION  2009   • CARDIAC CATHETERIZATION N/A 8/14/2019    Procedure: Left Heart Cath;  Surgeon: Jose Levi MD;  Location: [facility] CATH INVASIVE LOCATION;  Service: Cardiovascular   • CARDIAC CATHETERIZATION N/A 8/14/2019    Procedure: Right Heart Cath;  Surgeon: Jose Levi MD;  Location: [facility] CATH INVASIVE LOCATION;  Service: Cardiovascular   • CARDIAC CATHETERIZATION N/A 8/14/2019    Procedure: Aortic root aortogram;  Surgeon: Jose Levi MD;  Location: [facility] CATH INVASIVE LOCATION;  Service: Cardiovascular   • CARDIAC CATHETERIZATION N/A 1/20/2021    Procedure: Left Heart Cath;  Surgeon: Jose Levi MD;  Location: [facility] CATH INVASIVE LOCATION;  Service: Cardiovascular;  Laterality: N/A;   • COLONOSCOPY     • CYSTOCELE REPAIR  1984   • ELBOW ARTHROPLASTY  2011   • ENDOSCOPY     • FOOT SURGERY     • GALLBLADDER SURGERY  2002   • TONSILLECTOMY     • VAGINAL HYSTERECTOMY  1972        Social History:   Social History     Tobacco Use   • Smoking status: Current Every Day Smoker     Packs/day: 1.00     Types: Cigarettes   • Smokeless tobacco: Never Used   • Tobacco comment: decrease now and do not smoke dos   Substance Use Topics   • Alcohol use: Yes     Comment: socially      Family History:  History reviewed. No pertinent family history.       Allergies:  Allergies   Allergen Reactions   • Adhesive Tape Unknown (See Comments)     hives   • Butorphanol Other (See Comments)     Chest pain difficulty breathing throat swelling   • Garlic Other (See Comments)     Chest pain difficulty breathing throat swells   • Tetanus-Diphtheria Toxoids Td Other (See Comments)     Dizziness,  vomiting   • Aloe Itching   • Bee Venom Other (See Comments)     Swelling eyes and lips hand swelling   • Latex Itching   • Macadamia Nut Oil Other (See Comments)     Chest pain difficulty breathing throat  "swelling   • Tuberculin Unknown (See Comments)     reactor   • Wasp Venom Other (See Comments)     Swelling eyes lips and hand     Scheduled Meds:  arformoterol, 15 mcg, BID - RT  aspirin, 81 mg, Daily  atorvastatin, 40 mg, Nightly  bisacodyl, 10 mg, Daily  budesonide, 1 mg, BID - RT  bumetanide, 1 mg, TID  chlorhexidine, 15 mL, Q12H  docusate, 100 mg, BID  First Mouthwash (Magic Mouthwash), 5 mL, Q6H  FLUoxetine, 20 mg, Daily  fondaparinux, 5 mg, Q24H  gabapentin, 200 mg, Q12H  hydrALAZINE, 25 mg, Q8H  insulin glargine, 15 Units, Q12H  insulin lispro, 0-24 Units, Q6H  ipratropium-albuterol, 3 mL, Q4H - RT  lansoprazole, 30 mg, QAM  levothyroxine, 100 mcg, Q AM  metOLazone, 2.5 mg, Every Other Day  metoprolol tartrate, 12.5 mg, Q12H  nystatin, , Q12H  polyethylene glycol, 17 g, Daily  potassium chloride, 20 mEq, TID With Meals  sodium chloride, 10 mL, Q12H  sodium chloride, 10 mL, Q12H  warfarin, 3 mg, Daily          Review of Systems:   Review of Systems   Constitutional: Negative for chills and fever.   Cardiovascular: Negative for chest pain and palpitations.   Respiratory: Negative for cough and shortness of breath.    Gastrointestinal: Negative for nausea and vomiting.              Constitutional:  Temp:  [97.7 °F (36.5 °C)-98.3 °F (36.8 °C)] 98.3 °F (36.8 °C)  Heart Rate:  [58-78] 69  Resp:  [12-18] 18  Arterial Line BP: ()/(46-70) 99/51    Physical Exam   /64   Pulse 69   Temp 98.3 °F (36.8 °C) (Oral)   Resp 18   Ht 162.6 cm (64\")   Wt 122 kg (269 lb 6.4 oz)   SpO2 96%   BMI 46.24 kg/m²   General: Patient is sitting in chair in no acute distress  Eyes: Sclera is anicteric,  conjunctiva is clear   HEENT:  No JVD.  Dobbhoff tube present.  Respiratory: Good air entry, nonlabored breathing.  Scattered rhonchi and wheezing.  Cardiovascular: S1,S2 Regular rate and rhythm.  Sternotomy is healing well.  Gastrointestinal: Abdomen nondistended, soft  Musculoskeletal:  No abnormal " movements  Extremities: Patient has chronic bilateral lower extremity edema continues to have 1+ edema.  Skin: Stasis changes seen bilateral shin.  Neuro: Alert and awake, no lateralizing deficits appreciated    INTAKE AND OUTPUT:    Intake/Output Summary (Last 24 hours) at 4/10/2021 0801  Last data filed at 4/9/2021 2100  Gross per 24 hour   Intake 460 ml   Output 1600 ml   Net -1140 ml       Cardiographics  Telemetry: Sinus rhythm    ECG:   ECG 12 Lead   Preliminary Result   HEART RATE= 59  bpm   RR Interval= 1020  ms   LA Interval= 158  ms   P Horizontal Axis= 14  deg   P Front Axis= 25  deg   QRSD Interval= 91  ms   QT Interval= 652  ms   QRS Axis= 55  deg   T Wave Axis= 67  deg   - ABNORMAL ECG -   Sinus bradycardia   Prolonged QT interval   Electronically Signed By:    Date and Time of Study: 2021-03-23 13:15:33      ECG 12 Lead   Final Result   HEART RATE= 80  bpm   RR Interval= 752  ms   LA Interval= 161  ms   P Horizontal Axis= 0  deg   P Front Axis= 22  deg   QRSD Interval= 80  ms   QT Interval= 419  ms   QRS Axis= 39  deg   T Wave Axis= 35  deg   - NORMAL ECG -   Sinus rhythm   When compared with ECG of 19-Mar-2021 4:30:10,   Significant repolarization change   Electronically Signed By: Segun Rucker ([facility]) 20-Mar-2021 10:06:29   Date and Time of Study: 2021-03-20 05:15:54      ECG 12 Lead   Final Result   HEART RATE= 62  bpm   RR Interval= 984  ms   LA Interval= 165  ms   P Horizontal Axis= 13  deg   P Front Axis= 32  deg   QRSD Interval= 76  ms   QT Interval= 434  ms   QRS Axis= 43  deg   T Wave Axis= 82  deg   - ABNORMAL ECG -   Sinus rhythm   Atrial premature complex   ST elevation, consider inferior injury   When compared with ECG of 18-Mar-2021 11:04:47,   No significant change   Electronically Signed By: Segun Rucker ([facility]) 19-Mar-2021 16:48:51   Date and Time of Study: 2021-03-19 04:30:10      ECG 12 Lead   Final Result   HEART RATE= 56  bpm   RR Interval= 1072  ms   LA Interval= 163  ms   P  Horizontal Axis= -22  deg   P Front Axis= -9  deg   QRSD Interval= 88  ms   QT Interval= 516  ms   QRS Axis= 40  deg   T Wave Axis= 43  deg   - OTHERWISE NORMAL ECG -   Sinus bradycardia   When compared with ECG of 16-Mar-2021 8:51:36,   Significant repolarization change   Electronically Signed By: Segun Rucker ([facility]) 18-Mar-2021 17:48:15   Date and Time of Study: 2021-03-18 11:04:47        I have personally reviewed EKG    Echocardiogram: Results for orders placed during the hospital encounter of 03/18/21    Adult Transesophageal Echo (CHRISTIANO) W/ Cont if Necessary Per Protocol (Bedside)    Interpretation Summary  CHRISTIANO   procedure note    Procedure:  CHRISTIANO    Indication:   Valvular heart disease s/p AVR, respiratory failure    Date of procedure  : 3/25/2021    :  Dr. Jose Levi    Description of procedure:    Under conscious sedation given by anesthesiology and local anesthesia, a CHRISTIANO probe was advanced into the esophagus and into the stomach 2D, M-mode, color Doppler images were obtained..  Patient tolerated procedure well complications well.    Complications: none    Results:    Normal LV size and contractility LV contractility, EF of 60%  Normal RV size  Normal left atrial size, normal right atrial size, left atrial appendage without thrombus.  Aortic valve, mitral valve, tricuspid valve appears structurally normal, mild TR seen.  No vegetation seen  No pericardial effusion seen.  Proximal aorta appears normal in size.  Mild aortic plaque seen      Recommendations/plan:    Clinical correlation recommended      Lab Review   I have reviewed the labs      Results from last 7 days   Lab Units 04/09/21  0419   MAGNESIUM mg/dL 2.0     Results from last 7 days   Lab Units 04/10/21  0531   SODIUM mmol/L 135*   POTASSIUM mmol/L 4.0   BUN mg/dL 22   CREATININE mg/dL <0.47*   CALCIUM mg/dL 8.4*         Results from last 7 days   Lab Units 04/10/21  0531 04/09/21  1916 04/09/21  0418 04/08/21  0623   WBC 10*3/mm3  "15.40*  --  16.10* 16.20*   HEMOGLOBIN g/dL 10.3*  --  10.1* 10.2*   HEMOGLOBIN, POC g/dL  --  14.7  --   --    HEMATOCRIT % 31.4*  --  30.8* 31.5*   HEMATOCRIT POC %  --  43  --   --    PLATELETS 10*3/mm3 92*  --  92* 108*     Results from last 7 days   Lab Units 04/10/21  0531 04/09/21  0418 04/08/21  0623   INR  3.70* 1.65* 1.13*       RADIOLOGY:  Imaging Results (Last 24 Hours)     ** No results found for the last 24 hours. **                )4/10/2021  MD DANIEL Lamas/Transcription:   \"Dictated utilizing Dragon dictation\".   "

## 2021-04-10 NOTE — PROGRESS NOTES
"NEPHROLOGY PROGRESS NOTE------[facility]    [facility]  [phone]  Yovani Lerma MD      Patient Care Team:  Mary Wilde APRN as PCP - General  Mary Wilde APRN as PCP - Family Medicine  Jose Levi MD as Consulting Physician (Cardiology)  Yovani Lerma MD as Consulting Physician (Nephrology)      Provider:  Yovani Lerma MD  Patient Name: Claudia Rust  :  1950    SUBJECTIVE:    F/U JERROD/hypernatremia  No chest pain or SOA.   DHT in place    Medication:  arformoterol, 15 mcg, Nebulization, BID - RT  aspirin, 81 mg, Oral, Daily  atorvastatin, 40 mg, Oral, Nightly  bisacodyl, 10 mg, Rectal, Daily  budesonide, 1 mg, Nebulization, BID - RT  bumetanide, 1 mg, Nasogastric, TID  chlorhexidine, 15 mL, Mouth/Throat, Q12H  docusate sodium, 100 mg, Oral, BID  FLUoxetine, 20 mg, Oral, Daily  gabapentin, 200 mg, Oral, Q12H  hydrALAZINE, 25 mg, Nasogastric, Q8H  [START ON 2021] insulin glargine, 10 Units, Subcutaneous, QAM  insulin lispro, 0-14 Units, Subcutaneous, 4x Daily With Meals & Nightly  ipratropium-albuterol, 3 mL, Nebulization, Q4H - RT  levothyroxine, 100 mcg, Oral, Q AM  metOLazone, 2.5 mg, Nasogastric, Every Other Day  metoprolol tartrate, 12.5 mg, Oral, Q12H  nystatin, , Topical, Q12H  pantoprazole, 40 mg, Oral, QAM  polyethylene glycol, 17 g, Oral, Daily  potassium chloride, 20 mEq, Oral, TID With Meals  sodium chloride, 10 mL, Intravenous, Q12H  sodium chloride, 10 mL, Intravenous, Q12H  warfarin, 2 mg, Oral, Daily           OBJECTIVE    Vital Sign Min/Max for last 24 hours  Temp  Min: 97.7 °F (36.5 °C)  Max: 98.3 °F (36.8 °C)   No data recorded   Pulse  Min: 58  Max: 78   Resp  Min: 12  Max: 18   SpO2  Min: 92 %  Max: 100 %   No data recorded   Weight  Min: 122 kg (269 lb 6.4 oz)  Max: 122 kg (269 lb 6.4 oz)     Flowsheet Rows      First Filed Value   Admission Height  162.6 cm (64\") Documented at 2021 "   Admission Weight  129 kg (284 lb) Documented at 03/18/2021 2000          No intake/output data recorded.  I/O last 3 completed shifts:  In: 800 [P.O.:480; I.V.:40; Other:280]  Out: 2300 [Urine:2300]    Physical Exam:    General Appearance: NAD. Fatigued  Head: normocephalic, without obvious abnormality and atraumatic +NGT INTACT  Eyes: conjunctivae and sclerae normal and no icterus  Neck: supple. NO GROSS JVD NOW  Lungs: +FEW SCATTERED RHONCHI.NO CRACKLES AT PRESENT  Heart: regular rhythm & normal rate and normal S1, S2 +NARGIS  Chest: Wall no abnormalities observed  Abdomen: normal bowel sounds and soft non-tender +OBESITY  Extremities: +TRACE DIFFUSE BILAT LE EDEMA, no cyanosis and no redness  Skin: no bleeding, bruising or rash, turgor normal, color normal and no lesions noted  Neurologic: Alert and oriented without focal deficits    Labs:    WBC WBC   Date Value Ref Range Status   04/10/2021 15.40 (H) 3.40 - 10.80 10*3/mm3 Final   04/09/2021 16.10 (H) 3.40 - 10.80 10*3/mm3 Final   04/08/2021 16.20 (H) 3.40 - 10.80 10*3/mm3 Final      HGB Hemoglobin   Date Value Ref Range Status   04/10/2021 10.3 (L) 12.0 - 15.9 g/dL Final   04/09/2021 14.7 12.0 - 17.0 g/dL Final   04/09/2021 10.1 (L) 12.0 - 15.9 g/dL Final   04/08/2021 10.2 (L) 12.0 - 15.9 g/dL Final      HCT Hematocrit   Date Value Ref Range Status   04/10/2021 31.4 (L) 34.0 - 46.6 % Final   04/09/2021 43 38 - 51 % Final   04/09/2021 30.8 (L) 34.0 - 46.6 % Final   04/08/2021 31.5 (L) 34.0 - 46.6 % Final      Platlets No results found for: LABPLAT   MCV MCV   Date Value Ref Range Status   04/10/2021 92.1 79.0 - 97.0 fL Final   04/09/2021 91.5 79.0 - 97.0 fL Final   04/08/2021 93.0 79.0 - 97.0 fL Final          Sodium Sodium   Date Value Ref Range Status   04/10/2021 135 (L) 136 - 145 mmol/L Final   04/09/2021 135 (L) 136 - 145 mmol/L Final   04/08/2021 138 136 - 145 mmol/L Final      Potassium Potassium   Date Value Ref Range Status   04/10/2021 4.0 3.5 - 5.2  mmol/L Final     Comment:     Slight hemolysis detected by analyzer. Results may be affected.Result checked    04/09/2021 3.2 (L) 3.5 - 5.2 mmol/L Final   04/08/2021 4.3 3.5 - 5.2 mmol/L Final      Chloride Chloride   Date Value Ref Range Status   04/10/2021 99 98 - 107 mmol/L Final   04/09/2021 98 98 - 107 mmol/L Final   04/08/2021 104 98 - 107 mmol/L Final      CO2 CO2   Date Value Ref Range Status   04/10/2021 28.0 22.0 - 29.0 mmol/L Final   04/09/2021 27.0 22.0 - 29.0 mmol/L Final   04/08/2021 28.0 22.0 - 29.0 mmol/L Final      BUN BUN   Date Value Ref Range Status   04/10/2021 22 8 - 23 mg/dL Final   04/09/2021 22 8 - 23 mg/dL Final   04/08/2021 34 (H) 8 - 23 mg/dL Final      Creatinine Creatinine   Date Value Ref Range Status   04/10/2021 <0.47 (L) 0.57 - 1.00 mg/dL Final   04/09/2021 0.44 (L) 0.57 - 1.00 mg/dL Final   04/08/2021 0.47 (L) 0.57 - 1.00 mg/dL Final      Calcium Calcium   Date Value Ref Range Status   04/10/2021 8.4 (L) 8.6 - 10.5 mg/dL Final   04/09/2021 8.3 (L) 8.6 - 10.5 mg/dL Final   04/08/2021 8.5 (L) 8.6 - 10.5 mg/dL Final      PO4 No components found for: PO4   Albumin Albumin   Date Value Ref Range Status   04/10/2021 2.80 (L) 3.50 - 5.20 g/dL Final   04/09/2021 2.70 (L) 3.50 - 5.20 g/dL Final   04/08/2021 2.80 (L) 3.50 - 5.20 g/dL Final      Magnesium Magnesium   Date Value Ref Range Status   04/09/2021 2.0 1.6 - 2.4 mg/dL Final   04/08/2021 2.3 1.6 - 2.4 mg/dL Final      Uric Acid No components found for: URIC ACID     Imaging Results (Last 72 Hours)     ** No results found for the last 72 hours. **          Results for orders placed during the hospital encounter of 03/18/21    XR Chest 1 View    Narrative  DATE OF EXAM:  4/7/2021 3:25 AM    PROCEDURE:  XR CHEST 1 VW-    INDICATIONS:  Hypoxia.    COMPARISON:  Radiographs 4/6/2021, 4/5/2021, and 4/4/2021. CT 3/22/2021.    TECHNIQUE:  Single radiographic AP view of the chest was obtained.    FINDINGS:  Lordotic positioning. Stable  sternotomy wires, prosthetic heart valve,  partially imaged enteric tube, and left PICC. Interval removal of the  previously seen left IJ central venous catheter. Similar-appearing  basilar predominant opacification of both lungs. No pneumothorax.  Unchanged cardiomediastinal contours. No acute osseous abnormality is  identified.    Impression  Interval removal of the previously seen left IJ central venous catheter.  Otherwise, no significant interval change.    Electronically Signed By-Olman Benz MD On:4/7/2021 7:35 AM  This report was finalized on [id number] by  Olman Benz MD.      XR Chest 1 View    Narrative  Examination: XR CHEST 1 VW-    Date of Exam: 4/6/2021 5:35 AM    Indication: Hypoxia; M06.9-Rheumatoid arthritis, unspecified;  I35.0-Nonrheumatic aortic (valve) stenosis; J96.01-Acute respiratory  failure with hypoxia; J44.9-Chronic obstructive pulmonary disease,  unspecified.    Comparison: 4/5/2021.    Technique: Single radiographic view of the chest was obtained.    Findings:  Feeding tube courses below the diaphragm. New left PICC terminates in  the upper SVC. Stable left internal jugular central venous catheter.  Stable evidence of prior median sternotomy and heart valve replacement.  There is stable diffuse mixed interstitial and airspace disease in both  lungs with stable small bilateral pleural effusions. There is no  evidence of pneumothorax or new lung opacity. The heart size is stable.  Pulmonary vasculature is completely obscured. There are no acute osseous  abnormalities.    Impression  1. Stable mixed interstitial and airspace disease present throughout  both lungs.  2. Stable feeding tube, postoperative findings and left IJ CVC.  3. New left PICC terminates in the upper SVC.    Electronically Signed By-Romain Gutierrez MD On:4/6/2021 7:12 AM  This report was finalized on [id number] by  Romain Gutierrez MD.      XR Chest 1 View    Narrative  Examination: XR CHEST 1 VW-    Date of  Exam: 4/5/2021 5:00 AM    Indication: Hypoxia; M06.9-Rheumatoid arthritis, unspecified;  I35.0-Nonrheumatic aortic (valve) stenosis; J96.01-Acute respiratory  failure with hypoxia; J44.9-Chronic obstructive pulmonary disease,  unspecified.    Comparison: 4/4/2021.    Technique: Single radiographic view of the chest was obtained.    Findings:  There are diffuse bilateral mixed interstitial and airspace opacities in  both lungs. There is a stable left internal jugular central venous  catheter. Stable changes of prior median sternotomy and heart valve  replacement. Endotracheal tube has been removed. Stable feeding tube  coursing below the diaphragm. Stable bridging thoracic osteophytes  without acute osseous abnormality.    Impression  1. Stable diffuse mixed interstitial and airspace disease.  2. Interval extubation.  3. Stable postoperative findings and stable left internal jugular  central venous catheter/feeding tube.    Electronically Signed By-Romain Gutierrez MD On:4/5/2021 7:26 AM  This report was finalized on [id number] by  Romain Gutierrez MD.      Results for orders placed during the hospital encounter of 03/18/21    Duplex Venous Upper Extremity - Bilateral CAR    Interpretation Summary  · Acute right upper extremity deep vein thrombosis noted in the subclavian, axillary and brachial. This represents catheter associated deep vein thrombosis.  · All other vessels appear normal.        ASSESSMENT / PLAN      Aortic valve stenosis    Rheumatoid arthritis (CMS/HCC)    1. ARF/JERROD------Nonoliguric ATN. Venegas in place. Follow with ongoing diuretic exposure. No NSAIDs or IV dye    2. HYPERNATREMIA------Better.  Free water flushes with TFs. Encourage po water intake once swallow eval done and patient able to eat/drink    3. HYPOALBUMINEMIA-----on TFs.     4. 3RD SPACED EDEMA------On  Bumex per tube. Significant contribution from chronic venous insufficiency/obesit. Add QOD Metolazone today    5. CAD S/P AVR-----per  Cardiology and CT Surgery. S/P CHRISTIANO    6. OBESITY    7. ACUTE HYPOXIC RESPIRATORY FAILURE------S/P extubation. Per Pulmonary    8. RUE DVT    9. DMII-------Glucometers, SSI    10. ANEMIA------H/H stable    11. SEPSIS/LEUKOCYTOSIS------Antibiotics per Pulmonary/CC    12. THROMBOCYTOPENIA    13. HYPOCALCEMIA-----Replaced    14. HYPERLIPIDEMIA------On Statin.      15. HYPOTHYROIDISM------On Sythroid    16. GERD/PUD PROPHYLAXIS------Prevacid per NGT    17. HTN-------BP good. D/C Amlodipine b/c of edema. Added scheduled Hydralazine    18. METABOLIC ALKALOSIS-----Better    19. HYPOKALEMIA-----Replaced    Cr stable, non oliguric  Lytes OK  Continue bumex , reduce to BID  BP low, decrease hydralazine    Yovani Lerma MD  [facility]  [phone]  04/10/21  12:05 EDT

## 2021-04-10 NOTE — PLAN OF CARE
Problem: Adult Inpatient Plan of Care  Goal: Plan of Care Review  Outcome: Ongoing, Progressing  Flowsheets (Taken 4/10/2021 1311)  Progress: improving  Plan of Care Reviewed With:   patient   spouse  Outcome Summary: Pt still has A-line, PICC line, NG tube, and external catheter. Used kell lift to get up to chair. Complaining of burning with urination. VS stable. Will continue to monitor.  Goal: Patient-Specific Goal (Individualized)  Outcome: Ongoing, Progressing  Goal: Absence of Hospital-Acquired Illness or Injury  Outcome: Ongoing, Progressing  Intervention: Identify and Manage Fall Risk  Recent Flowsheet Documentation  Taken 4/10/2021 1200 by João Joaquin RN  Safety Promotion/Fall Prevention:   activity supervised   assistive device/personal items within reach   clutter free environment maintained   fall prevention program maintained   muscle strengthening facilitated   nonskid shoes/slippers when out of bed   room organization consistent   safety round/check completed  Taken 4/10/2021 0800 by João Joaquin RN  Safety Promotion/Fall Prevention:   activity supervised   assistive device/personal items within reach   clutter free environment maintained   muscle strengthening facilitated   nonskid shoes/slippers when out of bed   room organization consistent   safety round/check completed  Intervention: Prevent Skin Injury  Recent Flowsheet Documentation  Taken 4/10/2021 1310 by João Joaquin RN  Body Position: (up in chair) other (see comments)  Taken 4/10/2021 1200 by João Joaquin RN  Body Position: turned  Taken 4/10/2021 0800 by João Joaquin RN  Body Position:   turned   supine  Goal: Optimal Comfort and Wellbeing  Outcome: Ongoing, Progressing  Intervention: Provide Person-Centered Care  Recent Flowsheet Documentation  Taken 4/10/2021 1200 by João Joaquni RN  Trust Relationship/Rapport:   care explained   choices provided  Taken 4/10/2021 0800 by João Joaquin RN  Trust Relationship/Rapport:    care explained   choices provided  Goal: Readiness for Transition of Care  Outcome: Ongoing, Progressing   Goal Outcome Evaluation:  Plan of Care Reviewed With: patient, spouse  Progress: improving  Outcome Summary: Pt still has A-line, PICC line, NG tube, and external catheter. Used kell lift to get up to chair. Complaining of burning with urination. VS stable. Will continue to monitor.

## 2021-04-10 NOTE — PROGRESS NOTES
"Nutrition Services    Patient Name: Claudia Rust  YOB: 1950  MRN: [id number]  Admission date: 3/18/2021      PPE Documentation        PPE Worn By Provider RD did not enter room for this encounter   PPE Worn By Patient  -     PROGRESS NOTE      Encounter Information: Tube feed + PO check on. Patient remains extubated. TF switched to nocturnal feeds however appears tube feeds were not infused last night. Refused tube feeds x 2 nights ago. Patient is only eating 25% of meals and needs supplemental tube feeds to meet caloric/protein needs.        Labs (reviewed below): -hyponatremia  -low Cr       GI Function:  -residuals WNL  -Last BM documented on 4/9       Nutrition Intervention: Spoke with nursing today and highly recommend to infuse nocturnal tube feeds tonight if patient will allow to optimize nutritional status.        PO Diet/Supplements: Diet Texture, Diabetic/Consistent Carbs; Diabetic - Consistent Carb; Mechanical Ground; Thickened Liquids (Nectar), Full Feed - Nursing   Magic cups at lunch and dinner meals     EN Prescription: Impact Peptide 1.5 at 85 mL/hr + 60 mL/hr water flush (flush per nephrology) x12 hrs (7 PM to 7 AM)       Intake/Output:   Intake/Output Summary (Last 24 hours) at 4/10/2021 0913  Last data filed at 4/9/2021 2100  Gross per 24 hour   Intake 460 ml   Output 1600 ml   Net -1140 ml            Height: Height: 162.6 cm (64\")   Weight: Weight: 122 kg (269 lb 6.4 oz) (04/10/21 0500)   BMI: Body mass index is 46.24 kg/m².     Results from last 7 days   Lab Units 04/10/21  0531 04/09/21  0624 04/08/21  0623 04/05/21  0501   SODIUM mmol/L 135* 135* 138 141   POTASSIUM mmol/L 4.0 3.2* 4.3 3.3*   CHLORIDE mmol/L 99 98 104 106   CO2 mmol/L 28.0 27.0 28.0 26.0   BUN mg/dL 22 22 34* 43*   CREATININE mg/dL <0.47* 0.44* 0.47* 0.46*   CALCIUM mg/dL 8.4* 8.3* 8.5* 8.5*   BILIRUBIN mg/dL  --   --   --  0.5   ALK PHOS U/L  --   --   --  137*   ALT (SGPT) U/L  --   --   --  44*   AST (SGOT) " U/L  --   --   --  52*   GLUCOSE mg/dL 72 96 168* 133*     Results from last 7 days   Lab Units 04/10/21  0531 04/09/21  0624 04/09/21 0419 04/09/21 0418 04/08/21  0623 04/06/21 0422 04/04/21 0414   MAGNESIUM mg/dL  --   --  2.0  --  2.3 2.4  --    PHOSPHORUS mg/dL 4.2  --   --    < > 3.6 3.1 3.6   HEMOGLOBIN g/dL 10.3*  --   --   --  10.2* 9.7* 10.0*   HEMOGLOBIN, POC   --    < >  --   --   --   --   --    HEMATOCRIT % 31.4*  --   --   --  31.5* 29.3* 30.4*   HEMATOCRIT POC   --    < >  --   --   --   --   --    TRIGLYCERIDES mg/dL  --   --   --   --   --   --  132    < > = values in this interval not displayed.     COVID19   Date Value Ref Range Status   03/24/2021 Not Detected Not Detected - Ref. Range Final     Lab Results   Component Value Date    HGBA1C 5.7 (H) 03/16/2021       Vitals:    04/10/21 0736   BP:    Pulse:    Resp:    Temp: 98.3 °F (36.8 °C)   SpO2:        RD to follow up per protocol.    Electronically signed by:  Cintia Bhatti RD  04/10/21 09:13 EDT

## 2021-04-10 NOTE — PROGRESS NOTES
ICU Daily Progress Note        Aortic valve stenosis    Rheumatoid arthritis (CMS/[facility])      ASSESSMENT:  Acute hypoxic respiratory failure required intubation 03/22/2021 , extubated 4/4/21  Aortic valve stenosis, status post tissue replacement  Pulm hypertension, severe  COPD  KARLY  Cor pulmonale  Tobacco abuse  Upper airway infection with moderate amount of mucopurulent secretions noted on bronchoscopy 3/25/2021  Sputum culture with Candida, likely contaminant    Interstitial pneumonitis 2nd to aspiration   Acute thrombocytopenia  Catheter associated DVT right upper extremity subclavian, axillary, and brachial   hypernatremia  Upper arm left DVT  DM  Uremia     PLAN:  Wean O2 to maintain a saturation of 91%, currently 3 L  Anticoagulation currently on Arixtra and warfarin  Weaned steroids to discontinue  Echocardiogram reviewed  Bronchodilator  Inhaled corticosteroids  Electrolytes/ glycemic control  DVT prophylaxis arixtra,   GI prophylaxis.     Oral diet     Nephrology following  Cardiology following  CV surgery admitting     PT/OT evaluation and treatment   Consult case management to start discharge planning.  Will likely need rehab due to severe deconditioning      LOS: 23 days         Vital signs for last 24 hours:  Vitals:    04/10/21 1135 04/10/21 1451 04/10/21 1455 04/10/21 1541   BP:    126/62   Pulse:  64  67   Resp:  20 20    Temp: 98.3 °F (36.8 °C)      TempSrc: Oral      SpO2:       Weight:       Height:           Intake/Output last 3 shifts:  I/O last 3 completed shifts:  In: 800 [P.O.:480; I.V.:40; Other:280]  Out: 2300 [Urine:2300]  Intake/Output this shift:  No intake/output data recorded.    Vent settings for last 24 hours:       Hemodynamic parameters for last 24 hours:       Radiology  Imaging Results (Last 24 Hours)     ** No results found for the last 24 hours. **          Labs:  Results from last 7 days   Lab Units 04/10/21  0531   WBC 10*3/mm3 15.40*   HEMOGLOBIN g/dL 10.3*   HEMATOCRIT %  31.4*   PLATELETS 10*3/mm3 92*     Results from last 7 days   Lab Units 04/10/21  0531 04/05/21  0501   SODIUM mmol/L 135* 141   POTASSIUM mmol/L 4.0 3.3*   CHLORIDE mmol/L 99 106   CO2 mmol/L 28.0 26.0   BUN mg/dL 22 43*   CREATININE mg/dL <0.47* 0.46*   CALCIUM mg/dL 8.4* 8.5*   BILIRUBIN mg/dL  --  0.5   ALK PHOS U/L  --  137*   ALT (SGPT) U/L  --  44*   AST (SGOT) U/L  --  52*   GLUCOSE mg/dL 72 133*     Results from last 7 days   Lab Units 04/09/21  1916   PH, ARTERIAL pH units 7.313*   PO2 ART mm Hg 95.2   PCO2, ARTERIAL mm Hg 43.2   HCO3 ART mmol/L 21.9     Results from last 7 days   Lab Units 04/10/21  0531 04/09/21  0624 04/08/21  0623   ALBUMIN g/dL 2.80* 2.70* 2.80*             Results from last 7 days   Lab Units 04/09/21  0419   MAGNESIUM mg/dL 2.0     Results from last 7 days   Lab Units 04/10/21  0531 04/09/21  0418 04/08/21  0623   INR  3.70* 1.65* 1.13*               Meds:   SCHEDULE  arformoterol, 15 mcg, Nebulization, BID - RT  aspirin, 81 mg, Oral, Daily  atorvastatin, 40 mg, Oral, Nightly  bisacodyl, 10 mg, Rectal, Daily  budesonide, 1 mg, Nebulization, BID - RT  bumetanide, 1 mg, Nasogastric, BID  chlorhexidine, 15 mL, Mouth/Throat, Q12H  docusate sodium, 100 mg, Oral, BID  FLUoxetine, 20 mg, Oral, Daily  gabapentin, 200 mg, Oral, Q12H  hydrALAZINE, 10 mg, Nasogastric, Q8H  [START ON 4/11/2021] insulin glargine, 10 Units, Subcutaneous, QAM  insulin lispro, 0-14 Units, Subcutaneous, 4x Daily With Meals & Nightly  ipratropium-albuterol, 3 mL, Nebulization, Q4H - RT  levothyroxine, 100 mcg, Oral, Q AM  metOLazone, 2.5 mg, Nasogastric, Every Other Day  metoprolol tartrate, 12.5 mg, Oral, Q12H  nystatin, , Topical, Q12H  pantoprazole, 40 mg, Oral, QAM  polyethylene glycol, 17 g, Oral, Daily  potassium chloride, 20 mEq, Oral, TID With Meals  sodium chloride, 10 mL, Intravenous, Q12H  sodium chloride, 10 mL, Intravenous, Q12H  warfarin, 2 mg, Oral, Daily      Infusions     PRNs  •  acetaminophen  **OR** acetaminophen **OR** acetaminophen  •  bisacodyl  •  calcium carbonate  •  Chlorhexidine Gluconate Cloth  •  dextrose  •  dextrose  •  glucagon (human recombinant)  •  hydrALAZINE  •  insulin lispro **AND** insulin lispro  •  ipratropium-albuterol  •  lidocaine  •  lidocaine  •  meclizine  •  [] HYDROmorphone **AND** naloxone  •  ondansetron  •  potassium chloride **OR** potassium chloride  •  potassium chloride **OR** potassium chloride  •  sodium chloride  •  sodium chloride    Physical Exam:  Physical Exam  HENT:      Head: Normocephalic and atraumatic.   Cardiovascular:      Rate and Rhythm: Normal rate.      Heart sounds: No murmur heard.     Pulmonary:      Breath sounds: No wheezing, rhonchi or rales.      Comments: Diminished bases  Abdominal:      General: There is no distension.      Tenderness: There is no abdominal tenderness. There is no guarding or rebound.   Musculoskeletal:         General: Swelling present.      Cervical back: Normal range of motion.      Right lower leg: Edema present.      Left lower leg: Edema present.   Skin:     General: Skin is warm and dry.      Comments: Surgical incision healing well   Neurological:      Mental Status: She is oriented to person, place, and time.         ROS  Review of Systems   Constitutional: Positive for fatigue.   Respiratory: Positive for cough.    Cardiovascular: Positive for leg swelling.   Gastrointestinal: Negative for abdominal distention, abdominal pain, nausea and vomiting.   Musculoskeletal: Positive for arthralgias.         Critical Care Time greater than: 45 Minutes  Total time spent with patient greater than: 45 Minutes

## 2021-04-11 LAB
ALBUMIN SERPL-MCNC: 2.9 G/DL (ref 3.5–5.2)
ANION GAP SERPL CALCULATED.3IONS-SCNC: 11 MMOL/L (ref 5–15)
BACTERIA SPEC AEROBE CULT: ABNORMAL
BUN SERPL-MCNC: 20 MG/DL (ref 8–23)
BUN/CREAT SERPL: ABNORMAL
CALCIUM SPEC-SCNC: 8.6 MG/DL (ref 8.6–10.5)
CHLORIDE SERPL-SCNC: 97 MMOL/L (ref 98–107)
CO2 SERPL-SCNC: 29 MMOL/L (ref 22–29)
CREAT SERPL-MCNC: <0.47 MG/DL (ref 0.57–1)
DEPRECATED RDW RBC AUTO: 46.8 FL (ref 37–54)
ERYTHROCYTE [DISTWIDTH] IN BLOOD BY AUTOMATED COUNT: 14.9 % (ref 12.3–15.4)
GLUCOSE BLDC GLUCOMTR-MCNC: 107 MG/DL (ref 70–105)
GLUCOSE BLDC GLUCOMTR-MCNC: 118 MG/DL (ref 70–105)
GLUCOSE BLDC GLUCOMTR-MCNC: 151 MG/DL (ref 70–105)
GLUCOSE BLDC GLUCOMTR-MCNC: 81 MG/DL (ref 70–105)
GLUCOSE BLDC GLUCOMTR-MCNC: 96 MG/DL (ref 70–105)
GLUCOSE SERPL-MCNC: 81 MG/DL (ref 65–99)
HCT VFR BLD AUTO: 31.2 % (ref 34–46.6)
HGB BLD-MCNC: 10.3 G/DL (ref 12–15.9)
INR PPP: 4.55 (ref 2–3)
MCH RBC QN AUTO: 30.2 PG (ref 26.6–33)
MCHC RBC AUTO-ENTMCNC: 33.2 G/DL (ref 31.5–35.7)
MCV RBC AUTO: 91 FL (ref 79–97)
PHOSPHATE SERPL-MCNC: 3.7 MG/DL (ref 2.5–4.5)
PLATELET # BLD AUTO: 75 10*3/MM3 (ref 140–450)
PMV BLD AUTO: 9.5 FL (ref 6–12)
POTASSIUM SERPL-SCNC: 3.3 MMOL/L (ref 3.5–5.2)
POTASSIUM SERPL-SCNC: 4.2 MMOL/L (ref 3.5–5.2)
PROTHROMBIN TIME: 46.6 SECONDS (ref 19.4–28.5)
RBC # BLD AUTO: 3.43 10*6/MM3 (ref 3.77–5.28)
SODIUM SERPL-SCNC: 137 MMOL/L (ref 136–145)
WBC # BLD AUTO: 13.7 10*3/MM3 (ref 3.4–10.8)

## 2021-04-11 PROCEDURE — 94799 UNLISTED PULMONARY SVC/PX: CPT

## 2021-04-11 PROCEDURE — 84132 ASSAY OF SERUM POTASSIUM: CPT | Performed by: INTERNAL MEDICINE

## 2021-04-11 PROCEDURE — 85610 PROTHROMBIN TIME: CPT | Performed by: NURSE PRACTITIONER

## 2021-04-11 PROCEDURE — 25010000002 CEFTRIAXONE PER 250 MG: Performed by: INTERNAL MEDICINE

## 2021-04-11 PROCEDURE — 82962 GLUCOSE BLOOD TEST: CPT

## 2021-04-11 PROCEDURE — 25010000002 ONDANSETRON PER 1 MG: Performed by: NURSE PRACTITIONER

## 2021-04-11 PROCEDURE — 94760 N-INVAS EAR/PLS OXIMETRY 1: CPT

## 2021-04-11 PROCEDURE — 99232 SBSQ HOSP IP/OBS MODERATE 35: CPT | Performed by: INTERNAL MEDICINE

## 2021-04-11 PROCEDURE — 80069 RENAL FUNCTION PANEL: CPT | Performed by: INTERNAL MEDICINE

## 2021-04-11 PROCEDURE — 85027 COMPLETE CBC AUTOMATED: CPT | Performed by: NURSE PRACTITIONER

## 2021-04-11 RX ORDER — FLUCONAZOLE 40 MG/ML
200 POWDER, FOR SUSPENSION ORAL DAILY
Status: DISCONTINUED | OUTPATIENT
Start: 2021-04-11 | End: 2021-04-11

## 2021-04-11 RX ORDER — FLUCONAZOLE 100 MG/1
100 TABLET ORAL DAILY
Status: DISCONTINUED | OUTPATIENT
Start: 2021-04-12 | End: 2021-04-13 | Stop reason: HOSPADM

## 2021-04-11 RX ORDER — FLUCONAZOLE 100 MG/1
200 TABLET ORAL ONCE
Status: COMPLETED | OUTPATIENT
Start: 2021-04-11 | End: 2021-04-11

## 2021-04-11 RX ADMIN — IPRATROPIUM BROMIDE AND ALBUTEROL SULFATE 3 ML: 2.5; .5 SOLUTION RESPIRATORY (INHALATION) at 18:32

## 2021-04-11 RX ADMIN — CHLORHEXIDINE GLUCONATE 15 ML: 1.2 RINSE ORAL at 08:24

## 2021-04-11 RX ADMIN — POTASSIUM CHLORIDE 20 MEQ: 1500 TABLET, EXTENDED RELEASE ORAL at 07:26

## 2021-04-11 RX ADMIN — PHENAZOPYRIDINE HYDROCHLORIDE 200 MG: 200 TABLET ORAL at 17:26

## 2021-04-11 RX ADMIN — ACETAMINOPHEN 650 MG: 325 TABLET, FILM COATED ORAL at 11:35

## 2021-04-11 RX ADMIN — ARFORMOTEROL TARTRATE 15 MCG: 15 SOLUTION RESPIRATORY (INHALATION) at 06:58

## 2021-04-11 RX ADMIN — GABAPENTIN 200 MG: 100 CAPSULE ORAL at 20:58

## 2021-04-11 RX ADMIN — METOPROLOL TARTRATE 12.5 MG: 25 TABLET, FILM COATED ORAL at 20:57

## 2021-04-11 RX ADMIN — MECLIZINE HYDROCHLORIDE 25 MG: 25 TABLET ORAL at 13:05

## 2021-04-11 RX ADMIN — GABAPENTIN 200 MG: 100 CAPSULE ORAL at 08:23

## 2021-04-11 RX ADMIN — NYSTATIN 500000 UNITS: 100000 SUSPENSION ORAL at 21:00

## 2021-04-11 RX ADMIN — Medication 10 ML: at 08:24

## 2021-04-11 RX ADMIN — ASPIRIN 81 MG: 81 TABLET, COATED ORAL at 08:23

## 2021-04-11 RX ADMIN — POTASSIUM CHLORIDE 20 MEQ: 750 TABLET, EXTENDED RELEASE ORAL at 07:25

## 2021-04-11 RX ADMIN — NYSTATIN: 100000 POWDER TOPICAL at 21:01

## 2021-04-11 RX ADMIN — CEFTRIAXONE SODIUM 1 G: 1 INJECTION, POWDER, FOR SOLUTION INTRAMUSCULAR; INTRAVENOUS at 16:01

## 2021-04-11 RX ADMIN — IPRATROPIUM BROMIDE AND ALBUTEROL SULFATE 3 ML: 2.5; .5 SOLUTION RESPIRATORY (INHALATION) at 23:20

## 2021-04-11 RX ADMIN — NYSTATIN 500000 UNITS: 100000 SUSPENSION ORAL at 11:36

## 2021-04-11 RX ADMIN — Medication 10 ML: at 21:01

## 2021-04-11 RX ADMIN — FLUOXETINE 20 MG: 20 CAPSULE ORAL at 08:23

## 2021-04-11 RX ADMIN — BUMETANIDE 1 MG: 1 TABLET ORAL at 17:26

## 2021-04-11 RX ADMIN — PANTOPRAZOLE SODIUM 40 MG: 40 TABLET, DELAYED RELEASE ORAL at 06:08

## 2021-04-11 RX ADMIN — LEVOTHYROXINE SODIUM 100 MCG: 0.1 TABLET ORAL at 06:07

## 2021-04-11 RX ADMIN — IPRATROPIUM BROMIDE AND ALBUTEROL SULFATE 3 ML: 2.5; .5 SOLUTION RESPIRATORY (INHALATION) at 06:58

## 2021-04-11 RX ADMIN — FLUCONAZOLE 200 MG: 100 TABLET ORAL at 10:16

## 2021-04-11 RX ADMIN — POTASSIUM CHLORIDE 20 MEQ: 750 TABLET, EXTENDED RELEASE ORAL at 11:36

## 2021-04-11 RX ADMIN — ARFORMOTEROL TARTRATE 15 MCG: 15 SOLUTION RESPIRATORY (INHALATION) at 18:38

## 2021-04-11 RX ADMIN — IPRATROPIUM BROMIDE AND ALBUTEROL SULFATE 3 ML: 2.5; .5 SOLUTION RESPIRATORY (INHALATION) at 14:45

## 2021-04-11 RX ADMIN — Medication 10 ML: at 08:25

## 2021-04-11 RX ADMIN — PHENAZOPYRIDINE HYDROCHLORIDE 200 MG: 200 TABLET ORAL at 07:25

## 2021-04-11 RX ADMIN — BUMETANIDE 1 MG: 1 TABLET ORAL at 08:23

## 2021-04-11 RX ADMIN — POTASSIUM CHLORIDE 20 MEQ: 750 TABLET, EXTENDED RELEASE ORAL at 17:26

## 2021-04-11 RX ADMIN — MECLIZINE HYDROCHLORIDE 25 MG: 25 TABLET ORAL at 20:59

## 2021-04-11 RX ADMIN — IPRATROPIUM BROMIDE AND ALBUTEROL SULFATE 3 ML: 2.5; .5 SOLUTION RESPIRATORY (INHALATION) at 03:29

## 2021-04-11 RX ADMIN — HYDRALAZINE HYDROCHLORIDE 10 MG: 10 TABLET, FILM COATED ORAL at 06:07

## 2021-04-11 RX ADMIN — BUDESONIDE 0.5 MG: 0.5 SUSPENSION RESPIRATORY (INHALATION) at 06:58

## 2021-04-11 RX ADMIN — CHLORHEXIDINE GLUCONATE 15 ML: 1.2 RINSE ORAL at 21:00

## 2021-04-11 RX ADMIN — BUDESONIDE 1 MG: 0.5 SUSPENSION RESPIRATORY (INHALATION) at 18:43

## 2021-04-11 RX ADMIN — NYSTATIN: 100000 POWDER TOPICAL at 08:23

## 2021-04-11 RX ADMIN — DOCUSATE SODIUM 100 MG: 100 CAPSULE, LIQUID FILLED ORAL at 08:24

## 2021-04-11 RX ADMIN — ATORVASTATIN CALCIUM 40 MG: 40 TABLET, FILM COATED ORAL at 21:00

## 2021-04-11 RX ADMIN — PHENAZOPYRIDINE HYDROCHLORIDE 200 MG: 200 TABLET ORAL at 11:36

## 2021-04-11 RX ADMIN — HYDRALAZINE HYDROCHLORIDE 10 MG: 10 TABLET, FILM COATED ORAL at 13:00

## 2021-04-11 RX ADMIN — IPRATROPIUM BROMIDE AND ALBUTEROL SULFATE 3 ML: 2.5; .5 SOLUTION RESPIRATORY (INHALATION) at 10:50

## 2021-04-11 RX ADMIN — ONDANSETRON 4 MG: 2 INJECTION INTRAMUSCULAR; INTRAVENOUS at 13:41

## 2021-04-11 RX ADMIN — ACETAMINOPHEN 650 MG: 325 TABLET, FILM COATED ORAL at 20:57

## 2021-04-11 RX ADMIN — METOPROLOL TARTRATE 12.5 MG: 25 TABLET, FILM COATED ORAL at 08:23

## 2021-04-11 RX ADMIN — POTASSIUM CHLORIDE 20 MEQ: 1500 TABLET, EXTENDED RELEASE ORAL at 11:35

## 2021-04-11 RX ADMIN — NYSTATIN 500000 UNITS: 100000 SUSPENSION ORAL at 17:26

## 2021-04-11 RX ADMIN — HYDRALAZINE HYDROCHLORIDE 10 MG: 10 TABLET, FILM COATED ORAL at 21:00

## 2021-04-11 RX ADMIN — ONDANSETRON 4 MG: 2 INJECTION INTRAMUSCULAR; INTRAVENOUS at 21:00

## 2021-04-11 NOTE — PROGRESS NOTES
"Nutrition Services    Patient Name: Claudia Rust  YOB: 1950  MRN: [id number]  Admission date: 3/18/2021      PPE Documentation        PPE Worn By Provider RD did not enter room for this encounter   PPE Worn By Patient  -     PROGRESS NOTE      Encounter Information: Tube feed + PO check on. Patient remains extubated. Has nocturnal tube feeds ordered but not infused again. Dr. Reyes gave verbal order not to infuse them to avoid aspiration. Spoke with RN who reports patient is eating 50% of meals + snacks.        Labs (reviewed below): -hypokalemia- prn replacement given   -low Cr       GI Function:  -Last BM documented on 4/11       Nutrition Intervention: DC tube feeding orders given improvement in PO intakes and given MD no longer wants infusion. Will add additional oral nutritional supplements to promote calorie/protein intakes.        PO Diet/Supplements: Diet Texture, Diabetic/Consistent Carbs; Diabetic - Consistent Carb; Mechanical Ground; Thickened Liquids (Nectar), Full Feed - Nursing   Magic cups at lunch and dinner meals  Novasource Renal Shakes BID (safe for NTL)      EN Prescription: None        Intake/Output:   Intake/Output Summary (Last 24 hours) at 4/11/2021 0859  Last data filed at 4/11/2021 0500  Gross per 24 hour   Intake 718 ml   Output 1300 ml   Net -582 ml            Height: Height: 162.6 cm (64\")   Weight: Weight: 122 kg (269 lb 6.4 oz) (04/10/21 0500)   BMI: Body mass index is 46.24 kg/m².     Results from last 7 days   Lab Units 04/11/21  0426 04/10/21  0531 04/09/21  0624 04/05/21  0501   SODIUM mmol/L 137 135* 135* 141   POTASSIUM mmol/L 3.3* 4.0 3.2* 3.3*   CHLORIDE mmol/L 97* 99 98 106   CO2 mmol/L 29.0 28.0 27.0 26.0   BUN mg/dL 20 22 22 43*   CREATININE mg/dL <0.47* <0.47* 0.44* 0.46*   CALCIUM mg/dL 8.6 8.4* 8.3* 8.5*   BILIRUBIN mg/dL  --   --   --  0.5   ALK PHOS U/L  --   --   --  137*   ALT (SGPT) U/L  --   --   --  44*   AST (SGOT) U/L  --   --   --  52*   GLUCOSE " mg/dL 81 72 96 133*     Results from last 7 days   Lab Units 04/11/21  0426 04/11/21  0425 04/09/21  0624 04/09/21  0419 04/09/21 0418 04/08/21  0623 04/06/21 0422   MAGNESIUM mg/dL  --   --   --  2.0  --  2.3 2.4   PHOSPHORUS mg/dL 3.7  --   --   --    < > 3.6 3.1   HEMOGLOBIN g/dL  --  10.3*   < >  --   --  10.2* 9.7*   HEMOGLOBIN, POC   --   --    < >  --   --   --   --    HEMATOCRIT %  --  31.2*   < >  --   --  31.5* 29.3*   HEMATOCRIT POC   --   --    < >  --   --   --   --     < > = values in this interval not displayed.     COVID19   Date Value Ref Range Status   03/24/2021 Not Detected Not Detected - Ref. Range Final     Lab Results   Component Value Date    HGBA1C 5.7 (H) 03/16/2021       Vitals:    04/11/21 0800   BP:    Pulse:    Resp:    Temp: 98.2 °F (36.8 °C)   SpO2:        RD to follow up per protocol.    Electronically signed by:  Cintia Bhatti RD  04/11/21 08:59 EDT

## 2021-04-11 NOTE — PROGRESS NOTES
Cardiology Progress Note    Patient Identification:  Name: Claudia Rust  Age: 70 y.o.  Sex: female  :  1950  MRN: [id number]                 Follow Up / Chief Complaint: Follow-up for valvular heart disease status post AVR    Interval History: Patient underwent #23 magna pericardial prosthesis AVR 3/18/2021  3/22/2021.  Events noted.  Patient is intubated on IV Olegario-Synephrine and Bumex.  3/23/2021.  Patient continues to intubated  3/24/2021 patient continues to be intubated and mechanically ventilated.  3/25/2021 patient continues to be intubated and mechanically ventilated.  Underwent a CHRISTIANO which showed normal LV systolic function and good aortic valve function.  3/26/2021.  Patient continues to be intubated and mechanically ventilated.  He is lightly sedated.  Patient underwent bronchoscopy 3/25/2021.  Patient was getting tachycardic was given 12.5 of metoprolol developed hypotension requiring Olegario-Synephrine.  HIT antibody positive  3/29/2021: Continues to be intubated and mechanically ventilated.  3/30/2021.  Patient continues to be intubated and mechanically ventilated.  3/31/2021: Patient continues to be intubated and mechanically ventilated.  Nephrology has started on IV Bumex drip.  21: Patient is intubated and is on weaning trials.  Bumex drip has been discontinued.  2021 : Patient intubated on weaning trials.  On IV Bumex  21 : Patient extubated.  Dobbhoff tube present.  2021 : Patient is sitting in chair intermittently confused without symptoms at the current time.  2021 : Patient sitting in bed hemodynamically stable.  Dobbhoff tube is present.  2021 : Patient was feeling depressed yesterday was given her antidepressants is feeling a whole lot better and mental status is whole lot better today.  2021 : Patient is sitting in chair and hemodynamically stable.  2021: Patient is requiring less oxygen currently on 2 L by nasal cannula     Subjective: Patient seen  and examined.  Chart reviewed.  Labs reviewed.  Discussed with RN taking care of patient.  Patient is more awake denies any chest pain shortness of breath.  States that she is feeling better.      Objective: Sodium is 152--> 144-> 141-141.  BUN/creatinine  49/1.05-> 42/0.69-> 42/0.64-> 46/0.60-> 47/0.49-> 43/0.46--> 43/0.43-> 35/0.38-> 34/0.47-> 22/0.44-> 22/0.47-> 20/0.47..  Hemoglobin is 10.1--> 10.8--> 9.0-> 10.8-> 10.5-> 10.2-> 10.6-> 10.6-> 10.7-> 9.7-> 9.8-> 10.2-> 10.1-> 10.3  Platelet count 95-> 134-> 108-> 92-> 75. white count elevated at 22.2-> 16.2-> 16.1-> 15.4.    History of present illness :    This is a 70-year-old with PMH      #Valvular heart disease with moderate to severe aortic stenosis  Cardiac cath 1/20/2021 revealing nonobstructive CAD  # Hyperlipidemia,    # Hypertension  # COPD, KARLY on CPAP  # Prediabetes  # Hypothyroidism, Rheumatoid Arthritis, spinal stenosis, hiatal hernia, chronic depression, bipolar, GERD, IBS, chronic migraine, vertigo, herniated disc, compression lumbar fractures  #Allergies/intolerance to Stadol  # Hysterectomy, bladder reconstruction, cholecystectomy, right hand surgery  # Family history of strokes and grandfather.  Mother's cancer, father on  # Active cigarette smoker trying to quit         Here for aortic valve replacement.  Patient was recently seen with a complaint of dyspnea on exertion edema and weight gain.  Patient was brought in for elective aortic valve replacement.  Patient underwent aortic valve replacement 3/18/2021 with #23 magna pericardial prosthesis.  Blood pressure is high is on IV Cardene and IV nitroglycerin.  Currently intubated sedated chest tube present Venegas catheter present.  Underlying rhythm is flatline with AV paced rhythm.  Underwent an echocardiogram which is revealing severe aortic stenosis on 2/11/2020   Underwent cardiac cath 1/20/2021 which revealed no obstructive disease.  Carotid Dopplers 9/13/2019 normal.  Echocardiogram  2/11/2020 is revealing severe aortic stenosis  Work-up here on 3/16/2021 revealed proBNP 733, normal CMP, A1c 5.7, normal CBC, chest x-ray with no acute abnormality.  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  Events noted patient was intubated 3/21/2021 for respiratory failure.  Extubated 4/4/2021.  Continue to monitor pulmonary status and follow-up with pulmonary.  Chest x-ray 4 /5/2021   1. Stable diffuse mixed interstitial and airspace disease.  2. Interval extubation.  3. Stable postoperative findings and stable left internal jugular  central venous catheter/feeding tube.    Patient's po Bumex 1 mg 3 times daily  Monitor electrolytes and renal function  Potassium is low, will follow electrolyte replacement protocol and monitor electrolytes.  Patient sodium was going up, patient was given free water and has improved.  Appreciated nephrology input.  Will follow up with nephrology.  Monitor for routine expected post surgery blood loss anemia  Patient underwent transesophageal echo 3/25/2021 which revealed normal LV systolic function normal chamber sizes.  Normal aortic valve placement and no significant valvular heart disease.  Continue to monitor blood pressure closely patient developed hypotension requiring Olegario-Synephrine with 12.5 of  "metoprolol.  Now blood pressure is going up we will restart metoprolol and monitor blood pressure closely.  Will follow up with CT VS for routine post CABG care and HIT, patient has been started on warfarin.  INR is 4.55, will hold warfarin  Discussed with RN taking care of patient to coordinate care  Discussed with patient's  by bedside.   is requesting wheelchair ramp and left.  Will defer it to the primary admitting team to prescribe    Past Medical History:  Past Medical History:   Diagnosis Date   • Acute congestive heart failure (CMS/HCC)    • Allergies    • Anxiety    • Arthritis    • Asthma    • Bilateral leg edema    • Bipolar 1 disorder (CMS/HCC)    • Bulging lumbar disc     X3   • Cancer (CMS/[facility])     states had \"cancerous tumor during pregnancy and had to have hysterectomy\"   • Cataract     bilateral   • Cellulitis 03/2021    bilateral legs   • Compression fracture of vertebral column (CMS/HCC)     LUMBAR   • COPD (chronic obstructive pulmonary disease) (CMS/[facility])    • Delayed emergence from anesthesia    • Depression    • Depression    • Dyslipidemia    • Dyspnea on exertion    • Fibromyalgia    • GERD (gastroesophageal reflux disease)    • Hiatal hernia    • Hyperlipidemia    • Hypertension    • Hypothyroid     HYPOTHYROID   • IBS (irritable bowel syndrome)    • Migraines    • Morbid obesity (CMS/HCC)    • Neuropathy    • Panic attacks    • Peripheral edema    • PONV (postoperative nausea and vomiting)    • Poor mobility     rolling walker   • Prediabetes    • PVD (peripheral vascular disease) (CMS/[facility])    • Rheumatoid aortitis    • Spinal stenosis    • Spinal stenosis    • Vertigo    • Vitamin D deficiency      Past Surgical History:  Past Surgical History:   Procedure Laterality Date   • AORTIC VALVE REPAIR/REPLACEMENT N/A 3/18/2021    Procedure: AORTIC VALVE REPLACEMENT;  Surgeon: Olaf Mcgill MD;  Location: [facility];  Service: Cardiothoracic;  Laterality: N/A;   • BRONCHOSCOPY " N/A 3/25/2021    Procedure: BRONCHOSCOPY AT BEDSIDE WITH BRONCHIOALVEOLAR LAVAGE;  Surgeon: Glenn Reyes MD;  Location: [facility] ENDOSCOPY;  Service: Pulmonary;  Laterality: N/A;  PNA   • CARDIAC CATHETERIZATION  2009   • CARDIAC CATHETERIZATION N/A 8/14/2019    Procedure: Left Heart Cath;  Surgeon: Jose Levi MD;  Location: [facility] CATH INVASIVE LOCATION;  Service: Cardiovascular   • CARDIAC CATHETERIZATION N/A 8/14/2019    Procedure: Right Heart Cath;  Surgeon: Jose Levi MD;  Location: [facility] CATH INVASIVE LOCATION;  Service: Cardiovascular   • CARDIAC CATHETERIZATION N/A 8/14/2019    Procedure: Aortic root aortogram;  Surgeon: Jose Levi MD;  Location: [facility] CATH INVASIVE LOCATION;  Service: Cardiovascular   • CARDIAC CATHETERIZATION N/A 1/20/2021    Procedure: Left Heart Cath;  Surgeon: Jose Levi MD;  Location: [facility] CATH INVASIVE LOCATION;  Service: Cardiovascular;  Laterality: N/A;   • COLONOSCOPY     • CYSTOCELE REPAIR  1984   • ELBOW ARTHROPLASTY  2011   • ENDOSCOPY     • FOOT SURGERY     • GALLBLADDER SURGERY  2002   • TONSILLECTOMY     • VAGINAL HYSTERECTOMY  1972        Social History:   Social History     Tobacco Use   • Smoking status: Current Every Day Smoker     Packs/day: 1.00     Types: Cigarettes   • Smokeless tobacco: Never Used   • Tobacco comment: decrease now and do not smoke dos   Substance Use Topics   • Alcohol use: Yes     Comment: socially      Family History:  History reviewed. No pertinent family history.       Allergies:  Allergies   Allergen Reactions   • Adhesive Tape Unknown (See Comments)     hives   • Butorphanol Other (See Comments)     Chest pain difficulty breathing throat swelling   • Garlic Other (See Comments)     Chest pain difficulty breathing throat swells   • Tetanus-Diphtheria Toxoids Td Other (See Comments)     Dizziness,  vomiting   • Aloe Itching   • Bee Venom Other (See Comments)     Swelling eyes and lips  "hand swelling   • Latex Itching   • Macadamia Nut Oil Other (See Comments)     Chest pain difficulty breathing throat swelling   • Tuberculin Unknown (See Comments)     reactor   • Wasp Venom Other (See Comments)     Swelling eyes lips and hand     Scheduled Meds:  arformoterol, 15 mcg, BID - RT  aspirin, 81 mg, Daily  atorvastatin, 40 mg, Nightly  bisacodyl, 10 mg, Daily  budesonide, 1 mg, BID - RT  bumetanide, 1 mg, BID  cefTRIAXone, 1 g, Q24H  chlorhexidine, 15 mL, Q12H  docusate sodium, 100 mg, BID  fluconazole, 200 mg, Daily  FLUoxetine, 20 mg, Daily  gabapentin, 200 mg, Q12H  hydrALAZINE, 10 mg, Q8H  insulin lispro, 0-14 Units, 4x Daily With Meals & Nightly  ipratropium-albuterol, 3 mL, Q4H - RT  levothyroxine, 100 mcg, Q AM  metOLazone, 2.5 mg, Every Other Day  metoprolol tartrate, 12.5 mg, Q12H  nystatin, 5 mL, 4x Daily  nystatin, , Q12H  pantoprazole, 40 mg, QAM  phenazopyridine, 200 mg, TID With Meals  polyethylene glycol, 17 g, Daily  potassium chloride, 20 mEq, TID With Meals  sodium chloride, 10 mL, Q12H  sodium chloride, 10 mL, Q12H          Review of Systems:   Review of Systems   Constitutional: Negative for chills and fever.   Cardiovascular: Negative for chest pain and palpitations.   Respiratory: Negative for cough and shortness of breath.    Gastrointestinal: Negative for nausea and vomiting.              Constitutional:  Temp:  [97.8 °F (36.6 °C)-98.5 °F (36.9 °C)] 98.2 °F (36.8 °C)  Heart Rate:  [58-73] 68  Resp:  [18-20] 18  BP: (126)/(62) 126/62  Arterial Line BP: (103-138)/(47-67) 123/60    Physical Exam   /62   Pulse 68   Temp 98.2 °F (36.8 °C) (Axillary)   Resp 18   Ht 162.6 cm (64\")   Wt 122 kg (269 lb 6.4 oz)   SpO2 97%   BMI 46.24 kg/m²   General: Patient is sitting in chair in no acute distress  Eyes: Sclera is anicteric,  conjunctiva is clear   HEENT:  No JVD.  Dobbhoff tube present.  Respiratory: Good air entry, nonlabored breathing.  Scattered rhonchi and " wheezing.  Cardiovascular: S1,S2 Regular rate and rhythm.  Sternotomy is healing well.  Gastrointestinal: Abdomen nondistended, soft  Musculoskeletal:  No abnormal movements  Extremities: Patient has chronic bilateral lower extremity edema continues to have 1+ edema.  Skin: Stasis changes seen bilateral shin.  Neuro: Alert and awake, no lateralizing deficits appreciated    INTAKE AND OUTPUT:    Intake/Output Summary (Last 24 hours) at 4/11/2021 1004  Last data filed at 4/11/2021 0500  Gross per 24 hour   Intake 718 ml   Output 1300 ml   Net -582 ml       Cardiographics  Telemetry: Sinus rhythm    ECG:   ECG 12 Lead   Preliminary Result   HEART RATE= 59  bpm   RR Interval= 1020  ms   KY Interval= 158  ms   P Horizontal Axis= 14  deg   P Front Axis= 25  deg   QRSD Interval= 91  ms   QT Interval= 652  ms   QRS Axis= 55  deg   T Wave Axis= 67  deg   - ABNORMAL ECG -   Sinus bradycardia   Prolonged QT interval   Electronically Signed By:    Date and Time of Study: 2021-03-23 13:15:33      ECG 12 Lead   Final Result   HEART RATE= 80  bpm   RR Interval= 752  ms   KY Interval= 161  ms   P Horizontal Axis= 0  deg   P Front Axis= 22  deg   QRSD Interval= 80  ms   QT Interval= 419  ms   QRS Axis= 39  deg   T Wave Axis= 35  deg   - NORMAL ECG -   Sinus rhythm   When compared with ECG of 19-Mar-2021 4:30:10,   Significant repolarization change   Electronically Signed By: Segun Rucker ([facility]) 20-Mar-2021 10:06:29   Date and Time of Study: 2021-03-20 05:15:54      ECG 12 Lead   Final Result   HEART RATE= 62  bpm   RR Interval= 984  ms   KY Interval= 165  ms   P Horizontal Axis= 13  deg   P Front Axis= 32  deg   QRSD Interval= 76  ms   QT Interval= 434  ms   QRS Axis= 43  deg   T Wave Axis= 82  deg   - ABNORMAL ECG -   Sinus rhythm   Atrial premature complex   ST elevation, consider inferior injury   When compared with ECG of 18-Mar-2021 11:04:47,   No significant change   Electronically Signed By: Segun Rucker ([facility]) 19-Mar-2021  16:48:51   Date and Time of Study: 2021-03-19 04:30:10      ECG 12 Lead   Final Result   HEART RATE= 56  bpm   RR Interval= 1072  ms   CA Interval= 163  ms   P Horizontal Axis= -22  deg   P Front Axis= -9  deg   QRSD Interval= 88  ms   QT Interval= 516  ms   QRS Axis= 40  deg   T Wave Axis= 43  deg   - OTHERWISE NORMAL ECG -   Sinus bradycardia   When compared with ECG of 16-Mar-2021 8:51:36,   Significant repolarization change   Electronically Signed By: Segun Rucker ([facility]) 18-Mar-2021 17:48:15   Date and Time of Study: 2021-03-18 11:04:47        I have personally reviewed EKG    Echocardiogram: Results for orders placed during the hospital encounter of 03/18/21    Adult Transesophageal Echo (CHRISTIANO) W/ Cont if Necessary Per Protocol (Bedside)    Interpretation Summary  CHRISTIANO   procedure note    Procedure:  CHRISTIANO    Indication:   Valvular heart disease s/p AVR, respiratory failure    Date of procedure  : 3/25/2021    :  Dr. Jose Levi    Description of procedure:    Under conscious sedation given by anesthesiology and local anesthesia, a CHRISTIANO probe was advanced into the esophagus and into the stomach 2D, M-mode, color Doppler images were obtained..  Patient tolerated procedure well complications well.    Complications: none    Results:    Normal LV size and contractility LV contractility, EF of 60%  Normal RV size  Normal left atrial size, normal right atrial size, left atrial appendage without thrombus.  Aortic valve, mitral valve, tricuspid valve appears structurally normal, mild TR seen.  No vegetation seen  No pericardial effusion seen.  Proximal aorta appears normal in size.  Mild aortic plaque seen      Recommendations/plan:    Clinical correlation recommended      Lab Review   I have reviewed the labs      Results from last 7 days   Lab Units 04/09/21  0419   MAGNESIUM mg/dL 2.0     Results from last 7 days   Lab Units 04/11/21  0426   SODIUM mmol/L 137   POTASSIUM mmol/L 3.3*   BUN mg/dL 20  "  CREATININE mg/dL <0.47*   CALCIUM mg/dL 8.6         Results from last 7 days   Lab Units 04/11/21  0425 04/10/21  0531 04/09/21 1916 04/09/21 0418   WBC 10*3/mm3 13.70* 15.40*  --  16.10*   HEMOGLOBIN g/dL 10.3* 10.3*  --  10.1*   HEMOGLOBIN, POC g/dL  --   --  14.7  --    HEMATOCRIT % 31.2* 31.4*  --  30.8*   HEMATOCRIT POC %  --   --  43  --    PLATELETS 10*3/mm3 75* 92*  --  92*     Results from last 7 days   Lab Units 04/11/21  0425 04/10/21  0531 04/09/21  0418   INR  4.55* 3.70* 1.65*       RADIOLOGY:  Imaging Results (Last 24 Hours)     ** No results found for the last 24 hours. **                )4/11/2021  MD DANIEL Lamas/Transcription:   \"Dictated utilizing Dragon dictation\".   "

## 2021-04-11 NOTE — PROGRESS NOTES
ICU Daily Progress Note        Aortic valve stenosis    Rheumatoid arthritis (CMS/[facility])      ASSESSMENT:  Acute hypoxic respiratory failure required intubation 03/22/2021 , extubated 4/4/21  Aortic valve stenosis, status post tissue replacement  Pulm hypertension, severe  COPD  KARLY  Cor pulmonale  Tobacco abuse  Upper airway infection with moderate amount of mucopurulent secretions noted on bronchoscopy 3/25/2021  Sputum culture with Candida, likely contaminant  Interstitial pneumonitis 2nd to aspiration   Acute thrombocytopenia  Catheter associated DVT right upper extremity subclavian, axillary, and brachial  Upper arm left DVT  DM  Ecoli UTI  Oral candidiasis     PLAN:  Wean O2 to maintain a saturation of 91%, currently 2 L  Anticoagulation with Coumadin, currently on hold.  Supratherapeutic with INR 4.55  Steroids titrated off  Echocardiogram reviewed  Bronchodilator  Inhaled corticosteroids    Ceftriaxone added 4/10/2021 x 7 days for E. coli UTI  Diflucan added 4/11/2021 x7 days for oral candidiasis  Nystatin swish and spit added 4/11/2021 x 10 days    Glycemic control with sliding scale insulin.  Lantus DC'd due to hypoglycemia  DVT prophylaxis on hold, supratherapeutic INR  GI prophylaxis PPI     Oral diet, slowly improving intake, supplemented with boost     Nephrology following  Cardiology following  CV surgery admitting     PT/OT evaluation and treatment   Case management following for discharge planning.  Will likely need rehab due to severe deconditioning      LOS: 24 days         Vital signs for last 24 hours:  Vitals:    04/11/21 1300 04/11/21 1425 04/11/21 1445 04/11/21 1449   BP: 113/50 119/53     Pulse: 69 65 65    Resp:   18 18   Temp:       TempSrc:       SpO2:  96%     Weight:       Height:           Intake/Output last 3 shifts:  I/O last 3 completed shifts:  In: 718 [P.O.:420; I.V.:268; NG/GT:30]  Out: 2200 [Urine:2200]  Intake/Output this shift:  I/O this shift:  In: 240 [P.O.:240]  Out: -      Vent settings for last 24 hours:       Hemodynamic parameters for last 24 hours:       Radiology  Imaging Results (Last 24 Hours)     ** No results found for the last 24 hours. **          Labs:  Results from last 7 days   Lab Units 04/11/21  0425   WBC 10*3/mm3 13.70*   HEMOGLOBIN g/dL 10.3*   HEMATOCRIT % 31.2*   PLATELETS 10*3/mm3 75*     Results from last 7 days   Lab Units 04/11/21  0426 04/05/21  0501   SODIUM mmol/L 137 141   POTASSIUM mmol/L 3.3* 3.3*   CHLORIDE mmol/L 97* 106   CO2 mmol/L 29.0 26.0   BUN mg/dL 20 43*   CREATININE mg/dL <0.47* 0.46*   CALCIUM mg/dL 8.6 8.5*   BILIRUBIN mg/dL  --  0.5   ALK PHOS U/L  --  137*   ALT (SGPT) U/L  --  44*   AST (SGOT) U/L  --  52*   GLUCOSE mg/dL 81 133*     Results from last 7 days   Lab Units 04/09/21  1916   PH, ARTERIAL pH units 7.313*   PO2 ART mm Hg 95.2   PCO2, ARTERIAL mm Hg 43.2   HCO3 ART mmol/L 21.9     Results from last 7 days   Lab Units 04/11/21  0426 04/10/21  0531 04/09/21  0624   ALBUMIN g/dL 2.90* 2.80* 2.70*             Results from last 7 days   Lab Units 04/09/21  0419   MAGNESIUM mg/dL 2.0     Results from last 7 days   Lab Units 04/11/21  0425 04/10/21  0531 04/09/21  0418   INR  4.55* 3.70* 1.65*               Meds:   SCHEDULE  arformoterol, 15 mcg, Nebulization, BID - RT  aspirin, 81 mg, Oral, Daily  atorvastatin, 40 mg, Oral, Nightly  bisacodyl, 10 mg, Rectal, Daily  budesonide, 1 mg, Nebulization, BID - RT  bumetanide, 1 mg, Nasogastric, BID  cefTRIAXone, 1 g, Intravenous, Q24H  chlorhexidine, 15 mL, Mouth/Throat, Q12H  docusate sodium, 100 mg, Oral, BID  [START ON 4/12/2021] fluconazole, 100 mg, Oral, Daily  FLUoxetine, 20 mg, Oral, Daily  gabapentin, 200 mg, Oral, Q12H  hydrALAZINE, 10 mg, Nasogastric, Q8H  insulin lispro, 0-14 Units, Subcutaneous, 4x Daily With Meals & Nightly  ipratropium-albuterol, 3 mL, Nebulization, Q4H - RT  levothyroxine, 100 mcg, Oral, Q AM  metOLazone, 2.5 mg, Nasogastric, Every Other Day  metoprolol  tartrate, 12.5 mg, Oral, Q12H  nystatin, 5 mL, Oral, 4x Daily  nystatin, , Topical, Q12H  pantoprazole, 40 mg, Oral, QAM  phenazopyridine, 200 mg, Oral, TID With Meals  polyethylene glycol, 17 g, Oral, Daily  potassium chloride, 20 mEq, Oral, TID With Meals  sodium chloride, 10 mL, Intravenous, Q12H  sodium chloride, 10 mL, Intravenous, Q12H      Infusions     PRNs  •  acetaminophen **OR** acetaminophen **OR** acetaminophen  •  bisacodyl  •  calcium carbonate  •  Chlorhexidine Gluconate Cloth  •  dextrose  •  dextrose  •  glucagon (human recombinant)  •  hydrALAZINE  •  insulin lispro **AND** insulin lispro  •  ipratropium-albuterol  •  lidocaine  •  lidocaine  •  meclizine  •  [] HYDROmorphone **AND** naloxone  •  ondansetron  •  potassium chloride **OR** potassium chloride  •  potassium chloride **OR** potassium chloride  •  sodium chloride  •  sodium chloride    Physical Exam:  Physical Exam  HENT:      Head: Normocephalic and atraumatic.      Mouth/Throat:      Comments: Tongue with erythema, small white patch   Cardiovascular:      Rate and Rhythm: Normal rate.      Heart sounds: No murmur heard.     Pulmonary:      Breath sounds: Rales present. No wheezing or rhonchi.      Comments: Diminished bases, fine bibasilar crackles  Abdominal:      General: There is no distension.      Tenderness: There is no abdominal tenderness. There is no guarding or rebound.      Comments: Morbid body habitus   Musculoskeletal:         General: Swelling present.      Cervical back: Normal range of motion.      Right lower leg: Edema present.      Left lower leg: Edema present.   Skin:     General: Skin is warm and dry.      Comments: Surgical incision healing well   Neurological:      Mental Status: She is oriented to person, place, and time.         ROS  Review of Systems   Constitutional: Positive for fatigue.        Appetite improving   HENT:        Tongue burning   Respiratory: Positive for shortness of breath.  Negative for cough.    Cardiovascular: Positive for leg swelling. Negative for chest pain.   Gastrointestinal: Negative for abdominal distention, abdominal pain, nausea and vomiting.   Musculoskeletal: Positive for arthralgias.         Critical Care Time greater than: 45 Minutes  Total time spent with patient greater than: 45 Minutes

## 2021-04-11 NOTE — PROGRESS NOTES
She is eating and tolerating a diet.  Probably discontinue her tube today.  Discontinue her art line.  She has thrush.  Her INR is 4.5.  Hold Coumadin today.  It may be higher with the fluconazole

## 2021-04-11 NOTE — PROGRESS NOTES
NEPHROLOGY PROGRESS NOTE------[facility]    [facility]  [phone]  Yovani Lerma MD      Patient Care Team:  Mary Wilde APRN as PCP - General  Mary Wilde APRN as PCP - Family Medicine  Jose Levi MD as Consulting Physician (Cardiology)  Yovani Lerma MD as Consulting Physician (Nephrology)      Provider:  Yovani Lerma MD  Patient Name: Claudia Rust  :  1950    SUBJECTIVE:    F/U JERROD/hypernatremia  No chest pain or SOA.   DHT out. Tolerating diet.    Medication:  arformoterol, 15 mcg, Nebulization, BID - RT  aspirin, 81 mg, Oral, Daily  atorvastatin, 40 mg, Oral, Nightly  bisacodyl, 10 mg, Rectal, Daily  budesonide, 1 mg, Nebulization, BID - RT  bumetanide, 1 mg, Nasogastric, BID  cefTRIAXone, 1 g, Intravenous, Q24H  chlorhexidine, 15 mL, Mouth/Throat, Q12H  docusate sodium, 100 mg, Oral, BID  [START ON 2021] fluconazole, 100 mg, Oral, Daily  FLUoxetine, 20 mg, Oral, Daily  gabapentin, 200 mg, Oral, Q12H  hydrALAZINE, 10 mg, Nasogastric, Q8H  insulin lispro, 0-14 Units, Subcutaneous, 4x Daily With Meals & Nightly  ipratropium-albuterol, 3 mL, Nebulization, Q4H - RT  levothyroxine, 100 mcg, Oral, Q AM  metOLazone, 2.5 mg, Nasogastric, Every Other Day  metoprolol tartrate, 12.5 mg, Oral, Q12H  nystatin, 5 mL, Oral, 4x Daily  nystatin, , Topical, Q12H  pantoprazole, 40 mg, Oral, QAM  phenazopyridine, 200 mg, Oral, TID With Meals  polyethylene glycol, 17 g, Oral, Daily  potassium chloride, 20 mEq, Oral, TID With Meals  sodium chloride, 10 mL, Intravenous, Q12H  sodium chloride, 10 mL, Intravenous, Q12H           OBJECTIVE    Vital Sign Min/Max for last 24 hours  Temp  Min: 97.8 °F (36.6 °C)  Max: 98.5 °F (36.9 °C)   BP  Min: 126/62  Max: 126/62   Pulse  Min: 58  Max: 73   Resp  Min: 18  Max: 20   SpO2  Min: 92 %  Max: 100 %   No data recorded   No data recorded     Flowsheet Rows      First Filed Value   Admission Height   "162.6 cm (64\") Documented at 03/18/2021 2000   Admission Weight  129 kg (284 lb) Documented at 03/18/2021 2000          No intake/output data recorded.  I/O last 3 completed shifts:  In: 718 [P.O.:420; I.V.:268; NG/GT:30]  Out: 2200 [Urine:2200]    Physical Exam:    General Appearance: NAD. Fatigued  Head: normocephalic, without obvious abnormality and atraumatic +NGT INTACT  Eyes: conjunctivae and sclerae normal and no icterus  Neck: supple. NO GROSS JVD NOW  Lungs: +FEW SCATTERED RHONCHI.NO CRACKLES AT PRESENT  Heart: regular rhythm & normal rate and normal S1, S2 +NARGIS  Chest: Wall no abnormalities observed  Abdomen: normal bowel sounds and soft non-tender +OBESITY  Extremities: +TRACE DIFFUSE BILAT LE EDEMA, no cyanosis and no redness  Skin: no bleeding, bruising or rash, turgor normal, color normal and no lesions noted  Neurologic: Alert and oriented without focal deficits    Labs:    WBC WBC   Date Value Ref Range Status   04/11/2021 13.70 (H) 3.40 - 10.80 10*3/mm3 Final   04/10/2021 15.40 (H) 3.40 - 10.80 10*3/mm3 Final   04/09/2021 16.10 (H) 3.40 - 10.80 10*3/mm3 Final      HGB Hemoglobin   Date Value Ref Range Status   04/11/2021 10.3 (L) 12.0 - 15.9 g/dL Final   04/10/2021 10.3 (L) 12.0 - 15.9 g/dL Final   04/09/2021 14.7 12.0 - 17.0 g/dL Final   04/09/2021 10.1 (L) 12.0 - 15.9 g/dL Final      HCT Hematocrit   Date Value Ref Range Status   04/11/2021 31.2 (L) 34.0 - 46.6 % Final   04/10/2021 31.4 (L) 34.0 - 46.6 % Final   04/09/2021 43 38 - 51 % Final   04/09/2021 30.8 (L) 34.0 - 46.6 % Final      Platlets No results found for: LABPLAT   MCV MCV   Date Value Ref Range Status   04/11/2021 91.0 79.0 - 97.0 fL Final   04/10/2021 92.1 79.0 - 97.0 fL Final   04/09/2021 91.5 79.0 - 97.0 fL Final          Sodium Sodium   Date Value Ref Range Status   04/11/2021 137 136 - 145 mmol/L Final   04/10/2021 135 (L) 136 - 145 mmol/L Final   04/09/2021 135 (L) 136 - 145 mmol/L Final      Potassium Potassium   Date Value " Ref Range Status   04/11/2021 3.3 (L) 3.5 - 5.2 mmol/L Final   04/10/2021 4.0 3.5 - 5.2 mmol/L Final     Comment:     Slight hemolysis detected by analyzer. Results may be affected.Result checked    04/09/2021 3.2 (L) 3.5 - 5.2 mmol/L Final      Chloride Chloride   Date Value Ref Range Status   04/11/2021 97 (L) 98 - 107 mmol/L Final   04/10/2021 99 98 - 107 mmol/L Final   04/09/2021 98 98 - 107 mmol/L Final      CO2 CO2   Date Value Ref Range Status   04/11/2021 29.0 22.0 - 29.0 mmol/L Final   04/10/2021 28.0 22.0 - 29.0 mmol/L Final   04/09/2021 27.0 22.0 - 29.0 mmol/L Final      BUN BUN   Date Value Ref Range Status   04/11/2021 20 8 - 23 mg/dL Final   04/10/2021 22 8 - 23 mg/dL Final   04/09/2021 22 8 - 23 mg/dL Final      Creatinine Creatinine   Date Value Ref Range Status   04/11/2021 <0.47 (L) 0.57 - 1.00 mg/dL Final   04/10/2021 <0.47 (L) 0.57 - 1.00 mg/dL Final   04/09/2021 0.44 (L) 0.57 - 1.00 mg/dL Final      Calcium Calcium   Date Value Ref Range Status   04/11/2021 8.6 8.6 - 10.5 mg/dL Final   04/10/2021 8.4 (L) 8.6 - 10.5 mg/dL Final   04/09/2021 8.3 (L) 8.6 - 10.5 mg/dL Final      PO4 No components found for: PO4   Albumin Albumin   Date Value Ref Range Status   04/11/2021 2.90 (L) 3.50 - 5.20 g/dL Final   04/10/2021 2.80 (L) 3.50 - 5.20 g/dL Final   04/09/2021 2.70 (L) 3.50 - 5.20 g/dL Final      Magnesium Magnesium   Date Value Ref Range Status   04/09/2021 2.0 1.6 - 2.4 mg/dL Final      Uric Acid No components found for: URIC ACID     Imaging Results (Last 72 Hours)     ** No results found for the last 72 hours. **          Results for orders placed during the hospital encounter of 03/18/21    XR Chest 1 View    Narrative  DATE OF EXAM:  4/7/2021 3:25 AM    PROCEDURE:  XR CHEST 1 VW-    INDICATIONS:  Hypoxia.    COMPARISON:  Radiographs 4/6/2021, 4/5/2021, and 4/4/2021. CT 3/22/2021.    TECHNIQUE:  Single radiographic AP view of the chest was obtained.    FINDINGS:  Lordotic positioning. Stable  sternotomy wires, prosthetic heart valve,  partially imaged enteric tube, and left PICC. Interval removal of the  previously seen left IJ central venous catheter. Similar-appearing  basilar predominant opacification of both lungs. No pneumothorax.  Unchanged cardiomediastinal contours. No acute osseous abnormality is  identified.    Impression  Interval removal of the previously seen left IJ central venous catheter.  Otherwise, no significant interval change.    Electronically Signed By-Olman Benz MD On:4/7/2021 7:35 AM  This report was finalized on [id number] by  Olman Benz MD.      XR Chest 1 View    Narrative  Examination: XR CHEST 1 VW-    Date of Exam: 4/6/2021 5:35 AM    Indication: Hypoxia; M06.9-Rheumatoid arthritis, unspecified;  I35.0-Nonrheumatic aortic (valve) stenosis; J96.01-Acute respiratory  failure with hypoxia; J44.9-Chronic obstructive pulmonary disease,  unspecified.    Comparison: 4/5/2021.    Technique: Single radiographic view of the chest was obtained.    Findings:  Feeding tube courses below the diaphragm. New left PICC terminates in  the upper SVC. Stable left internal jugular central venous catheter.  Stable evidence of prior median sternotomy and heart valve replacement.  There is stable diffuse mixed interstitial and airspace disease in both  lungs with stable small bilateral pleural effusions. There is no  evidence of pneumothorax or new lung opacity. The heart size is stable.  Pulmonary vasculature is completely obscured. There are no acute osseous  abnormalities.    Impression  1. Stable mixed interstitial and airspace disease present throughout  both lungs.  2. Stable feeding tube, postoperative findings and left IJ CVC.  3. New left PICC terminates in the upper SVC.    Electronically Signed By-Romain Gutierrez MD On:4/6/2021 7:12 AM  This report was finalized on [id number] by  Romain Gutierrez MD.      XR Chest 1 View    Narrative  Examination: XR CHEST 1 VW-    Date of  Exam: 4/5/2021 5:00 AM    Indication: Hypoxia; M06.9-Rheumatoid arthritis, unspecified;  I35.0-Nonrheumatic aortic (valve) stenosis; J96.01-Acute respiratory  failure with hypoxia; J44.9-Chronic obstructive pulmonary disease,  unspecified.    Comparison: 4/4/2021.    Technique: Single radiographic view of the chest was obtained.    Findings:  There are diffuse bilateral mixed interstitial and airspace opacities in  both lungs. There is a stable left internal jugular central venous  catheter. Stable changes of prior median sternotomy and heart valve  replacement. Endotracheal tube has been removed. Stable feeding tube  coursing below the diaphragm. Stable bridging thoracic osteophytes  without acute osseous abnormality.    Impression  1. Stable diffuse mixed interstitial and airspace disease.  2. Interval extubation.  3. Stable postoperative findings and stable left internal jugular  central venous catheter/feeding tube.    Electronically Signed By-Romain Gutierrez MD On:4/5/2021 7:26 AM  This report was finalized on [id number] by  Romain Gutierrez MD.      Results for orders placed during the hospital encounter of 03/18/21    Duplex Venous Upper Extremity - Bilateral CAR    Interpretation Summary  · Acute right upper extremity deep vein thrombosis noted in the subclavian, axillary and brachial. This represents catheter associated deep vein thrombosis.  · All other vessels appear normal.        ASSESSMENT / PLAN      Aortic valve stenosis    Rheumatoid arthritis (CMS/HCC)    1. ARF/JERROD------Nonoliguric ATN.  Follow with ongoing diuretic exposure. No NSAIDs or IV dye    2. HYPERNATREMIA------Better.      3. HYPOALBUMINEMIA-----on TFs.     4. 3RD SPACED EDEMA------better. On bumex.    5. CAD S/P AVR-----per Cardiology and CT Surgery. S/P CHRISTIANO    6. OBESITY    7. ACUTE HYPOXIC RESPIRATORY FAILURE------S/P extubation. Per Pulmonary    8. RUE DVT    9. DMII-------Glucometers, SSI    10. ANEMIA------H/H stable    11.  SEPSIS/LEUKOCYTOSIS------Antibiotics per Pulmonary/CC    12. THROMBOCYTOPENIA    13. HYPOCALCEMIA-----Replaced    14. HYPERLIPIDEMIA------On Statin.      15. HYPOTHYROIDISM------On Sythroid    16. GERD/PUD PROPHYLAXIS------Prevacid per NGT    17. HTN-------BP good. D/C Amlodipine b/c of edema. Added scheduled Hydralazine    18. METABOLIC ALKALOSIS-----Better    19. HYPOKALEMIA-----Replaced    Cr stable, non oliguric  BP better  Replace K  Continue bumex    Yovani Lerma MD  [facility]  [phone]  04/11/21  11:00 EDT

## 2021-04-11 NOTE — PLAN OF CARE
Problem: Adult Inpatient Plan of Care  Goal: Plan of Care Review  Outcome: Ongoing, Progressing  Flowsheets (Taken 4/11/2021 1111)  Progress: improving  Plan of Care Reviewed With:   patient   spouse  Outcome Summary: D/C art line and NG tube. Still has external cath and on 3L NC. VS stable. Will continue to monitor.  Goal: Patient-Specific Goal (Individualized)  Outcome: Ongoing, Progressing  Goal: Absence of Hospital-Acquired Illness or Injury  Outcome: Ongoing, Progressing  Intervention: Identify and Manage Fall Risk  Recent Flowsheet Documentation  Taken 4/11/2021 0800 by João Joaquin RN  Safety Promotion/Fall Prevention:   activity supervised   assistive device/personal items within reach   clutter free environment maintained   fall prevention program maintained   muscle strengthening facilitated   nonskid shoes/slippers when out of bed   room organization consistent   safety round/check completed  Intervention: Prevent Skin Injury  Recent Flowsheet Documentation  Taken 4/11/2021 0800 by João Joaquin RN  Body Position: sitting up in bed  Goal: Optimal Comfort and Wellbeing  Outcome: Ongoing, Progressing  Intervention: Provide Person-Centered Care  Recent Flowsheet Documentation  Taken 4/11/2021 0800 by João Joaquin RN  Trust Relationship/Rapport:   care explained   choices provided   emotional support provided   empathic listening provided   questions answered   questions encouraged   reassurance provided   thoughts/feelings acknowledged  Goal: Readiness for Transition of Care  Outcome: Ongoing, Progressing   Goal Outcome Evaluation:  Plan of Care Reviewed With: patient, spouse  Progress: improving  Outcome Summary: D/C art line and NG tube. Still has external cath and on 3L NC. VS stable. Will continue to monitor.

## 2021-04-12 ENCOUNTER — APPOINTMENT (OUTPATIENT)
Dept: GENERAL RADIOLOGY | Facility: HOSPITAL | Age: 71
End: 2021-04-12

## 2021-04-12 LAB
ALBUMIN SERPL-MCNC: 2.8 G/DL (ref 3.5–5.2)
ANION GAP SERPL CALCULATED.3IONS-SCNC: 10 MMOL/L (ref 5–15)
BUN SERPL-MCNC: 21 MG/DL (ref 8–23)
BUN/CREAT SERPL: ABNORMAL
CALCIUM SPEC-SCNC: 8.5 MG/DL (ref 8.6–10.5)
CHLORIDE SERPL-SCNC: 100 MMOL/L (ref 98–107)
CO2 SERPL-SCNC: 29 MMOL/L (ref 22–29)
CREAT SERPL-MCNC: <0.47 MG/DL (ref 0.57–1)
DEPRECATED RDW RBC AUTO: 49.4 FL (ref 37–54)
ERYTHROCYTE [DISTWIDTH] IN BLOOD BY AUTOMATED COUNT: 15.2 % (ref 12.3–15.4)
GLUCOSE BLDC GLUCOMTR-MCNC: 101 MG/DL (ref 70–105)
GLUCOSE BLDC GLUCOMTR-MCNC: 128 MG/DL (ref 70–105)
GLUCOSE BLDC GLUCOMTR-MCNC: 151 MG/DL (ref 70–105)
GLUCOSE BLDC GLUCOMTR-MCNC: 173 MG/DL (ref 70–105)
GLUCOSE SERPL-MCNC: 130 MG/DL (ref 65–99)
HCT VFR BLD AUTO: 30.5 % (ref 34–46.6)
HGB BLD-MCNC: 10.4 G/DL (ref 12–15.9)
INR PPP: 2.89 (ref 2–3)
MCH RBC QN AUTO: 30.9 PG (ref 26.6–33)
MCHC RBC AUTO-ENTMCNC: 34 G/DL (ref 31.5–35.7)
MCV RBC AUTO: 91 FL (ref 79–97)
PHOSPHATE SERPL-MCNC: 3.3 MG/DL (ref 2.5–4.5)
PLATELET # BLD AUTO: 69 10*3/MM3 (ref 140–450)
PMV BLD AUTO: 9.2 FL (ref 6–12)
POTASSIUM SERPL-SCNC: 3.8 MMOL/L (ref 3.5–5.2)
PROTHROMBIN TIME: 30.2 SECONDS (ref 19.4–28.5)
RBC # BLD AUTO: 3.35 10*6/MM3 (ref 3.77–5.28)
SODIUM SERPL-SCNC: 139 MMOL/L (ref 136–145)
WBC # BLD AUTO: 11.4 10*3/MM3 (ref 3.4–10.8)

## 2021-04-12 PROCEDURE — 82962 GLUCOSE BLOOD TEST: CPT

## 2021-04-12 PROCEDURE — 94799 UNLISTED PULMONARY SVC/PX: CPT

## 2021-04-12 PROCEDURE — 25010000002 ONDANSETRON PER 1 MG: Performed by: NURSE PRACTITIONER

## 2021-04-12 PROCEDURE — 99232 SBSQ HOSP IP/OBS MODERATE 35: CPT | Performed by: INTERNAL MEDICINE

## 2021-04-12 PROCEDURE — 99024 POSTOP FOLLOW-UP VISIT: CPT | Performed by: NURSE PRACTITIONER

## 2021-04-12 PROCEDURE — 97112 NEUROMUSCULAR REEDUCATION: CPT

## 2021-04-12 PROCEDURE — 85027 COMPLETE CBC AUTOMATED: CPT | Performed by: NURSE PRACTITIONER

## 2021-04-12 PROCEDURE — 97530 THERAPEUTIC ACTIVITIES: CPT

## 2021-04-12 PROCEDURE — 74230 X-RAY XM SWLNG FUNCJ C+: CPT

## 2021-04-12 PROCEDURE — 80069 RENAL FUNCTION PANEL: CPT | Performed by: INTERNAL MEDICINE

## 2021-04-12 PROCEDURE — 85610 PROTHROMBIN TIME: CPT | Performed by: NURSE PRACTITIONER

## 2021-04-12 PROCEDURE — 92611 MOTION FLUOROSCOPY/SWALLOW: CPT

## 2021-04-12 PROCEDURE — 63710000001 INSULIN LISPRO (HUMAN) PER 5 UNITS: Performed by: THORACIC SURGERY (CARDIOTHORACIC VASCULAR SURGERY)

## 2021-04-12 RX ORDER — WARFARIN SODIUM 2 MG/1
1 TABLET ORAL
Status: COMPLETED | OUTPATIENT
Start: 2021-04-12 | End: 2021-04-12

## 2021-04-12 RX ORDER — NITROFURANTOIN 25; 75 MG/1; MG/1
100 CAPSULE ORAL EVERY 12 HOURS SCHEDULED
Status: DISCONTINUED | OUTPATIENT
Start: 2021-04-12 | End: 2021-04-13 | Stop reason: HOSPADM

## 2021-04-12 RX ADMIN — ARFORMOTEROL TARTRATE 15 MCG: 15 SOLUTION RESPIRATORY (INHALATION) at 18:49

## 2021-04-12 RX ADMIN — ACETAMINOPHEN 650 MG: 325 TABLET, FILM COATED ORAL at 12:57

## 2021-04-12 RX ADMIN — METOPROLOL TARTRATE 12.5 MG: 25 TABLET, FILM COATED ORAL at 21:29

## 2021-04-12 RX ADMIN — ARFORMOTEROL TARTRATE 15 MCG: 15 SOLUTION RESPIRATORY (INHALATION) at 07:16

## 2021-04-12 RX ADMIN — BUMETANIDE 1 MG: 1 TABLET ORAL at 08:45

## 2021-04-12 RX ADMIN — HYDRALAZINE HYDROCHLORIDE 10 MG: 10 TABLET, FILM COATED ORAL at 06:46

## 2021-04-12 RX ADMIN — ACETAMINOPHEN 650 MG: 325 TABLET, FILM COATED ORAL at 22:17

## 2021-04-12 RX ADMIN — BARIUM SULFATE 183 ML: 960 POWDER, FOR SUSPENSION ORAL at 15:00

## 2021-04-12 RX ADMIN — NYSTATIN: 100000 POWDER TOPICAL at 08:47

## 2021-04-12 RX ADMIN — ACETAMINOPHEN 650 MG: 325 TABLET, FILM COATED ORAL at 17:08

## 2021-04-12 RX ADMIN — GABAPENTIN 200 MG: 100 CAPSULE ORAL at 21:28

## 2021-04-12 RX ADMIN — HYDRALAZINE HYDROCHLORIDE 10 MG: 10 TABLET, FILM COATED ORAL at 13:21

## 2021-04-12 RX ADMIN — IPRATROPIUM BROMIDE AND ALBUTEROL SULFATE 3 ML: 2.5; .5 SOLUTION RESPIRATORY (INHALATION) at 03:26

## 2021-04-12 RX ADMIN — BUDESONIDE 0.5 MG: 0.5 SUSPENSION RESPIRATORY (INHALATION) at 18:49

## 2021-04-12 RX ADMIN — NYSTATIN 500000 UNITS: 100000 SUSPENSION ORAL at 11:45

## 2021-04-12 RX ADMIN — FLUOXETINE 20 MG: 20 CAPSULE ORAL at 08:45

## 2021-04-12 RX ADMIN — PHENAZOPYRIDINE HYDROCHLORIDE 200 MG: 200 TABLET ORAL at 08:44

## 2021-04-12 RX ADMIN — CHLORHEXIDINE GLUCONATE 15 ML: 1.2 RINSE ORAL at 21:29

## 2021-04-12 RX ADMIN — GABAPENTIN 200 MG: 100 CAPSULE ORAL at 08:44

## 2021-04-12 RX ADMIN — NYSTATIN 500000 UNITS: 100000 SUSPENSION ORAL at 17:08

## 2021-04-12 RX ADMIN — NYSTATIN 500000 UNITS: 100000 SUSPENSION ORAL at 08:44

## 2021-04-12 RX ADMIN — HYDRALAZINE HYDROCHLORIDE 10 MG: 10 TABLET, FILM COATED ORAL at 21:29

## 2021-04-12 RX ADMIN — IPRATROPIUM BROMIDE AND ALBUTEROL SULFATE 3 ML: 2.5; .5 SOLUTION RESPIRATORY (INHALATION) at 15:28

## 2021-04-12 RX ADMIN — NYSTATIN: 100000 POWDER TOPICAL at 21:29

## 2021-04-12 RX ADMIN — PANTOPRAZOLE SODIUM 40 MG: 40 TABLET, DELAYED RELEASE ORAL at 06:54

## 2021-04-12 RX ADMIN — METOLAZONE 2.5 MG: 2.5 TABLET ORAL at 08:45

## 2021-04-12 RX ADMIN — WARFARIN 1 MG: 2 TABLET ORAL at 17:07

## 2021-04-12 RX ADMIN — FLUCONAZOLE 100 MG: 100 TABLET ORAL at 08:45

## 2021-04-12 RX ADMIN — METOPROLOL TARTRATE 12.5 MG: 25 TABLET, FILM COATED ORAL at 08:44

## 2021-04-12 RX ADMIN — DOCUSATE SODIUM 100 MG: 100 CAPSULE, LIQUID FILLED ORAL at 08:46

## 2021-04-12 RX ADMIN — POTASSIUM CHLORIDE 20 MEQ: 750 TABLET, EXTENDED RELEASE ORAL at 11:45

## 2021-04-12 RX ADMIN — IPRATROPIUM BROMIDE AND ALBUTEROL SULFATE 3 ML: 2.5; .5 SOLUTION RESPIRATORY (INHALATION) at 12:00

## 2021-04-12 RX ADMIN — IPRATROPIUM BROMIDE AND ALBUTEROL SULFATE 3 ML: 2.5; .5 SOLUTION RESPIRATORY (INHALATION) at 23:00

## 2021-04-12 RX ADMIN — MECLIZINE HYDROCHLORIDE 25 MG: 25 TABLET ORAL at 08:45

## 2021-04-12 RX ADMIN — Medication 10 ML: at 08:47

## 2021-04-12 RX ADMIN — INSULIN LISPRO 3 UNITS: 100 INJECTION, SOLUTION INTRAVENOUS; SUBCUTANEOUS at 17:32

## 2021-04-12 RX ADMIN — BUDESONIDE 1 MG: 0.5 SUSPENSION RESPIRATORY (INHALATION) at 07:21

## 2021-04-12 RX ADMIN — Medication 10 ML: at 21:31

## 2021-04-12 RX ADMIN — POTASSIUM CHLORIDE 20 MEQ: 750 TABLET, EXTENDED RELEASE ORAL at 08:46

## 2021-04-12 RX ADMIN — ATORVASTATIN CALCIUM 40 MG: 40 TABLET, FILM COATED ORAL at 21:28

## 2021-04-12 RX ADMIN — DOCUSATE SODIUM 100 MG: 100 CAPSULE, LIQUID FILLED ORAL at 21:28

## 2021-04-12 RX ADMIN — BUMETANIDE 1 MG: 1 TABLET ORAL at 17:08

## 2021-04-12 RX ADMIN — ASPIRIN 81 MG: 81 TABLET, COATED ORAL at 08:44

## 2021-04-12 RX ADMIN — POTASSIUM CHLORIDE 20 MEQ: 750 TABLET, EXTENDED RELEASE ORAL at 17:07

## 2021-04-12 RX ADMIN — ONDANSETRON 4 MG: 2 INJECTION INTRAMUSCULAR; INTRAVENOUS at 13:21

## 2021-04-12 RX ADMIN — LEVOTHYROXINE SODIUM 100 MCG: 0.1 TABLET ORAL at 06:47

## 2021-04-12 RX ADMIN — INSULIN LISPRO 3 UNITS: 100 INJECTION, SOLUTION INTRAVENOUS; SUBCUTANEOUS at 08:44

## 2021-04-12 RX ADMIN — NITROFURANTOIN (MONOHYDRATE/MACROCRYSTALS) 100 MG: 75; 25 CAPSULE ORAL at 11:45

## 2021-04-12 RX ADMIN — NITROFURANTOIN (MONOHYDRATE/MACROCRYSTALS) 100 MG: 75; 25 CAPSULE ORAL at 21:28

## 2021-04-12 RX ADMIN — CHLORHEXIDINE GLUCONATE 15 ML: 1.2 RINSE ORAL at 08:47

## 2021-04-12 RX ADMIN — ACETAMINOPHEN 650 MG: 325 TABLET, FILM COATED ORAL at 08:43

## 2021-04-12 RX ADMIN — NYSTATIN 500000 UNITS: 100000 SUSPENSION ORAL at 21:29

## 2021-04-12 NOTE — PROGRESS NOTES
"Nutrition Services    Patient Name: Claudia Rust  YOB: 1950  MRN: [id number]  Admission date: 3/18/2021    Comments:    Patient now on an oral diet only. Average meal intakes since diet advanced is around 25-50% of meals.  Continue ONS to optimize intakes.    PO Diet: Mechanical Soft with NTL, Consistent CHO  ONS: Magic cups at lunch & dinner meals (provides 580 kcal & 18 g of protein), Novasource Renal shakes BID (provides 950 kcal & 43 g of protein)->both supplements are already NTL consistency       PPE Documentation        PPE Worn By Provider RD did not enter room for this encounter   PPE Worn By Patient  -     CLINICAL NUTRITION ASSESSMENT       Reason for Assessment Follow up protocol    3/22/21: TF consult      H&P:      Past Medical History:   Diagnosis Date   • Acute congestive heart failure (CMS/HCC)    • Allergies    • Anxiety    • Arthritis    • Asthma    • Bilateral leg edema    • Bipolar 1 disorder (CMS/HCC)    • Bulging lumbar disc     X3   • Cancer (CMS/[facility])     states had \"cancerous tumor during pregnancy and had to have hysterectomy\"   • Cataract     bilateral   • Cellulitis 03/2021    bilateral legs   • Compression fracture of vertebral column (CMS/HCC)     LUMBAR   • COPD (chronic obstructive pulmonary disease) (CMS/HCC)    • Delayed emergence from anesthesia    • Depression    • Depression    • Dyslipidemia    • Dyspnea on exertion    • Fibromyalgia    • GERD (gastroesophageal reflux disease)    • Hiatal hernia    • Hyperlipidemia    • Hypertension    • Hypothyroid     HYPOTHYROID   • IBS (irritable bowel syndrome)    • Migraines    • Morbid obesity (CMS/HCC)    • Neuropathy    • Panic attacks    • Peripheral edema    • PONV (postoperative nausea and vomiting)    • Poor mobility     rolling walker   • Prediabetes    • PVD (peripheral vascular disease) (CMS/HCC)    • Rheumatoid aortitis    • Spinal stenosis    • Spinal stenosis    • Vertigo    • Vitamin D deficiency        Past " Surgical History:   Procedure Laterality Date   • AORTIC VALVE REPAIR/REPLACEMENT N/A 3/18/2021    Procedure: AORTIC VALVE REPLACEMENT;  Surgeon: Olaf Mcgill MD;  Location: [facility];  Service: Cardiothoracic;  Laterality: N/A;   • BRONCHOSCOPY N/A 3/25/2021    Procedure: BRONCHOSCOPY AT BEDSIDE WITH BRONCHIOALVEOLAR LAVAGE;  Surgeon: Glenn Reyes MD;  Location: [facility] END[facility];  Service: Pulmonary;  Laterality: N/A;  PNA   • CARDIAC CATHETERIZATION  2009   • CARDIAC CATHETERIZATION N/A 8/14/2019    Procedure: Left Heart Cath;  Surgeon: Jose Levi MD;  Location: [facility] CATH INVASIVE LOCATION;  Service: Cardiovascular   • CARDIAC CATHETERIZATION N/A 8/14/2019    Procedure: Right Heart Cath;  Surgeon: Jose Levi MD;  Location: [facility] CATH INVASIVE LOCATION;  Service: Cardiovascular   • CARDIAC CATHETERIZATION N/A 8/14/2019    Procedure: Aortic root aortogram;  Surgeon: Jose Levi MD;  Location: [facility] CATH INVASIVE LOCATION;  Service: Cardiovascular   • CARDIAC CATHETERIZATION N/A 1/20/2021    Procedure: Left Heart Cath;  Surgeon: Jose Levi MD;  Location: [facility] CATH INVASIVE LOCATION;  Service: Cardiovascular;  Laterality: N/A;   • COLONOSCOPY     • CYSTOCELE REPAIR  1984   • ELBOW ARTHROPLASTY  2011   • ENDOSCOPY     • FOOT SURGERY     • GALLBLADDER SURGERY  2002   • TONSILLECTOMY     • VAGINAL HYSTERECTOMY  1972        Current Problems:   Aortic valve stenosis  -status post tissue replacement 3/18   -cardiothoracic surgery & cardiology following    Acute hypoxic resp failure   -intubated 3/22   -extubated 4/4     Interstitial pneumonitis    Post-op TCP/HIT positive    COPD    KARLY    Cor pulmonale    Tobacco abuse    Per pulmonary  -there is diffuse alveolar disease groundglass opacities suspicious for Covid infection->multiple tests negative    JERROD, Third spacing/edema  -nephrology following, managing diuretics    Diabetes Mellitus     Postop  UTI    Severe weakness and deconditioning     Nutrition/Diet History         Narrative     4/12: Patient discussed during AM round with critical care team, NGT removed and continues on PO diet. Plan to repeat VFSS. Pt working with therapy at RD visit attempt. S/w REE Moyer who reports pt drank all of Novasource renal shake this AM and 2 yesterday. Discussed to continue encouraging those since they are already NTL consistency. Pt eating 25%-50% of meals, continue magic cups and Novasource Renal shakes to help optimize intake.    4/8: Patient discussed during AM round with critical care team, continues on PO diet + TF. Patient needs assistance with feeding d/t weakness. RD visited at bedside, patient sitting up in chair at bedside visiting with family member. She reports she ate the most she has eaten in a long time this AM. Family member reports she ate bites of pudding, yogurt, applesauce, but did not like the eggs. Discussed NTL + mechanical soft diet to continue per ST and may re-eval swallow function as she gets stronger. Discussed continuation of supplemental EN at night to help meet needs and build strength, patient in agreement with. Patient confirmed she likes the magic cups, especially butter pecan, will continue as ONS.    4/6: Patient discussed during AM rounds, tolerating TF at goal rate. This afternoon patient passed VFSS and now on oral diet. Spoke with nursing who reports NGT remains in place given uncertainty if patient will be able to consume adequate PO intakes. Plans to change TFs to nocturnal feeds to promote PO intakes during the day. No BM noted still, RN notified post rounds. Patient not available x 2 visits today.     4/2: Patient discussed during AM rounds, remains intubated on TF. RD visited at bedside, nsg had just turned off propofol, TF infusing at goal    3/30: Patient discussed during AM rounds, remains intubated on TF. RD visited at bedside, intubated/sedated & TF at goal    3/26:  "Patient discussed during AM rounds, agreed will decrease free water flushes from 200 mL/hr to 100 mL/hr given serum Na+ 147. RD visited at bedside, family member in room with no questions/concerns r/t nutrition at this time. Pt intubated/sedated, TF infusing    3/24: Patient discussed during AM rounds with critical care team. COVID test send out pending. Plan for bronch today and CHRISTIANO tomorrow. TF have not been started, just some free water given per tube. S/w NP Belkis via secure chat, okay with starting TF after bronch, notified REE Moyer of current orders.    3/22: Visited patient today who was intubated. Visualized vent setting & medication drips. RN in room and discussed tube feeding. DHT already in place. RN reports Dr. Mcgill does not want TFs to start till the AM. Orders in place when needed.      Functional Status Per PT notes 4/5 \"Pt presents with global weakness requiring assist x2 for bed mobility after prolonged intubation.  Pt able to sit EOB with CGA-modA pending fatigue and pain.  Not safe to attempt transfer due to hypotension and global weakness at this time.  Attempt transfer at next session if appropriate.  Pt will need extensive IP rehab to address deficits\"      Food Allergies Garlic   Macadamia Nut      Factors Affecting   Nutritional Intake Recent intubation  Acute medical complexity   Post-op dysphagia     Anthropometrics        Current Height, Weight Height: 162.6 cm (64\")  Weight: 118 kg (259 lb 11.2 oz) (04/12/21 0500)        Admit Height, Weight     Flowsheet Rows      First Filed Value   Admission Height  162.6 cm (64\") Documented at 03/18/2021 2000   Admission Weight  129 kg (284 lb) Documented at 03/18/2021 2000             Ideal Body Weight (IBW) 54.5 kg/120 lb    % Ideal Body Weight 216%        Usual Body Weight UTD   % Usual Body Weight UTD   Wt Change Observation Current Admission:  4/12: CBW trending down some from admission,  likely r/t diuresis and some muscle atrophy d/t " prolonged bedbound status. Negative 2.7 Liters since 3/29  4/8: CBW trending down some, likely r/t diuresis and some muscle atrophy d/t bedbound status   4/6: CBW trending back down, possibly r/t diuresis   4/2: CBW consistent with last encounter  3/30: CBW trending down, now negative 1 Liter since admission  3/26: CBW consistent with admit wt  3/24: 10 lb wt change since admit (3.5% change), currently negative 5.6 Liters    PTA:  3/22: Wt fairly stable x 3 years.    Weight Hx    Wt Readings from Last 30 Encounters:   04/12/21 0500 118 kg (259 lb 11.2 oz)   04/10/21 0500 122 kg (269 lb 6.4 oz)   04/09/21 0415 121 kg (267 lb 13.7 oz)   04/08/21 0500 121 kg (267 lb 6.7 oz)   04/07/21 0555 121 kg (266 lb 15.6 oz)   04/06/21 0500 122 kg (268 lb 4.8 oz)   04/05/21 0522 120 kg (265 lb 6.9 oz)   04/04/21 0400 123 kg (272 lb 0.8 oz)   04/03/21 0600 125 kg (275 lb 12.7 oz)   04/02/21 0400 123 kg (271 lb 9.7 oz)   04/01/21 0600 124 kg (272 lb 4.3 oz)   03/31/21 0600 125 kg (276 lb 3.8 oz)   03/30/21 0600 123 kg (270 lb 15.1 oz)   03/29/21 0600 123 kg (270 lb 4.5 oz)   03/27/21 0600 127 kg (280 lb 3.3 oz)   03/26/21 0416 129 kg (284 lb 2.8 oz)   03/25/21 0600 127 kg (279 lb 1.6 oz)   03/25/21 0426 127 kg (279 lb 1.6 oz)   03/24/21 0540 125 kg (274 lb 14.6 oz)   03/23/21 0600 124 kg (273 lb 2.4 oz)   03/22/21 0543 129 kg (283 lb 11.7 oz)   03/22/21 0500 129 kg (283 lb 11.7 oz)   03/21/21 1352 132 kg (292 lb)   03/21/21 0800 133 kg (292 lb 8.8 oz)   03/20/21 0600 130 kg (287 lb 0.6 oz)   03/19/21 0430 132 kg (291 lb 0.1 oz)   03/18/21 2000 129 kg (284 lb)   03/16/21 1104 129 kg (283 lb 6 oz)   01/20/21 0921 130 kg (286 lb 9.6 oz)   01/14/21 1130 132 kg (290 lb 8 oz)   12/17/20 1239 132 kg (291 lb)   12/11/20 1301 132 kg (292 lb)   12/04/20 1106 132 kg (292 lb)   04/24/20 1000 132 kg (290 lb)   02/18/20 0809 130 kg (286 lb)   02/16/20 0210 125 kg (276 lb 9.6 oz)   02/15/20 2223 131 kg (289 lb 11 oz)   09/13/19 1020 129 kg (284  lb)   08/14/19 1121 130 kg (286 lb 2.5 oz)   08/02/19 1213 131 kg (289 lb)   07/22/19 1427 133 kg (293 lb 8 oz)   06/21/19 1141 132 kg (290 lb)   06/21/19 1044 132 kg (290 lb)   03/08/18 1032 127 kg (280 lb)   09/14/16 1337 (!) 136 kg (300 lb 12.8 oz)   07/18/16 1533 (!) 138 kg (305 lb)   06/22/16 1438 (!) 136 kg (300 lb 3.2 oz)   03/24/16 1304 (!) 139 kg (306 lb)   01/26/16 0943 136 kg (300 lb)        BMI kg/m2 Body mass index is 44.58 kg/m².     Labs/Medications         Pertinent Labs -Low Cr   Results from last 7 days   Lab Units 04/12/21  0508 04/11/21  1544 04/11/21  0426 04/10/21  0531   SODIUM mmol/L 139  --  137 135*   POTASSIUM mmol/L 3.8 4.2 3.3* 4.0   CHLORIDE mmol/L 100  --  97* 99   CO2 mmol/L 29.0  --  29.0 28.0   BUN mg/dL 21  --  20 22   CREATININE mg/dL <0.47*  --  <0.47* <0.47*   CALCIUM mg/dL 8.5*  --  8.6 8.4*   GLUCOSE mg/dL 130*  --  81 72     Results from last 7 days   Lab Units 04/12/21  0508 04/09/21  0624 04/09/21  0419 04/09/21  0418 04/08/21  0623 04/06/21  0422   MAGNESIUM mg/dL  --   --  2.0  --  2.3 2.4   PHOSPHORUS mg/dL 3.3  --   --    < > 3.6 3.1   HEMOGLOBIN g/dL 10.4*   < >  --   --  10.2* 9.7*   HEMOGLOBIN, POC   --    < >  --   --   --   --    HEMATOCRIT % 30.5*   < >  --   --  31.5* 29.3*   HEMATOCRIT POC   --    < >  --   --   --   --     < > = values in this interval not displayed.     COVID19   Date Value Ref Range Status   03/24/2021 Not Detected Not Detected - Ref. Range Final     Lab Results   Component Value Date    HGBA1C 5.7 (H) 03/16/2021         Pertinent Medications Aspirin, lipitor, bumex, peridex, colace, diflucan, prozac, gabapentin, admelog, synthroid, zaroxolyn, macrobid, nystatin swish & swallow, miralax (not given), K-DUR, coumadin, zofran PRN     Physical Findings        Overall Physical   Appearance, MSA 4/12: Unable to observe this date    4/8: Appears well nourished, fatigued, edematous  4/6: Unable to see at this visit   4/2: visually appears well  "nourished, edematous  3/30: visually appears well nourished  3/26: visually appears well nourished and edematous  3/24: RD did not visually observe this encounter -- will monitor COVID-19 results and plan to complete NFPE at next encounter (tomorrow or 3/25)  3/22: Patient visually appears well nourished at visit.    -  Edema  2+ Dependent, Generalized  2+ BUE  2-3+ BLE   Gastrointestinal 2 BMs documented on 4/10 (x2 days ago) -on colace/miralax   Tubes NGT removed     Oral/Mouth Cavity S/p VFSS on 4/6-> Noted need for mechanically altered diet.     Plan for repeat VFSS 4/12     Skin Midline sternal incision incision        Estimated/Assessed Needs    Re-estimated 4/6/21  *remains current 4/12/21   Energy Requirements    Height for Calculation  Height: 162.6 cm (64\")   Weight for Calculation 122 kg (268 lb) - CBW   Method for Estimation  MSJ x 1.2 AF    EST Needs (kcal/day) 2074 kcals/day       Protein Requirements    Weight for Calculation 54.6 kg (120 lb) - IBW   EST Protein Needs (g/kg) 2.5 g/kg    EST Daily Needs (g/day) 136 g/day       Fluid Requirements     Estimated Needs (mL/day) 1400 -1900 mL r/t CHF dx per CTS        Fluid Deficit    Current Na Level (mEq/L) -   Desired Na Level (mEq/L) -   Estimated Fluid Deficit (L)  -     Current Nutrition Orders & Evaluation of Received Nutrient/Fluid Intake       Oral Nutrition     Current PO Diet Diet Texture, Diabetic/Consistent Carbs; Diabetic - Consistent Carb; Mechanical Ground; Thickened Liquids (Nectar), Full Feed - Nursing   Supplement Magic cup at lunch/dinner meals  [facility] Renal shakes BID   PO Evaluation     % PO Intake 4/12: 25%-50% of meals since last encounter    4/8: 25% x4 meals eaten since last encounter    3/24 - 4/6: 0% - NPO, diet just upgraded and no meals recorded yet     3/22: 0% of meals documented prior to intubation    -  Enteral Nutrition    Enteral Route -   TF Modular -   TF Delivery Method -   Current Ordered TF  -   Current Ordered " H2O flush  -    TF Observation  -   EN Evaluation     TF Changes -    TF Residual -    TF Tolerance -    Average EN Delivered -       Parenteral Nutrition     TPN Route -   Current Ordered TPN VOL  -   Dextrose (g/kcals)  -   Amino Acid (g/kcals) -   Lipids (mL/Concentration/FREQ)  -   MVI Frequency  -   Trace Element Frequency  -   TPN Observation  -    TPN Evaluation    Total # Days on TPN -   -  Clinical Course    Nutrition Course Details PO Diet     3/18 NPO  3/19 CLD  3/20 Consistent Carb  3/21-4/5 NPO  4/6 ConCarb HH Mech Soft NTL   4/6 to present (4/12) Consistent CHO, Mech Soft, NTL    Nutrition Support   TF started 3/24  Conservatively advanced d/t multiple procedures, possible risk of RFS  TF reached goal 3/27 to 4/8. Stopped after this d/t patient refusal  Adjusted to nocturnal feeds 4/6 to 4/8. Orders officially d/c 4/11     Nutritional Risk Screening        NRS-2002 Score   -       Nutrition Diagnosis         Nutrition Dx Problem 1 Inadequate oral intake r/t decreased appetite in the setting of acute illness AEB average meal intake of 25-50%    Nutrition Dx Problem 2 -       Intervention Goal         Intervention Goal(s) PO intakes will be >50% of meals     Accepting of ONS     Nutrition Intervention        RD/Tech Action Continue Novasource Renal shakes BID    Continue Magic cups to L/D trays to promote PO calories      Nutrition Prescription          Diet Prescription Consistent Carb, Mech Soft, NTL    Supplement Prescription Novasource Renal shakes BID  Magic Cups at lunch and dinner meals   -  Enteral Prescription -       TPN Prescription -     Monitor/Evaluation        Monitor PO intakes, I/Os, Labs, BM, Weight, Skin and Medication changes      Electronically signed by:  Jordyn Strickland RD  04/12/21 08:58 EDT

## 2021-04-12 NOTE — PROGRESS NOTES
NEPHROLOGY PROGRESS NOTE------[facility]    [facility]  [phone]  Robert Hammer MD      Patient Care Team:  Mary Wilde APRN as PCP - General  Mary Wilde APRN as PCP - Family Medicine  Jose Levi MD as Consulting Physician (Cardiology)  Yovani Lerma MD as Consulting Physician (Nephrology)      Provider:  Robert Hammer MD  Patient Name: Claudia Rust  :  1950    SUBJECTIVE:    F/U ARF/JERROD/EDEMA/HYPERNATREMIA    Comfortable. No SOB, CP, dysuria, palpitations.    Medication:  arformoterol, 15 mcg, Nebulization, BID - RT  aspirin, 81 mg, Oral, Daily  atorvastatin, 40 mg, Oral, Nightly  bisacodyl, 10 mg, Rectal, Daily  budesonide, 1 mg, Nebulization, BID - RT  bumetanide, 1 mg, Nasogastric, BID  cefTRIAXone, 1 g, Intravenous, Q24H  chlorhexidine, 15 mL, Mouth/Throat, Q12H  docusate sodium, 100 mg, Oral, BID  fluconazole, 100 mg, Oral, Daily  FLUoxetine, 20 mg, Oral, Daily  gabapentin, 200 mg, Oral, Q12H  hydrALAZINE, 10 mg, Nasogastric, Q8H  insulin lispro, 0-14 Units, Subcutaneous, 4x Daily With Meals & Nightly  ipratropium-albuterol, 3 mL, Nebulization, Q4H - RT  levothyroxine, 100 mcg, Oral, Q AM  metOLazone, 2.5 mg, Nasogastric, Every Other Day  metoprolol tartrate, 12.5 mg, Oral, Q12H  nystatin, 5 mL, Oral, 4x Daily  nystatin, , Topical, Q12H  pantoprazole, 40 mg, Oral, QAM  phenazopyridine, 200 mg, Oral, TID With Meals  polyethylene glycol, 17 g, Oral, Daily  potassium chloride, 20 mEq, Oral, TID With Meals  sodium chloride, 10 mL, Intravenous, Q12H  sodium chloride, 10 mL, Intravenous, Q12H           OBJECTIVE    Vital Sign Min/Max for last 24 hours  Temp  Min: 98.2 °F (36.8 °C)  Max: 98.8 °F (37.1 °C)   BP  Min: 112/51  Max: 157/64   Pulse  Min: 59  Max: 78   Resp  Min: 18  Max: 20   SpO2  Min: 91 %  Max: 100 %   No data recorded   Weight  Min: 118 kg (259 lb 11.2 oz)  Max: 118 kg (259 lb 11.2 oz)  "    Flowsheet Rows      First Filed Value   Admission Height  162.6 cm (64\") Documented at 03/18/2021 2000   Admission Weight  129 kg (284 lb) Documented at 03/18/2021 2000          I/O this shift:  In: 706 [P.O.:240; I.V.:466]  Out: 725 [Urine:725]  I/O last 3 completed shifts:  In: 1078 [P.O.:780; I.V.:268; NG/GT:30]  Out: 2100 [Urine:2100]    Physical Exam:    General Appearance: NAD. Fatigued  Head: normocephalic, without obvious abnormality and atraumatic +NGT INTACT  Eyes: conjunctivae and sclerae normal and no icterus  Neck: supple. NO GROSS JVD NOW  Lungs: +FEW SCATTERED RHONCHI.NO CRACKLES AT PRESENT  Heart: regular rhythm & normal rate and normal S1, S2 +NARGIS  Chest: Wall no abnormalities observed  Abdomen: normal bowel sounds and soft non-tender +OBESITY  Extremities: +TRACE DIFFUSE BILAT LE EDEMA, no cyanosis and no redness  Skin: no bleeding, bruising or rash, turgor normal, color normal and no lesions noted  Neurologic: Alert and oriented without focal deficits    Labs:    WBC WBC   Date Value Ref Range Status   04/12/2021 11.40 (H) 3.40 - 10.80 10*3/mm3 Final   04/11/2021 13.70 (H) 3.40 - 10.80 10*3/mm3 Final   04/10/2021 15.40 (H) 3.40 - 10.80 10*3/mm3 Final      HGB Hemoglobin   Date Value Ref Range Status   04/12/2021 10.4 (L) 12.0 - 15.9 g/dL Final   04/11/2021 10.3 (L) 12.0 - 15.9 g/dL Final   04/10/2021 10.3 (L) 12.0 - 15.9 g/dL Final   04/09/2021 14.7 12.0 - 17.0 g/dL Final      HCT Hematocrit   Date Value Ref Range Status   04/12/2021 30.5 (L) 34.0 - 46.6 % Final   04/11/2021 31.2 (L) 34.0 - 46.6 % Final   04/10/2021 31.4 (L) 34.0 - 46.6 % Final   04/09/2021 43 38 - 51 % Final      Platlets No results found for: LABPLAT   MCV MCV   Date Value Ref Range Status   04/12/2021 91.0 79.0 - 97.0 fL Final   04/11/2021 91.0 79.0 - 97.0 fL Final   04/10/2021 92.1 79.0 - 97.0 fL Final          Sodium Sodium   Date Value Ref Range Status   04/12/2021 139 136 - 145 mmol/L Final   04/11/2021 137 136 - 145 " mmol/L Final   04/10/2021 135 (L) 136 - 145 mmol/L Final      Potassium Potassium   Date Value Ref Range Status   04/12/2021 3.8 3.5 - 5.2 mmol/L Final   04/11/2021 4.2 3.5 - 5.2 mmol/L Final     Comment:     Result checked    04/11/2021 3.3 (L) 3.5 - 5.2 mmol/L Final   04/10/2021 4.0 3.5 - 5.2 mmol/L Final     Comment:     Slight hemolysis detected by analyzer. Results may be affected.Result checked       Chloride Chloride   Date Value Ref Range Status   04/12/2021 100 98 - 107 mmol/L Final   04/11/2021 97 (L) 98 - 107 mmol/L Final   04/10/2021 99 98 - 107 mmol/L Final      CO2 CO2   Date Value Ref Range Status   04/12/2021 29.0 22.0 - 29.0 mmol/L Final   04/11/2021 29.0 22.0 - 29.0 mmol/L Final   04/10/2021 28.0 22.0 - 29.0 mmol/L Final      BUN BUN   Date Value Ref Range Status   04/12/2021 21 8 - 23 mg/dL Final   04/11/2021 20 8 - 23 mg/dL Final   04/10/2021 22 8 - 23 mg/dL Final      Creatinine Creatinine   Date Value Ref Range Status   04/12/2021 <0.47 (L) 0.57 - 1.00 mg/dL Final   04/11/2021 <0.47 (L) 0.57 - 1.00 mg/dL Final   04/10/2021 <0.47 (L) 0.57 - 1.00 mg/dL Final      Calcium Calcium   Date Value Ref Range Status   04/12/2021 8.5 (L) 8.6 - 10.5 mg/dL Final   04/11/2021 8.6 8.6 - 10.5 mg/dL Final   04/10/2021 8.4 (L) 8.6 - 10.5 mg/dL Final      PO4 No components found for: PO4   Albumin Albumin   Date Value Ref Range Status   04/12/2021 2.80 (L) 3.50 - 5.20 g/dL Final   04/11/2021 2.90 (L) 3.50 - 5.20 g/dL Final   04/10/2021 2.80 (L) 3.50 - 5.20 g/dL Final      Magnesium No results found for: MG   Uric Acid No components found for: URIC ACID     Imaging Results (Last 72 Hours)     Procedure Component Value Units Date/Time    FL Video Swallow With Speech Single Contrast [[id number]] Collected: 04/11/21 2005     Updated: 04/11/21 2007    Narrative:      DATE OF EXAM:  4/6/2021 12:40 PM     PROCEDURE:  FL VIDEO SWALLOW W SPEECH SINGLE-CONTRAST-     INDICATIONS:  on vent from 3/18-4/4; M06.9-Rheumatoid  arthritis, unspecified;  I35.0-Nonrheumatic aortic (valve) stenosis; J96.01-Acute respiratory  failure with hypoxia; J44.9-Chronic obstructive pulmonary disease,  unspecified     COMPARISON:  No Comparisons Available     TECHNIQUE:   This examination was performed in conjunction with speech pathology.  Lateral video fluoroscopic evaluation of the swallowing mechanism was  performed while correlate administering to the patient various  consistency food items mixed with barium.     Fluoroscopic Time:   3.2 minutes     FINDINGS:  There is penetration without definite aspiration.        Impression:      For more details, please refer to the speech pathology notes.     Electronically Signed By-Romain Gutierrez MD On:4/11/2021 8:05 PM  This report was finalized on [id number] by  Romain Gutierrez MD.          Results for orders placed during the hospital encounter of 03/18/21    XR Chest 1 View    Narrative  DATE OF EXAM:  4/7/2021 3:25 AM    PROCEDURE:  XR CHEST 1 VW-    INDICATIONS:  Hypoxia.    COMPARISON:  Radiographs 4/6/2021, 4/5/2021, and 4/4/2021. CT 3/22/2021.    TECHNIQUE:  Single radiographic AP view of the chest was obtained.    FINDINGS:  Lordotic positioning. Stable sternotomy wires, prosthetic heart valve,  partially imaged enteric tube, and left PICC. Interval removal of the  previously seen left IJ central venous catheter. Similar-appearing  basilar predominant opacification of both lungs. No pneumothorax.  Unchanged cardiomediastinal contours. No acute osseous abnormality is  identified.    Impression  Interval removal of the previously seen left IJ central venous catheter.  Otherwise, no significant interval change.    Electronically Signed By-Olman Benz MD On:4/7/2021 7:35 AM  This report was finalized on [id number] by  Olman Benz MD.      XR Chest 1 View    Narrative  Examination: XR CHEST 1 VW-    Date of Exam: 4/6/2021 5:35 AM    Indication: Hypoxia; M06.9-Rheumatoid arthritis,  unspecified;  I35.0-Nonrheumatic aortic (valve) stenosis; J96.01-Acute respiratory  failure with hypoxia; J44.9-Chronic obstructive pulmonary disease,  unspecified.    Comparison: 4/5/2021.    Technique: Single radiographic view of the chest was obtained.    Findings:  Feeding tube courses below the diaphragm. New left PICC terminates in  the upper SVC. Stable left internal jugular central venous catheter.  Stable evidence of prior median sternotomy and heart valve replacement.  There is stable diffuse mixed interstitial and airspace disease in both  lungs with stable small bilateral pleural effusions. There is no  evidence of pneumothorax or new lung opacity. The heart size is stable.  Pulmonary vasculature is completely obscured. There are no acute osseous  abnormalities.    Impression  1. Stable mixed interstitial and airspace disease present throughout  both lungs.  2. Stable feeding tube, postoperative findings and left IJ CVC.  3. New left PICC terminates in the upper SVC.    Electronically Signed By-Romain Gutierrez MD On:4/6/2021 7:12 AM  This report was finalized on [id number] by  Romain Gutierrez MD.      XR Chest 1 View    Narrative  Examination: XR CHEST 1 VW-    Date of Exam: 4/5/2021 5:00 AM    Indication: Hypoxia; M06.9-Rheumatoid arthritis, unspecified;  I35.0-Nonrheumatic aortic (valve) stenosis; J96.01-Acute respiratory  failure with hypoxia; J44.9-Chronic obstructive pulmonary disease,  unspecified.    Comparison: 4/4/2021.    Technique: Single radiographic view of the chest was obtained.    Findings:  There are diffuse bilateral mixed interstitial and airspace opacities in  both lungs. There is a stable left internal jugular central venous  catheter. Stable changes of prior median sternotomy and heart valve  replacement. Endotracheal tube has been removed. Stable feeding tube  coursing below the diaphragm. Stable bridging thoracic osteophytes  without acute osseous abnormality.    Impression  1.  Stable diffuse mixed interstitial and airspace disease.  2. Interval extubation.  3. Stable postoperative findings and stable left internal jugular  central venous catheter/feeding tube.    Electronically Signed By-Romain Gutierrez MD On:4/5/2021 7:26 AM  This report was finalized on [id number] by  Romain Gutierrez MD.      Results for orders placed during the hospital encounter of 03/18/21    Duplex Venous Upper Extremity - Bilateral CAR    Interpretation Summary  · Acute right upper extremity deep vein thrombosis noted in the subclavian, axillary and brachial. This represents catheter associated deep vein thrombosis.  · All other vessels appear normal.        ASSESSMENT / PLAN      Aortic valve stenosis    Rheumatoid arthritis (CMS/HCC)    1. ARF/JERROD------Nonoliguric ATN.  Follow with ongoing diuretic exposure. No NSAIDs or IV dye    2. HYPERNATREMIA-----Resolved    3. HYPOALBUMINEMIA----Encourage increased po protein intake    4. 3RD SPACED EDEMA------better. On bumex.    5. CAD S/P AVR-----per Cardiology and CT Surgery. S/P CHRISTIANO    6. OBESITY    7. ACUTE HYPOXIC RESPIRATORY FAILURE------S/P extubation. Per Pulmonary    8. RUE DVT    9. DMII-------Glucometers, SSI    10. ANEMIA------H/H stable    11. SEPSIS/LEUKOCYTOSIS------Antibiotics per Pulmonary/CC    12. THROMBOCYTOPENIA    13. HYPOCALCEMIA-----Replaced    14. HYPERLIPIDEMIA------On Statin.      15. HYPOTHYROIDISM------On Sythroid    16. GERD/PUD PROPHYLAXIS------Prevacid per NGT    17. HTN-------BP good. D/C Amlodipine b/c of edema. Added scheduled Hydralazine    18. METABOLIC ALKALOSIS-----Better    19. HYPOKALEMIA-----Replaced      Robert Hammer MD  [facility]  [phone]  04/12/21  06:55 EDT

## 2021-04-12 NOTE — PLAN OF CARE
Problem: Adult Inpatient Plan of Care  Goal: Plan of Care Review  Outcome: Ongoing, Progressing  Flowsheets (Taken 4/12/2021 1553)  Progress: improving  Plan of Care Reviewed With:   patient   spouse  Outcome Summary: Pt passed swallow study, now on soft to chew diet and thin liquids. Used kell lift to get out of bed and sit in chair. On 2L NC. VS stable. Will continue to monitor.  Goal: Patient-Specific Goal (Individualized)  Outcome: Ongoing, Progressing  Goal: Absence of Hospital-Acquired Illness or Injury  Outcome: Ongoing, Progressing  Intervention: Identify and Manage Fall Risk  Recent Flowsheet Documentation  Taken 4/12/2021 1200 by João Joaquin RN  Safety Promotion/Fall Prevention:   activity supervised   assistive device/personal items within reach   clutter free environment maintained   fall prevention program maintained   muscle strengthening facilitated   nonskid shoes/slippers when out of bed   room organization consistent   safety round/check completed  Taken 4/12/2021 1100 by João Joaquin RN  Safety Promotion/Fall Prevention: safety round/check completed  Taken 4/12/2021 1000 by João Joaquin RN  Safety Promotion/Fall Prevention: safety round/check completed  Taken 4/12/2021 0900 by João Joaquin RN  Safety Promotion/Fall Prevention: safety round/check completed  Taken 4/12/2021 0800 by João Joaquin RN  Safety Promotion/Fall Prevention:   assistive device/personal items within reach   activity supervised   clutter free environment maintained   fall prevention program maintained   muscle strengthening facilitated   nonskid shoes/slippers when out of bed   room organization consistent   safety round/check completed  Taken 4/12/2021 0700 by João Joaquin RN  Safety Promotion/Fall Prevention: safety round/check completed  Intervention: Prevent Skin Injury  Recent Flowsheet Documentation  Taken 4/12/2021 1210 by João Joaquin RN  Body Position: (up in chair) other (see comments)  Taken  4/12/2021 0800 by João Joaquin RN  Body Position: sitting up in bed  Goal: Optimal Comfort and Wellbeing  Outcome: Ongoing, Progressing  Intervention: Provide Person-Centered Care  Recent Flowsheet Documentation  Taken 4/12/2021 1200 by João Joaquin RN  Trust Relationship/Rapport:   care explained   choices provided  Taken 4/12/2021 0800 by João Joaquin RN  Trust Relationship/Rapport:   care explained   choices provided   emotional support provided   empathic listening provided   questions answered   questions encouraged   reassurance provided   thoughts/feelings acknowledged  Goal: Readiness for Transition of Care  Outcome: Ongoing, Progressing   Goal Outcome Evaluation:  Plan of Care Reviewed With: patient, spouse  Progress: improving  Outcome Summary: Pt passed swallow study, now on soft to chew diet and thin liquids. Used kell lift to get out of bed and sit in chair. On 2L NC. VS stable. Will continue to monitor.

## 2021-04-12 NOTE — PROGRESS NOTES
Cardiology Progress Note    Patient Identification:  Name: Claudia Rust  Age: 70 y.o.  Sex: female  :  1950  MRN: [id number]                 Follow Up / Chief Complaint: Follow-up for valvular heart disease status post AVR    Interval History: Patient underwent #23 magna pericardial prosthesis AVR 3/18/2021  3/22/2021.  Events noted.  Patient is intubated on IV Olegario-Synephrine and Bumex.  3/23/2021.  Patient continues to intubated  3/24/2021 patient continues to be intubated and mechanically ventilated.  3/25/2021 patient continues to be intubated and mechanically ventilated.  Underwent a CHRISTIANO which showed normal LV systolic function and good aortic valve function.  3/26/2021.  Patient continues to be intubated and mechanically ventilated.  He is lightly sedated.  Patient underwent bronchoscopy 3/25/2021.  Patient was getting tachycardic was given 12.5 of metoprolol developed hypotension requiring Olegario-Synephrine.  HIT antibody positive  3/29/2021: Continues to be intubated and mechanically ventilated.  3/30/2021.  Patient continues to be intubated and mechanically ventilated.  3/31/2021: Patient continues to be intubated and mechanically ventilated.  Nephrology has started on IV Bumex drip.  21: Patient is intubated and is on weaning trials.  Bumex drip has been discontinued.  2021 : Patient intubated on weaning trials.  On IV Bumex  21 : Patient extubated.  Dobbhoff tube present.  2021 : Patient is sitting in chair intermittently confused without symptoms at the current time.  2021 : Patient sitting in bed hemodynamically stable.  Dobbhoff tube is present.  2021 : Patient was feeling depressed yesterday was given her antidepressants is feeling a whole lot better and mental status is whole lot better today.  2021 : Patient is sitting in chair and hemodynamically stable.  2021: Patient is requiring less oxygen currently on 2 L by nasal cannula  2021 : Patient oxygen  requirement is decreasing and is improving daily.     Subjective: Patient seen and examined.  Chart reviewed.  Labs reviewed.  Discussed with RN taking care of patient.  Patient is more awake denies any chest pain shortness of breath.  States that she is feeling better.      Objective: Sodium is 152--> 144-> 141-141.  BUN/creatinine  49/1.05-> 42/0.69-> 42/0.64-> 46/0.60-> 47/0.49-> 43/0.46--> 43/0.43-> 35/0.38-> 34/0.47-> 22/0.44-> 22/0.47-> 20/0.47-> 21/0.47..  Hemoglobin is 10.1--> 10.8--> 9.0-> 10.8-> 10.5-> 10.2-> 10.6-> 10.6-> 10.7-> 9.7-> 9.8-> 10.2-> 10.1-> 10.3-> 10.4 platelet count 95-> 134-> 108-> 92-> 75-> 69. white count elevated at 22.2-> 16.2-> 16.1-> 15.4-> 11.4.    History of present illness :    This is a 70-year-old with PMH      #Valvular heart disease with moderate to severe aortic stenosis  Cardiac cath 1/20/2021 revealing nonobstructive CAD  # Hyperlipidemia,    # Hypertension  # COPD, KARYL on CPAP  # Prediabetes  # Hypothyroidism, Rheumatoid Arthritis, spinal stenosis, hiatal hernia, chronic depression, bipolar, GERD, IBS, chronic migraine, vertigo, herniated disc, compression lumbar fractures  #Allergies/intolerance to Stadol  # Hysterectomy, bladder reconstruction, cholecystectomy, right hand surgery  # Family history of strokes and grandfather.  Mother's cancer, father on  # Active cigarette smoker trying to quit         Here for aortic valve replacement.  Patient was recently seen with a complaint of dyspnea on exertion edema and weight gain.  Patient was brought in for elective aortic valve replacement.  Patient underwent aortic valve replacement 3/18/2021 with #23 magna pericardial prosthesis.  Blood pressure is high is on IV Cardene and IV nitroglycerin.  Currently intubated sedated chest tube present Venegas catheter present.  Underlying rhythm is flatline with AV paced rhythm.  Underwent an echocardiogram which is revealing severe aortic stenosis on 2/11/2020   Underwent cardiac cath  1/20/2021 which revealed no obstructive disease.  Carotid Dopplers 9/13/2019 normal.  Echocardiogram 2/11/2020 is revealing severe aortic stenosis  Work-up here on 3/16/2021 revealed proBNP 733, normal CMP, A1c 5.7, normal CBC, chest x-ray with no acute abnormality.  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  Events noted patient was intubated 3/21/2021 for respiratory failure.  Extubated 4/4/2021.  Continue to monitor pulmonary status and follow-up with pulmonary.  Chest x-ray 4 /5/2021   1. Stable diffuse mixed interstitial and airspace disease.  2. Interval extubation.  3. Stable postoperative findings and stable left internal jugular  central venous catheter/feeding tube.    Patient's po Bumex 1 mg 3 times daily  Monitor electrolytes and renal function  Potassium is low, will follow electrolyte replacement protocol and monitor electrolytes.  Patient sodium was going up, patient was given free water and has improved.  Appreciated nephrology input.  Will follow up with nephrology.  Monitor for routine expected post surgery blood loss anemia  Patient underwent transesophageal echo 3/25/2021 which revealed normal LV systolic function normal chamber sizes.  Normal aortic valve placement and no significant valvular heart disease.  Continue to monitor  "blood pressure closely patient developed hypotension requiring Olegario-Synephrine with 12.5 of metoprolol.  Now blood pressure is going up we will restart metoprolol and monitor blood pressure closely.  Will follow up with CT VS for routine post CABG care and HIT, patient has been started on warfarin.  INR is 4.55 on 4/11/2021, warfarin was on hold and is improved to 2.89  Discussed with RN taking care of patient to coordinate care  Discussed with patient's  by bedside.   is requesting wheelchair ramp and left.  Will defer it to the primary admitting team to prescribe    Past Medical History:  Past Medical History:   Diagnosis Date   • Acute congestive heart failure (CMS/HCC)    • Allergies    • Anxiety    • Arthritis    • Asthma    • Bilateral leg edema    • Bipolar 1 disorder (CMS/HCC)    • Bulging lumbar disc     X3   • Cancer (CMS/[facility])     states had \"cancerous tumor during pregnancy and had to have hysterectomy\"   • Cataract     bilateral   • Cellulitis 03/2021    bilateral legs   • Compression fracture of vertebral column (CMS/[facility])     LUMBAR   • COPD (chronic obstructive pulmonary disease) (CMS/[facility])    • Delayed emergence from anesthesia    • Depression    • Depression    • Dyslipidemia    • Dyspnea on exertion    • Fibromyalgia    • GERD (gastroesophageal reflux disease)    • Hiatal hernia    • Hyperlipidemia    • Hypertension    • Hypothyroid     HYPOTHYROID   • IBS (irritable bowel syndrome)    • Migraines    • Morbid obesity (CMS/[facility])    • Neuropathy    • Panic attacks    • Peripheral edema    • PONV (postoperative nausea and vomiting)    • Poor mobility     rolling walker   • Prediabetes    • PVD (peripheral vascular disease) (CMS/[facility])    • Rheumatoid aortitis    • Spinal stenosis    • Spinal stenosis    • Vertigo    • Vitamin D deficiency      Past Surgical History:  Past Surgical History:   Procedure Laterality Date   • AORTIC VALVE REPAIR/REPLACEMENT N/A 3/18/2021    Procedure: AORTIC VALVE " REPLACEMENT;  Surgeon: Olaf Mcgill MD;  Location: [facility] CVOR;  Service: Cardiothoracic;  Laterality: N/A;   • BRONCHOSCOPY N/A 3/25/2021    Procedure: BRONCHOSCOPY AT BEDSIDE WITH BRONCHIOALVEOLAR LAVAGE;  Surgeon: Glenn Reyes MD;  Location: [facility] ENDOSCOPY;  Service: Pulmonary;  Laterality: N/A;  PNA   • CARDIAC CATHETERIZATION  2009   • CARDIAC CATHETERIZATION N/A 8/14/2019    Procedure: Left Heart Cath;  Surgeon: Jose Levi MD;  Location: [facility] CATH INVASIVE LOCATION;  Service: Cardiovascular   • CARDIAC CATHETERIZATION N/A 8/14/2019    Procedure: Right Heart Cath;  Surgeon: Jose Levi MD;  Location: [facility] CATH INVASIVE LOCATION;  Service: Cardiovascular   • CARDIAC CATHETERIZATION N/A 8/14/2019    Procedure: Aortic root aortogram;  Surgeon: Jose Levi MD;  Location: [facility] CATH INVASIVE LOCATION;  Service: Cardiovascular   • CARDIAC CATHETERIZATION N/A 1/20/2021    Procedure: Left Heart Cath;  Surgeon: Jose Levi MD;  Location: [facility] CATH INVASIVE LOCATION;  Service: Cardiovascular;  Laterality: N/A;   • COLONOSCOPY     • CYSTOCELE REPAIR  1984   • ELBOW ARTHROPLASTY  2011   • ENDOSCOPY     • FOOT SURGERY     • GALLBLADDER SURGERY  2002   • TONSILLECTOMY     • VAGINAL HYSTERECTOMY  1972        Social History:   Social History     Tobacco Use   • Smoking status: Current Every Day Smoker     Packs/day: 1.00     Types: Cigarettes   • Smokeless tobacco: Never Used   • Tobacco comment: decrease now and do not smoke dos   Substance Use Topics   • Alcohol use: Yes     Comment: socially      Family History:  History reviewed. No pertinent family history.       Allergies:  Allergies   Allergen Reactions   • Adhesive Tape Unknown (See Comments)     hives   • Butorphanol Other (See Comments)     Chest pain difficulty breathing throat swelling   • Garlic Other (See Comments)     Chest pain difficulty breathing throat swells   • Heparin Other (See  "Comments)     HIT +   • Tetanus-Diphtheria Toxoids Td Other (See Comments)     Dizziness,  vomiting   • Aloe Itching   • Bee Venom Other (See Comments)     Swelling eyes and lips hand swelling   • Latex Itching   • Macadamia Nut Oil Other (See Comments)     Chest pain difficulty breathing throat swelling   • Tuberculin Unknown (See Comments)     reactor   • Wasp Venom Other (See Comments)     Swelling eyes lips and hand     Scheduled Meds:  arformoterol, 15 mcg, BID - RT  aspirin, 81 mg, Daily  atorvastatin, 40 mg, Nightly  budesonide, 1 mg, BID - RT  bumetanide, 1 mg, BID  cefTRIAXone, 1 g, Q24H  chlorhexidine, 15 mL, Q12H  docusate sodium, 100 mg, BID  fluconazole, 100 mg, Daily  FLUoxetine, 20 mg, Daily  gabapentin, 200 mg, Q12H  hydrALAZINE, 10 mg, Q8H  insulin lispro, 0-14 Units, 4x Daily With Meals & Nightly  ipratropium-albuterol, 3 mL, Q4H - RT  levothyroxine, 100 mcg, Q AM  metOLazone, 2.5 mg, Every Other Day  metoprolol tartrate, 12.5 mg, Q12H  nystatin, 5 mL, 4x Daily  nystatin, , Q12H  pantoprazole, 40 mg, QAM  phenazopyridine, 200 mg, TID With Meals  polyethylene glycol, 17 g, Daily  potassium chloride, 20 mEq, TID With Meals  sodium chloride, 10 mL, Q12H  sodium chloride, 10 mL, Q12H          Review of Systems:   Review of Systems   Constitutional: Negative for chills and fever.   Cardiovascular: Negative for chest pain and palpitations.   Respiratory: Negative for cough and shortness of breath.    Gastrointestinal: Negative for nausea and vomiting.              Constitutional:  Temp:  [98.3 °F (36.8 °C)-98.8 °F (37.1 °C)] 98.3 °F (36.8 °C)  Heart Rate:  [59-78] 70  Resp:  [18-20] 20  BP: (112-157)/(47-78) 145/78  Arterial Line BP: (125)/(63) 125/63    Physical Exam   /78   Pulse 70   Temp 98.3 °F (36.8 °C) (Oral)   Resp 20   Ht 162.6 cm (64\")   Wt 118 kg (259 lb 11.2 oz)   SpO2 100%   BMI 44.58 kg/m²   General: Patient is sitting in chair in no acute distress  Eyes: Sclera is anicteric,  " conjunctiva is clear   HEENT:  No JVD.  Dobbhoff tube present.  Respiratory: Good air entry, nonlabored breathing.  Scattered rhonchi and wheezing.  Cardiovascular: S1,S2 Regular rate and rhythm.  Sternotomy is healing well.  Gastrointestinal: Abdomen nondistended, soft  Musculoskeletal:  No abnormal movements  Extremities: Patient has chronic bilateral lower extremity edema continues to have 1+ edema.  Skin: Stasis changes seen bilateral shin.  Neuro: Alert and awake, no lateralizing deficits appreciated    INTAKE AND OUTPUT:    Intake/Output Summary (Last 24 hours) at 4/12/2021 0918  Last data filed at 4/12/2021 0600  Gross per 24 hour   Intake 946 ml   Output 1525 ml   Net -579 ml       Cardiographics  Telemetry: Sinus rhythm    ECG:   ECG 12 Lead   Preliminary Result   HEART RATE= 59  bpm   RR Interval= 1020  ms   MO Interval= 158  ms   P Horizontal Axis= 14  deg   P Front Axis= 25  deg   QRSD Interval= 91  ms   QT Interval= 652  ms   QRS Axis= 55  deg   T Wave Axis= 67  deg   - ABNORMAL ECG -   Sinus bradycardia   Prolonged QT interval   Electronically Signed By:    Date and Time of Study: 2021-03-23 13:15:33      ECG 12 Lead   Final Result   HEART RATE= 80  bpm   RR Interval= 752  ms   MO Interval= 161  ms   P Horizontal Axis= 0  deg   P Front Axis= 22  deg   QRSD Interval= 80  ms   QT Interval= 419  ms   QRS Axis= 39  deg   T Wave Axis= 35  deg   - NORMAL ECG -   Sinus rhythm   When compared with ECG of 19-Mar-2021 4:30:10,   Significant repolarization change   Electronically Signed By: Segun Rucker ([facility]) 20-Mar-2021 10:06:29   Date and Time of Study: 2021-03-20 05:15:54      ECG 12 Lead   Final Result   HEART RATE= 62  bpm   RR Interval= 984  ms   MO Interval= 165  ms   P Horizontal Axis= 13  deg   P Front Axis= 32  deg   QRSD Interval= 76  ms   QT Interval= 434  ms   QRS Axis= 43  deg   T Wave Axis= 82  deg   - ABNORMAL ECG -   Sinus rhythm   Atrial premature complex   ST elevation, consider inferior  injury   When compared with ECG of 18-Mar-2021 11:04:47,   No significant change   Electronically Signed By: Segun Rucker ([facility]) 19-Mar-2021 16:48:51   Date and Time of Study: 2021-03-19 04:30:10      ECG 12 Lead   Final Result   HEART RATE= 56  bpm   RR Interval= 1072  ms   NV Interval= 163  ms   P Horizontal Axis= -22  deg   P Front Axis= -9  deg   QRSD Interval= 88  ms   QT Interval= 516  ms   QRS Axis= 40  deg   T Wave Axis= 43  deg   - OTHERWISE NORMAL ECG -   Sinus bradycardia   When compared with ECG of 16-Mar-2021 8:51:36,   Significant repolarization change   Electronically Signed By: Segun Rucker ([facility]) 18-Mar-2021 17:48:15   Date and Time of Study: 2021-03-18 11:04:47        I have personally reviewed EKG    Echocardiogram: Results for orders placed during the hospital encounter of 03/18/21    Adult Transesophageal Echo (CHRISTIANO) W/ Cont if Necessary Per Protocol (Bedside)    Interpretation Summary  CHRISTIANO   procedure note    Procedure:  CHRISTIANO    Indication:   Valvular heart disease s/p AVR, respiratory failure    Date of procedure  : 3/25/2021    :  Dr. Jose Levi    Description of procedure:    Under conscious sedation given by anesthesiology and local anesthesia, a CHRISTIANO probe was advanced into the esophagus and into the stomach 2D, M-mode, color Doppler images were obtained..  Patient tolerated procedure well complications well.    Complications: none    Results:    Normal LV size and contractility LV contractility, EF of 60%  Normal RV size  Normal left atrial size, normal right atrial size, left atrial appendage without thrombus.  Aortic valve, mitral valve, tricuspid valve appears structurally normal, mild TR seen.  No vegetation seen  No pericardial effusion seen.  Proximal aorta appears normal in size.  Mild aortic plaque seen      Recommendations/plan:    Clinical correlation recommended      Lab Review   I have reviewed the labs      Results from last 7 days   Lab Units 04/09/21  2083  "  MAGNESIUM mg/dL 2.0     Results from last 7 days   Lab Units 04/12/21  0508   SODIUM mmol/L 139   POTASSIUM mmol/L 3.8   BUN mg/dL 21   CREATININE mg/dL <0.47*   CALCIUM mg/dL 8.5*         Results from last 7 days   Lab Units 04/12/21  0508 04/11/21  0425 04/10/21  0531   WBC 10*3/mm3 11.40* 13.70* 15.40*   HEMOGLOBIN g/dL 10.4* 10.3* 10.3*   HEMATOCRIT % 30.5* 31.2* 31.4*   PLATELETS 10*3/mm3 69* 75* 92*     Results from last 7 days   Lab Units 04/12/21  0508 04/11/21  0425 04/10/21  0531   INR  2.89 4.55* 3.70*       RADIOLOGY:  Imaging Results (Last 24 Hours)     Procedure Component Value Units Date/Time    FL Video Swallow With Speech Single Contrast [[id number]] Collected: 04/11/21 2005     Updated: 04/11/21 2007    Narrative:      DATE OF EXAM:  4/6/2021 12:40 PM     PROCEDURE:  FL VIDEO SWALLOW W SPEECH SINGLE-CONTRAST-     INDICATIONS:  on vent from 3/18-4/4; M06.9-Rheumatoid arthritis, unspecified;  I35.0-Nonrheumatic aortic (valve) stenosis; J96.01-Acute respiratory  failure with hypoxia; J44.9-Chronic obstructive pulmonary disease,  unspecified     COMPARISON:  No Comparisons Available     TECHNIQUE:   This examination was performed in conjunction with speech pathology.  Lateral video fluoroscopic evaluation of the swallowing mechanism was  performed while correlate administering to the patient various  consistency food items mixed with barium.     Fluoroscopic Time:   3.2 minutes     FINDINGS:  There is penetration without definite aspiration.        Impression:      For more details, please refer to the speech pathology notes.     Electronically Signed By-Romain Gutierrez MD On:4/11/2021 8:05 PM  This report was finalized on [id number] by  Romain Gutierrez MD.                )4/12/2021  Jose Levi MD      EMR MessageGate/Transcription:   \"Dictated utilizing Dragon dictation\".   "

## 2021-04-12 NOTE — PLAN OF CARE
Pt able to complete two sit <-> stand transfers this date with maxAx2.  Pt able to stand x15 seconds before needing seated rest break.  Pt exhibiting progress toward goals, however fatigues quickly requiring seated rest breaks.  Will need extensive IP rehab.      Kelsey Becerril PT, DPT, [facility]

## 2021-04-12 NOTE — THERAPY EVALUATION
"Acute Care - Speech Language Pathology   Swallow Re-Evaluation [facility]     Patient Name: Claudia Rust  : 1950  MRN: [id number]  Today's Date: 2021               Admit Date: 3/18/2021    Visit Dx:     ICD-10-CM ICD-9-CM   1. Rheumatoid arthritis, involving unspecified site, unspecified whether rheumatoid factor present (CMS/HCC)  M06.9 714.0   2. Aortic valve stenosis, etiology of cardiac valve disease unspecified  I35.0 424.1   3. Acute respiratory failure with hypoxia (CMS/HCC)  J96.01 518.81   4. Chronic obstructive pulmonary disease, unspecified COPD type (CMS/HCC)  J44.9 496     Patient Active Problem List   Diagnosis   • Morbidly obese (CMS/[facility])   • Dyspnea on exertion   • Abnormal nuclear stress test   • Nonrheumatic aortic valve stenosis   • Prediabetes   • Dyslipidemia   • Essential hypertension   • Acute UTI   • Bipolar disorder (CMS/[facility])   • COPD (chronic obstructive pulmonary disease) (CMS/HCC)   • High triglycerides   • Gastroesophageal reflux disease   • Rheumatoid arthritis (CMS/[facility])   • Aortic stenosis, severe   • Chronic heart failure with preserved ejection fraction (HFpEF) (CMS/HCC)   • Aortic valve stenosis     Past Medical History:   Diagnosis Date   • Acute congestive heart failure (CMS/[facility])    • Allergies    • Anxiety    • Arthritis    • Asthma    • Bilateral leg edema    • Bipolar 1 disorder (CMS/[facility])    • Bulging lumbar disc     X3   • Cancer (CMS/[facility])     states had \"cancerous tumor during pregnancy and had to have hysterectomy\"   • Cataract     bilateral   • Cellulitis 2021    bilateral legs   • Compression fracture of vertebral column (CMS/[facility])     LUMBAR   • COPD (chronic obstructive pulmonary disease) (CMS/[facility])    • Delayed emergence from anesthesia    • Depression    • Depression    • Dyslipidemia    • Dyspnea on exertion    • Fibromyalgia    • GERD (gastroesophageal reflux disease)    • Hiatal hernia    • Hyperlipidemia    • Hypertension    • Hypothyroid     " HYPOTHYROID   • IBS (irritable bowel syndrome)    • Migraines    • Morbid obesity (CMS/HCC)    • Neuropathy    • Panic attacks    • Peripheral edema    • PONV (postoperative nausea and vomiting)    • Poor mobility     rolling walker   • Prediabetes    • PVD (peripheral vascular disease) (CMS/[facility])    • Rheumatoid aortitis    • Spinal stenosis    • Spinal stenosis    • Vertigo    • Vitamin D deficiency      Past Surgical History:   Procedure Laterality Date   • AORTIC VALVE REPAIR/REPLACEMENT N/A 3/18/2021    Procedure: AORTIC VALVE REPLACEMENT;  Surgeon: Olaf Mcgill MD;  Location: [facility] CVOR;  Service: Cardiothoracic;  Laterality: N/A;   • BRONCHOSCOPY N/A 3/25/2021    Procedure: BRONCHOSCOPY AT BEDSIDE WITH BRONCHIOALVEOLAR LAVAGE;  Surgeon: Glenn Reyes MD;  Location: [facility] ENDOSCOPY;  Service: Pulmonary;  Laterality: N/A;  PNA   • CARDIAC CATHETERIZATION  2009   • CARDIAC CATHETERIZATION N/A 8/14/2019    Procedure: Left Heart Cath;  Surgeon: Jose Levi MD;  Location: [facility] CATH INVASIVE LOCATION;  Service: Cardiovascular   • CARDIAC CATHETERIZATION N/A 8/14/2019    Procedure: Right Heart Cath;  Surgeon: Jose Levi MD;  Location: [facility] CATH INVASIVE LOCATION;  Service: Cardiovascular   • CARDIAC CATHETERIZATION N/A 8/14/2019    Procedure: Aortic root aortogram;  Surgeon: Jose Levi MD;  Location: [facility] CATH INVASIVE LOCATION;  Service: Cardiovascular   • CARDIAC CATHETERIZATION N/A 1/20/2021    Procedure: Left Heart Cath;  Surgeon: Jose Levi MD;  Location: [facility] CATH INVASIVE LOCATION;  Service: Cardiovascular;  Laterality: N/A;   • COLONOSCOPY     • CYSTOCELE REPAIR  1984   • ELBOW ARTHROPLASTY  2011   • ENDOSCOPY     • FOOT SURGERY     • GALLBLADDER SURGERY  2002   • TONSILLECTOMY     • VAGINAL HYSTERECTOMY  1972        SWALLOW EVALUATION (last 72 hours)      SLP Adult Swallow Evaluation     Row Name 04/12/21 1500       Rehab Evaluation     Document Type  re-evaluation  -MM    Subjective Information  no complaints  -MM    Patient Observations  alert;cooperative;agree to therapy  -MM    Patient/Family/Caregiver Comments/Observations  Patient reports feeling a lot better and her voice has improved.  -MM    Patient Effort  good  -MM    Comment  Patient is preparing for discharge.  Re-evaluation of swallow was completed this date with VFSS.  -MM       General Information    Patient Profile Reviewed  yes  -MM    Pertinent History Of Current Problem  Patient initially presented to the hospital on for pre-admission testing prior to scheduled cardiac surgery.  She reported porr mobility die to SOB and fatigue.  Patient had AVR surgery on 3/18.  Patient had bronch on 3/25 with moderate amount of mucopurulent secretions suctioned.  Patient remained on ventilator following surgery until she was extubated on 4/4.  Patient had VFSS on 4/6 wtih recommendation of m/s and NTL.  Patients dobhoff tube has since been removed and she is on 2L nasal cannula.   cannula.    -MM    Prior Level of Function-Swallowing  mechanical soft textures;nectar thick liquids  -MM    Plans/Goals Discussed with  patient  -MM    Barriers to Rehab  none identified  -MM       Oral Motor Structure and Function    Dentition Assessment  natural, present and adequate;upper dentures/partial in place;lower dentures/partial in place  -MM    Secretion Management  WNL/WFL  -MM    Mucosal Quality  moist, healthy  -MM    Volitional Swallow  WFL  -MM    Volitional Cough  WFL  -MM       Oral Musculature and Cranial Nerve Assessment    Oral Motor General Assessment  WFL  -MM       General Eating/Swallowing Observations    Respiratory Support Currently in Use  nasal cannula  -MM    Eating/Swallowing Skills  fed by SLP;self-fed  -MM    Positioning During Eating  upright 90 degree;upright in chair  -MM       MBS/VFSS    Utensils Used  spoon;cup;straw  -MM    Consistencies Trialed  regular  textures;chopped;pureed;thin liquids  -MM       MBS/VFSS Interpretation    Oral Prep Phase  WFL  -MM    Oral Transit Phase  WFL  -MM    Oral Residue  WFL  -MM    VFSS Summary  THIN:  Patient given thin barium by cup 2x and by strw 2x.  Patient takes large sips requiring two consecutive swallow sto clear the entire bolus.  Swallow initiation is timely with no penetration or aspiration noted.   PUREE:  Patient given two trials of applesauce mixed with barium.  Patient forms a cohesive bolsu prior to timely swallow initiation with no penetration, aspiration or pharyngeal residue noted.   PEACHES:  Patient given single trial of peaches.  Mastication is functional for this consistency.  Mild base of tongue spillage of juice is noted.  However patient is able to form a cohesive bolus with timely swallow initiation, no penetration or aspiration.  CRACKER:  Patient exhibits extended mastication for cracker trial, stating it didn't help that her mouth was dry/  Patient is able to form a cohesive bolus and propel posteriorly with no difficulties.  Once swallowed, there is no penetration, aspiration or pharyngeal residue.    OVERALL:  Patient's laryngeal elevation, hyoid excursion and epiglottic deflection and WFL.    -MM       Initiation of Pharyngeal Swallow    Initiation of Pharyngeal Swallow  WFL  -MM       Swallow Goals (SLP)    Oral Nutrition/Hydration Goal Selection (SLP)  oral nutrition/hydration, SLP goal 1;oral nutrition/hydration, SLP goal (free text)  -MM       Oral Nutrition/Hydration Goal 1 (SLP)    Oral Nutrition/Hydration Goal 1, SLP  Patient will be seen at a meal assessment within 24-48 hours to assess tolerance of current diet with further recommendations to be given as indicated  -MM    Time Frame (Oral Nutrition/Hydration Goal 1, SLP)  2 days  -MM       Oral Nutrition/Hydration Goal (SLP)    Oral Nutrition/Hydration Goal, SLP  Pt will participate in ongoing dx tx to determine tolerance of recommended diet,  independence with strategies/precautions, and readiness for repeat VFSS/diet advancement.  -MM    Time Frame (Oral Nutrition/Hydration Goal, SLP)  1 week  -MM    Barriers (Oral Nutrition/Hydration Goal, SLP)  see above note  -MM      User Key  (r) = Recorded By, (t) = Taken By, (c) = Cosigned By    Initials Name Effective Dates    MM Cecilia Maxwell, OFELIA 03/01/19 -           EDUCATION  The patient has been educated in the following areas:   safe swallow strategies.    SLP Recommendation and Plan       Patient was not wearing a face mask during this therapy encounter. Therapist used appropriate personal protective equipment including mask, eye protection and gloves.  Mask used was standard procedure mask. Appropriate PPE was worn during the entire therapy session. Hand hygiene was completed before and after therapy session. Patient is not in enhanced droplet precautions.       VFSS review and education was conducted this date. Individuals educated included:  self Education methods/means included VFSS education means: verbal review face to face.  Education barriers: none identified Further follow up warranted next session                  Plan of Care Reviewed With: patient, spouse  .    SLP GOALS     Row Name 04/12/21 1500       Oral Nutrition/Hydration Goal 1 (SLP)    Oral Nutrition/Hydration Goal 1, SLP  Patient will be seen at a meal assessment within 24-48 hours to assess tolerance of current diet with further recommendations to be given as indicated  -MM    Time Frame (Oral Nutrition/Hydration Goal 1, SLP)  2 days  -MM       Oral Nutrition/Hydration Goal (SLP)    Oral Nutrition/Hydration Goal, SLP  Pt will participate in ongoing dx tx to determine tolerance of recommended diet, independence with strategies/precautions, and readiness for repeat VFSS/diet advancement.  -MM    Time Frame (Oral Nutrition/Hydration Goal, SLP)  1 week  -MM    Barriers (Oral Nutrition/Hydration Goal, SLP)  see above note  -MM       User Key  (r) = Recorded By, (t) = Taken By, (c) = Cosigned By    Initials Name Provider Type    Cecilia Farley, SLP Speech and Language Pathologist             Time Calculation:                OFELIA Arreola  4/12/2021

## 2021-04-12 NOTE — THERAPY TREATMENT NOTE
Subjective: Pt agreeable to therapeutic plan of care.    Objective:     Bed mobility - Mod-A and Assist x 2   Sitting EOB with Sudhir x7 minutes   Transfers - Max-A and Assist x 2 for sit <-> stand and stand pivot transfer to chair   Ambulation - unable due to weakness    Pain: 5 VAS  Education: Provided education on importance of mobility and skilled verbal / tactile cueing throughout intervention.     Assessment: Claudia Rust presents with functional mobility impairments which indicate the need for skilled intervention. Tolerating session today without incident.  Pt able to complete two sit <> stand transfers with maxAx2 and able to stand x15 seconds before needing break due to weakness.  Continue to fatigue quickly.  Pt needing rest breaks during mobility due to episodes of vertigo.  Will continue to follow and progress as tolerated.     Plan/Recommendations:   Pt would benefit from Inpatient Rehabilitation placement at discharge from facility and requires no DME at discharge.   Pt desires Inpatient Rehabilitation placement at discharge. Pt cooperative; agreeable to therapeutic recommendations and plan of care.     Basic Mobility 6-click:  Rollin = Total, A lot = 2, A little = 3; 4 = None  Supine>Sit:   1 = Total, A lot = 2, A little = 3; 4 = None   Sit>Stand with arms:  1 = Total, A lot = 2, A little = 3; 4 = None  Bed>Chair:   1 = Total, A lot = 2, A little = 3; 4 = None  Ambulate in room:  1 = Total, A lot = 2, A little = 3; 4 = None  3-5 Steps with railin = Total, A lot = 2, A little = 3; 4 = None  Score: 10    Modified Bruno: 5 = Severe disability (Requires constant nursing care and attention, bedridden, incontinent)    Post-Tx Position: Up in Chair, Alarms activated and Call light and personal items within reach,  present    PPE: gloves, surgical mask, eyewear protection

## 2021-04-12 NOTE — PROGRESS NOTES
"PULMONARY CRITICAL CARE Progress  NOTE      PATIENT IDENTIFICATION:  Name: Claudia Rust  MRN: [id number]  :  1950     Age: 70 y.o.  Sex: female    DATE OF Note:  2021   Referring Physician: Olaf Mcgill MD                  Subjective:   On NC  Still on nector thick  Oral diet   No changes bowel habits  No new rash or lesions      Objective:  tMax 24 hrs: Temp (24hrs), Av.5 °F (36.9 °C), Min:98.3 °F (36.8 °C), Max:98.8 °F (37.1 °C)      Vitals Ranges:   Temp:  [98.3 °F (36.8 °C)-98.8 °F (37.1 °C)] 98.3 °F (36.8 °C)  Heart Rate:  [59-78] 70  Resp:  [18-20] 20  BP: (112-157)/(47-78) 145/78    Intake and Output Last 3 Shifts:   I/O last 3 completed shifts:  In: 1784 [P.O.:1020; I.V.:734; NG/GT:30]  Out: 2825 [Urine:2825]    Exam:  /78   Pulse 70   Temp 98.3 °F (36.8 °C) (Oral)   Resp 20   Ht 162.6 cm (64\")   Wt 118 kg (259 lb 11.2 oz)   SpO2 100%   BMI 44.58 kg/m²     General Appearance: AA  HEENT:  Normocephalic, without obvious abnormality, Conjunctiva/corneas clear.  Normal external ear canals, Nares normal, no drainage.  Oral ET tube  Neck:  Supple, symmetrical, trachea midline. No JVD.  Lungs /Chest wall:   Mild scattered rhonchi, no wheezing, respirations unlabored symmetrical wall movement.     Heart: Sinus rhythm, no murmur or gallop  Abdomen: Soft, non-distended, active bowel sounds  Extremities: + Generalized edema, no clubbing or Cyanosis        Medications:    Current Facility-Administered Medications:   •  acetaminophen (TYLENOL) tablet 650 mg, 650 mg, Oral, Q4H PRN, 650 mg at 21 0843 **OR** acetaminophen (TYLENOL) 160 MG/5ML solution 650 mg, 650 mg, Oral, Q4H PRN, 649.6 mg at 21 2105 **OR** acetaminophen (TYLENOL) suppository 650 mg, 650 mg, Rectal, Q4H PRN, Janna Alberto APRN  •  arformoterol (BROVANA) nebulizer solution 15 mcg, 15 mcg, Nebulization, BID - RT, Shanell Hernandez MD, 15 mcg at 21 0716  •  aspirin EC tablet 81 mg, 81 mg, " Oral, Daily, Janna Alberto APRN, 81 mg at 04/12/21 0844  •  atorvastatin (LIPITOR) tablet 40 mg, 40 mg, Oral, Nightly, Janna Alberto, APRN, 40 mg at 04/11/21 2100  •  bisacodyl (DULCOLAX) suppository 10 mg, 10 mg, Rectal, Daily PRN, Janna Alberto, APRN, 10 mg at 04/08/21 1619  •  budesonide (PULMICORT) nebulizer solution 1 mg, 1 mg, Nebulization, BID - RT, Shanell Hernandez MD, 1 mg at 04/12/21 0721  •  bumetanide (BUMEX) tablet 1 mg, 1 mg, Nasogastric, BID, FlorentinYovani MD, 1 mg at 04/12/21 0845  •  calcium carbonate (TUMS) chewable tablet 500 mg (200 mg elemental), 2 tablet, Oral, TID PRN, Day, Lorna, APRN, 2 tablet at 04/09/21 1808  •  cefTRIAXone (ROCEPHIN) 1 g in sodium chloride 0.9 % 100 mL IVPB, 1 g, Intravenous, Q24H, Draw, Azmi, MD, Last Rate: 200 mL/hr at 04/11/21 1601, 1 g at 04/11/21 1601  •  chlorhexidine (PERIDEX) 0.12 % solution 15 mL, 15 mL, Mouth/Throat, Q12H, Janna Alberto APRN, 15 mL at 04/12/21 0847  •  Chlorhexidine Gluconate Cloth 2 % pads 1 application, 1 application, Topical, Q12H PRN, Monika Conn APRPATRICIA  •  dextrose (D50W) 25 g/ 50mL Intravenous Solution 25 g, 25 g, Intravenous, Q15 Min PRN, Olaf Mcgill MD  •  dextrose (GLUTOSE) oral gel 15 g, 15 g, Oral, Q15 Min PRN, Olaf Mcgill MD  •  docusate sodium (COLACE) capsule 100 mg, 100 mg, Oral, BID, Glenn Reyes MD, 100 mg at 04/12/21 0846  •  [COMPLETED] fluconazole (DIFLUCAN) tablet 200 mg, 200 mg, Oral, Once, 200 mg at 04/11/21 1016 **FOLLOWED BY** fluconazole (DIFLUCAN) tablet 100 mg, 100 mg, Oral, Daily, Day, CAMERON RothmanN, 100 mg at 04/12/21 0845  •  FLUoxetine (PROzac) capsule 20 mg, 20 mg, Oral, Daily, Draw, MD Glenn, 20 mg at 04/12/21 0845  •  gabapentin (NEURONTIN) capsule 200 mg, 200 mg, Oral, Q12H, Janna Alberto, APRN, 200 mg at 04/12/21 0844  •  glucagon (human recombinant) (GLUCAGEN DIAGNOSTIC) injection 1 mg, 1 mg, Subcutaneous, Q15 Min PRN, Artem,  MD Olaf  •  hydrALAZINE (APRESOLINE) injection 10 mg, 10 mg, Intravenous, Q6H PRN, Juan Jose Skelton MD, 10 mg at 21 2251  •  hydrALAZINE (APRESOLINE) tablet 10 mg, 10 mg, Nasogastric, Q8H, Yovani Lerma MD, 10 mg at 21 0646  •  insulin lispro (ADMELOG) injection 0-14 Units, 0-14 Units, Subcutaneous, 4x Daily With Meals & Nightly, 3 Units at 21 0844 **AND** insulin lispro (ADMELOG) injection 0-14 Units, 0-14 Units, Subcutaneous, PRN, Jr Angelito Dtoson MD  •  ipratropium-albuterol (DUO-NEB) nebulizer solution 3 mL, 3 mL, Nebulization, Q4H PRN, Janna Alberto, APRN, 3 mL at 21 1522  •  ipratropium-albuterol (DUO-NEB) nebulizer solution 3 mL, 3 mL, Nebulization, Q4H - RT, Day, Lorna, APRN, 3 mL at 21 0326  •  levothyroxine (SYNTHROID, LEVOTHROID) tablet 100 mcg, 100 mcg, Oral, Q AM, Olaf Mcgill MD, 100 mcg at 21 0647  •  lidocaine (XYLOCAINE) 2% injection, , , PRN, Glenn Reyes MD, 4 mL at 21 1439  •  lidocaine (XYLOCAINE) 5 % ointment, , , PRN, Glenn Reyes MD, 1 application at 21 1439  •  meclizine (ANTIVERT) tablet 25 mg, 25 mg, Oral, TID PRN, Belkis Loving, SARA, 25 mg at 21 0845  •  metOLazone (ZAROXOLYN) tablet 2.5 mg, 2.5 mg, Nasogastric, Every Other Day, Paola Hammer MD, 2.5 mg at 21 0845  •  metoprolol tartrate (LOPRESSOR) half tablet 12.5 mg, 12.5 mg, Oral, Q12H, Jose Levi MD, 12.5 mg at 21 0844  •  [] HYDROmorphone (DILAUDID) injection 0.5 mg, 0.5 mg, Intravenous, Q2H PRN, 0.5 mg at 21 0825 **AND** naloxone (NARCAN) injection 0.4 mg, 0.4 mg, Intravenous, Q5 Min PRN, Janna Alberto, APRN  •  nystatin (MYCOSTATIN) 385778 UNIT/ML suspension 500,000 Units, 5 mL, Oral, 4x Daily, Jr Angelito Dotson MD, 500,000 Units at 21 0844  •  nystatin (MYCOSTATIN) powder, , Topical, Q12H, Draw, MD Glenn, Given at 21 0847  •  ondansetron (ZOFRAN) injection 4 mg,  4 mg, Intravenous, Q6H PRN, Remy Janna L, APRN, 4 mg at 04/11/21 2100  •  pantoprazole (PROTONIX) EC tablet 40 mg, 40 mg, Oral, QAM, Glenn Reyes MD, 40 mg at 04/12/21 0654  •  phenazopyridine (PYRIDIUM) tablet 200 mg, 200 mg, Oral, TID With Meals, Day, Lorna, APRN, 200 mg at 04/12/21 0844  •  polyethylene glycol (MIRALAX) packet 17 g, 17 g, Oral, Daily, Glenn Reyes MD, 17 g at 04/10/21 0911  •  potassium chloride (K-DUR,KLOR-CON) CR tablet 20 mEq, 20 mEq, Oral, PRN, 20 mEq at 04/11/21 1135 **OR** potassium chloride (KLOR-CON) packet 20 mEq, 20 mEq, Oral, PRN, Janna Alberto APRN, 20 mEq at 04/09/21 1146  •  potassium chloride (K-DUR,KLOR-CON) ER tablet 20 mEq, 20 mEq, Oral, TID With Meals, Glenn Reyes MD, 20 mEq at 04/12/21 0846  •  potassium chloride 10 mEq in 100 mL IVPB, 10 mEq, Intravenous, Q1H PRN **OR** potassium chloride 10 mEq in 100 mL IVPB, 10 mEq, Intravenous, Q1H PRN, Remy Janna L, APRN, Last Rate: 200 mL/hr at 03/19/21 0726, 10 mEq at 03/19/21 0726  •  sodium chloride 0.9 % flush 10 mL, 10 mL, Intravenous, Q12H, Wernert Belkis, APRN, 10 mL at 04/12/21 0847  •  sodium chloride 0.9 % flush 10 mL, 10 mL, Intravenous, Q12H, Wernert, Belkis, APRN, 10 mL at 04/12/21 0847  •  sodium chloride 0.9 % flush 10 mL, 10 mL, Intravenous, PRN, Wernert, Belkis, APRN  •  sodium chloride 0.9 % flush 20 mL, 20 mL, Intravenous, PRN, Wernert, Belkis, APRN    Data Review:  All labs (24hrs):   Recent Results (from the past 24 hour(s))   POC Glucose Once    Collection Time: 04/11/21 10:53 AM    Specimen: Blood   Result Value Ref Range    Glucose 118 (H) 70 - 105 mg/dL   Potassium    Collection Time: 04/11/21  3:44 PM    Specimen: Blood   Result Value Ref Range    Potassium 4.2 3.5 - 5.2 mmol/L   POC Glucose Once    Collection Time: 04/11/21  5:33 PM    Specimen: Blood   Result Value Ref Range    Glucose 96 70 - 105 mg/dL   POC Glucose Once    Collection Time: 04/11/21  9:13 PM     Specimen: Blood   Result Value Ref Range    Glucose 151 (H) 70 - 105 mg/dL   CBC (No Diff)    Collection Time: 04/12/21  5:08 AM    Specimen: Blood   Result Value Ref Range    WBC 11.40 (H) 3.40 - 10.80 10*3/mm3    RBC 3.35 (L) 3.77 - 5.28 10*6/mm3    Hemoglobin 10.4 (L) 12.0 - 15.9 g/dL    Hematocrit 30.5 (L) 34.0 - 46.6 %    MCV 91.0 79.0 - 97.0 fL    MCH 30.9 26.6 - 33.0 pg    MCHC 34.0 31.5 - 35.7 g/dL    RDW 15.2 12.3 - 15.4 %    RDW-SD 49.4 37.0 - 54.0 fl    MPV 9.2 6.0 - 12.0 fL    Platelets 69 (L) 140 - 450 10*3/mm3   Renal Function Panel    Collection Time: 04/12/21  5:08 AM    Specimen: Blood   Result Value Ref Range    Glucose 130 (H) 65 - 99 mg/dL    BUN 21 8 - 23 mg/dL    Creatinine <0.47 (L) 0.57 - 1.00 mg/dL    Sodium 139 136 - 145 mmol/L    Potassium 3.8 3.5 - 5.2 mmol/L    Chloride 100 98 - 107 mmol/L    CO2 29.0 22.0 - 29.0 mmol/L    Calcium 8.5 (L) 8.6 - 10.5 mg/dL    Albumin 2.80 (L) 3.50 - 5.20 g/dL    Phosphorus 3.3 2.5 - 4.5 mg/dL    Anion Gap 10.0 5.0 - 15.0 mmol/L    BUN/Creatinine Ratio     Protime-INR    Collection Time: 04/12/21  5:08 AM    Specimen: Blood   Result Value Ref Range    Protime 30.2 (H) 19.4 - 28.5 Seconds    INR 2.89 2.00 - 3.00   POC Glucose Once    Collection Time: 04/12/21  7:31 AM    Specimen: Blood   Result Value Ref Range    Glucose 173 (H) 70 - 105 mg/dL        Imaging:  FL Video Swallow With Speech Single Contrast  Narrative: DATE OF EXAM:  4/6/2021 12:40 PM     PROCEDURE:  FL VIDEO SWALLOW W SPEECH SINGLE-CONTRAST-     INDICATIONS:  on vent from 3/18-4/4; M06.9-Rheumatoid arthritis, unspecified;  I35.0-Nonrheumatic aortic (valve) stenosis; J96.01-Acute respiratory  failure with hypoxia; J44.9-Chronic obstructive pulmonary disease,  unspecified     COMPARISON:  No Comparisons Available     TECHNIQUE:   This examination was performed in conjunction with speech pathology.  Lateral video fluoroscopic evaluation of the swallowing mechanism was  performed while correlate  administering to the patient various  consistency food items mixed with barium.     Fluoroscopic Time:   3.2 minutes     FINDINGS:  There is penetration without definite aspiration.      Impression: For more details, please refer to the speech pathology notes.     Electronically Signed By-Romain Gutierrez MD On:4/11/2021 8:05 PM  This report was finalized on [id number] by  Romain Gutierrez MD.       ASSESSMENT:  Acute hypoxic respiratory failure required intubation     There is diffuse alveolar disease groundglass opacities  Aortic valve stenosis, status post tissue replacement  Luminary hypertension, severe  COPD  KARLY  Cor pulmonale  Tobacco abuse  Upper airway infection with moderate amount of mucopurulent secretions noted on bronchoscopy 3/25/2021  Febrile illness   Interstitial pneumonitis  Acute thrombocytopenia  Catheter associated DVT right upper extremity subclavian, axillary, and brachial   hypernatremia  Upper arm left DVT  DM  Uremia    PLAN:  Macrobid for UTI  Wean O2 down  PT/OT  Hemodynamically stable   PT/OT  Diuretics per renal  Bronchodilator  Inhaled corticosteroids  Electrolytes/ glycemic control  DVT prophylaxis arixtra,   GI prophylaxis.        Critical care time in direct medical management (  32 ) minutes   Shanell Hernandez MD, D,ABSM,  4/12/2021  10:45 EDT

## 2021-04-12 NOTE — PROGRESS NOTES
Continued Stay Note  ISAAK Rush     Patient Name: Claudia Rust  MRN: [id number]  Today's Date: 4/12/2021    Admit Date: 3/18/2021    Discharge Plan     Row Name 04/12/21 1144       Plan    Plan  D/C Plan: [facility] Acute accepted pending bed availability. No precert or PASRR required.    Patient/Family in Agreement with Plan  yes    Plan Comments   spoke to patient at bedside wearing mask and goggles and keeping distance greater than 6 feet and spent less than 15 minutes in room. Patient and spouse requesting lift chair and w/c ramp orders be sent to [facility] ([phone], [phone]). CM discussed with Janna NP and orders placed. CM left message for Amanda Perales CM, at [phone] requesting fax number-awaiting reply. Barrier to D/C: IV abx, monitoring INR, repeat VFSS tomorrow.          Expected Discharge Date and Time     Expected Discharge Date Expected Discharge Time    Apr 13, 2021             Gabriella Schultz

## 2021-04-12 NOTE — PROGRESS NOTES
S/P POD# 25 tissue AVR--Pagni  EF 60-65% (echo)    Subjective: reports her bottom is hurting her but refused to let nsg reposition her because PT should be coming    Supra-therapeutic INR yest 4.55, today 2.89  E coli UTI 4/9--Ceftriaxone started 4/10  Thrush--Diflucan started 4/11  Repeat VFSS planned for Tues    CHRISTIANO (3/25)--no abnormal findings noted  Bronch (3/25)--mod mucopurulent secretions--cultures negative        Intake/Output Summary (Last 24 hours) at 4/12/2021 0758  Last data filed at 4/12/2021 0600  Gross per 24 hour   Intake 1066 ml   Output 1525 ml   Net -459 ml     Temp:  [98.2 °F (36.8 °C)-98.8 °F (37.1 °C)] 98.3 °F (36.8 °C)  Heart Rate:  [59-78] 70  Resp:  [18-20] 20  BP: (112-157)/(47-78) 145/78  Arterial Line BP: (123-138)/(60-63) 125/63      Results from last 7 days   Lab Units 04/12/21  0508 04/11/21  0425 04/10/21  0531   WBC 10*3/mm3 11.40* 13.70* 15.40*   HEMOGLOBIN g/dL 10.4* 10.3* 10.3*   HEMATOCRIT % 30.5* 31.2* 31.4*   PLATELETS 10*3/mm3 69* 75* 92*   INR  2.89 4.55* 3.70*     Results from last 7 days   Lab Units 04/12/21  0508 04/09/21  0419   CREATININE mg/dL <0.47*  --    POTASSIUM mmol/L 3.8  --    SODIUM mmol/L 139  --    MAGNESIUM mg/dL  --  2.0   PHOSPHORUS mg/dL 3.3  --        Physical Exam:  Neuro intact, NAD, resting in bed  at bedside  Tele:  SR 70's  diminished bilaterally, 99% 1L NC  Sternal incision healing well  Benign abd, + BM.   + edema, chronic erythema of BLE noted    Assessment/Plan:  Principal Problem:    Aortic valve stenosis  Active Problems:    Rheumatoid arthritis (CMS/HCC)    Severe aortic stenosis--s/p tissue AVR (Pagni)  Postop acute hypoxic resp failure--re-intubated 3/22, extubated 4/4  Postop TCP/HIT positive--Coumadin  Right subclavian/axillary/brachial DVT--Coumadin  Chronic HFpEF--maximize meds prior to d/c  COPD--nebs, pulmicort  Rheumatoid arthritis--not on medication per patient  HTN--stable  HLD--statin  Bipolar disorder--home Prozac  Obesity  stage 3  PVD  KARLY  Vertigo--home Antivert  Tobacco abuse--counseled on cessation  Postop leukocytosis, likely r/t atelectasis--aggressive pulm toileting  Post-op hypernatremia-- resolved  Postop E coli UTI--ceftriaxone started 4/10  Postop oral candidiasis--Diflucan started 4/11  Severe weakness and deconditioning, worsened postop--acute rehab at [place]    POD#25. Plans to get VFSS eval completed today, pt requesting to stop thickened liquids.  Continue Coumadin for RUE DVT. Coumadin 1mg today. Patient is severely deconditioned and needs aggressive PT/OT/ST.   agreed that she needed to be repositioned--trying to avoid any DTIs.     List heparin as an allergy.    Addendum:  Ceftriaxone changed to Macrobid per intensivist.  Await ST testing today.    Routine care--as above  D/w pt/, Dr. Mcgill, [place] planner  Plan for [facility] acute rehab at discharge--hopefully by mid week    Janna Alberto, SARA  4/12/2021  07:58 EDT

## 2021-04-12 NOTE — PLAN OF CARE
Goal Outcome Evaluation:  Plan of Care Reviewed With: patient     Video swallow study completed this date with trials of water by cup 2x and straw 2x, applesauce 2x, peaches 1x and cracker 1x.  Bolus formation, cohesion, & transit are WFL for all trials. Prolonged mastication for cracker trial noted which she attributes to having a dry mouth.  Laryngeal elevation, epiglottic deflection, and forward hyolaryngeal movement are WFL. No remarkable cricopharyngeal or UES findings. There is no laryngeal vestibule penetration, aspiration, or significant hypopharyngeal residue.     Pt presents with oropharyngeal swallow which is judged to be within functional limits under fluoroscopy with all trials assessed.  Recommend a soft to chew and thin liquid diet.  ST will continue to follow to ensure tolerance and provide further recommendations as indicated.

## 2021-04-13 VITALS
HEART RATE: 67 BPM | BODY MASS INDEX: 44.71 KG/M2 | WEIGHT: 261.91 LBS | RESPIRATION RATE: 16 BRPM | SYSTOLIC BLOOD PRESSURE: 111 MMHG | OXYGEN SATURATION: 97 % | TEMPERATURE: 97.7 F | HEIGHT: 64 IN | DIASTOLIC BLOOD PRESSURE: 55 MMHG

## 2021-04-13 PROBLEM — Z95.2 S/P AVR: Status: ACTIVE | Noted: 2021-04-13

## 2021-04-13 LAB
ALBUMIN SERPL-MCNC: 3 G/DL (ref 3.5–5.2)
ALBUMIN/GLOB SERPL: 1.1 G/DL
ALP SERPL-CCNC: 142 U/L (ref 39–117)
ALT SERPL W P-5'-P-CCNC: 34 U/L (ref 1–33)
ANION GAP SERPL CALCULATED.3IONS-SCNC: 9 MMOL/L (ref 5–15)
AST SERPL-CCNC: 26 U/L (ref 1–32)
BILIRUB SERPL-MCNC: 0.4 MG/DL (ref 0–1.2)
BUN SERPL-MCNC: 17 MG/DL (ref 8–23)
BUN/CREAT SERPL: 41.5 (ref 7–25)
CA-I SERPL ISE-MCNC: 1.2 MMOL/L (ref 1.2–1.3)
CALCIUM SPEC-SCNC: 8.9 MG/DL (ref 8.6–10.5)
CHLORIDE SERPL-SCNC: 97 MMOL/L (ref 98–107)
CO2 SERPL-SCNC: 30 MMOL/L (ref 22–29)
CREAT SERPL-MCNC: 0.41 MG/DL (ref 0.57–1)
DEPRECATED RDW RBC AUTO: 48.6 FL (ref 37–54)
ERYTHROCYTE [DISTWIDTH] IN BLOOD BY AUTOMATED COUNT: 15.1 % (ref 12.3–15.4)
GFR SERPL CREATININE-BSD FRML MDRD: >150 ML/MIN/1.73
GLOBULIN UR ELPH-MCNC: 2.7 GM/DL
GLUCOSE BLDC GLUCOMTR-MCNC: 107 MG/DL (ref 70–105)
GLUCOSE BLDC GLUCOMTR-MCNC: 115 MG/DL (ref 70–105)
GLUCOSE BLDC GLUCOMTR-MCNC: 139 MG/DL (ref 70–105)
GLUCOSE SERPL-MCNC: 98 MG/DL (ref 65–99)
HCT VFR BLD AUTO: 29.2 % (ref 34–46.6)
HGB BLD-MCNC: 9.9 G/DL (ref 12–15.9)
INR PPP: 2.04 (ref 2–3)
MAGNESIUM SERPL-MCNC: 1.8 MG/DL (ref 1.6–2.4)
MCH RBC QN AUTO: 30.6 PG (ref 26.6–33)
MCHC RBC AUTO-ENTMCNC: 33.8 G/DL (ref 31.5–35.7)
MCV RBC AUTO: 90.6 FL (ref 79–97)
PHOSPHATE SERPL-MCNC: 3.5 MG/DL (ref 2.5–4.5)
PLATELET # BLD AUTO: 59 10*3/MM3 (ref 140–450)
PMV BLD AUTO: 9.1 FL (ref 6–12)
POTASSIUM SERPL-SCNC: 3.5 MMOL/L (ref 3.5–5.2)
PROT SERPL-MCNC: 5.7 G/DL (ref 6–8.5)
PROTHROMBIN TIME: 21.7 SECONDS (ref 19.4–28.5)
RBC # BLD AUTO: 3.22 10*6/MM3 (ref 3.77–5.28)
SODIUM SERPL-SCNC: 136 MMOL/L (ref 136–145)
WBC # BLD AUTO: 10.3 10*3/MM3 (ref 3.4–10.8)

## 2021-04-13 PROCEDURE — 99024 POSTOP FOLLOW-UP VISIT: CPT | Performed by: NURSE PRACTITIONER

## 2021-04-13 PROCEDURE — 94799 UNLISTED PULMONARY SVC/PX: CPT

## 2021-04-13 PROCEDURE — 85610 PROTHROMBIN TIME: CPT | Performed by: NURSE PRACTITIONER

## 2021-04-13 PROCEDURE — 82330 ASSAY OF CALCIUM: CPT | Performed by: INTERNAL MEDICINE

## 2021-04-13 PROCEDURE — 25010000002 MAGNESIUM SULFATE IN D5W 1G/100ML (PREMIX) 1-5 GM/100ML-% SOLUTION: Performed by: INTERNAL MEDICINE

## 2021-04-13 PROCEDURE — 25010000002 ONDANSETRON PER 1 MG: Performed by: NURSE PRACTITIONER

## 2021-04-13 PROCEDURE — 97535 SELF CARE MNGMENT TRAINING: CPT

## 2021-04-13 PROCEDURE — 84100 ASSAY OF PHOSPHORUS: CPT | Performed by: INTERNAL MEDICINE

## 2021-04-13 PROCEDURE — 83735 ASSAY OF MAGNESIUM: CPT | Performed by: INTERNAL MEDICINE

## 2021-04-13 PROCEDURE — 85027 COMPLETE CBC AUTOMATED: CPT | Performed by: NURSE PRACTITIONER

## 2021-04-13 PROCEDURE — 80053 COMPREHEN METABOLIC PANEL: CPT | Performed by: INTERNAL MEDICINE

## 2021-04-13 PROCEDURE — 97530 THERAPEUTIC ACTIVITIES: CPT

## 2021-04-13 PROCEDURE — 99232 SBSQ HOSP IP/OBS MODERATE 35: CPT | Performed by: INTERNAL MEDICINE

## 2021-04-13 PROCEDURE — 82962 GLUCOSE BLOOD TEST: CPT

## 2021-04-13 PROCEDURE — 97112 NEUROMUSCULAR REEDUCATION: CPT

## 2021-04-13 PROCEDURE — 92526 ORAL FUNCTION THERAPY: CPT

## 2021-04-13 RX ORDER — WARFARIN SODIUM 2 MG/1
1 TABLET ORAL
Status: DISCONTINUED | OUTPATIENT
Start: 2021-04-13 | End: 2021-04-13 | Stop reason: HOSPADM

## 2021-04-13 RX ORDER — PSEUDOEPHEDRINE HCL 30 MG
100 TABLET ORAL 2 TIMES DAILY
Qty: 60 CAPSULE | Refills: 1 | Status: SHIPPED | OUTPATIENT
Start: 2021-04-13 | End: 2023-01-07

## 2021-04-13 RX ORDER — BUMETANIDE 1 MG/1
1 TABLET ORAL 2 TIMES DAILY
Qty: 60 TABLET | Refills: 3 | Status: SHIPPED | OUTPATIENT
Start: 2021-04-13 | End: 2021-06-25

## 2021-04-13 RX ORDER — FLUCONAZOLE 100 MG/1
100 TABLET ORAL DAILY
Qty: 4 TABLET | Refills: 0 | Status: SHIPPED | OUTPATIENT
Start: 2021-04-14 | End: 2021-04-18

## 2021-04-13 RX ORDER — METOLAZONE 2.5 MG/1
2.5 TABLET ORAL EVERY OTHER DAY
Qty: 15 TABLET | Refills: 2 | Status: SHIPPED | OUTPATIENT
Start: 2021-04-14 | End: 2021-08-06

## 2021-04-13 RX ORDER — BUMETANIDE 1 MG/1
1 TABLET ORAL
Status: DISCONTINUED | OUTPATIENT
Start: 2021-04-13 | End: 2021-04-13 | Stop reason: HOSPADM

## 2021-04-13 RX ORDER — METOLAZONE 2.5 MG/1
2.5 TABLET ORAL EVERY OTHER DAY
Status: DISCONTINUED | OUTPATIENT
Start: 2021-04-14 | End: 2021-04-13 | Stop reason: HOSPADM

## 2021-04-13 RX ORDER — MAGNESIUM SULFATE 1 G/100ML
1 INJECTION INTRAVENOUS ONCE
Status: COMPLETED | OUTPATIENT
Start: 2021-04-13 | End: 2021-04-13

## 2021-04-13 RX ORDER — NYSTATIN 100000 [USP'U]/G
POWDER TOPICAL EVERY 12 HOURS SCHEDULED
Qty: 1 EACH | Refills: 0 | Status: SHIPPED | OUTPATIENT
Start: 2021-04-13 | End: 2021-08-06

## 2021-04-13 RX ORDER — WARFARIN SODIUM 2 MG/1
1 TABLET ORAL NIGHTLY
Qty: 30 TABLET | Refills: 3 | Status: SHIPPED | OUTPATIENT
Start: 2021-04-13 | End: 2021-07-12 | Stop reason: SDUPTHER

## 2021-04-13 RX ORDER — POTASSIUM CHLORIDE 20 MEQ/1
20 TABLET, EXTENDED RELEASE ORAL
Qty: 90 TABLET | Refills: 3 | Status: SHIPPED | OUTPATIENT
Start: 2021-04-13 | End: 2021-08-06 | Stop reason: ALTCHOICE

## 2021-04-13 RX ORDER — FLUOXETINE HYDROCHLORIDE 20 MG/1
20 CAPSULE ORAL DAILY
Qty: 30 CAPSULE | Refills: 3 | Status: SHIPPED | OUTPATIENT
Start: 2021-04-14 | End: 2021-06-25

## 2021-04-13 RX ORDER — NITROFURANTOIN 25; 75 MG/1; MG/1
100 CAPSULE ORAL EVERY 12 HOURS SCHEDULED
Qty: 7 CAPSULE | Refills: 0 | Status: SHIPPED | OUTPATIENT
Start: 2021-04-13 | End: 2021-04-17

## 2021-04-13 RX ADMIN — GABAPENTIN 200 MG: 100 CAPSULE ORAL at 08:54

## 2021-04-13 RX ADMIN — POLYETHYLENE GLYCOL 3350 17 G: 17 POWDER, FOR SOLUTION ORAL at 08:54

## 2021-04-13 RX ADMIN — Medication 10 ML: at 08:56

## 2021-04-13 RX ADMIN — POTASSIUM CHLORIDE 20 MEQ: 1500 TABLET, EXTENDED RELEASE ORAL at 06:48

## 2021-04-13 RX ADMIN — BUMETANIDE 1 MG: 1 TABLET ORAL at 08:54

## 2021-04-13 RX ADMIN — HYDRALAZINE HYDROCHLORIDE 10 MG: 10 TABLET, FILM COATED ORAL at 06:29

## 2021-04-13 RX ADMIN — CHLORHEXIDINE GLUCONATE 15 ML: 1.2 RINSE ORAL at 08:54

## 2021-04-13 RX ADMIN — POTASSIUM CHLORIDE 20 MEQ: 750 TABLET, EXTENDED RELEASE ORAL at 12:28

## 2021-04-13 RX ADMIN — WARFARIN 1 MG: 2 TABLET ORAL at 17:36

## 2021-04-13 RX ADMIN — IPRATROPIUM BROMIDE AND ALBUTEROL SULFATE 3 ML: 2.5; .5 SOLUTION RESPIRATORY (INHALATION) at 15:36

## 2021-04-13 RX ADMIN — PANTOPRAZOLE SODIUM 40 MG: 40 TABLET, DELAYED RELEASE ORAL at 06:29

## 2021-04-13 RX ADMIN — NITROFURANTOIN (MONOHYDRATE/MACROCRYSTALS) 100 MG: 75; 25 CAPSULE ORAL at 08:54

## 2021-04-13 RX ADMIN — HYDRALAZINE HYDROCHLORIDE 10 MG: 10 TABLET, FILM COATED ORAL at 14:54

## 2021-04-13 RX ADMIN — ARFORMOTEROL TARTRATE 15 MCG: 15 SOLUTION RESPIRATORY (INHALATION) at 08:49

## 2021-04-13 RX ADMIN — MAGNESIUM SULFATE HEPTAHYDRATE 1 G: 1 INJECTION, SOLUTION INTRAVENOUS at 08:53

## 2021-04-13 RX ADMIN — FLUOXETINE 20 MG: 20 CAPSULE ORAL at 08:54

## 2021-04-13 RX ADMIN — METOPROLOL TARTRATE 12.5 MG: 25 TABLET, FILM COATED ORAL at 08:54

## 2021-04-13 RX ADMIN — NYSTATIN 500000 UNITS: 100000 SUSPENSION ORAL at 08:00

## 2021-04-13 RX ADMIN — IPRATROPIUM BROMIDE AND ALBUTEROL SULFATE 3 ML: 2.5; .5 SOLUTION RESPIRATORY (INHALATION) at 11:53

## 2021-04-13 RX ADMIN — POTASSIUM CHLORIDE 20 MEQ: 750 TABLET, EXTENDED RELEASE ORAL at 08:54

## 2021-04-13 RX ADMIN — ONDANSETRON 4 MG: 2 INJECTION INTRAMUSCULAR; INTRAVENOUS at 10:42

## 2021-04-13 RX ADMIN — NYSTATIN 500000 UNITS: 100000 SUSPENSION ORAL at 12:28

## 2021-04-13 RX ADMIN — DOCUSATE SODIUM 100 MG: 100 CAPSULE, LIQUID FILLED ORAL at 08:54

## 2021-04-13 RX ADMIN — FLUCONAZOLE 100 MG: 100 TABLET ORAL at 08:54

## 2021-04-13 RX ADMIN — NYSTATIN: 100000 POWDER TOPICAL at 08:55

## 2021-04-13 RX ADMIN — IPRATROPIUM BROMIDE AND ALBUTEROL SULFATE 3 ML: 2.5; .5 SOLUTION RESPIRATORY (INHALATION) at 02:54

## 2021-04-13 RX ADMIN — BUDESONIDE 1 MG: 0.5 SUSPENSION RESPIRATORY (INHALATION) at 08:54

## 2021-04-13 RX ADMIN — LEVOTHYROXINE SODIUM 100 MCG: 0.1 TABLET ORAL at 06:29

## 2021-04-13 RX ADMIN — ASPIRIN 81 MG: 81 TABLET, COATED ORAL at 08:54

## 2021-04-13 RX ADMIN — MECLIZINE HYDROCHLORIDE 25 MG: 25 TABLET ORAL at 14:04

## 2021-04-13 NOTE — THERAPY TREATMENT NOTE
"Acute Care - Speech Language Pathology   Swallow Treatment Note/Discharge    Victor Manuel     Patient Name: Claudia Rust  : 1950  MRN: [id number]  Today's Date: 2021               Admit Date: 3/18/2021    Visit Dx:      ICD-10-CM ICD-9-CM   1. Rheumatoid arthritis, involving unspecified site, unspecified whether rheumatoid factor present (CMS/[facility])  M06.9 714.0   2. Aortic valve stenosis, etiology of cardiac valve disease unspecified  I35.0 424.1   3. Acute respiratory failure with hypoxia (CMS/[facility])  J96.01 518.81   4. Chronic obstructive pulmonary disease, unspecified COPD type (CMS/HCC)  J44.9 496     Patient Active Problem List   Diagnosis   • Morbidly obese (CMS/[facility])   • Dyspnea on exertion   • Abnormal nuclear stress test   • Nonrheumatic aortic valve stenosis   • Prediabetes   • Dyslipidemia   • Essential hypertension   • Acute UTI   • Bipolar disorder (CMS/[facility])   • COPD (chronic obstructive pulmonary disease) (CMS/[facility])   • High triglycerides   • Gastroesophageal reflux disease   • Rheumatoid arthritis (CMS/[facility])   • Aortic stenosis, severe   • Chronic heart failure with preserved ejection fraction (HFpEF) (CMS/[facility])   • Aortic valve stenosis       Therapy Treatment      SLP GOALS     Row Name 21 1300       Oral Nutrition/Hydration Goal 1 (SLP)    Oral Nutrition/Hydration Goal 1, SLP  Patient will be seen at a meal assessment within 24-48 hours to assess tolerance of current diet with further recommendations to be given as indicated  -LF    Time Frame (Oral Nutrition/Hydration Goal 1, SLP)  2 days  -LF    Barriers (Oral Nutrition/Hydration Goal 1, SLP) Pt seen with trials of puree, soft solids, and thin liquids via straw. Upon entry pt sitting upright in a chair and self fed. Pt independently demonstrated safe swallow strategies. Adequate mastication. No overt s/s of aspiration observed at any time. Pt reports poor appetite d/t \"food not tasting well.\"   -LF    Progress/Outcomes (Oral " Nutrition/Hydration Goal 1, SLP)  goal met  -LF       Oral Nutrition/Hydration Goal 2 (SLP)    Oral Nutrition/Hydration Goal 2, SLP  Patient will initiate a PO diet and tolerate with no complications from aspiration  -LF    Time Frame (Oral Nutrition/Hydration Goal 2, SLP)  by discharge  -LF    Progress/Outcomes (Oral Nutrition/Hydration Goal 2, SLP)  goal met  -LF       Oral Nutrition/Hydration Goal (SLP)    Oral Nutrition/Hydration Goal, SLP  Pt will participate in ongoing dx tx to determine tolerance of recommended diet, independence with strategies/precautions, and readiness for repeat VFSS/diet advancement.  -LF    Time Frame (Oral Nutrition/Hydration Goal, SLP)  1 week  -LF    Barriers (Oral Nutrition/Hydration Goal, SLP) Pt currently tolerating safest and least restrictive diet with no complications from aspiration. No further ST services warranted at this time. ST will sign off. Please place new order if new symptoms arise. Thank you.   -LF    Progress/Outcomes (Oral Nutrition/Hydration Goal, SLP)  goal met  -LF      User Key  (r) = Recorded By, (t) = Taken By, (c) = Cosigned By    Initials Name Provider Type    Cecilia Farley, SLP Speech and Language Pathologist    Agata Cash, SLP Speech and Language Pathologist          EDUCATION  The patient has been educated in the following areas:   safe swallow strategies.    Patient was not wearing a face mask during this therapy encounter. Therapist used appropriate personal protective equipment including mask, eye protection and gloves.  Mask used was standard procedure mask. Appropriate PPE was worn during the entire therapy session. Hand hygiene was completed before and after therapy session. Patient is not in enhanced droplet precautions.                Time Calculation:                     OFELIA Goldsmith  4/13/2021

## 2021-04-13 NOTE — PLAN OF CARE
Goal Outcome Evaluation:  Pt continues to make good progress towards all ADL goals. Pt remains limited by impaired balance, generalized weakness, impaired endurance and acuity of illness. OT recommends continued treatment at 5x/week and d/c to IP rehab. PPE worn: mask, gloves and goggles.

## 2021-04-13 NOTE — SIGNIFICANT NOTE
[facility] Instructions given to patient via EMS, Report given to [facility].  Pt will go to room 119.  Spouse Isma Rust aware.  Teaching done, pt verbalized understanding.

## 2021-04-13 NOTE — NURSING NOTE
Picc line nurse recommended the picc line stay in for the patient as she has limited options for IV access related to having the DVT's on the Right side.  Report given to [facility].   They asked also if we can leave the picc line in to benefit the patient.   Will send patient to [facility] with MATY picc line.  Report number was [phone]

## 2021-04-13 NOTE — PROGRESS NOTES
Cardiology Progress Note    Patient Identification:  Name: Claudia Rust  Age: 70 y.o.  Sex: female  :  1950  MRN: [id number]                 Follow Up / Chief Complaint: Follow-up for valvular heart disease status post AVR    Interval History: Patient underwent #23 magna pericardial prosthesis AVR 3/18/2021  3/22/2021.  Events noted.  Patient is intubated on IV Olegario-Synephrine and Bumex.  3/23/2021.  Patient continues to intubated  3/24/2021 patient continues to be intubated and mechanically ventilated.  3/25/2021 patient continues to be intubated and mechanically ventilated.  Underwent a CHRISTIANO which showed normal LV systolic function and good aortic valve function.  3/26/2021.  Patient continues to be intubated and mechanically ventilated.  He is lightly sedated.  Patient underwent bronchoscopy 3/25/2021.  Patient was getting tachycardic was given 12.5 of metoprolol developed hypotension requiring Olegario-Synephrine.  HIT antibody positive  3/29/2021: Continues to be intubated and mechanically ventilated.  3/30/2021.  Patient continues to be intubated and mechanically ventilated.  3/31/2021: Patient continues to be intubated and mechanically ventilated.  Nephrology has started on IV Bumex drip.  21: Patient is intubated and is on weaning trials.  Bumex drip has been discontinued.  2021 : Patient intubated on weaning trials.  On IV Bumex  21 : Patient extubated.  Dobbhoff tube present.  2021 : Patient is sitting in chair intermittently confused without symptoms at the current time.  2021 : Patient sitting in bed hemodynamically stable.  Dobbhoff tube is present.  2021 : Patient was feeling depressed yesterday was given her antidepressants is feeling a whole lot better and mental status is whole lot better today.  2021 : Patient is sitting in chair and hemodynamically stable.  2021: Patient is requiring less oxygen currently on 2 L by nasal cannula  2021 : Patient oxygen  requirement is decreasing and is improving daily.  4/13/2021 :     Subjective: Patient seen and examined.  Chart reviewed.  Labs reviewed.  Discussed with RN taking care of patient.  Patient is more awake denies any chest pain shortness of breath.  States that she is feeling better.      Objective: Sodium is 152--> 144-> 141-141-> 136.  BUN/creatinine  49/1.05-> 42/0.69-> 42/0.64-> 46/0.60-> 47/0.49-> 43/0.46--> 43/0.43-> 35/0.38-> 34/0.47-> 22/0.44-> 22/0.47-> 20/0.47-> 21/0.47-> 17/0.41..  Hemoglobin is 10.1--> 10.8--> 9.0-> 10.8-> 10.5-> 10.2-> 10.6-> 10.6-> 10.7-> 9.7-> 9.8-> 10.2-> 10.1-> 10.3-> 10.4-> 9.9 platelet count 95-> 134-> 108-> 92-> 75-> 69-> 59. white count elevated at 22.2-> 16.2-> 16.1-> 15.4-> 11.4-> 10.3.    History of present illness :    This is a 70-year-old with PMH      #Valvular heart disease with moderate to severe aortic stenosis  Cardiac cath 1/20/2021 revealing nonobstructive CAD  # Hyperlipidemia,    # Hypertension  # COPD, KARLY on CPAP  # Prediabetes  # Hypothyroidism, Rheumatoid Arthritis, spinal stenosis, hiatal hernia, chronic depression, bipolar, GERD, IBS, chronic migraine, vertigo, herniated disc, compression lumbar fractures  #Allergies/intolerance to Stadol  # Hysterectomy, bladder reconstruction, cholecystectomy, right hand surgery  # Family history of strokes and grandfather.  Mother's cancer, father on  # Active cigarette smoker trying to quit         Here for aortic valve replacement.  Patient was recently seen with a complaint of dyspnea on exertion edema and weight gain.  Patient was brought in for elective aortic valve replacement.  Patient underwent aortic valve replacement 3/18/2021 with #23 magna pericardial prosthesis.  Blood pressure is high is on IV Cardene and IV nitroglycerin.  Currently intubated sedated chest tube present Venegas catheter present.  Underlying rhythm is flatline with AV paced rhythm.  Underwent an echocardiogram which is revealing severe aortic  stenosis on 2/11/2020   Underwent cardiac cath 1/20/2021 which revealed no obstructive disease.  Carotid Dopplers 9/13/2019 normal.  Echocardiogram 2/11/2020 is revealing severe aortic stenosis  Work-up here on 3/16/2021 revealed proBNP 733, normal CMP, A1c 5.7, normal CBC, chest x-ray with no acute abnormality.  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  Events noted patient was intubated 3/21/2021 for respiratory failure.  Extubated 4/4/2021.  Continue to monitor pulmonary status and follow-up with pulmonary.  Chest x-ray 4 /5/2021   1. Stable diffuse mixed interstitial and airspace disease.  2. Interval extubation.  3. Stable postoperative findings and stable left internal jugular  central venous catheter/feeding tube.    Patient's po Bumex 1 mg 3 times daily  Monitor electrolytes and renal function  Potassium is low, will follow electrolyte replacement protocol and monitor electrolytes.  Patient sodium was going up, patient was given free water and has improved.  Appreciated nephrology input.  Will follow up with nephrology.  Monitor for routine expected post surgery blood loss anemia  Patient underwent transesophageal echo 3/25/2021 which revealed normal LV systolic function normal chamber sizes.  Normal aortic valve placement and no  "significant valvular heart disease.  Continue to monitor blood pressure closely patient developed hypotension requiring Olegario-Synephrine with 12.5 of metoprolol.  Now blood pressure is going up we will restart metoprolol and monitor blood pressure closely.  Will follow up with CT VS for routine post CABG care and HIT, patient has been started on warfarin.  INR is 4.55 on 4/11/2021, warfarin was on hold and is improved to 2.04 will restart warfarin to keep INR between 2 and 3.  Discussed with RN taking care of patient to coordinate care  Discussed with patient's  by bedside.   is requesting wheelchair ramp and left.  Will defer it to the primary admitting team to prescribe    Past Medical History:  Past Medical History:   Diagnosis Date   • Acute congestive heart failure (CMS/HCC)    • Allergies    • Anxiety    • Arthritis    • Asthma    • Bilateral leg edema    • Bipolar 1 disorder (CMS/HCC)    • Bulging lumbar disc     X3   • Cancer (CMS/HCC)     states had \"cancerous tumor during pregnancy and had to have hysterectomy\"   • Cataract     bilateral   • Cellulitis 03/2021    bilateral legs   • Compression fracture of vertebral column (CMS/HCC)     LUMBAR   • COPD (chronic obstructive pulmonary disease) (CMS/HCC)    • Delayed emergence from anesthesia    • Depression    • Depression    • Dyslipidemia    • Dyspnea on exertion    • Fibromyalgia    • GERD (gastroesophageal reflux disease)    • Hiatal hernia    • Hyperlipidemia    • Hypertension    • Hypothyroid     HYPOTHYROID   • IBS (irritable bowel syndrome)    • Migraines    • Morbid obesity (CMS/HCC)    • Neuropathy    • Panic attacks    • Peripheral edema    • PONV (postoperative nausea and vomiting)    • Poor mobility     rolling walker   • Prediabetes    • PVD (peripheral vascular disease) (CMS/HCC)    • Rheumatoid aortitis    • Spinal stenosis    • Spinal stenosis    • Vertigo    • Vitamin D deficiency      Past Surgical History:  Past Surgical " History:   Procedure Laterality Date   • AORTIC VALVE REPAIR/REPLACEMENT N/A 3/18/2021    Procedure: AORTIC VALVE REPLACEMENT;  Surgeon: Olaf Mcgill MD;  Location: [facility];  Service: Cardiothoracic;  Laterality: N/A;   • BRONCHOSCOPY N/A 3/25/2021    Procedure: BRONCHOSCOPY AT BEDSIDE WITH BRONCHIOALVEOLAR LAVAGE;  Surgeon: Glenn Reyes MD;  Location: [facility];  Service: Pulmonary;  Laterality: N/A;  PNA   • CARDIAC CATHETERIZATION  2009   • CARDIAC CATHETERIZATION N/A 8/14/2019    Procedure: Left Heart Cath;  Surgeon: Jose Levi MD;  Location: [facility] CATH INVASIVE LOCATION;  Service: Cardiovascular   • CARDIAC CATHETERIZATION N/A 8/14/2019    Procedure: Right Heart Cath;  Surgeon: Jose Levi MD;  Location: [facility] CATH INVASIVE LOCATION;  Service: Cardiovascular   • CARDIAC CATHETERIZATION N/A 8/14/2019    Procedure: Aortic root aortogram;  Surgeon: Jose Levi MD;  Location: [facility] CATH INVASIVE LOCATION;  Service: Cardiovascular   • CARDIAC CATHETERIZATION N/A 1/20/2021    Procedure: Left Heart Cath;  Surgeon: Jose Levi MD;  Location: [facility] CATH INVASIVE LOCATION;  Service: Cardiovascular;  Laterality: N/A;   • COLONOSCOPY     • CYSTOCELE REPAIR  1984   • ELBOW ARTHROPLASTY  2011   • ENDOSCOPY     • FOOT SURGERY     • GALLBLADDER SURGERY  2002   • TONSILLECTOMY     • VAGINAL HYSTERECTOMY  1972        Social History:   Social History     Tobacco Use   • Smoking status: Current Every Day Smoker     Packs/day: 1.00     Types: Cigarettes   • Smokeless tobacco: Never Used   • Tobacco comment: decrease now and do not smoke dos   Substance Use Topics   • Alcohol use: Yes     Comment: socially      Family History:  History reviewed. No pertinent family history.       Allergies:  Allergies   Allergen Reactions   • Adhesive Tape Unknown (See Comments)     hives   • Butorphanol Other (See Comments)     Chest pain difficulty breathing throat  "swelling   • Garlic Other (See Comments)     Chest pain difficulty breathing throat swells   • Heparin Other (See Comments)     HIT +   • Tetanus-Diphtheria Toxoids Td Other (See Comments)     Dizziness,  vomiting   • Aloe Itching   • Bee Venom Other (See Comments)     Swelling eyes and lips hand swelling   • Latex Itching   • Macadamia Nut Oil Other (See Comments)     Chest pain difficulty breathing throat swelling   • Tuberculin Unknown (See Comments)     reactor   • Wasp Venom Other (See Comments)     Swelling eyes lips and hand     Scheduled Meds:  arformoterol, 15 mcg, BID - RT  aspirin, 81 mg, Daily  atorvastatin, 40 mg, Nightly  budesonide, 1 mg, BID - RT  bumetanide, 1 mg, BID  chlorhexidine, 15 mL, Q12H  docusate sodium, 100 mg, BID  fluconazole, 100 mg, Daily  FLUoxetine, 20 mg, Daily  gabapentin, 200 mg, Q12H  hydrALAZINE, 10 mg, Q8H  insulin lispro, 0-14 Units, 4x Daily With Meals & Nightly  ipratropium-albuterol, 3 mL, Q4H - RT  levothyroxine, 100 mcg, Q AM  [START ON 4/14/2021] metOLazone, 2.5 mg, Every Other Day  metoprolol tartrate, 12.5 mg, Q12H  nitrofurantoin (macrocrystal-monohydrate), 100 mg, Q12H  nystatin, 5 mL, 4x Daily  nystatin, , Q12H  pantoprazole, 40 mg, QAM  polyethylene glycol, 17 g, Daily  potassium chloride, 20 mEq, TID With Meals  sodium chloride, 10 mL, Q12H  sodium chloride, 10 mL, Q12H  warfarin, 1 mg, Daily          Review of Systems:   Review of Systems   Constitutional: Negative for chills and fever.   Cardiovascular: Negative for chest pain and palpitations.   Respiratory: Negative for cough and shortness of breath.    Gastrointestinal: Negative for nausea and vomiting.              Constitutional:  Temp:  [97.4 °F (36.3 °C)-98.5 °F (36.9 °C)] 97.4 °F (36.3 °C)  Heart Rate:  [55-89] 69  Resp:  [16-18] 16  BP: (108-142)/(48-81) 142/81    Physical Exam   /81   Pulse 69   Temp 97.4 °F (36.3 °C) (Oral)   Resp 16   Ht 162.6 cm (64\")   Wt 119 kg (261 lb 14.5 oz)   SpO2 " 94%   BMI 44.96 kg/m²   General: Patient is sitting in chair in no acute distress  Eyes: Sclera is anicteric,  conjunctiva is clear   HEENT:  No JVD.  Dobbhoff tube present.  Respiratory: Good air entry, nonlabored breathing.  Scattered rhonchi and wheezing.  Cardiovascular: S1,S2 Regular rate and rhythm.  Sternotomy is healing well.  Gastrointestinal: Abdomen nondistended, soft  Musculoskeletal:  No abnormal movements  Extremities: Patient has chronic bilateral lower extremity edema continues to have 1+ edema.  Skin: Stasis changes seen bilateral shin.  Neuro: Alert and awake, no lateralizing deficits appreciated    INTAKE AND OUTPUT:    Intake/Output Summary (Last 24 hours) at 4/13/2021 1415  Last data filed at 4/13/2021 1200  Gross per 24 hour   Intake 1040 ml   Output 1850 ml   Net -810 ml       Cardiographics  Telemetry: Sinus rhythm    ECG:   ECG 12 Lead   Preliminary Result   HEART RATE= 59  bpm   RR Interval= 1020  ms   MT Interval= 158  ms   P Horizontal Axis= 14  deg   P Front Axis= 25  deg   QRSD Interval= 91  ms   QT Interval= 652  ms   QRS Axis= 55  deg   T Wave Axis= 67  deg   - ABNORMAL ECG -   Sinus bradycardia   Prolonged QT interval   Electronically Signed By:    Date and Time of Study: 2021-03-23 13:15:33      ECG 12 Lead   Final Result   HEART RATE= 80  bpm   RR Interval= 752  ms   MT Interval= 161  ms   P Horizontal Axis= 0  deg   P Front Axis= 22  deg   QRSD Interval= 80  ms   QT Interval= 419  ms   QRS Axis= 39  deg   T Wave Axis= 35  deg   - NORMAL ECG -   Sinus rhythm   When compared with ECG of 19-Mar-2021 4:30:10,   Significant repolarization change   Electronically Signed By: Segun Rucker ([facility]) 20-Mar-2021 10:06:29   Date and Time of Study: 2021-03-20 05:15:54      ECG 12 Lead   Final Result   HEART RATE= 62  bpm   RR Interval= 984  ms   MT Interval= 165  ms   P Horizontal Axis= 13  deg   P Front Axis= 32  deg   QRSD Interval= 76  ms   QT Interval= 434  ms   QRS Axis= 43  deg   T Wave  Axis= 82  deg   - ABNORMAL ECG -   Sinus rhythm   Atrial premature complex   ST elevation, consider inferior injury   When compared with ECG of 18-Mar-2021 11:04:47,   No significant change   Electronically Signed By: Segun Rucker ([facility]) 19-Mar-2021 16:48:51   Date and Time of Study: 2021-03-19 04:30:10      ECG 12 Lead   Final Result   HEART RATE= 56  bpm   RR Interval= 1072  ms   TX Interval= 163  ms   P Horizontal Axis= -22  deg   P Front Axis= -9  deg   QRSD Interval= 88  ms   QT Interval= 516  ms   QRS Axis= 40  deg   T Wave Axis= 43  deg   - OTHERWISE NORMAL ECG -   Sinus bradycardia   When compared with ECG of 16-Mar-2021 8:51:36,   Significant repolarization change   Electronically Signed By: Segun Rucker ([facility]) 18-Mar-2021 17:48:15   Date and Time of Study: 2021-03-18 11:04:47        I have personally reviewed EKG    Echocardiogram: Results for orders placed during the hospital encounter of 03/18/21    Adult Transesophageal Echo (CHRISTIANO) W/ Cont if Necessary Per Protocol (Bedside)    Interpretation Summary  CHRISTIANO   procedure note    Procedure:  CHRISTIANO    Indication:   Valvular heart disease s/p AVR, respiratory failure    Date of procedure  : 3/25/2021    :  Dr. Jose Levi    Description of procedure:    Under conscious sedation given by anesthesiology and local anesthesia, a CHRISTIANO probe was advanced into the esophagus and into the stomach 2D, M-mode, color Doppler images were obtained..  Patient tolerated procedure well complications well.    Complications: none    Results:    Normal LV size and contractility LV contractility, EF of 60%  Normal RV size  Normal left atrial size, normal right atrial size, left atrial appendage without thrombus.  Aortic valve, mitral valve, tricuspid valve appears structurally normal, mild TR seen.  No vegetation seen  No pericardial effusion seen.  Proximal aorta appears normal in size.  Mild aortic plaque seen      Recommendations/plan:    Clinical correlation  "recommended      Lab Review   I have reviewed the labs      Results from last 7 days   Lab Units 04/13/21  0538   MAGNESIUM mg/dL 1.8     Results from last 7 days   Lab Units 04/13/21  0538   SODIUM mmol/L 136   POTASSIUM mmol/L 3.5   BUN mg/dL 17   CREATININE mg/dL 0.41*   CALCIUM mg/dL 8.9         Results from last 7 days   Lab Units 04/13/21  0538 04/12/21  0508 04/11/21  0425   WBC 10*3/mm3 10.30 11.40* 13.70*   HEMOGLOBIN g/dL 9.9* 10.4* 10.3*   HEMATOCRIT % 29.2* 30.5* 31.2*   PLATELETS 10*3/mm3 59* 69* 75*     Results from last 7 days   Lab Units 04/13/21  0538 04/12/21  0508 04/11/21  0425   INR  2.04 2.89 4.55*       RADIOLOGY:  Imaging Results (Last 24 Hours)     ** No results found for the last 24 hours. **                )4/13/2021  MD DANIEL Lamas/Transcription:   \"Dictated utilizing Dragon dictation\".   "

## 2021-04-13 NOTE — PLAN OF CARE
Goal Outcome Evaluation:      Pt. Being discharged to [facility].  VSS, report called.  Pt verbalized understanding of discharge and ambulance ride.

## 2021-04-13 NOTE — THERAPY TREATMENT NOTE
Subjective: Pt agreeable to therapeutic plan of care.  Cognition: oriented to Person, Place, Time and Situation    Objective:     Bed Mobility: Mod-A and Assist x 2  Functional Transfers: Max-A and Assist x 2  Functional Ambulation: N/A or Not attempted.    Grooming: Mod-A  ADL Position: edge of bed sitting  ADL Comments: Pt tolerated EOB sitting with close SBA x 8 min. Pt requires mod A for hair grooming due to sternal precautions. Pt performed oral hygiene with SBA.    Upper Body Dressing and Lower Body Dressing: Max-A  ADL Position: edge of bed sitting  ADL Comments: Pt requires max A to apply heart hugger while seated at EOB level. Pt requires max A for LB dressing in prep for mobility. May need use of A/E. CTM.    Phase 1 Cardiac Rehab Initiated    Sitting tolerance: 5-10min and supported  Standing tolerance: <1min and supported    Precautions:   Mid-sternal incision; avoid scapular retraction and depression.   Cardiovascular impairment post-sx; encourage energy conservation strategies.    Therapeutic Exercises: 10 reps UE and LE AROM in seated position.     MET level equivalent: 1.0-1.4 (AROM / ADLs in supine or sitting, bed mobility, transfers)      Pain: 5 VAS  Education: Provided education on importance of mobility and skilled verbal / tactile cueing throughout intervention.     Assessment: Claudia Rust presents with ADL impairments below baseline abilities which indicate the need for continued skilled intervention while inpatient. Tolerating session today without incident. Will continue to follow and progress as tolerated.     Plan/Recommendations:   Pt would benefit from Inpatient Rehabilitation placement at discharge from facility.   Pt desires Inpatient Rehabilitation placement at discharge. Pt cooperative; agreeable to therapeutic recommendations and plan of care.     Modified [place]: 5 = Severe disability (Requires constant nursing care and attention, bedridden, incontinent)    Post-Tx Position: Up  in Chair, Alarms activated and Call light and personal items within reach. Sukumar pad under chair in prep for nursing to assist back to bed.    PPE: gloves, surgical mask, eyewear protection

## 2021-04-13 NOTE — PROGRESS NOTES
S/P POD# 28 tissue AVR--Pagni  EF 60-65% (echo)    Subjective: Up to chair. Reports she feels better in the chair.     INR 2.04 (2.89)  E coli UTI 4/9--Ceftriaxone started 4/10-- transitioned to Macrobid    CHRISTIANO (3/25)--no abnormal findings noted  Bronch (3/25)--mod mucopurulent secretions--cultures negative        Intake/Output Summary (Last 24 hours) at 4/13/2021 1015  Last data filed at 4/13/2021 0820  Gross per 24 hour   Intake 840 ml   Output 1650 ml   Net -810 ml     Temp:  [97.3 °F (36.3 °C)-98.5 °F (36.9 °C)] 98.5 °F (36.9 °C)  Heart Rate:  [55-76] 75  Resp:  [16-18] 16  BP: (108-141)/(48-66) 125/54      Results from last 7 days   Lab Units 04/13/21  0538 04/12/21  0508 04/11/21  0425   WBC 10*3/mm3 10.30 11.40* 13.70*   HEMOGLOBIN g/dL 9.9* 10.4* 10.3*   HEMATOCRIT % 29.2* 30.5* 31.2*   PLATELETS 10*3/mm3 59* 69* 75*   INR  2.04 2.89 4.55*     Results from last 7 days   Lab Units 04/13/21  0538   CREATININE mg/dL 0.41*   POTASSIUM mmol/L 3.5   SODIUM mmol/L 136   MAGNESIUM mg/dL 1.8   PHOSPHORUS mg/dL 3.5       Physical Exam:  Neuro intact, NAD, up to chair  at bedside  Tele:  SR 70's  diminished bilaterally, 99% 1L NC  Sternal incision healing well  Benign abd, + BM.   + edema, chronic erythema of BLE noted    Assessment/Plan:  Principal Problem:    Aortic valve stenosis  Active Problems:    Rheumatoid arthritis (CMS/HCC)    Severe aortic stenosis--s/p tissue AVR (Pagni)  Postop acute hypoxic resp failure--re-intubated 3/22, extubated 4/4  Postop TCP/HIT positive--Coumadin  Right subclavian/axillary/brachial DVT--Coumadin  Chronic HFpEF--maximize meds prior to d/c  COPD--nebs, pulmicort  Rheumatoid arthritis--not on medication per patient  HTN--stable  HLD--statin  Bipolar disorder--home Prozac  Obesity stage 3  PVD  KARLY  Vertigo--home Antivert  Tobacco abuse--counseled on cessation  Postop leukocytosis, likely r/t atelectasis--aggressive pulm toileting  Post-op hypernatremia-- resolved  Postop E  coli UTI--ceftriaxone started 4/10  Postop oral candidiasis--Diflucan started 4/11  Severe weakness and deconditioning, worsened postop--acute rehab at [facility]    POD#26. Diet upgraded to soft chew with thin liquids yesterday. Remains on Macrobid for UTI. Continue Coumadin for RUE DVT. Coumadin 1mg today. Patient is severely deconditioned and needs aggressive PT/OT/ST. Patient wants to discharge to rehab today. Will check bed availability.    Routine care--as above  Plan for [facility] acute rehab at discharge    CELE ULLOA, APRN  4/13/2021  10:15 EDT

## 2021-04-13 NOTE — PROGRESS NOTES
"PULMONARY CRITICAL CARE Progress  NOTE      PATIENT IDENTIFICATION:  Name: Claudia Rust  MRN: [id number]  :  1950     Age: 70 y.o.  Sex: female    DATE OF Note:  2021   Referring Physician: Olaf Mcgill MD                  Subjective:   On NC 2 L   Advance diet   Working with PT   Good BM  No new rash or lesions      Objective:  tMax 24 hrs: Temp (24hrs), Av.9 °F (36.6 °C), Min:97.3 °F (36.3 °C), Max:98.5 °F (36.9 °C)      Vitals Ranges:   Temp:  [97.3 °F (36.3 °C)-98.5 °F (36.9 °C)] 98.5 °F (36.9 °C)  Heart Rate:  [55-76] 75  Resp:  [16-18] 16  BP: (108-141)/(48-66) 125/54    Intake and Output Last 3 Shifts:   I/O last 3 completed shifts:  In: 1306 [P.O.:840; I.V.:466]  Out: 2375 [Urine:2375]    Exam:  /54   Pulse 75   Temp 98.5 °F (36.9 °C) (Oral)   Resp 16   Ht 162.6 cm (64\")   Wt 119 kg (261 lb 14.5 oz)   SpO2 95%   BMI 44.96 kg/m²     General Appearance: AAOx3  HEENT:  Normocephalic, without obvious abnormality, Conjunctiva/corneas clear.  Normal external ear canals, Nares normal, no drainage.  Oral ET tube  Neck:  Supple, symmetrical, trachea midline. No JVD.  Lungs /Chest wall:   Mild scattered rhonchi, no wheezing, respirations unlabored symmetrical wall movement.     Heart: Sinus rhythm, no murmur or gallop  Abdomen: Soft, non-distended, active bowel sounds  Extremities: + Generalized edema, no clubbing or Cyanosis        Medications:    Current Facility-Administered Medications:     acetaminophen (TYLENOL) tablet 650 mg, 650 mg, Oral, Q4H PRN, 650 mg at 21 2217 **OR** acetaminophen (TYLENOL) 160 MG/5ML solution 650 mg, 650 mg, Oral, Q4H PRN, 649.6 mg at 042105 **OR** acetaminophen (TYLENOL) suppository 650 mg, 650 mg, Rectal, Q4H PRN, Janna Alberto APRN    arformoterol (BROVANA) nebulizer solution 15 mcg, 15 mcg, Nebulization, BID - RT, Shanell Hernandez MD, 15 mcg at 21 0849    aspirin EC tablet 81 mg, 81 mg, Oral, Daily, " Janna Alberto, APRN, 81 mg at 04/13/21 0854    atorvastatin (LIPITOR) tablet 40 mg, 40 mg, Oral, Nightly, Janna Alberto APRN, 40 mg at 04/12/21 2128    bisacodyl (DULCOLAX) suppository 10 mg, 10 mg, Rectal, Daily PRN, Janna Alberto, APRN, 10 mg at 04/08/21 1619    budesonide (PULMICORT) nebulizer solution 1 mg, 1 mg, Nebulization, BID - RT, Shanell Hernandez MD, 1 mg at 04/13/21 0854    bumetanide (BUMEX) tablet 1 mg, 1 mg, Oral, BID, Paola Hammer MD, 1 mg at 04/13/21 0854    calcium carbonate (TUMS) chewable tablet 500 mg (200 mg elemental), 2 tablet, Oral, TID PRN, Day, Lorna, APRN, 2 tablet at 04/09/21 1808    chlorhexidine (PERIDEX) 0.12 % solution 15 mL, 15 mL, Mouth/Throat, Q12H, Janna Alberto APRN, 15 mL at 04/13/21 0854    Chlorhexidine Gluconate Cloth 2 % pads 1 application, 1 application, Topical, Q12H PRN, Monika Conn APRN    dextrose (D50W) 25 g/ 50mL Intravenous Solution 25 g, 25 g, Intravenous, Q15 Min PRN, Olaf Mcgill MD    dextrose (GLUTOSE) oral gel 15 g, 15 g, Oral, Q15 Min PRN, Olaf Mcgill MD    docusate sodium (COLACE) capsule 100 mg, 100 mg, Oral, BID, Glenn Reyes MD, 100 mg at 04/13/21 0854    [COMPLETED] fluconazole (DIFLUCAN) tablet 200 mg, 200 mg, Oral, Once, 200 mg at 04/11/21 1016 **FOLLOWED BY** fluconazole (DIFLUCAN) tablet 100 mg, 100 mg, Oral, Daily, Day, Lorna, APRN, 100 mg at 04/13/21 0854    FLUoxetine (PROzac) capsule 20 mg, 20 mg, Oral, Daily, Draw, MD Glenn, 20 mg at 04/13/21 0854    gabapentin (NEURONTIN) capsule 200 mg, 200 mg, Oral, Q12H, Janna Alberto, APRN, 200 mg at 04/13/21 0854    glucagon (human recombinant) (GLUCAGEN DIAGNOSTIC) injection 1 mg, 1 mg, Subcutaneous, Q15 Min PRN, Olaf Mcgill MD    hydrALAZINE (APRESOLINE) injection 10 mg, 10 mg, Intravenous, Q6H PRN, Juan Jose Skelton MD, 10 mg at 03/27/21 2781    hydrALAZINE (APRESOLINE) tablet 10 mg, 10 mg, Nasogastric,  Q8H, Yovani Lerma MD, 10 mg at 21 0629    insulin lispro (ADMELOG) injection 0-14 Units, 0-14 Units, Subcutaneous, 4x Daily With Meals & Nightly, 3 Units at 21 1732 **AND** insulin lispro (ADMELOG) injection 0-14 Units, 0-14 Units, Subcutaneous, PRN, Jr Angelito Dotson MD    ipratropium-albuterol (DUO-NEB) nebulizer solution 3 mL, 3 mL, Nebulization, Q4H PRN, Janna Alberto, APRN, 3 mL at 21 1522    ipratropium-albuterol (DUO-NEB) nebulizer solution 3 mL, 3 mL, Nebulization, Q4H - RT, Day, Lorna, APRN, 3 mL at 21 0254    levothyroxine (SYNTHROID, LEVOTHROID) tablet 100 mcg, 100 mcg, Oral, Q AM, Olaf Mcgill MD, 100 mcg at 21 0629    lidocaine (XYLOCAINE) 2% injection, , , PRN, Glenn Reyes MD, 4 mL at 21 1439    lidocaine (XYLOCAINE) 5 % ointment, , , PRN, Glenn Reyes MD, 1 application at 21 1439    meclizine (ANTIVERT) tablet 25 mg, 25 mg, Oral, TID PRN, Belkis Loving, APRN, 25 mg at 21 0845    [START ON 2021] metOLazone (ZAROXOLYN) tablet 2.5 mg, 2.5 mg, Oral, Every Other Day, Paola Hammer MD    metoprolol tartrate (LOPRESSOR) half tablet 12.5 mg, 12.5 mg, Oral, Q12H, Jose Levi MD, 12.5 mg at 21 0854    [] HYDROmorphone (DILAUDID) injection 0.5 mg, 0.5 mg, Intravenous, Q2H PRN, 0.5 mg at 21 0825 **AND** naloxone (NARCAN) injection 0.4 mg, 0.4 mg, Intravenous, Q5 Min PRN, Janna Alberto APRN    nitrofurantoin (macrocrystal-monohydrate) (MACROBID) capsule 100 mg, 100 mg, Oral, Q12H, Shanell Hernandez MD, 100 mg at 21 0854    nystatin (MYCOSTATIN) 265582 UNIT/ML suspension 500,000 Units, 5 mL, Oral, 4x Daily, Jr Angelito Dotson MD, 500,000 Units at 21 0800    nystatin (MYCOSTATIN) powder, , Topical, Q12H, Draw, MD Glenn, Given at 21 0855    ondansetron (ZOFRAN) injection 4 mg, 4 mg, Intravenous, Q6H PRN, Janna Alberto APRN, 4 mg at 21  1042    pantoprazole (PROTONIX) EC tablet 40 mg, 40 mg, Oral, QAM, Glenn Reyes MD, 40 mg at 04/13/21 0629    polyethylene glycol (MIRALAX) packet 17 g, 17 g, Oral, Daily, Glenn Reyes MD, 17 g at 04/13/21 0854    potassium chloride (K-DUR,KLOR-CON) CR tablet 20 mEq, 20 mEq, Oral, PRN, 20 mEq at 04/13/21 0648 **OR** potassium chloride (KLOR-CON) packet 20 mEq, 20 mEq, Oral, PRN, SatterClem Groveitha L, APRN, 20 mEq at 04/09/21 1146    potassium chloride (K-DUR,KLOR-CON) ER tablet 20 mEq, 20 mEq, Oral, TID With Meals, Glenn Reyes MD, 20 mEq at 04/13/21 0854    potassium chloride 10 mEq in 100 mL IVPB, 10 mEq, Intravenous, Q1H PRN **OR** potassium chloride 10 mEq in 100 mL IVPB, 10 mEq, Intravenous, Q1H PRN, Alejandro-Clem Porteritha L, APRN, Last Rate: 200 mL/hr at 03/19/21 0726, 10 mEq at 03/19/21 0726    sodium chloride 0.9 % flush 10 mL, 10 mL, Intravenous, Q12H, Belkis Loving, APRN, 10 mL at 04/13/21 0856    sodium chloride 0.9 % flush 10 mL, 10 mL, Intravenous, Q12H, Belkis Loving, APRN, 10 mL at 04/13/21 0856    sodium chloride 0.9 % flush 10 mL, 10 mL, Intravenous, PRN, Akshat Lovingen, APRN    sodium chloride 0.9 % flush 20 mL, 20 mL, Intravenous, PRN, Akshat Lovingen, APRN    warfarin (COUMADIN) tablet 1 mg, 1 mg, Oral, Daily, Belkis Loving, APRN    Data Review:  All labs (24hrs):   Recent Results (from the past 24 hour(s))   POC Glucose Once    Collection Time: 04/12/21 11:21 AM    Specimen: Blood   Result Value Ref Range    Glucose 128 (H) 70 - 105 mg/dL   POC Glucose Once    Collection Time: 04/12/21  5:16 PM    Specimen: Blood   Result Value Ref Range    Glucose 151 (H) 70 - 105 mg/dL   POC Glucose Once    Collection Time: 04/12/21  9:26 PM    Specimen: Blood   Result Value Ref Range    Glucose 101 70 - 105 mg/dL   CBC (No Diff)    Collection Time: 04/13/21  5:38 AM    Specimen: Blood   Result Value Ref Range    WBC 10.30 3.40 - 10.80 10*3/mm3    RBC 3.22 (L) 3.77 - 5.28 10*6/mm3     Hemoglobin 9.9 (L) 12.0 - 15.9 g/dL    Hematocrit 29.2 (L) 34.0 - 46.6 %    MCV 90.6 79.0 - 97.0 fL    MCH 30.6 26.6 - 33.0 pg    MCHC 33.8 31.5 - 35.7 g/dL    RDW 15.1 12.3 - 15.4 %    RDW-SD 48.6 37.0 - 54.0 fl    MPV 9.1 6.0 - 12.0 fL    Platelets 59 (L) 140 - 450 10*3/mm3   Protime-INR    Collection Time: 04/13/21  5:38 AM    Specimen: Blood   Result Value Ref Range    Protime 21.7 19.4 - 28.5 Seconds    INR 2.04 2.00 - 3.00   Comprehensive Metabolic Panel    Collection Time: 04/13/21  5:38 AM    Specimen: Blood   Result Value Ref Range    Glucose 98 65 - 99 mg/dL    BUN 17 8 - 23 mg/dL    Creatinine 0.41 (L) 0.57 - 1.00 mg/dL    Sodium 136 136 - 145 mmol/L    Potassium 3.5 3.5 - 5.2 mmol/L    Chloride 97 (L) 98 - 107 mmol/L    CO2 30.0 (H) 22.0 - 29.0 mmol/L    Calcium 8.9 8.6 - 10.5 mg/dL    Total Protein 5.7 (L) 6.0 - 8.5 g/dL    Albumin 3.00 (L) 3.50 - 5.20 g/dL    ALT (SGPT) 34 (H) 1 - 33 U/L    AST (SGOT) 26 1 - 32 U/L    Alkaline Phosphatase 142 (H) 39 - 117 U/L    Total Bilirubin 0.4 0.0 - 1.2 mg/dL    eGFR Non African Amer >150 >60 mL/min/1.73    Globulin 2.7 gm/dL    A/G Ratio 1.1 g/dL    BUN/Creatinine Ratio 41.5 (H) 7.0 - 25.0    Anion Gap 9.0 5.0 - 15.0 mmol/L   Magnesium    Collection Time: 04/13/21  5:38 AM    Specimen: Blood   Result Value Ref Range    Magnesium 1.8 1.6 - 2.4 mg/dL   Phosphorus    Collection Time: 04/13/21  5:38 AM    Specimen: Blood   Result Value Ref Range    Phosphorus 3.5 2.5 - 4.5 mg/dL   Calcium, Ionized    Collection Time: 04/13/21  5:38 AM    Specimen: Blood   Result Value Ref Range    Ionized Calcium 1.20 1.20 - 1.30 mmol/L   POC Glucose Once    Collection Time: 04/13/21  7:30 AM    Specimen: Blood   Result Value Ref Range    Glucose 115 (H) 70 - 105 mg/dL        Imaging:  FL Video Swallow With Speech Single Contrast  Narrative: DATE OF EXAM:  4/12/2021 2:55 PM     PROCEDURE:  FL VIDEO SWALLOW W SPEECH SINGLE-CONTRAST-     INDICATIONS:  Dysphagia.      COMPARISON:  4/6/2021.     TECHNIQUE:   This examination was performed in conjunction with speech pathology.  Lateral video fluoroscopic evaluation of the swallowing mechanism was  performed while correlate administering to the patient various  consistency food items mixed with barium.     Fluoroscopic Time:   1.6 minutes.     FINDINGS:  No laryngeal penetration or tracheal aspiration.      Impression: No laryngeal penetration or tracheal aspiration.     Please see the speech pathology report for further findings and  recommendations.     Electronically Signed By-Olman Benz MD On:4/12/2021 3:07 PM  This report was finalized on [id number] by  Olman Benz MD.       ASSESSMENT:  Acute hypoxic respiratory failure required intubation     There is diffuse alveolar disease groundglass opacities  Aortic valve stenosis, status post tissue replacement  Luminary hypertension, severe  COPD  KARLY  Cor pulmonale  Tobacco abuse  Upper airway infection with moderate amount of mucopurulent secretions noted on bronchoscopy 3/25/2021  Febrile illness   Interstitial pneumonitis  Acute thrombocytopenia  Catheter associated DVT right upper extremity subclavian, axillary, and brachial   hypernatremia  Upper arm left DVT  DM  Uremia    PLAN:  Macrobid for UTI  Wean O2 down  PT/OT  Hemodynamically stable   PT/OT  Diuretics per renal  Bronchodilator  Inhaled corticosteroids  Electrolytes/ glycemic control  DVT prophylaxis arixtra,   GI prophylaxis.       Critical care time in direct medical management (  32 ) minutes   Shanell Hernandez MD, D,ABSM,  4/13/2021  10:46 EDT

## 2021-04-13 NOTE — THERAPY TREATMENT NOTE
Subjective: Pt agreeable to therapeutic plan of care. Pt reports she did not sleep well last night  Objective:     Bed mobility - Mod-A and Assist x 2   Sitting EOB- pt able to sit EOB with SBA-Sudhir x8 minutes   Transfers - Max-A and Assist x 2 sit <-> stand and stand pivot transfer to chair  Ambulation - unable to complete due to weakness    Pt needing verbal cues for hand placement during transfers.  Heart Hugger donned while sitting EOB    Cardiac level II    Pain: 5 VAS  Education: Provided education on importance of mobility and skilled verbal / tactile cueing throughout intervention.     Assessment: Claudia Rust presents with functional mobility impairments which indicate the need for skilled intervention. Tolerating session today without incident. Pt able to sit longer duration with less assist while EOB this date.  Pt continuing to require modAx2 for bed mobility and maxAx2 for transfer.  Will continue to follow and progress as tolerated.     Plan/Recommendations:   Pt would benefit from Inpatient Rehabilitation placement at discharge from facility and requires no DME at discharge.   Pt desires Inpatient Rehabilitation placement at discharge. Pt cooperative; agreeable to therapeutic recommendations and plan of care.     Basic Mobility 6-click:  Rollin = Total, A lot = 2, A little = 3; 4 = None  Supine>Sit:   1 = Total, A lot = 2, A little = 3; 4 = None   Sit>Stand with arms:  1 = Total, A lot = 2, A little = 3; 4 = None  Bed>Chair:   1 = Total, A lot = 2, A little = 3; 4 = None  Ambulate in room:  1 = Total, A lot = 2, A little = 3; 4 = None  3-5 Steps with railin = Total, A lot = 2, A little = 3; 4 = None  Score: 10    Modified Bruno: 5 = Severe disability (Requires constant nursing care and attention, bedridden, incontinent)    Post-Tx Position: Up in Chair, Alarms activated and Call light and personal items within reach,  present   PPE: gloves, surgical mask, eyewear  protection

## 2021-04-13 NOTE — PLAN OF CARE
Pt continuing to require modAx2 for safety with bed mobility and maxAx2 for transfers both sit <-> stand and stand pivot transfer to chair.  Pt able to sit EOB x8 minutes with SBA-Sudhir.  Pt remains far from mobility baseline and will need extensive IP rehab.    Kelsey Becerril PT, DPT, [facility]

## 2021-04-13 NOTE — PROGRESS NOTES
"Nutrition Services    Patient Name: Claudia Rust  YOB: 1950  MRN: [id number]  Admission date: 3/18/2021      PPE Documentation        PPE Worn By Provider RD did not enter room for this encounter   PPE Worn By Patient  -     PROGRESS NOTE      Encounter Information: Tube feed + PO check on. Repeat VFSS completed 4/12 with upgrade to soft to chew and thin liquids. Continues to eat 25%-50% of meals.       Labs (reviewed below): -low Cr       GI Function:  -Last BM documented on 4/12       Nutrition Intervention: Continue current ONS as they are kcal + protein dense to help optimize intakes       PO Diet/Supplements: Diet Texture, Diabetic/Consistent Carbs; Diabetic - Consistent Carb; Soft to Chew   Magic cups at lunch and dinner meals  Novasource Renal Shakes BID     EN Prescription: None        Intake/Output:   Intake/Output Summary (Last 24 hours) at 4/13/2021 1155  Last data filed at 4/13/2021 0820  Gross per 24 hour   Intake 840 ml   Output 1650 ml   Net -810 ml            Height: Height: 162.6 cm (64\")   Weight: Weight: 119 kg (261 lb 14.5 oz) (04/13/21 0300)   BMI: Body mass index is 44.96 kg/m².     Results from last 7 days   Lab Units 04/13/21  0538 04/12/21  0508 04/11/21  1544 04/11/21  0426   SODIUM mmol/L 136 139  --  137   POTASSIUM mmol/L 3.5 3.8 4.2 3.3*   CHLORIDE mmol/L 97* 100  --  97*   CO2 mmol/L 30.0* 29.0  --  29.0   BUN mg/dL 17 21  --  20   CREATININE mg/dL 0.41* <0.47*  --  <0.47*   CALCIUM mg/dL 8.9 8.5*  --  8.6   BILIRUBIN mg/dL 0.4  --   --   --    ALK PHOS U/L 142*  --   --   --    ALT (SGPT) U/L 34*  --   --   --    AST (SGOT) U/L 26  --   --   --    GLUCOSE mg/dL 98 130*  --  81     Results from last 7 days   Lab Units 04/13/21  0538 04/09/21  0624 04/09/21  0419 04/09/21  0418 04/08/21  0623   MAGNESIUM mg/dL 1.8  --  2.0  --  2.3   PHOSPHORUS mg/dL 3.5  --   --    < > 3.6   HEMOGLOBIN g/dL 9.9*   < >  --   --  10.2*   HEMOGLOBIN, POC   --    < >  --   --   --  "   HEMATOCRIT % 29.2*   < >  --   --  31.5*   HEMATOCRIT POC   --    < >  --   --   --     < > = values in this interval not displayed.     COVID19   Date Value Ref Range Status   03/24/2021 Not Detected Not Detected - Ref. Range Final     Lab Results   Component Value Date    HGBA1C 5.7 (H) 03/16/2021       Vitals:    04/13/21 1131   BP:    Pulse: 65   Resp:    Temp:    SpO2:        RD to follow up per protocol.    Electronically signed by:  Jordyn Strickland RD  04/13/21 11:55 EDT

## 2021-04-13 NOTE — PROGRESS NOTES
NEPHROLOGY PROGRESS NOTE------[facility]    [facility]  [phone]  Robert Hammer MD      Patient Care Team:  Mary Wilde APRN as PCP - General  Mary Wilde APRN as PCP - Family Medicine  Jose Levi MD as Consulting Physician (Cardiology)  oYvani Lerma MD as Consulting Physician (Nephrology)      Provider:  Robert Hammer MD  Patient Name: Claudia Rust  :  1950    SUBJECTIVE:    F/U ARF/JERROD/EDEMA/HYPERNATREMIA    No SOB, CP, dysuria, palpitations. Appetite good. Working with PT    Medication:  arformoterol, 15 mcg, Nebulization, BID - RT  aspirin, 81 mg, Oral, Daily  atorvastatin, 40 mg, Oral, Nightly  budesonide, 1 mg, Nebulization, BID - RT  bumetanide, 1 mg, Nasogastric, BID  chlorhexidine, 15 mL, Mouth/Throat, Q12H  docusate sodium, 100 mg, Oral, BID  fluconazole, 100 mg, Oral, Daily  FLUoxetine, 20 mg, Oral, Daily  gabapentin, 200 mg, Oral, Q12H  hydrALAZINE, 10 mg, Nasogastric, Q8H  insulin lispro, 0-14 Units, Subcutaneous, 4x Daily With Meals & Nightly  ipratropium-albuterol, 3 mL, Nebulization, Q4H - RT  levothyroxine, 100 mcg, Oral, Q AM  metOLazone, 2.5 mg, Nasogastric, Every Other Day  metoprolol tartrate, 12.5 mg, Oral, Q12H  nitrofurantoin (macrocrystal-monohydrate), 100 mg, Oral, Q12H  nystatin, 5 mL, Oral, 4x Daily  nystatin, , Topical, Q12H  pantoprazole, 40 mg, Oral, QAM  polyethylene glycol, 17 g, Oral, Daily  potassium chloride, 20 mEq, Oral, TID With Meals  sodium chloride, 10 mL, Intravenous, Q12H  sodium chloride, 10 mL, Intravenous, Q12H           OBJECTIVE    Vital Sign Min/Max for last 24 hours  Temp  Min: 97.3 °F (36.3 °C)  Max: 98.3 °F (36.8 °C)   BP  Min: 108/48  Max: 141/62   Pulse  Min: 55  Max: 71   Resp  Min: 16  Max: 20   SpO2  Min: 93 %  Max: 100 %   No data recorded   Weight  Min: 119 kg (261 lb 14.5 oz)  Max: 119 kg (261 lb 14.5 oz)     Flowsheet Rows      First Filed Value  "  Admission Height  162.6 cm (64\") Documented at 03/18/2021 2000   Admission Weight  129 kg (284 lb) Documented at 03/18/2021 2000          No intake/output data recorded.  I/O last 3 completed shifts:  In: 1306 [P.O.:840; I.V.:466]  Out: 2375 [Urine:2375]    Physical Exam:    General Appearance: NAD. Fatigued  Head: normocephalic, without obvious abnormality and atraumatic   Eyes: conjunctivae and sclerae normal and no icterus  Neck: supple. NO GROSS JVD NOW  Lungs: +FEW SCATTERED RHONCHI.NO CRACKLES AT PRESENT  Heart: regular rhythm & normal rate and normal S1, S2 +NARGIS  Chest: Wall no abnormalities observed  Abdomen: normal bowel sounds and soft non-tender +OBESITY  Extremities: +TRACE DIFFUSE BILAT LE EDEMA, no cyanosis and no redness  Skin: no bleeding, bruising or rash, turgor normal, color normal and no lesions noted  Neurologic: Alert and oriented without focal deficits    Labs:    WBC WBC   Date Value Ref Range Status   04/13/2021 10.30 3.40 - 10.80 10*3/mm3 Final   04/12/2021 11.40 (H) 3.40 - 10.80 10*3/mm3 Final   04/11/2021 13.70 (H) 3.40 - 10.80 10*3/mm3 Final      HGB Hemoglobin   Date Value Ref Range Status   04/13/2021 9.9 (L) 12.0 - 15.9 g/dL Final   04/12/2021 10.4 (L) 12.0 - 15.9 g/dL Final   04/11/2021 10.3 (L) 12.0 - 15.9 g/dL Final      HCT Hematocrit   Date Value Ref Range Status   04/13/2021 29.2 (L) 34.0 - 46.6 % Final   04/12/2021 30.5 (L) 34.0 - 46.6 % Final   04/11/2021 31.2 (L) 34.0 - 46.6 % Final      Platlets No results found for: LABPLAT   MCV MCV   Date Value Ref Range Status   04/13/2021 90.6 79.0 - 97.0 fL Final   04/12/2021 91.0 79.0 - 97.0 fL Final   04/11/2021 91.0 79.0 - 97.0 fL Final          Sodium Sodium   Date Value Ref Range Status   04/13/2021 136 136 - 145 mmol/L Final   04/12/2021 139 136 - 145 mmol/L Final   04/11/2021 137 136 - 145 mmol/L Final      Potassium Potassium   Date Value Ref Range Status   04/13/2021 3.5 3.5 - 5.2 mmol/L Final     Comment:     Slight " hemolysis detected by analyzer. Results may be affected.   04/12/2021 3.8 3.5 - 5.2 mmol/L Final   04/11/2021 4.2 3.5 - 5.2 mmol/L Final     Comment:     Result checked    04/11/2021 3.3 (L) 3.5 - 5.2 mmol/L Final      Chloride Chloride   Date Value Ref Range Status   04/13/2021 97 (L) 98 - 107 mmol/L Final   04/12/2021 100 98 - 107 mmol/L Final   04/11/2021 97 (L) 98 - 107 mmol/L Final      CO2 CO2   Date Value Ref Range Status   04/13/2021 30.0 (H) 22.0 - 29.0 mmol/L Final   04/12/2021 29.0 22.0 - 29.0 mmol/L Final   04/11/2021 29.0 22.0 - 29.0 mmol/L Final      BUN BUN   Date Value Ref Range Status   04/13/2021 17 8 - 23 mg/dL Final   04/12/2021 21 8 - 23 mg/dL Final   04/11/2021 20 8 - 23 mg/dL Final      Creatinine Creatinine   Date Value Ref Range Status   04/13/2021 0.41 (L) 0.57 - 1.00 mg/dL Final   04/12/2021 <0.47 (L) 0.57 - 1.00 mg/dL Final   04/11/2021 <0.47 (L) 0.57 - 1.00 mg/dL Final      Calcium Calcium   Date Value Ref Range Status   04/13/2021 8.9 8.6 - 10.5 mg/dL Final   04/12/2021 8.5 (L) 8.6 - 10.5 mg/dL Final   04/11/2021 8.6 8.6 - 10.5 mg/dL Final      PO4 No components found for: PO4   Albumin Albumin   Date Value Ref Range Status   04/13/2021 3.00 (L) 3.50 - 5.20 g/dL Final   04/12/2021 2.80 (L) 3.50 - 5.20 g/dL Final   04/11/2021 2.90 (L) 3.50 - 5.20 g/dL Final      Magnesium Magnesium   Date Value Ref Range Status   04/13/2021 1.8 1.6 - 2.4 mg/dL Final      Uric Acid No components found for: URIC ACID     Imaging Results (Last 72 Hours)     Procedure Component Value Units Date/Time    FL Video Swallow With Speech Single Contrast [[id number]] Collected: 04/12/21 1506     Updated: 04/12/21 1509    Narrative:      DATE OF EXAM:  4/12/2021 2:55 PM     PROCEDURE:  FL VIDEO SWALLOW W SPEECH SINGLE-CONTRAST-     INDICATIONS:  Dysphagia.     COMPARISON:  4/6/2021.     TECHNIQUE:   This examination was performed in conjunction with speech pathology.  Lateral video fluoroscopic evaluation of the  swallowing mechanism was  performed while correlate administering to the patient various  consistency food items mixed with barium.     Fluoroscopic Time:   1.6 minutes.     FINDINGS:  No laryngeal penetration or tracheal aspiration.        Impression:      No laryngeal penetration or tracheal aspiration.     Please see the speech pathology report for further findings and  recommendations.     Electronically Signed By-Olman Benz MD On:4/12/2021 3:07 PM  This report was finalized on [id number] by  Olman Benz MD.    FL Video Swallow With Speech Single Contrast [[id number]] Collected: 04/11/21 2005     Updated: 04/11/21 2007    Narrative:      DATE OF EXAM:  4/6/2021 12:40 PM     PROCEDURE:  FL VIDEO SWALLOW W SPEECH SINGLE-CONTRAST-     INDICATIONS:  on vent from 3/18-4/4; M06.9-Rheumatoid arthritis, unspecified;  I35.0-Nonrheumatic aortic (valve) stenosis; J96.01-Acute respiratory  failure with hypoxia; J44.9-Chronic obstructive pulmonary disease,  unspecified     COMPARISON:  No Comparisons Available     TECHNIQUE:   This examination was performed in conjunction with speech pathology.  Lateral video fluoroscopic evaluation of the swallowing mechanism was  performed while correlate administering to the patient various  consistency food items mixed with barium.     Fluoroscopic Time:   3.2 minutes     FINDINGS:  There is penetration without definite aspiration.        Impression:      For more details, please refer to the speech pathology notes.     Electronically Signed By-oRmain Gutierrez MD On:4/11/2021 8:05 PM  This report was finalized on [id number] by  Romain Gutierrez MD.          Results for orders placed during the hospital encounter of 03/18/21    XR Chest 1 View    Narrative  DATE OF EXAM:  4/7/2021 3:25 AM    PROCEDURE:  XR CHEST 1 VW-    INDICATIONS:  Hypoxia.    COMPARISON:  Radiographs 4/6/2021, 4/5/2021, and 4/4/2021. CT 3/22/2021.    TECHNIQUE:  Single radiographic AP view of the chest was  obtained.    FINDINGS:  Lordotic positioning. Stable sternotomy wires, prosthetic heart valve,  partially imaged enteric tube, and left PICC. Interval removal of the  previously seen left IJ central venous catheter. Similar-appearing  basilar predominant opacification of both lungs. No pneumothorax.  Unchanged cardiomediastinal contours. No acute osseous abnormality is  identified.    Impression  Interval removal of the previously seen left IJ central venous catheter.  Otherwise, no significant interval change.    Electronically Signed By-Olman Benz MD On:4/7/2021 7:35 AM  This report was finalized on [id number] by  Olman Benz MD.      XR Chest 1 View    Narrative  Examination: XR CHEST 1 VW-    Date of Exam: 4/6/2021 5:35 AM    Indication: Hypoxia; M06.9-Rheumatoid arthritis, unspecified;  I35.0-Nonrheumatic aortic (valve) stenosis; J96.01-Acute respiratory  failure with hypoxia; J44.9-Chronic obstructive pulmonary disease,  unspecified.    Comparison: 4/5/2021.    Technique: Single radiographic view of the chest was obtained.    Findings:  Feeding tube courses below the diaphragm. New left PICC terminates in  the upper SVC. Stable left internal jugular central venous catheter.  Stable evidence of prior median sternotomy and heart valve replacement.  There is stable diffuse mixed interstitial and airspace disease in both  lungs with stable small bilateral pleural effusions. There is no  evidence of pneumothorax or new lung opacity. The heart size is stable.  Pulmonary vasculature is completely obscured. There are no acute osseous  abnormalities.    Impression  1. Stable mixed interstitial and airspace disease present throughout  both lungs.  2. Stable feeding tube, postoperative findings and left IJ CVC.  3. New left PICC terminates in the upper SVC.    Electronically Signed By-Romain Gutierrez MD On:4/6/2021 7:12 AM  This report was finalized on [id number] by  Romain Gutierrez MD.      XR Chest 1  View    Narrative  Examination: XR CHEST 1 VW-    Date of Exam: 4/5/2021 5:00 AM    Indication: Hypoxia; M06.9-Rheumatoid arthritis, unspecified;  I35.0-Nonrheumatic aortic (valve) stenosis; J96.01-Acute respiratory  failure with hypoxia; J44.9-Chronic obstructive pulmonary disease,  unspecified.    Comparison: 4/4/2021.    Technique: Single radiographic view of the chest was obtained.    Findings:  There are diffuse bilateral mixed interstitial and airspace opacities in  both lungs. There is a stable left internal jugular central venous  catheter. Stable changes of prior median sternotomy and heart valve  replacement. Endotracheal tube has been removed. Stable feeding tube  coursing below the diaphragm. Stable bridging thoracic osteophytes  without acute osseous abnormality.    Impression  1. Stable diffuse mixed interstitial and airspace disease.  2. Interval extubation.  3. Stable postoperative findings and stable left internal jugular  central venous catheter/feeding tube.    Electronically Signed By-Romain Gutierrez MD On:4/5/2021 7:26 AM  This report was finalized on [id number] by  Romain Gutierrez MD.      Results for orders placed during the hospital encounter of 03/18/21    Duplex Venous Upper Extremity - Bilateral CAR    Interpretation Summary  · Acute right upper extremity deep vein thrombosis noted in the subclavian, axillary and brachial. This represents catheter associated deep vein thrombosis.  · All other vessels appear normal.        ASSESSMENT / PLAN      Aortic valve stenosis    Rheumatoid arthritis (CMS/HCC)    1. ARF/JERROD------Nonoliguric ATN.  Follow with ongoing diuretic exposure. No NSAIDs or IV dye    2. HYPERNATREMIA-----Resolved    3. HYPOALBUMINEMIA----Encourage increased po protein intake    4. 3RD SPACED EDEMA------better. On po Metolazone and bumex.    5. CAD S/P AVR-----per Cardiology and CT Surgery. S/P CHRISTIANO    6. OBESITY    7. ACUTE HYPOXIC RESPIRATORY FAILURE------S/P extubation. Per  Pulmonary    8. RUE DVT    9. DMII-------Glucometers, SSI    10. ANEMIA------H/H stable    11. SEPSIS/LEUKOCYTOSIS------Antibiotics per Pulmonary/CC    12. THROMBOCYTOPENIA    13. HYPOCALCEMIA-----Replaced    14. HYPERLIPIDEMIA------On Statin.      15. HYPOTHYROIDISM------On Sythroid    16. GERD/PUD PROPHYLAXIS------PPI    17. HTN-------BP good. D/C Amlodipine b/c of edema. Added scheduled Hydralazine    18. METABOLIC ALKALOSIS-----Better    19. HYPOKALEMIA-----Replaced      Robert Hammer MD  [facility]  [phone]  04/13/21  07:24 EDT

## 2021-04-13 NOTE — DISCHARGE SUMMARY
Date of Admission: 3/18/2021  Date of Discharge: 4/13/2021    Discharge Diagnosis:   Severe aortic stenosis--s/p tissue AVR (Artem)  Postop acute hypoxic resp failure--re-intubated 3/22, extubated 4/4  Postop TCP/HIT positive--Coumadin  Right subclavian/axillary/brachial DVT--Coumadin  Chronic HFpEF  COPD  Rheumatoid arthritis--not on medication per patient  HTN  HLD  Bipolar disorder  Obesity stage 3  PVD  KARLY  Vertigo  Tobacco abuse  Postop leukocytosis, likely r/t atelectasis  Post-op hypernatremia  Postop E coli UTI--ceftriaxone started 4/10  Postop oral candidiasis--Diflucan started 4/11  Severe weakness and deconditioning, worsened postop       Presenting Problem/History of Present Illness  Aortic valve stenosis, etiology of cardiac valve disease unspecified [I35.0]     Hospital Course  Patient is a 70 y.o. female presented for scheduled cardiac surgery with Dr. Mcgill. On 3/18/21 she underwent AVR (tissue). She was transferred to the recovery unit in stable condition and extubated the following morning. Her oxygenation needs began to increase and she required AVAPs on POD #2. A Bumex gtt was started however she required intubation on POD #4. A swan was placed on pod #5 to monitor hemodynamics. She was started on inhaled Flolan. She became hypernatremia and Bumex was discontinued. Free water flushes per DHT were initiated and nephrology was consulted. She underwent a CHRISTIANO that did not reveal any abnormality. She also underwent bronchoscopy that showed a small amount of mucous. Cultures showed Candida. Her platelets decreased and a HIT panel was sent. Panel resulted positive and Arixtra was started. A venous duplex revealed right subclavian/ brachial thrombi. She was ultimately transitioned to Coumadin. Goal INR 2-3. A PT/INR will be checked the day after discharge and called to cardiology for further management. She received blood for postop blood loss anemia. She required increased ventilator settings that were  able to be slowly weaned. She was extubated on 4/4/21. She was severely deconditioned and required agressive speech, occupational, and physical therapies. Speech recommended patient advance to soft chew and thin liquid diet. Prior to discharge she was started on Diflucan for thrush and Macrobid for E. Coli UTI. She will have a CBC checked the day after discharge to evaluate her thrombocytopenia. Patient is being discharged to [facility] Acute rehab in stable condition.    Procedures Performed  Procedure(s):  3/18/21-- Elective AVR with a 23 mm magna pericardial prosthesis  3/25/21-- Intubation  3/25/21-- BRONCHOSCOPY AT BEDSIDE WITH BRONCHIOALVEOLAR LAVAGE       Consults:   Consults     Date and Time Order Name Status Description    3/23/2021 10:28 AM Inpatient Nephrology Consult      3/19/2021  9:26 AM Inpatient Pulmonology Consult      3/18/2021 10:34 AM Inpatient Cardiology Consult Completed           Pertinent Test Results:    Lab Results   Component Value Date    WBC 10.30 04/13/2021    HGB 9.9 (L) 04/13/2021    HCT 29.2 (L) 04/13/2021    MCV 90.6 04/13/2021    PLT 59 (L) 04/13/2021      Lab Results   Component Value Date    GLUCOSE 98 04/13/2021    CALCIUM 8.9 04/13/2021     04/13/2021    K 3.5 04/13/2021    CO2 30.0 (H) 04/13/2021    CL 97 (L) 04/13/2021    BUN 17 04/13/2021    CREATININE 0.41 (L) 04/13/2021    EGFRIFAFRI 76 03/08/2018    EGFRIFNONA >150 04/13/2021    BCR 41.5 (H) 04/13/2021    ANIONGAP 9.0 04/13/2021     Lab Results   Component Value Date    INR 2.04 04/13/2021    PROTIME 21.7 04/13/2021         Condition on Discharge: Stable for discharge to [facility] Acute rehab    Vital Signs  Temp:  [97.4 °F (36.3 °C)-98.5 °F (36.9 °C)] 97.4 °F (36.3 °C)  Heart Rate:  [55-89] 69  Resp:  [16-18] 16  BP: (108-142)/(48-81) 142/81      Discharge Disposition  Rehab Facility or Unit (DC - External)    Discharge Medications     Discharge Medications      New Medications      Instructions Start Date   bumetanide 1 MG  tablet  Commonly known as: BUMEX   1 mg, Oral, 2 Times Daily      docusate sodium 100 MG capsule   100 mg, Oral, 2 Times Daily      fluconazole 100 MG tablet  Commonly known as: DIFLUCAN   100 mg, Oral, Daily   Start Date: April 14, 2021     FLUoxetine 20 MG capsule  Commonly known as: PROzac  Replaces: FLUoxetine 60 MG tablet   20 mg, Oral, Daily   Start Date: April 14, 2021     metOLazone 2.5 MG tablet  Commonly known as: ZAROXOLYN   2.5 mg, Oral, Every Other Day   Start Date: April 14, 2021     metoprolol tartrate 25 MG tablet  Commonly known as: LOPRESSOR   12.5 mg, Oral, Every 12 Hours Scheduled      nitrofurantoin (macrocrystal-monohydrate) 100 MG capsule  Commonly known as: MACROBID   100 mg, Oral, Every 12 Hours Scheduled      nystatin 730139 UNIT/GM powder  Commonly known as: MYCOSTATIN   Topical, Every 12 Hours Scheduled      nystatin 480177 UNIT/ML suspension  Commonly known as: MYCOSTATIN   500,000 Units, Oral, 4 Times Daily      warfarin 2 MG tablet  Commonly known as: COUMADIN   1 mg, Oral, Nightly, For RUE DVT. Goal INR 2-3.         Changes to Medications      Instructions Start Date   potassium chloride 20 MEQ CR tablet  Commonly known as: K-DUR,KLOR-CON  What changed: when to take this   20 mEq, Oral, 3 Times Daily With Meals         Continue These Medications      Instructions Start Date   albuterol (2.5 MG/3ML) 0.083% nebulizer solution  Commonly known as: PROVENTIL   2.5 mg, Inhalation, Every 6 Hours PRN, DURING WINTER IF NEEDED HASNT NEEDED      aluminum-magnesium hydroxide-simethicone 200-200-20 MG/5ML suspension  Commonly known as: MAALOX/MYLANTA   15 mL, Oral, Every 6 Hours PRN      aspirin 81 MG tablet   81 mg, Oral, Nightly      CALCIUM 500 PO   1,500 mg, Oral, Daily, LAST DOSE 3/4      diphenhydrAMINE 50 MG capsule  Commonly known as: BENADRYL   50 mg, Oral, Every Night at Bedtime      fluticasone 50 MCG/ACT nasal spray  Commonly known as: FLONASE   2 sprays, Nasal, 2 Times Daily PRN,  USUALLY TAKE AM ONLY      gabapentin 600 MG tablet  Commonly known as: NEURONTIN   400 mg, Oral, 3 Times Daily      levothyroxine 100 MCG tablet  Commonly known as: SYNTHROID, LEVOTHROID   100 mcg, Oral, Daily      meclizine 25 MG tablet  Commonly known as: ANTIVERT   25 mg, Oral, 3 Times Daily PRN      multivitamin with minerals tablet tablet   1 tablet, Oral, Daily, LAST DOSE 3/4      ondansetron 4 MG tablet  Commonly known as: ZOFRAN   4 mg, Oral, Every 8 Hours PRN, WITH MAXALT      pantoprazole 40 MG EC tablet  Commonly known as: PROTONIX   40 mg, Oral, Every Evening      polyethylene glycol 17 GM/SCOOP powder  Commonly known as: MIRALAX   17 g, Oral, Daily PRN      promethazine 25 MG tablet  Commonly known as: PHENERGAN   25 mg, Oral, Every 6 Hours PRN, WITH MAXALT      rizatriptan MLT 10 MG disintegrating tablet  Commonly known as: MAXALT-MLT   10 mg, Oral, Once As Needed, May repeat in 2 hours if needed       simvastatin 20 MG tablet  Commonly known as: ZOCOR   20 mg, Oral, Nightly      tiZANidine 4 MG tablet  Commonly known as: ZANAFLEX   4 mg, Oral, Every 8 Hours PRN, 8 MG NIGHTLY      TYLENOL PO   650 mg, Oral, Every 4 Hours PRN      vitamin b complex capsule capsule   1 capsule, Oral, Daily, LAST DOSE 3/4      vitamin B-12 1000 MCG tablet  Commonly known as: CYANOCOBALAMIN   1,000 mcg, Oral, Daily      Vitamin D-1000 Max St 25 MCG (1000 UT) tablet  Generic drug: cholecalciferol   2,000 Units, Oral, Nightly, LAST DOSE 3/4         Stop These Medications    FLUoxetine 60 MG tablet  Commonly known as: PROzac  Replaced by: FLUoxetine 20 MG capsule     furosemide 40 MG tablet  Commonly known as: LASIX     HYDROcodone-acetaminophen  MG per tablet  Commonly known as: NORCO     metoprolol succinate XL 25 MG 24 hr tablet  Commonly known as: TOPROL-XL            Discharge Diet:   Healthy Heart Diet    Activity at Discharge:   As tolerated, no lifting > 10 pounds x 6 weeks    Follow-up Appointments  Future  Appointments   Date Time Provider Department Center   5/6/2021  1:15 PM Belkis Loving APRN [facility] None   6/4/2021 12:10 PM Jose Levi MD [facility]     Additional Instructions for the Follow-ups that You Need to Schedule     Discharge Follow-up with PCP   As directed       Currently Documented PCP:    Mary Wilde APRN    PCP Phone Number:    [phone]     Follow Up Details: in one month         Discharge Follow-up with Specialty: Cardiology; 2 Weeks   As directed      Specialty: Cardiology    Follow Up: 2 Weeks    Follow Up Details: Follow up with cardiologist Dr. Levi in 2 weeks.         Discharge Follow-up with Specialty: Nephrology; 2 Weeks   As directed      Specialty: Nephrology    Follow Up: 2 Weeks    Follow Up Details: Follow up with nephrologist Dr. Hammer in 2 weeks.         Discharge Follow-up with Specialty: Pulmonary; 1 Month   As directed      Specialty: Pulmonary    Follow Up: 1 Month    Follow Up Details: Follow up with pulmonologist Dr. Reyes in 1 month.         Discharge Follow-up with Specified Provider: Cardiac Surgery; 3 Weeks   As directed      To: Cardiac Surgery    Follow Up: 3 Weeks    Follow Up Details: Follow up with [facility] on 5/6/21 at 1:15pm.         CBC & Differential    Apr 14, 2021 (Approximate)      Manual Differential: No    Release to patient: Immediate         Call MD With Problems / Concerns   Apr 14, 2021      Instructions: Call for temp >101 or surgical wound drainage    Order Comments: Instructions: Call for temp >101 or surgical wound drainage          Protime-INR    Apr 14, 2021 (Approximate)      Goal INR 2-3. For RUE DVT. Call results to cardiologist.    Order Comments: Goal INR 2-3. For RUE DVT. Call results to cardiologist.     Is Patient on anti-coag: Yes    Release to patient: Immediate         Basic Metabolic Panel    Apr 18, 2021 (Approximate)      Release to patient: Immediate         Protime-INR     Apr 18, 2021 (Approximate)      For RUE DVT. Goal INR 2-3. Call results to cardiologist.    Order Comments: For RUE DVT. Goal INR 2-3. Call results to cardiologist.     Is Patient on anti-coag: Yes    Release to patient: Immediate               Test Results Pending at Discharge  Pending Labs     Order Current Status    AFB Culture - Lavage, Lung, Right Lower Lobe Preliminary result    Fungus Culture - Lavage, Lung, Right Lower Lobe Preliminary result        STSinfomation:  Patient discharged on aspirin, statin, and beta blocker.       CELE ULLOA, APRN  04/13/21  15:23 EDT

## 2021-04-13 NOTE — PROGRESS NOTES
Continued Stay Note   Victor Manuel     Patient Name: Claudia Rust  MRN: [id number]  Today's Date: 4/13/2021    Admit Date: 3/18/2021    Discharge Plan     Row Name 04/13/21 1435       Plan    Plan  DC Plan: [facility] acute accepted and bed will be ready today. No precert or PASRR required. [facility] will call [facility] when bed ready.        Chart review only.           Expected Discharge Date and Time     Expected Discharge Date Expected Discharge Time    Apr 13, 2021             Julee Antony RN

## 2021-04-14 NOTE — PROGRESS NOTES
Case Management Discharge Note      Final Note: [facility] for acute inpt rehab         Selected Continued Care - Discharged on 4/13/2021 Admission date: 3/18/2021 - Discharge disposition: Rehab Facility or Unit (DC - External)    Destination Coordination complete    Service Provider Selected Services Address Phone Fax Patient Preferred    [facility]  Inpatient Rehabilitation [address] [id number] [phone] --                       Final Discharge Disposition Code: 62 - inpatient rehab facility

## 2021-04-16 LAB — QT INTERVAL: 652 MS

## 2021-04-22 LAB — FUNGUS WND CULT: ABNORMAL

## 2021-05-03 ENCOUNTER — TELEPHONE (OUTPATIENT)
Dept: CARDIAC REHAB | Facility: HOSPITAL | Age: 71
End: 2021-05-03

## 2021-05-04 ENCOUNTER — TELEPHONE (OUTPATIENT)
Dept: CARDIAC REHAB | Facility: HOSPITAL | Age: 71
End: 2021-05-04

## 2021-05-05 ENCOUNTER — TELEPHONE (OUTPATIENT)
Dept: CARDIAC SURGERY | Facility: CLINIC | Age: 71
End: 2021-05-05

## 2021-05-05 NOTE — TELEPHONE ENCOUNTER
states appt for tomorrow 5/6/21 will have to be cancelled. He will call back to reschedule when patient recovers. He states she was discharged to [facility] and he had to switch her to [facility] because [facility] was trying to push her to recover and she needed to be at a facility that would allow her to recover at on her own pace. He states  is unable to sit up on her own and would not be able to get in a wheelchair. She is currently quarantined until 5/11/21. Per  her sternal incision is healed and looks good.

## 2021-05-06 LAB
MYCOBACTERIUM SPEC CULT: NORMAL
NIGHT BLUE STAIN TISS: NORMAL

## 2021-05-10 RX ORDER — METOPROLOL SUCCINATE 25 MG/1
TABLET, EXTENDED RELEASE ORAL
Qty: 90 TABLET | Refills: 1 | Status: SHIPPED | OUTPATIENT
Start: 2021-05-10 | End: 2021-06-25

## 2021-05-21 ENCOUNTER — TELEPHONE (OUTPATIENT)
Dept: CARDIOLOGY | Facility: CLINIC | Age: 71
End: 2021-05-21

## 2021-06-16 NOTE — PROGRESS NOTES
NEPHROLOGY PROGRESS NOTE------[facility]    [facility]  [phone]  Robert Hammer MD      Patient Care Team:  Mary Wilde APRN as PCP - General  Mary Wilde APRN as PCP - Family Medicine  Jose Levi MD as Consulting Physician (Cardiology)  Yovani Lerma MD as Consulting Physician (Nephrology)      Provider:  Robert Hammer MD  Patient Name: Claudia Rust  :  1950    SUBJECTIVE:    F/U ARF/JERROD/HYPERNATREMIA    SEDATED AND INTUBATED ON VENT    Medication:  acetylcysteine, 3 mL, Nebulization, BID - RT  arformoterol, 15 mcg, Nebulization, BID - RT  aspirin, 81 mg, Oral, Daily  atorvastatin, 40 mg, Oral, Nightly  budesonide, 1 mg, Nebulization, BID - RT  bumetanide, 1 mg, Nasogastric, TID  chlorhexidine, 15 mL, Mouth/Throat, Q12H  docusate, 100 mg, Oral, BID  First Mouthwash (Magic Mouthwash), 5 mL, Swish & Spit, Q6H  FLUoxetine, 60 mg, Oral, Daily  fondaparinux, 5 mg, Subcutaneous, Q24H  gabapentin, 200 mg, Oral, Q12H  hydrALAZINE, 25 mg, Nasogastric, Q8H  insulin glargine, 20 Units, Subcutaneous, Q12H  insulin lispro, 0-24 Units, Subcutaneous, Q6H  insulin lispro, 5 Units, Subcutaneous, Q6H  ipratropium-albuterol, 3 mL, Nebulization, Q4H - RT  lansoprazole, 30 mg, Oral, QAM  levothyroxine, 100 mcg, Oral, Q AM  metoprolol tartrate, 12.5 mg, Oral, Q12H  nystatin, , Topical, Q12H  potassium chloride, 20 mEq, Oral, TID With Meals  predniSONE, 30 mg, Oral, Daily With Breakfast  QUEtiapine, 25 mg, Oral, Nightly      propofol, 5-30 mcg/kg/min, Last Rate: Stopped (21 0550)        OBJECTIVE    Vital Sign Min/Max for last 24 hours  Temp  Min: 97.8 °F (36.6 °C)  Max: 98.6 °F (37 °C)   BP  Min: 113/56  Max: 122/60   Pulse  Min: 64  Max: 79   Resp  Min: 22  Max: 27   SpO2  Min: 92 %  Max: 100 %   No data recorded   Weight  Min: 125 kg (275 lb 12.7 oz)  Max: 125 kg (275 lb 12.7 oz)     Flowsheet Rows      First Filed Value  "  Admission Height  162.6 cm (64\") Documented at 03/18/2021 2000   Admission Weight  129 kg (284 lb) Documented at 03/18/2021 2000          No intake/output data recorded.  I/O last 3 completed shifts:  In: 3804.8 [I.V.:221.8; Other:1735; NG/GT:1848]  Out: 4600 [Urine:4600]    Physical Exam: CVP=7-9  General Appearance: SEDATED AND INTUBATED  Head: normocephalic, without obvious abnormality and atraumatic +ETT/NGT INTACT  Eyes: conjunctivae and sclerae normal and no icterus  Neck: supple. NO GROSS JVD NOW  Lungs: +FEW SCATTERED RHONCHI AND FINE BIBASILAR CRACKLES  Heart: regular rhythm & normal rate and normal S1, S2 +NARGIS  Chest: Wall no abnormalities observed  Abdomen: normal bowel sounds and soft non-tender +OBESITY  Extremities: +TRACE/1+ DIFFUSE BILAT LE EDEMA, no cyanosis and no redness  Skin: no bleeding, bruising or rash, turgor normal, color normal and no lesions noted  Neurologic: SEDATED    Labs:    WBC WBC   Date Value Ref Range Status   04/03/2021 19.10 (H) 3.40 - 10.80 10*3/mm3 Final   04/02/2021 21.20 (H) 3.40 - 10.80 10*3/mm3 Final   04/01/2021 23.20 (H) 3.40 - 10.80 10*3/mm3 Final      HGB Hemoglobin   Date Value Ref Range Status   04/03/2021 10.2 (L) 12.0 - 15.9 g/dL Final   04/02/2021 10.5 (L) 12.0 - 15.9 g/dL Final   04/01/2021 10.6 (L) 12.0 - 15.9 g/dL Final      HCT Hematocrit   Date Value Ref Range Status   04/03/2021 30.5 (L) 34.0 - 46.6 % Final   04/02/2021 31.6 (L) 34.0 - 46.6 % Final   04/01/2021 32.3 (L) 34.0 - 46.6 % Final      Platlets No results found for: LABPLAT   MCV MCV   Date Value Ref Range Status   04/03/2021 90.8 79.0 - 97.0 fL Final   04/02/2021 90.7 79.0 - 97.0 fL Final   04/01/2021 90.2 79.0 - 97.0 fL Final          Sodium Sodium   Date Value Ref Range Status   04/03/2021 142 136 - 145 mmol/L Final   04/02/2021 143 136 - 145 mmol/L Final   04/01/2021 142 136 - 145 mmol/L Final      Potassium Potassium   Date Value Ref Range Status   04/03/2021 4.0 3.5 - 5.2 mmol/L Final "   04/02/2021 3.7 3.5 - 5.2 mmol/L Final     Comment:     Slight hemolysis detected by analyzer. Results may be affected.   04/01/2021 4.5 3.5 - 5.2 mmol/L Final     Comment:     Result checked    04/01/2021 3.3 (L) 3.5 - 5.2 mmol/L Final   03/31/2021 4.0 3.5 - 5.2 mmol/L Final      Chloride Chloride   Date Value Ref Range Status   04/03/2021 104 98 - 107 mmol/L Final   04/02/2021 105 98 - 107 mmol/L Final   04/01/2021 103 98 - 107 mmol/L Final      CO2 CO2   Date Value Ref Range Status   04/03/2021 30.0 (H) 22.0 - 29.0 mmol/L Final   04/02/2021 29.0 22.0 - 29.0 mmol/L Final   04/01/2021 30.0 (H) 22.0 - 29.0 mmol/L Final      BUN BUN   Date Value Ref Range Status   04/03/2021 48 (H) 8 - 23 mg/dL Final   04/02/2021 47 (H) 8 - 23 mg/dL Final   04/01/2021 45 (H) 8 - 23 mg/dL Final      Creatinine Creatinine   Date Value Ref Range Status   04/03/2021 0.51 (L) 0.57 - 1.00 mg/dL Final   04/02/2021 0.49 (L) 0.57 - 1.00 mg/dL Final   04/01/2021 0.57 0.57 - 1.00 mg/dL Final      Calcium Calcium   Date Value Ref Range Status   04/03/2021 8.8 8.6 - 10.5 mg/dL Final   04/02/2021 8.7 8.6 - 10.5 mg/dL Final   04/01/2021 8.0 (L) 8.6 - 10.5 mg/dL Final      PO4 No components found for: PO4   Albumin Albumin   Date Value Ref Range Status   04/03/2021 2.70 (L) 3.50 - 5.20 g/dL Final   04/02/2021 2.60 (L) 3.50 - 5.20 g/dL Final   04/01/2021 2.70 (L) 3.50 - 5.20 g/dL Final      Magnesium Magnesium   Date Value Ref Range Status   04/03/2021 2.3 1.6 - 2.4 mg/dL Final   04/02/2021 2.2 1.6 - 2.4 mg/dL Final   04/01/2021 2.0 1.6 - 2.4 mg/dL Final      Uric Acid No components found for: URIC ACID     Imaging Results (Last 72 Hours)     Procedure Component Value Units Date/Time    XR Chest 1 View [[id number]] Resulted: 04/03/21 0503     Updated: 04/03/21 0511    XR Chest 1 View [[id number]] Collected: 04/02/21 0724     Updated: 04/02/21 0728    Narrative:      DATE OF EXAM:  4/2/2021 5:00 AM     PROCEDURE:  XR CHEST 1 VW-      INDICATIONS:  Hypoxia; M06.9-Rheumatoid arthritis, unspecified; I35.0-Nonrheumatic  aortic (valve) stenosis; J96.01-Acute respiratory failure with hypoxia;  J44.9-Chronic obstructive pulmonary disease, unspecified     COMPARISON:  04/01/2021     TECHNIQUE:   Single radiographic view of the chest was obtained.     FINDINGS:  Endotracheal tube tip terminates 1.5 cm from the davi. Esophagogastric  tube is past GE junction. Left internal jugular catheter terminates in  the superior vena cava. There are midline sternotomy wires. There is a  prosthetic aortic valve. Heart is enlarged. Multifocal airspace opacity  lower lobe predominant with small left moderate size right pleural  effusion suspected. Mild improvement in septal thickening compared to  previous study.       Impression:      Mild improvement septal thickening compared to previous study.  Multifocal airspace opacities are present, question multifocal  pneumonia.  Endotracheal tube tip terminates about 1.5 cm from the davi, consider  pulling it back about 2 cm.     Electronically Signed By-Birgit Wilkins MD On:4/2/2021 7:26 AM  This report was finalized on [id number] by  Birgit Wilkins MD.    XR Abdomen KUB [[id number]] Collected: 04/01/21 2211     Updated: 04/01/21 2214    Narrative:      DATE OF EXAM:  4/1/2021 9:32 PM     PROCEDURE:  XR ABDOMEN KUB-     INDICATIONS:  ng tube placement; M06.9-Rheumatoid arthritis, unspecified;  I35.0-Nonrheumatic aortic (valve) stenosis; J96.01-Acute respiratory  failure with hypoxia; J44.9-Chronic obstructive pulmonary disease,  unspecified     COMPARISON:  03/22/2021     TECHNIQUE:   Single radiographic view of the abdomen was obtained.     FINDINGS:  The feeding tube passes below the diaphragm, with its tip in the area of  the upper body of the stomach, similar to the prior exam. Technique is  suboptimal for lung evaluation.       Impression:      1. Feeding tube tip is in the stomach.      Electronically Signed  By-Sol Javed MD On:4/1/2021 10:12 PM  This report was finalized on [id number] by  Sol Javed MD.    XR Chest 1 View [[id number]] Collected: 04/01/21 0733     Updated: 04/01/21 0737    Narrative:         DATE OF EXAM:   4/1/2021 6:23 AM     PROCEDURE:   XR CHEST 1 VW-     INDICATIONS:   Hypoxia; M06.9-Rheumatoid arthritis, unspecified; I35.0-Nonrheumatic  aortic (valve) stenosis; J96.01-Acute respiratory failure with hypoxia;  J44.9-Chronic obstructive pulmonary disease, unspecified     COMPARISON:  No Comparisons Available     TECHNIQUE:   Portable chest radiograph.     FINDINGS:    Stable endotracheal tube above the davi. Esophagogastric tube below  the diaphragm. Left IJ central venous catheter tip is at the cavoatrial  junction. Postsurgical changes of sternotomy and valve replacement. No  pneumothorax. Multifocal bibasilar airspace disease not significantly  changed.       Impression:      1. Stable lines and support tubes.  2. No pneumothorax.  3. No change in bibasilar multifocal airspace disease.     Electronically Signed By-Brett Long MD On:4/1/2021 7:35 AM  This report was finalized on [id number] by  Brett Long MD.          Results for orders placed during the hospital encounter of 03/18/21    XR Abdomen KUB    Narrative  DATE OF EXAM:  4/1/2021 9:32 PM    PROCEDURE:  XR ABDOMEN KUB-    INDICATIONS:  ng tube placement; M06.9-Rheumatoid arthritis, unspecified;  I35.0-Nonrheumatic aortic (valve) stenosis; J96.01-Acute respiratory  failure with hypoxia; J44.9-Chronic obstructive pulmonary disease,  unspecified    COMPARISON:  03/22/2021    TECHNIQUE:  Single radiographic view of the abdomen was obtained.    FINDINGS:  The feeding tube passes below the diaphragm, with its tip in the area of  the upper body of the stomach, similar to the prior exam. Technique is  suboptimal for lung evaluation.    Impression  1. Feeding tube tip is in the stomach.    Electronically Signed By-Sol Javed MD  On:4/1/2021 10:12 PM  This report was finalized on [id number] by  Sol Javed MD.      XR Chest 1 View    Narrative  DATE OF EXAM:  4/2/2021 5:00 AM    PROCEDURE:  XR CHEST 1 VW-    INDICATIONS:  Hypoxia; M06.9-Rheumatoid arthritis, unspecified; I35.0-Nonrheumatic  aortic (valve) stenosis; J96.01-Acute respiratory failure with hypoxia;  J44.9-Chronic obstructive pulmonary disease, unspecified    COMPARISON:  04/01/2021    TECHNIQUE:  Single radiographic view of the chest was obtained.    FINDINGS:  Endotracheal tube tip terminates 1.5 cm from the davi. Esophagogastric  tube is past GE junction. Left internal jugular catheter terminates in  the superior vena cava. There are midline sternotomy wires. There is a  prosthetic aortic valve. Heart is enlarged. Multifocal airspace opacity  lower lobe predominant with small left moderate size right pleural  effusion suspected. Mild improvement in septal thickening compared to  previous study.    Impression  Mild improvement septal thickening compared to previous study.  Multifocal airspace opacities are present, question multifocal  pneumonia.  Endotracheal tube tip terminates about 1.5 cm from the davi, consider  pulling it back about 2 cm.    Electronically Signed By-Birgit Wilkins MD On:4/2/2021 7:26 AM  This report was finalized on [id number] by  Birgit Wilkins MD.      XR Chest 1 View    Narrative  DATE OF EXAM:  4/1/2021 6:23 AM    PROCEDURE:  XR CHEST 1 VW-    INDICATIONS:  Hypoxia; M06.9-Rheumatoid arthritis, unspecified; I35.0-Nonrheumatic  aortic (valve) stenosis; J96.01-Acute respiratory failure with hypoxia;  J44.9-Chronic obstructive pulmonary disease, unspecified    COMPARISON:  No Comparisons Available    TECHNIQUE:  Portable chest radiograph.    FINDINGS:  Stable endotracheal tube above the davi. Esophagogastric tube below  the diaphragm. Left IJ central venous catheter tip is at the cavoatrial  junction. Postsurgical changes of sternotomy and valve  replacement. No  pneumothorax. Multifocal bibasilar airspace disease not significantly  changed.    Impression  1. Stable lines and support tubes.  2. No pneumothorax.  3. No change in bibasilar multifocal airspace disease.    Electronically Signed By-Brett Long MD On:4/1/2021 7:35 AM  This report was finalized on [id number] by  Brett Long MD.      Results for orders placed during the hospital encounter of 03/18/21    Duplex Venous Upper Extremity - Bilateral CAR    Interpretation Summary  · Acute right upper extremity deep vein thrombosis noted in the subclavian, axillary and brachial. This represents catheter associated deep vein thrombosis.  · All other vessels appear normal.        ASSESSMENT / PLAN      Aortic valve stenosis    Rheumatoid arthritis (CMS/HCC)    1. ARF/JERROD------Nonoliguric ATN. Venegas in place. Creatinine normal. BUN still up. Follow with ongoing diuretic exposure. No NSAIDs or IV dye    2. HYPERNATREMIA------Better.  Free water flushes with TFs    3. HYPOALBUMINEMIA-----on TFs.     4. 3RD SPACED EDEMA------Changed to Bumex per tube yesterday. Give IV Diuril x one today    5. CAD S/P AVR-----per Cardiology and CT Surgery. S/P CHRISTIANO    6. OBESITY    7. ACUTE HYPOXIC RESPIRATORY FAILURE------On vent per Pulmonary    8. RUE DVT    9. DMII-------Glucometers, SSI    10. ANEMIA------H/H stable    11. SEPSIS/LEUKOCYTOSIS------Antibiotics per Pulmonary/CC    12. THROMBOCYTOPENIA    13. HYPOCALCEMIA-----Replaced    14. HYPERLIPIDEMIA------On Statin.      15. HYPOTHYROIDISM------On Sythroid    16. GERD/PUD PROPHYLAXIS------Prevacid per NGT    17. HTN-------D/C Amlodipine b/c of edema. Added scheduled Hydralazine    18. METABOLIC ALKALOSIS-----Stable. Follow with ongoing diuretic exposure for Diamox need    Robert Hammer MD  [facility]  [phone]  04/03/21  07:56 EDT     Yes

## 2021-06-23 ENCOUNTER — TELEPHONE (OUTPATIENT)
Dept: CARDIOLOGY | Facility: CLINIC | Age: 71
End: 2021-06-23

## 2021-06-23 NOTE — TELEPHONE ENCOUNTER
----- Message from Claudia Rust sent at 6/22/2021  1:51 PM EDT -----  Regarding: Non-Urgent Medical Question  Contact: [phone]  Having problems regulating my blood pressure and swelling in legs and feet. Need an appointment. Still at [place].

## 2021-06-25 ENCOUNTER — OFFICE VISIT (OUTPATIENT)
Dept: CARDIOLOGY | Facility: CLINIC | Age: 71
End: 2021-06-25

## 2021-06-25 VITALS
HEART RATE: 61 BPM | OXYGEN SATURATION: 92 % | HEIGHT: 64 IN | DIASTOLIC BLOOD PRESSURE: 73 MMHG | SYSTOLIC BLOOD PRESSURE: 113 MMHG | WEIGHT: 261 LBS | BODY MASS INDEX: 44.56 KG/M2

## 2021-06-25 DIAGNOSIS — I25.119 CORONARY ARTERY DISEASE INVOLVING NATIVE CORONARY ARTERY OF NATIVE HEART WITH ANGINA PECTORIS (HCC): ICD-10-CM

## 2021-06-25 DIAGNOSIS — I11.0 HYPERTENSIVE HEART DISEASE WITH CHRONIC DIASTOLIC CONGESTIVE HEART FAILURE (HCC): ICD-10-CM

## 2021-06-25 DIAGNOSIS — R73.03 PREDIABETES: ICD-10-CM

## 2021-06-25 DIAGNOSIS — I50.32 CHRONIC HEART FAILURE WITH PRESERVED EJECTION FRACTION (HFPEF) (HCC): Primary | ICD-10-CM

## 2021-06-25 DIAGNOSIS — I50.32 HYPERTENSIVE HEART DISEASE WITH CHRONIC DIASTOLIC CONGESTIVE HEART FAILURE (HCC): ICD-10-CM

## 2021-06-25 DIAGNOSIS — E78.5 DYSLIPIDEMIA: ICD-10-CM

## 2021-06-25 DIAGNOSIS — Z95.2 S/P AVR: ICD-10-CM

## 2021-06-25 DIAGNOSIS — E87.6 HYPOKALEMIA: ICD-10-CM

## 2021-06-25 PROCEDURE — 99215 OFFICE O/P EST HI 40 MIN: CPT | Performed by: INTERNAL MEDICINE

## 2021-06-25 RX ORDER — FUROSEMIDE 40 MG/1
TABLET ORAL
COMMUNITY
Start: 2021-05-21 | End: 2021-08-06 | Stop reason: ALTCHOICE

## 2021-06-25 RX ORDER — SPIRONOLACTONE 25 MG/1
25 TABLET ORAL DAILY
Qty: 30 TABLET | Refills: 11 | Status: SHIPPED | OUTPATIENT
Start: 2021-06-25 | End: 2021-07-26

## 2021-07-12 ENCOUNTER — TELEPHONE (OUTPATIENT)
Dept: CARDIOLOGY | Facility: CLINIC | Age: 71
End: 2021-07-12

## 2021-07-12 ENCOUNTER — PATIENT MESSAGE (OUTPATIENT)
Dept: CARDIOLOGY | Facility: CLINIC | Age: 71
End: 2021-07-12

## 2021-07-12 DIAGNOSIS — Z95.2 S/P AVR: Primary | ICD-10-CM

## 2021-07-12 NOTE — TELEPHONE ENCOUNTER
Diana Nicole MA 7/12/2021 10:01 AM EDT      ----- Message -----  From: Claudia Rust  Sent: 7/12/2021 9:33 AM EDT  To: [facility]  Subject: Prescription Question     Received refill for Warfarin 2mg. Medication instructions : 2mg. Mon, Wed, Fri mornings. 1 1/2 mg. Tues,Thurs,Sat, Sun afternoon. Need prescription for 1.5mg or3mg cut in half for second part of dose. Please.

## 2021-07-12 NOTE — TELEPHONE ENCOUNTER
Pt discharged from [place] on Thursday 7/8/21. RX for warfarin sent to pharmacy. Patient scheduled 7/23/21 for in office PT/INR check apLPN

## 2021-07-15 RX ORDER — WARFARIN SODIUM 1 MG/1
TABLET ORAL
Qty: 40 TABLET | Refills: 0 | Status: SHIPPED | OUTPATIENT
Start: 2021-07-15 | End: 2021-08-05

## 2021-07-23 ENCOUNTER — ANTICOAGULATION VISIT (OUTPATIENT)
Dept: CARDIOLOGY | Facility: CLINIC | Age: 71
End: 2021-07-23

## 2021-07-23 VITALS
WEIGHT: 273 LBS | DIASTOLIC BLOOD PRESSURE: 72 MMHG | HEART RATE: 93 BPM | SYSTOLIC BLOOD PRESSURE: 152 MMHG | BODY MASS INDEX: 46.86 KG/M2

## 2021-07-23 DIAGNOSIS — Z79.01 LONG TERM (CURRENT) USE OF ANTICOAGULANTS: ICD-10-CM

## 2021-07-23 DIAGNOSIS — Z95.2 S/P AVR: Primary | ICD-10-CM

## 2021-07-23 LAB — INR PPP: 1.5 (ref 0.9–1.1)

## 2021-07-23 PROCEDURE — 85610 PROTHROMBIN TIME: CPT | Performed by: INTERNAL MEDICINE

## 2021-07-23 PROCEDURE — 36416 COLLJ CAPILLARY BLOOD SPEC: CPT | Performed by: INTERNAL MEDICINE

## 2021-07-26 RX ORDER — SPIRONOLACTONE 25 MG/1
TABLET ORAL
Qty: 90 TABLET | Refills: 3 | Status: SHIPPED | OUTPATIENT
Start: 2021-07-26 | End: 2021-08-06

## 2021-08-05 DIAGNOSIS — Z95.2 S/P AVR: ICD-10-CM

## 2021-08-05 RX ORDER — WARFARIN SODIUM 1 MG/1
TABLET ORAL
Qty: 40 TABLET | Refills: 2 | Status: SHIPPED | OUTPATIENT
Start: 2021-08-05 | End: 2021-08-06 | Stop reason: SDUPTHER

## 2021-08-06 ENCOUNTER — ANTICOAGULATION VISIT (OUTPATIENT)
Dept: CARDIOLOGY | Facility: CLINIC | Age: 71
End: 2021-08-06

## 2021-08-06 ENCOUNTER — OFFICE VISIT (OUTPATIENT)
Dept: CARDIOLOGY | Facility: CLINIC | Age: 71
End: 2021-08-06

## 2021-08-06 VITALS
DIASTOLIC BLOOD PRESSURE: 77 MMHG | BODY MASS INDEX: 46.69 KG/M2 | WEIGHT: 272 LBS | SYSTOLIC BLOOD PRESSURE: 150 MMHG | HEART RATE: 90 BPM

## 2021-08-06 VITALS
BODY MASS INDEX: 46.44 KG/M2 | SYSTOLIC BLOOD PRESSURE: 150 MMHG | WEIGHT: 272 LBS | DIASTOLIC BLOOD PRESSURE: 77 MMHG | HEIGHT: 64 IN | HEART RATE: 90 BPM

## 2021-08-06 DIAGNOSIS — Z95.2 S/P AVR: ICD-10-CM

## 2021-08-06 DIAGNOSIS — I35.0 AORTIC STENOSIS, SEVERE: ICD-10-CM

## 2021-08-06 DIAGNOSIS — I10 ESSENTIAL HYPERTENSION: Chronic | ICD-10-CM

## 2021-08-06 DIAGNOSIS — I35.0 NONRHEUMATIC AORTIC VALVE STENOSIS: Primary | Chronic | ICD-10-CM

## 2021-08-06 DIAGNOSIS — Z79.01 LONG TERM (CURRENT) USE OF ANTICOAGULANTS: ICD-10-CM

## 2021-08-06 DIAGNOSIS — Z95.2 S/P AVR: Primary | ICD-10-CM

## 2021-08-06 DIAGNOSIS — I50.32 CHRONIC HEART FAILURE WITH PRESERVED EJECTION FRACTION (HFPEF) (HCC): ICD-10-CM

## 2021-08-06 DIAGNOSIS — E66.01 MORBIDLY OBESE (HCC): Chronic | ICD-10-CM

## 2021-08-06 DIAGNOSIS — E78.5 DYSLIPIDEMIA: Chronic | ICD-10-CM

## 2021-08-06 LAB — INR PPP: 1.3 (ref 0.9–1.1)

## 2021-08-06 PROCEDURE — 99214 OFFICE O/P EST MOD 30 MIN: CPT | Performed by: NURSE PRACTITIONER

## 2021-08-06 PROCEDURE — 85610 PROTHROMBIN TIME: CPT | Performed by: INTERNAL MEDICINE

## 2021-08-06 PROCEDURE — 36416 COLLJ CAPILLARY BLOOD SPEC: CPT | Performed by: INTERNAL MEDICINE

## 2021-08-06 RX ORDER — METOLAZONE 2.5 MG/1
2.5 TABLET ORAL DAILY
Qty: 30 TABLET | Refills: 2 | Status: SHIPPED | OUTPATIENT
Start: 2021-08-06 | End: 2021-11-11

## 2021-08-06 RX ORDER — SPIRONOLACTONE 25 MG/1
50 TABLET ORAL DAILY
Qty: 90 TABLET | Refills: 3 | Status: SHIPPED | OUTPATIENT
Start: 2021-08-06 | End: 2021-09-13

## 2021-08-06 RX ORDER — WARFARIN SODIUM 3 MG/1
TABLET ORAL
Qty: 30 TABLET | Refills: 1 | Status: SHIPPED | OUTPATIENT
Start: 2021-08-06 | End: 2021-08-30

## 2021-08-06 NOTE — PATIENT INSTRUCTIONS
- Daily weight:  same time every day, same clothing   - Low Salt (sodium) diet   - Watch fluid intake       Heart Failure, Diagnosis    Heart failure means that your heart is not able to pump blood in the right way. This makes it hard for your body to work well. Heart failure is usually a long-term (chronic) condition. You must take good care of yourself and follow your treatment plan from your doctor.  What are the causes?  This condition may be caused by:  · High blood pressure.  · Build up of cholesterol and fat in the arteries.  · Heart attack. This injures the heart muscle.  · Heart valves that do not open and close properly.  · Damage of the heart muscle. This is also called cardiomyopathy.  · Lung disease.  · Abnormal heart rhythms.  What increases the risk?  The risk of heart failure goes up as a person ages. This condition is also more likely to develop in people who:  · Are overweight.  · Are male.  · Smoke or chew tobacco.  · Abuse alcohol or illegal drugs.  · Have taken medicines that can damage the heart.  · Have diabetes.  · Have abnormal heart rhythms.  · Have thyroid problems.  · Have low blood counts (anemia).  What are the signs or symptoms?  Symptoms of this condition include:  · Shortness of breath.  · Coughing.  · Swelling of the feet, ankles, legs, or belly.  · Losing weight for no reason.  · Trouble breathing.  · Waking from sleep because of the need to sit up and get more air.  · Rapid heartbeat.  · Being very tired.  · Feeling dizzy, or feeling like you may pass out (faint).  · Having no desire to eat.  · Feeling like you may vomit (nauseous).  · Peeing (urinating) more at night.  · Feeling confused.  How is this treated?         This condition may be treated with:  · Medicines. These can be given to treat blood pressure and to make the heart muscles stronger.  · Changes in your daily life. These may include eating a healthy diet, staying at a healthy body weight, quitting tobacco and  illegal drug use, or doing exercises.  · Surgery. Surgery can be done to open blocked valves, or to put devices in the heart, such as pacemakers.  · A donor heart (heart transplant). You will receive a healthy heart from a donor.  Follow these instructions at home:  · Treat other conditions as told by your doctor. These may include high blood pressure, diabetes, thyroid disease, or abnormal heart rhythms.  · Learn as much as you can about heart failure.  · Get support as you need it.  · Keep all follow-up visits as told by your doctor. This is important.  Summary  · Heart failure means that your heart is not able to pump blood in the right way.  · This condition is caused by high blood pressure, heart attack, or damage of the heart muscle.  · Symptoms of this condition include shortness of breath and swelling of the feet, ankles, legs, or belly. You may also feel very tired or feel like you may vomit.  · You may be treated with medicines, surgery, or changes in your daily life.  · Treat other health conditions as told by your doctor.  This information is not intended to replace advice given to you by your health care provider. Make sure you discuss any questions you have with your health care provider.  Document Revised: 03/06/2020 Document Reviewed: 03/06/2020  [employer] Patient Education © 2021 [employer]    Heart Failure Action Plan  A heart failure action plan helps you understand what to do when you have symptoms of heart failure. Follow the plan that was created by you and your health care provider. Review your plan each time you visit your health care provider.  Red zone  These signs and symptoms mean you should get medical help right away:  · You have trouble breathing when resting.  · You have a dry cough that is getting worse.  · You have swelling or pain in your legs or abdomen that is getting worse.  · You suddenly gain more than 2-3 lb (0.9-1.4 kg) in a day, or more than 5 lb (2.3 kg) in one week.  This amount may be more or less depending on your condition.  · You have trouble staying awake or you feel confused.  · You have chest pain.  · You do not have an appetite.  · You pass out.  If you experience any of these symptoms:  · Call your local emergency services (911 in the [place] right away or seek help at the emergency department of the nearest hospital.  Yellow zone  These signs and symptoms mean your condition may be getting worse and you should make some changes:  · You have trouble breathing when you are active or you need to sleep with extra pillows.  · You have swelling in your legs or abdomen.  · You gain 2-3 lb (0.9-1.4 kg) in one day, or 5 lb (2.3 kg) in one week. This amount may be more or less depending on your condition.  · You get tired easily.  · You have trouble sleeping.  · You have a dry cough.  If you experience any of these symptoms:  · Contact your health care provider within the next day.  · Your health care provider may adjust your medicines.  Green zone  These signs mean you are doing well and can continue what you are doing:  · You do not have shortness of breath.  · You have very little swelling or no new swelling.  · Your weight is stable (no gain or loss).  · You have a normal activity level.  · You do not have chest pain or any other new symptoms.  Follow these instructions at home:  · Take over-the-counter and prescription medicines only as told by your health care provider.  · Weigh yourself daily. Your target weight is __________ lb (__________ kg).  ? Call your health care provider if you gain more than __________ lb (__________ kg) in a day, or more than __________ lb (__________ kg) in one week.  · Eat a heart-healthy diet. Work with a diet and nutrition specialist (dietitian) to create an eating plan that is best for you.  · Keep all follow-up visits as told by your health care provider. This is important.  Where to find more information  · American Heart Association:  www.heart.org  Summary  · Follow the action plan that was created by you and your health care provider.  · Get help right away if you have any symptoms in the Red zone.  This information is not intended to replace advice given to you by your health care provider. Make sure you discuss any questions you have with your health care provider.  Document Revised: 11/30/2018 Document Reviewed: 01/27/2018  [employer] Patient Education © 2021 [employer]

## 2021-08-06 NOTE — PROGRESS NOTES
Subjective:     Encounter Date:08/06/2021      Patient ID: Claudia Rust is a 70 y.o. female.    Chief Complaint:   Follow-up shortness of breath, history of AVR, HFpEF    History of Present Illness       This is a 70-year-old with PMH      #Valvular heart disease with moderate to severe aortic stenosis  Cardiac cath 1/20/2021 revealing nonobstructive CAD  Patient underwent #23 magna pericardial prosthesis AVR 3/18/2021  # Hyperlipidemia,    # Hypertension  # COPD, KARLY on CPAP  # Prediabetes  #History of HIT  # Hypothyroidism, Rheumatoid Arthritis, spinal stenosis, hiatal hernia, chronic depression, bipolar, GERD, IBS, chronic migraine, vertigo, herniated disc, compression lumbar fractures  #Allergies/intolerance to Stadol  # Hysterectomy, bladder reconstruction, cholecystectomy, right hand surgery  # Family history of strokes and grandfather.  Mother's cancer, father on  # Former cigarette smoker     Here for follow up for HFpEF.  Patient reports no improvement in edema since being taken off of lasix and put of spironolactone.  She felt like the lasix may have helped better but did not like getting labs taken frequently for low potassium.  Patient has not gotten labs since last appointment.  Patient reports chronic shortness of breath with exertion but it has improved some. She does not weight herself daily and does not watch her sodium intake.     Review of records reveals patient underwent an echocardiogram which is revealing severe aortic stenosis on 2/11/2020 ,Underwent cardiac cath 1/20/2021 which revealed no obstructive disease,underwent aortic valve replacement 3/18/2021 with #23 magna pericardial prosthesis.       Blood pressure today is 150/77. HR 90 weight 272lbs     Labs from 4/26/2021 reveal Chem-7 with a potassium of 3.5, 6/5/2021 revealed proBNP of 1147.  Labs from 6/8/2021 reveal a potassium of 2.9. no labs since starting spironolactone last appointment 06/25/2021.         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  On zaroxolyn 2.5mg daily, spironolactone 25mg daily   Will increase spironolactone to 50mg daily   Check BMP and magnesium level today or tomorrow --- discussed importance of keeping up with labs especially regarding low potassium and monitoring kidney function    Discussed with patient and      INR 1.3 today, warfarin increased to 3mg daily     - Daily weight:  same time every day, same clothing   - Low Salt (sodium) diet   - Watch fluid intake       The following portions of the patient's history were reviewed and updated as appropriate: allergies, current medications, past family history, past medical history, past social history, past surgical history and problem list.    Review of Systems   Constitutional: Positive for malaise/fatigue. Negative for fever.   Cardiovascular: Positive for dyspnea on exertion and leg swelling. Negative for chest pain, irregular heartbeat, orthopnea and syncope.   Respiratory: Negative for cough.    Gastrointestinal: Negative for abdominal pain, nausea and vomiting.   Neurological: Positive for dizziness.   All other systems reviewed and are negative.           Objective:     Vitals and nursing note reviewed.   Constitutional:       Appearance: Not in distress. Morbidly obese.   Neck:      Vascular: No JVR. JVD normal.   Pulmonary:      Effort: Pulmonary effort is normal.      Breath sounds: Normal breath sounds. No wheezing. No rhonchi. No  rales.   Chest:      Chest wall: Not tender to palpatation.   Cardiovascular:      PMI at left midclavicular line. Normal rate. Regular rhythm. Normal S1. Normal S2.      Murmurs: There is no murmur.      No gallop. click. Aortic valve click No rub.   Pulses:     Intact distal pulses.   Edema:     Peripheral edema present.     Ankle: bilateral 1+ edema of the ankle.     Feet: bilateral 1+ edema of the feet.  Abdominal:      General: Bowel sounds are normal.      Palpations: Abdomen is soft.      Tenderness: There is no abdominal tenderness.   Musculoskeletal: Normal range of motion.         General: No tenderness. Skin:     General: Skin is warm and dry.   Neurological:      General: No focal deficit present.      Mental Status: Alert and oriented to person, place and time.   Psychiatric:         Behavior: Behavior is cooperative.                    Diagnosis Plan   1. Nonrheumatic aortic valve stenosis     2. Essential hypertension     3. Aortic stenosis, severe     4. Chronic heart failure with preserved ejection fraction (HFpEF) (CMS/[facility])  Magnesium   5. Morbidly obese (CMS/[facility])     6. Dyslipidemia     7. S/P AVR            Electronically signed by SARA Moore, 08/06/21, 3:41 PM EDT.

## 2021-08-07 ENCOUNTER — LAB (OUTPATIENT)
Dept: LAB | Facility: HOSPITAL | Age: 71
End: 2021-08-07

## 2021-08-07 DIAGNOSIS — I50.32 CHRONIC HEART FAILURE WITH PRESERVED EJECTION FRACTION (HFPEF) (HCC): ICD-10-CM

## 2021-08-07 LAB — MAGNESIUM SERPL-MCNC: 1.7 MG/DL (ref 1.6–2.4)

## 2021-08-07 PROCEDURE — 83735 ASSAY OF MAGNESIUM: CPT

## 2021-08-07 PROCEDURE — 36415 COLL VENOUS BLD VENIPUNCTURE: CPT

## 2021-08-20 ENCOUNTER — ANTICOAGULATION VISIT (OUTPATIENT)
Dept: CARDIOLOGY | Facility: CLINIC | Age: 71
End: 2021-08-20

## 2021-08-20 VITALS — DIASTOLIC BLOOD PRESSURE: 91 MMHG | SYSTOLIC BLOOD PRESSURE: 145 MMHG | BODY MASS INDEX: 46.69 KG/M2 | WEIGHT: 272 LBS

## 2021-08-20 DIAGNOSIS — Z95.2 S/P AVR: Primary | ICD-10-CM

## 2021-08-20 DIAGNOSIS — Z79.01 LONG TERM (CURRENT) USE OF ANTICOAGULANTS: ICD-10-CM

## 2021-08-20 LAB — INR PPP: 2.1 (ref 0.9–1.1)

## 2021-08-20 PROCEDURE — 36416 COLLJ CAPILLARY BLOOD SPEC: CPT | Performed by: INTERNAL MEDICINE

## 2021-08-20 PROCEDURE — 85610 PROTHROMBIN TIME: CPT | Performed by: INTERNAL MEDICINE

## 2021-08-28 DIAGNOSIS — Z95.2 S/P AVR: ICD-10-CM

## 2021-08-30 RX ORDER — WARFARIN SODIUM 3 MG/1
TABLET ORAL
Qty: 30 TABLET | Refills: 1 | Status: SHIPPED | OUTPATIENT
Start: 2021-08-30 | End: 2021-09-27

## 2021-08-30 NOTE — TELEPHONE ENCOUNTER
Rx Refill Note  Requested Prescriptions     Pending Prescriptions Disp Refills   • warfarin (COUMADIN) 3 MG tablet [Pharmacy Med Name: WARFARIN SODIUM 3 MG TABLET] 30 tablet 1     Sig: TAKE 1 TABLET BY MOUTH EVERY DAY AT NIGHT      Last office visit with prescribing clinician: 6/25/2021      Next office visit with prescribing clinician: 12/27/2021            Raven Patel MA  08/30/21, 07:37 EDT

## 2021-08-30 NOTE — TELEPHONE ENCOUNTER
Rx Refill Note  Requested Prescriptions     Pending Prescriptions Disp Refills   • warfarin (COUMADIN) 3 MG tablet [Pharmacy Med Name: WARFARIN SODIUM 3 MG TABLET] 30 tablet 1     Sig: TAKE 1 TABLET BY MOUTH EVERY DAY AT NIGHT      Last office visit with prescribing clinician: 6/25/2021      Next office visit with prescribing clinician: 12/27/2021              Anticoagulation Visit (08/20/2021)      Cara Mora LPN  08/30/21, 16:36 EDT

## 2021-09-03 ENCOUNTER — ANTICOAGULATION VISIT (OUTPATIENT)
Dept: CARDIOLOGY | Facility: CLINIC | Age: 71
End: 2021-09-03

## 2021-09-03 VITALS — SYSTOLIC BLOOD PRESSURE: 170 MMHG | DIASTOLIC BLOOD PRESSURE: 77 MMHG | HEART RATE: 82 BPM

## 2021-09-03 DIAGNOSIS — Z79.01 LONG TERM (CURRENT) USE OF ANTICOAGULANTS: ICD-10-CM

## 2021-09-03 DIAGNOSIS — Z95.2 S/P AVR: Primary | ICD-10-CM

## 2021-09-03 LAB — INR PPP: 1.4 (ref 0.9–1.1)

## 2021-09-03 PROCEDURE — 85610 PROTHROMBIN TIME: CPT | Performed by: INTERNAL MEDICINE

## 2021-09-03 PROCEDURE — 36416 COLLJ CAPILLARY BLOOD SPEC: CPT | Performed by: INTERNAL MEDICINE

## 2021-09-08 ENCOUNTER — TELEPHONE (OUTPATIENT)
Dept: CARDIOLOGY | Facility: CLINIC | Age: 71
End: 2021-09-08

## 2021-09-08 NOTE — TELEPHONE ENCOUNTER
Patient has blisters on legs, being treated with Bettye Minal bandage, no antibiotics at this time.

## 2021-09-13 RX ORDER — SPIRONOLACTONE 25 MG/1
TABLET ORAL
Qty: 90 TABLET | Refills: 3 | Status: SHIPPED | OUTPATIENT
Start: 2021-09-13 | End: 2022-01-21

## 2021-09-13 NOTE — TELEPHONE ENCOUNTER
Rx Refill Note  Requested Prescriptions     Pending Prescriptions Disp Refills   • spironolactone (ALDACTONE) 25 MG tablet [Pharmacy Med Name: SPIRONOLACTONE 25 MG TABLET] 90 tablet 3     Sig: TAKE 2 TABLETS BY MOUTH EVERY DAY      Last office visit with prescribing clinician: 8/6/2021      Next office visit with prescribing clinician:  12/27/2021    BNP (06/05/2021 20:30)  CBC & Differential (06/05/2021 20:30)  Basic Metabolic Panel (04/26/2021 04:30)         Hillary Robles MA  09/13/21, 09:32 EDT

## 2021-09-17 ENCOUNTER — ANTICOAGULATION VISIT (OUTPATIENT)
Dept: CARDIOLOGY | Facility: CLINIC | Age: 71
End: 2021-09-17

## 2021-09-17 VITALS
HEART RATE: 80 BPM | WEIGHT: 264 LBS | BODY MASS INDEX: 45.32 KG/M2 | DIASTOLIC BLOOD PRESSURE: 78 MMHG | SYSTOLIC BLOOD PRESSURE: 143 MMHG

## 2021-09-17 DIAGNOSIS — Z95.2 S/P AVR: Primary | ICD-10-CM

## 2021-09-17 DIAGNOSIS — Z79.01 LONG TERM (CURRENT) USE OF ANTICOAGULANTS: ICD-10-CM

## 2021-09-17 LAB — INR PPP: 1.4 (ref 0.9–1.1)

## 2021-09-17 PROCEDURE — 36416 COLLJ CAPILLARY BLOOD SPEC: CPT | Performed by: INTERNAL MEDICINE

## 2021-09-17 PROCEDURE — 85610 PROTHROMBIN TIME: CPT | Performed by: INTERNAL MEDICINE

## 2021-09-25 DIAGNOSIS — Z95.2 S/P AVR: ICD-10-CM

## 2021-09-27 RX ORDER — WARFARIN SODIUM 3 MG/1
TABLET ORAL
Qty: 35 TABLET | Refills: 2 | Status: SHIPPED | OUTPATIENT
Start: 2021-09-27 | End: 2021-10-15 | Stop reason: SDUPTHER

## 2021-09-27 NOTE — TELEPHONE ENCOUNTER
Rx Refill Note  Requested Prescriptions     Pending Prescriptions Disp Refills   • warfarin (COUMADIN) 3 MG tablet [Pharmacy Med Name: WARFARIN SODIUM 3 MG TABLET] 30 tablet 1     Sig: TAKE 1 TABLET BY MOUTH EVERY DAY AT NIGHT      Last office visit with prescribing clinician: 6/25/2021      Next office visit with prescribing clinician: 12/27/2021  Anticoagulation Visit 9/17/21 INR 1.4           Francisca Peace RN  09/27/21, 17:12 EDT

## 2021-10-01 ENCOUNTER — ANTICOAGULATION VISIT (OUTPATIENT)
Dept: CARDIOLOGY | Facility: CLINIC | Age: 71
End: 2021-10-01

## 2021-10-01 VITALS
BODY MASS INDEX: 45.14 KG/M2 | WEIGHT: 263 LBS | SYSTOLIC BLOOD PRESSURE: 161 MMHG | DIASTOLIC BLOOD PRESSURE: 84 MMHG | HEART RATE: 72 BPM

## 2021-10-01 DIAGNOSIS — Z79.01 LONG TERM (CURRENT) USE OF ANTICOAGULANTS: ICD-10-CM

## 2021-10-01 DIAGNOSIS — Z95.2 S/P AVR: Primary | ICD-10-CM

## 2021-10-01 LAB — INR PPP: 1.7 (ref 0.9–1.1)

## 2021-10-01 PROCEDURE — 85610 PROTHROMBIN TIME: CPT | Performed by: INTERNAL MEDICINE

## 2021-10-01 PROCEDURE — 36416 COLLJ CAPILLARY BLOOD SPEC: CPT | Performed by: INTERNAL MEDICINE

## 2021-10-15 ENCOUNTER — ANTICOAGULATION VISIT (OUTPATIENT)
Dept: CARDIOLOGY | Facility: CLINIC | Age: 71
End: 2021-10-15

## 2021-10-15 VITALS
HEART RATE: 77 BPM | BODY MASS INDEX: 45.49 KG/M2 | DIASTOLIC BLOOD PRESSURE: 91 MMHG | WEIGHT: 265 LBS | SYSTOLIC BLOOD PRESSURE: 177 MMHG

## 2021-10-15 DIAGNOSIS — Z95.2 S/P AVR: Primary | ICD-10-CM

## 2021-10-15 DIAGNOSIS — Z95.2 S/P AVR: ICD-10-CM

## 2021-10-15 DIAGNOSIS — Z79.01 LONG TERM (CURRENT) USE OF ANTICOAGULANTS: ICD-10-CM

## 2021-10-15 LAB — INR PPP: 1.7 (ref 0.9–1.1)

## 2021-10-15 PROCEDURE — 36416 COLLJ CAPILLARY BLOOD SPEC: CPT | Performed by: INTERNAL MEDICINE

## 2021-10-15 PROCEDURE — 85610 PROTHROMBIN TIME: CPT | Performed by: INTERNAL MEDICINE

## 2021-10-15 RX ORDER — WARFARIN SODIUM 3 MG/1
TABLET ORAL
Qty: 52 TABLET | Refills: 0 | Status: SHIPPED | OUTPATIENT
Start: 2021-10-15 | End: 2021-10-29 | Stop reason: SDUPTHER

## 2021-10-15 NOTE — TELEPHONE ENCOUNTER
Rx Refill Note  Requested Prescriptions     Pending Prescriptions Disp Refills   • warfarin (COUMADIN) 3 MG tablet 52 tablet 0     Sig: TAKE 1 tablet by mouth on Tuesday and Thursday and one and one half tablets by mouth all other days or as directed      Last office visit with prescribing clinician: 6/25/2021      Next office visit with prescribing clinician: 12/27/2021              Anticoagulation Visit (10/15/2021)      Cara Mora LPN  10/15/21, 13:51 EDT

## 2021-10-29 ENCOUNTER — ANTICOAGULATION VISIT (OUTPATIENT)
Dept: CARDIOLOGY | Facility: CLINIC | Age: 71
End: 2021-10-29

## 2021-10-29 ENCOUNTER — TELEPHONE (OUTPATIENT)
Dept: CARDIOLOGY | Facility: CLINIC | Age: 71
End: 2021-10-29

## 2021-10-29 VITALS
WEIGHT: 263 LBS | SYSTOLIC BLOOD PRESSURE: 146 MMHG | DIASTOLIC BLOOD PRESSURE: 83 MMHG | BODY MASS INDEX: 45.14 KG/M2 | HEART RATE: 64 BPM

## 2021-10-29 DIAGNOSIS — Z79.01 LONG TERM (CURRENT) USE OF ANTICOAGULANTS: ICD-10-CM

## 2021-10-29 DIAGNOSIS — Z95.2 S/P AVR: Primary | ICD-10-CM

## 2021-10-29 DIAGNOSIS — Z95.2 S/P AVR: ICD-10-CM

## 2021-10-29 LAB — INR PPP: 1.8 (ref 0.9–1.1)

## 2021-10-29 PROCEDURE — 85610 PROTHROMBIN TIME: CPT | Performed by: INTERNAL MEDICINE

## 2021-10-29 PROCEDURE — 36416 COLLJ CAPILLARY BLOOD SPEC: CPT | Performed by: INTERNAL MEDICINE

## 2021-10-29 RX ORDER — WARFARIN SODIUM 3 MG/1
TABLET ORAL
Qty: 52 TABLET | Refills: 1 | Status: SHIPPED | OUTPATIENT
Start: 2021-10-29 | End: 2021-11-30

## 2021-10-29 NOTE — TELEPHONE ENCOUNTER
Rx Refill Note  Requested Prescriptions      No prescriptions requested or ordered in this encounter      Last office visit with prescribing clinician: 6/25/2021      Next office visit with prescribing clinician: 12/27/2021   Anticoagulation Visit 10/29/21 INR 1.8           Francisca Peace RN  10/29/21, 14:00 EDT

## 2021-11-11 RX ORDER — METOLAZONE 2.5 MG/1
TABLET ORAL
Qty: 30 TABLET | Refills: 2 | Status: SHIPPED | OUTPATIENT
Start: 2021-11-11 | End: 2022-01-21

## 2021-11-11 NOTE — TELEPHONE ENCOUNTER
Rx Refill Note  Requested Prescriptions     Pending Prescriptions Disp Refills   • metOLazone (ZAROXOLYN) 2.5 MG tablet [Pharmacy Med Name: METOLAZONE 2.5 MG TABLET] 30 tablet 2     Sig: TAKE 1 TABLET BY MOUTH EVERY DAY      Last office visit with prescribing clinician: 8/6/2021      Next office visit with prescribing clinician: 12/27/2021          {TIP  Is Refill Pharmacy correct?:23}  Hillary Robles MA  11/11/21, 13:35 EST

## 2021-11-30 DIAGNOSIS — Z95.2 S/P AVR: ICD-10-CM

## 2021-11-30 RX ORDER — WARFARIN SODIUM 3 MG/1
TABLET ORAL
Qty: 52 TABLET | Refills: 1 | Status: SHIPPED | OUTPATIENT
Start: 2021-11-30 | End: 2021-12-27

## 2021-11-30 NOTE — TELEPHONE ENCOUNTER
Rx Refill Note  Requested Prescriptions     Pending Prescriptions Disp Refills   • warfarin (COUMADIN) 3 MG tablet [Pharmacy Med Name: WARFARIN SODIUM 3 MG TABLET] 52 tablet 1     Sig: TAKE 1 TAB BY MOUTH ON TUESDAY AND THURSDAY AND 1 & 1/2 TABS BY MOUTH ALL OTHER DAYS OR AS DIRECTED      Last office visit with prescribing clinician: 6/25/2021      Next office visit with prescribing clinician: 1/3/2022   Anticoagulation Visit 10/29/21 INR 1.8           Francisca Peace RN  11/30/21, 16:50 EST

## 2021-12-03 ENCOUNTER — ANTICOAGULATION VISIT (OUTPATIENT)
Dept: CARDIOLOGY | Facility: CLINIC | Age: 71
End: 2021-12-03

## 2021-12-03 VITALS
DIASTOLIC BLOOD PRESSURE: 85 MMHG | OXYGEN SATURATION: 93 % | BODY MASS INDEX: 45.83 KG/M2 | WEIGHT: 267 LBS | HEART RATE: 71 BPM | SYSTOLIC BLOOD PRESSURE: 127 MMHG

## 2021-12-03 DIAGNOSIS — Z95.2 S/P AVR: Primary | ICD-10-CM

## 2021-12-03 DIAGNOSIS — Z79.01 LONG TERM (CURRENT) USE OF ANTICOAGULANTS: ICD-10-CM

## 2021-12-03 LAB — INR PPP: 2.2 (ref 0.9–1.1)

## 2021-12-03 PROCEDURE — 36416 COLLJ CAPILLARY BLOOD SPEC: CPT | Performed by: INTERNAL MEDICINE

## 2021-12-03 PROCEDURE — 85610 PROTHROMBIN TIME: CPT | Performed by: INTERNAL MEDICINE

## 2021-12-25 DIAGNOSIS — Z95.2 S/P AVR: ICD-10-CM

## 2021-12-27 RX ORDER — WARFARIN SODIUM 3 MG/1
TABLET ORAL
Qty: 52 TABLET | Refills: 2 | Status: SHIPPED | OUTPATIENT
Start: 2021-12-27 | End: 2022-05-20

## 2021-12-27 NOTE — TELEPHONE ENCOUNTER
Rx Refill Note  Requested Prescriptions     Pending Prescriptions Disp Refills   • warfarin (COUMADIN) 3 MG tablet [Pharmacy Med Name: WARFARIN SODIUM 3 MG TABLET] 52 tablet 1     Sig: TAKE 1 TAB BY MOUTH ON TUESDAY AND THURSDAY AND 1 & 1/2 TABS BY MOUTH ALL OTHER DAYS OR AS DIRECTED      Last office visit with prescribing clinician: 6/25/2021      Next office visit with prescribing clinician: 1/3/2022   Anticoagulation Visit 12/3/21 INR 2.2           Francisca Peace RN  12/27/21, 10:32 EST

## 2022-01-03 ENCOUNTER — ANTICOAGULATION VISIT (OUTPATIENT)
Dept: CARDIOLOGY | Facility: CLINIC | Age: 72
End: 2022-01-03

## 2022-01-03 VITALS
WEIGHT: 273 LBS | SYSTOLIC BLOOD PRESSURE: 186 MMHG | BODY MASS INDEX: 46.86 KG/M2 | HEART RATE: 75 BPM | DIASTOLIC BLOOD PRESSURE: 82 MMHG

## 2022-01-03 DIAGNOSIS — Z95.2 S/P AVR: Primary | ICD-10-CM

## 2022-01-03 DIAGNOSIS — Z79.01 LONG TERM (CURRENT) USE OF ANTICOAGULANTS: ICD-10-CM

## 2022-01-03 LAB — INR PPP: 2.8 (ref 0.9–1.1)

## 2022-01-03 PROCEDURE — 85610 PROTHROMBIN TIME: CPT | Performed by: INTERNAL MEDICINE

## 2022-01-03 PROCEDURE — 36416 COLLJ CAPILLARY BLOOD SPEC: CPT | Performed by: INTERNAL MEDICINE

## 2022-01-14 ENCOUNTER — OFFICE VISIT (OUTPATIENT)
Dept: CARDIOLOGY | Facility: CLINIC | Age: 72
End: 2022-01-14

## 2022-01-14 VITALS
SYSTOLIC BLOOD PRESSURE: 136 MMHG | OXYGEN SATURATION: 96 % | BODY MASS INDEX: 46.44 KG/M2 | WEIGHT: 272 LBS | HEIGHT: 64 IN | HEART RATE: 65 BPM | DIASTOLIC BLOOD PRESSURE: 79 MMHG

## 2022-01-14 DIAGNOSIS — I10 ESSENTIAL HYPERTENSION: ICD-10-CM

## 2022-01-14 DIAGNOSIS — Z95.2 S/P AVR: Primary | ICD-10-CM

## 2022-01-14 DIAGNOSIS — Z79.01 LONG TERM (CURRENT) USE OF ANTICOAGULANTS: ICD-10-CM

## 2022-01-14 DIAGNOSIS — I50.32 CHRONIC HEART FAILURE WITH PRESERVED EJECTION FRACTION (HFPEF): ICD-10-CM

## 2022-01-14 DIAGNOSIS — E66.01 MORBID OBESITY WITH BMI OF 45.0-49.9, ADULT: ICD-10-CM

## 2022-01-14 DIAGNOSIS — E78.5 DYSLIPIDEMIA: ICD-10-CM

## 2022-01-14 PROCEDURE — 93000 ELECTROCARDIOGRAM COMPLETE: CPT | Performed by: INTERNAL MEDICINE

## 2022-01-14 PROCEDURE — 99214 OFFICE O/P EST MOD 30 MIN: CPT | Performed by: INTERNAL MEDICINE

## 2022-01-14 RX ORDER — SIMVASTATIN 20 MG
20 TABLET ORAL NIGHTLY
COMMUNITY
Start: 2021-11-19

## 2022-01-14 RX ORDER — METFORMIN HYDROCHLORIDE 500 MG/1
1 TABLET, EXTENDED RELEASE ORAL
COMMUNITY
Start: 2022-01-12

## 2022-01-14 RX ORDER — MELATONIN 10 MG
20 CAPSULE ORAL NIGHTLY PRN
COMMUNITY

## 2022-01-14 RX ORDER — FLUOXETINE HYDROCHLORIDE 20 MG/1
60 CAPSULE ORAL DAILY
COMMUNITY
Start: 2021-12-20

## 2022-01-14 RX ORDER — GABAPENTIN 400 MG/1
1 CAPSULE ORAL 3 TIMES DAILY
COMMUNITY
Start: 2021-10-11

## 2022-01-14 NOTE — PROGRESS NOTES
"    Subjective:     Encounter Date:01/14/2022      Patient ID: Claudia Rust is a 71 y.o. female.    Chief Complaint : Follow-up for AVR, CHF, prediabetes, hypertension,  History of Present Illness      Ms. Cluadia Rust has of PMH      #Valvular heart disease with moderate to severe aortic stenosis  Cardiac cath 1/20/2021 revealing nonobstructive CAD  Patient underwent #23 magna pericardial prosthesis AVR 3/18/2021  # Hyperlipidemia,    # Hypertension  # COPD, KARLY on CPAP  # Prediabetes  #History of HIT  # Hypothyroidism, Rheumatoid Arthritis, spinal stenosis, hiatal hernia, chronic depression, bipolar, GERD, IBS, chronic migraine, vertigo, herniated disc, compression lumbar fractures  #Allergies/intolerance to Stadol  # Hysterectomy, bladder reconstruction, cholecystectomy, right hand surgery  # Family history of strokes and grandfather.  Mother's cancer, father on  # Active cigarette smoker trying to quit         Here for follow-up.  Patient denies any chest pain or shortness of breath.  Review of records reveals patient underwent an echocardiogram which is revealing severe aortic stenosis on 2/11/2020 ,Underwent cardiac cath 1/20/2021 which revealed no obstructive disease,underwent aortic valve replacement 3/18/2021 with #23 magna pericardial prosthesis.  Went to rehab.  Patient has been retaining fluid is edema on Zaroxolyn and Lasix, has\" gained 40 pounds\" and has been having low blood pressure.    Carotid Dopplers 9/13/2019 normal.  Echocardiogram 2/11/2020 is revealing severe aortic stenosis  Work-up here on 3/16/2021 revealed proBNP 733, normal CMP, A1c 5.7, normal CBC, chest x-ray with no acute abnormality.  EKG 3/16/2021 reviewed/interpreted by me shows sinus rhythm with a rate of 60 bpm with QT of 507 ms.    Patient's arterial blood pressure is 136/79, heart rate 65, O2 sat of 96% on room air.  BMI is over 46.  Labs from 4/26/2021 reveal Chem-7 with a potassium of 3.5, 6/5/2021 revealed proBNP of 1147.  " Labs from 6/8/2021 reveal a potassium of 2.9.  Labs from 12/17/2021 revealed normal TSH at 1.9, CMP with a creatinine of 1.13 and GFR 47 1 normal CBC, hemoglobin A1c of 6.6.  Lipid profile with cholesterol 172, triglycerides 185, HDL 45, LDL 90.  INR from 1/3/2022 was 2.8.      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  Continue aspirin, diuretics with  , Zaroxolyn, Aldactone as tolerated for edema, AVR, carotid disease, chronic heart failure due to valvular heart disease and hypertensive cardiovascular disease.  Continue warfarin, simvastatin, as tolerated  Follow-up in CHF clinic   Counseled on smoking cessation.   Advised patient follow up with nephrology.  Patient underwent transesophageal echo 3/25/2021 which revealed normal LV systolic function normal chamber sizes.  Normal aortic valve placement and no significant valvular heart disease.  Will follow up with CT VS for routine post CABG care and HIT, patient has been started on warfarin.  Patient's INR 1/3/2022 was 2.8.  Discussed about BMI over 40, and now diabetes, counseled on weight loss diet and exercise.  Follow-up with PMD for diabetes care.  Reviewed extensive records and summarized in HPI for.    Patient is asymptomatic from bradycardia we will continue to monitor  Discussed about COVID-19 vaccination, patient took 2 doses of Moderna, is awaiting booster.            ECG 12 Lead    Date/Time: 1/14/2022 10:28 AM  Performed by: Jose Levi MD  Authorized by: Jose Levi,  "MD   Comparison: compared with previous ECG from 3/23/2021  Comparison to previous ECG: EKG done today reviewed/interpreted by me reveals sinus rhythm at a rate of 64 bpm with no acute ST-T changes, no new change compared to EKG from 3/23/2021              The following portions of the patient's history were reviewed and updated as appropriate: allergies, current medications, past family history, past medical history, past social history, past surgical history and problem list.    Assessment:         [facility]    No diagnosis found.       Plan:               Past Medical History:  Past Medical History:   Diagnosis Date   • Acute congestive heart failure (HCC)    • Allergies    • Anxiety    • Arthritis    • Asthma    • Bilateral leg edema    • Bipolar 1 disorder ([facility])    • Bulging lumbar disc     X3   • Cancer ([facility])     states had \"cancerous tumor during pregnancy and had to have hysterectomy\"   • Cataract     bilateral   • Cellulitis 03/2021    bilateral legs   • Compression fracture of vertebral column ([facility])     LUMBAR   • COPD (chronic obstructive pulmonary disease) ([facility])    • Delayed emergence from anesthesia    • Depression    • Depression    • Diabetes mellitus ([facility])    • Dyslipidemia    • Dyspnea on exertion    • Fibromyalgia    • GERD (gastroesophageal reflux disease)    • Hiatal hernia    • Hyperlipidemia    • Hypertension    • Hypothyroid     HYPOTHYROID   • IBS (irritable bowel syndrome)    • Migraines    • Morbid obesity ([facility])    • Neuropathy    • Panic attacks    • Peripheral edema    • PONV (postoperative nausea and vomiting)    • Poor mobility     rolling walker   • Prediabetes    • PVD (peripheral vascular disease) ([facility])    • Rheumatoid aortitis    • Spinal stenosis    • Spinal stenosis    • Vertigo    • Vertigo    • Vitamin D deficiency      Past Surgical History:  Past Surgical History:   Procedure Laterality Date   • AORTIC VALVE REPAIR/REPLACEMENT N/A 3/18/2021    Procedure: AORTIC VALVE REPLACEMENT;  " Surgeon: Olaf Mcgill MD;  [facility];  Service: Cardiothoracic;  Laterality: N/A;   • BRONCHOSCOPY N/A 3/25/2021    Procedure: BRONCHOSCOPY AT BEDSIDE WITH BRONCHIOALVEOLAR LAVAGE;  Surgeon: Glenn Reyes MD;  [facility];  Service: Pulmonary;  Laterality: N/A;  PNA   • CARDIAC CATHETERIZATION  2009   • CARDIAC CATHETERIZATION N/A 8/14/2019    Procedure: Left Heart Cath;  Surgeon: Jose Levi MD;  [facility];  Service: Cardiovascular   • CARDIAC CATHETERIZATION N/A 8/14/2019    Procedure: Right Heart Cath;  Surgeon: Jose Levi MD;  [facility];  Service: Cardiovascular   • CARDIAC CATHETERIZATION N/A 8/14/2019    Procedure: Aortic root aortogram;  Surgeon: Jose Levi MD;  [facility];  Service: Cardiovascular   • CARDIAC CATHETERIZATION N/A 1/20/2021    Procedure: Left Heart Cath;  Surgeon: Jose Levi MD;  [facility];  Service: Cardiovascular;  Laterality: N/A;   • COLONOSCOPY     • CYSTOCELE REPAIR  1984   • ELBOW ARTHROPLASTY  2011   • ENDOSCOPY     • FOOT SURGERY     • GALLBLADDER SURGERY  2002   • TONSILLECTOMY     • VAGINAL HYSTERECTOMY  1972      Allergies:  Allergies   Allergen Reactions   • Adhesive Tape Unknown (See Comments)     hives   • Butorphanol Other (See Comments)     Chest pain difficulty breathing throat swelling   • Garlic Other (See Comments)     Chest pain difficulty breathing throat swells   • Heparin Other (See Comments)     HIT +   • Tetanus-Diphtheria Toxoids Td Other (See Comments)     Dizziness,  vomiting   • Aloe Itching   • Bee Venom Other (See Comments)     Swelling eyes and lips hand swelling   • Latex Itching   • Macadamia Nut Oil Other (See Comments)     Chest pain difficulty breathing throat swelling   • Tuberculin Unknown (See Comments)     reactor   • Wasp Venom Other (See  Comments)     Swelling eyes lips and hand     Home Meds:  Current Meds:     Current Outpatient Medications:   •  Acetaminophen (TYLENOL PO), Take 650 mg by mouth Every 4 (Four) Hours As Needed., Disp: , Rfl:   •  B Complex Vitamins (VITAMIN B COMPLEX) capsule capsule, Take 1 capsule by mouth Daily. LAST DOSE 3/4, Disp: , Rfl:   •  Cholecalciferol (VITAMIN D-1000 MAX ST) 1000 units tablet, Take 2,000 Units by mouth Every Night. LAST DOSE 3/4, Disp: , Rfl:   •  FLUoxetine (PROzac) 20 MG capsule, , Disp: , Rfl:   •  gabapentin (NEURONTIN) 400 MG capsule, Take 1 capsule by mouth 3 (Three) Times a Day., Disp: , Rfl:   •  glucose blood test strip, 1 strip by Other route Daily., Disp: , Rfl:   •  levothyroxine (SYNTHROID, LEVOTHROID) 100 MCG tablet, Take 100 mcg by mouth Daily., Disp: , Rfl:   •  meclizine (ANTIVERT) 25 MG tablet, Take 25 mg by mouth 3 (Three) Times a Day As Needed., Disp: , Rfl:   •  Melatonin 10 MG capsule, Take  by mouth., Disp: , Rfl:   •  metFORMIN ER (GLUCOPHAGE-XR) 500 MG 24 hr tablet, Take 1 tablet by mouth Daily With Breakfast., Disp: , Rfl:   •  metOLazone (ZAROXOLYN) 2.5 MG tablet, TAKE 1 TABLET BY MOUTH EVERY DAY, Disp: 30 tablet, Rfl: 2  •  Multiple Vitamins-Minerals (MULTIVITAMIN WITH MINERALS) tablet tablet, Take 1 tablet by mouth Daily. LAST DOSE 3/4, Disp: , Rfl:   •  ondansetron (ZOFRAN) 4 MG tablet, Take 4 mg by mouth Every 8 (Eight) Hours As Needed for Nausea or Vomiting. WITH MAXALT, Disp: , Rfl:   •  pantoprazole (PROTONIX) 40 MG EC tablet, Take 40 mg by mouth Every Evening., Disp: , Rfl:   •  rizatriptan MLT (MAXALT-MLT) 10 MG disintegrating tablet, Take 10 mg by mouth 1 (One) Time As Needed for Migraine. May repeat in 2 hours if needed, Disp: , Rfl:   •  simvastatin (ZOCOR) 20 MG tablet, , Disp: , Rfl:   •  spironolactone (ALDACTONE) 25 MG tablet, TAKE 2 TABLETS BY MOUTH EVERY DAY, Disp: 90 tablet, Rfl: 3  •  tiZANidine (ZANAFLEX) 4 MG tablet, Take 4 mg by mouth Every 8 (Eight)  "Hours As Needed for Muscle Spasms. 8 MG NIGHTLY, Disp: , Rfl:   •  vitamin B-12 (CYANOCOBALAMIN) 1000 MCG tablet, Take 1,000 mcg by mouth Daily., Disp: , Rfl:   •  warfarin (COUMADIN) 3 MG tablet, TAKE 1 TAB BY MOUTH ON TUESDAY AND THURSDAY AND 1 & 1/2 TABS BY MOUTH ALL OTHER DAYS OR AS DIRECTED, Disp: 52 tablet, Rfl: 2  •  albuterol (PROVENTIL) (2.5 MG/3ML) 0.083% nebulizer solution, Inhale 2.5 mg Every 6 (Six) Hours As Needed. DURING WINTER IF NEEDED HASNT NEEDED, Disp: , Rfl:   •  aluminum-magnesium hydroxide-simethicone (MAALOX/MYLANTA) 200-200-20 MG/5ML suspension, Take 15 mL by mouth Every 6 (Six) Hours As Needed for Indigestion or Heartburn., Disp: , Rfl:   •  docusate sodium 100 MG capsule, Take 1 capsule by mouth 2 (Two) Times a Day., Disp: 60 capsule, Rfl: 1  Social History:   Social History     Tobacco Use   • Smoking status: Former Smoker     Packs/day: 1.00     Types: Cigarettes   • Smokeless tobacco: Never Used   • Tobacco comment: decrease now and do not smoke dos   Substance Use Topics   • Alcohol use: Not Currently     Comment: very rare      Family History:  No family history on file.           Review of Systems   Cardiovascular: Negative for chest pain, leg swelling and palpitations.   Respiratory: Positive for shortness of breath.    Neurological: Positive for dizziness. Negative for numbness.     All other systems are negative         Objective:     Physical Exam  /79 (BP Location: Left arm, Patient Position: Standing, Cuff Size: Large Adult)   Pulse 65   Ht 162.6 cm (64\")   Wt 123 kg (272 lb)   SpO2 96%   BMI 46.69 kg/m²   General:  Appears in no acute distress, pleasant obese  Eyes: Sclera is anicteric,  conjunctiva is clear   HEENT:  No JVD.  No carotid bruits  Respiratory: Respirations regular and unlabored at rest.  Clear to auscultation  Cardiovascular: S1,S2 Regular rate and rhythm. No murmur, rub or gallop auscultated.   Extremities: No digital clubbing or cyanosis, no " edema  Skin: Color pink. Skin warm and dry to touch. No rashes  No xanthoma  Neuro: Alert and awake, walks with a walker.    Lab Reviewed:         Jose Levi MD  1/14/2022 10:28 EST      Much of the above report is an electronic transcription/translation of the spoken language to printed text using Dragon Software. As such, the subtleties and finesse of the spoken language may permit erroneous, or at times, nonsensical words or phrases to be inadvertently transcribed; thus changes may be made at a later date to rectify these errors.

## 2022-01-21 RX ORDER — METOLAZONE 2.5 MG/1
TABLET ORAL
Qty: 90 TABLET | Refills: 3 | Status: SHIPPED | OUTPATIENT
Start: 2022-01-21 | End: 2022-12-13 | Stop reason: DRUGHIGH

## 2022-01-21 RX ORDER — SPIRONOLACTONE 25 MG/1
TABLET ORAL
Qty: 180 TABLET | Refills: 3 | Status: SHIPPED | OUTPATIENT
Start: 2022-01-21 | End: 2022-11-19

## 2022-01-21 NOTE — TELEPHONE ENCOUNTER
Rx Refill Note  Requested Prescriptions     Pending Prescriptions Disp Refills   • spironolactone (ALDACTONE) 25 MG tablet [Pharmacy Med Name: SPIRONOLACTONE 25 MG TABLET] 180 tablet 1     Sig: TAKE 2 TABLETS BY MOUTH EVERY DAY   • metOLazone (ZAROXOLYN) 2.5 MG tablet [Pharmacy Med Name: METOLAZONE 2.5 MG TABLET] 30 tablet 2     Sig: TAKE 1 TABLET BY MOUTH EVERY DAY      Last office visit with prescribing clinician: 1/14/2022      Next office visit with prescribing clinician: 1/13/2023     COMPREHENSIVE METABOLIC PANEL (12/17/2021 14:09)         Raven Red MA  01/21/22, 15:44 EST

## 2022-02-11 ENCOUNTER — ANTICOAGULATION VISIT (OUTPATIENT)
Dept: CARDIOLOGY | Facility: CLINIC | Age: 72
End: 2022-02-11

## 2022-02-11 VITALS
WEIGHT: 274 LBS | SYSTOLIC BLOOD PRESSURE: 153 MMHG | HEART RATE: 66 BPM | BODY MASS INDEX: 47.03 KG/M2 | DIASTOLIC BLOOD PRESSURE: 74 MMHG | OXYGEN SATURATION: 97 %

## 2022-02-11 DIAGNOSIS — Z79.01 LONG TERM (CURRENT) USE OF ANTICOAGULANTS: ICD-10-CM

## 2022-02-11 DIAGNOSIS — Z95.2 S/P AVR: Primary | ICD-10-CM

## 2022-02-11 LAB — INR PPP: 2.6 (ref 0.9–1.1)

## 2022-02-11 PROCEDURE — 36416 COLLJ CAPILLARY BLOOD SPEC: CPT | Performed by: INTERNAL MEDICINE

## 2022-02-11 PROCEDURE — 85610 PROTHROMBIN TIME: CPT | Performed by: INTERNAL MEDICINE

## 2022-03-11 ENCOUNTER — HOSPITAL ENCOUNTER (EMERGENCY)
Facility: HOSPITAL | Age: 72
Discharge: HOME | End: 2022-03-11
Admitting: EMERGENCY MEDICINE

## 2022-03-11 ENCOUNTER — APPOINTMENT (OUTPATIENT)
Dept: CT IMAGING | Facility: HOSPITAL | Age: 72
End: 2022-03-11

## 2022-03-11 VITALS
DIASTOLIC BLOOD PRESSURE: 75 MMHG | WEIGHT: 274 LBS | OXYGEN SATURATION: 94 % | BODY MASS INDEX: 46.78 KG/M2 | HEIGHT: 64 IN | SYSTOLIC BLOOD PRESSURE: 139 MMHG | TEMPERATURE: 100.7 F | HEART RATE: 74 BPM | RESPIRATION RATE: 15 BRPM

## 2022-03-11 DIAGNOSIS — R50.81 FEVER IN OTHER DISEASES: Primary | ICD-10-CM

## 2022-03-11 DIAGNOSIS — N39.0 URINARY TRACT INFECTION WITHOUT HEMATURIA, SITE UNSPECIFIED: ICD-10-CM

## 2022-03-11 DIAGNOSIS — A08.11 NOROVIRUS: ICD-10-CM

## 2022-03-11 DIAGNOSIS — R10.32 LEFT LOWER QUADRANT ABDOMINAL PAIN: ICD-10-CM

## 2022-03-11 DIAGNOSIS — B34.2 CORONAVIRUS INFECTION, UNSPECIFIED: ICD-10-CM

## 2022-03-11 LAB
ADV 40+41 DNA STL QL NAA+NON-PROBE: NOT DETECTED
ALBUMIN SERPL-MCNC: 3.5 G/DL (ref 3.5–5.2)
ALBUMIN/GLOB SERPL: 1.1 G/DL
ALP SERPL-CCNC: 75 U/L (ref 39–117)
ALT SERPL W P-5'-P-CCNC: 10 U/L (ref 1–33)
ANION GAP SERPL CALCULATED.3IONS-SCNC: 13 MMOL/L (ref 5–15)
AST SERPL-CCNC: 16 U/L (ref 1–32)
ASTRO TYP 1-8 RNA STL QL NAA+NON-PROBE: NOT DETECTED
B PARAPERT DNA SPEC QL NAA+PROBE: NOT DETECTED
B PERT DNA SPEC QL NAA+PROBE: NOT DETECTED
BACTERIA UR QL AUTO: ABNORMAL /HPF
BASOPHILS # BLD AUTO: 0 10*3/MM3 (ref 0–0.2)
BASOPHILS NFR BLD AUTO: 0.4 % (ref 0–1.5)
BILIRUB SERPL-MCNC: 0.4 MG/DL (ref 0–1.2)
BILIRUB UR QL STRIP: NEGATIVE
BUN SERPL-MCNC: 23 MG/DL (ref 8–23)
BUN/CREAT SERPL: 20.7 (ref 7–25)
C CAYETANENSIS DNA STL QL NAA+NON-PROBE: NOT DETECTED
C COLI+JEJ+UPSA DNA STL QL NAA+NON-PROBE: NOT DETECTED
C PNEUM DNA NPH QL NAA+NON-PROBE: NOT DETECTED
CALCIUM SPEC-SCNC: 9 MG/DL (ref 8.6–10.5)
CHLORIDE SERPL-SCNC: 98 MMOL/L (ref 98–107)
CLARITY UR: ABNORMAL
CO2 SERPL-SCNC: 22 MMOL/L (ref 22–29)
COLOR UR: ABNORMAL
CREAT SERPL-MCNC: 1.11 MG/DL (ref 0.57–1)
CRYPTOSP DNA STL QL NAA+NON-PROBE: NOT DETECTED
D-LACTATE SERPL-SCNC: 1 MMOL/L (ref 0.5–2)
DEPRECATED RDW RBC AUTO: 42.4 FL (ref 37–54)
E HISTOLYT DNA STL QL NAA+NON-PROBE: NOT DETECTED
EAEC PAA PLAS AGGR+AATA ST NAA+NON-PRB: NOT DETECTED
EC STX1+STX2 GENES STL QL NAA+NON-PROBE: NOT DETECTED
EGFRCR SERPLBLD CKD-EPI 2021: 53.3 ML/MIN/1.73
EOSINOPHIL # BLD AUTO: 0 10*3/MM3 (ref 0–0.4)
EOSINOPHIL NFR BLD AUTO: 0.7 % (ref 0.3–6.2)
EPEC EAE GENE STL QL NAA+NON-PROBE: NOT DETECTED
ERYTHROCYTE [DISTWIDTH] IN BLOOD BY AUTOMATED COUNT: 13.7 % (ref 12.3–15.4)
ETEC LTA+ST1A+ST1B TOX ST NAA+NON-PROBE: NOT DETECTED
FLUAV SUBTYP SPEC NAA+PROBE: NOT DETECTED
FLUBV RNA ISLT QL NAA+PROBE: NOT DETECTED
G LAMBLIA DNA STL QL NAA+NON-PROBE: NOT DETECTED
GLOBULIN UR ELPH-MCNC: 3.3 GM/DL
GLUCOSE SERPL-MCNC: 113 MG/DL (ref 65–99)
GLUCOSE UR STRIP-MCNC: NEGATIVE MG/DL
HADV DNA SPEC NAA+PROBE: NOT DETECTED
HCOV 229E RNA SPEC QL NAA+PROBE: NOT DETECTED
HCOV HKU1 RNA SPEC QL NAA+PROBE: NOT DETECTED
HCOV NL63 RNA SPEC QL NAA+PROBE: NOT DETECTED
HCOV OC43 RNA SPEC QL NAA+PROBE: DETECTED
HCT VFR BLD AUTO: 38.5 % (ref 34–46.6)
HGB BLD-MCNC: 13 G/DL (ref 12–15.9)
HGB UR QL STRIP.AUTO: NEGATIVE
HMPV RNA NPH QL NAA+NON-PROBE: NOT DETECTED
HPIV1 RNA ISLT QL NAA+PROBE: NOT DETECTED
HPIV2 RNA SPEC QL NAA+PROBE: NOT DETECTED
HPIV3 RNA NPH QL NAA+PROBE: NOT DETECTED
HPIV4 P GENE NPH QL NAA+PROBE: NOT DETECTED
HYALINE CASTS UR QL AUTO: ABNORMAL /LPF
INR PPP: 1.87 (ref 2–3)
KETONES UR QL STRIP: ABNORMAL
LEUKOCYTE ESTERASE UR QL STRIP.AUTO: ABNORMAL
LIPASE SERPL-CCNC: 15 U/L (ref 13–60)
LYMPHOCYTES # BLD AUTO: 0.9 10*3/MM3 (ref 0.7–3.1)
LYMPHOCYTES NFR BLD AUTO: 14.8 % (ref 19.6–45.3)
M PNEUMO IGG SER IA-ACNC: NOT DETECTED
MCH RBC QN AUTO: 29.8 PG (ref 26.6–33)
MCHC RBC AUTO-ENTMCNC: 33.7 G/DL (ref 31.5–35.7)
MCV RBC AUTO: 88.2 FL (ref 79–97)
MONOCYTES # BLD AUTO: 0.5 10*3/MM3 (ref 0.1–0.9)
MONOCYTES NFR BLD AUTO: 7.7 % (ref 5–12)
NEUTROPHILS NFR BLD AUTO: 4.5 10*3/MM3 (ref 1.7–7)
NEUTROPHILS NFR BLD AUTO: 76.4 % (ref 42.7–76)
NITRITE UR QL STRIP: POSITIVE
NOROVIRUS GI+II RNA STL QL NAA+NON-PROBE: DETECTED
NRBC BLD AUTO-RTO: 0.1 /100 WBC (ref 0–0.2)
P SHIGELLOIDES DNA STL QL NAA+NON-PROBE: NOT DETECTED
PH UR STRIP.AUTO: 6.5 [PH] (ref 5–8)
PLATELET # BLD AUTO: 205 10*3/MM3 (ref 140–450)
PMV BLD AUTO: 8.5 FL (ref 6–12)
POTASSIUM SERPL-SCNC: 4.3 MMOL/L (ref 3.5–5.2)
PROT SERPL-MCNC: 6.8 G/DL (ref 6–8.5)
PROT UR QL STRIP: NEGATIVE
PROTHROMBIN TIME: 19.8 SECONDS (ref 19.4–28.5)
RBC # BLD AUTO: 4.36 10*6/MM3 (ref 3.77–5.28)
RBC # UR STRIP: ABNORMAL /HPF
REF LAB TEST METHOD: ABNORMAL
RHINOVIRUS RNA SPEC NAA+PROBE: NOT DETECTED
RSV RNA NPH QL NAA+NON-PROBE: NOT DETECTED
RVA RNA STL QL NAA+NON-PROBE: NOT DETECTED
S ENT+BONG DNA STL QL NAA+NON-PROBE: NOT DETECTED
SAPO I+II+IV+V RNA STL QL NAA+NON-PROBE: NOT DETECTED
SARS-COV-2 RNA NPH QL NAA+NON-PROBE: NOT DETECTED
SHIGELLA SP+EIEC IPAH ST NAA+NON-PROBE: NOT DETECTED
SODIUM SERPL-SCNC: 133 MMOL/L (ref 136–145)
SP GR UR STRIP: 1.02 (ref 1–1.03)
SQUAMOUS #/AREA URNS HPF: ABNORMAL /HPF
UROBILINOGEN UR QL STRIP: ABNORMAL
V CHOL+PARA+VUL DNA STL QL NAA+NON-PROBE: NOT DETECTED
V CHOLERAE DNA STL QL NAA+NON-PROBE: NOT DETECTED
WBC # UR STRIP: ABNORMAL /HPF
WBC NRBC COR # BLD: 5.9 10*3/MM3 (ref 3.4–10.8)
Y ENTEROCOL DNA STL QL NAA+NON-PROBE: NOT DETECTED

## 2022-03-11 PROCEDURE — 74177 CT ABD & PELVIS W/CONTRAST: CPT

## 2022-03-11 PROCEDURE — 87077 CULTURE AEROBIC IDENTIFY: CPT

## 2022-03-11 PROCEDURE — 83605 ASSAY OF LACTIC ACID: CPT

## 2022-03-11 PROCEDURE — 87150 DNA/RNA AMPLIFIED PROBE: CPT | Performed by: NURSE PRACTITIONER

## 2022-03-11 PROCEDURE — 87040 BLOOD CULTURE FOR BACTERIA: CPT | Performed by: NURSE PRACTITIONER

## 2022-03-11 PROCEDURE — 99284 EMERGENCY DEPT VISIT MOD MDM: CPT

## 2022-03-11 PROCEDURE — 0 IOPAMIDOL PER 1 ML: Performed by: NURSE PRACTITIONER

## 2022-03-11 PROCEDURE — 87153 DNA/RNA SEQUENCING: CPT

## 2022-03-11 PROCEDURE — 87086 URINE CULTURE/COLONY COUNT: CPT | Performed by: NURSE PRACTITIONER

## 2022-03-11 PROCEDURE — 85610 PROTHROMBIN TIME: CPT | Performed by: NURSE PRACTITIONER

## 2022-03-11 PROCEDURE — 83690 ASSAY OF LIPASE: CPT | Performed by: NURSE PRACTITIONER

## 2022-03-11 PROCEDURE — 36415 COLL VENOUS BLD VENIPUNCTURE: CPT

## 2022-03-11 PROCEDURE — 96365 THER/PROPH/DIAG IV INF INIT: CPT

## 2022-03-11 PROCEDURE — P9612 CATHETERIZE FOR URINE SPEC: HCPCS

## 2022-03-11 PROCEDURE — 81001 URINALYSIS AUTO W/SCOPE: CPT | Performed by: NURSE PRACTITIONER

## 2022-03-11 PROCEDURE — 87186 SC STD MICRODIL/AGAR DIL: CPT

## 2022-03-11 PROCEDURE — 0097U HC BIOFIRE FILMARRAY GI PANEL: CPT | Performed by: NURSE PRACTITIONER

## 2022-03-11 PROCEDURE — 25010000002 CEFEPIME PER 500 MG: Performed by: NURSE PRACTITIONER

## 2022-03-11 PROCEDURE — 80053 COMPREHEN METABOLIC PANEL: CPT | Performed by: NURSE PRACTITIONER

## 2022-03-11 PROCEDURE — 87186 SC STD MICRODIL/AGAR DIL: CPT | Performed by: NURSE PRACTITIONER

## 2022-03-11 PROCEDURE — 87077 CULTURE AEROBIC IDENTIFY: CPT | Performed by: NURSE PRACTITIONER

## 2022-03-11 PROCEDURE — 0202U NFCT DS 22 TRGT SARS-COV-2: CPT | Performed by: NURSE PRACTITIONER

## 2022-03-11 PROCEDURE — 85025 COMPLETE CBC W/AUTO DIFF WBC: CPT | Performed by: NURSE PRACTITIONER

## 2022-03-11 RX ORDER — METOPROLOL SUCCINATE 25 MG/1
25 TABLET, EXTENDED RELEASE ORAL DAILY
COMMUNITY

## 2022-03-11 RX ORDER — ACETAMINOPHEN 650 MG/1
650 SUPPOSITORY RECTAL ONCE
Status: COMPLETED | OUTPATIENT
Start: 2022-03-11 | End: 2022-03-11

## 2022-03-11 RX ORDER — CEFDINIR 300 MG/1
300 CAPSULE ORAL 2 TIMES DAILY
Qty: 14 CAPSULE | Refills: 0 | Status: SHIPPED | OUTPATIENT
Start: 2022-03-11 | End: 2022-03-18

## 2022-03-11 RX ORDER — ONDANSETRON 4 MG/1
4 TABLET, ORALLY DISINTEGRATING ORAL EVERY 8 HOURS PRN
Qty: 12 TABLET | Refills: 0 | Status: SHIPPED | OUTPATIENT
Start: 2022-03-11 | End: 2022-03-14

## 2022-03-11 RX ORDER — SODIUM CHLORIDE 0.9 % (FLUSH) 0.9 %
10 SYRINGE (ML) INJECTION AS NEEDED
Status: DISCONTINUED | OUTPATIENT
Start: 2022-03-11 | End: 2022-03-11 | Stop reason: HOSPADM

## 2022-03-11 RX ADMIN — ACETAMINOPHEN 650 MG: 650 SUPPOSITORY RECTAL at 17:17

## 2022-03-11 RX ADMIN — IOPAMIDOL 100 ML: 755 INJECTION, SOLUTION INTRAVENOUS at 19:43

## 2022-03-11 RX ADMIN — CEFEPIME HYDROCHLORIDE 2 G: 2 INJECTION, POWDER, FOR SOLUTION INTRAVENOUS at 18:27

## 2022-03-11 RX ADMIN — SODIUM CHLORIDE 1000 ML: 9 INJECTION, SOLUTION INTRAVENOUS at 17:57

## 2022-03-11 NOTE — ED PROVIDER NOTES
"Subjective   71-year-old morbidly obese  female presents to the emergency room with complaint of abdominal pain, cough, nausea and vomiting and diarrhea and headache.  Patient denies fever.  Patient reports headache.  Onset: Few days  Location: Generalized abdomen  Duration: Few days  Character: Persistent  Aggravating/Alleviating Factors: Nausea and headache/none  Radiation: Left lower quadrant  Severity: Moderate to severe            Review of Systems   Constitutional: Positive for appetite change and fatigue. Negative for fever.   HENT: Negative for congestion, ear pain, postnasal drip, rhinorrhea, sore throat, trouble swallowing and voice change.    Eyes: Negative.    Respiratory: Positive for cough.    Cardiovascular: Negative for chest pain and palpitations.   Gastrointestinal: Positive for abdominal pain, diarrhea, nausea and vomiting.   Endocrine: Negative.    Genitourinary: Negative for dysuria.   Musculoskeletal: Positive for myalgias.   Skin: Negative for rash and wound.   Neurological: Positive for weakness and headaches. Negative for dizziness, tremors, seizures, syncope, light-headedness and numbness.   Hematological: Negative.    Psychiatric/Behavioral: Negative.        Past Medical History:   Diagnosis Date   • Acute congestive heart failure (HCC)    • Allergies    • Anxiety    • Arthritis    • Asthma    • Bilateral leg edema    • Bipolar 1 disorder ([facility])    • Bulging lumbar disc     X3   • Cancer ([facility])     states had \"cancerous tumor during pregnancy and had to have hysterectomy\"   • Cataract     bilateral   • Cellulitis 03/2021    bilateral legs   • Compression fracture of vertebral column ([facility])     LUMBAR   • COPD (chronic obstructive pulmonary disease) ([facility])    • Delayed emergence from anesthesia    • Depression    • Depression    • Diabetes mellitus ([facility])    • Dyslipidemia    • Dyspnea on exertion    • Fibromyalgia    • GERD (gastroesophageal reflux disease)    • Hiatal hernia    • " Hyperlipidemia    • Hypertension    • Hypothyroid     HYPOTHYROID   • IBS (irritable bowel syndrome)    • Migraines    • Morbid obesity ([facility])    • Neuropathy    • Panic attacks    • Peripheral edema    • PONV (postoperative nausea and vomiting)    • Poor mobility     rolling walker   • Prediabetes    • PVD (peripheral vascular disease) ([facility])    • Rheumatoid aortitis    • Spinal stenosis    • Spinal stenosis    • Vertigo    • Vertigo    • Vitamin D deficiency        Allergies   Allergen Reactions   • Adhesive Tape Unknown (See Comments)     hives   • Butorphanol Other (See Comments)     Chest pain difficulty breathing throat swelling   • Garlic Other (See Comments)     Chest pain difficulty breathing throat swells   • Heparin Other (See Comments)     HIT +   • Tetanus-Diphtheria Toxoids Td Other (See Comments)     Dizziness,  vomiting   • Aloe Itching   • Bee Venom Other (See Comments)     Swelling eyes and lips hand swelling   • Latex Itching   • Macadamia Nut Oil Other (See Comments)     Chest pain difficulty breathing throat swelling   • Tuberculin Unknown (See Comments)     reactor   • Wasp Venom Other (See Comments)     Swelling eyes lips and hand       Past Surgical History:   Procedure Laterality Date   • AORTIC VALVE REPAIR/REPLACEMENT N/A 3/18/2021    Procedure: AORTIC VALVE REPLACEMENT;  Surgeon: Olaf Mcgill MD;  Location: [facility] CVOR;  Service: Cardiothoracic;  Laterality: N/A;   • BRONCHOSCOPY N/A 3/25/2021    Procedure: BRONCHOSCOPY AT BEDSIDE WITH BRONCHIOALVEOLAR LAVAGE;  Surgeon: Glenn Reyes MD;  Location: [facility] ENDOSCOPY;  Service: Pulmonary;  Laterality: N/A;  PNA   • CARDIAC CATHETERIZATION  2009   • CARDIAC CATHETERIZATION N/A 8/14/2019    Procedure: Left Heart Cath;  Surgeon: Jose Levi MD;  Location: [facility] CATH INVASIVE LOCATION;  Service: Cardiovascular   • CARDIAC CATHETERIZATION N/A 8/14/2019    Procedure: Right Heart Cath;  Surgeon: Jose Levi MD;   Location: [facility] CATH INVASIVE LOCATION;  Service: Cardiovascular   • CARDIAC CATHETERIZATION N/A 8/14/2019    Procedure: Aortic root aortogram;  Surgeon: Jose Levi MD;  Location: [facility] CATH INVASIVE LOCATION;  Service: Cardiovascular   • CARDIAC CATHETERIZATION N/A 1/20/2021    Procedure: Left Heart Cath;  Surgeon: Jose Levi MD;  Location: [facility] CATH INVASIVE LOCATION;  Service: Cardiovascular;  Laterality: N/A;   • COLONOSCOPY     • CYSTOCELE REPAIR  1984   • ELBOW ARTHROPLASTY  2011   • ENDOSCOPY     • FOOT SURGERY     • GALLBLADDER SURGERY  2002   • TONSILLECTOMY     • VAGINAL HYSTERECTOMY  1972       History reviewed. No pertinent family history.    Social History     Socioeconomic History   • Marital status:    Tobacco Use   • Smoking status: Former Smoker     Packs/day: 1.00     Types: Cigarettes   • Smokeless tobacco: Never Used   • Tobacco comment: decrease now and do not smoke dos   Substance and Sexual Activity   • Alcohol use: Not Currently     Comment: very rare   • Drug use: Never   • Sexual activity: Defer           Objective   Physical Exam  Vitals and nursing note reviewed.   Constitutional:       General: She is not in acute distress.     Appearance: She is obese. She is ill-appearing. She is not toxic-appearing or diaphoretic.   HENT:      Head: Normocephalic and atraumatic.      Mouth/Throat:      Mouth: Mucous membranes are moist.      Pharynx: Oropharynx is clear.   Eyes:      Extraocular Movements: Extraocular movements intact.      Conjunctiva/sclera: Conjunctivae normal.      Pupils: Pupils are equal, round, and reactive to light.   Cardiovascular:      Rate and Rhythm: Normal rate and regular rhythm.      Pulses: Normal pulses.   Pulmonary:      Effort: Pulmonary effort is normal.      Breath sounds: Normal breath sounds.   Abdominal:      General: There is distension.      Palpations: There is no mass.      Tenderness: There is abdominal tenderness.  There is no left CVA tenderness.      Hernia: No hernia is present.   Musculoskeletal:         General: Normal range of motion.      Cervical back: Normal range of motion and neck supple.   Skin:     General: Skin is warm.      Capillary Refill: Capillary refill takes 2 to 3 seconds.      Coloration: Skin is pale.   Neurological:      General: No focal deficit present.      Mental Status: She is alert and oriented to person, place, and time.   Psychiatric:         Mood and Affect: Mood normal.         Thought Content: Thought content normal.         Judgment: Judgment normal.         Procedures           ED Course  ED Course as of 03/11/22 2142   Fri Mar 11, 2022   1656 U/a is significant for + nitrites, 4+ bacteria and 31-50 WBC; consult with Lil[facility] Pharmacist and recommends Cefepime due to previous susceptibility of urine cx. [CT]   1900 1900 - reevaluated patient and informed her and family that she has a UTI and viral illness causing fever.  Will await CT for further evaluation of abdominal pain and c/o diarrhea.  Patient and family verbalize understanding.  Informed patient and family antibiotic given was for UTI. [CT]      ED Course User Index  [CT] Linda Hubbard, APRN      Medications   sodium chloride 0.9 % flush 10 mL (has no administration in time range)   sodium chloride 0.9 % bolus 1,000 mL (0 mL Intravenous Stopped 3/11/22 2015)   acetaminophen (TYLENOL) suppository 650 mg (650 mg Rectal Given 3/11/22 1717)   cefepime 2 gm IVPB in 100 ml NS (MBP) (0 g Intravenous Stopped 3/11/22 1913)   iopamidol (ISOVUE-370) 76 % injection 100 mL (100 mL Intravenous Given 3/11/22 1943)   Peripheral IV obtained and 1 L normal saline bolus administered.  Cefepime ordered and administered for UTI.  650 Tylenol suppository ordered and administered due to patient's history of nausea and vomiting.    Labs Reviewed   RESPIRATORY PANEL PCR W/ COVID-19 (SARS-COV-2) FEROZ/MANINDER/IAIN/PAD/COR/MAD/RACIEL IN-HOUSE, NP SWAB IN [facility],  3-4 HR TAT - Abnormal; Notable for the following components:       Result Value    Coronavirus OC43 Detected (*)     All other components within normal limits    Narrative:     In the setting of a positive respiratory panel with a viral infection PLUS a negative procalcitonin without other underlying concern for bacterial infection, consider observing off antibiotics or discontinuation of antibiotics and continue supportive care. If the respiratory panel is positive for atypical bacterial infection (Bordetella pertussis, Chlamydophila pneumoniae, or Mycoplasma pneumoniae), consider antibiotic de-escalation to target atypical bacterial infection.   GASTROINTESTINAL PANEL, PCR - Abnormal; Notable for the following components:    Norovirus GI/GII Detected (*)     All other components within normal limits   COMPREHENSIVE METABOLIC PANEL - Abnormal; Notable for the following components:    Glucose 113 (*)     Creatinine 1.11 (*)     Sodium 133 (*)     eGFR 53.3 (*)     All other components within normal limits    Narrative:     GFR Normal >60  Chronic Kidney Disease <60  Kidney Failure <15     URINALYSIS W/ CULTURE IF INDICATED - Abnormal; Notable for the following components:    Color, UA Dark Yellow (*)     Appearance, UA Cloudy (*)     Ketones, UA Trace (*)     Leuk Esterase, UA Moderate (2+) (*)     Nitrite, UA Positive (*)     All other components within normal limits   PROTIME-INR - Abnormal; Notable for the following components:    INR 1.87 (*)     All other components within normal limits   CBC WITH AUTO DIFFERENTIAL - Abnormal; Notable for the following components:    Neutrophil % 76.4 (*)     Lymphocyte % 14.8 (*)     All other components within normal limits   URINALYSIS, MICROSCOPIC ONLY - Abnormal; Notable for the following components:    RBC, UA 0-2 (*)     WBC, UA 31-50 (*)     Bacteria, UA 4+ (*)     All other components within normal limits   LIPASE - Normal   POC LACTATE - Normal   BLOOD CULTURE   BLOOD  CULTURE   URINE CULTURE   POC LACTATE   CBC AND DIFFERENTIAL    Narrative:     The following orders were created for panel order CBC & Differential.  Procedure                               Abnormality         Status                     ---------                               -----------         ------                     CBC Auto Differential[[id number]]        Abnormal            Final result                 Please view results for these tests on the individual orders.       CT Abdomen Pelvis With Contrast    Result Date: 3/11/2022  1. There is fluid in the colon, possibly due to diarrhea. 2. Descending and sigmoid colon diverticulosis with no CT evidence of diverticulitis. 3. Additional findings as reported above  Electronically Signed By-Sol Javed MD On:3/11/2022 8:01 PM This report was finalized on [id number] by  Sol Javed MD.                             Respiratory panel is significant for a coronavirus but not COVID-19.  Stool culture is significant for a norovirus and these results were discussed with patient and family and they both verbalized understanding.  Patient desires to be discharged and states she feels markedly better after fluid administration and IV antibiotic administration.  Patient states she will take antibiotics as directed and prescribed and agrees to adhere to a clear liquid diet and then understands she may advance to bland as tolerated.                MDM  Discharge instructions:  Rest and please adhere to a clear liquid diet throughout the night.  May fill your prescription for antibiotic for your UTI and Zofran for your nausea and diarrhea; tomorrow morning at your pharmacy.  If your symptoms of fever and abdominal pain persist please see your primary care physician Monday or Tuesday for further evaluation care.  You may take Tylenol and or ibuprofen as directed for fever.  Please try to stay well-hydrated with popsicles, ginger ale, Gatorade and or Pedialyte type fluids.   Please see attached information about clear liquid diet and bland diet and refer for recommendations and directions.  Final diagnoses:   Fever in other diseases   Urinary tract infection without hematuria, site unspecified   Left lower quadrant abdominal pain   Norovirus   Coronavirus infection, unspecified       ED Disposition  ED Disposition     ED Disposition   Discharge    Condition   Stable    Comment   --             Mary Wilde, APRN  [address] [facility] [place] [address]  [phone]    Schedule an appointment as soon as possible for a visit in 2 days  As needed, If symptoms worsen         Medication List      New Prescriptions    cefdinir 300 MG capsule  Commonly known as: OMNICEF  Take 1 capsule by mouth 2 (Two) Times a Day for 7 days.     ondansetron ODT 4 MG disintegrating tablet  Commonly known as: Zofran ODT  Place 1 tablet on the tongue Every 8 (Eight) Hours As Needed for Nausea or Vomiting for up to 3 days.           Where to Get Your Medications      These medications were sent to [place] [address] [place] [facility] - [place], [place] - [address] [place] [address] [phone] [place] [phone] [address] [place] [address]    Phone: [phone]   · cefdinir 300 MG capsule  · ondansetron ODT 4 MG disintegrating tablet          Linda Hubbard, SARA  03/11/22 8000

## 2022-03-11 NOTE — ED NOTES
Patient states she requires IV ultrasound machine. EMS was unsuccessful with a line and I was unsuccessful x2. I have reached out to IV therapy for a line.    Felicia Carterrrony, RN

## 2022-03-12 NOTE — DISCHARGE INSTRUCTIONS
Rest and please adhere to a clear liquid diet throughout the night.  May fill your prescription for antibiotic for your UTI and Zofran for your nausea and diarrhea; tomorrow morning at your pharmacy.  If your symptoms of fever and abdominal pain persist please see your primary care physician Monday or Tuesday for further evaluation care.  You may take Tylenol and or ibuprofen as directed for fever.  Please try to stay well-hydrated with popsicles, ginger ale, Gatorade and or Pedialyte type fluids.  Please see attached information about clear liquid diet and bland diet and refer for recommendations and directions.

## 2022-03-13 LAB — BACTERIA SPEC AEROBE CULT: ABNORMAL

## 2022-03-13 NOTE — PHARMACY RECOMMENDATION
Patient urine culture resulted with K. pneumoniae. Susceptible to Cephalosporins (3rd gen). Patient was given Rx for cefdinir. Therapy is appropriate coverage. No further follow-up required..    Microbiology Results (last 10 days)       Procedure Component Value - Date/Time    Gastrointestinal Panel, PCR - Stool, Per Rectum [[id number]]  (Abnormal) Collected: 03/11/22 1923    Lab Status: Final result Specimen: Stool from Per Rectum Updated: 03/11/22 2115     Campylobacter Not Detected     Plesiomonas shigelloides Not Detected     Salmonella Not Detected     Vibrio Not Detected     Vibrio cholerae Not Detected     Yersinia enterocolitica Not Detected     Enteroaggregative E. coli (EAEC) Not Detected     Enteropathogenic E. coli (EPEC) Not Detected     Enterotoxigenic E. coli (ETEC) lt/st Not Detected     Shiga-like toxin-producing E. coli (STEC) stx1/stx2 Not Detected     Shigella/Enteroinvasive E. coli (EIEC) Not Detected     Cryptosporidium Not Detected     Cyclospora cayetanensis Not Detected     Entamoeba histolytica Not Detected     Giardia lamblia Not Detected     Adenovirus F40/41 Not Detected     Astrovirus Not Detected     Norovirus GI/GII Detected     Rotavirus A Not Detected     Sapovirus (I, II, IV or V) Not Detected    Blood Culture - Blood, Arm, Right [[id number]]  (Normal) Collected: 03/11/22 1751    Lab Status: Preliminary result Specimen: Blood from Arm, Right Updated: 03/12/22 1804     Blood Culture No growth at 24 hours    Blood Culture - Blood, Arm, Right [[id number]]  (Normal) Collected: 03/11/22 1751    Lab Status: Preliminary result Specimen: Blood from Arm, Right Updated: 03/12/22 1804     Blood Culture No growth at 24 hours    Urine Culture - Urine, Urine, Catheter In/Out [[id number]]  (Abnormal)  (Susceptibility) Collected: 03/11/22 1629    Lab Status: Final result Specimen: Urine, Catheter In/Out Updated: 03/13/22 0918     Urine Culture >100,000 CFU/mL Klebsiella pneumoniae ssp pneumoniae     Susceptibility        Klebsiella pneumoniae ssp pneumoniae      RICKY      Ampicillin Resistant      Ampicillin + Sulbactam Susceptible      Cefazolin Susceptible      Cefepime Susceptible      Ceftazidime Susceptible      Ceftriaxone Susceptible      Gentamicin Susceptible      Levofloxacin Susceptible      Nitrofurantoin Intermediate      Piperacillin + Tazobactam Susceptible      Trimethoprim + Sulfamethoxazole Susceptible                           Respiratory Panel PCR w/COVID-19(SARS-CoV-2) FEROZ/MANINDER/IAIN/PAD/COR/MAD/RACIEL In-House, NP Swab in UTM/VTM, 3-4 HR TAT - Swab, Nasopharynx [[id number]]  (Abnormal) Collected: 03/11/22 1615    Lab Status: Final result Specimen: Swab from Nasopharynx Updated: 03/11/22 1713     ADENOVIRUS, PCR Not Detected     Coronavirus 229E Not Detected     Coronavirus HKU1 Not Detected     Coronavirus NL63 Not Detected     Coronavirus OC43 Detected     COVID19 Not Detected     Human Metapneumovirus Not Detected     Human Rhinovirus/Enterovirus Not Detected     Influenza A PCR Not Detected     Influenza B PCR Not Detected     Parainfluenza Virus 1 Not Detected     Parainfluenza Virus 2 Not Detected     Parainfluenza Virus 3 Not Detected     Parainfluenza Virus 4 Not Detected     RSV, PCR Not Detected     Bordetella pertussis pcr Not Detected     Bordetella parapertussis PCR Not Detected     Chlamydophila pneumoniae PCR Not Detected     Mycoplasma pneumo by PCR Not Detected    Narrative:      In the setting of a positive respiratory panel with a viral infection PLUS a negative procalcitonin without other underlying concern for bacterial infection, consider observing off antibiotics or discontinuation of antibiotics and continue supportive care. If the respiratory panel is positive for atypical bacterial infection (Bordetella pertussis, Chlamydophila pneumoniae, or Mycoplasma pneumoniae), consider antibiotic de-escalation to target atypical bacterial infection.            Fabiola Christiansen,  [facility]  3/13/2022 09:39 EDT

## 2022-03-15 LAB
BACTERIA BLD CULT: NORMAL
BOTTLE TYPE: NORMAL

## 2022-03-15 NOTE — PHARMACY RECOMMENDATION
Preliminary blood culture resulted with gram-negative bacilli.  Patient was seen in ED with abdominal pain and UTI and was prescribed cefdinir on discharge (UTI Klebsiella, see previous iVent). Patient has a h/o E.coli, Klebsiella and resistant enterobacter UTIs. Spoke to patient who said she is feeling better and has had no fevers. She has a f/u appointment with her PCP on 3/18/22. Case discussed with DR Booth, no additional interventions today.   Will continue to follow until C&S final.     Microbiology Results (last 10 days)       Procedure Component Value - Date/Time    Gastrointestinal Panel, PCR - Stool, Per Rectum [[id number]]  (Abnormal) Collected: 03/11/22 1923    Lab Status: Final result Specimen: Stool from Per Rectum Updated: 03/11/22 2115     Campylobacter Not Detected     Plesiomonas shigelloides Not Detected     Salmonella Not Detected     Vibrio Not Detected     Vibrio cholerae Not Detected     Yersinia enterocolitica Not Detected     Enteroaggregative E. coli (EAEC) Not Detected     Enteropathogenic E. coli (EPEC) Not Detected     Enterotoxigenic E. coli (ETEC) lt/st Not Detected     Shiga-like toxin-producing E. coli (STEC) stx1/stx2 Not Detected     Shigella/Enteroinvasive E. coli (EIEC) Not Detected     Cryptosporidium Not Detected     Cyclospora cayetanensis Not Detected     Entamoeba histolytica Not Detected     Giardia lamblia Not Detected     Adenovirus F40/41 Not Detected     Astrovirus Not Detected     Norovirus GI/GII Detected     Rotavirus A Not Detected     Sapovirus (I, II, IV or V) Not Detected    Blood Culture - Blood, Arm, Right [[id number]]  (Normal) Collected: 03/11/22 1751    Lab Status: Preliminary result Specimen: Blood from Arm, Right Updated: 03/14/22 1903     Blood Culture No growth at 3 days    Blood Culture - Blood, Arm, Right [[id number]]  (Abnormal) Collected: 03/11/22 1751    Lab Status: Preliminary result Specimen: Blood from Arm, Right Updated: 03/15/22 1116     Blood  Culture Abnormal Stain     Gram Stain Aerobic Bottle Gram negative bacilli    Blood Culture ID, PCR - Blood, Arm, Right [[id number]] Collected: 03/11/22 1751    Lab Status: Final result Specimen: Blood from Arm, Right Updated: 03/15/22 0358     BCID, PCR Negative by BCID PCR. Culture to Follow.     BOTTLE TYPE Aerobic Bottle    Urine Culture - Urine, Urine, Catheter In/Out [[id number]]  (Abnormal)  (Susceptibility) Collected: 03/11/22 1629    Lab Status: Final result Specimen: Urine, Catheter In/Out Updated: 03/13/22 0918     Urine Culture >100,000 CFU/mL Klebsiella pneumoniae ssp pneumoniae    Susceptibility        Klebsiella pneumoniae ssp pneumoniae      RICKY      Ampicillin Resistant      Ampicillin + Sulbactam Susceptible      Cefazolin Susceptible      Cefepime Susceptible      Ceftazidime Susceptible      Ceftriaxone Susceptible      Gentamicin Susceptible      Levofloxacin Susceptible      Nitrofurantoin Intermediate      Piperacillin + Tazobactam Susceptible      Trimethoprim + Sulfamethoxazole Susceptible                           Respiratory Panel PCR w/COVID-19(SARS-CoV-2) FEROZ/MANINDER/IAIN/PAD/COR/MAD/RACIEL In-House, NP Swab in UTM/VTM, 3-4 HR TAT - Swab, Nasopharynx [[id number]]  (Abnormal) Collected: 03/11/22 1615    Lab Status: Final result Specimen: Swab from Nasopharynx Updated: 03/11/22 1713     ADENOVIRUS, PCR Not Detected     Coronavirus 229E Not Detected     Coronavirus HKU1 Not Detected     Coronavirus NL63 Not Detected     Coronavirus OC43 Detected     COVID19 Not Detected     Human Metapneumovirus Not Detected     Human Rhinovirus/Enterovirus Not Detected     Influenza A PCR Not Detected     Influenza B PCR Not Detected     Parainfluenza Virus 1 Not Detected     Parainfluenza Virus 2 Not Detected     Parainfluenza Virus 3 Not Detected     Parainfluenza Virus 4 Not Detected     RSV, PCR Not Detected     Bordetella pertussis pcr Not Detected     Bordetella parapertussis PCR Not Detected     Chlamydophila  pneumoniae PCR Not Detected     Mycoplasma pneumo by PCR Not Detected    Narrative:      In the setting of a positive respiratory panel with a viral infection PLUS a negative procalcitonin without other underlying concern for bacterial infection, consider observing off antibiotics or discontinuation of antibiotics and continue supportive care. If the respiratory panel is positive for atypical bacterial infection (Bordetella pertussis, Chlamydophila pneumoniae, or Mycoplasma pneumoniae), consider antibiotic de-escalation to target atypical bacterial infection.            Liliana Byers, PharmD  3/15/2022 11:23 EDT

## 2022-03-16 LAB — BACTERIA SPEC AEROBE CULT: NORMAL

## 2022-03-25 ENCOUNTER — ANTICOAGULATION VISIT (OUTPATIENT)
Dept: CARDIOLOGY | Facility: CLINIC | Age: 72
End: 2022-03-25

## 2022-03-25 VITALS
WEIGHT: 273 LBS | SYSTOLIC BLOOD PRESSURE: 138 MMHG | HEART RATE: 72 BPM | DIASTOLIC BLOOD PRESSURE: 71 MMHG | BODY MASS INDEX: 46.86 KG/M2

## 2022-03-25 DIAGNOSIS — Z95.2 S/P AVR: Primary | ICD-10-CM

## 2022-03-25 DIAGNOSIS — Z79.01 LONG TERM (CURRENT) USE OF ANTICOAGULANTS: ICD-10-CM

## 2022-03-25 LAB — INR PPP: 2.7 (ref 0.9–1.1)

## 2022-03-25 PROCEDURE — 36416 COLLJ CAPILLARY BLOOD SPEC: CPT | Performed by: INTERNAL MEDICINE

## 2022-03-25 PROCEDURE — 85610 PROTHROMBIN TIME: CPT | Performed by: INTERNAL MEDICINE

## 2022-03-27 LAB
BACTERIA SPEC AEROBE CULT: ABNORMAL
GRAM STN SPEC: ABNORMAL
ISOLATED FROM: ABNORMAL

## 2022-04-29 ENCOUNTER — ANTICOAGULATION VISIT (OUTPATIENT)
Dept: CARDIOLOGY | Facility: CLINIC | Age: 72
End: 2022-04-29

## 2022-04-29 VITALS
HEART RATE: 80 BPM | SYSTOLIC BLOOD PRESSURE: 165 MMHG | WEIGHT: 276 LBS | DIASTOLIC BLOOD PRESSURE: 78 MMHG | BODY MASS INDEX: 47.38 KG/M2

## 2022-04-29 DIAGNOSIS — Z95.2 S/P AVR: Primary | ICD-10-CM

## 2022-04-29 DIAGNOSIS — Z79.01 LONG TERM (CURRENT) USE OF ANTICOAGULANTS: ICD-10-CM

## 2022-04-29 LAB — INR PPP: 2.2 (ref 0.9–1.1)

## 2022-04-29 PROCEDURE — 85610 PROTHROMBIN TIME: CPT | Performed by: INTERNAL MEDICINE

## 2022-04-29 PROCEDURE — 36416 COLLJ CAPILLARY BLOOD SPEC: CPT | Performed by: INTERNAL MEDICINE

## 2022-05-12 ENCOUNTER — TELEPHONE (OUTPATIENT)
Dept: CARDIAC REHAB | Facility: HOSPITAL | Age: 72
End: 2022-05-12

## 2022-05-16 ENCOUNTER — TRANSCRIBE ORDERS (OUTPATIENT)
Dept: CARDIAC REHAB | Facility: HOSPITAL | Age: 72
End: 2022-05-16

## 2022-05-20 DIAGNOSIS — Z95.2 S/P AVR: ICD-10-CM

## 2022-05-20 RX ORDER — WARFARIN SODIUM 3 MG/1
TABLET ORAL
Qty: 52 TABLET | Refills: 2 | Status: SHIPPED | OUTPATIENT
Start: 2022-05-20 | End: 2022-08-15

## 2022-05-20 NOTE — TELEPHONE ENCOUNTER
Rx Refill Note  Requested Prescriptions     Pending Prescriptions Disp Refills   • warfarin (COUMADIN) 3 MG tablet [Pharmacy Med Name: WARFARIN SODIUM 3 MG TABLET] 52 tablet 2     Sig: TAKE 1 TAB BY MOUTH ON TUESDAY & THURSDAY AND ONE AND ONE-HALF ALL OTHER DAYS OR AS DIRECTED      Last office visit with prescribing clinician: 1/14/2022      Next office visit with prescribing clinician: 1/13/2023              Anticoagulation Visit (04/29/2022)      Cara Mora LPN  05/20/22, 14:35 EDT

## 2022-05-24 ENCOUNTER — TRANSCRIBE ORDERS (OUTPATIENT)
Dept: CARDIAC REHAB | Facility: HOSPITAL | Age: 72
End: 2022-05-24

## 2022-05-24 ENCOUNTER — TELEPHONE (OUTPATIENT)
Dept: CARDIAC REHAB | Facility: HOSPITAL | Age: 72
End: 2022-05-24

## 2022-05-24 DIAGNOSIS — J44.9 CHRONIC OBSTRUCTIVE PULMONARY DISEASE, UNSPECIFIED COPD TYPE: Primary | ICD-10-CM

## 2022-06-10 ENCOUNTER — ANTICOAGULATION VISIT (OUTPATIENT)
Dept: CARDIOLOGY | Facility: CLINIC | Age: 72
End: 2022-06-10

## 2022-06-10 VITALS
HEART RATE: 68 BPM | SYSTOLIC BLOOD PRESSURE: 144 MMHG | WEIGHT: 282 LBS | BODY MASS INDEX: 48.41 KG/M2 | DIASTOLIC BLOOD PRESSURE: 68 MMHG

## 2022-06-10 DIAGNOSIS — Z95.2 S/P AVR: Primary | ICD-10-CM

## 2022-06-10 DIAGNOSIS — Z79.01 LONG TERM (CURRENT) USE OF ANTICOAGULANTS: ICD-10-CM

## 2022-06-10 LAB — INR PPP: 2.6 (ref 0.9–1.1)

## 2022-06-10 PROCEDURE — 36416 COLLJ CAPILLARY BLOOD SPEC: CPT | Performed by: INTERNAL MEDICINE

## 2022-06-10 PROCEDURE — 85610 PROTHROMBIN TIME: CPT | Performed by: INTERNAL MEDICINE

## 2022-07-15 ENCOUNTER — OFFICE VISIT (OUTPATIENT)
Dept: CARDIOLOGY | Facility: CLINIC | Age: 72
End: 2022-07-15

## 2022-07-15 ENCOUNTER — ANTICOAGULATION VISIT (OUTPATIENT)
Dept: CARDIOLOGY | Facility: CLINIC | Age: 72
End: 2022-07-15

## 2022-07-15 VITALS
DIASTOLIC BLOOD PRESSURE: 84 MMHG | HEART RATE: 72 BPM | BODY MASS INDEX: 49.09 KG/M2 | SYSTOLIC BLOOD PRESSURE: 143 MMHG | WEIGHT: 286 LBS

## 2022-07-15 VITALS
DIASTOLIC BLOOD PRESSURE: 83 MMHG | OXYGEN SATURATION: 97 % | SYSTOLIC BLOOD PRESSURE: 143 MMHG | HEART RATE: 72 BPM | BODY MASS INDEX: 48.83 KG/M2 | HEIGHT: 64 IN | WEIGHT: 286 LBS

## 2022-07-15 DIAGNOSIS — Z79.01 LONG TERM (CURRENT) USE OF ANTICOAGULANTS: ICD-10-CM

## 2022-07-15 DIAGNOSIS — I35.0 NONRHEUMATIC AORTIC VALVE STENOSIS: Chronic | ICD-10-CM

## 2022-07-15 DIAGNOSIS — I50.32 CHRONIC HEART FAILURE WITH PRESERVED EJECTION FRACTION (HFPEF): Primary | ICD-10-CM

## 2022-07-15 DIAGNOSIS — E78.5 DYSLIPIDEMIA: Chronic | ICD-10-CM

## 2022-07-15 DIAGNOSIS — I10 ESSENTIAL HYPERTENSION: Chronic | ICD-10-CM

## 2022-07-15 DIAGNOSIS — Z95.2 S/P AVR: ICD-10-CM

## 2022-07-15 DIAGNOSIS — Z95.2 S/P AVR: Primary | ICD-10-CM

## 2022-07-15 DIAGNOSIS — E66.01 MORBIDLY OBESE: Chronic | ICD-10-CM

## 2022-07-15 LAB — INR PPP: 2.7 (ref 0.9–1.1)

## 2022-07-15 PROCEDURE — 36416 COLLJ CAPILLARY BLOOD SPEC: CPT | Performed by: INTERNAL MEDICINE

## 2022-07-15 PROCEDURE — 99214 OFFICE O/P EST MOD 30 MIN: CPT | Performed by: NURSE PRACTITIONER

## 2022-07-15 PROCEDURE — 85610 PROTHROMBIN TIME: CPT | Performed by: INTERNAL MEDICINE

## 2022-07-15 RX ORDER — ALENDRONATE SODIUM 70 MG/1
1 TABLET ORAL
COMMUNITY
Start: 2022-06-29 | End: 2023-01-07

## 2022-07-15 RX ORDER — LEVOTHYROXINE SODIUM 0.12 MG/1
125 TABLET ORAL DAILY
COMMUNITY
Start: 2022-07-05

## 2022-07-15 NOTE — PATIENT INSTRUCTIONS
- Daily weight:  same time every day, same clothing - at least weekly   - Low Salt (sodium) diet   - Watch fluid intake      Heart Failure Action Plan  A heart failure action plan helps you understand what to do when you have symptoms of heart failure. Your action plan is a color-coded plan that lists the symptoms to watch for and indicates what actions to take.  If you have symptoms in the red zone, you need medical care right away.  If you have symptoms in the yellow zone, you are having problems.  If you have symptoms in the green zone, you are doing well.  Follow the plan that was created by you and your health care provider. Review your plan each time you visit your health care provider.  Red zone  These signs and symptoms mean you should get medical help right away:  You have trouble breathing when resting.  You have a dry cough that is getting worse.  You have swelling or pain in your legs or abdomen that is getting worse.  You suddenly gain more than 2-3 lb (0.9-1.4 kg) in 24 hours, or more than 5 lb (2.3 kg) in a week. This amount may be more or less depending on your condition.  You have trouble staying awake or you feel confused.  You have chest pain.  You do not have an appetite.  You pass out.  You have worsening sadness or depression.  If you have any of these symptoms, call your local emergency services (911 in the [place] right away. Do not drive yourself to the hospital.  Yellow zone  These signs and symptoms mean your condition may be getting worse and you should make some changes:  You have trouble breathing when you are active, or you need to sleep with your head raised on extra pillows to help you breathe.  You have swelling in your legs or abdomen.  You gain 2-3 lb (0.9-1.4 kg) in 24 hours, or 5 lb (2.3 kg) in a week. This amount may be more or less depending on your condition.  You get tired easily.  You have trouble sleeping.  You have a dry cough.  If you have any of these symptoms:  Contact  your health care provider within the next day.  Your health care provider may adjust your medicines.  Green zone  These signs mean you are doing well and can continue what you are doing:  You do not have shortness of breath.  You have very little swelling or no new swelling.  Your weight is stable (no gain or loss).  You have a normal activity level.  You do not have chest pain or any other new symptoms.  Follow these instructions at home:  Take over-the-counter and prescription medicines only as told by your health care provider.  Weigh yourself daily. Your target weight is __________ lb (__________ kg).  Call your health care provider if you gain more than __________ lb (__________ kg) in 24 hours, or more than __________ lb (__________ kg) in a week.  Health care provider name: _____________________________________________________  Health care provider phone number: _____________________________________________________  Eat a heart-healthy diet. Work with a diet and nutrition specialist (dietitian) to create an eating plan that is best for you.  Keep all follow-up visits. This is important.  Where to find more information  American Heart Association: www.heart.org  Summary  A heart failure action plan helps you understand what to do when you have symptoms of heart failure.  Follow the action plan that was created by you and your health care provider.  Get help right away if you have any symptoms in the red zone.  This information is not intended to replace advice given to you by your health care provider. Make sure you discuss any questions you have with your health care provider.  Document Revised: 08/02/2021 Document Reviewed: 08/02/2021  Elsevier Patient Education © 2021 [employer]

## 2022-07-15 NOTE — PROGRESS NOTES
Subjective:     Encounter Date:  07/15/2022      Patient ID: Claudia Rust is a 71 y.o. female.    Chief Complaint : Follow-up for AVR, CHF, diabetes, hypertension    Congestive Heart Failure  Associated symptoms include shortness of breath. Pertinent negatives include no abdominal pain, chest pain or palpitations.         Ms. Claudia Rust has of [facility]      #Valvular heart disease with moderate to severe aortic stenosis  Cardiac cath 1/20/2021 revealing nonobstructive CAD  Patient underwent #23 magna pericardial prosthesis AVR 3/18/2021  # Hyperlipidemia,    # Hypertension  # COPD, KARLY on CPAP  # diabetes  #History of HIT  # Hypothyroidism, Rheumatoid Arthritis, spinal stenosis, hiatal hernia, chronic depression, bipolar, GERD, IBS, chronic migraine, vertigo, herniated disc, compression lumbar fractures  #Allergies/intolerance to Stadol  # Hysterectomy, bladder reconstruction, cholecystectomy, right hand surgery  # Family history of strokes father and grandfather.  Mother's cancer,   # former cigarette smoker      Here for 6 month follow up.  Patient reports recent reflux symptoms in the epigastric area. Patient continues to have chronic shortness of breath and lower extremity edema although she states that is much improved since her valve replacement in 2021. Patient does not weigh herself daily and does not follow a strict low sodium diet.     Her blood pressure today is          Labs 6/17/2022: CMP - potassium 4.5, BUN 18 creatinine 1.11  eGFR 48; HDL 55, LDL 96  CBC unremarkable, TSH normal   A1C 6.1           Procedures    The following portions of the patient's history were reviewed and updated as appropriate: allergies, current medications, past family history, past medical history, past social history, past surgical history and problem list.    Assessment:         [facility]     Diagnosis Plan   1. Chronic heart failure with preserved ejection fraction (HFpEF) ([facility])     2. Nonrheumatic aortic valve stenosis    "  3. Essential hypertension     4. Morbidly obese (HCC)     5. S/P AVR     6. Long term (current) use of anticoagulants     7. 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   Reviewed echocardiogram from 3/23/2021 post AVR   --- patient has normal LV function; Grade 2 diastolic dysfunction and severe pulmonary hypertension       Discussed daily or at least weekly weights; low sodium diet     Discussed weight loss-  Patient is not very active due to her chronic shortness of breath and having to use a walker.     Continue warfarin, INR today 2.7     Patient complaining of heart burn, advise that she take PPI correctly- 30-60 minutes prior to eating.     Past Medical History:  Past Medical History:   Diagnosis Date   • Acute congestive heart failure (HCC)    • Allergies    • Anxiety    • Arthritis    • Asthma    • Bilateral leg edema    • Bipolar 1 disorder (HCC)    • Bulging lumbar disc     X3   • Cancer ([facility])     states had \"cancerous tumor during pregnancy and had to have hysterectomy\"   • Cataract     bilateral   • Cellulitis 03/2021    bilateral legs   • Compression fracture of vertebral column ([facility])     LUMBAR   • COPD (chronic obstructive pulmonary disease) ([facility])    • Delayed emergence from anesthesia    • Depression    • Depression    • Diabetes mellitus (HCC)    • Dyslipidemia    • Dyspnea on exertion    • Fibromyalgia    • GERD (gastroesophageal reflux disease)    • Hiatal hernia    • " Hyperlipidemia    • Hypertension    • Hypothyroid     HYPOTHYROID   • IBS (irritable bowel syndrome)    • Migraines    • Morbid obesity ([facility])    • Neuropathy    • Panic attacks    • Peripheral edema    • PONV (postoperative nausea and vomiting)    • Poor mobility     rolling walker   • Prediabetes    • PVD (peripheral vascular disease) ([facility])    • Rheumatoid aortitis    • Spinal stenosis    • Spinal stenosis    • Vertigo    • Vertigo    • Vitamin D deficiency      Past Surgical History:  Past Surgical History:   Procedure Laterality Date   • AORTIC VALVE REPAIR/REPLACEMENT N/A 3/18/2021    Procedure: AORTIC VALVE REPLACEMENT;  Surgeon: Olaf Mcgill MD;  Location: [facility] CVOR;  Service: Cardiothoracic;  Laterality: N/A;   • BRONCHOSCOPY N/A 3/25/2021    Procedure: BRONCHOSCOPY AT BEDSIDE WITH BRONCHIOALVEOLAR LAVAGE;  Surgeon: Glenn Reyes MD;  Location: [facility] ENDOSCOPY;  Service: Pulmonary;  Laterality: N/A;  PNA   • CARDIAC CATHETERIZATION  2009   • CARDIAC CATHETERIZATION N/A 8/14/2019    Procedure: Left Heart Cath;  Surgeon: Jose Levi MD;  Location: [facility] CATH INVASIVE LOCATION;  Service: Cardiovascular   • CARDIAC CATHETERIZATION N/A 8/14/2019    Procedure: Right Heart Cath;  Surgeon: Jsoe Levi MD;  Location: [facility] CATH INVASIVE LOCATION;  Service: Cardiovascular   • CARDIAC CATHETERIZATION N/A 8/14/2019    Procedure: Aortic root aortogram;  Surgeon: Jose Levi MD;  Location: [facility] CATH INVASIVE LOCATION;  Service: Cardiovascular   • CARDIAC CATHETERIZATION N/A 1/20/2021    Procedure: Left Heart Cath;  Surgeon: Jose Levi MD;  Location: [facility] CATH INVASIVE LOCATION;  Service: Cardiovascular;  Laterality: N/A;   • COLONOSCOPY     • CYSTOCELE REPAIR  1984   • ELBOW ARTHROPLASTY  2011   • ENDOSCOPY     • FOOT SURGERY     • GALLBLADDER SURGERY  2002   • TONSILLECTOMY     • VAGINAL HYSTERECTOMY  1972      Allergies:  Allergies   Allergen Reactions    • Adhesive Tape Unknown (See Comments)     hives   • Butorphanol Other (See Comments)     Chest pain difficulty breathing throat swelling   • Garlic Other (See Comments)     Chest pain difficulty breathing throat swells   • Heparin Other (See Comments)     HIT +   • Tetanus-Diphtheria Toxoids Td Other (See Comments)     Dizziness,  vomiting   • Aloe Itching   • Bee Venom Other (See Comments)     Swelling eyes and lips hand swelling   • Latex Itching   • Macadamia Nut Oil Other (See Comments)     Chest pain difficulty breathing throat swelling   • Tuberculin Unknown (See Comments)     reactor   • Wasp Venom Other (See Comments)     Swelling eyes lips and hand     Home Meds:  Current Meds:     Current Outpatient Medications:   •  Acetaminophen (TYLENOL PO), Take 650 mg by mouth Every 4 (Four) Hours As Needed., Disp: , Rfl:   •  albuterol (PROVENTIL) (2.5 MG/3ML) 0.083% nebulizer solution, Inhale 2.5 mg Every 6 (Six) Hours As Needed. DURING WINTER IF NEEDED HASNT NEEDED, Disp: , Rfl:   •  alendronate (FOSAMAX) 70 MG tablet, Take 1 tablet by mouth Every 7 (Seven) Days., Disp: , Rfl:   •  aluminum-magnesium hydroxide-simethicone (MAALOX/MYLANTA) 200-200-20 MG/5ML suspension, Take 15 mL by mouth Every 6 (Six) Hours As Needed for Indigestion or Heartburn., Disp: , Rfl:   •  B Complex Vitamins (VITAMIN B COMPLEX) capsule capsule, Take 1 capsule by mouth Daily. LAST DOSE 3/4, Disp: , Rfl:   •  Calcium-Magnesium-Vitamin D (CALCIUM 1200+D3 PO), Take 1 capsule by mouth Daily., Disp: , Rfl:   •  cholecalciferol (Cholecalciferol) 25 MCG (1000 UT) tablet, Take 2,000 Units by mouth Every Night. LAST DOSE 3/4, Disp: , Rfl:   •  docusate sodium 100 MG capsule, Take 1 capsule by mouth 2 (Two) Times a Day., Disp: 60 capsule, Rfl: 1  •  FLUoxetine (PROzac) 20 MG capsule, , Disp: , Rfl:   •  gabapentin (NEURONTIN) 400 MG capsule, Take 1 capsule by mouth 3 (Three) Times a Day., Disp: , Rfl:   •  glucose blood test strip, 1 strip by  Other route Daily., Disp: , Rfl:   •  levothyroxine (SYNTHROID, LEVOTHROID) 125 MCG tablet, Take 125 mcg by mouth Daily., Disp: , Rfl:   •  meclizine (ANTIVERT) 25 MG tablet, Take 25 mg by mouth 3 (Three) Times a Day As Needed., Disp: , Rfl:   •  Melatonin 10 MG capsule, Take  by mouth., Disp: , Rfl:   •  metFORMIN ER (GLUCOPHAGE-XR) 500 MG 24 hr tablet, Take 1 tablet by mouth Daily With Breakfast., Disp: , Rfl:   •  metOLazone (ZAROXOLYN) 2.5 MG tablet, TAKE 1 TABLET BY MOUTH EVERY DAY, Disp: 90 tablet, Rfl: 3  •  metoprolol succinate XL (TOPROL-XL) 25 MG 24 hr tablet, Take 25 mg by mouth Daily., Disp: , Rfl:   •  Multiple Vitamins-Minerals (MULTIVITAMIN WITH MINERALS) tablet tablet, Take 1 tablet by mouth Daily. LAST DOSE 3/4, Disp: , Rfl:   •  pantoprazole (PROTONIX) 40 MG EC tablet, Take 40 mg by mouth Every Evening., Disp: , Rfl:   •  rizatriptan MLT (MAXALT-MLT) 10 MG disintegrating tablet, Take 10 mg by mouth 1 (One) Time As Needed for Migraine. May repeat in 2 hours if needed, Disp: , Rfl:   •  simvastatin (ZOCOR) 20 MG tablet, , Disp: , Rfl:   •  spironolactone (ALDACTONE) 25 MG tablet, TAKE 2 TABLETS BY MOUTH EVERY DAY, Disp: 180 tablet, Rfl: 3  •  tiZANidine (ZANAFLEX) 4 MG tablet, Take 4 mg by mouth Every 8 (Eight) Hours As Needed for Muscle Spasms. 8 MG NIGHTLY, Disp: , Rfl:   •  vitamin B-12 (CYANOCOBALAMIN) 1000 MCG tablet, Take 1,000 mcg by mouth Daily., Disp: , Rfl:   •  warfarin (COUMADIN) 3 MG tablet, TAKE 1 TAB BY MOUTH ON TUESDAY & THURSDAY AND ONE AND ONE-HALF ALL OTHER DAYS OR AS DIRECTED, Disp: 52 tablet, Rfl: 2  Social History:   Social History     Tobacco Use   • Smoking status: Former Smoker     Packs/day: 1.00     Types: Cigarettes   • Smokeless tobacco: Never Used   • Tobacco comment: decrease now and do not smoke dos   Substance Use Topics   • Alcohol use: Not Currently     Comment: very rare      Family History:  History reviewed. No pertinent family history.           Review of  "Systems   Cardiovascular: Positive for dyspnea on exertion and leg swelling. Negative for chest pain and palpitations.   Respiratory: Positive for shortness of breath.    Gastrointestinal: Positive for heartburn. Negative for abdominal pain.   Neurological: Negative for dizziness and numbness.     All other systems are negative         Objective:     Physical Exam  /83 (BP Location: Left arm, Patient Position: Sitting, Cuff Size: Adult)   Pulse 72   Ht 162.6 cm (64\")   Wt 130 kg (286 lb)   SpO2 97%   BMI 49.09 kg/m²   General:  Appears in no acute distress, pleasant obese  Eyes: Sclera is anicteric,  conjunctiva is clear   HEENT:  No JVD.   Respiratory: Respirations regular and unlabored at rest.  Clear to auscultation  Cardiovascular: S1,S2 Regular rate and rhythm. No murmur, rub or gallop auscultated.   Extremities: No digital clubbing or cyanosis, + edema, lower extremities, possible lymphedema as well.    Skin: Color pink. Skin warm and dry to touch. No rashes  No xanthoma   Neuro: Alert and awake, walks with a walker.    Lab Reviewed:         SARA Moore  7/15/2022 14:08 EDT  Electronically signed by SARA Moore, 07/15/22, 4:14 PM EDT.      Much of the above report is an electronic transcription/translation of the spoken language to printed text using Dragon Software. As such, the subtleties and finesse of the spoken language may permit erroneous, or at times, nonsensical words or phrases to be inadvertently transcribed; thus changes may be made at a later date to rectify these errors.         "

## 2022-08-15 DIAGNOSIS — Z95.2 S/P AVR: ICD-10-CM

## 2022-08-15 RX ORDER — WARFARIN SODIUM 3 MG/1
TABLET ORAL
Qty: 156 TABLET | Refills: 0 | Status: SHIPPED | OUTPATIENT
Start: 2022-08-15 | End: 2022-11-11

## 2022-08-15 NOTE — TELEPHONE ENCOUNTER
Rx Refill Note  Requested Prescriptions     Pending Prescriptions Disp Refills   • warfarin (COUMADIN) 3 MG tablet [Pharmacy Med Name: WARFARIN SODIUM 3 MG TABLET] 156 tablet      Sig: TAKE 1 TAB BY MOUTH ON TUESDAY & THURSDAY AND ONE AND ONE-HALF ALL OTHER DAYS OR AS DIRECTED      Last office visit with prescribing clinician: 1/14/2022      Next office visit with prescribing clinician: 1/13/2023              Anticoagulation Visit (07/15/2022)      Cara Mora LPN  08/15/22, 17:39 EDT

## 2022-08-19 ENCOUNTER — ANTICOAGULATION VISIT (OUTPATIENT)
Dept: CARDIOLOGY | Facility: CLINIC | Age: 72
End: 2022-08-19

## 2022-08-19 VITALS
BODY MASS INDEX: 48.92 KG/M2 | HEART RATE: 65 BPM | SYSTOLIC BLOOD PRESSURE: 144 MMHG | WEIGHT: 285 LBS | DIASTOLIC BLOOD PRESSURE: 82 MMHG

## 2022-08-19 DIAGNOSIS — Z95.2 S/P AVR: Primary | ICD-10-CM

## 2022-08-19 DIAGNOSIS — Z79.01 LONG TERM (CURRENT) USE OF ANTICOAGULANTS: ICD-10-CM

## 2022-08-19 LAB — INR PPP: 2.3 (ref 0.9–1.1)

## 2022-08-19 PROCEDURE — 36416 COLLJ CAPILLARY BLOOD SPEC: CPT | Performed by: INTERNAL MEDICINE

## 2022-08-19 PROCEDURE — 85610 PROTHROMBIN TIME: CPT | Performed by: INTERNAL MEDICINE

## 2022-09-15 ENCOUNTER — ANTICOAGULATION VISIT (OUTPATIENT)
Dept: CARDIOLOGY | Facility: CLINIC | Age: 72
End: 2022-09-15

## 2022-09-15 ENCOUNTER — HOSPITAL ENCOUNTER (EMERGENCY)
Facility: HOSPITAL | Age: 72
Discharge: HOME | End: 2022-09-15
Attending: EMERGENCY MEDICINE | Admitting: EMERGENCY MEDICINE

## 2022-09-15 VITALS
TEMPERATURE: 98 F | RESPIRATION RATE: 16 BRPM | HEIGHT: 64 IN | OXYGEN SATURATION: 100 % | SYSTOLIC BLOOD PRESSURE: 115 MMHG | DIASTOLIC BLOOD PRESSURE: 74 MMHG | HEART RATE: 72 BPM | BODY MASS INDEX: 47.8 KG/M2 | WEIGHT: 280 LBS

## 2022-09-15 DIAGNOSIS — Z79.01 LONG TERM (CURRENT) USE OF ANTICOAGULANTS: ICD-10-CM

## 2022-09-15 DIAGNOSIS — R04.0 EPISTAXIS: Primary | ICD-10-CM

## 2022-09-15 DIAGNOSIS — Z95.2 S/P AVR: Primary | ICD-10-CM

## 2022-09-15 LAB
BASOPHILS # BLD AUTO: 0.1 10*3/MM3 (ref 0–0.2)
BASOPHILS NFR BLD AUTO: 0.8 % (ref 0–1.5)
DEPRECATED RDW RBC AUTO: 40.3 FL (ref 37–54)
EOSINOPHIL # BLD AUTO: 0.2 10*3/MM3 (ref 0–0.4)
EOSINOPHIL NFR BLD AUTO: 2.4 % (ref 0.3–6.2)
ERYTHROCYTE [DISTWIDTH] IN BLOOD BY AUTOMATED COUNT: 12.9 % (ref 12.3–15.4)
HCT VFR BLD AUTO: 40.2 % (ref 34–46.6)
HGB BLD-MCNC: 13.2 G/DL (ref 12–15.9)
INR PPP: 3.34 (ref 2–3)
LYMPHOCYTES # BLD AUTO: 3 10*3/MM3 (ref 0.7–3.1)
LYMPHOCYTES NFR BLD AUTO: 35.7 % (ref 19.6–45.3)
MCH RBC QN AUTO: 28.8 PG (ref 26.6–33)
MCHC RBC AUTO-ENTMCNC: 32.9 G/DL (ref 31.5–35.7)
MCV RBC AUTO: 87.5 FL (ref 79–97)
MONOCYTES # BLD AUTO: 0.6 10*3/MM3 (ref 0.1–0.9)
MONOCYTES NFR BLD AUTO: 7.3 % (ref 5–12)
NEUTROPHILS NFR BLD AUTO: 4.5 10*3/MM3 (ref 1.7–7)
NEUTROPHILS NFR BLD AUTO: 53.8 % (ref 42.7–76)
NRBC BLD AUTO-RTO: 0 /100 WBC (ref 0–0.2)
PLATELET # BLD AUTO: 215 10*3/MM3 (ref 140–450)
PMV BLD AUTO: 8.5 FL (ref 6–12)
PROTHROMBIN TIME: 32.1 SECONDS (ref 19.4–28.5)
RBC # BLD AUTO: 4.6 10*6/MM3 (ref 3.77–5.28)
WBC NRBC COR # BLD: 8.3 10*3/MM3 (ref 3.4–10.8)

## 2022-09-15 PROCEDURE — 85610 PROTHROMBIN TIME: CPT | Performed by: EMERGENCY MEDICINE

## 2022-09-15 PROCEDURE — 36415 COLL VENOUS BLD VENIPUNCTURE: CPT

## 2022-09-15 PROCEDURE — 85025 COMPLETE CBC W/AUTO DIFF WBC: CPT | Performed by: EMERGENCY MEDICINE

## 2022-09-15 PROCEDURE — 99283 EMERGENCY DEPT VISIT LOW MDM: CPT

## 2022-09-15 RX ORDER — TRANEXAMIC ACID 100 MG/ML
500 INJECTION, SOLUTION INTRAVENOUS ONCE
Status: COMPLETED | OUTPATIENT
Start: 2022-09-15 | End: 2022-09-15

## 2022-09-15 RX ORDER — SODIUM CHLORIDE 0.9 % (FLUSH) 0.9 %
10 SYRINGE (ML) INJECTION AS NEEDED
Status: DISCONTINUED | OUTPATIENT
Start: 2022-09-15 | End: 2022-09-15 | Stop reason: HOSPADM

## 2022-09-15 RX ORDER — CEPHALEXIN 500 MG/1
500 CAPSULE ORAL 2 TIMES DAILY
Qty: 10 CAPSULE | Refills: 0 | Status: SHIPPED | OUTPATIENT
Start: 2022-09-15 | End: 2023-01-07

## 2022-09-15 RX ORDER — CEPHALEXIN 500 MG/1
500 CAPSULE ORAL ONCE
Status: COMPLETED | OUTPATIENT
Start: 2022-09-15 | End: 2022-09-15

## 2022-09-15 RX ADMIN — TRANEXAMIC ACID 500 MG: 100 INJECTION, SOLUTION INTRAVENOUS at 14:00

## 2022-09-15 RX ADMIN — CEPHALEXIN 500 MG: 500 CAPSULE ORAL at 15:24

## 2022-09-15 NOTE — ED PROVIDER NOTES
"Subjective   History of Present Illness  Chief complaint nosebleed    History of present is a 72-year-old female laying severe nosebleed left side of her nose 1 hour.  Unable to control at home.  No pain no injury.  No fever chills or sweats.  No dizziness or syncope.  Nothing makes it better nothing makes it worse ongoing continuous 1 hour moderate-severe.  Patient is on warfarin.  No change in the dose recently.  No other complaints or associated symptoms        Review of Systems   Constitutional: Negative for chills and fever.   HENT: Positive for nosebleeds. Negative for congestion.    Eyes: Negative for photophobia and visual disturbance.   Respiratory: Negative for chest tightness and shortness of breath.    Gastrointestinal: Negative for abdominal pain.   Musculoskeletal: Positive for arthralgias. Negative for neck pain.   Skin: Negative for color change and rash.   Neurological: Negative for dizziness and light-headedness.   Psychiatric/Behavioral: Negative for agitation and confusion.       Past Medical History:   Diagnosis Date   • Acute congestive heart failure (HCC)    • Allergies    • Anxiety    • Arthritis    • Asthma    • Bilateral leg edema    • Bipolar 1 disorder ([facility])    • Bulging lumbar disc     X3   • Cancer ([facility])     states had \"cancerous tumor during pregnancy and had to have hysterectomy\"   • Cataract     bilateral   • Cellulitis 03/2021    bilateral legs   • Compression fracture of vertebral column ([facility])     LUMBAR   • COPD (chronic obstructive pulmonary disease) ([facility])    • Delayed emergence from anesthesia    • Depression    • Depression    • Diabetes mellitus ([facility])    • Dyslipidemia    • Dyspnea on exertion    • Fibromyalgia    • GERD (gastroesophageal reflux disease)    • Hiatal hernia    • Hyperlipidemia    • Hypertension    • Hypothyroid     HYPOTHYROID   • IBS (irritable bowel syndrome)    • Migraines    • Morbid obesity ([facility])    • Neuropathy    • Panic attacks    • Peripheral edema  "   • PONV (postoperative nausea and vomiting)    • Poor mobility     rolling walker   • Prediabetes    • PVD (peripheral vascular disease) ([facility])    • Rheumatoid aortitis    • Spinal stenosis    • Spinal stenosis    • Vertigo    • Vertigo    • Vitamin D deficiency        Allergies   Allergen Reactions   • Adhesive Tape Unknown (See Comments)     hives   • Butorphanol Other (See Comments)     Chest pain difficulty breathing throat swelling   • Garlic Other (See Comments)     Chest pain difficulty breathing throat swells   • Heparin Other (See Comments)     HIT +   • Tetanus-Diphtheria Toxoids Td Other (See Comments)     Dizziness,  vomiting   • Aloe Itching   • Bee Venom Other (See Comments)     Swelling eyes and lips hand swelling   • Latex Itching   • Macadamia Nut Oil Other (See Comments)     Chest pain difficulty breathing throat swelling   • Tuberculin Unknown (See Comments)     reactor   • Wasp Venom Other (See Comments)     Swelling eyes lips and hand       Past Surgical History:   Procedure Laterality Date   • AORTIC VALVE REPAIR/REPLACEMENT N/A 3/18/2021    Procedure: AORTIC VALVE REPLACEMENT;  Surgeon: Olaf Mcgill MD;  Location: [facility] CVOR;  Service: Cardiothoracic;  Laterality: N/A;   • BRONCHOSCOPY N/A 3/25/2021    Procedure: BRONCHOSCOPY AT BEDSIDE WITH BRONCHIOALVEOLAR LAVAGE;  Surgeon: Glenn Reyes MD;  Location: [facility] ENDOSCOPY;  Service: Pulmonary;  Laterality: N/A;  PNA   • CARDIAC CATHETERIZATION  2009   • CARDIAC CATHETERIZATION N/A 8/14/2019    Procedure: Left Heart Cath;  Surgeon: Jose Levi MD;  Location: [facility] CATH INVASIVE LOCATION;  Service: Cardiovascular   • CARDIAC CATHETERIZATION N/A 8/14/2019    Procedure: Right Heart Cath;  Surgeon: Jose Levi MD;  Location: [facility] CATH INVASIVE LOCATION;  Service: Cardiovascular   • CARDIAC CATHETERIZATION N/A 8/14/2019    Procedure: Aortic root aortogram;  Surgeon: Jose Levi MD;  Location: [facility]  CATH INVASIVE LOCATION;  Service: Cardiovascular   • CARDIAC CATHETERIZATION N/A 1/20/2021    Procedure: Left Heart Cath;  Surgeon: Jose Levi MD;  Location: [facility] CATH INVASIVE LOCATION;  Service: Cardiovascular;  Laterality: N/A;   • COLONOSCOPY     • CYSTOCELE REPAIR  1984   • ELBOW ARTHROPLASTY  2011   • ENDOSCOPY     • FOOT SURGERY     • GALLBLADDER SURGERY  2002   • TONSILLECTOMY     • VAGINAL HYSTERECTOMY  1972       No family history on file.    Social History     Socioeconomic History   • Marital status:    Tobacco Use   • Smoking status: Former Smoker     Packs/day: 1.00     Types: Cigarettes   • Smokeless tobacco: Never Used   • Tobacco comment: decrease now and do not smoke dos   Vaping Use   • Vaping Use: Never used   Substance and Sexual Activity   • Alcohol use: Not Currently     Comment: very rare   • Drug use: Never   • Sexual activity: Defer     Prior to Admission medications    Medication Sig Start Date End Date Taking? Authorizing Provider   Acetaminophen (TYLENOL PO) Take 650 mg by mouth Every 4 (Four) Hours As Needed.    Krissy Hodgson MD   albuterol (PROVENTIL) (2.5 MG/3ML) 0.083% nebulizer solution Inhale 2.5 mg Every 6 (Six) Hours As Needed. DURING WINTER IF NEEDED HASNT NEEDED 2/3/15   Krissy Hodgson MD   alendronate (FOSAMAX) 70 MG tablet Take 1 tablet by mouth Every 7 (Seven) Days. 6/29/22 6/29/23  Krissy Hodgson MD   aluminum-magnesium hydroxide-simethicone (MAALOX/MYLANTA) 200-200-20 MG/5ML suspension Take 15 mL by mouth Every 6 (Six) Hours As Needed for Indigestion or Heartburn.    Krissy Hodgson MD   B Complex Vitamins (VITAMIN B COMPLEX) capsule capsule Take 1 capsule by mouth Daily. LAST DOSE 3/4    Krissy Hodgson MD   Calcium-Magnesium-Vitamin D (CALCIUM 1200+D3 PO) Take 1 capsule by mouth Daily.    Krissy Hodgson MD   cephalexin (KEFLEX) 500 MG capsule Take 1 capsule by mouth 2 (Two) Times a Day. 9/15/22   Riaz  Darius OJEDA MD   cholecalciferol (Cholecalciferol) 25 MCG (1000 UT) tablet Take 2,000 Units by mouth Every Night. LAST DOSE 3/4 9/16/16   Krissy Hodgson MD   docusate sodium 100 MG capsule Take 1 capsule by mouth 2 (Two) Times a Day. 4/13/21   Belkis Loving APRN   FLUoxetine (PROzac) 20 MG capsule  12/20/21   Krissy Hodgson MD   gabapentin (NEURONTIN) 400 MG capsule Take 1 capsule by mouth 3 (Three) Times a Day. 10/11/21   Krissy Hodgson MD   glucose blood test strip 1 strip by Other route Daily. 12/29/21 12/29/22  Krissy Hodgson MD   levothyroxine (SYNTHROID, LEVOTHROID) 125 MCG tablet Take 125 mcg by mouth Daily. 7/5/22   Krissy Hodgson MD   meclizine (ANTIVERT) 25 MG tablet Take 25 mg by mouth 3 (Three) Times a Day As Needed. 7/30/15   Krissy Hodgson MD   Melatonin 10 MG capsule Take  by mouth.    Krissy Hodgson MD   metFORMIN ER (GLUCOPHAGE-XR) 500 MG 24 hr tablet Take 1 tablet by mouth Daily With Breakfast. 1/12/22   Krissy Hodgson MD   metOLazone (ZAROXOLYN) 2.5 MG tablet TAKE 1 TABLET BY MOUTH EVERY DAY 1/21/22   Jose Levi MD   metoprolol succinate XL (TOPROL-XL) 25 MG 24 hr tablet Take 25 mg by mouth Daily.    Krissy Hodgson MD   Multiple Vitamins-Minerals (MULTIVITAMIN WITH MINERALS) tablet tablet Take 1 tablet by mouth Daily. LAST DOSE 3/4    Krissy Hodgson MD   pantoprazole (PROTONIX) 40 MG EC tablet Take 40 mg by mouth Every Evening.    Krissy Hodgson MD   rizatriptan MLT (MAXALT-MLT) 10 MG disintegrating tablet Take 10 mg by mouth 1 (One) Time As Needed for Migraine. May repeat in 2 hours if needed    Krissy Hodgson MD   simvastatin (ZOCOR) 20 MG tablet  11/19/21   Krissy Hodgson MD   spironolactone (ALDACTONE) 25 MG tablet TAKE 2 TABLETS BY MOUTH EVERY DAY 1/21/22   Jose Levi MD   tiZANidine (ZANAFLEX) 4 MG tablet Take 4 mg by mouth Every 8 (Eight) Hours As Needed for Muscle  Spasms. 8 MG NIGHTLY    Provider, MD Krissy   vitamin B-12 (CYANOCOBALAMIN) 1000 MCG tablet Take 1,000 mcg by mouth Daily.    Provider, MD Krissy   warfarin (COUMADIN) 3 MG tablet TAKE 1 TAB BY MOUTH ON TUESDAY & THURSDAY AND ONE AND ONE-HALF ALL OTHER DAYS OR AS DIRECTED 8/15/22   Jose Levi MD           Objective   Physical Exam  Constitutional 72-year-old female awake alert with minimal bleeding currently.  Temperature is 98 heart rate 72 blood pressure 115/74 sats at 98% on room air.  HEENT extraocular muscle intact pupils equal round reactive to the mouth is clear there is no bleeding posteriorly vaccine the back of the throat.  Uvula midline speech normal no trismus.  She has some mild oozing from the left nares.  No tenderness or swelling or redness.  Examination inside the nose with a speculum and a light shows that she has some mild oozing from a busted blood vessel on the left anterior septum.  There is no perforation of the septum there is no foreign bodies or polyps seen the right side is clear.  There is no drainage or foul odor or signs of infection.  No evidence of an abscess at this time.  Lungs clear no retraction heart regular without murmurs she is awake alert nontoxic-appearing  Epistaxis Management    Date/Time: 9/16/2022 12:14 AM  Performed by: Darius Mello MD  Authorized by: Darius Mello MD     Consent:     Consent obtained:  Verbal    Consent given by:  Patient    Risks, benefits, and alternatives were discussed: yes      Risks discussed:  Bleeding and pain  Universal protocol:     Procedure explained and questions answered to patient or proxy's satisfaction: yes      Patient identity confirmed:  Verbally with patient  Anesthesia:     Anesthesia method:  None  Procedure details:     Treatment site:  L anterior    Treatment method:  Anterior pack and nasal tampon    Treatment complexity:  Extensive    Treatment episode: initial    Post-procedure details:      Assessment:  Bleeding stopped    Procedure completion:  Tolerated  Comments:      Patient had bleeding a left-sided nose.  Patient had this area cauterized with silver nitrate this seemed to have good control.  The patient had cotton balls with TXA placed to the nose and left another nose for about half an hour.  These were removed the bleeding stopped.  But patient is on warfarin we put TXA on a nasal tampon Rhino Rocket 5.5 cm inserted into the nose and put about 3 cc of air in the balloon.  This was secured to the face.  She had no further bleeding.  The bleeding was controlled she was observed for about a half an hour and had no further problems and felt better.               ED Course      Results for orders placed or performed during the hospital encounter of 09/15/22   Protime-INR    Specimen: Blood   Result Value Ref Range    Protime 32.1 (H) 19.4 - 28.5 Seconds    INR 3.34 (H) 2.00 - 3.00   CBC Auto Differential    Specimen: Blood   Result Value Ref Range    WBC 8.30 3.40 - 10.80 10*3/mm3    RBC 4.60 3.77 - 5.28 10*6/mm3    Hemoglobin 13.2 12.0 - 15.9 g/dL    Hematocrit 40.2 34.0 - 46.6 %    MCV 87.5 79.0 - 97.0 fL    MCH 28.8 26.6 - 33.0 pg    MCHC 32.9 31.5 - 35.7 g/dL    RDW 12.9 12.3 - 15.4 %    RDW-SD 40.3 37.0 - 54.0 fl    MPV 8.5 6.0 - 12.0 fL    Platelets 215 140 - 450 10*3/mm3    Neutrophil % 53.8 42.7 - 76.0 %    Lymphocyte % 35.7 19.6 - 45.3 %    Monocyte % 7.3 5.0 - 12.0 %    Eosinophil % 2.4 0.3 - 6.2 %    Basophil % 0.8 0.0 - 1.5 %    Neutrophils, Absolute 4.50 1.70 - 7.00 10*3/mm3    Lymphocytes, Absolute 3.00 0.70 - 3.10 10*3/mm3    Monocytes, Absolute 0.60 0.10 - 0.90 10*3/mm3    Eosinophils, Absolute 0.20 0.00 - 0.40 10*3/mm3    Basophils, Absolute 0.10 0.00 - 0.20 10*3/mm3    nRBC 0.0 0.0 - 0.2 /100 WBC     No radiology results for the last day  Medications   sodium chloride 0.9 % flush 10 mL (has no administration in time range)   cephalexin (KEFLEX) capsule 500 mg (has no administration  in time range)   tranexamic acid 500 MG/5ML nasal (ED/Crit Care for epistaxis) - VIAL DISPENSE 500 mg (500 mg Nasal Given 9/15/22 1400)                                            MDM  Number of Diagnoses or Management Options  Epistaxis: new and requires workup  Diagnosis management comments: Medical decision making.  Patient had the above exam evaluation procedure please see the procedure note.  Bleeding was controlled with CBC unremarkable.  And the patient had an INR of 3.3.  Bleeding is currently controlled she has had no further problems she looks well nontoxic images had valve repair she takes warfarin.  We talked about that she was given Keflex 500 mg p.o. and she will need to be on Keflex is Loxapac is an area because of the valve.  Be placed on Keflex at home twice a day.  She will [facility] tomorrow to get an appointment to get this pack removed either tomorrow or Monday.  She is given enough antibiotics to last through that time.  We talked about her INR to hold her Coumadin today.  But she will need this checked on Monday because antibiotics will make her blood thin and she will have complications.  Please get this checked frequently and have it checked on Monday she voiced understanding stated she would.  We talked about what to return for and she voiced understanding she looked well and improved and discharged home for outpatient management.       Amount and/or Complexity of Data Reviewed  Clinical lab tests: reviewed    Risk of Complications, Morbidity, and/or Mortality  Presenting problems: moderate  Diagnostic procedures: moderate  Management options: moderate    Patient Progress  Patient progress: stable      Final diagnoses:   Epistaxis       ED Disposition  ED Disposition     ED Disposition   Discharge    Condition   Stable    Comment   --             [facility]  [address]  [facility] [address]  [phone]  In 1 day           Medication List      New  Prescriptions    cephalexin 500 MG capsule  Commonly known as: KEFLEX  Take 1 capsule by mouth 2 (Two) Times a Day.           Where to Get Your Medications      These medications were sent to [place] [id number] [place] - [address] [place] [address] [place] - [phone] [facility] [phone] [facility] [place] [address]    Phone: [phone]   · cephalexin 500 MG capsule          Darius Mello MD  09/16/22 0018

## 2022-09-15 NOTE — DISCHARGE INSTRUCTIONS
No straining coughing sneezing.  Return for uncontrolled bleeding fever vomiting altered mental status dizziness passing out or any other new or worse problems or concerns.  Your INR today is 3.3.  Hold your warfarin today.  Keflex as directed.  Please have your INR checked within the next 3 days because antibiotics will change the results and thin your blood and needs to be followed closely.  Call [facility] today and set up appointment tomorrow.  Check in and see when they want the packing to be removed.

## 2022-09-15 NOTE — ED NOTES
Patient was sitting in chair and her nose started bleeding. Has passed several large blood clots. Patient on blood thinner

## 2022-09-21 ENCOUNTER — ANTICOAGULATION VISIT (OUTPATIENT)
Dept: CARDIOLOGY | Facility: CLINIC | Age: 72
End: 2022-09-21

## 2022-09-21 VITALS — SYSTOLIC BLOOD PRESSURE: 149 MMHG | HEART RATE: 79 BPM | DIASTOLIC BLOOD PRESSURE: 60 MMHG

## 2022-09-21 DIAGNOSIS — Z79.01 LONG TERM (CURRENT) USE OF ANTICOAGULANTS: ICD-10-CM

## 2022-09-21 DIAGNOSIS — Z95.2 S/P AVR: Primary | ICD-10-CM

## 2022-09-21 LAB — INR PPP: 2 (ref 0.9–1.1)

## 2022-09-21 PROCEDURE — 36416 COLLJ CAPILLARY BLOOD SPEC: CPT | Performed by: INTERNAL MEDICINE

## 2022-09-21 PROCEDURE — 85610 PROTHROMBIN TIME: CPT | Performed by: INTERNAL MEDICINE

## 2022-09-30 ENCOUNTER — ANTICOAGULATION VISIT (OUTPATIENT)
Dept: CARDIOLOGY | Facility: CLINIC | Age: 72
End: 2022-09-30

## 2022-09-30 VITALS
DIASTOLIC BLOOD PRESSURE: 73 MMHG | WEIGHT: 286 LBS | SYSTOLIC BLOOD PRESSURE: 153 MMHG | HEART RATE: 71 BPM | BODY MASS INDEX: 49.09 KG/M2

## 2022-09-30 DIAGNOSIS — Z95.2 S/P AVR: Primary | ICD-10-CM

## 2022-09-30 DIAGNOSIS — Z79.01 LONG TERM (CURRENT) USE OF ANTICOAGULANTS: ICD-10-CM

## 2022-09-30 LAB
INR PPP: 2.1 (ref 0.9–1.1)
INR PPP: 2.1 (ref 0.9–1.1)

## 2022-09-30 PROCEDURE — 36416 COLLJ CAPILLARY BLOOD SPEC: CPT | Performed by: INTERNAL MEDICINE

## 2022-09-30 PROCEDURE — 85610 PROTHROMBIN TIME: CPT | Performed by: INTERNAL MEDICINE

## 2022-10-21 ENCOUNTER — ANTICOAGULATION VISIT (OUTPATIENT)
Dept: CARDIOLOGY | Facility: CLINIC | Age: 72
End: 2022-10-21

## 2022-10-21 VITALS
HEART RATE: 78 BPM | DIASTOLIC BLOOD PRESSURE: 81 MMHG | WEIGHT: 291 LBS | SYSTOLIC BLOOD PRESSURE: 123 MMHG | BODY MASS INDEX: 49.95 KG/M2

## 2022-10-21 DIAGNOSIS — Z95.2 S/P AVR: Primary | ICD-10-CM

## 2022-10-21 DIAGNOSIS — Z79.01 LONG TERM (CURRENT) USE OF ANTICOAGULANTS: ICD-10-CM

## 2022-10-21 LAB — INR PPP: 2.1 (ref 0.9–1.1)

## 2022-10-21 PROCEDURE — 36416 COLLJ CAPILLARY BLOOD SPEC: CPT | Performed by: INTERNAL MEDICINE

## 2022-10-21 PROCEDURE — 85610 PROTHROMBIN TIME: CPT | Performed by: INTERNAL MEDICINE

## 2022-11-11 DIAGNOSIS — Z95.2 S/P AVR: ICD-10-CM

## 2022-11-11 RX ORDER — WARFARIN SODIUM 3 MG/1
TABLET ORAL
Qty: 135 TABLET | Refills: 0 | Status: SHIPPED | OUTPATIENT
Start: 2022-11-11 | End: 2022-11-21

## 2022-11-11 NOTE — TELEPHONE ENCOUNTER
Rx Refill Note  Requested Prescriptions     Pending Prescriptions Disp Refills   • warfarin (COUMADIN) 3 MG tablet [Pharmacy Med Name: WARFARIN SODIUM 3 MG TABLET] 135 tablet 0     Sig: Take one tablet by mouth Tuesday Thursday Saturday and Sunday and one and one half tablet by mouth all other days or as directed      Last office visit with prescribing clinician: 1/14/2022      Next office visit with prescribing clinician: 1/13/2023              Anticoagulation Visit (10/21/2022)      Cara Mora LPN  11/11/22, 13:29 EST

## 2022-11-19 DIAGNOSIS — Z95.2 S/P AVR: ICD-10-CM

## 2022-11-19 DIAGNOSIS — I50.32 CHRONIC HEART FAILURE WITH PRESERVED EJECTION FRACTION (HFPEF): Primary | ICD-10-CM

## 2022-11-19 RX ORDER — SPIRONOLACTONE 25 MG/1
TABLET ORAL
Qty: 180 TABLET | Refills: 3 | Status: SHIPPED | OUTPATIENT
Start: 2022-11-19

## 2022-11-19 NOTE — TELEPHONE ENCOUNTER
Rx Refill Note  Requested Prescriptions     Pending Prescriptions Disp Refills   • warfarin (COUMADIN) 3 MG tablet [Pharmacy Med Name: WARFARIN SODIUM 3 MG TABLET] 135 tablet 0     Sig: TAKE 1 TABLET BY MOUTH TUESDAY, THURSDAY, SATURDAY & SUNDAY AND 1 & 1/2 TABLET ALL OTHER DAYS OR AS DIRECTED     Signed Prescriptions Disp Refills   • spironolactone (ALDACTONE) 25 MG tablet 180 tablet 3     Sig: TAKE 2 TABLETS BY MOUTH EVERY DAY     Authorizing Provider: TEETEE BENAVIDES     Ordering User: ERNESTO STEWART      Last office visit with prescribing clinician: 1/14/2022      Next office visit with prescribing clinician: 1/13/2023            Ernesto Stewart MA  11/19/22, 13:25 EST     *APPROVED SPIRONOLACTONE ... SENT WARFARIN TO THE NURSE POOL TO ADDRESS*

## 2022-11-21 RX ORDER — WARFARIN SODIUM 3 MG/1
TABLET ORAL
Qty: 135 TABLET | Refills: 0 | Status: SHIPPED | OUTPATIENT
Start: 2022-11-21 | End: 2022-12-30

## 2022-11-21 NOTE — TELEPHONE ENCOUNTER
Rx Refill Note  Requested Prescriptions     Pending Prescriptions Disp Refills   • warfarin (COUMADIN) 3 MG tablet [Pharmacy Med Name: WARFARIN SODIUM 3 MG TABLET] 135 tablet 0     Sig: TAKE 1 TABLET BY MOUTH TUESDAY, THURSDAY, SATURDAY & SUNDAY AND 1 & 1/2 TABLET ALL OTHER DAYS OR AS DIRECTED     Signed Prescriptions Disp Refills   • spironolactone (ALDACTONE) 25 MG tablet 180 tablet 3     Sig: TAKE 2 TABLETS BY MOUTH EVERY DAY     Authorizing Provider: TEETEE BENAVIDES     Ordering User: ERNESTO NO    Last INR 10/21/22  Last office visit with prescribing clinician: 1/14/2022      Next office visit with prescribing clinician: 1/13/2023            Katelynn Vargas RN  11/21/22, 14:19 EST

## 2022-12-02 ENCOUNTER — TELEPHONE (OUTPATIENT)
Dept: CARDIOLOGY | Facility: CLINIC | Age: 72
End: 2022-12-02

## 2022-12-02 ENCOUNTER — ANTICOAGULATION VISIT (OUTPATIENT)
Dept: CARDIOLOGY | Facility: CLINIC | Age: 72
End: 2022-12-02

## 2022-12-02 VITALS
WEIGHT: 293 LBS | SYSTOLIC BLOOD PRESSURE: 156 MMHG | HEART RATE: 72 BPM | BODY MASS INDEX: 50.81 KG/M2 | DIASTOLIC BLOOD PRESSURE: 75 MMHG

## 2022-12-02 DIAGNOSIS — I10 ESSENTIAL (PRIMARY) HYPERTENSION: ICD-10-CM

## 2022-12-02 DIAGNOSIS — Z79.01 LONG TERM (CURRENT) USE OF ANTICOAGULANTS: ICD-10-CM

## 2022-12-02 DIAGNOSIS — I50.31 ACUTE DIASTOLIC CONGESTIVE HEART FAILURE: Primary | ICD-10-CM

## 2022-12-02 DIAGNOSIS — R60.0 EDEMA LEG: ICD-10-CM

## 2022-12-02 DIAGNOSIS — Z95.2 S/P AVR: Primary | ICD-10-CM

## 2022-12-02 LAB — INR PPP: 3.7 (ref 0.9–1.1)

## 2022-12-02 PROCEDURE — 36416 COLLJ CAPILLARY BLOOD SPEC: CPT | Performed by: INTERNAL MEDICINE

## 2022-12-02 PROCEDURE — 85610 PROTHROMBIN TIME: CPT | Performed by: INTERNAL MEDICINE

## 2022-12-02 NOTE — TELEPHONE ENCOUNTER
Patient in office today with increase SOA when laying flat and swelling in hands and feet. Spoke with Mary. Per     Mary    Confirm if patient is taking metolazone 2.5mg daily.     She has not had labs since June.  Ordered BNP and BMP   If she is taking the metolazone, she can increase it to 5mg daily over the weekend and let me know how she is doing Monday.       Per the pt and spouse she is taking 2.5mg metolazone. I advised take 5mg over the weekend and report back on Monday. Pt will go for labs on Monday apLPN

## 2022-12-05 ENCOUNTER — LAB (OUTPATIENT)
Dept: LAB | Facility: HOSPITAL | Age: 72
End: 2022-12-05

## 2022-12-05 DIAGNOSIS — I10 ESSENTIAL (PRIMARY) HYPERTENSION: ICD-10-CM

## 2022-12-05 DIAGNOSIS — I50.31 ACUTE DIASTOLIC CONGESTIVE HEART FAILURE: ICD-10-CM

## 2022-12-05 DIAGNOSIS — R60.0 EDEMA LEG: ICD-10-CM

## 2022-12-05 LAB
ANION GAP SERPL CALCULATED.3IONS-SCNC: 12.5 MMOL/L (ref 5–15)
BUN SERPL-MCNC: 17 MG/DL (ref 8–23)
BUN/CREAT SERPL: 14 (ref 7–25)
CALCIUM SPEC-SCNC: 9.4 MG/DL (ref 8.6–10.5)
CHLORIDE SERPL-SCNC: 99 MMOL/L (ref 98–107)
CO2 SERPL-SCNC: 26.5 MMOL/L (ref 22–29)
CREAT SERPL-MCNC: 1.21 MG/DL (ref 0.57–1)
EGFRCR SERPLBLD CKD-EPI 2021: 47.7 ML/MIN/1.73
GLUCOSE SERPL-MCNC: 92 MG/DL (ref 65–99)
NT-PROBNP SERPL-MCNC: 389 PG/ML (ref 0–900)
POTASSIUM SERPL-SCNC: 4.7 MMOL/L (ref 3.5–5.2)
SODIUM SERPL-SCNC: 138 MMOL/L (ref 136–145)

## 2022-12-05 PROCEDURE — 36415 COLL VENOUS BLD VENIPUNCTURE: CPT

## 2022-12-05 PROCEDURE — 80048 BASIC METABOLIC PNL TOTAL CA: CPT

## 2022-12-05 PROCEDURE — 83880 ASSAY OF NATRIURETIC PEPTIDE: CPT

## 2022-12-06 ENCOUNTER — TELEPHONE (OUTPATIENT)
Dept: CARDIOLOGY | Facility: CLINIC | Age: 72
End: 2022-12-06

## 2022-12-06 NOTE — TELEPHONE ENCOUNTER
Pt reports the swelling in her hands have improved. She reports no significant change in the swelling in her lower extremities or improvement when laying flat. Advised of elevated creatinine level. Pt does not see nephrology.     Pt next follow up in office is 1/13/22 with Dr. Levi

## 2022-12-06 NOTE — TELEPHONE ENCOUNTER
Patient has not had outpatient follow up with Pulmonologist. She was seen by Dr. Reyes while INPT. I suggested patient follow up with Dr. Reyes regarding her Pulmonary Hypertension. Pt and spouse verbalize understanding. Advised may continue to take metolazone 5mg daily until follow up with Dr. Levi. Advised call back if swelling worsens. Pt could not tolerate Lasix in the past. Her and her spouse state her Potassium was depleted and could not be replaced while taking Lasix.

## 2022-12-06 NOTE — TELEPHONE ENCOUNTER
She can continue metolazone 5mg daily.   She could not tolerate lasix in the past correct?   Creatinine is stable  1.2    Has patient seen pulmonary for her pulmonary hypertension.

## 2022-12-07 NOTE — TELEPHONE ENCOUNTER
Spoke to patient, advised her she may continue the metolazone, labs ordered to check kidney function. Please have these done the week prior to seeing  Dr Levi.

## 2022-12-13 RX ORDER — METOLAZONE 5 MG/1
5 TABLET ORAL DAILY
Qty: 90 TABLET | Refills: 3 | Status: SHIPPED | OUTPATIENT
Start: 2022-12-13

## 2022-12-13 NOTE — TELEPHONE ENCOUNTER
Rx Refill Note  Requested Prescriptions     Pending Prescriptions Disp Refills   • metOLazone (ZAROXOLYN) 5 MG tablet 90 tablet 3     Sig: Take 1 tablet by mouth Daily.      Last office visit with prescribing clinician: 7/15/2022   Last telemedicine visit with prescribing clinician: 1/13/2023   Next office visit with prescribing clinician: Visit date not found                         Would you like a call back once the refill request has been completed: [] Yes [] No    If the office needs to give you a call back, can they leave a voicemail: [] Yes [] No    Nereida Clemens MA  12/13/22, 14:09 EST

## 2022-12-30 DIAGNOSIS — Z95.2 S/P AVR: ICD-10-CM

## 2022-12-30 RX ORDER — WARFARIN SODIUM 3 MG/1
TABLET ORAL
Qty: 135 TABLET | Refills: 0 | Status: SHIPPED | OUTPATIENT
Start: 2022-12-30 | End: 2023-01-16

## 2022-12-30 NOTE — TELEPHONE ENCOUNTER
Rx Refill Note  Requested Prescriptions     Pending Prescriptions Disp Refills   • warfarin (COUMADIN) 3 MG tablet [Pharmacy Med Name: WARFARIN SODIUM 3 MG TABLET] 135 tablet 0     Sig: TAKE 1 TABLET BY MOUTH TUESDAY, THURSDAY, SATURDAY & SUNDAY AND 1 & 1/2 TABLET ALL OTHER DAYS OR AS DIRECTED      Last office visit with prescribing clinician: 1/14/2022   Last telemedicine visit with prescribing clinician: 1/17/2023   Next office visit with prescribing clinician: 1/17/2023   Anticoagulation Visit 12/2/22 INR 3.7                        Would you like a call back once the refill request has been completed: [] Yes [] No    If the office needs to give you a call back, can they leave a voicemail: [] Yes [] No    Francisca Peace RN  12/30/22, 13:49 EST

## 2023-01-06 ENCOUNTER — APPOINTMENT (OUTPATIENT)
Dept: GENERAL RADIOLOGY | Facility: HOSPITAL | Age: 73
DRG: 177 | End: 2023-01-06
Payer: MEDICARE

## 2023-01-06 ENCOUNTER — HOSPITAL ENCOUNTER (INPATIENT)
Facility: HOSPITAL | Age: 73
LOS: 4 days | Discharge: HOME | DRG: 177 | End: 2023-01-10
Attending: EMERGENCY MEDICINE | Admitting: STUDENT IN AN ORGANIZED HEALTH CARE EDUCATION/TRAINING PROGRAM
Payer: MEDICARE

## 2023-01-06 DIAGNOSIS — U07.1 COVID: Primary | ICD-10-CM

## 2023-01-06 DIAGNOSIS — E87.1 HYPONATREMIA: ICD-10-CM

## 2023-01-06 DIAGNOSIS — R09.02 HYPOXIA: ICD-10-CM

## 2023-01-06 LAB
ALBUMIN SERPL-MCNC: 3.8 G/DL (ref 3.5–5.2)
ALBUMIN/GLOB SERPL: 1.1 G/DL
ALP SERPL-CCNC: 77 U/L (ref 39–117)
ALT SERPL W P-5'-P-CCNC: 17 U/L (ref 1–33)
ANION GAP SERPL CALCULATED.3IONS-SCNC: 15 MMOL/L (ref 5–15)
APTT PPP: 48.7 SECONDS (ref 61–76.5)
AST SERPL-CCNC: 26 U/L (ref 1–32)
BILIRUB SERPL-MCNC: 0.5 MG/DL (ref 0–1.2)
BUN SERPL-MCNC: 21 MG/DL (ref 8–23)
BUN/CREAT SERPL: 17.1 (ref 7–25)
CALCIUM SPEC-SCNC: 8.8 MG/DL (ref 8.6–10.5)
CHLORIDE SERPL-SCNC: 90 MMOL/L (ref 98–107)
CO2 SERPL-SCNC: 20 MMOL/L (ref 22–29)
CREAT SERPL-MCNC: 1.23 MG/DL (ref 0.57–1)
D DIMER PPP FEU-MCNC: 0.28 MG/L (FEU) (ref 0–0.72)
DEPRECATED RDW RBC AUTO: 41.6 FL (ref 37–54)
EGFRCR SERPLBLD CKD-EPI 2021: 46.8 ML/MIN/1.73
ERYTHROCYTE [DISTWIDTH] IN BLOOD BY AUTOMATED COUNT: 13.8 % (ref 12.3–15.4)
FLUAV SUBTYP SPEC NAA+PROBE: NOT DETECTED
FLUBV RNA ISLT QL NAA+PROBE: NOT DETECTED
GLOBULIN UR ELPH-MCNC: 3.6 GM/DL
GLUCOSE SERPL-MCNC: 110 MG/DL (ref 65–99)
HCT VFR BLD AUTO: 39 % (ref 34–46.6)
HGB BLD-MCNC: 12.6 G/DL (ref 12–15.9)
INR PPP: 2.12 (ref 0.93–1.1)
LYMPHOCYTES # BLD MANUAL: 3.12 10*3/MM3 (ref 0.7–3.1)
LYMPHOCYTES NFR BLD MANUAL: 4 % (ref 5–12)
MCH RBC QN AUTO: 28.5 PG (ref 26.6–33)
MCHC RBC AUTO-ENTMCNC: 32.2 G/DL (ref 31.5–35.7)
MCV RBC AUTO: 88.6 FL (ref 79–97)
MONOCYTES # BLD: 0.57 10*3/MM3 (ref 0.1–0.9)
NEUTROPHILS # BLD AUTO: 10.51 10*3/MM3 (ref 1.7–7)
NEUTROPHILS NFR BLD MANUAL: 66 % (ref 42.7–76)
NEUTS BAND NFR BLD MANUAL: 8 % (ref 0–5)
NT-PROBNP SERPL-MCNC: 277 PG/ML (ref 0–900)
PLAT MORPH BLD: NORMAL
PLATELET # BLD AUTO: 210 10*3/MM3 (ref 140–450)
PMV BLD AUTO: 9 FL (ref 6–12)
POTASSIUM SERPL-SCNC: 4.1 MMOL/L (ref 3.5–5.2)
PROCALCITONIN SERPL-MCNC: 0.56 NG/ML (ref 0–0.25)
PROT SERPL-MCNC: 7.4 G/DL (ref 6–8.5)
PROTHROMBIN TIME: 20.9 SECONDS (ref 9.6–11.7)
RBC # BLD AUTO: 4.41 10*6/MM3 (ref 3.77–5.28)
RBC MORPH BLD: NORMAL
SARS-COV-2 RNA PNL SPEC NAA+PROBE: DETECTED
SCAN SLIDE: NORMAL
SODIUM SERPL-SCNC: 125 MMOL/L (ref 136–145)
TROPONIN T SERPL-MCNC: <0.01 NG/ML (ref 0–0.03)
TROPONIN T SERPL-MCNC: <0.01 NG/ML (ref 0–0.03)
VARIANT LYMPHS NFR BLD MANUAL: 22 % (ref 19.6–45.3)
WBC MORPH BLD: NORMAL
WBC NRBC COR # BLD: 14.2 10*3/MM3 (ref 3.4–10.8)

## 2023-01-06 PROCEDURE — 84145 PROCALCITONIN (PCT): CPT | Performed by: NURSE PRACTITIONER

## 2023-01-06 PROCEDURE — 25010000002 METHYLPREDNISOLONE PER 125 MG

## 2023-01-06 PROCEDURE — 85025 COMPLETE CBC W/AUTO DIFF WBC: CPT

## 2023-01-06 PROCEDURE — 71045 X-RAY EXAM CHEST 1 VIEW: CPT

## 2023-01-06 PROCEDURE — 94640 AIRWAY INHALATION TREATMENT: CPT

## 2023-01-06 PROCEDURE — 85379 FIBRIN DEGRADATION QUANT: CPT

## 2023-01-06 PROCEDURE — 87636 SARSCOV2 & INF A&B AMP PRB: CPT | Performed by: PHYSICIAN ASSISTANT

## 2023-01-06 PROCEDURE — 85007 BL SMEAR W/DIFF WBC COUNT: CPT

## 2023-01-06 PROCEDURE — 85610 PROTHROMBIN TIME: CPT

## 2023-01-06 PROCEDURE — 85730 THROMBOPLASTIN TIME PARTIAL: CPT

## 2023-01-06 PROCEDURE — 99285 EMERGENCY DEPT VISIT HI MDM: CPT

## 2023-01-06 PROCEDURE — 93005 ELECTROCARDIOGRAM TRACING: CPT

## 2023-01-06 PROCEDURE — 80053 COMPREHEN METABOLIC PANEL: CPT

## 2023-01-06 PROCEDURE — 84484 ASSAY OF TROPONIN QUANT: CPT

## 2023-01-06 PROCEDURE — 94799 UNLISTED PULMONARY SVC/PX: CPT

## 2023-01-06 PROCEDURE — 83880 ASSAY OF NATRIURETIC PEPTIDE: CPT

## 2023-01-06 RX ORDER — ALBUTEROL SULFATE 90 UG/1
2 AEROSOL, METERED RESPIRATORY (INHALATION) ONCE
Status: COMPLETED | OUTPATIENT
Start: 2023-01-06 | End: 2023-01-06

## 2023-01-06 RX ORDER — METHYLPREDNISOLONE SODIUM SUCCINATE 125 MG/2ML
125 INJECTION, POWDER, LYOPHILIZED, FOR SOLUTION INTRAMUSCULAR; INTRAVENOUS ONCE
Status: COMPLETED | OUTPATIENT
Start: 2023-01-06 | End: 2023-01-06

## 2023-01-06 RX ORDER — SODIUM CHLORIDE 0.9 % (FLUSH) 0.9 %
10 SYRINGE (ML) INJECTION AS NEEDED
Status: DISCONTINUED | OUTPATIENT
Start: 2023-01-06 | End: 2023-01-10 | Stop reason: HOSPADM

## 2023-01-06 RX ADMIN — SODIUM CHLORIDE 1000 ML: 9 INJECTION, SOLUTION INTRAVENOUS at 21:20

## 2023-01-06 RX ADMIN — METHYLPREDNISOLONE SODIUM SUCCINATE 125 MG: 125 INJECTION, POWDER, FOR SOLUTION INTRAMUSCULAR; INTRAVENOUS at 20:25

## 2023-01-06 RX ADMIN — ALBUTEROL SULFATE 2 PUFF: 108 INHALANT RESPIRATORY (INHALATION) at 20:19

## 2023-01-06 NOTE — Clinical Note
Level of Care: Telemetry [5]   Admitting Physician: JOSEFA BEE [[id number]]   Attending Physician: JOSEFA BEE [[id number]]

## 2023-01-07 ENCOUNTER — APPOINTMENT (OUTPATIENT)
Dept: CT IMAGING | Facility: HOSPITAL | Age: 73
DRG: 177 | End: 2023-01-07
Payer: MEDICARE

## 2023-01-07 PROBLEM — J18.9 ATYPICAL PNEUMONIA: Status: ACTIVE | Noted: 2023-01-07

## 2023-01-07 LAB
ALBUMIN SERPL-MCNC: 3.9 G/DL (ref 3.5–5.2)
ALBUMIN SERPL-MCNC: 3.9 G/DL (ref 3.5–5.2)
ALP SERPL-CCNC: 76 U/L (ref 39–117)
ALP SERPL-CCNC: 78 U/L (ref 39–117)
ALT SERPL W P-5'-P-CCNC: 17 U/L (ref 1–33)
ALT SERPL W P-5'-P-CCNC: 18 U/L (ref 1–33)
ANION GAP SERPL CALCULATED.3IONS-SCNC: 16 MMOL/L (ref 5–15)
AST SERPL-CCNC: 28 U/L (ref 1–32)
AST SERPL-CCNC: 28 U/L (ref 1–32)
BASOPHILS # BLD AUTO: 0.1 10*3/MM3 (ref 0–0.2)
BASOPHILS NFR BLD AUTO: 0.5 % (ref 0–1.5)
BILIRUB CONJ SERPL-MCNC: <0.2 MG/DL (ref 0–0.3)
BILIRUB CONJ SERPL-MCNC: <0.2 MG/DL (ref 0–0.3)
BILIRUB INDIRECT SERPL-MCNC: NORMAL MG/DL
BILIRUB INDIRECT SERPL-MCNC: NORMAL MG/DL
BILIRUB SERPL-MCNC: 0.2 MG/DL (ref 0–1.2)
BILIRUB SERPL-MCNC: 0.2 MG/DL (ref 0–1.2)
BUN SERPL-MCNC: 19 MG/DL (ref 8–23)
BUN/CREAT SERPL: 17.3 (ref 7–25)
CALCIUM SPEC-SCNC: 8.7 MG/DL (ref 8.6–10.5)
CHLORIDE SERPL-SCNC: 96 MMOL/L (ref 98–107)
CO2 SERPL-SCNC: 20 MMOL/L (ref 22–29)
CREAT SERPL-MCNC: 1.1 MG/DL (ref 0.57–1)
DEPRECATED RDW RBC AUTO: 42.9 FL (ref 37–54)
EGFRCR SERPLBLD CKD-EPI 2021: 53.5 ML/MIN/1.73
EOSINOPHIL # BLD AUTO: 0 10*3/MM3 (ref 0–0.4)
EOSINOPHIL NFR BLD AUTO: 0 % (ref 0.3–6.2)
ERYTHROCYTE [DISTWIDTH] IN BLOOD BY AUTOMATED COUNT: 13.9 % (ref 12.3–15.4)
GLUCOSE SERPL-MCNC: 189 MG/DL (ref 65–99)
HCT VFR BLD AUTO: 38.1 % (ref 34–46.6)
HGB BLD-MCNC: 12.3 G/DL (ref 12–15.9)
INR PPP: 2.69 (ref 2–3)
L PNEUMO1 AG UR QL IA: NEGATIVE
LYMPHOCYTES # BLD AUTO: 1.2 10*3/MM3 (ref 0.7–3.1)
LYMPHOCYTES NFR BLD AUTO: 9.8 % (ref 19.6–45.3)
MCH RBC QN AUTO: 28.6 PG (ref 26.6–33)
MCHC RBC AUTO-ENTMCNC: 32.2 G/DL (ref 31.5–35.7)
MCV RBC AUTO: 88.7 FL (ref 79–97)
MONOCYTES # BLD AUTO: 0.3 10*3/MM3 (ref 0.1–0.9)
MONOCYTES NFR BLD AUTO: 2.8 % (ref 5–12)
NEUTROPHILS NFR BLD AUTO: 10.8 10*3/MM3 (ref 1.7–7)
NEUTROPHILS NFR BLD AUTO: 86.9 % (ref 42.7–76)
NRBC BLD AUTO-RTO: 0 /100 WBC (ref 0–0.2)
PLATELET # BLD AUTO: 196 10*3/MM3 (ref 140–450)
PMV BLD AUTO: 9.1 FL (ref 6–12)
POTASSIUM SERPL-SCNC: 3.9 MMOL/L (ref 3.5–5.2)
PROCALCITONIN SERPL-MCNC: 0.34 NG/ML (ref 0–0.25)
PROT SERPL-MCNC: 7.1 G/DL (ref 6–8.5)
PROT SERPL-MCNC: 7.5 G/DL (ref 6–8.5)
PROTHROMBIN TIME: 26.2 SECONDS (ref 19.4–28.5)
RBC # BLD AUTO: 4.29 10*6/MM3 (ref 3.77–5.28)
S PNEUM AG SPEC QL LA: NEGATIVE
SODIUM SERPL-SCNC: 132 MMOL/L (ref 136–145)
WBC NRBC COR # BLD: 12.4 10*3/MM3 (ref 3.4–10.8)

## 2023-01-07 PROCEDURE — 84145 PROCALCITONIN (PCT): CPT | Performed by: EMERGENCY MEDICINE

## 2023-01-07 PROCEDURE — 36415 COLL VENOUS BLD VENIPUNCTURE: CPT | Performed by: NURSE PRACTITIONER

## 2023-01-07 PROCEDURE — 80076 HEPATIC FUNCTION PANEL: CPT | Performed by: NURSE PRACTITIONER

## 2023-01-07 PROCEDURE — 87899 AGENT NOS ASSAY W/OPTIC: CPT | Performed by: STUDENT IN AN ORGANIZED HEALTH CARE EDUCATION/TRAINING PROGRAM

## 2023-01-07 PROCEDURE — 80048 BASIC METABOLIC PNL TOTAL CA: CPT | Performed by: EMERGENCY MEDICINE

## 2023-01-07 PROCEDURE — 85025 COMPLETE CBC W/AUTO DIFF WBC: CPT | Performed by: NURSE PRACTITIONER

## 2023-01-07 PROCEDURE — 25010000002 CEFTRIAXONE PER 250 MG: Performed by: NURSE PRACTITIONER

## 2023-01-07 PROCEDURE — 25010000002 AZITHROMYCIN PER 500 MG: Performed by: NURSE PRACTITIONER

## 2023-01-07 PROCEDURE — 71250 CT THORAX DX C-: CPT

## 2023-01-07 PROCEDURE — 25010000002 REMDESIVIR 100 MG RECONSTITUTED SOLUTION: Performed by: NURSE PRACTITIONER

## 2023-01-07 PROCEDURE — XW033E5 INTRODUCTION OF REMDESIVIR ANTI-INFECTIVE INTO PERIPHERAL VEIN, PERCUTANEOUS APPROACH, NEW TECHNOLOGY GROUP 5: ICD-10-PCS | Performed by: STUDENT IN AN ORGANIZED HEALTH CARE EDUCATION/TRAINING PROGRAM

## 2023-01-07 PROCEDURE — 36415 COLL VENOUS BLD VENIPUNCTURE: CPT | Performed by: STUDENT IN AN ORGANIZED HEALTH CARE EDUCATION/TRAINING PROGRAM

## 2023-01-07 PROCEDURE — 82565 ASSAY OF CREATININE: CPT | Performed by: NURSE PRACTITIONER

## 2023-01-07 PROCEDURE — 63710000001 DEXAMETHASONE PER 0.25 MG: Performed by: STUDENT IN AN ORGANIZED HEALTH CARE EDUCATION/TRAINING PROGRAM

## 2023-01-07 PROCEDURE — C1751 CATH, INF, PER/CENT/MIDLINE: HCPCS

## 2023-01-07 PROCEDURE — 87449 NOS EACH ORGANISM AG IA: CPT | Performed by: STUDENT IN AN ORGANIZED HEALTH CARE EDUCATION/TRAINING PROGRAM

## 2023-01-07 PROCEDURE — 85610 PROTHROMBIN TIME: CPT | Performed by: STUDENT IN AN ORGANIZED HEALTH CARE EDUCATION/TRAINING PROGRAM

## 2023-01-07 RX ORDER — NITROGLYCERIN 0.4 MG/1
0.4 TABLET SUBLINGUAL
Status: DISCONTINUED | OUTPATIENT
Start: 2023-01-07 | End: 2023-01-10 | Stop reason: HOSPADM

## 2023-01-07 RX ORDER — SPIRONOLACTONE 25 MG/1
50 TABLET ORAL DAILY
Status: DISCONTINUED | OUTPATIENT
Start: 2023-01-07 | End: 2023-01-07

## 2023-01-07 RX ORDER — METOLAZONE 5 MG/1
5 TABLET ORAL DAILY
Status: DISCONTINUED | OUTPATIENT
Start: 2023-01-07 | End: 2023-01-07

## 2023-01-07 RX ORDER — ACETAMINOPHEN 325 MG/1
650 TABLET ORAL EVERY 4 HOURS PRN
Status: DISCONTINUED | OUTPATIENT
Start: 2023-01-07 | End: 2023-01-10 | Stop reason: HOSPADM

## 2023-01-07 RX ORDER — CHOLECALCIFEROL (VITAMIN D3) 125 MCG
10 CAPSULE ORAL NIGHTLY PRN
Status: DISCONTINUED | OUTPATIENT
Start: 2023-01-07 | End: 2023-01-10 | Stop reason: HOSPADM

## 2023-01-07 RX ORDER — WARFARIN SODIUM 3 MG/1
3 TABLET ORAL
Status: DISCONTINUED | OUTPATIENT
Start: 2023-01-07 | End: 2023-01-08

## 2023-01-07 RX ORDER — WARFARIN SODIUM 4 MG/1
4 TABLET ORAL
Status: DISCONTINUED | OUTPATIENT
Start: 2023-01-07 | End: 2023-01-07 | Stop reason: SDUPTHER

## 2023-01-07 RX ORDER — ONDANSETRON 4 MG/1
4 TABLET, FILM COATED ORAL EVERY 8 HOURS PRN
Status: DISCONTINUED | OUTPATIENT
Start: 2023-01-07 | End: 2023-01-10 | Stop reason: HOSPADM

## 2023-01-07 RX ORDER — CHOLECALCIFEROL (VITAMIN D3) 125 MCG
10 CAPSULE ORAL ONCE
Status: DISCONTINUED | OUTPATIENT
Start: 2023-01-07 | End: 2023-01-10 | Stop reason: HOSPADM

## 2023-01-07 RX ORDER — GABAPENTIN 400 MG/1
400 CAPSULE ORAL ONCE
Status: COMPLETED | OUTPATIENT
Start: 2023-01-07 | End: 2023-01-07

## 2023-01-07 RX ORDER — FLUTICASONE PROPIONATE 50 MCG
2 SPRAY, SUSPENSION (ML) NASAL DAILY
COMMUNITY

## 2023-01-07 RX ORDER — SODIUM CHLORIDE 0.9 % (FLUSH) 0.9 %
10 SYRINGE (ML) INJECTION AS NEEDED
Status: DISCONTINUED | OUTPATIENT
Start: 2023-01-07 | End: 2023-01-10 | Stop reason: HOSPADM

## 2023-01-07 RX ORDER — DIPHENHYDRAMINE HCL 25 MG
50 TABLET ORAL NIGHTLY PRN
Status: DISCONTINUED | OUTPATIENT
Start: 2023-01-07 | End: 2023-01-07

## 2023-01-07 RX ORDER — MELATONIN
2000 NIGHTLY
Status: DISCONTINUED | OUTPATIENT
Start: 2023-01-07 | End: 2023-01-10 | Stop reason: HOSPADM

## 2023-01-07 RX ORDER — SODIUM CHLORIDE 0.9 % (FLUSH) 0.9 %
10 SYRINGE (ML) INJECTION EVERY 12 HOURS SCHEDULED
Status: DISCONTINUED | OUTPATIENT
Start: 2023-01-07 | End: 2023-01-10 | Stop reason: HOSPADM

## 2023-01-07 RX ORDER — MECLIZINE HYDROCHLORIDE 25 MG/1
25 TABLET ORAL 3 TIMES DAILY PRN
Status: DISCONTINUED | OUTPATIENT
Start: 2023-01-07 | End: 2023-01-10 | Stop reason: HOSPADM

## 2023-01-07 RX ORDER — LANOLIN ALCOHOL/MO/W.PET/CERES
1000 CREAM (GRAM) TOPICAL DAILY
Status: DISCONTINUED | OUTPATIENT
Start: 2023-01-07 | End: 2023-01-10 | Stop reason: HOSPADM

## 2023-01-07 RX ORDER — MULTIPLE VITAMINS W/ MINERALS TAB 9MG-400MCG
1 TAB ORAL DAILY
Status: DISCONTINUED | OUTPATIENT
Start: 2023-01-07 | End: 2023-01-10 | Stop reason: HOSPADM

## 2023-01-07 RX ORDER — DIPHENHYDRAMINE HCL 25 MG
50 CAPSULE ORAL NIGHTLY PRN
Status: DISCONTINUED | OUTPATIENT
Start: 2023-01-07 | End: 2023-01-10 | Stop reason: HOSPADM

## 2023-01-07 RX ORDER — PANTOPRAZOLE SODIUM 40 MG/1
40 TABLET, DELAYED RELEASE ORAL EVERY EVENING
Status: DISCONTINUED | OUTPATIENT
Start: 2023-01-07 | End: 2023-01-10 | Stop reason: HOSPADM

## 2023-01-07 RX ORDER — FLUOXETINE HYDROCHLORIDE 20 MG/1
60 CAPSULE ORAL DAILY
Status: DISCONTINUED | OUTPATIENT
Start: 2023-01-07 | End: 2023-01-10 | Stop reason: HOSPADM

## 2023-01-07 RX ORDER — ATORVASTATIN CALCIUM 10 MG/1
10 TABLET, FILM COATED ORAL DAILY
Status: DISCONTINUED | OUTPATIENT
Start: 2023-01-07 | End: 2023-01-10 | Stop reason: HOSPADM

## 2023-01-07 RX ORDER — TIZANIDINE 4 MG/1
4 TABLET ORAL EVERY 8 HOURS PRN
Status: DISCONTINUED | OUTPATIENT
Start: 2023-01-07 | End: 2023-01-10 | Stop reason: HOSPADM

## 2023-01-07 RX ORDER — ALBUTEROL SULFATE 2.5 MG/3ML
2.5 SOLUTION RESPIRATORY (INHALATION) EVERY 4 HOURS PRN
Status: DISCONTINUED | OUTPATIENT
Start: 2023-01-07 | End: 2023-01-10 | Stop reason: HOSPADM

## 2023-01-07 RX ORDER — SUMATRIPTAN 50 MG/1
50 TABLET, FILM COATED ORAL ONCE
Status: DISCONTINUED | OUTPATIENT
Start: 2023-01-07 | End: 2023-01-10 | Stop reason: HOSPADM

## 2023-01-07 RX ORDER — DEXAMETHASONE 4 MG/1
6 TABLET ORAL DAILY
Status: DISCONTINUED | OUTPATIENT
Start: 2023-01-07 | End: 2023-01-10 | Stop reason: HOSPADM

## 2023-01-07 RX ORDER — SODIUM CHLORIDE 9 MG/ML
40 INJECTION, SOLUTION INTRAVENOUS AS NEEDED
Status: DISCONTINUED | OUTPATIENT
Start: 2023-01-07 | End: 2023-01-10 | Stop reason: HOSPADM

## 2023-01-07 RX ORDER — ONDANSETRON 4 MG/1
4 TABLET, FILM COATED ORAL EVERY 8 HOURS PRN
COMMUNITY

## 2023-01-07 RX ORDER — METOPROLOL SUCCINATE 25 MG/1
25 TABLET, EXTENDED RELEASE ORAL DAILY
Status: DISCONTINUED | OUTPATIENT
Start: 2023-01-07 | End: 2023-01-10 | Stop reason: HOSPADM

## 2023-01-07 RX ORDER — GABAPENTIN 400 MG/1
400 CAPSULE ORAL 3 TIMES DAILY
Status: DISCONTINUED | OUTPATIENT
Start: 2023-01-07 | End: 2023-01-10 | Stop reason: HOSPADM

## 2023-01-07 RX ORDER — GUAIFENESIN 600 MG/1
1200 TABLET, EXTENDED RELEASE ORAL 2 TIMES DAILY
Status: DISCONTINUED | OUTPATIENT
Start: 2023-01-07 | End: 2023-01-10 | Stop reason: HOSPADM

## 2023-01-07 RX ORDER — DOCUSATE SODIUM 100 MG/1
100 CAPSULE, LIQUID FILLED ORAL 2 TIMES DAILY
Status: DISCONTINUED | OUTPATIENT
Start: 2023-01-07 | End: 2023-01-10 | Stop reason: HOSPADM

## 2023-01-07 RX ORDER — LEVOTHYROXINE SODIUM 0.12 MG/1
125 TABLET ORAL DAILY
Status: DISCONTINUED | OUTPATIENT
Start: 2023-01-07 | End: 2023-01-10 | Stop reason: HOSPADM

## 2023-01-07 RX ORDER — DIPHENHYDRAMINE HCL 50 MG
50 CAPSULE ORAL NIGHTLY PRN
COMMUNITY

## 2023-01-07 RX ORDER — SODIUM CHLORIDE 9 MG/ML
50 INJECTION, SOLUTION INTRAVENOUS CONTINUOUS
Status: DISPENSED | OUTPATIENT
Start: 2023-01-07 | End: 2023-01-08

## 2023-01-07 RX ADMIN — CYANOCOBALAMIN TAB 1000 MCG 1000 MCG: 1000 TAB at 11:19

## 2023-01-07 RX ADMIN — SODIUM CHLORIDE 50 ML/HR: 9 INJECTION, SOLUTION INTRAVENOUS at 21:53

## 2023-01-07 RX ADMIN — GUAIFENESIN 1200 MG: 600 TABLET, EXTENDED RELEASE ORAL at 01:39

## 2023-01-07 RX ADMIN — GABAPENTIN 400 MG: 400 CAPSULE ORAL at 01:40

## 2023-01-07 RX ADMIN — CEFTRIAXONE 1 G: 1 INJECTION, POWDER, FOR SOLUTION INTRAMUSCULAR; INTRAVENOUS at 02:49

## 2023-01-07 RX ADMIN — MULTIPLE VITAMINS W/ MINERALS TAB 1 TABLET: TAB at 09:35

## 2023-01-07 RX ADMIN — LEVOTHYROXINE SODIUM 125 MCG: 0.15 TABLET ORAL at 06:10

## 2023-01-07 RX ADMIN — GUAIFENESIN 1200 MG: 600 TABLET, EXTENDED RELEASE ORAL at 21:50

## 2023-01-07 RX ADMIN — GUAIFENESIN 1200 MG: 600 TABLET, EXTENDED RELEASE ORAL at 09:35

## 2023-01-07 RX ADMIN — GABAPENTIN 400 MG: 400 CAPSULE ORAL at 16:15

## 2023-01-07 RX ADMIN — SODIUM CHLORIDE 50 ML/HR: 9 INJECTION, SOLUTION INTRAVENOUS at 16:20

## 2023-01-07 RX ADMIN — METOPROLOL SUCCINATE 25 MG: 25 TABLET, EXTENDED RELEASE ORAL at 09:36

## 2023-01-07 RX ADMIN — GABAPENTIN 400 MG: 400 CAPSULE ORAL at 09:36

## 2023-01-07 RX ADMIN — DEXAMETHASONE 6 MG: 4 TABLET ORAL at 09:36

## 2023-01-07 RX ADMIN — Medication 10 ML: at 09:35

## 2023-01-07 RX ADMIN — Medication 10 MG: at 21:50

## 2023-01-07 RX ADMIN — FLUOXETINE 60 MG: 20 CAPSULE ORAL at 11:19

## 2023-01-07 RX ADMIN — Medication 2000 UNITS: at 21:50

## 2023-01-07 RX ADMIN — WARFARIN SODIUM 3 MG: 3 TABLET ORAL at 16:16

## 2023-01-07 RX ADMIN — GABAPENTIN 400 MG: 400 CAPSULE ORAL at 21:50

## 2023-01-07 RX ADMIN — Medication 10 ML: at 21:51

## 2023-01-07 RX ADMIN — REMDESIVIR 200 MG: 100 INJECTION, POWDER, LYOPHILIZED, FOR SOLUTION INTRAVENOUS at 01:40

## 2023-01-07 RX ADMIN — PANTOPRAZOLE SODIUM 40 MG: 40 TABLET, DELAYED RELEASE ORAL at 16:15

## 2023-01-07 RX ADMIN — ATORVASTATIN CALCIUM 10 MG: 20 TABLET, FILM COATED ORAL at 09:36

## 2023-01-07 RX ADMIN — DOCUSATE SODIUM 100 MG: 100 CAPSULE, LIQUID FILLED ORAL at 21:49

## 2023-01-07 RX ADMIN — AZITHROMYCIN MONOHYDRATE 500 MG: 500 INJECTION, POWDER, LYOPHILIZED, FOR SOLUTION INTRAVENOUS at 02:49

## 2023-01-07 RX ADMIN — Medication 10 ML: at 02:49

## 2023-01-07 NOTE — PLAN OF CARE
Goal Outcome Evaluation:    Patient is able to make needs known. She's been up in the chair since arrival to our unit. 4L NC, VSS.

## 2023-01-07 NOTE — PROGRESS NOTES
[facility] Medicine Services Daily Progress Note    Patient Name: Claudia Rust  : 1950  MRN: [id number]  Primary Care Physician:  Pool Meyer NP-C  Date of admission: 2023      Subjective      Chief Complaint: Dyspnea    2023  Patient states she is feeling better but still dyspneic    ROS   12 point ROS reviewed and negative except as mentioned above        Objective      Vitals:   Temp:  [97.8 °F (36.6 °C)-99.1 °F (37.3 °C)] 97.9 °F (36.6 °C)  Heart Rate:  [63-74] 66  Resp:  [15-24] 18  BP: (115-142)/(36-72) 137/64  Flow (L/min):  [2-4] 4    Physical Exam  Constitutional:       General: She is not in acute distress.     Appearance: Normal appearance. She is obese. She is not toxic-appearing.   HENT:      Head: Normocephalic and atraumatic.      Nose: Nose normal. No congestion.      Mouth/Throat:      Pharynx: Oropharynx is clear. No oropharyngeal exudate.   Eyes:      General: No scleral icterus.  Cardiovascular:      Rate and Rhythm: Normal rate and regular rhythm.      Heart sounds: No murmur heard.    No friction rub. No gallop.   Pulmonary:      Effort: No respiratory distress.      Breath sounds: Rales present. No wheezing.   Abdominal:      General: There is no distension.      Tenderness: There is no abdominal tenderness. There is no guarding.   Musculoskeletal:         General: Tenderness present.      Cervical back: Normal range of motion. No rigidity.      Right lower leg: Edema present.      Left lower leg: Edema present.   Skin:     Coloration: Skin is not jaundiced.      Findings: No lesion.   Neurological:      General: No focal deficit present.      Mental Status: She is alert and oriented to person, place, and time.      Motor: No weakness.             Result Review    Result Review:  I have personally reviewed the results from the time of this admission to 2023 15:44 EST and agree with these findings:  [x]  Laboratory  []  Microbiology  [x]   Radiology  []  EKG/Telemetry   []  Cardiology/Vascular   []  Pathology  []  Old records  []  Other:      Wounds (last 24 hours)     LDA Wound     Row Name               [REMOVED] Wound 03/18/21 midline sternal Incision    Wound - Properties Group Placement Date: 03/18/21  -AT Present on Hospital Admission: N  -AT Orientation: midline  -AT Location: sternal  -AT Primary Wound Type: Incision  -AT Removal Date: 01/07/23  -DM Removal Time: 0118 -DM    Retired Wound - Properties Group Placement Date: 03/18/21  -AT Present on Hospital Admission: N  -AT Orientation: midline  -AT Location: sternal  -AT Primary Wound Type: Incision  -AT Removal Date: 01/07/23  -DM Removal Time: 0118 -DM    Retired Wound - Properties Group Date first assessed: 03/18/21  -AT Present on Hospital Admission: N  -AT Location: sternal  -AT Primary Wound Type: Incision  -AT Resolution Date: 01/07/23  -DM Resolution Time: 0118 -DM          User Key  (r) = Recorded By, (t) = Taken By, (c) = Cosigned By    Initials Name Provider Type    AT Fidel Grimes RN Registered Nurse    Cyndee Daugherty RN Registered Nurse                  Assessment & Plan      Brief Patient Summary:  Claudia Rust is a 72 y.o. female who  Presented due to dyspnea      atorvastatin, 10 mg, Oral, Daily  azithromycin, 500 mg, Intravenous, Q24H  [START ON 1/8/2023] cefTRIAXone, 2 g, Intravenous, Q24H  cholecalciferol, 2,000 Units, Oral, Nightly  dexamethasone, 6 mg, Oral, Daily  docusate sodium, 100 mg, Oral, BID  FLUoxetine, 60 mg, Oral, Daily  gabapentin, 400 mg, Oral, TID  guaiFENesin, 1,200 mg, Oral, BID  levothyroxine, 125 mcg, Oral, Daily  melatonin, 10 mg, Oral, Once  metoprolol succinate XL, 25 mg, Oral, Daily  multivitamin with minerals, 1 tablet, Oral, Daily  pantoprazole, 40 mg, Oral, Q PM  [START ON 1/8/2023] remdesivir, 100 mg, Intravenous, Q24H  sodium chloride, 10 mL, Intravenous, Q12H  SUMAtriptan, 50 mg, Oral, Once  vitamin B-12, 1,000 mcg, Oral,  Daily  warfarin, 3 mg, Oral, Once per day on Sun Tue Thu Sat  [START ON 1/9/2023] warfarin, 4.5 mg, Oral, Once per day on Mon Wed Fri       Pharmacy Consult - Remdesivir,   Pharmacy to dose warfarin,   sodium chloride, 50 mL/hr         Active Hospital Problems:  Active Hospital Problems    Diagnosis    • **COVID    • Atypical pneumonia    • Obesity, morbid, BMI 50 or higher (HCC)    • COPD with hypoxia (HCC)      Plan:     #Acute hypoxic Respiratory failure  #Covid 19 pneumonia    - CXR shows pneumonia vs edema    - CT chest for better clarification, wish to avoid diuresis    - covid +    - suspect covid pneumonia    - Currently on 4L [place], [place] RA [place] base    - start Remdesivir, pharm to dose    - decadron 6mg daily    - patient on warfarin, pharm to dose    - procal 0.56 > 0.34    - cover with Rocephin and azithromycin    - strep and legionella, if negative can d/c azithromycin    - LE edema chronic per pt, bnp below base, check duplex     #COPD    - possible exacerbation, but suspect covid pneumoina    #Aortic stenosis    - s/p Aortic valve replaceemtn on 3/18/21 with #23 magna pericardial prosthesis    - continue warfarin, pharm to dose    #DM    -ISS    #Hyponatremia  #HFpEF    - improving with treatment    - follow labs    - 125 > 132    - hold home Metolazone and Aldactone    #Hypothyroid    - resume home synthroid    #GERD    - PPI          DVT prophylaxis:  Medical DVT prophylaxis orders are present.    CODE STATUS:    Code Status (Patient has no pulse and is not breathing): CPR (Attempt to Resuscitate)  Medical Interventions (Patient has pulse or is breathing): Full Support      Disposition:  I expect patient to be discharged in 2-3 days.    This patient has been examined wearing appropriate Personal Protective Equipment and discussed with Patient. 01/07/23      Electronically signed by Aramis Israel DO, 01/07/23, 15:44 EST.  [facility] Hospitalist Team

## 2023-01-07 NOTE — ED PROVIDER NOTES
"Subjective   History of Present Illness  Chief Complaint: Cough, congestion recent exposure      HPI: Patient is a 72-year-old female presents to the ER today by private vehicle complaining of cough, congestion recent exertional shortness of breath nausea vomiting.  States that the symptoms started approximately 5 days ago, she has had recent exposure she has received her flu COVID and pneumonia vaccine she has a history of COPD does not wear oxygen at  this time.  She also complains of some muscle discomfort to her abdomen following multiple coughing episodes.    PCP: Dukes   Pulm: Draw  Cardio: Gurmeet         Review of Systems   Respiratory: Positive for cough and shortness of breath.    Gastrointestinal: Positive for nausea and vomiting.   Musculoskeletal: Positive for myalgias.   All other systems reviewed and are negative.      Past Medical History:   Diagnosis Date   • Acute congestive heart failure ([facility])    • Allergies    • Anxiety    • Arthritis    • Asthma    • Bilateral leg edema    • Bipolar 1 disorder ([facility])    • Bulging lumbar disc     X3   • Cancer ([facility])     states had \"cancerous tumor during pregnancy and had to have hysterectomy\"   • Cataract     bilateral   • Cellulitis 03/2021    bilateral legs   • Compression fracture of vertebral column ([facility])     LUMBAR   • COPD (chronic obstructive pulmonary disease) ([facility])    • Delayed emergence from anesthesia    • Depression    • Depression    • Diabetes mellitus ([facility])    • Dyslipidemia    • Dyspnea on exertion    • Fibromyalgia    • GERD (gastroesophageal reflux disease)    • Hiatal hernia    • Hyperlipidemia    • Hypertension    • Hypothyroid     HYPOTHYROID   • IBS (irritable bowel syndrome)    • Migraines    • Morbid obesity ([facility])    • Neuropathy    • Panic attacks    • Peripheral edema    • PONV (postoperative nausea and vomiting)    • Poor mobility     rolling walker   • Prediabetes    • PVD (peripheral vascular disease) ([facility])    • Rheumatoid aortitis  "   • Spinal stenosis    • Spinal stenosis    • Vertigo    • Vertigo    • Vitamin D deficiency        Allergies   Allergen Reactions   • Adhesive Tape Unknown (See Comments)     hives   • Butorphanol Other (See Comments)     Chest pain difficulty breathing throat swelling   • Garlic Other (See Comments)     Chest pain difficulty breathing throat swells   • Heparin Other (See Comments)     HIT +   • Tetanus-Diphtheria Toxoids Td Other (See Comments)     Dizziness,  vomiting   • Aloe Itching   • Bee Venom Other (See Comments)     Swelling eyes and lips hand swelling   • Latex Itching   • Macadamia Nut Oil Other (See Comments)     Chest pain difficulty breathing throat swelling   • Tuberculin, Ppd Unknown (See Comments)     reactor   • Wasp Venom Other (See Comments)     Swelling eyes lips and hand       Past Surgical History:   Procedure Laterality Date   • AORTIC VALVE REPAIR/REPLACEMENT N/A 3/18/2021    Procedure: AORTIC VALVE REPLACEMENT;  Surgeon: Olaf Mcgill MD;  Location: [facility] CVOR;  Service: Cardiothoracic;  Laterality: N/A;   • BRONCHOSCOPY N/A 3/25/2021    Procedure: BRONCHOSCOPY AT BEDSIDE WITH BRONCHIOALVEOLAR LAVAGE;  Surgeon: Glenn Reyes MD;  Location: [facility] ENDOSCOPY;  Service: Pulmonary;  Laterality: N/A;  PNA   • CARDIAC CATHETERIZATION  2009   • CARDIAC CATHETERIZATION N/A 8/14/2019    Procedure: Left Heart Cath;  Surgeon: Jose Levi MD;  Location: [facility] CATH INVASIVE LOCATION;  Service: Cardiovascular   • CARDIAC CATHETERIZATION N/A 8/14/2019    Procedure: Right Heart Cath;  Surgeon: Jose Levi MD;  Location: [facility] CATH INVASIVE LOCATION;  Service: Cardiovascular   • CARDIAC CATHETERIZATION N/A 8/14/2019    Procedure: Aortic root aortogram;  Surgeon: Jose Levi MD;  Location: [facility] CATH INVASIVE LOCATION;  Service: Cardiovascular   • CARDIAC CATHETERIZATION N/A 1/20/2021    Procedure: Left Heart Cath;  Surgeon: Jose Levi MD;   Location: [facility] INVASIVE LOCATION;  Service: Cardiovascular;  Laterality: N/A;   • COLONOSCOPY     • CYSTOCELE REPAIR  1984   • ELBOW ARTHROPLASTY  2011   • ENDOSCOPY     • FOOT SURGERY     • GALLBLADDER SURGERY  2002   • TONSILLECTOMY     • VAGINAL HYSTERECTOMY  1972       No family history on file.    Social History     Socioeconomic History   • Marital status:    Tobacco Use   • Smoking status: Former     Packs/day: 1.00     Types: Cigarettes   • Smokeless tobacco: Never   • Tobacco comments:     decrease now and do not smoke dos   Vaping Use   • Vaping Use: Never used   Substance and Sexual Activity   • Alcohol use: Not Currently     Comment: very rare   • Drug use: Never   • Sexual activity: Defer           Objective   Physical Exam  Vitals reviewed.   Constitutional:       Appearance: She is obese. She is ill-appearing.   HENT:      Head: Normocephalic.      Mouth/Throat:      Mouth: Mucous membranes are dry.      Pharynx: Oropharynx is clear.   Eyes:      Extraocular Movements: Extraocular movements intact.      Pupils: Pupils are equal, round, and reactive to light.   Cardiovascular:      Rate and Rhythm: Normal rate.      Pulses: Normal pulses.      Heart sounds: Normal heart sounds. No murmur heard.  Pulmonary:      Effort: Tachypnea and accessory muscle usage present.      Breath sounds: Wheezing present.   Abdominal:      General: Bowel sounds are normal.      Palpations: Abdomen is soft.      Tenderness: There is no abdominal tenderness.   Musculoskeletal:      Cervical back: Neck supple. No rigidity.   Skin:     General: Skin is warm and dry.      Capillary Refill: Capillary refill takes less than 2 seconds.      Coloration: Skin is pale.   Neurological:      General: No focal deficit present.      Mental Status: She is alert and oriented to person, place, and time. Mental status is at baseline.      Motor: No weakness.   Psychiatric:         Mood and Affect: Mood normal.         Behavior:  "Behavior normal.         Thought Content: Thought content normal.         Judgment: Judgment normal.         ECG 12 Lead      Date/Time: 1/6/2023 7:49 PM  Performed by: Margarita Cottrell APRN  Authorized by: Darius Mello MD   Interpreted by physician  Comparison: compared with previous ECG from 3/23/2021  Comparison to previous ECG: Sinus Donald 59  Rhythm: sinus rhythm  Rate: normal  BPM: 64  ST Segments: ST segments normal                   ED Course      /72   Pulse 70   Temp 98.4 °F (36.9 °C)   Resp 20   Ht 162.6 cm (64\")   Wt 132 kg (291 lb 10.7 oz)   SpO2 94%   BMI 50.06 kg/m²   Labs Reviewed   COVID-19 AND FLU A/B, NP SWAB IN TRANSPORT MEDIA 8-12 HR TAT - Abnormal; Notable for the following components:       Result Value    COVID19 Detected (*)     All other components within normal limits    Narrative:     Fact sheet for providers: https://www.fda.gov/media/109320/download    Fact sheet for patients: https://www.fda.gov/media/547050/download    Test performed by PCR.  Influenza A and Influenza B negative results should be considered presumptive in samples that have a positive SARS-CoV-2 result.    Competitive inhibition studies showed that SARS-CoV-2 virus, when present at concentrations above 3.6E+04 copies/mL, can inhibit the detection and amplification of influenza A and influenza B virus RNA if present at or below 1.8E+02 copies/mL or 4.9E+02 copies/mL, respectively, and may lead to false negative influenza virus results. If co-infection with influenza A or influenza B virus is suspected in samples with a positive SARS-CoV-2 result, the sample should be re-tested with another FDA cleared, approved, or authorized influenza test, if influenza virus detection would change clinical management.   COMPREHENSIVE METABOLIC PANEL - Abnormal; Notable for the following components:    Glucose 110 (*)     Creatinine 1.23 (*)     Sodium 125 (*)     Chloride 90 (*)     CO2 20.0 (*)     eGFR 46.8 (*)  "    All other components within normal limits    Narrative:     GFR Normal >60  Chronic Kidney Disease <60  Kidney Failure <15    The GFR formula is only valid for adults with stable renal function between ages 18 and 70.   PROTIME-INR - Abnormal; Notable for the following components:    Protime 20.9 (*)     INR 2.12 (*)     All other components within normal limits   APTT - Abnormal; Notable for the following components:    PTT 48.7 (*)     All other components within normal limits   CBC WITH AUTO DIFFERENTIAL - Abnormal; Notable for the following components:    WBC 14.20 (*)     All other components within normal limits    Narrative:     The previously reported component NRBC is no longer being reported. Previous result was 0.1 /100 WBC (Reference Range: 0.0-0.2 /100 WBC) on 1/6/2023 at 2029 EST.   MANUAL DIFFERENTIAL - Abnormal; Notable for the following components:    Monocyte % 4.0 (*)     Bands %  8.0 (*)     Neutrophils Absolute 10.51 (*)     Lymphocytes Absolute 3.12 (*)     All other components within normal limits   BNP (IN-HOUSE) - Normal    Narrative:     Among patients with dyspnea, NT-proBNP is highly sensitive for the detection of acute congestive heart failure. In addition NT-proBNP of <300 pg/ml effectively rules out acute congestive heart failure with 99% negative predictive value.     TROPONIN (IN-HOUSE) - Normal    Narrative:     Troponin T Reference Range:  <= 0.03 ng/mL-   Negative for AMI  >0.03 ng/mL-     Abnormal for myocardial necrosis.  Clinicians would have to utilize clinical acumen, EKG, Troponin and serial changes to determine if it is an Acute Myocardial Infarction or myocardial injury due to an underlying chronic condition.       Results may be falsely decreased if patient taking Biotin.     TROPONIN (IN-HOUSE) - Normal    Narrative:     Troponin T Reference Range:  <= 0.03 ng/mL-   Negative for AMI  >0.03 ng/mL-     Abnormal for myocardial necrosis.  Clinicians would have to utilize  "clinical acumen, EKG, Troponin and serial changes to determine if it is an Acute Myocardial Infarction or myocardial injury due to an underlying chronic condition.       Results may be falsely decreased if patient taking Biotin.     D-DIMER, QUANTITATIVE - Normal    Narrative:     According to the assay 's published package insert, a normal (<0.50 mg/L (FEU)) D-dimer result in conjunction with a non-high clinical probability assessment, excludes deep vein thrombosis (DVT) and pulmonary embolism (PE) with high sensitivity.    D-dimer values increase with age and this can make VTE exclusion of an older population difficult. To address this, the [employer], based on best available evidence and recent guidelines, recommends that clinicians use age-adjusted D-dimer thresholds in patients greater than 50 years of age with: a) a low probability of PE who do not meet all Pulmonary Embolism Rule Out Criteria, or b) in those with intermediate probability of PE.   The formula for an age-adjusted D-dimer cut-off is \"age/100\".  For example, a 60 year old patient would have an age-adjusted cut-off of 0.60 mg/L (FEU) and an 80 year old 0.80 mg/L (FEU).   SCAN SLIDE   CBC AND DIFFERENTIAL    Narrative:     The following orders were created for panel order CBC & Differential.  Procedure                               Abnormality         Status                     ---------                               -----------         ------                     CBC Auto Differential[[id number]]        Abnormal            Final result               Scan Slide[[id number]]                                       Final result                 Please view results for these tests on the individual orders.     Medications   sodium chloride 0.9 % flush 10 mL (has no administration in time range)   albuterol sulfate HFA (PROVENTIL HFA;VENTOLIN HFA;PROAIR HFA) inhaler 2 puff (2 puffs Inhalation Given 1/6/23 2019) "   methylPREDNISolone sodium succinate (SOLU-Medrol) injection 125 mg (125 mg Intravenous Given 1/6/23 2025)   sodium chloride 0.9 % bolus 1,000 mL (1,000 mL Intravenous New Bag 1/6/23 2120)     XR Chest 1 View    Result Date: 1/6/2023  Impression: Increased hazy and interstitial opacities noted. Findings represent underlying interstitial edema. Viral and atypical pneumonia also in the differential. Electronically Signed: Micah Neil  1/6/2023 8:03 PM EST  Workstation ID: [id number]                                         Medical Decision Making  As patient presents to the ED with complaints of SOA and cough, she does have a history of COPD, though she does not wear oxygen regularly her O2 sats were sustained at 91 and occasionally dropping to 89% while she was wearing 2 L nasal cannula.  White blood cell count elevated at 14, respiratory panel was positive for COVID-19.  Chest x-ray also shows some pneumonia, radiologist noted versus pulmonary edema though her proBNP was within normal limits, likely pneumonia with patient's presentation.  D-dimer within normal limits as well, because of this CT PE protocol was not added.  Patient was therapeutic at 2.12 on her INR as she is currently on warfarin.  Significant finding includes hyponatremia with a sodium of 126 she was given a liter bolus of normal saline.  Solu-Medrol was administered in the emergency room intravenously, per hospital protocol as the patient is COVID-19 positive we are unable to provide nebulizing treatments she was giving a Proventil inhaler for her wheezing and rhonchi.  I discussed ED findings with the patient and her  at bedside, she is reluctant for admission, I discussed the possible complications if she was to leave if she does not wear oxygen at all times and she is having some hypoxia related to her complaint.  Patient case was discussed with Nevaeh nurse practitioner with the hospitalist group who was agreeable, admitting physician  Dr. Israel.    Comorbidities: see above, reviewed     Differentials: Viral, Pneumonia, PE, ACS, COPD exacerbation not all inclusive of differentials considered    This document is intended for medical expert use only.  Reading of this document by patient and/or patient's family without participating medical staff guidance may result misinterpretation and unintended morbidity.  Any interpretation of such data is the responsibility of the patient and or family member responsible for the patient in concert with their primary specialist providers, not to be left for resources of online searches such as Web MD, [employer] or similar core use.  Relying on these approaches to her knowledge may result in misinterpretation, misguided goals of care and even death should patient's or family history of recommendations outside of drama professional medical care in a supervised inpatient environment.    Appropriate PPE worn throughout the care of this patient.    Part of this note may be an electronic transcription/translation of spoken language to printed text using the Dragon Dictation System.         COVID: complicated acute illness or injury  Hyponatremia: complicated acute illness or injury that poses a threat to life or bodily functions  Hypoxia: complicated acute illness or injury that poses a threat to life or bodily functions  Amount and/or Complexity of Data Reviewed  Independent Historian: spouse     Details: Patient   External Data Reviewed: labs, radiology and notes.     Details: 9/15/2022 Epistaxis     7/15/2022 Cardiology office visit   Chronic MAHAD PADILLACynthia Jhaveri, currently on warfarin   5/13/2022 CXR  No acute cardiopulmonary abnormality identified.     3/18/2022 CT Chest  1. Lung RADS category 2: Benign appearance. Stable 4 mm left lower lobe pulmonary nodule. Follow-up low-dose screening CT is recommended in 12 months    12/2022 Creat 1.21  Labs: ordered. Decision-making details documented in ED Course.     Details: INR  2.12- currently on Warfarin  Na 126  Radiology: ordered and independent interpretation performed. Decision-making details documented in ED Course.  ECG/medicine tests: ordered and independent interpretation performed. Decision-making details documented in ED Course.      Risk  Prescription drug management.  Decision regarding hospitalization.          Final diagnoses:   COVID   Hypoxia   Hyponatremia       ED Disposition  ED Disposition     ED Disposition   Decision to Admit    Condition   --    Comment   Level of Care: Telemetry [5]   Diagnosis: COVID [[id number]]   Admitting Physician: JOSEFA BEE [[id number]]   Attending Physician: JOSEFA BEE [[id number]]   Certification: I Certify That Inpatient Hospital Services Are Medically Necessary For Greater Than 2 Midnights               No follow-up provider specified.       Medication List      No changes were made to your prescriptions during this visit.          Margarita Cottrell, SARA  01/06/23 2248       Margarita Cottrell, SARA  01/06/23 2248

## 2023-01-07 NOTE — PROGRESS NOTES
"Pharmacy Dosing Service  Anticoagulant  Warfarin     Subjective:  Claudia Rust is a 72 y.o.female with pharmacy consulted to dose warfarin for the indication of atrial fibrillation.    Date 1/7           INR 2.69           Dose 3mg               Assessment/Plan:    Continue home regimen given therapeutic INR (3 mg dose due today).    Continue to monitor INR and adjust as needed. Daily INR ordered.       INR Goal: 2 - 3  Continuation of a Home Dose?:  warfarin 3 mg PO daily, except for 4.5 mg On MWF  Bridge Therapy Present?:  No  Interacting Medications Evaluation (New/Present/Dicontinued): azithromycin, ceftriaxone, fluoxetine, remdesivir (may increase INR)  Additional Contributing Factors: covid (+). INR is monitored by Dr Levi's office.     Objective:  [Ht: 162.6 cm (64.02\"); Wt: (!) 141 kg (310 lb 3 oz); BMI: Body mass index is 53.22 kg/m².]  Lab Results   Component Value Date    ALBUMIN 3.9 01/07/2023     Lab Results   Component Value Date    INR 2.69 01/07/2023    INR 2.12 (C) 01/06/2023    INR 3.70 (A) 12/02/2022    PROTIME 26.2 01/07/2023    PROTIME 20.9 (H) 01/06/2023    PROTIME 32.1 (H) 09/15/2022     Lab Results   Component Value Date    HGB 12.3 01/07/2023    HGB 12.6 01/06/2023    HGB 13.2 09/15/2022     Lab Results   Component Value Date    HCT 38.1 01/07/2023    HCT 39.0 01/06/2023    HCT 40.2 09/15/2022       Liliana Byers, PharmD, BCPS  01/07/23 13:06 EST    "

## 2023-01-07 NOTE — PLAN OF CARE
Problem: Adult Inpatient Plan of Care  Goal: Plan of Care Review  Outcome: Ongoing, Progressing  Goal: Patient-Specific Goal (Individualized)  Outcome: Ongoing, Progressing  Goal: Absence of Hospital-Acquired Illness or Injury  Outcome: Ongoing, Progressing  Goal: Optimal Comfort and Wellbeing  Outcome: Ongoing, Progressing  Goal: Readiness for Transition of Care  Outcome: Ongoing, Progressing  Intervention: Mutually Develop Transition Plan  Recent Flowsheet Documentation  Taken 1/7/2023 0116 by Cyndee Knight, RN  Transportation Anticipated: family or friend will provide  Patient/Family Anticipated Services at Transition: none  Patient/Family Anticipates Transition to: home with family  Taken 1/7/2023 0054 by Cyndee Knight, RN  Equipment Currently Used at Home:   rollator   power chair,(recliner lift)   cane, straight     Problem: Fall Injury Risk  Goal: Absence of Fall and Fall-Related Injury  Outcome: Ongoing, Progressing   Goal Outcome Evaluation:         New admit from ED.  Admission completed medication reviewed with patient

## 2023-01-07 NOTE — H&P
"    [facility] Medicine Services  History & Physical    Patient Name: Claudia Rust  : 1950  MRN: [id number]  Primary Care Physician:  Pool Meyer NP-C  Date of admission: 2023  Date and Time of Service: 23 at 2300    Subjective      Chief Complaint: \" flu-like sympotoms\"    History of Present Illness: Claudia Rust is a 72 y.o. female with past medical history of morbid obesity, hypertension, COPD not on home oxygen, GERD, congestive heart failure, aortic valve stenosis on warfarin status post bovine aortic valve replacement, bipolar disorder, hyperlipidemia who presented to [facility] on 2023 complaining of weakness, shortness of breath and flulike symptoms.    Patient reports symptoms started on 23.  She denies any ill contacts.  She denies fever but having symptoms of cold chills and sweats.  She reports persistent dry cough which she is having residual chest pain.  Reports unable to sleep as she is unable to lie flat.  Positive for increased weakness and decreased mobility.  Having intermittent diarrhea and nausea.    In the ER she was afebrile.  Vital signs stable.  Having decreased O2 sats requiring 2 L nasal cannula to keep sats 93%.  Respiratory panel was positive for COVID-19.  Positive for hyponatremia at 125.  Acute kidney injury of 1.2.  INR 2.1.  White blood cell 14.2.  Chest x-ray positive for hazy and interstitial opacities.  Represent underlying interstitial edema but viral and atypical pneumonia also in the differential.  proBNP 277.  Troponins within normal limits.  Further work-up showed elevated Pro-Christ at 0.56.  Will start remdesivir and empiric antibiotics.      Review of Systems   Constitutional: Positive for chills and night sweats.   HENT: Negative.    Eyes: Negative.    Cardiovascular: Positive for dyspnea on exertion.        Chronic lower extremity edema    Respiratory: Positive for cough, shortness of breath and wheezing.  " "  Endocrine: Negative.    Skin: Negative.         Personal History     Past Medical History:   Diagnosis Date   • Acute congestive heart failure (HCC)    • Allergies    • Anxiety    • Arthritis    • Asthma    • Bilateral leg edema    • Bipolar 1 disorder ([facility])    • Bulging lumbar disc     X3   • Cancer ([facility])     states had \"cancerous tumor during pregnancy and had to have hysterectomy\"   • Cataract     bilateral   • Cellulitis 03/2021    bilateral legs   • Compression fracture of vertebral column ([facility])     LUMBAR   • COPD (chronic obstructive pulmonary disease) ([facility])    • Delayed emergence from anesthesia    • Depression    • Depression    • Diabetes mellitus ([facility])    • Dyslipidemia    • Dyspnea on exertion    • Fibromyalgia    • GERD (gastroesophageal reflux disease)    • Hiatal hernia    • Hyperlipidemia    • Hypertension    • Hypothyroid     HYPOTHYROID   • IBS (irritable bowel syndrome)    • Migraines    • Morbid obesity ([facility])    • Neuropathy    • Panic attacks    • Peripheral edema    • PONV (postoperative nausea and vomiting)    • Poor mobility     rolling walker   • Prediabetes    • PVD (peripheral vascular disease) ([facility])    • Rheumatoid aortitis    • Spinal stenosis    • Spinal stenosis    • Vertigo    • Vertigo    • Vitamin D deficiency        Past Surgical History:   Procedure Laterality Date   • AORTIC VALVE REPAIR/REPLACEMENT N/A 3/18/2021    Procedure: AORTIC VALVE REPLACEMENT;  Surgeon: Olaf Mcgill MD;  Location: [facility] CVOR;  Service: Cardiothoracic;  Laterality: N/A;   • BRONCHOSCOPY N/A 3/25/2021    Procedure: BRONCHOSCOPY AT BEDSIDE WITH BRONCHIOALVEOLAR LAVAGE;  Surgeon: Glenn Reyes MD;  Location: [facility] ENDOSCOPY;  Service: Pulmonary;  Laterality: N/A;  PNA   • CARDIAC CATHETERIZATION  2009   • CARDIAC CATHETERIZATION N/A 8/14/2019    Procedure: Left Heart Cath;  Surgeon: Jose Levi MD;  Location: [facility] CATH INVASIVE LOCATION;  Service: Cardiovascular   • CARDIAC " CATHETERIZATION N/A 8/14/2019    Procedure: Right Heart Cath;  Surgeon: Jose Levi MD;  Location: [facility] CATH INVASIVE LOCATION;  Service: Cardiovascular   • CARDIAC CATHETERIZATION N/A 8/14/2019    Procedure: Aortic root aortogram;  Surgeon: Jose Levi MD;  Location: [facility] CATH INVASIVE LOCATION;  Service: Cardiovascular   • CARDIAC CATHETERIZATION N/A 1/20/2021    Procedure: Left Heart Cath;  Surgeon: Jose Levi MD;  Location: [facility] CATH INVASIVE LOCATION;  Service: Cardiovascular;  Laterality: N/A;   • COLONOSCOPY     • CYSTOCELE REPAIR  1984   • ELBOW ARTHROPLASTY  2011   • ENDOSCOPY     • FOOT SURGERY     • GALLBLADDER SURGERY  2002   • TONSILLECTOMY     • VAGINAL HYSTERECTOMY  1972       Family History: family history is not on file. Otherwise pertinent FHx was reviewed and not pertinent to current issue.    Social History:  reports that she has quit smoking. Her smoking use included cigarettes. She smoked an average of 1 pack per day. She has never used smokeless tobacco. She reports that she does not currently use alcohol. She reports that she does not use drugs.    Home Medications:  Prior to Admission Medications     Prescriptions Last Dose Informant Patient Reported? Taking?    Acetaminophen (TYLENOL PO)   Yes No    Take 650 mg by mouth Every 4 (Four) Hours As Needed.    albuterol (PROVENTIL) (2.5 MG/3ML) 0.083% nebulizer solution  Self Yes No    Inhale 2.5 mg Every 6 (Six) Hours As Needed. DURING WINTER IF NEEDED HASNT NEEDED    alendronate (FOSAMAX) 70 MG tablet   Yes No    Take 1 tablet by mouth Every 7 (Seven) Days.    aluminum-magnesium hydroxide-simethicone (MAALOX/MYLANTA) 200-200-20 MG/5ML suspension  Self Yes No    Take 15 mL by mouth Every 6 (Six) Hours As Needed for Indigestion or Heartburn.    B Complex Vitamins (VITAMIN B COMPLEX) capsule capsule  Self Yes No    Take 1 capsule by mouth Daily. LAST DOSE 3/4    Calcium-Magnesium-Vitamin D (CALCIUM  1200+D3 PO)   Yes No    Take 1 capsule by mouth Daily.    cephalexin (KEFLEX) 500 MG capsule   No No    Take 1 capsule by mouth 2 (Two) Times a Day.    cholecalciferol (Cholecalciferol) 25 MCG (1000 UT) tablet  Self Yes No    Take 2,000 Units by mouth Every Night. LAST DOSE 3/4    docusate sodium 100 MG capsule   No No    Take 1 capsule by mouth 2 (Two) Times a Day.    FLUoxetine (PROzac) 20 MG capsule   Yes No    gabapentin (NEURONTIN) 400 MG capsule   Yes No    Take 1 capsule by mouth 3 (Three) Times a Day.    levothyroxine (SYNTHROID, LEVOTHROID) 125 MCG tablet   Yes No    Take 125 mcg by mouth Daily.    meclizine (ANTIVERT) 25 MG tablet  Self Yes No    Take 25 mg by mouth 3 (Three) Times a Day As Needed.    Melatonin 10 MG capsule   Yes No    Take  by mouth.    metFORMIN ER (GLUCOPHAGE-XR) 500 MG 24 hr tablet   Yes No    Take 1 tablet by mouth Daily With Breakfast.    metOLazone (ZAROXOLYN) 5 MG tablet   No No    Take 1 tablet by mouth Daily.    metoprolol succinate XL (TOPROL-XL) 25 MG 24 hr tablet   Yes No    Take 25 mg by mouth Daily.    Multiple Vitamins-Minerals (MULTIVITAMIN WITH MINERALS) tablet tablet  Self Yes No    Take 1 tablet by mouth Daily. LAST DOSE 3/4    pantoprazole (PROTONIX) 40 MG EC tablet  Self Yes No    Take 40 mg by mouth Every Evening.    rizatriptan MLT (MAXALT-MLT) 10 MG disintegrating tablet  Self Yes No    Take 10 mg by mouth 1 (One) Time As Needed for Migraine. May repeat in 2 hours if needed    simvastatin (ZOCOR) 20 MG tablet   Yes No    spironolactone (ALDACTONE) 25 MG tablet   No No    TAKE 2 TABLETS BY MOUTH EVERY DAY    tiZANidine (ZANAFLEX) 4 MG tablet  Self Yes No    Take 4 mg by mouth Every 8 (Eight) Hours As Needed for Muscle Spasms. 8 MG NIGHTLY    vitamin B-12 (CYANOCOBALAMIN) 1000 MCG tablet   Yes No    Take 1,000 mcg by mouth Daily.    warfarin (COUMADIN) 3 MG tablet   No No    TAKE 1 TABLET BY MOUTH TUESDAY, THURSDAY, SATURDAY & SUNDAY AND 1 & 1/2 TABLET ALL OTHER  DAYS OR AS DIRECTED            Allergies:  Allergies   Allergen Reactions   • Adhesive Tape Unknown (See Comments)     hives   • Butorphanol Other (See Comments)     Chest pain difficulty breathing throat swelling   • Garlic Other (See Comments)     Chest pain difficulty breathing throat swells   • Heparin Other (See Comments)     HIT +   • Tetanus-Diphtheria Toxoids Td Other (See Comments)     Dizziness,  vomiting   • Aloe Itching   • Bee Venom Other (See Comments)     Swelling eyes and lips hand swelling   • Latex Itching   • Macadamia Nut Oil Other (See Comments)     Chest pain difficulty breathing throat swelling   • Tuberculin, Ppd Unknown (See Comments)     reactor   • Wasp Venom Other (See Comments)     Swelling eyes lips and hand       Objective      Vitals:   Temp:  [98.4 °F (36.9 °C)-99.1 °F (37.3 °C)] 99.1 °F (37.3 °C)  Heart Rate:  [65-74] 70  Resp:  [20-24] 20  BP: (115-142)/(36-72) 142/36  Flow (L/min):  [2-3] 2    Physical Exam  Constitutional:       Appearance: She is ill-appearing.   Eyes:      Pupils: Pupils are equal, round, and reactive to light.   Cardiovascular:      Rate and Rhythm: Normal rate and regular rhythm.      Pulses: Normal pulses.   Pulmonary:      Effort: Pulmonary effort is normal.      Breath sounds: Wheezing present.      Comments: New 2L NC  Abdominal:      Palpations: Abdomen is soft.      Comments: Obese abdomen    Musculoskeletal:      Right lower leg: Edema present.   Skin:     General: Skin is warm and dry.   Neurological:      General: No focal deficit present.      Mental Status: She is alert and oriented to person, place, and time.   Psychiatric:         Mood and Affect: Mood normal.         Behavior: Behavior normal.          Result Review    Result Review:  I have personally reviewed the results from the time of this admission to 1/7/2023 02:17 EST and agree with these findings:  [x]  Laboratory  []  Microbiology  [x]  Radiology  []  EKG/Telemetry   []   Cardiology/Vascular   []  Pathology  []  Old records  []  Other:  Most notable findings include: see above HPI       Assessment & Plan        Active Hospital Problems:  Active Hospital Problems    Diagnosis    • **COVID    • Atypical pneumonia    • Obesity, morbid, BMI 50 or higher (HCC)    • COPD with hypoxia (HCC)      Plan:   Acute hypoxic respiratory failure 2/2 COPD, pneumonia, and COVID-19 infection  - start remdesivir  -Start empiric Rocephin and azithromycin  -O2 with plan to wean as tolerated to room air  -Incentive spirometer    Generalized weakness 2/2 to viral illness  - PT eval for safety to return home    HFpEF  - on aldactone and metolazone, will hold due to hyponatremia and reports of diarrhea and mild JERROD  - May need dose adjusted   - CXR with possible interstitial edema  - chronic lower extremity edema  - daily weight  - monitor volume status    Aortic valve stenosis s/p bovine AVR  - continue Warfarin  - therapeutic INR     Hyponatremia  - likely 2/2 to reports of decreased appetite and diarrhea   - IVF given in ER  - repeat BMP     DM with neuropathy  - continue gabapentin  - A1C 6.4 12/16  - hold metformin  - SS insulin if needed     Bipolar disorder  - continue prozac     DVT prophylaxis:  Medical DVT prophylaxis orders are present.    CODE STATUS:    Code Status (Patient has no pulse and is not breathing): CPR (Attempt to Resuscitate)  Medical Interventions (Patient has pulse or is breathing): Full Support    Admission Status:  I believe this patient meets inpatient  status.    I discussed the patient's findings and my recommendations with patient and family.    This patient has been examined wearing appropriate Personal Protective Equipment     Signature: Electronically signed by SARA Mcmillan, 01/07/23, 02:17 EST.  [facility]ist Team

## 2023-01-08 ENCOUNTER — APPOINTMENT (OUTPATIENT)
Dept: CARDIOLOGY | Facility: HOSPITAL | Age: 73
DRG: 177 | End: 2023-01-08
Payer: MEDICARE

## 2023-01-08 LAB
ALBUMIN SERPL-MCNC: 3.6 G/DL (ref 3.5–5.2)
ALP SERPL-CCNC: 81 U/L (ref 39–117)
ALT SERPL W P-5'-P-CCNC: 20 U/L (ref 1–33)
AST SERPL-CCNC: 32 U/L (ref 1–32)
B PARAPERT DNA SPEC QL NAA+PROBE: NOT DETECTED
B PERT DNA SPEC QL NAA+PROBE: NOT DETECTED
BH CV LOWER VASCULAR LEFT COMMON FEMORAL AUGMENT: NORMAL
BH CV LOWER VASCULAR LEFT COMMON FEMORAL COMPETENT: NORMAL
BH CV LOWER VASCULAR LEFT COMMON FEMORAL COMPRESS: NORMAL
BH CV LOWER VASCULAR LEFT COMMON FEMORAL PHASIC: NORMAL
BH CV LOWER VASCULAR LEFT COMMON FEMORAL SPONT: NORMAL
BH CV LOWER VASCULAR LEFT DISTAL FEMORAL COMPRESS: NORMAL
BH CV LOWER VASCULAR LEFT GASTRONEMIUS COMPRESS: NORMAL
BH CV LOWER VASCULAR LEFT GREATER SAPH AK COMPRESS: NORMAL
BH CV LOWER VASCULAR LEFT GREATER SAPH BK COMPRESS: NORMAL
BH CV LOWER VASCULAR LEFT LESSER SAPH COMPRESS: NORMAL
BH CV LOWER VASCULAR LEFT MID FEMORAL AUGMENT: NORMAL
BH CV LOWER VASCULAR LEFT MID FEMORAL COMPETENT: NORMAL
BH CV LOWER VASCULAR LEFT MID FEMORAL COMPRESS: NORMAL
BH CV LOWER VASCULAR LEFT MID FEMORAL PHASIC: NORMAL
BH CV LOWER VASCULAR LEFT MID FEMORAL SPONT: NORMAL
BH CV LOWER VASCULAR LEFT PERONEAL COMPRESS: NORMAL
BH CV LOWER VASCULAR LEFT POPLITEAL AUGMENT: NORMAL
BH CV LOWER VASCULAR LEFT POPLITEAL COMPETENT: NORMAL
BH CV LOWER VASCULAR LEFT POPLITEAL COMPRESS: NORMAL
BH CV LOWER VASCULAR LEFT POPLITEAL PHASIC: NORMAL
BH CV LOWER VASCULAR LEFT POPLITEAL SPONT: NORMAL
BH CV LOWER VASCULAR LEFT POSTERIOR TIBIAL COMPRESS: NORMAL
BH CV LOWER VASCULAR LEFT PROXIMAL FEMORAL COMPRESS: NORMAL
BH CV LOWER VASCULAR LEFT SAPHENOFEMORAL JUNCTION COMPRESS: NORMAL
BH CV LOWER VASCULAR RIGHT COMMON FEMORAL AUGMENT: NORMAL
BH CV LOWER VASCULAR RIGHT COMMON FEMORAL COMPETENT: NORMAL
BH CV LOWER VASCULAR RIGHT COMMON FEMORAL COMPRESS: NORMAL
BH CV LOWER VASCULAR RIGHT COMMON FEMORAL PHASIC: NORMAL
BH CV LOWER VASCULAR RIGHT COMMON FEMORAL SPONT: NORMAL
BH CV LOWER VASCULAR RIGHT DISTAL FEMORAL COMPRESS: NORMAL
BH CV LOWER VASCULAR RIGHT GASTRONEMIUS COMPRESS: NORMAL
BH CV LOWER VASCULAR RIGHT GREATER SAPH AK COMPRESS: NORMAL
BH CV LOWER VASCULAR RIGHT GREATER SAPH BK COMPRESS: NORMAL
BH CV LOWER VASCULAR RIGHT LESSER SAPH COMPRESS: NORMAL
BH CV LOWER VASCULAR RIGHT MID FEMORAL AUGMENT: NORMAL
BH CV LOWER VASCULAR RIGHT MID FEMORAL COMPETENT: NORMAL
BH CV LOWER VASCULAR RIGHT MID FEMORAL COMPRESS: NORMAL
BH CV LOWER VASCULAR RIGHT MID FEMORAL PHASIC: NORMAL
BH CV LOWER VASCULAR RIGHT MID FEMORAL SPONT: NORMAL
BH CV LOWER VASCULAR RIGHT PERONEAL COMPRESS: NORMAL
BH CV LOWER VASCULAR RIGHT POPLITEAL AUGMENT: NORMAL
BH CV LOWER VASCULAR RIGHT POPLITEAL COMPETENT: NORMAL
BH CV LOWER VASCULAR RIGHT POPLITEAL COMPRESS: NORMAL
BH CV LOWER VASCULAR RIGHT POPLITEAL PHASIC: NORMAL
BH CV LOWER VASCULAR RIGHT POPLITEAL SPONT: NORMAL
BH CV LOWER VASCULAR RIGHT POSTERIOR TIBIAL COMPRESS: NORMAL
BH CV LOWER VASCULAR RIGHT PROXIMAL FEMORAL COMPRESS: NORMAL
BH CV LOWER VASCULAR RIGHT SAPHENOFEMORAL JUNCTION COMPRESS: NORMAL
BILIRUB CONJ SERPL-MCNC: <0.2 MG/DL (ref 0–0.3)
BILIRUB INDIRECT SERPL-MCNC: NORMAL MG/DL
BILIRUB SERPL-MCNC: 0.3 MG/DL (ref 0–1.2)
C PNEUM DNA NPH QL NAA+NON-PROBE: NOT DETECTED
CREAT SERPL-MCNC: 1.21 MG/DL (ref 0.57–1)
DEPRECATED RDW RBC AUTO: 45.9 FL (ref 37–54)
EGFRCR SERPLBLD CKD-EPI 2021: 47.7 ML/MIN/1.73
ERYTHROCYTE [DISTWIDTH] IN BLOOD BY AUTOMATED COUNT: 14 % (ref 12.3–15.4)
FLUAV SUBTYP SPEC NAA+PROBE: NOT DETECTED
FLUBV RNA ISLT QL NAA+PROBE: NOT DETECTED
HADV DNA SPEC NAA+PROBE: NOT DETECTED
HCOV 229E RNA SPEC QL NAA+PROBE: NOT DETECTED
HCOV HKU1 RNA SPEC QL NAA+PROBE: NOT DETECTED
HCOV NL63 RNA SPEC QL NAA+PROBE: NOT DETECTED
HCOV OC43 RNA SPEC QL NAA+PROBE: NOT DETECTED
HCT VFR BLD AUTO: 37.3 % (ref 34–46.6)
HGB BLD-MCNC: 12.2 G/DL (ref 12–15.9)
HMPV RNA NPH QL NAA+NON-PROBE: NOT DETECTED
HPIV1 RNA ISLT QL NAA+PROBE: NOT DETECTED
HPIV2 RNA SPEC QL NAA+PROBE: NOT DETECTED
HPIV3 RNA NPH QL NAA+PROBE: NOT DETECTED
HPIV4 P GENE NPH QL NAA+PROBE: NOT DETECTED
INR PPP: 3.14 (ref 2–3)
INR PPP: 3.36 (ref 2–3)
M PNEUMO IGG SER IA-ACNC: NOT DETECTED
MAXIMAL PREDICTED HEART RATE: 148 BPM
MCH RBC QN AUTO: 29.2 PG (ref 26.6–33)
MCHC RBC AUTO-ENTMCNC: 32.5 G/DL (ref 31.5–35.7)
MCV RBC AUTO: 89.8 FL (ref 79–97)
PLATELET # BLD AUTO: 257 10*3/MM3 (ref 140–450)
PMV BLD AUTO: 9.2 FL (ref 6–12)
PROT SERPL-MCNC: 7.3 G/DL (ref 6–8.5)
PROTHROMBIN TIME: 30.3 SECONDS (ref 19.4–28.5)
PROTHROMBIN TIME: 32.3 SECONDS (ref 19.4–28.5)
QT INTERVAL: 479 MS
RBC # BLD AUTO: 4.16 10*6/MM3 (ref 3.77–5.28)
RHINOVIRUS RNA SPEC NAA+PROBE: NOT DETECTED
RSV RNA NPH QL NAA+NON-PROBE: NOT DETECTED
SARS-COV-2 RNA NPH QL NAA+NON-PROBE: DETECTED
STRESS TARGET HR: 126 BPM
WBC NRBC COR # BLD: 11.2 10*3/MM3 (ref 3.4–10.8)

## 2023-01-08 PROCEDURE — 0202U NFCT DS 22 TRGT SARS-COV-2: CPT | Performed by: HOSPITALIST

## 2023-01-08 PROCEDURE — 94799 UNLISTED PULMONARY SVC/PX: CPT

## 2023-01-08 PROCEDURE — 85610 PROTHROMBIN TIME: CPT | Performed by: STUDENT IN AN ORGANIZED HEALTH CARE EDUCATION/TRAINING PROGRAM

## 2023-01-08 PROCEDURE — 85027 COMPLETE CBC AUTOMATED: CPT | Performed by: HOSPITALIST

## 2023-01-08 PROCEDURE — 25010000002 AZITHROMYCIN PER 500 MG: Performed by: NURSE PRACTITIONER

## 2023-01-08 PROCEDURE — 25010000002 REMDESIVIR 100 MG/20ML SOLUTION 1 EACH VIAL: Performed by: NURSE PRACTITIONER

## 2023-01-08 PROCEDURE — 25010000002 CEFTRIAXONE PER 250 MG: Performed by: NURSE PRACTITIONER

## 2023-01-08 PROCEDURE — 93970 EXTREMITY STUDY: CPT

## 2023-01-08 PROCEDURE — 97162 PT EVAL MOD COMPLEX 30 MIN: CPT

## 2023-01-08 PROCEDURE — 82248 BILIRUBIN DIRECT: CPT | Performed by: NURSE PRACTITIONER

## 2023-01-08 PROCEDURE — 80053 COMPREHEN METABOLIC PANEL: CPT | Performed by: HOSPITALIST

## 2023-01-08 PROCEDURE — 63710000001 DEXAMETHASONE PER 0.25 MG: Performed by: STUDENT IN AN ORGANIZED HEALTH CARE EDUCATION/TRAINING PROGRAM

## 2023-01-08 RX ORDER — WARFARIN SODIUM 3 MG/1
1.5 TABLET ORAL
Status: COMPLETED | OUTPATIENT
Start: 2023-01-08 | End: 2023-01-08

## 2023-01-08 RX ORDER — ALBUTEROL SULFATE 90 UG/1
2 AEROSOL, METERED RESPIRATORY (INHALATION) EVERY 4 HOURS PRN
Status: DISCONTINUED | OUTPATIENT
Start: 2023-01-08 | End: 2023-01-10 | Stop reason: HOSPADM

## 2023-01-08 RX ORDER — WARFARIN SODIUM 3 MG/1
3 TABLET ORAL
Status: DISCONTINUED | OUTPATIENT
Start: 2023-01-10 | End: 2023-01-10

## 2023-01-08 RX ORDER — BENZONATATE 100 MG/1
200 CAPSULE ORAL EVERY 8 HOURS PRN
Status: DISCONTINUED | OUTPATIENT
Start: 2023-01-08 | End: 2023-01-10 | Stop reason: HOSPADM

## 2023-01-08 RX ADMIN — Medication 10 MG: at 20:19

## 2023-01-08 RX ADMIN — DEXAMETHASONE 6 MG: 4 TABLET ORAL at 08:06

## 2023-01-08 RX ADMIN — GUAIFENESIN 1200 MG: 600 TABLET, EXTENDED RELEASE ORAL at 08:06

## 2023-01-08 RX ADMIN — Medication 2000 UNITS: at 20:19

## 2023-01-08 RX ADMIN — AZITHROMYCIN MONOHYDRATE 500 MG: 500 INJECTION, POWDER, LYOPHILIZED, FOR SOLUTION INTRAVENOUS at 00:48

## 2023-01-08 RX ADMIN — PANTOPRAZOLE SODIUM 40 MG: 40 TABLET, DELAYED RELEASE ORAL at 17:44

## 2023-01-08 RX ADMIN — ALBUTEROL SULFATE 2 PUFF: 90 AEROSOL, METERED RESPIRATORY (INHALATION) at 21:20

## 2023-01-08 RX ADMIN — GABAPENTIN 400 MG: 400 CAPSULE ORAL at 17:45

## 2023-01-08 RX ADMIN — REMDESIVIR 100 MG: 100 INJECTION, POWDER, LYOPHILIZED, FOR SOLUTION INTRAVENOUS at 00:48

## 2023-01-08 RX ADMIN — LEVOTHYROXINE SODIUM 125 MCG: 0.15 TABLET ORAL at 05:55

## 2023-01-08 RX ADMIN — BENZONATATE 200 MG: 100 CAPSULE ORAL at 22:01

## 2023-01-08 RX ADMIN — ATORVASTATIN CALCIUM 10 MG: 20 TABLET, FILM COATED ORAL at 08:07

## 2023-01-08 RX ADMIN — GABAPENTIN 400 MG: 400 CAPSULE ORAL at 08:06

## 2023-01-08 RX ADMIN — MULTIPLE VITAMINS W/ MINERALS TAB 1 TABLET: TAB at 08:06

## 2023-01-08 RX ADMIN — GABAPENTIN 400 MG: 400 CAPSULE ORAL at 20:18

## 2023-01-08 RX ADMIN — CEFTRIAXONE 2 G: 2 INJECTION, POWDER, FOR SOLUTION INTRAMUSCULAR; INTRAVENOUS at 03:52

## 2023-01-08 RX ADMIN — Medication 10 ML: at 20:18

## 2023-01-08 RX ADMIN — CYANOCOBALAMIN TAB 1000 MCG 1000 MCG: 1000 TAB at 08:06

## 2023-01-08 RX ADMIN — WARFARIN SODIUM 1.5 MG: 3 TABLET ORAL at 17:44

## 2023-01-08 RX ADMIN — Medication 10 ML: at 08:07

## 2023-01-08 RX ADMIN — FLUOXETINE 60 MG: 20 CAPSULE ORAL at 08:06

## 2023-01-08 RX ADMIN — GUAIFENESIN 1200 MG: 600 TABLET, EXTENDED RELEASE ORAL at 20:18

## 2023-01-08 RX ADMIN — METOPROLOL SUCCINATE 25 MG: 25 TABLET, EXTENDED RELEASE ORAL at 08:06

## 2023-01-08 NOTE — PLAN OF CARE
Goal Outcome Evaluation:    Patient is alert and able to make needs known. She remains on 4L NC and does experience SOA with ambulation. She's been up in chair a couple times today. She is refusing IS at this time.

## 2023-01-08 NOTE — PLAN OF CARE
"Goal Outcome Evaluation:   Patient alert oriented able to make needs known. Continues on IV ABT. Patient on oxygen 4L / NC. Has slept in the chair all night because she states \"I can't breath when I lay in bed.  Patient is incont. Of bladder but refuses to wear external catheter. Continues on Enhanced contact precautions for Covid.               "

## 2023-01-08 NOTE — THERAPY EVALUATION
"Patient Name: Claudia Rust  : 1950    MRN: [id number]                              Today's Date: 2023       Admit Date: 2023    Visit Dx:     ICD-10-CM ICD-9-CM   1. COVID  U07.1 079.89   2. Hypoxia  R09.02 799.02   3. Hyponatremia  E87.1 276.1     Patient Active Problem List   Diagnosis   • Obesity, morbid, BMI 50 or higher ([facility])   • Dyspnea on exertion   • Abnormal nuclear stress test   • Nonrheumatic aortic valve stenosis   • Prediabetes   • Dyslipidemia   • Essential hypertension   • Acute UTI   • Bipolar disorder ([facility])   • COPD with hypoxia ([facility])   • High triglycerides   • Gastroesophageal reflux disease   • Rheumatoid arthritis ([facility])   • Aortic stenosis, severe   • Chronic heart failure with preserved ejection fraction (HFpEF) ([facility])   • Aortic valve stenosis   • S/P AVR   • Long term (current) use of anticoagulants   • COVID   • Atypical pneumonia     Past Medical History:   Diagnosis Date   • Acute congestive heart failure ([facility])    • Allergies    • Anxiety    • Arthritis    • Asthma    • Bilateral leg edema    • Bipolar 1 disorder ([facility])    • Bulging lumbar disc     X3   • Cancer ([facility])     states had \"cancerous tumor during pregnancy and had to have hysterectomy\"   • Cataract     bilateral   • Cellulitis 2021    bilateral legs   • Compression fracture of vertebral column ([facility])     LUMBAR   • COPD (chronic obstructive pulmonary disease) ([facility])    • Delayed emergence from anesthesia    • Depression    • Depression    • Diabetes mellitus ([facility])    • Dyslipidemia    • Dyspnea on exertion    • Fibromyalgia    • GERD (gastroesophageal reflux disease)    • Hiatal hernia    • Hyperlipidemia    • Hypertension    • Hypothyroid     HYPOTHYROID   • IBS (irritable bowel syndrome)    • Migraines    • Morbid obesity ([facility])    • Neuropathy    • Panic attacks    • Peripheral edema    • PONV (postoperative nausea and vomiting)    • Poor mobility     rolling walker   • Prediabetes    • PVD (peripheral vascular " disease) ([facility])    • Rheumatoid aortitis    • Spinal stenosis    • Spinal stenosis    • Vertigo    • Vertigo    • Vitamin D deficiency      Past Surgical History:   Procedure Laterality Date   • AORTIC VALVE REPAIR/REPLACEMENT N/A 3/18/2021    Procedure: AORTIC VALVE REPLACEMENT;  Surgeon: Olaf Mcgill MD;  Location: [facility] CVOR;  Service: Cardiothoracic;  Laterality: N/A;   • BRONCHOSCOPY N/A 3/25/2021    Procedure: BRONCHOSCOPY AT BEDSIDE WITH BRONCHIOALVEOLAR LAVAGE;  Surgeon: Glenn Reyes MD;  Location: [facility] ENDOSCOPY;  Service: Pulmonary;  Laterality: N/A;  PNA   • CARDIAC CATHETERIZATION  2009   • CARDIAC CATHETERIZATION N/A 8/14/2019    Procedure: Left Heart Cath;  Surgeon: Jose Levi MD;  Location: [facility] CATH INVASIVE LOCATION;  Service: Cardiovascular   • CARDIAC CATHETERIZATION N/A 8/14/2019    Procedure: Right Heart Cath;  Surgeon: Jose Levi MD;  Location: [facility] CATH INVASIVE LOCATION;  Service: Cardiovascular   • CARDIAC CATHETERIZATION N/A 8/14/2019    Procedure: Aortic root aortogram;  Surgeon: Jose Levi MD;  Location: [facility] CATH INVASIVE LOCATION;  Service: Cardiovascular   • CARDIAC CATHETERIZATION N/A 1/20/2021    Procedure: Left Heart Cath;  Surgeon: Jose Levi MD;  Location: [facility] CATH INVASIVE LOCATION;  Service: Cardiovascular;  Laterality: N/A;   • COLONOSCOPY     • CYSTOCELE REPAIR  1984   • ELBOW ARTHROPLASTY  2011   • ENDOSCOPY     • FOOT SURGERY     • GALLBLADDER SURGERY  2002   • TONSILLECTOMY     • VAGINAL HYSTERECTOMY  1972      General Information     Row Name 01/08/23 1255          Physical Therapy Time and Intention    Document Type evaluation  -CL     Mode of Treatment physical therapy;individual therapy  -CL     Row Name 01/08/23 1255          General Information    Patient Profile Reviewed yes  -CL     Prior Level of Function independent:;all household mobility;gait;transfer  uses rollator at home; w/c in  community  -CL     Existing Precautions/Restrictions fall;oxygen therapy device and L/min  -CL     Barriers to Rehab previous functional deficit;medically complex  -CL     Row Name 01/08/23 1255          Living Environment    People in Home spouse  Has caregiver when spouse is at work  -CL     Row Name 01/08/23 1255          Home Main Entrance    Number of Stairs, Main Entrance none  -CL     Row Name 01/08/23 1255          Stairs Within Home, Primary    Number of Stairs, Within Home, Primary none  -CL     Row Name 01/08/23 1255          Cognition    Orientation Status (Cognition) oriented x 4  -CL           User Key  (r) = Recorded By, (t) = Taken By, (c) = Cosigned By    Initials Name Provider Type    CL Kayley Verma, PT Physical Therapist               Mobility     Row Name 01/08/23 1257          Bed Mobility    Bed Mobility supine-sit  -CL     Supine-Sit [place] (Bed Mobility) independent  -CL     Assistive Device (Bed Mobility) bed rails;head of bed elevated  -CL     Row Name 01/08/23 1257          Bed-Chair Transfer    Bed-Chair [place] (Transfers) contact guard  -CL     Assistive Device (Bed-Chair Transfers) walker, front-wheeled  -CL     Row Name 01/08/23 1257          Sit-Stand Transfer    Sit-Stand [place] (Transfers) contact guard  -CL     Assistive Device (Sit-Stand Transfers) walker, front-wheeled  -CL     Row Name 01/08/23 1257          Gait/Stairs (Locomotion)    [place] Level (Gait) minimum assist (75% patient effort)  -CL     Assistive Device (Gait) walker, front-wheeled  -CL     Distance in Feet (Gait) 10'  -CL           User Key  (r) = Recorded By, (t) = Taken By, (c) = Cosigned By    Initials Name Provider Type    Kayley Reyes, PT Physical Therapist               Obj/Interventions     Row Name 01/08/23 1258          Range of Motion Comprehensive    General Range of Motion bilateral lower extremity ROM WFL  -CL     Comment, General Range of Motion Limited by LE  edema and body habitus  -CL     Row Name 01/08/23 1258          Strength Comprehensive (MMT)    Comment, General Manual Muscle Testing (MMT) Assessment Hip flex3+/5 L, 4/5 R, knee ext 4+/5 bilaterally  -CL     Row Name 01/08/23 1258          Balance    Balance Assessment sitting dynamic balance;standing dynamic balance  -CL     Dynamic Sitting Balance supervision  -CL     Position, Sitting Balance supported  -CL     Dynamic Standing Balance contact guard  -CL           User Key  (r) = Recorded By, (t) = Taken By, (c) = Cosigned By    Initials Name Provider Type    Kayley Reyes, PT Physical Therapist               Goals/Plan     Row Name 01/08/23 1306          Transfer Goal 1 (PT)    Activity/Assistive Device (Transfer Goal 1, PT) sit-to-stand/stand-to-sit;bed-to-chair/chair-to-bed  -CL     [place] Level/Cues Needed (Transfer Goal 1, PT) modified independence  -CL     Time Frame (Transfer Goal 1, PT) long term goal (LTG);2 weeks  -CL     Row Name 01/08/23 1306          Gait Training Goal 1 (PT)    Activity/Assistive Device (Gait Training Goal 1, PT) gait (walking locomotion);assistive device use  -CL     [place] Level (Gait Training Goal 1, PT) modified independence  -CL     Distance (Gait Training Goal 1, PT) 40'  -CL     Time Frame (Gait Training Goal 1, PT) long term goal (LTG);2 weeks  -CL     Row Name 01/08/23 1306          Therapy Assessment/Plan (PT)    Planned Therapy Interventions (PT) balance training;bed mobility training;gait training;patient/family education;strengthening;transfer training  -CL           User Key  (r) = Recorded By, (t) = Taken By, (c) = Cosigned By    Initials Name Provider Type    Kayley Reyes, PT Physical Therapist               Clinical Impression     Row Name 01/08/23 1300          Pain    Pretreatment Pain Rating 3/10  -CL     Posttreatment Pain Rating 2/10  -CL     Pain Location lower  -CL     Pain Location - back  -CL     Pre/Posttreatment Pain Comment  Improved with movement to chair  -CL     Pain Intervention(s) Repositioned  -CL     Row Name 01/08/23 1300          Plan of Care Review    Plan of Care Reviewed With patient  -CL     Outcome Evaluation Pt is a 72 y.o. female admitted with c/o SOA and flu-like sx. She was found to be COVID + with PNA. PMH is significant for morbid obesity, HTN, COPD, GERD, CHF, s/p aortic valve stenosis, bipolar disorder, HLD, DM with neuropathy.  Pt lives w/ spouse in a SSH with no RUDY. She reports that she has a caregiver to A when her spouse is at work.  She has a rollator, w/c and lift chair/recliner at home. She sleeps in bed or recliner based on her back pain.  At time of evaluation, she is A&O x 4 and transfers with CGA and ambulates short distances with RW and min A. She would benefit from  PT at discharge.  -CL     Row Name 01/08/23 1300          Therapy Assessment/Plan (PT)    Rehab Potential (PT) good, to achieve stated therapy goals  -CL     Criteria for Skilled Interventions Met (PT) yes;skilled treatment is necessary  -CL     Therapy Frequency (PT) 3 times/wk  -CL     Predicted Duration of Therapy Intervention (PT) Until discharge  -CL     Row Name 01/08/23 1300          Vital Signs    Pretreatment Heart Rate (beats/min) 68  -CL     Pre SpO2 (%) 95  -CL     O2 Delivery Pre Treatment supplemental O2  4L  -CL     Intra SpO2 (%) 91  -CL     O2 Delivery Intra Treatment supplemental O2  -CL     Post SpO2 (%) 94  -CL     O2 Delivery Post Treatment supplemental O2  -CL     Pre Patient Position Sitting  -CL     Intra Patient Position Standing  -CL     Post Patient Position Sitting  -CL     Row Name 01/08/23 1300          Positioning and Restraints    Pre-Treatment Position in bed  -CL     Post Treatment Position chair  -CL     In Chair sitting;call light within reach;encouraged to call for assist  -CL           User Key  (r) = Recorded By, (t) = Taken By, (c) = Cosigned By    Initials Name Provider Type    Kayley Reyes  B, PT Physical Therapist               Outcome Measures     Row Name 01/08/23 1307          How much help from another person do you currently need...    Turning from your back to your side while in flat bed without using bedrails? 4  -CL     Moving from lying on back to sitting on the side of a flat bed without bedrails? 4  -CL     Moving to and from a bed to a chair (including a wheelchair)? 3  -CL     Standing up from a chair using your arms (e.g., wheelchair, bedside chair)? 3  -CL     Climbing 3-5 steps with a railing? 1  -CL     To walk in hospital room? 3  -CL     AM-PAC 6 Clicks Score (PT) 18  -CL     Highest level of mobility 6 --> Walked 10 steps or more  -CL     Row Name 01/08/23 1307          Functional Assessment    Outcome Measure Options AM-PAC 6 Clicks Basic Mobility (PT)  -CL           User Key  (r) = Recorded By, (t) = Taken By, (c) = Cosigned By    Initials Name Provider Type    CL Kayley Verma, PT Physical Therapist                             Physical Therapy Education     Title: PT OT SLP Therapies (Done)     Topic: Physical Therapy (Done)     Point: Mobility training (Done)     Learning Progress Summary           Patient Acceptance, E,TB, VU by  at 1/8/2023 1307                               User Key     Initials Effective Dates Name Provider Type Discipline     03/02/22 -  Kayley Verma, PT Physical Therapist PT              PT Recommendation and Plan  Planned Therapy Interventions (PT): balance training, bed mobility training, gait training, patient/family education, strengthening, transfer training  Plan of Care Reviewed With: patient  Outcome Evaluation: Pt is a 72 y.o. female admitted with c/o SOA and flu-like sx. She was found to be COVID + with PNA. PMH is significant for morbid obesity, HTN, COPD, GERD, CHF, s/p aortic valve stenosis, bipolar disorder, HLD, DM with neuropathy.  Pt lives w/ spouse in a H with no RUDY. She reports that she has a caregiver to A when her  spouse is at work.  She has a rollator, w/c and lift chair/recliner at home. She sleeps in bed or recliner based on her back pain.  At time of evaluation, she is A&O x 4 and transfers with CGA and ambulates short distances with RW and min A. She would benefit from HH PT at discharge.     Time Calculation:    PT Charges     Row Name 01/08/23 1308             Time Calculation    Start Time 1031  -CL      Stop Time 1054  -CL      Time Calculation (min) 23 min  -CL      PT Received On 01/08/23  -CL      PT - Next Appointment 01/09/23  -CL      PT Goal Re-Cert Due Date 01/22/23  -CL         Time Calculation- PT    Total Timed Code Minutes- PT 0 minute(s)  -CL            User Key  (r) = Recorded By, (t) = Taken By, (c) = Cosigned By    Initials Name Provider Type    CL Kayley Verma, PT Physical Therapist              Therapy Charges for Today     Code Description Service Date Service Provider Modifiers Qty    [id number] HC PT EVAL MOD COMPLEXITY 4 1/8/2023 Kayley Verma, PT GP 1          PT G-Codes  Outcome Measure Options: AM-PAC 6 Clicks Basic Mobility (PT)  AM-PAC 6 Clicks Score (PT): 18  PT Discharge Summary  Anticipated Discharge Disposition (PT): home with home health, home with assist    Kayley Verma PT  1/8/2023

## 2023-01-08 NOTE — PLAN OF CARE
Goal Outcome Evaluation:  Plan of Care Reviewed With: patient           Outcome Evaluation: Pt is a 72 y.o. female admitted with c/o SOA and flu-like sx. She was found to be COVID + with PNA. PMH is significant for morbid obesity, HTN, COPD, GERD, CHF, s/p aortic valve stenosis, bipolar disorder, HLD, DM with neuropathy.  Pt lives w/ spouse in a H with no RUDY. She reports that she has a caregiver to A when her spouse is at work.  She has a rollator, w/c and lift chair/recliner at home. She sleeps in bed or recliner based on her back pain.  At time of evaluation, she is A&O x 4 and transfers with CGA and ambulates short distances with RW and min A. She would benefit from HH PT at discharge.

## 2023-01-08 NOTE — PROGRESS NOTES
"Pharmacy Dosing Service  Anticoagulant  Warfarin     Subjective:  Claudia Rust is a 72 y.o.female with pharmacy consulted to dose warfarin for the indication of atrial fibrillation.  Home dose: warfarin 3mg Tues/Thurs/Sat/Sun and 4.5mg Mon/Wed/Fri  INR goal: 2-3  DDI: azithro (1/7-present; LD 1/11); CTX (1/7-present; LD 1/11), Dexamethasone (1/7-present)      Date 1/7 1/8          INR 2.69 3.14          Dose 3mg 1.5mg               Assessment/Plan:  INR supratherapeutic today. Will give reduced dose of 1.5mg today and re-assess INR tomorrow.     Continue to monitor INR and adjust as needed. Daily INR ordered.         Objective:  [Ht: 162.6 cm (64.02\"); Wt: (!) 141 kg (310 lb 3 oz); BMI: Body mass index is 53.22 kg/m².]  Lab Results   Component Value Date    ALBUMIN 3.6 01/07/2023     Lab Results   Component Value Date    INR 3.14 (H) 01/08/2023    INR 2.69 01/07/2023    INR 2.12 (C) 01/06/2023    PROTIME 30.3 (H) 01/08/2023    PROTIME 26.2 01/07/2023    PROTIME 20.9 (H) 01/06/2023     Lab Results   Component Value Date    HGB 12.3 01/07/2023    HGB 12.6 01/06/2023    HGB 13.2 09/15/2022     Lab Results   Component Value Date    HCT 38.1 01/07/2023    HCT 39.0 01/06/2023    HCT 40.2 09/15/2022       Sharon Trammell, PharmD, BCPS  01/08/23 12:30 EST          "

## 2023-01-08 NOTE — PROGRESS NOTES
[facility] Medicine Services Daily Progress Note    Patient Name: Claudia Rust  : 1950  MRN: [id number]  Primary Care Physician:  Pool Meyer NP-C  Date of admission: 2023      Subjective      Chief Complaint: Shortness of breath    Seen examined bedside.  She is slowly improving.  Breathing still with some effort however slowly improving.  No chest pain    ROS   Negative apart for above      Objective      Vitals:   Temp:  [97.8 °F (36.6 °C)-97.9 °F (36.6 °C)] 97.9 °F (36.6 °C)  Heart Rate:  [58-76] 61  Resp:  [12-23] 21  BP: (126-156)/(44-85) 156/65  Flow (L/min):  [4] 4    Physical Exam   Head: atraumatic/normocephalic  Neck: supple   Heart: s1s2,regular rate and rythm,no extra heart sounds   Lungs: clear to ascultation b/l   Abd: soft/nontender/nondystended/bs+  Neuro: moving all extremities, no facial asymetry, dtr LE intact   Ext: no edema            Result Review    Result Review:  I have personally reviewed the results from the time of this admission to 2023 11:26 EST and agree with these findings:  [x]  Laboratory  []  Microbiology  [x]  Radiology  []  EKG/Telemetry   []  Cardiology/Vascular   []  Pathology    Wounds (last 24 hours)             Assessment & Plan          atorvastatin, 10 mg, Oral, Daily  azithromycin, 500 mg, Intravenous, Q24H  cefTRIAXone, 2 g, Intravenous, Q24H  cholecalciferol, 2,000 Units, Oral, Nightly  dexamethasone, 6 mg, Oral, Daily  docusate sodium, 100 mg, Oral, BID  FLUoxetine, 60 mg, Oral, Daily  gabapentin, 400 mg, Oral, TID  guaiFENesin, 1,200 mg, Oral, BID  levothyroxine, 125 mcg, Oral, Daily  melatonin, 10 mg, Oral, Once  metoprolol succinate XL, 25 mg, Oral, Daily  multivitamin with minerals, 1 tablet, Oral, Daily  pantoprazole, 40 mg, Oral, Q PM  remdesivir, 100 mg, Intravenous, Q24H  sodium chloride, 10 mL, Intravenous, Q12H  SUMAtriptan, 50 mg, Oral, Once  vitamin B-12, 1,000 mcg, Oral, Daily  warfarin, 3 mg, Oral, Once per day  on Sun Tue Thu Sat  [START ON 1/9/2023] warfarin, 4.5 mg, Oral, Once per day on Mon Wed Fri       Pharmacy Consult - Remdesivir,   Pharmacy to dose warfarin,   sodium chloride, 50 mL/hr, Last Rate: 50 mL/hr (01/07/23 2153)         Active Hospital Problems:  Active Hospital Problems    Diagnosis    • **COVID    • Atypical pneumonia    • Obesity, morbid, BMI 50 or higher (HCC)    • COPD with hypoxia (HCC)      Plan:   Acute respiratory failure  Secondary to COVID-19 pneumonia  Chest x-ray with bilateral infiltrates  Positive for COVID-19  Continue remdesivir  Continue Decadron  Follow inflammatory markers  Covered with antibiotics as well pending micro results  Check 2D echo    COPD  Managed with bronchodilators, steroids as above    Aortic stenosis   status post replacement in 2021 continue anticoagulation INR therapeutic    Diabetes  Sliding scale Accu-Cheks    DVT PUD prophylaxis    Plan as above      DVT prophylaxis:  Medical DVT prophylaxis orders are present.    CODE STATUS:    Code Status (Patient has no pulse and is not breathing): CPR (Attempt to Resuscitate)  Medical Interventions (Patient has pulse or is breathing): Full Support      Disposition:  I expect patient to be discharged 24 to 48 hours.    This patient has been examined wearing appropriate Personal Protective Equipment and discussed with hospital infection control department. 01/08/23      Electronically signed by Nikki Guevara MD, 01/08/23, 11:26 EST.  [facility] Hospitalist Team

## 2023-01-09 LAB
ALBUMIN SERPL-MCNC: 3.7 G/DL (ref 3.5–5.2)
ALBUMIN SERPL-MCNC: 3.7 G/DL (ref 3.5–5.2)
ALBUMIN/GLOB SERPL: 1.1 G/DL
ALP SERPL-CCNC: 78 U/L (ref 39–117)
ALP SERPL-CCNC: 81 U/L (ref 39–117)
ALT SERPL W P-5'-P-CCNC: 26 U/L (ref 1–33)
ALT SERPL W P-5'-P-CCNC: 37 U/L (ref 1–33)
ANION GAP SERPL CALCULATED.3IONS-SCNC: 12 MMOL/L (ref 5–15)
AST SERPL-CCNC: 33 U/L (ref 1–32)
AST SERPL-CCNC: 37 U/L (ref 1–32)
BILIRUB CONJ SERPL-MCNC: <0.2 MG/DL (ref 0–0.3)
BILIRUB CONJ SERPL-MCNC: <0.2 MG/DL (ref 0–0.3)
BILIRUB INDIRECT SERPL-MCNC: ABNORMAL MG/DL
BILIRUB SERPL-MCNC: 0.2 MG/DL (ref 0–1.2)
BILIRUB SERPL-MCNC: 0.2 MG/DL (ref 0–1.2)
BUN SERPL-MCNC: 34 MG/DL (ref 8–23)
BUN/CREAT SERPL: 30.1 (ref 7–25)
CALCIUM SPEC-SCNC: 9.1 MG/DL (ref 8.6–10.5)
CHLORIDE SERPL-SCNC: 100 MMOL/L (ref 98–107)
CO2 SERPL-SCNC: 22 MMOL/L (ref 22–29)
CREAT SERPL-MCNC: 1.13 MG/DL (ref 0.57–1)
CREAT SERPL-MCNC: 1.25 MG/DL (ref 0.57–1)
EGFRCR SERPLBLD CKD-EPI 2021: 45.9 ML/MIN/1.73
EGFRCR SERPLBLD CKD-EPI 2021: 51.8 ML/MIN/1.73
GLOBULIN UR ELPH-MCNC: 3.3 GM/DL
GLUCOSE SERPL-MCNC: 170 MG/DL (ref 65–99)
INR PPP: 3.9 (ref 2–3)
POTASSIUM SERPL-SCNC: 3.7 MMOL/L (ref 3.5–5.2)
PROT SERPL-MCNC: 6.6 G/DL (ref 6–8.5)
PROT SERPL-MCNC: 7 G/DL (ref 6–8.5)
PROTHROMBIN TIME: 37.2 SECONDS (ref 19.4–28.5)
SODIUM SERPL-SCNC: 134 MMOL/L (ref 136–145)

## 2023-01-09 PROCEDURE — 25010000002 REMDESIVIR 100 MG/20ML SOLUTION 1 EACH VIAL: Performed by: NURSE PRACTITIONER

## 2023-01-09 PROCEDURE — 25010000002 AZITHROMYCIN PER 500 MG: Performed by: NURSE PRACTITIONER

## 2023-01-09 PROCEDURE — 36415 COLL VENOUS BLD VENIPUNCTURE: CPT | Performed by: STUDENT IN AN ORGANIZED HEALTH CARE EDUCATION/TRAINING PROGRAM

## 2023-01-09 PROCEDURE — 25010000002 CEFTRIAXONE PER 250 MG: Performed by: NURSE PRACTITIONER

## 2023-01-09 PROCEDURE — 63710000001 DEXAMETHASONE PER 0.25 MG: Performed by: STUDENT IN AN ORGANIZED HEALTH CARE EDUCATION/TRAINING PROGRAM

## 2023-01-09 PROCEDURE — 85610 PROTHROMBIN TIME: CPT | Performed by: STUDENT IN AN ORGANIZED HEALTH CARE EDUCATION/TRAINING PROGRAM

## 2023-01-09 PROCEDURE — 82565 ASSAY OF CREATININE: CPT | Performed by: NURSE PRACTITIONER

## 2023-01-09 PROCEDURE — 80076 HEPATIC FUNCTION PANEL: CPT | Performed by: NURSE PRACTITIONER

## 2023-01-09 RX ADMIN — Medication 10 ML: at 20:44

## 2023-01-09 RX ADMIN — MULTIPLE VITAMINS W/ MINERALS TAB 1 TABLET: TAB at 10:14

## 2023-01-09 RX ADMIN — TIZANIDINE 4 MG: 4 TABLET ORAL at 20:54

## 2023-01-09 RX ADMIN — ACETAMINOPHEN 650 MG: 325 TABLET, FILM COATED ORAL at 16:42

## 2023-01-09 RX ADMIN — METOPROLOL SUCCINATE 25 MG: 25 TABLET, EXTENDED RELEASE ORAL at 10:13

## 2023-01-09 RX ADMIN — GUAIFENESIN 1200 MG: 600 TABLET, EXTENDED RELEASE ORAL at 10:13

## 2023-01-09 RX ADMIN — REMDESIVIR 100 MG: 100 INJECTION, POWDER, LYOPHILIZED, FOR SOLUTION INTRAVENOUS at 00:08

## 2023-01-09 RX ADMIN — Medication 10 MG: at 20:54

## 2023-01-09 RX ADMIN — CEFTRIAXONE 2 G: 2 INJECTION, POWDER, FOR SOLUTION INTRAMUSCULAR; INTRAVENOUS at 03:13

## 2023-01-09 RX ADMIN — BENZONATATE 200 MG: 100 CAPSULE ORAL at 20:44

## 2023-01-09 RX ADMIN — Medication 2000 UNITS: at 20:44

## 2023-01-09 RX ADMIN — ACETAMINOPHEN 650 MG: 325 TABLET, FILM COATED ORAL at 06:00

## 2023-01-09 RX ADMIN — CYANOCOBALAMIN TAB 1000 MCG 1000 MCG: 1000 TAB at 10:14

## 2023-01-09 RX ADMIN — GABAPENTIN 400 MG: 400 CAPSULE ORAL at 20:44

## 2023-01-09 RX ADMIN — GABAPENTIN 400 MG: 400 CAPSULE ORAL at 10:13

## 2023-01-09 RX ADMIN — ATORVASTATIN CALCIUM 10 MG: 20 TABLET, FILM COATED ORAL at 10:14

## 2023-01-09 RX ADMIN — AZITHROMYCIN MONOHYDRATE 500 MG: 500 INJECTION, POWDER, LYOPHILIZED, FOR SOLUTION INTRAVENOUS at 01:30

## 2023-01-09 RX ADMIN — LEVOTHYROXINE SODIUM 125 MCG: 0.15 TABLET ORAL at 05:43

## 2023-01-09 RX ADMIN — PANTOPRAZOLE SODIUM 40 MG: 40 TABLET, DELAYED RELEASE ORAL at 16:43

## 2023-01-09 RX ADMIN — REMDESIVIR 100 MG: 100 INJECTION, POWDER, LYOPHILIZED, FOR SOLUTION INTRAVENOUS at 23:51

## 2023-01-09 RX ADMIN — GABAPENTIN 400 MG: 400 CAPSULE ORAL at 16:43

## 2023-01-09 RX ADMIN — BENZONATATE 200 MG: 100 CAPSULE ORAL at 06:00

## 2023-01-09 RX ADMIN — DEXAMETHASONE 6 MG: 4 TABLET ORAL at 10:14

## 2023-01-09 RX ADMIN — GUAIFENESIN 1200 MG: 600 TABLET, EXTENDED RELEASE ORAL at 20:44

## 2023-01-09 RX ADMIN — FLUOXETINE 60 MG: 20 CAPSULE ORAL at 10:14

## 2023-01-09 NOTE — PLAN OF CARE
Goal Outcome Evaluation:            Patient doing well, VSS up in chair most of day. Patient is now on room air. Patient is able to make needs known, d/c home with HH possibly tomorrow. Care plan ongoing

## 2023-01-09 NOTE — PROGRESS NOTES
[facility] Medicine Services Daily Progress Note    Patient Name: Claudia Rust  : 1950  MRN: [id number]  Primary Care Physician:  Pool Meyer NP-C  Date of admission: 2023      Subjective      Chief Complaint: Shortness of breath    Continues to slowly improve still dyspneic still coughing however improving    ROS   Negative apart for above      Objective      Vitals:   Temp:  [97.5 °F (36.4 °C)-97.9 °F (36.6 °C)] 97.8 °F (36.6 °C)  Heart Rate:  [51-61] 52  Resp:  [10-20] 10  BP: (131-153)/(59-75) 145/59  Flow (L/min):  [4] 4    Physical Exam   Head: atraumatic/normocephalic  Neck: supple   Heart: s1s2,regular rate and rythm,no extra heart sounds   Lungs: clear to ascultation b/l   Abd: soft/nontender/nondystended/bs+  Neuro: moving all extremities, no facial asymetry, dtr LE intact   Ext: no edema            Result Review    Result Review:  I have personally reviewed the results from the time of this admission to 2023 11:00 EST and agree with these findings:  [x]  Laboratory  []  Microbiology  [x]  Radiology  []  EKG/Telemetry   []  Cardiology/Vascular   []  Pathology    Wounds (last 24 hours)             Assessment & Plan          atorvastatin, 10 mg, Oral, Daily  azithromycin, 500 mg, Intravenous, Q24H  cefTRIAXone, 2 g, Intravenous, Q24H  cholecalciferol, 2,000 Units, Oral, Nightly  dexamethasone, 6 mg, Oral, Daily  docusate sodium, 100 mg, Oral, BID  FLUoxetine, 60 mg, Oral, Daily  gabapentin, 400 mg, Oral, TID  guaiFENesin, 1,200 mg, Oral, BID  levothyroxine, 125 mcg, Oral, Daily  melatonin, 10 mg, Oral, Once  metoprolol succinate XL, 25 mg, Oral, Daily  multivitamin with minerals, 1 tablet, Oral, Daily  pantoprazole, 40 mg, Oral, Q PM  remdesivir, 100 mg, Intravenous, Q24H  sodium chloride, 10 mL, Intravenous, Q12H  SUMAtriptan, 50 mg, Oral, Once  vitamin B-12, 1,000 mcg, Oral, Daily  [START ON 1/10/2023] warfarin, 3 mg, Oral, Once per day on Sun Tue Thu Sat  [START  ON 1/11/2023] warfarin, 4.5 mg, Oral, Once per day on Mon Wed Fri       Pharmacy Consult - Remdesivir,   Pharmacy to dose warfarin,          Active Hospital Problems:  Active Hospital Problems    Diagnosis    • **COVID    • Atypical pneumonia    • Obesity, morbid, BMI 50 or higher (HCC)    • COPD with hypoxia (HCC)      Plan:   Acute respiratory failure  Secondary to COVID-19 pneumonia  Chest x-ray with bilateral infiltrates  Positive for COVID-19  Continue remdesivir  Continue Decadron  Follow inflammatory markers  Covered with antibiotics as well pending micro results  Check 2D echo    COPD  Managed with bronchodilators, steroids as above    Aortic stenosis   status post replacement in 2021 continue anticoagulation INR therapeutic    Diabetes  Sliding scale Accu-Cheks    DVT PUD prophylaxis    Plan as above      DVT prophylaxis:  Medical DVT prophylaxis orders are present.    CODE STATUS:    Code Status (Patient has no pulse and is not breathing): CPR (Attempt to Resuscitate)  Medical Interventions (Patient has pulse or is breathing): Full Support      Disposition:  I expect patient to be discharged 24 to 48 hours.    This patient has been examined wearing appropriate Personal Protective Equipment and discussed with hospital infection control department. 01/09/23      Electronically signed by Nikki Guevara MD, 01/09/23, 11:00 EST.  [facility] Hospitalist Team

## 2023-01-09 NOTE — PLAN OF CARE
Goal Outcome Evaluation:      Pt has had no c/o pain this shift, has been coughing a lot, asked provider for some cough meds, Tessalon pearls were ordered.  Tried to titrate o2 down but pt felt soa, so put back up to 5L.  Pt has been sitting up in chair for most of the evening, stated that it helped her cough less.  Call light in reach, plan of care ongoing

## 2023-01-09 NOTE — PROGRESS NOTES
"Pharmacy Dosing Service  Anticoagulant  Warfarin     Subjective:  Claudia Rust is a 72 y.o.female with pharmacy consulted to dose warfarin for the indication of atrial fibrillation.  Home dose: warfarin 3mg Tues/Thurs/Sat/Sun and 4.5mg Mon/Wed/Fri  INR goal: 2-3  DDI: azithro (1/7-present; LD 1/14); CTX (1/7-present; LD 1/11), Dexamethasone (1/7-present)      Date 1/7 1/8 1/9         INR 2.69 3.14 3.36         Dose 3mg 1.5mg  Hold              Assessment/Plan:  INR supratherapeutic and continues to rise today. Increased INR likely 2/2 DDI with antibiotics and steroids. Will hold warfarin dose today and re-assess INR tomorrow.     Continue to monitor INR and adjust as needed. Daily INR ordered.         Objective:  [Ht: 162.6 cm (64.02\"); Wt: (!) 141 kg (310 lb 3 oz); BMI: Body mass index is 53.22 kg/m².]  Lab Results   Component Value Date    ALBUMIN 3.7 01/08/2023     Lab Results   Component Value Date    INR 3.36 (H) 01/08/2023    INR 3.14 (H) 01/08/2023    INR 2.69 01/07/2023    PROTIME 32.3 (H) 01/08/2023    PROTIME 30.3 (H) 01/08/2023    PROTIME 26.2 01/07/2023     Lab Results   Component Value Date    HGB 12.2 01/08/2023    HGB 12.3 01/07/2023    HGB 12.6 01/06/2023     Lab Results   Component Value Date    HCT 37.3 01/08/2023    HCT 38.1 01/07/2023    HCT 39.0 01/06/2023       Sharon Trammell, PharmD, BCPS  01/09/23 08:04 EST            "

## 2023-01-09 NOTE — CASE MANAGEMENT/SOCIAL WORK
Continued Stay Note  [facility]     Patient Name: Claudia Rust  MRN: [id number]  Today's Date: 1/9/2023    Admit Date: 1/6/2023    Plan: need  assessment  unable to reach patient   Discharge Plan     Row Name 01/09/23 1657       Plan    Plan need sm assessment  unable to reach patient                   Expected Discharge Date and Time     Expected Discharge Date Expected Discharge Time    Vikas 10, 2023             Selam Griffiths RN   Phone communication or documentation only - no physical contact with patient or family.

## 2023-01-10 ENCOUNTER — READMISSION MANAGEMENT (OUTPATIENT)
Dept: CALL CENTER | Facility: HOSPITAL | Age: 73
End: 2023-01-10
Payer: MEDICARE

## 2023-01-10 VITALS
RESPIRATION RATE: 13 BRPM | WEIGHT: 293 LBS | TEMPERATURE: 97.2 F | HEART RATE: 51 BPM | OXYGEN SATURATION: 94 % | SYSTOLIC BLOOD PRESSURE: 136 MMHG | BODY MASS INDEX: 50.02 KG/M2 | HEIGHT: 64 IN | DIASTOLIC BLOOD PRESSURE: 70 MMHG

## 2023-01-10 PROCEDURE — 63710000001 DEXAMETHASONE PER 0.25 MG: Performed by: STUDENT IN AN ORGANIZED HEALTH CARE EDUCATION/TRAINING PROGRAM

## 2023-01-10 PROCEDURE — 25010000002 CEFTRIAXONE PER 250 MG: Performed by: HOSPITALIST

## 2023-01-10 RX ORDER — NICOTINE POLACRILEX 4 MG
15 LOZENGE BUCCAL
Status: DISCONTINUED | OUTPATIENT
Start: 2023-01-10 | End: 2023-01-10 | Stop reason: HOSPADM

## 2023-01-10 RX ORDER — DEXAMETHASONE 4 MG/1
4 TABLET ORAL DAILY
Qty: 2 TABLET | Refills: 0 | Status: SHIPPED | OUTPATIENT
Start: 2023-01-11 | End: 2023-01-13

## 2023-01-10 RX ORDER — DEXTROSE MONOHYDRATE 25 G/50ML
25 INJECTION, SOLUTION INTRAVENOUS
Status: DISCONTINUED | OUTPATIENT
Start: 2023-01-10 | End: 2023-01-10 | Stop reason: HOSPADM

## 2023-01-10 RX ORDER — OLANZAPINE 10 MG/2ML
1 INJECTION, POWDER, LYOPHILIZED, FOR SOLUTION INTRAMUSCULAR
Status: DISCONTINUED | OUTPATIENT
Start: 2023-01-10 | End: 2023-01-10 | Stop reason: HOSPADM

## 2023-01-10 RX ORDER — CEFDINIR 300 MG/1
300 CAPSULE ORAL 2 TIMES DAILY
Qty: 8 CAPSULE | Refills: 0 | Status: SHIPPED | OUTPATIENT
Start: 2023-01-10 | End: 2023-01-14

## 2023-01-10 RX ORDER — INSULIN LISPRO 100 [IU]/ML
2-7 INJECTION, SOLUTION INTRAVENOUS; SUBCUTANEOUS
Status: DISCONTINUED | OUTPATIENT
Start: 2023-01-10 | End: 2023-01-10 | Stop reason: HOSPADM

## 2023-01-10 RX ADMIN — FLUOXETINE 60 MG: 20 CAPSULE ORAL at 10:23

## 2023-01-10 RX ADMIN — GUAIFENESIN 1200 MG: 600 TABLET, EXTENDED RELEASE ORAL at 10:22

## 2023-01-10 RX ADMIN — DEXAMETHASONE 6 MG: 4 TABLET ORAL at 10:23

## 2023-01-10 RX ADMIN — METOPROLOL SUCCINATE 25 MG: 25 TABLET, EXTENDED RELEASE ORAL at 10:22

## 2023-01-10 RX ADMIN — MULTIPLE VITAMINS W/ MINERALS TAB 1 TABLET: TAB at 10:23

## 2023-01-10 RX ADMIN — ACETAMINOPHEN 650 MG: 325 TABLET, FILM COATED ORAL at 11:25

## 2023-01-10 RX ADMIN — CEFTRIAXONE 2 G: 2 INJECTION, POWDER, FOR SOLUTION INTRAMUSCULAR; INTRAVENOUS at 03:15

## 2023-01-10 RX ADMIN — CYANOCOBALAMIN TAB 1000 MCG 1000 MCG: 1000 TAB at 10:23

## 2023-01-10 RX ADMIN — ATORVASTATIN CALCIUM 10 MG: 20 TABLET, FILM COATED ORAL at 10:23

## 2023-01-10 RX ADMIN — GABAPENTIN 400 MG: 400 CAPSULE ORAL at 10:22

## 2023-01-10 RX ADMIN — LEVOTHYROXINE SODIUM 125 MCG: 0.15 TABLET ORAL at 05:38

## 2023-01-10 RX ADMIN — DOCUSATE SODIUM 100 MG: 100 CAPSULE, LIQUID FILLED ORAL at 10:23

## 2023-01-10 NOTE — PLAN OF CARE
Goal Outcome Evaluation:              Outcome Evaluation: Pt has had no c/o pain this evening, her cough has improved greatly since yesterday, has been on room air this entire shift, pt eager to go home, hopefuly to d/c in the am.  Call light in reach, plan of care ongoing

## 2023-01-10 NOTE — PLAN OF CARE
Goal Outcome Evaluation:      Patient doing well VSS. Patient on room air, coughing less. Patient anxious to go home today with spouse. Patient states that she does not want home health due to having a care giver that has been with her for 7 years. Discharge education provided

## 2023-01-10 NOTE — CASE MANAGEMENT/SOCIAL WORK
Case Management Discharge Note      Final Note: home    Provided Post Acute Provider List?: Yes  Post Acute Provider List: Home Health    Selected Continued Care - Discharged on 1/10/2023 Admission date: 1/6/2023 - Discharge disposition: Home or Self Care                Transportation Services  Private: Car    Final Discharge Disposition Code: 01 - home or self-care

## 2023-01-10 NOTE — DISCHARGE SUMMARY
"             [facility] Medicine Services  DISCHARGE SUMMARY    Patient Name: Claudia Rust  : 1950  MRN: [id number]    Date of Admission: 2023  Discharge Diagnosis: Shortness of breath  Date of Discharge: 1/10/2023  Primary Care Physician: Pool Meyer NP-C      Presenting Problem:   Hyponatremia [E87.1]  Hypoxia [R09.02]  COVID [U07.1]    Active and Resolved Hospital Problems:  Active Hospital Problems    Diagnosis POA   • **COVID [U07.1] Yes   • Atypical pneumonia [J18.9] Unknown   • Obesity, morbid, BMI 50 or higher (HCC) [E66.01] Unknown   • COPD with hypoxia (HCC) [J44.9, R09.02] Unknown      Resolved Hospital Problems   No resolved problems to display.         Hospital Course     Hospital Course:  Chief Complaint: \" flu-like sympotoms\"     History of Present Illness: Claudia Rust is a 72 y.o. female with past medical history of morbid obesity, hypertension, COPD not on home oxygen, GERD, congestive heart failure, aortic valve stenosis on warfarin status post bovine aortic valve replacement, bipolar disorder, hyperlipidemia who presented to [facility] on 2023 complaining of weakness, shortness of breath and flulike symptoms.     Patient reports symptoms started on 23.  She denies any ill contacts.  She denies fever but having symptoms of cold chills and sweats.  She reports persistent dry cough which she is having residual chest pain.  Reports unable to sleep as she is unable to lie flat.  Positive for increased weakness and decreased mobility.  Having intermittent diarrhea and nausea.     In the ER she was afebrile.  Vital signs stable.  Having decreased O2 sats requiring 2 L nasal cannula to keep sats 93%.  Respiratory panel was positive for COVID-19.  Positive for hyponatremia at 125.  Acute kidney injury of 1.2.  INR 2.1.  White blood cell 14.2.  Chest x-ray positive for hazy and interstitial opacities.  Represent underlying interstitial edema but " viral and atypical pneumonia also in the differential.  proBNP 277.  Troponins within normal limits.  Further work-up showed elevated Pro-Christ at 0.56.  Will start remdesivir and empiric antibiotics.    Hospital course and problem list    Plan:   Acute respiratory failure  Secondary to COVID-19 pneumonia  Chest x-ray with bilateral infiltrates  Positive for COVID-19  Patient treated with remdesivir did not want to stay for last dose however clinically much improved.  Finish quick Decadron taper  Patient will covered with antibiotics as well will finish up a course with Omnicef on discharge she was on ceftriaxone while here  Echo from 2021 noted     COPD  Managed with bronchodilators, steroids as above     Aortic stenosis   status post replacement in 2021 continue anticoagulation INR therapeutic     Diabetes  Sliding scale Accu-Cheks  Home regimen on discharge    CKD  Stage III  Restart diuretics on discharge, held while here     DVT PUD prophylaxis     Plan discharged home stable condition follow-up with PCP cardiology and outpatient.        Reasons For Change In Medications and Indications for New Medications:      Day of Discharge     Vital Signs:  Temp:  [97.2 °F (36.2 °C)-97.7 °F (36.5 °C)] 97.2 °F (36.2 °C)  Heart Rate:  [51-57] 51  Resp:  [12-15] 13  BP: (127-139)/(49-70) 136/70  Flow (L/min):  [4] 4    Physical Exam:  Physical Exam   Head: atraumatic/normocephalic  Neck: supple   Heart: s1s2,regular rate and rythm,no extra heart sounds   Lungs: clear to ascultation b/l   Abd: soft/nontender/nondystended/bs+  Neuro: moving all extremities, no facial asymetry, dtr LE intact   Ext: no edema           Pertinent  and/or Most Recent Results     LAB RESULTS:      Lab 01/09/23  2239 01/08/23  2250 01/08/23  0029 01/07/23  1049 01/07/23  0637 01/06/23  2142 01/06/23 2021   WBC  --  11.20*  --   --  12.40*  --  14.20*   HEMOGLOBIN  --  12.2  --   --  12.3  --  12.6   HEMATOCRIT  --  37.3  --   --  38.1  --  39.0    PLATELETS  --  257  --   --  196  --  210   NEUTROS ABS  --   --   --   --  10.80*  --  10.51*   LYMPHS ABS  --   --   --   --  1.20  --   --    MONOS ABS  --   --   --   --  0.30  --   --    EOS ABS  --   --   --   --  0.00  --   --    MCV  --  89.8  --   --  88.7  --  88.6   PROCALCITONIN  --   --   --   --  0.34* 0.56*  --    PROTIME 37.2* 32.3* 30.3* 26.2  --   --  20.9*   APTT  --   --   --   --   --   --  48.7*         Lab 01/09/23 2239 01/08/23 2329 01/07/23 2315 01/07/23 0637 01/06/23 2021   SODIUM  --  134*  --  132* 125*   POTASSIUM  --  3.7  --  3.9 4.1   CHLORIDE  --  100  --  96* 90*   CO2  --  22.0  --  20.0* 20.0*   ANION GAP  --  12.0  --  16.0* 15.0   BUN  --  34*  --  19 21   CREATININE 1.25* 1.13* 1.21* 1.10* 1.23*   EGFR 45.9* 51.8* 47.7* 53.5* 46.8*   GLUCOSE  --  170*  --  189* 110*   CALCIUM  --  9.1  --  8.7 8.8         Lab 01/09/23 2239 01/08/23 2329 01/07/23 2315 01/07/23  1049 01/07/23 0637 01/06/23 2021   TOTAL PROTEIN 6.6 7.0 7.3 7.5 7.1 7.4   ALBUMIN 3.7 3.7 3.6 3.9 3.9 3.8   GLOBULIN  --  3.3  --   --   --  3.6   ALT (SGPT) 37* 26 20 17 18 17   AST (SGOT) 37* 33* 32 28 28 26   BILIRUBIN 0.2 0.2 0.3 0.2 0.2 0.5   BILIRUBIN DIRECT <0.2 <0.2 <0.2 <0.2 <0.2  --    ALK PHOS 81 78 81 76 78 77         Lab 01/09/23  2239 01/08/23  2250 01/08/23  0029 01/07/23  1049 01/06/23  2142 01/06/23 2021   PROBNP  --   --   --   --   --  277.0   TROPONIN T  --   --   --   --  <0.010 <0.010   PROTIME 37.2* 32.3* 30.3* 26.2  --  20.9*   INR 3.90* 3.36* 3.14* 2.69  --  2.12*                 Brief Urine Lab Results  (Last result in the past 365 days)      Color   Clarity   Blood   Leuk Est   Nitrite   Protein   CREAT   Urine HCG        03/11/22 1629 Dark Yellow   Cloudy   Negative   Moderate (2+)   Positive   Negative               Microbiology Results (last 10 days)     Procedure Component Value - Date/Time    Respiratory Panel PCR w/COVID-19(SARS-CoV-2) FEROZ/MANINDER/IAIN/PAD/COR/MAD/RACIEL In-House, NP  Swab in UTM/VTM, 3-4 HR TAT - Swab, Nasopharynx [[id number]]  (Abnormal) Collected: 01/08/23 1405    Lab Status: Final result Specimen: Swab from Nasopharynx Updated: 01/08/23 1500     ADENOVIRUS, PCR Not Detected     Coronavirus 229E Not Detected     Coronavirus HKU1 Not Detected     Coronavirus NL63 Not Detected     Coronavirus OC43 Not Detected     COVID19 Detected     Human Metapneumovirus Not Detected     Human Rhinovirus/Enterovirus Not Detected     Influenza A PCR Not Detected     Influenza B PCR Not Detected     Parainfluenza Virus 1 Not Detected     Parainfluenza Virus 2 Not Detected     Parainfluenza Virus 3 Not Detected     Parainfluenza Virus 4 Not Detected     RSV, PCR Not Detected     Bordetella pertussis pcr Not Detected     Bordetella parapertussis PCR Not Detected     Chlamydophila pneumoniae PCR Not Detected     Mycoplasma pneumo by PCR Not Detected    Narrative:      In the setting of a positive respiratory panel with a viral infection PLUS a negative procalcitonin without other underlying concern for bacterial infection, consider observing off antibiotics or discontinuation of antibiotics and continue supportive care. If the respiratory panel is positive for atypical bacterial infection (Bordetella pertussis, Chlamydophila pneumoniae, or Mycoplasma pneumoniae), consider antibiotic de-escalation to target atypical bacterial infection.    S. Pneumo Ag Urine or CSF - Urine, Urine, Clean Catch [[id number]]  (Normal) Collected: 01/07/23 1539    Lab Status: Final result Specimen: Urine, Clean Catch Updated: 01/07/23 1611     Strep Pneumo Ag Negative    Legionella Antigen, Urine - Urine, Urine, Clean Catch [[id number]]  (Normal) Collected: 01/07/23 1539    Lab Status: Final result Specimen: Urine, Clean Catch Updated: 01/07/23 1611     LEGIONELLA ANTIGEN, URINE Negative    COVID-19 and FLU A/B PCR - Swab, Nasopharynx [[id number]]  (Abnormal) Collected: 01/06/23 1917    Lab Status: Final result Specimen:  Swab from Nasopharynx Updated: 01/06/23 1957     COVID19 Detected     Influenza A PCR Not Detected     Influenza B PCR Not Detected    Narrative:      Fact sheet for providers: https://www.fda.gov/media/467881/download    Fact sheet for patients: https://www.fda.gov/media/829306/download    Test performed by PCR.  Influenza A and Influenza B negative results should be considered presumptive in samples that have a positive SARS-CoV-2 result.    Competitive inhibition studies showed that SARS-CoV-2 virus, when present at concentrations above 3.6E+04 copies/mL, can inhibit the detection and amplification of influenza A and influenza B virus RNA if present at or below 1.8E+02 copies/mL or 4.9E+02 copies/mL, respectively, and may lead to false negative influenza virus results. If co-infection with influenza A or influenza B virus is suspected in samples with a positive SARS-CoV-2 result, the sample should be re-tested with another FDA cleared, approved, or authorized influenza test, if influenza virus detection would change clinical management.          CT Chest Without Contrast Diagnostic    Result Date: 1/7/2023  Impression: Impression: 1. Minimal left lower lobe airspace disease compatible with pneumonia. Electronically Signed: Leonardo Anaya  1/7/2023 7:04 PM EST  Workstation ID: [id number]    XR Chest 1 View    Result Date: 1/6/2023  Impression: Impression: Increased hazy and interstitial opacities noted. Findings represent underlying interstitial edema. Viral and atypical pneumonia also in the differential. Electronically Signed: Micah Neil  1/6/2023 8:03 PM EST  Workstation ID: [id number]      Results for orders placed during the hospital encounter of 01/06/23    Duplex Venous Lower Extremity - Bilateral CAR    Interpretation Summary  •  Normal bilateral lower extremity venous duplex scan.      Results for orders placed during the hospital encounter of 01/06/23    Duplex Venous Lower Extremity - Bilateral  CAR    Interpretation Summary  •  Normal bilateral lower extremity venous duplex scan.      Results for orders placed during the hospital encounter of 03/18/21    Adult Transesophageal Echo (CHRISTIANO) W/ Cont if Necessary Per Protocol (Bedside)    Interpretation Summary  CHRISTIANO   procedure note    Procedure:  CHRISTIANO    Indication:   Valvular heart disease s/p AVR, respiratory failure    Date of procedure  : 3/25/2021    :  Dr. Jose Levi    Description of procedure:    Under conscious sedation given by anesthesiology and local anesthesia, a CHRISTIANO probe was advanced into the esophagus and into the stomach 2D, M-mode, color Doppler images were obtained..  Patient tolerated procedure well complications well.    Complications: none    Results:    Normal LV size and contractility LV contractility, EF of 60%  Normal RV size  Normal left atrial size, normal right atrial size, left atrial appendage without thrombus.  Aortic valve, mitral valve, tricuspid valve appears structurally normal, mild TR seen.  No vegetation seen  No pericardial effusion seen.  Proximal aorta appears normal in size.  Mild aortic plaque seen      Recommendations/plan:    Clinical correlation recommended      Labs Pending at Discharge:      Procedures Performed           Consults:   Consults     Date and Time Order Name Status Description    1/6/2023 10:16 PM Hospitalist (on-call MD unless specified)              Discharge Details        Discharge Medications      New Medications      Instructions Start Date   cefdinir 300 MG capsule  Commonly known as: OMNICEF   300 mg, Oral, 2 Times Daily      dexamethasone 4 MG tablet  Commonly known as: DECADRON   4 mg, Oral, Daily   Start Date: January 11, 2023        Continue These Medications      Instructions Start Date   Acetaminophen 325 MG capsule   650 mg, Oral, Every 4 Hours PRN      albuterol (2.5 MG/3ML) 0.083% nebulizer solution  Commonly known as: PROVENTIL   2.5 mg, Inhalation, Every 6 Hours PRN,  DURING WINTER IF NEEDED HASNT NEEDED      CALCIUM 1200+D3 PO   1 capsule, Oral, Daily      cholecalciferol 25 MCG (1000 UT) tablet  Commonly known as: VITAMIN D3   2,000 Units, Oral, Nightly, LAST DOSE 3/4      diphenhydrAMINE 50 MG capsule  Commonly known as: BENADRYL   50 mg, Oral, Nightly PRN      FLUoxetine 20 MG capsule  Commonly known as: PROzac   60 mg, Oral, Daily      fluticasone 50 MCG/ACT nasal spray  Commonly known as: FLONASE   2 sprays, Nasal, Daily      gabapentin 400 MG capsule  Commonly known as: NEURONTIN   1 capsule, Oral, 3 Times Daily      levothyroxine 125 MCG tablet  Commonly known as: SYNTHROID, LEVOTHROID   125 mcg, Oral, Daily      meclizine 25 MG tablet  Commonly known as: ANTIVERT   25 mg, Oral, 3 Times Daily PRN      Melatonin 10 MG capsule   20 mg, Oral, Nightly PRN      metFORMIN  MG 24 hr tablet  Commonly known as: GLUCOPHAGE-XR   1 tablet, Oral, Daily With Breakfast      metOLazone 5 MG tablet  Commonly known as: ZAROXOLYN   5 mg, Oral, Daily      metoprolol succinate XL 25 MG 24 hr tablet  Commonly known as: TOPROL-XL   25 mg, Oral, Daily      multivitamin with minerals tablet tablet   1 tablet, Oral, Daily, LAST DOSE 3/4      ondansetron 4 MG tablet  Commonly known as: ZOFRAN   4 mg, Oral, Every 8 Hours PRN      pantoprazole 40 MG EC tablet  Commonly known as: PROTONIX   40 mg, Oral, Every Evening      rizatriptan MLT 10 MG disintegrating tablet  Commonly known as: MAXALT-MLT   10 mg, Oral, Once As Needed, May repeat in 2 hours if needed       simvastatin 20 MG tablet  Commonly known as: ZOCOR   20 mg, Oral, Nightly      spironolactone 25 MG tablet  Commonly known as: ALDACTONE   TAKE 2 TABLETS BY MOUTH EVERY DAY      tiZANidine 4 MG tablet  Commonly known as: ZANAFLEX   4 mg, Oral, Every 8 Hours PRN      vitamin b complex capsule capsule   1 capsule, Oral, Daily, LAST DOSE 3/4      vitamin B-12 1000 MCG tablet  Commonly known as: CYANOCOBALAMIN   1,000 mcg, Oral, Daily       warfarin 3 MG tablet  Commonly known as: COUMADIN   TAKE 1 TABLET BY MOUTH TUESDAY, THURSDAY, SATURDAY & SUNDAY AND 1 & 1/2 TABLET ALL OTHER DAYS OR AS DIRECTED             Allergies   Allergen Reactions   • Adhesive Tape Unknown (See Comments)     hives   • Butorphanol Other (See Comments)     Chest pain difficulty breathing throat swelling   • Garlic Other (See Comments)     Chest pain difficulty breathing throat swells   • Heparin Other (See Comments)     HIT +   • Tetanus-Diphtheria Toxoids Td Other (See Comments)     Dizziness,  vomiting   • Aloe Itching   • Bee Venom Other (See Comments)     Swelling eyes and lips hand swelling   • Latex Itching   • Macadamia Nut Oil Other (See Comments)     Chest pain difficulty breathing throat swelling   • Tuberculin, Ppd Unknown (See Comments)     reactor   • Wasp Venom Other (See Comments)     Swelling eyes lips and hand         Discharge Disposition: Discharged home stable condition  Home or Self Care    Diet:  Hospital:  Diet Order   Procedures   • Diet: Regular/House Diet; Texture: Regular Texture (IDDSI 7); Fluid Consistency: Thin (IDDSI 0)         Discharge Activity:   Activity Instructions     Activity as Tolerated              CODE STATUS:  Code Status and Medical Interventions:   Ordered at: 01/07/23 0153     Code Status (Patient has no pulse and is not breathing):    CPR (Attempt to Resuscitate)     Medical Interventions (Patient has pulse or is breathing):    Full Support         Future Appointments   Date Time Provider Department Center   1/17/2023  9:30 AM [facility]   1/17/2023 10:00 AM Jose Levi MD [facility]       Additional Instructions for the Follow-ups that You Need to Schedule     Discharge Follow-up with PCP   As directed       Currently Documented PCP:    Pool Meyer NP-C    PCP Phone Number:    [phone]     Follow Up Details: one week         Discharge Follow-up with Specialty:  cardiology one week   As directed      Specialty: cardiology one week               Time spent on Discharge including face to face service: 38 minutes      Signature:   Electronically signed by Nikki Guevara MD, 01/10/23, 10:44 AM EST.

## 2023-01-10 NOTE — PROGRESS NOTES
"Pharmacy Dosing Service  Anticoagulant  Warfarin     Subjective:  Claudia Rust is a 72 y.o.female with pharmacy consulted to dose warfarin for the indication of atrial fibrillation.  Home dose: warfarin 3mg Tues/Thurs/Sat/Sun and 4.5mg Mon/Wed/Fri  INR goal: 2-3  DDI: azithro (1/7-1/9); CTX (1/7-present; LD 1/11), Dexamethasone (1/7-present)    Date 1/7 1/8 1/9 1/10        INR 2.69 3.14 3.36 3.9        Dose 3 mg 1.5 mg  Hold  Hold            Assessment/Plan:  INR supratherapeutic, increased again today. Etiology likely drug interaction with antibiotics and steroids.     Will hold warfarin dose today and re-assess INR tomorrow.     Continue to monitor INR and adjust as needed. Daily INR ordered.       Objective:  [Ht: 162.6 cm (64.02\"); Wt: (!) 141 kg (310 lb 3 oz); BMI: Body mass index is 53.22 kg/m².]  Lab Results   Component Value Date    ALBUMIN 3.7 01/09/2023     Lab Results   Component Value Date    INR 3.90 (H) 01/09/2023    INR 3.36 (H) 01/08/2023    INR 3.14 (H) 01/08/2023    PROTIME 37.2 (H) 01/09/2023    PROTIME 32.3 (H) 01/08/2023    PROTIME 30.3 (H) 01/08/2023     Lab Results   Component Value Date    HGB 12.2 01/08/2023    HGB 12.3 01/07/2023    HGB 12.6 01/06/2023     Lab Results   Component Value Date    HCT 37.3 01/08/2023    HCT 38.1 01/07/2023    HCT 39.0 01/06/2023     Kraig Keene RPH    1/10/2023  08:56 EST  "

## 2023-01-10 NOTE — CASE MANAGEMENT/SOCIAL WORK
Discharge Planning Assessment  [facility]     Patient Name: Claudia Rust  MRN: [id number]  Today's Date: 1/10/2023    Admit Date: 1/6/2023    Plan: Home with family and caregivers   Discharge Needs Assessment     Row Name 01/10/23 1409       Living Environment    People in Home spouse    Current Living Arrangements home    Primary Care Provided by self    Provides Primary Care For no one    Family Caregiver if Needed spouse    Quality of Family Relationships supportive;involved;helpful    Able to Return to Prior Arrangements yes       Resource/Environmental Concerns    Resource/Environmental Concerns none    Transportation Concerns none       Transition Planning    Patient/Family Anticipates Transition to home with family    Patient/Family Anticipated Services at Transition home health care    Transportation Anticipated family or friend will provide       Discharge Needs Assessment    Readmission Within the Last 30 Days no previous admission in last 30 days    Equipment Currently Used at Home rollator;power chair,(recliner lift);cane, straight    Concerns to be Addressed care coordination/care conferences;discharge planning    Anticipated Changes Related to Illness none    Equipment Needed After Discharge none    Outpatient/Agency/Support Group Needs homecare agency    Discharge Facility/Level of Care Needs home with home health    Provided Post Acute Provider List? Yes    Post Acute Provider List Home Health               Discharge Plan     Row Name 01/10/23 1410       Plan    Plan Home with family and caregivers    Plan Comments Spoke with patient.  Lives at home with spouse and has caregivers,  Has rollator, cane and lift recliner,  Denies dc needs,  Declined home health.  Family will transport hime.  Has PCP and pharmacy she aileenly uses,              Continued Care and Services - Discharged on 1/10/2023 Admission date: 1/6/2023 - Discharge disposition: Home or Self Care   Coordination has not been started for  this encounter.       Expected Discharge Date and Time     Expected Discharge Date Expected Discharge Time    Vikas 10, 2023          Demographic Summary     Row Name 01/10/23 1407       General Information    Admission Type inpatient    Arrived From emergency department    Referral Source admission list    Reason for Consult discharge planning    Preferred Language English       Contact Information    Permission Granted to Share Info With                Functional Status     Row Name 01/10/23 1408       Functional Status    Usual Activity Tolerance good    Current Activity Tolerance good       Functional Status, IADL    Medications independent    Meal Preparation independent    Housekeeping independent    Laundry independent    Shopping independent       Mental Status    General Appearance WDL WDL       Mental Status Summary    Recent Changes in Mental Status/Cognitive Functioning no changes               Selam Griffiths RN   Phone communication or documentation only - no physical contact with patient or family.

## 2023-01-11 NOTE — OUTREACH NOTE
Prep Survey    Flowsheet Row Responses   [facility] facility [facility] discharged from? [facility]   Is LACE score < 7 ? No   Eligibility Readm Mgmt   Discharge diagnosis Atypical pneumonia   Does the patient have one of the following disease processes/diagnoses(primary or secondary)? Pneumonia   Does the patient have Home health ordered? No   Is there a DME ordered? No   Prep survey completed? Yes          ANTONIO OJEDA - Registered Nurse

## 2023-01-12 ENCOUNTER — READMISSION MANAGEMENT (OUTPATIENT)
Dept: CALL CENTER | Facility: HOSPITAL | Age: 73
End: 2023-01-12
Payer: MEDICARE

## 2023-01-12 NOTE — OUTREACH NOTE
COPD/PN Week 1 Survey    Flowsheet Row Responses   [facility] patient discharged from? [facility]   Does the patient have one of the following disease processes/diagnoses(primary or secondary)? Pneumonia   Week 1 attempt successful? Yes   Call start time 1207   Call end time 1209   Discharge diagnosis Atypical pneumonia   Meds reviewed with patient/caregiver? Yes   Is the patient having any side effects they believe may be caused by any medication additions or changes? No   Does the patient have all medications ordered at discharge? Yes   Is the patient taking all medications as directed (includes completed medication regime)? Yes   Does the patient have a primary care provider?  Yes   Does the patient have an appointment with their PCP or specialist within 7 days of discharge? No   What is preventing the patient from scheduling follow up appointments within 7 days of discharge? Haven't had time   Nursing Interventions Advised patient to make appointment   Has the patient kept scheduled appointments due by today? N/A   Pulse Ox monitoring Intermittent   O2 Sat comments 93% on RA    Psychosocial issues? No   Did the patient receive a copy of their discharge instructions? Yes   Nursing interventions Reviewed instructions with patient   What is the patient's perception of their health status since discharge? Improving   Are the patient's immunizations up to date?  Yes   If the patient is a current smoker, are they able to teach back resources for cessation? Not a smoker   Is the patient/caregiver able to teach back the hierarchy of who to call/visit for symptoms/problems? PCP, Specialist, Home health nurse, Urgent Care, ED, 911 Yes   Is the patient/caregiver able to teach back signs and symptoms of worsening condition: Fever/chills, Shortness of breath, Chest pain   Is the patient/caregiver able to teach back importance of completing antibiotic course of treatment? Yes   Week 1 call completed? Yes   Revoked No further  "contact(revokes)-requires comment   Is the patient interested in additional calls from an ambulatory ?  NOTE:  applies to high risk patients requiring additional follow-up. No   Graduated/Revoked comments Pt states, \"I don't think it's necessary. I have a caregiver and my .\"          TENA ADEN - Registered Nurse  "

## 2023-01-16 DIAGNOSIS — Z95.2 S/P AVR: ICD-10-CM

## 2023-01-16 RX ORDER — WARFARIN SODIUM 3 MG/1
TABLET ORAL
Qty: 135 TABLET | Refills: 0 | Status: SHIPPED | OUTPATIENT
Start: 2023-01-16

## 2023-01-16 NOTE — TELEPHONE ENCOUNTER
Rx Refill Note  Requested Prescriptions     Pending Prescriptions Disp Refills   • warfarin (COUMADIN) 3 MG tablet [Pharmacy Med Name: WARFARIN SODIUM 3 MG TABLET] 135 tablet 0     Sig: TAKE 1 TABLET BY MOUTH TUESDAY, THURSDAY, SATURDAY & SUNDAY AND 1 & 1/2 TABLET ALL OTHER DAYS OR AS DIRECTED      Last office visit with prescribing clinician: 1/14/2022   Last telemedicine visit with prescribing clinician: 1/30/2023   Next office visit with prescribing clinician: 1/30/2023   Anticoagulation Visit 12/2/22 INR 3.7                        Would you like a call back once the refill request has been completed: [] Yes [] No    If the office needs to give you a call back, can they leave a voicemail: [] Yes [] No    Francisca Peace RN  01/16/23, 16:52 EST

## 2023-01-30 ENCOUNTER — OFFICE VISIT (OUTPATIENT)
Dept: CARDIOLOGY | Facility: CLINIC | Age: 73
End: 2023-01-30
Payer: MEDICARE

## 2023-01-30 ENCOUNTER — ANTICOAGULATION VISIT (OUTPATIENT)
Dept: CARDIOLOGY | Facility: CLINIC | Age: 73
End: 2023-01-30
Payer: MEDICARE

## 2023-01-30 VITALS
HEART RATE: 68 BPM | HEIGHT: 64 IN | SYSTOLIC BLOOD PRESSURE: 143 MMHG | WEIGHT: 289 LBS | BODY MASS INDEX: 49.34 KG/M2 | DIASTOLIC BLOOD PRESSURE: 82 MMHG

## 2023-01-30 VITALS
SYSTOLIC BLOOD PRESSURE: 143 MMHG | WEIGHT: 289 LBS | HEART RATE: 68 BPM | BODY MASS INDEX: 49.58 KG/M2 | DIASTOLIC BLOOD PRESSURE: 82 MMHG

## 2023-01-30 DIAGNOSIS — R20.0 NUMBNESS AND TINGLING IN LEFT HAND: Primary | ICD-10-CM

## 2023-01-30 DIAGNOSIS — R20.2 NUMBNESS AND TINGLING IN LEFT HAND: Primary | ICD-10-CM

## 2023-01-30 DIAGNOSIS — E66.01 MORBID OBESITY WITH BMI OF 45.0-49.9, ADULT: ICD-10-CM

## 2023-01-30 DIAGNOSIS — E78.5 DYSLIPIDEMIA: ICD-10-CM

## 2023-01-30 DIAGNOSIS — Z79.01 LONG TERM (CURRENT) USE OF ANTICOAGULANTS: ICD-10-CM

## 2023-01-30 DIAGNOSIS — Z95.2 S/P AVR: Primary | ICD-10-CM

## 2023-01-30 DIAGNOSIS — I50.32 CHRONIC HEART FAILURE WITH PRESERVED EJECTION FRACTION (HFPEF): ICD-10-CM

## 2023-01-30 DIAGNOSIS — R60.0 EDEMA LEG: ICD-10-CM

## 2023-01-30 DIAGNOSIS — Z95.2 S/P AVR: ICD-10-CM

## 2023-01-30 DIAGNOSIS — I10 ESSENTIAL HYPERTENSION: ICD-10-CM

## 2023-01-30 LAB — INR PPP: 2.9 (ref 0.9–1.1)

## 2023-01-30 PROCEDURE — 36416 COLLJ CAPILLARY BLOOD SPEC: CPT | Performed by: INTERNAL MEDICINE

## 2023-01-30 PROCEDURE — 99214 OFFICE O/P EST MOD 30 MIN: CPT | Performed by: INTERNAL MEDICINE

## 2023-01-30 PROCEDURE — 85610 PROTHROMBIN TIME: CPT | Performed by: INTERNAL MEDICINE

## 2023-01-30 RX ORDER — LOSARTAN POTASSIUM 25 MG/1
25 TABLET ORAL DAILY
Qty: 90 TABLET | Refills: 3 | Status: SHIPPED | OUTPATIENT
Start: 2023-01-30

## 2023-01-30 NOTE — PROGRESS NOTES
Subjective:     Encounter Date:01/30/2023      Patient ID: Claudia Rust is a 72 y.o. female.    Chief Complaint :Follow-up for AVR, CHF, prediabetes, hypertension,    History of Present Illness           Ms. Claudia Rust has of PMH      #Valvular heart disease with moderate to severe aortic stenosis  Cardiac cath 1/20/2021 revealing nonobstructive CAD  Patient underwent #23 magna pericardial prosthesis AVR 3/18/2021  Transesophageal echo 3/25/2021 normal LV systolic function  # Hyperlipidemia,    # Hypertension  # COPD, KARLY on CPAP  # Prediabetes  #History of HIT  # Hypothyroidism, Rheumatoid Arthritis, spinal stenosis, hiatal hernia, chronic depression, bipolar, GERD, IBS, chronic migraine, vertigo, herniated disc, compression lumbar fractures  #Allergies/intolerance to Stadol  # Hysterectomy, bladder reconstruction, cholecystectomy, right hand surgery  # Family history of strokes and grandfather.  Mother's cancer, father on  # Active cigarette smoker trying to quit         Here for follow-up.  Patient is complaining of shortness of breath and dyspnea on exertion.  Is complaining of numbness in the left arm small finger finger.  Is complaining of edema and dizziness..  Patient denies any chest pain..    Patient's arterial blood pressure is 143/82, heart rate 68 bpm..  BMI is over 40.    Review of records reveals patient underwent an echocardiogram which is revealing severe aortic stenosis on 2/11/2020 ,Underwent cardiac cath 1/20/2021 which revealed no obstructive disease,underwent aortic valve replacement 3/18/2021 with #23 magna pericardial prosthesis.  Went to rehab.     Carotid Dopplers 9/13/2019 normal.  Echocardiogram 2/11/2020 is revealing severe aortic stenosis  Work-up here on 3/16/2021 revealed proBNP 733, normal CMP, A1c 5.7, normal CBC, chest x-ray with no acute abnormality.  EKG 3/16/2021 reviewed/interpreted by me shows sinus rhythm with a rate of 60 bpm with QT of 507 ms.        Labs from  4/26/2021 reveal Chem-7 with a potassium of 3.5, 6/5/2021 revealed proBNP of 1147.  Labs from 6/8/2021 reveal a potassium of 2.9.  Labs from 12/17/2021 revealed normal TSH at 1.9, CMP with a creatinine of 1.13 and GFR 47 1 normal CBC, hemoglobin A1c of 6.6.  Lipid profile with cholesterol 172, triglycerides 185, HDL 45, LDL 90.  INR from 1/3/2022 was 2.8.  Labs from 1/8/2023 revealed CMP with glucose 170, creatinine 1.13, GFR 51, sodium 134, AST 33.  CBC with hemoglobin 12.2.  proBNP 12/5/2022 normal at 389.      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  This could be anginal equivalent in a female.  We will do stress test.  Patient says she cannot walk due to her current deconditioning we will do Lexiscan Cardiolite.  Patient blood pressure is elevated will add losartan  Continue aspirin, diuretics with  , Zaroxolyn, Aldactone as tolerated for edema, AVR, carotid disease, chronic heart failure due to valvular heart disease and hypertensive cardiovascular disease.  Continue warfarin, simvastatin, as tolerated  Follow-up in CHF clinic   Counseled on smoking cessation.   Advised patient follow up with nephrology.  Patient underwent transesophageal echo 3/25/2021 which revealed normal LV systolic function normal chamber sizes.  Normal aortic valve placement and no significant valvular heart disease.  Discussed about BMI  "over 40, and now diabetes, counseled on weight loss diet and exercise.  Follow-up with PMD for diabetes care.          Procedures   EKG done 1/6/2023 reviewed/interpreted by me reveals sinus rhythm with rate of 64 bpm    Copied text in this portion of the note has been reviewed and is accurate as of 1/30/2023  The following portions of the patient's history were reviewed and updated as appropriate: allergies, current medications, past family history, past medical history, past social history, past surgical history and problem list.    Assessment:         [facility]     Diagnosis Plan   1. Numbness and tingling in left hand  Stress Test With Myocardial Perfusion One Day    Comprehensive Metabolic Panel    Lipid Panel      2. Chronic heart failure with preserved ejection fraction (HFpEF) ([facility])  Stress Test With Myocardial Perfusion One Day    Comprehensive Metabolic Panel    Lipid Panel      3. S/P AVR  Stress Test With Myocardial Perfusion One Day    Comprehensive Metabolic Panel    Lipid Panel      4. Edema leg  Stress Test With Myocardial Perfusion One Day    Comprehensive Metabolic Panel    Lipid Panel      5. Essential hypertension  Stress Test With Myocardial Perfusion One Day    Comprehensive Metabolic Panel    Lipid Panel      6. Dyslipidemia  Stress Test With Myocardial Perfusion One Day    Comprehensive Metabolic Panel    Lipid Panel      7. Morbid obesity with BMI of 45.0-49.9, adult ([facility])  Stress Test With Myocardial Perfusion One Day    Comprehensive Metabolic Panel    Lipid Panel             Plan:               Past Medical History:  Past Medical History:   Diagnosis Date   • Acute congestive heart failure (HCC)    • Allergies    • Anxiety    • Arthritis    • Asthma    • Bilateral leg edema    • Bipolar 1 disorder ([facility])    • Bulging lumbar disc     X3   • Cancer ([facility])     states had \"cancerous tumor during pregnancy and had to have hysterectomy\"   • Cataract     bilateral   • Cellulitis 03/2021    bilateral " legs   • Compression fracture of vertebral column ([facility])     LUMBAR   • COPD (chronic obstructive pulmonary disease) ([facility])    • Delayed emergence from anesthesia    • Depression    • Depression    • Diabetes mellitus ([facility])    • Dyslipidemia    • Dyspnea on exertion    • Fibromyalgia    • GERD (gastroesophageal reflux disease)    • Hiatal hernia    • Hyperlipidemia    • Hypertension    • Hypothyroid     HYPOTHYROID   • IBS (irritable bowel syndrome)    • Migraines    • Morbid obesity ([facility])    • Neuropathy    • Panic attacks    • Peripheral edema    • PONV (postoperative nausea and vomiting)    • Poor mobility     rolling walker   • Prediabetes    • PVD (peripheral vascular disease) ([facility])    • Rheumatoid aortitis    • Spinal stenosis    • Spinal stenosis    • Vertigo    • Vertigo    • Vitamin D deficiency      Past Surgical History:  Past Surgical History:   Procedure Laterality Date   • AORTIC VALVE REPAIR/REPLACEMENT N/A 3/18/2021    Procedure: AORTIC VALVE REPLACEMENT;  Surgeon: Olaf Mcgill MD;  [facility];  Service: Cardiothoracic;  Laterality: N/A;   • BRONCHOSCOPY N/A 3/25/2021    Procedure: BRONCHOSCOPY AT BEDSIDE WITH BRONCHIOALVEOLAR LAVAGE;  Surgeon: Glenn Reyes MD;  Location: [facility] ENDOSCOPY;  Service: Pulmonary;  Laterality: N/A;  PNA   • CARDIAC CATHETERIZATION  2009   • CARDIAC CATHETERIZATION N/A 8/14/2019    Procedure: Left Heart Cath;  Surgeon: Jose Levi MD;  Location: [facility] CATH INVASIVE LOCATION;  Service: Cardiovascular   • CARDIAC CATHETERIZATION N/A 8/14/2019    Procedure: Right Heart Cath;  Surgeon: Jose Levi MD;  Location: [facility] CATH INVASIVE LOCATION;  Service: Cardiovascular   • CARDIAC CATHETERIZATION N/A 8/14/2019    Procedure: Aortic root aortogram;  Surgeon: Jose Levi MD;  Location: [facility] CATH INVASIVE LOCATION;  Service: Cardiovascular   • CARDIAC CATHETERIZATION N/A 1/20/2021    Procedure: Left Heart Cath;  Surgeon:  Jose Levi MD;  Location: [facility] INVASIVE LOC[facility];  Service: Cardiovascular;  Laterality: N/A;   • COLONOSCOPY     • CYSTOCELE REPAIR  1984   • ELBOW ARTHROPLASTY  2011   • ENDOSCOPY     • FOOT SURGERY     • GALLBLADDER SURGERY  2002   • TONSILLECTOMY     • VAGINAL HYSTERECTOMY  1972      Allergies:  Allergies   Allergen Reactions   • Adhesive Tape Unknown (See Comments)     hives   • Butorphanol Other (See Comments)     Chest pain difficulty breathing throat swelling   • Garlic Other (See Comments)     Chest pain difficulty breathing throat swells   • Heparin Other (See Comments)     HIT +   • Tetanus-Diphtheria Toxoids Td Other (See Comments)     Dizziness,  vomiting   • Aloe Itching   • Bee Venom Other (See Comments)     Swelling eyes and lips hand swelling   • Latex Itching   • Macadamia Nut Oil Other (See Comments)     Chest pain difficulty breathing throat swelling   • Tuberculin, Ppd Unknown (See Comments)     reactor   • Wasp Venom Other (See Comments)     Swelling eyes lips and hand     Home Meds:  Current Meds:     Current Outpatient Medications:   •  Acetaminophen 325 MG capsule, Take 650 mg by mouth Every 4 (Four) Hours As Needed., Disp: , Rfl:   •  albuterol (PROVENTIL) (2.5 MG/3ML) 0.083% nebulizer solution, Inhale 2.5 mg Every 6 (Six) Hours As Needed. DURING WINTER IF NEEDED HASNT NEEDED, Disp: , Rfl:   •  B Complex Vitamins (VITAMIN B COMPLEX) capsule capsule, Take 1 capsule by mouth Daily. LAST DOSE 3/4, Disp: , Rfl:   •  Calcium-Magnesium-Vitamin D (CALCIUM 1200+D3 PO), Take 1 capsule by mouth Daily., Disp: , Rfl:   •  cholecalciferol (Cholecalciferol) 25 MCG (1000 UT) tablet, Take 2,000 Units by mouth Every Night. LAST DOSE 3/4, Disp: , Rfl:   •  diphenhydrAMINE (BENADRYL) 50 MG capsule, Take 50 mg by mouth At Night As Needed for Itching., Disp: , Rfl:   •  FLUoxetine (PROzac) 20 MG capsule, Take 60 mg by mouth Daily., Disp: , Rfl:   •  fluticasone (FLONASE) 50 MCG/ACT nasal  spray, 2 sprays into the nostril(s) as directed by provider Daily., Disp: , Rfl:   •  gabapentin (NEURONTIN) 400 MG capsule, Take 1 capsule by mouth 3 (Three) Times a Day., Disp: , Rfl:   •  levothyroxine (SYNTHROID, LEVOTHROID) 125 MCG tablet, Take 125 mcg by mouth Daily., Disp: , Rfl:   •  meclizine (ANTIVERT) 25 MG tablet, Take 25 mg by mouth 3 (Three) Times a Day As Needed., Disp: , Rfl:   •  Melatonin 10 MG capsule, Take 20 mg by mouth At Night As Needed., Disp: , Rfl:   •  metFORMIN ER (GLUCOPHAGE-XR) 500 MG 24 hr tablet, Take 1 tablet by mouth Daily With Breakfast., Disp: , Rfl:   •  metOLazone (ZAROXOLYN) 5 MG tablet, Take 1 tablet by mouth Daily., Disp: 90 tablet, Rfl: 3  •  metoprolol succinate XL (TOPROL-XL) 25 MG 24 hr tablet, Take 25 mg by mouth Daily., Disp: , Rfl:   •  Multiple Vitamins-Minerals (MULTIVITAMIN WITH MINERALS) tablet tablet, Take 1 tablet by mouth Daily. LAST DOSE 3/4, Disp: , Rfl:   •  ondansetron (ZOFRAN) 4 MG tablet, Take 4 mg by mouth Every 8 (Eight) Hours As Needed for Nausea or Vomiting., Disp: , Rfl:   •  pantoprazole (PROTONIX) 40 MG EC tablet, Take 40 mg by mouth Every Evening., Disp: , Rfl:   •  rizatriptan MLT (MAXALT-MLT) 10 MG disintegrating tablet, Take 10 mg by mouth 1 (One) Time As Needed for Migraine. May repeat in 2 hours if needed, Disp: , Rfl:   •  simvastatin (ZOCOR) 20 MG tablet, Take 20 mg by mouth Every Night., Disp: , Rfl:   •  spironolactone (ALDACTONE) 25 MG tablet, TAKE 2 TABLETS BY MOUTH EVERY DAY, Disp: 180 tablet, Rfl: 3  •  tiZANidine (ZANAFLEX) 4 MG tablet, Take 4 mg by mouth Every 8 (Eight) Hours As Needed for Muscle Spasms., Disp: , Rfl:   •  vitamin B-12 (CYANOCOBALAMIN) 1000 MCG tablet, Take 1,000 mcg by mouth Daily., Disp: , Rfl:   •  warfarin (COUMADIN) 3 MG tablet, TAKE 1 TABLET BY MOUTH TUESDAY,WEDNESDAY, THURSDAY, SATURDAY & SUNDAY AND 1 & 1/2 TABLETS ON Monday AND Friday., Disp: 135 tablet, Rfl: 0  •  losartan (Cozaar) 25 MG tablet, Take 1  "tablet by mouth Daily., Disp: 90 tablet, Rfl: 3  Social History:   Social History     Tobacco Use   • Smoking status: Former     Packs/day: 1.00     Types: Cigarettes   • Smokeless tobacco: Never   • Tobacco comments:     decrease now and do not smoke dos   Substance Use Topics   • Alcohol use: Not Currently     Comment: very rare      Family History:  History reviewed. No pertinent family history.           Review of Systems   Constitutional: Negative for malaise/fatigue.   Cardiovascular: Positive for leg swelling. Negative for chest pain and palpitations.   Respiratory: Positive for shortness of breath.    Skin: Negative for rash.   Neurological: Positive for dizziness, light-headedness and numbness (small, ring, and long fingers on left hand).     All other systems are negative         Objective:     Physical Exam  /82   Pulse 68   Ht 162.6 cm (64\")   Wt 131 kg (289 lb)   BMI 49.61 kg/m²   General:  Appears in no acute distress  Eyes: Sclera is anicteric,  conjunctiva is clear   HEENT:  No JVD.  No carotid bruits  Respiratory: Respirations regular and unlabored at rest.  Clear to auscultation  Cardiovascular: S1,S2 Regular rate and rhythm. No murmur, rub or gallop auscultated.   Extremities: No digital clubbing or cyanosis, no edema  Skin: Color pink. Skin warm and dry to touch. No rashes  No xanthoma  Neuro: Alert and awake.    Lab Reviewed:         Jose Levi MD  1/30/2023 16:08 EST      [facility] Dragon/Transcription:   \"Dictated utilizing Dragon dictation\".        "

## 2023-02-20 ENCOUNTER — HOSPITAL ENCOUNTER (OUTPATIENT)
Dept: CARDIOLOGY | Facility: HOSPITAL | Age: 73
Discharge: HOME | End: 2023-02-20
Payer: MEDICARE

## 2023-02-20 ENCOUNTER — OFFICE VISIT (OUTPATIENT)
Dept: CARDIOLOGY | Facility: CLINIC | Age: 73
End: 2023-02-20
Payer: MEDICARE

## 2023-02-20 VITALS
BODY MASS INDEX: 49.34 KG/M2 | DIASTOLIC BLOOD PRESSURE: 82 MMHG | HEIGHT: 64 IN | SYSTOLIC BLOOD PRESSURE: 144 MMHG | WEIGHT: 289 LBS | HEART RATE: 68 BPM

## 2023-02-20 DIAGNOSIS — Z95.2 S/P AVR: ICD-10-CM

## 2023-02-20 DIAGNOSIS — I10 ESSENTIAL HYPERTENSION: ICD-10-CM

## 2023-02-20 DIAGNOSIS — R20.2 NUMBNESS AND TINGLING IN LEFT HAND: ICD-10-CM

## 2023-02-20 DIAGNOSIS — E66.01 MORBID OBESITY WITH BMI OF 45.0-49.9, ADULT: ICD-10-CM

## 2023-02-20 DIAGNOSIS — R60.0 EDEMA LEG: ICD-10-CM

## 2023-02-20 DIAGNOSIS — I50.32 CHRONIC HEART FAILURE WITH PRESERVED EJECTION FRACTION (HFPEF): ICD-10-CM

## 2023-02-20 DIAGNOSIS — E78.5 DYSLIPIDEMIA: ICD-10-CM

## 2023-02-20 DIAGNOSIS — R20.0 NUMBNESS AND TINGLING IN LEFT HAND: ICD-10-CM

## 2023-02-20 DIAGNOSIS — N18.31 STAGE 3A CHRONIC KIDNEY DISEASE: Primary | ICD-10-CM

## 2023-02-20 PROCEDURE — 93018 CV STRESS TEST I&R ONLY: CPT | Performed by: INTERNAL MEDICINE

## 2023-02-20 PROCEDURE — A9500 TC99M SESTAMIBI: HCPCS | Performed by: INTERNAL MEDICINE

## 2023-02-20 PROCEDURE — 93017 CV STRESS TEST TRACING ONLY: CPT

## 2023-02-20 PROCEDURE — 0 TECHNETIUM SESTAMIBI: Performed by: INTERNAL MEDICINE

## 2023-02-20 PROCEDURE — 78452 HT MUSCLE IMAGE SPECT MULT: CPT | Performed by: INTERNAL MEDICINE

## 2023-02-20 PROCEDURE — 78452 HT MUSCLE IMAGE SPECT MULT: CPT

## 2023-02-20 PROCEDURE — 25010000002 REGADENOSON 0.4 MG/5ML SOLUTION: Performed by: INTERNAL MEDICINE

## 2023-02-20 PROCEDURE — 93016 CV STRESS TEST SUPVJ ONLY: CPT

## 2023-02-20 PROCEDURE — 99214 OFFICE O/P EST MOD 30 MIN: CPT | Performed by: INTERNAL MEDICINE

## 2023-02-20 RX ADMIN — TECHNETIUM TC 99M SESTAMIBI 1 DOSE: 1 INJECTION INTRAVENOUS at 10:05

## 2023-02-20 RX ADMIN — TECHNETIUM TC 99M SESTAMIBI 1 DOSE: 1 INJECTION INTRAVENOUS at 08:30

## 2023-02-20 RX ADMIN — REGADENOSON 0.4 MG: 0.08 INJECTION, SOLUTION INTRAVENOUS at 10:05

## 2023-02-20 NOTE — PROGRESS NOTES
Subjective:     Encounter Date:02/20/2023      Patient ID: Claudia Rust is a 72 y.o. female.    Chief Complaint : Stress test follow-up for dyspnea on exertion.  Follow-up for AVR, CHF, prediabetes, hypertension    History of Present Illness        Ms. Claudia Rust has of PMH      #Valvular heart disease with moderate to severe aortic stenosis  Cardiac cath 1/20/2021 revealing nonobstructive CAD  Patient underwent #23 magna pericardial prosthesis AVR 3/18/2021  Transesophageal echo 3/25/2021 normal LV systolic function  # Hyperlipidemia,    # Hypertension  # COPD, KARLY on CPAP  # Prediabetes  #History of HIT  # Hypothyroidism, Rheumatoid Arthritis, spinal stenosis, hiatal hernia, chronic depression, bipolar, GERD, IBS, chronic migraine, vertigo, herniated disc, compression lumbar fractures  #Allergies/intolerance to Stadol  # Hysterectomy, bladder reconstruction, cholecystectomy, right hand surgery  # Family history of strokes and grandfather.  Mother's cancer, father on  # Active cigarette smoker trying to quit         Here for follow-up.  Patient was complaining of shortness of breath and dyspnea on exertion.  Is complaining of numbness in the left arm small finger finger.  Is complaining of edema and dizziness..  Patient denies any chest pain.  Is here for follow-up after stress test.  Patient continues to have dyspnea on exertion..    Patient's arterial blood pressure is 144/82, heart rate 68 bpm..  BMI is over 40.    Review of records reveals patient underwent an echocardiogram which is revealing severe aortic stenosis on 2/11/2020 ,Underwent cardiac cath 1/20/2021 which revealed no obstructive disease,underwent aortic valve replacement 3/18/2021 with #23 magna pericardial prosthesis.  Went to rehab.     Carotid Dopplers 9/13/2019 normal.  Echocardiogram 2/11/2020 is revealing severe aortic stenosis  Work-up here on 3/16/2021 revealed proBNP 733, normal CMP, A1c 5.7, normal CBC, chest x-ray with no acute  abnormality.  EKG 3/16/2021 reviewed/interpreted by me shows sinus rhythm with a rate of 60 bpm with QT of 507 ms.        Labs from 4/26/2021 reveal Chem-7 with a potassium of 3.5, 6/5/2021 revealed proBNP of 1147.  Labs from 6/8/2021 reveal a potassium of 2.9.  Labs from 12/17/2021 revealed normal TSH at 1.9, CMP with a creatinine of 1.13 and GFR 47 1 normal CBC, hemoglobin A1c of 6.6.  Lipid profile with cholesterol 172, triglycerides 185, HDL 45, LDL 90.  INR from 1/3/2022 was 2.8.  Labs from 1/8/2023 revealed CMP with glucose 170, creatinine 1.13, GFR 51, sodium 134, AST 33.  CBC with hemoglobin 12.2.  proBNP 12/5/2022 normal at 389.      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  Underwent a stress test 2/20/2023 which was negative for ischemia.  Advised patient to monitor blood pressure at home.  Continue aspirin, diuretics, losartan, Zaroxolyn, Aldactone as tolerated for edema, AVR, carotid disease, chronic heart failure due to valvular heart disease and hypertensive cardiovascular disease.  Continue warfarin, simvastatin, as tolerated  Follow-up in CHF clinic   Counseled on smoking cessation.   Advised patient follow up with nephrology.  Patient underwent transesophageal echo 3/25/2021 which revealed normal LV systolic function normal chamber sizes.  Normal aortic valve placement and no significant valvular heart disease.  Discussed about BMI over 40, and now diabetes,  "counseled on weight loss diet and exercise.  Follow-up with PMD for diabetes care.          Procedures   EKG done 1/6/2023 reviewed/interpreted by me reveals sinus rhythm with rate of 64 bpm  Lexiscan Cardiolite performed 2/20/2023 reviewed/interpreted by me reveals normal perfusion EF of 86%        Copied text in this portion of the note has been reviewed and is accurate as of 2/20/2023  The following portions of the patient's history were reviewed and updated as appropriate: allergies, current medications, past family history, past medical history, past social history, past surgical history and problem list.    Assessment:         [facility]     Diagnosis Plan   1. Stage 3a chronic kidney disease (HCC)  Ambulatory Referral to Nephrology      2. S/P AVR  Ambulatory Referral to Nephrology      3. Essential hypertension  Ambulatory Referral to Nephrology      4. Morbid obesity with BMI of 45.0-49.9, adult (HCC)  Ambulatory Referral to Nephrology             Plan:               Past Medical History:  Past Medical History:   Diagnosis Date   • Acute congestive heart failure (HCC)    • Allergies    • Anxiety    • Arthritis    • Asthma    • Bilateral leg edema    • Bipolar 1 disorder ([facility])    • Bulging lumbar disc     X3   • Cancer ([facility])     states had \"cancerous tumor during pregnancy and had to have hysterectomy\"   • Cataract     bilateral   • Cellulitis 03/2021    bilateral legs   • Compression fracture of vertebral column ([facility])     LUMBAR   • COPD (chronic obstructive pulmonary disease) ([facility])    • Delayed emergence from anesthesia    • Depression    • Depression    • Diabetes mellitus ([facility])    • Dyslipidemia    • Dyspnea on exertion    • Fibromyalgia    • GERD (gastroesophageal reflux disease)    • Hiatal hernia    • Hyperlipidemia    • Hypertension    • Hypothyroid     HYPOTHYROID   • IBS (irritable bowel syndrome)    • Migraines    • Morbid obesity (HCC)    • Neuropathy    • Panic attacks    • Peripheral edema    • PONV " (postoperative nausea and vomiting)    • Poor mobility     rolling walker   • Prediabetes    • PVD (peripheral vascular disease) ([facility])    • Rheumatoid aortitis    • Spinal stenosis    • Spinal stenosis    • Vertigo    • Vertigo    • Vitamin D deficiency      Past Surgical History:  Past Surgical History:   Procedure Laterality Date   • AORTIC VALVE REPAIR/REPLACEMENT N/A 3/18/2021    Procedure: AORTIC VALVE REPLACEMENT;  Surgeon: Olaf Mcgill MD;  Location: [facility] CVOR;  Service: Cardiothoracic;  Laterality: N/A;   • BRONCHOSCOPY N/A 3/25/2021    Procedure: BRONCHOSCOPY AT BEDSIDE WITH BRONCHIOALVEOLAR LAVAGE;  Surgeon: Glenn Reyes MD;  Location: [facility] ENDOSCOPY;  Service: Pulmonary;  Laterality: N/A;  PNA   • CARDIAC CATHETERIZATION  2009   • CARDIAC CATHETERIZATION N/A 8/14/2019    Procedure: Left Heart Cath;  Surgeon: Jose Levi MD;  Location: [facility] CATH INVASIVE LOCATION;  Service: Cardiovascular   • CARDIAC CATHETERIZATION N/A 8/14/2019    Procedure: Right Heart Cath;  Surgeon: Jose Levi MD;  Location: [facility] CATH INVASIVE LOCATION;  Service: Cardiovascular   • CARDIAC CATHETERIZATION N/A 8/14/2019    Procedure: Aortic root aortogram;  Surgeon: Jose Levi MD;  Location: [facility] CATH INVASIVE LOCATION;  Service: Cardiovascular   • CARDIAC CATHETERIZATION N/A 1/20/2021    Procedure: Left Heart Cath;  Surgeon: Jose Levi MD;  Location: [facility] CATH INVASIVE LOCATION;  Service: Cardiovascular;  Laterality: N/A;   • COLONOSCOPY     • CYSTOCELE REPAIR  1984   • ELBOW ARTHROPLASTY  2011   • ENDOSCOPY     • FOOT SURGERY     • GALLBLADDER SURGERY  2002   • TONSILLECTOMY     • VAGINAL HYSTERECTOMY  1972      Allergies:  Allergies   Allergen Reactions   • Adhesive Tape Unknown (See Comments)     hives   • Butorphanol Other (See Comments)     Chest pain difficulty breathing throat swelling   • Garlic Other (See Comments)     Chest pain difficulty breathing  throat swells   • Heparin Other (See Comments)     HIT +   • Tetanus-Diphtheria Toxoids Td Other (See Comments)     Dizziness,  vomiting   • Aloe Itching   • Bee Venom Other (See Comments)     Swelling eyes and lips hand swelling   • Latex Itching   • Macadamia Nut Oil Other (See Comments)     Chest pain difficulty breathing throat swelling   • Tuberculin, Ppd Unknown (See Comments)     reactor   • Wasp Venom Other (See Comments)     Swelling eyes lips and hand     Home Meds:  Current Meds:     Current Outpatient Medications:   •  Acetaminophen 325 MG capsule, Take 650 mg by mouth Every 4 (Four) Hours As Needed., Disp: , Rfl:   •  albuterol (PROVENTIL) (2.5 MG/3ML) 0.083% nebulizer solution, Inhale 2.5 mg Every 6 (Six) Hours As Needed. DURING WINTER IF NEEDED HASNT NEEDED, Disp: , Rfl:   •  B Complex Vitamins (VITAMIN B COMPLEX) capsule capsule, Take 1 capsule by mouth Daily. LAST DOSE 3/4, Disp: , Rfl:   •  Calcium-Magnesium-Vitamin D (CALCIUM 1200+D3 PO), Take 1 capsule by mouth Daily., Disp: , Rfl:   •  cholecalciferol (Cholecalciferol) 25 MCG (1000 UT) tablet, Take 2,000 Units by mouth Every Night. LAST DOSE 3/4, Disp: , Rfl:   •  diphenhydrAMINE (BENADRYL) 50 MG capsule, Take 50 mg by mouth At Night As Needed for Itching., Disp: , Rfl:   •  FLUoxetine (PROzac) 20 MG capsule, Take 60 mg by mouth Daily., Disp: , Rfl:   •  fluticasone (FLONASE) 50 MCG/ACT nasal spray, 2 sprays into the nostril(s) as directed by provider Daily., Disp: , Rfl:   •  gabapentin (NEURONTIN) 400 MG capsule, Take 1 capsule by mouth 3 (Three) Times a Day., Disp: , Rfl:   •  levothyroxine (SYNTHROID, LEVOTHROID) 125 MCG tablet, Take 125 mcg by mouth Daily., Disp: , Rfl:   •  losartan (Cozaar) 25 MG tablet, Take 1 tablet by mouth Daily., Disp: 90 tablet, Rfl: 3  •  meclizine (ANTIVERT) 25 MG tablet, Take 25 mg by mouth 3 (Three) Times a Day As Needed., Disp: , Rfl:   •  Melatonin 10 MG capsule, Take 20 mg by mouth At Night As Needed., Disp:  , Rfl:   •  metFORMIN ER (GLUCOPHAGE-XR) 500 MG 24 hr tablet, Take 1 tablet by mouth Daily With Breakfast., Disp: , Rfl:   •  metOLazone (ZAROXOLYN) 5 MG tablet, Take 1 tablet by mouth Daily., Disp: 90 tablet, Rfl: 3  •  metoprolol succinate XL (TOPROL-XL) 25 MG 24 hr tablet, Take 25 mg by mouth Daily., Disp: , Rfl:   •  Multiple Vitamins-Minerals (MULTIVITAMIN WITH MINERALS) tablet tablet, Take 1 tablet by mouth Daily. LAST DOSE 3/4, Disp: , Rfl:   •  ondansetron (ZOFRAN) 4 MG tablet, Take 4 mg by mouth Every 8 (Eight) Hours As Needed for Nausea or Vomiting., Disp: , Rfl:   •  pantoprazole (PROTONIX) 40 MG EC tablet, Take 40 mg by mouth Every Evening., Disp: , Rfl:   •  rizatriptan MLT (MAXALT-MLT) 10 MG disintegrating tablet, Take 10 mg by mouth 1 (One) Time As Needed for Migraine. May repeat in 2 hours if needed, Disp: , Rfl:   •  simvastatin (ZOCOR) 20 MG tablet, Take 20 mg by mouth Every Night., Disp: , Rfl:   •  spironolactone (ALDACTONE) 25 MG tablet, TAKE 2 TABLETS BY MOUTH EVERY DAY, Disp: 180 tablet, Rfl: 3  •  tiZANidine (ZANAFLEX) 4 MG tablet, Take 4 mg by mouth Every 8 (Eight) Hours As Needed for Muscle Spasms., Disp: , Rfl:   •  vitamin B-12 (CYANOCOBALAMIN) 1000 MCG tablet, Take 1,000 mcg by mouth Daily., Disp: , Rfl:   •  warfarin (COUMADIN) 3 MG tablet, TAKE 1 TABLET BY MOUTH TUESDAY,WEDNESDAY, THURSDAY, SATURDAY & SUNDAY AND 1 & 1/2 TABLETS ON Monday AND Friday., Disp: 135 tablet, Rfl: 0  No current facility-administered medications for this visit.  Social History:   Social History     Tobacco Use   • Smoking status: Former     Packs/day: 1.00     Types: Cigarettes   • Smokeless tobacco: Never   • Tobacco comments:     decrease now and do not smoke dos   Substance Use Topics   • Alcohol use: Not Currently     Comment: very rare      Family History:  History reviewed. No pertinent family history.           Review of Systems   Cardiovascular: Negative for chest pain, leg swelling and palpitations.  "  Respiratory: Negative for shortness of breath.    Neurological: Negative for dizziness and numbness.     All other systems are negative         Objective:     Physical Exam  /82 (BP Location: Left arm, Patient Position: Sitting, Cuff Size: Adult)   Pulse 68   Ht 162.6 cm (64\")   Wt 131 kg (289 lb)   BMI 49.61 kg/m²   General:  Appears in no acute distress  Eyes: Sclera is anicteric,  conjunctiva is clear   HEENT:  No JVD.  No carotid bruits  Respiratory: Respirations regular and unlabored at rest.  Clear to auscultation  Cardiovascular: S1,S2 Regular rate and rhythm. No murmur, rub or gallop auscultated.   Extremities: No digital clubbing or cyanosis, no edema  Skin: Color pink. Skin warm and dry to touch. No rashes  No xanthoma  Neuro: Alert and awake.    Lab Reviewed:         Jose Levi MD  2/20/2023 11:22 EST      EMR Dragon/Transcription:   \"Dictated utilizing Dragon dictation\".        "

## 2023-02-22 LAB
BH CV REST NUCLEAR ISOTOPE DOSE: 10.8 MCI
BH CV STRESS COMMENTS STAGE 1: NORMAL
BH CV STRESS DOSE REGADENOSON STAGE 1: 0.4
BH CV STRESS DURATION MIN STAGE 1: 0
BH CV STRESS DURATION SEC STAGE 1: 10
BH CV STRESS NUCLEAR ISOTOPE DOSE: 31.2 MCI
BH CV STRESS PROTOCOL 1: NORMAL
BH CV STRESS RECOVERY BP: NORMAL MMHG
BH CV STRESS RECOVERY HR: 87 BPM
BH CV STRESS STAGE 1: 1
MAXIMAL PREDICTED HEART RATE: 148 BPM
STRESS BASELINE BP: NORMAL MMHG
STRESS BASELINE HR: 68 BPM
STRESS TARGET HR: 126 BPM

## 2023-02-24 ENCOUNTER — ANTICOAGULATION VISIT (OUTPATIENT)
Dept: CARDIOLOGY | Facility: CLINIC | Age: 73
End: 2023-02-24
Payer: MEDICARE

## 2023-02-24 VITALS
WEIGHT: 293 LBS | HEART RATE: 70 BPM | BODY MASS INDEX: 50.46 KG/M2 | DIASTOLIC BLOOD PRESSURE: 68 MMHG | SYSTOLIC BLOOD PRESSURE: 130 MMHG

## 2023-02-24 DIAGNOSIS — Z79.01 LONG TERM (CURRENT) USE OF ANTICOAGULANTS: ICD-10-CM

## 2023-02-24 DIAGNOSIS — Z95.2 S/P AVR: Primary | ICD-10-CM

## 2023-02-24 LAB — INR PPP: 1.7 (ref 0.9–1.1)

## 2023-02-24 PROCEDURE — 36416 COLLJ CAPILLARY BLOOD SPEC: CPT | Performed by: INTERNAL MEDICINE

## 2023-02-24 PROCEDURE — 85610 PROTHROMBIN TIME: CPT | Performed by: INTERNAL MEDICINE

## 2023-03-31 ENCOUNTER — ANTICOAGULATION VISIT (OUTPATIENT)
Dept: CARDIOLOGY | Facility: CLINIC | Age: 73
End: 2023-03-31
Payer: MEDICARE

## 2023-03-31 VITALS
HEART RATE: 72 BPM | BODY MASS INDEX: 50.81 KG/M2 | DIASTOLIC BLOOD PRESSURE: 62 MMHG | WEIGHT: 293 LBS | SYSTOLIC BLOOD PRESSURE: 136 MMHG

## 2023-03-31 DIAGNOSIS — Z95.2 S/P AVR: Primary | ICD-10-CM

## 2023-03-31 DIAGNOSIS — Z79.01 LONG TERM (CURRENT) USE OF ANTICOAGULANTS: ICD-10-CM

## 2023-03-31 LAB — INR PPP: 2 (ref 0.9–1.1)

## 2023-03-31 PROCEDURE — 85610 PROTHROMBIN TIME: CPT | Performed by: INTERNAL MEDICINE

## 2023-03-31 PROCEDURE — 36416 COLLJ CAPILLARY BLOOD SPEC: CPT | Performed by: INTERNAL MEDICINE

## 2023-04-26 DIAGNOSIS — Z95.2 S/P AVR: ICD-10-CM

## 2023-04-26 RX ORDER — WARFARIN SODIUM 3 MG/1
TABLET ORAL
Qty: 102 TABLET | Refills: 0 | Status: SHIPPED | OUTPATIENT
Start: 2023-04-26

## 2023-04-26 NOTE — TELEPHONE ENCOUNTER
Rx Refill Note  Requested Prescriptions     Pending Prescriptions Disp Refills   • warfarin (COUMADIN) 3 MG tablet [Pharmacy Med Name: WARFARIN SODIUM 3 MG TABLET] 102 tablet 0     Sig: TAKE 1 TAB BY MOUTH TUESDAY,WEDNESDAY, THURSDAY, SATURDAY & SUNDAY AND 1 & 1/2 TABS ON MONDAY & FRIDAY      Last office visit with prescribing clinician: 2/20/2023   Last telemedicine visit with prescribing clinician: 5/5/2023   Next office visit with prescribing clinician: 7/31/2023                         Would you like a call back once the refill request has been completed: [] Yes [] No    If the office needs to give you a call back, can they leave a voicemail: [] Yes [] No    Anticoagulation Visit (03/31/2023)        Cara Mora LPN  04/26/23, 17:27 EDT

## 2023-05-05 ENCOUNTER — ANTICOAGULATION VISIT (OUTPATIENT)
Dept: CARDIOLOGY | Facility: CLINIC | Age: 73
End: 2023-05-05
Payer: MEDICARE

## 2023-05-05 VITALS
DIASTOLIC BLOOD PRESSURE: 66 MMHG | SYSTOLIC BLOOD PRESSURE: 152 MMHG | HEART RATE: 76 BPM | WEIGHT: 293 LBS | BODY MASS INDEX: 51.32 KG/M2

## 2023-05-05 DIAGNOSIS — Z95.2 S/P AVR: Primary | ICD-10-CM

## 2023-05-05 DIAGNOSIS — Z79.01 LONG TERM (CURRENT) USE OF ANTICOAGULANTS: ICD-10-CM

## 2023-05-05 LAB — INR PPP: 1.9 (ref 2–3)

## 2023-05-05 PROCEDURE — 85610 PROTHROMBIN TIME: CPT | Performed by: INTERNAL MEDICINE

## 2023-05-05 PROCEDURE — 36416 COLLJ CAPILLARY BLOOD SPEC: CPT | Performed by: INTERNAL MEDICINE

## 2023-06-02 ENCOUNTER — ANTICOAGULATION VISIT (OUTPATIENT)
Dept: CARDIOLOGY | Facility: CLINIC | Age: 73
End: 2023-06-02

## 2023-06-02 VITALS
DIASTOLIC BLOOD PRESSURE: 50 MMHG | WEIGHT: 293 LBS | BODY MASS INDEX: 50.81 KG/M2 | SYSTOLIC BLOOD PRESSURE: 112 MMHG | HEART RATE: 76 BPM

## 2023-06-02 DIAGNOSIS — Z79.01 LONG TERM (CURRENT) USE OF ANTICOAGULANTS: ICD-10-CM

## 2023-06-02 DIAGNOSIS — Z95.2 S/P AVR: Primary | ICD-10-CM

## 2023-06-02 LAB — INR PPP: 1.8 (ref 0.9–1.1)

## 2023-06-02 PROCEDURE — 85610 PROTHROMBIN TIME: CPT | Performed by: INTERNAL MEDICINE

## 2023-06-02 PROCEDURE — 36416 COLLJ CAPILLARY BLOOD SPEC: CPT | Performed by: INTERNAL MEDICINE

## 2023-07-31 ENCOUNTER — OFFICE VISIT (OUTPATIENT)
Dept: CARDIOLOGY | Facility: CLINIC | Age: 73
End: 2023-07-31
Payer: MEDICARE

## 2023-07-31 ENCOUNTER — ANTICOAGULATION VISIT (OUTPATIENT)
Dept: CARDIOLOGY | Facility: CLINIC | Age: 73
End: 2023-07-31
Payer: MEDICARE

## 2023-07-31 VITALS
BODY MASS INDEX: 50.29 KG/M2 | HEART RATE: 72 BPM | SYSTOLIC BLOOD PRESSURE: 150 MMHG | DIASTOLIC BLOOD PRESSURE: 72 MMHG | WEIGHT: 293 LBS

## 2023-07-31 VITALS
HEIGHT: 64 IN | WEIGHT: 293 LBS | BODY MASS INDEX: 50.02 KG/M2 | DIASTOLIC BLOOD PRESSURE: 50 MMHG | SYSTOLIC BLOOD PRESSURE: 121 MMHG | HEART RATE: 72 BPM

## 2023-07-31 DIAGNOSIS — E78.5 DYSLIPIDEMIA: ICD-10-CM

## 2023-07-31 DIAGNOSIS — E66.01 MORBID OBESITY WITH BMI OF 50.0-59.9, ADULT: ICD-10-CM

## 2023-07-31 DIAGNOSIS — Z95.2 S/P AVR: Primary | ICD-10-CM

## 2023-07-31 DIAGNOSIS — I10 ESSENTIAL HYPERTENSION: ICD-10-CM

## 2023-07-31 DIAGNOSIS — Z79.01 LONG TERM (CURRENT) USE OF ANTICOAGULANTS: ICD-10-CM

## 2023-07-31 DIAGNOSIS — Z79.01 LONG TERM (CURRENT) USE OF ANTICOAGULANTS: Primary | ICD-10-CM

## 2023-07-31 DIAGNOSIS — Z95.2 S/P AVR: ICD-10-CM

## 2023-07-31 DIAGNOSIS — Z86.2 HISTORY OF HEPARIN-INDUCED THROMBOCYTOPENIA: ICD-10-CM

## 2023-07-31 PROBLEM — I27.20 PULMONARY HYPERTENSION: Status: ACTIVE | Noted: 2021-09-27

## 2023-07-31 LAB — INR PPP: 1.8 (ref 2–3)

## 2023-07-31 PROCEDURE — 93000 ELECTROCARDIOGRAM COMPLETE: CPT | Performed by: INTERNAL MEDICINE

## 2023-07-31 PROCEDURE — 99214 OFFICE O/P EST MOD 30 MIN: CPT | Performed by: INTERNAL MEDICINE

## 2023-07-31 PROCEDURE — 1159F MED LIST DOCD IN RCRD: CPT | Performed by: INTERNAL MEDICINE

## 2023-07-31 PROCEDURE — 3074F SYST BP LT 130 MM HG: CPT | Performed by: INTERNAL MEDICINE

## 2023-07-31 PROCEDURE — 36416 COLLJ CAPILLARY BLOOD SPEC: CPT | Performed by: INTERNAL MEDICINE

## 2023-07-31 PROCEDURE — 1160F RVW MEDS BY RX/DR IN RCRD: CPT | Performed by: INTERNAL MEDICINE

## 2023-07-31 PROCEDURE — 3078F DIAST BP <80 MM HG: CPT | Performed by: INTERNAL MEDICINE

## 2023-07-31 PROCEDURE — 85610 PROTHROMBIN TIME: CPT | Performed by: INTERNAL MEDICINE

## 2023-08-04 ENCOUNTER — TELEPHONE (OUTPATIENT)
Dept: ONCOLOGY | Facility: CLINIC | Age: 73
End: 2023-08-04
Payer: MEDICARE

## 2023-08-04 ENCOUNTER — TELEPHONE (OUTPATIENT)
Dept: CARDIOLOGY | Facility: CLINIC | Age: 73
End: 2023-08-04
Payer: MEDICARE

## 2023-08-04 ENCOUNTER — CONSULT (OUTPATIENT)
Dept: ONCOLOGY | Facility: CLINIC | Age: 73
End: 2023-08-04
Payer: MEDICARE

## 2023-08-04 VITALS
HEIGHT: 64 IN | DIASTOLIC BLOOD PRESSURE: 72 MMHG | BODY MASS INDEX: 50.02 KG/M2 | HEART RATE: 73 BPM | SYSTOLIC BLOOD PRESSURE: 113 MMHG | WEIGHT: 293 LBS | OXYGEN SATURATION: 94 %

## 2023-08-04 DIAGNOSIS — D75.829 HEPARIN INDUCED THROMBOCYTOPENIA: Primary | ICD-10-CM

## 2023-08-04 DIAGNOSIS — Z95.2 HISTORY OF PROSTHETIC AORTIC VALVE: ICD-10-CM

## 2023-08-07 NOTE — TELEPHONE ENCOUNTER
Anticoag encounter resolved  
Consult with Theo Arreguin MD (08/04/2023)   
Patient can stop the warfarin per Dr. Levi.   No cardiac indications for taking it.  She had HIT in the hospital   
Pt made aware and voiced understanding.           
[facility]     PATIENT HAD APT TODAY WITH DR. BIRMINGHAM TO SEE IF SHE NEEDS TO CONTINUE TAKING WARFARIN. FROM HIS STANDPOINT WARFARIN CAN BE DISCONTINUED.  BUT LEAVING DECISION UP TO DR. BENAVIDES.    
[[phone]]

## 2023-10-04 RX ORDER — METOLAZONE 5 MG/1
TABLET ORAL
Qty: 90 TABLET | Refills: 3 | Status: SHIPPED | OUTPATIENT
Start: 2023-10-04

## 2023-10-04 NOTE — TELEPHONE ENCOUNTER
Pt denies any metal implants, body piercings or jewelry, dentures, contacts or hearing aids. Preop questions answered. Instructed pt to call with any questions. Call light within reach. Family at bedside. Projected surgery start time given to pt. Pt. Verbalized understanding.   Rx Refill Note  Requested Prescriptions     Pending Prescriptions Disp Refills    metOLazone (ZAROXOLYN) 5 MG tablet [Pharmacy Med Name: METOLAZONE 5 MG TABLET] 90 tablet 3     Sig: TAKE 1 TABLET BY MOUTH EVERY DAY      Last office visit with prescribing clinician: 7/15/2022   Last telemedicine visit with prescribing clinician: Visit date not found   Next office visit with prescribing clinician: Visit date not found                         Would you like a call back once the refill request has been completed: [] Yes [] No    If the office needs to give you a call back, can they leave a voicemail: [] Yes [] No    Diana Nicole MA  10/04/23, 16:36 EDT

## 2023-10-10 ENCOUNTER — APPOINTMENT (OUTPATIENT)
Dept: GENERAL RADIOLOGY | Facility: HOSPITAL | Age: 73
End: 2023-10-10
Payer: MEDICARE

## 2023-10-10 ENCOUNTER — HOSPITAL ENCOUNTER (EMERGENCY)
Facility: HOSPITAL | Age: 73
Discharge: HOME | End: 2023-10-10
Attending: EMERGENCY MEDICINE | Admitting: EMERGENCY MEDICINE
Payer: MEDICARE

## 2023-10-10 VITALS
RESPIRATION RATE: 20 BRPM | SYSTOLIC BLOOD PRESSURE: 112 MMHG | DIASTOLIC BLOOD PRESSURE: 80 MMHG | HEART RATE: 83 BPM | TEMPERATURE: 98.8 F | OXYGEN SATURATION: 93 % | HEIGHT: 64 IN | WEIGHT: 293 LBS | BODY MASS INDEX: 50.02 KG/M2

## 2023-10-10 DIAGNOSIS — R06.09 DYSPNEA ON EXERTION: Primary | Chronic | ICD-10-CM

## 2023-10-10 DIAGNOSIS — B34.8 RHINOVIRUS: ICD-10-CM

## 2023-10-10 DIAGNOSIS — J44.9 CHRONIC OBSTRUCTIVE PULMONARY DISEASE, UNSPECIFIED COPD TYPE: ICD-10-CM

## 2023-10-10 LAB
ANION GAP SERPL CALCULATED.3IONS-SCNC: 12 MMOL/L (ref 5–15)
B PARAPERT DNA SPEC QL NAA+PROBE: NOT DETECTED
B PERT DNA SPEC QL NAA+PROBE: NOT DETECTED
BASOPHILS # BLD AUTO: 0.1 10*3/MM3 (ref 0–0.2)
BASOPHILS NFR BLD AUTO: 0.6 % (ref 0–1.5)
BUN SERPL-MCNC: 21 MG/DL (ref 8–23)
BUN/CREAT SERPL: 16.2 (ref 7–25)
C PNEUM DNA NPH QL NAA+NON-PROBE: NOT DETECTED
CALCIUM SPEC-SCNC: 9.2 MG/DL (ref 8.6–10.5)
CHLORIDE SERPL-SCNC: 95 MMOL/L (ref 98–107)
CO2 SERPL-SCNC: 22 MMOL/L (ref 22–29)
CREAT SERPL-MCNC: 1.3 MG/DL (ref 0.57–1)
DEPRECATED RDW RBC AUTO: 45.1 FL (ref 37–54)
EGFRCR SERPLBLD CKD-EPI 2021: 43.5 ML/MIN/1.73
EOSINOPHIL # BLD AUTO: 0.3 10*3/MM3 (ref 0–0.4)
EOSINOPHIL NFR BLD AUTO: 2.7 % (ref 0.3–6.2)
ERYTHROCYTE [DISTWIDTH] IN BLOOD BY AUTOMATED COUNT: 13.6 % (ref 12.3–15.4)
FLUAV SUBTYP SPEC NAA+PROBE: NOT DETECTED
FLUBV RNA ISLT QL NAA+PROBE: NOT DETECTED
GLUCOSE SERPL-MCNC: 128 MG/DL (ref 65–99)
HADV DNA SPEC NAA+PROBE: NOT DETECTED
HCOV 229E RNA SPEC QL NAA+PROBE: NOT DETECTED
HCOV HKU1 RNA SPEC QL NAA+PROBE: NOT DETECTED
HCOV NL63 RNA SPEC QL NAA+PROBE: NOT DETECTED
HCOV OC43 RNA SPEC QL NAA+PROBE: NOT DETECTED
HCT VFR BLD AUTO: 36.6 % (ref 34–46.6)
HGB BLD-MCNC: 11.8 G/DL (ref 12–15.9)
HMPV RNA NPH QL NAA+NON-PROBE: NOT DETECTED
HPIV1 RNA ISLT QL NAA+PROBE: NOT DETECTED
HPIV2 RNA SPEC QL NAA+PROBE: NOT DETECTED
HPIV3 RNA NPH QL NAA+PROBE: NOT DETECTED
HPIV4 P GENE NPH QL NAA+PROBE: NOT DETECTED
LYMPHOCYTES # BLD AUTO: 3.1 10*3/MM3 (ref 0.7–3.1)
LYMPHOCYTES NFR BLD AUTO: 27.4 % (ref 19.6–45.3)
M PNEUMO IGG SER IA-ACNC: NOT DETECTED
MCH RBC QN AUTO: 28.7 PG (ref 26.6–33)
MCHC RBC AUTO-ENTMCNC: 32.3 G/DL (ref 31.5–35.7)
MCV RBC AUTO: 88.8 FL (ref 79–97)
MONOCYTES # BLD AUTO: 1.1 10*3/MM3 (ref 0.1–0.9)
MONOCYTES NFR BLD AUTO: 10 % (ref 5–12)
NEUTROPHILS NFR BLD AUTO: 59.3 % (ref 42.7–76)
NEUTROPHILS NFR BLD AUTO: 6.7 10*3/MM3 (ref 1.7–7)
NRBC BLD AUTO-RTO: 0 /100 WBC (ref 0–0.2)
NT-PROBNP SERPL-MCNC: 77.9 PG/ML (ref 0–900)
PLATELET # BLD AUTO: 204 10*3/MM3 (ref 140–450)
PMV BLD AUTO: 8.9 FL (ref 6–12)
POTASSIUM SERPL-SCNC: 4.7 MMOL/L (ref 3.5–5.2)
RBC # BLD AUTO: 4.12 10*6/MM3 (ref 3.77–5.28)
RHINOVIRUS RNA SPEC NAA+PROBE: DETECTED
RSV RNA NPH QL NAA+NON-PROBE: NOT DETECTED
SARS-COV-2 RNA NPH QL NAA+NON-PROBE: NOT DETECTED
SODIUM SERPL-SCNC: 129 MMOL/L (ref 136–145)
WBC NRBC COR # BLD: 11.3 10*3/MM3 (ref 3.4–10.8)

## 2023-10-10 PROCEDURE — 94799 UNLISTED PULMONARY SVC/PX: CPT

## 2023-10-10 PROCEDURE — 83880 ASSAY OF NATRIURETIC PEPTIDE: CPT | Performed by: NURSE PRACTITIONER

## 2023-10-10 PROCEDURE — 96374 THER/PROPH/DIAG INJ IV PUSH: CPT

## 2023-10-10 PROCEDURE — 94761 N-INVAS EAR/PLS OXIMETRY MLT: CPT

## 2023-10-10 PROCEDURE — 80048 BASIC METABOLIC PNL TOTAL CA: CPT | Performed by: NURSE PRACTITIONER

## 2023-10-10 PROCEDURE — 71045 X-RAY EXAM CHEST 1 VIEW: CPT

## 2023-10-10 PROCEDURE — 85025 COMPLETE CBC W/AUTO DIFF WBC: CPT | Performed by: NURSE PRACTITIONER

## 2023-10-10 PROCEDURE — 0202U NFCT DS 22 TRGT SARS-COV-2: CPT | Performed by: NURSE PRACTITIONER

## 2023-10-10 PROCEDURE — 99283 EMERGENCY DEPT VISIT LOW MDM: CPT

## 2023-10-10 PROCEDURE — 25010000002 METHYLPREDNISOLONE PER 125 MG: Performed by: NURSE PRACTITIONER

## 2023-10-10 RX ORDER — IPRATROPIUM BROMIDE AND ALBUTEROL SULFATE 2.5; .5 MG/3ML; MG/3ML
3 SOLUTION RESPIRATORY (INHALATION)
Status: DISCONTINUED | OUTPATIENT
Start: 2023-10-10 | End: 2023-10-10 | Stop reason: SDUPTHER

## 2023-10-10 RX ORDER — SODIUM CHLORIDE 0.9 % (FLUSH) 0.9 %
10 SYRINGE (ML) INJECTION AS NEEDED
Status: DISCONTINUED | OUTPATIENT
Start: 2023-10-10 | End: 2023-10-10 | Stop reason: HOSPADM

## 2023-10-10 RX ORDER — METHYLPREDNISOLONE SODIUM SUCCINATE 125 MG/2ML
125 INJECTION, POWDER, LYOPHILIZED, FOR SOLUTION INTRAMUSCULAR; INTRAVENOUS ONCE
Status: COMPLETED | OUTPATIENT
Start: 2023-10-10 | End: 2023-10-10

## 2023-10-10 RX ORDER — IPRATROPIUM BROMIDE AND ALBUTEROL SULFATE 2.5; .5 MG/3ML; MG/3ML
3 SOLUTION RESPIRATORY (INHALATION)
Status: DISCONTINUED | OUTPATIENT
Start: 2023-10-10 | End: 2023-10-10

## 2023-10-10 RX ORDER — ALBUTEROL SULFATE 0.63 MG/3ML
1 SOLUTION RESPIRATORY (INHALATION) EVERY 6 HOURS PRN
Qty: 30 EACH | Refills: 0 | Status: SHIPPED | OUTPATIENT
Start: 2023-10-10 | End: 2023-10-12 | Stop reason: SDUPTHER

## 2023-10-10 RX ORDER — IPRATROPIUM BROMIDE AND ALBUTEROL SULFATE 2.5; .5 MG/3ML; MG/3ML
3 SOLUTION RESPIRATORY (INHALATION) ONCE
Status: COMPLETED | OUTPATIENT
Start: 2023-10-10 | End: 2023-10-10

## 2023-10-10 RX ORDER — PREDNISONE 20 MG/1
40 TABLET ORAL DAILY
Qty: 8 TABLET | Refills: 0 | Status: ON HOLD | OUTPATIENT
Start: 2023-10-10 | End: 2023-10-14

## 2023-10-10 RX ADMIN — IPRATROPIUM BROMIDE AND ALBUTEROL SULFATE 3 ML: .5; 3 SOLUTION RESPIRATORY (INHALATION) at 20:26

## 2023-10-10 RX ADMIN — METHYLPREDNISOLONE SODIUM SUCCINATE 125 MG: 125 INJECTION, POWDER, FOR SOLUTION INTRAMUSCULAR; INTRAVENOUS at 19:37

## 2023-10-10 RX ADMIN — IPRATROPIUM BROMIDE AND ALBUTEROL SULFATE 3 ML: .5; 3 SOLUTION RESPIRATORY (INHALATION) at 19:31

## 2023-10-10 NOTE — ED PROVIDER NOTES
"Subjective   Provider in Triage Note  Cough x 2 weeks, hx copd and chf; taking mucinex, no prior eval for these complaints.  Denies any worsening pedal edema.    Due to significant overcrowding in the emergency department patient was initially seen and evaluated in triage.  Provider in triage recommended patient placement in the treatment area to initiate therapy and movement to an ER bed as soon as possible.        History of Present Illness  I interviewed the patient.  Complains of cough, intermittent dyspnea.  No chest pain.No fevers.  Review of Systems   Constitutional:  Negative for chills and fever.   Respiratory:  Positive for cough and shortness of breath.    Cardiovascular:  Negative for chest pain, palpitations and leg swelling.   Skin:  Negative for color change and rash.       Past Medical History:   Diagnosis Date    Acute congestive heart failure     Allergies     Anxiety     Arthritis     Asthma     Bilateral leg edema     Bipolar 1 disorder     Bulging lumbar disc     X3    Cancer     states had \"cancerous tumor during pregnancy and had to have hysterectomy\"    Cataract     bilateral    Cellulitis 03/2021    bilateral legs    Compression fracture of vertebral column     LUMBAR    COPD (chronic obstructive pulmonary disease)     Delayed emergence from anesthesia     Depression     Depression     Diabetes mellitus     Dyslipidemia     Dyspnea on exertion     Fibromyalgia     GERD (gastroesophageal reflux disease)     Hiatal hernia     Hyperlipidemia     Hypertension     Hypothyroid     HYPOTHYROID    IBS (irritable bowel syndrome)     Migraines     Morbid obesity     Neuropathy     Panic attacks     Peripheral edema     PONV (postoperative nausea and vomiting)     Poor mobility     rolling walker    Prediabetes     PVD (peripheral vascular disease)     Rheumatoid aortitis     Spinal stenosis     Spinal stenosis     Vertigo     Vertigo     Vitamin D deficiency        Allergies   Allergen Reactions    " Adhesive Tape Unknown (See Comments)     hives    Butorphanol Other (See Comments)     Chest pain difficulty breathing throat swelling, STADAL    Garlic Other (See Comments)     Chest pain difficulty breathing throat swells    Heparin Other (See Comments)     HIT +    Tetanus-Diphtheria Toxoids Td Other (See Comments)     Dizziness,  vomiting    Aloe Itching    Bee Venom Other (See Comments)     Swelling eyes and lips hand swelling    Latex Itching    Macadamia Nut Oil Other (See Comments)     Chest pain difficulty breathing throat swelling    Tuberculin, Ppd Unknown (See Comments)     reactor    Wasp Venom Other (See Comments)     Swelling eyes lips and hand       Past Surgical History:   Procedure Laterality Date    AORTIC VALVE REPAIR/REPLACEMENT N/A 3/18/2021    Procedure: AORTIC VALVE REPLACEMENT;  Surgeon: Olaf Mcgill MD;  Location: [facility];  Service: Cardiothoracic;  Laterality: N/A;    BRONCHOSCOPY N/A 3/25/2021    Procedure: BRONCHOSCOPY AT BEDSIDE WITH BRONCHIOALVEOLAR LAVAGE;  Surgeon: Glenn Reyes MD;  Location: [facility];  Service: Pulmonary;  Laterality: N/A;  PNA    CARDIAC CATHETERIZATION  2009    CARDIAC CATHETERIZATION N/A 8/14/2019    Procedure: Left Heart Cath;  Surgeon: Jose Levi MD;  Location: [facility] INVASIVE LOCATION;  Service: Cardiovascular    CARDIAC CATHETERIZATION N/A 8/14/2019    Procedure: Right Heart Cath;  Surgeon: Jose Levi MD;  Location: [facility] CATH INVASIVE LOCATION;  Service: Cardiovascular    CARDIAC CATHETERIZATION N/A 8/14/2019    Procedure: Aortic root aortogram;  Surgeon: Jose Levi MD;  Location: [facility] CATH INVASIVE LOCATION;  Service: Cardiovascular    CARDIAC CATHETERIZATION N/A 1/20/2021    Procedure: Left Heart Cath;  Surgeon: Jose Levi MD;  Location: [facility] CATH INVASIVE LOCATION;  Service: Cardiovascular;  Laterality: N/A;    COLONOSCOPY      CYSTOCELE REPAIR  1984    ELBOW  ARTHROPLASTY  2011    ENDOSCOPY      FOOT SURGERY      GALLBLADDER SURGERY  2002    TONSILLECTOMY      VAGINAL HYSTERECTOMY  1972       Family History   Problem Relation Age of Onset    Ovarian cancer Mother     Lung cancer Mother     Esophageal cancer Mother     Hypertension Father     Diabetes Maternal Grandmother        Social History     Socioeconomic History    Marital status:    Tobacco Use    Smoking status: Former     Packs/day: 1     Types: Cigarettes     Quit date:      Years since quittin.7    Smokeless tobacco: Never    Tobacco comments:     decrease now and do not smoke dos   Vaping Use    Vaping Use: Never used   Substance and Sexual Activity    Alcohol use: Not Currently     Comment: very rare    Drug use: Never    Sexual activity: Defer           Objective   Physical Exam  Vitals and nursing note reviewed.   Constitutional:       Appearance: Normal appearance. She is not toxic-appearing.   HENT:      Head: Normocephalic and atraumatic.      Mouth/Throat:      Mouth: Mucous membranes are moist.      Pharynx: Oropharynx is clear.   Eyes:      Extraocular Movements: Extraocular movements intact.      Conjunctiva/sclera: Conjunctivae normal.      Pupils: Pupils are equal, round, and reactive to light.   Cardiovascular:      Rate and Rhythm: Normal rate and regular rhythm.      Heart sounds: Normal heart sounds. No murmur heard.     No gallop.   Pulmonary:      Effort: No tachypnea or respiratory distress.      Breath sounds: Examination of the right-upper field reveals wheezing. Wheezing present.   Abdominal:      General: Bowel sounds are normal.      Palpations: Abdomen is soft.   Musculoskeletal:      Cervical back: Normal range of motion and neck supple.   Skin:     General: Skin is warm and dry.      Capillary Refill: Capillary refill takes less than 2 seconds.      Findings: No erythema or rash.   Neurological:      Mental Status: She is alert and oriented to person, place, and time.  "        Procedures           ED Course  ED Course as of 10/11/23 0322   Tue Oct 10, 2023   1917 Creatinine(!): 1.30  Similar to previous [LB]   2005 Is still have some wheezing. [LB]      ED Course User Index  [LB] Agata Lau APRN      /80 (BP Location: Left arm)   Pulse 83   Temp 98.8 øF (37.1 øC) (Oral)   Resp 20   Ht 162.6 cm (64\")   Wt (!) 138 kg (303 lb 5.7 oz)   SpO2 93%   BMI 52.07 kg/mý   Medications   methylPREDNISolone sodium succinate (SOLU-Medrol) injection 125 mg (125 mg Intravenous Given 10/10/23 1937)   ipratropium-albuterol (DUO-NEB) nebulizer solution 3 mL (3 mL Nebulization Given 10/10/23 1931)   ipratropium-albuterol (DUO-NEB) nebulizer solution 3 mL (3 mL Nebulization Given 10/10/23 2026)   ipratropium-albuterol (DUO-NEB) nebulizer solution 3 mL (3 mL Nebulization Given 10/10/23 2026)     XR Chest AP    Result Date: 10/10/2023  Impression: No acute abnormality. Electronically Signed: David Montemayor DO  10/10/2023 7:14 PM EDT  Workstation ID: [id number]   Lab Results (last 24 hours)       Procedure Component Value Units Date/Time    CBC & Differential [[id number]]  (Abnormal) Collected: 10/10/23 1648    Specimen: Blood Updated: 10/10/23 1654    Narrative:      The following orders were created for panel order CBC & Differential.  Procedure                               Abnormality         Status                     ---------                               -----------         ------                     CBC Auto Differential[[id number]]        Abnormal            Final result                 Please view results for these tests on the individual orders.    Basic Metabolic Panel [[id number]]  (Abnormal) Collected: 10/10/23 1648    Specimen: Blood Updated: 10/10/23 1713     Glucose 128 mg/dL      BUN 21 mg/dL      Creatinine 1.30 mg/dL      Sodium 129 mmol/L      Potassium 4.7 mmol/L      Comment: Slight hemolysis detected by analyzer. Results may be affected.        Chloride " 95 mmol/L      CO2 22.0 mmol/L      Calcium 9.2 mg/dL      BUN/Creatinine Ratio 16.2     Anion Gap 12.0 mmol/L      eGFR 43.5 mL/min/1.73     Narrative:      GFR Normal >60  Chronic Kidney Disease <60  Kidney Failure <15    The GFR formula is only valid for adults with stable renal function between ages 18 and 70.    Respiratory Panel PCR w/COVID-19(SARS-CoV-2) FEROZ/MANINDER/IAIN/PAD/COR/MAD/RACIEL In-House, NP Swab in UTM/VTM, 3-4 HR TAT - Swab, Nasopharynx [[id number]]  (Abnormal) Collected: 10/10/23 1648    Specimen: Swab from Nasopharynx Updated: 10/10/23 1800     ADENOVIRUS, PCR Not Detected     Coronavirus 229E Not Detected     Coronavirus HKU1 Not Detected     Coronavirus NL63 Not Detected     Coronavirus OC43 Not Detected     COVID19 Not Detected     Human Metapneumovirus Not Detected     Human Rhinovirus/Enterovirus Detected     Influenza A PCR Not Detected     Influenza B PCR Not Detected     Parainfluenza Virus 1 Not Detected     Parainfluenza Virus 2 Not Detected     Parainfluenza Virus 3 Not Detected     Parainfluenza Virus 4 Not Detected     RSV, PCR Not Detected     Bordetella pertussis pcr Not Detected     Bordetella parapertussis PCR Not Detected     Chlamydophila pneumoniae PCR Not Detected     Mycoplasma pneumo by PCR Not Detected    Narrative:      In the setting of a positive respiratory panel with a viral infection PLUS a negative procalcitonin without other underlying concern for bacterial infection, consider observing off antibiotics or discontinuation of antibiotics and continue supportive care. If the respiratory panel is positive for atypical bacterial infection (Bordetella pertussis, Chlamydophila pneumoniae, or Mycoplasma pneumoniae), consider antibiotic de-escalation to target atypical bacterial infection.    BNP [[id number]]  (Normal) Collected: 10/10/23 1648    Specimen: Blood Updated: 10/10/23 1713     proBNP 77.9 pg/mL     Narrative:      This assay is used as an aid in the diagnosis of  individuals suspected of having heart failure. It can be used as an aid in the diagnosis of acute decompensated heart failure (ADHF) in patients presenting with signs and symptoms of ADHF to the emergency department (ED). In addition, NT-proBNP of <300 pg/mL indicates ADHF is not likely.    Age Range Result Interpretation  NT-proBNP Concentration (pg/mL:      <50             Positive            >450                   Gray                 300-450                    Negative             <300    50-75           Positive            >900                  Gray                300-900                  Negative            <300      >75             Positive            >1800                  Gray                300-1800                  Negative            <300    CBC Auto Differential [[id number]]  (Abnormal) Collected: 10/10/23 1648    Specimen: Blood Updated: 10/10/23 1654     WBC 11.30 10*3/mm3      RBC 4.12 10*6/mm3      Hemoglobin 11.8 g/dL      Hematocrit 36.6 %      MCV 88.8 fL      MCH 28.7 pg      MCHC 32.3 g/dL      RDW 13.6 %      RDW-SD 45.1 fl      MPV 8.9 fL      Platelets 204 10*3/mm3      Neutrophil % 59.3 %      Lymphocyte % 27.4 %      Monocyte % 10.0 %      Eosinophil % 2.7 %      Basophil % 0.6 %      Neutrophils, Absolute 6.70 10*3/mm3      Lymphocytes, Absolute 3.10 10*3/mm3      Monocytes, Absolute 1.10 10*3/mm3      Eosinophils, Absolute 0.30 10*3/mm3      Basophils, Absolute 0.10 10*3/mm3      nRBC 0.0 /100 WBC                                                Medical Decision Making  73-year-old female presents the ED with complaint of cough, congestion.  Dyspnea whenever she gets to coughing.  Differentiall diagnoses considered for patient presentation, this list is not all inclusive of diagnoses considered: Pneumonia, CHF, COVID, COPD exacerbation.  Patient had IV established, blood work obtained, was placed on appropriate monitoring, underwent the above ED exam and evaluation, notable for, labs  reviewed as above.  Positive for human rhinovirus.  Patient given DuoNebs here in the ED with significant proved and wheezing.  She was given Solu-Medrol.  I discussed this with her regarding her diabetes and she reports she has good control even on steroids.  Chest x-ray reveals no acute findings.  Patient wants to be discharged home.  I will give her a nebulizer for home, nebulizer solution and give her a short course of steroid and advised follow-up PCP.  I spoke with the patient at the bedside regarding their plan of care, discharge instruction,  prescriptions, as well as reasons to return to the emergency department.  We discussed test results at the bedside, including incidental abnormal labs, radiological findings, understands need and importance  for follow-up with primary care or specialist if indicated.  Patient verbalizes understanding and agrees to the treatment plan at this time.  Note Disclaimer: At [facility], we believe that sharing information builds trust and better relationships. You are receiving this note because you recently visited [facility]. It is possible you will see health information before a provider has talked with you about it. This kind of information can be easy to misunderstand. To help you fully understand what it means for your health, we urge you to discuss this note with your provider.Note dictated utilizing Dragon Dictation. Appropriate PPE worn during patient interactions.              Problems Addressed:  Chronic obstructive pulmonary disease, unspecified COPD type: complicated acute illness or injury  Dyspnea on exertion: chronic illness or injury  Rhinovirus: complicated acute illness or injury    Amount and/or Complexity of Data Reviewed  Labs: ordered. Decision-making details documented in ED Course.  Radiology: ordered.    Risk  Prescription drug management.    Patient is a 73-year-old female presents to the ED with complaint of cough, intermittent  dyspnea.Work-up here in the ED initiated per [facility] provider.  Chemistry appears similar to previous.  Noted to be positive for rhinovirus.  BNP normal.  No acute findings on chest x-ray.  Upon auscultation patient does have wheezing in the right upper lung field, will give DuoNeb treatment, as well as a dose of Solu-Medrol.  I discussed steroid treatment with the patient, she has a history of diabetes, however she reports that she has had well control of her diabetes with steroids in the past.  I feel she would benefit from the steroids given her history of COPD.  She would like to be discharged home.    Final diagnoses:   Dyspnea on exertion   Chronic obstructive pulmonary disease, unspecified COPD type   Rhinovirus       ED Disposition  ED Disposition       ED Disposition   Discharge    Condition   Stable    Comment   --               Dukes, Darra, NP-C  [address] [place] [address] [phone]    Schedule an appointment as soon as possible for a visit       [facility] EMERGENCY DEPARTMENT  [employer] [facility] [place] [address] [phone]    As needed, If symptoms worsen         Medication List        New Prescriptions      albuterol 0.63 MG/3ML nebulizer solution  Commonly known as: ACCUNEB  Take 3 mL by nebulization Every 6 (Six) Hours As Needed for Wheezing.  Replaces: albuterol (2.5 MG/3ML) 0.083% nebulizer solution     predniSONE 20 MG tablet  Commonly known as: DELTASONE  Take 2 tablets by mouth Daily for 4 days.            Stop      albuterol (2.5 MG/3ML) 0.083% nebulizer solution  Commonly known as: PROVENTIL  Replaced by: albuterol 0.63 MG/3ML nebulizer solution               Where to Get Your Medications        These medications were sent to [place] [id number] [place] [facility] [place] - [address] [place] [address] [phone] [facility] [phone] [address] [facility] [place] [address]      Phone: [phone]   albuterol 0.63 MG/3ML nebulizer solution  predniSONE 20 MG  tablet            Agata Lau, APRPATRICIA  10/11/23 0321       Agata Lau, SARA  10/11/23 0322

## 2023-10-11 NOTE — DISCHARGE INSTRUCTIONS
Monitor your blood sugar closely at home due to the steroid  Follow up with PCP, call for an appointment in 1-2 days, if you do not have a primary care provider please use patient connection [phone] to help establish care  Follow up with any specialist as indicated and discussed  Return to the ED for new or worsening symptoms  Take any medications as prescribed

## 2023-10-12 RX ORDER — ALBUTEROL SULFATE 0.63 MG/3ML
3 SOLUTION RESPIRATORY (INHALATION) EVERY 6 HOURS PRN
Qty: 30 EACH | Refills: 0 | Status: SHIPPED | OUTPATIENT
Start: 2023-10-12 | End: 2023-10-12 | Stop reason: SDUPTHER

## 2023-10-12 RX ORDER — ALBUTEROL SULFATE 0.63 MG/3ML
3 SOLUTION RESPIRATORY (INHALATION) EVERY 6 HOURS PRN
Qty: 30 EACH | Refills: 0 | Status: ON HOLD | OUTPATIENT
Start: 2023-10-12

## 2023-10-13 ENCOUNTER — HOSPITAL ENCOUNTER (INPATIENT)
Facility: HOSPITAL | Age: 73
LOS: 6 days | Discharge: HOME | DRG: 193 | End: 2023-10-20
Attending: EMERGENCY MEDICINE | Admitting: HOSPITALIST
Payer: MEDICARE

## 2023-10-13 DIAGNOSIS — J96.01 ACUTE HYPOXEMIC RESPIRATORY FAILURE: ICD-10-CM

## 2023-10-13 DIAGNOSIS — E87.70 HYPERVOLEMIA, UNSPECIFIED HYPERVOLEMIA TYPE: ICD-10-CM

## 2023-10-13 DIAGNOSIS — T17.800A MULTIPLE TRACHEOBRONCHIAL MUCUS PLUGS: ICD-10-CM

## 2023-10-13 DIAGNOSIS — J18.9 MULTIFOCAL PNEUMONIA: ICD-10-CM

## 2023-10-13 DIAGNOSIS — E87.1 HYPONATREMIA: Primary | ICD-10-CM

## 2023-10-13 PROCEDURE — 93005 ELECTROCARDIOGRAM TRACING: CPT

## 2023-10-13 PROCEDURE — 99285 EMERGENCY DEPT VISIT HI MDM: CPT

## 2023-10-13 PROCEDURE — 36415 COLL VENOUS BLD VENIPUNCTURE: CPT

## 2023-10-13 PROCEDURE — P9612 CATHETERIZE FOR URINE SPEC: HCPCS

## 2023-10-13 NOTE — Clinical Note
Level of Care: Telemetry [5]   Admitting Physician: SAMIRA MACKEY [[id number]]   Attending Physician: SAMIRA MACKEY [[id number]]

## 2023-10-14 ENCOUNTER — APPOINTMENT (OUTPATIENT)
Dept: CARDIOLOGY | Facility: HOSPITAL | Age: 73
DRG: 193 | End: 2023-10-14
Payer: MEDICARE

## 2023-10-14 ENCOUNTER — APPOINTMENT (OUTPATIENT)
Dept: GENERAL RADIOLOGY | Facility: HOSPITAL | Age: 73
DRG: 193 | End: 2023-10-14
Payer: MEDICARE

## 2023-10-14 PROBLEM — J96.00 ACUTE RESPIRATORY FAILURE: Status: ACTIVE | Noted: 2023-10-14

## 2023-10-14 LAB
ALBUMIN SERPL-MCNC: 3.7 G/DL (ref 3.5–5.2)
ALBUMIN/GLOB SERPL: 1.2 G/DL
ALP SERPL-CCNC: 80 U/L (ref 39–117)
ALT SERPL W P-5'-P-CCNC: 17 U/L (ref 1–33)
ANION GAP SERPL CALCULATED.3IONS-SCNC: 12 MMOL/L (ref 5–15)
ARTERIAL PATENCY WRIST A: POSITIVE
AST SERPL-CCNC: 38 U/L (ref 1–32)
ATMOSPHERIC PRESS: ABNORMAL MM[HG]
BACTERIA UR QL AUTO: ABNORMAL /HPF
BASE EXCESS BLDA CALC-SCNC: -1.4 MMOL/L (ref 0–3)
BASOPHILS # BLD AUTO: 0 10*3/MM3 (ref 0–0.2)
BASOPHILS NFR BLD AUTO: 0.3 % (ref 0–1.5)
BDY SITE: ABNORMAL
BH CV ECHO MEAS - ACS: 1.73 CM
BH CV ECHO MEAS - AO MAX PG: 12.8 MMHG
BH CV ECHO MEAS - AO ROOT DIAM: 2.7 CM
BH CV ECHO MEAS - AO V2 MAX: 173.5 CM/SEC
BH CV ECHO MEAS - EDV(CUBED): 103.4 ML
BH CV ECHO MEAS - ESV(CUBED): 30.1 ML
BH CV ECHO MEAS - FS: 33.7 %
BH CV ECHO MEAS - IVS/LVPW: 0.89 CM
BH CV ECHO MEAS - IVSD: 0.91 CM
BH CV ECHO MEAS - LA DIMENSION: 4 CM
BH CV ECHO MEAS - LV MASS(C)D: 157 GRAMS
BH CV ECHO MEAS - LV MAX PG: 4.1 MMHG
BH CV ECHO MEAS - LV V1 MAX: 100.7 CM/SEC
BH CV ECHO MEAS - LVIDD: 4.7 CM
BH CV ECHO MEAS - LVIDS: 3.1 CM
BH CV ECHO MEAS - LVOT AREA: 2.3 CM2
BH CV ECHO MEAS - LVOT DIAM: 1.71 CM
BH CV ECHO MEAS - LVPWD: 1.02 CM
BH CV ECHO MEAS - MR MAX PG: 14.4 MMHG
BH CV ECHO MEAS - MR MAX VEL: 189.9 CM/SEC
BH CV ECHO MEAS - MV A MAX VEL: 110.3 CM/SEC
BH CV ECHO MEAS - MV DEC SLOPE: 273.1 CM/SEC2
BH CV ECHO MEAS - MV DEC TIME: 0.3 SEC
BH CV ECHO MEAS - MV E MAX VEL: 82.4 CM/SEC
BH CV ECHO MEAS - MV E/A: 0.75
BH CV ECHO MEAS - MV MAX PG: 3.8 MMHG
BH CV ECHO MEAS - MV MEAN PG: 1.45 MMHG
BH CV ECHO MEAS - MV V2 VTI: 24.2 CM
BH CV ECHO MEAS - PA ACC TIME: 0.14 SEC
BH CV ECHO MEAS - PA V2 MAX: 77.9 CM/SEC
BH CV ECHO MEAS - RAP SYSTOLE: 3 MMHG
BH CV ECHO MEAS - RV MAX PG: 1.62 MMHG
BH CV ECHO MEAS - RV V1 MAX: 63.6 CM/SEC
BH CV ECHO MEAS - RV V1 VTI: 12.8 CM
BH CV ECHO MEAS - RVDD: 3.5 CM
BH CV ECHO MEAS - RVSP: 27.5 MMHG
BH CV ECHO MEAS - TR MAX PG: 24.5 MMHG
BH CV ECHO MEAS - TR MAX VEL: 247.2 CM/SEC
BH CV LOWER VASCULAR LEFT COMMON FEMORAL AUGMENT: NORMAL
BH CV LOWER VASCULAR LEFT COMMON FEMORAL COMPETENT: NORMAL
BH CV LOWER VASCULAR LEFT COMMON FEMORAL COMPRESS: NORMAL
BH CV LOWER VASCULAR LEFT COMMON FEMORAL PHASIC: NORMAL
BH CV LOWER VASCULAR LEFT COMMON FEMORAL SPONT: NORMAL
BH CV LOWER VASCULAR LEFT DISTAL FEMORAL COMPRESS: NORMAL
BH CV LOWER VASCULAR LEFT GREATER SAPH AK COMPRESS: NORMAL
BH CV LOWER VASCULAR LEFT MID FEMORAL AUGMENT: NORMAL
BH CV LOWER VASCULAR LEFT MID FEMORAL COMPETENT: NORMAL
BH CV LOWER VASCULAR LEFT MID FEMORAL COMPRESS: NORMAL
BH CV LOWER VASCULAR LEFT MID FEMORAL PHASIC: NORMAL
BH CV LOWER VASCULAR LEFT MID FEMORAL SPONT: NORMAL
BH CV LOWER VASCULAR LEFT POPLITEAL AUGMENT: NORMAL
BH CV LOWER VASCULAR LEFT POPLITEAL COMPETENT: NORMAL
BH CV LOWER VASCULAR LEFT POPLITEAL COMPRESS: NORMAL
BH CV LOWER VASCULAR LEFT POPLITEAL PHASIC: NORMAL
BH CV LOWER VASCULAR LEFT POPLITEAL SPONT: NORMAL
BH CV LOWER VASCULAR LEFT PROXIMAL FEMORAL COMPRESS: NORMAL
BH CV LOWER VASCULAR LEFT SAPHENOFEMORAL JUNCTION COMPRESS: NORMAL
BH CV LOWER VASCULAR RIGHT COMMON FEMORAL AUGMENT: NORMAL
BH CV LOWER VASCULAR RIGHT COMMON FEMORAL COMPETENT: NORMAL
BH CV LOWER VASCULAR RIGHT COMMON FEMORAL COMPRESS: NORMAL
BH CV LOWER VASCULAR RIGHT COMMON FEMORAL PHASIC: NORMAL
BH CV LOWER VASCULAR RIGHT COMMON FEMORAL SPONT: NORMAL
BH CV LOWER VASCULAR RIGHT DISTAL FEMORAL COMPRESS: NORMAL
BH CV LOWER VASCULAR RIGHT GASTRONEMIUS COMPRESS: NORMAL
BH CV LOWER VASCULAR RIGHT GREATER SAPH AK COMPRESS: NORMAL
BH CV LOWER VASCULAR RIGHT GREATER SAPH BK COMPRESS: NORMAL
BH CV LOWER VASCULAR RIGHT MID FEMORAL AUGMENT: NORMAL
BH CV LOWER VASCULAR RIGHT MID FEMORAL COMPETENT: NORMAL
BH CV LOWER VASCULAR RIGHT MID FEMORAL COMPRESS: NORMAL
BH CV LOWER VASCULAR RIGHT MID FEMORAL PHASIC: NORMAL
BH CV LOWER VASCULAR RIGHT MID FEMORAL SPONT: NORMAL
BH CV LOWER VASCULAR RIGHT POPLITEAL AUGMENT: NORMAL
BH CV LOWER VASCULAR RIGHT POPLITEAL COMPETENT: NORMAL
BH CV LOWER VASCULAR RIGHT POPLITEAL COMPRESS: NORMAL
BH CV LOWER VASCULAR RIGHT POPLITEAL PHASIC: NORMAL
BH CV LOWER VASCULAR RIGHT POPLITEAL SPONT: NORMAL
BH CV LOWER VASCULAR RIGHT PROXIMAL FEMORAL COMPRESS: NORMAL
BH CV LOWER VASCULAR RIGHT SAPHENOFEMORAL JUNCTION COMPRESS: NORMAL
BH CV VAS PRELIMINARY FINDINGS SCRIPTING: 1
BILIRUB SERPL-MCNC: 0.5 MG/DL (ref 0–1.2)
BILIRUB UR QL STRIP: NEGATIVE
BUN SERPL-MCNC: 25 MG/DL (ref 8–23)
BUN/CREAT SERPL: 26 (ref 7–25)
CALCIUM SPEC-SCNC: 8.8 MG/DL (ref 8.6–10.5)
CHLORIDE SERPL-SCNC: 87 MMOL/L (ref 98–107)
CLARITY UR: CLEAR
CO2 BLDA-SCNC: 23.5 MMOL/L (ref 22–29)
CO2 SERPL-SCNC: 22 MMOL/L (ref 22–29)
COLOR UR: YELLOW
CREAT SERPL-MCNC: 0.96 MG/DL (ref 0.57–1)
DEPRECATED RDW RBC AUTO: 41.1 FL (ref 37–54)
EGFRCR SERPLBLD CKD-EPI 2021: 62.6 ML/MIN/1.73
EOSINOPHIL # BLD AUTO: 0 10*3/MM3 (ref 0–0.4)
EOSINOPHIL NFR BLD AUTO: 0.1 % (ref 0.3–6.2)
ERYTHROCYTE [DISTWIDTH] IN BLOOD BY AUTOMATED COUNT: 13.4 % (ref 12.3–15.4)
GLOBULIN UR ELPH-MCNC: 3.1 GM/DL
GLUCOSE BLDC GLUCOMTR-MCNC: 156 MG/DL (ref 70–105)
GLUCOSE BLDC GLUCOMTR-MCNC: 157 MG/DL (ref 70–105)
GLUCOSE BLDC GLUCOMTR-MCNC: 200 MG/DL (ref 70–105)
GLUCOSE SERPL-MCNC: 126 MG/DL (ref 65–99)
GLUCOSE UR STRIP-MCNC: NEGATIVE MG/DL
HCO3 BLDA-SCNC: 22.5 MMOL/L (ref 21–28)
HCT VFR BLD AUTO: 36.7 % (ref 34–46.6)
HEMODILUTION: NO
HGB BLD-MCNC: 11.9 G/DL (ref 12–15.9)
HGB UR QL STRIP.AUTO: NEGATIVE
HYALINE CASTS UR QL AUTO: ABNORMAL /LPF
INHALED O2 CONCENTRATION: 100 %
KETONES UR QL STRIP: NEGATIVE
LEUKOCYTE ESTERASE UR QL STRIP.AUTO: ABNORMAL
LYMPHOCYTES # BLD AUTO: 2.2 10*3/MM3 (ref 0.7–3.1)
LYMPHOCYTES NFR BLD AUTO: 13.9 % (ref 19.6–45.3)
MCH RBC QN AUTO: 28.7 PG (ref 26.6–33)
MCHC RBC AUTO-ENTMCNC: 32.3 G/DL (ref 31.5–35.7)
MCV RBC AUTO: 88.7 FL (ref 79–97)
MODALITY: ABNORMAL
MONOCYTES # BLD AUTO: 2.3 10*3/MM3 (ref 0.1–0.9)
MONOCYTES NFR BLD AUTO: 14.6 % (ref 5–12)
NEUTROPHILS NFR BLD AUTO: 11.2 10*3/MM3 (ref 1.7–7)
NEUTROPHILS NFR BLD AUTO: 71.1 % (ref 42.7–76)
NITRITE UR QL STRIP: POSITIVE
NRBC BLD AUTO-RTO: 0 /100 WBC (ref 0–0.2)
NT-PROBNP SERPL-MCNC: 646.9 PG/ML (ref 0–900)
PCO2 BLDA: 34.4 MM HG (ref 35–48)
PH BLDA: 7.42 PH UNITS (ref 7.35–7.45)
PH UR STRIP.AUTO: 5.5 [PH] (ref 5–8)
PLATELET # BLD AUTO: 253 10*3/MM3 (ref 140–450)
PMV BLD AUTO: 8.6 FL (ref 6–12)
PO2 BLDA: 96 MM HG (ref 83–108)
POTASSIUM SERPL-SCNC: 4.3 MMOL/L (ref 3.5–5.2)
PROCALCITONIN SERPL-MCNC: 0.06 NG/ML (ref 0–0.25)
PROT SERPL-MCNC: 6.8 G/DL (ref 6–8.5)
PROT UR QL STRIP: NEGATIVE
RBC # BLD AUTO: 4.14 10*6/MM3 (ref 3.77–5.28)
RBC # UR STRIP: ABNORMAL /HPF
REF LAB TEST METHOD: ABNORMAL
SAO2 % BLDCOA: 97.7 % (ref 94–98)
SODIUM SERPL-SCNC: 121 MMOL/L (ref 136–145)
SP GR UR STRIP: 1.01 (ref 1–1.03)
SQUAMOUS #/AREA URNS HPF: ABNORMAL /HPF
TROPONIN T SERPL HS-MCNC: 9 NG/L
UROBILINOGEN UR QL STRIP: ABNORMAL
WBC # UR STRIP: ABNORMAL /HPF
WBC NRBC COR # BLD: 15.8 10*3/MM3 (ref 3.4–10.8)

## 2023-10-14 PROCEDURE — 85025 COMPLETE CBC W/AUTO DIFF WBC: CPT | Performed by: EMERGENCY MEDICINE

## 2023-10-14 PROCEDURE — 25010000002 FUROSEMIDE PER 20 MG: Performed by: EMERGENCY MEDICINE

## 2023-10-14 PROCEDURE — 81001 URINALYSIS AUTO W/SCOPE: CPT | Performed by: EMERGENCY MEDICINE

## 2023-10-14 PROCEDURE — 94799 UNLISTED PULMONARY SVC/PX: CPT

## 2023-10-14 PROCEDURE — 84145 PROCALCITONIN (PCT): CPT | Performed by: EMERGENCY MEDICINE

## 2023-10-14 PROCEDURE — 80053 COMPREHEN METABOLIC PANEL: CPT | Performed by: EMERGENCY MEDICINE

## 2023-10-14 PROCEDURE — 94664 DEMO&/EVAL PT USE INHALER: CPT

## 2023-10-14 PROCEDURE — 82803 BLOOD GASES ANY COMBINATION: CPT

## 2023-10-14 PROCEDURE — 93306 TTE W/DOPPLER COMPLETE: CPT

## 2023-10-14 PROCEDURE — 84484 ASSAY OF TROPONIN QUANT: CPT | Performed by: EMERGENCY MEDICINE

## 2023-10-14 PROCEDURE — 87040 BLOOD CULTURE FOR BACTERIA: CPT | Performed by: EMERGENCY MEDICINE

## 2023-10-14 PROCEDURE — 25010000002 CEFTRIAXONE PER 250 MG: Performed by: HOSPITALIST

## 2023-10-14 PROCEDURE — 25010000002 METHYLPREDNISOLONE PER 125 MG: Performed by: HOSPITALIST

## 2023-10-14 PROCEDURE — 93306 TTE W/DOPPLER COMPLETE: CPT | Performed by: STUDENT IN AN ORGANIZED HEALTH CARE EDUCATION/TRAINING PROGRAM

## 2023-10-14 PROCEDURE — 93970 EXTREMITY STUDY: CPT

## 2023-10-14 PROCEDURE — 94761 N-INVAS EAR/PLS OXIMETRY MLT: CPT

## 2023-10-14 PROCEDURE — 71045 X-RAY EXAM CHEST 1 VIEW: CPT

## 2023-10-14 PROCEDURE — 83880 ASSAY OF NATRIURETIC PEPTIDE: CPT | Performed by: EMERGENCY MEDICINE

## 2023-10-14 PROCEDURE — 36600 WITHDRAWAL OF ARTERIAL BLOOD: CPT

## 2023-10-14 PROCEDURE — 25810000003 SODIUM CHLORIDE 0.9 % SOLUTION 250 ML FLEX CONT: Performed by: HOSPITALIST

## 2023-10-14 PROCEDURE — 82948 REAGENT STRIP/BLOOD GLUCOSE: CPT

## 2023-10-14 PROCEDURE — 63710000001 INSULIN LISPRO (HUMAN) PER 5 UNITS: Performed by: INTERNAL MEDICINE

## 2023-10-14 PROCEDURE — 25010000002 AZITHROMYCIN PER 500 MG: Performed by: HOSPITALIST

## 2023-10-14 PROCEDURE — 25010000002 SULFUR HEXAFLUORIDE MICROSPH 60.7-25 MG RECONSTITUTED SUSPENSION: Performed by: INTERNAL MEDICINE

## 2023-10-14 PROCEDURE — 94640 AIRWAY INHALATION TREATMENT: CPT

## 2023-10-14 PROCEDURE — 25010000002 FUROSEMIDE PER 20 MG: Performed by: HOSPITALIST

## 2023-10-14 RX ORDER — GABAPENTIN 400 MG/1
400 CAPSULE ORAL 3 TIMES DAILY
Status: DISCONTINUED | OUTPATIENT
Start: 2023-10-14 | End: 2023-10-20 | Stop reason: HOSPADM

## 2023-10-14 RX ORDER — METHYLPREDNISOLONE SODIUM SUCCINATE 125 MG/2ML
80 INJECTION, POWDER, LYOPHILIZED, FOR SOLUTION INTRAMUSCULAR; INTRAVENOUS EVERY 8 HOURS
Status: DISCONTINUED | OUTPATIENT
Start: 2023-10-14 | End: 2023-10-15

## 2023-10-14 RX ORDER — ONDANSETRON 4 MG/1
4 TABLET, FILM COATED ORAL EVERY 8 HOURS PRN
Status: DISCONTINUED | OUTPATIENT
Start: 2023-10-14 | End: 2023-10-20 | Stop reason: HOSPADM

## 2023-10-14 RX ORDER — AMOXICILLIN 250 MG
2 CAPSULE ORAL 2 TIMES DAILY
Status: DISCONTINUED | OUTPATIENT
Start: 2023-10-14 | End: 2023-10-20 | Stop reason: HOSPADM

## 2023-10-14 RX ORDER — GUAIFENESIN 600 MG/1
600 TABLET, EXTENDED RELEASE ORAL EVERY 12 HOURS SCHEDULED
Status: DISCONTINUED | OUTPATIENT
Start: 2023-10-14 | End: 2023-10-15

## 2023-10-14 RX ORDER — ACETAMINOPHEN 325 MG/1
650 TABLET ORAL EVERY 4 HOURS PRN
Status: DISCONTINUED | OUTPATIENT
Start: 2023-10-14 | End: 2023-10-20 | Stop reason: HOSPADM

## 2023-10-14 RX ORDER — SPIRONOLACTONE 25 MG/1
50 TABLET ORAL DAILY
Status: DISCONTINUED | OUTPATIENT
Start: 2023-10-14 | End: 2023-10-15

## 2023-10-14 RX ORDER — IBUPROFEN 600 MG/1
1 TABLET ORAL
Status: DISCONTINUED | OUTPATIENT
Start: 2023-10-14 | End: 2023-10-20 | Stop reason: HOSPADM

## 2023-10-14 RX ORDER — DEXTROSE MONOHYDRATE 25 G/50ML
25 INJECTION, SOLUTION INTRAVENOUS
Status: DISCONTINUED | OUTPATIENT
Start: 2023-10-14 | End: 2023-10-20 | Stop reason: HOSPADM

## 2023-10-14 RX ORDER — SODIUM CHLORIDE 0.9 % (FLUSH) 0.9 %
10 SYRINGE (ML) INJECTION EVERY 12 HOURS SCHEDULED
Status: DISCONTINUED | OUTPATIENT
Start: 2023-10-14 | End: 2023-10-20 | Stop reason: HOSPADM

## 2023-10-14 RX ORDER — SODIUM CHLORIDE 9 MG/ML
40 INJECTION, SOLUTION INTRAVENOUS AS NEEDED
Status: DISCONTINUED | OUTPATIENT
Start: 2023-10-14 | End: 2023-10-20 | Stop reason: HOSPADM

## 2023-10-14 RX ORDER — ONDANSETRON 2 MG/ML
4 INJECTION INTRAMUSCULAR; INTRAVENOUS EVERY 6 HOURS PRN
Status: DISCONTINUED | OUTPATIENT
Start: 2023-10-14 | End: 2023-10-20 | Stop reason: HOSPADM

## 2023-10-14 RX ORDER — FUROSEMIDE 10 MG/ML
40 INJECTION INTRAMUSCULAR; INTRAVENOUS
Status: DISCONTINUED | OUTPATIENT
Start: 2023-10-14 | End: 2023-10-15

## 2023-10-14 RX ORDER — GUAIFENESIN AND CODEINE PHOSPHATE 100; 10 MG/5ML; MG/5ML
10 SOLUTION ORAL EVERY 4 HOURS PRN
Status: DISCONTINUED | OUTPATIENT
Start: 2023-10-14 | End: 2023-10-20 | Stop reason: HOSPADM

## 2023-10-14 RX ORDER — FLUOXETINE HYDROCHLORIDE 20 MG/1
60 CAPSULE ORAL DAILY
Status: DISCONTINUED | OUTPATIENT
Start: 2023-10-14 | End: 2023-10-15

## 2023-10-14 RX ORDER — POLYETHYLENE GLYCOL 3350 17 G/17G
17 POWDER, FOR SOLUTION ORAL DAILY PRN
Status: DISCONTINUED | OUTPATIENT
Start: 2023-10-14 | End: 2023-10-20 | Stop reason: HOSPADM

## 2023-10-14 RX ORDER — FUROSEMIDE 10 MG/ML
40 INJECTION INTRAMUSCULAR; INTRAVENOUS ONCE
Status: COMPLETED | OUTPATIENT
Start: 2023-10-14 | End: 2023-10-14

## 2023-10-14 RX ORDER — LOSARTAN POTASSIUM 25 MG/1
25 TABLET ORAL DAILY
Status: DISCONTINUED | OUTPATIENT
Start: 2023-10-14 | End: 2023-10-15

## 2023-10-14 RX ORDER — IPRATROPIUM BROMIDE AND ALBUTEROL SULFATE 2.5; .5 MG/3ML; MG/3ML
3 SOLUTION RESPIRATORY (INHALATION) ONCE
Status: COMPLETED | OUTPATIENT
Start: 2023-10-14 | End: 2023-10-14

## 2023-10-14 RX ORDER — IPRATROPIUM BROMIDE AND ALBUTEROL SULFATE 2.5; .5 MG/3ML; MG/3ML
3 SOLUTION RESPIRATORY (INHALATION)
Status: DISCONTINUED | OUTPATIENT
Start: 2023-10-14 | End: 2023-10-20 | Stop reason: HOSPADM

## 2023-10-14 RX ORDER — NICOTINE POLACRILEX 4 MG
15 LOZENGE BUCCAL
Status: DISCONTINUED | OUTPATIENT
Start: 2023-10-14 | End: 2023-10-20 | Stop reason: HOSPADM

## 2023-10-14 RX ORDER — METOLAZONE 5 MG/1
5 TABLET ORAL DAILY
Status: DISCONTINUED | OUTPATIENT
Start: 2023-10-14 | End: 2023-10-15

## 2023-10-14 RX ORDER — BISACODYL 5 MG/1
5 TABLET, DELAYED RELEASE ORAL DAILY PRN
Status: DISCONTINUED | OUTPATIENT
Start: 2023-10-14 | End: 2023-10-20 | Stop reason: HOSPADM

## 2023-10-14 RX ORDER — INSULIN LISPRO 100 [IU]/ML
2-7 INJECTION, SOLUTION INTRAVENOUS; SUBCUTANEOUS
Status: DISCONTINUED | OUTPATIENT
Start: 2023-10-14 | End: 2023-10-15

## 2023-10-14 RX ORDER — BISACODYL 10 MG
10 SUPPOSITORY, RECTAL RECTAL DAILY PRN
Status: DISCONTINUED | OUTPATIENT
Start: 2023-10-14 | End: 2023-10-20 | Stop reason: HOSPADM

## 2023-10-14 RX ORDER — LEVOTHYROXINE SODIUM 0.12 MG/1
125 TABLET ORAL
Status: DISCONTINUED | OUTPATIENT
Start: 2023-10-14 | End: 2023-10-20 | Stop reason: HOSPADM

## 2023-10-14 RX ORDER — LANOLIN ALCOHOL/MO/W.PET/CERES
1000 CREAM (GRAM) TOPICAL DAILY
Status: DISCONTINUED | OUTPATIENT
Start: 2023-10-14 | End: 2023-10-20 | Stop reason: HOSPADM

## 2023-10-14 RX ORDER — TIZANIDINE 4 MG/1
2 TABLET ORAL 3 TIMES DAILY
Status: DISCONTINUED | OUTPATIENT
Start: 2023-10-14 | End: 2023-10-20 | Stop reason: HOSPADM

## 2023-10-14 RX ORDER — PANTOPRAZOLE SODIUM 40 MG/1
40 TABLET, DELAYED RELEASE ORAL EVERY EVENING
Status: DISCONTINUED | OUTPATIENT
Start: 2023-10-14 | End: 2023-10-20 | Stop reason: HOSPADM

## 2023-10-14 RX ORDER — SUMATRIPTAN 50 MG/1
50 TABLET, FILM COATED ORAL DAILY PRN
Status: COMPLETED | OUTPATIENT
Start: 2023-10-14 | End: 2023-10-14

## 2023-10-14 RX ORDER — SODIUM CHLORIDE 0.9 % (FLUSH) 0.9 %
10 SYRINGE (ML) INJECTION AS NEEDED
Status: DISCONTINUED | OUTPATIENT
Start: 2023-10-14 | End: 2023-10-20 | Stop reason: HOSPADM

## 2023-10-14 RX ORDER — MELATONIN
2000 NIGHTLY
Status: DISCONTINUED | OUTPATIENT
Start: 2023-10-14 | End: 2023-10-20 | Stop reason: HOSPADM

## 2023-10-14 RX ORDER — ATORVASTATIN CALCIUM 10 MG/1
10 TABLET, FILM COATED ORAL NIGHTLY
Status: DISCONTINUED | OUTPATIENT
Start: 2023-10-14 | End: 2023-10-15

## 2023-10-14 RX ORDER — ALBUTEROL SULFATE 0.63 MG/3ML
3 SOLUTION RESPIRATORY (INHALATION) EVERY 6 HOURS PRN
Status: DISCONTINUED | OUTPATIENT
Start: 2023-10-14 | End: 2023-10-20 | Stop reason: HOSPADM

## 2023-10-14 RX ADMIN — GABAPENTIN 400 MG: 400 CAPSULE ORAL at 20:48

## 2023-10-14 RX ADMIN — TIZANIDINE 2 MG: 4 TABLET ORAL at 12:10

## 2023-10-14 RX ADMIN — METHYLPREDNISOLONE SODIUM SUCCINATE 80 MG: 125 INJECTION, POWDER, FOR SOLUTION INTRAMUSCULAR; INTRAVENOUS at 08:02

## 2023-10-14 RX ADMIN — SENNOSIDES AND DOCUSATE SODIUM 2 TABLET: 50; 8.6 TABLET ORAL at 20:48

## 2023-10-14 RX ADMIN — LEVOTHYROXINE SODIUM 125 MCG: 0.12 TABLET ORAL at 12:10

## 2023-10-14 RX ADMIN — Medication 10 ML: at 20:48

## 2023-10-14 RX ADMIN — IPRATROPIUM BROMIDE AND ALBUTEROL SULFATE 3 ML: .5; 3 SOLUTION RESPIRATORY (INHALATION) at 02:29

## 2023-10-14 RX ADMIN — GUAIFENESIN 600 MG: 600 TABLET, MULTILAYER, EXTENDED RELEASE ORAL at 20:48

## 2023-10-14 RX ADMIN — SPIRONOLACTONE 50 MG: 25 TABLET ORAL at 12:10

## 2023-10-14 RX ADMIN — FUROSEMIDE 40 MG: 10 INJECTION, SOLUTION INTRAMUSCULAR; INTRAVENOUS at 01:52

## 2023-10-14 RX ADMIN — PANTOPRAZOLE SODIUM 40 MG: 40 TABLET, DELAYED RELEASE ORAL at 17:38

## 2023-10-14 RX ADMIN — FUROSEMIDE 40 MG: 10 INJECTION, SOLUTION INTRAMUSCULAR; INTRAVENOUS at 08:02

## 2023-10-14 RX ADMIN — Medication 2000 UNITS: at 20:48

## 2023-10-14 RX ADMIN — CEFTRIAXONE 2000 MG: 2 INJECTION, POWDER, FOR SOLUTION INTRAMUSCULAR; INTRAVENOUS at 03:32

## 2023-10-14 RX ADMIN — GUAIFENESIN 600 MG: 600 TABLET, MULTILAYER, EXTENDED RELEASE ORAL at 08:02

## 2023-10-14 RX ADMIN — FLUOXETINE 60 MG: 20 CAPSULE ORAL at 12:10

## 2023-10-14 RX ADMIN — INSULIN LISPRO 2 UNITS: 100 INJECTION, SOLUTION INTRAVENOUS; SUBCUTANEOUS at 20:49

## 2023-10-14 RX ADMIN — AZITHROMYCIN MONOHYDRATE 500 MG: 500 INJECTION, POWDER, LYOPHILIZED, FOR SOLUTION INTRAVENOUS at 04:26

## 2023-10-14 RX ADMIN — SULFUR HEXAFLUORIDE 3 ML: KIT at 13:29

## 2023-10-14 RX ADMIN — LOSARTAN POTASSIUM 25 MG: 25 TABLET, FILM COATED ORAL at 12:10

## 2023-10-14 RX ADMIN — INSULIN LISPRO 3 UNITS: 100 INJECTION, SOLUTION INTRAVENOUS; SUBCUTANEOUS at 17:40

## 2023-10-14 RX ADMIN — SUMATRIPTAN SUCCINATE 50 MG: 50 TABLET ORAL at 20:48

## 2023-10-14 RX ADMIN — TIZANIDINE 2 MG: 4 TABLET ORAL at 20:48

## 2023-10-14 RX ADMIN — FUROSEMIDE 40 MG: 10 INJECTION, SOLUTION INTRAMUSCULAR; INTRAVENOUS at 17:41

## 2023-10-14 RX ADMIN — CYANOCOBALAMIN TAB 1000 MCG 1000 MCG: 1000 TAB at 12:10

## 2023-10-14 RX ADMIN — GUAIFENESIN AND CODEINE PHOSPHATE 10 ML: 100; 10 SOLUTION ORAL at 23:35

## 2023-10-14 RX ADMIN — Medication 10 ML: at 08:06

## 2023-10-14 RX ADMIN — METHYLPREDNISOLONE SODIUM SUCCINATE 80 MG: 125 INJECTION, POWDER, FOR SOLUTION INTRAMUSCULAR; INTRAVENOUS at 17:41

## 2023-10-14 RX ADMIN — IPRATROPIUM BROMIDE AND ALBUTEROL SULFATE 3 ML: .5; 3 SOLUTION RESPIRATORY (INHALATION) at 20:04

## 2023-10-14 RX ADMIN — GUAIFENESIN AND CODEINE PHOSPHATE 10 ML: 100; 10 SOLUTION ORAL at 14:51

## 2023-10-14 RX ADMIN — GABAPENTIN 400 MG: 400 CAPSULE ORAL at 12:10

## 2023-10-14 RX ADMIN — IPRATROPIUM BROMIDE AND ALBUTEROL SULFATE 3 ML: .5; 3 SOLUTION RESPIRATORY (INHALATION) at 06:44

## 2023-10-14 RX ADMIN — METHYLPREDNISOLONE SODIUM SUCCINATE 80 MG: 125 INJECTION, POWDER, FOR SOLUTION INTRAMUSCULAR; INTRAVENOUS at 03:32

## 2023-10-14 RX ADMIN — IPRATROPIUM BROMIDE AND ALBUTEROL SULFATE 3 ML: .5; 3 SOLUTION RESPIRATORY (INHALATION) at 22:58

## 2023-10-14 RX ADMIN — ATORVASTATIN CALCIUM 10 MG: 10 TABLET, FILM COATED ORAL at 20:48

## 2023-10-14 RX ADMIN — IPRATROPIUM BROMIDE AND ALBUTEROL SULFATE 3 ML: .5; 3 SOLUTION RESPIRATORY (INHALATION) at 15:16

## 2023-10-14 NOTE — PLAN OF CARE
Goal Outcome Evaluation:         Patient is alert and oriented x4, and currently on 6L per nasal cannula. Patient has received IV antibiotics as ordered. Will continue to monitor patient and their well being during current shift.

## 2023-10-14 NOTE — H&P
"    [facility] Medicine Services  History & Physical    Patient Name: Claudia Rust  : 1950  MRN: [id number]  Primary Care Physician:  Pool Meyer NP-C  Date of admission: 10/13/2023  Date and Time of Service: 10/14/23    Subjective      Chief Complaint: SOB    History of Present Illness: Claudia Rust is a 73 y.o. female who presented to [facility] on 10/13/2023 complaining of worsening shortness of breath and cough.  Patient was just seen in the ED 3 days ago for same complaints.  She was discharged with breathing treatments and steroids however patient stated that she did not improve.  Her shortness of breath actually worsened.  Patient does have history of COPD but does not use oxygen at home.  She endorses dry cough.  Denies any fever or chills, chest pain, palpitations, nausea, vomiting, abdominal pain, diarrhea or constipation.    In the ED, patient was requiring 4 L nasal cannula.  Labs remarkable for sodium 121, WBC 15.8.  Chest x-ray showed patchy airspace disease within the bilateral lower lobes, pneumonia versus pulmonary edema.  Patient was given Lasix and DuoNebs in the ED.    ROS   Negative except those in HPI    Personal History     Past Medical History:   Diagnosis Date    Acute congestive heart failure     Allergies     Anxiety     Arthritis     Asthma     Bilateral leg edema     Bipolar 1 disorder     Bulging lumbar disc     X3    Cancer     states had \"cancerous tumor during pregnancy and had to have hysterectomy\"    Cataract     bilateral    Cellulitis 2021    bilateral legs    Compression fracture of vertebral column     LUMBAR    COPD (chronic obstructive pulmonary disease)     Delayed emergence from anesthesia     Depression     Depression     Diabetes mellitus     Dyslipidemia     Dyspnea on exertion     Fibromyalgia     GERD (gastroesophageal reflux disease)     Hiatal hernia     Hyperlipidemia     Hypertension     Hypothyroid     HYPOTHYROID    IBS " (irritable bowel syndrome)     Migraines     Morbid obesity     Neuropathy     Panic attacks     Peripheral edema     PONV (postoperative nausea and vomiting)     Poor mobility     rolling walker    Prediabetes     PVD (peripheral vascular disease)     Rheumatoid aortitis     Spinal stenosis     Spinal stenosis     Vertigo     Vertigo     Vitamin D deficiency        Past Surgical History:   Procedure Laterality Date    AORTIC VALVE REPAIR/REPLACEMENT N/A 3/18/2021    Procedure: AORTIC VALVE REPLACEMENT;  Surgeon: Olaf Mcgill MD;  Location: [facility] CVOR;  Service: Cardiothoracic;  Laterality: N/A;    BRONCHOSCOPY N/A 3/25/2021    Procedure: BRONCHOSCOPY AT BEDSIDE WITH BRONCHIOALVEOLAR LAVAGE;  Surgeon: Glenn Reyes MD;  Location: [facility] ENDOSCOPY;  Service: Pulmonary;  Laterality: N/A;  PNA    CARDIAC CATHETERIZATION  2009    CARDIAC CATHETERIZATION N/A 8/14/2019    Procedure: Left Heart Cath;  Surgeon: Jose Levi MD;  Location: [facility] CATH INVASIVE LOCATION;  Service: Cardiovascular    CARDIAC CATHETERIZATION N/A 8/14/2019    Procedure: Right Heart Cath;  Surgeon: Jose Levi MD;  Location: [facility] CATH INVASIVE LOCATION;  Service: Cardiovascular    CARDIAC CATHETERIZATION N/A 8/14/2019    Procedure: Aortic root aortogram;  Surgeon: Jose Levi MD;  Location: [facility] CATH INVASIVE LOCATION;  Service: Cardiovascular    CARDIAC CATHETERIZATION N/A 1/20/2021    Procedure: Left Heart Cath;  Surgeon: Jose Levi MD;  Location: [facility] CATH INVASIVE LOCATION;  Service: Cardiovascular;  Laterality: N/A;    COLONOSCOPY      CYSTOCELE REPAIR  1984    ELBOW ARTHROPLASTY  2011    ENDOSCOPY      FOOT SURGERY      GALLBLADDER SURGERY  2002    TONSILLECTOMY      VAGINAL HYSTERECTOMY  1972       Family History: family history includes Diabetes in her maternal grandmother; Esophageal cancer in her mother; Hypertension in her father; Lung cancer in her mother; Ovarian cancer  in her mother. Otherwise pertinent FHx was reviewed and not pertinent to current issue.    Social History:  reports that she quit smoking about 21 months ago. Her smoking use included cigarettes. She smoked an average of 1 pack per day. She has never used smokeless tobacco. She reports that she does not currently use alcohol. She reports that she does not use drugs.    Home Medications:  Prior to Admission Medications       Prescriptions Last Dose Informant Patient Reported? Taking?    albuterol (ACCUNEB) 0.63 MG/3ML nebulizer solution   No No    Take 3 mL by nebulization Every 6 (Six) Hours As Needed for Wheezing (Dx: J 44.9 (COPD), R 06.09 (dyspnea on exertion)).    Acetaminophen 325 MG capsule   Yes No    Take 650 mg by mouth Every 4 (Four) Hours As Needed.    B Complex Vitamins (VITAMIN B COMPLEX) capsule capsule  Self Yes No    Take 1 capsule by mouth Daily. LAST DOSE 3/4    Calcium-Magnesium-Vitamin D (CALCIUM 1200+D3 PO)   Yes No    Take 1 capsule by mouth Daily.    cholecalciferol (Cholecalciferol) 25 MCG (1000 UT) tablet  Self Yes No    Take 2 tablets by mouth Every Night. LAST DOSE 3/4    diphenhydrAMINE (BENADRYL) 50 MG capsule   Yes No    Take 1 capsule by mouth At Night As Needed for Itching.    FLUoxetine (PROzac) 20 MG capsule   Yes No    Take 3 capsules by mouth Daily.    fluticasone (FLONASE) 50 MCG/ACT nasal spray   Yes No    2 sprays into the nostril(s) as directed by provider Daily.    gabapentin (NEURONTIN) 400 MG capsule   Yes No    Take 1 capsule by mouth 3 (Three) Times a Day.    levothyroxine (SYNTHROID, LEVOTHROID) 125 MCG tablet   Yes No    Take 1 tablet by mouth Daily.    losartan (Cozaar) 25 MG tablet   No No    Take 1 tablet by mouth Daily.    meclizine (ANTIVERT) 25 MG tablet  Self Yes No    Take 1 tablet by mouth 3 (Three) Times a Day As Needed.    Melatonin 10 MG capsule   Yes No    Take 2 capsules by mouth At Night As Needed.    metFORMIN ER (GLUCOPHAGE-XR) 500 MG 24 hr tablet   Yes  No    Take 1 tablet by mouth Daily With Breakfast.    metOLazone (ZAROXOLYN) 5 MG tablet   No No    TAKE 1 TABLET BY MOUTH EVERY DAY    metoprolol succinate XL (TOPROL-XL) 25 MG 24 hr tablet   Yes No    Take 1 tablet by mouth Daily.    Multiple Vitamins-Minerals (MULTIVITAMIN WITH MINERALS) tablet tablet  Self Yes No    Take 1 tablet by mouth Daily. LAST DOSE 3/4    ondansetron (ZOFRAN) 4 MG tablet   Yes No    Take 1 tablet by mouth Every 8 (Eight) Hours As Needed for Nausea or Vomiting.    pantoprazole (PROTONIX) 40 MG EC tablet  Self Yes No    Take 1 tablet by mouth Every Evening.    predniSONE (DELTASONE) 20 MG tablet   No No    Take 2 tablets by mouth Daily for 4 days.    rizatriptan MLT (MAXALT-MLT) 10 MG disintegrating tablet  Self Yes No    Take 1 tablet by mouth 1 (One) Time As Needed for Migraine. May repeat in 2 hours if needed    simvastatin (ZOCOR) 20 MG tablet   Yes No    Take 1 tablet by mouth Every Night.    spironolactone (ALDACTONE) 25 MG tablet   No No    TAKE 2 TABLETS BY MOUTH EVERY DAY    tiZANidine (ZANAFLEX) 4 MG tablet  Self Yes No    Take 1 tablet by mouth Every 8 (Eight) Hours As Needed for Muscle Spasms.    vitamin B-12 (CYANOCOBALAMIN) 1000 MCG tablet   Yes No    Take 1 tablet by mouth Daily.              Allergies:  Allergies   Allergen Reactions    Adhesive Tape Unknown (See Comments)     hives    Butorphanol Other (See Comments)     Chest pain difficulty breathing throat swelling, STADAL    Garlic Other (See Comments)     Chest pain difficulty breathing throat swells    Heparin Other (See Comments)     HIT +    Tetanus-Diphtheria Toxoids Td Other (See Comments)     Dizziness,  vomiting    Aloe Itching    Bee Venom Other (See Comments)     Swelling eyes and lips hand swelling    Latex Itching    Macadamia Nut Oil Other (See Comments)     Chest pain difficulty breathing throat swelling    Tuberculin, Ppd Unknown (See Comments)     reactor    Wasp Venom Other (See Comments)     Swelling  eyes lips and hand       Objective      Vitals:   Temp:  [98.3 °F (36.8 °C)] 98.3 °F (36.8 °C)  Heart Rate:  [77-85] 81  Resp:  [18-30] 30  BP: (105-115)/(42-87) 105/42  Flow (L/min):  [4-10] 4    Physical Exam   General Appearance: AOO x 4, cooperative, ill appearing morbidly obese woman  Head:  Normocephalic, without obvious abnormality  Eyes:  PERRL, EOM's intact, conjunctivae and cornea clear  Nose:  Nares symmetrical, septum midline, mucosa pink  Throat:  Lips, tongue, and mucosa are moist, pink, and intact  Neck:  Supple; symmetrical, trachea midline, no adenopathy  Back:  Symmetrical, ROM normal, no CVA tenderness  Lungs: Respirations unlabored, no audible wheeze  Heart: Regular rate & rhythm, S1 and S2 normal  Abdomen:  Soft, nontender, bowel sounds active all four quadrants  Musculoskeletal: +2 pitting edema bilaterally, tender to palpation.  Skin/Hair/Nails:  Skin warm, dry and intact, no rashes or abnormal dyspigmentation       Result Review    Result Review:  I have personally reviewed the results from the time of this admission to 10/14/2023 03:17 EDT and agree with these findings:  [x]  Laboratory  []  Microbiology  [x]  Radiology  []  EKG/Telemetry   []  Cardiology/Vascular   []  Pathology  []  Old records  []  Other:  Most notable findings include:  Labs remarkable for sodium 121, WBC 15.8.  Chest x-ray showed patchy airspace disease within the bilateral lower lobes, pneumonia versus pulmonary edema.      Assessment & Plan        Active Hospital Problems:  Active Hospital Problems    Diagnosis     **Acute respiratory failure      Plan:   COPD  Dyspnea  Acute respiratory failure  Patient currently on 4 L nasal cannula  Continue oxygen supplementation  Wean as tolerated  Add DuoNebs every 4 hours  Start Solu-Medrol 80 mg IV every 8 hours    Pneumonia  Start Rocephin and azithromycin  Continue breathing treatments  Guaifenesin as needed for cough    Possible pulmonary edema  Start Lasix 40 mg IV twice  daily  Obtain echocardiogram    Hyponatremia  Sodium 121  Fluid restriction  Continue diuretics    Leukocytosis  Most likely due to steroid use  Repeat CBC    Hypertension  Hyperlipidemia  Hypothyroidism  Depression  Resume home meds when verified    DVT prophylaxis:  Mechanical DVT prophylaxis orders are present.    CODE STATUS:       Admission Status:  I believe this patient meets inpatient status.    I discussed the patient's findings and my recommendations with patient and family.    10/14/23      Signature: Electronically signed by Elana Tinajero MD, 10/14/23, 03:17 EDT.  [facility] Hospitalist Team

## 2023-10-14 NOTE — PROGRESS NOTES
[facility] Medicine Services   Daily Progress Note    Patient Name: Claudia Rust  : 1950  MRN: [id number]  Primary Care Physician:  Pool Meyer NP-C  Date of admission: 10/13/2023  Date of service 10/14/2023      Subjective      Patient complains of cough.  Chest congestion.  His pain and swelling of the lower legs.  Has redness of the legs.  Patient has chronic leg swelling.  Family thinks that her swelling is worse now.      ROS A 12 point review of system was done and was negative except as mentioned above      Objective      Vitals:   Temp:  [97.6 °F (36.4 °C)-98.3 °F (36.8 °C)] 98.1 °F (36.7 °C)  Heart Rate:  [77-99] 99  Resp:  [18-30] 24  BP: (100-122)/(42-87) 108/57  Flow (L/min):  [4-10] 6    Physical Exam  Constitutional:       Appearance: Normal appearance.   HENT:      Head: Normocephalic and atraumatic.      Nose: Nose normal.      Mouth/Throat:      Mouth: Mucous membranes are moist.   Cardiovascular:      Rate and Rhythm: Normal rate.   Pulmonary:      Effort: Pulmonary effort is normal. No respiratory distress.      Breath sounds: Normal breath sounds. No stridor. No wheezing, rhonchi or rales.   Chest:      Chest wall: No tenderness.   Abdominal:      General: Abdomen is flat. There is no distension.      Palpations: Abdomen is soft. There is no mass.      Tenderness: There is no abdominal tenderness.   Musculoskeletal:         General: Swelling and tenderness present.      Right lower leg: Edema present.      Left lower leg: Edema present.      Comments: Mild local rise of temperature of the left lower leg   Neurological:      General: No focal deficit present.      Mental Status: She is alert.             Result Review    Result Review:  I have personally reviewed the results from the time of this admission to 10/14/2023 11:06 EDT and agree with these findings:  [x]  Laboratory  []  Microbiology  [x]  Radiology  []  EKG/Telemetry   []  Cardiology/Vascular   []   Pathology  []  Old records  []  Other:            Assessment & Plan      Brief Patient Summary:   Claudia Rust is a 73 y.o. female who presented to [facility] on 10/13/2023 complaining of worsening shortness of breath and cough.  Patient was just seen in the ED a few days ago for same complaints.  She was discharged with breathing treatments and steroids however patient stated that she did not improve.  Her shortness of breath actually worsened.  Patient does have history of COPD but does not use oxygen at home.    In the ED, patient was requiring 4 L nasal cannula.  Labs remarkable for sodium 121, WBC 15.8.  Chest x-ray showed patchy airspace disease within the bilateral lower lobes, pneumonia versus pulmonary edema.  Patient was given Lasix and DuoNebs in the ED.      azithromycin, 500 mg, Intravenous, Q24H  cefTRIAXone, 2,000 mg, Intravenous, Q24H  furosemide, 40 mg, Intravenous, BID  guaiFENesin, 600 mg, Oral, Q12H  ipratropium-albuterol, 3 mL, Nebulization, Q4H - RT  methylPREDNISolone sodium succinate, 80 mg, Intravenous, Q8H  senna-docusate sodium, 2 tablet, Oral, BID  sodium chloride, 10 mL, Intravenous, Q12H             Active Hospital Problems:  Active Hospital Problems    Diagnosis     **Acute respiratory failure      Plan:     COPD  Dyspnea  Acute respiratory failure  Patient currently on 4 L nasal cannula  Continue oxygen supplementation  Wean as tolerated  Add DuoNebs every 4 hours  IV steroids -Solu-Medrol 80 mg IV every 8 hours  Antibiotics     Pneumonia  Start Rocephin and azithromycin  Continue breathing treatments  Guaifenesin as needed for cough     Possible pulmonary edema  Start Lasix 40 mg IV twice daily  Obtain echocardiogram     Hyponatremia  Sodium 121  Fluid restriction  Continue diuretics  Follow-up electrolytes     Leukocytosis  Most likely due to steroid use  Repeat CBC    Cellulitis of lower legs  Antibiotics as ordered.  Venous Doppler to rule out a DVT     UTI  UA studies  reviewed.  Follow-up urine culture  Continue antibiotics     Hypertension  Follow-up BP      DVT prophylaxis:  Mechanical DVT prophylaxis orders are present.    CODE STATUS:         Disposition:  I expect patient to be discharged 2 to 3 days    Patient,  and daughter was updated with plan of care.  All questions answered      Electronically signed by Flora Tejada MD, 10/14/23, 11:06 EDT.  [facility] Hospitalist Team

## 2023-10-14 NOTE — PLAN OF CARE
Goal Outcome Evaluation:         Pt a/ox3, from home lives with spouse, here for SOB, generalized weakness, assist x 3-4, wearing 6l/o2 NC, ble edema 3+, healthy heart diet, iv antibiotics, diuretics, reviewed plan of care with pt and , call light in reach

## 2023-10-14 NOTE — ED PROVIDER NOTES
"Subjective   History of Present Illness  73-year-old female with history of CHF, status post aortic valve replacement 3 years ago, asthma presents for shortness of breath.  Been going on for several days but worse over the last couple hours.  Was satting in upper 80s with EMS.  Used home albuterol did not help.  Also received breathing treatment with EMS without significant change in condition.  States she feels like she has been swelling.  Endorses increasing orthopnea.  Review of Systems  See HPI.  Past Medical History:   Diagnosis Date    Acute congestive heart failure     Allergies     Anxiety     Arthritis     Asthma     Bilateral leg edema     Bipolar 1 disorder     Bulging lumbar disc     X3    Cancer     states had \"cancerous tumor during pregnancy and had to have hysterectomy\"    Cataract     bilateral    Cellulitis 03/2021    bilateral legs    Compression fracture of vertebral column     LUMBAR    COPD (chronic obstructive pulmonary disease)     Delayed emergence from anesthesia     Depression     Depression     Diabetes mellitus     Dyslipidemia     Dyspnea on exertion     Fibromyalgia     GERD (gastroesophageal reflux disease)     Hiatal hernia     Hyperlipidemia     Hypertension     Hypothyroid     HYPOTHYROID    IBS (irritable bowel syndrome)     Migraines     Morbid obesity     Neuropathy     Panic attacks     Peripheral edema     PONV (postoperative nausea and vomiting)     Poor mobility     rolling walker    Prediabetes     PVD (peripheral vascular disease)     Rheumatoid aortitis     Spinal stenosis     Spinal stenosis     Vertigo     Vertigo     Vitamin D deficiency        Allergies   Allergen Reactions    Adhesive Tape Unknown (See Comments)     hives    Butorphanol Other (See Comments)     Chest pain difficulty breathing throat swelling, STADAL    Garlic Other (See Comments)     Chest pain difficulty breathing throat swells    Heparin Other (See Comments)     HIT +    Tetanus-Diphtheria Toxoids " Td Other (See Comments)     Dizziness,  vomiting    Aloe Itching    Bee Venom Other (See Comments)     Swelling eyes and lips hand swelling    Latex Itching    Macadamia Nut Oil Other (See Comments)     Chest pain difficulty breathing throat swelling    Tuberculin, Ppd Unknown (See Comments)     reactor    Wasp Venom Other (See Comments)     Swelling eyes lips and hand       Past Surgical History:   Procedure Laterality Date    AORTIC VALVE REPAIR/REPLACEMENT N/A 3/18/2021    Procedure: AORTIC VALVE REPLACEMENT;  Surgeon: Olaf Mcgill MD;  Location: [facility] CVOR;  Service: Cardiothoracic;  Laterality: N/A;    BRONCHOSCOPY N/A 3/25/2021    Procedure: BRONCHOSCOPY AT BEDSIDE WITH BRONCHIOALVEOLAR LAVAGE;  Surgeon: Glenn Reyes MD;  Location: [facility] ENDOSCOPY;  Service: Pulmonary;  Laterality: N/A;  PNA    CARDIAC CATHETERIZATION  2009    CARDIAC CATHETERIZATION N/A 8/14/2019    Procedure: Left Heart Cath;  Surgeon: Jose Levi MD;  Location: [facility] CATH INVASIVE LOCATION;  Service: Cardiovascular    CARDIAC CATHETERIZATION N/A 8/14/2019    Procedure: Right Heart Cath;  Surgeon: Jose Levi MD;  Location: [facility] CATH INVASIVE LOCATION;  Service: Cardiovascular    CARDIAC CATHETERIZATION N/A 8/14/2019    Procedure: Aortic root aortogram;  Surgeon: Jose Levi MD;  Location: [facility] CATH INVASIVE LOCATION;  Service: Cardiovascular    CARDIAC CATHETERIZATION N/A 1/20/2021    Procedure: Left Heart Cath;  Surgeon: Jose Levi MD;  Location: [facility] CATH INVASIVE LOCATION;  Service: Cardiovascular;  Laterality: N/A;    COLONOSCOPY      CYSTOCELE REPAIR  1984    ELBOW ARTHROPLASTY  2011    ENDOSCOPY      FOOT SURGERY      GALLBLADDER SURGERY  2002    TONSILLECTOMY      VAGINAL HYSTERECTOMY  1972       Family History   Problem Relation Age of Onset    Ovarian cancer Mother     Lung cancer Mother     Esophageal cancer Mother     Hypertension Father     Diabetes Maternal  "Grandmother        Social History     Socioeconomic History    Marital status:    Tobacco Use    Smoking status: Former     Packs/day: 1     Types: Cigarettes     Quit date:      Years since quittin.7    Smokeless tobacco: Never    Tobacco comments:     decrease now and do not smoke dos   Vaping Use    Vaping Use: Never used   Substance and Sexual Activity    Alcohol use: Not Currently     Comment: very rare    Drug use: Never    Sexual activity: Defer           Objective   Physical Exam  Constitutional: Mild acute distress.  Head:  Atraumatic.  Normocephalic.   Eyes:  No scleral icterus. Normal conjunctivae  ENT:  Moist mucosa.  No nasal discharge present.  Cardiovascular:  Well perfused.  Equal pulses.  Regular rhythm and normal rate.  Normal capillary refill.    Pulmonary/Chest: Mild tachypnea.  Diminished breath sounds bilaterally.  Abdominal:  Nondistended. Nontender.   Extremities: 2+ bilateral lower peripheral edema.  No Deformity  Skin:  Warm, dry  Neurological:  Alert, awake, and appropriate.  Normal speech.      Procedures           ED Course      /57 (BP Location: Left arm, Patient Position: Lying)   Pulse 88   Temp 97.6 °F (36.4 °C) (Oral)   Resp 20   Ht 162.6 cm (64.02\")   Wt (!) 139 kg (306 lb 10.6 oz)   SpO2 91%   BMI 52.61 kg/m²   Labs Reviewed   COMPREHENSIVE METABOLIC PANEL - Abnormal; Notable for the following components:       Result Value    Glucose 126 (*)     BUN 25 (*)     Sodium 121 (*)     Chloride 87 (*)     AST (SGOT) 38 (*)     BUN/Creatinine Ratio 26.0 (*)     All other components within normal limits    Narrative:     GFR Normal >60  Chronic Kidney Disease <60  Kidney Failure <15    The GFR formula is only valid for adults with stable renal function between ages 18 and 70.   URINALYSIS W/ MICROSCOPIC IF INDICATED (NO CULTURE) - Abnormal; Notable for the following components:    Leuk Esterase, UA Moderate (2+) (*)     Nitrite, UA Positive (*)     All other " components within normal limits   CBC WITH AUTO DIFFERENTIAL - Abnormal; Notable for the following components:    WBC 15.80 (*)     Hemoglobin 11.9 (*)     Lymphocyte % 13.9 (*)     Monocyte % 14.6 (*)     Eosinophil % 0.1 (*)     Neutrophils, Absolute 11.20 (*)     Monocytes, Absolute 2.30 (*)     All other components within normal limits   BLOOD GAS, ARTERIAL - Abnormal; Notable for the following components:    pCO2, Arterial 34.4 (*)     Base Excess, Arterial -1.4 (*)     All other components within normal limits   URINALYSIS, MICROSCOPIC ONLY - Abnormal; Notable for the following components:    WBC, UA Too Numerous to Count (*)     Bacteria, UA 4+ (*)     All other components within normal limits   SINGLE HSTROPONIN T - Normal    Narrative:     High Sensitive Troponin T Reference Range:  <10.0 ng/L- Negative Female for AMI  <15.0 ng/L- Negative Male for AMI  >=10 - Abnormal Female indicating possible myocardial injury.  >=15 - Abnormal Male indicating possible myocardial injury.   Clinicians would have to utilize clinical acumen, EKG, Troponin, and serial changes to determine if it is an Acute Myocardial Infarction or myocardial injury due to an underlying chronic condition.        BNP (IN-HOUSE) - Normal    Narrative:     This assay is used as an aid in the diagnosis of individuals suspected of having heart failure. It can be used as an aid in the diagnosis of acute decompensated heart failure (ADHF) in patients presenting with signs and symptoms of ADHF to the emergency department (ED). In addition, NT-proBNP of <300 pg/mL indicates ADHF is not likely.    Age Range Result Interpretation  NT-proBNP Concentration (pg/mL:      <50             Positive            >450                   Gray                 300-450                    Negative             <300    50-75           Positive            >900                  Gray                300-900                  Negative            <300      >75              "Positive            >1800                  Gray                300-1800                  Negative            <300   PROCALCITONIN - Normal    Narrative:     As a Marker for Sepsis (Non-Neonates):    1. <0.5 ng/mL represents a low risk of severe sepsis and/or septic shock.  2. >2 ng/mL represents a high risk of severe sepsis and/or septic shock.    As a Marker for Lower Respiratory Tract Infections that require antibiotic therapy:    PCT on Admission    Antibiotic Therapy       6-12 Hrs later    >0.5                Strongly Recommended  >0.25 - <0.5        Recommended   0.1 - 0.25          Discouraged              Remeasure/reassess PCT  <0.1                Strongly Discouraged     Remeasure/reassess PCT    As 28 day mortality risk marker: \"Change in Procalcitonin Result\" (>80% or <=80%) if Day 0 (or Day 1) and Day 4 values are available. Refer to http://www.[facility]-pct-calculator.com    Change in PCT <=80%  A decrease of PCT levels below or equal to 80% defines a positive change in PCT test result representing a higher risk for 28-day all-cause mortality of patients diagnosed with severe sepsis for septic shock.    Change in PCT >80%  A decrease of PCT levels of more than 80% defines a negative change in PCT result representing a lower risk for 28-day all-cause mortality of patients diagnosed with severe sepsis or septic shock.      BLOOD CULTURE   BLOOD CULTURE   BLOOD GAS, ARTERIAL   CBC AND DIFFERENTIAL    Narrative:     The following orders were created for panel order CBC & Differential.  Procedure                               Abnormality         Status                     ---------                               -----------         ------                     CBC Auto Differential[[id number]]        Abnormal            Final result                 Please view results for these tests on the individual orders.     Medications   sodium chloride 0.9 % flush 10 mL (has no administration in time range)   sodium " chloride 0.9 % flush 10 mL (has no administration in time range)   sodium chloride 0.9 % flush 10 mL (has no administration in time range)   sodium chloride 0.9 % infusion 40 mL (has no administration in time range)   sennosides-docusate (PERICOLACE) 8.6-50 MG per tablet 2 tablet (has no administration in time range)     And   polyethylene glycol (MIRALAX) packet 17 g (has no administration in time range)     And   bisacodyl (DULCOLAX) EC tablet 5 mg (has no administration in time range)     And   bisacodyl (DULCOLAX) suppository 10 mg (has no administration in time range)   acetaminophen (TYLENOL) tablet 650 mg (has no administration in time range)   ondansetron (ZOFRAN) injection 4 mg (has no administration in time range)   furosemide (LASIX) injection 40 mg (40 mg Intravenous Given 10/14/23 0802)   methylPREDNISolone sodium succinate (SOLU-Medrol) injection 80 mg (80 mg Intravenous Given 10/14/23 0802)   cefTRIAXone (ROCEPHIN) 2,000 mg in sodium chloride 0.9 % 100 mL IVPB (2,000 mg Intravenous New Bag 10/14/23 0332)   azithromycin (ZITHROMAX) 500 mg in sodium chloride 0.9 % 250 mL IVPB-VTB (500 mg Intravenous New Bag 10/14/23 0426)   ipratropium-albuterol (DUO-NEB) nebulizer solution 3 mL (3 mL Nebulization Given 10/14/23 0644)   guaiFENesin (MUCINEX) 12 hr tablet 600 mg (600 mg Oral Given 10/14/23 0802)   guaiFENesin-codeine (GUAIFENESIN AC) 100-10 MG/5ML liquid 10 mL (has no administration in time range)   ipratropium-albuterol (DUO-NEB) nebulizer solution 3 mL (3 mL Nebulization Given 10/14/23 0229)   furosemide (LASIX) injection 40 mg (40 mg Intravenous Given 10/14/23 0152)     XR Chest 1 View    Result Date: 10/14/2023  Impression: Patchy airspace disease within the bilateral lower lobes, likely related to pneumonia. Possible mild underlying pulmonary edema pattern. Electronically Signed: Tana Sutherland MD  10/14/2023 12:27 AM EDT  Workstation ID: [id number]                                        Medical  Decision Making  Problems Addressed:  Acute hypoxemic respiratory failure: complicated acute illness or injury  Hypervolemia, unspecified hypervolemia type: complicated acute illness or injury  Hyponatremia: complicated acute illness or injury    Amount and/or Complexity of Data Reviewed  Labs: ordered.  Radiology: ordered.    Risk  Prescription drug management.  Decision regarding hospitalization.    EKG # 1  Signed and interpreted by the EP.  Time Interpreted: 12:05 AM  Rate: 86  Rhythm: Normal sinus rhythm  Axis: Normal axis  Intervals: Normal intervals  ST Segments: No acute ischemic changes     My interpretation chest x-ray concerning for pulmonary edema versus bilateral pneumonia.  See above for radiology interpretation.    White count 15 but suspect this secondary to the prednisone she is on.  States she feels like she has swelling and cannot lie flat.  Chest x-ray concerning for pulmonary edema versus bilateral pneumonia.  Suspect much more likely pulmonary edema.  Given dose of Lasix here.  Urine with bacteria and white blood cells but seems to be asymptomatic.  Breathing treatments not helping at home.      Final diagnoses:   Hyponatremia   Hypervolemia, unspecified hypervolemia type   Acute hypoxemic respiratory failure             ED Disposition  ED Disposition       ED Disposition   Decision to Admit    Condition   --    Comment   Level of Care: Med/Surg [1]   Diagnosis: Acute respiratory failure [518.81.ICD-9-CM]   Admitting Physician: SAMIRA MACKEY [[id number]]   Attending Physician: SAMIRA MACKEY [[id number]]   Certification: I Certify That Inpatient Hospital Services Are Medically Necessary For Greater Than 2 Midnights                 No follow-up provider specified.       Medication List      No changes were made to your prescriptions during this visit.            Eliceo Jones MD  10/14/23 0803

## 2023-10-14 NOTE — CASE MANAGEMENT/SOCIAL WORK
Discharge Planning Assessment  [facility]     Patient Name: Claudia Rust  MRN: [id number]  Today's Date: 10/14/2023    Admit Date: 10/13/2023    Plan: Return home with spouse. Current with [facility] and [employer] services.   Discharge Needs Assessment       Row Name 10/14/23 1050       Living Environment    People in Home spouse    Current Living Arrangements apartment    Potentially Unsafe Housing Conditions none    Primary Care Provided by self    Provides Primary Care For no one    Family Caregiver if Needed spouse    Quality of Family Relationships supportive;helpful    Able to Return to Prior Arrangements yes       Resource/Environmental Concerns    Resource/Environmental Concerns none    Transportation Concerns none       Transition Planning    Patient/Family Anticipates Transition to home with family;home with help/services    Patient/Family Anticipated Services at Transition home health care    Transportation Anticipated family or friend will provide       Discharge Needs Assessment    Readmission Within the Last 30 Days no previous admission in last 30 days    Current Outpatient/Agency/Support Group homecare agency    Equipment Currently Used at Home rollator;power chair,(recliner lift)    Concerns to be Addressed discharge planning    Anticipated Changes Related to Illness none    Equipment Needed After Discharge none                   Discharge Plan       Row Name 10/14/23 5716       Plan    Plan Return home with spouse. Current with [facility] and [employer] services.    Patient/Family in Agreement with Plan yes    Plan Comments Met with pt and spouse in ED. Pt asleep, CM assessment with pt spouse. Pt lives in apt with spouse, is IADLs mostly -  assists with needs. Pt ambulates with rollator. Current with [facility] for many years, and current with [employer] services. Pt spouse will provide transportation at discharge. PCP and pharmacy verified,denies trouble affording home medications.  IV steroids, IV abx, O2 6L NC - patient doesn't wear home oxygen.                  Continued Care and Services - Admitted Since 10/13/2023       Home Medical Care       Service Provider Request Status Selected Services Address Phone Fax Patient Preferred    [facility] Pending - Request Sent N/A [address] [place] [address] [phone] --                  Expected Discharge Date and Time       Expected Discharge Date Expected Discharge Time    Oct 17, 2023            Demographic Summary       Row Name 10/14/23 1045       General Information    Admission Type inpatient    Arrived From emergency department    Referral Source admission list    Reason for Consult discharge planning    Preferred Language English                   Functional Status       Row Name 10/14/23 1045       Functional Status    Usual Activity Tolerance good    Current Activity Tolerance moderate       Functional Status, IADL    Medications assistive person    Meal Preparation assistive person    Housekeeping assistive person    Laundry assistive person    Shopping assistive person       Mental Status    General Appearance WDL WDL       Mental Status Summary    Recent Changes in Mental Status/Cognitive Functioning unable to assess                          Kia Maxwell RN

## 2023-10-15 PROBLEM — T17.800A MULTIPLE TRACHEOBRONCHIAL MUCUS PLUGS: Status: ACTIVE | Noted: 2023-10-13

## 2023-10-15 PROBLEM — J18.9 MULTIFOCAL PNEUMONIA: Status: ACTIVE | Noted: 2023-10-13

## 2023-10-15 LAB
ALBUMIN SERPL-MCNC: 3.3 G/DL (ref 3.5–5.2)
ALBUMIN/GLOB SERPL: 1 G/DL
ALP SERPL-CCNC: 181 U/L (ref 39–117)
ALT SERPL W P-5'-P-CCNC: 48 U/L (ref 1–33)
ANION GAP SERPL CALCULATED.3IONS-SCNC: 13 MMOL/L (ref 5–15)
AST SERPL-CCNC: 77 U/L (ref 1–32)
BASOPHILS # BLD AUTO: 0 10*3/MM3 (ref 0–0.2)
BASOPHILS NFR BLD AUTO: 0.1 % (ref 0–1.5)
BILIRUB SERPL-MCNC: 0.4 MG/DL (ref 0–1.2)
BUN SERPL-MCNC: 28 MG/DL (ref 8–23)
BUN SERPL-MCNC: 36 MG/DL (ref 8–23)
BUN SERPL-MCNC: 36 MG/DL (ref 8–23)
BUN/CREAT SERPL: 26.7 (ref 7–25)
BUN/CREAT SERPL: 30.3 (ref 7–25)
BUN/CREAT SERPL: 30.3 (ref 7–25)
CA-I SERPL ISE-MCNC: 1.19 MMOL/L (ref 1.2–1.3)
CALCIUM SPEC-SCNC: 8.9 MG/DL (ref 8.6–10.5)
CALCIUM SPEC-SCNC: 9.1 MG/DL (ref 8.6–10.5)
CALCIUM SPEC-SCNC: 9.1 MG/DL (ref 8.6–10.5)
CHLORIDE SERPL-SCNC: 84 MMOL/L (ref 98–107)
CHLORIDE SERPL-SCNC: 86 MMOL/L (ref 98–107)
CHLORIDE SERPL-SCNC: 86 MMOL/L (ref 98–107)
CK SERPL-CCNC: 1631 U/L (ref 20–180)
CK SERPL-CCNC: 2719 U/L (ref 20–180)
CO2 SERPL-SCNC: 24 MMOL/L (ref 22–29)
CO2 SERPL-SCNC: 27 MMOL/L (ref 22–29)
CO2 SERPL-SCNC: 27 MMOL/L (ref 22–29)
CORTIS SERPL-MCNC: 12.53 MCG/DL
CREAT SERPL-MCNC: 1.05 MG/DL (ref 0.57–1)
CREAT SERPL-MCNC: 1.19 MG/DL (ref 0.57–1)
CREAT SERPL-MCNC: 1.19 MG/DL (ref 0.57–1)
DEPRECATED RDW RBC AUTO: 39.4 FL (ref 37–54)
DEPRECATED RDW RBC AUTO: 41.1 FL (ref 37–54)
EGFRCR SERPLBLD CKD-EPI 2021: 48.4 ML/MIN/1.73
EGFRCR SERPLBLD CKD-EPI 2021: 48.4 ML/MIN/1.73
EGFRCR SERPLBLD CKD-EPI 2021: 56.2 ML/MIN/1.73
EOSINOPHIL # BLD AUTO: 0 10*3/MM3 (ref 0–0.4)
EOSINOPHIL NFR BLD AUTO: 0 % (ref 0.3–6.2)
ERYTHROCYTE [DISTWIDTH] IN BLOOD BY AUTOMATED COUNT: 13 % (ref 12.3–15.4)
ERYTHROCYTE [DISTWIDTH] IN BLOOD BY AUTOMATED COUNT: 13.1 % (ref 12.3–15.4)
FERRITIN SERPL-MCNC: 197.8 NG/ML (ref 13–150)
GLOBULIN UR ELPH-MCNC: 3.3 GM/DL
GLUCOSE BLDC GLUCOMTR-MCNC: 162 MG/DL (ref 70–105)
GLUCOSE BLDC GLUCOMTR-MCNC: 169 MG/DL (ref 70–105)
GLUCOSE BLDC GLUCOMTR-MCNC: 192 MG/DL (ref 70–105)
GLUCOSE BLDC GLUCOMTR-MCNC: 195 MG/DL (ref 70–105)
GLUCOSE SERPL-MCNC: 181 MG/DL (ref 65–99)
GLUCOSE SERPL-MCNC: 181 MG/DL (ref 65–99)
GLUCOSE SERPL-MCNC: 210 MG/DL (ref 65–99)
HCT VFR BLD AUTO: 34.5 % (ref 34–46.6)
HCT VFR BLD AUTO: 34.5 % (ref 34–46.6)
HGB BLD-MCNC: 11 G/DL (ref 12–15.9)
HGB BLD-MCNC: 11.4 G/DL (ref 12–15.9)
IRON 24H UR-MRATE: 22 MCG/DL (ref 37–145)
IRON SATN MFR SERPL: 8 % (ref 20–50)
LYMPHOCYTES # BLD AUTO: 1.3 10*3/MM3 (ref 0.7–3.1)
LYMPHOCYTES NFR BLD AUTO: 7.9 % (ref 19.6–45.3)
MAGNESIUM SERPL-MCNC: 2.2 MG/DL (ref 1.6–2.4)
MCH RBC QN AUTO: 28.3 PG (ref 26.6–33)
MCH RBC QN AUTO: 28.6 PG (ref 26.6–33)
MCHC RBC AUTO-ENTMCNC: 32.1 G/DL (ref 31.5–35.7)
MCHC RBC AUTO-ENTMCNC: 33.1 G/DL (ref 31.5–35.7)
MCV RBC AUTO: 86.6 FL (ref 79–97)
MCV RBC AUTO: 88.3 FL (ref 79–97)
MONOCYTES # BLD AUTO: 1.2 10*3/MM3 (ref 0.1–0.9)
MONOCYTES NFR BLD AUTO: 7.3 % (ref 5–12)
NEUTROPHILS NFR BLD AUTO: 14 10*3/MM3 (ref 1.7–7)
NEUTROPHILS NFR BLD AUTO: 84.7 % (ref 42.7–76)
NRBC BLD AUTO-RTO: 0 /100 WBC (ref 0–0.2)
OSMOLALITY SERPL: 285 MOSM/KG (ref 280–301)
PHOSPHATE SERPL-MCNC: 3.2 MG/DL (ref 2.5–4.5)
PLATELET # BLD AUTO: 215 10*3/MM3 (ref 140–450)
PLATELET # BLD AUTO: 249 10*3/MM3 (ref 140–450)
PMV BLD AUTO: 8.9 FL (ref 6–12)
PMV BLD AUTO: 8.9 FL (ref 6–12)
POTASSIUM SERPL-SCNC: 3.4 MMOL/L (ref 3.5–5.2)
POTASSIUM SERPL-SCNC: 3.8 MMOL/L (ref 3.5–5.2)
POTASSIUM SERPL-SCNC: 3.8 MMOL/L (ref 3.5–5.2)
PROT SERPL-MCNC: 6.6 G/DL (ref 6–8.5)
RBC # BLD AUTO: 3.9 10*6/MM3 (ref 3.77–5.28)
RBC # BLD AUTO: 3.98 10*6/MM3 (ref 3.77–5.28)
SODIUM SERPL-SCNC: 121 MMOL/L (ref 136–145)
SODIUM SERPL-SCNC: 123 MMOL/L (ref 136–145)
SODIUM SERPL-SCNC: 126 MMOL/L (ref 136–145)
SODIUM SERPL-SCNC: 126 MMOL/L (ref 136–145)
TIBC SERPL-MCNC: 291 MCG/DL (ref 298–536)
TRANSFERRIN SERPL-MCNC: 195 MG/DL (ref 200–360)
TSH SERPL DL<=0.05 MIU/L-ACNC: 0.58 UIU/ML (ref 0.27–4.2)
URATE SERPL-MCNC: 9.1 MG/DL (ref 2.4–5.7)
WBC NRBC COR # BLD: 16.6 10*3/MM3 (ref 3.4–10.8)
WBC NRBC COR # BLD: 20.6 10*3/MM3 (ref 3.4–10.8)

## 2023-10-15 PROCEDURE — 94799 UNLISTED PULMONARY SVC/PX: CPT

## 2023-10-15 PROCEDURE — 83735 ASSAY OF MAGNESIUM: CPT | Performed by: INTERNAL MEDICINE

## 2023-10-15 PROCEDURE — 82330 ASSAY OF CALCIUM: CPT | Performed by: INTERNAL MEDICINE

## 2023-10-15 PROCEDURE — 94761 N-INVAS EAR/PLS OXIMETRY MLT: CPT

## 2023-10-15 PROCEDURE — 94664 DEMO&/EVAL PT USE INHALER: CPT

## 2023-10-15 PROCEDURE — 82948 REAGENT STRIP/BLOOD GLUCOSE: CPT

## 2023-10-15 PROCEDURE — 84443 ASSAY THYROID STIM HORMONE: CPT | Performed by: INTERNAL MEDICINE

## 2023-10-15 PROCEDURE — 84550 ASSAY OF BLOOD/URIC ACID: CPT | Performed by: INTERNAL MEDICINE

## 2023-10-15 PROCEDURE — 82533 TOTAL CORTISOL: CPT | Performed by: INTERNAL MEDICINE

## 2023-10-15 PROCEDURE — 82728 ASSAY OF FERRITIN: CPT | Performed by: INTERNAL MEDICINE

## 2023-10-15 PROCEDURE — 83930 ASSAY OF BLOOD OSMOLALITY: CPT | Performed by: HOSPITALIST

## 2023-10-15 PROCEDURE — 84295 ASSAY OF SERUM SODIUM: CPT | Performed by: HOSPITALIST

## 2023-10-15 PROCEDURE — 25810000003 SODIUM CHLORIDE 0.9 % SOLUTION 250 ML FLEX CONT: Performed by: INTERNAL MEDICINE

## 2023-10-15 PROCEDURE — 80053 COMPREHEN METABOLIC PANEL: CPT | Performed by: INTERNAL MEDICINE

## 2023-10-15 PROCEDURE — 25010000002 FUROSEMIDE PER 20 MG: Performed by: INTERNAL MEDICINE

## 2023-10-15 PROCEDURE — 82550 ASSAY OF CK (CPK): CPT | Performed by: INTERNAL MEDICINE

## 2023-10-15 PROCEDURE — 36415 COLL VENOUS BLD VENIPUNCTURE: CPT | Performed by: INTERNAL MEDICINE

## 2023-10-15 PROCEDURE — 84165 PROTEIN E-PHORESIS SERUM: CPT | Performed by: INTERNAL MEDICINE

## 2023-10-15 PROCEDURE — 25010000002 NA FERRIC GLUC CPLX PER 12.5 MG: Performed by: INTERNAL MEDICINE

## 2023-10-15 PROCEDURE — 83540 ASSAY OF IRON: CPT | Performed by: INTERNAL MEDICINE

## 2023-10-15 PROCEDURE — 85025 COMPLETE CBC W/AUTO DIFF WBC: CPT | Performed by: INTERNAL MEDICINE

## 2023-10-15 PROCEDURE — 25010000002 CEFTRIAXONE PER 250 MG: Performed by: INTERNAL MEDICINE

## 2023-10-15 PROCEDURE — 25010000002 METHYLPREDNISOLONE PER 125 MG: Performed by: HOSPITALIST

## 2023-10-15 PROCEDURE — 99222 1ST HOSP IP/OBS MODERATE 55: CPT | Performed by: STUDENT IN AN ORGANIZED HEALTH CARE EDUCATION/TRAINING PROGRAM

## 2023-10-15 PROCEDURE — 25010000002 FUROSEMIDE PER 20 MG: Performed by: HOSPITALIST

## 2023-10-15 PROCEDURE — 84466 ASSAY OF TRANSFERRIN: CPT | Performed by: INTERNAL MEDICINE

## 2023-10-15 PROCEDURE — 84100 ASSAY OF PHOSPHORUS: CPT | Performed by: INTERNAL MEDICINE

## 2023-10-15 PROCEDURE — 85027 COMPLETE CBC AUTOMATED: CPT | Performed by: INTERNAL MEDICINE

## 2023-10-15 PROCEDURE — 25010000002 AZITHROMYCIN PER 500 MG: Performed by: INTERNAL MEDICINE

## 2023-10-15 RX ORDER — CEPHALEXIN 500 MG/1
500 CAPSULE ORAL EVERY 6 HOURS SCHEDULED
Status: DISCONTINUED | OUTPATIENT
Start: 2023-10-15 | End: 2023-10-16

## 2023-10-15 RX ORDER — POTASSIUM CHLORIDE 20 MEQ/1
40 TABLET, EXTENDED RELEASE ORAL ONCE
Status: COMPLETED | OUTPATIENT
Start: 2023-10-15 | End: 2023-10-15

## 2023-10-15 RX ORDER — TOLVAPTAN 15 MG/1
7.5 TABLET ORAL ONCE
Status: COMPLETED | OUTPATIENT
Start: 2023-10-15 | End: 2023-10-15

## 2023-10-15 RX ORDER — BUDESONIDE AND FORMOTEROL FUMARATE DIHYDRATE 160; 4.5 UG/1; UG/1
2 AEROSOL RESPIRATORY (INHALATION)
Status: DISCONTINUED | OUTPATIENT
Start: 2023-10-15 | End: 2023-10-20 | Stop reason: HOSPADM

## 2023-10-15 RX ORDER — POTASSIUM CHLORIDE 20 MEQ/1
40 TABLET, EXTENDED RELEASE ORAL
Status: DISCONTINUED | OUTPATIENT
Start: 2023-10-15 | End: 2023-10-15

## 2023-10-15 RX ORDER — GUAIFENESIN 600 MG/1
1200 TABLET, EXTENDED RELEASE ORAL EVERY 12 HOURS SCHEDULED
Status: DISCONTINUED | OUTPATIENT
Start: 2023-10-15 | End: 2023-10-20 | Stop reason: HOSPADM

## 2023-10-15 RX ORDER — INSULIN LISPRO 100 [IU]/ML
3-14 INJECTION, SOLUTION INTRAVENOUS; SUBCUTANEOUS
Status: DISCONTINUED | OUTPATIENT
Start: 2023-10-15 | End: 2023-10-20 | Stop reason: HOSPADM

## 2023-10-15 RX ORDER — ALLOPURINOL 100 MG/1
100 TABLET ORAL DAILY
Status: DISCONTINUED | OUTPATIENT
Start: 2023-10-15 | End: 2023-10-20 | Stop reason: HOSPADM

## 2023-10-15 RX ORDER — METOLAZONE 5 MG/1
5 TABLET ORAL EVERY OTHER DAY
Status: DISCONTINUED | OUTPATIENT
Start: 2023-10-16 | End: 2023-10-15

## 2023-10-15 RX ORDER — POTASSIUM CHLORIDE 20 MEQ/1
20 TABLET, EXTENDED RELEASE ORAL
Status: DISCONTINUED | OUTPATIENT
Start: 2023-10-15 | End: 2023-10-15

## 2023-10-15 RX ORDER — FUROSEMIDE 10 MG/ML
60 INJECTION INTRAMUSCULAR; INTRAVENOUS 3 TIMES DAILY
Status: DISCONTINUED | OUTPATIENT
Start: 2023-10-15 | End: 2023-10-15

## 2023-10-15 RX ORDER — FUROSEMIDE 10 MG/ML
40 INJECTION INTRAMUSCULAR; INTRAVENOUS 3 TIMES DAILY
Status: DISCONTINUED | OUTPATIENT
Start: 2023-10-15 | End: 2023-10-17

## 2023-10-15 RX ORDER — POTASSIUM CHLORIDE 20 MEQ/1
40 TABLET, EXTENDED RELEASE ORAL
Status: DISCONTINUED | OUTPATIENT
Start: 2023-10-15 | End: 2023-10-20

## 2023-10-15 RX ADMIN — METHYLPREDNISOLONE SODIUM SUCCINATE 80 MG: 125 INJECTION, POWDER, FOR SOLUTION INTRAMUSCULAR; INTRAVENOUS at 03:14

## 2023-10-15 RX ADMIN — FLUOXETINE 60 MG: 20 CAPSULE ORAL at 08:01

## 2023-10-15 RX ADMIN — GUAIFENESIN AND CODEINE PHOSPHATE 10 ML: 100; 10 SOLUTION ORAL at 23:55

## 2023-10-15 RX ADMIN — GUAIFENESIN AND CODEINE PHOSPHATE 10 ML: 100; 10 SOLUTION ORAL at 05:02

## 2023-10-15 RX ADMIN — FUROSEMIDE 40 MG: 10 INJECTION, SOLUTION INTRAMUSCULAR; INTRAVENOUS at 15:11

## 2023-10-15 RX ADMIN — TIZANIDINE 2 MG: 4 TABLET ORAL at 08:01

## 2023-10-15 RX ADMIN — SPIRONOLACTONE 50 MG: 25 TABLET ORAL at 08:01

## 2023-10-15 RX ADMIN — POTASSIUM CHLORIDE 40 MEQ: 1500 TABLET, EXTENDED RELEASE ORAL at 17:02

## 2023-10-15 RX ADMIN — IPRATROPIUM BROMIDE AND ALBUTEROL SULFATE 3 ML: .5; 3 SOLUTION RESPIRATORY (INHALATION) at 19:04

## 2023-10-15 RX ADMIN — SODIUM CHLORIDE 125 MG: 9 INJECTION, SOLUTION INTRAVENOUS at 15:12

## 2023-10-15 RX ADMIN — GUAIFENESIN 600 MG: 600 TABLET, MULTILAYER, EXTENDED RELEASE ORAL at 08:01

## 2023-10-15 RX ADMIN — IPRATROPIUM BROMIDE AND ALBUTEROL SULFATE 3 ML: .5; 3 SOLUTION RESPIRATORY (INHALATION) at 02:30

## 2023-10-15 RX ADMIN — SODIUM BICARBONATE 50 MEQ: 84 INJECTION INTRAVENOUS at 23:51

## 2023-10-15 RX ADMIN — TOLVAPTAN 7.5 MG: 15 TABLET ORAL at 15:12

## 2023-10-15 RX ADMIN — GUAIFENESIN 1200 MG: 600 TABLET, MULTILAYER, EXTENDED RELEASE ORAL at 20:16

## 2023-10-15 RX ADMIN — FUROSEMIDE 40 MG: 10 INJECTION, SOLUTION INTRAMUSCULAR; INTRAVENOUS at 20:16

## 2023-10-15 RX ADMIN — CEFTRIAXONE 2000 MG: 2 INJECTION, POWDER, FOR SOLUTION INTRAMUSCULAR; INTRAVENOUS at 03:14

## 2023-10-15 RX ADMIN — GABAPENTIN 400 MG: 400 CAPSULE ORAL at 17:02

## 2023-10-15 RX ADMIN — TIZANIDINE 2 MG: 4 TABLET ORAL at 20:16

## 2023-10-15 RX ADMIN — GABAPENTIN 400 MG: 400 CAPSULE ORAL at 08:05

## 2023-10-15 RX ADMIN — GABAPENTIN 400 MG: 400 CAPSULE ORAL at 20:16

## 2023-10-15 RX ADMIN — CEPHALEXIN 500 MG: 500 CAPSULE ORAL at 23:51

## 2023-10-15 RX ADMIN — POTASSIUM CHLORIDE 40 MEQ: 1500 TABLET, EXTENDED RELEASE ORAL at 11:09

## 2023-10-15 RX ADMIN — IPRATROPIUM BROMIDE AND ALBUTEROL SULFATE 3 ML: .5; 3 SOLUTION RESPIRATORY (INHALATION) at 15:59

## 2023-10-15 RX ADMIN — FUROSEMIDE 40 MG: 10 INJECTION, SOLUTION INTRAMUSCULAR; INTRAVENOUS at 08:01

## 2023-10-15 RX ADMIN — BUDESONIDE AND FORMOTEROL FUMARATE DIHYDRATE 2 PUFF: 160; 4.5 AEROSOL RESPIRATORY (INHALATION) at 19:00

## 2023-10-15 RX ADMIN — Medication 10 ML: at 08:05

## 2023-10-15 RX ADMIN — SODIUM BICARBONATE 50 MEQ: 84 INJECTION INTRAVENOUS at 15:13

## 2023-10-15 RX ADMIN — IPRATROPIUM BROMIDE AND ALBUTEROL SULFATE 3 ML: .5; 3 SOLUTION RESPIRATORY (INHALATION) at 07:10

## 2023-10-15 RX ADMIN — CEPHALEXIN 500 MG: 500 CAPSULE ORAL at 17:02

## 2023-10-15 RX ADMIN — Medication 10 ML: at 20:18

## 2023-10-15 RX ADMIN — AZITHROMYCIN MONOHYDRATE 500 MG: 500 INJECTION, POWDER, LYOPHILIZED, FOR SOLUTION INTRAVENOUS at 03:52

## 2023-10-15 RX ADMIN — METOLAZONE 5 MG: 5 TABLET ORAL at 08:01

## 2023-10-15 RX ADMIN — CYANOCOBALAMIN TAB 1000 MCG 1000 MCG: 1000 TAB at 08:01

## 2023-10-15 RX ADMIN — ALLOPURINOL 100 MG: 100 TABLET ORAL at 15:12

## 2023-10-15 RX ADMIN — LOSARTAN POTASSIUM 25 MG: 25 TABLET, FILM COATED ORAL at 08:01

## 2023-10-15 RX ADMIN — GUAIFENESIN AND CODEINE PHOSPHATE 10 ML: 100; 10 SOLUTION ORAL at 15:11

## 2023-10-15 RX ADMIN — PANTOPRAZOLE SODIUM 40 MG: 40 TABLET, DELAYED RELEASE ORAL at 17:02

## 2023-10-15 RX ADMIN — TIZANIDINE 2 MG: 4 TABLET ORAL at 17:02

## 2023-10-15 RX ADMIN — Medication 2000 UNITS: at 20:16

## 2023-10-15 RX ADMIN — LEVOTHYROXINE SODIUM 125 MCG: 0.12 TABLET ORAL at 05:02

## 2023-10-15 RX ADMIN — IPRATROPIUM BROMIDE AND ALBUTEROL SULFATE 3 ML: .5; 3 SOLUTION RESPIRATORY (INHALATION) at 23:10

## 2023-10-15 RX ADMIN — IPRATROPIUM BROMIDE AND ALBUTEROL SULFATE 3 ML: .5; 3 SOLUTION RESPIRATORY (INHALATION) at 12:00

## 2023-10-15 NOTE — CONSULTS
[facility] [place] CARDIOLOGY CONSULT NOTE    Referring Provider: Dr. Bradley, Hospitalist   Reason for Consultation: Acute on chronic HFpEF     Patient Care Team:  Pool Meyer NP-C as PCP - General (Nurse Practitioner)  Jose Levi MD as Consulting Physician (Cardiology)  Yovani Lerma MD as Consulting Physician (Nephrology)  Mary Hunt APRN as Nurse Practitioner (Nurse Practitioner)  Edmar Bear MD as Consulting Physician (Cardiology)    Chief complaint- shortness of breath         History of present illness:  Claudia Rust is a 73 y.o. female with past medical history of  HFpEF due valvular disease status post aortic valve replacement, non obstructive CAD, COPD, KARLY/CPAP therapy, hypertension, dyslipidemia, diabetes presented to the ED with worsening shortness of breath and cough over the last 2 weeks.  Patient was diagnosed with rhinovirus on 10/10/2023 in the ED, given steroids and discharged home.   Patient progressively worsened the next few days and EMS was called.   Patient was hypoxic in the 80s.    She reports worsening edema as well as orthopnea.  Patient denies any chest pain.  No fevers     In the ED on 10/14/2023 CXR showed pulmonary edema vs bilateral pneumonia,   HS troponin negative, pro bnp negative at 646, , bun 25 creatinine 0.96 AST 38   WBC 15.8  hgb 11.9 CK 2719 potassium 3.4 BUN 28, creatinine 1.05 iron 22 iron sat 8    Previous cardiac cath in 2021 with NO obstructive coronary disease       Review of Systems   Constitutional: Positive for malaise/fatigue. Negative for fever.   Cardiovascular:  Positive for dyspnea on exertion, leg swelling and orthopnea. Negative for chest pain and syncope.   Respiratory:  Positive for cough, shortness of breath, sputum production and wheezing.    Neurological:  Positive for weakness.   All other systems reviewed and are negative.      History  Past Medical History:   Diagnosis Date    Acute congestive heart failure     Allergies      "Anxiety     Arthritis     Asthma     Bilateral leg edema     Bipolar 1 disorder     Bulging lumbar disc     X3    Cancer     states had \"cancerous tumor during pregnancy and had to have hysterectomy\"    Cataract     bilateral    Cellulitis 03/2021    bilateral legs    Compression fracture of vertebral column     LUMBAR    COPD (chronic obstructive pulmonary disease)     Delayed emergence from anesthesia     Depression     Depression     Diabetes mellitus     Dyslipidemia     Dyspnea on exertion     Fibromyalgia     GERD (gastroesophageal reflux disease)     Hiatal hernia     Hyperlipidemia     Hypertension     Hypothyroid     HYPOTHYROID    IBS (irritable bowel syndrome)     Migraines     Morbid obesity     Neuropathy     Panic attacks     Peripheral edema     PONV (postoperative nausea and vomiting)     Poor mobility     rolling walker    Prediabetes     PVD (peripheral vascular disease)     Rheumatoid aortitis     Spinal stenosis     Spinal stenosis     Vertigo     Vertigo     Vitamin D deficiency        Past Surgical History:   Procedure Laterality Date    AORTIC VALVE REPAIR/REPLACEMENT N/A 3/18/2021    Procedure: AORTIC VALVE REPLACEMENT;  Surgeon: Olaf Mcgill MD;  Location: [facility] CVOR;  Service: Cardiothoracic;  Laterality: N/A;    BRONCHOSCOPY N/A 3/25/2021    Procedure: BRONCHOSCOPY AT BEDSIDE WITH BRONCHIOALVEOLAR LAVAGE;  Surgeon: Glenn Reyes MD;  Location: [facility] ENDOSCOPY;  Service: Pulmonary;  Laterality: N/A;  PNA    CARDIAC CATHETERIZATION  2009    CARDIAC CATHETERIZATION N/A 8/14/2019    Procedure: Left Heart Cath;  Surgeon: Jose Levi MD;  Location: [facility] CATH INVASIVE LOCATION;  Service: Cardiovascular    CARDIAC CATHETERIZATION N/A 8/14/2019    Procedure: Right Heart Cath;  Surgeon: Jose Levi MD;  Location: [facility] CATH INVASIVE LOCATION;  Service: Cardiovascular    CARDIAC CATHETERIZATION N/A 8/14/2019    Procedure: Aortic root aortogram;  Surgeon: Gurmeet" Jose Sierra MD;  Location:  [facility] CATH INVASIVE LOCATION;  Service: Cardiovascular    CARDIAC CATHETERIZATION N/A 2021    Procedure: Left Heart Cath;  Surgeon: Jose Levi MD;  Location:  [facility] CATH INVASIVE LOCATION;  Service: Cardiovascular;  Laterality: N/A;    COLONOSCOPY      CYSTOCELE REPAIR      ELBOW ARTHROPLASTY      ENDOSCOPY      FOOT SURGERY      GALLBLADDER SURGERY  2002    TONSILLECTOMY      VAGINAL HYSTERECTOMY  1972       Family History   Problem Relation Age of Onset    Ovarian cancer Mother     Lung cancer Mother     Esophageal cancer Mother     Hypertension Father     Diabetes Maternal Grandmother        Social History     Tobacco Use    Smoking status: Former     Packs/day: 1     Types: Cigarettes     Quit date:      Years since quittin.7    Smokeless tobacco: Never    Tobacco comments:     decrease now and do not smoke dos   Vaping Use    Vaping Use: Never used   Substance Use Topics    Alcohol use: Not Currently     Comment: very rare    Drug use: Never        Medications Prior to Admission   Medication Sig Dispense Refill Last Dose    Acetaminophen 325 MG capsule Take 650 mg by mouth Every 4 (Four) Hours As Needed.   10/13/2023    albuterol (ACCUNEB) 0.63 MG/3ML nebulizer solution Take 3 mL by nebulization Every 6 (Six) Hours As Needed for Wheezing (Dx: J 44.9 (COPD), R 06.09 (dyspnea on exertion)). 30 each 0 10/13/2023    B Complex Vitamins (VITAMIN B COMPLEX) capsule capsule Take 1 capsule by mouth Daily. LAST DOSE 3/4   10/13/2023    Calcium-Magnesium-Vitamin D (CALCIUM 1200+D3 PO) Take 1 capsule by mouth Daily.   10/13/2023    cholecalciferol (Cholecalciferol) 25 MCG (1000 UT) tablet Take 2 tablets by mouth Every Night. LAST DOSE 3/4   10/13/2023    diphenhydrAMINE (BENADRYL) 50 MG capsule Take 1 capsule by mouth At Night As Needed for Itching.   Past Month    FLUoxetine (PROzac) 20 MG capsule Take 3 capsules by mouth Daily.   10/13/2023     fluticasone (FLONASE) 50 MCG/ACT nasal spray 2 sprays into the nostril(s) as directed by provider Daily.   10/13/2023    gabapentin (NEURONTIN) 400 MG capsule Take 1 capsule by mouth 3 (Three) Times a Day.   10/13/2023    levothyroxine (SYNTHROID, LEVOTHROID) 125 MCG tablet Take 1 tablet by mouth Daily.   10/13/2023    losartan (Cozaar) 25 MG tablet Take 1 tablet by mouth Daily. 90 tablet 3 10/13/2023    meclizine (ANTIVERT) 25 MG tablet Take 1 tablet by mouth 3 (Three) Times a Day As Needed.   Past Week    Melatonin 10 MG capsule Take 2 capsules by mouth Every Night.   10/13/2023    metFORMIN ER (GLUCOPHAGE-XR) 500 MG 24 hr tablet Take 1 tablet by mouth Daily With Breakfast.   10/13/2023    metOLazone (ZAROXOLYN) 5 MG tablet TAKE 1 TABLET BY MOUTH EVERY DAY 90 tablet 3 10/13/2023    Multiple Vitamins-Minerals (MULTIVITAMIN WITH MINERALS) tablet tablet Take 1 tablet by mouth Daily.   10/13/2023    ondansetron (ZOFRAN) 4 MG tablet Take 1 tablet by mouth Every 8 (Eight) Hours As Needed for Nausea or Vomiting.   Past Week    pantoprazole (PROTONIX) 40 MG EC tablet Take 1 tablet by mouth Every Evening.   10/13/2023    [] predniSONE (DELTASONE) 20 MG tablet Take 2 tablets by mouth Daily for 4 days. 8 tablet 0 10/13/2023    rizatriptan MLT (MAXALT-MLT) 10 MG disintegrating tablet Take 1 tablet by mouth 1 (One) Time As Needed for Migraine. May repeat in 2 hours if needed   Past Week    simvastatin (ZOCOR) 20 MG tablet Take 1 tablet by mouth Every Night.   10/13/2023    spironolactone (ALDACTONE) 25 MG tablet TAKE 2 TABLETS BY MOUTH EVERY  tablet 3 10/13/2023    tiZANidine (ZANAFLEX) 4 MG tablet Take 1 tablet by mouth 3 times a day.   10/13/2023    vitamin B-12 (CYANOCOBALAMIN) 1000 MCG tablet Take 1 tablet by mouth Daily.   10/13/2023         Adhesive tape; Butorphanol; Garlic; Heparin; Tetanus-diphtheria toxoids td; Aloe; Bee venom; Latex; Macadamia nut oil; Tuberculin, ppd; and Wasp venom    Scheduled  "Meds:allopurinol, 100 mg, Oral, Daily  azithromycin, 500 mg, Intravenous, Q24H  budesonide-formoterol, 2 puff, Inhalation, BID - RT  [START ON 10/16/2023] cefTRIAXone, 1,000 mg, Intravenous, Q24H  cholecalciferol, 2,000 Units, Oral, Nightly  ferric gluconate, 125 mg, Intravenous, Once  furosemide, 40 mg, Intravenous, TID  gabapentin, 400 mg, Oral, TID  guaiFENesin, 1,200 mg, Oral, Q12H  insulin lispro, 2-7 Units, Subcutaneous, 4x Daily AC & at Bedtime  ipratropium-albuterol, 3 mL, Nebulization, Q4H - RT  levothyroxine, 125 mcg, Oral, Q AM  pantoprazole, 40 mg, Oral, Q PM  potassium chloride, 40 mEq, Oral, TID With Meals  senna-docusate sodium, 2 tablet, Oral, BID  sodium bicarbonate, 50 mEq, Intravenous, Q8H  sodium chloride, 10 mL, Intravenous, Q12H  tiZANidine, 2 mg, Oral, TID  tolvaptan, 7.5 mg, Oral, Once  vitamin B-12, 1,000 mcg, Oral, Daily      Continuous Infusions:   PRN Meds:.  acetaminophen    albuterol    senna-docusate sodium **AND** polyethylene glycol **AND** bisacodyl **AND** bisacodyl    dextrose    dextrose    glucagon (human recombinant)    guaiFENesin-codeine    ondansetron    ondansetron    [COMPLETED] Insert Peripheral IV **AND** sodium chloride    sodium chloride    sodium chloride        VITAL SIGNS  Vitals:    10/15/23 0713 10/15/23 1100 10/15/23 1200 10/15/23 1205   BP:  115/62     BP Location:       Patient Position:       Pulse: 78 78 78 77   Resp: 16 16 18 18   Temp:       TempSrc:       SpO2: 98% 93% 93% 96%   Weight:       Height:           Flowsheet Rows      Flowsheet Row First Filed Value   Admission Height 162.6 cm (64\") Documented at 10/13/2023 2352   Admission Weight 137 kg (303 lb) Documented at 10/13/2023 2352             TELEMETRY: sinus rhythm     Physical Exam:  Vitals and nursing note reviewed.   Constitutional:       Appearance: Not in distress. Morbidly obese.   Neck:      Vascular: No JVR. JVD normal.   Pulmonary:      Effort: Pulmonary effort is normal.      Breath " sounds: Diffuse Wheezing present. Diffuse Rhonchi present. No rales.   Chest:      Chest wall: Not tender to palpatation.   Cardiovascular:      PMI at left midclavicular line. Normal rate. Regular rhythm. Normal S1. Normal S2.       Murmurs: There is no murmur.      No gallop.  No click. No rub.   Pulses:     Intact distal pulses.   Edema:     Peripheral edema present.     Comments: Chronic lymphedema   Abdominal:      General: Bowel sounds are normal.      Palpations: Abdomen is soft.      Tenderness: There is no abdominal tenderness.   Musculoskeletal: Normal range of motion.         General: No tenderness. Skin:     General: Skin is warm and dry.   Neurological:      General: No focal deficit present.      Mental Status: Alert and oriented to person, place and time.                  LAB RESULTS (LAST 7 DAYS)    CBC  Results from last 7 days   Lab Units 10/15/23  0324 10/14/23  0018 10/10/23  1648   WBC 10*3/mm3 16.60* 15.80* 11.30*   RBC 10*6/mm3 3.98 4.14 4.12   HEMOGLOBIN g/dL 11.4* 11.9* 11.8*   HEMATOCRIT % 34.5 36.7 36.6   MCV fL 86.6 88.7 88.8   PLATELETS 10*3/mm3 215 253 204       BMP  Results from last 7 days   Lab Units 10/15/23  0323 10/14/23  0018 10/10/23  1648   SODIUM mmol/L 121* 121* 129*   POTASSIUM mmol/L 3.4* 4.3 4.7   CHLORIDE mmol/L 84* 87* 95*   CO2 mmol/L 24.0 22.0 22.0   BUN mg/dL 28* 25* 21   CREATININE mg/dL 1.05* 0.96 1.30*   GLUCOSE mg/dL 210* 126* 128*       CMP   Results from last 7 days   Lab Units 10/15/23  0323 10/14/23  0018 10/10/23  1648   SODIUM mmol/L 121* 121* 129*   POTASSIUM mmol/L 3.4* 4.3 4.7   CHLORIDE mmol/L 84* 87* 95*   CO2 mmol/L 24.0 22.0 22.0   BUN mg/dL 28* 25* 21   CREATININE mg/dL 1.05* 0.96 1.30*   GLUCOSE mg/dL 210* 126* 128*   ALBUMIN g/dL  --  3.7  --    BILIRUBIN mg/dL  --  0.5  --    ALK PHOS U/L  --  80  --    AST (SGOT) U/L  --  38*  --    ALT (SGPT) U/L  --  17  --          BNP        TROPONIN  Results from last 7 days   Lab Units 10/15/23  6915  10/14/23  0018   CK TOTAL U/L 2,719*  --    HSTROP T ng/L  --  9       CoAg        Creatinine Clearance  Estimated Creatinine Clearance: 66.6 mL/min (A) (by C-G formula based on SCr of 1.05 mg/dL (H)).    ABG  Results from last 7 days   Lab Units 10/14/23  0024   PH, ARTERIAL pH units 7.424   PCO2, ARTERIAL mm Hg 34.4*   PO2 ART mm Hg 96.0   O2 SATURATION ART % 97.7   BASE EXCESS ART mmol/L -1.4*       Radiology  XR Chest 1 View    Result Date: 10/14/2023  Impression: Patchy airspace disease within the bilateral lower lobes, likely related to pneumonia. Possible mild underlying pulmonary edema pattern. Electronically Signed: Tana Sutherland MD  10/14/2023 12:27 AM EDT  Workstation ID: [id number]         EKG      I personally viewed and interpreted the patient's EKG/Telemetry data:    ECHOCARDIOGRAM:    Results for orders placed during the hospital encounter of 10/13/23    Adult Transthoracic Echo Complete w/ Color, Spectral and Contrast if necessary per protocol    Interpretation Summary    Left ventricular diastolic function was indeterminate.    Intravenous  contrast was administered to optimize endocardial definition.    Normal left ventricular systolic function  Concentric left ventricular hypertrophy  Indeterminate left ventricular diastolic function  Dilated right ventricle with probably normal function  Dilated left atrium  Bioprosthetic aortic valve with normal function.  Mild mitral regurgitation  Mild tricuspid regurgitation      STRESS MYOVIEW:    CARDIAC CATHETERIZATION:    OTHER:         Assessment & Plan       Acute respiratory failure    Multifocal pneumonia    Multiple tracheobronchial mucus plugs      Acute respiratory failure, multi-factorial   - + rhinovirus on 10/10/2023, pneumonia vs pulmonary edema   - pro bnp normal at 646, clinically volume overloaded   - diuretics per nephrology   - plans for bronchoscopy tomorrow per pulmonary         Aortic stenosis, severe, status post AVR with #23 magna  pericardial prosthesis 3/18/2021    Acute on Chronic heart failure  due to valvular heart disease, hypertensive cardiovascular disease,severe pulmonary hypertension, diastolic dysfunction   - diuretics per nephrology   - monitor renal function and electrolytes closely   - holding losartan and spironolactone at this time   - will start SGLT2 inhibitor prior to discharge   - history of bradycardia -- hold off on beta blocker     Acute hyponatremia --- monitor sodium closely     Dyslipidemia- statin on hold due to elevated CK       Diabetes A1C 6.4 12/16/22   - need to start SGLT2 inhibitor prior to discharge if afforable       Morbid obesity with BMI over 52      Former smoker       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 Ramirez     I discussed the patients findings and my recommendations with patient and family at bedside     Mary Hunt, SARA  10/15/23  13:40 EDT    Addendum    Fluid overload state in setting of HFpEF exacerbation given viral infection with rhinovirus  TTE showing normal functioning bioprosthetic AVR  Diuretics  Bronchoscopy with pulmonary team tomorrow    Mattie Ramirez MD

## 2023-10-15 NOTE — CONSULTS
NEPHROLOGY CONSULTATION-----[facility]    [facility]  [phone]  Robert Hammer MD    Patient Care Team:  Pool Meyer NP-C as PCP - General (Nurse Practitioner)  Jose Levi MD as Consulting Physician (Cardiology)  Yovani Lerma MD as Consulting Physician (Nephrology)  Mary Hunt APRN as Nurse Practitioner (Nurse Practitioner)  Edmar Bear MD as Consulting Physician (Cardiology)    CC/REASON FOR CONSULTATION: HYPONATREMIA/ELEVATED CREATININE    PHYSICIAN REQUESTING CONSULTATION:     History of Present Illness    HPI    Patient is a 73 y.o. WF whom I was asked to see in consultation for evaluation and management of renal failure/elevated serum creatinine and hyponatremia. Patient was admitted after presenting to ER with c/o SOB, 10 lb weight gain, and edema.  No NSAIDs or recent IV dye exposure. No known h/o hepatitis, TB, rheumatic fever, jaundice, SLE, bleeding/bruising disorders.  +Urinary frequency and stress incontinence. Chronic LE edema. No HA, blurry vision, tremors, twitches, seizures.  +Compliance with home meds. Was on diuretics in the form of Metolazone and Aldactone prior to admission. Was not on ACE-I/ARB prior to admission. No herbal med use. Is chronically on high dose SSRI.    Review of Systems   Constitutional:  Positive for activity change, appetite change and fatigue. Negative for chills, diaphoresis, fever and unexpected weight change.   HENT:  Negative for congestion, dental problem, drooling, ear discharge, ear pain, facial swelling, hearing loss, mouth sores, nosebleeds, postnasal drip, rhinorrhea, sinus pressure, sinus pain, sneezing, sore throat, tinnitus, trouble swallowing and voice change.    Eyes:  Negative for photophobia, pain, discharge, redness, itching and visual disturbance.   Respiratory:  Positive for shortness of breath. Negative for apnea, cough, choking, chest  "tightness, wheezing and stridor.    Cardiovascular:  Positive for leg swelling. Negative for chest pain and palpitations.   Gastrointestinal:  Negative for abdominal distention, abdominal pain, anal bleeding, blood in stool, constipation, diarrhea, nausea, rectal pain and vomiting.   Endocrine: Negative for cold intolerance, heat intolerance, polydipsia, polyphagia and polyuria.   Genitourinary:  Positive for frequency and urgency. Negative for decreased urine volume, difficulty urinating, dysuria, enuresis, flank pain, genital sores and hematuria.   Musculoskeletal:  Positive for arthralgias, back pain and myalgias. Negative for gait problem, joint swelling, neck pain and neck stiffness.   Skin:  Negative for color change, pallor, rash and wound.   Allergic/Immunologic: Negative for environmental allergies, food allergies and immunocompromised state.   Neurological:  Positive for weakness. Negative for dizziness, tremors, seizures, syncope, facial asymmetry, speech difficulty, light-headedness, numbness and headaches.   Hematological:  Negative for adenopathy. Does not bruise/bleed easily.   Psychiatric/Behavioral:  Positive for dysphoric mood. Negative for agitation, behavioral problems, confusion, decreased concentration, hallucinations, self-injury, sleep disturbance and suicidal ideas. The patient is not nervous/anxious and is not hyperactive.           Past Medical History:   Diagnosis Date    Acute congestive heart failure     Allergies     Anxiety     Arthritis     Asthma     Bilateral leg edema     Bipolar 1 disorder     Bulging lumbar disc     X3    Cancer     states had \"cancerous tumor during pregnancy and had to have hysterectomy\"    Cataract     bilateral    Cellulitis 03/2021    bilateral legs    Compression fracture of vertebral column     LUMBAR    COPD (chronic obstructive pulmonary disease)     Delayed emergence from anesthesia     Depression     Depression     Diabetes mellitus     Dyslipidemia  "    Dyspnea on exertion     Fibromyalgia     GERD (gastroesophageal reflux disease)     Hiatal hernia     Hyperlipidemia     Hypertension     Hypothyroid     HYPOTHYROID    IBS (irritable bowel syndrome)     Migraines     Morbid obesity     Neuropathy     Panic attacks     Peripheral edema     PONV (postoperative nausea and vomiting)     Poor mobility     rolling walker    Prediabetes     PVD (peripheral vascular disease)     Rheumatoid aortitis     Spinal stenosis     Spinal stenosis     Vertigo     Vertigo     Vitamin D deficiency        Past Surgical History:   Procedure Laterality Date    AORTIC VALVE REPAIR/REPLACEMENT N/A 3/18/2021    Procedure: AORTIC VALVE REPLACEMENT;  Surgeon: Olaf Mcgill MD;  Location: [facility] CVOR;  Service: Cardiothoracic;  Laterality: N/A;    BRONCHOSCOPY N/A 3/25/2021    Procedure: BRONCHOSCOPY AT BEDSIDE WITH BRONCHIOALVEOLAR LAVAGE;  Surgeon: Glenn Reyes MD;  Location: [facility] ENDOSCOPY;  Service: Pulmonary;  Laterality: N/A;  PNA    CARDIAC CATHETERIZATION  2009    CARDIAC CATHETERIZATION N/A 8/14/2019    Procedure: Left Heart Cath;  Surgeon: Jose Levi MD;  Location: [facility] CATH INVASIVE LOCATION;  Service: Cardiovascular    CARDIAC CATHETERIZATION N/A 8/14/2019    Procedure: Right Heart Cath;  Surgeon: Jose Levi MD;  Location: [facility] CATH INVASIVE LOCATION;  Service: Cardiovascular    CARDIAC CATHETERIZATION N/A 8/14/2019    Procedure: Aortic root aortogram;  Surgeon: Jose Levi MD;  Location: [facility] CATH INVASIVE LOCATION;  Service: Cardiovascular    CARDIAC CATHETERIZATION N/A 1/20/2021    Procedure: Left Heart Cath;  Surgeon: Jose Levi MD;  Location: [facility] CATH INVASIVE LOCATION;  Service: Cardiovascular;  Laterality: N/A;    COLONOSCOPY      CYSTOCELE REPAIR  1984    ELBOW ARTHROPLASTY  2011    ENDOSCOPY      FOOT SURGERY      GALLBLADDER SURGERY  2002    TONSILLECTOMY      VAGINAL HYSTERECTOMY  1972        Family History   Problem Relation Age of Onset    Ovarian cancer Mother     Lung cancer Mother     Esophageal cancer Mother     Hypertension Father     Diabetes Maternal Grandmother        Social History     Tobacco Use    Smoking status: Former     Packs/day: 1     Types: Cigarettes     Quit date:      Years since quittin.7    Smokeless tobacco: Never    Tobacco comments:     decrease now and do not smoke dos   Vaping Use    Vaping Use: Never used   Substance Use Topics    Alcohol use: Not Currently     Comment: very rare    Drug use: Never       Home Meds:   Medications Prior to Admission   Medication Sig Dispense Refill Last Dose    Acetaminophen 325 MG capsule Take 650 mg by mouth Every 4 (Four) Hours As Needed.   10/13/2023    albuterol (ACCUNEB) 0.63 MG/3ML nebulizer solution Take 3 mL by nebulization Every 6 (Six) Hours As Needed for Wheezing (Dx: J 44.9 (COPD), R 06.09 (dyspnea on exertion)). 30 each 0 10/13/2023    B Complex Vitamins (VITAMIN B COMPLEX) capsule capsule Take 1 capsule by mouth Daily. LAST DOSE 3/4   10/13/2023    Calcium-Magnesium-Vitamin D (CALCIUM 1200+D3 PO) Take 1 capsule by mouth Daily.   10/13/2023    cholecalciferol (Cholecalciferol) 25 MCG (1000 UT) tablet Take 2 tablets by mouth Every Night. LAST DOSE 3/4   10/13/2023    diphenhydrAMINE (BENADRYL) 50 MG capsule Take 1 capsule by mouth At Night As Needed for Itching.   Past Month    FLUoxetine (PROzac) 20 MG capsule Take 3 capsules by mouth Daily.   10/13/2023    fluticasone (FLONASE) 50 MCG/ACT nasal spray 2 sprays into the nostril(s) as directed by provider Daily.   10/13/2023    gabapentin (NEURONTIN) 400 MG capsule Take 1 capsule by mouth 3 (Three) Times a Day.   10/13/2023    levothyroxine (SYNTHROID, LEVOTHROID) 125 MCG tablet Take 1 tablet by mouth Daily.   10/13/2023    losartan (Cozaar) 25 MG tablet Take 1 tablet by mouth Daily. 90 tablet 3 10/13/2023    meclizine (ANTIVERT) 25 MG tablet Take 1 tablet by mouth  3 (Three) Times a Day As Needed.   Past Week    Melatonin 10 MG capsule Take 2 capsules by mouth Every Night.   10/13/2023    metFORMIN ER (GLUCOPHAGE-XR) 500 MG 24 hr tablet Take 1 tablet by mouth Daily With Breakfast.   10/13/2023    metOLazone (ZAROXOLYN) 5 MG tablet TAKE 1 TABLET BY MOUTH EVERY DAY 90 tablet 3 10/13/2023    Multiple Vitamins-Minerals (MULTIVITAMIN WITH MINERALS) tablet tablet Take 1 tablet by mouth Daily.   10/13/2023    ondansetron (ZOFRAN) 4 MG tablet Take 1 tablet by mouth Every 8 (Eight) Hours As Needed for Nausea or Vomiting.   Past Week    pantoprazole (PROTONIX) 40 MG EC tablet Take 1 tablet by mouth Every Evening.   10/13/2023    [] predniSONE (DELTASONE) 20 MG tablet Take 2 tablets by mouth Daily for 4 days. 8 tablet 0 10/13/2023    rizatriptan MLT (MAXALT-MLT) 10 MG disintegrating tablet Take 1 tablet by mouth 1 (One) Time As Needed for Migraine. May repeat in 2 hours if needed   Past Week    simvastatin (ZOCOR) 20 MG tablet Take 1 tablet by mouth Every Night.   10/13/2023    spironolactone (ALDACTONE) 25 MG tablet TAKE 2 TABLETS BY MOUTH EVERY  tablet 3 10/13/2023    tiZANidine (ZANAFLEX) 4 MG tablet Take 1 tablet by mouth 3 times a day.   10/13/2023    vitamin B-12 (CYANOCOBALAMIN) 1000 MCG tablet Take 1 tablet by mouth Daily.   10/13/2023       Scheduled Meds:  atorvastatin, 10 mg, Oral, Nightly  azithromycin, 500 mg, Intravenous, Q24H  [START ON 10/16/2023] cefTRIAXone, 1,000 mg, Intravenous, Q24H  cholecalciferol, 2,000 Units, Oral, Nightly  FLUoxetine, 60 mg, Oral, Daily  furosemide, 60 mg, Intravenous, TID  gabapentin, 400 mg, Oral, TID  guaiFENesin, 600 mg, Oral, Q12H  insulin lispro, 2-7 Units, Subcutaneous, 4x Daily AC & at Bedtime  ipratropium-albuterol, 3 mL, Nebulization, Q4H - RT  levothyroxine, 125 mcg, Oral, Q AM  losartan, 25 mg, Oral, Daily  [START ON 10/16/2023] metOLazone, 5 mg, Oral, Every Other Day  pantoprazole, 40 mg, Oral, Q PM  senna-docusate  sodium, 2 tablet, Oral, BID  sodium chloride, 10 mL, Intravenous, Q12H  spironolactone, 50 mg, Oral, Daily  tiZANidine, 2 mg, Oral, TID  vitamin B-12, 1,000 mcg, Oral, Daily        Continuous Infusions:       PRN Meds:    acetaminophen    albuterol    senna-docusate sodium **AND** polyethylene glycol **AND** bisacodyl **AND** bisacodyl    dextrose    dextrose    glucagon (human recombinant)    guaiFENesin-codeine    ondansetron    ondansetron    [COMPLETED] Insert Peripheral IV **AND** sodium chloride    sodium chloride    sodium chloride    Allergies:  Adhesive tape; Butorphanol; Garlic; Heparin; Tetanus-diphtheria toxoids td; Aloe; Bee venom; Latex; Macadamia nut oil; Tuberculin, ppd; and Wasp venom    OBJECTIVE    Vital Signs  Temp:  [97.8 °F (36.6 °C)-99.8 °F (37.7 °C)] 98.1 °F (36.7 °C)  Heart Rate:  [71-87] 78  Resp:  [16-24] 16  BP: (111-131)/(64-68) 119/67    No intake/output data recorded.  I/O last 3 completed shifts:  In: 1790 [P.O.:1440; IV Piggyback:350]  Out: 2000 [Urine:2000]    Physical Exam:  General Appearance: alert, appears stated age and cooperative  Head: normocephalic, without obvious abnormality and atraumatic  Eyes: conjunctivae and sclerae normal and no icterus  Neck: supple and +JVD  Lungs: +FINE BIBASILAR CRACKLES  Heart: regular rhythm & normal rate and normal S1, S2 +NARGIS  Chest Wall: no abnormalities observed  Abdomen: normal bowel sounds and soft, nontender +MORBID OBESITY  Extremities: moves extremities well, 2+/3+ BILAT LE EDEMA/ANASARCA, no cyanosis  Skin: no bleeding, bruising or rash  Neurologic: Alert, and oriented. No focal deficits    Results Review:    I reviewed the patient's new clinical results.    WBC WBC   Date Value Ref Range Status   10/15/2023 16.60 (H) 3.40 - 10.80 10*3/mm3 Final   10/14/2023 15.80 (H) 3.40 - 10.80 10*3/mm3 Final      HGB Hemoglobin   Date Value Ref Range Status   10/15/2023 11.4 (L) 12.0 - 15.9 g/dL Final   10/14/2023 11.9 (L) 12.0 - 15.9 g/dL Final  "     HCT Hematocrit   Date Value Ref Range Status   10/15/2023 34.5 34.0 - 46.6 % Final   10/14/2023 36.7 34.0 - 46.6 % Final      Platelets No results found for: \"LABPLAT\"   MCV MCV   Date Value Ref Range Status   10/15/2023 86.6 79.0 - 97.0 fL Final   10/14/2023 88.7 79.0 - 97.0 fL Final          Sodium Sodium   Date Value Ref Range Status   10/15/2023 121 (L) 136 - 145 mmol/L Final   10/14/2023 121 (L) 136 - 145 mmol/L Final      Potassium Potassium   Date Value Ref Range Status   10/15/2023 3.4 (L) 3.5 - 5.2 mmol/L Final   10/14/2023 4.3 3.5 - 5.2 mmol/L Final      Chloride Chloride   Date Value Ref Range Status   10/15/2023 84 (L) 98 - 107 mmol/L Final   10/14/2023 87 (L) 98 - 107 mmol/L Final      CO2 CO2   Date Value Ref Range Status   10/15/2023 24.0 22.0 - 29.0 mmol/L Final   10/14/2023 22.0 22.0 - 29.0 mmol/L Final      BUN BUN   Date Value Ref Range Status   10/15/2023 28 (H) 8 - 23 mg/dL Final   10/14/2023 25 (H) 8 - 23 mg/dL Final      Creatinine Creatinine   Date Value Ref Range Status   10/15/2023 1.05 (H) 0.57 - 1.00 mg/dL Final   10/14/2023 0.96 0.57 - 1.00 mg/dL Final      Calcium Calcium   Date Value Ref Range Status   10/15/2023 8.9 8.6 - 10.5 mg/dL Final   10/14/2023 8.8 8.6 - 10.5 mg/dL Final      PO4 No results found for: \"CAPO4\"   Albumin Albumin   Date Value Ref Range Status   10/14/2023 3.7 3.5 - 5.2 g/dL Final      Magnesium No results found for: \"MG\"   Uric Acid No results found for: \"URICACID\"       Imaging Results (Last 72 Hours)       Procedure Component Value Units Date/Time    XR Chest 1 View [[id number]] Collected: 10/14/23 0026     Updated: 10/14/23 0029    Narrative:      XR CHEST 1 VW    Date of Exam: 10/14/2023 12:16 AM EDT    Indication: sob    Comparison: 10/10/2023.    Findings:  Patchy airspace disease is seen within the bilateral lower lobes. No sizable pleural effusion identified. The heart appears enlarged. There is indistinctness of the pulmonary vasculature. Median " sternotomy wires are present. No pneumothorax. No acute   osseous abnormality identified.      Impression:      Impression:  Patchy airspace disease within the bilateral lower lobes, likely related to pneumonia. Possible mild underlying pulmonary edema pattern.        Electronically Signed: Tana Sutherland MD    10/14/2023 12:27 AM EDT    Workstation ID: [id number]              Results for orders placed during the hospital encounter of 10/13/23    XR Chest 1 View    Narrative  XR CHEST 1 VW    Date of Exam: 10/14/2023 12:16 AM EDT    Indication: sob    Comparison: 10/10/2023.    Findings:  Patchy airspace disease is seen within the bilateral lower lobes. No sizable pleural effusion identified. The heart appears enlarged. There is indistinctness of the pulmonary vasculature. Median sternotomy wires are present. No pneumothorax. No acute  osseous abnormality identified.    Impression  Impression:  Patchy airspace disease within the bilateral lower lobes, likely related to pneumonia. Possible mild underlying pulmonary edema pattern.        Electronically Signed: Tana Sutherland MD  10/14/2023 12:27 AM EDT  Workstation ID: [id number]      Results for orders placed during the hospital encounter of 10/10/23    XR Chest AP    Narrative  XR CHEST AP    Date of Exam: 10/10/2023 7:02 PM EDT    Indication: COVID Evaluation, Cough, Fever    Comparison: 1/6/2023    Findings:  Lines: None    Lungs: The lungs are clear.  Pleura: No pleural effusion or pneumothorax.    Cardiomediastinum: Unchanged    Soft Tissues: Unremarkable.    Bones: Status post median sternotomy. No acute osseous abnormality.    Impression  Impression:  No acute abnormality.      Electronically Signed: David Montemayor DO  10/10/2023 7:14 PM EDT  Workstation ID: [id number]      Results for orders placed during the hospital encounter of 01/06/23    XR Chest 1 View    Narrative  XR CHEST 1 VW    Date of Exam: 1/6/2023 7:33 PM EST    Indication: SOA.    Comparison:  4/7/2021 and prior    Findings:  Patient is status post median sternotomy. The heart size appears stable. Prosthetic valve at the aortic root noted. Pulmonary vascularity is congested and distinct. Coarsening of the interstitial markings noted diffusely with increased hazy and  interstitial opacities noted with a perihilar and basilar predominance. Osseous structures demonstrate no acute abnormality. Multilevel degenerative changes noted.    Impression  Impression:  Increased hazy and interstitial opacities noted. Findings represent underlying interstitial edema. Viral and atypical pneumonia also in the differential.    Electronically Signed: Micah Neil  1/6/2023 8:03 PM EST  Workstation ID: [id number]        Results for orders placed during the hospital encounter of 10/13/23    Duplex Venous Lower Extremity - Bilateral CAR    Interpretation Summary    Negative for deep vein thrombosis bilaterally in the visualized veins.  However, significant limitations to study due to patient movement and body habitus.  Multiple veins could not be adequately visualized.      ASSESSMENT / PLAN      Acute respiratory failure    1. HYPONATREMIA-------Acute. Hypotonic and clinically hypervolemic. Secondary to volume overload and SIADH from high dose SSRI use. Diurese with loop diuretics. Hold Aldactone and Metolazone to avoid sodium wasting. Temporarily hold Prozac (Patient refuses to go off of it completely and/or try another antidepressant). No focal neuro symptoms, so no indication for administration of hypertonic saline. Give low dose Samsca 7.5 mg po x one and follow serial sodium levels. Hold po fluid restriction for 24 hours post Samsca administration. Check further urine and serum studies to assess for other contributing factors. Seizure/fall precautions and neuro checks. No polyuria or polydipsia     2. ARF/JERROD/RHABDOMYOLYSIS--------D/C Statin. No IVFs given volume overload. Follow with gentle alkalinization     3. ACUTE  EXACERBATION OF CHRONIC DIASTOLIC CHF/CHRONIC LE EDEMA AND ANASARCA-----Diurese with loop diuretics. Follow weight. Hold Aldactone and metolazone for now. Check TSH     4. CAD WITH H/O AVR-----per Cardiology. No angina     5. MORBID OBESITY     6. ACUTE HYPOXIC RESPIRATORY FAILURE------Oxygen levels better. Follow with diuresis. Oxygen, nebs, abx, pulmonary toilet     7. H/O RUE DVT     8. DMII-------Glucometers, SSI. Metformin on hold     9. ANEMIA------H/H stable. SPEP pending. IV iron x one for VICTORIANO     10. PNA------On Abx. Check urine legionella antigen    11. OA/DJD/HYPERURICEMIA-------No NSAIDs. Add Allopurinol     12. HYPERLIPIDEMIA------Hold Statin given elevated CK     13. HYPOTHYROIDISM------On Synthroid. TSH normal     14. GERD/PUD PROPHYLAXIS------PPI    15. HYPOKALEMIA------Replace po      17. HTN-------BP good. No ACE-I/ARB/DRI     18. DEPRESSION------Holding SSRI until sodium in safe range      I discussed the patient's findings and my recommendations with patient, family, and nursing staff    Will follow along closely. Thank you for allowing us to see this patient in renal consultation.    [facility]  [phone]  MD Robert Nelson MD  10/15/23  10:13 EDT

## 2023-10-15 NOTE — PLAN OF CARE
Goal Outcome Evaluation:              Outcome Evaluation: Pts  at bedside due to anxiety.  Pt changed and checked.  No other issues. Will continue to monitor.

## 2023-10-15 NOTE — PLAN OF CARE
Goal Outcome Evaluation:           Progress: no change       Problem: Adult Inpatient Plan of Care  Goal: Plan of Care Review  Outcome: Ongoing, Progressing  Flowsheets (Taken 10/15/2023 1546)  Progress: no change  Goal: Patient-Specific Goal (Individualized)  Outcome: Ongoing, Progressing  Goal: Absence of Hospital-Acquired Illness or Injury  Outcome: Ongoing, Progressing  Intervention: Identify and Manage Fall Risk  Recent Flowsheet Documentation  Taken 10/15/2023 1407 by Abel Patten RN  Safety Promotion/Fall Prevention: safety round/check completed  Taken 10/15/2023 1206 by Abel Patten RN  Safety Promotion/Fall Prevention: safety round/check completed  Taken 10/15/2023 1013 by Abel Patten RN  Safety Promotion/Fall Prevention: safety round/check completed  Taken 10/15/2023 0801 by Abel Patten RN  Safety Promotion/Fall Prevention:   safety round/check completed   room organization consistent   nonskid shoes/slippers when out of bed   fall prevention program maintained   clutter free environment maintained   assistive device/personal items within reach   activity supervised  Intervention: Prevent Skin Injury  Recent Flowsheet Documentation  Taken 10/15/2023 0801 by Abel Patten RN  Skin Protection:   adhesive use limited   transparent dressing maintained  Intervention: Prevent and Manage VTE (Venous Thromboembolism) Risk  Recent Flowsheet Documentation  Taken 10/15/2023 0801 by Abel Patten RN  VTE Prevention/Management:   bilateral   sequential compression devices off  Intervention: Prevent Infection  Recent Flowsheet Documentation  Taken 10/15/2023 0801 by Abel Patten RN  Infection Prevention:   single patient room provided   rest/sleep promoted   personal protective equipment utilized   hand hygiene promoted  Goal: Optimal Comfort and Wellbeing  Outcome: Ongoing, Progressing  Intervention: Provide Person-Centered Care  Recent Flowsheet Documentation  Taken 10/15/2023 0801 by  Able Patten RN  Trust Relationship/Rapport:   care explained   choices provided   questions answered   questions encouraged   thoughts/feelings acknowledged  Goal: Readiness for Transition of Care  Outcome: Ongoing, Progressing     Problem: Fall Injury Risk  Goal: Absence of Fall and Fall-Related Injury  Outcome: Ongoing, Progressing  Intervention: Identify and Manage Contributors  Recent Flowsheet Documentation  Taken 10/15/2023 1407 by Abel Patten RN  Medication Review/Management: medications reviewed  Taken 10/15/2023 1206 by Abel Patten RN  Medication Review/Management: medications reviewed  Taken 10/15/2023 1013 by Abel Patten RN  Medication Review/Management: medications reviewed  Taken 10/15/2023 0801 by Abel Patten RN  Medication Review/Management: medications reviewed  Intervention: Promote Injury-Free Environment  Recent Flowsheet Documentation  Taken 10/15/2023 1407 by Abel Patten RN  Safety Promotion/Fall Prevention: safety round/check completed  Taken 10/15/2023 1206 by Abel Patten RN  Safety Promotion/Fall Prevention: safety round/check completed  Taken 10/15/2023 1013 by Abel Patten RN  Safety Promotion/Fall Prevention: safety round/check completed  Taken 10/15/2023 0801 by Abel Patten RN  Safety Promotion/Fall Prevention:   safety round/check completed   room organization consistent   nonskid shoes/slippers when out of bed   fall prevention program maintained   clutter free environment maintained   assistive device/personal items within reach   activity supervised     Problem: Behavioral Health Comorbidity  Goal: Maintenance of Behavioral Health Symptom Control  Outcome: Ongoing, Progressing  Intervention: Maintain Behavioral Health Symptom Control  Recent Flowsheet Documentation  Taken 10/15/2023 1407 by Abel Patten RN  Medication Review/Management: medications reviewed  Taken 10/15/2023 1206 by Abel Patten, RN  Medication Review/Management:  medications reviewed  Taken 10/15/2023 1013 by Abel Patten RN  Medication Review/Management: medications reviewed  Taken 10/15/2023 0801 by Abel Patten RN  Medication Review/Management: medications reviewed     Problem: COPD (Chronic Obstructive Pulmonary Disease) Comorbidity  Goal: Maintenance of COPD Symptom Control  Outcome: Ongoing, Progressing  Intervention: Maintain COPD-Symptom Control  Recent Flowsheet Documentation  Taken 10/15/2023 1407 by Abel Patten RN  Medication Review/Management: medications reviewed  Taken 10/15/2023 1206 by Abel Patten RN  Medication Review/Management: medications reviewed  Taken 10/15/2023 1013 by Abel Patten RN  Medication Review/Management: medications reviewed  Taken 10/15/2023 0801 by Abel Patten RN  Medication Review/Management: medications reviewed     Problem: Hypertension Comorbidity  Goal: Blood Pressure in Desired Range  Outcome: Ongoing, Progressing  Intervention: Maintain Blood Pressure Management  Recent Flowsheet Documentation  Taken 10/15/2023 1407 by Abel Patten RN  Medication Review/Management: medications reviewed  Taken 10/15/2023 1206 by Able Patten RN  Medication Review/Management: medications reviewed  Taken 10/15/2023 1013 by Abel Patten RN  Medication Review/Management: medications reviewed  Taken 10/15/2023 0801 by Abel Patten RN  Medication Review/Management: medications reviewed     Problem: Skin Injury Risk Increased  Goal: Skin Health and Integrity  Outcome: Ongoing, Progressing  Intervention: Optimize Skin Protection  Recent Flowsheet Documentation  Taken 10/15/2023 0801 by Abel Patten RN  Skin Protection:   adhesive use limited   transparent dressing maintained     Problem: Adjustment to Illness (Sepsis/Septic Shock)  Goal: Optimal Coping  Outcome: Ongoing, Progressing     Problem: Bleeding (Sepsis/Septic Shock)  Goal: Absence of Bleeding  Outcome: Ongoing, Progressing     Problem: Glycemic  Control Impaired (Sepsis/Septic Shock)  Goal: Blood Glucose Level Within Desired Range  Outcome: Ongoing, Progressing     Problem: Infection Progression (Sepsis/Septic Shock)  Goal: Absence of Infection Signs and Symptoms  Outcome: Ongoing, Progressing  Intervention: Initiate Sepsis Management  Recent Flowsheet Documentation  Taken 10/15/2023 0801 by Abel Patten, RN  Infection Prevention:   single patient room provided   rest/sleep promoted   personal protective equipment utilized   hand hygiene promoted  Isolation Precautions:   droplet   precautions maintained     Problem: Nutrition Impaired (Sepsis/Septic Shock)  Goal: Optimal Nutrition Intake  Outcome: Ongoing, Progressing

## 2023-10-15 NOTE — PROGRESS NOTES
[facility] Medicine Services   Daily Progress Note    Patient Name: Claudia Rust  : 1950  MRN: [id number]  Primary Care Physician:  Pool Meyer NP-C  Date of admission: 10/13/2023  Date of service 10/14/2023      Subjective      Subjective  Pt complaining of short of breath, also noted to have swelling involving the lower leg. Denies for any other active complaint.      ROS A 12 point review of system was done and was negative except as mentioned above      Objective      Vitals:   Temp:  [97.8 °F (36.6 °C)-99.8 °F (37.7 °C)] 98.1 °F (36.7 °C)  Heart Rate:  [71-85] 77  Resp:  [16-23] 18  BP: (111-131)/(62-68) 115/62  Flow (L/min):  [3-6] 3    Physical Exam  Constitutional:       Appearance: Normal appearance.   HENT:      Head: Normocephalic and atraumatic.      Nose: Nose normal.      Mouth/Throat:      Mouth: Mucous membranes are moist.   Cardiovascular:      Rate and Rhythm: Normal rate.   Pulmonary:      Effort: Pulmonary effort is normal. No respiratory distress.      Breath sounds: Normal breath sounds. No stridor. No wheezing, rhonchi or rales.   Chest:      Chest wall: No tenderness.   Abdominal:      General: Abdomen is flat. There is no distension.      Palpations: Abdomen is soft. There is no mass.      Tenderness: There is no abdominal tenderness.   Musculoskeletal:         General: Swelling and tenderness present.      Right lower leg: Edema present.      Left lower leg: Edema present.      Comments: Mild local rise of temperature of the left lower leg   Neurological:      General: No focal deficit present.      Mental Status: She is alert.             Result Review    Result Review:  I have personally reviewed the results from the time of this admission to 10/15/2023 12:28 EDT and agree with these findings:  [x]  Laboratory  []  Microbiology  [x]  Radiology  []  EKG/Telemetry   []  Cardiology/Vascular   []  Pathology  []  Old records  []  Other:            Assessment & Plan       Brief Patient Summary:   Claudia Rust is a 73 y.o. female who presented to [facility] on 10/13/2023 complaining of worsening shortness of breath and cough.  Patient was just seen in the ED a few days ago for same complaints.  She was discharged with breathing treatments and steroids however patient stated that she did not improve.  Her shortness of breath actually worsened.  Patient does have history of COPD but does not use oxygen at home.    In the ED, patient was requiring 4 L nasal cannula.  Labs remarkable for sodium 121, WBC 15.8.  Chest x-ray showed patchy airspace disease within the bilateral lower lobes, pneumonia versus pulmonary edema.  Patient was given Lasix and DuoNebs in the ED.      allopurinol, 100 mg, Oral, Daily  azithromycin, 500 mg, Intravenous, Q24H  budesonide-formoterol, 2 puff, Inhalation, BID - RT  [START ON 10/16/2023] cefTRIAXone, 1,000 mg, Intravenous, Q24H  cholecalciferol, 2,000 Units, Oral, Nightly  ferric gluconate, 125 mg, Intravenous, Once  furosemide, 40 mg, Intravenous, TID  gabapentin, 400 mg, Oral, TID  guaiFENesin, 1,200 mg, Oral, Q12H  insulin lispro, 2-7 Units, Subcutaneous, 4x Daily AC & at Bedtime  ipratropium-albuterol, 3 mL, Nebulization, Q4H - RT  levothyroxine, 125 mcg, Oral, Q AM  pantoprazole, 40 mg, Oral, Q PM  potassium chloride, 40 mEq, Oral, TID With Meals  senna-docusate sodium, 2 tablet, Oral, BID  sodium bicarbonate, 50 mEq, Intravenous, Q8H  sodium chloride, 10 mL, Intravenous, Q12H  tiZANidine, 2 mg, Oral, TID  tolvaptan, 7.5 mg, Oral, Once  vitamin B-12, 1,000 mcg, Oral, Daily             Active Hospital Problems:  Active Hospital Problems    Diagnosis     **Acute respiratory failure     Multifocal pneumonia     Multiple tracheobronchial mucus plugs      Plan:     COPD  Dyspnea  Acute respiratory failure  Patient currently on 4 L nasal cannula  Continue oxygen supplementation  Wean as tolerated  Off steroids now.  Antibiotics     Possible  Pneumonia  Start Rocephin and azithromycin  Continue breathing treatments  Guaifenesin as needed for cough  Pulmonary on board.     Possible pulmonary edema  Start Lasix 40 mg IV twice daily  Obtain echocardiogram     Hyponatremia appear secondary to hypervolemia  Sodium 121  Fluid restriction  Continue diuretics  Follow-up electrolytes  Urine studies  Nephrology consulted.  On lasix 60 mg iv TID now.       Leukocytosis  Most likely due to steroid use  Repeat CBC    Cellulitis of lower legs  Antibiotics as ordered.  Venous Doppler to rule out a DVT     UTI  UA studies reviewed.  Follow-up urine culture  Continue antibiotics     Hypertension  Follow-up BP      DVT prophylaxis:  Mechanical DVT prophylaxis orders are present.    CODE STATUS:         Disposition:  I expect patient to be discharged 2 to 3 days    Patient,  and daughter was updated with plan of care.  All questions answered      Electronically signed by Suly Bradley MD, 10/15/23, 12:28 EDT.  [facility] Hospitalist Team

## 2023-10-15 NOTE — CONSULTS
Group: [facility]         CONSULT NOTE    Patient Identification:  Claudia Rust  73 y.o.  female  1950  [id number]            Requesting physician: Attending physician    Reason for Consultation: Pneumonia      History of Present Illness:  73-year-old female with history of COPD, CHF, DM, HLD, morbid obesity and hypothyroidism who presented 10/13/2023 with complaints of progressive shortness of breath and cough for the last 3 days.  On presentation she was requiring 4 L of oxygen and chest x-ray showing patchy airspace disease in the lower lobes bilaterally.  The patient is afebrile and hemodynamically stable  Potassium 3.4, creatinine 1.05, BUN 28, WBC 16.6, proBNP 646  Respiratory panel on 10/10/2023 was positive for human rhinovirus    Assessment:    Hypoxia: ABG 7.42/34.4/96 while on nonrebreather mask  COPD with acute exacerbation  Multifocal pneumonia  Multiple mucous plugs  Cellulitis of lower extremities  HTN  JERROD  DM2  Chronic anemia  HLD    Recommendations:  Since patient is unable to clear her secretions, I will schedule bronchoscopy in a.m. to clear the mucous plugs and obtain cultures  Oxygen supplement and titration to maintain saturation 90 to 95%: Currently requiring 3 L per nasal cannula  Antibiotics  Bronchodilators  Mucinex  Inhaled corticosteroids  Diuretics/electrolyte management as per nephrology  Atorvastatin  BP control  Glucose controlled  Thyroid hormone replacement          Review of Sytems:  Constitutional: Negative for chills, fever and malaise/fatigue.   HENT: Negative.    Eyes: Negative.    Cardiovascular: Negative.    Respiratory: Positive for cough and shortness of breath.    Skin: Erythema in lower extremities  Musculoskeletal: Lower extremity swelling  Gastrointestinal: Negative.    Genitourinary: Negative.    Neurological: Negative.    Psychiatric/Behavioral: Negative.    Past Medical History:  Past Medical History:   Diagnosis Date    Acute congestive heart  "failure     Allergies     Anxiety     Arthritis     Asthma     Bilateral leg edema     Bipolar 1 disorder     Bulging lumbar disc     X3    Cancer     states had \"cancerous tumor during pregnancy and had to have hysterectomy\"    Cataract     bilateral    Cellulitis 03/2021    bilateral legs    Compression fracture of vertebral column     LUMBAR    COPD (chronic obstructive pulmonary disease)     Delayed emergence from anesthesia     Depression     Depression     Diabetes mellitus     Dyslipidemia     Dyspnea on exertion     Fibromyalgia     GERD (gastroesophageal reflux disease)     Hiatal hernia     Hyperlipidemia     Hypertension     Hypothyroid     HYPOTHYROID    IBS (irritable bowel syndrome)     Migraines     Morbid obesity     Neuropathy     Panic attacks     Peripheral edema     PONV (postoperative nausea and vomiting)     Poor mobility     rolling walker    Prediabetes     PVD (peripheral vascular disease)     Rheumatoid aortitis     Spinal stenosis     Spinal stenosis     Vertigo     Vertigo     Vitamin D deficiency        Past Surgical History:  Past Surgical History:   Procedure Laterality Date    AORTIC VALVE REPAIR/REPLACEMENT N/A 3/18/2021    Procedure: AORTIC VALVE REPLACEMENT;  Surgeon: Olaf Mcgill MD;  Location: [facility] CVOR;  Service: Cardiothoracic;  Laterality: N/A;    BRONCHOSCOPY N/A 3/25/2021    Procedure: BRONCHOSCOPY AT BEDSIDE WITH BRONCHIOALVEOLAR LAVAGE;  Surgeon: Glenn Reyes MD;  Location: [facility] ENDOSCOPY;  Service: Pulmonary;  Laterality: N/A;  PNA    CARDIAC CATHETERIZATION  2009    CARDIAC CATHETERIZATION N/A 8/14/2019    Procedure: Left Heart Cath;  Surgeon: Jose Levi MD;  Location: [facility] CATH INVASIVE LOCATION;  Service: Cardiovascular    CARDIAC CATHETERIZATION N/A 8/14/2019    Procedure: Right Heart Cath;  Surgeon: Jose Levi MD;  Location: [facility] CATH INVASIVE LOCATION;  Service: Cardiovascular    CARDIAC CATHETERIZATION N/A 8/14/2019    " Procedure: Aortic root aortogram;  Surgeon: Jose Levi MD;  Location:  [facility] CATH INVASIVE LOCATION;  Service: Cardiovascular    CARDIAC CATHETERIZATION N/A 1/20/2021    Procedure: Left Heart Cath;  Surgeon: Jose Levi MD;  Location:  [facility] CATH INVASIVE LOCATION;  Service: Cardiovascular;  Laterality: N/A;    COLONOSCOPY      CYSTOCELE REPAIR  1984    ELBOW ARTHROPLASTY  2011    ENDOSCOPY      FOOT SURGERY      GALLBLADDER SURGERY  2002    TONSILLECTOMY      VAGINAL HYSTERECTOMY  1972        Home Meds:  Medications Prior to Admission   Medication Sig Dispense Refill Last Dose    Acetaminophen 325 MG capsule Take 650 mg by mouth Every 4 (Four) Hours As Needed.   10/13/2023    albuterol (ACCUNEB) 0.63 MG/3ML nebulizer solution Take 3 mL by nebulization Every 6 (Six) Hours As Needed for Wheezing (Dx: J 44.9 (COPD), R 06.09 (dyspnea on exertion)). 30 each 0 10/13/2023    B Complex Vitamins (VITAMIN B COMPLEX) capsule capsule Take 1 capsule by mouth Daily. LAST DOSE 3/4   10/13/2023    Calcium-Magnesium-Vitamin D (CALCIUM 1200+D3 PO) Take 1 capsule by mouth Daily.   10/13/2023    cholecalciferol (Cholecalciferol) 25 MCG (1000 UT) tablet Take 2 tablets by mouth Every Night. LAST DOSE 3/4   10/13/2023    diphenhydrAMINE (BENADRYL) 50 MG capsule Take 1 capsule by mouth At Night As Needed for Itching.   Past Month    FLUoxetine (PROzac) 20 MG capsule Take 3 capsules by mouth Daily.   10/13/2023    fluticasone (FLONASE) 50 MCG/ACT nasal spray 2 sprays into the nostril(s) as directed by provider Daily.   10/13/2023    gabapentin (NEURONTIN) 400 MG capsule Take 1 capsule by mouth 3 (Three) Times a Day.   10/13/2023    levothyroxine (SYNTHROID, LEVOTHROID) 125 MCG tablet Take 1 tablet by mouth Daily.   10/13/2023    losartan (Cozaar) 25 MG tablet Take 1 tablet by mouth Daily. 90 tablet 3 10/13/2023    meclizine (ANTIVERT) 25 MG tablet Take 1 tablet by mouth 3 (Three) Times a Day As Needed.   Past  Week    Melatonin 10 MG capsule Take 2 capsules by mouth Every Night.   10/13/2023    metFORMIN ER (GLUCOPHAGE-XR) 500 MG 24 hr tablet Take 1 tablet by mouth Daily With Breakfast.   10/13/2023    metOLazone (ZAROXOLYN) 5 MG tablet TAKE 1 TABLET BY MOUTH EVERY DAY 90 tablet 3 10/13/2023    Multiple Vitamins-Minerals (MULTIVITAMIN WITH MINERALS) tablet tablet Take 1 tablet by mouth Daily.   10/13/2023    ondansetron (ZOFRAN) 4 MG tablet Take 1 tablet by mouth Every 8 (Eight) Hours As Needed for Nausea or Vomiting.   Past Week    pantoprazole (PROTONIX) 40 MG EC tablet Take 1 tablet by mouth Every Evening.   10/13/2023    [] predniSONE (DELTASONE) 20 MG tablet Take 2 tablets by mouth Daily for 4 days. 8 tablet 0 10/13/2023    rizatriptan MLT (MAXALT-MLT) 10 MG disintegrating tablet Take 1 tablet by mouth 1 (One) Time As Needed for Migraine. May repeat in 2 hours if needed   Past Week    simvastatin (ZOCOR) 20 MG tablet Take 1 tablet by mouth Every Night.   10/13/2023    spironolactone (ALDACTONE) 25 MG tablet TAKE 2 TABLETS BY MOUTH EVERY  tablet 3 10/13/2023    tiZANidine (ZANAFLEX) 4 MG tablet Take 1 tablet by mouth 3 times a day.   10/13/2023    vitamin B-12 (CYANOCOBALAMIN) 1000 MCG tablet Take 1 tablet by mouth Daily.   10/13/2023       Allergies:  Allergies   Allergen Reactions    Adhesive Tape Unknown (See Comments)     hives    Butorphanol Other (See Comments)     Chest pain difficulty breathing throat swelling, STADAL    Garlic Other (See Comments)     Chest pain difficulty breathing throat swells    Heparin Other (See Comments)     HIT +    Tetanus-Diphtheria Toxoids Td Other (See Comments)     Dizziness,  vomiting    Aloe Itching    Bee Venom Other (See Comments)     Swelling eyes and lips hand swelling    Latex Itching    Macadamia Nut Oil Other (See Comments)     Chest pain difficulty breathing throat swelling    Tuberculin, Ppd Unknown (See Comments)     reactor    Wasp Venom Other (See  "Comments)     Swelling eyes lips and hand       Social History:   Social History     Socioeconomic History    Marital status:    Tobacco Use    Smoking status: Former     Packs/day: 1     Types: Cigarettes     Quit date:      Years since quittin.7    Smokeless tobacco: Never    Tobacco comments:     decrease now and do not smoke dos   Vaping Use    Vaping Use: Never used   Substance and Sexual Activity    Alcohol use: Not Currently     Comment: very rare    Drug use: Never    Sexual activity: Defer       Family History:  Family History   Problem Relation Age of Onset    Ovarian cancer Mother     Lung cancer Mother     Esophageal cancer Mother     Hypertension Father     Diabetes Maternal Grandmother        Physical Exam:  /67 (BP Location: Right arm, Patient Position: Lying)   Pulse 78   Temp 98.1 °F (36.7 °C) (Oral)   Resp 16   Ht 162.6 cm (64\")   Wt (!) 139 kg (306 lb)   SpO2 98%   BMI 52.52 kg/m²  Body mass index is 52.52 kg/m². 98% (!) 139 kg (306 lb)  General Appearance:  Alert   HEENT:  Normocephalic, without obvious abnormality, Conjunctiva/corneas clear,.   Nares normal, no drainage     Neck:  Supple, symmetrical, trachea midline. No JVD.  Lungs /Chest wall: Wheezing and bilateral basal rhonchi, respirations unlabored, symmetrical wall movement.     Heart:  Regular rate and rhythm, S1 S2 normal  Abdomen: Soft, non-tender, no masses, no organomegaly.    Extremities: 2+ bilateral lower extremity edema and erythema, no clubbing or cyanosis    LABS:  Lab Results   Component Value Date    CALCIUM 8.9 10/15/2023    PHOS 3.5 2021     Results from last 7 days   Lab Units 10/15/23  0324 10/15/23  0323 10/14/23  0018 10/10/23  1648   SODIUM mmol/L  --  121* 121* 129*   POTASSIUM mmol/L  --  3.4* 4.3 4.7   CHLORIDE mmol/L  --  84* 87* 95*   CO2 mmol/L  --  24.0 22.0 22.0   BUN mg/dL  --  28* 25* 21   CREATININE mg/dL  --  1.05* 0.96 1.30*   GLUCOSE mg/dL  --  210* 126* 128*   CALCIUM " mg/dL  --  8.9 8.8 9.2   WBC 10*3/mm3 16.60*  --  15.80* 11.30*   HEMOGLOBIN g/dL 11.4*  --  11.9* 11.8*   PLATELETS 10*3/mm3 215  --  253 204   ALT (SGPT) U/L  --   --  17  --    AST (SGOT) U/L  --   --  38*  --    PROBNP pg/mL  --   --  646.9 77.9   PROCALCITONIN ng/mL  --   --  0.06  --      Lab Results   Component Value Date    CKTOTAL 45 03/30/2021    TROPONINT 9 10/14/2023     Results from last 7 days   Lab Units 10/14/23  0018   HSTROP T ng/L 9     Results from last 7 days   Lab Units 10/14/23  0332 10/14/23  0157   BLOODCX  No growth at 24 hours No growth at 24 hours     Results from last 7 days   Lab Units 10/14/23  0018   PROCALCITONIN ng/mL 0.06     Results from last 7 days   Lab Units 10/14/23  0024   PH, ARTERIAL pH units 7.424   PCO2, ARTERIAL mm Hg 34.4*   PO2 ART mm Hg 96.0   O2 SATURATION ART % 97.7   MODALITY  NRB     Results from last 7 days   Lab Units 10/10/23  1648   ADENOVIRUS DETECTION BY PCR  Not Detected   CORONAVIRUS 229E  Not Detected   CORONAVIRUS HKU1  Not Detected   CORONAVIRUS NL63  Not Detected   CORONAVIRUS OC43  Not Detected   HUMAN METAPNEUMOVIRUS  Not Detected   HUMAN RHINOVIRUS/ENTEROVIRUS  Detected*   INFLUENZA B PCR  Not Detected   PARAINFLUENZA 1  Not Detected   PARAINFLUENZA VIRUS 2  Not Detected   PARAINFLUENZA VIRUS 3  Not Detected   PARAINFLUENZA VIRUS 4  Not Detected   BORDETELLA PERTUSSIS PCR  Not Detected   CHLAMYDOPHILA PNEUMONIAE PCR  Not Detected   MYCOPLAMA PNEUMO PCR  Not Detected   INFLUENZA A PCR  Not Detected   RSV, PCR  Not Detected         Results from last 7 days   Lab Units 10/14/23  0332 10/14/23  0157   BLOODCX  No growth at 24 hours No growth at 24 hours     Lab Results   Component Value Date    TSH 3.350 06/17/2022     Estimated Creatinine Clearance: 66.6 mL/min (A) (by C-G formula based on SCr of 1.05 mg/dL (H)).  Results from last 7 days   Lab Units 10/14/23  0124   NITRITE UA  Positive*   WBC UA /HPF Too Numerous to Count*   BACTERIA UA /HPF 4+*    SQUAM EPITHEL UA /HPF 0-2        Imaging:  Imaging Results (Last 24 Hours)       ** No results found for the last 24 hours. **              Current Meds:   SCHEDULE  atorvastatin, 10 mg, Oral, Nightly  azithromycin, 500 mg, Intravenous, Q24H  cefTRIAXone, 2,000 mg, Intravenous, Q24H  cholecalciferol, 2,000 Units, Oral, Nightly  FLUoxetine, 60 mg, Oral, Daily  furosemide, 40 mg, Intravenous, BID  gabapentin, 400 mg, Oral, TID  guaiFENesin, 600 mg, Oral, Q12H  insulin lispro, 2-7 Units, Subcutaneous, 4x Daily AC & at Bedtime  ipratropium-albuterol, 3 mL, Nebulization, Q4H - RT  levothyroxine, 125 mcg, Oral, Q AM  losartan, 25 mg, Oral, Daily  methylPREDNISolone sodium succinate, 80 mg, Intravenous, Q8H  metOLazone, 5 mg, Oral, Daily  pantoprazole, 40 mg, Oral, Q PM  senna-docusate sodium, 2 tablet, Oral, BID  sodium chloride, 10 mL, Intravenous, Q12H  spironolactone, 50 mg, Oral, Daily  tiZANidine, 2 mg, Oral, TID  vitamin B-12, 1,000 mcg, Oral, Daily      Infusions     PRNs    acetaminophen    albuterol    senna-docusate sodium **AND** polyethylene glycol **AND** bisacodyl **AND** bisacodyl    dextrose    dextrose    glucagon (human recombinant)    guaiFENesin-codeine    ondansetron    ondansetron    [COMPLETED] Insert Peripheral IV **AND** sodium chloride    sodium chloride    sodium chloride        Iesha Skinner MD  10/15/2023  08:12 EDT      Much of this encounter note is an electronic transcription/translation of spoken language to printed text using Dragon Software.

## 2023-10-16 ENCOUNTER — ANESTHESIA (OUTPATIENT)
Dept: GASTROENTEROLOGY | Facility: HOSPITAL | Age: 73
End: 2023-10-16
Payer: MEDICARE

## 2023-10-16 ENCOUNTER — ANESTHESIA EVENT (OUTPATIENT)
Dept: GASTROENTEROLOGY | Facility: HOSPITAL | Age: 73
End: 2023-10-16
Payer: MEDICARE

## 2023-10-16 LAB
ANION GAP SERPL CALCULATED.3IONS-SCNC: 13 MMOL/L (ref 5–15)
B PARAPERT DNA SPEC QL NAA+PROBE: NOT DETECTED
B PERT DNA SPEC QL NAA+PROBE: NOT DETECTED
BUN SERPL-MCNC: 39 MG/DL (ref 8–23)
BUN/CREAT SERPL: 30 (ref 7–25)
C PNEUM DNA NPH QL NAA+NON-PROBE: NOT DETECTED
CALCIUM SPEC-SCNC: 8.3 MG/DL (ref 8.6–10.5)
CHLORIDE SERPL-SCNC: 90 MMOL/L (ref 98–107)
CO2 SERPL-SCNC: 24 MMOL/L (ref 22–29)
CREAT SERPL-MCNC: 1.3 MG/DL (ref 0.57–1)
CREAT UR-MCNC: 26.6 MG/DL
EGFRCR SERPLBLD CKD-EPI 2021: 43.5 ML/MIN/1.73
FLUAV SUBTYP SPEC NAA+PROBE: NOT DETECTED
FLUBV RNA ISLT QL NAA+PROBE: NOT DETECTED
GLUCOSE BLDC GLUCOMTR-MCNC: 124 MG/DL (ref 70–105)
GLUCOSE BLDC GLUCOMTR-MCNC: 137 MG/DL (ref 70–105)
GLUCOSE BLDC GLUCOMTR-MCNC: 142 MG/DL (ref 70–105)
GLUCOSE BLDC GLUCOMTR-MCNC: 261 MG/DL (ref 70–105)
GLUCOSE SERPL-MCNC: 143 MG/DL (ref 65–99)
HADV DNA SPEC NAA+PROBE: NOT DETECTED
HCOV 229E RNA SPEC QL NAA+PROBE: NOT DETECTED
HCOV HKU1 RNA SPEC QL NAA+PROBE: NOT DETECTED
HCOV NL63 RNA SPEC QL NAA+PROBE: NOT DETECTED
HCOV OC43 RNA SPEC QL NAA+PROBE: NOT DETECTED
HMPV RNA NPH QL NAA+NON-PROBE: NOT DETECTED
HPIV1 RNA ISLT QL NAA+PROBE: NOT DETECTED
HPIV2 RNA SPEC QL NAA+PROBE: NOT DETECTED
HPIV3 RNA NPH QL NAA+PROBE: NOT DETECTED
HPIV4 P GENE NPH QL NAA+PROBE: NOT DETECTED
L PNEUMO1 AG UR QL IA: NEGATIVE
M PNEUMO IGG SER IA-ACNC: NOT DETECTED
OSMOLALITY UR: 258 MOSM/KG (ref 300–800)
POTASSIUM SERPL-SCNC: 4.2 MMOL/L (ref 3.5–5.2)
QT INTERVAL: 405 MS
QTC INTERVAL: 485 MS
RHINOVIRUS RNA SPEC NAA+PROBE: DETECTED
RSV RNA NPH QL NAA+NON-PROBE: NOT DETECTED
SARS-COV-2 RNA NPH QL NAA+NON-PROBE: NOT DETECTED
SODIUM SERPL-SCNC: 127 MMOL/L (ref 136–145)
SODIUM UR-SCNC: 74 MMOL/L

## 2023-10-16 PROCEDURE — 87798 DETECT AGENT NOS DNA AMP: CPT | Performed by: INTERNAL MEDICINE

## 2023-10-16 PROCEDURE — 87206 SMEAR FLUORESCENT/ACID STAI: CPT | Performed by: INTERNAL MEDICINE

## 2023-10-16 PROCEDURE — 94799 UNLISTED PULMONARY SVC/PX: CPT

## 2023-10-16 PROCEDURE — 87070 CULTURE OTHR SPECIMN AEROBIC: CPT | Performed by: INTERNAL MEDICINE

## 2023-10-16 PROCEDURE — 88108 CYTOPATH CONCENTRATE TECH: CPT | Performed by: INTERNAL MEDICINE

## 2023-10-16 PROCEDURE — 25010000002 AMPICILLIN-SULBACTAM PER 1.5 G: Performed by: INTERNAL MEDICINE

## 2023-10-16 PROCEDURE — 94664 DEMO&/EVAL PT USE INHALER: CPT

## 2023-10-16 PROCEDURE — 99232 SBSQ HOSP IP/OBS MODERATE 35: CPT | Performed by: INTERNAL MEDICINE

## 2023-10-16 PROCEDURE — 0202U NFCT DS 22 TRGT SARS-COV-2: CPT | Performed by: INTERNAL MEDICINE

## 2023-10-16 PROCEDURE — 94761 N-INVAS EAR/PLS OXIMETRY MLT: CPT

## 2023-10-16 PROCEDURE — 84300 ASSAY OF URINE SODIUM: CPT | Performed by: HOSPITALIST

## 2023-10-16 PROCEDURE — 63710000001 PREDNISONE PER 5 MG: Performed by: INTERNAL MEDICINE

## 2023-10-16 PROCEDURE — 25010000002 PROPOFOL 500 MG/50ML EMULSION: Performed by: NURSE ANESTHETIST, CERTIFIED REGISTERED

## 2023-10-16 PROCEDURE — 3E1F88Z IRRIGATION OF RESPIRATORY TRACT USING IRRIGATING SUBSTANCE, VIA NATURAL OR ARTIFICIAL OPENING ENDOSCOPIC: ICD-10-PCS | Performed by: INTERNAL MEDICINE

## 2023-10-16 PROCEDURE — 87205 SMEAR GRAM STAIN: CPT | Performed by: INTERNAL MEDICINE

## 2023-10-16 PROCEDURE — 82550 ASSAY OF CK (CPK): CPT | Performed by: INTERNAL MEDICINE

## 2023-10-16 PROCEDURE — 80053 COMPREHEN METABOLIC PANEL: CPT | Performed by: INTERNAL MEDICINE

## 2023-10-16 PROCEDURE — 87102 FUNGUS ISOLATION CULTURE: CPT | Performed by: INTERNAL MEDICINE

## 2023-10-16 PROCEDURE — 83935 ASSAY OF URINE OSMOLALITY: CPT | Performed by: HOSPITALIST

## 2023-10-16 PROCEDURE — 87449 NOS EACH ORGANISM AG IA: CPT | Performed by: INTERNAL MEDICINE

## 2023-10-16 PROCEDURE — 63710000001 INSULIN LISPRO (HUMAN) PER 5 UNITS: Performed by: HOSPITALIST

## 2023-10-16 PROCEDURE — 63710000001 PREDNISONE PER 1 MG: Performed by: INTERNAL MEDICINE

## 2023-10-16 PROCEDURE — 82570 ASSAY OF URINE CREATININE: CPT | Performed by: HOSPITALIST

## 2023-10-16 PROCEDURE — 25010000002 FUROSEMIDE PER 20 MG: Performed by: INTERNAL MEDICINE

## 2023-10-16 PROCEDURE — 85025 COMPLETE CBC W/AUTO DIFF WBC: CPT | Performed by: STUDENT IN AN ORGANIZED HEALTH CARE EDUCATION/TRAINING PROGRAM

## 2023-10-16 PROCEDURE — 85007 BL SMEAR W/DIFF WBC COUNT: CPT | Performed by: STUDENT IN AN ORGANIZED HEALTH CARE EDUCATION/TRAINING PROGRAM

## 2023-10-16 PROCEDURE — 82948 REAGENT STRIP/BLOOD GLUCOSE: CPT

## 2023-10-16 PROCEDURE — 25010000002 ONDANSETRON PER 1 MG: Performed by: NURSE ANESTHETIST, CERTIFIED REGISTERED

## 2023-10-16 PROCEDURE — 87116 MYCOBACTERIA CULTURE: CPT | Performed by: INTERNAL MEDICINE

## 2023-10-16 RX ORDER — SODIUM CHLORIDE 9 MG/ML
INJECTION, SOLUTION INTRAVENOUS CONTINUOUS PRN
Status: DISCONTINUED | OUTPATIENT
Start: 2023-10-16 | End: 2023-10-16 | Stop reason: SURG

## 2023-10-16 RX ORDER — HYDRALAZINE HYDROCHLORIDE 20 MG/ML
5 INJECTION INTRAMUSCULAR; INTRAVENOUS
Status: DISCONTINUED | OUTPATIENT
Start: 2023-10-16 | End: 2023-10-16 | Stop reason: HOSPADM

## 2023-10-16 RX ORDER — LIDOCAINE 50 MG/G
OINTMENT TOPICAL AS NEEDED
Status: DISCONTINUED | OUTPATIENT
Start: 2023-10-16 | End: 2023-10-16 | Stop reason: HOSPADM

## 2023-10-16 RX ORDER — LIDOCAINE HYDROCHLORIDE 20 MG/ML
INJECTION, SOLUTION INFILTRATION; PERINEURAL AS NEEDED
Status: DISCONTINUED | OUTPATIENT
Start: 2023-10-16 | End: 2023-10-16 | Stop reason: HOSPADM

## 2023-10-16 RX ORDER — ONDANSETRON 2 MG/ML
INJECTION INTRAMUSCULAR; INTRAVENOUS AS NEEDED
Status: DISCONTINUED | OUTPATIENT
Start: 2023-10-16 | End: 2023-10-16 | Stop reason: SURG

## 2023-10-16 RX ORDER — IPRATROPIUM BROMIDE AND ALBUTEROL SULFATE 2.5; .5 MG/3ML; MG/3ML
3 SOLUTION RESPIRATORY (INHALATION) ONCE
Status: DISCONTINUED | OUTPATIENT
Start: 2023-10-16 | End: 2023-10-16 | Stop reason: HOSPADM

## 2023-10-16 RX ORDER — EPHEDRINE SULFATE 5 MG/ML
5 INJECTION INTRAVENOUS ONCE AS NEEDED
Status: DISCONTINUED | OUTPATIENT
Start: 2023-10-16 | End: 2023-10-16 | Stop reason: HOSPADM

## 2023-10-16 RX ORDER — PROPOFOL 10 MG/ML
INJECTION, EMULSION INTRAVENOUS AS NEEDED
Status: DISCONTINUED | OUTPATIENT
Start: 2023-10-16 | End: 2023-10-16 | Stop reason: SURG

## 2023-10-16 RX ORDER — ONDANSETRON 2 MG/ML
4 INJECTION INTRAMUSCULAR; INTRAVENOUS ONCE AS NEEDED
Status: DISCONTINUED | OUTPATIENT
Start: 2023-10-16 | End: 2023-10-16 | Stop reason: HOSPADM

## 2023-10-16 RX ORDER — IPRATROPIUM BROMIDE AND ALBUTEROL SULFATE 2.5; .5 MG/3ML; MG/3ML
3 SOLUTION RESPIRATORY (INHALATION) ONCE AS NEEDED
Status: COMPLETED | OUTPATIENT
Start: 2023-10-16 | End: 2023-10-16

## 2023-10-16 RX ORDER — LIDOCAINE HYDROCHLORIDE 20 MG/ML
INJECTION, SOLUTION INFILTRATION; PERINEURAL AS NEEDED
Status: DISCONTINUED | OUTPATIENT
Start: 2023-10-16 | End: 2023-10-16 | Stop reason: SURG

## 2023-10-16 RX ORDER — PREDNISONE 10 MG/1
10 TABLET ORAL DAILY
Status: DISCONTINUED | OUTPATIENT
Start: 2023-10-20 | End: 2023-10-20 | Stop reason: HOSPADM

## 2023-10-16 RX ORDER — LABETALOL HYDROCHLORIDE 5 MG/ML
5 INJECTION, SOLUTION INTRAVENOUS
Status: DISCONTINUED | OUTPATIENT
Start: 2023-10-16 | End: 2023-10-16 | Stop reason: HOSPADM

## 2023-10-16 RX ORDER — PREDNISONE 20 MG/1
20 TABLET ORAL DAILY
Status: COMPLETED | OUTPATIENT
Start: 2023-10-18 | End: 2023-10-19

## 2023-10-16 RX ADMIN — PREDNISONE 30 MG: 10 TABLET ORAL at 10:30

## 2023-10-16 RX ADMIN — Medication 10 ML: at 20:41

## 2023-10-16 RX ADMIN — LIDOCAINE HYDROCHLORIDE 100 MG: 20 INJECTION, SOLUTION INFILTRATION; PERINEURAL at 07:47

## 2023-10-16 RX ADMIN — IPRATROPIUM BROMIDE AND ALBUTEROL SULFATE 3 ML: .5; 3 SOLUTION RESPIRATORY (INHALATION) at 08:00

## 2023-10-16 RX ADMIN — IPRATROPIUM BROMIDE AND ALBUTEROL SULFATE 3 ML: .5; 3 SOLUTION RESPIRATORY (INHALATION) at 03:12

## 2023-10-16 RX ADMIN — PROPOFOL INJECTABLE EMULSION 100 MG: 10 INJECTION, EMULSION INTRAVENOUS at 07:47

## 2023-10-16 RX ADMIN — GABAPENTIN 400 MG: 400 CAPSULE ORAL at 15:11

## 2023-10-16 RX ADMIN — BUDESONIDE AND FORMOTEROL FUMARATE DIHYDRATE 2 PUFF: 160; 4.5 AEROSOL RESPIRATORY (INHALATION) at 19:08

## 2023-10-16 RX ADMIN — POTASSIUM CHLORIDE 40 MEQ: 1500 TABLET, EXTENDED RELEASE ORAL at 17:36

## 2023-10-16 RX ADMIN — ACETAMINOPHEN 650 MG: 325 TABLET, FILM COATED ORAL at 12:33

## 2023-10-16 RX ADMIN — IPRATROPIUM BROMIDE AND ALBUTEROL SULFATE 3 ML: .5; 3 SOLUTION RESPIRATORY (INHALATION) at 19:12

## 2023-10-16 RX ADMIN — GUAIFENESIN 1200 MG: 600 TABLET, MULTILAYER, EXTENDED RELEASE ORAL at 10:31

## 2023-10-16 RX ADMIN — IPRATROPIUM BROMIDE AND ALBUTEROL SULFATE 3 ML: .5; 3 SOLUTION RESPIRATORY (INHALATION) at 10:58

## 2023-10-16 RX ADMIN — GABAPENTIN 400 MG: 400 CAPSULE ORAL at 20:34

## 2023-10-16 RX ADMIN — FUROSEMIDE 40 MG: 10 INJECTION, SOLUTION INTRAMUSCULAR; INTRAVENOUS at 15:11

## 2023-10-16 RX ADMIN — POTASSIUM CHLORIDE 40 MEQ: 1500 TABLET, EXTENDED RELEASE ORAL at 10:31

## 2023-10-16 RX ADMIN — GUAIFENESIN 1200 MG: 600 TABLET, MULTILAYER, EXTENDED RELEASE ORAL at 20:34

## 2023-10-16 RX ADMIN — AMPICILLIN SODIUM AND SULBACTAM SODIUM 3 G: 2; 1 INJECTION, POWDER, FOR SOLUTION INTRAMUSCULAR; INTRAVENOUS at 10:31

## 2023-10-16 RX ADMIN — SODIUM CHLORIDE: 9 INJECTION, SOLUTION INTRAVENOUS at 07:38

## 2023-10-16 RX ADMIN — TIZANIDINE 2 MG: 4 TABLET ORAL at 10:30

## 2023-10-16 RX ADMIN — INSULIN LISPRO 8 UNITS: 100 INJECTION, SOLUTION INTRAVENOUS; SUBCUTANEOUS at 17:36

## 2023-10-16 RX ADMIN — FUROSEMIDE 40 MG: 10 INJECTION, SOLUTION INTRAMUSCULAR; INTRAVENOUS at 10:31

## 2023-10-16 RX ADMIN — Medication 2000 UNITS: at 20:34

## 2023-10-16 RX ADMIN — AMPICILLIN SODIUM AND SULBACTAM SODIUM 3 G: 2; 1 INJECTION, POWDER, FOR SOLUTION INTRAMUSCULAR; INTRAVENOUS at 20:34

## 2023-10-16 RX ADMIN — ONDANSETRON 4 MG: 2 INJECTION INTRAMUSCULAR; INTRAVENOUS at 07:44

## 2023-10-16 RX ADMIN — TIZANIDINE 2 MG: 4 TABLET ORAL at 20:34

## 2023-10-16 RX ADMIN — PANTOPRAZOLE SODIUM 40 MG: 40 TABLET, DELAYED RELEASE ORAL at 17:36

## 2023-10-16 RX ADMIN — GABAPENTIN 400 MG: 400 CAPSULE ORAL at 10:30

## 2023-10-16 RX ADMIN — ALLOPURINOL 100 MG: 100 TABLET ORAL at 10:31

## 2023-10-16 RX ADMIN — AMPICILLIN SODIUM AND SULBACTAM SODIUM 3 G: 2; 1 INJECTION, POWDER, FOR SOLUTION INTRAMUSCULAR; INTRAVENOUS at 15:11

## 2023-10-16 RX ADMIN — TIZANIDINE 2 MG: 4 TABLET ORAL at 15:11

## 2023-10-16 RX ADMIN — CYANOCOBALAMIN TAB 1000 MCG 1000 MCG: 1000 TAB at 10:31

## 2023-10-16 RX ADMIN — Medication 10 ML: at 10:30

## 2023-10-16 NOTE — PLAN OF CARE
Goal Outcome Evaluation:                      Pt had trouble with the coughing. PRN pain medication was given. No other complaints. VSS, call light within reach. Pt NPO for bronch. Plan of care ongoing.

## 2023-10-16 NOTE — PLAN OF CARE
Goal Outcome Evaluation:   Patient is alert, able to make needs known to staff. Patient has no complaints of pain noted at this time.

## 2023-10-16 NOTE — ANESTHESIA POSTPROCEDURE EVALUATION
Patient: Claudia Rust    Procedure Summary       Date: 10/16/23 Room / Location: [facility] ENDOSCOPY 2 / [facility] ENDOSCOPY    Anesthesia Start: 0738 Anesthesia Stop: 0800    Procedure: BRONCHOSCOPY with bronchial washing (Bronchus) Diagnosis:       Multifocal pneumonia      Multiple tracheobronchial mucus plugs      (Multifocal pneumonia [J18.9])      (Multiple tracheobronchial mucus plugs [T17.800A])    Surgeons: Glenn Reyes MD Provider: Vu Seymour MD    Anesthesia Type: general ASA Status: 4            Anesthesia Type: general    Vitals  Vitals Value Taken Time   /53 10/16/23 0834   Temp     Pulse 90 10/16/23 0838   Resp 19 10/16/23 0826   SpO2 94 % 10/16/23 0838   Vitals shown include unfiled device data.        Post Anesthesia Care and Evaluation    Patient location during evaluation: PACU  Patient participation: complete - patient participated  Level of consciousness: awake  Pain scale: See nurse's notes for pain score.  Pain management: adequate    Airway patency: patent  Anesthetic complications: No anesthetic complications  PONV Status: none  Cardiovascular status: acceptable  Respiratory status: acceptable and spontaneous ventilation  Hydration status: acceptable    Comments: Patient seen and examined postoperatively; vital signs stable; SpO2 greater than or equal to 90%; cardiopulmonary status stable; nausea/vomiting adequately controlled; pain adequately controlled; no apparent anesthesia complications; patient discharged from anesthesia care when discharge criteria were met

## 2023-10-16 NOTE — PROGRESS NOTES
"NEPHROLOGY PROGRESS NOTE------[facility]    [facility]  [phone]  Yovani Lerma MD      Patient Care Team:  Pool Meyer NP-C as PCP - General (Nurse Practitioner)  Jose Levi MD as Consulting Physician (Cardiology)  Yovani Lerma MD as Consulting Physician (Nephrology)  Mary Hunt APRN as Nurse Practitioner (Nurse Practitioner)  Edmar Bear MD as Consulting Physician (Cardiology)      Provider:  Yovani Lerma MD  Patient Name: Claudia Rust  :  1950    SUBJECTIVE:  Follow-up JERROD/hyponatremia  No chest pain has some dyspnea      Medication:  allopurinol, 100 mg, Oral, Daily  ampicillin-sulbactam, 3 g, Intravenous, Q6H  budesonide-formoterol, 2 puff, Inhalation, BID - RT  cholecalciferol, 2,000 Units, Oral, Nightly  furosemide, 40 mg, Intravenous, TID  gabapentin, 400 mg, Oral, TID  guaiFENesin, 1,200 mg, Oral, Q12H  insulin lispro, 3-14 Units, Subcutaneous, 4x Daily AC & at Bedtime  ipratropium-albuterol, 3 mL, Nebulization, Q4H - RT  levothyroxine, 125 mcg, Oral, Q AM  pantoprazole, 40 mg, Oral, Q PM  potassium chloride, 40 mEq, Oral, TID With Meals  predniSONE, 30 mg, Oral, Daily   Followed by  [START ON 10/18/2023] predniSONE, 20 mg, Oral, Daily   Followed by  [START ON 10/20/2023] predniSONE, 10 mg, Oral, Daily  senna-docusate sodium, 2 tablet, Oral, BID  sodium chloride, 10 mL, Intravenous, Q12H  tiZANidine, 2 mg, Oral, TID  vitamin B-12, 1,000 mcg, Oral, Daily           OBJECTIVE    Vital Sign Min/Max for last 24 hours  Temp  Min: 97.4 °F (36.3 °C)  Max: 100.3 °F (37.9 °C)   BP  Min: 98/58  Max: 143/64   Pulse  Min: 68  Max: 106   Resp  Min: 16  Max: 29   SpO2  Min: 90 %  Max: 96 %   No data recorded   No data recorded     Flowsheet Rows      Flowsheet Row First Filed Value   Admission Height 162.6 cm (64\") Documented at 10/13/2023 2352   Admission Weight 137 kg (303 lb) Documented at 10/13/2023 2352      " "      I/O this shift:  In: 100 [I.V.:100]  Out: -   I/O last 3 completed shifts:  In: 1150 [P.O.:800; IV Piggyback:350]  Out: -     Physical Exam:  General Appearance: alert, appears stated age and cooperative  Head: normocephalic, without obvious abnormality and atraumatic  Eyes: conjunctivae and sclerae normal and no icterus  Neck: supple and no JVD  Lungs: clear to auscultation and respirations regular  Heart: regular rhythm & normal rate and normal S1, S2  Chest: Wall no abnormalities observed  Abdomen: normal bowel sounds and soft, nontender  Extremities: moves extremities well, plus edema, no cyanosis and no redness  Skin: no bleeding, bruising or rash, turgor normal, color normal and no lesions noted  Neurologic: Alert, and oriented. No focal deficits    Labs:    WBC WBC   Date Value Ref Range Status   10/15/2023 20.60 (H) 3.40 - 10.80 10*3/mm3 Final   10/15/2023 16.60 (H) 3.40 - 10.80 10*3/mm3 Final   10/14/2023 15.80 (H) 3.40 - 10.80 10*3/mm3 Final      HGB Hemoglobin   Date Value Ref Range Status   10/15/2023 11.0 (L) 12.0 - 15.9 g/dL Final   10/15/2023 11.4 (L) 12.0 - 15.9 g/dL Final   10/14/2023 11.9 (L) 12.0 - 15.9 g/dL Final      HCT Hematocrit   Date Value Ref Range Status   10/15/2023 34.5 34.0 - 46.6 % Final   10/15/2023 34.5 34.0 - 46.6 % Final   10/14/2023 36.7 34.0 - 46.6 % Final      Platelets No results found for: \"LABPLAT\"   MCV MCV   Date Value Ref Range Status   10/15/2023 88.3 79.0 - 97.0 fL Final   10/15/2023 86.6 79.0 - 97.0 fL Final   10/14/2023 88.7 79.0 - 97.0 fL Final          Sodium Sodium   Date Value Ref Range Status   10/15/2023 126 (L) 136 - 145 mmol/L Final   10/15/2023 126 (L) 136 - 145 mmol/L Final   10/15/2023 123 (L) 136 - 145 mmol/L Final   10/15/2023 121 (L) 136 - 145 mmol/L Final   10/14/2023 121 (L) 136 - 145 mmol/L Final      Potassium Potassium   Date Value Ref Range Status   10/15/2023 3.8 3.5 - 5.2 mmol/L Final   10/15/2023 3.8 3.5 - 5.2 mmol/L Final   10/15/2023 3.4 " "(L) 3.5 - 5.2 mmol/L Final   10/14/2023 4.3 3.5 - 5.2 mmol/L Final      Chloride Chloride   Date Value Ref Range Status   10/15/2023 86 (L) 98 - 107 mmol/L Final   10/15/2023 86 (L) 98 - 107 mmol/L Final   10/15/2023 84 (L) 98 - 107 mmol/L Final   10/14/2023 87 (L) 98 - 107 mmol/L Final      CO2 CO2   Date Value Ref Range Status   10/15/2023 27.0 22.0 - 29.0 mmol/L Final   10/15/2023 27.0 22.0 - 29.0 mmol/L Final   10/15/2023 24.0 22.0 - 29.0 mmol/L Final   10/14/2023 22.0 22.0 - 29.0 mmol/L Final      BUN BUN   Date Value Ref Range Status   10/15/2023 36 (H) 8 - 23 mg/dL Final   10/15/2023 36 (H) 8 - 23 mg/dL Final   10/15/2023 28 (H) 8 - 23 mg/dL Final   10/14/2023 25 (H) 8 - 23 mg/dL Final      Creatinine Creatinine   Date Value Ref Range Status   10/15/2023 1.19 (H) 0.57 - 1.00 mg/dL Final   10/15/2023 1.19 (H) 0.57 - 1.00 mg/dL Final   10/15/2023 1.05 (H) 0.57 - 1.00 mg/dL Final   10/14/2023 0.96 0.57 - 1.00 mg/dL Final      Calcium Calcium   Date Value Ref Range Status   10/15/2023 9.1 8.6 - 10.5 mg/dL Final   10/15/2023 9.1 8.6 - 10.5 mg/dL Final   10/15/2023 8.9 8.6 - 10.5 mg/dL Final   10/14/2023 8.8 8.6 - 10.5 mg/dL Final      PO4 No components found for: \"PO4\"   Albumin Albumin   Date Value Ref Range Status   10/15/2023 3.3 (L) 3.5 - 5.2 g/dL Final   10/14/2023 3.7 3.5 - 5.2 g/dL Final      Magnesium Magnesium   Date Value Ref Range Status   10/15/2023 2.2 1.6 - 2.4 mg/dL Final      Uric Acid No components found for: \"URIC ACID\"     Imaging Results (Last 72 Hours)       Procedure Component Value Units Date/Time    XR Chest 1 View [[id number]] Collected: 10/14/23 0026     Updated: 10/14/23 0029    Narrative:      XR CHEST 1 VW    Date of Exam: 10/14/2023 12:16 AM EDT    Indication: sob    Comparison: 10/10/2023.    Findings:  Patchy airspace disease is seen within the bilateral lower lobes. No sizable pleural effusion identified. The heart appears enlarged. There is indistinctness of the pulmonary " vasculature. Median sternotomy wires are present. No pneumothorax. No acute   osseous abnormality identified.      Impression:      Impression:  Patchy airspace disease within the bilateral lower lobes, likely related to pneumonia. Possible mild underlying pulmonary edema pattern.        Electronically Signed: Tana Sutherland MD    10/14/2023 12:27 AM EDT    Workstation ID: [id number]            Results for orders placed during the hospital encounter of 10/13/23    XR Chest 1 View    Narrative  XR CHEST 1 VW    Date of Exam: 10/14/2023 12:16 AM EDT    Indication: sob    Comparison: 10/10/2023.    Findings:  Patchy airspace disease is seen within the bilateral lower lobes. No sizable pleural effusion identified. The heart appears enlarged. There is indistinctness of the pulmonary vasculature. Median sternotomy wires are present. No pneumothorax. No acute  osseous abnormality identified.    Impression  Impression:  Patchy airspace disease within the bilateral lower lobes, likely related to pneumonia. Possible mild underlying pulmonary edema pattern.        Electronically Signed: Tana Sutherland MD  10/14/2023 12:27 AM EDT  Workstation ID: [id number]      Results for orders placed during the hospital encounter of 10/10/23    XR Chest AP    Narrative  XR CHEST AP    Date of Exam: 10/10/2023 7:02 PM EDT    Indication: COVID Evaluation, Cough, Fever    Comparison: 1/6/2023    Findings:  Lines: None    Lungs: The lungs are clear.  Pleura: No pleural effusion or pneumothorax.    Cardiomediastinum: Unchanged    Soft Tissues: Unremarkable.    Bones: Status post median sternotomy. No acute osseous abnormality.    Impression  Impression:  No acute abnormality.      Electronically Signed: David Montemayor DO  10/10/2023 7:14 PM EDT  Workstation ID: [id number]      Results for orders placed during the hospital encounter of 01/06/23    XR Chest 1 View    Narrative  XR CHEST 1 VW    Date of Exam: 1/6/2023 7:33 PM EST    Indication:  SOA.    Comparison: 4/7/2021 and prior    Findings:  Patient is status post median sternotomy. The heart size appears stable. Prosthetic valve at the aortic root noted. Pulmonary vascularity is congested and distinct. Coarsening of the interstitial markings noted diffusely with increased hazy and  interstitial opacities noted with a perihilar and basilar predominance. Osseous structures demonstrate no acute abnormality. Multilevel degenerative changes noted.    Impression  Impression:  Increased hazy and interstitial opacities noted. Findings represent underlying interstitial edema. Viral and atypical pneumonia also in the differential.    Electronically Signed: Micah Neil  1/6/2023 8:03 PM EST  Workstation ID: [id number]      Results for orders placed during the hospital encounter of 10/13/23    Duplex Venous Lower Extremity - Bilateral CAR    Interpretation Summary    Negative for deep vein thrombosis bilaterally in the visualized veins.  However, significant limitations to study due to patient movement and body habitus.  Multiple veins could not be adequately visualized.        ASSESSMENT / PLAN      Acute respiratory failure    Multifocal pneumonia    Multiple tracheobronchial mucus plugs    CKD stage IIIa-CKD due to hypertensive nephrosclerosis  Rhabdomyolysis-statins held  Hyponatremia-with hypervolemia.  Probably underlying excess ADH related to SSRI use.  Aldactone and metolazone held.  Status post Samsca 7.5 mg x 1  Acute exacerbation of chronic diastolic heart failure  Chronic lower extremity edema  History coronary artery disease with history of AVR  Morbid obesity  Acute hypoxic respiratory failure  Pneumonia  Depression on SSRI  Diabetes type 2  Anemia-SPEP pending  History right upper extremity DVT  Hyperlipidemia    Creatinine remains stable  Sodium better, status post Samsca  CK still high but trending down  Clinically appears with excess volume  Continue IV Lasix  Monitor renal function fluid  status electrolytes        Yovani Lerma MD  [facility]  [phone]  10/16/23  12:23 EDT

## 2023-10-16 NOTE — OP NOTE
Bronchoscopy Procedure Note    Timeout was done appropriately by staff    Procedure:  Bronchoscopy, Therapeutic removal of very thick mucopurulent secretions  Bilateral lung washing    Preoperative Diagnosis: Multifocal pneumonia    Postoperative Diagnosis:   Copious amount of very thick mucopurulent secretions both lungs suctioned therapeutically  Bilateral lung washing  Revealed reactive airway disease  Moderate to severe inflammation    Anesthesia: Moderate Sedation    Procedure Details: Patient was consented for the procedure with all risks and benefits of the procedure explained in detail.  Patient was given the opportunity to ask questions and all concerns were answered.  The bronchocope was inserted into the main airway via the oropharynx. An anatomical survey was done of the main airways and the subsegmental bronchus to at least the first subsegmental level of all five lobes of both lungs.  The findings are reported below.  A bronchoalveolar lavage was performed using aliquots of normal saline instilled into the airways then aspirated back.    Findings:  Bronchoscope passed into oral cavity to the level of the vocal cords.  Lidocaine used for local anesthetic over vocal cords.  Bronchoscope was passed between the vocal cords into the trachea.  All airways were visualized to at least the first subsegment level of all 5 lobes of both lungs.     Copious amount of very thick mucopurulent secretions both lungs suctioned therapeutically  Bilateral lung washing  Revealed reactive airway disease  Moderate to severe inflammation    Patient tolerated procedure well        Estimated Blood Loss:  Minimal           Specimens:  Sent purulent fluid                Complications:  None; patient tolerated the procedure well.           Disposition: PACU - hemodynamically stable.      Patient tolerated the procedure well.    Glenn Reyes MD  10/16/2023  07:55 EDT

## 2023-10-16 NOTE — PROGRESS NOTES
Daily Progress Note        Acute respiratory failure    Multifocal pneumonia    Multiple tracheobronchial mucus plugs    Assessment:     Hypoxia:   ABG 7.42/34.4/96 while on nonrebreather mask  COPD with acute exacerbation  Multifocal pneumonia  Multiple mucous plugs  Cellulitis of lower extremities  Hx of respiratory failure required intubation 03/22/2021 , extubated 4/4/21  Aortic valve stenosis, status post tissue replacement  Pulm hypertension, severe  KARLY  Tobacco abuse  bronchoscopy 3/25/2021, cultures negative  Interstitial pneumonitis 2nd to aspiration   Hx of Catheter associated DVT right upper extremity subclavian, axillary, and brachial  Upper arm left DVT  DM  Hx of Ecoli UTI  Oral candidiasis  HTN  JERROD  DM2  Chronic anemia  HLD     Recommendations:    Bronchoscopy completed 10/16/2023  Copious amount of very thick mucopurulent secretions both lungs suctioned therapeutically  Bilateral lung washing    Oxygen supplement and titration to maintain saturation 90 to 95%: Currently requiring 3 L per nasal cannula  Antibiotics  Bronchodilators  Mucinex  Inhaled corticosteroids  Diuretics/electrolyte management as per nephrology  Atorvastatin  BP control  Glucose controlled  Thyroid hormone replacement                LOS: 2 days     Subjective         Objective     Vital signs for last 24 hours:  Vitals:    10/16/23 0312 10/16/23 0315 10/16/23 0438 10/16/23 0732   BP:   124/54 121/57   BP Location:   Right arm Right arm   Patient Position:   Lying Lying   Pulse: 72 72 77 85   Resp: 18 18 19 17   Temp:   98.3 °F (36.8 °C)    TempSrc:   Oral    SpO2: 93% 94% 90% 92%   Weight:       Height:           Intake/Output last 3 shifts:  I/O last 3 completed shifts:  In: 1150 [P.O.:800; IV Piggyback:350]  Out: -   Intake/Output this shift:  No intake/output data recorded.      Radiology  Imaging Results (Last 24 Hours)       ** No results found for the last 24 hours. **            Labs:  Results from last 7 days   Lab  Units 10/15/23  2236   WBC 10*3/mm3 20.60*   HEMOGLOBIN g/dL 11.0*   HEMATOCRIT % 34.5   PLATELETS 10*3/mm3 249     Results from last 7 days   Lab Units 10/15/23  2236   SODIUM mmol/L 126*  126*   POTASSIUM mmol/L 3.8  3.8   CHLORIDE mmol/L 86*  86*   CO2 mmol/L 27.0  27.0   BUN mg/dL 36*  36*   CREATININE mg/dL 1.19*  1.19*   CALCIUM mg/dL 9.1  9.1   BILIRUBIN mg/dL 0.4   ALK PHOS U/L 181*   ALT (SGPT) U/L 48*   AST (SGOT) U/L 77*   GLUCOSE mg/dL 181*  181*     Results from last 7 days   Lab Units 10/14/23  0024   PH, ARTERIAL pH units 7.424   PO2 ART mm Hg 96.0   PCO2, ARTERIAL mm Hg 34.4*   HCO3 ART mmol/L 22.5     Results from last 7 days   Lab Units 10/15/23  2236 10/14/23  0018   ALBUMIN g/dL 3.3* 3.7     Results from last 7 days   Lab Units 10/15/23  2236 10/15/23  0323 10/14/23  0018   CK TOTAL U/L 1,631* 2,719*  --    HSTROP T ng/L  --   --  9         Results from last 7 days   Lab Units 10/15/23  2236   MAGNESIUM mg/dL 2.2         Results from last 7 days   Lab Units 10/15/23  0323   TSH uIU/mL 0.580           Meds:   SCHEDULE  allopurinol, 100 mg, Oral, Daily  budesonide-formoterol, 2 puff, Inhalation, BID - RT  cephalexin, 500 mg, Oral, Q6H  cholecalciferol, 2,000 Units, Oral, Nightly  furosemide, 40 mg, Intravenous, TID  gabapentin, 400 mg, Oral, TID  guaiFENesin, 1,200 mg, Oral, Q12H  insulin lispro, 3-14 Units, Subcutaneous, 4x Daily AC & at Bedtime  ipratropium-albuterol, 3 mL, Nebulization, Q4H - RT  levothyroxine, 125 mcg, Oral, Q AM  pantoprazole, 40 mg, Oral, Q PM  potassium chloride, 40 mEq, Oral, TID With Meals  senna-docusate sodium, 2 tablet, Oral, BID  sodium chloride, 10 mL, Intravenous, Q12H  tiZANidine, 2 mg, Oral, TID  vitamin B-12, 1,000 mcg, Oral, Daily      Infusions     PRNs    acetaminophen    albuterol    senna-docusate sodium **AND** polyethylene glycol **AND** bisacodyl **AND** bisacodyl    dextrose    dextrose    glucagon (human recombinant)    guaiFENesin-codeine     ondansetron    ondansetron    [COMPLETED] Insert Peripheral IV **AND** sodium chloride    sodium chloride    sodium chloride    Physical Exam:  Physical Exam  Cardiovascular:      Heart sounds: Murmur heard.   Pulmonary:      Effort: No respiratory distress.      Breath sounds: No stridor. Wheezing, rhonchi and rales present.   Chest:      Chest wall: No tenderness.         ROS  Review of Systems   Respiratory:  Positive for cough and shortness of breath. Negative for wheezing and stridor.    Cardiovascular:  Positive for palpitations and leg swelling. Negative for chest pain.   Gastrointestinal:  Negative for abdominal distention.             Total time spent with patient greater than: 45 Minutes

## 2023-10-16 NOTE — SIGNIFICANT NOTE
10/16/23 1512   OTHER   Discipline physical therapist   Rehab Time/Intention   Session Not Performed patient/family declined evaluation  (Pt requested time to eat; will re-attempt as schedule permits.)   Therapy Assessment/Plan (PT)   Criteria for Skilled Interventions Met (PT) yes

## 2023-10-16 NOTE — PROGRESS NOTES
[facility] Medicine Services   Daily Progress Note    Patient Name: Claudia Rust  : 1950  MRN: [id number]  Primary Care Physician:  Pool Meyer NP-C  Date of admission: 10/13/2023  Date of service 10/14/2023      Subjective      Subjective  Pt complaining of short of breath, also noted to have swelling involving the lower leg. Denies for any other active complaint.  Going down for bronchoscopy today      ROS A 12 point review of system was done and was negative except as mentioned above      Objective      Vitals:   Temp:  [97.4 °F (36.3 °C)-100.3 °F (37.9 °C)] 98.4 °F (36.9 °C)  Heart Rate:  [] 92  Resp:  [16-29] 18  BP: ()/(52-66) 112/52  Flow (L/min):  [3-10] 10    Physical Exam  Constitutional:       Appearance: Normal appearance.   HENT:      Head: Normocephalic and atraumatic.      Nose: Nose normal.      Mouth/Throat:      Mouth: Mucous membranes are moist.   Cardiovascular:      Rate and Rhythm: Normal rate.   Pulmonary:      Effort: Pulmonary effort is normal. No respiratory distress.      Breath sounds: Normal breath sounds. No stridor. No wheezing, rhonchi or rales.   Chest:      Chest wall: No tenderness.   Abdominal:      General: Abdomen is flat. There is no distension.      Palpations: Abdomen is soft. There is no mass.      Tenderness: There is no abdominal tenderness.   Musculoskeletal:         General: Swelling and tenderness present.      Right lower leg: Edema present.      Left lower leg: Edema present.      Comments: Mild local rise of temperature of the left lower leg   Neurological:      General: No focal deficit present.      Mental Status: She is alert.             Result Review    Result Review:  I have personally reviewed the results from the time of this admission to 10/16/2023 17:28 EDT and agree with these findings:  [x]  Laboratory  []  Microbiology  [x]  Radiology  []  EKG/Telemetry   []  Cardiology/Vascular   []  Pathology  []  Old records  []   Other:            Assessment & Plan      Brief Patient Summary:   Claudia Rust is a 73 y.o. female who presented to [facility] on 10/13/2023 complaining of worsening shortness of breath and cough.  Patient was just seen in the ED a few days ago for same complaints.  She was discharged with breathing treatments and steroids however patient stated that she did not improve.  Her shortness of breath actually worsened.  Patient does have history of COPD but does not use oxygen at home.    In the ED, patient was requiring 4 L nasal cannula.  Labs remarkable for sodium 121, WBC 15.8.  Chest x-ray showed patchy airspace disease within the bilateral lower lobes, pneumonia versus pulmonary edema.  Patient was given Lasix and DuoNebs in the ED.      allopurinol, 100 mg, Oral, Daily  ampicillin-sulbactam, 3 g, Intravenous, Q6H  budesonide-formoterol, 2 puff, Inhalation, BID - RT  cholecalciferol, 2,000 Units, Oral, Nightly  furosemide, 40 mg, Intravenous, TID  gabapentin, 400 mg, Oral, TID  guaiFENesin, 1,200 mg, Oral, Q12H  insulin lispro, 3-14 Units, Subcutaneous, 4x Daily AC & at Bedtime  ipratropium-albuterol, 3 mL, Nebulization, Q4H - RT  levothyroxine, 125 mcg, Oral, Q AM  pantoprazole, 40 mg, Oral, Q PM  potassium chloride, 40 mEq, Oral, TID With Meals  predniSONE, 30 mg, Oral, Daily   Followed by  [START ON 10/18/2023] predniSONE, 20 mg, Oral, Daily   Followed by  [START ON 10/20/2023] predniSONE, 10 mg, Oral, Daily  senna-docusate sodium, 2 tablet, Oral, BID  sodium chloride, 10 mL, Intravenous, Q12H  tiZANidine, 2 mg, Oral, TID  vitamin B-12, 1,000 mcg, Oral, Daily             Active Hospital Problems:  Active Hospital Problems    Diagnosis     **Acute respiratory failure     Multifocal pneumonia     Multiple tracheobronchial mucus plugs      Plan:     COPD  Dyspnea  Acute respiratory failure  Patient currently on 4 L nasal cannula  Continue oxygen supplementation  Wean as tolerated  Off steroids  now.  Antibiotics     Possible Pneumonia  Start Rocephin and azithromycin  Continue breathing treatments  Guaifenesin as needed for cough  Pulmonary on board.     Possible pulmonary edema  Start Lasix 40 mg IV twice daily  Obtain echocardiogram     Hyponatremia appear secondary to hypervolemia  Sodium 121  Fluid restriction  Continue diuretics  Follow-up electrolytes  Urine studies  Nephrology consulted.  On lasix 60 mg iv TID now.       Leukocytosis  Most likely due to steroid use  Repeat CBC    Cellulitis of lower legs  Antibiotics as ordered.  Venous Doppler to rule out a DVT     UTI  UA studies reviewed.  Follow-up urine culture  Continue antibiotics     Hypertension  Follow-up BP      DVT prophylaxis:  Mechanical DVT prophylaxis orders are present.    Bronchoscopy 10/16/2023.  Currently on 10 L of oxygen nonrebreather.  Continue prednisone and IV Unasyn.  Sodium trending up 126, creatinine 1.19.  Leukocytosis.  Labs reviewed.  Repeat labs and monitor.    CODE STATUS:         Disposition:  I expect patient to be discharged 2 to 3 days    Patient,  and daughter was updated with plan of care.  All questions answered      Electronically signed by Kimmy Mendoza MD, 10/16/23, 17:28 EDT.  [facility] Hospitalist Team

## 2023-10-16 NOTE — PROGRESS NOTES
Cardiology Progress Note    Patient Identification:  Name: Claudia Rust  Age: 73 y.o.  Sex: female  :  1950  MRN: [id number]                 Follow Up / Chief Complaint: Chronic HFpEF   Chief Complaint   Patient presents with    Shortness of Breath       Interval History: Patient presented with worsening shortness of breath and edema.  She is hypervolemic and hyponatremia, nephrology following.  She was also positive for Rhinovirus on 10/10/2023, symptoms progressively worsening.  She underwent bronchoscopy today 10/16/2023 with Dr. Reyes that showed significant amounts of mucuopurulent secretions, reactive airway disease and moderate to severe inflammation.     NP NOTE:  Patient seen with Dr. Levi.  She is still drowsy from bronchoscopy, but feeling better.  She denies any chest pain.  Still with significant edema and bilateral lower extremities are red as well.  Continue diuretics per nephrology, sodium improved to 126.   Check doppler left upper extremity     Electronically signed by Mary Hunt, SARA, 10/16/23, 10:13 AM EDT.    Cardiology attending addendum :    I have personally performed a face-to-face diagnostic evaluation, physical exam and reviewed data on this patient.  I have reviewed documentation done by me and nurse practitioner  and corrected as needed.  And agree with the different components of documentation.Greater than 50% of the time spent in the care of this patient was provided by attending consultant/me.         Subjective: Patient seen and examined.  Chart reviewed.  Labs reviewed.  Discussed with RN taking care of patient.  Patient underwent bronchoscopy this morning is awake but still sleepy and groggy.        Objective: pro bnp negative at 646, , bun 25 creatinine 0.96 AST 38   WBC 15.8  hgb 11.9 CK 2719 potassium 3.4 BUN 28, creatinine 1.05 iron 22 iron sat 8  10/16/2023: CK 1631, sodium 126, potassium 3.8, bun 36 creatinine 1.19 AST 77, ALT 48 WBC 20.6 hgb 11.0              History of present illness:      Ms. Claudia Rust has of PMH      #Valvular heart disease with moderate to severe aortic stenosis  Cardiac cath 1/20/2021 revealing nonobstructive CAD  Patient underwent #23 magna pericardial prosthesis AVR 3/18/2021  Transesophageal echo 3/25/2021 normal LV systolic function  # Hyperlipidemia,    # Hypertension  # COPD, KARLY on CPAP  # Prediabetes  #History of HIT, history of DVT RUE   # Hypothyroidism, Rheumatoid Arthritis, spinal stenosis, hiatal hernia, chronic depression, bipolar, GERD, IBS, chronic migraine, vertigo, herniated disc, compression lumbar fractures  #Allergies/intolerance to Stadol  # Hysterectomy, bladder reconstruction, cholecystectomy, right hand surgery  # Family history of strokes and grandfather.  Mother's cancer, father on  # Active cigarette smoker trying to quit         presented to the ED with worsening shortness of breath and cough over the last 2 weeks.  Patient was diagnosed with rhinovirus on 10/10/2023 in the ED, given steroids and discharged home.   Patient progressively worsened the next few days and EMS was called.   Patient was hypoxic in the 80s.    She reports worsening edema as well as orthopnea.  Patient denies any chest pain.  No fevers      In the ED on 10/14/2023 CXR showed pulmonary edema vs bilateral pneumonia,   HS troponin negative, pro bnp negative at 646, , bun 25 creatinine 0.96 AST 38   WBC 15.8  hgb 11.9 CK 2719 potassium 3.4 BUN 28, creatinine 1.05 iron 22 iron sat 8          Review of records reveals patient underwent an echocardiogram which is revealing severe aortic stenosis on 2/11/2020 ,Underwent cardiac cath 1/20/2021 which revealed no obstructive disease,underwent aortic valve replacement 3/18/2021 with #23 magna pericardial prosthesis.       Carotid Dopplers 9/13/2019 normal.  Echocardiogram 2/11/2020 is revealing severe aortic stenosis  Work-up here on 3/16/2021 revealed proBNP 733, normal CMP, A1c 5.7,  "normal CBC, chest x-ray with no acute abnormality.  Lexiscan Cardiolite 2/28/2023 negative for ischemia EF of 86%           Assessment:     Acute respiratory failure with recent rhinovirus 10/10/2023 and now with pneumonia and mucous plugs    Bradycardia    Aortic stenosis, severe, status post AVR with #23 magna pericardial prosthesis 3/18/2021    Chronic congestive heart failure  due to valvular heart disease and aortic stenosis hypertensive cardiovascular disease essential hypertension/hypertensive cardiovascular disease     Dyslipidemia    diabetes  Morbid obesity with BMI over 40  Cigarette smoker  TCP HIT positive on 3/26/2021, + RUE DVT  previously on warfarin  Diabetes     Plan:     Telemetry is revealing sinus rhythm.  Appreciated [facility] report.  We will follow-up with pulmonary.    Continue aspirin,  metoprolol, simvastatin as tolerated for edema, AVR, carotid disease, chronic heart failure due to valvular heart disease and hypertensive cardiovascular disease.  Stress test 2/20/2023 which was negative for ischemia.  follow up with nephrology     Discussed with RN taking care of patient to coordinate care.          Copied text in this portion of the note has been reviewed and is accurate as of 10/16/2023    Past Medical History:  Past Medical History:   Diagnosis Date    Acute congestive heart failure     Allergies     Anxiety     Arthritis     Asthma     Bilateral leg edema     Bipolar 1 disorder     Bulging lumbar disc     X3    Cancer     states had \"cancerous tumor during pregnancy and had to have hysterectomy\"    Cataract     bilateral    Cellulitis 03/2021    bilateral legs    Compression fracture of vertebral column     LUMBAR    COPD (chronic obstructive pulmonary disease)     Delayed emergence from anesthesia     Depression     Depression     Diabetes mellitus     Dyslipidemia     Dyspnea on exertion     Fibromyalgia     GERD (gastroesophageal reflux disease)     Hiatal hernia     Hyperlipidemia     " Hypertension     Hypothyroid     HYPOTHYROID    IBS (irritable bowel syndrome)     Migraines     Morbid obesity     Neuropathy     Panic attacks     Peripheral edema     PONV (postoperative nausea and vomiting)     Poor mobility     rolling walker    Prediabetes     PVD (peripheral vascular disease)     Rheumatoid aortitis     Spinal stenosis     Spinal stenosis     Vertigo     Vertigo     Vitamin D deficiency      Past Surgical History:  Past Surgical History:   Procedure Laterality Date    AORTIC VALVE REPAIR/REPLACEMENT N/A 3/18/2021    Procedure: AORTIC VALVE REPLACEMENT;  Surgeon: Olaf Mcgill MD;  Location: [facility] CVOR;  Service: Cardiothoracic;  Laterality: N/A;    BRONCHOSCOPY N/A 3/25/2021    Procedure: BRONCHOSCOPY AT BEDSIDE WITH BRONCHIOALVEOLAR LAVAGE;  Surgeon: Glenn Reyes MD;  Location: [facility] ENDOSCOPY;  Service: Pulmonary;  Laterality: N/A;  PNA    CARDIAC CATHETERIZATION  2009    CARDIAC CATHETERIZATION N/A 8/14/2019    Procedure: Left Heart Cath;  Surgeon: Jose Levi MD;  Location: [facility] CATH INVASIVE LOCATION;  Service: Cardiovascular    CARDIAC CATHETERIZATION N/A 8/14/2019    Procedure: Right Heart Cath;  Surgeon: Jose Levi MD;  Location: [facility] CATH INVASIVE LOCATION;  Service: Cardiovascular    CARDIAC CATHETERIZATION N/A 8/14/2019    Procedure: Aortic root aortogram;  Surgeon: Jose Levi MD;  Location: [facility] CATH INVASIVE LOCATION;  Service: Cardiovascular    CARDIAC CATHETERIZATION N/A 1/20/2021    Procedure: Left Heart Cath;  Surgeon: Jose Levi MD;  Location: [facility] CATH INVASIVE LOCATION;  Service: Cardiovascular;  Laterality: N/A;    COLONOSCOPY      CYSTOCELE REPAIR  1984    ELBOW ARTHROPLASTY  2011    ENDOSCOPY      FOOT SURGERY      GALLBLADDER SURGERY  2002    TONSILLECTOMY      VAGINAL HYSTERECTOMY  1972        Social History:   Social History     Tobacco Use    Smoking status: Former     Packs/day: 1     Types:  Cigarettes     Quit date:      Years since quittin.7    Smokeless tobacco: Never    Tobacco comments:     decrease now and do not smoke dos   Substance Use Topics    Alcohol use: Not Currently     Comment: very rare      Family History:  Family History   Problem Relation Age of Onset    Ovarian cancer Mother     Lung cancer Mother     Esophageal cancer Mother     Hypertension Father     Diabetes Maternal Grandmother           Allergies:  Allergies   Allergen Reactions    Adhesive Tape Unknown (See Comments)     hives    Butorphanol Other (See Comments)     Chest pain difficulty breathing throat swelling, STADAL    Garlic Other (See Comments)     Chest pain difficulty breathing throat swells    Heparin Other (See Comments)     HIT +    Tetanus-Diphtheria Toxoids Td Other (See Comments)     Dizziness,  vomiting    Aloe Itching    Bee Venom Other (See Comments)     Swelling eyes and lips hand swelling    Latex Itching    Macadamia Nut Oil Other (See Comments)     Chest pain difficulty breathing throat swelling    Tuberculin, Ppd Unknown (See Comments)     reactor    Wasp Venom Other (See Comments)     Swelling eyes lips and hand     Scheduled Meds:  allopurinol, 100 mg, Daily  ampicillin-sulbactam, 3 g, Q6H  budesonide-formoterol, 2 puff, BID - RT  cholecalciferol, 2,000 Units, Nightly  furosemide, 40 mg, TID  gabapentin, 400 mg, TID  guaiFENesin, 1,200 mg, Q12H  insulin lispro, 3-14 Units, 4x Daily AC & at Bedtime  ipratropium-albuterol, 3 mL, Q4H - RT  levothyroxine, 125 mcg, Q AM  pantoprazole, 40 mg, Q PM  potassium chloride, 40 mEq, TID With Meals  predniSONE, 30 mg, Daily   Followed by  [START ON 10/18/2023] predniSONE, 20 mg, Daily   Followed by  [START ON 10/20/2023] predniSONE, 10 mg, Daily  senna-docusate sodium, 2 tablet, BID  sodium chloride, 10 mL, Q12H  tiZANidine, 2 mg, TID  vitamin B-12, 1,000 mcg, Daily          Review of Systems:   ROS  Review of Systems   Constitution: Negative for chills and  "fever.   Cardiovascular: Negative for chest pain and palpitations.   Respiratory: Negative for cough and hemoptysis.    Gastrointestinal: Negative for nausea.        Constitutional:  Temp:  [97.4 °F (36.3 °C)-100.3 °F (37.9 °C)] 98.4 °F (36.9 °C)  Heart Rate:  [] 92  Resp:  [16-29] 18  BP: ()/(52-66) 112/52    Physical Exam   /52 (BP Location: Left arm, Patient Position: Sitting)   Pulse 92   Temp 98.4 °F (36.9 °C) (Oral)   Resp 18   Ht 162.6 cm (64\")   Wt (!) 139 kg (306 lb)   SpO2 94%   BMI 52.52 kg/m²   General:  Appears in no acute distress, drowsy on non re-breather   Eyes: Sclera is anicteric,  conjunctiva is clear   HEENT:  No JVD. Thyroid not visibly enlarged. No mucosal pallor or cyanosis  Respiratory: Respirations regular and unlabored at rest.  Bilaterally good breath sounds, with good air entry in all fields. No crackles, rubs or wheezes auscultated  Cardiovascular: S1,S2 Regular rate and rhythm. No murmur, rub or gallop auscultated.  + bilateral lower extremity edema   Gastrointestinal: Abdomen nondistended, soft  obese   Musculoskeletal:  No abnormal movements  Extremities: left upper extremity edema, bilateral lower extremity edema with erythema   Skin: Color pink. Skin warm and dry to touch. No rashes  No xanthoma  Neuro: Alert and awake, no lateralizing deficits appreciated    INTAKE AND OUTPUT:    Intake/Output Summary (Last 24 hours) at 10/16/2023 1704  Last data filed at 10/16/2023 1500  Gross per 24 hour   Intake 340 ml   Output --   Net 340 ml       Cardiographics  Telemetry: sinus rhythm     ECG:   ECG 12 Lead Dyspnea   Final Result   HEART RATE= 86  bpm   RR Interval= 696  ms   TN Interval= 169  ms   P Horizontal Axis= 21  deg   P Front Axis= 61  deg   QRSD Interval= 96  ms   QT Interval= 405  ms   QTcB= 485  ms   QRS Axis= 64  deg   T Wave Axis= 71  deg   - NORMAL ECG -   Sinus rhythm   When compared with ECG of 06-Jan-2023 19:49:48,   Significant rate increase " "  Electronically Signed By: Eliceo Jones (IAIN) 16-Oct-2023 10:45:37   Date and Time of Study: 2023-10-14 00:05:27      SCANNED - TELEMETRY     Final Result      SCANNED - TELEMETRY     Final Result        I have personally reviewed EKG    Echocardiogram: Results for orders placed during the hospital encounter of 10/13/23    Adult Transthoracic Echo Complete w/ Color, Spectral and Contrast if necessary per protocol    Interpretation Summary    Left ventricular diastolic function was indeterminate.    Intravenous  contrast was administered to optimize endocardial definition.    Normal left ventricular systolic function  Concentric left ventricular hypertrophy  Indeterminate left ventricular diastolic function  Dilated right ventricle with probably normal function  Dilated left atrium  Bioprosthetic aortic valve with normal function.  Mild mitral regurgitation  Mild tricuspid regurgitation      Lab Review   I have reviewed the labs  Results from last 7 days   Lab Units 10/15/23  2236 10/15/23  0323 10/14/23  0018   CK TOTAL U/L 1,631* 2,719*  --    HSTROP T ng/L  --   --  9     Results from last 7 days   Lab Units 10/15/23  2236   MAGNESIUM mg/dL 2.2     Results from last 7 days   Lab Units 10/16/23  1226   SODIUM mmol/L 127*   POTASSIUM mmol/L 4.2   BUN mg/dL 39*   CREATININE mg/dL 1.30*   CALCIUM mg/dL 8.3*         Results from last 7 days   Lab Units 10/15/23  2236 10/15/23  0324 10/14/23  0018   WBC 10*3/mm3 20.60* 16.60* 15.80*   HEMOGLOBIN g/dL 11.0* 11.4* 11.9*   HEMATOCRIT % 34.5 34.5 36.7   PLATELETS 10*3/mm3 249 215 253           RADIOLOGY:  Imaging Results (Last 24 Hours)       ** No results found for the last 24 hours. **                  )10/16/2023  Jose Levi MD      EMR Dragon/Transcription:   \"Dictated utilizing Dragon dictation\".   "

## 2023-10-16 NOTE — ANESTHESIA PREPROCEDURE EVALUATION
Anesthesia Evaluation     Patient summary reviewed and Nursing notes reviewed   history of anesthetic complications:  PONV  NPO Solid Status: > 6 hours  NPO Liquid Status: > 6 hours           Airway   Mallampati: II  TM distance: >3 FB  Neck ROM: full  No difficulty expected  Dental - normal exam     Pulmonary - normal exam    breath sounds clear to auscultation  (+) pneumonia , COPD, asthma,shortness of breath  Cardiovascular - normal exam    ECG reviewed  Rhythm: regular  Rate: normal    (+) hypertension, valvular problems/murmurs (s/p replacement) AS and murmur, CHF , PVD, hyperlipidemia      Neuro/Psych  (+) headaches, dizziness/light headedness, psychiatric history  GI/Hepatic/Renal/Endo    (+) obesity, morbid obesity, hiatal hernia, GERD, diabetes mellitus, thyroid problem hypothyroidism    Musculoskeletal     Abdominal  - normal exam    Abdomen: soft.  Bowel sounds: normal.   Substance History - negative use     OB/GYN negative ob/gyn ROS         Other   arthritis,   history of cancer                      Anesthesia Plan    ASA 4     general     intravenous induction     Anesthetic plan, risks, benefits, and alternatives have been provided, discussed and informed consent has been obtained with: patient.    Plan discussed with CRNA.

## 2023-10-17 ENCOUNTER — APPOINTMENT (OUTPATIENT)
Dept: CARDIOLOGY | Facility: HOSPITAL | Age: 73
DRG: 193 | End: 2023-10-17
Payer: MEDICARE

## 2023-10-17 LAB
ALBUMIN SERPL ELPH-MCNC: 2.9 G/DL (ref 2.9–4.4)
ALBUMIN SERPL-MCNC: 3.1 G/DL (ref 3.5–5.2)
ALBUMIN/GLOB SERPL: 0.9 G/DL
ALBUMIN/GLOB SERPL: 0.9 {RATIO} (ref 0.7–1.7)
ALP SERPL-CCNC: 117 U/L (ref 39–117)
ALPHA1 GLOB SERPL ELPH-MCNC: 0.5 G/DL (ref 0–0.4)
ALPHA2 GLOB SERPL ELPH-MCNC: 1.3 G/DL (ref 0.4–1)
ALT SERPL W P-5'-P-CCNC: 66 U/L (ref 1–33)
ANION GAP SERPL CALCULATED.3IONS-SCNC: 11 MMOL/L (ref 5–15)
AST SERPL-CCNC: 64 U/L (ref 1–32)
B-GLOBULIN SERPL ELPH-MCNC: 0.8 G/DL (ref 0.7–1.3)
BH CV UPPER VENOUS LEFT AXILLARY AUGMENT: NORMAL
BH CV UPPER VENOUS LEFT AXILLARY COMPRESS: NORMAL
BH CV UPPER VENOUS LEFT AXILLARY PHASIC: NORMAL
BH CV UPPER VENOUS LEFT AXILLARY SPONT: NORMAL
BH CV UPPER VENOUS LEFT BASILIC FOREARM COMPRESS: NORMAL
BH CV UPPER VENOUS LEFT BASILIC UPPER COMPRESS: NORMAL
BH CV UPPER VENOUS LEFT BRACHIAL COMPRESS: NORMAL
BH CV UPPER VENOUS LEFT CEPHALIC FOREARM COMPRESS: NORMAL
BH CV UPPER VENOUS LEFT CEPHALIC UPPER COMPRESS: NORMAL
BH CV UPPER VENOUS LEFT INTERNAL JUGULAR AUGMENT: NORMAL
BH CV UPPER VENOUS LEFT INTERNAL JUGULAR COMPRESS: NORMAL
BH CV UPPER VENOUS LEFT INTERNAL JUGULAR PHASIC: NORMAL
BH CV UPPER VENOUS LEFT INTERNAL JUGULAR SPONT: NORMAL
BH CV UPPER VENOUS LEFT RADIAL COMPRESS: NORMAL
BH CV UPPER VENOUS LEFT SUBCLAVIAN AUGMENT: NORMAL
BH CV UPPER VENOUS LEFT SUBCLAVIAN COMPRESS: NORMAL
BH CV UPPER VENOUS LEFT SUBCLAVIAN PHASIC: NORMAL
BH CV UPPER VENOUS LEFT SUBCLAVIAN SPONT: NORMAL
BH CV UPPER VENOUS LEFT ULNAR COMPRESS: NORMAL
BH CV UPPER VENOUS RIGHT INTERNAL JUGULAR AUGMENT: NORMAL
BH CV UPPER VENOUS RIGHT INTERNAL JUGULAR COMPRESS: NORMAL
BH CV UPPER VENOUS RIGHT INTERNAL JUGULAR PHASIC: NORMAL
BH CV UPPER VENOUS RIGHT INTERNAL JUGULAR SPONT: NORMAL
BH CV UPPER VENOUS RIGHT SUBCLAVIAN AUGMENT: NORMAL
BH CV UPPER VENOUS RIGHT SUBCLAVIAN COMPRESS: NORMAL
BH CV UPPER VENOUS RIGHT SUBCLAVIAN PHASIC: NORMAL
BH CV UPPER VENOUS RIGHT SUBCLAVIAN SPONT: NORMAL
BILIRUB SERPL-MCNC: 0.4 MG/DL (ref 0–1.2)
BUN SERPL-MCNC: 45 MG/DL (ref 8–23)
BUN/CREAT SERPL: 32.8 (ref 7–25)
CALCIUM SPEC-SCNC: 8.8 MG/DL (ref 8.6–10.5)
CHLORIDE SERPL-SCNC: 92 MMOL/L (ref 98–107)
CK SERPL-CCNC: 828 U/L (ref 20–180)
CO2 SERPL-SCNC: 29 MMOL/L (ref 22–29)
CREAT SERPL-MCNC: 1.37 MG/DL (ref 0.57–1)
DEPRECATED RDW RBC AUTO: 43.8 FL (ref 37–54)
EGFRCR SERPLBLD CKD-EPI 2021: 40.9 ML/MIN/1.73
ERYTHROCYTE [DISTWIDTH] IN BLOOD BY AUTOMATED COUNT: 13.6 % (ref 12.3–15.4)
GAMMA GLOB SERPL ELPH-MCNC: 0.7 G/DL (ref 0.4–1.8)
GLOBULIN SER CALC-MCNC: 3.3 G/DL (ref 2.2–3.9)
GLOBULIN UR ELPH-MCNC: 3.5 GM/DL
GLUCOSE BLDC GLUCOMTR-MCNC: 116 MG/DL (ref 70–105)
GLUCOSE BLDC GLUCOMTR-MCNC: 160 MG/DL (ref 70–105)
GLUCOSE BLDC GLUCOMTR-MCNC: 165 MG/DL (ref 70–105)
GLUCOSE BLDC GLUCOMTR-MCNC: 207 MG/DL (ref 70–105)
GLUCOSE SERPL-MCNC: 156 MG/DL (ref 65–99)
HCT VFR BLD AUTO: 33.1 % (ref 34–46.6)
HGB BLD-MCNC: 11.4 G/DL (ref 12–15.9)
LAB AP CASE REPORT: NORMAL
LABORATORY COMMENT REPORT: ABNORMAL
LARGE PLATELETS: ABNORMAL
LYMPHOCYTES # BLD MANUAL: 1.91 10*3/MM3 (ref 0.7–3.1)
LYMPHOCYTES NFR BLD MANUAL: 2 % (ref 5–12)
M PROTEIN SERPL ELPH-MCNC: ABNORMAL G/DL
MCH RBC QN AUTO: 30.2 PG (ref 26.6–33)
MCHC RBC AUTO-ENTMCNC: 34.3 G/DL (ref 31.5–35.7)
MCV RBC AUTO: 87.9 FL (ref 79–97)
METAMYELOCYTES NFR BLD MANUAL: 1 % (ref 0–0)
MONOCYTES # BLD: 0.42 10*3/MM3 (ref 0.1–0.9)
MYELOCYTES NFR BLD MANUAL: 1 % (ref 0–0)
NEUTROPHILS # BLD AUTO: 18.44 10*3/MM3 (ref 1.7–7)
NEUTROPHILS NFR BLD MANUAL: 86 % (ref 42.7–76)
NEUTS BAND NFR BLD MANUAL: 1 % (ref 0–5)
PATH REPORT.FINAL DX SPEC: NORMAL
PATH REPORT.GROSS SPEC: NORMAL
PLATELET # BLD AUTO: 238 10*3/MM3 (ref 140–450)
PMV BLD AUTO: 8.8 FL (ref 6–12)
POTASSIUM SERPL-SCNC: 4.5 MMOL/L (ref 3.5–5.2)
PROT PATTERN SERPL ELPH-IMP: ABNORMAL
PROT SERPL-MCNC: 6.2 G/DL (ref 6–8.5)
PROT SERPL-MCNC: 6.6 G/DL (ref 6–8.5)
RBC # BLD AUTO: 3.76 10*6/MM3 (ref 3.77–5.28)
RBC MORPH BLD: NORMAL
SCAN SLIDE: NORMAL
SODIUM SERPL-SCNC: 132 MMOL/L (ref 136–145)
VARIANT LYMPHS NFR BLD MANUAL: 9 % (ref 19.6–45.3)
WBC MORPH BLD: NORMAL
WBC NRBC COR # BLD: 21.2 10*3/MM3 (ref 3.4–10.8)

## 2023-10-17 PROCEDURE — 82948 REAGENT STRIP/BLOOD GLUCOSE: CPT

## 2023-10-17 PROCEDURE — 63710000001 PREDNISONE PER 5 MG: Performed by: INTERNAL MEDICINE

## 2023-10-17 PROCEDURE — 99232 SBSQ HOSP IP/OBS MODERATE 35: CPT | Performed by: INTERNAL MEDICINE

## 2023-10-17 PROCEDURE — 94761 N-INVAS EAR/PLS OXIMETRY MLT: CPT

## 2023-10-17 PROCEDURE — 94799 UNLISTED PULMONARY SVC/PX: CPT

## 2023-10-17 PROCEDURE — 25010000002 FUROSEMIDE PER 20 MG: Performed by: INTERNAL MEDICINE

## 2023-10-17 PROCEDURE — 63710000001 PREDNISONE PER 1 MG: Performed by: INTERNAL MEDICINE

## 2023-10-17 PROCEDURE — 25010000002 AMPICILLIN-SULBACTAM PER 1.5 G: Performed by: INTERNAL MEDICINE

## 2023-10-17 PROCEDURE — 63710000001 INSULIN LISPRO (HUMAN) PER 5 UNITS: Performed by: HOSPITALIST

## 2023-10-17 PROCEDURE — 94664 DEMO&/EVAL PT USE INHALER: CPT

## 2023-10-17 PROCEDURE — 93971 EXTREMITY STUDY: CPT

## 2023-10-17 PROCEDURE — 97166 OT EVAL MOD COMPLEX 45 MIN: CPT

## 2023-10-17 PROCEDURE — 97162 PT EVAL MOD COMPLEX 30 MIN: CPT

## 2023-10-17 RX ORDER — ATORVASTATIN CALCIUM 10 MG/1
10 TABLET, FILM COATED ORAL NIGHTLY
Status: DISCONTINUED | OUTPATIENT
Start: 2023-10-17 | End: 2023-10-20 | Stop reason: HOSPADM

## 2023-10-17 RX ORDER — METOLAZONE 5 MG/1
5 TABLET ORAL ONCE
Qty: 1 TABLET | Refills: 0 | Status: COMPLETED | OUTPATIENT
Start: 2023-10-17 | End: 2023-10-17

## 2023-10-17 RX ORDER — MONTELUKAST SODIUM 10 MG/1
10 TABLET ORAL NIGHTLY
Status: DISCONTINUED | OUTPATIENT
Start: 2023-10-17 | End: 2023-10-20 | Stop reason: HOSPADM

## 2023-10-17 RX ORDER — FUROSEMIDE 10 MG/ML
80 INJECTION INTRAMUSCULAR; INTRAVENOUS EVERY 12 HOURS
Status: DISCONTINUED | OUTPATIENT
Start: 2023-10-18 | End: 2023-10-20

## 2023-10-17 RX ORDER — ACETYLCYSTEINE 200 MG/ML
2 SOLUTION ORAL; RESPIRATORY (INHALATION)
Status: DISCONTINUED | OUTPATIENT
Start: 2023-10-17 | End: 2023-10-20 | Stop reason: HOSPADM

## 2023-10-17 RX ADMIN — AMPICILLIN SODIUM AND SULBACTAM SODIUM 3 G: 2; 1 INJECTION, POWDER, FOR SOLUTION INTRAMUSCULAR; INTRAVENOUS at 02:03

## 2023-10-17 RX ADMIN — SENNOSIDES AND DOCUSATE SODIUM 2 TABLET: 50; 8.6 TABLET ORAL at 21:46

## 2023-10-17 RX ADMIN — TIZANIDINE 2 MG: 4 TABLET ORAL at 16:07

## 2023-10-17 RX ADMIN — GUAIFENESIN AND CODEINE PHOSPHATE 10 ML: 100; 10 SOLUTION ORAL at 02:22

## 2023-10-17 RX ADMIN — POTASSIUM CHLORIDE 40 MEQ: 1500 TABLET, EXTENDED RELEASE ORAL at 12:22

## 2023-10-17 RX ADMIN — IPRATROPIUM BROMIDE AND ALBUTEROL SULFATE 3 ML: .5; 3 SOLUTION RESPIRATORY (INHALATION) at 06:41

## 2023-10-17 RX ADMIN — GUAIFENESIN 1200 MG: 600 TABLET, MULTILAYER, EXTENDED RELEASE ORAL at 21:45

## 2023-10-17 RX ADMIN — POTASSIUM CHLORIDE 40 MEQ: 1500 TABLET, EXTENDED RELEASE ORAL at 17:24

## 2023-10-17 RX ADMIN — IPRATROPIUM BROMIDE AND ALBUTEROL SULFATE 3 ML: .5; 3 SOLUTION RESPIRATORY (INHALATION) at 19:25

## 2023-10-17 RX ADMIN — TIZANIDINE 2 MG: 4 TABLET ORAL at 21:45

## 2023-10-17 RX ADMIN — Medication 10 ML: at 09:56

## 2023-10-17 RX ADMIN — METOLAZONE 5 MG: 5 TABLET ORAL at 17:24

## 2023-10-17 RX ADMIN — FUROSEMIDE 40 MG: 10 INJECTION, SOLUTION INTRAMUSCULAR; INTRAVENOUS at 09:57

## 2023-10-17 RX ADMIN — ATORVASTATIN CALCIUM 10 MG: 10 TABLET, FILM COATED ORAL at 21:46

## 2023-10-17 RX ADMIN — THEOPHYLLINE ANHYDROUS 300 MG: 300 CAPSULE, EXTENDED RELEASE ORAL at 21:45

## 2023-10-17 RX ADMIN — INSULIN LISPRO 3 UNITS: 100 INJECTION, SOLUTION INTRAVENOUS; SUBCUTANEOUS at 12:22

## 2023-10-17 RX ADMIN — AMPICILLIN SODIUM AND SULBACTAM SODIUM 3 G: 2; 1 INJECTION, POWDER, FOR SOLUTION INTRAMUSCULAR; INTRAVENOUS at 16:07

## 2023-10-17 RX ADMIN — PREDNISONE 30 MG: 10 TABLET ORAL at 09:55

## 2023-10-17 RX ADMIN — LEVOTHYROXINE SODIUM 125 MCG: 0.12 TABLET ORAL at 05:35

## 2023-10-17 RX ADMIN — Medication 10 ML: at 21:44

## 2023-10-17 RX ADMIN — GUAIFENESIN AND CODEINE PHOSPHATE 10 ML: 100; 10 SOLUTION ORAL at 12:13

## 2023-10-17 RX ADMIN — BUDESONIDE AND FORMOTEROL FUMARATE DIHYDRATE 2 PUFF: 160; 4.5 AEROSOL RESPIRATORY (INHALATION) at 19:25

## 2023-10-17 RX ADMIN — MONTELUKAST 10 MG: 10 TABLET, FILM COATED ORAL at 21:46

## 2023-10-17 RX ADMIN — AMPICILLIN SODIUM AND SULBACTAM SODIUM 3 G: 2; 1 INJECTION, POWDER, FOR SOLUTION INTRAMUSCULAR; INTRAVENOUS at 21:47

## 2023-10-17 RX ADMIN — AMPICILLIN SODIUM AND SULBACTAM SODIUM 3 G: 2; 1 INJECTION, POWDER, FOR SOLUTION INTRAMUSCULAR; INTRAVENOUS at 09:56

## 2023-10-17 RX ADMIN — FUROSEMIDE 40 MG: 10 INJECTION, SOLUTION INTRAMUSCULAR; INTRAVENOUS at 16:07

## 2023-10-17 RX ADMIN — CYANOCOBALAMIN TAB 1000 MCG 1000 MCG: 1000 TAB at 09:56

## 2023-10-17 RX ADMIN — GABAPENTIN 400 MG: 400 CAPSULE ORAL at 09:56

## 2023-10-17 RX ADMIN — IPRATROPIUM BROMIDE AND ALBUTEROL SULFATE 3 ML: .5; 3 SOLUTION RESPIRATORY (INHALATION) at 02:10

## 2023-10-17 RX ADMIN — BUDESONIDE AND FORMOTEROL FUMARATE DIHYDRATE 2 PUFF: 160; 4.5 AEROSOL RESPIRATORY (INHALATION) at 06:38

## 2023-10-17 RX ADMIN — Medication 2000 UNITS: at 21:45

## 2023-10-17 RX ADMIN — ALLOPURINOL 100 MG: 100 TABLET ORAL at 09:56

## 2023-10-17 RX ADMIN — IPRATROPIUM BROMIDE AND ALBUTEROL SULFATE 3 ML: .5; 3 SOLUTION RESPIRATORY (INHALATION) at 11:08

## 2023-10-17 RX ADMIN — GUAIFENESIN AND CODEINE PHOSPHATE 10 ML: 100; 10 SOLUTION ORAL at 16:07

## 2023-10-17 RX ADMIN — GABAPENTIN 400 MG: 400 CAPSULE ORAL at 21:45

## 2023-10-17 RX ADMIN — IPRATROPIUM BROMIDE AND ALBUTEROL SULFATE 3 ML: .5; 3 SOLUTION RESPIRATORY (INHALATION) at 23:19

## 2023-10-17 RX ADMIN — ACETYLCYSTEINE 2 ML: 200 SOLUTION ORAL; RESPIRATORY (INHALATION) at 19:25

## 2023-10-17 RX ADMIN — PANTOPRAZOLE SODIUM 40 MG: 40 TABLET, DELAYED RELEASE ORAL at 16:07

## 2023-10-17 RX ADMIN — INSULIN LISPRO 3 UNITS: 100 INJECTION, SOLUTION INTRAVENOUS; SUBCUTANEOUS at 22:14

## 2023-10-17 RX ADMIN — GUAIFENESIN 1200 MG: 600 TABLET, MULTILAYER, EXTENDED RELEASE ORAL at 09:56

## 2023-10-17 RX ADMIN — TIZANIDINE 2 MG: 4 TABLET ORAL at 09:56

## 2023-10-17 RX ADMIN — INSULIN LISPRO 5 UNITS: 100 INJECTION, SOLUTION INTRAVENOUS; SUBCUTANEOUS at 17:24

## 2023-10-17 RX ADMIN — GABAPENTIN 400 MG: 400 CAPSULE ORAL at 16:07

## 2023-10-17 NOTE — THERAPY EVALUATION
"Patient Name: Claudia Rust  : 1950    MRN: [id number]                              Today's Date: 10/17/2023       Admit Date: 10/13/2023    Visit Dx:     ICD-10-CM ICD-9-CM   1. Hyponatremia  E87.1 276.1   2. Hypervolemia, unspecified hypervolemia type  E87.70 276.69   3. Acute hypoxemic respiratory failure  J96.01 518.81   4. Multifocal pneumonia  J18.9 486   5. Multiple tracheobronchial mucus plugs  T17.800A 519.19     Patient Active Problem List   Diagnosis    Obesity, morbid, BMI 50 or higher    Dyspnea on exertion    Abnormal nuclear stress test    Nonrheumatic aortic valve stenosis    Prediabetes    Dyslipidemia    Essential hypertension    Acute UTI    Bipolar disorder    COPD with hypoxia    Hyperlipidemia, unspecified    Gastroesophageal reflux disease    Rheumatoid arthritis    Aortic stenosis, severe    Chronic diastolic heart failure    Aortic valve stenosis    S/P AVR (aortic valve replacement)    Long term (current) use of anticoagulants    COVID    Atypical pneumonia    Pulmonary hypertension    Acute respiratory failure    Multifocal pneumonia    Multiple tracheobronchial mucus plugs     Past Medical History:   Diagnosis Date    Acute congestive heart failure     Allergies     Anxiety     Arthritis     Asthma     Bilateral leg edema     Bipolar 1 disorder     Bulging lumbar disc     X3    Cancer     states had \"cancerous tumor during pregnancy and had to have hysterectomy\"    Cataract     bilateral    Cellulitis 2021    bilateral legs    Compression fracture of vertebral column     LUMBAR    COPD (chronic obstructive pulmonary disease)     Delayed emergence from anesthesia     Depression     Depression     Diabetes mellitus     Dyslipidemia     Dyspnea on exertion     Fibromyalgia     GERD (gastroesophageal reflux disease)     Hiatal hernia     Hyperlipidemia     Hypertension     Hypothyroid     HYPOTHYROID    IBS (irritable bowel syndrome)     Migraines     Morbid obesity     Neuropathy "     Panic attacks     Peripheral edema     PONV (postoperative nausea and vomiting)     Poor mobility     rolling walker    Prediabetes     PVD (peripheral vascular disease)     Rheumatoid aortitis     Spinal stenosis     Spinal stenosis     Vertigo     Vertigo     Vitamin D deficiency      Past Surgical History:   Procedure Laterality Date    AORTIC VALVE REPAIR/REPLACEMENT N/A 3/18/2021    Procedure: AORTIC VALVE REPLACEMENT;  Surgeon: Olaf Mcgill MD;  Location: [facility] CVOR;  Service: Cardiothoracic;  Laterality: N/A;    BRONCHOSCOPY N/A 3/25/2021    Procedure: BRONCHOSCOPY AT BEDSIDE WITH BRONCHIOALVEOLAR LAVAGE;  Surgeon: Glenn Reyes MD;  Location: [facility] ENDOSCOPY;  Service: Pulmonary;  Laterality: N/A;  PNA    CARDIAC CATHETERIZATION  2009    CARDIAC CATHETERIZATION N/A 8/14/2019    Procedure: Left Heart Cath;  Surgeon: Jose Levi MD;  Location: [facility] CATH INVASIVE LOCATION;  Service: Cardiovascular    CARDIAC CATHETERIZATION N/A 8/14/2019    Procedure: Right Heart Cath;  Surgeon: Jose Levi MD;  Location: [facility] CATH INVASIVE LOCATION;  Service: Cardiovascular    CARDIAC CATHETERIZATION N/A 8/14/2019    Procedure: Aortic root aortogram;  Surgeon: Jose Levi MD;  Location: [facility] CATH INVASIVE LOCATION;  Service: Cardiovascular    CARDIAC CATHETERIZATION N/A 1/20/2021    Procedure: Left Heart Cath;  Surgeon: Jose Levi MD;  Location: [facility] CATH INVASIVE LOCATION;  Service: Cardiovascular;  Laterality: N/A;    COLONOSCOPY      CYSTOCELE REPAIR  1984    ELBOW ARTHROPLASTY  2011    ENDOSCOPY      FOOT SURGERY      GALLBLADDER SURGERY  2002    TONSILLECTOMY      VAGINAL HYSTERECTOMY  1972      General Information       Row Name 10/17/23 8931          OT Time and Intention    Document Type evaluation  -ES (r) PS (t) ES (c)     Mode of Treatment occupational therapy  -ES (r) PS (t) ES (c)       Row Name 10/17/23 8312          General Information     Patient Profile Reviewed yes  -ES (r) PS (t) ES (c)     Prior Level of Function min assist:;all household mobility;ADL's  -ES (r) PS (t) ES (c)       Row Name 10/17/23 1050          Occupational Profile    Reason for Services/Referral (Occupational Profile) Pt reny 74yo F admitted on 10/13/23 c/o worsening SOB and cough. Recent ED visit 3 days ago (discharged with breathing treatments that did not help). CXR (+) PNA/pulmonary edema. Sim duplex extremity veins (-). Dx acute respiratory failure, PNA, hyponatremia.   PMHx COPD, bipolar, RA, valve replacement. At baseline pt lives in 1st floor apt with , daughter and 2 dogs. Pt uses a rollator for household mobility and an electric scooter in the community. She has a caregiver sun-thur while  is at work who cooks, cleans, does laundry and helps as needed. She has a step over tub with a shower chair but is getting it renovated to a walk in.  -ES (r) PS (t) ES (c)       Row Name 10/17/23 1050          Living Environment    People in Home spouse;child(ludmila), adult  -ES (r) PS (t) ES (c)       Row Name 10/17/23 1050          Home Main Entrance    Number of Stairs, Main Entrance none  -ES (r) PS (t) ES (c)       Row Name 10/17/23 1050          Cognition    Orientation Status (Cognition) oriented x 4  -ES (r) PS (t) ES (c)       Row Name 10/17/23 1050          Safety Issues, Functional Mobility    Impairments Affecting Function (Mobility) endurance/activity tolerance;shortness of breath  -ES (r) PS (t) ES (c)               User Key  (r) = Recorded By, (t) = Taken By, (c) = Cosigned By      Initials Name Provider Type    Selam Suazo OT Occupational Therapist    Indira Meza OT Student OT Student                     Mobility/ADL's       Row Name 10/17/23 1059          Bed Mobility    Bed Mobility bed mobility (all) activities  -ES (r) PS (t) ES (c)     All Activities, [place] (Bed Mobility) not tested;unable to assess;other (see comments)  -ES  (r) PS (t) ES (c)     Comment, (Bed Mobility) Pt up in chair when OT arrived  -ES (r) PS (t) ES (c)       Row Name 10/17/23 1059          Transfers    Transfers sit-stand transfer  -ES (r) PS (t) ES (c)       Row Name 10/17/23 1059          Sit-Stand Transfer    Sit-Stand [place] (Transfers) minimum assist (75% patient effort)  -ES (r) PS (t) ES (c)     Assistive Device (Sit-Stand Transfers) walker, front-wheeled  -ES (r) PS (t) ES (c)       Row Name 10/17/23 1059          Functional Mobility    Functional Mobility- Ind. Level not tested;unable to perform  -ES (r) PS (t) ES (c)     Functional Mobility- Comment Pt desated when standing so returned to sitting.  -ES (r) PS (t) ES (c)       Row Name 10/17/23 1059          Activities of Daily Living    BADL Assessment/Intervention lower body dressing  -ES (r) PS (t) ES (c)       Row Name 10/17/23 1059          Lower Body Dressing Assessment/Training    [place] Level (Lower Body Dressing) don;pants/bottoms;maximum assist (25% patient effort)  -ES (r) PS (t) ES (c)     Position (Lower Body Dressing) supported standing;supported sitting  -ES (r) PS (t) ES (c)               User Key  (r) = Recorded By, (t) = Taken By, (c) = Cosigned By      Initials Name Provider Type    ES Selam Adair OT Occupational Therapist    Indira Meza OT Student OT Student                   Obj/Interventions       Row Name 10/17/23 1105          Sensory Assessment (Somatosensory)    Sensory Assessment neuropathy  -ES (r) PS (t) ES (c)       Row Name 10/17/23 1105          Vision Assessment/Intervention    Visual Impairment/Limitations corrective lenses full-time  -ES (r) PS (t) ES (c)       Row Name 10/17/23 1105          Range of Motion Comprehensive    General Range of Motion upper extremity range of motion deficits identified  -ES (r) PS (t) ES (c)     Comment, General Range of Motion BUE ROM 75%  -ES (r) PS (t) ES (c)       Row Name 10/17/23 1105          Strength  Comprehensive (MMT)    General Manual Muscle Testing (MMT) Assessment upper extremity strength deficits identified  -ES (r) PS (t) ES (c)     Comment, General Manual Muscle Testing (MMT) Assessment BUE MMT 3+/5  -ES (r) PS (t) ES (c)       Row Name 10/17/23 1105          Balance    Balance Assessment sitting static balance;standing static balance  -ES (r) PS (t) ES (c)     Static Sitting Balance independent  -ES (r) PS (t) ES (c)     Position, Sitting Balance supported;sitting in chair  -ES (r) PS (t) ES (c)     Static Standing Balance contact guard  -ES (r) PS (t) ES (c)     Position/Device Used, Standing Balance walker, 4-wheeled  -ES (r) PS (t) ES (c)               User Key  (r) = Recorded By, (t) = Taken By, (c) = Cosigned By      Initials Name Provider Type    ES Selam Adair OT Occupational Therapist    Indira Meza OT Student OT Student                   Goals/Plan       Row Name 10/17/23 1128          Bathing Goal 1 (OT)    Activity/Device (Bathing Goal 1, OT) bathing skills, all  -ES (r) PS (t) ES (c)     [place] Level/Cues Needed (Bathing Goal 1, OT) moderate assist (50-74% patient effort)  -ES (r) PS (t) ES (c)     Time Frame (Bathing Goal 1, OT) 2 weeks  -ES (r) PS (t) ES (c)     Progress/Outcomes (Bathing Goal 1, OT) new goal  -ES (r) PS (t) ES (c)       Row Name 10/17/23 1128          Dressing Goal 1 (OT)    Activity/Device (Dressing Goal 1, OT) dressing skills, all  -ES (r) PS (t) ES (c)     [place]/Cues Needed (Dressing Goal 1, OT) moderate assist (50-74% patient effort)  -ES (r) PS (t) ES (c)     Time Frame (Dressing Goal 1, OT) 2 weeks  -ES (r) PS (t) ES (c)     Progress/Outcome (Dressing Goal 1, OT) new goal  -ES (r) PS (t) ES (c)       Row Name 10/17/23 1128          Toileting Goal 1 (OT)    Activity/Device (Toileting Goal 1, OT) toileting skills, all  -ES (r) PS (t) ES (c)     [place] Level/Cues Needed (Toileting Goal 1, OT) moderate assist (50-74% patient effort)   -ES (r) PS (t) ES (c)     Time Frame (Toileting Goal 1, OT) 2 weeks  -ES (r) PS (t) ES (c)     Progress/Outcome (Toileting Goal 1, OT) new goal  -ES (r) PS (t) ES (c)               User Key  (r) = Recorded By, (t) = Taken By, (c) = Cosigned By      Initials Name Provider Type    ES Selam Adair, OT Occupational Therapist    Indira Meza, ANITA Student OT Student                   Clinical Impression       Row Name 10/17/23 1125          Pain Assessment    Additional Documentation Pain Scale: FACES Pre/Post-Treatment (Group)  -ES (r) PS (t) ES (c)       Row Name 10/17/23 1121          Pain Scale: FACES Pre/Post-Treatment    Pain: FACES Scale, Pretreatment 0-->no hurt  -ES (r) PS (t) ES (c)     Posttreatment Pain Rating 0-->no hurt  -ES (r) PS (t) ES (c)       Row Name 10/17/23 1125          Plan of Care Review    Plan of Care Reviewed With patient;spouse  -ES (r) PS (t) ES (c)     Progress no change  -ES (r) PS (t) ES (c)     Outcome Evaluation Pt is a 74yo F admitted on 10/13/23 c/o worsening SOB and cough. Recent ED visit 3 days ago (discharged with breathing treatments that did not help). CXR (+) PNA/pulmonary edema. Sim duplex extremity veins (-). Dx acute respiratory failure, PNA, hyponatremia.   PMHx COPD, bipolar, RA, valve replacement. At baseline pt lives in 1st floor apt with , daughter and 2 dogs. Pt uses a rollator for household mobility and an electric scooter in the community. She has a caregiver sun-thur while  is at work who cooks, cleans, does laundry and helps as needed. She has a step over tub with a shower chair but is getting it renovated to a walk in. Oriented x 4 this date and on 10L high flow O2. Pt up in chair upon OT arrival. Min A sit<>stand. Pt admitted to SOB in standing, could not get a good pleth, pt returned to sitting, O2 96 while sitting. Pt required Max A for donning brief. Slight BUE strength and ROM deficits noted. Pt demoed good sitting balance but required  CGA with rollator for UE in standing. Pt below functional baseline and would benefit from skilled rehab, OT recommends HHOT and 24/7 care following discharge when medically stable.  -ES (r) PS (t) ES (c)       Row Name 10/17/23 1125          Therapy Assessment/Plan (OT)    Rehab Potential (OT) good, to achieve stated therapy goals  -ES (r) PS (t) ES (c)     Criteria for Skilled Therapeutic Interventions Met (OT) yes;skilled treatment is necessary  -ES (r) PS (t) ES (c)     Therapy Frequency (OT) 3 times/wk  -ES (r) PS (t) ES (c)       Row Name 10/17/23 1125          Therapy Plan Review/Discharge Plan (OT)    Anticipated Discharge Disposition (OT) home with 24/7 care;home with home health  -ES (r) PS (t) ES (c)       Row Name 10/17/23 1125          Vital Signs    Pre Systolic BP Rehab 158  -ES (r) PS (t) ES (c)     Pre Treatment Diastolic   -ES (r) PS (t) ES (c)     Post Systolic BP Rehab 131  -ES (r) PS (t) ES (c)     Post Treatment Diastolic BP 58  -ES (r) PS (t) ES (c)     Pre SpO2 (%) 97  -ES (r) PS (t) ES (c)     O2 Delivery Pre Treatment hi-flow  10L  -ES (r) PS (t) ES (c)     O2 Delivery Intra Treatment hi-flow  -ES (r) PS (t) ES (c)     Post SpO2 (%) 96  -ES (r) PS (t) ES (c)     O2 Delivery Post Treatment hi-flow  -ES (r) PS (t) ES (c)     Pre Patient Position Sitting  -ES (r) PS (t) ES (c)     Intra Patient Position Standing  -ES (r) PS (t) ES (c)     Post Patient Position Sitting  -ES (r) PS (t) ES (c)       Row Name 10/17/23 1125          Positioning and Restraints    Pre-Treatment Position sitting in chair/recliner  -ES (r) PS (t) ES (c)     Post Treatment Position chair  -ES (r) PS (t) ES (c)     In Chair call light within reach;encouraged to call for assist;with family/caregiver;sitting  -ES (r) PS (t) ES (c)               User Key  (r) = Recorded By, (t) = Taken By, (c) = Cosigned By      Initials Name Provider Type    Selam Suazo OT Occupational Therapist    PS Indira Ferreira, OT  Student OT Student                   Outcome Measures       Row Name 10/17/23 1129          How much help from another is currently needed...    Putting on and taking off regular lower body clothing? 1  -ES (r) PS (t) ES (c)     Bathing (including washing, rinsing, and drying) 2  -ES (r) PS (t) ES (c)     Toileting (which includes using toilet bed pan or urinal) 1  -ES (r) PS (t) ES (c)     Putting on and taking off regular upper body clothing 2  -ES (r) PS (t) ES (c)     Taking care of personal grooming (such as brushing teeth) 3  -ES (r) PS (t) ES (c)     Eating meals 3  -ES (r) PS (t) ES (c)     AM-PAC 6 Clicks Score (OT) 12  -ES (r) PS (t)       Row Name 10/17/23 1402          How much help from another person do you currently need...    Turning from your back to your side while in flat bed without using bedrails? 2  -BR     Moving from lying on back to sitting on the side of a flat bed without bedrails? 2  -BR     Moving to and from a bed to a chair (including a wheelchair)? 2  -BR     Standing up from a chair using your arms (e.g., wheelchair, bedside chair)? 3  -BR     Climbing 3-5 steps with a railing? 2  -BR     To walk in hospital room? 2  -BR     AM-PAC 6 Clicks Score (PT) 13  -BR     Highest level of mobility 4 --> Transferred to chair/commode  -BR       Row Name 10/17/23 1402 10/17/23 1129       Functional Assessment    Outcome Measure Options AM-PAC 6 Clicks Basic Mobility (PT)  -BR AM-PAC 6 Clicks Daily Activity (OT)  -ES (r) PS (t) ES (c)              User Key  (r) = Recorded By, (t) = Taken By, (c) = Cosigned By      Initials Name Provider Type    Selam Suazo, OT Occupational Therapist    BR Sharon Hendrix, PT Physical Therapist    PS Indira Ferreira, OT Student OT Student                    Occupational Therapy Education       Title: PT OT SLP Therapies (Done)       Topic: Occupational Therapy (Done)       Point: ADL training (Done)       Description:   Instruct learner(s) on proper  safety adaptation and remediation techniques during self care or transfers.   Instruct in proper use of assistive devices.                  Learning Progress Summary             Patient Acceptance, E,D, VU,DU by BR at 10/17/2023 1403    Acceptance, E, VU by PS at 10/17/2023 1130                         Point: Home exercise program (Done)       Description:   Instruct learner(s) on appropriate technique for monitoring, assisting and/or progressing therapeutic exercises/activities.                  Learning Progress Summary             Patient Acceptance, E,D, VU,DU by BR at 10/17/2023 1403    Acceptance, E, VU by PS at 10/17/2023 1130                         Point: Precautions (Done)       Description:   Instruct learner(s) on prescribed precautions during self-care and functional transfers.                  Learning Progress Summary             Patient Acceptance, E,D, VU,DU by BR at 10/17/2023 1403    Acceptance, E, VU by PS at 10/17/2023 1130                         Point: Body mechanics (Done)       Description:   Instruct learner(s) on proper positioning and spine alignment during self-care, functional mobility activities and/or exercises.                  Learning Progress Summary             Patient Acceptance, E,D, VU,DU by BR at 10/17/2023 1403    Acceptance, E, VU by PS at 10/17/2023 1130                                         User Key       Initials Effective Dates Name Provider Type Discipline    BR 02/01/22 -  Sharon Hendrix, PT Physical Therapist PT    PS 08/16/23 -  Indira Ferreira OT Student OT Student OT                  OT Recommendation and Plan  Therapy Frequency (OT): 3 times/wk  Plan of Care Review  Plan of Care Reviewed With: patient, spouse  Progress: no change  Outcome Evaluation: Pt is a 74yo F admitted on 10/13/23 c/o worsening SOB and cough. Recent ED visit 3 days ago (discharged with breathing treatments that did not help). CXR (+) PNA/pulmonary edema. Sim duplex extremity veins (-).  Dx acute respiratory failure, PNA, hyponatremia.   PMHx COPD, bipolar, RA, valve replacement. At baseline pt lives in 1st floor apt with , daughter and 2 dogs. Pt uses a rollator for household mobility and an electric scooter in the community. She has a caregiver sun-thur while  is at work who cooks, cleans, does laundry and helps as needed. She has a step over tub with a shower chair but is getting it renovated to a walk in. Oriented x 4 this date and on 10L high flow O2. Pt up in chair upon OT arrival. Min A sit<>stand. Pt admitted to SOB in standing, could not get a good pleth, pt returned to sitting, O2 96 while sitting. Pt required Max A for donning brief. Slight BUE strength and ROM deficits noted. Pt demoed good sitting balance but required CGA with rollator for UE in standing. Pt below functional baseline and would benefit from skilled rehab, OT recommends HHOT and 24/7 care following discharge when medically stable.     Time Calculation:         Time Calculation- OT       Row Name 10/17/23 1049             Time Calculation- OT    OT Start Time 0944  -ES (r) PS (t) ES (c)      OT Stop Time 1014  -ES (r) PS (t) ES (c)      OT Time Calculation (min) 30 min  -ES (r) PS (t)      Total Timed Code Minutes- OT 0 minute(s)  -ES (r) PS (t) ES (c)      OT Received On 10/17/23  -ES (r) PS (t) ES (c)      OT - Next Appointment 10/19/23  -ES (r) PS (t) ES (c)      OT Goal Re-Cert Due Date 10/31/23  -ES (r) PS (t) ES (c)                User Key  (r) = Recorded By, (t) = Taken By, (c) = Cosigned By      Initials Name Provider Type    Selam Suazo OT Occupational Therapist    Indira Meza OT Student OT Student                  Therapy Charges for Today       Code Description Service Date Service Provider Modifiers Qty    [id number]  OT EVAL MOD COMPLEXITY 4 10/17/2023 Indira Ferreira OT Student GO 1                 ANITA Haynes  10/17/2023

## 2023-10-17 NOTE — PROGRESS NOTES
[facility]   Daily Progress Note    Patient Name: Claudia Rust  : 1950  MRN: [id number]  Primary Care Physician:  Pool Meyer NP-C  Date of admission: 10/13/2023  Date of service 10/14/2023      Subjective      Subjective  Pt still complaining of short of breath, and cough. On 10L supplemental oxygen. Denies for any other active complaint.  Going down for bronchoscopy today      ROS A 12 point review of system was done and was negative except as mentioned above      Objective      Vitals:   Temp:  [97.6 °F (36.4 °C)-100.3 °F (37.9 °C)] 97.7 °F (36.5 °C)  Heart Rate:  [67-92] 79  Resp:  [18-24] 22  BP: ()/(51-84) 118/61  Flow (L/min):  [6-10] 10    Physical Exam  Constitutional:       Appearance: Normal appearance.   HENT:      Head: Normocephalic and atraumatic.      Nose: Nose normal.      Mouth/Throat:      Mouth: Mucous membranes are moist.   Cardiovascular:      Rate and Rhythm: Normal rate.   Pulmonary:      Effort: Pulmonary effort is normal. No respiratory distress.      Breath sounds: Normal breath sounds. No stridor. No wheezing, rhonchi or rales.   Chest:      Chest wall: No tenderness.   Abdominal:      General: Abdomen is flat. There is no distension.      Palpations: Abdomen is soft. There is no mass.      Tenderness: There is no abdominal tenderness.   Musculoskeletal:         General: Swelling and tenderness present.      Right lower leg: Edema present.      Left lower leg: Edema present.      Comments: Mild local rise of temperature of the left lower leg   Neurological:      General: No focal deficit present.      Mental Status: She is alert.             Result Review    Result Review:  I have personally reviewed the results from the time of this admission to 10/17/2023 10:26 EDT and agree with these findings:  [x]  Laboratory  []  Microbiology  [x]  Radiology  []  EKG/Telemetry   []  Cardiology/Vascular   []  Pathology  []  Old records  []   Other:            Assessment & Plan      Brief Patient Summary:   Claudia Rust is a 73 y.o. female who presented to [facility] on 10/13/2023 complaining of worsening shortness of breath and cough.  Patient was just seen in the ED a few days ago for same complaints.  She was discharged with breathing treatments and steroids however patient stated that she did not improve.  Her shortness of breath actually worsened.  Patient does have history of COPD but does not use oxygen at home.    In the ED, patient was requiring 4 L nasal cannula.  Labs remarkable for sodium 121, WBC 15.8.  Chest x-ray showed patchy airspace disease within the bilateral lower lobes, pneumonia versus pulmonary edema.  Patient was given Lasix and DuoNebs in the ED.      allopurinol, 100 mg, Oral, Daily  ampicillin-sulbactam, 3 g, Intravenous, Q6H  budesonide-formoterol, 2 puff, Inhalation, BID - RT  cholecalciferol, 2,000 Units, Oral, Nightly  furosemide, 40 mg, Intravenous, TID  gabapentin, 400 mg, Oral, TID  guaiFENesin, 1,200 mg, Oral, Q12H  insulin lispro, 3-14 Units, Subcutaneous, 4x Daily AC & at Bedtime  ipratropium-albuterol, 3 mL, Nebulization, Q4H - RT  levothyroxine, 125 mcg, Oral, Q AM  pantoprazole, 40 mg, Oral, Q PM  potassium chloride, 40 mEq, Oral, TID With Meals  [START ON 10/18/2023] predniSONE, 20 mg, Oral, Daily   Followed by  [START ON 10/20/2023] predniSONE, 10 mg, Oral, Daily  senna-docusate sodium, 2 tablet, Oral, BID  sodium chloride, 10 mL, Intravenous, Q12H  tiZANidine, 2 mg, Oral, TID  vitamin B-12, 1,000 mcg, Oral, Daily             Active Hospital Problems:  Active Hospital Problems    Diagnosis     **Acute respiratory failure     Multifocal pneumonia     Multiple tracheobronchial mucus plugs      Plan:     COPD  Dyspnea  Acute respiratory failure  Patient currently on 4 L nasal cannula  Continue oxygen supplementation  Wean as tolerated  Off steroids now.  Antibiotics     Possible Pneumonia  Start  Rocephin and azithromycin  Continue breathing treatments  Guaifenesin as needed for cough  Pulmonary on board.     Possible pulmonary edema  Start Lasix 40 mg IV twice daily  Obtain echocardiogram     Hyponatremia appear secondary to hypervolemia  Sodium 121  Fluid restriction  Continue diuretics  Follow-up electrolytes  Urine studies  Nephrology consulted.  On lasix 60 mg iv TID now.       Leukocytosis  Most likely due to steroid use  Repeat CBC    Cellulitis of lower legs  Antibiotics as ordered.  Venous Doppler to rule out a DVT     UTI  UA studies reviewed.  Follow-up urine culture  Continue antibiotics     Hypertension  Follow-up BP      DVT prophylaxis:  Mechanical DVT prophylaxis orders are present.    Bronchoscopy 10/16/2023.  Currently on 10 L of oxygen nonrebreather.  Continue prednisone and IV Unasyn.  Sodium trending up 126, creatinine 1.19.  Leukocytosis.  Labs reviewed.  Repeat labs and monitor.    Discussed with pulm, Nebulized Mucomyst, Antibiotics currently on Unasyn once 72 hours completed we will switch to p.o. Augmentin for additional 4 days. Continue IV diuretics per nephro recs. Labs pending    CODE STATUS:         Disposition:  I expect patient to be discharged 2 to 3 days    Patient,  and daughter was updated with plan of care.  All questions answered      Electronically signed by Kimmy Mendoza MD, 10/17/23, 10:26 EDT.  [facility] Hospitalist Team

## 2023-10-17 NOTE — CASE MANAGEMENT/SOCIAL WORK
Continued Stay Note  [facility]     Patient Name: Claudia Rust  MRN: [id number]  Today's Date: 10/17/2023    Admit Date: 10/13/2023    Plan: Home with spouse. [facility] (current), JEAN order needed. Current with [facility].   Discharge Plan       Row Name 10/17/23 1120       Plan    Plan Home with spouse. [facility] (current), JEAN order needed. Current with [facility].    Plan Comments Patient will discharge home with spouse. Patient is current with [facility], JEAN order needed prior to discharge. Patient is current with [facility] services. Patient may need O2 at discharge, patient currently on 10L HF, CM will follow. CM asked SW to start PASRR in case patient may need SNF. D/C barriers: 10LHF, elevated BUN/Cr, pending bronch cultures, nephro, cards, pulm following.             Beth Yusuf, RN, BSN    [address] [address]  Phone: [phone]  Fax: [phone]

## 2023-10-17 NOTE — PLAN OF CARE
Goal Outcome Evaluation:  Plan of Care Reviewed With: patient, spouse        Progress: no change  Outcome Evaluation: Pt is a 74yo F admitted on 10/13/23 c/o worsening SOB and cough. Recent ED visit 3 days ago (discharged with breathing treatments that did not help). CXR (+) PNA/pulmonary edema. Sim duplex extremity veins (-). Dx acute respiratory failure, PNA, hyponatremia.   PMHx COPD, bipolar, RA, valve replacement. At baseline pt lives in 1st floor apt with , daughter and 2 dogs. Pt uses a rollator for household mobility and an electric scooter in the community. She has a caregiver sun-thur while  is at work who cooks, cleans, does laundry and helps as needed. She has a step over tub with a shower chair but is getting it renovated to a walk in. Oriented x 4 this date and on 10L high flow O2. Pt up in chair upon OT arrival. Min A sit<>stand. Pt admitted to SOB in standing, could not get a good pleth, pt returned to sitting, O2 96 while sitting. Pt required Max A for donning brief. Slight BUE strength and ROM deficits noted. Pt demoed good sitting balance but required CGA with rollator for UE in standing. Pt below functional baseline and would benefit from skilled rehab, OT recommends HHOT and 24/7 care following discharge when medically stable.      Anticipated Discharge Disposition (OT): home with 24/7 care, home with home health

## 2023-10-17 NOTE — PROGRESS NOTES
Daily Progress Note        Acute respiratory failure    Multifocal pneumonia    Multiple tracheobronchial mucus plugs    Assessment:     Hypoxia:   ABG 7.42/34.4/96 while on nonrebreather mask  COPD with acute exacerbation  Multifocal pneumonia  Multiple mucous plugs  Bronchoscopy completed 10/16/2023  Copious amount of very thick mucopurulent secretions both lungs suctioned therapeutically  Bilateral lung washing    Cellulitis of lower extremities  Hx of respiratory failure required intubation 03/22/2021 , extubated 4/4/21    Aortic valve stenosis, status post tissue replacement  Pulm hypertension, severe  KARLY  Tobacco abuse  bronchoscopy 3/25/2021, cultures negative  Interstitial pneumonitis 2nd to aspiration   Hx of Catheter associated DVT right upper extremity subclavian, axillary, and brachial  Upper arm left DVT  DM  Hx of Ecoli UTI  Oral candidiasis  HTN  JERROD  DM2  Chronic anemia  HLD     Recommendations:    Nebulized Mucomyst  Flutter valve  Antibiotics currently on Unasyn once 72 hours completed we will switch to p.o. Augmentin for additional 4 days    Theophylline 300 mg daily  Mucinex 1200 mg p.o. twice daily  Singulair    Steroids, prednisone 6-day taper    Oxygen supplement and titration to maintain saturation 90 to 95%: Currently requiring 3 L per nasal cannula    Bronchodilators  Mucinex  Inhaled corticosteroids  Diuretics/electrolyte management as per nephrology  Atorvastatin  BP control  Glucose controlled  Thyroid hormone replacement                LOS: 3 days     Subjective         Objective     Vital signs for last 24 hours:  Vitals:    10/17/23 0640 10/17/23 0641 10/17/23 0644 10/17/23 0718   BP:    118/61   BP Location:    Left arm   Patient Position:    Lying   Pulse: 78 77 75 79   Resp: 20 20 20 22   Temp:    97.7 °F (36.5 °C)   TempSrc:    Oral   SpO2: 95% 95% 95% 90%   Weight:       Height:           Intake/Output last 3 shifts:  I/O last 3 completed shifts:  In: 580 [P.O.:480;  I.V.:100]  Out: 1600 [Urine:1600]  Intake/Output this shift:  I/O this shift:  In: 350 [P.O.:350]  Out: -       Radiology  Imaging Results (Last 24 Hours)       ** No results found for the last 24 hours. **            Labs:  Results from last 7 days   Lab Units 10/16/23  2357   WBC 10*3/mm3 21.20*   HEMOGLOBIN g/dL 11.4*   HEMATOCRIT % 33.1*   PLATELETS 10*3/mm3 238     Results from last 7 days   Lab Units 10/16/23  2357   SODIUM mmol/L 132*   POTASSIUM mmol/L 4.5   CHLORIDE mmol/L 92*   CO2 mmol/L 29.0   BUN mg/dL 45*   CREATININE mg/dL 1.37*   CALCIUM mg/dL 8.8   BILIRUBIN mg/dL 0.4   ALK PHOS U/L 117   ALT (SGPT) U/L 66*   AST (SGOT) U/L 64*   GLUCOSE mg/dL 156*     Results from last 7 days   Lab Units 10/14/23  0024   PH, ARTERIAL pH units 7.424   PO2 ART mm Hg 96.0   PCO2, ARTERIAL mm Hg 34.4*   HCO3 ART mmol/L 22.5     Results from last 7 days   Lab Units 10/16/23  2357 10/15/23  2236 10/14/23  0018   ALBUMIN g/dL 3.1* 3.3* 3.7     Results from last 7 days   Lab Units 10/16/23  2357 10/15/23  2236 10/15/23  0323 10/14/23  0018   CK TOTAL U/L 828* 1,631* 2,719*  --    HSTROP T ng/L  --   --   --  9         Results from last 7 days   Lab Units 10/15/23  2236   MAGNESIUM mg/dL 2.2         Results from last 7 days   Lab Units 10/15/23  0323   TSH uIU/mL 0.580           Meds:   SCHEDULE  allopurinol, 100 mg, Oral, Daily  ampicillin-sulbactam, 3 g, Intravenous, Q6H  budesonide-formoterol, 2 puff, Inhalation, BID - RT  cholecalciferol, 2,000 Units, Oral, Nightly  furosemide, 40 mg, Intravenous, TID  gabapentin, 400 mg, Oral, TID  guaiFENesin, 1,200 mg, Oral, Q12H  insulin lispro, 3-14 Units, Subcutaneous, 4x Daily AC & at Bedtime  ipratropium-albuterol, 3 mL, Nebulization, Q4H - RT  levothyroxine, 125 mcg, Oral, Q AM  pantoprazole, 40 mg, Oral, Q PM  potassium chloride, 40 mEq, Oral, TID With Meals  predniSONE, 30 mg, Oral, Daily   Followed by  [START ON 10/18/2023] predniSONE, 20 mg, Oral, Daily   Followed  by  [START ON 10/20/2023] predniSONE, 10 mg, Oral, Daily  senna-docusate sodium, 2 tablet, Oral, BID  sodium chloride, 10 mL, Intravenous, Q12H  tiZANidine, 2 mg, Oral, TID  vitamin B-12, 1,000 mcg, Oral, Daily      Infusions     PRNs    acetaminophen    albuterol    senna-docusate sodium **AND** polyethylene glycol **AND** bisacodyl **AND** bisacodyl    dextrose    dextrose    glucagon (human recombinant)    guaiFENesin-codeine    ondansetron    ondansetron    [COMPLETED] Insert Peripheral IV **AND** sodium chloride    sodium chloride    sodium chloride    Physical Exam:  Physical Exam  Cardiovascular:      Heart sounds: Murmur heard.   Pulmonary:      Effort: No respiratory distress.      Breath sounds: No stridor. Wheezing, rhonchi and rales present.   Chest:      Chest wall: No tenderness.         ROS  Review of Systems   Respiratory:  Positive for cough and shortness of breath. Negative for wheezing and stridor.    Cardiovascular:  Positive for palpitations and leg swelling. Negative for chest pain.   Gastrointestinal:  Negative for abdominal distention.             Total time spent with patient greater than: 45 Minutes

## 2023-10-17 NOTE — PLAN OF CARE
Goal Outcome Evaluation:  Patient has had a consistent cough throughout the day. On 10L hi flow oxygen nasal cannula. Using the flutter valve when able. On IV antibiotics. PRN cough syrup given. Up in chair throughout the day.  at bedside. Call light within reach.

## 2023-10-17 NOTE — PROGRESS NOTES
"NEPHROLOGY PROGRESS NOTE------[facility]    [facility]  [phone]  Yovani Lerma MD      Patient Care Team:  Pool Meyer NP-C as PCP - General (Nurse Practitioner)  Jose Levi MD as Consulting Physician (Cardiology)  Yovani Lerma MD as Consulting Physician (Nephrology)  Mary Hunt APRN as Nurse Practitioner (Nurse Practitioner)  Edmar Bear MD as Consulting Physician (Cardiology)      Provider:  Yovani Lerma MD  Patient Name: Claudia Rust  :  1950    SUBJECTIVE:  Follow-up JERROD/hyponatremia  No chest pain but still  has some dyspnea      Medication:  allopurinol, 100 mg, Oral, Daily  ampicillin-sulbactam, 3 g, Intravenous, Q6H  atorvastatin, 10 mg, Oral, Nightly  budesonide-formoterol, 2 puff, Inhalation, BID - RT  cholecalciferol, 2,000 Units, Oral, Nightly  furosemide, 40 mg, Intravenous, TID  gabapentin, 400 mg, Oral, TID  guaiFENesin, 1,200 mg, Oral, Q12H  insulin lispro, 3-14 Units, Subcutaneous, 4x Daily AC & at Bedtime  ipratropium-albuterol, 3 mL, Nebulization, Q4H - RT  levothyroxine, 125 mcg, Oral, Q AM  pantoprazole, 40 mg, Oral, Q PM  potassium chloride, 40 mEq, Oral, TID With Meals  [START ON 10/18/2023] predniSONE, 20 mg, Oral, Daily   Followed by  [START ON 10/20/2023] predniSONE, 10 mg, Oral, Daily  senna-docusate sodium, 2 tablet, Oral, BID  sodium chloride, 10 mL, Intravenous, Q12H  tiZANidine, 2 mg, Oral, TID  vitamin B-12, 1,000 mcg, Oral, Daily           OBJECTIVE    Vital Sign Min/Max for last 24 hours  Temp  Min: 97.6 °F (36.4 °C)  Max: 98.4 °F (36.9 °C)   BP  Min: 100/66  Max: 136/73   Pulse  Min: 67  Max: 83   Resp  Min: 18  Max: 24   SpO2  Min: 90 %  Max: 99 %   No data recorded   No data recorded     Flowsheet Rows      Flowsheet Row First Filed Value   Admission Height 162.6 cm (64\") Documented at 10/13/2023 2352   Admission Weight 137 kg (303 lb) Documented at 10/13/2023 2352      " "      I/O this shift:  In: 350 [P.O.:350]  Out: -   I/O last 3 completed shifts:  In: 580 [P.O.:480; I.V.:100]  Out: 1600 [Urine:1600]    Physical Exam:  General Appearance: alert, appears stated age and cooperative  Head: normocephalic, without obvious abnormality and atraumatic  Eyes: conjunctivae and sclerae normal and no icterus  Neck: supple and no JVD  Lungs: clear to auscultation and respirations regular  Heart: regular rhythm & normal rate and normal S1, S2  Chest: Wall no abnormalities observed  Abdomen: normal bowel sounds and soft, nontender  Extremities: moves extremities well, plus edema, no cyanosis and no redness  Skin: no bleeding, bruising or rash, turgor normal, color normal and no lesions noted  Neurologic: Alert, and oriented. No focal deficits    Labs:    WBC WBC   Date Value Ref Range Status   10/16/2023 21.20 (H) 3.40 - 10.80 10*3/mm3 Final   10/15/2023 20.60 (H) 3.40 - 10.80 10*3/mm3 Final   10/15/2023 16.60 (H) 3.40 - 10.80 10*3/mm3 Final      HGB Hemoglobin   Date Value Ref Range Status   10/16/2023 11.4 (L) 12.0 - 15.9 g/dL Final   10/15/2023 11.0 (L) 12.0 - 15.9 g/dL Final   10/15/2023 11.4 (L) 12.0 - 15.9 g/dL Final      HCT Hematocrit   Date Value Ref Range Status   10/16/2023 33.1 (L) 34.0 - 46.6 % Final   10/15/2023 34.5 34.0 - 46.6 % Final   10/15/2023 34.5 34.0 - 46.6 % Final      Platelets No results found for: \"LABPLAT\"   MCV MCV   Date Value Ref Range Status   10/16/2023 87.9 79.0 - 97.0 fL Final   10/15/2023 88.3 79.0 - 97.0 fL Final   10/15/2023 86.6 79.0 - 97.0 fL Final          Sodium Sodium   Date Value Ref Range Status   10/16/2023 132 (L) 136 - 145 mmol/L Final   10/16/2023 127 (L) 136 - 145 mmol/L Final   10/15/2023 126 (L) 136 - 145 mmol/L Final   10/15/2023 126 (L) 136 - 145 mmol/L Final   10/15/2023 123 (L) 136 - 145 mmol/L Final   10/15/2023 121 (L) 136 - 145 mmol/L Final      Potassium Potassium   Date Value Ref Range Status   10/16/2023 4.5 3.5 - 5.2 mmol/L Final " "  10/16/2023 4.2 3.5 - 5.2 mmol/L Final     Comment:     Slight hemolysis detected by analyzer. Results may be affected.   10/15/2023 3.8 3.5 - 5.2 mmol/L Final   10/15/2023 3.8 3.5 - 5.2 mmol/L Final   10/15/2023 3.4 (L) 3.5 - 5.2 mmol/L Final      Chloride Chloride   Date Value Ref Range Status   10/16/2023 92 (L) 98 - 107 mmol/L Final   10/16/2023 90 (L) 98 - 107 mmol/L Final   10/15/2023 86 (L) 98 - 107 mmol/L Final   10/15/2023 86 (L) 98 - 107 mmol/L Final   10/15/2023 84 (L) 98 - 107 mmol/L Final      CO2 CO2   Date Value Ref Range Status   10/16/2023 29.0 22.0 - 29.0 mmol/L Final   10/16/2023 24.0 22.0 - 29.0 mmol/L Final   10/15/2023 27.0 22.0 - 29.0 mmol/L Final   10/15/2023 27.0 22.0 - 29.0 mmol/L Final   10/15/2023 24.0 22.0 - 29.0 mmol/L Final      BUN BUN   Date Value Ref Range Status   10/16/2023 45 (H) 8 - 23 mg/dL Final   10/16/2023 39 (H) 8 - 23 mg/dL Final   10/15/2023 36 (H) 8 - 23 mg/dL Final   10/15/2023 36 (H) 8 - 23 mg/dL Final   10/15/2023 28 (H) 8 - 23 mg/dL Final      Creatinine Creatinine   Date Value Ref Range Status   10/16/2023 1.37 (H) 0.57 - 1.00 mg/dL Final   10/16/2023 1.30 (H) 0.57 - 1.00 mg/dL Final   10/15/2023 1.19 (H) 0.57 - 1.00 mg/dL Final   10/15/2023 1.19 (H) 0.57 - 1.00 mg/dL Final   10/15/2023 1.05 (H) 0.57 - 1.00 mg/dL Final      Calcium Calcium   Date Value Ref Range Status   10/16/2023 8.8 8.6 - 10.5 mg/dL Final   10/16/2023 8.3 (L) 8.6 - 10.5 mg/dL Final   10/15/2023 9.1 8.6 - 10.5 mg/dL Final   10/15/2023 9.1 8.6 - 10.5 mg/dL Final   10/15/2023 8.9 8.6 - 10.5 mg/dL Final      PO4 No components found for: \"PO4\"   Albumin Albumin   Date Value Ref Range Status   10/16/2023 3.1 (L) 3.5 - 5.2 g/dL Final   10/15/2023 3.3 (L) 3.5 - 5.2 g/dL Final      Magnesium Magnesium   Date Value Ref Range Status   10/15/2023 2.2 1.6 - 2.4 mg/dL Final      Uric Acid No components found for: \"URIC ACID\"     Imaging Results (Last 72 Hours)       ** No results found for the last 72 hours. " **            Results for orders placed during the hospital encounter of 10/13/23    XR Chest 1 View    Narrative  XR CHEST 1 VW    Date of Exam: 10/14/2023 12:16 AM EDT    Indication: sob    Comparison: 10/10/2023.    Findings:  Patchy airspace disease is seen within the bilateral lower lobes. No sizable pleural effusion identified. The heart appears enlarged. There is indistinctness of the pulmonary vasculature. Median sternotomy wires are present. No pneumothorax. No acute  osseous abnormality identified.    Impression  Impression:  Patchy airspace disease within the bilateral lower lobes, likely related to pneumonia. Possible mild underlying pulmonary edema pattern.        Electronically Signed: Tana Sutherland MD  10/14/2023 12:27 AM EDT  Workstation ID: [id number]      Results for orders placed during the hospital encounter of 10/10/23    XR Chest AP    Narrative  XR CHEST AP    Date of Exam: 10/10/2023 7:02 PM EDT    Indication: COVID Evaluation, Cough, Fever    Comparison: 1/6/2023    Findings:  Lines: None    Lungs: The lungs are clear.  Pleura: No pleural effusion or pneumothorax.    Cardiomediastinum: Unchanged    Soft Tissues: Unremarkable.    Bones: Status post median sternotomy. No acute osseous abnormality.    Impression  Impression:  No acute abnormality.      Electronically Signed: David Montemayor DO  10/10/2023 7:14 PM EDT  Workstation ID: [id number]      Results for orders placed during the hospital encounter of 01/06/23    XR Chest 1 View    Narrative  XR CHEST 1 VW    Date of Exam: 1/6/2023 7:33 PM EST    Indication: SOA.    Comparison: 4/7/2021 and prior    Findings:  Patient is status post median sternotomy. The heart size appears stable. Prosthetic valve at the aortic root noted. Pulmonary vascularity is congested and distinct. Coarsening of the interstitial markings noted diffusely with increased hazy and  interstitial opacities noted with a perihilar and basilar predominance. Osseous structures  demonstrate no acute abnormality. Multilevel degenerative changes noted.    Impression  Impression:  Increased hazy and interstitial opacities noted. Findings represent underlying interstitial edema. Viral and atypical pneumonia also in the differential.    Electronically Signed: Micah Neil  1/6/2023 8:03 PM EST  Workstation ID: [id number]      Results for orders placed during the hospital encounter of 10/13/23    Duplex Venous Upper Extremity - Left CAR    Interpretation Summary    Normal left upper extremity venous duplex scan.        ASSESSMENT / PLAN      Acute respiratory failure    Multifocal pneumonia    Multiple tracheobronchial mucus plugs    CKD stage IIIa-CKD due to hypertensive nephrosclerosis  Rhabdomyolysis-statins held  Hyponatremia-with hypervolemia.  Probably underlying excess ADH related to SSRI use.  Aldactone and metolazone held.  Status post Samsca 7.5 mg x 1  Acute exacerbation of chronic diastolic heart failure  Chronic lower extremity edema  History coronary artery disease with history of AVR  Morbid obesity  Acute hypoxic respiratory failure  Pneumonia  Depression on SSRI  Diabetes type 2  Anemia-SPEP pending  History right upper extremity DVT  Hyperlipidemia    Creatinine remains stable  Sodium better,  CK still high but trending down to 800 today  Clinically appears with excess volume  Continue IV Lasix, increase dose, metolazone x 1   Monitor renal function fluid status electrolytes        Yovani Lerma MD  [facility]  [phone]  10/17/23  12:27 EDT

## 2023-10-17 NOTE — PLAN OF CARE
Goal Outcome Evaluation:                      No change in status. Pt rested. Pt had a couple of coughing episodes. O2 was bumped to 10L at this time. VSS, call light within reach. Plan of care ongoing.

## 2023-10-17 NOTE — THERAPY EVALUATION
"Patient Name: Claudia Rust  : 1950    MRN: [id number]                              Today's Date: 10/17/2023       Admit Date: 10/13/2023    Visit Dx:     ICD-10-CM ICD-9-CM   1. Hyponatremia  E87.1 276.1   2. Hypervolemia, unspecified hypervolemia type  E87.70 276.69   3. Acute hypoxemic respiratory failure  J96.01 518.81   4. Multifocal pneumonia  J18.9 486   5. Multiple tracheobronchial mucus plugs  T17.800A 519.19     Patient Active Problem List   Diagnosis    Obesity, morbid, BMI 50 or higher    Dyspnea on exertion    Abnormal nuclear stress test    Nonrheumatic aortic valve stenosis    Prediabetes    Dyslipidemia    Essential hypertension    Acute UTI    Bipolar disorder    COPD with hypoxia    Hyperlipidemia, unspecified    Gastroesophageal reflux disease    Rheumatoid arthritis    Aortic stenosis, severe    Chronic diastolic heart failure    Aortic valve stenosis    S/P AVR (aortic valve replacement)    Long term (current) use of anticoagulants    COVID    Atypical pneumonia    Pulmonary hypertension    Acute respiratory failure    Multifocal pneumonia    Multiple tracheobronchial mucus plugs     Past Medical History:   Diagnosis Date    Acute congestive heart failure     Allergies     Anxiety     Arthritis     Asthma     Bilateral leg edema     Bipolar 1 disorder     Bulging lumbar disc     X3    Cancer     states had \"cancerous tumor during pregnancy and had to have hysterectomy\"    Cataract     bilateral    Cellulitis 2021    bilateral legs    Compression fracture of vertebral column     LUMBAR    COPD (chronic obstructive pulmonary disease)     Delayed emergence from anesthesia     Depression     Depression     Diabetes mellitus     Dyslipidemia     Dyspnea on exertion     Fibromyalgia     GERD (gastroesophageal reflux disease)     Hiatal hernia     Hyperlipidemia     Hypertension     Hypothyroid     HYPOTHYROID    IBS (irritable bowel syndrome)     Migraines     Morbid obesity     Neuropathy "     Panic attacks     Peripheral edema     PONV (postoperative nausea and vomiting)     Poor mobility     rolling walker    Prediabetes     PVD (peripheral vascular disease)     Rheumatoid aortitis     Spinal stenosis     Spinal stenosis     Vertigo     Vertigo     Vitamin D deficiency      Past Surgical History:   Procedure Laterality Date    AORTIC VALVE REPAIR/REPLACEMENT N/A 3/18/2021    Procedure: AORTIC VALVE REPLACEMENT;  Surgeon: Olaf Mcgill MD;  Location: [facility];  Service: Cardiothoracic;  Laterality: N/A;    BRONCHOSCOPY N/A 3/25/2021    Procedure: BRONCHOSCOPY AT BEDSIDE WITH BRONCHIOALVEOLAR LAVAGE;  Surgeon: Glenn Reyes MD;  Location: [facility];  Service: Pulmonary;  Laterality: N/A;  PNA    CARDIAC CATHETERIZATION  2009    CARDIAC CATHETERIZATION N/A 8/14/2019    Procedure: Left Heart Cath;  Surgeon: Jose Levi MD;  Location: [facility];  Service: Cardiovascular    CARDIAC CATHETERIZATION N/A 8/14/2019    Procedure: Right Heart Cath;  Surgeon: Jose Levi MD;  Location: [facility] LOCATION;  Service: Cardiovascular    CARDIAC CATHETERIZATION N/A 8/14/2019    Procedure: Aortic root aortogram;  Surgeon: Jose Levi MD;  Location: [facility] LOCATION;  Service: Cardiovascular    CARDIAC CATHETERIZATION N/A 1/20/2021    Procedure: Left Heart Cath;  Surgeon: Jose Levi MD;  Location: [facility] LOCATION;  Service: Cardiovascular;  Laterality: N/A;    COLONOSCOPY      CYSTOCELE REPAIR  1984    ELBOW ARTHROPLASTY  2011    ENDOSCOPY      FOOT SURGERY      GALLBLADDER SURGERY  2002    TONSILLECTOMY      VAGINAL HYSTERECTOMY  1972      General Information       Row Name 10/17/23 1342          Physical Therapy Time and Intention    Document Type evaluation  -BR     Mode of Treatment physical therapy  -BR       Row Name 10/17/23 1341          General Information    Patient Profile Reviewed  yes  -BR     Prior Level of Function independent:;gait;transfer;bed mobility;community mobility  Rollator at Baseline  -BR     Existing Precautions/Restrictions oxygen therapy device and L/min;other (see comments)  Fluid Restrictions  -BR     Barriers to Rehab medically complex;impaired sensation  -BR       Row Name 10/17/23 1343          Living Environment    People in Home child(ludmila), adult;spouse  Lives with  & Daughter, Caregiver Sunday-Thursday  -BR       Row Name 10/17/23 1343          Home Main Entrance    Number of Stairs, Main Entrance none  -BR       Row Name 10/17/23 1343          Stairs Within Home, Primary    Number of Stairs, Within Home, Primary none  -BR       Row Name 10/17/23 1343          Cognition    Orientation Status (Cognition) oriented x 4  -BR       Row Name 10/17/23 1343          Safety Issues, Functional Mobility    Impairments Affecting Function (Mobility) balance;endurance/activity tolerance;pain;postural/trunk control;range of motion (ROM);sensation/sensory awareness;shortness of breath;strength  -BR               User Key  (r) = Recorded By, (t) = Taken By, (c) = Cosigned By      Initials Name Provider Type    Sharon Arthur PT Physical Therapist                   Mobility       Row Name 10/17/23 1347          Bed Mobility    Comment, (Bed Mobility) Patient in chair upon PT arrival  -BR       Row Name 10/17/23 1347          Sit-Stand Transfer    Sit-Stand Sharkey (Transfers) verbal cues;contact guard;2 person assist  -BR     Assistive Device (Sit-Stand Transfers) walker, front-wheeled  -BR       Row Name 10/17/23 1347          Gait/Stairs (Locomotion)    Distance in Feet (Gait) Patient stood with rollator for 60 sec while BP retaken and patient unable to tolerate further walking  -BR               User Key  (r) = Recorded By, (t) = Taken By, (c) = Cosigned By      Initials Name Provider Type    Sharon Arthur PT Physical Therapist                    Obj/Interventions       Row Name 10/17/23 1348          Range of Motion Comprehensive    General Range of Motion bilateral lower extremity ROM WFL  -BR       Row Name 10/17/23 1348          Strength Comprehensive (MMT)    Comment, General Manual Muscle Testing (MMT) Assessment BLE MMT 3+/5  -BR       Row Name 10/17/23 1348          Balance    Balance Assessment sitting static balance;standing static balance  -BR     Static Sitting Balance modified independence  -BR     Position, Sitting Balance unsupported;sitting in chair  -BR     Static Standing Balance contact guard;2-person assist  -BR     Position/Device Used, Standing Balance supported;walker, rolling  -BR       Row Name 10/17/23 1348          Sensory Assessment (Somatosensory)    Sensory Assessment (Somatosensory) bilateral LE;bilateral UE  -BR     Bilateral UE Sensory Assessment general sensation;impaired  -BR     Bilateral LE Sensory Assessment general sensation;impaired  -BR               User Key  (r) = Recorded By, (t) = Taken By, (c) = Cosigned By      Initials Name Provider Type    BR Sharon Hendrix, PT Physical Therapist                   Goals/Plan       Row Name 10/17/23 1401          Bed Mobility Goal 1 (PT)    Activity/Assistive Device (Bed Mobility Goal 1, PT) bed mobility activities, all  -BR     [place] Level/Cues Needed (Bed Mobility Goal 1, PT) verbal cues required;standby assist  -BR     Time Frame (Bed Mobility Goal 1, PT) long term goal (LTG);2 weeks  -BR       Row Name 10/17/23 1401          Transfer Goal 1 (PT)    Activity/Assistive Device (Transfer Goal 1, PT) transfers, all  -BR     [place] Level/Cues Needed (Transfer Goal 1, PT) standby assist  -BR     Time Frame (Transfer Goal 1, PT) long term goal (LTG);2 weeks  -BR       Row Name 10/17/23 1401          Gait Training Goal 1 (PT)    Activity/Assistive Device (Gait Training Goal 1, PT) gait (walking locomotion);assistive device use;walker, rolling  -BR     [place]  Level (Gait Training Goal 1, PT) contact guard required  -BR     Distance (Gait Training Goal 1, PT) 35  -BR     Time Frame (Gait Training Goal 1, PT) long term goal (LTG);2 weeks  -BR       Row Name 10/17/23 8010          Therapy Assessment/Plan (PT)    Planned Therapy Interventions (PT) balance training;bed mobility training;gait training;neuromuscular re-education;patient/family education;ROM (range of motion);strengthening;transfer training  -BR               User Key  (r) = Recorded By, (t) = Taken By, (c) = Cosigned By      Initials Name Provider Type    BR Sharon Hendrix, PT Physical Therapist                   Clinical Impression       Row Name 10/17/23 9730          Pain    Additional Documentation Pain Scale: FACES Pre/Post-Treatment (Group)  -BR       Row Name 10/17/23 5894          Pain Scale: FACES Pre/Post-Treatment    Pain: FACES Scale, Pretreatment 0-->no hurt  -BR     Posttreatment Pain Rating 0-->no hurt  -BR       Row Name 10/17/23 0887          Plan of Care Review    Plan of Care Reviewed With patient;spouse  -BR     Outcome Evaluation Patient is a 73 y.o. F admitted to [facility] on 10/13/2023 due to worsening SOB and cough. Hospital diagnosis includes, Rhinovirus, acute respiratory failure, and pneumonia.Chest x-ray showed patchy airspace disease within the bilateral lower lobes, pneumonia versus pulmonary edema. Duplex BLE negative, Duplex LUE pending. Patient had bronchoscopy 10/16. Patient requires 10L via hi-flow at this time and does not need oxygen at baseline. PMH: Obesity, HTN, RA, heart failure, Bipolar disorder. Patient lives in an apartment with spouse and daughter with no steps, patient can use rollator to move around house. Patient has caregiver Sunday-Thursday when her spouse is at work. This day, patient requires CGAx2 for standing 60s. Patient limited by SOB, decreased endurance and weakness. Patient adamantly against rehab, PT recommendation is Home with 24/7 care and HHPT.  -BR        Row Name 10/17/23 1350          Therapy Assessment/Plan (PT)    Rehab Potential (PT) good, to achieve stated therapy goals  -BR     Criteria for Skilled Interventions Met (PT) yes;meets criteria;skilled treatment is necessary  -BR     Therapy Frequency (PT) 5 times/wk  -BR     Predicted Duration of Therapy Intervention (PT) Until D/C  -BR       Row Name 10/17/23 1350          Vital Signs    Pre Systolic BP Rehab 158  -BR     Pre Treatment Diastolic   -BR     Post Systolic BP Rehab 131  -BR     Post Treatment Diastolic BP 58  Patient took BP medication and was assessed afterwards  -BR     Pretreatment Heart Rate (beats/min) 77  -BR     Posttreatment Heart Rate (beats/min) 88  -BR     Pre SpO2 (%) 97  -BR     O2 Delivery Pre Treatment hi-flow  10L via Hi-flow  -BR     Post SpO2 (%) 96  -BR     O2 Delivery Post Treatment hi-flow  -BR     Pre Patient Position Sitting  -BR     Intra Patient Position Standing  -BR     Post Patient Position Sitting  -BR       Row Name 10/17/23 1350          Positioning and Restraints    Pre-Treatment Position sitting in chair/recliner  -BR     Post Treatment Position chair  -BR     In Chair notified nsg;sitting;call light within reach;encouraged to call for assist;with family/caregiver  -BR               User Key  (r) = Recorded By, (t) = Taken By, (c) = Cosigned By      Initials Name Provider Type    Sharon Arthur, PT Physical Therapist                   Outcome Measures       Row Name 10/17/23 1402          How much help from another person do you currently need...    Turning from your back to your side while in flat bed without using bedrails? 2  -BR     Moving from lying on back to sitting on the side of a flat bed without bedrails? 2  -BR     Moving to and from a bed to a chair (including a wheelchair)? 2  -BR     Standing up from a chair using your arms (e.g., wheelchair, bedside chair)? 3  -BR     Climbing 3-5 steps with a railing? 2  -BR     To walk in hospital  room? 2  -BR     AM-PAC 6 Clicks Score (PT) 13  -BR     Highest level of mobility 4 --> Transferred to chair/commode  -BR       Row Name 10/17/23 1402 10/17/23 1129       Functional Assessment    Outcome Measure Options AM-PAC 6 Clicks Basic Mobility (PT)  -BR AM-PAC 6 Clicks Daily Activity (OT)  -ES (r) PS (t) ES (c)              User Key  (r) = Recorded By, (t) = Taken By, (c) = Cosigned By      Initials Name Provider Type    ES Selam Adair, OT Occupational Therapist    BR Sharon Hendrix, PT Physical Therapist    PS Indira Ferreira, OT Student OT Student                                 Physical Therapy Education       Title: PT OT SLP Therapies (Done)       Topic: Physical Therapy (Done)       Point: Mobility training (Done)       Learning Progress Summary             Patient Acceptance, E,D, VU,DU by BR at 10/17/2023 1403                         Point: Body mechanics (Done)       Learning Progress Summary             Patient Acceptance, E,D, VU,DU by BR at 10/17/2023 1403                         Point: Precautions (Done)       Learning Progress Summary             Patient Acceptance, E,D, VU,DU by BR at 10/17/2023 1403                                         User Key       Initials Effective Dates Name Provider Type Discipline    BR 02/01/22 -  Sharon Hendrix, PT Physical Therapist PT                  PT Recommendation and Plan  Planned Therapy Interventions (PT): balance training, bed mobility training, gait training, neuromuscular re-education, patient/family education, ROM (range of motion), strengthening, transfer training  Plan of Care Reviewed With: patient, spouse  Outcome Evaluation: Patient is a 73 y.o. F admitted to [facility] on 10/13/2023 due to worsening SOB and cough. Hospital diagnosis includes, Rhinovirus, acute respiratory failure, and pneumonia.Chest x-ray showed patchy airspace disease within the bilateral lower lobes, pneumonia versus pulmonary edema. Duplex BLE negative, Duplex LUE  pending. Patient had bronchoscopy 10/16. Patient requires 10L via hi-flow at this time and does not need oxygen at baseline. PMH: Obesity, HTN, RA, heart failure, Bipolar disorder. Patient lives in an apartment with spouse and daughter with no steps, patient can use rollator to move around house. Patient has caregiver Sunday-Thursday when her spouse is at work. This day, patient requires CGAx2 for standing 60s. Patient limited by SOB, decreased endurance and weakness. Patient adamantly against rehab, PT recommendation is Home with 24/7 care and HHPT.     Time Calculation:         PT Charges       Row Name 10/17/23 1403             Time Calculation    Start Time 0943  -BR      Stop Time 1017  -BR      Time Calculation (min) 34 min  -BR      PT Received On 10/17/23  -BR      PT - Next Appointment 10/18/23  -BR      PT Goal Re-Cert Due Date 10/31/23  -BR         Time Calculation- PT    Total Timed Code Minutes- PT 0 minute(s)  -BR                User Key  (r) = Recorded By, (t) = Taken By, (c) = Cosigned By      Initials Name Provider Type    Sharon Arthur PT Physical Therapist                  Therapy Charges for Today       Code Description Service Date Service Provider Modifiers Qty    [id number] HC PT EVAL MOD COMPLEXITY 4 10/17/2023 Sharon Hendrix, PT GP 1            PT G-Codes  Outcome Measure Options: AM-PAC 6 Clicks Basic Mobility (PT)  AM-PAC 6 Clicks Score (PT): 13  AM-PAC 6 Clicks Score (OT): 12       Sharon Hendrix PT  10/17/2023

## 2023-10-17 NOTE — PROGRESS NOTES
Cardiology Progress Note    Patient Identification:  Name: Claudia Rust  Age: 73 y.o.  Sex: female  :  1950  MRN: [id number]                 Follow Up / Chief Complaint: Chronic HFpEF   Chief Complaint   Patient presents with    Shortness of Breath       Interval History: Patient presented with worsening shortness of breath and edema.  She is hypervolemic and hyponatremia, nephrology following.  She was also positive for Rhinovirus on 10/10/2023, symptoms progressively worsening.  She underwent bronchoscopy today 10/16/2023 with Dr. Reyes that showed significant amounts of mucuopurulent secretions, reactive airway disease and moderate to severe inflammation.     NP NOTE:  Patient seen and examined this morning.  She is up in the chair working with PT/OT.  She denies any chest pain.  She reports continued cough, minimal improvement in shortness of breath.  Edema slowly improving.   Left upper extremity doppler pending     Electronically signed by SARA Moore, 10/17/23, 10:24 AM EDT.    Cardiology attending addendum :    I have personally performed a face-to-face diagnostic evaluation, physical exam and reviewed data on this patient.  I have reviewed documentation done by me and nurse practitioner  and corrected as needed.  And agree with the different components of documentation.Greater than 50% of the time spent in the care of this patient was provided by attending consultant/me.             Subjective: Patient seen and examined.  Chart reviewed.  Labs reviewed.  Discussed with RN taking care of patient.  Patient is complaining of left thumb numbness and swelling.  Underwent venous Dopplers of upper extremity which are negative for DVT.        Objective: pro bnp negative at 646, , bun 25 creatinine 0.96 AST 38   WBC 15.8  hgb 11.9 CK 2719 potassium 3.4 BUN 28, creatinine 1.05 iron 22 iron sat 8  10/16/2023: CK 1631, sodium 126, potassium 3.8, bun 36 creatinine 1.19 AST 77, ALT 48 WBC 20.6 hgb  11.0   10/17/2023: , sodium 132, potassium 4.5, bun 45 creatinine 1.37 AST 64, ALT 66  WBC 21.2 hgb 11.4             History of present illness:      Ms. Claudia Rust has of PMH      #Valvular heart disease with moderate to severe aortic stenosis  Cardiac cath 1/20/2021 revealing nonobstructive CAD  Patient underwent #23 magna pericardial prosthesis AVR 3/18/2021  Transesophageal echo 3/25/2021 normal LV systolic function  # Hyperlipidemia,    # Hypertension  # COPD, KARLY on CPAP  # Prediabetes  #History of HIT, history of DVT RUE   # Hypothyroidism, Rheumatoid Arthritis, spinal stenosis, hiatal hernia, chronic depression, bipolar, GERD, IBS, chronic migraine, vertigo, herniated disc, compression lumbar fractures  #Allergies/intolerance to Stadol  # Hysterectomy, bladder reconstruction, cholecystectomy, right hand surgery  # Family history of strokes and grandfather.  Mother's cancer, father on  # Active cigarette smoker trying to quit         presented to the ED with worsening shortness of breath and cough over the last 2 weeks.  Patient was diagnosed with rhinovirus on 10/10/2023 in the ED, given steroids and discharged home.   Patient progressively worsened the next few days and EMS was called.   Patient was hypoxic in the 80s.    She reports worsening edema as well as orthopnea.  Patient denies any chest pain.  No fevers      In the ED on 10/14/2023 CXR showed pulmonary edema vs bilateral pneumonia,   HS troponin negative, pro bnp negative at 646, , bun 25 creatinine 0.96 AST 38   WBC 15.8  hgb 11.9 CK 2719 potassium 3.4 BUN 28, creatinine 1.05 iron 22 iron sat 8          Review of records reveals patient underwent an echocardiogram which is revealing severe aortic stenosis on 2/11/2020 ,Underwent cardiac cath 1/20/2021 which revealed no obstructive disease,underwent aortic valve replacement 3/18/2021 with #23 magna pericardial prosthesis.       Carotid Dopplers 9/13/2019 normal.  Echocardiogram  "2/11/2020 is revealing severe aortic stenosis  Work-up here on 3/16/2021 revealed proBNP 733, normal CMP, A1c 5.7, normal CBC, chest x-ray with no acute abnormality.  Lexiscan Cardiolite 2/28/2023 negative for ischemia EF of 86%           Assessment:     Acute respiratory failure with recent rhinovirus 10/10/2023 and now with pneumonia and mucous plugs    Bradycardia    Aortic stenosis, severe, status post AVR with #23 magna pericardial prosthesis 3/18/2021    Chronic congestive heart failure  due to valvular heart disease and aortic stenosis hypertensive cardiovascular disease essential hypertension/hypertensive cardiovascular disease     Dyslipidemia    diabetes  Morbid obesity with BMI over 40  Cigarette smoker  TCP HIT positive on 3/26/2021, + RUE DVT  previously on warfarin  Diabetes     Plan:     Telemetry is revealing sinus rhythm.  Appreciated [facility] report.  We will follow-up with pulmonary.    Continue aspirin,  metoprolol, simvastatin as tolerated for edema, AVR, carotid disease, chronic heart failure due to valvular heart disease and hypertensive cardiovascular disease.  Stress test 2/20/2023 which was negative for ischemia.  Continue diuresis follow-up with nephrology  Patient had venous Dopplers of left upper extremity which was negative for DVT.  Reviewed results with patient and family.    Discussed with RN taking care of patient to coordinate care.          Copied text in this portion of the note has been reviewed and is accurate as of 10/17/2023    Past Medical History:  Past Medical History:   Diagnosis Date    Acute congestive heart failure     Allergies     Anxiety     Arthritis     Asthma     Bilateral leg edema     Bipolar 1 disorder     Bulging lumbar disc     X3    Cancer     states had \"cancerous tumor during pregnancy and had to have hysterectomy\"    Cataract     bilateral    Cellulitis 03/2021    bilateral legs    Compression fracture of vertebral column     LUMBAR    COPD (chronic " obstructive pulmonary disease)     Delayed emergence from anesthesia     Depression     Depression     Diabetes mellitus     Dyslipidemia     Dyspnea on exertion     Fibromyalgia     GERD (gastroesophageal reflux disease)     Hiatal hernia     Hyperlipidemia     Hypertension     Hypothyroid     HYPOTHYROID    IBS (irritable bowel syndrome)     Migraines     Morbid obesity     Neuropathy     Panic attacks     Peripheral edema     PONV (postoperative nausea and vomiting)     Poor mobility     rolling walker    Prediabetes     PVD (peripheral vascular disease)     Rheumatoid aortitis     Spinal stenosis     Spinal stenosis     Vertigo     Vertigo     Vitamin D deficiency      Past Surgical History:  Past Surgical History:   Procedure Laterality Date    AORTIC VALVE REPAIR/REPLACEMENT N/A 3/18/2021    Procedure: AORTIC VALVE REPLACEMENT;  Surgeon: Olaf Mcgill MD;  Location: [facility];  Service: Cardiothoracic;  Laterality: N/A;    BRONCHOSCOPY N/A 3/25/2021    Procedure: BRONCHOSCOPY AT BEDSIDE WITH BRONCHIOALVEOLAR LAVAGE;  Surgeon: Glenn Reyes MD;  Location: [facility];  Service: Pulmonary;  Laterality: N/A;  PNA    CARDIAC CATHETERIZATION  2009    CARDIAC CATHETERIZATION N/A 8/14/2019    Procedure: Left Heart Cath;  Surgeon: Jose Levi MD;  Location: [facility] CATH INVASIVE LOCATION;  Service: Cardiovascular    CARDIAC CATHETERIZATION N/A 8/14/2019    Procedure: Right Heart Cath;  Surgeon: Jose Levi MD;  Location: [facility] CATH INVASIVE LOCATION;  Service: Cardiovascular    CARDIAC CATHETERIZATION N/A 8/14/2019    Procedure: Aortic root aortogram;  Surgeon: Jose Levi MD;  Location: [facility] CATH INVASIVE LOCATION;  Service: Cardiovascular    CARDIAC CATHETERIZATION N/A 1/20/2021    Procedure: Left Heart Cath;  Surgeon: Jose Levi MD;  Location: [facility] CATH INVASIVE LOCATION;  Service: Cardiovascular;  Laterality: N/A;    COLONOSCOPY      CYSTOCELE  REPAIR  1984    ELBOW ARTHROPLASTY  2011    ENDOSCOPY      FOOT SURGERY      GALLBLADDER SURGERY  2002    TONSILLECTOMY      VAGINAL HYSTERECTOMY  1972        Social History:   Social History     Tobacco Use    Smoking status: Former     Packs/day: 1     Types: Cigarettes     Quit date:      Years since quittin.7    Smokeless tobacco: Never    Tobacco comments:     decrease now and do not smoke dos   Substance Use Topics    Alcohol use: Not Currently     Comment: very rare      Family History:  Family History   Problem Relation Age of Onset    Ovarian cancer Mother     Lung cancer Mother     Esophageal cancer Mother     Hypertension Father     Diabetes Maternal Grandmother           Allergies:  Allergies   Allergen Reactions    Adhesive Tape Unknown (See Comments)     hives    Butorphanol Other (See Comments)     Chest pain difficulty breathing throat swelling, STADAL    Garlic Other (See Comments)     Chest pain difficulty breathing throat swells    Heparin Other (See Comments)     HIT +    Tetanus-Diphtheria Toxoids Td Other (See Comments)     Dizziness,  vomiting    Aloe Itching    Bee Venom Other (See Comments)     Swelling eyes and lips hand swelling    Latex Itching    Macadamia Nut Oil Other (See Comments)     Chest pain difficulty breathing throat swelling    Tuberculin, Ppd Unknown (See Comments)     reactor    Wasp Venom Other (See Comments)     Swelling eyes lips and hand     Scheduled Meds:  allopurinol, 100 mg, Daily  ampicillin-sulbactam, 3 g, Q6H  atorvastatin, 10 mg, Nightly  budesonide-formoterol, 2 puff, BID - RT  cholecalciferol, 2,000 Units, Nightly  furosemide, 40 mg, TID  gabapentin, 400 mg, TID  guaiFENesin, 1,200 mg, Q12H  insulin lispro, 3-14 Units, 4x Daily AC & at Bedtime  ipratropium-albuterol, 3 mL, Q4H - RT  levothyroxine, 125 mcg, Q AM  pantoprazole, 40 mg, Q PM  potassium chloride, 40 mEq, TID With Meals  [START ON 10/18/2023] predniSONE, 20 mg, Daily   Followed by  [START ON  "10/20/2023] predniSONE, 10 mg, Daily  senna-docusate sodium, 2 tablet, BID  sodium chloride, 10 mL, Q12H  tiZANidine, 2 mg, TID  vitamin B-12, 1,000 mcg, Daily          Review of Systems:   ROS  Review of Systems   Constitution: Negative for chills and fever.   Cardiovascular: Negative for chest pain and palpitations.   Respiratory: Negative for cough and hemoptysis.    Gastrointestinal: Negative for nausea.        Constitutional:  Temp:  [97.6 °F (36.4 °C)-98.4 °F (36.9 °C)] 97.6 °F (36.4 °C)  Heart Rate:  [67-83] 70  Resp:  [18-24] 23  BP: (100-136)/(51-84) 100/66    Physical Exam   /66 (BP Location: Left arm, Patient Position: Sitting)   Pulse 70   Temp 97.6 °F (36.4 °C) (Oral)   Resp 23   Ht 162.6 cm (64\")   Wt (!) 139 kg (306 lb)   SpO2 93%   BMI 52.52 kg/m²   General:  Appears in no acute distress, drowsy on non re-breather   Eyes: Sclera is anicteric,  conjunctiva is clear   HEENT:  No JVD. Thyroid not visibly enlarged. No mucosal pallor or cyanosis  Respiratory: Respirations regular and unlabored at rest.  Bilaterally good breath sounds, with good air entry in all fields. No crackles, rubs or wheezes auscultated  Cardiovascular: S1,S2 Regular rate and rhythm. No murmur, rub or gallop auscultated.  + bilateral lower extremity edema   Gastrointestinal: Abdomen nondistended, soft  obese   Musculoskeletal:  No abnormal movements  Extremities: left upper extremity edema, bilateral lower extremity edema with erythema   Skin: Color pink. Skin warm and dry to touch. No rashes  No xanthoma  Neuro: Alert and awake, no lateralizing deficits appreciated    INTAKE AND OUTPUT:    Intake/Output Summary (Last 24 hours) at 10/17/2023 1202  Last data filed at 10/17/2023 0718  Gross per 24 hour   Intake 830 ml   Output 1600 ml   Net -770 ml       Cardiographics  Telemetry: sinus rhythm     ECG:   ECG 12 Lead Dyspnea   Final Result   HEART RATE= 86  bpm   RR Interval= 696  ms   OK Interval= 169  ms   P Horizontal " Axis= 21  deg   P Front Axis= 61  deg   QRSD Interval= 96  ms   QT Interval= 405  ms   QTcB= 485  ms   QRS Axis= 64  deg   T Wave Axis= 71  deg   - NORMAL ECG -   Sinus rhythm   When compared with ECG of 06-Jan-2023 19:49:48,   Significant rate increase   Electronically Signed By: Eliceo Jones (IAIN) 16-Oct-2023 10:45:37   Date and Time of Study: 2023-10-14 00:05:27      SCANNED - TELEMETRY     Final Result      SCANNED - TELEMETRY     Final Result      SCANNED - TELEMETRY     Final Result      SCANNED - TELEMETRY     Final Result        I have personally reviewed EKG    Echocardiogram: Results for orders placed during the hospital encounter of 10/13/23    Adult Transthoracic Echo Complete w/ Color, Spectral and Contrast if necessary per protocol    Interpretation Summary    Left ventricular diastolic function was indeterminate.    Intravenous  contrast was administered to optimize endocardial definition.    Normal left ventricular systolic function  Concentric left ventricular hypertrophy  Indeterminate left ventricular diastolic function  Dilated right ventricle with probably normal function  Dilated left atrium  Bioprosthetic aortic valve with normal function.  Mild mitral regurgitation  Mild tricuspid regurgitation      Lab Review   I have reviewed the labs  Results from last 7 days   Lab Units 10/16/23  2357 10/15/23  2236 10/15/23  0323 10/14/23  0018   CK TOTAL U/L 828* 1,631* 2,719*  --    HSTROP T ng/L  --   --   --  9     Results from last 7 days   Lab Units 10/15/23  2236   MAGNESIUM mg/dL 2.2     Results from last 7 days   Lab Units 10/16/23  2357   SODIUM mmol/L 132*   POTASSIUM mmol/L 4.5   BUN mg/dL 45*   CREATININE mg/dL 1.37*   CALCIUM mg/dL 8.8         Results from last 7 days   Lab Units 10/16/23  2357 10/15/23  2236 10/15/23  0324   WBC 10*3/mm3 21.20* 20.60* 16.60*   HEMOGLOBIN g/dL 11.4* 11.0* 11.4*   HEMATOCRIT % 33.1* 34.5 34.5   PLATELETS 10*3/mm3 238 249 215           RADIOLOGY:  Imaging  "Results (Last 24 Hours)       ** No results found for the last 24 hours. **                  )10/17/2023  MD DANIEL Lamas/Transcription:   \"Dictated utilizing Dragon dictation\".   "

## 2023-10-17 NOTE — PLAN OF CARE
Goal Outcome Evaluation:  Plan of Care Reviewed With: patient, spouse   Patient is a 73 y.o. F admitted to [facility] on 10/13/2023 due to worsening SOB and cough. Hospital diagnosis includes, Rhinovirus, acute respiratory failure, and pneumonia.Chest x-ray showed patchy airspace disease within the bilateral lower lobes, pneumonia versus pulmonary edema. Duplex BLE negative, Duplex LUE pending. Patient had bronchoscopy 10/16. Patient requires 10L via hi-flow at this time and does not need oxygen at baseline. PMH: Obesity, HTN, RA, heart failure, Bipolar disorder. Patient lives in an apartment with spouse and daughter with no steps, patient can use rollator to move around house. Patient has caregiver Sunday-Thursday when her spouse is at work. This day, patient requires CGAx2 for standing 60s. Patient limited by SOB, decreased endurance and weakness. Patient adamantly against rehab, PT recommendation is Home with 24/7 care and HHPT.      .

## 2023-10-18 LAB
ALBUMIN SERPL-MCNC: 3.1 G/DL (ref 3.5–5.2)
ALP SERPL-CCNC: 165 U/L (ref 39–117)
ALT SERPL W P-5'-P-CCNC: 83 U/L (ref 1–33)
ANION GAP SERPL CALCULATED.3IONS-SCNC: 12 MMOL/L (ref 5–15)
AST SERPL-CCNC: 75 U/L (ref 1–32)
BACTERIA SPEC RESP CULT: NORMAL
BILIRUB CONJ SERPL-MCNC: 0.2 MG/DL (ref 0–0.3)
BILIRUB INDIRECT SERPL-MCNC: 0.4 MG/DL
BILIRUB SERPL-MCNC: 0.6 MG/DL (ref 0–1.2)
BUN SERPL-MCNC: 36 MG/DL (ref 8–23)
BUN/CREAT SERPL: 31.6 (ref 7–25)
CALCIUM SPEC-SCNC: 9.4 MG/DL (ref 8.6–10.5)
CHLORIDE SERPL-SCNC: 92 MMOL/L (ref 98–107)
CK SERPL-CCNC: 419 U/L (ref 20–180)
CO2 SERPL-SCNC: 28 MMOL/L (ref 22–29)
CREAT SERPL-MCNC: 1.14 MG/DL (ref 0.57–1)
DEPRECATED RDW RBC AUTO: 41.1 FL (ref 37–54)
EGFRCR SERPLBLD CKD-EPI 2021: 50.9 ML/MIN/1.73
ERYTHROCYTE [DISTWIDTH] IN BLOOD BY AUTOMATED COUNT: 13.4 % (ref 12.3–15.4)
GLUCOSE BLDC GLUCOMTR-MCNC: 113 MG/DL (ref 70–105)
GLUCOSE BLDC GLUCOMTR-MCNC: 150 MG/DL (ref 70–105)
GLUCOSE BLDC GLUCOMTR-MCNC: 182 MG/DL (ref 70–105)
GLUCOSE BLDC GLUCOMTR-MCNC: 191 MG/DL (ref 70–105)
GLUCOSE SERPL-MCNC: 179 MG/DL (ref 65–99)
GRAM STN SPEC: NORMAL
HCT VFR BLD AUTO: 37.7 % (ref 34–46.6)
HGB BLD-MCNC: 12.3 G/DL (ref 12–15.9)
LARGE PLATELETS: ABNORMAL
LYMPHOCYTES # BLD MANUAL: 3.76 10*3/MM3 (ref 0.7–3.1)
LYMPHOCYTES NFR BLD MANUAL: 8 % (ref 5–12)
MCH RBC QN AUTO: 29.3 PG (ref 26.6–33)
MCHC RBC AUTO-ENTMCNC: 32.7 G/DL (ref 31.5–35.7)
MCV RBC AUTO: 89.6 FL (ref 79–97)
METAMYELOCYTES NFR BLD MANUAL: 6 % (ref 0–0)
MONOCYTES # BLD: 1.77 10*3/MM3 (ref 0.1–0.9)
MYELOCYTES NFR BLD MANUAL: 1 % (ref 0–0)
NEUTROPHILS # BLD AUTO: 15.03 10*3/MM3 (ref 1.7–7)
NEUTROPHILS NFR BLD MANUAL: 66 % (ref 42.7–76)
NEUTS BAND NFR BLD MANUAL: 2 % (ref 0–5)
NEUTS VAC BLD QL SMEAR: ABNORMAL
PLATELET # BLD AUTO: 324 10*3/MM3 (ref 140–450)
PMV BLD AUTO: 8.5 FL (ref 6–12)
POTASSIUM SERPL-SCNC: 5.4 MMOL/L (ref 3.5–5.2)
PROT SERPL-MCNC: 6.5 G/DL (ref 6–8.5)
RBC # BLD AUTO: 4.21 10*6/MM3 (ref 3.77–5.28)
RBC MORPH BLD: NORMAL
SCAN SLIDE: NORMAL
SMALL PLATELETS BLD QL SMEAR: ADEQUATE
SODIUM SERPL-SCNC: 132 MMOL/L (ref 136–145)
TOXIC GRANULATION: ABNORMAL
VARIANT LYMPHS NFR BLD MANUAL: 1 % (ref 0–5)
VARIANT LYMPHS NFR BLD MANUAL: 16 % (ref 19.6–45.3)
WBC NRBC COR # BLD: 22.1 10*3/MM3 (ref 3.4–10.8)

## 2023-10-18 PROCEDURE — 94761 N-INVAS EAR/PLS OXIMETRY MLT: CPT

## 2023-10-18 PROCEDURE — 25010000002 AMPICILLIN-SULBACTAM PER 1.5 G: Performed by: INTERNAL MEDICINE

## 2023-10-18 PROCEDURE — 85007 BL SMEAR W/DIFF WBC COUNT: CPT | Performed by: STUDENT IN AN ORGANIZED HEALTH CARE EDUCATION/TRAINING PROGRAM

## 2023-10-18 PROCEDURE — 63710000001 INSULIN LISPRO (HUMAN) PER 5 UNITS: Performed by: HOSPITALIST

## 2023-10-18 PROCEDURE — 82550 ASSAY OF CK (CPK): CPT | Performed by: STUDENT IN AN ORGANIZED HEALTH CARE EDUCATION/TRAINING PROGRAM

## 2023-10-18 PROCEDURE — 97112 NEUROMUSCULAR REEDUCATION: CPT

## 2023-10-18 PROCEDURE — 94664 DEMO&/EVAL PT USE INHALER: CPT

## 2023-10-18 PROCEDURE — 82550 ASSAY OF CK (CPK): CPT | Performed by: INTERNAL MEDICINE

## 2023-10-18 PROCEDURE — 85025 COMPLETE CBC W/AUTO DIFF WBC: CPT | Performed by: STUDENT IN AN ORGANIZED HEALTH CARE EDUCATION/TRAINING PROGRAM

## 2023-10-18 PROCEDURE — 80048 BASIC METABOLIC PNL TOTAL CA: CPT | Performed by: INTERNAL MEDICINE

## 2023-10-18 PROCEDURE — 97116 GAIT TRAINING THERAPY: CPT

## 2023-10-18 PROCEDURE — 63710000001 PREDNISONE PER 1 MG: Performed by: INTERNAL MEDICINE

## 2023-10-18 PROCEDURE — 84100 ASSAY OF PHOSPHORUS: CPT | Performed by: INTERNAL MEDICINE

## 2023-10-18 PROCEDURE — 99232 SBSQ HOSP IP/OBS MODERATE 35: CPT | Performed by: INTERNAL MEDICINE

## 2023-10-18 PROCEDURE — 97110 THERAPEUTIC EXERCISES: CPT

## 2023-10-18 PROCEDURE — 94799 UNLISTED PULMONARY SVC/PX: CPT

## 2023-10-18 PROCEDURE — 80076 HEPATIC FUNCTION PANEL: CPT | Performed by: STUDENT IN AN ORGANIZED HEALTH CARE EDUCATION/TRAINING PROGRAM

## 2023-10-18 PROCEDURE — 80048 BASIC METABOLIC PNL TOTAL CA: CPT | Performed by: STUDENT IN AN ORGANIZED HEALTH CARE EDUCATION/TRAINING PROGRAM

## 2023-10-18 PROCEDURE — 82948 REAGENT STRIP/BLOOD GLUCOSE: CPT

## 2023-10-18 PROCEDURE — 25010000002 FUROSEMIDE PER 20 MG: Performed by: INTERNAL MEDICINE

## 2023-10-18 RX ORDER — DIPHENHYDRAMINE HYDROCHLORIDE AND LIDOCAINE HYDROCHLORIDE AND ALUMINUM HYDROXIDE AND MAGNESIUM HYDRO
5 KIT EVERY 6 HOURS
Status: DISCONTINUED | OUTPATIENT
Start: 2023-10-18 | End: 2023-10-20 | Stop reason: HOSPADM

## 2023-10-18 RX ADMIN — GUAIFENESIN AND CODEINE PHOSPHATE 10 ML: 100; 10 SOLUTION ORAL at 09:36

## 2023-10-18 RX ADMIN — AMPICILLIN SODIUM AND SULBACTAM SODIUM 3 G: 2; 1 INJECTION, POWDER, FOR SOLUTION INTRAMUSCULAR; INTRAVENOUS at 09:33

## 2023-10-18 RX ADMIN — GUAIFENESIN 1200 MG: 600 TABLET, MULTILAYER, EXTENDED RELEASE ORAL at 21:41

## 2023-10-18 RX ADMIN — THEOPHYLLINE ANHYDROUS 300 MG: 300 CAPSULE, EXTENDED RELEASE ORAL at 21:46

## 2023-10-18 RX ADMIN — FUROSEMIDE 80 MG: 10 INJECTION, SOLUTION INTRAMUSCULAR; INTRAVENOUS at 09:37

## 2023-10-18 RX ADMIN — GUAIFENESIN AND CODEINE PHOSPHATE 10 ML: 100; 10 SOLUTION ORAL at 13:30

## 2023-10-18 RX ADMIN — Medication 10 ML: at 09:34

## 2023-10-18 RX ADMIN — GABAPENTIN 400 MG: 400 CAPSULE ORAL at 09:36

## 2023-10-18 RX ADMIN — FUROSEMIDE 80 MG: 10 INJECTION, SOLUTION INTRAMUSCULAR; INTRAVENOUS at 21:48

## 2023-10-18 RX ADMIN — Medication: at 21:48

## 2023-10-18 RX ADMIN — POTASSIUM CHLORIDE 40 MEQ: 1500 TABLET, EXTENDED RELEASE ORAL at 09:36

## 2023-10-18 RX ADMIN — Medication 10 ML: at 21:48

## 2023-10-18 RX ADMIN — IPRATROPIUM BROMIDE AND ALBUTEROL SULFATE 3 ML: .5; 3 SOLUTION RESPIRATORY (INHALATION) at 15:37

## 2023-10-18 RX ADMIN — CYANOCOBALAMIN TAB 1000 MCG 1000 MCG: 1000 TAB at 09:36

## 2023-10-18 RX ADMIN — GUAIFENESIN 1200 MG: 600 TABLET, MULTILAYER, EXTENDED RELEASE ORAL at 09:36

## 2023-10-18 RX ADMIN — DIPHENHYDRAMINE HYDROCHLORIDE AND LIDOCAINE HYDROCHLORIDE AND ALUMINUM HYDROXIDE AND MAGNESIUM HYDRO 5 ML: KIT at 15:21

## 2023-10-18 RX ADMIN — Medication: at 15:28

## 2023-10-18 RX ADMIN — PREDNISONE 20 MG: 20 TABLET ORAL at 09:36

## 2023-10-18 RX ADMIN — IPRATROPIUM BROMIDE AND ALBUTEROL SULFATE 3 ML: .5; 3 SOLUTION RESPIRATORY (INHALATION) at 11:20

## 2023-10-18 RX ADMIN — GUAIFENESIN AND CODEINE PHOSPHATE 10 ML: 100; 10 SOLUTION ORAL at 23:23

## 2023-10-18 RX ADMIN — INSULIN LISPRO 3 UNITS: 100 INJECTION, SOLUTION INTRAVENOUS; SUBCUTANEOUS at 21:47

## 2023-10-18 RX ADMIN — IPRATROPIUM BROMIDE AND ALBUTEROL SULFATE 3 ML: .5; 3 SOLUTION RESPIRATORY (INHALATION) at 07:09

## 2023-10-18 RX ADMIN — AMPICILLIN SODIUM AND SULBACTAM SODIUM 3 G: 2; 1 INJECTION, POWDER, FOR SOLUTION INTRAMUSCULAR; INTRAVENOUS at 15:20

## 2023-10-18 RX ADMIN — TIZANIDINE 2 MG: 4 TABLET ORAL at 09:36

## 2023-10-18 RX ADMIN — ATORVASTATIN CALCIUM 10 MG: 10 TABLET, FILM COATED ORAL at 21:41

## 2023-10-18 RX ADMIN — LEVOTHYROXINE SODIUM 125 MCG: 0.12 TABLET ORAL at 05:20

## 2023-10-18 RX ADMIN — MONTELUKAST 10 MG: 10 TABLET, FILM COATED ORAL at 21:41

## 2023-10-18 RX ADMIN — TIZANIDINE 2 MG: 4 TABLET ORAL at 15:22

## 2023-10-18 RX ADMIN — SENNOSIDES AND DOCUSATE SODIUM 2 TABLET: 50; 8.6 TABLET ORAL at 21:41

## 2023-10-18 RX ADMIN — GABAPENTIN 400 MG: 400 CAPSULE ORAL at 21:41

## 2023-10-18 RX ADMIN — SENNOSIDES AND DOCUSATE SODIUM 2 TABLET: 50; 8.6 TABLET ORAL at 09:35

## 2023-10-18 RX ADMIN — ALLOPURINOL 100 MG: 100 TABLET ORAL at 09:36

## 2023-10-18 RX ADMIN — PANTOPRAZOLE SODIUM 40 MG: 40 TABLET, DELAYED RELEASE ORAL at 17:24

## 2023-10-18 RX ADMIN — POTASSIUM CHLORIDE 40 MEQ: 1500 TABLET, EXTENDED RELEASE ORAL at 17:24

## 2023-10-18 RX ADMIN — TIZANIDINE 2 MG: 4 TABLET ORAL at 21:42

## 2023-10-18 RX ADMIN — POTASSIUM CHLORIDE 40 MEQ: 1500 TABLET, EXTENDED RELEASE ORAL at 13:00

## 2023-10-18 RX ADMIN — GUAIFENESIN AND CODEINE PHOSPHATE 10 ML: 100; 10 SOLUTION ORAL at 00:03

## 2023-10-18 RX ADMIN — IPRATROPIUM BROMIDE AND ALBUTEROL SULFATE 3 ML: .5; 3 SOLUTION RESPIRATORY (INHALATION) at 23:04

## 2023-10-18 RX ADMIN — AMPICILLIN SODIUM AND SULBACTAM SODIUM 3 G: 2; 1 INJECTION, POWDER, FOR SOLUTION INTRAMUSCULAR; INTRAVENOUS at 21:54

## 2023-10-18 RX ADMIN — IPRATROPIUM BROMIDE AND ALBUTEROL SULFATE 3 ML: .5; 3 SOLUTION RESPIRATORY (INHALATION) at 02:46

## 2023-10-18 RX ADMIN — BUDESONIDE AND FORMOTEROL FUMARATE DIHYDRATE 2 PUFF: 160; 4.5 AEROSOL RESPIRATORY (INHALATION) at 07:13

## 2023-10-18 RX ADMIN — AMPICILLIN SODIUM AND SULBACTAM SODIUM 3 G: 2; 1 INJECTION, POWDER, FOR SOLUTION INTRAMUSCULAR; INTRAVENOUS at 02:57

## 2023-10-18 RX ADMIN — GABAPENTIN 400 MG: 400 CAPSULE ORAL at 15:21

## 2023-10-18 RX ADMIN — GUAIFENESIN AND CODEINE PHOSPHATE 10 ML: 100; 10 SOLUTION ORAL at 17:25

## 2023-10-18 RX ADMIN — DIPHENHYDRAMINE HYDROCHLORIDE AND LIDOCAINE HYDROCHLORIDE AND ALUMINUM HYDROXIDE AND MAGNESIUM HYDRO 5 ML: KIT at 21:48

## 2023-10-18 RX ADMIN — IPRATROPIUM BROMIDE AND ALBUTEROL SULFATE 3 ML: .5; 3 SOLUTION RESPIRATORY (INHALATION) at 20:09

## 2023-10-18 RX ADMIN — Medication 2000 UNITS: at 21:41

## 2023-10-18 RX ADMIN — BUDESONIDE AND FORMOTEROL FUMARATE DIHYDRATE 2 PUFF: 160; 4.5 AEROSOL RESPIRATORY (INHALATION) at 20:09

## 2023-10-18 RX ADMIN — Medication: at 13:00

## 2023-10-18 NOTE — THERAPY TREATMENT NOTE
"Subjective: Pt agreeable to therapeutic plan of care. Patient was willing to move and get up into chair using rolling walker.    Objective:     Bed mobility - Bel with additional time and cues for initiation  Transfers - Bel with rolling walker  Ambulation - 5 feet Min-A of pivoting bed to recliner      Vitals: WNL    Pain: 0 VAS   Location:   Intervention for pain: Increased Activity    Education: Provided education on the importance of mobility in the acute care setting, Verbal/Tactile Cues, Transfer Training, and Energy conservation strategies    Assessment: Claudia Rust presents with functional mobility impairments which indicate the need for skilled intervention. Tolerating session today without incident. Will continue to follow and progress as tolerated. Patient had a willingness to move while also on 8L of hi-flow via nasal cannula. Patient appeared motivated to get into a chair and presented with less of a coughing episode than the day before. Patient was able to tolerate sitting EOB more independently and longer than previous day for about 5 minutes.    Plan/Recommendations:   Low Intensity Therapy recommended post-acute care - This is recommended as therapy feels this patient would require 2-3 visits per week. OP or HH would be the best option depending on patient's home bound status. Consider, if the patient has other  \"skilled\" needs such as wounds, IV antibiotics, etc. Combined with \"low intensity\" could also equate to a SNF. If patient is medically complex, consider LTAC.. Pt requires rolling walker at discharge.     Pt desires Home with Home Health and 24/7 care at discharge. Pt cooperative; agreeable to therapeutic recommendations and plan of care.         Basic Mobility 6-click:  Rollin = Total, A lot = 2, A little = 3; 4 = None  Supine>Sit:   1 = Total, A lot = 2, A little = 3; 4 = None   Sit>Stand with arms:  1 = Total, A lot = 2, A little = 3; 4 = None  Bed>Chair:   1 = Total, A " lot = 2, A little = 3; 4 = None  Ambulate in room:  1 = Total, A lot = 2, A little = 3; 4 = None  3-5 Steps with railin = Total, A lot = 2, A little = 3; 4 = None  Score: 18    Modified [place]: N/A = No pre-op stroke/TIA    Post-Tx Position: Up in Chair  PPE: gloves, surgical mask, and gown

## 2023-10-18 NOTE — PROGRESS NOTES
[facility] Medicine Services   Daily Progress Note    Patient Name: Claudia Rust  : 1950  MRN: [id number]  Primary Care Physician:  Pool Meyer NP-C  Date of admission: 10/13/2023  Date of service 10/14/2023      Subjective      Subjective  Pt still complaining of short of breath, and cough. On 8L supplemental oxygen. Denies for any other active complaint.        ROS A 12 point review of system was done and was negative except as mentioned above      Objective      Vitals:   Temp:  [97 °F (36.1 °C)-98.3 °F (36.8 °C)] 97.4 °F (36.3 °C)  Heart Rate:  [69-83] 77  Resp:  [18-27] 27  BP: (100-149)/(34-66) 131/61  Flow (L/min):  [8-10] 8    Physical Exam  Constitutional:       Appearance: Normal appearance.   HENT:      Head: Normocephalic and atraumatic.      Nose: Nose normal.      Mouth/Throat:      Mouth: Mucous membranes are moist.   Cardiovascular:      Rate and Rhythm: Normal rate.   Pulmonary:      Effort: Pulmonary effort is normal. No respiratory distress.      Breath sounds: Normal breath sounds. No stridor. No wheezing, rhonchi or rales.   Chest:      Chest wall: No tenderness.   Abdominal:      General: Abdomen is flat. There is no distension.      Palpations: Abdomen is soft. There is no mass.      Tenderness: There is no abdominal tenderness.   Musculoskeletal:         General: Swelling and tenderness present.      Right lower leg: Edema present.      Left lower leg: Edema present.      Comments: Mild local rise of temperature of the left lower leg   Neurological:      General: No focal deficit present.      Mental Status: She is alert.             Result Review    Result Review:  I have personally reviewed the results from the time of this admission to 10/18/2023 09:05 EDT and agree with these findings:  [x]  Laboratory  []  Microbiology  [x]  Radiology  []  EKG/Telemetry   []  Cardiology/Vascular   []  Pathology  []  Old records  []  Other:      Wounds (last 24 hours)       LDA  Wound       Row Name 10/18/23 0201 10/18/23 0200 10/17/23 2200       Wound 10/17/23 2200 Left gluteal Pressure Injury    Wound - Properties Group Placement Date: 10/17/23  -DD Placement Time: 2200 -DD Side: Left  -DD Location: gluteal  -DD Primary Wound Type: Pressure inj  -DD, DTI     Wound Image -- Images linked: 1  -DD --    Pressure Injury Stage -- -- DTPI  -DD    Dressing Appearance -- -- open to air  -DD    Closure -- -- Open to air  -DD    Care, Wound -- -- cleansed with;soap and water  -DD    Retired Wound - Properties Group Placement Date: 10/17/23  -DD Placement Time: 2200 -DD Side: Left  -DD Location: gluteal  -DD Primary Wound Type: Pressure inj  -DD, DTI     Retired Wound - Properties Group Date first assessed: 10/17/23  -DD Time first assessed: 2200  -DD Side: Left  -DD Location: gluteal  -DD Primary Wound Type: Pressure inj  -DD, DTI        Wound 10/17/23 2200 Right gluteal Pressure Injury    Wound - Properties Group Placement Date: 10/17/23  -DD Placement Time: 2200 -DD Side: Right  -DD Location: gluteal  -DD Primary Wound Type: Pressure inj  -DD, DTI     Wound Image Images linked: 1  -DD -- --    Pressure Injury Stage -- -- DTPI  -DD    Dressing Appearance -- -- open to air  -DD    Closure -- -- Open to air  -DD    Care, Wound -- -- cleansed with;soap and water  -DD    Retired Wound - Properties Group Placement Date: 10/17/23  -DD Placement Time: 2200 -DD Side: Right  -DD Location: gluteal  -DD Primary Wound Type: Pressure inj  -DD, DTI     Retired Wound - Properties Group Date first assessed: 10/17/23  -DD Time first assessed: 2200 -DD Side: Right  -DD Location: gluteal  -DD Primary Wound Type: Pressure inj  -DD, DTI               User Key  (r) = Recorded By, (t) = Taken By, (c) = Cosigned By      Initials Name Provider Type    Nidia Hudson LPN Licensed Nurse                      Assessment & Plan      Brief Patient Summary:   Claudia Rust is a 73 y.o. female who presented to [facility] on 10/13/2023 complaining of worsening shortness of breath and cough.  Patient was just seen in the ED a few days ago for same complaints.  She was discharged with breathing treatments and steroids however patient stated that she did not improve.  Her shortness of breath actually worsened.  Patient does have history of COPD but does not use oxygen at home.    In the ED, patient was requiring 4 L nasal cannula.  Labs remarkable for sodium 121, WBC 15.8.  Chest x-ray showed patchy airspace disease within the bilateral lower lobes, pneumonia versus pulmonary edema.  Patient was given Lasix and DuoNebs in the ED.      acetylcysteine, 2 mL, Nebulization, BID - RT  allopurinol, 100 mg, Oral, Daily  ampicillin-sulbactam, 3 g, Intravenous, Q6H  atorvastatin, 10 mg, Oral, Nightly  budesonide-formoterol, 2 puff, Inhalation, BID - RT  cholecalciferol, 2,000 Units, Oral, Nightly  furosemide, 80 mg, Intravenous, Q12H  gabapentin, 400 mg, Oral, TID  guaiFENesin, 1,200 mg, Oral, Q12H  insulin lispro, 3-14 Units, Subcutaneous, 4x Daily AC & at Bedtime  ipratropium-albuterol, 3 mL, Nebulization, Q4H - RT  levothyroxine, 125 mcg, Oral, Q AM  montelukast, 10 mg, Oral, Nightly  pantoprazole, 40 mg, Oral, Q PM  potassium chloride, 40 mEq, Oral, TID With Meals  predniSONE, 20 mg, Oral, Daily   Followed by  [START ON 10/20/2023] predniSONE, 10 mg, Oral, Daily  senna-docusate sodium, 2 tablet, Oral, BID  sodium chloride, 10 mL, Intravenous, Q12H  theophylline, 300 mg, Oral, Nightly  tiZANidine, 2 mg, Oral, TID  vitamin B-12, 1,000 mcg, Oral, Daily             Active Hospital Problems:  Active Hospital Problems    Diagnosis     **Acute respiratory failure     Multifocal pneumonia     Multiple tracheobronchial mucus plugs      Plan:     COPD  Dyspnea  Acute respiratory failure  Patient currently on 4 L nasal cannula  Continue oxygen supplementation  Wean as tolerated  Off steroids now.  Antibiotics     Possible Pneumonia  Start  Rocephin and azithromycin  Continue breathing treatments  Guaifenesin as needed for cough  Pulmonary on board.     Possible pulmonary edema  Start Lasix 40 mg IV twice daily  Obtain echocardiogram     Hyponatremia appear secondary to hypervolemia  Sodium 121  Fluid restriction  Continue diuretics  Follow-up electrolytes  Urine studies  Nephrology consulted.  On lasix 60 mg iv TID now.       Leukocytosis  Most likely due to steroid use  Repeat CBC    Cellulitis of lower legs  Antibiotics as ordered.  Venous Doppler to rule out a DVT     UTI  UA studies reviewed.  Follow-up urine culture  Continue antibiotics     Hypertension  Follow-up BP      DVT prophylaxis:  Mechanical DVT prophylaxis orders are present.    Bronchoscopy 10/16/2023.  Currently on 10 L of oxygen nonrebreather.  Continue prednisone and IV Unasyn.  Sodium trending up 126, creatinine 1.19.  Leukocytosis.  Labs reviewed.  Repeat labs and monitor.    Discussed with pulm, Nebulized Mucomyst, Antibiotics currently on Unasyn once 72 hours completed we will switch to p.o. Augmentin for additional 4 days. Continue IV diuretics per nephro recs. Labs pending    Labs still pending sine 10/16        CODE STATUS:         Disposition:  I expect patient to be discharged 2 to 3 days    Patient,  and daughter was updated with plan of care.  All questions answered      Electronically signed by Kimmy Mendoza MD, 10/18/23, 09:05 EDT.  [facility] Hospitalist Team

## 2023-10-18 NOTE — PROGRESS NOTES
NEPHROLOGY PROGRESS NOTE------[facility]    [facility]  [phone]  Yovani Lerma MD      Patient Care Team:  Pool Meyer NP-C as PCP - General (Nurse Practitioner)  Jose Levi MD as Consulting Physician (Cardiology)  Yovani Lerma MD as Consulting Physician (Nephrology)  Mary Hunt APRN as Nurse Practitioner (Nurse Practitioner)  Edmar Bear MD as Consulting Physician (Cardiology)      Provider:  Yovani Lerma MD  Patient Name: Claudia Rust  :  1950    SUBJECTIVE:  Follow-up JERROD/hyponatremia  No chest pain, breathing better    Medication:  acetylcysteine, 2 mL, Nebulization, BID - RT  allopurinol, 100 mg, Oral, Daily  ampicillin-sulbactam, 3 g, Intravenous, Q6H  atorvastatin, 10 mg, Oral, Nightly  budesonide-formoterol, 2 puff, Inhalation, BID - RT  cholecalciferol, 2,000 Units, Oral, Nightly  furosemide, 80 mg, Intravenous, Q12H  gabapentin, 400 mg, Oral, TID  guaiFENesin, 1,200 mg, Oral, Q12H  insulin lispro, 3-14 Units, Subcutaneous, 4x Daily AC & at Bedtime  ipratropium-albuterol, 3 mL, Nebulization, Q4H - RT  levothyroxine, 125 mcg, Oral, Q AM  montelukast, 10 mg, Oral, Nightly  pantoprazole, 40 mg, Oral, Q PM  potassium chloride, 40 mEq, Oral, TID With Meals  predniSONE, 20 mg, Oral, Daily   Followed by  [START ON 10/20/2023] predniSONE, 10 mg, Oral, Daily  senna-docusate sodium, 2 tablet, Oral, BID  sodium chloride, 10 mL, Intravenous, Q12H  theophylline, 300 mg, Oral, Nightly  tiZANidine, 2 mg, Oral, TID  vitamin B-12, 1,000 mcg, Oral, Daily  Zinc Oxide, , Apply externally, TID           OBJECTIVE    Vital Sign Min/Max for last 24 hours  Temp  Min: 97 °F (36.1 °C)  Max: 98.3 °F (36.8 °C)   BP  Min: 100/66  Max: 149/55   Pulse  Min: 69  Max: 83   Resp  Min: 18  Max: 27   SpO2  Min: 89 %  Max: 97 %   No data recorded   Weight  Min: 131 kg (288 lb 12.8 oz)  Max: 131 kg (288 lb 12.8 oz)     Flowsheet Rows   "    Flowsheet Row First Filed Value   Admission Height 162.6 cm (64\") Documented at 10/13/2023 2352   Admission Weight 137 kg (303 lb) Documented at 10/13/2023 2352            I/O this shift:  In: 120 [P.O.:120]  Out: 700 [Urine:700]  I/O last 3 completed shifts:  In: 980 [P.O.:850; I.V.:30; IV Piggyback:100]  Out: 1600 [Urine:1600]    Physical Exam:  General Appearance: alert, appears stated age and cooperative  Head: normocephalic, without obvious abnormality and atraumatic  Eyes: conjunctivae and sclerae normal and no icterus  Neck: supple and no JVD  Lungs: clear to auscultation and respirations regular  Heart: regular rhythm & normal rate and normal S1, S2  Chest: Wall no abnormalities observed  Abdomen: normal bowel sounds and soft, nontender  Extremities: moves extremities well, plus edema, no cyanosis and no redness  Skin: no bleeding, bruising or rash, turgor normal, color normal and no lesions noted  Neurologic: Alert, and oriented. No focal deficits    Labs:    WBC WBC   Date Value Ref Range Status   10/16/2023 21.20 (H) 3.40 - 10.80 10*3/mm3 Final   10/15/2023 20.60 (H) 3.40 - 10.80 10*3/mm3 Final      HGB Hemoglobin   Date Value Ref Range Status   10/16/2023 11.4 (L) 12.0 - 15.9 g/dL Final   10/15/2023 11.0 (L) 12.0 - 15.9 g/dL Final      HCT Hematocrit   Date Value Ref Range Status   10/16/2023 33.1 (L) 34.0 - 46.6 % Final   10/15/2023 34.5 34.0 - 46.6 % Final      Platelets No results found for: \"LABPLAT\"   MCV MCV   Date Value Ref Range Status   10/16/2023 87.9 79.0 - 97.0 fL Final   10/15/2023 88.3 79.0 - 97.0 fL Final          Sodium Sodium   Date Value Ref Range Status   10/16/2023 132 (L) 136 - 145 mmol/L Final   10/16/2023 127 (L) 136 - 145 mmol/L Final   10/15/2023 126 (L) 136 - 145 mmol/L Final   10/15/2023 126 (L) 136 - 145 mmol/L Final   10/15/2023 123 (L) 136 - 145 mmol/L Final      Potassium Potassium   Date Value Ref Range Status   10/16/2023 4.5 3.5 - 5.2 mmol/L Final   10/16/2023 4.2 " "3.5 - 5.2 mmol/L Final     Comment:     Slight hemolysis detected by analyzer. Results may be affected.   10/15/2023 3.8 3.5 - 5.2 mmol/L Final   10/15/2023 3.8 3.5 - 5.2 mmol/L Final      Chloride Chloride   Date Value Ref Range Status   10/16/2023 92 (L) 98 - 107 mmol/L Final   10/16/2023 90 (L) 98 - 107 mmol/L Final   10/15/2023 86 (L) 98 - 107 mmol/L Final   10/15/2023 86 (L) 98 - 107 mmol/L Final      CO2 CO2   Date Value Ref Range Status   10/16/2023 29.0 22.0 - 29.0 mmol/L Final   10/16/2023 24.0 22.0 - 29.0 mmol/L Final   10/15/2023 27.0 22.0 - 29.0 mmol/L Final   10/15/2023 27.0 22.0 - 29.0 mmol/L Final      BUN BUN   Date Value Ref Range Status   10/16/2023 45 (H) 8 - 23 mg/dL Final   10/16/2023 39 (H) 8 - 23 mg/dL Final   10/15/2023 36 (H) 8 - 23 mg/dL Final   10/15/2023 36 (H) 8 - 23 mg/dL Final      Creatinine Creatinine   Date Value Ref Range Status   10/16/2023 1.37 (H) 0.57 - 1.00 mg/dL Final   10/16/2023 1.30 (H) 0.57 - 1.00 mg/dL Final   10/15/2023 1.19 (H) 0.57 - 1.00 mg/dL Final   10/15/2023 1.19 (H) 0.57 - 1.00 mg/dL Final      Calcium Calcium   Date Value Ref Range Status   10/16/2023 8.8 8.6 - 10.5 mg/dL Final   10/16/2023 8.3 (L) 8.6 - 10.5 mg/dL Final   10/15/2023 9.1 8.6 - 10.5 mg/dL Final   10/15/2023 9.1 8.6 - 10.5 mg/dL Final      PO4 No components found for: \"PO4\"   Albumin Albumin   Date Value Ref Range Status   10/16/2023 3.1 (L) 3.5 - 5.2 g/dL Final   10/15/2023 3.3 (L) 3.5 - 5.2 g/dL Final   10/15/2023 2.9 2.9 - 4.4 g/dL Final      Magnesium Magnesium   Date Value Ref Range Status   10/15/2023 2.2 1.6 - 2.4 mg/dL Final      Uric Acid No components found for: \"URIC ACID\"     Imaging Results (Last 72 Hours)       ** No results found for the last 72 hours. **            Results for orders placed during the hospital encounter of 10/13/23    XR Chest 1 View    Narrative  XR CHEST 1 VW    Date of Exam: 10/14/2023 12:16 AM EDT    Indication: sob    Comparison: " 10/10/2023.    Findings:  Patchy airspace disease is seen within the bilateral lower lobes. No sizable pleural effusion identified. The heart appears enlarged. There is indistinctness of the pulmonary vasculature. Median sternotomy wires are present. No pneumothorax. No acute  osseous abnormality identified.    Impression  Impression:  Patchy airspace disease within the bilateral lower lobes, likely related to pneumonia. Possible mild underlying pulmonary edema pattern.        Electronically Signed: Tana Sutherland MD  10/14/2023 12:27 AM EDT  Workstation ID: [id number]      Results for orders placed during the hospital encounter of 10/10/23    XR Chest AP    Narrative  XR CHEST AP    Date of Exam: 10/10/2023 7:02 PM EDT    Indication: COVID Evaluation, Cough, Fever    Comparison: 1/6/2023    Findings:  Lines: None    Lungs: The lungs are clear.  Pleura: No pleural effusion or pneumothorax.    Cardiomediastinum: Unchanged    Soft Tissues: Unremarkable.    Bones: Status post median sternotomy. No acute osseous abnormality.    Impression  Impression:  No acute abnormality.      Electronically Signed: David Montemayor DO  10/10/2023 7:14 PM EDT  Workstation ID: [id number]      Results for orders placed during the hospital encounter of 01/06/23    XR Chest 1 View    Narrative  XR CHEST 1 VW    Date of Exam: 1/6/2023 7:33 PM EST    Indication: SOA.    Comparison: 4/7/2021 and prior    Findings:  Patient is status post median sternotomy. The heart size appears stable. Prosthetic valve at the aortic root noted. Pulmonary vascularity is congested and distinct. Coarsening of the interstitial markings noted diffusely with increased hazy and  interstitial opacities noted with a perihilar and basilar predominance. Osseous structures demonstrate no acute abnormality. Multilevel degenerative changes noted.    Impression  Impression:  Increased hazy and interstitial opacities noted. Findings represent underlying interstitial edema.  Viral and atypical pneumonia also in the differential.    Electronically Signed: Micah Neil  1/6/2023 8:03 PM EST  Workstation ID: [id number]      Results for orders placed during the hospital encounter of 10/13/23    Duplex Venous Upper Extremity - Left CAR    Interpretation Summary    Normal left upper extremity venous duplex scan.        ASSESSMENT / PLAN      Acute respiratory failure    Multifocal pneumonia    Multiple tracheobronchial mucus plugs    CKD stage IIIa-CKD due to hypertensive nephrosclerosis  Rhabdomyolysis-statins held  Hyponatremia-with hypervolemia.  Probably underlying excess ADH related to SSRI use.  Aldactone and metolazone held.  Status post Samsca 7.5 mg x 1  Acute exacerbation of chronic diastolic heart failure  Chronic lower extremity edema  History coronary artery disease with history of AVR  Morbid obesity  Acute hypoxic respiratory failure  Pneumonia  Depression on SSRI  Diabetes type 2  Anemia-SPEP pending  History right upper extremity DVT  Hyperlipidemia    Labs pending  CK  Continue iv lasix  Monitor renal function fluid status electrolytes        Yovani Lerma MD  [facility]  [phone]  10/18/23  11:05 EDT

## 2023-10-18 NOTE — CASE MANAGEMENT/SOCIAL WORK
Social Work Assessment  [facility]     Patient Name: Claudia Rust  MRN: [id number]  Today's Date: 10/18/2023    Admit Date: 10/13/2023       Discharge Plan       Row Name 10/18/23 1143       Plan    Plan Home with spouse. [facility] (current) JEAN order needed. Current with [facility]. PASRR approved if needed. (Exempted Hosptial discharge 1&2)            MYKE Vega, MSW    Phone: [phone]  Fax: [phone]  Email: [email or web address]

## 2023-10-18 NOTE — PLAN OF CARE
Goal Outcome Evaluation:  Patient is being treated for pneumonia and rhinovirus. Plan is for patient to return home when medically stable.

## 2023-10-18 NOTE — PLAN OF CARE
Goal Outcome Evaluation:              Outcome Evaluation: Patient up most of the night.  Currently on 8L HFNC.  Ext. Cath in [facility].  wound consult placed for a DTI.  Pt stable at this time.

## 2023-10-18 NOTE — PROGRESS NOTES
Cardiology Progress Note    Patient Identification:  Name: Claudia Rust  Age: 73 y.o.  Sex: female  :  1950  MRN: [id number]                 Follow Up / Chief Complaint: Chronic HFpEF   Chief Complaint   Patient presents with    Shortness of Breath       Interval History: Patient presented with worsening shortness of breath and edema.  She is hypervolemic and hyponatremia, nephrology following.  She was also positive for Rhinovirus on 10/10/2023, symptoms progressively worsening.  She underwent bronchoscopy today 10/16/2023 with Dr. Reyes that showed significant amounts of mucuopurulent secretions, reactive airway disease and moderate to severe inflammation.     NP NOTE:  Patient seen and examined this morning.  Sitting in chair again this morning she remains on 8 L high flow nasal cannula.  Patient asking if she will need home oxygen I advised that she likely will however we need to get her oxygen requirements down lower prior to discharge.  Patient continues to diurese she has only had 1.5 L out since admission likely some of patient's urine has not been collected due to being in the brief at times.    Her weight is down from 306 pounds to 288.     Patient continues to have significant rhonchi and rales on examination and upper and lower extremity edema.  Patient continues on IV Lasix 80 mg every 12 and received a dose of metolazone today nephrology following    Electronically signed by SARA Moore, 10/18/23, 11:59 AM EDT.    Cardiology attending addendum :    I have personally performed a face-to-face diagnostic evaluation, physical exam and reviewed data on this patient.  I have reviewed documentation done by me and nurse practitioner  and corrected as needed.  And agree with the different components of documentation.Greater than 50% of the time spent in the care of this patient was provided by attending consultant/me.             Subjective: Patient seen and examined.  Chart reviewed.  Labs  reviewed.  Discussed with RN taking care of patient.  Patient is complaining of left thumb numbness and swelling.  Underwent venous Dopplers of upper extremity which are negative for DVT.        Objective: pro bnp negative at 646, , bun 25 creatinine 0.96 AST 38   WBC 15.8  hgb 11.9 CK 2719 potassium 3.4 BUN 28, creatinine 1.05 iron 22 iron sat 8  10/16/2023: CK 1631, sodium 126, potassium 3.8, bun 36 creatinine 1.19 AST 77, ALT 48 WBC 20.6 hgb 11.0   10/17/2023: , sodium 132, potassium 4.5, bun 45 creatinine 1.37 AST 64, ALT 66  WBC 21.2 hgb 11.4   10/18/2023: No labs to review we will order CMP            History of present illness:      Ms. Claudia Rust has of PMH      #Valvular heart disease with moderate to severe aortic stenosis  Cardiac cath 1/20/2021 revealing nonobstructive CAD  Patient underwent #23 magna pericardial prosthesis AVR 3/18/2021  Transesophageal echo 3/25/2021 normal LV systolic function  # Hyperlipidemia,    # Hypertension  # COPD, KARLY on CPAP  # Prediabetes  #History of HIT, history of DVT RUE   # Hypothyroidism, Rheumatoid Arthritis, spinal stenosis, hiatal hernia, chronic depression, bipolar, GERD, IBS, chronic migraine, vertigo, herniated disc, compression lumbar fractures  #Allergies/intolerance to Stadol  # Hysterectomy, bladder reconstruction, cholecystectomy, right hand surgery  # Family history of strokes and grandfather.  Mother's cancer, father on  # Active cigarette smoker trying to quit         presented to the ED with worsening shortness of breath and cough over the last 2 weeks.  Patient was diagnosed with rhinovirus on 10/10/2023 in the ED, given steroids and discharged home.   Patient progressively worsened the next few days and EMS was called.   Patient was hypoxic in the 80s.    She reports worsening edema as well as orthopnea.  Patient denies any chest pain.  No fevers      In the ED on 10/14/2023 CXR showed pulmonary edema vs bilateral pneumonia,   HS troponin  negative, pro bnp negative at 646, , bun 25 creatinine 0.96 AST 38   WBC 15.8  hgb 11.9 CK 2719 potassium 3.4 BUN 28, creatinine 1.05 iron 22 iron sat 8          Review of records reveals patient underwent an echocardiogram which is revealing severe aortic stenosis on 2/11/2020 ,Underwent cardiac cath 1/20/2021 which revealed no obstructive disease,underwent aortic valve replacement 3/18/2021 with #23 magna pericardial prosthesis.       Carotid Dopplers 9/13/2019 normal.  Echocardiogram 2/11/2020 is revealing severe aortic stenosis  Work-up here on 3/16/2021 revealed proBNP 733, normal CMP, A1c 5.7, normal CBC, chest x-ray with no acute abnormality.  Lexiscan Cardiolite 2/28/2023 negative for ischemia EF of 86%           Assessment:     Acute respiratory failure with recent rhinovirus 10/10/2023 and now with pneumonia and mucous plugs    Bradycardia    Aortic stenosis, severe, status post AVR with #23 magna pericardial prosthesis 3/18/2021    Chronic congestive heart failure  due to valvular heart disease and aortic stenosis hypertensive cardiovascular disease essential hypertension/hypertensive cardiovascular disease     Dyslipidemia    diabetes  Morbid obesity with BMI over 40  Cigarette smoker  TCP HIT positive on 3/26/2021, + RUE DVT  previously on warfarin  Diabetes     Plan:     Telemetry is revealing sinus rhythm.  Appreciated [facility] report.  We will follow-up with pulmonary.    Continue aspirin,  metoprolol, simvastatin as tolerated for edema, AVR, carotid disease, chronic heart failure due to valvular heart disease and hypertensive cardiovascular disease.  Stress test 2/20/2023 which was negative for ischemia.  Continue diuresis follow-up with nephrology  Patient had venous Dopplers of left upper extremity which was negative for DVT.  Reviewed results with patient and family.    Discussed with RN taking care of patient to coordinate care.          Copied text in this portion of the note has been  "reviewed and is accurate as of 10/18/2023    Past Medical History:  Past Medical History:   Diagnosis Date    Acute congestive heart failure     Allergies     Anxiety     Arthritis     Asthma     Bilateral leg edema     Bipolar 1 disorder     Bulging lumbar disc     X3    Cancer     states had \"cancerous tumor during pregnancy and had to have hysterectomy\"    Cataract     bilateral    Cellulitis 03/2021    bilateral legs    Compression fracture of vertebral column     LUMBAR    COPD (chronic obstructive pulmonary disease)     Delayed emergence from anesthesia     Depression     Depression     Diabetes mellitus     Dyslipidemia     Dyspnea on exertion     Fibromyalgia     GERD (gastroesophageal reflux disease)     Hiatal hernia     Hyperlipidemia     Hypertension     Hypothyroid     HYPOTHYROID    IBS (irritable bowel syndrome)     Migraines     Morbid obesity     Neuropathy     Panic attacks     Peripheral edema     PONV (postoperative nausea and vomiting)     Poor mobility     rolling walker    Prediabetes     PVD (peripheral vascular disease)     Rheumatoid aortitis     Spinal stenosis     Spinal stenosis     Vertigo     Vertigo     Vitamin D deficiency      Past Surgical History:  Past Surgical History:   Procedure Laterality Date    AORTIC VALVE REPAIR/REPLACEMENT N/A 3/18/2021    Procedure: AORTIC VALVE REPLACEMENT;  Surgeon: Olaf Mcgill MD;  Location: [facility];  Service: Cardiothoracic;  Laterality: N/A;    BRONCHOSCOPY N/A 3/25/2021    Procedure: BRONCHOSCOPY AT BEDSIDE WITH BRONCHIOALVEOLAR LAVAGE;  Surgeon: Glenn Reyes MD;  Location: [facility];  Service: Pulmonary;  Laterality: N/A;  PNA    BRONCHOSCOPY N/A 10/16/2023    Procedure: BRONCHOSCOPY with bronchial washing;  Surgeon: Glenn Reyes MD;  Location: [facility];  Service: Pulmonary;  Laterality: N/A;  post op: pneumonia    CARDIAC CATHETERIZATION  2009    CARDIAC CATHETERIZATION N/A 8/14/2019    Procedure: Left Heart Cath;  " Surgeon: Jose Levi MD;  Location:  [facility];  Service: Cardiovascular    CARDIAC CATHETERIZATION N/A 2019    Procedure: Right Heart Cath;  Surgeon: Jose Levi MD;  Location:  [facility];  Service: Cardiovascular    CARDIAC CATHETERIZATION N/A 2019    Procedure: Aortic root aortogram;  Surgeon: Jose Levi MD;  Location:  [facility];  Service: Cardiovascular    CARDIAC CATHETERIZATION N/A 2021    Procedure: Left Heart Cath;  Surgeon: Jose Levi MD;  Location: [facility] INV[facility] LOCATION;  Service: Cardiovascular;  Laterality: N/A;    COLONOSCOPY      CYSTOCELE REPAIR      ELBOW ARTHROPLASTY      ENDOSCOPY      FOOT SURGERY      GALLBLADDER SURGERY      TONSILLECTOMY      VAGINAL HYSTERECTOMY          Social History:   Social History     Tobacco Use    Smoking status: Former     Packs/day: 1     Types: Cigarettes     Quit date:      Years since quittin.7    Smokeless tobacco: Never    Tobacco comments:     decrease now and do not smoke dos   Substance Use Topics    Alcohol use: Not Currently     Comment: very rare      Family History:  Family History   Problem Relation Age of Onset    Ovarian cancer Mother     Lung cancer Mother     Esophageal cancer Mother     Hypertension Father     Diabetes Maternal Grandmother           Allergies:  Allergies   Allergen Reactions    Adhesive Tape Unknown (See Comments)     hives    Butorphanol Other (See Comments)     Chest pain difficulty breathing throat swelling, STADAL    Garlic Other (See Comments)     Chest pain difficulty breathing throat swells    Heparin Other (See Comments)     HIT +    Tetanus-Diphtheria Toxoids Td Other (See Comments)     Dizziness,  vomiting    Aloe Itching    Bee Venom Other (See Comments)     Swelling eyes and lips hand swelling    Latex Itching    Macadamia Nut Oil Other (See Comments)     Chest  "pain difficulty breathing throat swelling    Tuberculin, Ppd Unknown (See Comments)     reactor    Wasp Venom Other (See Comments)     Swelling eyes lips and hand     Scheduled Meds:  acetylcysteine, 2 mL, BID - RT  allopurinol, 100 mg, Daily  ampicillin-sulbactam, 3 g, Q6H  atorvastatin, 10 mg, Nightly  budesonide-formoterol, 2 puff, BID - RT  cholecalciferol, 2,000 Units, Nightly  furosemide, 80 mg, Q12H  gabapentin, 400 mg, TID  guaiFENesin, 1,200 mg, Q12H  insulin lispro, 3-14 Units, 4x Daily AC & at Bedtime  ipratropium-albuterol, 3 mL, Q4H - RT  levothyroxine, 125 mcg, Q AM  montelukast, 10 mg, Nightly  pantoprazole, 40 mg, Q PM  potassium chloride, 40 mEq, TID With Meals  predniSONE, 20 mg, Daily   Followed by  [START ON 10/20/2023] predniSONE, 10 mg, Daily  senna-docusate sodium, 2 tablet, BID  sodium chloride, 10 mL, Q12H  theophylline, 300 mg, Nightly  tiZANidine, 2 mg, TID  vitamin B-12, 1,000 mcg, Daily  Zinc Oxide, , TID          Review of Systems:   ROS  Review of Systems   Constitution: Negative for chills and fever.   Cardiovascular: Negative for chest pain and palpitations.  Positive edema  Respiratory: Positive cough and shortness of breath   gastrointestinal: Negative for nausea.        Constitutional:  Temp:  [97 °F (36.1 °C)-98.3 °F (36.8 °C)] 98.3 °F (36.8 °C)  Heart Rate:  [69-86] 83  Resp:  [18-27] 25  BP: (109-149)/(34-64) 109/63    Physical Exam   /63 (BP Location: Left arm, Patient Position: Lying)   Pulse 83   Temp 98.3 °F (36.8 °C) (Oral)   Resp 25   Ht 162.6 cm (64\")   Wt 131 kg (288 lb 12.8 oz)   SpO2 96%   BMI 49.57 kg/m²   General:  Appears in no acute distress, sitting in chair   eyes: Sclera is anicteric,  conjunctiva is clear   HEENT:  No JVD. Thyroid not visibly enlarged. No mucosal pallor or cyanosis  Respiratory: Respirations regular and unlabored at rest.  Scattered rhonchi and rails cardiovascular: S1,S2 Regular rate and rhythm. No murmur, rub or gallop " auscultated.  + bilateral lower extremity edema   Gastrointestinal: Abdomen nondistended, soft  obese   Musculoskeletal:  No abnormal movements  Extremities: left upper extremity edema, bilateral lower extremity edema with erythema   Skin: Color pink. Skin warm and dry to touch.  Erythema bilateral lower extremity   neuro: Alert and awake, no lateralizing deficits appreciated    INTAKE AND OUTPUT:    Intake/Output Summary (Last 24 hours) at 10/18/2023 1207  Last data filed at 10/18/2023 1054  Gross per 24 hour   Intake 520 ml   Output 1700 ml   Net -1180 ml       Cardiographics  Telemetry: sinus rhythm     ECG:   ECG 12 Lead Dyspnea   Final Result   HEART RATE= 86  bpm   RR Interval= 696  ms   OH Interval= 169  ms   P Horizontal Axis= 21  deg   P Front Axis= 61  deg   QRSD Interval= 96  ms   QT Interval= 405  ms   QTcB= 485  ms   QRS Axis= 64  deg   T Wave Axis= 71  deg   - NORMAL ECG -   Sinus rhythm   When compared with ECG of 06-Jan-2023 19:49:48,   Significant rate increase   Electronically Signed By: Eliceo Jones ([facility]) 16-Oct-2023 10:45:37   Date and Time of Study: 2023-10-14 00:05:27      SCANNED - TELEMETRY     Final Result      SCANNED - TELEMETRY     Final Result      SCANNED - TELEMETRY     Final Result      SCANNED - TELEMETRY     Final Result      SCANNED - TELEMETRY     Final Result      SCANNED - TELEMETRY     Final Result      SCANNED - TELEMETRY     Final Result      SCANNED - TELEMETRY     Final Result        I have personally reviewed EKG    Echocardiogram: Results for orders placed during the hospital encounter of 10/13/23    Adult Transthoracic Echo Complete w/ Color, Spectral and Contrast if necessary per protocol    Interpretation Summary    Left ventricular diastolic function was indeterminate.    Intravenous  contrast was administered to optimize endocardial definition.    Normal left ventricular systolic function  Concentric left ventricular hypertrophy  Indeterminate left ventricular  "diastolic function  Dilated right ventricle with probably normal function  Dilated left atrium  Bioprosthetic aortic valve with normal function.  Mild mitral regurgitation  Mild tricuspid regurgitation      Lab Review   I have reviewed the labs  Results from last 7 days   Lab Units 10/16/23  2357 10/15/23  2236 10/15/23  0323 10/14/23  0018   CK TOTAL U/L 828* 1,631* 2,719*  --    HSTROP T ng/L  --   --   --  9     Results from last 7 days   Lab Units 10/15/23  2236   MAGNESIUM mg/dL 2.2     Results from last 7 days   Lab Units 10/16/23  2357   SODIUM mmol/L 132*   POTASSIUM mmol/L 4.5   BUN mg/dL 45*   CREATININE mg/dL 1.37*   CALCIUM mg/dL 8.8         Results from last 7 days   Lab Units 10/16/23  2357 10/15/23  2236 10/15/23  0324   WBC 10*3/mm3 21.20* 20.60* 16.60*   HEMOGLOBIN g/dL 11.4* 11.0* 11.4*   HEMATOCRIT % 33.1* 34.5 34.5   PLATELETS 10*3/mm3 238 249 215           RADIOLOGY:  Imaging Results (Last 24 Hours)       ** No results found for the last 24 hours. **                  )10/18/2023  MD DANIEL Lamas/Transcription:   \"Dictated utilizing Dragon dictation\".   "

## 2023-10-18 NOTE — PROGRESS NOTES
Daily Progress Note        Acute respiratory failure    Multifocal pneumonia    Multiple tracheobronchial mucus plugs    Assessment:     Hypoxia:   ABG 7.42/34.4/96 while on nonrebreather mask  COPD with acute exacerbation  Multifocal pneumonia  Multiple mucous plugs  Bronchoscopy completed 10/16/2023  Copious amount of very thick mucopurulent secretions both lungs suctioned therapeutically  Bilateral lung washing    Cellulitis of lower extremities  Hx of respiratory failure required intubation 03/22/2021 , extubated 4/4/21    Aortic valve stenosis, status post tissue replacement  Pulm hypertension, severe  KARLY  Tobacco abuse  bronchoscopy 3/25/2021, cultures negative  Interstitial pneumonitis 2nd to aspiration   Hx of Catheter associated DVT right upper extremity subclavian, axillary, and brachial  Upper arm left DVT  DM  Hx of Ecoli UTI  Oral candidiasis  HTN  JERROD  DM2  Chronic anemia  HLD     Recommendations:    Nebulized Mucomyst  Flutter valve  Antibiotics currently on Unasyn once 72 hours completed we will switch to p.o. Augmentin for additional 4 days    Theophylline 300 mg daily  Mucinex 1200 mg p.o. twice daily  Singulair    Steroids, prednisone 6-day taper    Oxygen supplement and titration to maintain saturation 90 to 95%: Currently requiring 3 L per nasal cannula    Bronchodilators  Mucinex  Inhaled corticosteroids  Diuretics/electrolyte management as per nephrology  Atorvastatin  BP control  Glucose controlled  Thyroid hormone replacement                LOS: 4 days     Subjective         Objective     Vital signs for last 24 hours:  Vitals:    10/18/23 1135 10/18/23 1537 10/18/23 1541 10/18/23 1704   BP: 109/63      BP Location: Left arm      Patient Position: Lying      Pulse:  85 74    Resp: 25 18 18 18   Temp: 98.3 °F (36.8 °C)   98.5 °F (36.9 °C)   TempSrc: Oral   Oral   SpO2:  (!) 87% 93%    Weight:       Height:           Intake/Output last 3 shifts:  I/O last 3 completed shifts:  In: 1100  [P.O.:970; I.V.:30; IV Piggyback:100]  Out: 1700 [Urine:1700]  Intake/Output this shift:  No intake/output data recorded.      Radiology  Imaging Results (Last 24 Hours)       ** No results found for the last 24 hours. **            Labs:  Results from last 7 days   Lab Units 10/18/23  1208   WBC 10*3/mm3 22.10*   HEMOGLOBIN g/dL 12.3   HEMATOCRIT % 37.7   PLATELETS 10*3/mm3 324     Results from last 7 days   Lab Units 10/18/23  1208   SODIUM mmol/L 132*   POTASSIUM mmol/L 5.4*   CHLORIDE mmol/L 92*   CO2 mmol/L 28.0   BUN mg/dL 36*   CREATININE mg/dL 1.14*   CALCIUM mg/dL 9.4   BILIRUBIN mg/dL 0.6   ALK PHOS U/L 165*   ALT (SGPT) U/L 83*   AST (SGOT) U/L 75*   GLUCOSE mg/dL 179*     Results from last 7 days   Lab Units 10/14/23  0024   PH, ARTERIAL pH units 7.424   PO2 ART mm Hg 96.0   PCO2, ARTERIAL mm Hg 34.4*   HCO3 ART mmol/L 22.5     Results from last 7 days   Lab Units 10/18/23  1208 10/16/23  2357 10/15/23  2236   ALBUMIN g/dL 3.1* 3.1* 3.3*     Results from last 7 days   Lab Units 10/18/23  1208 10/16/23  2357 10/15/23  2236 10/15/23  0323 10/14/23  0018   CK TOTAL U/L 419* 828* 1,631*   < >  --    HSTROP T ng/L  --   --   --   --  9    < > = values in this interval not displayed.         Results from last 7 days   Lab Units 10/15/23  2236   MAGNESIUM mg/dL 2.2         Results from last 7 days   Lab Units 10/15/23  0323   TSH uIU/mL 0.580           Meds:   SCHEDULE  acetylcysteine, 2 mL, Nebulization, BID - RT  allopurinol, 100 mg, Oral, Daily  ampicillin-sulbactam, 3 g, Intravenous, Q6H  atorvastatin, 10 mg, Oral, Nightly  budesonide-formoterol, 2 puff, Inhalation, BID - RT  cholecalciferol, 2,000 Units, Oral, Nightly  First Mouthwash (Magic Mouthwash), 5 mL, Swish & Spit, Q6H  furosemide, 80 mg, Intravenous, Q12H  gabapentin, 400 mg, Oral, TID  guaiFENesin, 1,200 mg, Oral, Q12H  insulin lispro, 3-14 Units, Subcutaneous, 4x Daily AC & at Bedtime  ipratropium-albuterol, 3 mL, Nebulization, Q4H -  RT  levothyroxine, 125 mcg, Oral, Q AM  montelukast, 10 mg, Oral, Nightly  pantoprazole, 40 mg, Oral, Q PM  potassium chloride, 40 mEq, Oral, TID With Meals  predniSONE, 20 mg, Oral, Daily   Followed by  [START ON 10/20/2023] predniSONE, 10 mg, Oral, Daily  senna-docusate sodium, 2 tablet, Oral, BID  sodium chloride, 10 mL, Intravenous, Q12H  theophylline, 300 mg, Oral, Nightly  tiZANidine, 2 mg, Oral, TID  vitamin B-12, 1,000 mcg, Oral, Daily  Zinc Oxide, , Apply externally, TID      Infusions     PRNs    acetaminophen    albuterol    senna-docusate sodium **AND** polyethylene glycol **AND** bisacodyl **AND** bisacodyl    dextrose    dextrose    glucagon (human recombinant)    guaiFENesin-codeine    ondansetron    ondansetron    [COMPLETED] Insert Peripheral IV **AND** sodium chloride    sodium chloride    sodium chloride    Physical Exam:  Physical Exam  Cardiovascular:      Heart sounds: Murmur heard.   Pulmonary:      Effort: No respiratory distress.      Breath sounds: No stridor. Wheezing, rhonchi and rales present.   Chest:      Chest wall: No tenderness.         ROS  Review of Systems   Respiratory:  Positive for cough and shortness of breath. Negative for wheezing and stridor.    Cardiovascular:  Positive for palpitations and leg swelling. Negative for chest pain.   Gastrointestinal:  Negative for abdominal distention.             Total time spent with patient greater than: 45 Minutes

## 2023-10-18 NOTE — CASE MANAGEMENT/SOCIAL WORK
Continued Stay Note  [facility]     Patient Name: Claudia Rust  MRN: [id number]  Today's Date: 10/18/2023    Admit Date: 10/13/2023    Plan: Home with spouse. [facility] (current) JEAN order needed. Current with [facility]. PASRR approved if needed. (Exempted Hosptial discharge 1&2)   Discharge Plan       Row Name 10/18/23 1520       Plan    Plan Home with spouse. [facility] (current) JEAN order needed. Current with [facility]. PASRR approved if needed. (Exempted Hosptial discharge 1&2)    Plan Comments Patient will discharge home with spouse. Patient is current with [facility], JEAN order needed prior to discharge. Patient is current with [facility] services. PASRR approved if needed per SW. D/C barriers: pending bronch cultures, 8L HF, elevated BUN/Cr, IV abx, pulm, nephro, cardiology following.             Beth Yusuf, RN, BSN    [address] [address]  Phone: [phone]  Fax: [phone]

## 2023-10-18 NOTE — PLAN OF CARE
"Goal Outcome Evaluation:   Assessment: Claudia Rust presents with functional mobility impairments which indicate the need for skilled intervention. Tolerating session today without incident. Will continue to follow and progress as tolerated. Patient had a willingness to move while also on 8L of hi-flow via nasal cannula. Patient appeared motivated to get into a chair and presented with less of a coughing episode than the day before.     Plan/Recommendations:   Low Intensity Therapy recommended post-acute care - This is recommended as therapy feels this patient would require 2-3 visits per week. OP or HH would be the best option depending on patient's home bound status. Consider, if the patient has other  \"skilled\" needs such as wounds, IV antibiotics, etc. Combined with \"low intensity\" could also equate to a SNF. If patient is medically complex, consider LTAC.. Pt requires rolling walker at discharge.     Pt desires Home with Home Health and Outpatient therapy at discharge. Pt cooperative; agreeable to therapeutic recommendations and plan of care.                      "

## 2023-10-18 NOTE — NURSING NOTE
Patient has new orders for Agility bed and to remove external catheter due to her injury on buttocks. Patient says that she does not want bed to be changed to Agility bed at this time and is happy with the [place] pump that is already in place. Patient also refused to let this nurse remove external catheter. Patient says that wearing briefs would not be enough with her receiving IV lasix. Hospitalist was notified of patients wishes and her refusal of changes.

## 2023-10-18 NOTE — NURSING NOTE
73-year-old female who presented to the hospital on October 13 with increasing shortness of breath and cough.  Patient consult received to assess patient's for a buttock injury.  Patient does complain of burning and stinging to her buttocks area.  She states that she has some incontinence of urine.  She currently has a pure wick device in place.  Patient reports that she does not turn in position change due to her respiratory status.  States that she does not tolerated    Patient has a new hospital-acquired pressure injury that appears to be device related and I do suspect that it is the pure wick device that is caused that it is the same outline I believe that the area most likely at some point shifted the wound is superficial but that top layer of skin has been removed.  Scant serous exudate is noted.  She currently does have a [place] low-air-loss pump in place however patient is rather large.  We will recommend placing her on agility offloading surface and did discuss with patient removing the pure wick.  Patient is very agreeable to this.  States that she is willing to try but would we will she will request utilizing some briefs instead we will recommend treating with a zinc oxide-based barrier cream.

## 2023-10-19 LAB
ALBUMIN SERPL-MCNC: 3.4 G/DL (ref 3.5–5.2)
ALP SERPL-CCNC: 172 U/L (ref 39–117)
ALT SERPL W P-5'-P-CCNC: 101 U/L (ref 1–33)
ANION GAP SERPL CALCULATED.3IONS-SCNC: 10 MMOL/L (ref 5–15)
AST SERPL-CCNC: 79 U/L (ref 1–32)
BACTERIA SPEC AEROBE CULT: NORMAL
BACTERIA SPEC AEROBE CULT: NORMAL
BILIRUB CONJ SERPL-MCNC: 0.2 MG/DL (ref 0–0.3)
BILIRUB INDIRECT SERPL-MCNC: 0.3 MG/DL
BILIRUB SERPL-MCNC: 0.5 MG/DL (ref 0–1.2)
BUN SERPL-MCNC: 36 MG/DL (ref 8–23)
BUN/CREAT SERPL: 29.5 (ref 7–25)
CALCIUM SPEC-SCNC: 9.5 MG/DL (ref 8.6–10.5)
CHLORIDE SERPL-SCNC: 94 MMOL/L (ref 98–107)
CK SERPL-CCNC: 239 U/L (ref 20–180)
CO2 SERPL-SCNC: 32 MMOL/L (ref 22–29)
CREAT SERPL-MCNC: 1.22 MG/DL (ref 0.57–1)
DEPRECATED RDW RBC AUTO: 43.8 FL (ref 37–54)
EGFRCR SERPLBLD CKD-EPI 2021: 47 ML/MIN/1.73
ERYTHROCYTE [DISTWIDTH] IN BLOOD BY AUTOMATED COUNT: 13.4 % (ref 12.3–15.4)
GLUCOSE BLDC GLUCOMTR-MCNC: 120 MG/DL (ref 70–105)
GLUCOSE BLDC GLUCOMTR-MCNC: 135 MG/DL (ref 70–105)
GLUCOSE BLDC GLUCOMTR-MCNC: 154 MG/DL (ref 70–105)
GLUCOSE BLDC GLUCOMTR-MCNC: 217 MG/DL (ref 70–105)
GLUCOSE SERPL-MCNC: 152 MG/DL (ref 65–99)
HCT VFR BLD AUTO: 35 % (ref 34–46.6)
HGB BLD-MCNC: 11.2 G/DL (ref 12–15.9)
LYMPHOCYTES # BLD MANUAL: 4.56 10*3/MM3 (ref 0.7–3.1)
LYMPHOCYTES NFR BLD MANUAL: 13 % (ref 5–12)
MCH RBC QN AUTO: 28.3 PG (ref 26.6–33)
MCHC RBC AUTO-ENTMCNC: 31.9 G/DL (ref 31.5–35.7)
MCV RBC AUTO: 88.7 FL (ref 79–97)
METAMYELOCYTES NFR BLD MANUAL: 2 % (ref 0–0)
MONOCYTES # BLD: 2.82 10*3/MM3 (ref 0.1–0.9)
MYELOCYTES NFR BLD MANUAL: 1 % (ref 0–0)
NEUTROPHILS # BLD AUTO: 13.67 10*3/MM3 (ref 1.7–7)
NEUTROPHILS NFR BLD MANUAL: 63 % (ref 42.7–76)
P JIROVECII DNA L RESP QL NAA+NON-PROBE: NEGATIVE
PHOSPHATE SERPL-MCNC: 2.7 MG/DL (ref 2.5–4.5)
PLATELET # BLD AUTO: 293 10*3/MM3 (ref 140–450)
PMV BLD AUTO: 8.4 FL (ref 6–12)
POTASSIUM SERPL-SCNC: 4.8 MMOL/L (ref 3.5–5.2)
PROT SERPL-MCNC: 6.6 G/DL (ref 6–8.5)
RBC # BLD AUTO: 3.95 10*6/MM3 (ref 3.77–5.28)
RBC MORPH BLD: NORMAL
REF LAB TEST METHOD: NORMAL
SCAN SLIDE: NORMAL
SMALL PLATELETS BLD QL SMEAR: ADEQUATE
SODIUM SERPL-SCNC: 136 MMOL/L (ref 136–145)
VARIANT LYMPHS NFR BLD MANUAL: 21 % (ref 19.6–45.3)
WBC MORPH BLD: NORMAL
WBC NRBC COR # BLD: 21.7 10*3/MM3 (ref 3.4–10.8)

## 2023-10-19 PROCEDURE — 25010000002 FUROSEMIDE PER 20 MG: Performed by: INTERNAL MEDICINE

## 2023-10-19 PROCEDURE — 94761 N-INVAS EAR/PLS OXIMETRY MLT: CPT

## 2023-10-19 PROCEDURE — 97535 SELF CARE MNGMENT TRAINING: CPT

## 2023-10-19 PROCEDURE — 94664 DEMO&/EVAL PT USE INHALER: CPT

## 2023-10-19 PROCEDURE — 82948 REAGENT STRIP/BLOOD GLUCOSE: CPT

## 2023-10-19 PROCEDURE — 97116 GAIT TRAINING THERAPY: CPT

## 2023-10-19 PROCEDURE — 97530 THERAPEUTIC ACTIVITIES: CPT

## 2023-10-19 PROCEDURE — 63710000001 INSULIN LISPRO (HUMAN) PER 5 UNITS: Performed by: HOSPITALIST

## 2023-10-19 PROCEDURE — 63710000001 PREDNISONE PER 1 MG: Performed by: INTERNAL MEDICINE

## 2023-10-19 PROCEDURE — 97112 NEUROMUSCULAR REEDUCATION: CPT

## 2023-10-19 PROCEDURE — 25010000002 AMPICILLIN-SULBACTAM PER 1.5 G: Performed by: INTERNAL MEDICINE

## 2023-10-19 PROCEDURE — 94799 UNLISTED PULMONARY SVC/PX: CPT

## 2023-10-19 PROCEDURE — 99232 SBSQ HOSP IP/OBS MODERATE 35: CPT | Performed by: INTERNAL MEDICINE

## 2023-10-19 RX ORDER — METOLAZONE 5 MG/1
5 TABLET ORAL ONCE
Status: COMPLETED | OUTPATIENT
Start: 2023-10-19 | End: 2023-10-19

## 2023-10-19 RX ADMIN — IPRATROPIUM BROMIDE AND ALBUTEROL SULFATE 3 ML: .5; 3 SOLUTION RESPIRATORY (INHALATION) at 19:55

## 2023-10-19 RX ADMIN — IPRATROPIUM BROMIDE AND ALBUTEROL SULFATE 3 ML: .5; 3 SOLUTION RESPIRATORY (INHALATION) at 07:17

## 2023-10-19 RX ADMIN — GUAIFENESIN AND CODEINE PHOSPHATE 10 ML: 100; 10 SOLUTION ORAL at 09:22

## 2023-10-19 RX ADMIN — DIPHENHYDRAMINE HYDROCHLORIDE AND LIDOCAINE HYDROCHLORIDE AND ALUMINUM HYDROXIDE AND MAGNESIUM HYDRO 5 ML: KIT at 16:30

## 2023-10-19 RX ADMIN — BUDESONIDE AND FORMOTEROL FUMARATE DIHYDRATE 2 PUFF: 160; 4.5 AEROSOL RESPIRATORY (INHALATION) at 20:00

## 2023-10-19 RX ADMIN — IPRATROPIUM BROMIDE AND ALBUTEROL SULFATE 3 ML: .5; 3 SOLUTION RESPIRATORY (INHALATION) at 22:30

## 2023-10-19 RX ADMIN — GUAIFENESIN AND CODEINE PHOSPHATE 10 ML: 100; 10 SOLUTION ORAL at 16:39

## 2023-10-19 RX ADMIN — TIZANIDINE 2 MG: 4 TABLET ORAL at 08:25

## 2023-10-19 RX ADMIN — IPRATROPIUM BROMIDE AND ALBUTEROL SULFATE 3 ML: .5; 3 SOLUTION RESPIRATORY (INHALATION) at 15:00

## 2023-10-19 RX ADMIN — GUAIFENESIN 1200 MG: 600 TABLET, MULTILAYER, EXTENDED RELEASE ORAL at 08:24

## 2023-10-19 RX ADMIN — DIPHENHYDRAMINE HYDROCHLORIDE AND LIDOCAINE HYDROCHLORIDE AND ALUMINUM HYDROXIDE AND MAGNESIUM HYDRO 5 ML: KIT at 21:22

## 2023-10-19 RX ADMIN — CYANOCOBALAMIN TAB 1000 MCG 1000 MCG: 1000 TAB at 08:24

## 2023-10-19 RX ADMIN — ALLOPURINOL 100 MG: 100 TABLET ORAL at 08:24

## 2023-10-19 RX ADMIN — LEVOTHYROXINE SODIUM 125 MCG: 0.12 TABLET ORAL at 05:46

## 2023-10-19 RX ADMIN — POTASSIUM CHLORIDE 40 MEQ: 1500 TABLET, EXTENDED RELEASE ORAL at 14:14

## 2023-10-19 RX ADMIN — IPRATROPIUM BROMIDE AND ALBUTEROL SULFATE 3 ML: .5; 3 SOLUTION RESPIRATORY (INHALATION) at 11:23

## 2023-10-19 RX ADMIN — PANTOPRAZOLE SODIUM 40 MG: 40 TABLET, DELAYED RELEASE ORAL at 16:37

## 2023-10-19 RX ADMIN — FUROSEMIDE 80 MG: 10 INJECTION, SOLUTION INTRAMUSCULAR; INTRAVENOUS at 21:18

## 2023-10-19 RX ADMIN — GABAPENTIN 400 MG: 400 CAPSULE ORAL at 08:25

## 2023-10-19 RX ADMIN — POTASSIUM CHLORIDE 40 MEQ: 1500 TABLET, EXTENDED RELEASE ORAL at 08:25

## 2023-10-19 RX ADMIN — Medication: at 08:26

## 2023-10-19 RX ADMIN — Medication 10 ML: at 08:22

## 2023-10-19 RX ADMIN — GUAIFENESIN AND CODEINE PHOSPHATE 10 ML: 100; 10 SOLUTION ORAL at 05:46

## 2023-10-19 RX ADMIN — THEOPHYLLINE ANHYDROUS 300 MG: 300 CAPSULE, EXTENDED RELEASE ORAL at 21:14

## 2023-10-19 RX ADMIN — AMPICILLIN SODIUM AND SULBACTAM SODIUM 3 G: 2; 1 INJECTION, POWDER, FOR SOLUTION INTRAMUSCULAR; INTRAVENOUS at 03:02

## 2023-10-19 RX ADMIN — GUAIFENESIN AND CODEINE PHOSPHATE 10 ML: 100; 10 SOLUTION ORAL at 21:24

## 2023-10-19 RX ADMIN — SENNOSIDES AND DOCUSATE SODIUM 2 TABLET: 50; 8.6 TABLET ORAL at 21:14

## 2023-10-19 RX ADMIN — BUDESONIDE AND FORMOTEROL FUMARATE DIHYDRATE 2 PUFF: 160; 4.5 AEROSOL RESPIRATORY (INHALATION) at 07:21

## 2023-10-19 RX ADMIN — DIPHENHYDRAMINE HYDROCHLORIDE AND LIDOCAINE HYDROCHLORIDE AND ALUMINUM HYDROXIDE AND MAGNESIUM HYDRO 5 ML: KIT at 08:27

## 2023-10-19 RX ADMIN — GUAIFENESIN 1200 MG: 600 TABLET, MULTILAYER, EXTENDED RELEASE ORAL at 21:15

## 2023-10-19 RX ADMIN — FUROSEMIDE 80 MG: 10 INJECTION, SOLUTION INTRAMUSCULAR; INTRAVENOUS at 08:23

## 2023-10-19 RX ADMIN — IPRATROPIUM BROMIDE AND ALBUTEROL SULFATE 3 ML: .5; 3 SOLUTION RESPIRATORY (INHALATION) at 02:43

## 2023-10-19 RX ADMIN — GABAPENTIN 400 MG: 400 CAPSULE ORAL at 21:15

## 2023-10-19 RX ADMIN — Medication: at 16:37

## 2023-10-19 RX ADMIN — PREDNISONE 20 MG: 20 TABLET ORAL at 08:24

## 2023-10-19 RX ADMIN — GABAPENTIN 400 MG: 400 CAPSULE ORAL at 16:37

## 2023-10-19 RX ADMIN — INSULIN LISPRO 5 UNITS: 100 INJECTION, SOLUTION INTRAVENOUS; SUBCUTANEOUS at 16:48

## 2023-10-19 RX ADMIN — ATORVASTATIN CALCIUM 10 MG: 10 TABLET, FILM COATED ORAL at 21:14

## 2023-10-19 RX ADMIN — Medication 10 ML: at 21:18

## 2023-10-19 RX ADMIN — TIZANIDINE 2 MG: 4 TABLET ORAL at 21:16

## 2023-10-19 RX ADMIN — MONTELUKAST 10 MG: 10 TABLET, FILM COATED ORAL at 21:14

## 2023-10-19 RX ADMIN — Medication: at 21:18

## 2023-10-19 RX ADMIN — TIZANIDINE 2 MG: 4 TABLET ORAL at 16:37

## 2023-10-19 RX ADMIN — DIPHENHYDRAMINE HYDROCHLORIDE AND LIDOCAINE HYDROCHLORIDE AND ALUMINUM HYDROXIDE AND MAGNESIUM HYDRO 5 ML: KIT at 03:02

## 2023-10-19 RX ADMIN — Medication 2000 UNITS: at 21:15

## 2023-10-19 RX ADMIN — POTASSIUM CHLORIDE 40 MEQ: 1500 TABLET, EXTENDED RELEASE ORAL at 18:00

## 2023-10-19 RX ADMIN — METOLAZONE 5 MG: 5 TABLET ORAL at 14:14

## 2023-10-19 NOTE — CASE MANAGEMENT/SOCIAL WORK
Continued Stay Note  [facility]     Patient Name: Claudia Rust  MRN: [id number]  Today's Date: 10/19/2023    Admit Date: 10/13/2023    Plan: Home with spouse. [facility] (current) JEAN order placed. Current with [facility]. PASRR approved if needed.   Discharge Plan       Row Name 10/19/23 1333       Plan    Plan Home with spouse. [facility] (current) JEAN order placed. Current with [facility]. PASRR approved if needed.    Plan Comments Patient will discharge home with spouse. Patient is current with [facility], JEAN order placed. Patient is current with [facility] for services. PASRR approved if needed. Patient currently on 5L of O2, will need walking oximetry prior to discharge. D/C barriers: IV lasix, IV abx, 5L, elevated BUN/Cr, cardiology, pulm, nephro following.             Beth Yusuf, RN, BSN    [address] [address]  Phone: [phone]  Fax: [phone]

## 2023-10-19 NOTE — PROGRESS NOTES
[facility] Medicine Services   Daily Progress Note    Patient Name: Claudia Rust  : 1950  MRN: [id number]  Primary Care Physician:  Pool Meyer NP-C  Date of admission: 10/13/2023  Date of service 10/14/2023      Subjective      Subjective  Pt seen and examined   Still sob down to 5L     ROS A 12 point review of system was done and was negative except as mentioned above      Objective      Vitals:   Temp:  [97.7 °F (36.5 °C)-98.5 °F (36.9 °C)] 97.7 °F (36.5 °C)  Heart Rate:  [67-86] 76  Resp:  [14-28] 14  BP: (121-137)/(66-87) 121/69  Flow (L/min):  [4-8] 4    Physical Exam  Constitutional:       Appearance: Normal appearance.   HENT:      Head: Normocephalic and atraumatic.      Nose: Nose normal.      Mouth/Throat:      Mouth: Mucous membranes are moist.   Cardiovascular:      Rate and Rhythm: Normal rate.   Pulmonary:      Effort: Pulmonary effort is normal. No respiratory distress.      Breath sounds: Normal breath sounds. No stridor. No wheezing, rhonchi or rales.   Chest:      Chest wall: No tenderness.   Abdominal:      General: Abdomen is flat. There is no distension.      Palpations: Abdomen is soft. There is no mass.      Tenderness: There is no abdominal tenderness.   Musculoskeletal:         General: Swelling and tenderness present.      Right lower leg: Edema present.      Left lower leg: Edema present.      Comments: Mild local rise of temperature of the left lower leg   Neurological:      General: No focal deficit present.      Mental Status: She is alert.             Result Review    Result Review:  I have personally reviewed the results from the time of this admission to 10/19/2023 13:40 EDT and agree with these findings:  [x]  Laboratory  []  Microbiology  [x]  Radiology  []  EKG/Telemetry   []  Cardiology/Vascular   []  Pathology  []  Old records  []  Other:      Wounds (last 24 hours)       LDA Wound       Row Name 10/19/23 0845 10/18/23 2045          Wound 10/17/23 2200  Left gluteal Pressure Injury    Wound - Properties Group Placement Date: 10/17/23  -DD Placement Time: 2200 -DD Side: Left  -DD Location: gluteal  -DD Primary Wound Type: Pressure inj  -DD, DTI     Wound Image --  images linked  -SC --     Pressure Injury Stage DTPI  -SC DTPI  -AH     Dressing Appearance open to air  -SC open to air  -AH     Closure Open to air  -SC Open to air  -AH     Base -- pink  -AH     Periwound intact;dry  -SC intact;dry  -AH     Periwound Temperature warm  -SC warm  -AH     Periwound Skin Turgor soft  -SC soft  -AH     Edges -- rolled/closed  -AH     Care, Wound -- cleansed with;soap and water  -AH     Dressing Care skin barrier agent applied  -SC skin barrier agent applied  -AH     Periwound Care dry periwound area maintained  -SC dry periwound area maintained  -AH     Retired Wound - Properties Group Placement Date: 10/17/23  -DD Placement Time: 2200 -DD Side: Left  -DD Location: gluteal  -DD Primary Wound Type: Pressure inj  -DD, DTI     Retired Wound - Properties Group Date first assessed: 10/17/23  -DD Time first assessed: 2200 -DD Side: Left  -DD Location: gluteal  -DD Primary Wound Type: Pressure inj  -DD, DTI        Wound 10/17/23 2200 Right gluteal Pressure Injury    Wound - Properties Group Placement Date: 10/17/23  -DD Placement Time: 2200 -DD Side: Right  -DD Location: gluteal  -DD Primary Wound Type: Pressure inj  -DD, DTI     Wound Image --  images linked  -SC --     Pressure Injury Stage DTPI  -SC DTPI  -AH     Dressing Appearance open to air  -SC open to air  -AH     Closure Open to air  -SC Open to air  -AH     Base -- pink  -AH     Periwound dry;intact  -SC dry;intact  -AH     Periwound Temperature warm  -SC warm  -AH     Periwound Skin Turgor soft  -SC soft  -AH     Edges -- rolled/closed  -AH     Drainage Amount none  -SC none  -AH     Care, Wound -- cleansed with;soap and water  -AH     Dressing Care skin barrier agent applied  -SC skin barrier agent applied  -AH      Periwound Care dry periwound area maintained  -SC dry periwound area maintained  -     Retired Wound - Properties Group Placement Date: 10/17/23  -DD Placement Time: 2200 -DD Side: Right  -DD Location: gluteal  -DD Primary Wound Type: Pressure inj  -DD, DTI     Retired Wound - Properties Group Date first assessed: 10/17/23  -DD Time first assessed: 2200  -DD Side: Right  -DD Location: gluteal  -DD Primary Wound Type: Pressure inj  -DD, DTI               User Key  (r) = Recorded By, (t) = Taken By, (c) = Cosigned By      Initials Name Provider Type    Martha Mccormick, RN Registered Nurse    Nidia Hudson LPN Licensed Nurse    Mary Mccrary RN Registered Nurse                      Assessment & Plan      Brief Patient Summary:   Claudia Rust is a 73 y.o. female who presented to [facility] on 10/13/2023 complaining of worsening shortness of breath and cough.  Patient was just seen in the ED a few days ago for same complaints.  She was discharged with breathing treatments and steroids however patient stated that she did not improve.  Her shortness of breath actually worsened.  Patient does have history of COPD but does not use oxygen at home.    In the ED, patient was requiring 4 L nasal cannula.  Labs remarkable for sodium 121, WBC 15.8.  Chest x-ray showed patchy airspace disease within the bilateral lower lobes, pneumonia versus pulmonary edema.  Patient was given Lasix and DuoNebs in the ED.      acetylcysteine, 2 mL, Nebulization, BID - RT  allopurinol, 100 mg, Oral, Daily  atorvastatin, 10 mg, Oral, Nightly  budesonide-formoterol, 2 puff, Inhalation, BID - RT  cholecalciferol, 2,000 Units, Oral, Nightly  First Mouthwash (Magic Mouthwash), 5 mL, Swish & Spit, Q6H  furosemide, 80 mg, Intravenous, Q12H  gabapentin, 400 mg, Oral, TID  guaiFENesin, 1,200 mg, Oral, Q12H  insulin lispro, 3-14 Units, Subcutaneous, 4x Daily AC & at Bedtime  ipratropium-albuterol, 3 mL, Nebulization, Q4H -  RT  levothyroxine, 125 mcg, Oral, Q AM  metOLazone, 5 mg, Oral, Once  montelukast, 10 mg, Oral, Nightly  pantoprazole, 40 mg, Oral, Q PM  potassium chloride, 40 mEq, Oral, TID With Meals  [START ON 10/20/2023] predniSONE, 10 mg, Oral, Daily  senna-docusate sodium, 2 tablet, Oral, BID  sodium chloride, 10 mL, Intravenous, Q12H  theophylline, 300 mg, Oral, Nightly  tiZANidine, 2 mg, Oral, TID  vitamin B-12, 1,000 mcg, Oral, Daily  Zinc Oxide, , Apply externally, TID             Active Hospital Problems:  Active Hospital Problems    Diagnosis     **Acute respiratory failure     Multifocal pneumonia     Multiple tracheobronchial mucus plugs      Plan:     COPD  Dyspnea  Acute respiratory failure  Patient currently on 5 L nasal cannula  Continue oxygen supplementation  Wean as tolerated  Off steroids now.  Antibiotics     Acute hypoxic resp failure due to mucus plugging and Pneumonia  Start Rocephin and azithromycin  Continue breathing treatments  Guaifenesin as needed for cough  Pulmonary on board.     Possible pulmonary edema  Start Lasix 40 mg IV twice daily  Obtain echocardiogram    JERROD  -cr stable     Hyponatremia appear secondary to hypervolemia  Sodium 121  Fluid restriction  Continue diuretics  Follow-up electrolytes  Urine studies  Nephrology consulted.  On lasix 60 mg iv TID now.       Leukocytosis  Most likely due to steroid use  Repeat CBC    Cellulitis of lower legs  Antibiotics as ordered.  Venous Doppler to rule out a DVT     UTI  UA studies reviewed.  Follow-up urine culture  Continue antibiotics     Hypertension  Follow-up BP      DVT prophylaxis:  Mechanical DVT prophylaxis orders are present.    Bronchoscopy 10/16/2023.  Currently on 10 L of oxygen nonrebreather.  Continue prednisone and IV Unasyn.  Sodium trending up 126, creatinine 1.19.  Leukocytosis.  Labs reviewed.  Repeat labs and monitor.    Discussed with pulm, Nebulized Mucomyst, Antibiotics currently on Unasyn once 72 hours completed we will  switch to p.o. Augmentin for additional 4 days. Continue IV diuretics per nephro recs. Labs pending    Labs still pending sine 10/16        CODE STATUS:         Disposition:  I expect patient to be discharged 2 to 3 days    Patient,  and daughter was updated with plan of care.  All questions answered      Electronically signed by Zeferino Vega MD, 10/19/23, 13:40 EDT.  [facility] Hospitalist Team

## 2023-10-19 NOTE — PROGRESS NOTES
"NEPHROLOGY PROGRESS NOTE------[facility]    [facility]  [phone]  Yovani Lerma MD      Patient Care Team:  Pool Meyer NP-C as PCP - General (Nurse Practitioner)  Jose Levi MD as Consulting Physician (Cardiology)  Yovani Lerma MD as Consulting Physician (Nephrology)  Mary Hunt APRN as Nurse Practitioner (Nurse Practitioner)  dEmar Bear MD as Consulting Physician (Cardiology)      Provider:  Yovani Lerma MD  Patient Name: Claudia Rust  :  1950    SUBJECTIVE:  Follow-up JERROD/hyponatremia  No chest pain, breathing better    Medication:  acetylcysteine, 2 mL, Nebulization, BID - RT  allopurinol, 100 mg, Oral, Daily  atorvastatin, 10 mg, Oral, Nightly  budesonide-formoterol, 2 puff, Inhalation, BID - RT  cholecalciferol, 2,000 Units, Oral, Nightly  First Mouthwash (Magic Mouthwash), 5 mL, Swish & Spit, Q6H  furosemide, 80 mg, Intravenous, Q12H  gabapentin, 400 mg, Oral, TID  guaiFENesin, 1,200 mg, Oral, Q12H  insulin lispro, 3-14 Units, Subcutaneous, 4x Daily AC & at Bedtime  ipratropium-albuterol, 3 mL, Nebulization, Q4H - RT  levothyroxine, 125 mcg, Oral, Q AM  montelukast, 10 mg, Oral, Nightly  pantoprazole, 40 mg, Oral, Q PM  potassium chloride, 40 mEq, Oral, TID With Meals  [START ON 10/20/2023] predniSONE, 10 mg, Oral, Daily  senna-docusate sodium, 2 tablet, Oral, BID  sodium chloride, 10 mL, Intravenous, Q12H  theophylline, 300 mg, Oral, Nightly  tiZANidine, 2 mg, Oral, TID  vitamin B-12, 1,000 mcg, Oral, Daily  Zinc Oxide, , Apply externally, TID           OBJECTIVE    Vital Sign Min/Max for last 24 hours  Temp  Min: 97.7 °F (36.5 °C)  Max: 98.5 °F (36.9 °C)   BP  Min: 109/63  Max: 137/87   Pulse  Min: 67  Max: 86   Resp  Min: 16  Max: 28   SpO2  Min: 87 %  Max: 97 %   No data recorded   No data recorded     Flowsheet Rows      Flowsheet Row First Filed Value   Admission Height 162.6 cm (64\") Documented at " "10/13/2023 2352   Admission Weight 137 kg (303 lb) Documented at 10/13/2023 2352            No intake/output data recorded.  I/O last 3 completed shifts:  In: 700 [P.O.:600; IV Piggyback:100]  Out: 1700 [Urine:1700]    Physical Exam:  General Appearance: alert, appears stated age and cooperative  Head: normocephalic, without obvious abnormality and atraumatic  Eyes: conjunctivae and sclerae normal and no icterus  Neck: supple and no JVD  Lungs: clear to auscultation and respirations regular  Heart: regular rhythm & normal rate and normal S1, S2  Chest: Wall no abnormalities observed  Abdomen: normal bowel sounds and soft, nontender  Extremities: moves extremities well, plus edema, no cyanosis and no redness  Skin: no bleeding, bruising or rash, turgor normal, color normal and no lesions noted  Neurologic: Alert, and oriented. No focal deficits    Labs:    WBC WBC   Date Value Ref Range Status   10/18/2023 21.70 (H) 3.40 - 10.80 10*3/mm3 Final   10/18/2023 22.10 (H) 3.40 - 10.80 10*3/mm3 Final   10/16/2023 21.20 (H) 3.40 - 10.80 10*3/mm3 Final      HGB Hemoglobin   Date Value Ref Range Status   10/18/2023 11.2 (L) 12.0 - 15.9 g/dL Final   10/18/2023 12.3 12.0 - 15.9 g/dL Final   10/16/2023 11.4 (L) 12.0 - 15.9 g/dL Final      HCT Hematocrit   Date Value Ref Range Status   10/18/2023 35.0 34.0 - 46.6 % Final   10/18/2023 37.7 34.0 - 46.6 % Final   10/16/2023 33.1 (L) 34.0 - 46.6 % Final      Platelets No results found for: \"LABPLAT\"   MCV MCV   Date Value Ref Range Status   10/18/2023 88.7 79.0 - 97.0 fL Final   10/18/2023 89.6 79.0 - 97.0 fL Final   10/16/2023 87.9 79.0 - 97.0 fL Final          Sodium Sodium   Date Value Ref Range Status   10/18/2023 136 136 - 145 mmol/L Final   10/18/2023 132 (L) 136 - 145 mmol/L Final   10/16/2023 132 (L) 136 - 145 mmol/L Final   10/16/2023 127 (L) 136 - 145 mmol/L Final      Potassium Potassium   Date Value Ref Range Status   10/18/2023 4.8 3.5 - 5.2 mmol/L Final   10/18/2023 5.4 " "(H) 3.5 - 5.2 mmol/L Final     Comment:     Slight hemolysis detected by analyzer. Results may be affected.   10/16/2023 4.5 3.5 - 5.2 mmol/L Final   10/16/2023 4.2 3.5 - 5.2 mmol/L Final     Comment:     Slight hemolysis detected by analyzer. Results may be affected.      Chloride Chloride   Date Value Ref Range Status   10/18/2023 94 (L) 98 - 107 mmol/L Final   10/18/2023 92 (L) 98 - 107 mmol/L Final   10/16/2023 92 (L) 98 - 107 mmol/L Final   10/16/2023 90 (L) 98 - 107 mmol/L Final      CO2 CO2   Date Value Ref Range Status   10/18/2023 32.0 (H) 22.0 - 29.0 mmol/L Final   10/18/2023 28.0 22.0 - 29.0 mmol/L Final   10/16/2023 29.0 22.0 - 29.0 mmol/L Final   10/16/2023 24.0 22.0 - 29.0 mmol/L Final      BUN BUN   Date Value Ref Range Status   10/18/2023 36 (H) 8 - 23 mg/dL Final   10/18/2023 36 (H) 8 - 23 mg/dL Final   10/16/2023 45 (H) 8 - 23 mg/dL Final   10/16/2023 39 (H) 8 - 23 mg/dL Final      Creatinine Creatinine   Date Value Ref Range Status   10/18/2023 1.22 (H) 0.57 - 1.00 mg/dL Final   10/18/2023 1.14 (H) 0.57 - 1.00 mg/dL Final   10/16/2023 1.37 (H) 0.57 - 1.00 mg/dL Final   10/16/2023 1.30 (H) 0.57 - 1.00 mg/dL Final      Calcium Calcium   Date Value Ref Range Status   10/18/2023 9.5 8.6 - 10.5 mg/dL Final   10/18/2023 9.4 8.6 - 10.5 mg/dL Final   10/16/2023 8.8 8.6 - 10.5 mg/dL Final   10/16/2023 8.3 (L) 8.6 - 10.5 mg/dL Final      PO4 No components found for: \"PO4\"   Albumin Albumin   Date Value Ref Range Status   10/18/2023 3.4 (L) 3.5 - 5.2 g/dL Final   10/18/2023 3.1 (L) 3.5 - 5.2 g/dL Final   10/16/2023 3.1 (L) 3.5 - 5.2 g/dL Final      Magnesium No results found for: \"MG\"     Uric Acid No components found for: \"URIC ACID\"     Imaging Results (Last 72 Hours)       ** No results found for the last 72 hours. **            Results for orders placed during the hospital encounter of 10/13/23    XR Chest 1 View    Narrative  XR CHEST 1 VW    Date of Exam: 10/14/2023 12:16 AM EDT    Indication: " sob    Comparison: 10/10/2023.    Findings:  Patchy airspace disease is seen within the bilateral lower lobes. No sizable pleural effusion identified. The heart appears enlarged. There is indistinctness of the pulmonary vasculature. Median sternotomy wires are present. No pneumothorax. No acute  osseous abnormality identified.    Impression  Impression:  Patchy airspace disease within the bilateral lower lobes, likely related to pneumonia. Possible mild underlying pulmonary edema pattern.        Electronically Signed: Tana Sutherland MD  10/14/2023 12:27 AM EDT  Workstation ID: [id number]      Results for orders placed during the hospital encounter of 10/10/23    XR Chest AP    Narrative  XR CHEST AP    Date of Exam: 10/10/2023 7:02 PM EDT    Indication: COVID Evaluation, Cough, Fever    Comparison: 1/6/2023    Findings:  Lines: None    Lungs: The lungs are clear.  Pleura: No pleural effusion or pneumothorax.    Cardiomediastinum: Unchanged    Soft Tissues: Unremarkable.    Bones: Status post median sternotomy. No acute osseous abnormality.    Impression  Impression:  No acute abnormality.      Electronically Signed: David Montemayor DO  10/10/2023 7:14 PM EDT  Workstation ID: [id number]      Results for orders placed during the hospital encounter of 01/06/23    XR Chest 1 View    Narrative  XR CHEST 1 VW    Date of Exam: 1/6/2023 7:33 PM EST    Indication: SOA.    Comparison: 4/7/2021 and prior    Findings:  Patient is status post median sternotomy. The heart size appears stable. Prosthetic valve at the aortic root noted. Pulmonary vascularity is congested and distinct. Coarsening of the interstitial markings noted diffusely with increased hazy and  interstitial opacities noted with a perihilar and basilar predominance. Osseous structures demonstrate no acute abnormality. Multilevel degenerative changes noted.    Impression  Impression:  Increased hazy and interstitial opacities noted. Findings represent underlying  interstitial edema. Viral and atypical pneumonia also in the differential.    Electronically Signed: Micah Neil  1/6/2023 8:03 PM EST  Workstation ID: [id number]      Results for orders placed during the hospital encounter of 10/13/23    Duplex Venous Upper Extremity - Left CAR    Interpretation Summary    Normal left upper extremity venous duplex scan.        ASSESSMENT / PLAN      Acute respiratory failure    Multifocal pneumonia    Multiple tracheobronchial mucus plugs    CKD stage IIIa-CKD due to hypertensive nephrosclerosis  Rhabdomyolysis-statins held  Hyponatremia-with hypervolemia.  Probably underlying excess ADH related to SSRI use.  Aldactone and metolazone held.  Status post Samsca 7.5 mg x 1  Acute exacerbation of chronic diastolic heart failure  Chronic lower extremity edema  History coronary artery disease with history of AVR  Morbid obesity  Acute hypoxic respiratory failure  Pneumonia  Depression on SSRI  Diabetes type 2  Anemia-SPEP pending  History right upper extremity DVT  Hyperlipidemia    Cr stable, K down  Continue iv lasix, redose metolazone  Clinically much better, hopefully switch to oral diuretics in am  Monitor renal function fluid status electrolytes        Yovani Lerma MD  [facility]  [phone]  10/19/23  11:06 EDT

## 2023-10-19 NOTE — PLAN OF CARE
"Assessment: Claudia Rust presents with ADL impairments affecting function including balance, endurance / activity tolerance, shortness of breath, and strength. Demonstrated functioning below baseline abilities indicate the need for continued skilled intervention while inpatient. Pt sitting up in chair with spouse present upon arrival. Pt requesting assistance with changing brief d/t being saturated. Pt requires max A to don/doff brief and max A for nelson hygiene in standing. Pt requires seated rest break prior to further activity. Pt requires CGA x2 with RW for additional safety, line management and monitor vitals. Pt noted to be tachypneic with RR increased to 38 and noted to desaturates to 82% with poor pleth, therefore pt returned to room for seated rest break to recover. VSS at recovery. Tolerating session today without incident. Will continue to follow and progress as tolerated.     Plan/Recommendations:   Low Intensity Therapy recommended post-acute care - This is recommended as therapy feels this patient would require 2-3 visits per week. OP or HH would be the best option depending on patient's home bound status. Consider, if the patient has other  \"skilled\" needs such as wounds, IV antibiotics, etc. Combined with \"low intensity\" could also equate to a SNF. If patient is medically complex, consider LTAC.. Pt requires no DME at discharge.     Pt desires Home with family assist and and Home Health at discharge. Pt cooperative; agreeable to therapeutic recommendations and plan of care.   "

## 2023-10-19 NOTE — PLAN OF CARE
Problem: Adult Inpatient Plan of Care  Goal: Plan of Care Review  Outcome: Ongoing, Progressing  Flowsheets (Taken 10/19/2023 0244)  Progress: improving  Plan of Care Reviewed With:   patient   spouse  Goal: Patient-Specific Goal (Individualized)  Outcome: Ongoing, Progressing  Goal: Absence of Hospital-Acquired Illness or Injury  Outcome: Ongoing, Progressing  Intervention: Identify and Manage Fall Risk  Recent Flowsheet Documentation  Taken 10/19/2023 0244 by Mary Vera RN  Safety Promotion/Fall Prevention:   assistive device/personal items within reach   clutter free environment maintained   nonskid shoes/slippers when out of bed   room organization consistent   safety round/check completed  Taken 10/19/2023 0043 by Mary Vera RN  Safety Promotion/Fall Prevention:   assistive device/personal items within reach   clutter free environment maintained   nonskid shoes/slippers when out of bed   room organization consistent   safety round/check completed  Taken 10/18/2023 2211 by Mary Vera RN  Safety Promotion/Fall Prevention:   assistive device/personal items within reach   clutter free environment maintained   fall prevention program maintained   nonskid shoes/slippers when out of bed   room organization consistent   safety round/check completed  Taken 10/18/2023 2045 by Mary Vear RN  Safety Promotion/Fall Prevention:   assistive device/personal items within reach   clutter free environment maintained   nonskid shoes/slippers when out of bed   room organization consistent   safety round/check completed  Intervention: Prevent Skin Injury  Recent Flowsheet Documentation  Taken 10/18/2023 2045 by Mary Vera RN  Body Position: position changed independently  Intervention: Prevent and Manage VTE (Venous Thromboembolism) Risk  Recent Flowsheet Documentation  Taken 10/18/2023 2045 by Mary Vera RN  Activity Management:   up in chair   activity encouraged  VTE Prevention/Management:  sequential compression devices off  Range of Motion: active ROM (range of motion) encouraged  Intervention: Prevent Infection  Recent Flowsheet Documentation  Taken 10/19/2023 0244 by Mary Vera RN  Infection Prevention:   environmental surveillance performed   hand hygiene promoted   personal protective equipment utilized   rest/sleep promoted   single patient room provided   visitors restricted/screened  Taken 10/19/2023 0043 by Mary Vera RN  Infection Prevention:   environmental surveillance performed   hand hygiene promoted   personal protective equipment utilized   single patient room provided   visitors restricted/screened  Taken 10/18/2023 2211 by Mary Vera RN  Infection Prevention:   environmental surveillance performed   hand hygiene promoted   personal protective equipment utilized   visitors restricted/screened   single patient room provided  Taken 10/18/2023 2045 by Mary Vera RN  Infection Prevention:   environmental surveillance performed   hand hygiene promoted   personal protective equipment utilized   single patient room provided   visitors restricted/screened  Goal: Optimal Comfort and Wellbeing  Outcome: Ongoing, Progressing  Intervention: Monitor Pain and Promote Comfort  Recent Flowsheet Documentation  Taken 10/18/2023 2045 by Mary Vera RN  Pain Management Interventions:   care clustered   diversional activity provided  Intervention: Provide Person-Centered Care  Recent Flowsheet Documentation  Taken 10/18/2023 2045 by Mary Vera RN  Trust Relationship/Rapport:   care explained   choices provided  Goal: Readiness for Transition of Care  Outcome: Ongoing, Progressing     Problem: Fall Injury Risk  Goal: Absence of Fall and Fall-Related Injury  Outcome: Ongoing, Progressing  Intervention: Identify and Manage Contributors  Recent Flowsheet Documentation  Taken 10/19/2023 0043 by Mary Vera RN  Medication Review/Management:   medications reviewed   high-risk  medications identified  Taken 10/18/2023 2211 by Mary Vera RN  Medication Review/Management:   medications reviewed   high-risk medications identified  Taken 10/18/2023 2045 by Mary Vera RN  Medication Review/Management:   medications reviewed   high-risk medications identified  Self-Care Promotion:   independence encouraged   BADL personal objects within reach  Intervention: Promote Injury-Free Environment  Recent Flowsheet Documentation  Taken 10/19/2023 0244 by Mary Vera RN  Safety Promotion/Fall Prevention:   assistive device/personal items within reach   clutter free environment maintained   nonskid shoes/slippers when out of bed   room organization consistent   safety round/check completed  Taken 10/19/2023 0043 by Mary Vera RN  Safety Promotion/Fall Prevention:   assistive device/personal items within reach   clutter free environment maintained   nonskid shoes/slippers when out of bed   room organization consistent   safety round/check completed  Taken 10/18/2023 2211 by Mary Vera RN  Safety Promotion/Fall Prevention:   assistive device/personal items within reach   clutter free environment maintained   fall prevention program maintained   nonskid shoes/slippers when out of bed   room organization consistent   safety round/check completed  Taken 10/18/2023 2045 by Mary Vera RN  Safety Promotion/Fall Prevention:   assistive device/personal items within reach   clutter free environment maintained   nonskid shoes/slippers when out of bed   room organization consistent   safety round/check completed     Problem: Behavioral Health Comorbidity  Goal: Maintenance of Behavioral Health Symptom Control  Outcome: Ongoing, Progressing  Intervention: Maintain Behavioral Health Symptom Control  Recent Flowsheet Documentation  Taken 10/19/2023 0043 by Mary Vera, RN  Medication Review/Management:   medications reviewed   high-risk medications identified  Taken 10/18/2023 2211 by  Mary Vera RN  Medication Review/Management:   medications reviewed   high-risk medications identified  Taken 10/18/2023 2045 by Mary Vera RN  Medication Review/Management:   medications reviewed   high-risk medications identified     Problem: COPD (Chronic Obstructive Pulmonary Disease) Comorbidity  Goal: Maintenance of COPD Symptom Control  Outcome: Ongoing, Progressing  Intervention: Maintain COPD-Symptom Control  Recent Flowsheet Documentation  Taken 10/19/2023 0043 by Mary Vera RN  Medication Review/Management:   medications reviewed   high-risk medications identified  Taken 10/18/2023 2211 by Mary Vera RN  Medication Review/Management:   medications reviewed   high-risk medications identified  Taken 10/18/2023 2045 by Mary Vera RN  Supportive Measures: active listening utilized  Medication Review/Management:   medications reviewed   high-risk medications identified     Problem: Hypertension Comorbidity  Goal: Blood Pressure in Desired Range  Outcome: Ongoing, Progressing  Intervention: Maintain Blood Pressure Management  Recent Flowsheet Documentation  Taken 10/19/2023 0043 by Mary Vera RN  Medication Review/Management:   medications reviewed   high-risk medications identified  Taken 10/18/2023 2211 by Mary Vera RN  Medication Review/Management:   medications reviewed   high-risk medications identified  Taken 10/18/2023 2045 by Mary Vera RN  Medication Review/Management:   medications reviewed   high-risk medications identified     Problem: Skin Injury Risk Increased  Goal: Skin Health and Integrity  Outcome: Ongoing, Progressing  Intervention: Optimize Skin Protection  Recent Flowsheet Documentation  Taken 10/18/2023 2045 by Mary Vera RN  Pressure Reduction Techniques:   frequent weight shift encouraged   weight shift assistance provided  Head of Bed (HOB) Positioning: HOB at 90 degrees  Pressure Reduction Devices: specialty bed utilized     Problem:  Adjustment to Illness (Sepsis/Septic Shock)  Goal: Optimal Coping  Outcome: Ongoing, Progressing  Intervention: Optimize Psychosocial Adjustment to Illness  Recent Flowsheet Documentation  Taken 10/18/2023 2045 by Mary Vera RN  Supportive Measures: active listening utilized  Family/Support System Care:   support provided   self-care encouraged     Problem: Bleeding (Sepsis/Septic Shock)  Goal: Absence of Bleeding  Outcome: Ongoing, Progressing  Intervention: Monitor and Manage Bleeding  Recent Flowsheet Documentation  Taken 10/18/2023 2045 by Mary Vera RN  Bleeding Precautions:   monitored for signs of bleeding   gentle oral care promoted     Problem: Glycemic Control Impaired (Sepsis/Septic Shock)  Goal: Blood Glucose Level Within Desired Range  Outcome: Ongoing, Progressing  Intervention: Optimize Glycemic Control  Recent Flowsheet Documentation  Taken 10/18/2023 2045 by Mary Vera RN  Glycemic Management:   blood glucose monitored   supplemental insulin given     Problem: Infection Progression (Sepsis/Septic Shock)  Goal: Absence of Infection Signs and Symptoms  Outcome: Ongoing, Progressing  Intervention: Initiate Sepsis Management  Recent Flowsheet Documentation  Taken 10/19/2023 0244 by Mary Vera RN  Infection Prevention:   environmental surveillance performed   hand hygiene promoted   personal protective equipment utilized   rest/sleep promoted   single patient room provided   visitors restricted/screened  Isolation Precautions:   precautions maintained   droplet  Taken 10/19/2023 0043 by Mary Vera RN  Infection Prevention:   environmental surveillance performed   hand hygiene promoted   personal protective equipment utilized   single patient room provided   visitors restricted/screened  Isolation Precautions:   precautions maintained   droplet  Taken 10/18/2023 2211 by Mary Vera RN  Infection Prevention:   environmental surveillance performed   hand hygiene promoted    personal protective equipment utilized   visitors restricted/screened   single patient room provided  Isolation Precautions:   precautions maintained   droplet  Taken 10/18/2023 2045 by Mary Vera RN  Infection Prevention:   environmental surveillance performed   hand hygiene promoted   personal protective equipment utilized   single patient room provided   visitors restricted/screened  Isolation Precautions:   precautions maintained   droplet  Intervention: Promote Recovery  Recent Flowsheet Documentation  Taken 10/18/2023 2045 by Mary Vera RN  Activity Management:   up in chair   activity encouraged  Sleep/Rest Enhancement:   family presence promoted   relaxation techniques promoted  Intervention: Promote Stabilization  Recent Flowsheet Documentation  Taken 10/18/2023 2045 by Mary Vera RN  Lung Protection Measures: fluid excess minimized     Problem: Nutrition Impaired (Sepsis/Septic Shock)  Goal: Optimal Nutrition Intake  Outcome: Ongoing, Progressing   Goal Outcome Evaluation:  Plan of Care Reviewed With: patient, spouse        Progress: improving     Alert and oriented x 4. Spouse at bedside, active in care. No s/s of hyper/hypoglycemia noted. Compliant with 1000ml/day fluid restriction. WBC trending down, sodium WNL. Was able to titrate O2 therapy from 8 LPM via NC to 7 LPM via NC and tolerating well. Does better when sitting upright in bedside chair. Continues to receive IV Unasyn q 6 hrs, no s/s of adverse/allergic reaction noted. Continues to be followed by pulmonology, nephrology and cardiology. Discharge plan: home with spouse. Currently in bedside chair, eyes closed/rise and fall of chest observed. Call bell in reach. Assist x 2 for transfers. Uses Rolator for short distance ambulation. Continent of bowel and bladder. External catheter in place during sleeping periods d/t being on IV diuretic and having mobility issues. Takes medication whole and tolerates well. No c/o pain/discomfort  noted.

## 2023-10-19 NOTE — PLAN OF CARE
"Goal Outcome Evaluation:  Plan of Care Reviewed With: patient, spouse     Assessment: Claudia Rust presents with functional mobility impairments which indicate the need for skilled intervention. PT encouraged pt to use flutter valve respiratory device and pt reported she has been using it. Pt was not coughing today during treatment. Pt was able to ambulate 35 feet today with rollator with FF posture. Pt has 24/7 care at home. Tolerating session today without incident. Will continue to follow and progress as tolerated.     Plan/Recommendations:   Low Intensity Therapy recommended post-acute care - This is recommended as therapy feels this patient would require 2-3 visits per week. OP or HH would be the best option depending on patient's home bound status. Consider, if the patient has other  \"skilled\" needs such as wounds, IV antibiotics, etc. Combined with \"low intensity\" could also equate to a SNF. If patient is medically complex, consider LTAC.. Pt requires no DME at discharge.     Pt desires Home with family assist and and Home Health at discharge. Pt cooperative; agreeable to therapeutic recommendations and plan of care.       Anticipated Discharge Disposition (PT): home with 24/7 care, home with home health  "

## 2023-10-19 NOTE — THERAPY TREATMENT NOTE
Subjective: Pt agreeable to therapeutic plan of care. Per nsg report, pt has been up in chair since yesterday after PT session with multiple opportunities to get up/out of chair/into bed. Pt reports brief is saturated, request assist changing.    Cognition: oriented to Person, Place, Time, and Situation    Objective:     Bed Mobility: N/A or Not attempted.   Functional Transfers: CGA, Assist x 2, and with rolling walker     Balance: supported, static, with UE support, and standing CGA and with rolling walker  Functional Ambulation: CGA, Assist x 2, and with rolling walker additional assist to manage lines and monitor vitals     Lower Body Dressing: Max-A  ADL Position: supported standing  ADL Comments: don/doff brief     Toileting: Max-A  ADL Position: supported standing  ADL Comments: nelson hygiene in standing       Vitals: Desaturates tachypneic   HR   O2 increased to 6L HF with activity, decreased to 82% with poor pleth, returned to room for seated rest break, 91% after recovery.  RR increased to 38, recovered to 17     Pain: 0 VAS  Location: N/A  Interventions for pain: N/A  Education: Provided education on the importance of mobility in the acute care setting, Verbal/Tactile Cues, and ADL training      Assessment: Claudia Rust presents with ADL impairments affecting function including balance, endurance / activity tolerance, shortness of breath, and strength. Demonstrated functioning below baseline abilities indicate the need for continued skilled intervention while inpatient. Pt sitting up in chair with spouse present upon arrival. Pt requesting assistance with changing brief d/t being saturated. Pt requires max A to don/doff brief and max A for nelson hygiene in standing. Pt requires seated rest break prior to further activity. Pt requires CGA x2 with RW for additional safety, line management and monitor vitals. Pt noted to be tachypneic with RR increased to 38 and noted to desaturates to 82% with poor  "pleth, therefore pt returned to room for seated rest break to recover. VSS at recovery. Tolerating session today without incident. Will continue to follow and progress as tolerated.     Plan/Recommendations:   Low Intensity Therapy recommended post-acute care - This is recommended as therapy feels this patient would require 2-3 visits per week. OP or HH would be the best option depending on patient's home bound status. Consider, if the patient has other  \"skilled\" needs such as wounds, IV antibiotics, etc. Combined with \"low intensity\" could also equate to a SNF. If patient is medically complex, consider LTAC.. Pt requires no DME at discharge.     Pt desires Home with family assist and and Home Health at discharge. Pt cooperative; agreeable to therapeutic recommendations and plan of care.     Modified [place]: N/A = No pre-op stroke/TIA    Post-Tx Position: Up in Chair, Alarms activated, and Call light and personal items within reach  PPE: gloves, surgical mask, and gown    "

## 2023-10-19 NOTE — PROGRESS NOTES
Cardiology Progress Note    Patient Identification:  Name: Claudia Rust  Age: 73 y.o.  Sex: female  :  1950  MRN: [id number]                 Follow Up / Chief Complaint: Chronic HFpEF   Chief Complaint   Patient presents with    Shortness of Breath       Interval History: Patient presented with worsening shortness of breath and edema.  She is hypervolemic and hyponatremia, nephrology following.  She was also positive for Rhinovirus on 10/10/2023, symptoms progressively worsening.  She underwent bronchoscopy today 10/16/2023 with Dr. Reyes that showed significant amounts of mucuopurulent secretions, reactive airway disease and moderate to severe inflammation.     NP NOTE:  Patient seen and examined this morning.  Reports significant improvement overnight although still coughing and requiring 5L of oxygen down from 8L.  She reports she was able to sleep.  No cardiac complaints.  Continue diuresis per nephrology.     Electronically signed by SARA Moore, 10/19/23, 11:32 AM EDT.    Cardiology attending addendum :    I have personally performed a face-to-face diagnostic evaluation, physical exam and reviewed data on this patient.  I have reviewed documentation done by me and nurse practitioner  and corrected as needed.  And agree with the different components of documentation.Greater than 50% of the time spent in the care of this patient was provided by attending consultant/me.             Subjective: Patient seen and examined.  Chart reviewed.  Labs reviewed.  Discussed with RN taking care of patient.  Patient is complaining of left thumb numbness and swelling.  Underwent venous Dopplers of upper extremity which are negative for DVT.        Objective: pro bnp negative at 646, , bun 25 creatinine 0.96 AST 38   WBC 15.8  hgb 11.9 CK 2719 potassium 3.4 BUN 28, creatinine 1.05 iron 22 iron sat 8  10/16/2023: CK 1631, sodium 126, potassium 3.8, bun 36 creatinine 1.19 AST 77, ALT 48 WBC 20.6 hgb 11.0    10/17/2023: , sodium 132, potassium 4.5, bun 45 creatinine 1.37 AST 64, ALT 66  WBC 21.2 hgb 11.4   10/18/2023: , sodium 132, potassium 5.4 BUN 36 creatinine 1.14 AST 75, ALT 83 WBC 22.1  10/19/2023: , potassium 4.8, bun 36 creatinine 1.22 AST 79,  WBC 21.7 hgb 11.2             History of present illness:      Ms. Claudia Rust has of PMH      #Valvular heart disease with moderate to severe aortic stenosis  Cardiac cath 1/20/2021 revealing nonobstructive CAD  Patient underwent #23 magna pericardial prosthesis AVR 3/18/2021  Transesophageal echo 3/25/2021 normal LV systolic function  # Hyperlipidemia,    # Hypertension  # COPD, KARLY on CPAP  # Prediabetes  #History of HIT, history of DVT RUE   # Hypothyroidism, Rheumatoid Arthritis, spinal stenosis, hiatal hernia, chronic depression, bipolar, GERD, IBS, chronic migraine, vertigo, herniated disc, compression lumbar fractures  #Allergies/intolerance to Stadol  # Hysterectomy, bladder reconstruction, cholecystectomy, right hand surgery  # Family history of strokes and grandfather.  Mother's cancer, father on  # Active cigarette smoker trying to quit         presented to the ED with worsening shortness of breath and cough over the last 2 weeks.  Patient was diagnosed with rhinovirus on 10/10/2023 in the ED, given steroids and discharged home.   Patient progressively worsened the next few days and EMS was called.   Patient was hypoxic in the 80s.    She reports worsening edema as well as orthopnea.  Patient denies any chest pain.  No fevers      In the ED on 10/14/2023 CXR showed pulmonary edema vs bilateral pneumonia,   HS troponin negative, pro bnp negative at 646, , bun 25 creatinine 0.96 AST 38   WBC 15.8  hgb 11.9 CK 2719 potassium 3.4 BUN 28, creatinine 1.05 iron 22 iron sat 8          Review of records reveals patient underwent an echocardiogram which is revealing severe aortic stenosis on 2/11/2020 ,Underwent cardiac cath 1/20/2021  which revealed no obstructive disease,underwent aortic valve replacement 3/18/2021 with #23 magna pericardial prosthesis.       Carotid Dopplers 9/13/2019 normal.  Echocardiogram 2/11/2020 is revealing severe aortic stenosis  Work-up here on 3/16/2021 revealed proBNP 733, normal CMP, A1c 5.7, normal CBC, chest x-ray with no acute abnormality.  Lexiscan Cardiolite 2/28/2023 negative for ischemia EF of 86%           Assessment:     Acute respiratory failure with recent rhinovirus 10/10/2023 and now with pneumonia and mucous plugs    Bradycardia    Aortic stenosis, severe, status post AVR with #23 magna pericardial prosthesis 3/18/2021    Chronic congestive heart failure  due to valvular heart disease and aortic stenosis hypertensive cardiovascular disease essential hypertension/hypertensive cardiovascular disease     Dyslipidemia    diabetes  Morbid obesity with BMI over 40  Cigarette smoker  TCP HIT positive on 3/26/2021, + RUE DVT  previously on warfarin  Diabetes     Plan:     Telemetry is revealing sinus rhythm.  Appreciated [facility] report.  We will follow-up with pulmonary.    Continue aspirin,  metoprolol, simvastatin as tolerated for edema, AVR, carotid disease, chronic heart failure due to valvular heart disease and hypertensive cardiovascular disease.  Stress test 2/20/2023 which was negative for ischemia.  Continue diuresis follow-up with nephrology  Patient had venous Dopplers of left upper extremity which was negative for DVT.  Patient has diuresed well.  Oxygen requirements are decreasing.    Discussed with RN taking care of patient to coordinate care.          Copied text in this portion of the note has been reviewed and is accurate as of 10/19/2023    Past Medical History:  Past Medical History:   Diagnosis Date    Acute congestive heart failure     Allergies     Anxiety     Arthritis     Asthma     Bilateral leg edema     Bipolar 1 disorder     Bulging lumbar disc     X3    Cancer     states had  "\"cancerous tumor during pregnancy and had to have hysterectomy\"    Cataract     bilateral    Cellulitis 03/2021    bilateral legs    Compression fracture of vertebral column     LUMBAR    COPD (chronic obstructive pulmonary disease)     Delayed emergence from anesthesia     Depression     Depression     Diabetes mellitus     Dyslipidemia     Dyspnea on exertion     Fibromyalgia     GERD (gastroesophageal reflux disease)     Hiatal hernia     Hyperlipidemia     Hypertension     Hypothyroid     HYPOTHYROID    IBS (irritable bowel syndrome)     Migraines     Morbid obesity     Neuropathy     Panic attacks     Peripheral edema     PONV (postoperative nausea and vomiting)     Poor mobility     rolling walker    Prediabetes     PVD (peripheral vascular disease)     Rheumatoid aortitis     Spinal stenosis     Spinal stenosis     Vertigo     Vertigo     Vitamin D deficiency      Past Surgical History:  Past Surgical History:   Procedure Laterality Date    AORTIC VALVE REPAIR/REPLACEMENT N/A 3/18/2021    Procedure: AORTIC VALVE REPLACEMENT;  Surgeon: Olaf Mcgill MD;  Location: [facility] CVOR;  Service: Cardiothoracic;  Laterality: N/A;    BRONCHOSCOPY N/A 3/25/2021    Procedure: BRONCHOSCOPY AT BEDSIDE WITH BRONCHIOALVEOLAR LAVAGE;  Surgeon: Glenn Reyes MD;  Location: [facility] ENDOSCOPY;  Service: Pulmonary;  Laterality: N/A;  PNA    BRONCHOSCOPY N/A 10/16/2023    Procedure: BRONCHOSCOPY with bronchial washing;  Surgeon: Glenn Reyes MD;  Location: [facility] ENDOSCOPY;  Service: Pulmonary;  Laterality: N/A;  post op: pneumonia    CARDIAC CATHETERIZATION  2009    CARDIAC CATHETERIZATION N/A 8/14/2019    Procedure: Left Heart Cath;  Surgeon: Jose Levi MD;  Location: [facility] CATH INVASIVE LOCATION;  Service: Cardiovascular    CARDIAC CATHETERIZATION N/A 8/14/2019    Procedure: Right Heart Cath;  Surgeon: Jose Levi MD;  Location: [facility] CATH INVASIVE LOCATION;  Service: Cardiovascular    CARDIAC " CATHETERIZATION N/A 2019    Procedure: Aortic root aortogram;  Surgeon: Jose Levi MD;  Location:  [facility] CATH INVASIVE LOCATION;  Service: Cardiovascular    CARDIAC CATHETERIZATION N/A 2021    Procedure: Left Heart Cath;  Surgeon: Jose Levi MD;  Location:  [facility] CATH INVASIVE LOCATION;  Service: Cardiovascular;  Laterality: N/A;    COLONOSCOPY      CYSTOCELE REPAIR      ELBOW ARTHROPLASTY      ENDOSCOPY      FOOT SURGERY      GALLBLADDER SURGERY      TONSILLECTOMY      VAGINAL HYSTERECTOMY  1972        Social History:   Social History     Tobacco Use    Smoking status: Former     Packs/day: 1     Types: Cigarettes     Quit date:      Years since quittin.7    Smokeless tobacco: Never    Tobacco comments:     decrease now and do not smoke dos   Substance Use Topics    Alcohol use: Not Currently     Comment: very rare      Family History:  Family History   Problem Relation Age of Onset    Ovarian cancer Mother     Lung cancer Mother     Esophageal cancer Mother     Hypertension Father     Diabetes Maternal Grandmother           Allergies:  Allergies   Allergen Reactions    Adhesive Tape Unknown (See Comments)     hives    Butorphanol Other (See Comments)     Chest pain difficulty breathing throat swelling, STADAL    Garlic Other (See Comments)     Chest pain difficulty breathing throat swells    Heparin Other (See Comments)     HIT +    Tetanus-Diphtheria Toxoids Td Other (See Comments)     Dizziness,  vomiting    Aloe Itching    Bee Venom Other (See Comments)     Swelling eyes and lips hand swelling    Latex Itching    Macadamia Nut Oil Other (See Comments)     Chest pain difficulty breathing throat swelling    Tuberculin, Ppd Unknown (See Comments)     reactor    Wasp Venom Other (See Comments)     Swelling eyes lips and hand     Scheduled Meds:  acetylcysteine, 2 mL, BID - RT  allopurinol, 100 mg, Daily  atorvastatin, 10 mg, Nightly  budesonide-formoterol,  "2 puff, BID - RT  cholecalciferol, 2,000 Units, Nightly  First Mouthwash (Magic Mouthwash), 5 mL, Q6H  furosemide, 80 mg, Q12H  gabapentin, 400 mg, TID  guaiFENesin, 1,200 mg, Q12H  insulin lispro, 3-14 Units, 4x Daily AC & at Bedtime  ipratropium-albuterol, 3 mL, Q4H - RT  levothyroxine, 125 mcg, Q AM  montelukast, 10 mg, Nightly  pantoprazole, 40 mg, Q PM  potassium chloride, 40 mEq, TID With Meals  predniSONE, 20 mg, Daily   Followed by  [START ON 10/20/2023] predniSONE, 10 mg, Daily  senna-docusate sodium, 2 tablet, BID  sodium chloride, 10 mL, Q12H  theophylline, 300 mg, Nightly  tiZANidine, 2 mg, TID  vitamin B-12, 1,000 mcg, Daily  Zinc Oxide, , TID          Review of Systems:   ROS  Review of Systems   Constitution: Negative for chills and fever.   Cardiovascular: Negative for chest pain and palpitations.   Respiratory: Negative for cough and hemoptysis.    Gastrointestinal: Negative for nausea.        Constitutional:  Temp:  [97.4 °F (36.3 °C)-98.5 °F (36.9 °C)] 97.7 °F (36.5 °C)  Heart Rate:  [67-86] 86  Resp:  [16-28] 28  BP: (109-137)/(61-87) 133/66    Physical Exam   /66 (BP Location: Left arm, Patient Position: Sitting)   Pulse 86   Temp 97.7 °F (36.5 °C) (Oral)   Resp 28   Ht 162.6 cm (64\")   Wt 131 kg (288 lb 12.8 oz)   SpO2 94%   BMI 49.57 kg/m²   General:  Appears in no acute distress, drowsy on non re-breather   Eyes: Sclera is anicteric,  conjunctiva is clear   HEENT:  No JVD. Thyroid not visibly enlarged. No mucosal pallor or cyanosis  Respiratory: Respirations regular and unlabored at rest.  Bilaterally good breath sounds, with good air entry in all fields. No crackles, rubs or wheezes auscultated  Cardiovascular: S1,S2 Regular rate and rhythm. No murmur, rub or gallop auscultated.  + bilateral lower extremity edema   Gastrointestinal: Abdomen nondistended, soft  obese   Musculoskeletal:  No abnormal movements  Extremities: left upper extremity edema, bilateral lower extremity " edema with erythema   Skin: Color pink. Skin warm and dry to touch. No rashes  No xanthoma  Neuro: Alert and awake, no lateralizing deficits appreciated    INTAKE AND OUTPUT:    Intake/Output Summary (Last 24 hours) at 10/19/2023 0819  Last data filed at 10/19/2023 0550  Gross per 24 hour   Intake 600 ml   Output 700 ml   Net -100 ml       Cardiographics  Telemetry: sinus rhythm     ECG:   ECG 12 Lead Dyspnea   Final Result   HEART RATE= 86  bpm   RR Interval= 696  ms   CO Interval= 169  ms   P Horizontal Axis= 21  deg   P Front Axis= 61  deg   QRSD Interval= 96  ms   QT Interval= 405  ms   QTcB= 485  ms   QRS Axis= 64  deg   T Wave Axis= 71  deg   - NORMAL ECG -   Sinus rhythm   When compared with ECG of 06-Jan-2023 19:49:48,   Significant rate increase   Electronically Signed By: Eliceo Jones ([facility]) 16-Oct-2023 10:45:37   Date and Time of Study: 2023-10-14 00:05:27      SCANNED - TELEMETRY     Final Result      SCANNED - TELEMETRY     Final Result      SCANNED - TELEMETRY     Final Result      SCANNED - TELEMETRY     Final Result      SCANNED - TELEMETRY     Final Result      SCANNED - TELEMETRY     Final Result      SCANNED - TELEMETRY     Final Result      SCANNED - TELEMETRY     Final Result      SCANNED - TELEMETRY     Final Result      SCANNED - TELEMETRY     Final Result        I have personally reviewed EKG    Echocardiogram: Results for orders placed during the hospital encounter of 10/13/23    Adult Transthoracic Echo Complete w/ Color, Spectral and Contrast if necessary per protocol    Interpretation Summary    Left ventricular diastolic function was indeterminate.    Intravenous  contrast was administered to optimize endocardial definition.    Normal left ventricular systolic function  Concentric left ventricular hypertrophy  Indeterminate left ventricular diastolic function  Dilated right ventricle with probably normal function  Dilated left atrium  Bioprosthetic aortic valve with normal  "function.  Mild mitral regurgitation  Mild tricuspid regurgitation      Lab Review   I have reviewed the labs  Results from last 7 days   Lab Units 10/18/23  2347 10/18/23  1208 10/16/23  2357 10/15/23  0323 10/14/23  0018   CK TOTAL U/L 239* 419* 828*   < >  --    HSTROP T ng/L  --   --   --   --  9    < > = values in this interval not displayed.     Results from last 7 days   Lab Units 10/15/23  2236   MAGNESIUM mg/dL 2.2     Results from last 7 days   Lab Units 10/18/23  2347   SODIUM mmol/L 136   POTASSIUM mmol/L 4.8   BUN mg/dL 36*   CREATININE mg/dL 1.22*   CALCIUM mg/dL 9.5         Results from last 7 days   Lab Units 10/18/23  2347 10/18/23  1208 10/16/23  2357   WBC 10*3/mm3 21.70* 22.10* 21.20*   HEMOGLOBIN g/dL 11.2* 12.3 11.4*   HEMATOCRIT % 35.0 37.7 33.1*   PLATELETS 10*3/mm3 293 324 238           RADIOLOGY:  Imaging Results (Last 24 Hours)       ** No results found for the last 24 hours. **                  )10/19/2023  MD DANIEL Lamas/Transcription:   \"Dictated utilizing Dragon dictation\".   "

## 2023-10-19 NOTE — PROGRESS NOTES
Daily Progress Note        Acute respiratory failure    Multifocal pneumonia    Multiple tracheobronchial mucus plugs    Assessment:     Hypoxia:   ABG 7.42/34.4/96 while on nonrebreather mask  COPD with acute exacerbation  Multifocal pneumonia  Multiple mucous plugs  Bronchoscopy completed 10/16/2023  Copious amount of very thick mucopurulent secretions both lungs suctioned therapeutically  Bilateral lung washing    Cellulitis of lower extremities  Hx of respiratory failure required intubation 03/22/2021 , extubated 4/4/21    Aortic valve stenosis, status post tissue replacement  Pulm hypertension, severe  KARLY  Tobacco abuse  bronchoscopy 3/25/2021, cultures negative  Interstitial pneumonitis 2nd to aspiration   Hx of Catheter associated DVT right upper extremity subclavian, axillary, and brachial  Upper arm left DVT  DM  Hx of Ecoli UTI  Oral candidiasis  HTN  JERROD  DM2  Chronic anemia  HLD     Recommendations:    6-minute walk in a.m.   nebulized Mucomyst  Flutter valve  Antibiotics currently on Unasyn once 72 hours completed we will switch to p.o. Augmentin for additional 4 days    Theophylline 300 mg daily  Mucinex 1200 mg p.o. twice daily  Singulair    Steroids, prednisone 6-day taper    Oxygen supplement and titration to maintain saturation 90 to 95%: Currently requiring 3 L per nasal cannula    Bronchodilators  Mucinex  Inhaled corticosteroids  Diuretics/electrolyte management as per nephrology  Atorvastatin  BP control  Glucose controlled  Thyroid hormone replacement                LOS: 5 days     Subjective         Objective     Vital signs for last 24 hours:  Vitals:    10/19/23 0720 10/19/23 0721 10/19/23 0723 10/19/23 0738   BP:    133/66   BP Location:    Left arm   Patient Position:    Sitting   Pulse: 79 76 77 86   Resp: 16 16 16 28   Temp:    97.7 °F (36.5 °C)   TempSrc:    Oral   SpO2: 95% 95% 95% 94%   Weight:       Height:           Intake/Output last 3 shifts:  I/O last 3 completed shifts:  In:  700 [P.O.:600; IV Piggyback:100]  Out: 1700 [Urine:1700]  Intake/Output this shift:  No intake/output data recorded.      Radiology  Imaging Results (Last 24 Hours)       ** No results found for the last 24 hours. **            Labs:  Results from last 7 days   Lab Units 10/18/23  2347   WBC 10*3/mm3 21.70*   HEMOGLOBIN g/dL 11.2*   HEMATOCRIT % 35.0   PLATELETS 10*3/mm3 293     Results from last 7 days   Lab Units 10/18/23  2347   SODIUM mmol/L 136   POTASSIUM mmol/L 4.8   CHLORIDE mmol/L 94*   CO2 mmol/L 32.0*   BUN mg/dL 36*   CREATININE mg/dL 1.22*   CALCIUM mg/dL 9.5   BILIRUBIN mg/dL 0.5   ALK PHOS U/L 172*   ALT (SGPT) U/L 101*   AST (SGOT) U/L 79*   GLUCOSE mg/dL 152*     Results from last 7 days   Lab Units 10/14/23  0024   PH, ARTERIAL pH units 7.424   PO2 ART mm Hg 96.0   PCO2, ARTERIAL mm Hg 34.4*   HCO3 ART mmol/L 22.5     Results from last 7 days   Lab Units 10/18/23  2347 10/18/23  1208 10/16/23  2357   ALBUMIN g/dL 3.4* 3.1* 3.1*     Results from last 7 days   Lab Units 10/18/23  2347 10/18/23  1208 10/16/23  2357 10/15/23  0323 10/14/23  0018   CK TOTAL U/L 239* 419* 828*   < >  --    HSTROP T ng/L  --   --   --   --  9    < > = values in this interval not displayed.         Results from last 7 days   Lab Units 10/15/23  2236   MAGNESIUM mg/dL 2.2         Results from last 7 days   Lab Units 10/15/23  0323   TSH uIU/mL 0.580           Meds:   SCHEDULE  acetylcysteine, 2 mL, Nebulization, BID - RT  allopurinol, 100 mg, Oral, Daily  atorvastatin, 10 mg, Oral, Nightly  budesonide-formoterol, 2 puff, Inhalation, BID - RT  cholecalciferol, 2,000 Units, Oral, Nightly  First Mouthwash (Magic Mouthwash), 5 mL, Swish & Spit, Q6H  furosemide, 80 mg, Intravenous, Q12H  gabapentin, 400 mg, Oral, TID  guaiFENesin, 1,200 mg, Oral, Q12H  insulin lispro, 3-14 Units, Subcutaneous, 4x Daily AC & at Bedtime  ipratropium-albuterol, 3 mL, Nebulization, Q4H - RT  levothyroxine, 125 mcg, Oral, Q AM  montelukast, 10 mg,  Oral, Nightly  pantoprazole, 40 mg, Oral, Q PM  potassium chloride, 40 mEq, Oral, TID With Meals  predniSONE, 20 mg, Oral, Daily   Followed by  [START ON 10/20/2023] predniSONE, 10 mg, Oral, Daily  senna-docusate sodium, 2 tablet, Oral, BID  sodium chloride, 10 mL, Intravenous, Q12H  theophylline, 300 mg, Oral, Nightly  tiZANidine, 2 mg, Oral, TID  vitamin B-12, 1,000 mcg, Oral, Daily  Zinc Oxide, , Apply externally, TID      Infusions     PRNs    acetaminophen    albuterol    senna-docusate sodium **AND** polyethylene glycol **AND** bisacodyl **AND** bisacodyl    dextrose    dextrose    glucagon (human recombinant)    guaiFENesin-codeine    ondansetron    ondansetron    [COMPLETED] Insert Peripheral IV **AND** sodium chloride    sodium chloride    sodium chloride    Physical Exam:  Physical Exam  Cardiovascular:      Heart sounds: Murmur heard.   Pulmonary:      Effort: No respiratory distress.      Breath sounds: No stridor. Wheezing, rhonchi and rales present.   Chest:      Chest wall: No tenderness.         ROS  Review of Systems   Respiratory:  Positive for cough and shortness of breath. Negative for wheezing and stridor.    Cardiovascular:  Positive for palpitations and leg swelling. Negative for chest pain.   Gastrointestinal:  Negative for abdominal distention.             Total time spent with patient greater than: 45 Minutes

## 2023-10-19 NOTE — THERAPY TREATMENT NOTE
"Subjective: Pt agreeable to therapeutic plan of care. Nursing reports pt has stayed in her chair since PT saw her yesterday ~ 10 am. Pt was given multiple opportunities to return to bed.     Objective:     Bed mobility - N/A or Not attempted.  Transfers - CGA, Assist x 2, and with rolling walker; Pt stood with walker support for ~ 1 minute while purewick and brief were changed then pt returned to sitting to rest before ambulating  Ambulation - 35 feet CGA, Assist x 2, and with rolling walker    Vitals: Desaturates with O2 at 6 L; pt recovers in sitting in <30 seconds    Pain: 0 VAS     Intervention for pain: N/A    Education: Provided education on the importance of mobility in the acute care setting, Verbal/Tactile Cues, Transfer Training, and Gait Training    Assessment: Claudia Rust presents with functional mobility impairments which indicate the need for skilled intervention. PT encouraged pt to use flutter valve respiratory device and pt reported she has been using it. Pt was not coughing today during treatment. Pt was able to ambulate 35 feet today with rollator with FF posture. Pt has 24/ care at home. Tolerating session today without incident. Will continue to follow and progress as tolerated.     Plan/Recommendations:   Low Intensity Therapy recommended post-acute care - This is recommended as therapy feels this patient would require 2-3 visits per week. OP or HH would be the best option depending on patient's home bound status. Consider, if the patient has other  \"skilled\" needs such as wounds, IV antibiotics, etc. Combined with \"low intensity\" could also equate to a SNF. If patient is medically complex, consider LTAC.. Pt requires no DME at discharge.     Pt desires Home with family assist and and Home Health at discharge. Pt cooperative; agreeable to therapeutic recommendations and plan of care.         Basic Mobility 6-click:  Rollin = Total, A lot = 2, A little = 3; 4 = None  Supine>Sit:  "  1 = Total, A lot = 2, A little = 3; 4 = None   Sit>Stand with arms:  1 = Total, A lot = 2, A little = 3; 4 = None  Bed>Chair:   1 = Total, A lot = 2, A little = 3; 4 = None  Ambulate in room:  1 = Total, A lot = 2, A little = 3; 4 = None  3-5 Steps with railin = Total, A lot = 2, A little = 3; 4 = None  Score: 18    Modified [place]: N/A = No pre-op stroke/TIA    Post-Tx Position: Up in Chair and Call light and personal items within reach  PPE: gloves, surgical mask, and gown

## 2023-10-19 NOTE — PLAN OF CARE
Goal Outcome Evaluation:   Patients oxygen needs have been decreased from 8L to 5L at this time. Plan is for patient to return home when medically stable.

## 2023-10-20 ENCOUNTER — READMISSION MANAGEMENT (OUTPATIENT)
Dept: CALL CENTER | Facility: HOSPITAL | Age: 73
End: 2023-10-20
Payer: MEDICARE

## 2023-10-20 ENCOUNTER — TELEPHONE (OUTPATIENT)
Dept: CARDIOLOGY | Facility: CLINIC | Age: 73
End: 2023-10-20
Payer: MEDICARE

## 2023-10-20 VITALS
HEART RATE: 76 BPM | RESPIRATION RATE: 20 BRPM | SYSTOLIC BLOOD PRESSURE: 109 MMHG | HEIGHT: 64 IN | DIASTOLIC BLOOD PRESSURE: 58 MMHG | OXYGEN SATURATION: 97 % | TEMPERATURE: 97.9 F | WEIGHT: 288.8 LBS | BODY MASS INDEX: 49.31 KG/M2

## 2023-10-20 PROBLEM — J18.9 PNEUMONIA, UNSPECIFIED ORGANISM: Status: ACTIVE | Noted: 2023-10-20

## 2023-10-20 LAB
ALBUMIN SERPL-MCNC: 3.6 G/DL (ref 3.5–5.2)
ALP SERPL-CCNC: 185 U/L (ref 39–117)
ALT SERPL W P-5'-P-CCNC: 111 U/L (ref 1–33)
ANION GAP SERPL CALCULATED.3IONS-SCNC: 15 MMOL/L (ref 5–15)
AST SERPL-CCNC: 69 U/L (ref 1–32)
BILIRUB CONJ SERPL-MCNC: 0.2 MG/DL (ref 0–0.3)
BILIRUB INDIRECT SERPL-MCNC: 0.4 MG/DL
BILIRUB SERPL-MCNC: 0.6 MG/DL (ref 0–1.2)
BUN SERPL-MCNC: 45 MG/DL (ref 8–23)
BUN/CREAT SERPL: 32.8 (ref 7–25)
CALCIUM SPEC-SCNC: 10.1 MG/DL (ref 8.6–10.5)
CHLORIDE SERPL-SCNC: 96 MMOL/L (ref 98–107)
CK SERPL-CCNC: 131 U/L (ref 20–180)
CO2 SERPL-SCNC: 27 MMOL/L (ref 22–29)
CREAT SERPL-MCNC: 1.37 MG/DL (ref 0.57–1)
DEPRECATED RDW RBC AUTO: 43.3 FL (ref 37–54)
EGFRCR SERPLBLD CKD-EPI 2021: 40.9 ML/MIN/1.73
ERYTHROCYTE [DISTWIDTH] IN BLOOD BY AUTOMATED COUNT: 13.9 % (ref 12.3–15.4)
GLUCOSE BLDC GLUCOMTR-MCNC: 127 MG/DL (ref 70–105)
GLUCOSE BLDC GLUCOMTR-MCNC: 188 MG/DL (ref 70–105)
GLUCOSE SERPL-MCNC: 144 MG/DL (ref 65–99)
HCT VFR BLD AUTO: 37.4 % (ref 34–46.6)
HGB BLD-MCNC: 12.7 G/DL (ref 12–15.9)
LYMPHOCYTES # BLD MANUAL: 5.53 10*3/MM3 (ref 0.7–3.1)
LYMPHOCYTES NFR BLD MANUAL: 14 % (ref 5–12)
MCH RBC QN AUTO: 30.8 PG (ref 26.6–33)
MCHC RBC AUTO-ENTMCNC: 33.8 G/DL (ref 31.5–35.7)
MCV RBC AUTO: 90.9 FL (ref 79–97)
METAMYELOCYTES NFR BLD MANUAL: 1 % (ref 0–0)
MONOCYTES # BLD: 4.07 10*3/MM3 (ref 0.1–0.9)
MYELOCYTES NFR BLD MANUAL: 3 % (ref 0–0)
NEUTROPHILS # BLD AUTO: 18.33 10*3/MM3 (ref 1.7–7)
NEUTROPHILS NFR BLD MANUAL: 63 % (ref 42.7–76)
PHOSPHATE SERPL-MCNC: 3.8 MG/DL (ref 2.5–4.5)
PLATELET # BLD AUTO: 325 10*3/MM3 (ref 140–450)
PMV BLD AUTO: 8.5 FL (ref 6–12)
POTASSIUM SERPL-SCNC: 5.5 MMOL/L (ref 3.5–5.2)
PROT SERPL-MCNC: 7.4 G/DL (ref 6–8.5)
RBC # BLD AUTO: 4.11 10*6/MM3 (ref 3.77–5.28)
RBC MORPH BLD: NORMAL
SCAN SLIDE: NORMAL
SMALL PLATELETS BLD QL SMEAR: ABNORMAL
SODIUM SERPL-SCNC: 138 MMOL/L (ref 136–145)
VARIANT LYMPHS NFR BLD MANUAL: 19 % (ref 19.6–45.3)
WBC MORPH BLD: NORMAL
WBC NRBC COR # BLD: 29.1 10*3/MM3 (ref 3.4–10.8)

## 2023-10-20 PROCEDURE — 94799 UNLISTED PULMONARY SVC/PX: CPT

## 2023-10-20 PROCEDURE — 80076 HEPATIC FUNCTION PANEL: CPT | Performed by: STUDENT IN AN ORGANIZED HEALTH CARE EDUCATION/TRAINING PROGRAM

## 2023-10-20 PROCEDURE — 94664 DEMO&/EVAL PT USE INHALER: CPT

## 2023-10-20 PROCEDURE — 85007 BL SMEAR W/DIFF WBC COUNT: CPT | Performed by: STUDENT IN AN ORGANIZED HEALTH CARE EDUCATION/TRAINING PROGRAM

## 2023-10-20 PROCEDURE — 84100 ASSAY OF PHOSPHORUS: CPT | Performed by: INTERNAL MEDICINE

## 2023-10-20 PROCEDURE — 94618 PULMONARY STRESS TESTING: CPT

## 2023-10-20 PROCEDURE — 63710000001 PREDNISONE PER 5 MG: Performed by: INTERNAL MEDICINE

## 2023-10-20 PROCEDURE — 82550 ASSAY OF CK (CPK): CPT | Performed by: INTERNAL MEDICINE

## 2023-10-20 PROCEDURE — 94761 N-INVAS EAR/PLS OXIMETRY MLT: CPT

## 2023-10-20 PROCEDURE — 85025 COMPLETE CBC W/AUTO DIFF WBC: CPT | Performed by: STUDENT IN AN ORGANIZED HEALTH CARE EDUCATION/TRAINING PROGRAM

## 2023-10-20 PROCEDURE — 80048 BASIC METABOLIC PNL TOTAL CA: CPT | Performed by: INTERNAL MEDICINE

## 2023-10-20 PROCEDURE — 99232 SBSQ HOSP IP/OBS MODERATE 35: CPT | Performed by: INTERNAL MEDICINE

## 2023-10-20 PROCEDURE — 82948 REAGENT STRIP/BLOOD GLUCOSE: CPT

## 2023-10-20 RX ORDER — METHYLPREDNISOLONE 4 MG/1
TABLET ORAL
Qty: 21 TABLET | Refills: 0 | Status: SHIPPED | OUTPATIENT
Start: 2023-10-20

## 2023-10-20 RX ORDER — BUDESONIDE AND FORMOTEROL FUMARATE DIHYDRATE 160; 4.5 UG/1; UG/1
2 AEROSOL RESPIRATORY (INHALATION)
Qty: 1 EACH | Refills: 0 | Status: SHIPPED | OUTPATIENT
Start: 2023-10-20

## 2023-10-20 RX ORDER — BUMETANIDE 2 MG/1
2 TABLET ORAL DAILY
Qty: 30 TABLET | Refills: 0 | Status: SHIPPED | OUTPATIENT
Start: 2023-10-20

## 2023-10-20 RX ORDER — AMOXICILLIN AND CLAVULANATE POTASSIUM 875; 125 MG/1; MG/1
1 TABLET, FILM COATED ORAL 2 TIMES DAILY
Qty: 10 TABLET | Refills: 0 | Status: SHIPPED | OUTPATIENT
Start: 2023-10-20 | End: 2023-10-25

## 2023-10-20 RX ORDER — BUMETANIDE 1 MG/1
2 TABLET ORAL DAILY
Status: DISCONTINUED | OUTPATIENT
Start: 2023-10-20 | End: 2023-10-20 | Stop reason: HOSPADM

## 2023-10-20 RX ADMIN — IPRATROPIUM BROMIDE AND ALBUTEROL SULFATE 3 ML: .5; 3 SOLUTION RESPIRATORY (INHALATION) at 12:03

## 2023-10-20 RX ADMIN — TIZANIDINE 2 MG: 4 TABLET ORAL at 09:36

## 2023-10-20 RX ADMIN — DIPHENHYDRAMINE HYDROCHLORIDE AND LIDOCAINE HYDROCHLORIDE AND ALUMINUM HYDROXIDE AND MAGNESIUM HYDRO 5 ML: KIT at 02:43

## 2023-10-20 RX ADMIN — PREDNISONE 10 MG: 10 TABLET ORAL at 09:35

## 2023-10-20 RX ADMIN — IPRATROPIUM BROMIDE AND ALBUTEROL SULFATE 3 ML: .5; 3 SOLUTION RESPIRATORY (INHALATION) at 07:48

## 2023-10-20 RX ADMIN — GUAIFENESIN 1200 MG: 600 TABLET, MULTILAYER, EXTENDED RELEASE ORAL at 09:35

## 2023-10-20 RX ADMIN — GABAPENTIN 400 MG: 400 CAPSULE ORAL at 09:36

## 2023-10-20 RX ADMIN — ALLOPURINOL 100 MG: 100 TABLET ORAL at 09:36

## 2023-10-20 RX ADMIN — ACETYLCYSTEINE 2 ML: 200 SOLUTION ORAL; RESPIRATORY (INHALATION) at 07:49

## 2023-10-20 RX ADMIN — Medication 10 ML: at 09:36

## 2023-10-20 RX ADMIN — Medication: at 09:36

## 2023-10-20 RX ADMIN — BUDESONIDE AND FORMOTEROL FUMARATE DIHYDRATE 2 PUFF: 160; 4.5 AEROSOL RESPIRATORY (INHALATION) at 07:57

## 2023-10-20 RX ADMIN — LEVOTHYROXINE SODIUM 125 MCG: 0.12 TABLET ORAL at 05:15

## 2023-10-20 RX ADMIN — CYANOCOBALAMIN TAB 1000 MCG 1000 MCG: 1000 TAB at 09:36

## 2023-10-20 RX ADMIN — IPRATROPIUM BROMIDE AND ALBUTEROL SULFATE 3 ML: .5; 3 SOLUTION RESPIRATORY (INHALATION) at 02:32

## 2023-10-20 RX ADMIN — DIPHENHYDRAMINE HYDROCHLORIDE AND LIDOCAINE HYDROCHLORIDE AND ALUMINUM HYDROXIDE AND MAGNESIUM HYDRO 5 ML: KIT at 09:36

## 2023-10-20 NOTE — DISCHARGE PLACEMENT REQUEST
"Claudia Mckoy (73 y.o. Female)       Date of Birth   1950    Social Security Number       Address   [address] [place] [address] [place] [address]    Home Phone   [phone]    MRN   [id number]       Orthodoxy   Gnosticism    Marital Status                               Admission Date   10/13/23    Admission Type   Emergency    Admitting Provider   Elana Tinajero MD    Attending Provider   Zeferino Vega MD    Department, Room/Bed   [facility] 3C MEDICAL INPATIENT, 379/1       Discharge Date       Discharge Disposition   Home or Self Care    Discharge Destination                                 Attending Provider: Zeferino Vega MD    Allergies: Adhesive Tape, Butorphanol, Garlic, Heparin, Tetanus-diphtheria Toxoids Td, Aloe, Bee Venom, Latex, Macadamia Nut Oil, Tuberculin, Ppd, Wasp Venom    Isolation: Droplet   Infection: Rhinovirus  (10/10/23)   Code Status: CPR    Ht: 162.6 cm (64\")   Wt: 131 kg (288 lb 12.8 oz)    Admission Cmt: None   Principal Problem: Acute respiratory failure [J96.00]                   Active Insurance as of 10/13/2023       Primary Coverage       Payor Plan Insurance Group Employer/Plan Group    MEDICARE MEDICARE A & B        Payor Plan Address Payor Plan Phone Number Payor Plan Fax Number Effective Dates    [address] [phone]  6/1/2014 - None Entered    formerly [facility] [id number]         Subscriber Name Subscriber Birth Date Member ID       CLAUDIA MCKOY 1950 [id number]               Secondary Coverage       Payor Plan Insurance Group Employer/Plan Group    [place]        Payor Plan Address Payor Plan Phone Number Payor Plan Fax Number Effective Dates    [address]   6/7/2019 - None Entered    [place] [address]         Subscriber Name Subscriber Birth Date Member ID       CLAUDIA MCKOY R 1950 [id number]                     Emergency Contacts        (Rel.) Home Phone Work Phone Mobile " Phone    Isma Rust (Spouse) [phone] -- [phone]    OVIDIO THURMAN (Daughter) [phone] -- --    Mara Baker (Daughter) -- -- [phone]

## 2023-10-20 NOTE — PROGRESS NOTES
Cardiology Progress Note    Patient Identification:  Name: Claudai Rust  Age: 73 y.o.  Sex: female  :  1950  MRN: [id number]                 Follow Up / Chief Complaint: Chronic HFpEF   Chief Complaint   Patient presents with    Shortness of Breath       Interval History: Patient presented with worsening shortness of breath and edema.  She is hypervolemic and hyponatremia, nephrology following.  She was also positive for Rhinovirus on 10/10/2023, symptoms progressively worsening.  She underwent bronchoscopy today 10/16/2023 with Dr. Reyes that showed significant amounts of mucuopurulent secretions, reactive airway disease and moderate to severe inflammation.   Patient is requiring less oxygen           Subjective: Patient seen and examined.  Chart reviewed.  Labs reviewed.  Discussed with RN taking care of patient.  Patient is complaining of left thumb numbness and swelling.  Underwent venous Dopplers of upper extremity which are negative for DVT.        Objective: pro bnp negative at 646, , bun 25 creatinine 0.96 AST 38   WBC 15.8  hgb 11.9 CK 2719 potassium 3.4 BUN 28, creatinine 1.05 iron 22 iron sat 8  10/16/2023: CK 1631, sodium 126, potassium 3.8, bun 36 creatinine 1.19 AST 77, ALT 48 WBC 20.6 hgb 11.0   10/17/2023: , sodium 132, potassium 4.5, bun 45 creatinine 1.37 AST 64, ALT 66  WBC 21.2 hgb 11.4   10/18/2023: , sodium 132, potassium 5.4 BUN 36 creatinine 1.14 AST 75, ALT 83 WBC 22.1  10/19/2023: , potassium 4.8, bun 36 creatinine 1.22 AST 79,  WBC 21.7 hgb 11.2   10/20/2023: CK1 31 BUN/creatinine 44/1.37          History of present illness:      Ms. Claudia Rust has of PMH      #Valvular heart disease with moderate to severe aortic stenosis  Cardiac cath 2021 revealing nonobstructive CAD  Patient underwent #23 magna pericardial prosthesis AVR 3/18/2021  Transesophageal echo 3/25/2021 normal LV systolic function  # Hyperlipidemia,    # Hypertension  # COPD,  KARLY on CPAP  # Prediabetes  #History of HIT, history of DVT RUE   # Hypothyroidism, Rheumatoid Arthritis, spinal stenosis, hiatal hernia, chronic depression, bipolar, GERD, IBS, chronic migraine, vertigo, herniated disc, compression lumbar fractures  #Allergies/intolerance to Stadol  # Hysterectomy, bladder reconstruction, cholecystectomy, right hand surgery  # Family history of strokes and grandfather.  Mother's cancer, father on  # Active cigarette smoker trying to quit         presented to the ED with worsening shortness of breath and cough over the last 2 weeks.  Patient was diagnosed with rhinovirus on 10/10/2023 in the ED, given steroids and discharged home.   Patient progressively worsened the next few days and EMS was called.   Patient was hypoxic in the 80s.    She reports worsening edema as well as orthopnea.  Patient denies any chest pain.  No fevers      In the ED on 10/14/2023 CXR showed pulmonary edema vs bilateral pneumonia,   HS troponin negative, pro bnp negative at 646, , bun 25 creatinine 0.96 AST 38   WBC 15.8  hgb 11.9 CK 2719 potassium 3.4 BUN 28, creatinine 1.05 iron 22 iron sat 8          Review of records reveals patient underwent an echocardiogram which is revealing severe aortic stenosis on 2/11/2020 ,Underwent cardiac cath 1/20/2021 which revealed no obstructive disease,underwent aortic valve replacement 3/18/2021 with #23 magna pericardial prosthesis.       Carotid Dopplers 9/13/2019 normal.  Echocardiogram 2/11/2020 is revealing severe aortic stenosis  Work-up here on 3/16/2021 revealed proBNP 733, normal CMP, A1c 5.7, normal CBC, chest x-ray with no acute abnormality.  Lexiscan Cardiolite 2/28/2023 negative for ischemia EF of 86%           Assessment:     Acute respiratory failure with recent rhinovirus 10/10/2023 and now with pneumonia and mucous plugs    Bradycardia    Aortic stenosis, severe, status post AVR with #23 magna pericardial prosthesis 3/18/2021    Chronic congestive  "heart failure  due to valvular heart disease and aortic stenosis hypertensive cardiovascular disease essential hypertension/hypertensive cardiovascular disease     Dyslipidemia    diabetes  Morbid obesity with BMI over 40  Cigarette smoker  TCP HIT positive on 3/26/2021, + RUE DVT  previously on warfarin  Diabetes     Plan:     Telemetry is revealing sinus rhythm.  We will follow-up with pulmonary  We will follow-up with primary team for electrolytes.    Continue aspirin,  metoprolol, simvastatin as tolerated for edema, AVR, carotid disease, chronic heart failure due to valvular heart disease and hypertensive cardiovascular disease.  Stress test 2/20/2023 which was negative for ischemia.  Continue diuresis follow-up with nephrology  Patient had venous Dopplers of left upper extremity which was negative for DVT.  Patient has diuresed well.  Oxygen requirements are decreasing.    Discussed with patient's family by bedside.          Copied text in this portion of the note has been reviewed and is accurate as of 10/20/2023    Past Medical History:  Past Medical History:   Diagnosis Date    Acute congestive heart failure     Allergies     Anxiety     Arthritis     Asthma     Bilateral leg edema     Bipolar 1 disorder     Bulging lumbar disc     X3    Cancer     states had \"cancerous tumor during pregnancy and had to have hysterectomy\"    Cataract     bilateral    Cellulitis 03/2021    bilateral legs    Compression fracture of vertebral column     LUMBAR    COPD (chronic obstructive pulmonary disease)     Delayed emergence from anesthesia     Depression     Depression     Diabetes mellitus     Dyslipidemia     Dyspnea on exertion     Fibromyalgia     GERD (gastroesophageal reflux disease)     Hiatal hernia     Hyperlipidemia     Hypertension     Hypothyroid     HYPOTHYROID    IBS (irritable bowel syndrome)     Migraines     Morbid obesity     Neuropathy     Panic attacks     Peripheral edema     PONV (postoperative nausea " and vomiting)     Poor mobility     rolling walker    Prediabetes     PVD (peripheral vascular disease)     Rheumatoid aortitis     Spinal stenosis     Spinal stenosis     Vertigo     Vertigo     Vitamin D deficiency      Past Surgical History:  Past Surgical History:   Procedure Laterality Date    AORTIC VALVE REPAIR/REPLACEMENT N/A 3/18/2021    Procedure: AORTIC VALVE REPLACEMENT;  Surgeon: Olaf Mcgill MD;  Location: [facility] CVOR;  Service: Cardiothoracic;  Laterality: N/A;    BRONCHOSCOPY N/A 3/25/2021    Procedure: BRONCHOSCOPY AT BEDSIDE WITH BRONCHIOALVEOLAR LAVAGE;  Surgeon: Glenn Reyes MD;  Location: [facility] ENDOSCOPY;  Service: Pulmonary;  Laterality: N/A;  PNA    BRONCHOSCOPY N/A 10/16/2023    Procedure: BRONCHOSCOPY with bronchial washing;  Surgeon: Glenn Reyes MD;  Location: [facility] ENDOSCOPY;  Service: Pulmonary;  Laterality: N/A;  post op: pneumonia    CARDIAC CATHETERIZATION  2009    CARDIAC CATHETERIZATION N/A 8/14/2019    Procedure: Left Heart Cath;  Surgeon: Jose Levi MD;  Location: [facility] CATH INVASIVE LOCATION;  Service: Cardiovascular    CARDIAC CATHETERIZATION N/A 8/14/2019    Procedure: Right Heart Cath;  Surgeon: Jose Levi MD;  Location: [facility] CATH INVASIVE LOCATION;  Service: Cardiovascular    CARDIAC CATHETERIZATION N/A 8/14/2019    Procedure: Aortic root aortogram;  Surgeon: Jose Levi MD;  Location: [facility] CATH INVASIVE LOCATION;  Service: Cardiovascular    CARDIAC CATHETERIZATION N/A 1/20/2021    Procedure: Left Heart Cath;  Surgeon: Jose Levi MD;  Location: [facility] CATH INVASIVE LOCATION;  Service: Cardiovascular;  Laterality: N/A;    COLONOSCOPY      CYSTOCELE REPAIR  1984    ELBOW ARTHROPLASTY  2011    ENDOSCOPY      FOOT SURGERY      GALLBLADDER SURGERY  2002    TONSILLECTOMY      VAGINAL HYSTERECTOMY  1972        Social History:   Social History     Tobacco Use    Smoking status: Former     Packs/day: 1     Types:  Cigarettes     Quit date:      Years since quittin.8    Smokeless tobacco: Never    Tobacco comments:     decrease now and do not smoke dos   Substance Use Topics    Alcohol use: Not Currently     Comment: very rare      Family History:  Family History   Problem Relation Age of Onset    Ovarian cancer Mother     Lung cancer Mother     Esophageal cancer Mother     Hypertension Father     Diabetes Maternal Grandmother           Allergies:  Allergies   Allergen Reactions    Adhesive Tape Unknown (See Comments)     hives    Butorphanol Other (See Comments)     Chest pain difficulty breathing throat swelling, STADAL    Garlic Other (See Comments)     Chest pain difficulty breathing throat swells    Heparin Other (See Comments)     HIT +    Tetanus-Diphtheria Toxoids Td Other (See Comments)     Dizziness,  vomiting    Aloe Itching    Bee Venom Other (See Comments)     Swelling eyes and lips hand swelling    Latex Itching    Macadamia Nut Oil Other (See Comments)     Chest pain difficulty breathing throat swelling    Tuberculin, Ppd Unknown (See Comments)     reactor    Wasp Venom Other (See Comments)     Swelling eyes lips and hand     Scheduled Meds:  acetylcysteine, 2 mL, BID - RT  allopurinol, 100 mg, Daily  atorvastatin, 10 mg, Nightly  budesonide-formoterol, 2 puff, BID - RT  cholecalciferol, 2,000 Units, Nightly  First Mouthwash (Magic Mouthwash), 5 mL, Q6H  gabapentin, 400 mg, TID  guaiFENesin, 1,200 mg, Q12H  insulin lispro, 3-14 Units, 4x Daily AC & at Bedtime  ipratropium-albuterol, 3 mL, Q4H - RT  levothyroxine, 125 mcg, Q AM  montelukast, 10 mg, Nightly  pantoprazole, 40 mg, Q PM  predniSONE, 10 mg, Daily  senna-docusate sodium, 2 tablet, BID  sodium chloride, 10 mL, Q12H  theophylline, 300 mg, Nightly  tiZANidine, 2 mg, TID  vitamin B-12, 1,000 mcg, Daily  Zinc Oxide, , TID          Review of Systems:   ROS  Review of Systems   Constitution: Negative for chills and fever.   Cardiovascular: Negative  "for chest pain and palpitations.   Respiratory: Negative for cough and hemoptysis.    Gastrointestinal: Negative for nausea.        Constitutional:  Temp:  [97.3 °F (36.3 °C)-97.9 °F (36.6 °C)] 97.9 °F (36.6 °C)  Heart Rate:  [68-92] 87  Resp:  [14-32] 24  BP: (121-146)/(65-74) 146/65    Physical Exam   /65 (BP Location: Right arm, Patient Position: Sitting)   Pulse 87   Temp 97.9 °F (36.6 °C) (Oral)   Resp 24   Ht 162.6 cm (64\")   Wt 131 kg (288 lb 12.8 oz)   SpO2 100%   BMI 49.57 kg/m²   General:  Appears in no acute distress, drowsy on non re-breather   Eyes: Sclera is anicteric,  conjunctiva is clear   HEENT:  No JVD. Thyroid not visibly enlarged. No mucosal pallor or cyanosis  Respiratory: Respirations regular and unlabored at rest.  Bilaterally good breath sounds, with good air entry in all fields. No crackles, rubs or wheezes auscultated  Cardiovascular: S1,S2 Regular rate and rhythm. No murmur, rub or gallop auscultated.  + bilateral lower extremity edema   Gastrointestinal: Abdomen nondistended, soft  obese   Musculoskeletal:  No abnormal movements  Extremities: left upper extremity edema, bilateral lower extremity edema with erythema   Skin: Color pink. Skin warm and dry to touch. No rashes  No xanthoma  Neuro: Alert and awake, no lateralizing deficits appreciated    INTAKE AND OUTPUT:    Intake/Output Summary (Last 24 hours) at 10/20/2023 0919  Last data filed at 10/20/2023 0000  Gross per 24 hour   Intake 912 ml   Output --   Net 912 ml       Cardiographics  Telemetry: sinus rhythm     ECG:   ECG 12 Lead Dyspnea   Final Result   HEART RATE= 86  bpm   RR Interval= 696  ms   [place] Interval= 169  ms   P Horizontal Axis= 21  deg   P Front Axis= 61  deg   QRSD Interval= 96  ms   QT Interval= 405  ms   QTcB= 485  ms   QRS Axis= 64  deg   T Wave Axis= 71  deg   - NORMAL ECG -   Sinus rhythm   When compared with ECG of 06-Jan-2023 19:49:48,   Significant rate increase   Electronically Signed By: " Eliceo Jones (IAIN) 16-Oct-2023 10:45:37   Date and Time of Study: 2023-10-14 00:05:27      SCANNED - TELEMETRY     Final Result      SCANNED - TELEMETRY     Final Result      SCANNED - TELEMETRY     Final Result      SCANNED - TELEMETRY     Final Result      SCANNED - TELEMETRY     Final Result      SCANNED - TELEMETRY     Final Result      SCANNED - TELEMETRY     Final Result      SCANNED - TELEMETRY     Final Result      SCANNED - TELEMETRY     Final Result      SCANNED - TELEMETRY     Final Result      SCANNED - TELEMETRY     Final Result      SCANNED - TELEMETRY     Final Result      SCANNED - TELEMETRY     Final Result        I have personally reviewed EKG    Echocardiogram: Results for orders placed during the hospital encounter of 10/13/23    Adult Transthoracic Echo Complete w/ Color, Spectral and Contrast if necessary per protocol    Interpretation Summary    Left ventricular diastolic function was indeterminate.    Intravenous  contrast was administered to optimize endocardial definition.    Normal left ventricular systolic function  Concentric left ventricular hypertrophy  Indeterminate left ventricular diastolic function  Dilated right ventricle with probably normal function  Dilated left atrium  Bioprosthetic aortic valve with normal function.  Mild mitral regurgitation  Mild tricuspid regurgitation      Lab Review   I have reviewed the labs  Results from last 7 days   Lab Units 10/20/23  0344 10/18/23  2347 10/18/23  1208 10/15/23  0323 10/14/23  0018   CK TOTAL U/L 131 239* 419*   < >  --    HSTROP T ng/L  --   --   --   --  9    < > = values in this interval not displayed.     Results from last 7 days   Lab Units 10/15/23  2236   MAGNESIUM mg/dL 2.2     Results from last 7 days   Lab Units 10/20/23  0344   SODIUM mmol/L 138   POTASSIUM mmol/L 5.5*   BUN mg/dL 45*   CREATININE mg/dL 1.37*   CALCIUM mg/dL 10.1         Results from last 7 days   Lab Units 10/20/23  0344 10/18/23  2347 10/18/23  1208  "  WBC 10*3/mm3 29.10* 21.70* 22.10*   HEMOGLOBIN g/dL 12.7 11.2* 12.3   HEMATOCRIT % 37.4 35.0 37.7   PLATELETS 10*3/mm3 325 293 324           RADIOLOGY:  Imaging Results (Last 24 Hours)       ** No results found for the last 24 hours. **                  )10/20/2023  Jose Levi MD      EMR Dragon/Transcription:   \"Dictated utilizing Dragon dictation\".   " complains of pain/discomfort

## 2023-10-20 NOTE — CASE MANAGEMENT/SOCIAL WORK
Continued Stay Note  [facility]     Patient Name: Claudia Rust  MRN: [id number]  Today's Date: 10/20/2023    Admit Date: 10/13/2023  Qualified for 2 liters of oxygen. Referred to [facility] and oxygen delivered to room  Plan: [facility] spouse and [facility]. Current with [employer]. PASRR approved if needed. Will need 6 MWT prior to discharge   Discharge Plan       Row Name 10/20/23 0911       Plan    Plan Home with spouse and resume [facility]. Current with [employer]. PASRR approved if needed. Will need 6 MWT prior to discharge    Plan Comments Barriers: 2 liters of oxygen which she does not have at home. Will need 6 MWT ordered and completed by RT to determine needs. Nephrology, Pulmonology and Cardiology following. She plans to return home at discharge with her                         Phone communication or documentation only - no physical contact with patient or family.   Joan WARD,RN Case Manager  [facility]  Phone: Desk- [phone] cell- [phone]

## 2023-10-20 NOTE — PLAN OF CARE
Problem: Adult Inpatient Plan of Care  Goal: Plan of Care Review  Outcome: Ongoing, Not Progressing  Flowsheets (Taken 10/20/2023 0258)  Progress: no change  Plan of Care Reviewed With: patient  Goal: Patient-Specific Goal (Individualized)  Outcome: Ongoing, Not Progressing  Goal: Absence of Hospital-Acquired Illness or Injury  Outcome: Ongoing, Not Progressing  Intervention: Identify and Manage Fall Risk  Recent Flowsheet Documentation  Taken 10/20/2023 0242 by Mary Vera RN  Safety Promotion/Fall Prevention:   assistive device/personal items within reach   clutter free environment maintained   nonskid shoes/slippers when out of bed   room organization consistent   safety round/check completed  Taken 10/20/2023 0044 by Mary Vera RN  Safety Promotion/Fall Prevention:   assistive device/personal items within reach   clutter free environment maintained   nonskid shoes/slippers when out of bed   room organization consistent   safety round/check completed  Taken 10/19/2023 2246 by Mary Vera RN  Safety Promotion/Fall Prevention:   assistive device/personal items within reach   clutter free environment maintained   nonskid shoes/slippers when out of bed   room organization consistent   safety round/check completed  Taken 10/19/2023 2045 by Mary Vera RN  Safety Promotion/Fall Prevention:   assistive device/personal items within reach   clutter free environment maintained   nonskid shoes/slippers when out of bed   room organization consistent   safety round/check completed  Intervention: Prevent Skin Injury  Recent Flowsheet Documentation  Taken 10/19/2023 2045 by Mary Vera RN  Body Position: sitting up in bed  Intervention: Prevent and Manage VTE (Venous Thromboembolism) Risk  Recent Flowsheet Documentation  Taken 10/19/2023 2045 by Mary Vera RN  Activity Management: up in chair  VTE Prevention/Management: sequential compression devices off  Range of Motion: active ROM (range of motion)  encouraged  Intervention: Prevent Infection  Recent Flowsheet Documentation  Taken 10/20/2023 0242 by Mary Vera RN  Infection Prevention:   environmental surveillance performed   hand hygiene promoted   personal protective equipment utilized   single patient room provided  Taken 10/20/2023 0044 by Mary Vera RN  Infection Prevention:   environmental surveillance performed   hand hygiene promoted   personal protective equipment utilized   single patient room provided  Taken 10/19/2023 2246 by Mary Vera RN  Infection Prevention:   environmental surveillance performed   hand hygiene promoted   personal protective equipment utilized   single patient room provided  Taken 10/19/2023 2045 by Mary Vera RN  Infection Prevention:   environmental surveillance performed   hand hygiene promoted   personal protective equipment utilized   single patient room provided   visitors restricted/screened  Goal: Optimal Comfort and Wellbeing  Outcome: Ongoing, Not Progressing  Intervention: Provide Person-Centered Care  Recent Flowsheet Documentation  Taken 10/19/2023 2045 by Mary Vera RN  Trust Relationship/Rapport:   care explained   choices provided  Goal: Readiness for Transition of Care  Outcome: Ongoing, Not Progressing     Problem: Fall Injury Risk  Goal: Absence of Fall and Fall-Related Injury  Outcome: Ongoing, Not Progressing  Intervention: Identify and Manage Contributors  Recent Flowsheet Documentation  Taken 10/20/2023 0242 by Mary Vera RN  Medication Review/Management:   medications reviewed   high-risk medications identified  Taken 10/20/2023 0044 by Mary Vera RN  Medication Review/Management:   medications reviewed   high-risk medications identified  Taken 10/19/2023 2246 by Mary Vera RN  Medication Review/Management:   medications reviewed   high-risk medications identified  Taken 10/19/2023 2045 by Mary Vera RN  Medication Review/Management:   medications reviewed    high-risk medications identified  Self-Care Promotion:   independence encouraged   BADL personal objects within reach  Intervention: Promote Injury-Free Environment  Recent Flowsheet Documentation  Taken 10/20/2023 0242 by Mary Vera RN  Safety Promotion/Fall Prevention:   assistive device/personal items within reach   clutter free environment maintained   nonskid shoes/slippers when out of bed   room organization consistent   safety round/check completed  Taken 10/20/2023 0044 by Mary Vera RN  Safety Promotion/Fall Prevention:   assistive device/personal items within reach   clutter free environment maintained   nonskid shoes/slippers when out of bed   room organization consistent   safety round/check completed  Taken 10/19/2023 2246 by Mary Vera RN  Safety Promotion/Fall Prevention:   assistive device/personal items within reach   clutter free environment maintained   nonskid shoes/slippers when out of bed   room organization consistent   safety round/check completed  Taken 10/19/2023 2045 by Mary Vera RN  Safety Promotion/Fall Prevention:   assistive device/personal items within reach   clutter free environment maintained   nonskid shoes/slippers when out of bed   room organization consistent   safety round/check completed     Problem: Behavioral Health Comorbidity  Goal: Maintenance of Behavioral Health Symptom Control  Outcome: Ongoing, Not Progressing  Intervention: Maintain Behavioral Health Symptom Control  Recent Flowsheet Documentation  Taken 10/20/2023 0242 by Mary Vera RN  Medication Review/Management:   medications reviewed   high-risk medications identified  Taken 10/20/2023 0044 by Mary Vera RN  Medication Review/Management:   medications reviewed   high-risk medications identified  Taken 10/19/2023 2246 by Mary Vera RN  Medication Review/Management:   medications reviewed   high-risk medications identified  Taken 10/19/2023 2045 by Mary Vera  RN  Medication Review/Management:   medications reviewed   high-risk medications identified     Problem: COPD (Chronic Obstructive Pulmonary Disease) Comorbidity  Goal: Maintenance of COPD Symptom Control  Outcome: Ongoing, Not Progressing  Intervention: Maintain COPD-Symptom Control  Recent Flowsheet Documentation  Taken 10/20/2023 0242 by Mary Vera RN  Medication Review/Management:   medications reviewed   high-risk medications identified  Taken 10/20/2023 0044 by Mary Vera RN  Medication Review/Management:   medications reviewed   high-risk medications identified  Taken 10/19/2023 2246 by Mary Vera RN  Medication Review/Management:   medications reviewed   high-risk medications identified  Taken 10/19/2023 2045 by Mray Vera RN  Supportive Measures: active listening utilized  Medication Review/Management:   medications reviewed   high-risk medications identified     Problem: Hypertension Comorbidity  Goal: Blood Pressure in Desired Range  Outcome: Ongoing, Not Progressing  Intervention: Maintain Blood Pressure Management  Recent Flowsheet Documentation  Taken 10/20/2023 0242 by Mary Vera RN  Medication Review/Management:   medications reviewed   high-risk medications identified  Taken 10/20/2023 0044 by Mary Vera RN  Medication Review/Management:   medications reviewed   high-risk medications identified  Taken 10/19/2023 2246 by Mary Vera RN  Medication Review/Management:   medications reviewed   high-risk medications identified  Taken 10/19/2023 2045 by Mary Vera RN  Medication Review/Management:   medications reviewed   high-risk medications identified     Problem: Skin Injury Risk Increased  Goal: Skin Health and Integrity  Outcome: Ongoing, Not Progressing  Intervention: Optimize Skin Protection  Recent Flowsheet Documentation  Taken 10/19/2023 2045 by Mary Vera RN  Pressure Reduction Techniques: frequent weight shift encouraged  Head of Bed (HOB)  Positioning: HOB at 90 degrees  Pressure Reduction Devices:   specialty bed utilized   chair cushion utilized     Problem: Adjustment to Illness (Sepsis/Septic Shock)  Goal: Optimal Coping  Outcome: Ongoing, Not Progressing  Intervention: Optimize Psychosocial Adjustment to Illness  Recent Flowsheet Documentation  Taken 10/19/2023 2045 by Mary Vera RN  Supportive Measures: active listening utilized  Family/Support System Care:   support provided   self-care encouraged     Problem: Bleeding (Sepsis/Septic Shock)  Goal: Absence of Bleeding  Outcome: Ongoing, Not Progressing  Intervention: Monitor and Manage Bleeding  Recent Flowsheet Documentation  Taken 10/19/2023 2045 by Mary Vera RN  Bleeding Precautions:   monitored for signs of bleeding   gentle oral care promoted     Problem: Glycemic Control Impaired (Sepsis/Septic Shock)  Goal: Blood Glucose Level Within Desired Range  Outcome: Ongoing, Not Progressing  Intervention: Optimize Glycemic Control  Recent Flowsheet Documentation  Taken 10/19/2023 2045 by Mary Vera RN  Glycemic Management:   blood glucose monitored   supplemental insulin given     Problem: Infection Progression (Sepsis/Septic Shock)  Goal: Absence of Infection Signs and Symptoms  Outcome: Ongoing, Not Progressing  Intervention: Initiate Sepsis Management  Recent Flowsheet Documentation  Taken 10/20/2023 0242 by Mary Vera RN  Infection Prevention:   environmental surveillance performed   hand hygiene promoted   personal protective equipment utilized   single patient room provided  Isolation Precautions:   precautions maintained   droplet  Taken 10/20/2023 0044 by Mary Vera RN  Infection Prevention:   environmental surveillance performed   hand hygiene promoted   personal protective equipment utilized   single patient room provided  Isolation Precautions:   precautions maintained   droplet  Taken 10/19/2023 2246 by Mary Vera RN  Infection Prevention:   environmental  surveillance performed   hand hygiene promoted   personal protective equipment utilized   single patient room provided  Isolation Precautions:   precautions maintained   droplet  Taken 10/19/2023 2045 by Mary Vera, RN  Infection Prevention:   environmental surveillance performed   hand hygiene promoted   personal protective equipment utilized   single patient room provided   visitors restricted/screened  Isolation Precautions:   precautions maintained   droplet  Intervention: Promote Recovery  Recent Flowsheet Documentation  Taken 10/20/2023 0044 by Mary Vera, RN  Airway/Ventilation Support:   comfort measures provided   cough relief provided  Taken 10/19/2023 2045 by Mary Vera, RN  Activity Management: up in chair  Airway/Ventilation Support: cough relief provided  Sleep/Rest Enhancement:   awakenings minimized   noise level reduced     Problem: Nutrition Impaired (Sepsis/Septic Shock)  Goal: Optimal Nutrition Intake  Outcome: Ongoing, Not Progressing   Goal Outcome Evaluation:  Plan of Care Reviewed With: patient        Progress: no change     Alert and oriented x 4. Able to verbalize needs and wants. Takes medication whole in pudding and tolerates well. External catheter in place for bladder elimination. Continent of bowel. Assist x 1 for transfers, uses walker for short distance ambulation. Continues to require O2 therapy at 2 LPM via NC and tolerating well. Compliant with fluid restriction. No s/s of hyper/hypoglycemia noted. Completed IV ABT, no s/s of adverse/allergic reaction noted. Continues to be followed by pulmonology, nephrology and cardiology. No c/o pain/discomfort/SOB noted. Cough present, PRN cough medicine administered per MD order with positive effect noted. Currently sitting in chair at bedside. Awake. Call bell in reach.

## 2023-10-20 NOTE — TELEPHONE ENCOUNTER
Please schedule patient with me on Wed or Friday next week. Okay to overbook.  She is hospital follow up.

## 2023-10-20 NOTE — PROGRESS NOTES
Exercise Oximetry    Patient Name:Claudia Rust   MRN: [id number]   Date: 10/20/23             ROOM AIR BASELINE   SpO2% 87 on ra at rest in chair   Heart Rate    Blood Pressure      EXERCISE ON ROOM AIR SpO2% EXERCISE ON O2 @  LPM SpO2%   1 MINUTE  1 MINUTE     90    2 MINUTES  2 MINUTES   91    3 MINUTES  3 MINUTES   94    4 MINUTES  4 MINUTES   94    5 MINUTES  5 MINUTES   93    6 MINUTES  6 MINUTES   92               Distance Walked   Distance Walked   Dyspnea (Jose Guadalupe Scale)   Dyspnea (Jose Guadalupe Scale)   Fatigue (Jose Guadalupe Scale)   Fatigue (Jose Guadalupe Scale)   SpO2% Post Exercise   SpO2% Post Exercise   HR Post Exercise   HR Post Exercise   Time to Recovery   Time to Recovery     Comments: Pt. Was 87% on room air sitting up in chair and was placed on 2 l/m nc she stood et walked in place using her walker, at bed side in room without desaturation at about minute 5 she was forced to just stand.

## 2023-10-20 NOTE — PROGRESS NOTES
Daily Progress Note        Acute respiratory failure    Multifocal pneumonia    Multiple tracheobronchial mucus plugs    Pneumonia, unspecified organism    Assessment:     Hypoxia:   ABG 7.42/34.4/96 while on nonrebreather mask  COPD with acute exacerbation  Multifocal pneumonia  Multiple mucous plugs  Bronchoscopy completed 10/16/2023  Copious amount of very thick mucopurulent secretions both lungs suctioned therapeutically  Bilateral lung washing    Cellulitis of lower extremities  Hx of respiratory failure required intubation 03/22/2021 , extubated 4/4/21    Aortic valve stenosis, status post tissue replacement  Pulm hypertension, severe  KARLY  Tobacco abuse  bronchoscopy 3/25/2021, cultures negative  Interstitial pneumonitis 2nd to aspiration   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 Augmentin for additional 4 days    Theophylline 300 mg daily  Mucinex 1200 mg p.o. twice daily  Singulair    Steroids, prednisone 6-day taper    Oxygen supplement and titration to maintain saturation 90 to 95%: Currently requiring 3 L per nasal cannula    Bronchodilators  Mucinex  Inhaled corticosteroids  Diuretics/electrolyte management as per nephrology  Atorvastatin  BP control  Glucose controlled  Thyroid hormone replacement                LOS: 6 days     Subjective         Objective     Vital signs for last 24 hours:  Vitals:    10/20/23 0756 10/20/23 0757 10/20/23 0758 10/20/23 1105   BP:    109/58   BP Location:    Right arm   Patient Position:    Lying   Pulse: 87 85 87    Resp: 24 24 24 22   Temp:       TempSrc:       SpO2: 100% 100% 100%    Weight:       Height:           Intake/Output last 3 shifts:  I/O last 3  completed shifts:  In: 1602 [P.O.:1602]  Out: -   Intake/Output this shift:  No intake/output data recorded.      Radiology  Imaging Results (Last 24 Hours)       ** No results found for the last 24 hours. **            Labs:  Results from last 7 days   Lab Units 10/20/23  0344   WBC 10*3/mm3 29.10*   HEMOGLOBIN g/dL 12.7   HEMATOCRIT % 37.4   PLATELETS 10*3/mm3 325     Results from last 7 days   Lab Units 10/20/23  0344   SODIUM mmol/L 138   POTASSIUM mmol/L 5.5*   CHLORIDE mmol/L 96*   CO2 mmol/L 27.0   BUN mg/dL 45*   CREATININE mg/dL 1.37*   CALCIUM mg/dL 10.1   BILIRUBIN mg/dL 0.6   ALK PHOS U/L 185*   ALT (SGPT) U/L 111*   AST (SGOT) U/L 69*   GLUCOSE mg/dL 144*     Results from last 7 days   Lab Units 10/14/23  0024   PH, ARTERIAL pH units 7.424   PO2 ART mm Hg 96.0   PCO2, ARTERIAL mm Hg 34.4*   HCO3 ART mmol/L 22.5     Results from last 7 days   Lab Units 10/20/23  0344 10/18/23  2347 10/18/23  1208   ALBUMIN g/dL 3.6 3.4* 3.1*     Results from last 7 days   Lab Units 10/20/23  0344 10/18/23  2347 10/18/23  1208 10/15/23  0323 10/14/23  0018   CK TOTAL U/L 131 239* 419*   < >  --    HSTROP T ng/L  --   --   --   --  9    < > = values in this interval not displayed.         Results from last 7 days   Lab Units 10/15/23  2236   MAGNESIUM mg/dL 2.2         Results from last 7 days   Lab Units 10/15/23  0323   TSH uIU/mL 0.580           Meds:   SCHEDULE  acetylcysteine, 2 mL, Nebulization, BID - RT  allopurinol, 100 mg, Oral, Daily  atorvastatin, 10 mg, Oral, Nightly  budesonide-formoterol, 2 puff, Inhalation, BID - RT  cholecalciferol, 2,000 Units, Oral, Nightly  First Mouthwash (Magic Mouthwash), 5 mL, Swish & Spit, Q6H  gabapentin, 400 mg, Oral, TID  guaiFENesin, 1,200 mg, Oral, Q12H  insulin lispro, 3-14 Units, Subcutaneous, 4x Daily AC & at Bedtime  ipratropium-albuterol, 3 mL, Nebulization, Q4H - RT  levothyroxine, 125 mcg, Oral, Q AM  montelukast, 10 mg, Oral, Nightly  pantoprazole, 40 mg, Oral, Q  PM  predniSONE, 10 mg, Oral, Daily  senna-docusate sodium, 2 tablet, Oral, BID  sodium chloride, 10 mL, Intravenous, Q12H  theophylline, 300 mg, Oral, Nightly  tiZANidine, 2 mg, Oral, TID  vitamin B-12, 1,000 mcg, Oral, Daily  Zinc Oxide, , Apply externally, TID      Infusions     PRNs    acetaminophen    albuterol    senna-docusate sodium **AND** polyethylene glycol **AND** bisacodyl **AND** bisacodyl    dextrose    dextrose    glucagon (human recombinant)    guaiFENesin-codeine    ondansetron    ondansetron    [COMPLETED] Insert Peripheral IV **AND** sodium chloride    sodium chloride    sodium chloride    Physical Exam:  Physical Exam  Cardiovascular:      Heart sounds: Murmur heard.   Pulmonary:      Effort: No respiratory distress.      Breath sounds: No stridor. Wheezing, rhonchi and rales present.   Chest:      Chest wall: No tenderness.         ROS  Review of Systems   Respiratory:  Positive for cough and shortness of breath. Negative for wheezing and stridor.    Cardiovascular:  Positive for palpitations and leg swelling. Negative for chest pain.   Gastrointestinal:  Negative for abdominal distention.             Total time spent with patient greater than: 45 Minutes

## 2023-10-20 NOTE — DISCHARGE SUMMARY
[facility] Medicine Services  Discharge Summary    Date of Service: 10/20/23  Patient Name: Claudia Rust  : 1950  MRN: [id number]    Date of Admission: 10/13/2023  Discharge Diagnosis:    Diagnosis Plan   1. Hyponatremia  Ambulatory Referral to Home Health    Basic Metabolic Panel      2. Hypervolemia, unspecified hypervolemia type        3. Acute hypoxemic respiratory failure  Oxygen Therapy      4. Multifocal pneumonia  Case request    Case request    Non-gynecologic Cytology    Non-gynecologic Cytology    Fungus Culture - Wash, Bronchus    Fungus Culture - Wash, Bronchus    AFB Culture - Wash, Bronchus    AFB Culture - Wash, Bronchus    Respiratory Culture - Wash, Bronchus    Respiratory Culture - Wash, Bronchus    Pneumocystis PCR - Wash, Bronchus    Pneumocystis PCR - Wash, Bronchus    Respiratory Panel PCR w/COVID-19(SARS-CoV-2) FEROZ/MANINDER/IAIN/PAD/COR/MAD/RACIEL In-House, NP Swab in UTM/VTM, 3-4 HR TAT - Wash, Bronchus    Respiratory Panel PCR w/COVID-19(SARS-CoV-2) FEROZ/MANINDER/IAIN/PAD/COR/MAD/RACIEL In-House, NP Swab in UTM/VTM, 3-4 HR TAT - Wash, Bronchus    Oxygen Therapy      5. Multiple tracheobronchial mucus plugs  Case request    Case request    Non-gynecologic Cytology    Non-gynecologic Cytology    Fungus Culture - Wash, Bronchus    Fungus Culture - Wash, Bronchus    AFB Culture - Wash, Bronchus    AFB Culture - Wash, Bronchus    Respiratory Culture - Wash, Bronchus    Respiratory Culture - Wash, Bronchus    Pneumocystis PCR - Wash, Bronchus    Pneumocystis PCR - Wash, Bronchus    Respiratory Panel PCR w/COVID-19(SARS-CoV-2) FEROZ/MANINDER/IAIN/PAD/COR/MAD/RACIEL In-House, NP Swab in UTM/VTM, 3-4 HR TAT - Wash, Bronchus    Respiratory Panel PCR w/COVID-19(SARS-CoV-2) FEROZ/MANINDER/IAIN/PAD/COR/MAD/RACIEL In-House, NP Swab in UTM/VTM, 3-4 HR TAT - Wash, Bronchus          Date of Discharge:  10/20/23  Primary Care Physician: Pool Meyer NP-C      Presenting Problem:   Acute respiratory failure  [J96.00]  Hyponatremia [E87.1]  Hypervolemia, unspecified hypervolemia type [E87.70]  Acute hypoxemic respiratory failure [J96.01]    Active and Resolved Hospital Problems:  Active Hospital Problems    Diagnosis POA    **Acute respiratory failure [J96.00] Yes    Pneumonia, unspecified organism [J18.9] Yes    Multifocal pneumonia [J18.9] Unknown    Multiple tracheobronchial mucus plugs [T17.800A] Unknown      Resolved Hospital Problems   No resolved problems to display.         Hospital Course     Hospital Course:  Claudia Rust is a 73 y.o. female who presented to [facility] on 10/13/2023 complaining of worsening shortness of breath and cough.  Patient was just seen in the ED 3 days ago for same complaints.  She was discharged with breathing treatments and steroids however patient stated that she did not improve.  Her shortness of breath actually worsened.  Patient does have history of COPD but does not use oxygen at home.  She endorses dry cough.  Denies any fever or chills, chest pain, palpitations, nausea, vomiting, abdominal pain, diarrhea or constipation.     In the ED, patient was requiring 4 L nasal cannula.  Labs remarkable for sodium 121, WBC 15.8.  Chest x-ray showed patchy airspace disease within the bilateral lower lobes, pneumonia versus pulmonary edema.  Patient was given Lasix and DuoNebs in the ED.  Pulm consulted and has cleared patient for dc to home. With reccomndations:  6-minute walk in a.m.   nebulized Mucomyst  Flutter valve  Antibiotics currently on Unasyn once 72 hours completed we will switch to p.o. Augmentin for additional 4 days     Theophylline 300 mg daily  Mucinex 1200 mg p.o. twice daily  Singulair     Steroids, prednisone 6-day taper     Oxygen supplement and titration to maintain saturation 90 to 95%: Currently requiring 3 L per nasal cannula      DISCHARGE Follow Up Recommendations for labs and diagnostics:       Reasons For Change In Medications and Indications for New  Medications:      Day of Discharge     Vital Signs:  Temp:  [97.3 °F (36.3 °C)-97.9 °F (36.6 °C)] 97.9 °F (36.6 °C)  Heart Rate:  [68-92] 76  Resp:  [16-32] 20  BP: (109-146)/(58-74) 109/58  Flow (L/min):  [1-3] 2    Physical Exam:  Physical Exam   GENERAL: The patient is well developed and nontoxic.  HEENT: Nonicteric sclerae, PERRLA, EOMI. Oropharynx clear. Moist mucous membranes. Conjunctivae appear well perfused.  CHEST: Chest wall is nontender.  HEART: Regular rate and rhythm without murmurs. No MRG  LUNGS: Clear to auscultation bilaterally. No WRR  ABDOMEN: Soft, positive bowel sounds, nontender, no organomegaly.  RECTAL: Deferred.  SKIN: No rash, no excessive bruising, petechiae, or purpura.  NEUROLOGIC: Cranial nerves II-XII intact without motor/sensory deficit       Pertinent  and/or Most Recent Results     LAB RESULTS:      Lab 10/20/23  0344 10/18/23  2347 10/18/23  1208 10/16/23  2357 10/15/23  2236 10/15/23  0324 10/14/23  0018   WBC 29.10* 21.70* 22.10* 21.20* 20.60* 16.60* 15.80*   HEMOGLOBIN 12.7 11.2* 12.3 11.4* 11.0* 11.4* 11.9*   HEMATOCRIT 37.4 35.0 37.7 33.1* 34.5 34.5 36.7   PLATELETS 325 293 324 238 249 215 253   NEUTROS ABS 18.33* 13.67* 15.03* 18.44*  --  14.00* 11.20*   LYMPHS ABS  --   --   --   --   --  1.30 2.20   MONOS ABS  --   --   --   --   --  1.20* 2.30*   EOS ABS  --   --   --   --   --  0.00 0.00   MCV 90.9 88.7 89.6 87.9 88.3 86.6 88.7   PROCALCITONIN  --   --   --   --   --   --  0.06         Lab 10/20/23  0344 10/18/23  2347 10/18/23  1208 10/16/23  2357 10/16/23  1226 10/15/23  2236 10/15/23  1951 10/15/23  0323   SODIUM 138 136 132* 132* 127* 126*  126*   < > 121*   POTASSIUM 5.5* 4.8 5.4* 4.5 4.2 3.8  3.8  --  3.4*   CHLORIDE 96* 94* 92* 92* 90* 86*  86*  --  84*   CO2 27.0 32.0* 28.0 29.0 24.0 27.0  27.0  --  24.0   ANION GAP 15.0 10.0 12.0 11.0 13.0 13.0  13.0  --  13.0   BUN 45* 36* 36* 45* 39* 36*  36*  --  28*   CREATININE 1.37* 1.22* 1.14* 1.37* 1.30* 1.19*   1.19*  --  1.05*   EGFR 40.9* 47.0* 50.9* 40.9* 43.5* 48.4*  48.4*  --  56.2*   GLUCOSE 144* 152* 179* 156* 143* 181*  181*  --  210*   CALCIUM 10.1 9.5 9.4 8.8 8.3* 9.1  9.1  --  8.9   IONIZED CALCIUM  --   --   --   --   --  1.19*  --   --    MAGNESIUM  --   --   --   --   --  2.2  --   --    PHOSPHORUS 3.8 2.7  --   --   --  3.2  --   --    TSH  --   --   --   --   --   --   --  0.580    < > = values in this interval not displayed.         Lab 10/20/23  0344 10/18/23  2347 10/18/23  1208 10/16/23  2357 10/15/23  2236 10/15/23  1740 10/14/23  0018   TOTAL PROTEIN 7.4 6.6 6.5 6.6 6.6  --  6.8   ALBUMIN 3.6 3.4* 3.1* 3.1* 3.3*   < > 3.7   GLOBULIN  --   --   --  3.5 3.3  --  3.1   ALT (SGPT) 111* 101* 83* 66* 48*  --  17   AST (SGOT) 69* 79* 75* 64* 77*  --  38*   BILIRUBIN 0.6 0.5 0.6 0.4 0.4  --  0.5   INDIRECT BILIRUBIN 0.4 0.3 0.4  --   --   --   --    BILIRUBIN DIRECT 0.2 0.2 0.2  --   --   --   --    ALK PHOS 185* 172* 165* 117 181*  --  80    < > = values in this interval not displayed.         Lab 10/14/23  0018   PROBNP 646.9   HSTROP T 9             Lab 10/15/23  0323   IRON 22*   IRON SATURATION (TSAT) 8*   TIBC 291*   TRANSFERRIN 195*   FERRITIN 197.80*         Lab 10/14/23  0024   PH, ARTERIAL 7.424   PCO2, ARTERIAL 34.4*   PO2 ART 96.0   O2 SATURATION ART 97.7   FIO2 100   HCO3 ART 22.5   BASE EXCESS ART -1.4*     Brief Urine Lab Results  (Last result in the past 365 days)        Color   Clarity   Blood   Leuk Est   Nitrite   Protein   CREAT   Urine HCG        10/16/23 0003             26.6               Microbiology Results (last 10 days)       Procedure Component Value - Date/Time    AFB Culture - Wash, Bronchus [[id number]] Collected: 10/16/23 0750    Lab Status: Preliminary result Specimen: Wash from Bronchus Updated: 10/17/23 0930     AFB Stain No acid fast bacilli seen on concentrated smear    Respiratory Culture - Wash, Bronchus [[id number]] Collected: 10/16/23 0750    Lab Status: Final result  Specimen: Wash from Bronchus Updated: 10/18/23 1030     Respiratory Culture Light growth (2+) Normal respiratory awais. No S. aureus or Pseudomonas aeruginosa detected. Final report.     Gram Stain Moderate (3+) WBCs seen      Many (4+) Yeast      Moderate (3+) Mixed bacterial morphotypes seen on Gram Stain    Pneumocystis PCR - Wash, Bronchus [[id number]] Collected: 10/16/23 0750    Lab Status: Final result Specimen: Wash from Bronchus Updated: 10/19/23 1510     Reference Lab Report See Attached Report     Pneumocystis Negative    Respiratory Panel PCR w/COVID-19(SARS-CoV-2) FEROZ/MANINDER/IAIN/PAD/COR/MAD/RACIEL In-House, NP Swab in UTM/VTM, 3-4 HR TAT - Wash, Bronchus [[id number]]  (Abnormal) Collected: 10/16/23 0750    Lab Status: Final result Specimen: Wash from Bronchus Updated: 10/16/23 0920     ADENOVIRUS, PCR Not Detected     Coronavirus 229E Not Detected     Coronavirus HKU1 Not Detected     Coronavirus NL63 Not Detected     Coronavirus OC43 Not Detected     COVID19 Not Detected     Human Metapneumovirus Not Detected     Human Rhinovirus/Enterovirus Detected     Influenza A PCR Not Detected     Influenza B PCR Not Detected     Parainfluenza Virus 1 Not Detected     Parainfluenza Virus 2 Not Detected     Parainfluenza Virus 3 Not Detected     Parainfluenza Virus 4 Not Detected     RSV, PCR Not Detected     Bordetella pertussis pcr Not Detected     Bordetella parapertussis PCR Not Detected     Chlamydophila pneumoniae PCR Not Detected     Mycoplasma pneumo by PCR Not Detected    Narrative:      In the setting of a positive respiratory panel with a viral infection PLUS a negative procalcitonin without other underlying concern for bacterial infection, consider observing off antibiotics or discontinuation of antibiotics and continue supportive care. If the respiratory panel is positive for atypical bacterial infection (Bordetella pertussis, Chlamydophila pneumoniae, or Mycoplasma pneumoniae), consider antibiotic  de-escalation to target atypical bacterial infection.    Legionella Antigen, Urine - Urine, Urine, Clean Catch [[id number]]  (Normal) Collected: 10/16/23 0003    Lab Status: Final result Specimen: Urine, Clean Catch Updated: 10/16/23 0142     LEGIONELLA ANTIGEN, URINE Negative    Blood Culture - Blood, Arm, Right [[id number]]  (Normal) Collected: 10/14/23 0332    Lab Status: Final result Specimen: Blood from Arm, Right Updated: 10/19/23 0345     Blood Culture No growth at 5 days    Narrative:      Less than seven (7) mL's of blood was collected.  Insufficient quantity may yield false negative results.    Blood Culture - Blood, Arm, Right [[id number]]  (Normal) Collected: 10/14/23 0157    Lab Status: Final result Specimen: Blood from Arm, Right Updated: 10/19/23 0201     Blood Culture No growth at 5 days    Narrative:      Less than seven (7) mL's of blood was collected.  Insufficient quantity may yield false negative results.    Respiratory Panel PCR w/COVID-19(SARS-CoV-2) FEROZ/MANINDER/IAIN/PAD/COR/MAD/RACIEL In-House, NP Swab in UTM/VTM, 3-4 HR TAT - Swab, Nasopharynx [[id number]]  (Abnormal) Collected: 10/10/23 1648    Lab Status: Final result Specimen: Swab from Nasopharynx Updated: 10/10/23 1800     ADENOVIRUS, PCR Not Detected     Coronavirus 229E Not Detected     Coronavirus HKU1 Not Detected     Coronavirus NL63 Not Detected     Coronavirus OC43 Not Detected     COVID19 Not Detected     Human Metapneumovirus Not Detected     Human Rhinovirus/Enterovirus Detected     Influenza A PCR Not Detected     Influenza B PCR Not Detected     Parainfluenza Virus 1 Not Detected     Parainfluenza Virus 2 Not Detected     Parainfluenza Virus 3 Not Detected     Parainfluenza Virus 4 Not Detected     RSV, PCR Not Detected     Bordetella pertussis pcr Not Detected     Bordetella parapertussis PCR Not Detected     Chlamydophila pneumoniae PCR Not Detected     Mycoplasma pneumo by PCR Not Detected    Narrative:      In the setting of a  positive respiratory panel with a viral infection PLUS a negative procalcitonin without other underlying concern for bacterial infection, consider observing off antibiotics or discontinuation of antibiotics and continue supportive care. If the respiratory panel is positive for atypical bacterial infection (Bordetella pertussis, Chlamydophila pneumoniae, or Mycoplasma pneumoniae), consider antibiotic de-escalation to target atypical bacterial infection.            XR Chest 1 View    Result Date: 10/14/2023  Impression: Impression: Patchy airspace disease within the bilateral lower lobes, likely related to pneumonia. Possible mild underlying pulmonary edema pattern. Electronically Signed: Tana Sutherland MD  10/14/2023 12:27 AM EDT  Workstation ID: [id number]    XR Chest AP    Result Date: 10/10/2023  Impression: Impression: No acute abnormality. Electronically Signed: David Montemayor DO  10/10/2023 7:14 PM EDT  Workstation ID: [id number]     Results for orders placed during the hospital encounter of 10/13/23    Duplex Venous Upper Extremity - Left CAR    Interpretation Summary    Normal left upper extremity venous duplex scan.      Results for orders placed during the hospital encounter of 10/13/23    Duplex Venous Upper Extremity - Left CAR    Interpretation Summary    Normal left upper extremity venous duplex scan.      Results for orders placed during the hospital encounter of 10/13/23    Adult Transthoracic Echo Complete w/ Color, Spectral and Contrast if necessary per protocol    Interpretation Summary    Left ventricular diastolic function was indeterminate.    Intravenous  contrast was administered to optimize endocardial definition.    Normal left ventricular systolic function  Concentric left ventricular hypertrophy  Indeterminate left ventricular diastolic function  Dilated right ventricle with probably normal function  Dilated left atrium  Bioprosthetic aortic valve with normal function.  Mild mitral  regurgitation  Mild tricuspid regurgitation      Labs Pending at Discharge:  Pending Labs       Order Current Status    Fungus Culture - Wash, Bronchus In process    AFB Culture - Wash, Bronchus Preliminary result            Procedures Performed  Procedure(s):  BRONCHOSCOPY with bronchial washing         Consults:   Consults       Date and Time Order Name Status Description    10/15/2023  9:49 AM Inpatient Nephrology Consult Completed     10/15/2023  9:45 AM Inpatient Cardiology Consult Completed     10/14/2023 11:06 AM Inpatient Pulmonology Consult Completed               Discharge Details        Discharge Medications        New Medications        Instructions Start Date   amoxicillin-clavulanate 875-125 MG per tablet  Commonly known as: AUGMENTIN   1 tablet, Oral, 2 Times Daily      budesonide-formoterol 160-4.5 MCG/ACT inhaler  Commonly known as: SYMBICORT   2 puffs, Inhalation, 2 Times Daily - RT      bumetanide 2 MG tablet  Commonly known as: BUMEX   2 mg, Oral, Daily      methylPREDNISolone 4 MG dose pack  Commonly known as: MEDROL   Take as directed on package instructions.      theophylline 300 MG 24 hr capsule  Commonly known as: ERIN-24   300 mg, Oral, Nightly             Continue These Medications        Instructions Start Date   Acetaminophen 325 MG capsule   650 mg, Oral, Every 4 Hours PRN      albuterol 0.63 MG/3ML nebulizer solution  Commonly known as: ACCUNEB   3 mL, Nebulization, Every 6 Hours PRN      CALCIUM 1200+D3 PO   1 capsule, Oral, Daily      cholecalciferol 25 MCG (1000 UT) tablet  Commonly known as: VITAMIN D3   2,000 Units, Oral, Nightly, LAST DOSE 3/4      diphenhydrAMINE 50 MG capsule  Commonly known as: BENADRYL   50 mg, Oral, Nightly PRN      FLUoxetine 20 MG capsule  Commonly known as: PROzac   60 mg, Oral, Daily      fluticasone 50 MCG/ACT nasal spray  Commonly known as: FLONASE   2 sprays, Nasal, Daily      gabapentin 400 MG capsule  Commonly known as: NEURONTIN   1 capsule, Oral,  3 Times Daily      levothyroxine 125 MCG tablet  Commonly known as: SYNTHROID, LEVOTHROID   125 mcg, Oral, Daily      losartan 25 MG tablet  Commonly known as: Cozaar   25 mg, Oral, Daily      meclizine 25 MG tablet  Commonly known as: ANTIVERT   25 mg, Oral, 3 Times Daily PRN      Melatonin 10 MG capsule   20 mg, Oral, Nightly      metFORMIN  MG 24 hr tablet  Commonly known as: GLUCOPHAGE-XR   1 tablet, Oral, Daily With Breakfast      metOLazone 5 MG tablet  Commonly known as: ZAROXOLYN   TAKE 1 TABLET BY MOUTH EVERY DAY      multivitamin with minerals tablet tablet   1 tablet, Oral, Daily      ondansetron 4 MG tablet  Commonly known as: ZOFRAN   4 mg, Oral, Every 8 Hours PRN      pantoprazole 40 MG EC tablet  Commonly known as: PROTONIX   40 mg, Oral, Every Evening      rizatriptan MLT 10 MG disintegrating tablet  Commonly known as: MAXALT-MLT   10 mg, Oral, Once As Needed, May repeat in 2 hours if needed       simvastatin 20 MG tablet  Commonly known as: ZOCOR   20 mg, Oral, Nightly      spironolactone 25 MG tablet  Commonly known as: ALDACTONE   TAKE 2 TABLETS BY MOUTH EVERY DAY      tiZANidine 4 MG tablet  Commonly known as: ZANAFLEX   4 mg, Oral, 3 times daily      vitamin b complex capsule capsule   1 capsule, Oral, Daily, LAST DOSE 3/4      vitamin B-12 1000 MCG tablet  Commonly known as: CYANOCOBALAMIN   1,000 mcg, Oral, Daily             Stop These Medications      predniSONE 20 MG tablet  Commonly known as: DELTASONE              Allergies   Allergen Reactions    Adhesive Tape Unknown (See Comments)     hives    Butorphanol Other (See Comments)     Chest pain difficulty breathing throat swelling, STADAL    Garlic Other (See Comments)     Chest pain difficulty breathing throat swells    Heparin Other (See Comments)     HIT +    Tetanus-Diphtheria Toxoids Td Other (See Comments)     Dizziness,  vomiting    Aloe Itching    Bee Venom Other (See Comments)     Swelling eyes and lips hand swelling    Latex  Itching    Macadamia Nut Oil Other (See Comments)     Chest pain difficulty breathing throat swelling    Tuberculin, Ppd Unknown (See Comments)     reactor    Wasp Venom Other (See Comments)     Swelling eyes lips and hand         Discharge Disposition:   Home or Self Care    Diet:  Hospital:  Diet Order   Procedures    Diet: Cardiac Diets, Fluid Restriction (240 mL/tray) Diets; Healthy Heart (2-3 Na+); 1000 mL/day; Texture: Regular Texture (IDDSI 7); Fluid Consistency: Thin (IDDSI 0)         Discharge Activity:         CODE STATUS:  Code Status and Medical Interventions:   Ordered at: 10/20/23 0936     Level Of Support Discussed With:    Patient     Code Status (Patient has no pulse and is not breathing):    CPR (Attempt to Resuscitate)     Medical Interventions (Patient has pulse or is breathing):    Full Support         Future Appointments   Date Time Provider Department Center   7/29/2024  1:30 PM [facility] [place] [facility]   7/29/2024  1:50 PM Jose Levi MD [facility]       Additional Instructions for the Follow-ups that You Need to Schedule       Ambulatory Referral to Home Health   As directed      Face to Face Visit Date: 10/19/2023   Follow-up provider for Plan of Care?: I treated the patient in an acute care facility and will not continue treatment after discharge.   Follow-up provider: MELY BRYAN [[id number]]   Reason/Clinical Findings: JEAN   Describe mobility limitations that make leaving home difficult: UNABLE TO DRIVE   Nursing/Therapeutic Services Requested: Physical Therapy Occupational Therapy Skilled Nursing   Skilled nursing orders: Other (JEAN)   PT orders: Other (add comment) (JEAN)   Occupational orders: Other (JEAN)   Frequency: 1 Week 1        Discharge Follow-up with Specialty: Nephrology; 3 Weeks   As directed      Specialty: Nephrology   Follow Up: 3 Weeks        Basic Metabolic Panel    Oct 23, 2023 (Approximate)      Release to patient: Routine  Release                Time spent on Discharge including face to face service:  25 minutes      Signature: Electronically signed by Zeferino Vega MD, 10/20/23, 12:28 EDT.  [facility] Hospitalist Team

## 2023-10-20 NOTE — PROGRESS NOTES
"NEPHROLOGY PROGRESS NOTE------[facility]    [facility]  [phone]  Yovani Lerma MD      Patient Care Team:  Pool Meyer NP-C as PCP - General (Nurse Practitioner)  Jose Leiv MD as Consulting Physician (Cardiology)  Yovani Lerma MD as Consulting Physician (Nephrology)  aMry Hunt APRN as Nurse Practitioner (Nurse Practitioner)  Edmar Bear MD as Consulting Physician (Cardiology)      Provider:  Yovani Lerma MD  Patient Name: Claudia Rust  :  1950    SUBJECTIVE:  Follow-up JERROD/hyponatremia  No chest pain, breathing better    Medication:  acetylcysteine, 2 mL, Nebulization, BID - RT  allopurinol, 100 mg, Oral, Daily  atorvastatin, 10 mg, Oral, Nightly  budesonide-formoterol, 2 puff, Inhalation, BID - RT  cholecalciferol, 2,000 Units, Oral, Nightly  First Mouthwash (Magic Mouthwash), 5 mL, Swish & Spit, Q6H  gabapentin, 400 mg, Oral, TID  guaiFENesin, 1,200 mg, Oral, Q12H  insulin lispro, 3-14 Units, Subcutaneous, 4x Daily AC & at Bedtime  ipratropium-albuterol, 3 mL, Nebulization, Q4H - RT  levothyroxine, 125 mcg, Oral, Q AM  montelukast, 10 mg, Oral, Nightly  pantoprazole, 40 mg, Oral, Q PM  predniSONE, 10 mg, Oral, Daily  senna-docusate sodium, 2 tablet, Oral, BID  sodium chloride, 10 mL, Intravenous, Q12H  theophylline, 300 mg, Oral, Nightly  tiZANidine, 2 mg, Oral, TID  vitamin B-12, 1,000 mcg, Oral, Daily  Zinc Oxide, , Apply externally, TID           OBJECTIVE    Vital Sign Min/Max for last 24 hours  Temp  Min: 97.3 °F (36.3 °C)  Max: 97.9 °F (36.6 °C)   BP  Min: 121/69  Max: 146/65   Pulse  Min: 68  Max: 92   Resp  Min: 14  Max: 32   SpO2  Min: 93 %  Max: 100 %   No data recorded   No data recorded     Flowsheet Rows      Flowsheet Row First Filed Value   Admission Height 162.6 cm (64\") Documented at 10/13/2023 2352   Admission Weight 137 kg (303 lb) Documented at 10/13/2023 2352            No intake/output " "data recorded.  I/O last 3 completed shifts:  In: 1602 [P.O.:1602]  Out: -     Physical Exam:  General Appearance: alert, appears stated age and cooperative  Head: normocephalic, without obvious abnormality and atraumatic  Eyes: conjunctivae and sclerae normal and no icterus  Neck: supple and no JVD  Lungs: clear to auscultation and respirations regular  Heart: regular rhythm & normal rate and normal S1, S2  Chest: Wall no abnormalities observed  Abdomen: normal bowel sounds and soft, nontender  Extremities: moves extremities well, plus edema, no cyanosis and no redness  Skin: no bleeding, bruising or rash, turgor normal, color normal and no lesions noted  Neurologic: Alert, and oriented. No focal deficits    Labs:    WBC WBC   Date Value Ref Range Status   10/20/2023 29.10 (H) 3.40 - 10.80 10*3/mm3 Final   10/18/2023 21.70 (H) 3.40 - 10.80 10*3/mm3 Final   10/18/2023 22.10 (H) 3.40 - 10.80 10*3/mm3 Final      HGB Hemoglobin   Date Value Ref Range Status   10/20/2023 12.7 12.0 - 15.9 g/dL Final   10/18/2023 11.2 (L) 12.0 - 15.9 g/dL Final   10/18/2023 12.3 12.0 - 15.9 g/dL Final      HCT Hematocrit   Date Value Ref Range Status   10/20/2023 37.4 34.0 - 46.6 % Final   10/18/2023 35.0 34.0 - 46.6 % Final   10/18/2023 37.7 34.0 - 46.6 % Final      Platelets No results found for: \"LABPLAT\"   MCV MCV   Date Value Ref Range Status   10/20/2023 90.9 79.0 - 97.0 fL Final   10/18/2023 88.7 79.0 - 97.0 fL Final   10/18/2023 89.6 79.0 - 97.0 fL Final          Sodium Sodium   Date Value Ref Range Status   10/20/2023 138 136 - 145 mmol/L Final   10/18/2023 136 136 - 145 mmol/L Final   10/18/2023 132 (L) 136 - 145 mmol/L Final      Potassium Potassium   Date Value Ref Range Status   10/20/2023 5.5 (H) 3.5 - 5.2 mmol/L Final     Comment:     Slight hemolysis detected by analyzer. Results may be affected.   10/18/2023 4.8 3.5 - 5.2 mmol/L Final   10/18/2023 5.4 (H) 3.5 - 5.2 mmol/L Final     Comment:     Slight hemolysis detected " "by analyzer. Results may be affected.      Chloride Chloride   Date Value Ref Range Status   10/20/2023 96 (L) 98 - 107 mmol/L Final   10/18/2023 94 (L) 98 - 107 mmol/L Final   10/18/2023 92 (L) 98 - 107 mmol/L Final      CO2 CO2   Date Value Ref Range Status   10/20/2023 27.0 22.0 - 29.0 mmol/L Final   10/18/2023 32.0 (H) 22.0 - 29.0 mmol/L Final   10/18/2023 28.0 22.0 - 29.0 mmol/L Final      BUN BUN   Date Value Ref Range Status   10/20/2023 45 (H) 8 - 23 mg/dL Final   10/18/2023 36 (H) 8 - 23 mg/dL Final   10/18/2023 36 (H) 8 - 23 mg/dL Final      Creatinine Creatinine   Date Value Ref Range Status   10/20/2023 1.37 (H) 0.57 - 1.00 mg/dL Final   10/18/2023 1.22 (H) 0.57 - 1.00 mg/dL Final   10/18/2023 1.14 (H) 0.57 - 1.00 mg/dL Final      Calcium Calcium   Date Value Ref Range Status   10/20/2023 10.1 8.6 - 10.5 mg/dL Final   10/18/2023 9.5 8.6 - 10.5 mg/dL Final   10/18/2023 9.4 8.6 - 10.5 mg/dL Final      PO4 No components found for: \"PO4\"   Albumin Albumin   Date Value Ref Range Status   10/20/2023 3.6 3.5 - 5.2 g/dL Final   10/18/2023 3.4 (L) 3.5 - 5.2 g/dL Final   10/18/2023 3.1 (L) 3.5 - 5.2 g/dL Final      Magnesium No results found for: \"MG\"     Uric Acid No components found for: \"URIC ACID\"     Imaging Results (Last 72 Hours)       ** No results found for the last 72 hours. **            Results for orders placed during the hospital encounter of 10/13/23    XR Chest 1 View    Narrative  XR CHEST 1 VW    Date of Exam: 10/14/2023 12:16 AM EDT    Indication: sob    Comparison: 10/10/2023.    Findings:  Patchy airspace disease is seen within the bilateral lower lobes. No sizable pleural effusion identified. The heart appears enlarged. There is indistinctness of the pulmonary vasculature. Median sternotomy wires are present. No pneumothorax. No acute  osseous abnormality identified.    Impression  Impression:  Patchy airspace disease within the bilateral lower lobes, likely related to pneumonia. Possible " mild underlying pulmonary edema pattern.        Electronically Signed: Tana Sutherland MD  10/14/2023 12:27 AM EDT  Workstation ID: [id number]      Results for orders placed during the hospital encounter of 10/10/23    XR Chest AP    Narrative  XR CHEST AP    Date of Exam: 10/10/2023 7:02 PM EDT    Indication: COVID Evaluation, Cough, Fever    Comparison: 1/6/2023    Findings:  Lines: None    Lungs: The lungs are clear.  Pleura: No pleural effusion or pneumothorax.    Cardiomediastinum: Unchanged    Soft Tissues: Unremarkable.    Bones: Status post median sternotomy. No acute osseous abnormality.    Impression  Impression:  No acute abnormality.      Electronically Signed: David Montemayor DO  10/10/2023 7:14 PM EDT  Workstation ID: [id number]      Results for orders placed during the hospital encounter of 01/06/23    XR Chest 1 View    Narrative  XR CHEST 1 VW    Date of Exam: 1/6/2023 7:33 PM EST    Indication: SOA.    Comparison: 4/7/2021 and prior    Findings:  Patient is status post median sternotomy. The heart size appears stable. Prosthetic valve at the aortic root noted. Pulmonary vascularity is congested and distinct. Coarsening of the interstitial markings noted diffusely with increased hazy and  interstitial opacities noted with a perihilar and basilar predominance. Osseous structures demonstrate no acute abnormality. Multilevel degenerative changes noted.    Impression  Impression:  Increased hazy and interstitial opacities noted. Findings represent underlying interstitial edema. Viral and atypical pneumonia also in the differential.    Electronically Signed: Micah Neil  1/6/2023 8:03 PM EST  Workstation ID: [id number]      Results for orders placed during the hospital encounter of 10/13/23    Duplex Venous Upper Extremity - Left CAR    Interpretation Summary    Normal left upper extremity venous duplex scan.        ASSESSMENT / PLAN      Acute respiratory failure    Multifocal pneumonia    Multiple  tracheobronchial mucus plugs    CKD stage IIIa-CKD due to hypertensive nephrosclerosis  Rhabdomyolysis-statins held  Hyponatremia-with hypervolemia.  Probably underlying excess ADH related to SSRI use.  Aldactone and metolazone held.  Status post Samsca 7.5 mg x 1  Acute exacerbation of chronic diastolic heart failure  Chronic lower extremity edema  History coronary artery disease with history of AVR  Morbid obesity  Acute hypoxic respiratory failure  Pneumonia  Depression on SSRI  Diabetes type 2  Anemia-SPEP pending  History right upper extremity DVT  Hyperlipidemia    Clinically better  Volume improved  Creatinine starting to trend up, stop IV Lasix and will change to oral diuretics  Potassium high, stop scheduled potassium  No metolazone and aldactone d/c  Monitor renal function fluid status electrolytes        Yovani Lerma MD  [facility]  [phone]  10/20/23  08:59 EDT

## 2023-10-20 NOTE — PLAN OF CARE
Goal Outcome Evaluation:   Plan is for patient to discharge home with home health. Patient will need oxygen at home.

## 2023-10-20 NOTE — CASE MANAGEMENT/SOCIAL WORK
Case Management Discharge Note      Final Note: Home with spouse and resume [facility] with [place] for oxygen 2 liters              Durable Medical Equipment Coordination complete.      Service Provider Selected Services Address Phone Fax Patient Preferred    [employer] [facility] [employer] [address], [place] [address] [phone] --       Internal Comment last updated by Joan Ac 10/20/2023 1408    Oxygen  2 liters                                 Home Medical Care Coordination complete.      Service Provider Selected Services Address Phone Fax Patient Preferred    [facility] ,  Home Rehabilitation [address] [address] [phone] --                 Transportation Services  Private: Car    Final Discharge Disposition Code: 06 - home with home health care

## 2023-10-21 NOTE — OUTREACH NOTE
Prep Survey      Flowsheet Row Responses   [facility]   Is LACE score < 7 ? No   Eligibility Readm Mgmt   Discharge diagnosis Hyponatremia   Does the patient have one of the following disease processes/diagnoses(primary or secondary)? Other   Does the patient have Home health ordered? Yes   What is the Home health agency?  [facility]   Is there a DME ordered? Yes   What [facility] was [facility]   Prep survey completed? Yes            ISIS MADRIGAL - Registered Nurse

## 2023-10-23 ENCOUNTER — READMISSION MANAGEMENT (OUTPATIENT)
Dept: CALL CENTER | Facility: HOSPITAL | Age: 73
End: 2023-10-23
Payer: MEDICARE

## 2023-10-23 LAB — FUNGUS WND CULT: ABNORMAL

## 2023-10-23 NOTE — OUTREACH NOTE
Medical Week 1 Survey      Flowsheet Row Responses   [facility] [place] patient discharged from? [facility]   Does the patient have one of the following disease processes/diagnoses(primary or secondary)? Other   Week 1 attempt successful? Yes   Call start time 0937   Call end time 0940   Discharge diagnosis Hyponatremia   Meds reviewed with patient/caregiver? Yes   Is the patient having any side effects they believe may be caused by any medication additions or changes? No   Does the patient have all medications ordered at discharge? Yes   Is the patient taking all medications as directed (includes completed medication regime)? Yes   Does the patient have a primary care provider?  Yes   Comments regarding PCP Patient has an appointment with a new pcp this Friday 10/27/23   Has the patient kept scheduled appointments due by today? N/A   What is the Home health agency?  [facility]   Has home health visited the patient within 72 hours of discharge? Yes   What DME was ordered? [facility]   Has all DME been delivered? Yes   Psychosocial issues? No   Did the patient receive a copy of their discharge instructions? Yes   Nursing interventions Reviewed instructions with patient   What is the patient's perception of their health status since discharge? Improving   Is the patient/caregiver able to teach back signs and symptoms related to disease process for when to call PCP? Yes   Is the patient/caregiver able to teach back signs and symptoms related to disease process for when to call 911? Yes   Is the patient/caregiver able to teach back the hierarchy of who to call/visit for symptoms/problems? PCP, Specialist, Home health nurse, Urgent Care, ED, 911 Yes   If the patient is a current smoker, are they able to teach back resources for cessation? Not a smoker   Week 1 call completed? Yes   Call end time 0940            Aggie Kumar Nurse

## 2023-10-24 NOTE — PROGRESS NOTES
Subjective:     Encounter Date:10/25/2023      Patient ID: Claudia Rust is a 73 y.o. female.    Chief Complaint: hospital follow up     History of Present Illness    Ms. Claudia Rust has of PMH      #Valvular heart disease with moderate to severe aortic stenosis  Cardiac cath 1/20/2021 revealing nonobstructive CAD  Patient underwent #23 magna pericardial prosthesis AVR 3/18/2021  Transesophageal echo 3/25/2021 normal LV systolic function  # Hyperlipidemia,    # Hypertension  # COPD, KARLY on CPAP  # Prediabetes  #History of HIT, history of DVT RUE   # Hypothyroidism, Rheumatoid Arthritis, spinal stenosis, hiatal hernia, chronic depression, bipolar, GERD, IBS, chronic migraine, vertigo, herniated disc, compression lumbar fractures  #Allergies/intolerance to Stadol  # Hysterectomy, bladder reconstruction, cholecystectomy, right hand surgery  # Family history of strokes and grandfather.  Mother's cancer, father hypertension   # Quit smoking 2022       Here for hospital follow up.  She reports improving cough and shortness of breath.  Still on 2L NC but working to wean off oxygen.  She still has edema but significantly improved.  She is walking with a walker and her  is with her today.       Blood pressure 123/53 HR 77  oxygen 95% on 2L weight 284lbs (303lbs on admission)           Hospital course:   presented to the ED with worsening shortness of breath and cough over the last 2 weeks.  Patient was diagnosed with rhinovirus on 10/10/2023 in the ED, given steroids and discharged home.   Patient progressively worsened the next few days and EMS was called.   Patient was hypoxic in the 80s.    She reports worsening edema as well as orthopnea.  Patient denies any chest pain.  No fevers      In the ED on 10/14/2023 CXR showed pulmonary edema vs bilateral pneumonia,   HS troponin negative, pro bnp negative at 646, , bun 25 creatinine 0.96 AST 38   WBC 15.8  hgb 11.9 CK 2719 potassium 3.4 BUN 28, creatinine  1.05 iron 22 iron sat 8        Patient was diuresed and hyponatremia improved.  She underwent bronchoscopy that showed mucous plugging.               Review of records reveals patient underwent an echocardiogram which is revealing severe aortic stenosis on 2/11/2020 ,Underwent cardiac cath 1/20/2021 which revealed no obstructive disease,underwent aortic valve replacement 3/18/2021 with #23 magna pericardial prosthesis.       Carotid Dopplers 9/13/2019 normal.  Echocardiogram 2/11/2020 is revealing severe aortic stenosis  Work-up here on 3/16/2021 revealed proBNP 733, normal CMP, A1c 5.7, normal CBC, chest x-ray with no acute abnormality.  Lexiscan Cardiolite 2/28/2023 negative for ischemia EF of 86%     Echocardiogram 10/14/2023  Normal left ventricular systolic function  Concentric left ventricular hypertrophy  Indeterminate left ventricular diastolic function  Dilated right ventricle with probably normal function  Dilated left atrium  Bioprosthetic aortic valve with normal function.  Mild mitral regurgitation  Mild tricuspid regurgitation    Lab Review:   pro bnp negative at 646, , bun 25 creatinine 0.96 AST 38   WBC 15.8  hgb 11.9 CK 2719 potassium 3.4 BUN 28, creatinine 1.05 iron 22 iron sat 8  10/16/2023: CK 1631, sodium 126, potassium 3.8, bun 36 creatinine 1.19 AST 77, ALT 48 WBC 20.6 hgb 11.0   10/17/2023: , sodium 132, potassium 4.5, bun 45 creatinine 1.37 AST 64, ALT 66  WBC 21.2 hgb 11.4   10/18/2023: , sodium 132, potassium 5.4 BUN 36 creatinine 1.14 AST 75, ALT 83 WBC 22.1  10/19/2023: , potassium 4.8, bun 36 creatinine 1.22 AST 79,  WBC 21.7 hgb 11.2   10/20/2023: CK1 31 BUN/creatinine 44/1.37        The following portions of the patient's history were reviewed and updated as appropriate: allergies, current medications, past family history, past medical history, past social history, past surgical history, and problem list.    Review of Systems   Constitutional: Negative  "for malaise/fatigue.   Cardiovascular:  Positive for dyspnea on exertion and leg swelling. Negative for chest pain and palpitations.   Respiratory:  Positive for shortness of breath. Negative for cough.    Gastrointestinal:  Negative for abdominal pain, nausea and vomiting.   Neurological:  Negative for dizziness, focal weakness, headaches, light-headedness and numbness.   All other systems reviewed and are negative.        Past Medical History:   Diagnosis Date    Acute congestive heart failure     Allergies     Anxiety     Arthritis     Asthma     Bilateral leg edema     Bipolar 1 disorder     Bulging lumbar disc     X3    Cancer     states had \"cancerous tumor during pregnancy and had to have hysterectomy\"    Cataract     bilateral    Cellulitis 03/2021    bilateral legs    Compression fracture of vertebral column     LUMBAR    COPD (chronic obstructive pulmonary disease)     Delayed emergence from anesthesia     Depression     Depression     Diabetes mellitus     Dyslipidemia     Dyspnea on exertion     Fibromyalgia     GERD (gastroesophageal reflux disease)     Hiatal hernia     Hyperlipidemia     Hypertension     Hypothyroid     HYPOTHYROID    IBS (irritable bowel syndrome)     Migraines     Morbid obesity     Neuropathy     Panic attacks     Peripheral edema     PONV (postoperative nausea and vomiting)     Poor mobility     rolling walker    Prediabetes     PVD (peripheral vascular disease)     Rheumatoid aortitis     Spinal stenosis     Spinal stenosis     Vertigo     Vertigo     Vitamin D deficiency      Past Surgical History:   Procedure Laterality Date    AORTIC VALVE REPAIR/REPLACEMENT N/A 3/18/2021    Procedure: AORTIC VALVE REPLACEMENT;  Surgeon: Olaf Mcgill MD;  Location: [facility] CVOR;  Service: Cardiothoracic;  Laterality: N/A;    BRONCHOSCOPY N/A 3/25/2021    Procedure: BRONCHOSCOPY AT BEDSIDE WITH BRONCHIOALVEOLAR LAVAGE;  Surgeon: Glenn Reyes MD;  Location: [facility] ENDOSCOPY;  Service: " "Pulmonary;  Laterality: N/A;  PNA    BRONCHOSCOPY N/A 10/16/2023    Procedure: BRONCHOSCOPY with bronchial washing;  Surgeon: Glenn Reyes MD;  Location: [facility] ENDOSCOPY;  Service: Pulmonary;  Laterality: N/A;  post op: pneumonia    CARDIAC CATHETERIZATION  2009    CARDIAC CATHETERIZATION N/A 8/14/2019    Procedure: Left Heart Cath;  Surgeon: Jose Levi MD;  Location: [facility] CATH INVASIVE LOCATION;  Service: Cardiovascular    CARDIAC CATHETERIZATION N/A 8/14/2019    Procedure: Right Heart Cath;  Surgeon: Jose Levi MD;  Location: [facility] CATH INVASIVE LOCATION;  Service: Cardiovascular    CARDIAC CATHETERIZATION N/A 8/14/2019    Procedure: Aortic root aortogram;  Surgeon: Jose Levi MD;  Location: [facility] CATH INVASIVE LOCATION;  Service: Cardiovascular    CARDIAC CATHETERIZATION N/A 1/20/2021    Procedure: Left Heart Cath;  Surgeon: Jose Levi MD;  Location: [facility] CATH INVASIVE LOCATION;  Service: Cardiovascular;  Laterality: N/A;    COLONOSCOPY      CYSTOCELE REPAIR  1984    ELBOW ARTHROPLASTY  2011    ENDOSCOPY      FOOT SURGERY      GALLBLADDER SURGERY  2002    TONSILLECTOMY      VAGINAL HYSTERECTOMY  1972     /53 (BP Location: Right arm, Patient Position: Sitting, Cuff Size: Large Adult)   Pulse 77   Ht 162.6 cm (64\")   Wt 129 kg (284 lb 12 oz)   SpO2 95%   BMI 48.88 kg/m²   Family History   Problem Relation Age of Onset    Ovarian cancer Mother     Lung cancer Mother     Esophageal cancer Mother     Hypertension Father     Diabetes Maternal Grandmother        Current Outpatient Medications:     Acetaminophen 325 MG capsule, Take 650 mg by mouth Every 4 (Four) Hours As Needed., Disp: , Rfl:     albuterol (ACCUNEB) 0.63 MG/3ML nebulizer solution, Take 3 mL by nebulization Every 6 (Six) Hours As Needed for Wheezing (Dx: J 44.9 (COPD), R 06.09 (dyspnea on exertion))., Disp: 30 each, Rfl: 0    B Complex Vitamins (VITAMIN B COMPLEX) capsule " capsule, Take 1 capsule by mouth Daily. LAST DOSE 3/4, Disp: , Rfl:     budesonide-formoterol (SYMBICORT) 160-4.5 MCG/ACT inhaler, Inhale 2 puffs 2 (Two) Times a Day., Disp: 1 each, Rfl: 0    bumetanide (BUMEX) 2 MG tablet, Take 1 tablet by mouth Daily., Disp: 30 tablet, Rfl: 0    Calcium-Magnesium-Vitamin D (CALCIUM 1200+D3 PO), Take 1 capsule by mouth Daily., Disp: , Rfl:     cholecalciferol (Cholecalciferol) 25 MCG (1000 UT) tablet, Take 2 tablets by mouth Every Night. LAST DOSE 3/4, Disp: , Rfl:     diphenhydrAMINE (BENADRYL) 50 MG capsule, Take 1 capsule by mouth At Night As Needed for Itching., Disp: , Rfl:     FLUoxetine (PROzac) 20 MG capsule, Take 3 capsules by mouth Daily., Disp: , Rfl:     fluticasone (FLONASE) 50 MCG/ACT nasal spray, 2 sprays into the nostril(s) as directed by provider Daily., Disp: , Rfl:     gabapentin (NEURONTIN) 400 MG capsule, Take 1 capsule by mouth 3 (Three) Times a Day., Disp: , Rfl:     levothyroxine (SYNTHROID, LEVOTHROID) 125 MCG tablet, Take 1 tablet by mouth Daily., Disp: , Rfl:     losartan (Cozaar) 25 MG tablet, Take 1 tablet by mouth Daily., Disp: 90 tablet, Rfl: 3    meclizine (ANTIVERT) 25 MG tablet, Take 1 tablet by mouth 3 (Three) Times a Day As Needed., Disp: , Rfl:     Melatonin 10 MG capsule, Take 2 capsules by mouth Every Night., Disp: , Rfl:     metFORMIN ER (GLUCOPHAGE-XR) 500 MG 24 hr tablet, Take 1 tablet by mouth Daily With Breakfast., Disp: , Rfl:     methylPREDNISolone (MEDROL) 4 MG dose pack, Take as directed on package instructions., Disp: 21 tablet, Rfl: 0    Multiple Vitamins-Minerals (MULTIVITAMIN WITH MINERALS) tablet tablet, Take 1 tablet by mouth Daily., Disp: , Rfl:     ondansetron (ZOFRAN) 4 MG tablet, Take 1 tablet by mouth Every 8 (Eight) Hours As Needed for Nausea or Vomiting., Disp: , Rfl:     pantoprazole (PROTONIX) 40 MG EC tablet, Take 1 tablet by mouth Every Evening., Disp: , Rfl:     rizatriptan MLT (MAXALT-MLT) 10 MG disintegrating  tablet, Take 1 tablet by mouth 1 (One) Time As Needed for Migraine. May repeat in 2 hours if needed, Disp: , Rfl:     simvastatin (ZOCOR) 20 MG tablet, Take 1 tablet by mouth Every Night., Disp: , Rfl:     theophylline (ERIN-24) 300 MG 24 hr capsule, Take 1 capsule by mouth Every Night., Disp: 30 capsule, Rfl: 0    tiZANidine (ZANAFLEX) 4 MG tablet, Take 1 tablet by mouth 3 times a day., Disp: , Rfl:     vitamin B-12 (CYANOCOBALAMIN) 1000 MCG tablet, Take 1 tablet by mouth Daily., Disp: , Rfl:   Allergies   Allergen Reactions    Adhesive Tape Unknown (See Comments)     hives    Butorphanol Other (See Comments)     Chest pain difficulty breathing throat swelling, STADAL    Garlic Other (See Comments)     Chest pain difficulty breathing throat swells    Heparin Other (See Comments)     HIT +    Tetanus-Diphtheria Toxoids Td Other (See Comments)     Dizziness,  vomiting    Aloe Itching    Bee Venom Other (See Comments)     Swelling eyes and lips hand swelling    Latex Itching    Macadamia Nut Oil Other (See Comments)     Chest pain difficulty breathing throat swelling    Tuberculin, Ppd Unknown (See Comments)     reactor    Wasp Venom Other (See Comments)     Swelling eyes lips and hand     Social History     Socioeconomic History    Marital status:    Tobacco Use    Smoking status: Former     Packs/day: 1     Types: Cigarettes     Quit date:      Years since quittin.8     Passive exposure: Never    Smokeless tobacco: Never    Tobacco comments:     decrease now and do not smoke dos   Vaping Use    Vaping Use: Never used   Substance and Sexual Activity    Alcohol use: Not Currently     Comment: very rare    Drug use: Never    Sexual activity: Defer                Objective:     Vitals reviewed.   Constitutional:       Appearance: Morbidly obese and frail.   Neck:      Vascular: No JVR. JVD normal.   Pulmonary:      Effort: Pulmonary effort is normal. Increased respiratory effort.      Breath sounds: No  wheezing. Diffuse Rhonchi present. No rales.      Comments: Improved from admission   Chest:      Chest wall: Not tender to palpatation.   Cardiovascular:      PMI at left midclavicular line. Normal rate. Regular rhythm. Normal S1. Normal S2.       Murmurs: There is no murmur.      No gallop.  No click. No rub.   Pulses:     Intact distal pulses.   Edema:     Pretibial: bilateral 1+ edema of the pretibial area.     Ankle: bilateral 1+ edema of the ankle.     Feet: bilateral 1+ edema of the feet.  Abdominal:      General: Bowel sounds are normal.      Palpations: Abdomen is soft.      Tenderness: There is no abdominal tenderness.   Musculoskeletal: Normal range of motion.         General: No tenderness. Skin:     General: Skin is warm and dry.   Neurological:      General: No focal deficit present.      Mental Status: Alert and oriented to person, place and time.       Procedures                  Assessment:     [facility]       Diagnosis Plan   1. Hospital discharge follow-up        2. Essential hypertension        3. Chronic diastolic heart failure        4. Pulmonary hypertension        5. Nonrheumatic aortic valve stenosis        6. S/P AVR (aortic valve replacement)                       Plan:   Chart reviewed   Labs reviewed     Discussed HF with patient   They do not currently have a scale, advise that they get one to monitor daily weights     Monitor blood pressure at home   Continue bumex 2mg daily, losartan.  Would like to start patient on SGLT2 inhibitor if okay with nephrology     Per nephrology notes STOP metolazone and spironolactone (patient had not stopped these on discharge).  Instructed patient and  to stop these medications and follow up with nephrologist.           Follow up with Dr. Levi in 3 months  CMP prior to next visit      Electronically signed by SARA Moore, 10/30/23, 2:18 PM EDT.        This document is intended for medical expert use only.  Reading of this document by  patients and/or patient's family without participating medical staff guidance may result in misinterpretation and unintended morbidity. Any interpretation of such data is the responsibility of the patient and/or family member responsible for the patient in concert with their primary or specialist providers, not to be left for sources of online search as such as [employer], [employer] or similar queries.  Relying on these approaches to knowledge may result in misinterpretation, misguided goals of care and even death should patient or family members try recommendations outside of the realm of professional medical care in a supervised inpatient environment.

## 2023-10-25 ENCOUNTER — OFFICE VISIT (OUTPATIENT)
Dept: CARDIOLOGY | Facility: CLINIC | Age: 73
End: 2023-10-25
Payer: MEDICARE

## 2023-10-25 VITALS
SYSTOLIC BLOOD PRESSURE: 123 MMHG | DIASTOLIC BLOOD PRESSURE: 53 MMHG | OXYGEN SATURATION: 95 % | BODY MASS INDEX: 48.61 KG/M2 | HEIGHT: 64 IN | HEART RATE: 77 BPM | WEIGHT: 284.75 LBS

## 2023-10-25 DIAGNOSIS — I27.20 PULMONARY HYPERTENSION: ICD-10-CM

## 2023-10-25 DIAGNOSIS — I10 ESSENTIAL HYPERTENSION: Chronic | ICD-10-CM

## 2023-10-25 DIAGNOSIS — Z09 HOSPITAL DISCHARGE FOLLOW-UP: Primary | ICD-10-CM

## 2023-10-25 DIAGNOSIS — I35.0 NONRHEUMATIC AORTIC VALVE STENOSIS: Chronic | ICD-10-CM

## 2023-10-25 DIAGNOSIS — I50.32 CHRONIC DIASTOLIC HEART FAILURE: ICD-10-CM

## 2023-10-25 DIAGNOSIS — Z95.2 S/P AVR (AORTIC VALVE REPLACEMENT): ICD-10-CM

## 2023-10-25 PROCEDURE — 99214 OFFICE O/P EST MOD 30 MIN: CPT | Performed by: NURSE PRACTITIONER

## 2023-10-25 PROCEDURE — 1160F RVW MEDS BY RX/DR IN RCRD: CPT | Performed by: NURSE PRACTITIONER

## 2023-10-25 PROCEDURE — 1159F MED LIST DOCD IN RCRD: CPT | Performed by: NURSE PRACTITIONER

## 2023-10-25 PROCEDURE — 3078F DIAST BP <80 MM HG: CPT | Performed by: NURSE PRACTITIONER

## 2023-10-25 PROCEDURE — 3074F SYST BP LT 130 MM HG: CPT | Performed by: NURSE PRACTITIONER

## 2023-10-25 NOTE — PATIENT INSTRUCTIONS
STOP SPIRONOLACTONE AND METOLAZONE       - Daily weight:  same time every day, same clothing   - Low Salt (sodium) diet   - Watch fluid intake               Heart Failure Action Plan  A heart failure action plan helps you understand what to do when you have symptoms of heart failure. Your action plan is a color-coded plan that lists the symptoms to watch for and indicates what actions to take.  If you have symptoms in the red zone, you need medical care right away.  If you have symptoms in the yellow zone, you are having problems.  If you have symptoms in the green zone, you are doing well.  Follow the plan that was created by you and your health care provider. Review your plan each time you visit your health care provider.  Red zone  These signs and symptoms mean you should get medical help right away:  You have trouble breathing when resting.  You have a dry cough that is getting worse.  You have swelling or pain in your legs or abdomen that is getting worse.  You suddenly gain more than 2-3 lb (0.9-1.4 kg) in 24 hours, or more than 5 lb (2.3 kg) in a week. This amount may be more or less depending on your condition.  You have trouble staying awake or you feel confused.  You have chest pain.  You do not have an appetite.  You pass out.  You have worsening sadness or depression.  If you have any of these symptoms, call your local emergency services (911 in the [place] right away. Do not drive yourself to the hospital.  Yellow zone  These signs and symptoms mean your condition may be getting worse and you should make some changes:  You have trouble breathing when you are active, or you need to sleep with your head raised on extra pillows to help you breathe.  You have swelling in your legs or abdomen.  You gain 2-3 lb (0.9-1.4 kg) in 24 hours, or 5 lb (2.3 kg) in a week. This amount may be more or less depending on your condition.  You get tired easily.  You have trouble sleeping.  You have a dry cough.  If you have  any of these symptoms:  Contact your health care provider within the next day.  Your health care provider may adjust your medicines.  Green zone  These signs mean you are doing well and can continue what you are doing:  You do not have shortness of breath.  You have very little swelling or no new swelling.  Your weight is stable (no gain or loss).  You have a normal activity level.  You do not have chest pain or any other new symptoms.  Follow these instructions at home:  Take over-the-counter and prescription medicines only as told by your health care provider.  Weigh yourself daily. Your target weight is __________ lb (__________ kg).  Call your health care provider if you gain more than __________ lb (__________ kg) in 24 hours, or more than __________ lb (__________ kg) in a week.  Health care provider name: _____________________________________________________  Health care provider phone number: _____________________________________________________  Eat a heart-healthy diet. Work with a diet and nutrition specialist (dietitian) to create an eating plan that is best for you.  Keep all follow-up visits. This is important.  Where to find more information  American Heart Association:  Summary  A heart failure action plan helps you understand what to do when you have symptoms of heart failure.  Follow the action plan that was created by you and your health care provider.  Get help right away if you have any symptoms in the red zone.  This information is not intended to replace advice given to you by your health care provider. Make sure you discuss any questions you have with your health care provider.  Document Revised: 03/28/2023 Document Reviewed: 08/02/2021  Elsevier Patient Education © 2023 [employer]

## 2023-10-30 PROBLEM — Z79.01 LONG TERM (CURRENT) USE OF ANTICOAGULANTS: Status: RESOLVED | Noted: 2021-07-23 | Resolved: 2023-10-30

## 2023-11-02 ENCOUNTER — READMISSION MANAGEMENT (OUTPATIENT)
Dept: CALL CENTER | Facility: HOSPITAL | Age: 73
End: 2023-11-02
Payer: MEDICARE

## 2023-11-02 NOTE — OUTREACH NOTE
Medical Week 2 Survey      Flowsheet Row Responses   [facility] patient discharged from? [facility]   Does the patient have one of the following disease processes/diagnoses(primary or secondary)? Other   Week 2 attempt successful? Yes   Call start time 1345   Discharge diagnosis Hyponatremia   Call end time 1347   Meds reviewed with patient/caregiver? Yes   Is the patient having any side effects they believe may be caused by any medication additions or changes? No   Is the patient taking all medications as directed (includes completed medication regime)? Yes   Does the patient have a primary care provider?  Yes   Does the patient have an appointment with their PCP within 7 days of discharge? Yes   Has the patient kept scheduled appointments due by today? Yes   What is the Home health agency?  [facility]   Has home health visited the patient within 72 hours of discharge? Yes   Has all DME been delivered? Yes   DME comments wears O2 prn, sats 97-98% on O2,  92% on RA   Psychosocial issues? No   What is the patient's perception of their health status since discharge? Improving   Is the patient/caregiver able to teach back the hierarchy of who to call/visit for symptoms/problems? PCP, Specialist, Home health nurse, Urgent Care, ED, 911 Yes   Week 2 Call Completed? Yes   Graduated Yes   Is the patient interested in additional calls from an ambulatory ? No   Would this patient benefit from a Referral to Amb Social Work? No   Graduated/Revoked comments Pt reports feeling alot better, no concerns at this time   Call end time 1347            Ernestine OJEDA - Registered Nurse

## 2023-11-07 ENCOUNTER — LAB (OUTPATIENT)
Dept: LAB | Facility: HOSPITAL | Age: 73
End: 2023-11-07
Payer: MEDICARE

## 2023-11-07 DIAGNOSIS — E87.1 HYPONATREMIA: ICD-10-CM

## 2023-11-07 DIAGNOSIS — I27.20 PULMONARY HYPERTENSION: ICD-10-CM

## 2023-11-07 DIAGNOSIS — R20.0 NUMBNESS AND TINGLING IN LEFT HAND: ICD-10-CM

## 2023-11-07 DIAGNOSIS — I50.32 CHRONIC DIASTOLIC HEART FAILURE: ICD-10-CM

## 2023-11-07 DIAGNOSIS — E66.01 MORBID OBESITY WITH BMI OF 45.0-49.9, ADULT: ICD-10-CM

## 2023-11-07 DIAGNOSIS — E78.5 DYSLIPIDEMIA: ICD-10-CM

## 2023-11-07 DIAGNOSIS — R20.2 NUMBNESS AND TINGLING IN LEFT HAND: ICD-10-CM

## 2023-11-07 DIAGNOSIS — R60.0 EDEMA LEG: ICD-10-CM

## 2023-11-07 DIAGNOSIS — Z95.2 S/P AVR: ICD-10-CM

## 2023-11-07 DIAGNOSIS — I50.32 CHRONIC HEART FAILURE WITH PRESERVED EJECTION FRACTION (HFPEF): ICD-10-CM

## 2023-11-07 DIAGNOSIS — I10 ESSENTIAL HYPERTENSION: ICD-10-CM

## 2023-11-07 LAB
ALBUMIN SERPL-MCNC: 3.6 G/DL (ref 3.5–5.2)
ALBUMIN/GLOB SERPL: 1.2 G/DL
ALP SERPL-CCNC: 91 U/L (ref 39–117)
ALT SERPL W P-5'-P-CCNC: 13 U/L (ref 1–33)
ANION GAP SERPL CALCULATED.3IONS-SCNC: 10.2 MMOL/L (ref 5–15)
AST SERPL-CCNC: 16 U/L (ref 1–32)
BILIRUB SERPL-MCNC: 0.3 MG/DL (ref 0–1.2)
BUN SERPL-MCNC: 14 MG/DL (ref 8–23)
BUN/CREAT SERPL: 12.5 (ref 7–25)
CALCIUM SPEC-SCNC: 9 MG/DL (ref 8.6–10.5)
CHLORIDE SERPL-SCNC: 100 MMOL/L (ref 98–107)
CHOLEST SERPL-MCNC: 131 MG/DL (ref 0–200)
CO2 SERPL-SCNC: 30.8 MMOL/L (ref 22–29)
CREAT SERPL-MCNC: 1.12 MG/DL (ref 0.57–1)
EGFRCR SERPLBLD CKD-EPI 2021: 52 ML/MIN/1.73
GLOBULIN UR ELPH-MCNC: 2.9 GM/DL
GLUCOSE SERPL-MCNC: 80 MG/DL (ref 65–99)
HDLC SERPL-MCNC: 38 MG/DL (ref 40–60)
LDLC SERPL CALC-MCNC: 68 MG/DL (ref 0–100)
LDLC/HDLC SERPL: 1.71 {RATIO}
POTASSIUM SERPL-SCNC: 4.2 MMOL/L (ref 3.5–5.2)
PROT SERPL-MCNC: 6.5 G/DL (ref 6–8.5)
SODIUM SERPL-SCNC: 141 MMOL/L (ref 136–145)
TRIGL SERPL-MCNC: 141 MG/DL (ref 0–150)
VLDLC SERPL-MCNC: 25 MG/DL (ref 5–40)

## 2023-11-07 PROCEDURE — 80053 COMPREHEN METABOLIC PANEL: CPT

## 2023-11-07 PROCEDURE — 80061 LIPID PANEL: CPT

## 2023-11-07 PROCEDURE — 36415 COLL VENOUS BLD VENIPUNCTURE: CPT

## 2023-11-15 LAB — FUNGUS WND CULT: ABNORMAL

## 2023-11-27 LAB
MYCOBACTERIUM SPEC CULT: NORMAL
NIGHT BLUE STAIN TISS: NORMAL

## 2024-01-14 DIAGNOSIS — I50.32 CHRONIC HEART FAILURE WITH PRESERVED EJECTION FRACTION (HFPEF): ICD-10-CM

## 2024-01-15 RX ORDER — SPIRONOLACTONE 25 MG/1
TABLET ORAL
Qty: 180 TABLET | Refills: 3 | OUTPATIENT
Start: 2024-01-15

## 2024-01-15 NOTE — TELEPHONE ENCOUNTER
Rx Refill Note  Requested Prescriptions     Refused Prescriptions Disp Refills    spironolactone (ALDACTONE) 25 MG tablet [Pharmacy Med Name: SPIRONOLACTONE 25 MG TABLET] 180 tablet 3     Sig: TAKE 2 TABLETS BY MOUTH EVERY DAY      Last office visit with prescribing clinician: 7/31/2023   Last telemedicine visit with prescribing clinician: Visit date not found   Next office visit with prescribing clinician: 1/29/2024                         Medication was discontinued     Mary Daniel MA  01/15/24, 09:59 EST

## 2024-06-10 ENCOUNTER — TELEPHONE (OUTPATIENT)
Dept: URGENT CARE | Facility: CLINIC | Age: 74
End: 2024-06-10
Payer: MEDICARE

## 2024-07-29 ENCOUNTER — OFFICE VISIT (OUTPATIENT)
Dept: CARDIOLOGY | Facility: CLINIC | Age: 74
End: 2024-07-29
Payer: MEDICARE

## 2024-07-29 VITALS
HEIGHT: 64 IN | BODY MASS INDEX: 50.02 KG/M2 | OXYGEN SATURATION: 95 % | WEIGHT: 293 LBS | SYSTOLIC BLOOD PRESSURE: 142 MMHG | HEART RATE: 67 BPM | DIASTOLIC BLOOD PRESSURE: 63 MMHG

## 2024-07-29 DIAGNOSIS — E11.9 TYPE 2 DIABETES MELLITUS WITHOUT COMPLICATION, WITHOUT LONG-TERM CURRENT USE OF INSULIN: ICD-10-CM

## 2024-07-29 DIAGNOSIS — I73.9 PVD (PERIPHERAL VASCULAR DISEASE) WITH CLAUDICATION: Primary | ICD-10-CM

## 2024-07-29 DIAGNOSIS — I10 ESSENTIAL HYPERTENSION: ICD-10-CM

## 2024-07-29 DIAGNOSIS — Z95.2 S/P AVR (AORTIC VALVE REPLACEMENT): ICD-10-CM

## 2024-07-29 DIAGNOSIS — E78.5 DYSLIPIDEMIA: ICD-10-CM

## 2024-07-29 RX ORDER — METOPROLOL SUCCINATE 25 MG/1
12.5 TABLET, EXTENDED RELEASE ORAL DAILY
Qty: 90 TABLET | Refills: 3 | Status: SHIPPED | OUTPATIENT
Start: 2024-07-29

## 2024-07-29 NOTE — PROGRESS NOTES
Subjective:     Encounter Date:07/29/2024      Patient ID: Claudia Rust is a 73 y.o. female.    Chief Complaint and history of present illness:     Follow-up for valvular heart disease, AVR, hypertension, dyslipidemia, diabetes, obesity with BMI over 50       History of present illness:       Ms. Claudia Rust has of PMH      #Valvular heart disease with moderate to severe aortic stenosis  Cardiac cath 1/20/2021 revealing nonobstructive CAD  Patient underwent #23 magna pericardial prosthesis AVR 3/18/2021  Transesophageal echo 3/25/2021 normal LV systolic function  # Hyperlipidemia,    # Hypertension  # COPD, KARLY on CPAP  # Prediabetes  #History of HIT, history of DVT RUE   # Hypothyroidism, Rheumatoid Arthritis, spinal stenosis, hiatal hernia, chronic depression, bipolar, GERD, IBS, chronic migraine, vertigo, herniated disc, compression lumbar fractures  #Allergies/intolerance to Stadol  # Hysterectomy, bladder reconstruction, cholecystectomy, right hand surgery  # Family history of strokes and grandfather.  Mother's cancer, father on  # Active cigarette smoker trying to quit         Here for follow-up.  Patient denies any chest pain.  Has chronic dyspnea and edema, recent rhinovirus infection.    Patient's arterial blood pressure is 142/63, heart rate 67, O2 sat of 95% on room air.  BMI is over 53.             Review of records reveals:  Echocardiogram which is revealing severe aortic stenosis on 2/11/2020 ,Underwent cardiac cath 1/20/2021 which revealed no obstructive disease,underwent aortic valve replacement 3/18/2021 with #23 magna pericardial prosthesis.       Carotid Dopplers 9/13/2019 normal.  Echocardiogram 2/11/2020 is revealing severe aortic stenosis  Work-up here on 3/16/2021 revealed proBNP 733, normal CMP, A1c 5.7, normal CBC, chest x-ray with no acute abnormality.  Lexiscan Cardiolite 2/28/2023 negative for ischemia EF of 86%  Echo 10/14/2023 reveals normal LV systolic function with concentric  LVH, dilated RV and left atrium, normal bioprosthetic aortic valve mild MR and TR      Labs 11/7/2023 reveal lipid profile with cholesterol 131, triglycerides 141, HDL 38, LDL 68.  CMP with a creatinine of 1.12 and EGFR 52.        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  Continue medical management with Bumex, losartan, metoprolol, simvastatin to help with AVR, carotid disease, chronic heart failure due to valvular heart disease and hypertensive cardiovascular disease.  Stress test 2/20/2023 which was negative for ischemia.  Continue diuresis follow-up with nephrology  Patient had venous Dopplers of left upper extremity which was negative for DVT.                  ECG 12 Lead    Date/Time: 7/29/2024 10:22 AM  Performed by: Jose Levi MD    Authorized by: Jose Levi MD  Comparison: compared with previous ECG from 10/14/2023  Comparison to previous ECG: EKG done today reviewed/interpreted by me reveals sinus rhythm with a rate of 71 bpm with no significant change compared EKG from 10/14/2023          Copied text in this portion of the note has been reviewed and is accurate as of 8/1/2024  The following portions of the patient's history were reviewed and updated as appropriate: allergies, current medications, past family history, past medical history, past social history, past surgical history and problem list.    Assessment:         MDM       Diagnosis Plan   1. PVD (peripheral vascular disease) with claudication  Doppler  "Ankle Brachial Index Single Level CAR      2. S/P AVR (aortic valve replacement)        3. Essential hypertension        4. Dyslipidemia        5. Type 2 diabetes mellitus without complication, without long-term current use of insulin               Plan:               Past Medical History:  Past Medical History:   Diagnosis Date    Acute congestive heart failure     Allergies     Anxiety     Arthritis     Asthma     Bilateral leg edema     Bipolar 1 disorder     Bulging lumbar disc     X3    Cancer     states had \"cancerous tumor during pregnancy and had to have hysterectomy\"    Cataract     bilateral    Cellulitis 03/2021    bilateral legs    Compression fracture of vertebral column     LUMBAR    COPD (chronic obstructive pulmonary disease)     Delayed emergence from anesthesia     Depression     Depression     Diabetes mellitus     Dyslipidemia     Dyspnea on exertion     Fibromyalgia     GERD (gastroesophageal reflux disease)     Hiatal hernia     Hyperlipidemia     Hypertension     Hypothyroid     HYPOTHYROID    IBS (irritable bowel syndrome)     Migraines     Morbid obesity     Neuropathy     Panic attacks     Peripheral edema     PONV (postoperative nausea and vomiting)     Poor mobility     rolling walker    Prediabetes     PVD (peripheral vascular disease)     Rheumatoid aortitis     Sleep apnea     cpap    Spinal stenosis     Spinal stenosis     Vertigo     Vertigo     Vitamin D deficiency      Past Surgical History:  Past Surgical History:   Procedure Laterality Date    AORTIC VALVE REPAIR/REPLACEMENT N/A 3/18/2021    Procedure: AORTIC VALVE REPLACEMENT;  Surgeon: Olaf Mcgill MD;  Location: [facility] CVOR;  Service: Cardiothoracic;  Laterality: N/A;    BRONCHOSCOPY N/A 3/25/2021    Procedure: BRONCHOSCOPY AT BEDSIDE WITH BRONCHIOALVEOLAR LAVAGE;  Surgeon: Glenn Reyes MD;  Location: [facility] ENDOSCOPY;  Service: Pulmonary;  Laterality: N/A;  PNA    BRONCHOSCOPY N/A 10/16/2023    Procedure: BRONCHOSCOPY " with bronchial washing;  Surgeon: Glenn Reyes MD;  Location: [facility] ENDOSCOPY;  Service: Pulmonary;  Laterality: N/A;  post op: pneumonia    CARDIAC CATHETERIZATION  2009    CARDIAC CATHETERIZATION N/A 8/14/2019    Procedure: Left Heart Cath;  Surgeon: Jose Levi MD;  Location: [facility] CATH INVASIVE LOCATION;  Service: Cardiovascular    CARDIAC CATHETERIZATION N/A 8/14/2019    Procedure: Right Heart Cath;  Surgeon: Jose Levi MD;  Location: [facility] CATH INVASIVE LOCATION;  Service: Cardiovascular    CARDIAC CATHETERIZATION N/A 8/14/2019    Procedure: Aortic root aortogram;  Surgeon: Jose Levi MD;  Location: [facility] CATH INVASIVE LOCATION;  Service: Cardiovascular    CARDIAC CATHETERIZATION N/A 1/20/2021    Procedure: Left Heart Cath;  Surgeon: Jose Levi MD;  Location: [facility] CATH INVASIVE LOCATION;  Service: Cardiovascular;  Laterality: N/A;    COLONOSCOPY      CYSTOCELE REPAIR  1984    ELBOW ARTHROPLASTY  2011    ENDOSCOPY      FOOT SURGERY      GALLBLADDER SURGERY  2002    TONSILLECTOMY      VAGINAL HYSTERECTOMY  1972      Allergies:  Allergies   Allergen Reactions    Adhesive Tape Unknown (See Comments)     hives    Butorphanol Other (See Comments)     Chest pain difficulty breathing throat swelling, STADAL    Garlic Other (See Comments)     Chest pain difficulty breathing throat swells    Heparin Other (See Comments)     HIT +    Tetanus-Diphtheria Toxoids Td Other (See Comments)     Dizziness,  vomiting    Aloe Itching    Bee Venom Other (See Comments)     Swelling eyes and lips hand swelling    Latex Itching    Macadamia Nut Oil Other (See Comments)     Chest pain difficulty breathing throat swelling    Tuberculin, Ppd Unknown (See Comments)     reactor    Wasp Venom Other (See Comments)     Swelling eyes lips and hand     Home Meds:  Current Meds:     Current Outpatient Medications:     Acetaminophen 325 MG capsule, Take 650 mg by mouth Every 4 (Four)  Hours As Needed., Disp: , Rfl:     albuterol (ACCUNEB) 0.63 MG/3ML nebulizer solution, Take 3 mL by nebulization Every 6 (Six) Hours As Needed for Wheezing (Dx: J 44.9 (COPD), R 06.09 (dyspnea on exertion))., Disp: 30 each, Rfl: 0    B Complex Vitamins (VITAMIN B COMPLEX) capsule capsule, Take 1 capsule by mouth Daily. LAST DOSE 3/4, Disp: , Rfl:     budesonide-formoterol (SYMBICORT) 160-4.5 MCG/ACT inhaler, Inhale 2 puffs 2 (Two) Times a Day., Disp: 1 each, Rfl: 0    bumetanide (BUMEX) 2 MG tablet, Take 1 tablet by mouth Daily., Disp: 30 tablet, Rfl: 0    Calcium-Magnesium-Vitamin D (CALCIUM 1200+D3 PO), Take 1 capsule by mouth Daily., Disp: , Rfl:     cholecalciferol (Cholecalciferol) 25 MCG (1000 UT) tablet, Take 2 tablets by mouth Every Night. LAST DOSE 3/4, Disp: , Rfl:     diphenhydrAMINE (BENADRYL) 50 MG capsule, Take 1 capsule by mouth At Night As Needed for Itching., Disp: , Rfl:     FLUoxetine (PROzac) 20 MG capsule, Take 3 capsules by mouth Daily., Disp: , Rfl:     fluticasone (FLONASE) 50 MCG/ACT nasal spray, 2 sprays into the nostril(s) as directed by provider Daily., Disp: , Rfl:     gabapentin (NEURONTIN) 400 MG capsule, Take 1 capsule by mouth 3 (Three) Times a Day., Disp: , Rfl:     levothyroxine (SYNTHROID, LEVOTHROID) 125 MCG tablet, Take 1 tablet by mouth Daily., Disp: , Rfl:     meclizine (ANTIVERT) 25 MG tablet, Take 1 tablet by mouth 3 (Three) Times a Day As Needed., Disp: , Rfl:     Melatonin 10 MG capsule, Take 2 capsules by mouth Every Night., Disp: , Rfl:     metFORMIN ER (GLUCOPHAGE-XR) 500 MG 24 hr tablet, Take 1 tablet by mouth Daily With Breakfast., Disp: , Rfl:     Multiple Vitamins-Minerals (MULTIVITAMIN WITH MINERALS) tablet tablet, Take 1 tablet by mouth Daily., Disp: , Rfl:     ondansetron (ZOFRAN) 4 MG tablet, Take 1 tablet by mouth Every 8 (Eight) Hours As Needed for Nausea or Vomiting., Disp: , Rfl:     pantoprazole (PROTONIX) 40 MG EC tablet, Take 1 tablet by mouth Every  "Evening., Disp: , Rfl:     rizatriptan MLT (MAXALT-MLT) 10 MG disintegrating tablet, Take 1 tablet by mouth 1 (One) Time As Needed for Migraine. May repeat in 2 hours if needed, Disp: , Rfl:     simvastatin (ZOCOR) 20 MG tablet, Take 1 tablet by mouth Every Night., Disp: , Rfl:     tiZANidine (ZANAFLEX) 4 MG tablet, Take 1 tablet by mouth 3 times a day., Disp: , Rfl:     vitamin B-12 (CYANOCOBALAMIN) 1000 MCG tablet, Take 1 tablet by mouth Daily., Disp: , Rfl:     losartan (Cozaar) 25 MG tablet, Take 1 tablet by mouth Daily., Disp: 90 tablet, Rfl: 3    metoprolol succinate XL (TOPROL-XL) 25 MG 24 hr tablet, Take 0.5 tablets by mouth Daily., Disp: 90 tablet, Rfl: 3    theophylline (ERIN-24) 300 MG 24 hr capsule, Take 1 capsule by mouth Every Night., Disp: 30 capsule, Rfl: 0  Social History:   Social History     Tobacco Use    Smoking status: Former     Current packs/day: 0.00     Types: Cigarettes     Quit date:      Years since quittin.5     Passive exposure: Never    Smokeless tobacco: Never    Tobacco comments:     decrease now and do not smoke dos   Substance Use Topics    Alcohol use: Not Currently     Comment: very rare      Family History:  Family History   Problem Relation Age of Onset    Ovarian cancer Mother     Lung cancer Mother     Esophageal cancer Mother     Hypertension Father     Diabetes Maternal Grandmother               Review of Systems   Constitutional: Negative for malaise/fatigue.   Cardiovascular:  Positive for leg swelling. Negative for chest pain and palpitations.   Respiratory:  Positive for shortness of breath.    Skin:  Negative for rash.   Neurological:  Positive for dizziness and light-headedness. Negative for numbness.     All other systems are negative         Objective:     Physical Exam  /63   Pulse 67   Ht 162.6 cm (64\")   Wt (!) 142 kg (313 lb)   SpO2 95%   BMI 53.73 kg/m²   General:  Appears in no acute distress  Eyes: Sclera is anicteric,  conjunctiva is " "clear   HEENT:  No JVD.  No carotid bruits  Respiratory: Respirations regular and unlabored at rest.  Clear to auscultation  Cardiovascular: S1,S2 Regular rate and rhythm. No murmur, rub or gallop auscultated.   Extremities: No digital clubbing or cyanosis, no edema  Skin: Color pink. Skin warm and dry to touch. No rashes  No xanthoma  Neuro: Alert and awake.    Lab Reviewed:         Jose Levi MD  8/1/2024 10:25 EDT      EMR Dragon/Transcription:   \"Dictated utilizing Dragon dictation\".        "

## 2024-08-15 ENCOUNTER — TELEPHONE (OUTPATIENT)
Dept: CARDIOLOGY | Facility: CLINIC | Age: 74
End: 2024-08-15
Payer: MEDICARE

## 2024-08-15 NOTE — TELEPHONE ENCOUNTER
Caller: MOMO    Relationship:     Best call back number: [phone]      PATIENTS CAREGIVER CALLED IN TO ADVISE OF PATIENT SLEEPING ALMOST ALL DAY AND NIGHT FOR THE LAST COUPLE OF WEEKS. SHE EXPERIENCES CHEST PAIN OFF AND ON. WAS NOT AT THE TIME OF THE CALL.

## 2024-08-16 NOTE — TELEPHONE ENCOUNTER
Looks like the metoprolol is new from last visit with Dr. Levi.  It may be keeping her heart rate too low.   Have them monitor her heart rate closely.  If less than 50 do not take it.

## 2024-08-16 NOTE — TELEPHONE ENCOUNTER
Can you find out more information?   Blood pressure heart rate?   Has she seen her primary care for these symptoms?   Description of chest pain.

## 2024-08-16 NOTE — TELEPHONE ENCOUNTER
Patient will hold metoprolol if heart rate is 50 or under. Patient will keep a log of HR over the weekend with blood pressure and report back Monday if she is not feeling any better. Patient verbalized understanding of not taking the medication if HR is 50 or below.

## 2024-08-16 NOTE — TELEPHONE ENCOUNTER
"Spoke to patient and did obtain verbal auth to speak to Linda today. Linda is not on patients release forms. Her current Blood pressure is 139/74, HR is currently 50, O2 is 93. Patient stated this is not chest pain but pressure like \"gas\" and believes that could be what that is. Patient stated she has not made a follow up with PCP but would get that done today. Pressure is located mid sternum. Informed patient that I would forward this information to DORENE Hunt for review and add the verbal auth for Linda for today. Patient verbalized understanding.   "

## 2024-08-29 ENCOUNTER — HOSPITAL ENCOUNTER (OUTPATIENT)
Dept: CARDIOLOGY | Facility: HOSPITAL | Age: 74
Discharge: HOME | End: 2024-08-29
Admitting: INTERNAL MEDICINE
Payer: MEDICARE

## 2024-08-29 DIAGNOSIS — I73.9 PVD (PERIPHERAL VASCULAR DISEASE) WITH CLAUDICATION: ICD-10-CM

## 2024-08-29 LAB
BH CV LOWER ARTERIAL LEFT ABI RATIO: 1.06
BH CV LOWER ARTERIAL LEFT DORSALIS PEDIS SYS MAX: 124
BH CV LOWER ARTERIAL LEFT GREAT TOE SYS MAX: 79
BH CV LOWER ARTERIAL LEFT POST TIBIAL SYS MAX: 135
BH CV LOWER ARTERIAL RIGHT ABI RATIO: 1.15
BH CV LOWER ARTERIAL RIGHT DORSALIS PEDIS SYS MAX: 145
BH CV LOWER ARTERIAL RIGHT GREAT TOE SYS MAX: 85
BH CV LOWER ARTERIAL RIGHT POST TIBIAL SYS MAX: 146
UPPER ARTERIAL LEFT ARM BRACHIAL SYS MAX: 127
UPPER ARTERIAL RIGHT ARM BRACHIAL SYS MAX: 122

## 2024-08-29 PROCEDURE — 93922 UPR/L XTREMITY ART 2 LEVELS: CPT

## 2024-08-30 ENCOUNTER — TELEPHONE (OUTPATIENT)
Dept: CARDIOLOGY | Facility: CLINIC | Age: 74
End: 2024-08-30
Payer: MEDICARE

## 2024-08-30 NOTE — TELEPHONE ENCOUNTER
Spoke to patient to relay message from AP REE Hunt that ÓSCAR results were reviewed and are normal. Patient verbalized understanding.

## 2025-01-31 ENCOUNTER — OFFICE VISIT (OUTPATIENT)
Dept: CARDIOLOGY | Facility: CLINIC | Age: 75
End: 2025-01-31
Payer: MEDICARE

## 2025-01-31 VITALS
DIASTOLIC BLOOD PRESSURE: 49 MMHG | WEIGHT: 293 LBS | BODY MASS INDEX: 52.35 KG/M2 | HEART RATE: 59 BPM | SYSTOLIC BLOOD PRESSURE: 111 MMHG | OXYGEN SATURATION: 91 %

## 2025-01-31 DIAGNOSIS — Z95.2 S/P AVR (AORTIC VALVE REPLACEMENT): ICD-10-CM

## 2025-01-31 DIAGNOSIS — I50.32 CHRONIC DIASTOLIC HEART FAILURE: ICD-10-CM

## 2025-01-31 DIAGNOSIS — I10 ESSENTIAL HYPERTENSION: Primary | Chronic | ICD-10-CM

## 2025-01-31 DIAGNOSIS — I27.20 PULMONARY HYPERTENSION: ICD-10-CM

## 2025-01-31 RX ORDER — METOLAZONE 2.5 MG/1
1 TABLET ORAL
COMMUNITY
Start: 2024-11-26

## 2025-01-31 RX ORDER — FAMOTIDINE 40 MG/1
40 TABLET, FILM COATED ORAL
COMMUNITY
Start: 2025-01-22

## 2025-01-31 RX ORDER — LEVOTHYROXINE SODIUM 137 UG/1
1 TABLET ORAL DAILY
COMMUNITY
Start: 2024-11-27

## 2025-01-31 RX ORDER — DAPAGLIFLOZIN 5 MG/1
5 TABLET, FILM COATED ORAL EVERY MORNING
COMMUNITY
Start: 2025-01-14

## 2025-01-31 RX ORDER — TIZANIDINE 2 MG/1
2 TABLET ORAL EVERY 8 HOURS PRN
COMMUNITY
Start: 2024-12-21

## 2025-01-31 RX ORDER — BUMETANIDE 1 MG/1
1 TABLET ORAL DAILY
COMMUNITY
Start: 2025-01-05

## 2025-01-31 NOTE — PROGRESS NOTES
Subjective:     Encounter Date:01/31/2025      Patient ID: Claudia Rust is a 74 y.o. female.    Chief Complaint: hospital follow up     History of Present Illness    Ms. Claudia Rust has of PMH      #Valvular heart disease with moderate to severe aortic stenosis  Cardiac cath 1/20/2021 revealing nonobstructive CAD  Patient underwent #23 magna pericardial prosthesis AVR 3/18/2021  Transesophageal echo 3/25/2021 normal LV systolic function  # Hyperlipidemia,    # Hypertension  # COPD, KARLY on CPAP  # Prediabetes  #History of HIT, history of DVT RUE   # Hypothyroidism, Rheumatoid Arthritis, spinal stenosis, hiatal hernia, chronic depression, bipolar, GERD, IBS, chronic migraine, vertigo, herniated disc, compression lumbar fractures  #Allergies/intolerance to Stadol and tape latex  # Hysterectomy, bladder reconstruction, cholecystectomy, right hand surgery  # Family history of strokes and grandfather.  Mother's cancer, father hypertension   # Quit smoking 2022  # Chronic edema - lymphedema     Here for 6 month follow up.  Patient denies any chest pain although she reports she is having issues with reflux and constipation.  She will be undergoing colonoscopy 2/14/2025.  Patient reports chronic dyspnea on exertion and edema, as well as chronic vertigo.      Blood pressure today is 111/49  HR 59 oxygen 91% on room air.  Weight 305 lbs.  BMI 52                   Review of records reveals patient underwent an echocardiogram which is revealing severe aortic stenosis on 2/11/2020 ,Underwent cardiac cath 1/20/2021 which revealed no obstructive disease,underwent aortic valve replacement 3/18/2021 with #23 magna pericardial prosthesis.       Carotid Dopplers 9/13/2019 normal.  Echocardiogram 2/11/2020 is revealing severe aortic stenosis  Work-up here on 3/16/2021 revealed proBNP 733, normal CMP, A1c 5.7, normal CBC, chest x-ray with no acute abnormality.  Lexiscan Cardiolite 2/28/2023 negative for ischemia EF of 86%      Echocardiogram 10/14/2023  Normal left ventricular systolic function  Concentric left ventricular hypertrophy  Indeterminate left ventricular diastolic function  Dilated right ventricle with probably normal function  Dilated left atrium  Bioprosthetic aortic valve with normal function.  Mild mitral regurgitation  Mild tricuspid regurgitation    Lab Review:   pro bnp negative at 646, , bun 25 creatinine 0.96 AST 38   WBC 15.8  hgb 11.9 CK 2719 potassium 3.4 BUN 28, creatinine 1.05 iron 22 iron sat 8  10/16/2023: CK 1631, sodium 126, potassium 3.8, bun 36 creatinine 1.19 AST 77, ALT 48 WBC 20.6 hgb 11.0   10/17/2023: , sodium 132, potassium 4.5, bun 45 creatinine 1.37 AST 64, ALT 66  WBC 21.2 hgb 11.4   10/18/2023: , sodium 132, potassium 5.4 BUN 36 creatinine 1.14 AST 75, ALT 83 WBC 22.1  10/19/2023: , potassium 4.8, bun 36 creatinine 1.22 AST 79,  WBC 21.7 hgb 11.2   10/20/2023: CK1 31 BUN/creatinine 44/1.37    Patient reports that she had labs with PCP office in December    The following portions of the patient's history were reviewed and updated as appropriate: allergies, current medications, past family history, past medical history, past social history, past surgical history, and problem list.    Review of Systems   Constitutional: Negative for malaise/fatigue.   Cardiovascular:  Positive for dyspnea on exertion and leg swelling. Negative for chest pain and palpitations.   Respiratory:  Positive for shortness of breath. Negative for cough.    Gastrointestinal:  Positive for bloating, constipation and heartburn. Negative for abdominal pain, nausea and vomiting.   Neurological:  Positive for dizziness and vertigo. Negative for focal weakness, headaches, light-headedness and numbness.   All other systems reviewed and are negative.        Past Medical History:   Diagnosis Date    Acute congestive heart failure     Allergies     Anxiety     Arthritis     Asthma     Bilateral leg edema  "    Bipolar 1 disorder     Bulging lumbar disc     X3    Cancer     states had \"cancerous tumor during pregnancy and had to have hysterectomy\"    Cataract     bilateral    Cellulitis 03/2021    bilateral legs    Compression fracture of vertebral column     LUMBAR    COPD (chronic obstructive pulmonary disease)     Delayed emergence from anesthesia     Depression     Depression     Diabetes mellitus     Dyslipidemia     Dyspnea on exertion     Fibromyalgia     GERD (gastroesophageal reflux disease)     Hiatal hernia     Hyperlipidemia     Hypertension     Hypothyroid     HYPOTHYROID    IBS (irritable bowel syndrome)     Migraines     Morbid obesity     Neuropathy     Panic attacks     Peripheral edema     PONV (postoperative nausea and vomiting)     Poor mobility     rolling walker    Prediabetes     PVD (peripheral vascular disease)     Rheumatoid aortitis     Sleep apnea     cpap    Spinal stenosis     Spinal stenosis     Vertigo     Vertigo     Vitamin D deficiency      Past Surgical History:   Procedure Laterality Date    AORTIC VALVE REPAIR/REPLACEMENT N/A 3/18/2021    Procedure: AORTIC VALVE REPLACEMENT;  Surgeon: Olaf Mcgill MD;  Location: [facility] CVOR;  Service: Cardiothoracic;  Laterality: N/A;    BRONCHOSCOPY N/A 3/25/2021    Procedure: BRONCHOSCOPY AT BEDSIDE WITH BRONCHIOALVEOLAR LAVAGE;  Surgeon: Glenn Reyes MD;  Location: [facility] ENDOSCOPY;  Service: Pulmonary;  Laterality: N/A;  PNA    BRONCHOSCOPY N/A 10/16/2023    Procedure: BRONCHOSCOPY with bronchial washing;  Surgeon: Glenn Reyes MD;  Location: [facility] ENDOSCOPY;  Service: Pulmonary;  Laterality: N/A;  post op: pneumonia    CARDIAC CATHETERIZATION  2009    CARDIAC CATHETERIZATION N/A 8/14/2019    Procedure: Left Heart Cath;  Surgeon: Jose Levi MD;  Location: [facility] CATH INVASIVE LOCATION;  Service: Cardiovascular    CARDIAC CATHETERIZATION N/A 8/14/2019    Procedure: Right Heart Cath;  Surgeon: Jose Levi MD;  " Location: [facility] CATH INVASIVE LOCATION;  Service: Cardiovascular    CARDIAC CATHETERIZATION N/A 8/14/2019    Procedure: Aortic root aortogram;  Surgeon: Jose Levi MD;  Location: [facility] CATH INVASIVE LOCATION;  Service: Cardiovascular    CARDIAC CATHETERIZATION N/A 1/20/2021    Procedure: Left Heart Cath;  Surgeon: Jose Levi MD;  Location: [facility] CATH INVASIVE LOCATION;  Service: Cardiovascular;  Laterality: N/A;    COLONOSCOPY      CYSTOCELE REPAIR  1984    ELBOW ARTHROPLASTY  2011    ENDOSCOPY      FOOT SURGERY      GALLBLADDER SURGERY  2002    TONSILLECTOMY      VAGINAL HYSTERECTOMY  1972     /49 (BP Location: Left arm, Patient Position: Sitting, Cuff Size: Large Adult)   Pulse 59   Wt (!) 138 kg (305 lb)   SpO2 91%   BMI 52.35 kg/m²   Family History   Problem Relation Age of Onset    Ovarian cancer Mother     Lung cancer Mother     Esophageal cancer Mother     Hypertension Father     Diabetes Maternal Grandmother        Current Outpatient Medications:     Acetaminophen 325 MG capsule, Take 650 mg by mouth Every 4 (Four) Hours As Needed., Disp: , Rfl:     albuterol (ACCUNEB) 0.63 MG/3ML nebulizer solution, Take 3 mL by nebulization Every 6 (Six) Hours As Needed for Wheezing (Dx: J 44.9 (COPD), R 06.09 (dyspnea on exertion))., Disp: 30 each, Rfl: 0    B Complex Vitamins (VITAMIN B COMPLEX) capsule capsule, Take 1 capsule by mouth Daily. LAST DOSE 3/4, Disp: , Rfl:     budesonide-formoterol (SYMBICORT) 160-4.5 MCG/ACT inhaler, Inhale 2 puffs 2 (Two) Times a Day., Disp: 1 each, Rfl: 0    bumetanide (BUMEX) 1 MG tablet, Take 1 tablet by mouth Daily., Disp: , Rfl:     bumetanide (BUMEX) 2 MG tablet, Take 1 tablet by mouth Daily., Disp: 30 tablet, Rfl: 0    Calcium-Magnesium-Vitamin D (CALCIUM 1200+D3 PO), Take 1 capsule by mouth Daily., Disp: , Rfl:     cholecalciferol (Cholecalciferol) 25 MCG (1000 UT) tablet, Take 2 tablets by mouth Every Night. LAST DOSE 3/4, Disp: , Rfl:      diphenhydrAMINE (BENADRYL) 50 MG capsule, Take 1 capsule by mouth At Night As Needed for Itching., Disp: , Rfl:     famotidine (PEPCID) 40 MG tablet, Take 1 tablet by mouth every night at bedtime., Disp: , Rfl:     Farxiga 5 MG tablet tablet, Take 1 tablet by mouth Every Morning., Disp: , Rfl:     FLUoxetine (PROzac) 20 MG capsule, Take 3 capsules by mouth Daily., Disp: , Rfl:     fluticasone (FLONASE) 50 MCG/ACT nasal spray, Administer 2 sprays into the nostril(s) as directed by provider Daily. (Patient taking differently: Administer 2 sprays into the nostril(s) as directed by provider Daily As Needed.), Disp: , Rfl:     gabapentin (NEURONTIN) 400 MG capsule, Take 1 capsule by mouth 3 (Three) Times a Day., Disp: , Rfl:     levothyroxine (SYNTHROID, LEVOTHROID) 137 MCG tablet, Take 1 tablet by mouth Daily., Disp: , Rfl:     losartan (Cozaar) 25 MG tablet, Take 1 tablet by mouth Daily., Disp: 90 tablet, Rfl: 3    meclizine (ANTIVERT) 25 MG tablet, Take 1 tablet by mouth 3 (Three) Times a Day As Needed., Disp: , Rfl:     Melatonin 10 MG capsule, Take 2 capsules by mouth Every Night., Disp: , Rfl:     metOLazone (ZAROXOLYN) 2.5 MG tablet, Take 1 tablet by mouth Every Other Day., Disp: , Rfl:     metoprolol succinate XL (TOPROL-XL) 25 MG 24 hr tablet, Take 0.5 tablets by mouth Daily., Disp: 90 tablet, Rfl: 3    Multiple Vitamins-Minerals (MULTIVITAMIN WITH MINERALS) tablet tablet, Take 1 tablet by mouth Daily., Disp: , Rfl:     ondansetron (ZOFRAN) 4 MG tablet, Take 1 tablet by mouth Every 8 (Eight) Hours As Needed for Nausea or Vomiting., Disp: , Rfl:     pantoprazole (PROTONIX) 40 MG EC tablet, Take 1 tablet by mouth Every Evening., Disp: , Rfl:     rizatriptan MLT (MAXALT-MLT) 10 MG disintegrating tablet, Take 1 tablet by mouth 1 (One) Time As Needed for Migraine. May repeat in 2 hours if needed, Disp: , Rfl:     simvastatin (ZOCOR) 20 MG tablet, Take 1 tablet by mouth Every Night., Disp: , Rfl:     tiZANidine  (ZANAFLEX) 2 MG tablet, Take 1 tablet by mouth Every 8 (Eight) Hours As Needed., Disp: , Rfl:     vitamin B-12 (CYANOCOBALAMIN) 1000 MCG tablet, Take 1 tablet by mouth Daily., Disp: , Rfl:   Allergies   Allergen Reactions    Adhesive Tape Unknown (See Comments)     hives    Butorphanol Other (See Comments)     Chest pain difficulty breathing throat swelling, STADAL    Garlic Other (See Comments)     Chest pain difficulty breathing throat swells    Heparin Other (See Comments)     HIT +    Tetanus-Diphtheria Toxoids Td Other (See Comments)     Dizziness,  vomiting    Aloe Itching    Bee Venom Other (See Comments)     Swelling eyes and lips hand swelling    Latex Itching    Macadamia Nut Oil Other (See Comments)     Chest pain difficulty breathing throat swelling    Tuberculin, Ppd Unknown (See Comments)     reactor    Wasp Venom Other (See Comments)     Swelling eyes lips and hand     Social History     Socioeconomic History    Marital status:    Tobacco Use    Smoking status: Former     Current packs/day: 0.00     Types: Cigarettes     Quit date: 2022     Years since quitting: 3.0     Passive exposure: Never    Smokeless tobacco: Never    Tobacco comments:     decrease now and do not smoke dos   Vaping Use    Vaping status: Some Days   Substance and Sexual Activity    Alcohol use: Not Currently     Comment: very rare    Drug use: Never    Sexual activity: Not Currently                Objective:     Vitals reviewed.   Constitutional:       Appearance: Normal appearance. Morbidly obese and frail.   Neck:      Vascular: No JVR. JVD normal.   Pulmonary:      Effort: Pulmonary effort is normal. Increased respiratory effort.      Breath sounds: Decreased breath sounds present. No wheezing. No rales.      Comments: Improved from admission   Chest:      Chest wall: Not tender to palpatation.   Cardiovascular:      PMI at left midclavicular line. Normal rate. Regular rhythm. Normal S1. Normal S2.       Murmurs: There  is no murmur.      No gallop.  No click. No rub.   Edema:     Pretibial: bilateral 2+ edema of the pretibial area.     Ankle: bilateral 2+ edema of the ankle.     Feet: 1+ edema of the left foot and 2+ edema of the right foot.     Comments: Lymphedema  Abdominal:      General: Bowel sounds are normal. There is distension.      Palpations: Abdomen is soft.      Tenderness: There is no abdominal tenderness.   Musculoskeletal: Normal range of motion.         General: No tenderness. Skin:     General: Skin is warm and dry.   Neurological:      General: No focal deficit present.      Mental Status: Alert and oriented to person, place and time.   Psychiatric:         Behavior: Behavior is cooperative.       Procedures                  Assessment:     [facility]       Diagnosis Plan   1. Essential hypertension  Adult Transthoracic Echo Complete W/ Cont if Necessary Per Protocol      2. Chronic diastolic heart failure  Adult Transthoracic Echo Complete W/ Cont if Necessary Per Protocol      3. S/P AVR (aortic valve replacement)  Adult Transthoracic Echo Complete W/ Cont if Necessary Per Protocol      4. Pulmonary hypertension  Adult Transthoracic Echo Complete W/ Cont if Necessary Per Protocol                       Plan:   Chart reviewed   Labs reviewed patient reports she had labs in December with PCP will obtain copy from their office.         Monitor blood pressure at home   Continue bumex 2mg in a.m. and 1 mg in p.m., losartan.  Continue 12.5 mg of metoprolol succinate  Continue low-dose Farxiga  Continue metolazone 2.5 mg every other day    Keep follow-up in 6 months with Dr. Levi or sooner if needed    Will check for surveillance echocardiogram to check prosthetic aortic valve prior to next visit    Patient is undergoing colonoscopy at that is unremarkable we will check stress test if she is still having symptoms of heartburn which could be anginal equivalent.    Electronically signed by SARA Moore, 01/31/25,  10:51 AM EST.        This document is intended for medical expert use only.  Reading of this document by patients and/or patient's family without participating medical staff guidance may result in misinterpretation and unintended morbidity. Any interpretation of such data is the responsibility of the patient and/or family member responsible for the patient in concert with their primary or specialist providers, not to be left for sources of online search as such as [employer], [employer] or similar queries.  Relying on these approaches to knowledge may result in misinterpretation, misguided goals of care and even death should patient or family members try recommendations outside of the realm of professional medical care in a supervised inpatient environment.

## 2025-02-13 PROBLEM — Z12.11 ENCOUNTER FOR HEMOCCULT SCREENING: Status: ACTIVE | Noted: 2025-02-13

## 2025-02-14 ENCOUNTER — HOSPITAL ENCOUNTER (OUTPATIENT)
Facility: HOSPITAL | Age: 75
Setting detail: HOSPITAL OUTPATIENT SURGERY
Discharge: HOME | End: 2025-02-14
Attending: INTERNAL MEDICINE | Admitting: INTERNAL MEDICINE
Payer: MEDICARE

## 2025-02-14 ENCOUNTER — ANESTHESIA (OUTPATIENT)
Dept: GASTROENTEROLOGY | Facility: HOSPITAL | Age: 75
End: 2025-02-14
Payer: MEDICARE

## 2025-02-14 ENCOUNTER — ANESTHESIA EVENT (OUTPATIENT)
Dept: GASTROENTEROLOGY | Facility: HOSPITAL | Age: 75
End: 2025-02-14
Payer: MEDICARE

## 2025-02-14 ENCOUNTER — ON CAMPUS - OUTPATIENT (OUTPATIENT)
Dept: URBAN - METROPOLITAN AREA HOSPITAL 85 | Facility: HOSPITAL | Age: 75
End: 2025-02-14
Payer: MEDICAID

## 2025-02-14 VITALS
SYSTOLIC BLOOD PRESSURE: 107 MMHG | HEIGHT: 64 IN | HEART RATE: 61 BPM | DIASTOLIC BLOOD PRESSURE: 47 MMHG | TEMPERATURE: 97.3 F | OXYGEN SATURATION: 99 % | WEIGHT: 293 LBS | RESPIRATION RATE: 12 BRPM | BODY MASS INDEX: 50.02 KG/M2

## 2025-02-14 DIAGNOSIS — K64.0 FIRST DEGREE HEMORRHOIDS: ICD-10-CM

## 2025-02-14 DIAGNOSIS — K57.30 DIVERTICULOSIS OF LARGE INTESTINE WITHOUT PERFORATION OR ABS: ICD-10-CM

## 2025-02-14 DIAGNOSIS — D12.2 BENIGN NEOPLASM OF ASCENDING COLON: ICD-10-CM

## 2025-02-14 DIAGNOSIS — Z12.11 SCREEN FOR COLON CANCER: ICD-10-CM

## 2025-02-14 DIAGNOSIS — D12.3 BENIGN NEOPLASM OF TRANSVERSE COLON: ICD-10-CM

## 2025-02-14 DIAGNOSIS — Z12.11 ENCOUNTER FOR SCREENING FOR MALIGNANT NEOPLASM OF COLON: ICD-10-CM

## 2025-02-14 LAB — GLUCOSE BLDC GLUCOMTR-MCNC: 122 MG/DL (ref 70–105)

## 2025-02-14 PROCEDURE — 82948 REAGENT STRIP/BLOOD GLUCOSE: CPT

## 2025-02-14 PROCEDURE — C1751 CATH, INF, PER/CENT/MIDLINE: HCPCS

## 2025-02-14 PROCEDURE — 88305 TISSUE EXAM BY PATHOLOGIST: CPT | Performed by: INTERNAL MEDICINE

## 2025-02-14 PROCEDURE — 25810000003 SODIUM CHLORIDE 0.9 % SOLUTION: Performed by: NURSE ANESTHETIST, CERTIFIED REGISTERED

## 2025-02-14 PROCEDURE — 25010000002 LIDOCAINE PF 2% 2 % SOLUTION: Performed by: NURSE ANESTHETIST, CERTIFIED REGISTERED

## 2025-02-14 PROCEDURE — 45385 COLONOSCOPY W/LESION REMOVAL: CPT | Mod: PT | Performed by: INTERNAL MEDICINE

## 2025-02-14 PROCEDURE — 25010000002 PROPOFOL 10 MG/ML EMULSION: Performed by: NURSE ANESTHETIST, CERTIFIED REGISTERED

## 2025-02-14 RX ORDER — ONDANSETRON 2 MG/ML
4 INJECTION INTRAMUSCULAR; INTRAVENOUS ONCE AS NEEDED
Status: DISCONTINUED | OUTPATIENT
Start: 2025-02-14 | End: 2025-02-14 | Stop reason: HOSPADM

## 2025-02-14 RX ORDER — LIDOCAINE HYDROCHLORIDE 20 MG/ML
INJECTION, SOLUTION EPIDURAL; INFILTRATION; INTRACAUDAL; PERINEURAL AS NEEDED
Status: DISCONTINUED | OUTPATIENT
Start: 2025-02-14 | End: 2025-02-14 | Stop reason: SURG

## 2025-02-14 RX ORDER — MEPERIDINE HYDROCHLORIDE 25 MG/ML
12.5 INJECTION INTRAMUSCULAR; INTRAVENOUS; SUBCUTANEOUS
Status: DISCONTINUED | OUTPATIENT
Start: 2025-02-14 | End: 2025-02-14 | Stop reason: HOSPADM

## 2025-02-14 RX ORDER — EPHEDRINE SULFATE 5 MG/ML
5 INJECTION INTRAVENOUS ONCE AS NEEDED
Status: DISCONTINUED | OUTPATIENT
Start: 2025-02-14 | End: 2025-02-14 | Stop reason: HOSPADM

## 2025-02-14 RX ORDER — DIPHENHYDRAMINE HYDROCHLORIDE 50 MG/ML
12.5 INJECTION INTRAMUSCULAR; INTRAVENOUS
Status: DISCONTINUED | OUTPATIENT
Start: 2025-02-14 | End: 2025-02-14 | Stop reason: HOSPADM

## 2025-02-14 RX ORDER — PROPOFOL 10 MG/ML
VIAL (ML) INTRAVENOUS AS NEEDED
Status: DISCONTINUED | OUTPATIENT
Start: 2025-02-14 | End: 2025-02-14 | Stop reason: SURG

## 2025-02-14 RX ORDER — IPRATROPIUM BROMIDE AND ALBUTEROL SULFATE 2.5; .5 MG/3ML; MG/3ML
3 SOLUTION RESPIRATORY (INHALATION) ONCE AS NEEDED
Status: DISCONTINUED | OUTPATIENT
Start: 2025-02-14 | End: 2025-02-14 | Stop reason: HOSPADM

## 2025-02-14 RX ORDER — SODIUM CHLORIDE 9 MG/ML
INJECTION, SOLUTION INTRAVENOUS CONTINUOUS PRN
Status: DISCONTINUED | OUTPATIENT
Start: 2025-02-14 | End: 2025-02-14 | Stop reason: SURG

## 2025-02-14 RX ORDER — LABETALOL HYDROCHLORIDE 5 MG/ML
5 INJECTION, SOLUTION INTRAVENOUS
Status: DISCONTINUED | OUTPATIENT
Start: 2025-02-14 | End: 2025-02-14 | Stop reason: HOSPADM

## 2025-02-14 RX ORDER — HYDRALAZINE HYDROCHLORIDE 20 MG/ML
5 INJECTION INTRAMUSCULAR; INTRAVENOUS
Status: DISCONTINUED | OUTPATIENT
Start: 2025-02-14 | End: 2025-02-14 | Stop reason: HOSPADM

## 2025-02-14 RX ADMIN — SODIUM CHLORIDE: 9 INJECTION, SOLUTION INTRAVENOUS at 11:40

## 2025-02-14 RX ADMIN — PROPOFOL INJECTABLE EMULSION 30 MG: 10 INJECTION, EMULSION INTRAVENOUS at 11:46

## 2025-02-14 RX ADMIN — PROPOFOL INJECTABLE EMULSION 100 MCG/KG/MIN: 10 INJECTION, EMULSION INTRAVENOUS at 11:44

## 2025-02-14 RX ADMIN — LIDOCAINE HYDROCHLORIDE 100 MG: 20 INJECTION, SOLUTION EPIDURAL; INFILTRATION; INTRACAUDAL; PERINEURAL at 11:43

## 2025-02-14 RX ADMIN — PROPOFOL INJECTABLE EMULSION 70 MG: 10 INJECTION, EMULSION INTRAVENOUS at 11:43

## 2025-02-14 NOTE — H&P
"GI Pre-operative History and Physical:    Referring Provider:    Davis Boateng NP Matthew David McColloug*    Chief complaint: Encounter for Hemoccult screening    History of present illness:      Claudia Rust is a 74 y.o. female who presents today for Procedure(s):  COLONOSCOPY for the indications listed below.     The updated Patient Profile was reviewed prior to the procedure, in conjunction with the Physical Exam, including medical conditions, surgical procedures, medications, allergies, family history and social history.     Pre-operatively, I reviewed the indication(s) for the procedure, the risks of the procedure [including but not limited to: unexpected bleeding possibly requiring hospitalization and/or unplanned repeat procedures, perforation possibly requiring surgical treatment, missed lesions and complications of sedation/MAC (also explained by anesthesia staff)].     I have evaluated the patient for risks associated with the planned anesthesia and the procedure to be performed and find the patient an acceptable candidate for IV sedation.    Multiple opportunities were provided for any questions or concerns, and all questions were answered satisfactorily before any anesthesia was administered. We will proceed with the planned procedure.    Past Medical History:  Past Medical History:   Diagnosis Date    Acute congestive heart failure     Allergies     Anxiety     Arthritis     Asthma     Bilateral leg edema     Bipolar 1 disorder     Bulging lumbar disc     X3    Cancer     states had \"cancerous tumor during pregnancy and had to have hysterectomy\"    Cataract     bilateral    Cellulitis 03/2021    bilateral legs    Compression fracture of vertebral column     LUMBAR    COPD (chronic obstructive pulmonary disease)     Delayed emergence from anesthesia     Depression     Depression     Diabetes mellitus     Dyslipidemia     Dyspnea on exertion     Fibromyalgia     GERD (gastroesophageal reflux " disease)     Hiatal hernia     Hyperlipidemia     Hypertension     Hypothyroid     HYPOTHYROID    IBS (irritable bowel syndrome)     Migraines     Morbid obesity     Neuropathy     Panic attacks     Peripheral edema     PONV (postoperative nausea and vomiting)     Poor mobility     rolling walker    Prediabetes     PVD (peripheral vascular disease)     Rheumatoid aortitis     Sleep apnea     cpap    Spinal stenosis     Spinal stenosis     Vertigo     Vertigo     Vitamin D deficiency        Past Surgical History:  Past Surgical History:   Procedure Laterality Date    AORTIC VALVE REPAIR/REPLACEMENT N/A 3/18/2021    Procedure: AORTIC VALVE REPLACEMENT;  Surgeon: Olaf Mcgill MD;  Location: [facility] CVOR;  Service: Cardiothoracic;  Laterality: N/A;    BRONCHOSCOPY N/A 3/25/2021    Procedure: BRONCHOSCOPY AT BEDSIDE WITH BRONCHIOALVEOLAR LAVAGE;  Surgeon: Glenn Reyes MD;  Location: [facility] ENDOSCOPY;  Service: Pulmonary;  Laterality: N/A;  PNA    BRONCHOSCOPY N/A 10/16/2023    Procedure: BRONCHOSCOPY with bronchial washing;  Surgeon: Glenn Reyes MD;  Location: [facility] ENDOSCOPY;  Service: Pulmonary;  Laterality: N/A;  post op: pneumonia    CARDIAC CATHETERIZATION  2009    CARDIAC CATHETERIZATION N/A 8/14/2019    Procedure: Left Heart Cath;  Surgeon: Jose Levi MD;  Location: [facility] CATH INVASIVE LOCATION;  Service: Cardiovascular    CARDIAC CATHETERIZATION N/A 8/14/2019    Procedure: Right Heart Cath;  Surgeon: Jose Levi MD;  Location: [facility] CATH INVASIVE LOCATION;  Service: Cardiovascular    CARDIAC CATHETERIZATION N/A 8/14/2019    Procedure: Aortic root aortogram;  Surgeon: Jose Levi MD;  Location: [facility] CATH INVASIVE LOCATION;  Service: Cardiovascular    CARDIAC CATHETERIZATION N/A 1/20/2021    Procedure: Left Heart Cath;  Surgeon: Jose Levi MD;  Location: [facility] CATH INVASIVE LOCATION;  Service: Cardiovascular;  Laterality: N/A;    COLONOSCOPY       "CYSTOCELE REPAIR  1984    ELBOW ARTHROPLASTY  2011    ENDOSCOPY      FOOT SURGERY      GALLBLADDER SURGERY  2002    TONSILLECTOMY      VAGINAL HYSTERECTOMY  1972       Social History:  Social History     Tobacco Use    Smoking status: Former     Current packs/day: 0.00     Types: Cigarettes     Quit date: 2022     Years since quitting: 3.1     Passive exposure: Never    Smokeless tobacco: Never    Tobacco comments:     decrease now and do not smoke dos   Vaping Use    Vaping status: Former   Substance Use Topics    Alcohol use: Not Currently     Comment: very rare    Drug use: Never       Family History:  Family History   Problem Relation Age of Onset    Ovarian cancer Mother     Lung cancer Mother     Esophageal cancer Mother     Hypertension Father     Diabetes Maternal Grandmother        Medications:  No medications prior to admission.       Scheduled Meds:  Continuous Infusions:No current facility-administered medications for this encounter.    PRN Meds:.    ALLERGIES:  Adhesive tape; Butorphanol; Garlic; Heparin; Tetanus-diphtheria toxoids td; Aloe; Bee venom; Latex; Macadamia nut oil; Tuberculin, ppd; and Wasp venom    ROS:  The following systems were reviewed and negative;   Constitution:  No fevers, chills, no unintentional weight loss  Skin: no rash, no jaundice  Eyes:  No blurry vision, no eye pain  HENT:  No change in hearing or smell  Resp:  No dyspnea or cough  CV:  No chest pain or palpitations  :  No dysuria, hematuria  Musculoskeletal:  No leg cramps or arthralgias  Neuro:  No tremor, no numbness  Psych:  No depression or confusion    Objective     Vital Signs:   Vitals:    10/28/24 1453 02/04/25 1234   Weight: 134 kg (296 lb) 136 kg (300 lb)   Height: 162.6 cm (64\") 162.6 cm (64\")       Physical Exam:       General Appearance:    Awake and alert, in no acute distress   Head:    Normocephalic, without obvious abnormality, atraumatic   Throat:   No oral lesions, no thrush, oral mucosa moist   Lungs: "     respirations regular, even and unlabored   Skin:   No rash, no jaundice       Results Review:  Lab Results (last 24 hours)       ** No results found for the last 24 hours. **            Imaging Results (Last 24 Hours)       ** No results found for the last 24 hours. **             I reviewed the patient's labs and imaging.    ASSESSMENT AND PLAN:      Principal Problem:    Encounter for Hemoccult screening       Procedure(s):  COLONOSCOPY      I discussed the patients findings and my recommendations with the patient.    Electronically signed by Segun Cleveland MD, 02/13/25, 9:20 PM EST.

## 2025-02-14 NOTE — DISCHARGE INSTRUCTIONS
A responsible adult should stay with you and you should rest quietly for the rest of the day.    Do not drink alcohol, drive, operate any heavy machinery or power tools or make any legal/important decisions for the next 24 hours.     Progress your diet as tolerated.  If you begin to experience severe pain, increased shortness of breath, racing heartbeat or a fever above 101 F, seek immediate medical attention.     Follow up with MD as instructed. Call office for results in 3 to 5 days if needed.     Dr Cleveland office at [phone]    Findings:   Terminal ileum: Normal mucosa distal 10 cm  Colon: Normal mucosa to cecum.  4, sessile, 3 to 6 mm polyps completely removed with cold snare single piece polypectomies and retrieved.  3 from the ascending colon, and 1 from the transverse colon.  Mild diverticulosis of sigmoid colon not diverticulitis or bleeding  Grade 1 internal hemorrhoids     Impression:  Colonoscopy showed 4 colon polyps removed with snare, diverticulosis of sigmoid colon, and internal hemorrhoids     Recommendations:  Follow-up on pathology  Repeat colonoscopy in 5 years if medically appropriate  High-fiber diet

## 2025-02-14 NOTE — ANESTHESIA PREPROCEDURE EVALUATION
Anesthesia Evaluation     Patient summary reviewed and Nursing notes reviewed   history of anesthetic complications:  PONV  NPO Solid Status: > 6 hours  NPO Liquid Status: > 6 hours           Airway   Mallampati: II  TM distance: >3 FB  Neck ROM: full  No difficulty expected  Dental    (+) edentulous    Pulmonary - normal exam    breath sounds clear to auscultation  (+) pneumonia , COPD, asthma,shortness of breath, sleep apnea  Cardiovascular - normal exam    ECG reviewed  Rhythm: regular  Rate: normal    (+) hypertension, valvular problems/murmurs (s/p replacement) AS and murmur, CHF , PVD, hyperlipidemia      Neuro/Psych  (+) headaches, dizziness/light headedness, psychiatric history Anxiety, Depression and Bipolar  GI/Hepatic/Renal/Endo    (+) obesity, morbid obesity, hiatal hernia, GERD, diabetes mellitus, thyroid problem hypothyroidism    Musculoskeletal     Abdominal  - normal exam    Abdomen: soft.  Bowel sounds: normal.   Substance History - negative use     OB/GYN negative ob/gyn ROS         Other   arthritis,   history of cancer                      Anesthesia Plan    ASA 4     general     intravenous induction     Anesthetic plan, risks, benefits, and alternatives have been provided, discussed and informed consent has been obtained with: patient.    Plan discussed with CRNA.

## 2025-02-14 NOTE — ANESTHESIA POSTPROCEDURE EVALUATION
Patient: Claudia Rust    Procedure Summary       Date: 02/14/25 Room / Location: [facility] ENDOSCOPY 1 / [facility] ENDOSCOPY    Anesthesia Start: 1140 Anesthesia Stop: 1215    Procedure: COLONOSCOPY WITH POLYPECTOMY X 4 Diagnosis:       Screen for colon cancer      (Screen for colon cancer [Z12.11])    Surgeons: Segun Cleveland MD Provider: Vu Seymour MD    Anesthesia Type: general ASA Status: 4            Anesthesia Type: general    Vitals  Vitals Value Taken Time   /47 02/14/25 1236   Temp     Pulse 59 02/14/25 1238   Resp 12 02/14/25 1234   SpO2 95 % 02/14/25 1238   Vitals shown include unfiled device data.        Post Anesthesia Care and Evaluation    Patient location during evaluation: PACU  Patient participation: complete - patient participated  Level of consciousness: awake  Pain scale: See nurse's notes for pain score.  Pain management: adequate    Airway patency: patent  Anesthetic complications: No anesthetic complications  PONV Status: none  Cardiovascular status: acceptable  Respiratory status: acceptable and spontaneous ventilation  Hydration status: acceptable    Comments: Patient seen and examined postoperatively; vital signs stable; SpO2 greater than or equal to 90%; cardiopulmonary status stable; nausea/vomiting adequately controlled; pain adequately controlled; no apparent anesthesia complications; patient discharged from anesthesia care when discharge criteria were met

## 2025-02-14 NOTE — OP NOTE
COLONOSCOPY Procedure Report    Patient Name:  Claudia Rust  YOB: 1950    Date of Surgery:  2/14/2025     Preoperative diagnosis:  Average risk [place] for colon cancer    Postoperative diagnosis:  Colon polyps x 4  Mild diverticulosis of sigmoid colon without diverticulitis or bleeding  Grade 1 internal hemorrhoids    No CPT Code Applied in Case Entry    Procedure(s):  COLONOSCOPY WITH SNARE POLYPECTOMY X 4     Staff:  Surgeon(s):  Segun Cleveland MD      Anesthesia: Monitored Anesthesia Care    Implants:    Nothing was implanted during the procedure    Specimen:        See below    DNR status: Patient wishes full code during the procedure    Estimated blood loss: Minimal     Complications:  None     Description of Procedure:  Informed consent was obtained for the procedure, including sedation.  Risks of perforation, hemorrhage, adverse drug reaction and aspiration were discussed.  The patient was brought into the endoscopy suite. Continuous cardiopulmonary monitoring was performed.  The patient was placed in the left lateral decubitus position. After adequate sedation was attained, the digital rectal exam was performed which was normal.  Subsequently, the Olympus colonoscope was inserted into the patient's rectum and advanced to the level of the cecum and terminal ileum with photodocumentation without difficulty.  The bowel prep was negative.  Circumferential examination of the patient's colon was performed on scope withdrawal. The cecum, ascending colon, and hepatic flexure were examined twice.  The transverse colon, splenic flexure, descending, sigmoid colon, and rectum were examined.  A retroflex exam was performed in the rectum.  The bowel was decompressed, the scope was withdrawn from the patient, and the patient tolerated the procedure well.     Findings:   Terminal ileum: Normal mucosa distal 10 cm  Colon: Normal mucosa to cecum.  4, sessile, 3 to 6 mm polyps completely removed  with cold snare single piece polypectomies and retrieved.  3 from the ascending colon, and 1 from the transverse colon.  Mild diverticulosis of sigmoid colon not diverticulitis or bleeding  Grade 1 internal hemorrhoids    Impression:  Colonoscopy showed 4 colon polyps removed with snare, diverticulosis of sigmoid colon, and internal hemorrhoids    Recommendations:  Follow-up on pathology  Repeat colonoscopy in 5 years if medically appropriate  High-fiber diet    We appreciate the referral    Electronically signed by Segun Cleveland MD, 02/14/25, 12:10 PM EST.

## 2025-02-17 LAB
LAB AP CASE REPORT: NORMAL
PATH REPORT.FINAL DX SPEC: NORMAL
PATH REPORT.GROSS SPEC: NORMAL

## 2025-04-23 ENCOUNTER — TRANSCRIBE ORDERS (OUTPATIENT)
Dept: ADMINISTRATIVE | Facility: HOSPITAL | Age: 75
End: 2025-04-23
Payer: MEDICAID

## 2025-04-23 DIAGNOSIS — Z87.891 PERSONAL HISTORY OF SMOKING: Primary | ICD-10-CM

## 2025-05-23 ENCOUNTER — APPOINTMENT (OUTPATIENT)
Dept: CT IMAGING | Facility: HOSPITAL | Age: 75
End: 2025-05-23
Payer: MEDICARE

## 2025-05-23 ENCOUNTER — APPOINTMENT (OUTPATIENT)
Dept: GENERAL RADIOLOGY | Facility: HOSPITAL | Age: 75
End: 2025-05-23
Payer: MEDICARE

## 2025-05-23 ENCOUNTER — HOSPITAL ENCOUNTER (INPATIENT)
Facility: HOSPITAL | Age: 75
LOS: 5 days | Discharge: NURSING, REHAB OR LONG-TERM CARE | End: 2025-05-31
Attending: EMERGENCY MEDICINE | Admitting: STUDENT IN AN ORGANIZED HEALTH CARE EDUCATION/TRAINING PROGRAM
Payer: MEDICARE

## 2025-05-23 DIAGNOSIS — E87.6 HYPOKALEMIA: ICD-10-CM

## 2025-05-23 DIAGNOSIS — N17.9 ACUTE KIDNEY INJURY: ICD-10-CM

## 2025-05-23 DIAGNOSIS — S42.352A CLOSED DISPLACED COMMINUTED FRACTURE OF SHAFT OF LEFT HUMERUS, INITIAL ENCOUNTER: Primary | ICD-10-CM
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
## 2025-05-23 LAB
ANION GAP SERPL CALCULATED.3IONS-SCNC: 11.8 MMOL/L (ref 5–15)
APTT PPP: 29.1 SECONDS (ref 22.7–35.4)
BASOPHILS # BLD AUTO: 0.04 10*3/MM3 (ref 0–0.2)
BASOPHILS NFR BLD AUTO: 0.4 % (ref 0–1.5)
BUN SERPL-MCNC: 51 MG/DL (ref 8–23)
BUN/CREAT SERPL: 31.3 (ref 7–25)
CALCIUM SPEC-SCNC: 9 MG/DL (ref 8.6–10.5)
CHLORIDE SERPL-SCNC: 96 MMOL/L (ref 98–107)
CO2 SERPL-SCNC: 29.2 MMOL/L (ref 22–29)
CREAT SERPL-MCNC: 1.63 MG/DL (ref 0.57–1)
DEPRECATED RDW RBC AUTO: 47.5 FL (ref 37–54)
EGFRCR SERPLBLD CKD-EPI 2021: 33 ML/MIN/1.73
EOSINOPHIL # BLD AUTO: 0.18 10*3/MM3 (ref 0–0.4)
EOSINOPHIL NFR BLD AUTO: 1.6 % (ref 0.3–6.2)
ERYTHROCYTE [DISTWIDTH] IN BLOOD BY AUTOMATED COUNT: 14 % (ref 12.3–15.4)
GLUCOSE SERPL-MCNC: 133 MG/DL (ref 65–99)
HCT VFR BLD AUTO: 37.6 % (ref 34–46.6)
HGB BLD-MCNC: 12 G/DL (ref 12–15.9)
HOLD SPECIMEN: NORMAL
IMM GRANULOCYTES # BLD AUTO: 0.1 10*3/MM3 (ref 0–0.05)
IMM GRANULOCYTES NFR BLD AUTO: 0.9 % (ref 0–0.5)
INR PPP: 1.16 (ref 0.9–1.1)
LYMPHOCYTES # BLD AUTO: 3.52 10*3/MM3 (ref 0.7–3.1)
LYMPHOCYTES NFR BLD AUTO: 32.1 % (ref 19.6–45.3)
MCH RBC QN AUTO: 29.6 PG (ref 26.6–33)
MCHC RBC AUTO-ENTMCNC: 31.9 G/DL (ref 31.5–35.7)
MCV RBC AUTO: 92.6 FL (ref 79–97)
MONOCYTES # BLD AUTO: 1.1 10*3/MM3 (ref 0.1–0.9)
MONOCYTES NFR BLD AUTO: 10 % (ref 5–12)
NEUTROPHILS NFR BLD AUTO: 55 % (ref 42.7–76)
NEUTROPHILS NFR BLD AUTO: 6.03 10*3/MM3 (ref 1.7–7)
NRBC BLD AUTO-RTO: 0 /100 WBC (ref 0–0.2)
PLATELET # BLD AUTO: 256 10*3/MM3 (ref 140–450)
PMV BLD AUTO: 9.8 FL (ref 6–12)
POTASSIUM SERPL-SCNC: 2.7 MMOL/L (ref 3.5–5.2)
PROTHROMBIN TIME: 14.7 SECONDS (ref 11.7–14.2)
RBC # BLD AUTO: 4.06 10*6/MM3 (ref 3.77–5.28)
SODIUM SERPL-SCNC: 137 MMOL/L (ref 136–145)
WBC NRBC COR # BLD AUTO: 10.97 10*3/MM3 (ref 3.4–10.8)

## 2025-05-23 PROCEDURE — 99285 EMERGENCY DEPT VISIT HI MDM: CPT

## 2025-05-23 PROCEDURE — 85610 PROTHROMBIN TIME: CPT | Performed by: EMERGENCY MEDICINE

## 2025-05-23 PROCEDURE — 25010000002 ONDANSETRON PER 1 MG: Performed by: EMERGENCY MEDICINE

## 2025-05-23 PROCEDURE — 72125 CT NECK SPINE W/O DYE: CPT

## 2025-05-23 PROCEDURE — 73060 X-RAY EXAM OF HUMERUS: CPT

## 2025-05-23 PROCEDURE — 80048 BASIC METABOLIC PNL TOTAL CA: CPT | Performed by: EMERGENCY MEDICINE

## 2025-05-23 PROCEDURE — 70450 CT HEAD/BRAIN W/O DYE: CPT

## 2025-05-23 PROCEDURE — 25010000002 HYDROMORPHONE 1 MG/ML SOLUTION: Performed by: EMERGENCY MEDICINE

## 2025-05-23 PROCEDURE — 85025 COMPLETE CBC W/AUTO DIFF WBC: CPT | Performed by: EMERGENCY MEDICINE

## 2025-05-23 PROCEDURE — 85730 THROMBOPLASTIN TIME PARTIAL: CPT | Performed by: EMERGENCY MEDICINE

## 2025-05-23 PROCEDURE — 83735 ASSAY OF MAGNESIUM: CPT | Performed by: EMERGENCY MEDICINE

## 2025-05-23 RX ORDER — SODIUM CHLORIDE 0.9 % (FLUSH) 0.9 %
10 SYRINGE (ML) INJECTION AS NEEDED
Status: DISCONTINUED | OUTPATIENT
Start: 2025-05-23 | End: 2025-06-01 | Stop reason: HOSPADM

## 2025-05-23 RX ORDER — ONDANSETRON 2 MG/ML
4 INJECTION INTRAMUSCULAR; INTRAVENOUS ONCE
Status: COMPLETED | OUTPATIENT
Start: 2025-05-23 | End: 2025-05-23

## 2025-05-23 RX ADMIN — HYDROMORPHONE HYDROCHLORIDE 1 MG: 1 INJECTION, SOLUTION INTRAMUSCULAR; INTRAVENOUS; SUBCUTANEOUS at 23:08

## 2025-05-23 RX ADMIN — ONDANSETRON 4 MG: 2 INJECTION, SOLUTION INTRAMUSCULAR; INTRAVENOUS at 23:07

## 2025-05-23 NOTE — LETTER
EMS Transport Request  For use at [facility], [place], [place], [place], and [place] only   Patient Name: Claudia Rust : 1950   Weight:(!) 137 kg (302 lb) Pick-up Location: [facility] BLS/ALS: BLS/ALS: ALS   Insurance: [employer] MEDICARE REPLACEMENT Auth End Date: n/a   Pre-Cert #: D/C Summary complete:    Destination: Other [facility]   Contact Precautions: None   Equipment (O2, Fluids, etc.): O2, settings 4LPM high flow NC   Arrive By Date/Time: 2025 Stretcher/WC: Stretcher   CM Requesting: Nieves Houston RN Ext: [phone]   Notes/Medical Necessity: 137kg, 4L high flow NC. Patient requires max assist to roll in bed. Transfers/ambulation not attempted by therapy. Facility unable to transport on weekends. Patient going to room #306. Nurse called report to Morena at facility.      ______________________________________________________________________    *Only 2 patient bags OR 1 carry-on size bag are permitted.  Wheelchairs and walkers CANNOT transported with the patient. Acknowledge: Yes         Nieves Houston, RN    Case Management     Case Management/Social Work     Signed     Date of Service: 25  Creation Time: 25     Signed       Expand All Collapse All    Physicians Statement of Medical Necessity for  Ambulance Transportation     GENERAL INFORMATION      Name: Claudia Rust  YOB: 1950  Medicare #: [id number]   Transport Date: 25 (Valid for round trips this date, or for scheduled repetitive trips for 60 days from the date signed below.)  Origin: [facility]  Destination: [facility]  Is the Patient's stay covered under Medicare Part A (PPS/DRG?)Yes  Closest appropriate facility? Yes  If this a hosp-hosp transfer? No  Is this a hospice patient? No     MEDICAL NECESSITY QUESTIONAIRE     Ambulance Transportation is medically necessary only if other means of transportation are contraindicated or would be  "potentially harmful to the patient.  To meet this requirement, the patient must be either \"bed confined\" or suffer from a condition such that transport by means other than an ambulance is contraindicated by the patient's condition.  The following questions must be answered by the healthcare professional signing below for this form to be valid:      1) Describe the MEDICAL CONDITION (physical and/or mental) of this patient AT THE TIME OF AMBULANCE TRANSPORT that requires the patient to be transported in an ambulance, and why transport by other means is contraindicated by the patient's condition: 137kg, 4L high flow NC. Patient requires max assist to roll in bed. Transfers/ambulation not attempted by therapy. Facility unable to transport on weekends. Patient going to room #306. Nurse called report to Morena at facility.   Medical History        Past Medical History:   Diagnosis Date   • Acute congestive heart failure     • Allergies     • Anxiety     • Arthritis     • Asthma     • Bilateral leg edema     • Bipolar 1 disorder     • Bulging lumbar disc       X3   • Cancer       states had \"cancerous tumor during pregnancy and had to have hysterectomy\"   • Cataract       bilateral   • Cellulitis 03/2021     bilateral legs   • Compression fracture of vertebral column       LUMBAR   • COPD (chronic obstructive pulmonary disease)     • Delayed emergence from anesthesia     • Depression     • Diabetes mellitus     • Dyslipidemia     • Dyspnea on exertion     • Fibromyalgia     • GERD (gastroesophageal reflux disease)     • Hiatal hernia     • Hyperlipidemia     • Hypertension     • Hypothyroid       HYPOTHYROID   • IBS (irritable bowel syndrome)     • Migraines     • Morbid obesity     • Neuropathy     • Panic attacks     • Peripheral edema     • PONV (postoperative nausea and vomiting)     • Poor mobility       rolling walker   • Prediabetes     • PVD (peripheral vascular disease)     • Rheumatoid aortitis     • Sleep apnea  "      cpap   • Spinal stenosis     • Vertigo     • Vitamin D deficiency           Surgical History         Past Surgical History:   Procedure Laterality Date   • AORTIC VALVE REPAIR/REPLACEMENT N/A 3/18/2021     Procedure: AORTIC VALVE REPLACEMENT;  Surgeon: Olaf Mcgill MD;  Location: [facility] CVOR;  Service: Cardiothoracic;  Laterality: N/A;   • BRONCHOSCOPY N/A 3/25/2021     Procedure: BRONCHOSCOPY AT BEDSIDE WITH BRONCHIOALVEOLAR LAVAGE;  Surgeon: Glenn Reyes MD;  Location: [facility] ENDOSCOPY;  Service: Pulmonary;  Laterality: N/A;  PNA   • BRONCHOSCOPY N/A 10/16/2023     Procedure: BRONCHOSCOPY with bronchial washing;  Surgeon: Glenn Reyes MD;  Location: [facility] ENDOSCOPY;  Service: Pulmonary;  Laterality: N/A;  post op: pneumonia   • CARDIAC CATHETERIZATION   2009   • CARDIAC CATHETERIZATION N/A 8/14/2019     Procedure: Left Heart Cath;  Surgeon: Jose Levi MD;  Location: [facility] CATH INVASIVE LOCATION;  Service: Cardiovascular   • CARDIAC CATHETERIZATION N/A 8/14/2019     Procedure: Right Heart Cath;  Surgeon: Jose Levi MD;  Location: [facility] CATH INVASIVE LOCATION;  Service: Cardiovascular   • CARDIAC CATHETERIZATION N/A 8/14/2019     Procedure: Aortic root aortogram;  Surgeon: Jose Levi MD;  Location: [facility] CATH INVASIVE LOCATION;  Service: Cardiovascular   • CARDIAC CATHETERIZATION N/A 1/20/2021     Procedure: Left Heart Cath;  Surgeon: Jose Levi MD;  Location: [facility] CATH INVASIVE LOCATION;  Service: Cardiovascular;  Laterality: N/A;   • COLONOSCOPY       • COLONOSCOPY N/A 2/14/2025     Procedure: COLONOSCOPY WITH POLYPECTOMY X 4;  Surgeon: Segun Cleveland MD;  Location: [facility] ENDOSCOPY;  Service: Gastroenterology;  Laterality: N/A;  POST- POLYPS X 4, DIVERTICULOSIS, INTERNAL HEMORRHOIDS   • CYSTOCELE REPAIR   1984   • ELBOW ARTHROPLASTY   2011   • ENDOSCOPY       • FOOT SURGERY       • GALLBLADDER SURGERY   2002   • HUMERUS IM  "NAILING/RODDING Left 5/25/2025     Procedure: HUMERUS INTRAMEDULLARY NAIL/JONO INSERTION LEFT;  Surgeon: Rufus Peralta MD;  Location: [facility] MAIN OR;  Service: Orthopedics;  Laterality: Left;   • TONSILLECTOMY       • VAGINAL HYSTERECTOMY   1972         2) Is this patient \"bed confined\" as defined below?Yes              To be \"bed confined\" the patient must satisfy all three of the following criteria:       (1) unable to get up from bed without assistance; AND (2) unable to ambulate;  AND (3) unable to sit in a chair or wheelchair.  3) Can this patient safely be transported by car or wheelchair van (I.e., may safely sit during transport, without an attendant or monitoring?)No   4. In addition to completing questions 1-3 above, please check any of the following conditions that apply*:          *Note: supporting documentation for any boxes checked must be maintained in the patient's medical records Medical attendant required, Requires oxygen - unable to self administer, Unable to tolerate seated position for time needed to transport, and Morbid obesity requires additional personnel/equipment to safely handle patient        SIGNATURE OF PHYSICIAN OR OTHER AUTHORIZED HEALTHCARE PROFESSIONAL     I certify that the above information is true and correct based on my evaluation of this patient, and represent that the patient requires transport by ambulance and that other forms of transport are contraindicated.  I understand that this information will be used by the Centers for Medicare and Medicaid Services (CMS) to support the determiniation of medical necessity for ambulance services, and I represent that I have personal knowledge of the patient's condition at the time of transport.     x   If this box is checked, I also certify that the patient is physically or mentally incapable of signing the ambulance service's claim form and that the institution with which I am affiliated has furnished care, services or assistance " to the patient.  My signature below is made on behalf of the patient pursuant to 42 .36(b)(4). In accordance with 42 .37, the specific reason(s) that the patient is physically or mentally incapable of signing the claim for is as follows:      Signature of Physician or Healthcare Professional Nieves Houston RN,  Date/Time:   5/31/25 3591      (For Scheduled repetitive transport, this form is not valid for transports performed more than 60 days after this date).                      Nieves Houston RN                                                                                                                      --------------------------------------------------------------------------------------------  Printed Name and Credentials of Physician or Authorized Healthcare Professional      *Form must be signed by patient's attending physician for scheduled, repetitive transports,.  For non-repetitive ambulance transports, if unable to obtain the signature of the attending physician, any of the following may sign (please select below):       Physician   Clinical Nurse Specialist   Registered Nurse x     Physician Assistant   Discharge Planner   Licensed Practical Nurse       Nurse Practitioner    x

## 2025-05-24 ENCOUNTER — ANESTHESIA EVENT (OUTPATIENT)
Dept: PERIOP | Facility: HOSPITAL | Age: 75
End: 2025-05-24
Payer: MEDICAID

## 2025-05-24 ENCOUNTER — APPOINTMENT (OUTPATIENT)
Dept: GENERAL RADIOLOGY | Facility: HOSPITAL | Age: 75
End: 2025-05-24
Payer: MEDICARE

## 2025-05-24 PROBLEM — S42.309A HUMERAL FRACTURE: Status: ACTIVE | Noted: 2025-05-24

## 2025-05-24 LAB
ABO GROUP BLD: NORMAL
ALBUMIN SERPL-MCNC: 3.8 G/DL (ref 3.5–5.2)
ALBUMIN/GLOB SERPL: 1.2 G/DL
ALP SERPL-CCNC: 108 U/L (ref 39–117)
ALT SERPL W P-5'-P-CCNC: 132 U/L (ref 1–33)
ANION GAP SERPL CALCULATED.3IONS-SCNC: 10.9 MMOL/L (ref 5–15)
AST SERPL-CCNC: 214 U/L (ref 1–32)
BASOPHILS # BLD AUTO: 0.04 10*3/MM3 (ref 0–0.2)
BASOPHILS NFR BLD AUTO: 0.2 % (ref 0–1.5)
BILIRUB SERPL-MCNC: 0.5 MG/DL (ref 0–1.2)
BLD GP AB SCN SERPL QL: NEGATIVE
BUN SERPL-MCNC: 52 MG/DL (ref 8–23)
BUN/CREAT SERPL: 31.9 (ref 7–25)
CALCIUM SPEC-SCNC: 8.7 MG/DL (ref 8.6–10.5)
CHLORIDE SERPL-SCNC: 97 MMOL/L (ref 98–107)
CO2 SERPL-SCNC: 30.1 MMOL/L (ref 22–29)
CREAT SERPL-MCNC: 1.63 MG/DL (ref 0.57–1)
DEPRECATED RDW RBC AUTO: 47.1 FL (ref 37–54)
EGFRCR SERPLBLD CKD-EPI 2021: 33 ML/MIN/1.73
EOSINOPHIL # BLD AUTO: 0.05 10*3/MM3 (ref 0–0.4)
EOSINOPHIL NFR BLD AUTO: 0.3 % (ref 0.3–6.2)
ERYTHROCYTE [DISTWIDTH] IN BLOOD BY AUTOMATED COUNT: 13.7 % (ref 12.3–15.4)
GLOBULIN UR ELPH-MCNC: 3.1 GM/DL
GLUCOSE SERPL-MCNC: 156 MG/DL (ref 65–99)
HCT VFR BLD AUTO: 36.8 % (ref 34–46.6)
HGB BLD-MCNC: 11.9 G/DL (ref 12–15.9)
IMM GRANULOCYTES # BLD AUTO: 0.12 10*3/MM3 (ref 0–0.05)
IMM GRANULOCYTES NFR BLD AUTO: 0.7 % (ref 0–0.5)
INR PPP: 1.18 (ref 0.9–1.1)
LYMPHOCYTES # BLD AUTO: 2.17 10*3/MM3 (ref 0.7–3.1)
LYMPHOCYTES NFR BLD AUTO: 13 % (ref 19.6–45.3)
MAGNESIUM SERPL-MCNC: 2.3 MG/DL (ref 1.6–2.4)
MAGNESIUM SERPL-MCNC: 2.4 MG/DL (ref 1.6–2.4)
MCH RBC QN AUTO: 30.3 PG (ref 26.6–33)
MCHC RBC AUTO-ENTMCNC: 32.3 G/DL (ref 31.5–35.7)
MCV RBC AUTO: 93.6 FL (ref 79–97)
MONOCYTES # BLD AUTO: 1.52 10*3/MM3 (ref 0.1–0.9)
MONOCYTES NFR BLD AUTO: 9.1 % (ref 5–12)
NEUTROPHILS NFR BLD AUTO: 12.73 10*3/MM3 (ref 1.7–7)
NEUTROPHILS NFR BLD AUTO: 76.7 % (ref 42.7–76)
NRBC BLD AUTO-RTO: 0 /100 WBC (ref 0–0.2)
PHOSPHATE SERPL-MCNC: 4.5 MG/DL (ref 2.5–4.5)
PLATELET # BLD AUTO: 281 10*3/MM3 (ref 140–450)
PMV BLD AUTO: 10.1 FL (ref 6–12)
POTASSIUM SERPL-SCNC: 3.6 MMOL/L (ref 3.5–5.2)
PROT SERPL-MCNC: 6.9 G/DL (ref 6–8.5)
PROTHROMBIN TIME: 14.9 SECONDS (ref 11.7–14.2)
RBC # BLD AUTO: 3.93 10*6/MM3 (ref 3.77–5.28)
RH BLD: POSITIVE
SODIUM SERPL-SCNC: 138 MMOL/L (ref 136–145)
T&S EXPIRATION DATE: NORMAL
WBC NRBC COR # BLD AUTO: 16.63 10*3/MM3 (ref 3.4–10.8)

## 2025-05-24 PROCEDURE — 25010000002 POTASSIUM CHLORIDE 10 MEQ/100ML SOLUTION: Performed by: EMERGENCY MEDICINE

## 2025-05-24 PROCEDURE — 93005 ELECTROCARDIOGRAM TRACING: CPT | Performed by: INTERNAL MEDICINE

## 2025-05-24 PROCEDURE — 99222 1ST HOSP IP/OBS MODERATE 55: CPT | Performed by: INTERNAL MEDICINE

## 2025-05-24 PROCEDURE — G0378 HOSPITAL OBSERVATION PER HR: HCPCS

## 2025-05-24 PROCEDURE — 94799 UNLISTED PULMONARY SVC/PX: CPT

## 2025-05-24 PROCEDURE — 25010000002 HYDROMORPHONE PER 4 MG: Performed by: STUDENT IN AN ORGANIZED HEALTH CARE EDUCATION/TRAINING PROGRAM

## 2025-05-24 PROCEDURE — 25010000002 HYDROMORPHONE 1 MG/ML SOLUTION: Performed by: EMERGENCY MEDICINE

## 2025-05-24 PROCEDURE — 83735 ASSAY OF MAGNESIUM: CPT | Performed by: STUDENT IN AN ORGANIZED HEALTH CARE EDUCATION/TRAINING PROGRAM

## 2025-05-24 PROCEDURE — 93010 ELECTROCARDIOGRAM REPORT: CPT | Performed by: INTERNAL MEDICINE

## 2025-05-24 PROCEDURE — 94640 AIRWAY INHALATION TREATMENT: CPT

## 2025-05-24 PROCEDURE — 94761 N-INVAS EAR/PLS OXIMETRY MLT: CPT

## 2025-05-24 PROCEDURE — 86850 RBC ANTIBODY SCREEN: CPT | Performed by: STUDENT IN AN ORGANIZED HEALTH CARE EDUCATION/TRAINING PROGRAM

## 2025-05-24 PROCEDURE — 25010000002 HYDROMORPHONE 1 MG/ML SOLUTION: Performed by: HOSPITALIST

## 2025-05-24 PROCEDURE — 85610 PROTHROMBIN TIME: CPT | Performed by: STUDENT IN AN ORGANIZED HEALTH CARE EDUCATION/TRAINING PROGRAM

## 2025-05-24 PROCEDURE — 85025 COMPLETE CBC W/AUTO DIFF WBC: CPT | Performed by: STUDENT IN AN ORGANIZED HEALTH CARE EDUCATION/TRAINING PROGRAM

## 2025-05-24 PROCEDURE — 73070 X-RAY EXAM OF ELBOW: CPT

## 2025-05-24 PROCEDURE — 80053 COMPREHEN METABOLIC PANEL: CPT | Performed by: STUDENT IN AN ORGANIZED HEALTH CARE EDUCATION/TRAINING PROGRAM

## 2025-05-24 PROCEDURE — 86901 BLOOD TYPING SEROLOGIC RH(D): CPT | Performed by: STUDENT IN AN ORGANIZED HEALTH CARE EDUCATION/TRAINING PROGRAM

## 2025-05-24 PROCEDURE — 94664 DEMO&/EVAL PT USE INHALER: CPT

## 2025-05-24 PROCEDURE — 86900 BLOOD TYPING SEROLOGIC ABO: CPT | Performed by: STUDENT IN AN ORGANIZED HEALTH CARE EDUCATION/TRAINING PROGRAM

## 2025-05-24 PROCEDURE — 84100 ASSAY OF PHOSPHORUS: CPT | Performed by: STUDENT IN AN ORGANIZED HEALTH CARE EDUCATION/TRAINING PROGRAM

## 2025-05-24 RX ORDER — GABAPENTIN 300 MG/1
300 CAPSULE ORAL 3 TIMES DAILY
Status: DISCONTINUED | OUTPATIENT
Start: 2025-05-24 | End: 2025-06-01 | Stop reason: HOSPADM

## 2025-05-24 RX ORDER — BISACODYL 5 MG/1
5 TABLET, DELAYED RELEASE ORAL DAILY PRN
Status: DISCONTINUED | OUTPATIENT
Start: 2025-05-24 | End: 2025-06-01 | Stop reason: HOSPADM

## 2025-05-24 RX ORDER — PANTOPRAZOLE SODIUM 40 MG/1
40 TABLET, DELAYED RELEASE ORAL NIGHTLY
Status: DISCONTINUED | OUTPATIENT
Start: 2025-05-24 | End: 2025-06-01 | Stop reason: HOSPADM

## 2025-05-24 RX ORDER — HYDROMORPHONE HYDROCHLORIDE 1 MG/ML
0.5 INJECTION, SOLUTION INTRAMUSCULAR; INTRAVENOUS; SUBCUTANEOUS EVERY 4 HOURS PRN
Status: COMPLETED | OUTPATIENT
Start: 2025-05-24 | End: 2025-05-24

## 2025-05-24 RX ORDER — BISACODYL 10 MG
10 SUPPOSITORY, RECTAL RECTAL DAILY PRN
Status: DISCONTINUED | OUTPATIENT
Start: 2025-05-24 | End: 2025-06-01 | Stop reason: HOSPADM

## 2025-05-24 RX ORDER — IPRATROPIUM BROMIDE AND ALBUTEROL SULFATE 2.5; .5 MG/3ML; MG/3ML
3 SOLUTION RESPIRATORY (INHALATION)
Status: DISCONTINUED | OUTPATIENT
Start: 2025-05-24 | End: 2025-06-01 | Stop reason: HOSPADM

## 2025-05-24 RX ORDER — OXYCODONE HYDROCHLORIDE 5 MG/1
10 TABLET ORAL EVERY 4 HOURS PRN
Refills: 0 | Status: DISPENSED | OUTPATIENT
Start: 2025-05-24 | End: 2025-05-29

## 2025-05-24 RX ORDER — METOPROLOL SUCCINATE 25 MG/1
12.5 TABLET, EXTENDED RELEASE ORAL DAILY
Status: DISCONTINUED | OUTPATIENT
Start: 2025-05-24 | End: 2025-06-01 | Stop reason: HOSPADM

## 2025-05-24 RX ORDER — MECLIZINE HYDROCHLORIDE 25 MG/1
25 TABLET ORAL 3 TIMES DAILY PRN
Status: DISCONTINUED | OUTPATIENT
Start: 2025-05-24 | End: 2025-06-01 | Stop reason: HOSPADM

## 2025-05-24 RX ORDER — AMOXICILLIN 250 MG
2 CAPSULE ORAL 2 TIMES DAILY PRN
Status: DISCONTINUED | OUTPATIENT
Start: 2025-05-24 | End: 2025-06-01 | Stop reason: HOSPADM

## 2025-05-24 RX ORDER — POLYETHYLENE GLYCOL 3350 17 G/17G
17 POWDER, FOR SOLUTION ORAL DAILY
Status: DISCONTINUED | OUTPATIENT
Start: 2025-05-24 | End: 2025-06-01 | Stop reason: HOSPADM

## 2025-05-24 RX ORDER — POTASSIUM CHLORIDE 1.5 G/1.58G
40 POWDER, FOR SOLUTION ORAL
Status: DISPENSED | OUTPATIENT
Start: 2025-05-24 | End: 2025-05-24

## 2025-05-24 RX ORDER — POLYETHYLENE GLYCOL 3350 17 G/17G
17 POWDER, FOR SOLUTION ORAL DAILY PRN
Status: DISCONTINUED | OUTPATIENT
Start: 2025-05-24 | End: 2025-06-01 | Stop reason: HOSPADM

## 2025-05-24 RX ORDER — SODIUM CHLORIDE 9 MG/ML
40 INJECTION, SOLUTION INTRAVENOUS AS NEEDED
Status: DISCONTINUED | OUTPATIENT
Start: 2025-05-24 | End: 2025-06-01 | Stop reason: HOSPADM

## 2025-05-24 RX ORDER — POTASSIUM CHLORIDE 7.45 MG/ML
10 INJECTION INTRAVENOUS
Status: COMPLETED | OUTPATIENT
Start: 2025-05-24 | End: 2025-05-24

## 2025-05-24 RX ORDER — ATORVASTATIN CALCIUM 10 MG/1
10 TABLET, FILM COATED ORAL DAILY
Status: DISCONTINUED | OUTPATIENT
Start: 2025-05-24 | End: 2025-06-01 | Stop reason: HOSPADM

## 2025-05-24 RX ORDER — DOCUSATE SODIUM 100 MG/1
100 CAPSULE, LIQUID FILLED ORAL NIGHTLY
COMMUNITY

## 2025-05-24 RX ORDER — SODIUM CHLORIDE 0.9 % (FLUSH) 0.9 %
10 SYRINGE (ML) INJECTION EVERY 12 HOURS SCHEDULED
Status: DISCONTINUED | OUTPATIENT
Start: 2025-05-24 | End: 2025-06-01 | Stop reason: HOSPADM

## 2025-05-24 RX ORDER — LOSARTAN POTASSIUM 25 MG/1
25 TABLET ORAL DAILY
Status: DISCONTINUED | OUTPATIENT
Start: 2025-05-24 | End: 2025-05-25

## 2025-05-24 RX ORDER — OXYCODONE HYDROCHLORIDE 5 MG/1
5 TABLET ORAL EVERY 6 HOURS PRN
Refills: 0 | Status: DISPENSED | OUTPATIENT
Start: 2025-05-24 | End: 2025-05-29

## 2025-05-24 RX ORDER — HYDROMORPHONE HYDROCHLORIDE 1 MG/ML
0.5 INJECTION, SOLUTION INTRAMUSCULAR; INTRAVENOUS; SUBCUTANEOUS EVERY 4 HOURS PRN
Status: DISCONTINUED | OUTPATIENT
Start: 2025-05-24 | End: 2025-05-24 | Stop reason: SDUPTHER

## 2025-05-24 RX ORDER — NITROGLYCERIN 0.4 MG/1
0.4 TABLET SUBLINGUAL
Status: DISCONTINUED | OUTPATIENT
Start: 2025-05-24 | End: 2025-06-01 | Stop reason: HOSPADM

## 2025-05-24 RX ORDER — LORATADINE 10 MG/1
10 TABLET ORAL DAILY
COMMUNITY

## 2025-05-24 RX ORDER — SODIUM CHLORIDE 0.9 % (FLUSH) 0.9 %
10 SYRINGE (ML) INJECTION AS NEEDED
Status: DISCONTINUED | OUTPATIENT
Start: 2025-05-24 | End: 2025-06-01 | Stop reason: HOSPADM

## 2025-05-24 RX ORDER — DOCUSATE SODIUM 100 MG/1
100 CAPSULE, LIQUID FILLED ORAL NIGHTLY
Status: DISCONTINUED | OUTPATIENT
Start: 2025-05-24 | End: 2025-06-01 | Stop reason: HOSPADM

## 2025-05-24 RX ADMIN — HYDROMORPHONE HYDROCHLORIDE 1 MG: 1 INJECTION, SOLUTION INTRAMUSCULAR; INTRAVENOUS; SUBCUTANEOUS at 00:20

## 2025-05-24 RX ADMIN — IPRATROPIUM BROMIDE AND ALBUTEROL SULFATE 3 ML: .5; 3 SOLUTION RESPIRATORY (INHALATION) at 19:36

## 2025-05-24 RX ADMIN — HYDROMORPHONE HYDROCHLORIDE 0.5 MG: 1 INJECTION, SOLUTION INTRAMUSCULAR; INTRAVENOUS; SUBCUTANEOUS at 09:02

## 2025-05-24 RX ADMIN — METOPROLOL SUCCINATE 12.5 MG: 25 TABLET, EXTENDED RELEASE ORAL at 11:53

## 2025-05-24 RX ADMIN — GABAPENTIN 300 MG: 300 CAPSULE ORAL at 16:11

## 2025-05-24 RX ADMIN — FLUOXETINE 60 MG: 20 CAPSULE ORAL at 11:53

## 2025-05-24 RX ADMIN — GABAPENTIN 300 MG: 300 CAPSULE ORAL at 20:13

## 2025-05-24 RX ADMIN — OXYCODONE 10 MG: 5 TABLET ORAL at 20:13

## 2025-05-24 RX ADMIN — POTASSIUM CHLORIDE 10 MEQ: 7.46 INJECTION, SOLUTION INTRAVENOUS at 00:20

## 2025-05-24 RX ADMIN — HYDROMORPHONE HYDROCHLORIDE 0.5 MG: 1 INJECTION, SOLUTION INTRAMUSCULAR; INTRAVENOUS; SUBCUTANEOUS at 01:35

## 2025-05-24 RX ADMIN — HYDROMORPHONE HYDROCHLORIDE 0.5 MG: 1 INJECTION, SOLUTION INTRAMUSCULAR; INTRAVENOUS; SUBCUTANEOUS at 12:59

## 2025-05-24 RX ADMIN — IPRATROPIUM BROMIDE AND ALBUTEROL SULFATE 3 ML: .5; 3 SOLUTION RESPIRATORY (INHALATION) at 15:05

## 2025-05-24 RX ADMIN — DOCUSATE SODIUM 100 MG: 100 CAPSULE, LIQUID FILLED ORAL at 20:13

## 2025-05-24 RX ADMIN — ATORVASTATIN CALCIUM 10 MG: 10 TABLET ORAL at 11:53

## 2025-05-24 RX ADMIN — PANTOPRAZOLE SODIUM 40 MG: 40 TABLET, DELAYED RELEASE ORAL at 20:13

## 2025-05-24 RX ADMIN — LEVOTHYROXINE SODIUM 137 MCG: 0.11 TABLET ORAL at 05:39

## 2025-05-24 RX ADMIN — POLYETHYLENE GLYCOL 3350 17 G: 17 POWDER, FOR SOLUTION ORAL at 11:54

## 2025-05-24 RX ADMIN — OXYCODONE 10 MG: 5 TABLET ORAL at 16:11

## 2025-05-24 RX ADMIN — OXYCODONE 10 MG: 5 TABLET ORAL at 11:53

## 2025-05-24 RX ADMIN — HYDROMORPHONE HYDROCHLORIDE 0.5 MG: 1 INJECTION, SOLUTION INTRAMUSCULAR; INTRAVENOUS; SUBCUTANEOUS at 22:42

## 2025-05-24 RX ADMIN — Medication 10 ML: at 09:03

## 2025-05-24 RX ADMIN — HYDROMORPHONE HYDROCHLORIDE 0.5 MG: 1 INJECTION, SOLUTION INTRAMUSCULAR; INTRAVENOUS; SUBCUTANEOUS at 17:54

## 2025-05-24 RX ADMIN — POTASSIUM CHLORIDE 10 MEQ: 7.46 INJECTION, SOLUTION INTRAVENOUS at 01:35

## 2025-05-24 RX ADMIN — GABAPENTIN 300 MG: 300 CAPSULE ORAL at 11:53

## 2025-05-24 RX ADMIN — IPRATROPIUM BROMIDE AND ALBUTEROL SULFATE 3 ML: .5; 3 SOLUTION RESPIRATORY (INHALATION) at 07:50

## 2025-05-24 RX ADMIN — POTASSIUM CHLORIDE 40 MEQ: 1.5 POWDER, FOR SOLUTION ORAL at 00:20

## 2025-05-24 RX ADMIN — Medication 10 ML: at 20:14

## 2025-05-24 NOTE — PLAN OF CARE
Goal Outcome Evaluation:    Patient is A/O x4 able to make needs known. Spouse at bedside participating in care. Pain treated per MAR, patient declining ice packs at this time. Cardiology consulted for preop evaluation. Plan for surgery tomorrow, NPO MN.

## 2025-05-24 NOTE — PLAN OF CARE
Goal Outcome Evaluation:  Patient's pain controlled with current pain medication regimen, left arm in sling as patient would tolerate placement with pillow under arm, ortho consult called to Dr. Peralta, labs obtained, awaiting decision by ortho if possible surgery might be needed,  at bedside

## 2025-05-24 NOTE — PROGRESS NOTES
[facility] MEDICINE SERVICE  DAILY PROGRESS NOTE    NAME: Claudia Rust  : 1950  MRN: [id number]      LOS: 0 days     PROVIDER OF SERVICE: Timothy Duane Brammell, MD    Chief Complaint: Humeral fracture    Subjective:     Interval History:  History taken from: patient    Patient primarily with issues with left arm pain.  She chronically uses her walker for ambulation.  She uses her oxygen chronically at bedtime as well as as needed during the day.  She denies any shortness of breath.  Denies any  bladder issues has IBS with constipation with some worsened bowel issues at present..  Denies any other additional acute issues.        Review of Systems:   Review of Systems   All other systems reviewed and are negative.      Objective:     Vital Signs  Temp:  [97.6 °F (36.4 °C)-97.9 °F (36.6 °C)] 97.9 °F (36.6 °C)  Heart Rate:  [] 72  Resp:  [19-20] 20  BP: ()/() 127/62  Flow (L/min) (Oxygen Therapy):  [2] 2   Body mass index is 51.84 kg/m².    Physical Exam  Physical Exam  Constitutional:       General: She is not in acute distress.     Appearance: She is obese.   HENT:      Head: Normocephalic.   Cardiovascular:      Rate and Rhythm: Normal rate and regular rhythm.   Pulmonary:      Effort: Pulmonary effort is normal.      Breath sounds: Normal breath sounds.   Abdominal:      General: Bowel sounds are normal.      Palpations: Abdomen is soft.   Musculoskeletal:         General: No swelling.      Comments: Left arm in sling   Neurological:      Mental Status: She is alert.            Diagnostic Data    Results from last 7 days   Lab Units 25  0319   WBC 10*3/mm3 16.63*   HEMOGLOBIN g/dL 11.9*   HEMATOCRIT % 36.8   PLATELETS 10*3/mm3 281   GLUCOSE mg/dL 156*   CREATININE mg/dL 1.63*   BUN mg/dL 52*   SODIUM mmol/L 138   POTASSIUM mmol/L 3.6   AST (SGOT) U/L 214*   ALT (SGPT) U/L 132*   ALK PHOS U/L 108   BILIRUBIN mg/dL 0.5   ANION GAP mmol/L 10.9       CT Head Without  Contrast  Result Date: 5/24/2025  Impression: 1.No acute intracranial process. 2.No acute osseous abnormality of the cervical spine. Electronically Signed: Tana Sutherland MD  5/24/2025 12:08 AM EDT  Workstation ID: [id number]    CT Cervical Spine Without Contrast  Result Date: 5/24/2025  Impression: 1.No acute intracranial process. 2.No acute osseous abnormality of the cervical spine. Electronically Signed: Tana Sutherland MD  5/24/2025 12:08 AM EDT  Workstation ID: [id number]    XR Humerus Left  Result Date: 5/23/2025  Impression: Mildly comminuted mildly displaced fracture of the proximal humeral diaphysis. Electronically Signed: Tana Sutherland MD  5/23/2025 11:38 PM EDT  Workstation ID: [id number]        I reviewed the patient's new clinical results.    Assessment:      Left humeral fracture  Obstructive sleep apnea  Chronic hypoxemic respiratory failure  Massive obesity  CKD stage III  Transaminitis  Constipation     Plan: Orthopedic physician to see.  Symptomatic pain control.  Follow-up labs.  Avoid nephrotoxic drugs.      Active and Resolved Problems  Active Hospital Problems    Diagnosis  POA    **Humeral fracture [S42.309A]  Yes      Resolved Hospital Problems   No resolved problems to display.           VTE Prophylaxis:  No VTE prophylaxis order currently exists.             Disposition Planning:     Barriers to Discharge:ortho clearance  Anticipated Date of Discharge: 5/25  Place of Discharge: home      Time: 30 minutes     Code Status and Medical Interventions: CPR (Attempt to Resuscitate); Full Support   Ordered at: 05/24/25 0116     Code Status (Patient has no pulse and is not breathing):    CPR (Attempt to Resuscitate)     Medical Interventions (Patient has pulse or is breathing):    Full Support       Signature: Electronically signed by Timothy Duane Brammell, MD, 05/24/25, 10:11 EDT.  [facility] Hospitalist Team

## 2025-05-24 NOTE — H&P
"[facility] Medicine Services  History & Physical    Patient Name: Claudia Rust  : 1950  MRN: [id number]  Primary Care Physician:  Davis Boateng NP  Date of admission: 2025  Date and Time of Service: 2025 at 0021    Subjective      Chief Complaint: Mechanical fall    History of Present Illness: Claudia Rust is a 74 y.o. female with a CMH of chronic vertigo, CHF, hypertension, hyperlipidemia, depression, GERD, fibromyalgia, hypothyroidism, who presented to [facility] on 2025 with  Mechanical fall.Patient fell while using her rollator and landed on her left arm, resulting in significant pain and inability to move the arm. She also hit her head but denies loss of consciousness, denies chest pain or SOB. Reports mild right-sided neck pain. No other injuries noted.    In the ED, BP soft, afebrile, labs remarkable for potassium of 2.7, creatinine of 1.63 (1.3), CBC showing WBC of 10.9, hemoglobin and platelets normal, x-ray of the left humerus showing mild given the mildly displaced fracture of the proximal humeral diaphysis.  He has been cervical spine negative for any acute acute process.  Patient given potassium, hydromorphone, Zofran before, admitted to medicine team further inpatient management.    Review of Systems negative unless mentioned above    Personal History     Past Medical History:   Diagnosis Date    Acute congestive heart failure     Allergies     Anxiety     Arthritis     Asthma     Bilateral leg edema     Bipolar 1 disorder     Bulging lumbar disc     X3    Cancer     states had \"cancerous tumor during pregnancy and had to have hysterectomy\"    Cataract     bilateral    Cellulitis 2021    bilateral legs    Compression fracture of vertebral column     LUMBAR    COPD (chronic obstructive pulmonary disease)     Delayed emergence from anesthesia     Depression     Depression     Diabetes mellitus     Dyslipidemia     Dyspnea on exertion     Fibromyalgia  "    GERD (gastroesophageal reflux disease)     Hiatal hernia     Hyperlipidemia     Hypertension     Hypothyroid     HYPOTHYROID    IBS (irritable bowel syndrome)     Migraines     Morbid obesity     Neuropathy     Panic attacks     Peripheral edema     PONV (postoperative nausea and vomiting)     Poor mobility     rolling walker    Prediabetes     PVD (peripheral vascular disease)     Rheumatoid aortitis     Sleep apnea     cpap    Spinal stenosis     Spinal stenosis     Vertigo     Vertigo     Vitamin D deficiency        Past Surgical History:   Procedure Laterality Date    AORTIC VALVE REPAIR/REPLACEMENT N/A 3/18/2021    Procedure: AORTIC VALVE REPLACEMENT;  Surgeon: Olaf Mcgill MD;  Location: [facility] CVOR;  Service: Cardiothoracic;  Laterality: N/A;    BRONCHOSCOPY N/A 3/25/2021    Procedure: BRONCHOSCOPY AT BEDSIDE WITH BRONCHIOALVEOLAR LAVAGE;  Surgeon: Glenn Reyes MD;  Location: [facility] ENDOSCOPY;  Service: Pulmonary;  Laterality: N/A;  PNA    BRONCHOSCOPY N/A 10/16/2023    Procedure: BRONCHOSCOPY with bronchial washing;  Surgeon: Glenn Reyes MD;  Location: [facility] ENDOSCOPY;  Service: Pulmonary;  Laterality: N/A;  post op: pneumonia    CARDIAC CATHETERIZATION  2009    CARDIAC CATHETERIZATION N/A 8/14/2019    Procedure: Left Heart Cath;  Surgeon: Jose Levi MD;  Location: [facility] CATH INVASIVE LOCATION;  Service: Cardiovascular    CARDIAC CATHETERIZATION N/A 8/14/2019    Procedure: Right Heart Cath;  Surgeon: Jose Levi MD;  Location: [facility] CATH INVASIVE LOCATION;  Service: Cardiovascular    CARDIAC CATHETERIZATION N/A 8/14/2019    Procedure: Aortic root aortogram;  Surgeon: Jose Levi MD;  Location: [facility] CATH INVASIVE LOCATION;  Service: Cardiovascular    CARDIAC CATHETERIZATION N/A 1/20/2021    Procedure: Left Heart Cath;  Surgeon: Jose Levi MD;  Location: [facility] CATH INVASIVE LOCATION;  Service: Cardiovascular;  Laterality: N/A;     COLONOSCOPY      COLONOSCOPY N/A 2/14/2025    Procedure: COLONOSCOPY WITH POLYPECTOMY X 4;  Surgeon: Segun Cleveland MD;  Location: [facility] ENDOSCOPY;  Service: Gastroenterology;  Laterality: N/A;  POST- POLYPS X 4, DIVERTICULOSIS, INTERNAL HEMORRHOIDS    CYSTOCELE REPAIR  1984    ELBOW ARTHROPLASTY  2011    ENDOSCOPY      FOOT SURGERY      GALLBLADDER SURGERY  2002    TONSILLECTOMY      VAGINAL HYSTERECTOMY  1972       Family History: family history includes Diabetes in her maternal grandmother; Esophageal cancer in her mother; Hypertension in her father; Lung cancer in her mother; Ovarian cancer in her mother. Otherwise pertinent FHx was reviewed and not pertinent to current issue.    Social History:  reports that she quit smoking about 3 years ago. Her smoking use included cigarettes. She has never been exposed to tobacco smoke. She has never used smokeless tobacco. She reports that she does not currently use alcohol. She reports that she does not use drugs.    Home Medications:  Prior to Admission Medications       Prescriptions Last Dose Informant Patient Reported? Taking?    Acetaminophen 325 MG capsule   Yes No    Take 650 mg by mouth Every 4 (Four) Hours As Needed.    albuterol (ACCUNEB) 0.63 MG/3ML nebulizer solution   No No    Take 3 mL by nebulization Every 6 (Six) Hours As Needed for Wheezing (Dx: J 44.9 (COPD), R 06.09 (dyspnea on exertion)).    B Complex Vitamins (VITAMIN B COMPLEX) capsule capsule  Self Yes No    Take 1 capsule by mouth Daily. LAST DOSE 3/4    budesonide-formoterol (SYMBICORT) 160-4.5 MCG/ACT inhaler   No No    Inhale 2 puffs 2 (Two) Times a Day.    bumetanide (BUMEX) 1 MG tablet   Yes No    Take 1 tablet by mouth Daily.    bumetanide (BUMEX) 2 MG tablet   No No    Take 1 tablet by mouth Daily.    Calcium-Magnesium-Vitamin D (CALCIUM 1200+D3 PO)   Yes No    Take 1 capsule by mouth Daily.    cholecalciferol (Cholecalciferol) 25 MCG (1000 UT) tablet  Self Yes No    Take 2  tablets by mouth Every Night. LAST DOSE 3/4    diphenhydrAMINE (BENADRYL) 50 MG capsule   Yes No    Take 1 capsule by mouth At Night As Needed for Itching.    famotidine (PEPCID) 40 MG tablet   Yes No    Take 1 tablet by mouth every night at bedtime.    Farxiga 5 MG tablet tablet   Yes No    Take 1 tablet by mouth Every Morning.    FLUoxetine (PROzac) 20 MG capsule   Yes No    Take 3 capsules by mouth Daily.    fluticasone (FLONASE) 50 MCG/ACT nasal spray   Yes No    Administer 2 sprays into the nostril(s) as directed by provider Daily.    Patient taking differently:  Administer 2 sprays into the nostril(s) as directed by provider Daily As Needed.    gabapentin (NEURONTIN) 400 MG capsule   Yes No    Take 1 capsule by mouth 3 (Three) Times a Day.    levothyroxine (SYNTHROID, LEVOTHROID) 137 MCG tablet   Yes No    Take 1 tablet by mouth Daily.    losartan (Cozaar) 25 MG tablet   No No    Take 1 tablet by mouth Daily.    meclizine (ANTIVERT) 25 MG tablet  Self Yes No    Take 1 tablet by mouth 3 (Three) Times a Day As Needed.    Melatonin 10 MG capsule   Yes No    Take 2 capsules by mouth Every Night.    metOLazone (ZAROXOLYN) 2.5 MG tablet   Yes No    Take 1 tablet by mouth Every Other Day.    metoprolol succinate XL (TOPROL-XL) 25 MG 24 hr tablet   No No    Take 0.5 tablets by mouth Daily.    Multiple Vitamins-Minerals (MULTIVITAMIN WITH MINERALS) tablet tablet  Self Yes No    Take 1 tablet by mouth Daily.    ondansetron (ZOFRAN) 4 MG tablet   Yes No    Take 1 tablet by mouth Every 8 (Eight) Hours As Needed for Nausea or Vomiting.    pantoprazole (PROTONIX) 40 MG EC tablet  Self Yes No    Take 1 tablet by mouth Every Evening.    rizatriptan MLT (MAXALT-MLT) 10 MG disintegrating tablet  Self Yes No    Take 1 tablet by mouth 1 (One) Time As Needed for Migraine. May repeat in 2 hours if needed    simvastatin (ZOCOR) 20 MG tablet   Yes No    Take 1 tablet by mouth Every Night.    tiZANidine (ZANAFLEX) 2 MG tablet   Yes No     Take 1 tablet by mouth Every 8 (Eight) Hours As Needed.    vitamin B-12 (CYANOCOBALAMIN) 1000 MCG tablet   Yes No    Take 1 tablet by mouth Daily.              Allergies:  Allergies   Allergen Reactions    Adhesive Tape Unknown (See Comments)     hives    Butorphanol Other (See Comments)     Chest pain difficulty breathing throat swelling, STADAL    Garlic Other (See Comments)     Chest pain difficulty breathing throat swells    Heparin Other (See Comments)     HIT +    Tetanus-Diphtheria Toxoids Td Other (See Comments)     Dizziness,  vomiting    Aloe Itching    Bee Venom Other (See Comments)     Swelling eyes and lips hand swelling    Latex Itching    Macadamia Nut Oil Other (See Comments)     Chest pain difficulty breathing throat swelling    Tuberculin, Ppd Unknown (See Comments)     reactor    Wasp Venom Other (See Comments)     Swelling eyes lips and hand       Objective      Vitals:   Temp:  [97.8 °F (36.6 °C)] 97.8 °F (36.6 °C)  Heart Rate:  [] 56  Resp:  [20] 20  BP: ()/() 96/53  Body mass index is 51.84 kg/m².  Physical Exam  General: Awake alert Decker x 3, conversational  HEENT: Atraumatic normocephalic  Cardiac: Heart normal, rhythm regular  Respiratory: Clear to auscultation on exam  Abdomen: Soft, nontender, no rebound or guarding  Extremities: Edema present on the bilateral lower extremities, no purulence or drainage or open wounds noted, mild erythema noted right above the ankles  General: Awake alert oriented x 3, conversational, moves all extremities, normal speech    Diagnostic Data:  Lab Results (last 24 hours)       Procedure Component Value Units Date/Time    Magnesium [[id number]] Collected: 05/23/25 2258    Specimen: Blood Updated: 05/24/25 0012    Basic Metabolic Panel [[id number]]  (Abnormal) Collected: 05/23/25 2258    Specimen: Blood Updated: 05/23/25 4657     Glucose 133 mg/dL      BUN 51 mg/dL      Creatinine 1.63 mg/dL      Sodium 137 mmol/L      Potassium 2.7  mmol/L      Chloride 96 mmol/L      CO2 29.2 mmol/L      Calcium 9.0 mg/dL      BUN/Creatinine Ratio 31.3     Anion Gap 11.8 mmol/L      eGFR 33.0 mL/min/1.73     Narrative:      GFR Categories in Chronic Kidney Disease (CKD)              GFR Category          GFR (mL/min/1.73)    Interpretation  G1                    90 or greater        Normal or high (1)  G2                    60-89                Mild decrease (1)  G3a                   45-59                Mild to moderate decrease  G3b                   30-44                Moderate to severe decrease  G4                    15-29                Severe decrease  G5                    14 or less           Kidney failure    (1)In the absence of evidence of kidney disease, neither GFR category G1 or G2 fulfill the criteria for CKD.    eGFR calculation 2021 CKD-EPI creatinine equation, which does not include race as a factor    Extra Tubes [[id number]] Collected: 05/23/25 2258    Specimen: Blood, Venous Line Updated: 05/23/25 2315    Narrative:      The following orders were created for panel order Extra Tubes.  Procedure                               Abnormality         Status                     ---------                               -----------         ------                     Gold Top - SST[[id number]]                                   Final result                 Please view results for these tests on the individual orders.    Gold Top - SST [[id number]] Collected: 05/23/25 2258    Specimen: Blood Updated: 05/23/25 2315     Extra Tube Hold for add-ons.     Comment: Auto resulted.       aPTT [[id number]]  (Normal) Collected: 05/23/25 2258    Specimen: Blood Updated: 05/23/25 2313     PTT 29.1 seconds     Protime-INR [[id number]]  (Abnormal) Collected: 05/23/25 2258    Specimen: Blood Updated: 05/23/25 2313     Protime 14.7 Seconds      INR 1.16    CBC & Differential [[id number]]  (Abnormal) Collected: 05/23/25 2258    Specimen: Blood Updated: 05/23/25 2304     Narrative:      The following orders were created for panel order CBC & Differential.  Procedure                               Abnormality         Status                     ---------                               -----------         ------                     CBC Auto Differential[[id number]]        Abnormal            Final result                 Please view results for these tests on the individual orders.    CBC Auto Differential [[id number]]  (Abnormal) Collected: 05/23/25 2258    Specimen: Blood Updated: 05/23/25 2304     WBC 10.97 10*3/mm3      RBC 4.06 10*6/mm3      Hemoglobin 12.0 g/dL      Hematocrit 37.6 %      MCV 92.6 fL      MCH 29.6 pg      MCHC 31.9 g/dL      RDW 14.0 %      RDW-SD 47.5 fl      MPV 9.8 fL      Platelets 256 10*3/mm3      Neutrophil % 55.0 %      Lymphocyte % 32.1 %      Monocyte % 10.0 %      Eosinophil % 1.6 %      Basophil % 0.4 %      Immature Grans % 0.9 %      Neutrophils, Absolute 6.03 10*3/mm3      Lymphocytes, Absolute 3.52 10*3/mm3      Monocytes, Absolute 1.10 10*3/mm3      Eosinophils, Absolute 0.18 10*3/mm3      Basophils, Absolute 0.04 10*3/mm3      Immature Grans, Absolute 0.10 10*3/mm3      nRBC 0.0 /100 WBC              Imaging Results (Last 24 Hours)       Procedure Component Value Units Date/Time    CT Head Without Contrast [[id number]] Collected: 05/24/25 0004     Updated: 05/24/25 0010    Narrative:      CT HEAD WO CONTRAST, CT CERVICAL SPINE WO CONTRAST    Date of Exam: 5/23/2025 11:58 PM EDT    Indication: fall.    Comparison: 2/15/2020.    Technique: Axial CT images were obtained of the head and cervical spine without contrast administration.  Coronal reconstructions were performed.  Automated exposure control and iterative reconstruction methods were used.      Findings:  Head: There is no evidence of hemorrhage. There is no mass effect or midline shift.    There is no extracerebral collection.    Ventricles are normal in size and configuration for patient's  stated age.      Posterior fossa is within normal limits.    Calvarium and skull base appear intact.   Visualized sinuses show no air fluid levels. Visualized orbits are unremarkable.    Cervical: No evidence of fracture or compression deformity. No evidence of spondylolysis.  No significant spondylolisthesis. No evidence of focal lesions. The prevertebral soft tissues appear unremarkable. Surrounding soft tissues appear within normal   limits. Mild to moderate multilevel degenerative changes are present throughout the spine. The lung apices appear clear.        Impression:      Impression:  1.No acute intracranial process.  2.No acute osseous abnormality of the cervical spine.          Electronically Signed: Tana Sutherland MD    5/24/2025 12:08 AM EDT    Workstation ID: [id number]    CT Cervical Spine Without Contrast [[id number]] Collected: 05/24/25 0004     Updated: 05/24/25 0010    Narrative:      CT HEAD WO CONTRAST, CT CERVICAL SPINE WO CONTRAST    Date of Exam: 5/23/2025 11:58 PM EDT    Indication: fall.    Comparison: 2/15/2020.    Technique: Axial CT images were obtained of the head and cervical spine without contrast administration.  Coronal reconstructions were performed.  Automated exposure control and iterative reconstruction methods were used.      Findings:  Head: There is no evidence of hemorrhage. There is no mass effect or midline shift.    There is no extracerebral collection.    Ventricles are normal in size and configuration for patient's stated age.      Posterior fossa is within normal limits.    Calvarium and skull base appear intact.   Visualized sinuses show no air fluid levels. Visualized orbits are unremarkable.    Cervical: No evidence of fracture or compression deformity. No evidence of spondylolysis.  No significant spondylolisthesis. No evidence of focal lesions. The prevertebral soft tissues appear unremarkable. Surrounding soft tissues appear within normal   limits. Mild to moderate  multilevel degenerative changes are present throughout the spine. The lung apices appear clear.        Impression:      Impression:  1.No acute intracranial process.  2.No acute osseous abnormality of the cervical spine.          Electronically Signed: Tana Sutherland MD    5/24/2025 12:08 AM EDT    Workstation ID: [id number]    XR Humerus Left [[id number]] Collected: 05/23/25 2336     Updated: 05/23/25 2340    Narrative:      XR HUMERUS LEFT    Date of Exam: 5/23/2025 11:15 PM EDT    Indication: fall    Comparison: None available.    Findings:  There is a mildly comminuted mildly displaced fracture of the proximal humeral diaphysis. The remaining portions of the humerus and the elbow joint appears grossly unremarkable.      Impression:      Impression:  Mildly comminuted mildly displaced fracture of the proximal humeral diaphysis.          Electronically Signed: Tana Sutherland MD    5/23/2025 11:38 PM EDT    Workstation ID: [id number]              Assessment & Plan    Claudia Rust is a 74 y.o. female with a CMH of CHF, hypertension, hyperlipidemia, depression, GERD, fibromyalgia, hypothyroidism, who presented to [facility] on 5/23/2025 with  Mechanical fall.    Assessments  #Mechanical Fall  #Left Humeral Fracture  #Chronic vertigo  #Congestive Heart Failure (LVEF 55-60%), Concentric Left Ventricular Hypertrophy  #JERROD on CKD  #Hypertension  #Hyperlipidemia  #GERD  #Fibromyalgia  #Depression  #Hypothyroidism    Plan  -Consult orthopedic surgery for evaluation and management of left humeral fracture.    -Pain management with Dilaudid 0.5 mg IV every 4 hours as needed; resume home gabapentin.    -Hold losartan, SGLT2 inhibitors, and diuretics for now given JERROD and underlying CKD.    -Monitor renal function closely; if creatinine fails to improve, consider nephrology consult. Bladder scan to rule out urinary retention as a contributor to JERROD.    -Continue metoprolol, statins, Synthroid, and PPI for GERD, meclizine as  needed for chronic vertigo    -Resume other home medications once fully reconciled by pharmacy and deemed medically appropriate.    -Follow a.m. labs for renal function, electrolytes, and medication effects.    VTE Prophylaxis:  No VTE prophylaxis order currently exists.      CODE STATUS:  Full      I discussed the patient's findings and my recommendations with patient.      This document has been electronically signed by Brian Zavala MD on May 24, 2025 00:21 EDT   [facility]ist Team

## 2025-05-24 NOTE — CONSULTS
"  Referring Provider: Brammell, Timothy Duane,*  Reason for Consultation:  Preoperative cardiovascular evaluation prior to having left humerus surgery.  Mechanical fall..    Patient Care Team:  Davis Boateng NP as PCP - General (Family Medicine)  Jose Levi MD as Consulting Physician (Cardiology)  Yovani Lerma MD as Consulting Physician (Nephrology)  Mary Hunt APRN as Nurse Practitioner (Nurse Practitioner)  Edmar Bear MD as Consulting Physician (Cardiology)    Chief complaint left humerus fracture.    Subjective .     History of present illness:  Claudia Rust is a 74 y.o. female who presents with with multiple cardiac and noncardiac problems was admitted to the hospital with mechanical fall and having left humerus fracture.  Patient was seen by orthopedic and is being considered for surgery soon.  Preoperative cardiovascular evaluation was requested.  Patient denies having any cardiac symptoms such as dizziness chest pain shortness of breath or syncope.  Patient apparently slipped and fell.  No other associated aggravating or alleviating factors..    ROS      Since I have last seen, the patient has been without any chest discomfort ,shortness of breath, palpitations, dizziness or syncope.  Denies having any headache ,abdominal pain ,nausea, vomiting , diarrhea constipation, loss of weight or loss of appetite.  Denies having any excessive bruising ,hematuria or blood in the stool.    Review of all systems negative except as indicated.    Reviewed ROS.    History  Past Medical History:   Diagnosis Date    Acute congestive heart failure     Allergies     Anxiety     Arthritis     Asthma     Bilateral leg edema     Bipolar 1 disorder     Bulging lumbar disc     X3    Cancer     states had \"cancerous tumor during pregnancy and had to have hysterectomy\"    Cataract     bilateral    Cellulitis 03/2021    bilateral legs    Compression fracture of vertebral column     LUMBAR    COPD " (chronic obstructive pulmonary disease)     Delayed emergence from anesthesia     Depression     Depression     Diabetes mellitus     Dyslipidemia     Dyspnea on exertion     Fibromyalgia     GERD (gastroesophageal reflux disease)     Hiatal hernia     Hyperlipidemia     Hypertension     Hypothyroid     HYPOTHYROID    IBS (irritable bowel syndrome)     Migraines     Morbid obesity     Neuropathy     Panic attacks     Peripheral edema     PONV (postoperative nausea and vomiting)     Poor mobility     rolling walker    Prediabetes     PVD (peripheral vascular disease)     Rheumatoid aortitis     Sleep apnea     cpap    Spinal stenosis     Spinal stenosis     Vertigo     Vertigo     Vitamin D deficiency        Past Surgical History:   Procedure Laterality Date    AORTIC VALVE REPAIR/REPLACEMENT N/A 3/18/2021    Procedure: AORTIC VALVE REPLACEMENT;  Surgeon: Olaf Mcgill MD;  Location: [facility] CVOR;  Service: Cardiothoracic;  Laterality: N/A;    BRONCHOSCOPY N/A 3/25/2021    Procedure: BRONCHOSCOPY AT BEDSIDE WITH BRONCHIOALVEOLAR LAVAGE;  Surgeon: Glenn Reyes MD;  Location: [facility] ENDOSCOPY;  Service: Pulmonary;  Laterality: N/A;  PNA    BRONCHOSCOPY N/A 10/16/2023    Procedure: BRONCHOSCOPY with bronchial washing;  Surgeon: Glenn Reyes MD;  Location: [facility] ENDOSCOPY;  Service: Pulmonary;  Laterality: N/A;  post op: pneumonia    CARDIAC CATHETERIZATION  2009    CARDIAC CATHETERIZATION N/A 8/14/2019    Procedure: Left Heart Cath;  Surgeon: Jose Levi MD;  Location: [facility] CATH INVASIVE LOCATION;  Service: Cardiovascular    CARDIAC CATHETERIZATION N/A 8/14/2019    Procedure: Right Heart Cath;  Surgeon: Jose Levi MD;  Location: [facility] CATH INVASIVE LOCATION;  Service: Cardiovascular    CARDIAC CATHETERIZATION N/A 8/14/2019    Procedure: Aortic root aortogram;  Surgeon: Jose Levi MD;  Location: [facility] CATH INVASIVE LOCATION;  Service: Cardiovascular    CARDIAC  CATHETERIZATION N/A 1/20/2021    Procedure: Left Heart Cath;  Surgeon: Jose Levi MD;  Location: [facility] CATH INVASIVE LOCATION;  Service: Cardiovascular;  Laterality: N/A;    COLONOSCOPY      COLONOSCOPY N/A 2/14/2025    Procedure: COLONOSCOPY WITH POLYPECTOMY X 4;  Surgeon: Segun Cleveland MD;  Location: [facility] ENDOSCOPY;  Service: Gastroenterology;  Laterality: N/A;  POST- POLYPS X 4, DIVERTICULOSIS, INTERNAL HEMORRHOIDS    CYSTOCELE REPAIR  1984    ELBOW ARTHROPLASTY  2011    ENDOSCOPY      FOOT SURGERY      GALLBLADDER SURGERY  2002    TONSILLECTOMY      VAGINAL HYSTERECTOMY  1972       Family History   Problem Relation Age of Onset    Ovarian cancer Mother     Lung cancer Mother     Esophageal cancer Mother     Hypertension Father     Diabetes Maternal Grandmother        Social History     Tobacco Use    Smoking status: Former     Current packs/day: 0.00     Types: Cigarettes     Quit date: 2022     Years since quitting: 3.3     Passive exposure: Never    Smokeless tobacco: Never    Tobacco comments:     decrease now and do not smoke dos   Vaping Use    Vaping status: Former   Substance Use Topics    Alcohol use: Not Currently     Comment: very rare    Drug use: Never        Medications Prior to Admission   Medication Sig Dispense Refill Last Dose/Taking    Acetaminophen 325 MG capsule Take 650 mg by mouth Every 4 (Four) Hours As Needed.   Taking As Needed    albuterol (ACCUNEB) 0.63 MG/3ML nebulizer solution Take 3 mL by nebulization Every 6 (Six) Hours As Needed for Wheezing (Dx: J 44.9 (COPD), R 06.09 (dyspnea on exertion)). 30 each 0 Taking As Needed    B Complex Vitamins (VITAMIN B COMPLEX) capsule capsule Take 1 capsule by mouth Daily. LAST DOSE 3/4   5/23/2025 Morning    bumetanide (BUMEX) 1 MG tablet Take 1 tablet by mouth Daily. Noon.   5/23/2025 Noon    bumetanide (BUMEX) 2 MG tablet Take 1 tablet by mouth Daily. 30 tablet 0 5/23/2025 Morning    Calcium-Magnesium-Vitamin D  (CALCIUM 1200+D3 PO) Take 1 capsule by mouth Daily.   5/23/2025 Morning    cholecalciferol (Cholecalciferol) 25 MCG (1000 UT) tablet Take 1 tablet by mouth Every Morning. LAST DOSE 3/4 5/23/2025 Morning    docusate sodium (COLACE) 100 MG capsule Take 1 capsule by mouth Every Night.   5/23/2025 Evening    famotidine (PEPCID) 40 MG tablet Take 1 tablet by mouth At Night As Needed for Indigestion.   Taking As Needed    Farxiga 5 MG tablet tablet Take 1 tablet by mouth Every Morning.   5/23/2025 Morning    FLUoxetine (PROzac) 20 MG capsule Take 3 capsules by mouth Daily.   5/23/2025 Morning    gabapentin (NEURONTIN) 400 MG capsule Take 1 capsule by mouth 3 (Three) Times a Day.   5/23/2025 Evening    levothyroxine (SYNTHROID, LEVOTHROID) 137 MCG tablet Take 1 tablet by mouth Daily.   5/23/2025 Morning    loratadine (CLARITIN) 10 MG tablet Take 1 tablet by mouth Daily.   5/23/2025 Morning    losartan (Cozaar) 25 MG tablet Take 1 tablet by mouth Daily. 90 tablet 3 5/23/2025 Morning    meclizine (ANTIVERT) 25 MG tablet Take 1 tablet by mouth 3 (Three) Times a Day As Needed.   Taking As Needed    Melatonin 10 MG capsule Take 2 capsules by mouth Every Night.   5/23/2025 Evening    metOLazone (ZAROXOLYN) 2.5 MG tablet Take 1 tablet by mouth Every Other Day.   5/22/2025    metoprolol succinate XL (TOPROL-XL) 25 MG 24 hr tablet Take 0.5 tablets by mouth Daily. 90 tablet 3 5/23/2025 Morning    Multiple Vitamins-Minerals (MULTIVITAMIN WITH MINERALS) tablet tablet Take 1 tablet by mouth Daily.   5/23/2025 Morning    ondansetron (ZOFRAN) 4 MG tablet Take 1 tablet by mouth Every 8 (Eight) Hours As Needed for Nausea or Vomiting.   Taking As Needed    pantoprazole (PROTONIX) 40 MG EC tablet Take 1 tablet by mouth Every Evening.   5/23/2025 Evening    rizatriptan MLT (MAXALT-MLT) 10 MG disintegrating tablet Take 1 tablet by mouth 1 (One) Time As Needed for Migraine. May repeat in 2 hours if needed   Taking As Needed    simvastatin  (ZOCOR) 20 MG tablet Take 1 tablet by mouth Every Night.   5/23/2025 Evening    tiZANidine (ZANAFLEX) 2 MG tablet Take 1 tablet by mouth Every 8 (Eight) Hours As Needed.   Taking As Needed    vitamin B-12 (CYANOCOBALAMIN) 1000 MCG tablet Take 1 tablet by mouth Daily.   5/23/2025 Morning    fluticasone (FLONASE) 50 MCG/ACT nasal spray Administer 2 sprays into the nostril(s) as directed by provider Daily. (Patient taking differently: Administer 2 sprays into the nostril(s) as directed by provider Daily As Needed for Rhinitis or Allergies.)            Adhesive tape; Benadryl [diphenhydramine]; Butorphanol; Garlic; Heparin; Tetanus-diphtheria toxoids td; Aloe; Bee venom; Latex; Macadamia nut oil; Tuberculin, ppd; and Wasp venom    Scheduled Meds:atorvastatin, 10 mg, Oral, Daily  docusate sodium, 100 mg, Oral, Nightly  [Held by provider] empagliflozin, 10 mg, Oral, Daily  FLUoxetine, 60 mg, Oral, Daily  gabapentin, 300 mg, Oral, TID  ipratropium-albuterol, 3 mL, Nebulization, 4x Daily - RT  levothyroxine, 137 mcg, Oral, Q AM  [Held by provider] losartan, 25 mg, Oral, Daily  metoprolol succinate XL, 12.5 mg, Oral, Daily  pantoprazole, 40 mg, Oral, Nightly  polyethylene glycol, 17 g, Oral, Daily  sodium chloride, 10 mL, Intravenous, Q12H      Continuous Infusions:   PRN Meds:.  senna-docusate sodium **AND** polyethylene glycol **AND** bisacodyl **AND** bisacodyl    Calcium Replacement - Follow Nurse / BPA Driven Protocol    Magnesium Standard Dose Replacement - Follow Nurse / BPA Driven Protocol    meclizine    nitroglycerin    oxyCODONE    oxyCODONE    Phosphorus Replacement - Follow Nurse / BPA Driven Protocol    Potassium Replacement - Follow Nurse / BPA Driven Protocol    [COMPLETED] Insert Peripheral IV **AND** sodium chloride    sodium chloride    sodium chloride    Objective     VITAL SIGNS  Vitals:    05/24/25 0753 05/24/25 0856 05/24/25 0900 05/24/25 1304   BP:  127/62  105/51   BP Location:  Right arm  Right arm  "  Patient Position:  Lying  Lying   Pulse: 69 73 72 70   Resp: 20 20  20   Temp:  97.9 °F (36.6 °C)  98.1 °F (36.7 °C)   TempSrc:  Oral  Oral   SpO2: 95% 96% 94% 93%   Weight:       Height:           Flowsheet Rows      Flowsheet Row First Filed Value   Admission Height 162.6 cm (64\") Documented at 05/23/2025 2227   Admission Weight 137 kg (302 lb 0.5 oz) Documented at 05/23/2025 2227              Intake/Output Summary (Last 24 hours) at 5/24/2025 1435  Last data filed at 5/24/2025 1223  Gross per 24 hour   Intake 240 ml   Output --   Net 240 ml        TELEMETRY: Sinus rhythm    Physical Exam:  The patient is alert, oriented and in no distress.  Vital signs as noted above.  's obesity.  Head and neck revealed no carotid bruits or jugular venous distention.  No thyromegaly or lymphadenopathy is present  Lungs clear.  No wheezing.  Breath sounds are normal bilaterally.  Heart normal first and second heart sounds. No murmur.  No precordial rub is present.  No gallop is present.  Abdomen soft and nontender.  No organomegaly is present.  Extremities with good peripheral pulses without any pedal edema.  Skin warm and dry.  Musculoskeletal system is grossly normal.  Left humerus fracture.  CNS grossly normal    Reviewed and updated.  Results Review:   I reviewed the patient's new clinical results.  Lab Results (last 24 hours)       Procedure Component Value Units Date/Time    Magnesium [[id number]]  (Normal) Collected: 05/24/25 0319    Specimen: Blood Updated: 05/24/25 0354     Magnesium 2.4 mg/dL     Comprehensive Metabolic Panel [[id number]]  (Abnormal) Collected: 05/24/25 0319    Specimen: Blood Updated: 05/24/25 0354     Glucose 156 mg/dL      BUN 52 mg/dL      Creatinine 1.63 mg/dL      Sodium 138 mmol/L      Potassium 3.6 mmol/L      Comment: Specimen hemolyzed.  Result may be falsely elevated.        Chloride 97 mmol/L      CO2 30.1 mmol/L      Calcium 8.7 mg/dL      Total Protein 6.9 g/dL      Albumin 3.8 g/dL      " ALT (SGPT) 132 U/L      AST (SGOT) 214 U/L      Alkaline Phosphatase 108 U/L      Total Bilirubin 0.5 mg/dL      Globulin 3.1 gm/dL      A/G Ratio 1.2 g/dL      BUN/Creatinine Ratio 31.9     Anion Gap 10.9 mmol/L      eGFR 33.0 mL/min/1.73     Narrative:      GFR Categories in Chronic Kidney Disease (CKD)              GFR Category          GFR (mL/min/1.73)    Interpretation  G1                    90 or greater        Normal or high (1)  G2                    60-89                Mild decrease (1)  G3a                   45-59                Mild to moderate decrease  G3b                   30-44                Moderate to severe decrease  G4                    15-29                Severe decrease  G5                    14 or less           Kidney failure    (1)In the absence of evidence of kidney disease, neither GFR category G1 or G2 fulfill the criteria for CKD.    eGFR calculation 2021 CKD-EPI creatinine equation, which does not include race as a factor    Phosphorus [[id number]]  (Normal) Collected: 05/24/25 0319    Specimen: Blood Updated: 05/24/25 0351     Phosphorus 4.5 mg/dL     CBC & Differential [[id number]]  (Abnormal) Collected: 05/24/25 0319    Specimen: Blood Updated: 05/24/25 0343    Narrative:      The following orders were created for panel order CBC & Differential.  Procedure                               Abnormality         Status                     ---------                               -----------         ------                     CBC Auto Differential[[id number]]        Abnormal            Final result                 Please view results for these tests on the individual orders.    CBC Auto Differential [[id number]]  (Abnormal) Collected: 05/24/25 0319    Specimen: Blood Updated: 05/24/25 0343     WBC 16.63 10*3/mm3      RBC 3.93 10*6/mm3      Hemoglobin 11.9 g/dL      Hematocrit 36.8 %      MCV 93.6 fL      MCH 30.3 pg      MCHC 32.3 g/dL      RDW 13.7 %      RDW-SD 47.1 fl      MPV 10.1 fL       Platelets 281 10*3/mm3      Neutrophil % 76.7 %      Lymphocyte % 13.0 %      Monocyte % 9.1 %      Eosinophil % 0.3 %      Basophil % 0.2 %      Immature Grans % 0.7 %      Neutrophils, Absolute 12.73 10*3/mm3      Lymphocytes, Absolute 2.17 10*3/mm3      Monocytes, Absolute 1.52 10*3/mm3      Eosinophils, Absolute 0.05 10*3/mm3      Basophils, Absolute 0.04 10*3/mm3      Immature Grans, Absolute 0.12 10*3/mm3      nRBC 0.0 /100 WBC     Protime-INR [[id number]]  (Abnormal) Collected: 05/24/25 0319    Specimen: Blood Updated: 05/24/25 0336     Protime 14.9 Seconds      INR 1.18    Magnesium [[id number]]  (Normal) Collected: 05/23/25 2258    Specimen: Blood Updated: 05/24/25 0024     Magnesium 2.3 mg/dL     Basic Metabolic Panel [[id number]]  (Abnormal) Collected: 05/23/25 2258    Specimen: Blood Updated: 05/23/25 2327     Glucose 133 mg/dL      BUN 51 mg/dL      Creatinine 1.63 mg/dL      Sodium 137 mmol/L      Potassium 2.7 mmol/L      Chloride 96 mmol/L      CO2 29.2 mmol/L      Calcium 9.0 mg/dL      BUN/Creatinine Ratio 31.3     Anion Gap 11.8 mmol/L      eGFR 33.0 mL/min/1.73     Narrative:      GFR Categories in Chronic Kidney Disease (CKD)              GFR Category          GFR (mL/min/1.73)    Interpretation  G1                    90 or greater        Normal or high (1)  G2                    60-89                Mild decrease (1)  G3a                   45-59                Mild to moderate decrease  G3b                   30-44                Moderate to severe decrease  G4                    15-29                Severe decrease  G5                    14 or less           Kidney failure    (1)In the absence of evidence of kidney disease, neither GFR category G1 or G2 fulfill the criteria for CKD.    eGFR calculation 2021 CKD-EPI creatinine equation, which does not include race as a factor    Extra Tubes [[id number]] Collected: 05/23/25 2258    Specimen: Blood, Venous Line Updated: 05/23/25 2315    Narrative:       The following orders were created for panel order Extra Tubes.  Procedure                               Abnormality         Status                     ---------                               -----------         ------                     Gold Top - SST[[id number]]                                   Final result                 Please view results for these tests on the individual orders.    Gold Top - SST [[id number]] Collected: 05/23/25 2258    Specimen: Blood Updated: 05/23/25 2315     Extra Tube Hold for add-ons.     Comment: Auto resulted.       aPTT [[id number]]  (Normal) Collected: 05/23/25 2258    Specimen: Blood Updated: 05/23/25 2313     PTT 29.1 seconds     Protime-INR [[id number]]  (Abnormal) Collected: 05/23/25 2258    Specimen: Blood Updated: 05/23/25 2313     Protime 14.7 Seconds      INR 1.16    CBC & Differential [[id number]]  (Abnormal) Collected: 05/23/25 2258    Specimen: Blood Updated: 05/23/25 2304    Narrative:      The following orders were created for panel order CBC & Differential.  Procedure                               Abnormality         Status                     ---------                               -----------         ------                     CBC Auto Differential[[id number]]        Abnormal            Final result                 Please view results for these tests on the individual orders.    CBC Auto Differential [[id number]]  (Abnormal) Collected: 05/23/25 2258    Specimen: Blood Updated: 05/23/25 2304     WBC 10.97 10*3/mm3      RBC 4.06 10*6/mm3      Hemoglobin 12.0 g/dL      Hematocrit 37.6 %      MCV 92.6 fL      MCH 29.6 pg      MCHC 31.9 g/dL      RDW 14.0 %      RDW-SD 47.5 fl      MPV 9.8 fL      Platelets 256 10*3/mm3      Neutrophil % 55.0 %      Lymphocyte % 32.1 %      Monocyte % 10.0 %      Eosinophil % 1.6 %      Basophil % 0.4 %      Immature Grans % 0.9 %      Neutrophils, Absolute 6.03 10*3/mm3      Lymphocytes, Absolute 3.52 10*3/mm3      Monocytes, Absolute 1.10  10*3/mm3      Eosinophils, Absolute 0.18 10*3/mm3      Basophils, Absolute 0.04 10*3/mm3      Immature Grans, Absolute 0.10 10*3/mm3      nRBC 0.0 /100 WBC             Imaging Results (Last 24 Hours)       Procedure Component Value Units Date/Time    XR ELBOW 2 VIEW LEFT [[id number]] Collected: 05/24/25 1301     Updated: 05/24/25 1304    Narrative:      XR ELBOW 2 VW LEFT    Date of Exam: 5/24/2025 12:51 PM EDT    Indication: humerus fx    Comparison: None available.    Findings:  No definite acute osseous abnormality of the elbow is seen. There is no positive fat pad. Osseous mineralization does not appear unusual. Just included in the field-of-view is a fracture of the proximal shaft of the humerus.      Impression:      Impression:  1.No definite acute osseous abnormality of the elbow.  2.Fracture proximal shaft of the humerus.        Electronically Signed: Roverto Elizabeth MD    5/24/2025 1:02 PM EDT    Workstation ID: [id number]    CT Head Without Contrast [[id number]] Collected: 05/24/25 0004     Updated: 05/24/25 0010    Narrative:      CT HEAD WO CONTRAST, CT CERVICAL SPINE WO CONTRAST    Date of Exam: 5/23/2025 11:58 PM EDT    Indication: fall.    Comparison: 2/15/2020.    Technique: Axial CT images were obtained of the head and cervical spine without contrast administration.  Coronal reconstructions were performed.  Automated exposure control and iterative reconstruction methods were used.      Findings:  Head: There is no evidence of hemorrhage. There is no mass effect or midline shift.    There is no extracerebral collection.    Ventricles are normal in size and configuration for patient's stated age.      Posterior fossa is within normal limits.    Calvarium and skull base appear intact.   Visualized sinuses show no air fluid levels. Visualized orbits are unremarkable.    Cervical: No evidence of fracture or compression deformity. No evidence of spondylolysis.  No significant spondylolisthesis. No evidence of  focal lesions. The prevertebral soft tissues appear unremarkable. Surrounding soft tissues appear within normal   limits. Mild to moderate multilevel degenerative changes are present throughout the spine. The lung apices appear clear.        Impression:      Impression:  1.No acute intracranial process.  2.No acute osseous abnormality of the cervical spine.          Electronically Signed: Tana Sutherland MD    5/24/2025 12:08 AM EDT    Workstation ID: [id number]    CT Cervical Spine Without Contrast [[id number]] Collected: 05/24/25 0004     Updated: 05/24/25 0010    Narrative:      CT HEAD WO CONTRAST, CT CERVICAL SPINE WO CONTRAST    Date of Exam: 5/23/2025 11:58 PM EDT    Indication: fall.    Comparison: 2/15/2020.    Technique: Axial CT images were obtained of the head and cervical spine without contrast administration.  Coronal reconstructions were performed.  Automated exposure control and iterative reconstruction methods were used.      Findings:  Head: There is no evidence of hemorrhage. There is no mass effect or midline shift.    There is no extracerebral collection.    Ventricles are normal in size and configuration for patient's stated age.      Posterior fossa is within normal limits.    Calvarium and skull base appear intact.   Visualized sinuses show no air fluid levels. Visualized orbits are unremarkable.    Cervical: No evidence of fracture or compression deformity. No evidence of spondylolysis.  No significant spondylolisthesis. No evidence of focal lesions. The prevertebral soft tissues appear unremarkable. Surrounding soft tissues appear within normal   limits. Mild to moderate multilevel degenerative changes are present throughout the spine. The lung apices appear clear.        Impression:      Impression:  1.No acute intracranial process.  2.No acute osseous abnormality of the cervical spine.          Electronically Signed: Tana Sutherland MD    5/24/2025 12:08 AM EDT    Workstation ID: [id number]    XR  Humerus Left [[id number]] Collected: 05/23/25 2336     Updated: 05/23/25 2340    Narrative:      XR HUMERUS LEFT    Date of Exam: 5/23/2025 11:15 PM EDT    Indication: fall    Comparison: None available.    Findings:  There is a mildly comminuted mildly displaced fracture of the proximal humeral diaphysis. The remaining portions of the humerus and the elbow joint appears grossly unremarkable.      Impression:      Impression:  Mildly comminuted mildly displaced fracture of the proximal humeral diaphysis.          Electronically Signed: Tana Sutherland MD    5/23/2025 11:38 PM EDT    Workstation ID: [id number]        LAB RESULTS (LAST 7 DAYS)    CBC  Results from last 7 days   Lab Units 05/24/25 0319 05/23/25 2258   WBC 10*3/mm3 16.63* 10.97*   RBC 10*6/mm3 3.93 4.06   HEMOGLOBIN g/dL 11.9* 12.0   HEMATOCRIT % 36.8 37.6   MCV fL 93.6 92.6   PLATELETS 10*3/mm3 281 256       BMP  Results from last 7 days   Lab Units 05/24/25 0319 05/23/25 2258   SODIUM mmol/L 138 137   POTASSIUM mmol/L 3.6 2.7*   CHLORIDE mmol/L 97* 96*   CO2 mmol/L 30.1* 29.2*   BUN mg/dL 52* 51*   CREATININE mg/dL 1.63* 1.63*   GLUCOSE mg/dL 156* 133*   MAGNESIUM mg/dL 2.4 2.3   PHOSPHORUS mg/dL 4.5  --        CMP   Results from last 7 days   Lab Units 05/24/25 0319 05/23/25 2258   SODIUM mmol/L 138 137   POTASSIUM mmol/L 3.6 2.7*   CHLORIDE mmol/L 97* 96*   CO2 mmol/L 30.1* 29.2*   BUN mg/dL 52* 51*   CREATININE mg/dL 1.63* 1.63*   GLUCOSE mg/dL 156* 133*   ALBUMIN g/dL 3.8  --    BILIRUBIN mg/dL 0.5  --    ALK PHOS U/L 108  --    AST (SGOT) U/L 214*  --    ALT (SGPT) U/L 132*  --          BNP        TROPONIN        CoAg  Results from last 7 days   Lab Units 05/24/25 0319 05/23/25 2258   INR  1.18* 1.16*   APTT seconds  --  29.1       Creatinine Clearance  Estimated Creatinine Clearance: 41.9 mL/min (A) (by C-G formula based on SCr of 1.63 mg/dL (H)).    ABG        Radiology  XR ELBOW 2 VIEW LEFT  Result Date: 5/24/2025  Impression: 1.No definite  acute osseous abnormality of the elbow. 2.Fracture proximal shaft of the humerus. Electronically Signed: Roverto Elizabeth MD  5/24/2025 1:02 PM EDT  Workstation ID: [id number]    CT Head Without Contrast  Result Date: 5/24/2025  Impression: 1.No acute intracranial process. 2.No acute osseous abnormality of the cervical spine. Electronically Signed: Tana Sutherland MD  5/24/2025 12:08 AM EDT  Workstation ID: [id number]    CT Cervical Spine Without Contrast  Result Date: 5/24/2025  Impression: 1.No acute intracranial process. 2.No acute osseous abnormality of the cervical spine. Electronically Signed: Tana Sutherland MD  5/24/2025 12:08 AM EDT  Workstation ID: [id number]    XR Humerus Left  Result Date: 5/23/2025  Impression: Mildly comminuted mildly displaced fracture of the proximal humeral diaphysis. Electronically Signed: Tana Sutherland MD  5/23/2025 11:38 PM EDT  Workstation ID: [id number]        EKG      I personally viewed and interpreted the patient's EKG/Telemetry data:    ECHOCARDIOGRAM:    Results for orders placed during the hospital encounter of 10/13/23    Adult Transthoracic Echo Complete w/ Color, Spectral and Contrast if necessary per protocol    Interpretation Summary    Left ventricular diastolic function was indeterminate.    Intravenous  contrast was administered to optimize endocardial definition.    Normal left ventricular systolic function  Concentric left ventricular hypertrophy  Indeterminate left ventricular diastolic function  Dilated right ventricle with probably normal function  Dilated left atrium  Bioprosthetic aortic valve with normal function.  Mild mitral regurgitation  Mild tricuspid regurgitation      STRESS TEST  Results for orders placed during the hospital encounter of 02/20/23    Stress Test With Myocardial Perfusion One Day    Interpretation Summary  LEXISCAN CARDIOLITE REPORT    DATE OF PROCEDURE: 2/28/2023    INDICATION FOR PROCEDURE: Numbness and tingling of left hand, dyspnea on exertion,  chronic CHF, AVR, edema, dyslipidemia, hypertension, obesity with BMI over 45    PROCEDURE PERFORMED: Lexiscan Cardiolite    PROCEDURE COMMENTS:    After informed consent was obtained.  Patient's resting heart rate was 68 bpm, resting blood pressure was 144/82, resting EKG revealed sinus rhythm with rate of 68 bpm.  Patient was given 0.4 mg of regadenosine for stress testing.  There was no significant change in heart rate, blood pressure, symptoms with regadenoson injection.  Patient tolerated procedure well.  Complications were none.    NUCLEAR IMAGIN.  There was  uniform uptake of Cardiolite both in resting and post stress images, no ischemia seen.  2.  Gated images reveal  normal LV size and contractility, LVEF of 86%.    CONCLUSION:  1.  Lexiscan Cardiolite with normal perfusion, negative for ischemia.  2.  Normal wall motion.  LVEF of 86%.    RECOMMENDATIONS:    Clinical correlation recommended.      Jose Levi MD  23  20:24 EST        Cardiolite (Tc-99m sestamibi) stress test    HEART CATHETERIZATION  Results for orders placed during the hospital encounter of 21    Cardiac Catheterization/Vascular Study    Narrative  Table formatting from the original result was not included.  2021      Heart Cath Report    NAME:              Claudia Rust  :                1950  AGE/SEX:        70 y.o. female  MRN:                [id number]        Procedures Performed    1. Bilateral selective coronary arteriogram    :   Jose Levi MD    Vascular Access Site: Femoral    Indication for procedure: 70-year-old with severe critical  aortic stenosis and history of CHF, obesity, dyslipidemia, prediabetes      Procedure Note    After discussing the risks, benefits, and alternatives of the procedure, informed consent was obtained.  Moderate conscious sedation was given utilizing IV Versed and fentanyl administered by RN with continuous EKG oximetry and  hemodynamic monitoring supervised by me throughout the entire case, conscious sedation time was 30 minutes.  I was present with the patient for the duration of moderate sedation and supervised staff who had no other duties and monitored the patient for the entire procedure patient had Moise 2-3 sedation scale. the vascular access site was prepped and draped in the usual sterile fashion.  2% lidocaine was used for local anesthesia. Appropriate landmarks were assessed.  A 6 [place] short sheath was inserted in the artery using the modified Seldinger technique.    Selective coronary angiography was performed with JL4 and JR4 diagnostic catheters. Left ventriculogram  was not performed because patient has severe aortic stenosis..  All exchanges were performed over the wire.  No specimens were removed.  There were no apparent acute or early complications.  The patient tolerated the procedure well and was transferred to the recovery area in stable condition.    Artery hemostasis will be achieved with  manual pressure in OPCV.      Complications:  None  Blood Loss: minimal    Hemodynamics    Pressures    Ao:    138/62 mmHg      Coronary Angiography    Left Main :  The left main   is without disease    Left Anterior Descending : Is tortuous vessel, without disease    Left Circumflex : Tortuous vessel ends up with 3 branches which are tortuous without disease    Right Coronary Artery :  The right coronary artery   dominant vessel without disease    Dominance:  []  Left  [x]  Right  []  Co-Dominant        Impression:    1. No obstructive CAD    Recommendations:    1. Aortic valve replacement evaluation by CT VS.  Patient had echo showing severe critical aortic stenosis        I sincerely appreciate the opportunity to participate in your patient's care. Please feel free to contact me anytime if I can be of assistance in this or any other way.      Pertinent History    Past Medical History:  Diagnosis Date   Acute congestive  heart failure (CMS/[facility])   Asthma   Bipolar 1 disorder (CMS/[facility])   COPD (chronic obstructive pulmonary disease) (CMS/[facility])   Depression   Dyslipidemia   Fibromyalgia   Hypertension   Hypothyroid   IBS (irritable bowel syndrome)   Migraines   Morbid obesity (CMS/[facility])   Neuropathy   Spinal stenosis   Vertigo    Past Surgical History:  Procedure Laterality Date   CARDIAC CATHETERIZATION  2009   CARDIAC CATHETERIZATION N/A 8/14/2019  Procedure: Left Heart Cath;  Surgeon: Jose Levi MD;  Location:  [facility] CATH [facility]VASIVE [facility];  Service: Cardiovascular   CARDIAC CATHETERIZATION N/A 8/14/2019  Procedure: Right Heart Cath;  Surgeon: Jose Levi MD;  Location:  [facility] CATH INVASIVE LOCATION;  Service: Cardiovascular   CARDIAC CATHETERIZATION N/A 8/14/2019  Procedure: Aortic root aortogram;  Surgeon: Jose Levi MD;  Location:  [facility] CATH INVASIVE LOCATION;  Service: Cardiovascular   CYSTOCELE REPAIR  1984   ELBOW ARTHROPLASTY  2011   FOOT SURGERY   GALLBLADDER SURGERY  2002   VAGINAL HYSTERECTOMY  1972    Prior to Admission medications  Medication Sig Start Date End Date Taking? Authorizing Provider  aspirin 81 MG tablet Take 81 mg by mouth Every Night. 2/3/15  Yes Krissy Hodgson MD  B Complex Vitamins (VITAMIN B COMPLEX) capsule capsule Take 1 capsule by mouth Daily.   Yes Krissy Hodgson MD  Calcium Carbonate (CALCIUM 500 PO) Take 1,500 mg by mouth Daily.   Yes Krissy Hodgson MD  Cholecalciferol (VITAMIN D-1000 MAX ST) 1000 units tablet Take 2,000 Units by mouth Daily. 9/16/16  Yes Krissy Hodgson MD  diphenhydrAMINE (BENADRYL) 50 MG capsule Take 50 mg by mouth every night at bedtime.   Yes Krissy Hodgson MD  FLUoxetine (PROzac) 60 MG tablet Take 60 mg by mouth Daily. 4/19/19  Yes Krissy Hodgson MD  fluticasone (FLONASE) 50 MCG/ACT nasal spray 2 sprays into the nostril(s) as directed by provider 2 (two) times a day.   Yes Krissy Hodgson  MD  furosemide (LASIX) 40 MG tablet Take 40 mg by mouth 2 (Two) Times a Day. 2/5/15  Yes Krissy Hodgson MD  gabapentin (NEURONTIN) 600 MG tablet Take 400 mg by mouth 3 (Three) Times a Day. 7/30/19  Yes Krissy Hodgson MD  HYDROcodone-acetaminophen (NORCO)  MG per tablet Take 1 tablet by mouth Daily. Noon an prm   Yes Krissy Hodgson MD  levothyroxine (SYNTHROID, LEVOTHROID) 100 MCG tablet Take 100 mcg by mouth Daily. 7/30/15  Yes Krissy Hodgson MD  metoprolol succinate XL (TOPROL-XL) 25 MG 24 hr tablet Take 25 mg by mouth Daily.   Yes Krissy Hodgson MD  Multiple Vitamins-Minerals (MULTIVITAMIN WITH MINERALS) tablet tablet Take 1 tablet by mouth Daily.   Yes Krissy Hodgson MD  ondansetron (ZOFRAN) 4 MG tablet Take 4 mg by mouth Every 8 (Eight) Hours As Needed for Nausea or Vomiting.   Yes Krissy Hodgson MD  pantoprazole (PROTONIX) 40 MG EC tablet Take 40 mg by mouth Daily.   Yes Krissy Hodgson MD  potassium chloride (KLOR-CON) 20 MEQ CR tablet Take 20 mEq by mouth 2 (Two) Times a Day. 7/30/15  Yes Krissy Hodgson MD  simvastatin (ZOCOR) 20 MG tablet Take 20 mg by mouth Daily. 2/5/15  Yes Krissy Hodgson MD  Acetaminophen (TYLENOL PO) Take 1,000 mg by mouth Every 4 (Four) Hours As Needed.    Krissy Hodgson MD  albuterol (PROVENTIL) (2.5 MG/3ML) 0.083% nebulizer solution Inhale 2.5 mg Every 6 (Six) Hours As Needed. 2/3/15   Krissy Hodgson MD  aluminum-magnesium hydroxide-simethicone (MAALOX/MYLANTA) 200-200-20 MG/5ML suspension Take 15 mL by mouth Every 6 (Six) Hours As Needed for Indigestion or Heartburn.    Krissy Hodgson MD  meclizine (ANTIVERT) 25 MG tablet Take 25 mg by mouth 3 (Three) Times a Day As Needed. 7/30/15   Krissy Hodgson MD  polyethylene glycol (MIRALAX) powder Take 17 g by mouth Daily As Needed. 11/9/16   Krissy Hodgson MD  promethazine (PHENERGAN) 25 MG tablet Take 25 mg by mouth Every 6 (Six)  Hours As Needed for Nausea or Vomiting.    Krissy Hodgson MD  rizatriptan MLT (MAXALT-MLT) 10 MG disintegrating tablet Take 10 mg by mouth 1 (One) Time As Needed for Migraine. May repeat in 2 hours if needed    Krissy Hodgson MD  tiZANidine (ZANAFLEX) 4 MG tablet Take 4 mg by mouth Every 8 (Eight) Hours As Needed for Muscle Spasms.    Krissy Hodgson MD  acetaminophen (TYLENOL) 325 MG tablet Take 650 mg by mouth Every 6 (Six) Hours As Needed for Mild Pain .  1/20/21  Krissy Hodgson MD  Calcium Carbonate ([employer] Calcium) 1500 (600 Ca) MG tablet Take 600 mg by mouth Daily.  1/20/21  Krissy Hodgson MD  metoprolol succinate XL (TOPROL-XL) 25 MG 24 hr tablet TAKE 1 TABLET BY MOUTH EVERY DAY  Patient taking differently: 25 mg. 11/17/20 1/20/21  Jose Levi MD  ondansetron ODT (ZOFRAN-ODT) 4 MG disintegrating tablet 4 mg Every 8 (Eight) Hours As Needed. 1/6/21 1/20/21  Krissy Hodgson MD      Pre-Procedure Notes  H&P Performed  [x]  Yes []  No       []  N/A    Indications:  []  ACS <= 24 HRS  []  ACS >24 HRS  []  New Onset Angina <= 2 mos  []  Worsening Angina  []  Resuscitated Cardiac Arrest  []  Angina on Exertion:  []  Suspected CAD  []  Valvular Disease  []  Pericardial Disease  []  Cardiac Arrythmia  []  Cardiomyopathy  []  LV Dysfunction  []  Syncope  []  Post Cardiac Transplant  []  Eval. For Exercise Clearance  []  Other  []  Pre-Operative Evaluation  If Pre-Op Eval:  Evaluation for Surgery Type:  []  Cardiac Surgery   []  Non-Cardiac Surgery  Functional Capacity:  []  <4 METS  []  >=4 METS w/o symptoms  []  >= 4 METS with symptoms  []  Unknown  Surgical Risk:  []  Low  []  Intermediate  []  High Risk: Vascular  []  High Risk Non-Vascular    Risks, Benefits, & Complications Discussed:  [x]  Yes  []  No  []  N/A    Questions Answered:  [x]  Yes  []  No  []  N/A    Consent Obtained:  [x]  Yes  []  No  []  N/A    CHF: [x]  Yes  []  No  If Yes:  Newly Diagnosed?  []   Yes  [x]  No  If Yes:  HF Type:  []  Diastolic  []  Systolic  []  Unknown      Jose Levi MD  1/20/2021  12:55 EST  Electronically signed by Jose Levi MD, 01/20/21, 12:55 PM EST.      OTHER:     Assessment & Plan     Principal Problem:    Humeral fracture    [[[[[[[[[[[[[[[[[[[[[[  Impression  ============  -Mechanical fall and humerus fracture.    - Status post TAVR valve replacement (bioprosthetic 3/18/2021.  Patient had nonobstructive coronary artery disease on cardiac cath 1/20/2021.    - Hypertension dyslipidemia diabetes COPD obstructive sleep apnea.  Hypothyroidism rheumatoid arthritis hiatal hernia chronic depression bipolar disorder GERD IBS chronic migraine vertigo.    - History of compression lumbar fracture.    - History of HIT.  History of DVT and upper extremity clot.    - Status post cholecystectomy right hand surgery and bladder reconstruction.    - History of smoking.    - Significant exogenous obesity.    - History of allergy to sotalol.    Transesophageal echo 3/25/2021 normal LV systolic function    Carotid Dopplers 9/13/2019 normal.  Echocardiogram 2/11/2020 is revealing severe aortic stenosis  Lexiscan Cardiolite 2/28/2023 negative for ischemia EF of 86%  Echo 10/14/2023 reveals normal LV systolic function with concentric LVH, dilated RV and left atrium, normal bioprosthetic aortic valve mild MR and TR   ============  Plan  ===========  Patient had mechanical fall without any cardiac symptoms.  Patient has humerus fracture.  Patient is being considered for surgery.  Patient does not have any angina pectoris or congestive heart failure.  Status post bioprosthetic aortic valve replacement for aortic valve stenosis.  Will obtain an EKG.  Echocardiogram to assess TAVR valve function.  At this point I did not see any specific contraindications for proposed surgery pending EKG etc.    Further plan will depend on patient's progress.    Reviewed and updated  5/24/2025.    ]]]]]]]]]]]]]]]]]]]]]]]]]]        Kaz Arnett MD  05/24/25  14:35 EDT

## 2025-05-24 NOTE — ED PROVIDER NOTES
"Subjective   History of Present Illness  Chief complaint: Left arm injury    74-year-old female presents with a left arm injury after a fall.  Patient states she was using her rollator and fell.  She tried to catch herself with her left arm and injured her left upper arm.  She states she is unable to move the arm due to the pain.  She states she did hit her head as well but had no loss of consciousness.  She reports some mild right-sided neck pain.  She denies any other injuries.    History provided by:  Patient      Review of Systems   Constitutional:  Negative for fever.   HENT:  Negative for congestion.    Respiratory:  Negative for cough and shortness of breath.    Cardiovascular:  Negative for chest pain.   Gastrointestinal:  Negative for abdominal pain and vomiting.   Musculoskeletal:  Negative for back pain.        Left arm pain   Neurological:  Negative for headaches.       Past Medical History:   Diagnosis Date    Acute congestive heart failure     Allergies     Anxiety     Arthritis     Asthma     Bilateral leg edema     Bipolar 1 disorder     Bulging lumbar disc     X3    Cancer     states had \"cancerous tumor during pregnancy and had to have hysterectomy\"    Cataract     bilateral    Cellulitis 03/2021    bilateral legs    Compression fracture of vertebral column     LUMBAR    COPD (chronic obstructive pulmonary disease)     Delayed emergence from anesthesia     Depression     Depression     Diabetes mellitus     Dyslipidemia     Dyspnea on exertion     Fibromyalgia     GERD (gastroesophageal reflux disease)     Hiatal hernia     Hyperlipidemia     Hypertension     Hypothyroid     HYPOTHYROID    IBS (irritable bowel syndrome)     Migraines     Morbid obesity     Neuropathy     Panic attacks     Peripheral edema     PONV (postoperative nausea and vomiting)     Poor mobility     rolling walker    Prediabetes     PVD (peripheral vascular disease)     Rheumatoid aortitis     Sleep apnea     cpap    Spinal " stenosis     Spinal stenosis     Vertigo     Vertigo     Vitamin D deficiency        Allergies   Allergen Reactions    Adhesive Tape Unknown (See Comments)     hives    Butorphanol Other (See Comments)     Chest pain difficulty breathing throat swelling, STADAL    Garlic Other (See Comments)     Chest pain difficulty breathing throat swells    Heparin Other (See Comments)     HIT +    Tetanus-Diphtheria Toxoids Td Other (See Comments)     Dizziness,  vomiting    Aloe Itching    Bee Venom Other (See Comments)     Swelling eyes and lips hand swelling    Latex Itching    Macadamia Nut Oil Other (See Comments)     Chest pain difficulty breathing throat swelling    Tuberculin, Ppd Unknown (See Comments)     reactor    Wasp Venom Other (See Comments)     Swelling eyes lips and hand       Past Surgical History:   Procedure Laterality Date    AORTIC VALVE REPAIR/REPLACEMENT N/A 3/18/2021    Procedure: AORTIC VALVE REPLACEMENT;  Surgeon: Olaf Mcgill MD;  Location: [facility] CVOR;  Service: Cardiothoracic;  Laterality: N/A;    BRONCHOSCOPY N/A 3/25/2021    Procedure: BRONCHOSCOPY AT BEDSIDE WITH BRONCHIOALVEOLAR LAVAGE;  Surgeon: Glenn Reyes MD;  Location: [facility] ENDOSCOPY;  Service: Pulmonary;  Laterality: N/A;  PNA    BRONCHOSCOPY N/A 10/16/2023    Procedure: BRONCHOSCOPY with bronchial washing;  Surgeon: Glenn Reyes MD;  Location: [facility] ENDOSCOPY;  Service: Pulmonary;  Laterality: N/A;  post op: pneumonia    CARDIAC CATHETERIZATION  2009    CARDIAC CATHETERIZATION N/A 8/14/2019    Procedure: Left Heart Cath;  Surgeon: Jose Levi MD;  Location: [facility] CATH INVASIVE LOCATION;  Service: Cardiovascular    CARDIAC CATHETERIZATION N/A 8/14/2019    Procedure: Right Heart Cath;  Surgeon: Jose Levi MD;  Location: [facility] CATH INVASIVE LOCATION;  Service: Cardiovascular    CARDIAC CATHETERIZATION N/A 8/14/2019    Procedure: Aortic root aortogram;  Surgeon: Jose Levi MD;  Location:   "[facility] CATH INVASIVE LOCATION;  Service: Cardiovascular    CARDIAC CATHETERIZATION N/A 1/20/2021    Procedure: Left Heart Cath;  Surgeon: oJse Levi MD;  Location: [facility] CATH INVASIVE LOCATION;  Service: Cardiovascular;  Laterality: N/A;    COLONOSCOPY      COLONOSCOPY N/A 2/14/2025    Procedure: COLONOSCOPY WITH POLYPECTOMY X 4;  Surgeon: Segun Cleveland MD;  Location: [facility] ENDOSCOPY;  Service: Gastroenterology;  Laterality: N/A;  POST- POLYPS X 4, DIVERTICULOSIS, INTERNAL HEMORRHOIDS    CYSTOCELE REPAIR  1984    ELBOW ARTHROPLASTY  2011    ENDOSCOPY      FOOT SURGERY      GALLBLADDER SURGERY  2002    TONSILLECTOMY      VAGINAL HYSTERECTOMY  1972       Family History   Problem Relation Age of Onset    Ovarian cancer Mother     Lung cancer Mother     Esophageal cancer Mother     Hypertension Father     Diabetes Maternal Grandmother        Social History     Socioeconomic History    Marital status:    Tobacco Use    Smoking status: Former     Current packs/day: 0.00     Types: Cigarettes     Quit date: 2022     Years since quitting: 3.3     Passive exposure: Never    Smokeless tobacco: Never    Tobacco comments:     decrease now and do not smoke dos   Vaping Use    Vaping status: Former   Substance and Sexual Activity    Alcohol use: Not Currently     Comment: very rare    Drug use: Never    Sexual activity: Not Currently       /62   Pulse 60   Temp 97.8 °F (36.6 °C)   Resp 20   Ht 162.6 cm (64\")   Wt (!) 137 kg (302 lb 0.5 oz)   SpO2 97%   BMI 51.84 kg/m²       Objective   Physical Exam  Vitals and nursing note reviewed.   Constitutional:       Appearance: Normal appearance.   HENT:      Head: Normocephalic and atraumatic.      Mouth/Throat:      Mouth: Mucous membranes are moist.   Cardiovascular:      Rate and Rhythm: Normal rate and regular rhythm.   Pulmonary:      Effort: Pulmonary effort is normal. No respiratory distress.   Abdominal:      Palpations: Abdomen is " soft.      Tenderness: There is no abdominal tenderness.   Musculoskeletal:      Comments: Tender to palpation with apparent deformity to the left humerus area.  Neurovascular intact distally.  Extremities otherwise unremarkable.   Skin:     General: Skin is warm and dry.   Neurological:      General: No focal deficit present.      Mental Status: She is alert and oriented to person, place, and time.         Procedures           ED Course      Results for orders placed or performed during the hospital encounter of 05/23/25   Basic Metabolic Panel    Collection Time: 05/23/25 10:58 PM    Specimen: Blood   Result Value Ref Range    Glucose 133 (H) 65 - 99 mg/dL    BUN 51 (H) 8 - 23 mg/dL    Creatinine 1.63 (H) 0.57 - 1.00 mg/dL    Sodium 137 136 - 145 mmol/L    Potassium 2.7 (L) 3.5 - 5.2 mmol/L    Chloride 96 (L) 98 - 107 mmol/L    CO2 29.2 (H) 22.0 - 29.0 mmol/L    Calcium 9.0 8.6 - 10.5 mg/dL    BUN/Creatinine Ratio 31.3 (H) 7.0 - 25.0    Anion Gap 11.8 5.0 - 15.0 mmol/L    eGFR 33.0 (L) >60.0 mL/min/1.73   aPTT    Collection Time: 05/23/25 10:58 PM    Specimen: Blood   Result Value Ref Range    PTT 29.1 22.7 - 35.4 seconds   Protime-INR    Collection Time: 05/23/25 10:58 PM    Specimen: Blood   Result Value Ref Range    Protime 14.7 (H) 11.7 - 14.2 Seconds    INR 1.16 (H) 0.90 - 1.10   CBC Auto Differential    Collection Time: 05/23/25 10:58 PM    Specimen: Blood   Result Value Ref Range    WBC 10.97 (H) 3.40 - 10.80 10*3/mm3    RBC 4.06 3.77 - 5.28 10*6/mm3    Hemoglobin 12.0 12.0 - 15.9 g/dL    Hematocrit 37.6 34.0 - 46.6 %    MCV 92.6 79.0 - 97.0 fL    MCH 29.6 26.6 - 33.0 pg    MCHC 31.9 31.5 - 35.7 g/dL    RDW 14.0 12.3 - 15.4 %    RDW-SD 47.5 37.0 - 54.0 fl    MPV 9.8 6.0 - 12.0 fL    Platelets 256 140 - 450 10*3/mm3    Neutrophil % 55.0 42.7 - 76.0 %    Lymphocyte % 32.1 19.6 - 45.3 %    Monocyte % 10.0 5.0 - 12.0 %    Eosinophil % 1.6 0.3 - 6.2 %    Basophil % 0.4 0.0 - 1.5 %    Immature Grans % 0.9 (H) 0.0  - 0.5 %    Neutrophils, Absolute 6.03 1.70 - 7.00 10*3/mm3    Lymphocytes, Absolute 3.52 (H) 0.70 - 3.10 10*3/mm3    Monocytes, Absolute 1.10 (H) 0.10 - 0.90 10*3/mm3    Eosinophils, Absolute 0.18 0.00 - 0.40 10*3/mm3    Basophils, Absolute 0.04 0.00 - 0.20 10*3/mm3    Immature Grans, Absolute 0.10 (H) 0.00 - 0.05 10*3/mm3    nRBC 0.0 0.0 - 0.2 /100 WBC   Magnesium    Collection Time: 05/23/25 10:58 PM    Specimen: Blood   Result Value Ref Range    Magnesium 2.3 1.6 - 2.4 mg/dL   Gold Top - SST    Collection Time: 05/23/25 10:58 PM   Result Value Ref Range    Extra Tube Hold for add-ons.      CT Head Without Contrast  Result Date: 5/24/2025  Impression: 1.No acute intracranial process. 2.No acute osseous abnormality of the cervical spine. Electronically Signed: Tana Sutherland MD  5/24/2025 12:08 AM EDT  Workstation ID: [id number]    CT Cervical Spine Without Contrast  Result Date: 5/24/2025  Impression: 1.No acute intracranial process. 2.No acute osseous abnormality of the cervical spine. Electronically Signed: Tana Sutherland MD  5/24/2025 12:08 AM EDT  Workstation ID: [id number]    XR Humerus Left  Result Date: 5/23/2025  Impression: Mildly comminuted mildly displaced fracture of the proximal humeral diaphysis. Electronically Signed: Tana Sutherland MD  5/23/2025 11:38 PM EDT  Workstation ID: [id number]                                                     Medical Decision Making  Amount and/or Complexity of Data Reviewed  Labs: ordered.  Radiology: ordered.    Risk  OTC drugs.  Prescription drug management.      Patient had the above evaluation.  Results were discussed with patient.  CT head showed no acute intracranial abnormality.  CT cervical spine shows no acute injury.  My interpretation of left humerus fracture shows a displaced fracture of the proximal humerus.  Patient was placed in a sling.  She was given pain control.  Labs significant for acute kidney injury with BUN of 51 and creatinine 1.63 as well as potassium of  2.7.  She was started on potassium replacement.  I discussed with the hospitalist and the patient will be admitted for further evaluation and management including orthopedic consult.      Final diagnoses:   Closed displaced comminuted fracture of shaft of left humerus, initial encounter   Hypokalemia   Acute kidney injury   Fall, initial encounter       ED Disposition  ED Disposition       ED Disposition   Decision to Admit    Condition   --    Comment   Level of Care: Med/Surg [1]   Diagnosis: Humeral fracture [624572]   Admitting Physician: JARED NARAYANAN [[id number]]                 No follow-up provider specified.       Medication List      No changes were made to your prescriptions during this visit.            Cedric Tolliver MD  05/24/25 0029

## 2025-05-24 NOTE — PLAN OF CARE
Goal Outcome Evaluation:         Patient admitted from ED for left humerus fracture. Sling is in place. Patient is alert and oriented x 4, skin is warm to touch, moves extremities. NPO. Plan of care initiated. Call light is within reach.

## 2025-05-24 NOTE — CONSULTS
"  Orthopaedic Surgery  Consult Note  Rufus Peralta MD    [phone]    HPI:  Patient is a 74 y.o.female who presents with left arm pain after a fall at home while using her rollator. She complains of pain to the left upper extremity. Was able to ambulate after the fall. Denies pain elsewhere in her body. She typically uses a rollator at baseline about the home and is limited in mobility secondary to her weight. She is morbidly obese at my 51.84. she was evaluated in the ED at [facility] where radiographs demonstrated a proximal third humeral shaft fracture. She was placed into a sling by the ED provider. Her pain is well controlled while at rest however she is quite painful with movement of the upper extremity. Pain improved with pain medication and immobilization. Denies numbness and tingling into the hand, motor deferred about the shoulder. She does have history of aortic valve repair and multiple heart catheterizations. Other medical comorbidities significant for COPD and diabetes. Others below. Denies being on a blood thinner. Diabetes fair control with A1c < 7.     MEDICAL HISTORY  Past Medical History:   Diagnosis Date    Acute congestive heart failure     Allergies     Anxiety     Arthritis     Asthma     Bilateral leg edema     Bipolar 1 disorder     Bulging lumbar disc     X3    Cancer     states had \"cancerous tumor during pregnancy and had to have hysterectomy\"    Cataract     bilateral    Cellulitis 03/2021    bilateral legs    Compression fracture of vertebral column     LUMBAR    COPD (chronic obstructive pulmonary disease)     Delayed emergence from anesthesia     Depression     Depression     Diabetes mellitus     Dyslipidemia     Dyspnea on exertion     Fibromyalgia     GERD (gastroesophageal reflux disease)     Hiatal hernia     Hyperlipidemia     Hypertension     Hypothyroid     HYPOTHYROID    IBS (irritable bowel syndrome)     Migraines     Morbid obesity     Neuropathy     " Panic attacks     Peripheral edema     PONV (postoperative nausea and vomiting)     Poor mobility     rolling walker    Prediabetes     PVD (peripheral vascular disease)     Rheumatoid aortitis     Sleep apnea     cpap    Spinal stenosis     Spinal stenosis     Vertigo     Vertigo     Vitamin D deficiency      Past Surgical History:   Procedure Laterality Date    AORTIC VALVE REPAIR/REPLACEMENT N/A 3/18/2021    Procedure: AORTIC VALVE REPLACEMENT;  Surgeon: Olaf Mcgill MD;  Location: [facility] CVOR;  Service: Cardiothoracic;  Laterality: N/A;    BRONCHOSCOPY N/A 3/25/2021    Procedure: BRONCHOSCOPY AT BEDSIDE WITH BRONCHIOALVEOLAR LAVAGE;  Surgeon: Glenn Reyes MD;  Location: [facility] ENDOSCOPY;  Service: Pulmonary;  Laterality: N/A;  PNA    BRONCHOSCOPY N/A 10/16/2023    Procedure: BRONCHOSCOPY with bronchial washing;  Surgeon: Glenn Reyes MD;  Location: [facility] ENDOSCOPY;  Service: Pulmonary;  Laterality: N/A;  post op: pneumonia    CARDIAC CATHETERIZATION  2009    CARDIAC CATHETERIZATION N/A 8/14/2019    Procedure: Left Heart Cath;  Surgeon: Jose Levi MD;  Location: [facility] CATH INVASIVE LOCATION;  Service: Cardiovascular    CARDIAC CATHETERIZATION N/A 8/14/2019    Procedure: Right Heart Cath;  Surgeon: Jose Levi MD;  Location: [facility] CATH INVASIVE LOCATION;  Service: Cardiovascular    CARDIAC CATHETERIZATION N/A 8/14/2019    Procedure: Aortic root aortogram;  Surgeon: Jose Levi MD;  Location: [facility] CATH INVASIVE LOCATION;  Service: Cardiovascular    CARDIAC CATHETERIZATION N/A 1/20/2021    Procedure: Left Heart Cath;  Surgeon: Jose Levi MD;  Location: [facility] CATH INVASIVE LOCATION;  Service: Cardiovascular;  Laterality: N/A;    COLONOSCOPY      COLONOSCOPY N/A 2/14/2025    Procedure: COLONOSCOPY WITH POLYPECTOMY X 4;  Surgeon: Segun Cleveland MD;  Location: [facility] ENDOSCOPY;  Service: Gastroenterology;  Laterality: N/A;  POST- POLYPS X 4,  "DIVERTICULOSIS, INTERNAL HEMORRHOIDS    CYSTOCELE REPAIR  1984    ELBOW ARTHROPLASTY  2011    ENDOSCOPY      FOOT SURGERY      GALLBLADDER SURGERY  2002    TONSILLECTOMY      VAGINAL HYSTERECTOMY  1972     Most Recent Immunizations   Administered Date(s) Administered    COVID-19 (MODERNA) 12YRS+ (SPIKEVAX) 10/05/2024    COVID-19 (MODERNA) 1st,2nd,3rd Dose Monovalent 07/28/2021    COVID-19 (MODERNA) Monovalent Original Booster 03/05/2022    COVID-19 (PFIZER) Purple Cap Monovalent 06/23/2021     Social History     Tobacco Use    Smoking status: Former     Current packs/day: 0.00     Types: Cigarettes     Quit date: 2022     Years since quitting: 3.3     Passive exposure: Never    Smokeless tobacco: Never    Tobacco comments:     decrease now and do not smoke dos   Substance Use Topics    Alcohol use: Not Currently     Comment: very rare      Social History     Substance and Sexual Activity   Drug Use Never       VITALS: /62 (BP Location: Right arm, Patient Position: Lying)   Pulse 72   Temp 97.9 °F (36.6 °C) (Oral)   Resp 20   Ht 162.6 cm (64\")   Wt (!) 137 kg (302 lb 0.5 oz)   SpO2 94%   BMI 51.84 kg/m²  Body mass index is 51.84 kg/m².    PHYSICAL EXAM:   CONSTITUTIONAL: No acute distress  LUNGS: Equal chest rise, no shortness of air  CARDIOVASCULAR: palpable peripheral pulses  SKIN: no skin lesions in the area examined  LYMPH: no lymphadenopathy in the area examined  Musculoskeletal:   LUE   Skin intact  arm resting in a position of comfort  AIN PIN ULNAR nerve intact although weak wrist extension 4-/5   Sensation is intact distally to dense palpation   Hand is wwp       RADIOLOGY REVIEW:   2 vw of the humerus reviewed with left proximal third humeral shaft fracture with no obvious extension into the head. Head is well seated on the glenoid, no obvious dislocation.     2 vw of the elbow are pending.   CT Head Without Contrast  Result Date: 5/24/2025  Impression: 1.No acute intracranial process. 2.No " acute osseous abnormality of the cervical spine. Electronically Signed: Tana Sutherland MD  5/24/2025 12:08 AM EDT  Workstation ID: [id number]    CT Cervical Spine Without Contrast  Result Date: 5/24/2025  Impression: 1.No acute intracranial process. 2.No acute osseous abnormality of the cervical spine. Electronically Signed: Tana Sutherland MD  5/24/2025 12:08 AM EDT  Workstation ID: [id number]    XR Humerus Left  Result Date: 5/23/2025  Impression: Mildly comminuted mildly displaced fracture of the proximal humeral diaphysis. Electronically Signed: Tana Sutherland MD  5/23/2025 11:38 PM EDT  Workstation ID: [id number]      LABS:   Results for the past 24 hours:   Recent Results (from the past 24 hours)   Basic Metabolic Panel    Collection Time: 05/23/25 10:58 PM    Specimen: Blood   Result Value Ref Range    Glucose 133 (H) 65 - 99 mg/dL    BUN 51 (H) 8 - 23 mg/dL    Creatinine 1.63 (H) 0.57 - 1.00 mg/dL    Sodium 137 136 - 145 mmol/L    Potassium 2.7 (L) 3.5 - 5.2 mmol/L    Chloride 96 (L) 98 - 107 mmol/L    CO2 29.2 (H) 22.0 - 29.0 mmol/L    Calcium 9.0 8.6 - 10.5 mg/dL    BUN/Creatinine Ratio 31.3 (H) 7.0 - 25.0    Anion Gap 11.8 5.0 - 15.0 mmol/L    eGFR 33.0 (L) >60.0 mL/min/1.73   aPTT    Collection Time: 05/23/25 10:58 PM    Specimen: Blood   Result Value Ref Range    PTT 29.1 22.7 - 35.4 seconds   Protime-INR    Collection Time: 05/23/25 10:58 PM    Specimen: Blood   Result Value Ref Range    Protime 14.7 (H) 11.7 - 14.2 Seconds    INR 1.16 (H) 0.90 - 1.10   CBC Auto Differential    Collection Time: 05/23/25 10:58 PM    Specimen: Blood   Result Value Ref Range    WBC 10.97 (H) 3.40 - 10.80 10*3/mm3    RBC 4.06 3.77 - 5.28 10*6/mm3    Hemoglobin 12.0 12.0 - 15.9 g/dL    Hematocrit 37.6 34.0 - 46.6 %    MCV 92.6 79.0 - 97.0 fL    MCH 29.6 26.6 - 33.0 pg    MCHC 31.9 31.5 - 35.7 g/dL    RDW 14.0 12.3 - 15.4 %    RDW-SD 47.5 37.0 - 54.0 fl    MPV 9.8 6.0 - 12.0 fL    Platelets 256 140 - 450 10*3/mm3    Neutrophil % 55.0  42.7 - 76.0 %    Lymphocyte % 32.1 19.6 - 45.3 %    Monocyte % 10.0 5.0 - 12.0 %    Eosinophil % 1.6 0.3 - 6.2 %    Basophil % 0.4 0.0 - 1.5 %    Immature Grans % 0.9 (H) 0.0 - 0.5 %    Neutrophils, Absolute 6.03 1.70 - 7.00 10*3/mm3    Lymphocytes, Absolute 3.52 (H) 0.70 - 3.10 10*3/mm3    Monocytes, Absolute 1.10 (H) 0.10 - 0.90 10*3/mm3    Eosinophils, Absolute 0.18 0.00 - 0.40 10*3/mm3    Basophils, Absolute 0.04 0.00 - 0.20 10*3/mm3    Immature Grans, Absolute 0.10 (H) 0.00 - 0.05 10*3/mm3    nRBC 0.0 0.0 - 0.2 /100 WBC   Gold Top - SST    Collection Time: 05/23/25 10:58 PM   Result Value Ref Range    Extra Tube Hold for add-ons.    Magnesium    Collection Time: 05/23/25 10:58 PM    Specimen: Blood   Result Value Ref Range    Magnesium 2.3 1.6 - 2.4 mg/dL   Magnesium    Collection Time: 05/24/25  3:19 AM    Specimen: Blood   Result Value Ref Range    Magnesium 2.4 1.6 - 2.4 mg/dL   Phosphorus    Collection Time: 05/24/25  3:19 AM    Specimen: Blood   Result Value Ref Range    Phosphorus 4.5 2.5 - 4.5 mg/dL   Comprehensive Metabolic Panel    Collection Time: 05/24/25  3:19 AM    Specimen: Blood   Result Value Ref Range    Glucose 156 (H) 65 - 99 mg/dL    BUN 52 (H) 8 - 23 mg/dL    Creatinine 1.63 (H) 0.57 - 1.00 mg/dL    Sodium 138 136 - 145 mmol/L    Potassium 3.6 3.5 - 5.2 mmol/L    Chloride 97 (L) 98 - 107 mmol/L    CO2 30.1 (H) 22.0 - 29.0 mmol/L    Calcium 8.7 8.6 - 10.5 mg/dL    Total Protein 6.9 6.0 - 8.5 g/dL    Albumin 3.8 3.5 - 5.2 g/dL    ALT (SGPT) 132 (H) 1 - 33 U/L    AST (SGOT) 214 (H) 1 - 32 U/L    Alkaline Phosphatase 108 39 - 117 U/L    Total Bilirubin 0.5 0.0 - 1.2 mg/dL    Globulin 3.1 gm/dL    A/G Ratio 1.2 g/dL    BUN/Creatinine Ratio 31.9 (H) 7.0 - 25.0    Anion Gap 10.9 5.0 - 15.0 mmol/L    eGFR 33.0 (L) >60.0 mL/min/1.73   Protime-INR    Collection Time: 05/24/25  3:19 AM    Specimen: Blood   Result Value Ref Range    Protime 14.9 (H) 11.7 - 14.2 Seconds    INR 1.18 (H) 0.90 - 1.10    Type & Screen    Collection Time: 05/24/25  3:19 AM    Specimen: Blood   Result Value Ref Range    ABO Type O     RH type Positive     Antibody Screen Negative     T&S Expiration Date 5/27/2025 11:59:59 PM    CBC Auto Differential    Collection Time: 05/24/25  3:19 AM    Specimen: Blood   Result Value Ref Range    WBC 16.63 (H) 3.40 - 10.80 10*3/mm3    RBC 3.93 3.77 - 5.28 10*6/mm3    Hemoglobin 11.9 (L) 12.0 - 15.9 g/dL    Hematocrit 36.8 34.0 - 46.6 %    MCV 93.6 79.0 - 97.0 fL    MCH 30.3 26.6 - 33.0 pg    MCHC 32.3 31.5 - 35.7 g/dL    RDW 13.7 12.3 - 15.4 %    RDW-SD 47.1 37.0 - 54.0 fl    MPV 10.1 6.0 - 12.0 fL    Platelets 281 140 - 450 10*3/mm3    Neutrophil % 76.7 (H) 42.7 - 76.0 %    Lymphocyte % 13.0 (L) 19.6 - 45.3 %    Monocyte % 9.1 5.0 - 12.0 %    Eosinophil % 0.3 0.3 - 6.2 %    Basophil % 0.2 0.0 - 1.5 %    Immature Grans % 0.7 (H) 0.0 - 0.5 %    Neutrophils, Absolute 12.73 (H) 1.70 - 7.00 10*3/mm3    Lymphocytes, Absolute 2.17 0.70 - 3.10 10*3/mm3    Monocytes, Absolute 1.52 (H) 0.10 - 0.90 10*3/mm3    Eosinophils, Absolute 0.05 0.00 - 0.40 10*3/mm3    Basophils, Absolute 0.04 0.00 - 0.20 10*3/mm3    Immature Grans, Absolute 0.12 (H) 0.00 - 0.05 10*3/mm3    nRBC 0.0 0.0 - 0.2 /100 WBC       IMPRESSION:  Patient is a 74 y.o. female with left proximal third humeral shaft fracture     PLAN:   Admited to: MD [facility] in [facility]   NPO @ [facility]   Peripop abx ordered   TXA ordered   Discussed with her the diagnosis and the treatment options with conservative treatment and operative intervention. I do feel that she is at high risk for nonunion given her size and body habitus. Additionally I believe that I can return function to her left upper extremity sooner with ability to weight bear sooner with facilitation of ambulation to lessen pulmonary complications pressure ulcers risk of blood clot with decreased ambulatory status as she uses a rollator and needs her left upper extremity to do  so.   Therefore I have discussed with her doing an intramedullary nail of the left humerus. This will allow for sooner weight bearing and lessen complications from immobilization. I discussed with her the risks and benefits of surgery with the risks including bleeding, infection, need for further surgery, damage to surrounding blood vessels, nerves, tendons and muscle,the potential for malunion, nonunion, implant irritation, implant breakage, decreased ROM about the shoulder and shoulder stiffness and pain, , and the need for future surgery. Risks of anesthesia including DVT, PE, and even death were discussed. All of the patients questions were answered. No guarantees given.   She wishes to proceed with left humerus IMN on 5/25/25. She is to be NPO at midnight. All questions answered and no guarantees given.     please call/chat  with any questions or concerns.    Rufus Peralta MD   [facility]   [phone] - [place] [facility]  [phone] - [facility]

## 2025-05-24 NOTE — PROGRESS NOTES
Orthopaedics aware of consult   Plan for fixation of the left humerus tomorrow.   Okay for diet.   Formal consult note to follow

## 2025-05-25 ENCOUNTER — ANESTHESIA (OUTPATIENT)
Dept: PERIOP | Facility: HOSPITAL | Age: 75
End: 2025-05-25
Payer: MEDICAID

## 2025-05-25 ENCOUNTER — APPOINTMENT (OUTPATIENT)
Dept: GENERAL RADIOLOGY | Facility: HOSPITAL | Age: 75
End: 2025-05-25
Payer: MEDICARE

## 2025-05-25 ENCOUNTER — APPOINTMENT (OUTPATIENT)
Dept: CARDIOLOGY | Facility: HOSPITAL | Age: 75
End: 2025-05-25
Payer: MEDICARE

## 2025-05-25 LAB
ALBUMIN SERPL-MCNC: 3.7 G/DL (ref 3.5–5.2)
ALBUMIN/GLOB SERPL: 1.3 G/DL
ALP SERPL-CCNC: 108 U/L (ref 39–117)
ALT SERPL W P-5'-P-CCNC: 144 U/L (ref 1–33)
ANION GAP SERPL CALCULATED.3IONS-SCNC: 12 MMOL/L (ref 5–15)
AST SERPL-CCNC: 103 U/L (ref 1–32)
BASOPHILS # BLD AUTO: 0.03 10*3/MM3 (ref 0–0.2)
BASOPHILS NFR BLD AUTO: 0.2 % (ref 0–1.5)
BH CV ECHO MEAS - EDV(CUBED): 110.6 ML
BH CV ECHO MEAS - ESV(CUBED): 32.8 ML
BH CV ECHO MEAS - FS: 33.3 %
BH CV ECHO MEAS - IVS/LVPW: 1 CM
BH CV ECHO MEAS - IVSD: 0.9 CM
BH CV ECHO MEAS - LA DIMENSION: 3 CM
BH CV ECHO MEAS - LV MASS(C)D: 147.8 GRAMS
BH CV ECHO MEAS - LVIDD: 4.8 CM
BH CV ECHO MEAS - LVIDS: 3.2 CM
BH CV ECHO MEAS - LVOT AREA: 2.01 CM2
BH CV ECHO MEAS - LVOT DIAM: 1.6 CM
BH CV ECHO MEAS - LVPWD: 0.9 CM
BH CV ECHO MEAS - PA ACC TIME: 0.09 SEC
BH CV ECHO MEAS - PA V2 MAX: 85.5 CM/SEC
BH CV ECHO MEAS - RAP SYSTOLE: 8 MMHG
BH CV ECHO MEAS - RV MAX PG: 2.7 MMHG
BH CV ECHO MEAS - RV V1 MAX: 82.2 CM/SEC
BH CV ECHO MEAS - RV V1 VTI: 11.7 CM
BH CV ECHO MEAS - RVSP: 21.1 MMHG
BH CV ECHO MEAS - TR MAX PG: 13.1 MMHG
BH CV ECHO MEAS - TR MAX VEL: 181 CM/SEC
BILIRUB SERPL-MCNC: 0.5 MG/DL (ref 0–1.2)
BUN SERPL-MCNC: 44 MG/DL (ref 8–23)
BUN/CREAT SERPL: 32.8 (ref 7–25)
CALCIUM SPEC-SCNC: 8.6 MG/DL (ref 8.6–10.5)
CHLORIDE SERPL-SCNC: 100 MMOL/L (ref 98–107)
CO2 SERPL-SCNC: 30 MMOL/L (ref 22–29)
CREAT SERPL-MCNC: 1.34 MG/DL (ref 0.57–1)
DEPRECATED RDW RBC AUTO: 49.2 FL (ref 37–54)
EGFRCR SERPLBLD CKD-EPI 2021: 41.7 ML/MIN/1.73
EOSINOPHIL # BLD AUTO: 0.07 10*3/MM3 (ref 0–0.4)
EOSINOPHIL NFR BLD AUTO: 0.6 % (ref 0.3–6.2)
ERYTHROCYTE [DISTWIDTH] IN BLOOD BY AUTOMATED COUNT: 13.9 % (ref 12.3–15.4)
GLOBULIN UR ELPH-MCNC: 2.9 GM/DL
GLUCOSE BLDC GLUCOMTR-MCNC: 171 MG/DL (ref 70–105)
GLUCOSE SERPL-MCNC: 147 MG/DL (ref 65–99)
HCT VFR BLD AUTO: 35.9 % (ref 34–46.6)
HGB BLD-MCNC: 11.1 G/DL (ref 12–15.9)
IMM GRANULOCYTES # BLD AUTO: 0.05 10*3/MM3 (ref 0–0.05)
IMM GRANULOCYTES NFR BLD AUTO: 0.4 % (ref 0–0.5)
LYMPHOCYTES # BLD AUTO: 1.64 10*3/MM3 (ref 0.7–3.1)
LYMPHOCYTES NFR BLD AUTO: 12.9 % (ref 19.6–45.3)
MCH RBC QN AUTO: 29.8 PG (ref 26.6–33)
MCHC RBC AUTO-ENTMCNC: 30.9 G/DL (ref 31.5–35.7)
MCV RBC AUTO: 96.5 FL (ref 79–97)
MONOCYTES # BLD AUTO: 1.43 10*3/MM3 (ref 0.1–0.9)
MONOCYTES NFR BLD AUTO: 11.3 % (ref 5–12)
NEUTROPHILS NFR BLD AUTO: 74.6 % (ref 42.7–76)
NEUTROPHILS NFR BLD AUTO: 9.45 10*3/MM3 (ref 1.7–7)
NRBC BLD AUTO-RTO: 0 /100 WBC (ref 0–0.2)
PLATELET # BLD AUTO: 229 10*3/MM3 (ref 140–450)
PMV BLD AUTO: 9.9 FL (ref 6–12)
POTASSIUM SERPL-SCNC: 4.1 MMOL/L (ref 3.5–5.2)
PROT SERPL-MCNC: 6.6 G/DL (ref 6–8.5)
QT INTERVAL: 430 MS
QTC INTERVAL: 491 MS
RBC # BLD AUTO: 3.72 10*6/MM3 (ref 3.77–5.28)
SODIUM SERPL-SCNC: 142 MMOL/L (ref 136–145)
WBC NRBC COR # BLD AUTO: 12.67 10*3/MM3 (ref 3.4–10.8)

## 2025-05-25 PROCEDURE — 93308 TTE F-UP OR LMTD: CPT

## 2025-05-25 PROCEDURE — 25010000002 FENTANYL CITRATE (PF) 100 MCG/2ML SOLUTION: Performed by: ANESTHESIOLOGY

## 2025-05-25 PROCEDURE — 25810000003 LACTATED RINGERS PER 1000 ML: Performed by: ANESTHESIOLOGY

## 2025-05-25 PROCEDURE — 25010000002 PHENYLEPHRINE 10 MG/ML SOLUTION 5 ML VIAL: Performed by: ANESTHESIOLOGY

## 2025-05-25 PROCEDURE — 25010000002 LIDOCAINE 1% - EPINEPHRINE 1:100000 1 %-1:100000 SOLUTION 20 ML VIAL: Performed by: ORTHOPAEDIC SURGERY

## 2025-05-25 PROCEDURE — C1713 ANCHOR/SCREW BN/BN,TIS/BN: HCPCS | Performed by: ORTHOPAEDIC SURGERY

## 2025-05-25 PROCEDURE — 94761 N-INVAS EAR/PLS OXIMETRY MLT: CPT

## 2025-05-25 PROCEDURE — 25010000002 SUCCINYLCHOLINE PER 20 MG: Performed by: ANESTHESIOLOGY

## 2025-05-25 PROCEDURE — 25010000002 HYDROMORPHONE PER 4 MG: Performed by: ANESTHESIOLOGY

## 2025-05-25 PROCEDURE — 85025 COMPLETE CBC W/AUTO DIFF WBC: CPT | Performed by: HOSPITALIST

## 2025-05-25 PROCEDURE — 25010000002 HYDROMORPHONE 1 MG/ML SOLUTION: Performed by: ORTHOPAEDIC SURGERY

## 2025-05-25 PROCEDURE — 25010000002 BUPIVACAINE (PF) 0.25 % SOLUTION 30 ML VIAL: Performed by: ORTHOPAEDIC SURGERY

## 2025-05-25 PROCEDURE — 93321 DOPPLER ECHO F-UP/LMTD STD: CPT | Performed by: INTERNAL MEDICINE

## 2025-05-25 PROCEDURE — 25010000002 MORPHINE PER 10 MG: Performed by: ANESTHESIOLOGY

## 2025-05-25 PROCEDURE — 25010000002 SUGAMMADEX 200 MG/2ML SOLUTION: Performed by: ANESTHESIOLOGY

## 2025-05-25 PROCEDURE — 93321 DOPPLER ECHO F-UP/LMTD STD: CPT

## 2025-05-25 PROCEDURE — 76000 FLUOROSCOPY <1 HR PHYS/QHP: CPT

## 2025-05-25 PROCEDURE — G0378 HOSPITAL OBSERVATION PER HR: HCPCS

## 2025-05-25 PROCEDURE — 25010000002 HYDROMORPHONE 1 MG/ML SOLUTION: Performed by: HOSPITALIST

## 2025-05-25 PROCEDURE — 25010000002 ONDANSETRON PER 1 MG: Performed by: ANESTHESIOLOGY

## 2025-05-25 PROCEDURE — 94799 UNLISTED PULMONARY SVC/PX: CPT

## 2025-05-25 PROCEDURE — 99232 SBSQ HOSP IP/OBS MODERATE 35: CPT | Performed by: INTERNAL MEDICINE

## 2025-05-25 PROCEDURE — 25010000002 CEFAZOLIN 3 G RECONSTITUTED SOLUTION 1 EACH VIAL: Performed by: ORTHOPAEDIC SURGERY

## 2025-05-25 PROCEDURE — 94664 DEMO&/EVAL PT USE INHALER: CPT

## 2025-05-25 PROCEDURE — 25010000002 ONDANSETRON PER 1 MG: Performed by: HOSPITALIST

## 2025-05-25 PROCEDURE — 25010000002 PROPOFOL 200 MG/20ML EMULSION: Performed by: ANESTHESIOLOGY

## 2025-05-25 PROCEDURE — C1769 GUIDE WIRE: HCPCS | Performed by: ORTHOPAEDIC SURGERY

## 2025-05-25 PROCEDURE — 0PSG36Z REPOSITION LEFT HUMERAL SHAFT WITH INTRAMEDULLARY INTERNAL FIXATION DEVICE, PERCUTANEOUS APPROACH: ICD-10-PCS | Performed by: ORTHOPAEDIC SURGERY

## 2025-05-25 PROCEDURE — 82948 REAGENT STRIP/BLOOD GLUCOSE: CPT

## 2025-05-25 PROCEDURE — 25810000003 SODIUM CHLORIDE 0.9 % SOLUTION 250 ML FLEX CONT: Performed by: ANESTHESIOLOGY

## 2025-05-25 PROCEDURE — 93325 DOPPLER ECHO COLOR FLOW MAPG: CPT

## 2025-05-25 PROCEDURE — 25010000002 PROPOFOL 10 MG/ML EMULSION: Performed by: ANESTHESIOLOGY

## 2025-05-25 PROCEDURE — 73060 X-RAY EXAM OF HUMERUS: CPT

## 2025-05-25 PROCEDURE — 93325 DOPPLER ECHO COLOR FLOW MAPG: CPT | Performed by: INTERNAL MEDICINE

## 2025-05-25 PROCEDURE — 25010000002 GLYCOPYRROLATE 0.2 MG/ML SOLUTION: Performed by: ANESTHESIOLOGY

## 2025-05-25 PROCEDURE — 25010000002 LIDOCAINE PF 1% 1 % SOLUTION: Performed by: ANESTHESIOLOGY

## 2025-05-25 PROCEDURE — 93308 TTE F-UP OR LMTD: CPT | Performed by: INTERNAL MEDICINE

## 2025-05-25 PROCEDURE — 80053 COMPREHEN METABOLIC PANEL: CPT | Performed by: HOSPITALIST

## 2025-05-25 DEVICE — LOCKING SCREW, FULLY THREADED: Type: IMPLANTABLE DEVICE | Site: HUMERUS | Status: FUNCTIONAL

## 2025-05-25 DEVICE — HUMERAL NAIL
Type: IMPLANTABLE DEVICE | Site: HUMERUS | Status: FUNCTIONAL
Brand: T2

## 2025-05-25 RX ORDER — LIDOCAINE HYDROCHLORIDE AND EPINEPHRINE 10; 10 MG/ML; UG/ML
INJECTION, SOLUTION INFILTRATION; PERINEURAL
Status: DISPENSED
Start: 2025-05-25 | End: 2025-05-26

## 2025-05-25 RX ORDER — LIDOCAINE HYDROCHLORIDE 10 MG/ML
INJECTION, SOLUTION EPIDURAL; INFILTRATION; INTRACAUDAL; PERINEURAL AS NEEDED
Status: DISCONTINUED | OUTPATIENT
Start: 2025-05-25 | End: 2025-05-25 | Stop reason: SURG

## 2025-05-25 RX ORDER — BUPIVACAINE HYDROCHLORIDE 2.5 MG/ML
INJECTION, SOLUTION EPIDURAL; INFILTRATION; INTRACAUDAL; PERINEURAL
Status: DISPENSED
Start: 2025-05-25 | End: 2025-05-26

## 2025-05-25 RX ORDER — MAGNESIUM HYDROXIDE 1200 MG/15ML
LIQUID ORAL AS NEEDED
Status: DISCONTINUED | OUTPATIENT
Start: 2025-05-25 | End: 2025-05-25 | Stop reason: HOSPADM

## 2025-05-25 RX ORDER — SODIUM CHLORIDE, SODIUM LACTATE, POTASSIUM CHLORIDE, CALCIUM CHLORIDE 600; 310; 30; 20 MG/100ML; MG/100ML; MG/100ML; MG/100ML
INJECTION, SOLUTION INTRAVENOUS CONTINUOUS PRN
Status: DISCONTINUED | OUTPATIENT
Start: 2025-05-25 | End: 2025-05-25 | Stop reason: SURG

## 2025-05-25 RX ORDER — PROCHLORPERAZINE EDISYLATE 5 MG/ML
10 INJECTION INTRAMUSCULAR; INTRAVENOUS EVERY 8 HOURS PRN
Status: DISCONTINUED | OUTPATIENT
Start: 2025-05-25 | End: 2025-06-01 | Stop reason: HOSPADM

## 2025-05-25 RX ORDER — ROCURONIUM BROMIDE 10 MG/ML
INJECTION, SOLUTION INTRAVENOUS AS NEEDED
Status: DISCONTINUED | OUTPATIENT
Start: 2025-05-25 | End: 2025-05-25 | Stop reason: SURG

## 2025-05-25 RX ORDER — GLYCOPYRROLATE 0.2 MG/ML
INJECTION INTRAMUSCULAR; INTRAVENOUS AS NEEDED
Status: DISCONTINUED | OUTPATIENT
Start: 2025-05-25 | End: 2025-05-25 | Stop reason: SURG

## 2025-05-25 RX ORDER — IPRATROPIUM BROMIDE AND ALBUTEROL SULFATE 2.5; .5 MG/3ML; MG/3ML
3 SOLUTION RESPIRATORY (INHALATION) ONCE
Status: COMPLETED | OUTPATIENT
Start: 2025-05-25 | End: 2025-05-25

## 2025-05-25 RX ORDER — SODIUM CHLORIDE, SODIUM LACTATE, POTASSIUM CHLORIDE, CALCIUM CHLORIDE 600; 310; 30; 20 MG/100ML; MG/100ML; MG/100ML; MG/100ML
20 INJECTION, SOLUTION INTRAVENOUS ONCE
Status: DISCONTINUED | OUTPATIENT
Start: 2025-05-25 | End: 2025-05-25 | Stop reason: HOSPADM

## 2025-05-25 RX ORDER — ONDANSETRON 4 MG/1
4 TABLET, ORALLY DISINTEGRATING ORAL EVERY 6 HOURS PRN
Status: DISCONTINUED | OUTPATIENT
Start: 2025-05-25 | End: 2025-06-01 | Stop reason: HOSPADM

## 2025-05-25 RX ORDER — HYDROMORPHONE HYDROCHLORIDE 1 MG/ML
0.5 INJECTION, SOLUTION INTRAMUSCULAR; INTRAVENOUS; SUBCUTANEOUS
Status: DISCONTINUED | OUTPATIENT
Start: 2025-05-25 | End: 2025-05-30

## 2025-05-25 RX ORDER — ONDANSETRON 2 MG/ML
4 INJECTION INTRAMUSCULAR; INTRAVENOUS EVERY 6 HOURS PRN
Status: DISCONTINUED | OUTPATIENT
Start: 2025-05-25 | End: 2025-06-01 | Stop reason: HOSPADM

## 2025-05-25 RX ORDER — PROPOFOL 10 MG/ML
INJECTION, EMULSION INTRAVENOUS AS NEEDED
Status: DISCONTINUED | OUTPATIENT
Start: 2025-05-25 | End: 2025-05-25 | Stop reason: SURG

## 2025-05-25 RX ORDER — ONDANSETRON 2 MG/ML
INJECTION INTRAMUSCULAR; INTRAVENOUS AS NEEDED
Status: DISCONTINUED | OUTPATIENT
Start: 2025-05-25 | End: 2025-05-25 | Stop reason: SURG

## 2025-05-25 RX ORDER — FENTANYL CITRATE 50 UG/ML
INJECTION, SOLUTION INTRAMUSCULAR; INTRAVENOUS AS NEEDED
Status: DISCONTINUED | OUTPATIENT
Start: 2025-05-25 | End: 2025-05-25 | Stop reason: SURG

## 2025-05-25 RX ORDER — SUCCINYLCHOLINE CHLORIDE 20 MG/ML
INJECTION INTRAMUSCULAR; INTRAVENOUS AS NEEDED
Status: DISCONTINUED | OUTPATIENT
Start: 2025-05-25 | End: 2025-05-25 | Stop reason: SURG

## 2025-05-25 RX ADMIN — LIDOCAINE HYDROCHLORIDE 50 MG: 10 INJECTION, SOLUTION EPIDURAL; INFILTRATION; INTRACAUDAL; PERINEURAL at 15:45

## 2025-05-25 RX ADMIN — OXYCODONE 10 MG: 5 TABLET ORAL at 21:31

## 2025-05-25 RX ADMIN — HYDROMORPHONE HYDROCHLORIDE 0.5 MG: 1 INJECTION, SOLUTION INTRAMUSCULAR; INTRAVENOUS; SUBCUTANEOUS at 18:00

## 2025-05-25 RX ADMIN — FENTANYL CITRATE 50 MCG: 50 INJECTION, SOLUTION INTRAMUSCULAR; INTRAVENOUS at 16:27

## 2025-05-25 RX ADMIN — PROPOFOL 150 MCG/KG/MIN: 10 INJECTION, EMULSION INTRAVENOUS at 15:55

## 2025-05-25 RX ADMIN — FENTANYL CITRATE 50 MCG: 50 INJECTION, SOLUTION INTRAMUSCULAR; INTRAVENOUS at 17:26

## 2025-05-25 RX ADMIN — MORPHINE SULFATE 1 MG: 4 INJECTION, SOLUTION INTRAMUSCULAR; INTRAVENOUS at 18:13

## 2025-05-25 RX ADMIN — DOCUSATE SODIUM 100 MG: 100 CAPSULE, LIQUID FILLED ORAL at 20:18

## 2025-05-25 RX ADMIN — PHENYLEPHRINE HYDROCHLORIDE 0.2 MCG/KG/MIN: 10 INJECTION INTRAVENOUS at 15:50

## 2025-05-25 RX ADMIN — HYDROMORPHONE HYDROCHLORIDE 0.5 MG: 1 INJECTION, SOLUTION INTRAMUSCULAR; INTRAVENOUS; SUBCUTANEOUS at 08:18

## 2025-05-25 RX ADMIN — ONDANSETRON 4 MG: 2 INJECTION, SOLUTION INTRAMUSCULAR; INTRAVENOUS at 17:27

## 2025-05-25 RX ADMIN — ROCURONIUM BROMIDE 20 MG: 10 INJECTION, SOLUTION INTRAVENOUS at 17:08

## 2025-05-25 RX ADMIN — IPRATROPIUM BROMIDE AND ALBUTEROL SULFATE 3 ML: .5; 3 SOLUTION RESPIRATORY (INHALATION) at 18:40

## 2025-05-25 RX ADMIN — HYDROMORPHONE HYDROCHLORIDE 0.5 MG: 1 INJECTION, SOLUTION INTRAMUSCULAR; INTRAVENOUS; SUBCUTANEOUS at 20:14

## 2025-05-25 RX ADMIN — OXYCODONE 10 MG: 5 TABLET ORAL at 12:25

## 2025-05-25 RX ADMIN — PANTOPRAZOLE SODIUM 40 MG: 40 TABLET, DELAYED RELEASE ORAL at 20:18

## 2025-05-25 RX ADMIN — ROCURONIUM BROMIDE 40 MG: 10 INJECTION, SOLUTION INTRAVENOUS at 15:50

## 2025-05-25 RX ADMIN — SUCCINYLCHOLINE CHLORIDE 140 MG: 20 INJECTION, SOLUTION INTRAMUSCULAR; INTRAVENOUS at 15:45

## 2025-05-25 RX ADMIN — IPRATROPIUM BROMIDE AND ALBUTEROL SULFATE 3 ML: .5; 3 SOLUTION RESPIRATORY (INHALATION) at 11:33

## 2025-05-25 RX ADMIN — Medication 10 ML: at 20:18

## 2025-05-25 RX ADMIN — SODIUM CHLORIDE 3000 MG: 900 INJECTION INTRAVENOUS at 15:40

## 2025-05-25 RX ADMIN — ONDANSETRON 4 MG: 2 INJECTION, SOLUTION INTRAMUSCULAR; INTRAVENOUS at 08:17

## 2025-05-25 RX ADMIN — PROPOFOL 200 MG: 10 INJECTION, EMULSION INTRAVENOUS at 15:45

## 2025-05-25 RX ADMIN — LEVOTHYROXINE SODIUM 137 MCG: 0.11 TABLET ORAL at 05:00

## 2025-05-25 RX ADMIN — HYDROMORPHONE HYDROCHLORIDE 0.5 MG: 1 INJECTION, SOLUTION INTRAMUSCULAR; INTRAVENOUS; SUBCUTANEOUS at 02:43

## 2025-05-25 RX ADMIN — SUGAMMADEX 200 MG: 100 INJECTION, SOLUTION INTRAVENOUS at 17:37

## 2025-05-25 RX ADMIN — IPRATROPIUM BROMIDE AND ALBUTEROL SULFATE 3 ML: .5; 3 SOLUTION RESPIRATORY (INHALATION) at 20:18

## 2025-05-25 RX ADMIN — Medication 10 ML: at 08:21

## 2025-05-25 RX ADMIN — ROCURONIUM BROMIDE 10 MG: 10 INJECTION, SOLUTION INTRAVENOUS at 15:45

## 2025-05-25 RX ADMIN — IPRATROPIUM BROMIDE AND ALBUTEROL SULFATE 3 ML: .5; 3 SOLUTION RESPIRATORY (INHALATION) at 08:12

## 2025-05-25 RX ADMIN — OXYCODONE 10 MG: 5 TABLET ORAL at 00:20

## 2025-05-25 RX ADMIN — GABAPENTIN 300 MG: 300 CAPSULE ORAL at 20:18

## 2025-05-25 RX ADMIN — SODIUM CHLORIDE, SODIUM LACTATE, POTASSIUM CHLORIDE, AND CALCIUM CHLORIDE 20 ML/HR: .6; .31; .03; .02 INJECTION, SOLUTION INTRAVENOUS at 15:30

## 2025-05-25 RX ADMIN — GLYCOPYRROLATE 0.2 MG: 0.2 INJECTION, SOLUTION INTRAMUSCULAR; INTRAVENOUS at 15:44

## 2025-05-25 RX ADMIN — OXYCODONE 10 MG: 5 TABLET ORAL at 04:56

## 2025-05-25 RX ADMIN — SODIUM CHLORIDE, SODIUM LACTATE, POTASSIUM CHLORIDE, AND CALCIUM CHLORIDE: .6; .31; .03; .02 INJECTION, SOLUTION INTRAVENOUS at 15:43

## 2025-05-25 NOTE — PLAN OF CARE
Problem: Adult Inpatient Plan of Care  Goal: Plan of Care Review  Outcome: Progressing  Flowsheets (Taken 5/25/2025 1250)  Progress: improving  Outcome Evaluation: Patient planned for surgery today. Patient is anxious and family is present at bedside. Medication has been administered for pain and nausea- see MAR for details. Patient is refusing to be repositioned due to pain.  Plan of Care Reviewed With: patient  Goal: Patient-Specific Goal (Individualized)  Outcome: Progressing  Goal: Absence of Hospital-Acquired Illness or Injury  Outcome: Progressing  Intervention: Prevent Skin Injury  Description: Perform a screening for skin injury risk, such as pressure or moisture-associated skin damage on admission and at regular intervals throughout hospital stay.Keep all areas of skin (especially folds) clean and dry.Maintain adequate skin hydration.Relieve and redistribute pressure and protect bony prominences and skin at risk for injury; implement measures based on patient-specific risk factors.Match turning and repositioning schedule to clinical condition.Encourage weight shift frequently; assist with reposition if unable to complete independently.Float heels off bed; avoid pressure on the Achilles tendon.Keep skin free from extended contact with medical devices.Optimize nutrition and hydration.Encourage functional activity and mobility, as early as tolerated.Use aids (e.g., slide boards, mechanical lift) during transfer.  Recent Flowsheet Documentation  Taken 5/25/2025 1200 by Ada Horvath RN  Skin Protection: incontinence pads utilized  Taken 5/25/2025 1000 by Ada Horvath, RN  Skin Protection: incontinence pads utilized  Intervention: Prevent and Manage VTE (Venous Thromboembolism) Risk  Description: Assess for VTE (venous thromboembolism) risk.Promote early mobilization; encourage both active and passive leg exercises, if unable to ambulate.Initiate and maintain compression or other therapy, as indicated,  based on identified risk in accordance with organizational protocol and provider order.Recognize the patient's individual risk for bleeding before initiating pharmacologic thromboprophylaxis.  Recent Flowsheet Documentation  Taken 5/25/2025 0845 by Ada Horvath RN  VTE Prevention/Management:   bilateral   SCDs (sequential compression devices) on  Intervention: Prevent Infection  Description: Maintain skin and mucous membrane integrity; promote hand, oral and pulmonary hygiene.Optimize fluid balance, nutrition, sleep and glycemic control to maximize infection resistance.Identify potential sources of infection early to prevent or mitigate progression of infection (e.g., wound, lines, devices).Evaluate ongoing need for invasive devices; remove promptly when no longer indicated.Review vaccination status.  Recent Flowsheet Documentation  Taken 5/25/2025 1200 by Ada Horvath RN  Infection Prevention:   single patient room provided   rest/sleep promoted  Taken 5/25/2025 1000 by Ada Horvath RN  Infection Prevention:   rest/sleep promoted   single patient room provided  Taken 5/25/2025 0845 by Ada Horvath RN  Infection Prevention:   single patient room provided   rest/sleep promoted  Goal: Optimal Comfort and Wellbeing  Outcome: Progressing  Intervention: Provide Person-Centered Care  Description: Use a family-focused approach to care; encourage support system presence and participation.Develop trust and rapport by proactively providing information, encouraging questions, addressing concerns and offering reassurance.Acknowledge emotional response to hospitalization.Recognize and utilize personal coping strategies and strengths; develop goals via shared decision-making.Honor spiritual and cultural preferences.  Recent Flowsheet Documentation  Taken 5/25/2025 1228 by Ada Horvath RN  Trust Relationship/Rapport: care explained  Taken 5/25/2025 0845 by Ada Horvath RN  Trust  Relationship/Rapport:   care explained   choices provided  Goal: Readiness for Transition of Care  Outcome: Progressing  Goal: Plan of Care Review  Outcome: Progressing  Flowsheets (Taken 5/25/2025 1250)  Progress: improving  Outcome Evaluation: Patient planned for surgery today. Patient is anxious and family is present at bedside. Medication has been administered for pain and nausea- see MAR for details. Patient is refusing to be repositioned due to pain.  Plan of Care Reviewed With: patient  Goal: Patient-Specific Goal (Individualized)  Outcome: Progressing  Goal: Absence of Hospital-Acquired Illness or Injury  Outcome: Progressing  Intervention: Prevent Skin Injury  Description: Perform a screening for skin injury risk, such as pressure or moisture-associated skin damage on admission and at regular intervals throughout hospital stay.Keep all areas of skin (especially folds) clean and dry.Maintain adequate skin hydration.Relieve and redistribute pressure and protect bony prominences and skin at risk for injury; implement measures based on patient-specific risk factors.Match turning and repositioning schedule to clinical condition.Encourage weight shift frequently; assist with reposition if unable to complete independently.Float heels off bed; avoid pressure on the Achilles tendon.Keep skin free from extended contact with medical devices.Optimize nutrition and hydration.Encourage functional activity and mobility, as early as tolerated.Use aids (e.g., slide boards, mechanical lift) during transfer.  Recent Flowsheet Documentation  Taken 5/25/2025 1200 by Ada Horvath RN  Skin Protection: incontinence pads utilized  Taken 5/25/2025 1000 by Ada Horvath, RN  Skin Protection: incontinence pads utilized  Intervention: Prevent and Manage VTE (Venous Thromboembolism) Risk  Description: Assess for VTE (venous thromboembolism) risk.Promote early mobilization; encourage both active and passive leg exercises, if  unable to ambulate.Initiate and maintain compression or other therapy, as indicated, based on identified risk in accordance with organizational protocol and provider order.Recognize the patient's individual risk for bleeding before initiating pharmacologic thromboprophylaxis.  Recent Flowsheet Documentation  Taken 5/25/2025 0845 by Ada Horvath RN  VTE Prevention/Management:   bilateral   SCDs (sequential compression devices) on  Intervention: Prevent Infection  Description: Maintain skin and mucous membrane integrity; promote hand, oral and pulmonary hygiene.Optimize fluid balance, nutrition, sleep and glycemic control to maximize infection resistance.Identify potential sources of infection early to prevent or mitigate progression of infection (e.g., wound, lines, devices).Evaluate ongoing need for invasive devices; remove promptly when no longer indicated.Review vaccination status.  Recent Flowsheet Documentation  Taken 5/25/2025 1200 by Ada Horvath RN  Infection Prevention:   single patient room provided   rest/sleep promoted  Taken 5/25/2025 1000 by Ada Horvath RN  Infection Prevention:   rest/sleep promoted   single patient room provided  Taken 5/25/2025 0845 by Ada Horvath RN  Infection Prevention:   single patient room provided   rest/sleep promoted  Goal: Optimal Comfort and Wellbeing  Outcome: Progressing  Intervention: Provide Person-Centered Care  Description: Use a family-focused approach to care; encourage support system presence and participation.Develop trust and rapport by proactively providing information, encouraging questions, addressing concerns and offering reassurance.Acknowledge emotional response to hospitalization.Recognize and utilize personal coping strategies and strengths; develop goals via shared decision-making.Honor spiritual and cultural preferences.  Recent Flowsheet Documentation  Taken 5/25/2025 1228 by Ada Horvath RN  Trust Relationship/Rapport: care  explained  Taken 5/25/2025 0845 by Ada Horvath RN  Trust Relationship/Rapport:   care explained   choices provided  Goal: Readiness for Transition of Care  Outcome: Progressing     Problem: Comorbidity Management  Goal: Blood Glucose Level Within Target Range  Outcome: Progressing  Goal: Maintenance of Heart Failure Symptom Control  Outcome: Progressing  Goal: Blood Pressure in Desired Range  Outcome: Progressing  Goal: Maintenance of Osteoarthritis Symptom Control  Outcome: Progressing     Problem: Skin Injury Risk Increased  Goal: Skin Health and Integrity  Outcome: Progressing  Intervention: Optimize Skin Protection  Description: Perform a full pressure injury risk assessment, as indicated by screening, upon admission to care unit.Reassess skin (full inspection and injury risk, including skin temperature, consistency and color) frequently (e.g., scheduled interval, with change in condition) to provide optimal early detection and prevention.Maintain adequate tissue perfusion (e.g., encourage fluid balance; avoid crossing legs, constrictive clothing or devices) to promote tissue oxygenation.Maintain head of bed at lowest degree of elevation tolerated, considering medical condition and other restrictions. Use positioning supports to prevent sliding and friction. Consider low friction textiles.Avoid positioning onto an area that remains reddened or on bony prominences.Minimize incontinence and moisture (e.g., toileting schedule; moisture-wicking pad, diaper or incontinence collection device; skin moisture barrier).Cleanse skin promptly and gently, when soiled, utilizing a pH-balanced cleanser.Relieve and redistribute pressure (e.g., scheduled position changes, weight shifts, use of support surface, medical device repositioning, protective dressing application, use of positioning device, microclimate control, use of pressure-injury-monitorEncourage increased activity, such as sitting in a chair at the bedside or  early mobilization, when able to tolerate. Avoid prolonged sitting.  Recent Flowsheet Documentation  Taken 5/25/2025 1200 by Ada Horvath RN  Pressure Reduction Techniques: frequent weight shift encouraged  Pressure Reduction Devices: pressure-redistributing mattress utilized  Skin Protection: incontinence pads utilized  Taken 5/25/2025 1000 by Ada Horvath RN  Pressure Reduction Techniques: frequent weight shift encouraged  Pressure Reduction Devices: pressure-redistributing mattress utilized  Skin Protection: incontinence pads utilized  Taken 5/25/2025 0845 by Ada Horvath RN  Pressure Reduction Techniques: frequent weight shift encouraged  Pressure Reduction Devices: pressure-redistributing mattress utilized     Problem: Pain Acute  Goal: Optimal Pain Control and Function  Outcome: Progressing     Problem: Fall Injury Risk  Goal: Absence of Fall and Fall-Related Injury  Outcome: Progressing   Goal Outcome Evaluation:  Plan of Care Reviewed With: patient        Progress: improving  Outcome Evaluation: Patient planned for surgery today. Patient is anxious and family is present at bedside. Medication has been administered for pain and nausea- see MAR for details. Patient is refusing to be repositioned due to pain.

## 2025-05-25 NOTE — BRIEF OP NOTE
HUMERUS INTRAMEDULLARY NAIL/JONO INSERTION  Progress Note    Claudia Rust  5/25/2025    Pre-op Diagnosis:   Left proximal third humeral shaft fracture        Post-Op Diagnosis Codes:   Left proximal third humeral shaft fracture     Procedure(s):      Procedure(s):  HUMERUS INTRAMEDULLARY NAIL/JONO INSERTION LEFT              Surgeon(s):  Rufus Peralta MD    Anesthesia: General    Staff:   Circulator: Segun Barraza RN  Scrub Person: Kateryna Rojas  Vendor Representative: Jovon Scott  Assistant: Heather Durbin CSA  Assistant: Heather Durbin CSA    Estimated Blood Loss: 100ml    Urine Voided: * No values recorded between 5/25/2025  3:43 PM and 5/25/2025  5:37 PM *    Specimens:                None      Drains:   External Urinary Catheter (Active)   Daily Indications Daily output 05/25/25 1200   Site Assessment Clean;Skin intact 05/25/25 1200   Application/Removal skin care provided 05/25/25 0020   Collection Container Wall suction 05/25/25 1200   Securement Method Securing device 05/25/25 0020   Output (mL) 700 mL 05/25/25 0500       Findings: left proximal third humeral shaft fracture       Complications: none evident     Assistant: Heather Durbin CSA  was responsible for performing the following activities: Retraction, Suction, Irrigation, Suturing, Closing, and Placing Dressing and their skilled assistance was necessary for the success of this case.    Rufus Peralta MD     Date: 5/25/2025  Time: 17:50 EDT

## 2025-05-25 NOTE — ANESTHESIA PROCEDURE NOTES
Airway  Reason: elective    Date/Time: 5/25/2025 3:47 PM  End Time:5/25/2025 3:47 PM  Airway not difficult    General Information and Staff    Patient location during procedure: OR  Anesthesiologist: Zana Sykes MD    Indications and Patient Condition  Indications for airway management: airway protection    Preoxygenated: yes  MILS maintained throughout    Mask difficulty assessment: 1 - vent by mask    Final Airway Details    Final airway type: endotracheal airway      Successful airway: ETT  Cuffed: yes   Successful intubation technique: direct laryngoscopy and video laryngoscopy  Endotracheal tube insertion site: oral  Blade: Buckner  Blade size: 3  ETT size (mm): 7.0  Cormack-Lehane Classification: grade I - full view of glottis  Placement verified by: chest auscultation and capnometry   Measured from: gums  ETT/EBT to gums (cm): 22  Number of attempts at approach: 1  Assessment: lips, teeth, and gum same as pre-op and atraumatic intubation    Additional Comments  ATRAUMATIC.  TEETH IN PREOP CONDITION.  CUFF TO MINIMUM OCCLUSIVE CUFF PRESSURE. GAUZE BITE BLOCK

## 2025-05-25 NOTE — ANESTHESIA PREPROCEDURE EVALUATION
Anesthesia Evaluation     history of anesthetic complications:  PONV, prolonged sedation  NPO Solid Status: > 8 hours  NPO Liquid Status: > 8 hours           Airway   Mallampati: I  TM distance: >3 FB  Neck ROM: full  No difficulty expected  Dental - normal exam     Pulmonary - normal exam   (+) pneumonia , COPD, asthma,shortness of breath, sleep apnea    ROS comment: -Mechanical fall and humerus fracture.     - Status post TAVR valve replacement (bioprosthetic 3/18/2021.  Patient had nonobstructive coronary artery disease on cardiac cath 1/20/2021.     - Hypertension dyslipidemia diabetes COPD obstructive sleep apnea.  Hypothyroidism rheumatoid arthritis hiatal hernia chronic depression bipolar disorder GERD IBS chronic migraine vertigo.     - History of compression lumbar fracture.     - History of HIT.  History of DVT and upper extremity clot.     - Status post cholecystectomy right hand surgery and bladder reconstruction.     - History of smoking.     - Significant exogenous obesity.     - History of allergy to sotalol.     Transesophageal echo 3/25/2021 normal LV systolic function     Carotid Dopplers 9/13/2019 normal.  Echocardiogram 2/11/2020 is revealing severe aortic stenosis  Lexiscan Cardiolite 2/28/2023 negative for ischemia EF of 86%  Echo 10/14/2023 reveals normal LV systolic function with concentric LVH, dilated RV and left atrium, normal bioprosthetic aortic valve mild MR and TR   ============  Plan  ===========  Patient had mechanical fall without any cardiac symptoms.  Patient has humerus fracture.  Patient is being considered for surgery.  Patient does not have any angina pectoris or congestive heart failure.  Status post bioprosthetic aortic valve replacement for aortic valve stenosis.  Will obtain an EKG.  Echocardiogram to assess TAVR valve function.  At this point I did not see any specific contraindications for proposed surgery pending EKG etc.     Further plan will depend on patient's  progress.     GONDI  Cardiovascular - normal exam    (+) hypertension, valvular problems/murmurs, CHF , PVD, hyperlipidemia    ROS comment: Normal left ventricular systolic function  Concentric left ventricular hypertrophy  Indeterminate left ventricular diastolic function  Dilated right ventricle with probably normal function  Dilated left atrium  Bioprosthetic aortic valve with normal function.  Mild mitral regurgitation  Mild tricuspid regurgitation     10/2023    Neuro/Psych  (+) headaches, dizziness/light headedness, psychiatric history  GI/Hepatic/Renal/Endo    (+) obesity, morbid obesity, hiatal hernia, GERD, diabetes mellitus, thyroid problem hypothyroidism    Musculoskeletal     Abdominal  - normal exam    Bowel sounds: normal.   Substance History      OB/GYN          Other   arthritis,   history of cancer    ROS/Med Hx Other: SP AVR 2021 BIOPROSTHETIC MAC 4 GRI  GONDI CLEARANCE  TTE 5/2024 PENDING  PONV  DELAYED EMERGENCE              Anesthesia Plan    ASA 4     general   total IV anesthesia  (TIVA FOR PONV)  intravenous induction     Anesthetic plan, risks, benefits, and alternatives have been provided, discussed and informed consent has been obtained with: patient.  Pre-procedure education provided  Plan discussed with CRNA.    CODE STATUS:    Code Status (Patient has no pulse and is not breathing): CPR (Attempt to Resuscitate)  Medical Interventions (Patient has pulse or is breathing): Full Support

## 2025-05-25 NOTE — PROGRESS NOTES
[facility] MEDICINE SERVICE  DAILY PROGRESS NOTE    NAME: Claudia Rust  : 1950  MRN: [id number]      LOS: 0 days     PROVIDER OF SERVICE: Timothy Duane Brammell, MD    Chief Complaint: Humeral fracture    Subjective:     Interval History:  History taken from: patient    Ongoing left arm pain.  Apprehensive about surgery.  Somewhat confused with her pain medication.  Does not have her sleep apparatus with her has her nasal cannula.  Denies any chest pain.  Denies any severe shortness of breath.  Denies any additional acute issues.        Review of Systems:   Review of Systems   All other systems reviewed and are negative.      Objective:     Vital Signs  Temp:  [97.3 °F (36.3 °C)-98.6 °F (37 °C)] 98.4 °F (36.9 °C)  Heart Rate:  [69-77] 70  Resp:  [16-21] 18  BP: (109-119)/(51-67) 110/51  Flow (L/min) (Oxygen Therapy):  [2-4] 4   Body mass index is 51.84 kg/m².    Physical Exam  Physical Exam  Constitutional:       Appearance: She is obese.   HENT:      Head: Normocephalic.   Cardiovascular:      Rate and Rhythm: Normal rate and regular rhythm.   Pulmonary:      Effort: Pulmonary effort is normal.      Breath sounds: Normal breath sounds.      Comments: Coarse breath sounds  Abdominal:      General: Bowel sounds are normal.      Palpations: Abdomen is soft.      Tenderness: There is no abdominal tenderness.   Musculoskeletal:         General: No swelling.   Neurological:      Mental Status: She is alert. Mental status is at baseline.      Comments: Somewhat confused and easily redirected            Diagnostic Data    Results from last 7 days   Lab Units 25  0027   WBC 10*3/mm3 12.67*   HEMOGLOBIN g/dL 11.1*   HEMATOCRIT % 35.9   PLATELETS 10*3/mm3 229   GLUCOSE mg/dL 147*   CREATININE mg/dL 1.34*   BUN mg/dL 44*   SODIUM mmol/L 142   POTASSIUM mmol/L 4.1   AST (SGOT) U/L 103*   ALT (SGPT) U/L 144*   ALK PHOS U/L 108   BILIRUBIN mg/dL 0.5   ANION GAP mmol/L 12.0       Adult Transthoracic Echo  Limited W/ Cont if Necessary Per Protocol  Addendum Date: 5/25/2025    Left ventricular ejection fraction appears to be 61 - 65%.   Left ventricular diastolic function was not assessed.   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   Estimated right ventricular systolic pressure from tricuspid regurgitation is normal (<35 mmHg).   Imaging is limited by left arm being in sling.     XR ELBOW 2 VIEW LEFT  Result Date: 5/24/2025  Impression: 1.No definite acute osseous abnormality of the elbow. 2.Fracture proximal shaft of the humerus. Electronically Signed: Roverto Elizabeth MD  5/24/2025 1:02 PM EDT  Workstation ID: [id number]    CT Head Without Contrast  Result Date: 5/24/2025  Impression: 1.No acute intracranial process. 2.No acute osseous abnormality of the cervical spine. Electronically Signed: Tana Sutherland MD  5/24/2025 12:08 AM EDT  Workstation ID: [id number]    CT Cervical Spine Without Contrast  Result Date: 5/24/2025  Impression: 1.No acute intracranial process. 2.No acute osseous abnormality of the cervical spine. Electronically Signed: Tana Sutherland MD  5/24/2025 12:08 AM EDT  Workstation ID: [id number]    XR Humerus Left  Result Date: 5/23/2025  Impression: Mildly comminuted mildly displaced fracture of the proximal humeral diaphysis. Electronically Signed: Tana Sutherland MD  5/23/2025 11:38 PM EDT  Workstation ID: [id number]            Assessment:   Left humeral fracture  Obstructive sleep apnea  Chronic hypoxemic respiratory failure  Massive obesity  CKD stage III  Transaminitis  Constipation      Plan: Symptomatic pain control.  Pulmonary to review.  Appreciate cardiology input.  Follow-up labs.  Worsen with her pain complaints send her narcotic use and her obvious sleep apnea. Family to bring sleep apparatus from home.      Active and Resolved Problems  Active Hospital Problems    Diagnosis  POA    **Humeral fracture [S42.309A]  Yes      Resolved Hospital Problems   No resolved problems to display.       VTE Prophylaxis:  No VTE  prophylaxis order currently exists.             Disposition Planning:     Barriers to Discharge: Pain control and discharge planning  Anticipated Date of Discharge: 5/27  Place of Discharge: home      Time: 30 minutes     Code Status and Medical Interventions: CPR (Attempt to Resuscitate); Full Support   Ordered at: 05/24/25 0116     Code Status (Patient has no pulse and is not breathing):    CPR (Attempt to Resuscitate)     Medical Interventions (Patient has pulse or is breathing):    Full Support       Signature: Electronically signed by Timothy Duane Brammell, MD, 05/25/25, 13:53 EDT.  [facility] Hospitalist Team

## 2025-05-25 NOTE — PLAN OF CARE
Goal Outcome Evaluation:  Patient's pain controlled with alternation of po and IV pain meds every 2 hours, left arm remains in sling with pillow in place to limit movement of extremity, patient able to move fingers without issue, patient states at times has numbness and tingling, chg bath given for pending surgery, patient requested  to sign paperwork when he arrives later this am for consent to surgery

## 2025-05-25 NOTE — SIGNIFICANT NOTE
05/25/25 0759   OTHER   Discipline physical therapist   Rehab Time/Intention   Session Not Performed other (see comments)  (Sx today, will f/u as able)   Recommendation   PT - Next Appointment 05/26/25

## 2025-05-25 NOTE — PROGRESS NOTES
"Referring Provider: Brammell, Timothy Duane,*    Reason for follow-up:  Preoperative cardiovascular evaluation.  Left humerus fracture.     Patient Care Team:  Davis Boateng NP as PCP - General (Family Medicine)  Jose Levi MD as Consulting Physician (Cardiology)  Yovani Lerma MD as Consulting Physician (Nephrology)  Mary Hunt APRN as Nurse Practitioner (Nurse Practitioner)  Edmar Bear MD as Consulting Physician (Cardiology)    Subjective .      ROS    Since I have last seen, the patient has been without any chest discomfort ,shortness of breath, palpitations, dizziness or syncope.  Denies having any headache ,abdominal pain ,nausea, vomiting , diarrhea constipation, loss of weight or loss of appetite.  Denies having any excessive bruising ,hematuria or blood in the stool.    Review of all systems negative except as indicated.    Reviewed ROS.  History  Past Medical History:   Diagnosis Date    Acute congestive heart failure     Allergies     Anxiety     Arthritis     Asthma     Bilateral leg edema     Bipolar 1 disorder     Bulging lumbar disc     X3    Cancer     states had \"cancerous tumor during pregnancy and had to have hysterectomy\"    Cataract     bilateral    Cellulitis 03/2021    bilateral legs    Compression fracture of vertebral column     LUMBAR    COPD (chronic obstructive pulmonary disease)     Delayed emergence from anesthesia     Depression     Depression     Diabetes mellitus     Dyslipidemia     Dyspnea on exertion     Fibromyalgia     GERD (gastroesophageal reflux disease)     Hiatal hernia     Hyperlipidemia     Hypertension     Hypothyroid     HYPOTHYROID    IBS (irritable bowel syndrome)     Migraines     Morbid obesity     Neuropathy     Panic attacks     Peripheral edema     PONV (postoperative nausea and vomiting)     Poor mobility     rolling walker    Prediabetes     PVD (peripheral vascular disease)     Rheumatoid aortitis     Sleep apnea     cpap    " Spinal stenosis     Spinal stenosis     Vertigo     Vertigo     Vitamin D deficiency        Past Surgical History:   Procedure Laterality Date    AORTIC VALVE REPAIR/REPLACEMENT N/A 3/18/2021    Procedure: AORTIC VALVE REPLACEMENT;  Surgeon: Olaf Mcgill MD;  Location: [facility] CVOR;  Service: Cardiothoracic;  Laterality: N/A;    BRONCHOSCOPY N/A 3/25/2021    Procedure: BRONCHOSCOPY AT BEDSIDE WITH BRONCHIOALVEOLAR LAVAGE;  Surgeon: Glenn Reyes MD;  Location: [facility] ENDOSCOPY;  Service: Pulmonary;  Laterality: N/A;  PNA    BRONCHOSCOPY N/A 10/16/2023    Procedure: BRONCHOSCOPY with bronchial washing;  Surgeon: Glenn Reyes MD;  Location: [facility] ENDOSCOPY;  Service: Pulmonary;  Laterality: N/A;  post op: pneumonia    CARDIAC CATHETERIZATION  2009    CARDIAC CATHETERIZATION N/A 8/14/2019    Procedure: Left Heart Cath;  Surgeon: Jose Levi MD;  Location: [facility] CATH INVASIVE LOCATION;  Service: Cardiovascular    CARDIAC CATHETERIZATION N/A 8/14/2019    Procedure: Right Heart Cath;  Surgeon: Jose Levi MD;  Location: [facility] CATH INVASIVE LOCATION;  Service: Cardiovascular    CARDIAC CATHETERIZATION N/A 8/14/2019    Procedure: Aortic root aortogram;  Surgeon: Jose Levi MD;  Location: [facility] CATH INVASIVE LOCATION;  Service: Cardiovascular    CARDIAC CATHETERIZATION N/A 1/20/2021    Procedure: Left Heart Cath;  Surgeon: Jose Levi MD;  Location: [facility] CATH INVASIVE LOCATION;  Service: Cardiovascular;  Laterality: N/A;    COLONOSCOPY      COLONOSCOPY N/A 2/14/2025    Procedure: COLONOSCOPY WITH POLYPECTOMY X 4;  Surgeon: Segun Cleveland MD;  Location: [facility] ENDOSCOPY;  Service: Gastroenterology;  Laterality: N/A;  POST- POLYPS X 4, DIVERTICULOSIS, INTERNAL HEMORRHOIDS    CYSTOCELE REPAIR  1984    ELBOW ARTHROPLASTY  2011    ENDOSCOPY      FOOT SURGERY      GALLBLADDER SURGERY  2002    TONSILLECTOMY      VAGINAL HYSTERECTOMY  1972       Family  History   Problem Relation Age of Onset    Ovarian cancer Mother     Lung cancer Mother     Esophageal cancer Mother     Hypertension Father     Diabetes Maternal Grandmother        Social History     Tobacco Use    Smoking status: Former     Current packs/day: 0.00     Types: Cigarettes     Quit date: 2022     Years since quitting: 3.3     Passive exposure: Never    Smokeless tobacco: Never    Tobacco comments:     decrease now and do not smoke dos   Vaping Use    Vaping status: Former   Substance Use Topics    Alcohol use: Not Currently     Comment: very rare    Drug use: Never        Medications Prior to Admission   Medication Sig Dispense Refill Last Dose/Taking    Acetaminophen 325 MG capsule Take 650 mg by mouth Every 4 (Four) Hours As Needed.   Taking As Needed    albuterol (ACCUNEB) 0.63 MG/3ML nebulizer solution Take 3 mL by nebulization Every 6 (Six) Hours As Needed for Wheezing (Dx: J 44.9 (COPD), R 06.09 (dyspnea on exertion)). 30 each 0 Taking As Needed    B Complex Vitamins (VITAMIN B COMPLEX) capsule capsule Take 1 capsule by mouth Daily. LAST DOSE 3/4 5/23/2025 Morning    bumetanide (BUMEX) 1 MG tablet Take 1 tablet by mouth Daily. Noon.   5/23/2025 Noon    bumetanide (BUMEX) 2 MG tablet Take 1 tablet by mouth Daily. 30 tablet 0 5/23/2025 Morning    Calcium-Magnesium-Vitamin D (CALCIUM 1200+D3 PO) Take 1 capsule by mouth Daily.   5/23/2025 Morning    cholecalciferol (Cholecalciferol) 25 MCG (1000 UT) tablet Take 1 tablet by mouth Every Morning. LAST DOSE 3/4 5/23/2025 Morning    docusate sodium (COLACE) 100 MG capsule Take 1 capsule by mouth Every Night.   5/23/2025 Evening    famotidine (PEPCID) 40 MG tablet Take 1 tablet by mouth At Night As Needed for Indigestion.   Taking As Needed    Farxiga 5 MG tablet tablet Take 1 tablet by mouth Every Morning.   5/23/2025 Morning    FLUoxetine (PROzac) 20 MG capsule Take 3 capsules by mouth Daily.   5/23/2025 Morning    gabapentin (NEURONTIN) 400 MG  capsule Take 1 capsule by mouth 3 (Three) Times a Day.   5/23/2025 Evening    levothyroxine (SYNTHROID, LEVOTHROID) 137 MCG tablet Take 1 tablet by mouth Daily.   5/23/2025 Morning    loratadine (CLARITIN) 10 MG tablet Take 1 tablet by mouth Daily.   5/23/2025 Morning    losartan (Cozaar) 25 MG tablet Take 1 tablet by mouth Daily. 90 tablet 3 5/23/2025 Morning    meclizine (ANTIVERT) 25 MG tablet Take 1 tablet by mouth 3 (Three) Times a Day As Needed.   Taking As Needed    Melatonin 10 MG capsule Take 2 capsules by mouth Every Night.   5/23/2025 Evening    metOLazone (ZAROXOLYN) 2.5 MG tablet Take 1 tablet by mouth Every Other Day.   5/22/2025    metoprolol succinate XL (TOPROL-XL) 25 MG 24 hr tablet Take 0.5 tablets by mouth Daily. 90 tablet 3 5/23/2025 Morning    Multiple Vitamins-Minerals (MULTIVITAMIN WITH MINERALS) tablet tablet Take 1 tablet by mouth Daily.   5/23/2025 Morning    ondansetron (ZOFRAN) 4 MG tablet Take 1 tablet by mouth Every 8 (Eight) Hours As Needed for Nausea or Vomiting.   Taking As Needed    pantoprazole (PROTONIX) 40 MG EC tablet Take 1 tablet by mouth Every Evening.   5/23/2025 Evening    rizatriptan MLT (MAXALT-MLT) 10 MG disintegrating tablet Take 1 tablet by mouth 1 (One) Time As Needed for Migraine. May repeat in 2 hours if needed   Taking As Needed    simvastatin (ZOCOR) 20 MG tablet Take 1 tablet by mouth Every Night.   5/23/2025 Evening    tiZANidine (ZANAFLEX) 2 MG tablet Take 1 tablet by mouth Every 8 (Eight) Hours As Needed.   Taking As Needed    vitamin B-12 (CYANOCOBALAMIN) 1000 MCG tablet Take 1 tablet by mouth Daily.   5/23/2025 Morning    fluticasone (FLONASE) 50 MCG/ACT nasal spray Administer 2 sprays into the nostril(s) as directed by provider Daily. (Patient taking differently: Administer 2 sprays into the nostril(s) as directed by provider Daily As Needed for Rhinitis or Allergies.)          Allergies  Adhesive tape; Benadryl [diphenhydramine]; Butorphanol; Garlic;  "Heparin; Tetanus-diphtheria toxoids td; Aloe; Bee venom; Latex; Macadamia nut oil; Tuberculin, ppd; and Wasp venom    Scheduled Meds:atorvastatin, 10 mg, Oral, Daily  docusate sodium, 100 mg, Oral, Nightly  [Held by provider] empagliflozin, 10 mg, Oral, Daily  FLUoxetine, 60 mg, Oral, Daily  gabapentin, 300 mg, Oral, TID  ipratropium-albuterol, 3 mL, Nebulization, 4x Daily - RT  levothyroxine, 137 mcg, Oral, Q AM  [Held by provider] losartan, 25 mg, Oral, Daily  metoprolol succinate XL, 12.5 mg, Oral, Daily  pantoprazole, 40 mg, Oral, Nightly  polyethylene glycol, 17 g, Oral, Daily  sodium chloride, 10 mL, Intravenous, Q12H      Continuous Infusions:   PRN Meds:.  senna-docusate sodium **AND** polyethylene glycol **AND** bisacodyl **AND** bisacodyl    Calcium Replacement - Follow Nurse / BPA Driven Protocol    HYDROmorphone    Magnesium Standard Dose Replacement - Follow Nurse / BPA Driven Protocol    meclizine    nitroglycerin    ondansetron    ondansetron ODT    oxyCODONE    oxyCODONE    Phosphorus Replacement - Follow Nurse / BPA Driven Protocol    Potassium Replacement - Follow Nurse / BPA Driven Protocol    [COMPLETED] Insert Peripheral IV **AND** sodium chloride    sodium chloride    sodium chloride    Objective     VITAL SIGNS  Vitals:    05/24/25 2000 05/25/25 0051 05/25/25 0812 05/25/25 0816   BP: 118/66 109/54     BP Location: Right arm      Patient Position: Lying      Pulse:  72 69 69   Resp: 16 16 20 18   Temp: 98.6 °F (37 °C)      TempSrc: Oral      SpO2:  92% 94% 94%   Weight:       Height:           Flowsheet Rows      Flowsheet Row First Filed Value   Admission Height 162.6 cm (64\") Documented at 05/23/2025 2227   Admission Weight 137 kg (302 lb 0.5 oz) Documented at 05/23/2025 2227              Intake/Output Summary (Last 24 hours) at 5/25/2025 0829  Last data filed at 5/25/2025 0500  Gross per 24 hour   Intake 570 ml   Output 1600 ml   Net -1030 ml        TELEMETRY: Sinus rhythm    Physical " Exam:  The patient is alert, oriented and in no distress.  Vital signs as noted above.  Exogenous obesity.  Head and neck revealed no carotid bruits or jugular venous distention.  No thyromegaly or lymphadenopathy is present  Lungs clear.  No wheezing.  Breath sounds are normal bilaterally.  Heart normal first and second heart sounds.  No murmur. No precordial rub is present.  No gallop is present.  Abdomen soft and nontender.  No organomegaly is present.  Extremities with good peripheral pulses without any pedal edema.  Left humerus fracture.  Skin warm and dry.  Musculoskeletal system is grossly normal  CNS grossly normal    Reviewed and updated.    Results Review:   I reviewed the patient's new clinical results.  Lab Results (last 24 hours)       Procedure Component Value Units Date/Time    Comprehensive Metabolic Panel [[id number]]  (Abnormal) Collected: 05/25/25 0027    Specimen: Blood from Arm, Right Updated: 05/25/25 0108     Glucose 147 mg/dL      BUN 44 mg/dL      Creatinine 1.34 mg/dL      Sodium 142 mmol/L      Potassium 4.1 mmol/L      Chloride 100 mmol/L      CO2 30.0 mmol/L      Calcium 8.6 mg/dL      Total Protein 6.6 g/dL      Albumin 3.7 g/dL      ALT (SGPT) 144 U/L      AST (SGOT) 103 U/L      Alkaline Phosphatase 108 U/L      Total Bilirubin 0.5 mg/dL      Globulin 2.9 gm/dL      A/G Ratio 1.3 g/dL      BUN/Creatinine Ratio 32.8     Anion Gap 12.0 mmol/L      eGFR 41.7 mL/min/1.73     Narrative:      GFR Categories in Chronic Kidney Disease (CKD)              GFR Category          GFR (mL/min/1.73)    Interpretation  G1                    90 or greater        Normal or high (1)  G2                    60-89                Mild decrease (1)  G3a                   45-59                Mild to moderate decrease  G3b                   30-44                Moderate to severe decrease  G4                    15-29                Severe decrease  G5                    14 or less           Kidney  failure    (1)In the absence of evidence of kidney disease, neither GFR category G1 or G2 fulfill the criteria for CKD.    eGFR calculation 2021 CKD-EPI creatinine equation, which does not include race as a factor    CBC & Differential [[id number]]  (Abnormal) Collected: 05/25/25 0027    Specimen: Blood from Arm, Right Updated: 05/25/25 0037    Narrative:      The following orders were created for panel order CBC & Differential.  Procedure                               Abnormality         Status                     ---------                               -----------         ------                     CBC Auto Differential[[id number]]        Abnormal            Final result                 Please view results for these tests on the individual orders.    CBC Auto Differential [[id number]]  (Abnormal) Collected: 05/25/25 0027    Specimen: Blood from Arm, Right Updated: 05/25/25 0037     WBC 12.67 10*3/mm3      RBC 3.72 10*6/mm3      Hemoglobin 11.1 g/dL      Hematocrit 35.9 %      MCV 96.5 fL      MCH 29.8 pg      MCHC 30.9 g/dL      RDW 13.9 %      RDW-SD 49.2 fl      MPV 9.9 fL      Platelets 229 10*3/mm3      Neutrophil % 74.6 %      Lymphocyte % 12.9 %      Monocyte % 11.3 %      Eosinophil % 0.6 %      Basophil % 0.2 %      Immature Grans % 0.4 %      Neutrophils, Absolute 9.45 10*3/mm3      Lymphocytes, Absolute 1.64 10*3/mm3      Monocytes, Absolute 1.43 10*3/mm3      Eosinophils, Absolute 0.07 10*3/mm3      Basophils, Absolute 0.03 10*3/mm3      Immature Grans, Absolute 0.05 10*3/mm3      nRBC 0.0 /100 WBC             Imaging Results (Last 24 Hours)       Procedure Component Value Units Date/Time    XR ELBOW 2 VIEW LEFT [[id number]] Collected: 05/24/25 1301     Updated: 05/24/25 1304    Narrative:      XR ELBOW 2 VW LEFT    Date of Exam: 5/24/2025 12:51 PM EDT    Indication: humerus fx    Comparison: None available.    Findings:  No definite acute osseous abnormality of the elbow is seen. There is no positive fat  pad. Osseous mineralization does not appear unusual. Just included in the field-of-view is a fracture of the proximal shaft of the humerus.      Impression:      Impression:  1.No definite acute osseous abnormality of the elbow.  2.Fracture proximal shaft of the humerus.        Electronically Signed: Roverto Elizabeth MD    5/24/2025 1:02 PM EDT    Workstation ID: [id number]        LAB RESULTS (LAST 7 DAYS)    CBC  Results from last 7 days   Lab Units 05/25/25 0027 05/24/25 0319 05/23/25 2258   WBC 10*3/mm3 12.67* 16.63* 10.97*   RBC 10*6/mm3 3.72* 3.93 4.06   HEMOGLOBIN g/dL 11.1* 11.9* 12.0   HEMATOCRIT % 35.9 36.8 37.6   MCV fL 96.5 93.6 92.6   PLATELETS 10*3/mm3 229 281 256       BMP  Results from last 7 days   Lab Units 05/25/25 0027 05/24/25 0319 05/23/25 2258   SODIUM mmol/L 142 138 137   POTASSIUM mmol/L 4.1 3.6 2.7*   CHLORIDE mmol/L 100 97* 96*   CO2 mmol/L 30.0* 30.1* 29.2*   BUN mg/dL 44* 52* 51*   CREATININE mg/dL 1.34* 1.63* 1.63*   GLUCOSE mg/dL 147* 156* 133*   MAGNESIUM mg/dL  --  2.4 2.3   PHOSPHORUS mg/dL  --  4.5  --        CMP   Results from last 7 days   Lab Units 05/25/25 0027 05/24/25 0319 05/23/25 2258   SODIUM mmol/L 142 138 137   POTASSIUM mmol/L 4.1 3.6 2.7*   CHLORIDE mmol/L 100 97* 96*   CO2 mmol/L 30.0* 30.1* 29.2*   BUN mg/dL 44* 52* 51*   CREATININE mg/dL 1.34* 1.63* 1.63*   GLUCOSE mg/dL 147* 156* 133*   ALBUMIN g/dL 3.7 3.8  --    BILIRUBIN mg/dL 0.5 0.5  --    ALK PHOS U/L 108 108  --    AST (SGOT) U/L 103* 214*  --    ALT (SGPT) U/L 144* 132*  --          BNP        TROPONIN        CoAg  Results from last 7 days   Lab Units 05/24/25  0319 05/23/25  2258   INR  1.18* 1.16*   APTT seconds  --  29.1       Creatinine Clearance  Estimated Creatinine Clearance: 50.9 mL/min (A) (by C-G formula based on SCr of 1.34 mg/dL (H)).    ABG        Radiology  XR ELBOW 2 VIEW LEFT  Result Date: 5/24/2025  Impression: 1.No definite acute osseous abnormality of the elbow. 2.Fracture proximal  shaft of the humerus. Electronically Signed: Roverto Elizabeth MD  5/24/2025 1:02 PM EDT  Workstation ID: [id number]    CT Head Without Contrast  Result Date: 5/24/2025  Impression: 1.No acute intracranial process. 2.No acute osseous abnormality of the cervical spine. Electronically Signed: Tana Sutherland MD  5/24/2025 12:08 AM EDT  Workstation ID: [id number]    CT Cervical Spine Without Contrast  Result Date: 5/24/2025  Impression: 1.No acute intracranial process. 2.No acute osseous abnormality of the cervical spine. Electronically Signed: Tana Sutherland MD  5/24/2025 12:08 AM EDT  Workstation ID: [id number]    XR Humerus Left  Result Date: 5/23/2025  Impression: Mildly comminuted mildly displaced fracture of the proximal humeral diaphysis. Electronically Signed: Tana Sutherland MD  5/23/2025 11:38 PM EDT  Workstation ID: [id number]          EKG      I personally viewed and interpreted the patient's EKG/Telemetry data: Sinus rhythm nonspecific ST-T wave change    ECHOCARDIOGRAM:    Results for orders placed during the hospital encounter of 10/13/23    Adult Transthoracic Echo Complete w/ Color, Spectral and Contrast if necessary per protocol    Interpretation Summary    Left ventricular diastolic function was indeterminate.    Intravenous  contrast was administered to optimize endocardial definition.    Normal left ventricular systolic function  Concentric left ventricular hypertrophy  Indeterminate left ventricular diastolic function  Dilated right ventricle with probably normal function  Dilated left atrium  Bioprosthetic aortic valve with normal function.  Mild mitral regurgitation  Mild tricuspid regurgitation          STRESS TEST  Results for orders placed during the hospital encounter of 02/20/23    Stress Test With Myocardial Perfusion One Day    Interpretation Summary  LEXISCAN CARDIOLITE REPORT    DATE OF PROCEDURE: 2/28/2023    INDICATION FOR PROCEDURE: Numbness and tingling of left hand, dyspnea on exertion, chronic CHF,  AVR, edema, dyslipidemia, hypertension, obesity with BMI over 45    PROCEDURE PERFORMED: Lexiscan Cardiolite    PROCEDURE COMMENTS:    After informed consent was obtained.  Patient's resting heart rate was 68 bpm, resting blood pressure was 144/82, resting EKG revealed sinus rhythm with rate of 68 bpm.  Patient was given 0.4 mg of regadenosine for stress testing.  There was no significant change in heart rate, blood pressure, symptoms with regadenoson injection.  Patient tolerated procedure well.  Complications were none.    NUCLEAR IMAGIN.  There was  uniform uptake of Cardiolite both in resting and post stress images, no ischemia seen.  2.  Gated images reveal  normal LV size and contractility, LVEF of 86%.    CONCLUSION:  1.  Lexiscan Cardiolite with normal perfusion, negative for ischemia.  2.  Normal wall motion.  LVEF of 86%.    RECOMMENDATIONS:    Clinical correlation recommended.      Jose Levi MD  23  20:24 EST        Cardiolite (Tc-99m sestamibi) stress test    CARDIAC CATHETERIZATION  Results for orders placed during the hospital encounter of 21    Cardiac Catheterization/Vascular Study    Narrative  Table formatting from the original result was not included.  2021      Heart Cath Report    NAME:              Claudia Rust  :                1950  AGE/SEX:        70 y.o. female  MRN:                [id number]        Procedures Performed    1. Bilateral selective coronary arteriogram    :   Jose Levi MD    Vascular Access Site: Femoral    Indication for procedure: 70-year-old with severe critical  aortic stenosis and history of CHF, obesity, dyslipidemia, prediabetes      Procedure Note    After discussing the risks, benefits, and alternatives of the procedure, informed consent was obtained.  Moderate conscious sedation was given utilizing IV Versed and fentanyl administered by RN with continuous EKG oximetry and hemodynamic  monitoring supervised by me throughout the entire case, conscious sedation time was 30 minutes.  I was present with the patient for the duration of moderate sedation and supervised staff who had no other duties and monitored the patient for the entire procedure patient had Moise 2-3 sedation scale. the vascular access site was prepped and draped in the usual sterile fashion.  2% lidocaine was used for local anesthesia. Appropriate landmarks were assessed.  A 6 Japanese short sheath was inserted in the artery using the modified Seldinger technique.    Selective coronary angiography was performed with JL4 and JR4 diagnostic catheters. Left ventriculogram  was not performed because patient has severe aortic stenosis..  All exchanges were performed over the wire.  No specimens were removed.  There were no apparent acute or early complications.  The patient tolerated the procedure well and was transferred to the recovery area in stable condition.    Artery hemostasis will be achieved with  manual pressure in OPCV.      Complications:  None  Blood Loss: minimal    Hemodynamics    Pressures    Ao:    138/62 mmHg      Coronary Angiography    Left Main :  The left main   is without disease    Left Anterior Descending : Is tortuous vessel, without disease    Left Circumflex : Tortuous vessel ends up with 3 branches which are tortuous without disease    Right Coronary Artery :  The right coronary artery   dominant vessel without disease    Dominance:  []  Left  [x]  Right  []  Co-Dominant        Impression:    1. No obstructive CAD    Recommendations:    1. Aortic valve replacement evaluation by CT VS.  Patient had echo showing severe critical aortic stenosis        I sincerely appreciate the opportunity to participate in your patient's care. Please feel free to contact me anytime if I can be of assistance in this or any other way.      Pertinent History    Past Medical History:  Diagnosis Date   Acute congestive heart failure  (CMS/[facility])   Asthma   Bipolar 1 disorder (CMS/[facility])   COPD (chronic obstructive pulmonary disease) (CMS/[facility])   Depression   Dyslipidemia   Fibromyalgia   Hypertension   Hypothyroid   IBS (irritable bowel syndrome)   Migraines   Morbid obesity (CMS/[facility])   Neuropathy   Spinal stenosis   Vertigo    Past Surgical History:  Procedure Laterality Date   CARDIAC CATHETERIZATION  2009   CARDIAC CATHETERIZATION N/A 8/14/2019  Procedure: Left Heart Cath;  Surgeon: Jose Levi MD;  Location:  [facility] CATH INVASIVE LOCATION;  Service: Cardiovascular   CARDIAC CATHETERIZATION N/A 8/14/2019  Procedure: Right Heart Cath;  Surgeon: Jose Levi MD;  Location:  [facility] CATH INVASIVE LOCATION;  Service: Cardiovascular   CARDIAC CATHETERIZATION N/A 8/14/2019  Procedure: Aortic root aortogram;  Surgeon: Jose Levi MD;  Location:  [facility] CATH INVASIVE LOCATION;  Service: Cardiovascular   CYSTOCELE REPAIR  1984   ELBOW ARTHROPLASTY  2011   FOOT SURGERY   GALLBLADDER SURGERY  2002   VAGINAL HYSTERECTOMY  1972    Prior to Admission medications  Medication Sig Start Date End Date Taking? Authorizing Provider  aspirin 81 MG tablet Take 81 mg by mouth Every Night. 2/3/15  Yes Krissy Hodgson MD  B Complex Vitamins (VITAMIN B COMPLEX) capsule capsule Take 1 capsule by mouth Daily.   Yes Krissy Hodgson MD  Calcium Carbonate (CALCIUM 500 PO) Take 1,500 mg by mouth Daily.   Yes Krissy Hodgson MD  Cholecalciferol (VITAMIN D-1000 MAX ST) 1000 units tablet Take 2,000 Units by mouth Daily. 9/16/16  Yes Krissy Hodgson MD  diphenhydrAMINE (BENADRYL) 50 MG capsule Take 50 mg by mouth every night at bedtime.   Yes Krissy Hodgson MD  FLUoxetine (PROzac) 60 MG tablet Take 60 mg by mouth Daily. 4/19/19  Yes Krissy Hodgson MD  fluticasone (FLONASE) 50 MCG/ACT nasal spray 2 sprays into the nostril(s) as directed by provider 2 (two) times a day.   Yes Krissy Hodgson  MD  furosemide (LASIX) 40 MG tablet Take 40 mg by mouth 2 (Two) Times a Day. 2/5/15  Yes Krissy Hodgson MD  gabapentin (NEURONTIN) 600 MG tablet Take 400 mg by mouth 3 (Three) Times a Day. 7/30/19  Yes Krissy Hodgson MD  HYDROcodone-acetaminophen (NORCO)  MG per tablet Take 1 tablet by mouth Daily. Noon an prm   Yes Krissy Hodgson MD  levothyroxine (SYNTHROID, LEVOTHROID) 100 MCG tablet Take 100 mcg by mouth Daily. 7/30/15  Yes Krissy Hodgson MD  metoprolol succinate XL (TOPROL-XL) 25 MG 24 hr tablet Take 25 mg by mouth Daily.   Yes Krissy Hodgson MD  Multiple Vitamins-Minerals (MULTIVITAMIN WITH MINERALS) tablet tablet Take 1 tablet by mouth Daily.   Yes Krissy Hodgson MD  ondansetron (ZOFRAN) 4 MG tablet Take 4 mg by mouth Every 8 (Eight) Hours As Needed for Nausea or Vomiting.   Yes Krissy Hodgson MD  pantoprazole (PROTONIX) 40 MG EC tablet Take 40 mg by mouth Daily.   Yes Krissy Hodgson MD  potassium chloride (KLOR-CON) 20 MEQ CR tablet Take 20 mEq by mouth 2 (Two) Times a Day. 7/30/15  Yes Krissy Hodgson MD  simvastatin (ZOCOR) 20 MG tablet Take 20 mg by mouth Daily. 2/5/15  Yes Krissy Hodgson MD  Acetaminophen (TYLENOL PO) Take 1,000 mg by mouth Every 4 (Four) Hours As Needed.    Krissy Hodgson MD  albuterol (PROVENTIL) (2.5 MG/3ML) 0.083% nebulizer solution Inhale 2.5 mg Every 6 (Six) Hours As Needed. 2/3/15   Krissy Hodgson MD  aluminum-magnesium hydroxide-simethicone (MAALOX/MYLANTA) 200-200-20 MG/5ML suspension Take 15 mL by mouth Every 6 (Six) Hours As Needed for Indigestion or Heartburn.    Krissy Hodgson MD  meclizine (ANTIVERT) 25 MG tablet Take 25 mg by mouth 3 (Three) Times a Day As Needed. 7/30/15   Krissy Hodgson MD  polyethylene glycol (MIRALAX) powder Take 17 g by mouth Daily As Needed. 11/9/16   Krissy Hodgson MD  promethazine (PHENERGAN) 25 MG tablet Take 25 mg by mouth Every 6 (Six)  Hours As Needed for Nausea or Vomiting.    Krissy Hodgson MD  rizatriptan MLT (MAXALT-MLT) 10 MG disintegrating tablet Take 10 mg by mouth 1 (One) Time As Needed for Migraine. May repeat in 2 hours if needed    Krissy Hodgson MD  tiZANidine (ZANAFLEX) 4 MG tablet Take 4 mg by mouth Every 8 (Eight) Hours As Needed for Muscle Spasms.    Krissy Hodgson MD  acetaminophen (TYLENOL) 325 MG tablet Take 650 mg by mouth Every 6 (Six) Hours As Needed for Mild Pain .  1/20/21  Krissy Hodgson MD  Calcium Carbonate ([employer] Calcium) 1500 (600 Ca) MG tablet Take 600 mg by mouth Daily.  1/20/21  Krissy Hodgson MD  metoprolol succinate XL (TOPROL-XL) 25 MG 24 hr tablet TAKE 1 TABLET BY MOUTH EVERY DAY  Patient taking differently: 25 mg. 11/17/20 1/20/21  Jose Levi MD  ondansetron ODT (ZOFRAN-ODT) 4 MG disintegrating tablet 4 mg Every 8 (Eight) Hours As Needed. 1/6/21 1/20/21  Krissy Hodgson MD      Pre-Procedure Notes  H&P Performed  [x]  Yes []  No       []  N/A    Indications:  []  ACS <= 24 HRS  []  ACS >24 HRS  []  New Onset Angina <= 2 mos  []  Worsening Angina  []  Resuscitated Cardiac Arrest  []  Angina on Exertion:  []  Suspected CAD  []  Valvular Disease  []  Pericardial Disease  []  Cardiac Arrythmia  []  Cardiomyopathy  []  LV Dysfunction  []  Syncope  []  Post Cardiac Transplant  []  Eval. For Exercise Clearance  []  Other  []  Pre-Operative Evaluation  If Pre-Op Eval:  Evaluation for Surgery Type:  []  Cardiac Surgery   []  Non-Cardiac Surgery  Functional Capacity:  []  <4 METS  []  >=4 METS w/o symptoms  []  >= 4 METS with symptoms  []  Unknown  Surgical Risk:  []  Low  []  Intermediate  []  High Risk: Vascular  []  High Risk Non-Vascular    Risks, Benefits, & Complications Discussed:  [x]  Yes  []  No  []  N/A    Questions Answered:  [x]  Yes  []  No  []  N/A    Consent Obtained:  [x]  Yes  []  No  []  N/A    CHF: [x]  Yes  []  No  If Yes:  Newly Diagnosed?  []   Yes  [x]  No  If Yes:  HF Type:  []  Diastolic  []  Systolic  []  Unknown      Jose Levi MD  1/20/2021  12:55 EST  Electronically signed by Jose Levi MD, 01/20/21, 12:55 PM EST.                OTHER:         Assessment & Plan     Principal Problem:    Humeral fracture      [[[[[[[[[[[[[[[[[[[[[[  Impression  ============  -Mechanical fall and humerus fracture.     - Status post TAVR valve replacement (bioprosthetic 3/18/2021.  Patient had nonobstructive coronary artery disease on cardiac cath 1/20/2021.     - Hypertension dyslipidemia diabetes COPD obstructive sleep apnea.  Hypothyroidism rheumatoid arthritis hiatal hernia chronic depression bipolar disorder GERD IBS chronic migraine vertigo.     - History of compression lumbar fracture.     - History of HIT.  History of DVT and upper extremity clot.     - Status post cholecystectomy right hand surgery and bladder reconstruction.     - History of smoking.     - Significant exogenous obesity.     - History of allergy to sotalol.     Transesophageal echo 3/25/2021 normal LV systolic function     Carotid Dopplers 9/13/2019 normal.  Echocardiogram 2/11/2020 is revealing severe aortic stenosis  Lexiscan Cardiolite 2/28/2023 negative for ischemia EF of 86%  Echo 10/14/2023 reveals normal LV systolic function with concentric LVH, dilated RV and left atrium, normal bioprosthetic aortic valve mild MR and TR   ============  Plan  ===========  Patient had mechanical fall without any cardiac symptoms.  Patient has humerus fracture.  Patient is being considered for surgery.  Patient does not have any angina pectoris or congestive heart failure.  Status post bioprosthetic aortic valve replacement for aortic valve stenosis.  EKG showed sinus rhythm.    Echocardiogram showed normal left ventricle function.  TAVR valve is functioning normally    At this point I did not see any specific contraindications for proposed surgery pending EKG etc.    I have  discussed with patient's family at bedside.     Medications were reviewed and updated.    Further plan will depend on patient's progress.    Reviewed and updated 5/25/2025.  ]]]]]]]]]]]]]]]]]]]]]]]]]]         Kaz Arnett MD  05/24/25  14:35 EDT        Kaz Arnett MD  05/25/25  08:29 EDT

## 2025-05-25 NOTE — PROGRESS NOTES
Orthopaedic Progress Note     In pain. Awaiting echo. Discussed surgical plan and reasoning for surgery as I do not feel that this will heal with nonoperative management secondary to her obesity. Needs arm for walker use. Uses oxygen at baseline during sleep.     Hgb: 11.1  Vitals: p 70, 110/51, 95% on 4     NAD  Alert  Respirations are nonlabored   LUE   Skin intact  arm resting in a position of comfort  AIN PIN ULNAR nerve intact although weak wrist extension 4-/5   Sensation is intact distally to dense palpation   Hand is wwp    Moving all other extremities through their physiologic ROM without pain   Patient is a 74 y.o. female with left proximal third humeral shaft fracture      PLAN:   Admited to: MD VALERIA Mancera LUALANNAH in sling   NPO  Peripop abx ordered   TXA ordered   Discussed with her and her  the diagnosis and the treatment options with conservative treatment and operative intervention. I do feel that she is at high risk for nonunion given her size and body habitus. Additionally I believe that I can return function to her left upper extremity sooner with ability to weight bear sooner with facilitation of ambulation to lessen pulmonary complications, pressure ulcers,risk of blood clot with decreased ambulatory status as she uses a rollator and needs her left upper extremity to do so.   Therefore I have discussed with her doing an intramedullary nail of the left humerus. This will allow for sooner weight bearing and lessen complications from immobilization. Discussed she is high risk for complications regarding her pulmonary status on O2, previous heart valve replacement and her obesity. I discussed with her the risks and benefits of surgery with the risks including bleeding, infection, need for further surgery, damage to surrounding blood vessels, nerves, tendons and muscle,the potential for malunion, nonunion, implant irritation, implant breakage, decreased ROM about the shoulder and  shoulder stiffness and pain, , and the need for future surgery. Risks of anesthesia including DVT, PE, and even death were discussed. All of the patients questions were answered. No guarantees given.   She wishes to proceed with left humerus IMN on 5/25/25. She is NPO. All questions answered and no guarantees given.  Pending echo for cardiac clearance      please call/chat  with any questions or concerns.    Rufus Peralta MD   [facility]   [phone] - [place] Office  [phone] - [place] Office

## 2025-05-26 ENCOUNTER — APPOINTMENT (OUTPATIENT)
Dept: GENERAL RADIOLOGY | Facility: HOSPITAL | Age: 75
End: 2025-05-26
Payer: MEDICARE

## 2025-05-26 LAB
ALBUMIN SERPL-MCNC: 3.3 G/DL (ref 3.5–5.2)
ALP SERPL-CCNC: 112 U/L (ref 39–117)
ALT SERPL W P-5'-P-CCNC: 66 U/L (ref 1–33)
ANION GAP SERPL CALCULATED.3IONS-SCNC: 7.4 MMOL/L (ref 5–15)
AST SERPL-CCNC: 47 U/L (ref 1–32)
BASOPHILS # BLD AUTO: 0.04 10*3/MM3 (ref 0–0.2)
BASOPHILS NFR BLD AUTO: 0.3 % (ref 0–1.5)
BILIRUB CONJ SERPL-MCNC: 0.3 MG/DL (ref 0–0.3)
BILIRUB INDIRECT SERPL-MCNC: 0.1 MG/DL
BILIRUB SERPL-MCNC: 0.4 MG/DL (ref 0–1.2)
BUN SERPL-MCNC: 37 MG/DL (ref 8–23)
BUN/CREAT SERPL: 31.9 (ref 7–25)
CALCIUM SPEC-SCNC: 8.7 MG/DL (ref 8.6–10.5)
CHLORIDE SERPL-SCNC: 103 MMOL/L (ref 98–107)
CO2 SERPL-SCNC: 30.6 MMOL/L (ref 22–29)
CREAT SERPL-MCNC: 1.16 MG/DL (ref 0.57–1)
DEPRECATED RDW RBC AUTO: 50 FL (ref 37–54)
EGFRCR SERPLBLD CKD-EPI 2021: 49.6 ML/MIN/1.73
EOSINOPHIL # BLD AUTO: 0 10*3/MM3 (ref 0–0.4)
EOSINOPHIL NFR BLD AUTO: 0 % (ref 0.3–6.2)
ERYTHROCYTE [DISTWIDTH] IN BLOOD BY AUTOMATED COUNT: 14 % (ref 12.3–15.4)
GLUCOSE SERPL-MCNC: 159 MG/DL (ref 65–99)
HCT VFR BLD AUTO: 35.1 % (ref 34–46.6)
HGB BLD-MCNC: 10.8 G/DL (ref 12–15.9)
IMM GRANULOCYTES # BLD AUTO: 0.07 10*3/MM3 (ref 0–0.05)
IMM GRANULOCYTES NFR BLD AUTO: 0.5 % (ref 0–0.5)
LYMPHOCYTES # BLD AUTO: 1.37 10*3/MM3 (ref 0.7–3.1)
LYMPHOCYTES NFR BLD AUTO: 9 % (ref 19.6–45.3)
MCH RBC QN AUTO: 30.1 PG (ref 26.6–33)
MCHC RBC AUTO-ENTMCNC: 30.8 G/DL (ref 31.5–35.7)
MCV RBC AUTO: 97.8 FL (ref 79–97)
MONOCYTES # BLD AUTO: 1.68 10*3/MM3 (ref 0.1–0.9)
MONOCYTES NFR BLD AUTO: 11.1 % (ref 5–12)
NEUTROPHILS NFR BLD AUTO: 12.02 10*3/MM3 (ref 1.7–7)
NEUTROPHILS NFR BLD AUTO: 79.1 % (ref 42.7–76)
NRBC BLD AUTO-RTO: 0.1 /100 WBC (ref 0–0.2)
PLATELET # BLD AUTO: 195 10*3/MM3 (ref 140–450)
PMV BLD AUTO: 10.4 FL (ref 6–12)
POTASSIUM SERPL-SCNC: 3.6 MMOL/L (ref 3.5–5.2)
POTASSIUM SERPL-SCNC: 4.3 MMOL/L (ref 3.5–5.2)
PROT SERPL-MCNC: 6.4 G/DL (ref 6–8.5)
RBC # BLD AUTO: 3.59 10*6/MM3 (ref 3.77–5.28)
SODIUM SERPL-SCNC: 141 MMOL/L (ref 136–145)
WBC NRBC COR # BLD AUTO: 15.18 10*3/MM3 (ref 3.4–10.8)

## 2025-05-26 PROCEDURE — 80048 BASIC METABOLIC PNL TOTAL CA: CPT | Performed by: ORTHOPAEDIC SURGERY

## 2025-05-26 PROCEDURE — 80076 HEPATIC FUNCTION PANEL: CPT | Performed by: ORTHOPAEDIC SURGERY

## 2025-05-26 PROCEDURE — 94799 UNLISTED PULMONARY SVC/PX: CPT

## 2025-05-26 PROCEDURE — 97110 THERAPEUTIC EXERCISES: CPT

## 2025-05-26 PROCEDURE — 25010000002 CEFAZOLIN PER 500 MG: Performed by: ORTHOPAEDIC SURGERY

## 2025-05-26 PROCEDURE — 85025 COMPLETE CBC W/AUTO DIFF WBC: CPT | Performed by: ORTHOPAEDIC SURGERY

## 2025-05-26 PROCEDURE — 94761 N-INVAS EAR/PLS OXIMETRY MLT: CPT

## 2025-05-26 PROCEDURE — 25010000002 HYDROMORPHONE 1 MG/ML SOLUTION: Performed by: ORTHOPAEDIC SURGERY

## 2025-05-26 PROCEDURE — 94664 DEMO&/EVAL PT USE INHALER: CPT

## 2025-05-26 PROCEDURE — 99232 SBSQ HOSP IP/OBS MODERATE 35: CPT | Performed by: INTERNAL MEDICINE

## 2025-05-26 PROCEDURE — 97162 PT EVAL MOD COMPLEX 30 MIN: CPT

## 2025-05-26 PROCEDURE — 84132 ASSAY OF SERUM POTASSIUM: CPT | Performed by: STUDENT IN AN ORGANIZED HEALTH CARE EDUCATION/TRAINING PROGRAM

## 2025-05-26 PROCEDURE — 25010000002 PROCHLORPERAZINE 10 MG/2ML SOLUTION: Performed by: ORTHOPAEDIC SURGERY

## 2025-05-26 PROCEDURE — 71045 X-RAY EXAM CHEST 1 VIEW: CPT

## 2025-05-26 RX ORDER — POTASSIUM CHLORIDE 1500 MG/1
40 TABLET, EXTENDED RELEASE ORAL EVERY 4 HOURS
Status: COMPLETED | OUTPATIENT
Start: 2025-05-26 | End: 2025-05-26

## 2025-05-26 RX ORDER — BUDESONIDE 0.5 MG/2ML
0.5 INHALANT ORAL
Status: DISCONTINUED | OUTPATIENT
Start: 2025-05-26 | End: 2025-06-01 | Stop reason: HOSPADM

## 2025-05-26 RX ORDER — ACETYLCYSTEINE 200 MG/ML
2 SOLUTION ORAL; RESPIRATORY (INHALATION)
Status: DISCONTINUED | OUTPATIENT
Start: 2025-05-26 | End: 2025-05-31

## 2025-05-26 RX ADMIN — METOPROLOL SUCCINATE 12.5 MG: 25 TABLET, EXTENDED RELEASE ORAL at 08:32

## 2025-05-26 RX ADMIN — HYDROMORPHONE HYDROCHLORIDE 0.5 MG: 1 INJECTION, SOLUTION INTRAMUSCULAR; INTRAVENOUS; SUBCUTANEOUS at 17:59

## 2025-05-26 RX ADMIN — IPRATROPIUM BROMIDE AND ALBUTEROL SULFATE 3 ML: .5; 3 SOLUTION RESPIRATORY (INHALATION) at 19:48

## 2025-05-26 RX ADMIN — POTASSIUM CHLORIDE 40 MEQ: 1500 TABLET, EXTENDED RELEASE ORAL at 09:42

## 2025-05-26 RX ADMIN — OXYCODONE 10 MG: 5 TABLET ORAL at 12:38

## 2025-05-26 RX ADMIN — OXYCODONE 10 MG: 5 TABLET ORAL at 20:24

## 2025-05-26 RX ADMIN — HYDROMORPHONE HYDROCHLORIDE 0.5 MG: 1 INJECTION, SOLUTION INTRAMUSCULAR; INTRAVENOUS; SUBCUTANEOUS at 09:42

## 2025-05-26 RX ADMIN — GABAPENTIN 300 MG: 300 CAPSULE ORAL at 20:25

## 2025-05-26 RX ADMIN — GABAPENTIN 300 MG: 300 CAPSULE ORAL at 15:19

## 2025-05-26 RX ADMIN — IPRATROPIUM BROMIDE AND ALBUTEROL SULFATE 3 ML: .5; 3 SOLUTION RESPIRATORY (INHALATION) at 11:03

## 2025-05-26 RX ADMIN — CEFAZOLIN 2000 MG: 2 INJECTION, POWDER, FOR SOLUTION INTRAMUSCULAR; INTRAVENOUS at 15:19

## 2025-05-26 RX ADMIN — APIXABAN 2.5 MG: 2.5 TABLET, FILM COATED ORAL at 20:25

## 2025-05-26 RX ADMIN — IPRATROPIUM BROMIDE AND ALBUTEROL SULFATE 3 ML: .5; 3 SOLUTION RESPIRATORY (INHALATION) at 07:19

## 2025-05-26 RX ADMIN — DOCUSATE SODIUM 100 MG: 100 CAPSULE, LIQUID FILLED ORAL at 20:25

## 2025-05-26 RX ADMIN — PROCHLORPERAZINE EDISYLATE 10 MG: 5 INJECTION INTRAMUSCULAR; INTRAVENOUS at 01:04

## 2025-05-26 RX ADMIN — Medication 10 ML: at 08:41

## 2025-05-26 RX ADMIN — HYDROMORPHONE HYDROCHLORIDE 0.5 MG: 1 INJECTION, SOLUTION INTRAMUSCULAR; INTRAVENOUS; SUBCUTANEOUS at 05:46

## 2025-05-26 RX ADMIN — OXYCODONE 10 MG: 5 TABLET ORAL at 03:34

## 2025-05-26 RX ADMIN — HYDROMORPHONE HYDROCHLORIDE 0.5 MG: 1 INJECTION, SOLUTION INTRAMUSCULAR; INTRAVENOUS; SUBCUTANEOUS at 00:59

## 2025-05-26 RX ADMIN — LEVOTHYROXINE SODIUM 137 MCG: 0.11 TABLET ORAL at 05:46

## 2025-05-26 RX ADMIN — PANTOPRAZOLE SODIUM 40 MG: 40 TABLET, DELAYED RELEASE ORAL at 20:24

## 2025-05-26 RX ADMIN — CEFAZOLIN 2000 MG: 2 INJECTION, POWDER, FOR SOLUTION INTRAMUSCULAR; INTRAVENOUS at 06:33

## 2025-05-26 RX ADMIN — POLYETHYLENE GLYCOL 3350 17 G: 17 POWDER, FOR SOLUTION ORAL at 08:32

## 2025-05-26 RX ADMIN — POTASSIUM CHLORIDE 40 MEQ: 1500 TABLET, EXTENDED RELEASE ORAL at 06:34

## 2025-05-26 RX ADMIN — CEFAZOLIN 2000 MG: 2 INJECTION, POWDER, FOR SOLUTION INTRAMUSCULAR; INTRAVENOUS at 00:59

## 2025-05-26 RX ADMIN — ATORVASTATIN CALCIUM 10 MG: 10 TABLET ORAL at 08:32

## 2025-05-26 RX ADMIN — Medication 10 ML: at 20:25

## 2025-05-26 RX ADMIN — ACETYLCYSTEINE 2 ML: 200 SOLUTION ORAL; RESPIRATORY (INHALATION) at 19:48

## 2025-05-26 RX ADMIN — FLUOXETINE 60 MG: 20 CAPSULE ORAL at 08:32

## 2025-05-26 RX ADMIN — BUDESONIDE 0.5 MG: 0.5 SUSPENSION RESPIRATORY (INHALATION) at 19:48

## 2025-05-26 RX ADMIN — GABAPENTIN 300 MG: 300 CAPSULE ORAL at 08:32

## 2025-05-26 RX ADMIN — APIXABAN 2.5 MG: 2.5 TABLET, FILM COATED ORAL at 13:08

## 2025-05-26 NOTE — PROGRESS NOTES
"Referring Provider: Osman Pepper MD    Reason for follow-up:  Preoperative cardiovascular evaluation.  Humerus fracture.  Patient post surgery.     Patient Care Team:  Davis Boateng NP as PCP - General (Family Medicine)  Jose Levi MD as Consulting Physician (Cardiology)  Yovani Lerma MD as Consulting Physician (Nephrology)  Mary Hunt APRN as Nurse Practitioner (Nurse Practitioner)  Edmar Bear MD as Consulting Physician (Cardiology)    Subjective .      ROS    Since I have last seen, the patient has been without any chest discomfort ,shortness of breath, palpitations, dizziness or syncope.  Denies having any headache ,abdominal pain ,nausea, vomiting , diarrhea constipation, loss of weight or loss of appetite.  Denies having any excessive bruising ,hematuria or blood in the stool.    Review of all systems negative except as indicated.    Reviewed ROS.    History  Past Medical History:   Diagnosis Date    Acute congestive heart failure     Allergies     Anxiety     Arthritis     Asthma     Bilateral leg edema     Bipolar 1 disorder     Bulging lumbar disc     X3    Cancer     states had \"cancerous tumor during pregnancy and had to have hysterectomy\"    Cataract     bilateral    Cellulitis 03/2021    bilateral legs    Compression fracture of vertebral column     LUMBAR    COPD (chronic obstructive pulmonary disease)     Delayed emergence from anesthesia     Depression     Diabetes mellitus     Dyslipidemia     Dyspnea on exertion     Fibromyalgia     GERD (gastroesophageal reflux disease)     Hiatal hernia     Hyperlipidemia     Hypertension     Hypothyroid     HYPOTHYROID    IBS (irritable bowel syndrome)     Migraines     Morbid obesity     Neuropathy     Panic attacks     Peripheral edema     PONV (postoperative nausea and vomiting)     Poor mobility     rolling walker    Prediabetes     PVD (peripheral vascular disease)     Rheumatoid aortitis     Sleep apnea     cpap    Spinal " stenosis     Vertigo     Vitamin D deficiency        Past Surgical History:   Procedure Laterality Date    AORTIC VALVE REPAIR/REPLACEMENT N/A 3/18/2021    Procedure: AORTIC VALVE REPLACEMENT;  Surgeon: Olaf Mcgill MD;  Location: [facility] CVOR;  Service: Cardiothoracic;  Laterality: N/A;    BRONCHOSCOPY N/A 3/25/2021    Procedure: BRONCHOSCOPY AT BEDSIDE WITH BRONCHIOALVEOLAR LAVAGE;  Surgeon: Glenn Reyes MD;  Location: [facility] ENDOSCOPY;  Service: Pulmonary;  Laterality: N/A;  PNA    BRONCHOSCOPY N/A 10/16/2023    Procedure: BRONCHOSCOPY with bronchial washing;  Surgeon: Glenn Reyes MD;  Location: [facility] ENDOSCOPY;  Service: Pulmonary;  Laterality: N/A;  post op: pneumonia    CARDIAC CATHETERIZATION  2009    CARDIAC CATHETERIZATION N/A 8/14/2019    Procedure: Left Heart Cath;  Surgeon: Jose Levi MD;  Location: [facility] CATH INVASIVE LOCATION;  Service: Cardiovascular    CARDIAC CATHETERIZATION N/A 8/14/2019    Procedure: Right Heart Cath;  Surgeon: Jose Levi MD;  Location: [facility] CATH INVASIVE LOCATION;  Service: Cardiovascular    CARDIAC CATHETERIZATION N/A 8/14/2019    Procedure: Aortic root aortogram;  Surgeon: Jose Levi MD;  Location: [facility] CATH INVASIVE LOCATION;  Service: Cardiovascular    CARDIAC CATHETERIZATION N/A 1/20/2021    Procedure: Left Heart Cath;  Surgeon: Jose Levi MD;  Location: [facility] CATH INVASIVE LOCATION;  Service: Cardiovascular;  Laterality: N/A;    COLONOSCOPY      COLONOSCOPY N/A 2/14/2025    Procedure: COLONOSCOPY WITH POLYPECTOMY X 4;  Surgeon: Segun Cleveland MD;  Location: [facility] ENDOSCOPY;  Service: Gastroenterology;  Laterality: N/A;  POST- POLYPS X 4, DIVERTICULOSIS, INTERNAL HEMORRHOIDS    CYSTOCELE REPAIR  1984    ELBOW ARTHROPLASTY  2011    ENDOSCOPY      FOOT SURGERY      GALLBLADDER SURGERY  2002    TONSILLECTOMY      VAGINAL HYSTERECTOMY  1972       Family History   Problem Relation Age of Onset     Ovarian cancer Mother     Lung cancer Mother     Esophageal cancer Mother     Hypertension Father     Diabetes Maternal Grandmother        Social History     Tobacco Use    Smoking status: Former     Current packs/day: 0.00     Types: Cigarettes     Quit date: 2022     Years since quitting: 3.4     Passive exposure: Never    Smokeless tobacco: Never    Tobacco comments:     decrease now and do not smoke dos   Vaping Use    Vaping status: Former   Substance Use Topics    Alcohol use: Not Currently     Comment: very rare    Drug use: Never        Medications Prior to Admission   Medication Sig Dispense Refill Last Dose/Taking    Acetaminophen 325 MG capsule Take 650 mg by mouth Every 4 (Four) Hours As Needed.   Taking As Needed    albuterol (ACCUNEB) 0.63 MG/3ML nebulizer solution Take 3 mL by nebulization Every 6 (Six) Hours As Needed for Wheezing (Dx: J 44.9 (COPD), R 06.09 (dyspnea on exertion)). 30 each 0 Taking As Needed    B Complex Vitamins (VITAMIN B COMPLEX) capsule capsule Take 1 capsule by mouth Daily. LAST DOSE 3/4 5/23/2025 Morning    bumetanide (BUMEX) 1 MG tablet Take 1 tablet by mouth Daily. Noon.   5/23/2025 Noon    bumetanide (BUMEX) 2 MG tablet Take 1 tablet by mouth Daily. 30 tablet 0 5/23/2025 Morning    Calcium-Magnesium-Vitamin D (CALCIUM 1200+D3 PO) Take 1 capsule by mouth Daily.   5/23/2025 Morning    cholecalciferol (Cholecalciferol) 25 MCG (1000 UT) tablet Take 1 tablet by mouth Every Morning. LAST DOSE 3/4 5/23/2025 Morning    docusate sodium (COLACE) 100 MG capsule Take 1 capsule by mouth Every Night.   5/23/2025 Evening    famotidine (PEPCID) 40 MG tablet Take 1 tablet by mouth At Night As Needed for Indigestion.   Taking As Needed    Farxiga 5 MG tablet tablet Take 1 tablet by mouth Every Morning.   5/23/2025 Morning    FLUoxetine (PROzac) 20 MG capsule Take 3 capsules by mouth Daily.   5/23/2025 Morning    gabapentin (NEURONTIN) 400 MG capsule Take 1 capsule by mouth 3 (Three) Times  a Day.   5/23/2025 Evening    levothyroxine (SYNTHROID, LEVOTHROID) 137 MCG tablet Take 1 tablet by mouth Daily.   5/23/2025 Morning    loratadine (CLARITIN) 10 MG tablet Take 1 tablet by mouth Daily.   5/23/2025 Morning    losartan (Cozaar) 25 MG tablet Take 1 tablet by mouth Daily. 90 tablet 3 5/23/2025 Morning    meclizine (ANTIVERT) 25 MG tablet Take 1 tablet by mouth 3 (Three) Times a Day As Needed.   Taking As Needed    Melatonin 10 MG capsule Take 2 capsules by mouth Every Night.   5/23/2025 Evening    metOLazone (ZAROXOLYN) 2.5 MG tablet Take 1 tablet by mouth Every Other Day.   5/22/2025    metoprolol succinate XL (TOPROL-XL) 25 MG 24 hr tablet Take 0.5 tablets by mouth Daily. 90 tablet 3 5/23/2025 Morning    Multiple Vitamins-Minerals (MULTIVITAMIN WITH MINERALS) tablet tablet Take 1 tablet by mouth Daily.   5/23/2025 Morning    ondansetron (ZOFRAN) 4 MG tablet Take 1 tablet by mouth Every 8 (Eight) Hours As Needed for Nausea or Vomiting.   Taking As Needed    pantoprazole (PROTONIX) 40 MG EC tablet Take 1 tablet by mouth Every Evening.   5/23/2025 Evening    rizatriptan MLT (MAXALT-MLT) 10 MG disintegrating tablet Take 1 tablet by mouth 1 (One) Time As Needed for Migraine. May repeat in 2 hours if needed   Taking As Needed    simvastatin (ZOCOR) 20 MG tablet Take 1 tablet by mouth Every Night.   5/23/2025 Evening    tiZANidine (ZANAFLEX) 2 MG tablet Take 1 tablet by mouth Every 8 (Eight) Hours As Needed.   Taking As Needed    vitamin B-12 (CYANOCOBALAMIN) 1000 MCG tablet Take 1 tablet by mouth Daily.   5/23/2025 Morning    fluticasone (FLONASE) 50 MCG/ACT nasal spray Administer 2 sprays into the nostril(s) as directed by provider Daily. (Patient taking differently: Administer 2 sprays into the nostril(s) as directed by provider Daily As Needed for Rhinitis or Allergies.)          Allergies  Adhesive tape; Benadryl [diphenhydramine]; Butorphanol; Garlic; Heparin; Tetanus-diphtheria toxoids td; Aloe; Bee  "venom; Latex; Macadamia nut oil; Tuberculin, ppd; and Wasp venom    Scheduled Meds:atorvastatin, 10 mg, Oral, Daily  ceFAZolin, 2,000 mg, Intravenous, Q8H  docusate sodium, 100 mg, Oral, Nightly  FLUoxetine, 60 mg, Oral, Daily  gabapentin, 300 mg, Oral, TID  ipratropium-albuterol, 3 mL, Nebulization, 4x Daily - RT  levothyroxine, 137 mcg, Oral, Q AM  metoprolol succinate XL, 12.5 mg, Oral, Daily  pantoprazole, 40 mg, Oral, Nightly  polyethylene glycol, 17 g, Oral, Daily  potassium chloride ER, 40 mEq, Oral, Q4H  sodium chloride, 10 mL, Intravenous, Q12H      Continuous Infusions:   PRN Meds:.  senna-docusate sodium **AND** polyethylene glycol **AND** bisacodyl **AND** bisacodyl    Calcium Replacement - Follow Nurse / BPA Driven Protocol    HYDROmorphone    HYDROmorphone    Magnesium Standard Dose Replacement - Follow Nurse / BPA Driven Protocol    meclizine    morphine    nitroglycerin    ondansetron    ondansetron ODT    oxyCODONE    oxyCODONE    Phosphorus Replacement - Follow Nurse / BPA Driven Protocol    Potassium Replacement - Follow Nurse / BPA Driven Protocol    prochlorperazine    [COMPLETED] Insert Peripheral IV **AND** sodium chloride    sodium chloride    sodium chloride    Objective     VITAL SIGNS  Vitals:    05/25/25 2018 05/25/25 2025 05/25/25 2317 05/26/25 0454   BP:   121/78 115/77   BP Location:   Right arm Right arm   Patient Position:   Lying Lying   Pulse: 78 78 85    Resp: 20 20 15 22   Temp:   98.1 °F (36.7 °C) 98.2 °F (36.8 °C)   TempSrc:   Oral Oral   SpO2: 96% (!) 88% 94% 94%   Weight:       Height:           Flowsheet Rows      Flowsheet Row First Filed Value   Admission Height 162.6 cm (64\") Documented at 05/23/2025 2227   Admission Weight 137 kg (302 lb 0.5 oz) Documented at 05/23/2025 2227              Intake/Output Summary (Last 24 hours) at 5/26/2025 0707  Last data filed at 5/26/2025 0454  Gross per 24 hour   Intake 240 ml   Output 1400 ml   Net -1160 ml        TELEMETRY: Sinus " rhythm.  Physical Exam:  The patient is alert, oriented and in no distress.  Vital signs as noted above.  Exogenous obesity.  Head and neck revealed no carotid bruits or jugular venous distention.  No thyromegaly or lymphadenopathy is present  Lungs clear.  No wheezing.  Breath sounds are normal bilaterally.  Heart normal first and second heart sounds.  No murmur. No precordial rub is present.  No gallop is present.  Abdomen soft and nontender.  No organomegaly is present.  Extremities with good peripheral pulses without any pedal edema.  Left humerus fracture.  Status post surgery.  Skin warm and dry.  Musculoskeletal system is grossly normal  CNS grossly normal    Reviewed and updated.  Results Review:   I reviewed the patient's new clinical results.  Lab Results (last 24 hours)       Procedure Component Value Units Date/Time    Basic Metabolic Panel [[id number]]  (Abnormal) Collected: 05/26/25 0357    Specimen: Blood Updated: 05/26/25 0435     Glucose 159 mg/dL      BUN 37 mg/dL      Creatinine 1.16 mg/dL      Sodium 141 mmol/L      Potassium 3.6 mmol/L      Chloride 103 mmol/L      CO2 30.6 mmol/L      Calcium 8.7 mg/dL      BUN/Creatinine Ratio 31.9     Anion Gap 7.4 mmol/L      eGFR 49.6 mL/min/1.73     Narrative:      GFR Categories in Chronic Kidney Disease (CKD)              GFR Category          GFR (mL/min/1.73)    Interpretation  G1                    90 or greater        Normal or high (1)  G2                    60-89                Mild decrease (1)  G3a                   45-59                Mild to moderate decrease  G3b                   30-44                Moderate to severe decrease  G4                    15-29                Severe decrease  G5                    14 or less           Kidney failure    (1)In the absence of evidence of kidney disease, neither GFR category G1 or G2 fulfill the criteria for CKD.    eGFR calculation 2021 CKD-EPI creatinine equation, which does not include race as a  factor    Hepatic Function Panel [[id number]]  (Abnormal) Collected: 05/26/25 0357    Specimen: Blood Updated: 05/26/25 0435     Total Protein 6.4 g/dL      Albumin 3.3 g/dL      ALT (SGPT) 66 U/L      AST (SGOT) 47 U/L      Alkaline Phosphatase 112 U/L      Total Bilirubin 0.4 mg/dL      Bilirubin, Direct 0.3 mg/dL      Bilirubin, Indirect 0.1 mg/dL     CBC & Differential [[id number]]  (Abnormal) Collected: 05/26/25 0357    Specimen: Blood Updated: 05/26/25 0405    Narrative:      The following orders were created for panel order CBC & Differential.  Procedure                               Abnormality         Status                     ---------                               -----------         ------                     CBC Auto Differential[[id number]]        Abnormal            Final result                 Please view results for these tests on the individual orders.    CBC Auto Differential [[id number]]  (Abnormal) Collected: 05/26/25 0357    Specimen: Blood Updated: 05/26/25 0405     WBC 15.18 10*3/mm3      RBC 3.59 10*6/mm3      Hemoglobin 10.8 g/dL      Hematocrit 35.1 %      MCV 97.8 fL      MCH 30.1 pg      MCHC 30.8 g/dL      RDW 14.0 %      RDW-SD 50.0 fl      MPV 10.4 fL      Platelets 195 10*3/mm3      Neutrophil % 79.1 %      Lymphocyte % 9.0 %      Monocyte % 11.1 %      Eosinophil % 0.0 %      Basophil % 0.3 %      Immature Grans % 0.5 %      Neutrophils, Absolute 12.02 10*3/mm3      Lymphocytes, Absolute 1.37 10*3/mm3      Monocytes, Absolute 1.68 10*3/mm3      Eosinophils, Absolute 0.00 10*3/mm3      Basophils, Absolute 0.04 10*3/mm3      Immature Grans, Absolute 0.07 10*3/mm3      nRBC 0.1 /100 WBC     POC Glucose Once [[id number]]  (Abnormal) Collected: 05/25/25 1756    Specimen: Blood Updated: 05/25/25 1758     Glucose 171 mg/dL      Comment: Serial Number: [id number]:  [id number]               Imaging Results (Last 24 Hours)       Procedure Component Value Units Date/Time    FL < 1 Hour  [[id number]] Resulted: 05/25/25 1739     Updated: 05/25/25 1739    Narrative:      This procedure was auto-finalized with no dictation required.    XR Humerus Left [[id number]] Resulted: 05/25/25 1738     Updated: 05/25/25 1738    Narrative:      This procedure was auto-finalized with no dictation required.        LAB RESULTS (LAST 7 DAYS)    CBC  Results from last 7 days   Lab Units 05/26/25 0357 05/25/25 0027 05/24/25 0319 05/23/25 2258   WBC 10*3/mm3 15.18* 12.67* 16.63* 10.97*   RBC 10*6/mm3 3.59* 3.72* 3.93 4.06   HEMOGLOBIN g/dL 10.8* 11.1* 11.9* 12.0   HEMATOCRIT % 35.1 35.9 36.8 37.6   MCV fL 97.8* 96.5 93.6 92.6   PLATELETS 10*3/mm3 195 229 281 256       BMP  Results from last 7 days   Lab Units 05/26/25 0357 05/25/25 0027 05/24/25 0319 05/23/25 2258   SODIUM mmol/L 141 142 138 137   POTASSIUM mmol/L 3.6 4.1 3.6 2.7*   CHLORIDE mmol/L 103 100 97* 96*   CO2 mmol/L 30.6* 30.0* 30.1* 29.2*   BUN mg/dL 37* 44* 52* 51*   CREATININE mg/dL 1.16* 1.34* 1.63* 1.63*   GLUCOSE mg/dL 159* 147* 156* 133*   MAGNESIUM mg/dL  --   --  2.4 2.3   PHOSPHORUS mg/dL  --   --  4.5  --        CMP   Results from last 7 days   Lab Units 05/26/25 0357 05/25/25 0027 05/24/25 0319 05/23/25 2258   SODIUM mmol/L 141 142 138 137   POTASSIUM mmol/L 3.6 4.1 3.6 2.7*   CHLORIDE mmol/L 103 100 97* 96*   CO2 mmol/L 30.6* 30.0* 30.1* 29.2*   BUN mg/dL 37* 44* 52* 51*   CREATININE mg/dL 1.16* 1.34* 1.63* 1.63*   GLUCOSE mg/dL 159* 147* 156* 133*   ALBUMIN g/dL 3.3* 3.7 3.8  --    BILIRUBIN mg/dL 0.4 0.5 0.5  --    ALK PHOS U/L 112 108 108  --    AST (SGOT) U/L 47* 103* 214*  --    ALT (SGPT) U/L 66* 144* 132*  --          BNP        TROPONIN        CoAg  Results from last 7 days   Lab Units 05/24/25  0319 05/23/25  2258   INR  1.18* 1.16*   APTT seconds  --  29.1       Creatinine Clearance  Estimated Creatinine Clearance: 58.8 mL/min (A) (by C-G formula based on SCr of 1.16 mg/dL (H)).    ABG        Radiology  Adult Transthoracic Echo  Limited W/ Cont if Necessary Per Protocol  Addendum Date: 5/25/2025    Left ventricular ejection fraction appears to be 61 - 65%.   Left ventricular diastolic function was not assessed.   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   Estimated right ventricular systolic pressure from tricuspid regurgitation is normal (<35 mmHg).   Imaging is limited by left arm being in sling.     XR ELBOW 2 VIEW LEFT  Result Date: 5/24/2025  Impression: 1.No definite acute osseous abnormality of the elbow. 2.Fracture proximal shaft of the humerus. Electronically Signed: Roverto Elizabeth MD  5/24/2025 1:02 PM EDT  Workstation ID: [id number]          EKG      I personally viewed and interpreted the patient's EKG/Telemetry data: Sinus rhythm nonspecific ST-T wave change    ECHOCARDIOGRAM:    Results for orders placed during the hospital encounter of 05/23/25    Adult Transthoracic Echo Limited W/ Cont if Necessary Per Protocol    Interpretation Summary    Left ventricular ejection fraction appears to be 61 - 65%.    Left ventricular diastolic function was not assessed.    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    Estimated right ventricular systolic pressure from tricuspid regurgitation is normal (<35 mmHg).    Imaging is limited by left arm being in sling.          STRESS TEST  Results for orders placed during the hospital encounter of 02/20/23    Stress Test With Myocardial Perfusion One Day    Interpretation Summary  LEXISCAN CARDIOLITE REPORT    DATE OF PROCEDURE: 2/28/2023    INDICATION FOR PROCEDURE: Numbness and tingling of left hand, dyspnea on exertion, chronic CHF, AVR, edema, dyslipidemia, hypertension, obesity with BMI over 45    PROCEDURE PERFORMED: Lexiscan Cardiolite    PROCEDURE COMMENTS:    After informed consent was obtained.  Patient's resting heart rate was 68 bpm, resting blood pressure was 144/82, resting EKG revealed sinus rhythm with rate of 68 bpm.  Patient was given 0.4 mg of regadenosine for stress testing.  There was no significant  change in heart rate, blood pressure, symptoms with regadenoson injection.  Patient tolerated procedure well.  Complications were none.    NUCLEAR IMAGIN.  There was  uniform uptake of Cardiolite both in resting and post stress images, no ischemia seen.  2.  Gated images reveal  normal LV size and contractility, LVEF of 86%.    CONCLUSION:  1.  Lexiscan Cardiolite with normal perfusion, negative for ischemia.  2.  Normal wall motion.  LVEF of 86%.    RECOMMENDATIONS:    Clinical correlation recommended.      Jose Levi MD  23  20:24 EST        Cardiolite (Tc-99m sestamibi) stress test    CARDIAC CATHETERIZATION  Results for orders placed during the hospital encounter of 21    Cardiac Catheterization/Vascular Study    Narrative  Table formatting from the original result was not included.  2021      Heart Cath Report    NAME:              Claudia Rust  :                1950  AGE/SEX:        70 y.o. female  MRN:                [id number]        Procedures Performed    1. Bilateral selective coronary arteriogram    :   Jose Levi MD    Vascular Access Site: Femoral    Indication for procedure: 70-year-old with severe critical  aortic stenosis and history of CHF, obesity, dyslipidemia, prediabetes      Procedure Note    After discussing the risks, benefits, and alternatives of the procedure, informed consent was obtained.  Moderate conscious sedation was given utilizing IV Versed and fentanyl administered by RN with continuous EKG oximetry and hemodynamic monitoring supervised by me throughout the entire case, conscious sedation time was 30 minutes.  I was present with the patient for the duration of moderate sedation and supervised staff who had no other duties and monitored the patient for the entire procedure patient had Moise 2-3 sedation scale. the vascular access site was prepped and draped in the usual sterile fashion.  2% lidocaine was  used for local anesthesia. Appropriate landmarks were assessed.  A 6 Nepali short sheath was inserted in the artery using the modified Seldinger technique.    Selective coronary angiography was performed with JL4 and JR4 diagnostic catheters. Left ventriculogram  was not performed because patient has severe aortic stenosis..  All exchanges were performed over the wire.  No specimens were removed.  There were no apparent acute or early complications.  The patient tolerated the procedure well and was transferred to the recovery area in stable condition.    Artery hemostasis will be achieved with  manual pressure in OPCV.      Complications:  None  Blood Loss: minimal    Hemodynamics    Pressures    Ao:    138/62 mmHg      Coronary Angiography    Left Main :  The left main   is without disease    Left Anterior Descending : Is tortuous vessel, without disease    Left Circumflex : Tortuous vessel ends up with 3 branches which are tortuous without disease    Right Coronary Artery :  The right coronary artery   dominant vessel without disease    Dominance:  []  Left  [x]  Right  []  Co-Dominant        Impression:    1. No obstructive CAD    Recommendations:    1. Aortic valve replacement evaluation by CT VS.  Patient had echo showing severe critical aortic stenosis        I sincerely appreciate the opportunity to participate in your patient's care. Please feel free to contact me anytime if I can be of assistance in this or any other way.      Pertinent History    Past Medical History:  Diagnosis Date   Acute congestive heart failure (CMS/HCC)   Asthma   Bipolar 1 disorder (CMS/HCC)   COPD (chronic obstructive pulmonary disease) (CMS/HCC)   Depression   Dyslipidemia   Fibromyalgia   Hypertension   Hypothyroid   IBS (irritable bowel syndrome)   Migraines   Morbid obesity (CMS/HCC)   Neuropathy   Spinal stenosis   Vertigo    Past Surgical History:  Procedure Laterality Date   CARDIAC CATHETERIZATION  2009   CARDIAC  CATHETERIZATION N/A 8/14/2019  Procedure: Left Heart Cath;  Surgeon: Jose Levi MD;  Location:  [facility] CATH INVASIVE LOCATION;  Service: Cardiovascular   CARDIAC CATHETERIZATION N/A 8/14/2019  Procedure: Right Heart Cath;  Surgeon: Jose Levi MD;  Location:  [facility] CATH INVASIVE LOCATION;  Service: Cardiovascular   CARDIAC CATHETERIZATION N/A 8/14/2019  Procedure: Aortic root aortogram;  Surgeon: Jose Levi MD;  Location: [facility] CATH INVASIVE LOCATION;  Service: Cardiovascular   CYSTOCELE REPAIR  1984   ELBOW ARTHROPLASTY  2011   FOOT SURGERY   GALLBLADDER SURGERY  2002   VAGINAL HYSTERECTOMY  1972    Prior to Admission medications  Medication Sig Start Date End Date Taking? Authorizing Provider  aspirin 81 MG tablet Take 81 mg by mouth Every Night. 2/3/15  Yes Krissy Hodgson MD  B Complex Vitamins (VITAMIN B COMPLEX) capsule capsule Take 1 capsule by mouth Daily.   Yes Krissy Hodgson MD  Calcium Carbonate (CALCIUM 500 PO) Take 1,500 mg by mouth Daily.   Yes Krissy Hodgson MD  Cholecalciferol (VITAMIN D-1000 MAX ST) 1000 units tablet Take 2,000 Units by mouth Daily. 9/16/16  Yes Krissy Hodgson MD  diphenhydrAMINE (BENADRYL) 50 MG capsule Take 50 mg by mouth every night at bedtime.   Yes Krissy Hodgson MD  FLUoxetine (PROzac) 60 MG tablet Take 60 mg by mouth Daily. 4/19/19  Yes Krissy Hodgson MD  fluticasone (FLONASE) 50 MCG/ACT nasal spray 2 sprays into the nostril(s) as directed by provider 2 (two) times a day.   Yes Krissy Hodgson MD  furosemide (LASIX) 40 MG tablet Take 40 mg by mouth 2 (Two) Times a Day. 2/5/15  Yes Krissy Hodgson MD  gabapentin (NEURONTIN) 600 MG tablet Take 400 mg by mouth 3 (Three) Times a Day. 7/30/19  Yes Krissy Hodgson MD  HYDROcodone-acetaminophen (NORCO)  MG per tablet Take 1 tablet by mouth Daily. Noon an prm   Yes Krissy Hodgson MD  levothyroxine (SYNTHROID,  LEVOTHROID) 100 MCG tablet Take 100 mcg by mouth Daily. 7/30/15  Yes Krissy Hodgson MD  metoprolol succinate XL (TOPROL-XL) 25 MG 24 hr tablet Take 25 mg by mouth Daily.   Yes Krissy Hodgson MD  Multiple Vitamins-Minerals (MULTIVITAMIN WITH MINERALS) tablet tablet Take 1 tablet by mouth Daily.   Yes Krissy Hodgson MD  ondansetron (ZOFRAN) 4 MG tablet Take 4 mg by mouth Every 8 (Eight) Hours As Needed for Nausea or Vomiting.   Yes Krissy Hodgson MD  pantoprazole (PROTONIX) 40 MG EC tablet Take 40 mg by mouth Daily.   Yes Krissy Hodgson MD  potassium chloride (KLOR-CON) 20 MEQ CR tablet Take 20 mEq by mouth 2 (Two) Times a Day. 7/30/15  Yes Krissy Hodgson MD  simvastatin (ZOCOR) 20 MG tablet Take 20 mg by mouth Daily. 2/5/15  Yes Krissy Hodgson MD  Acetaminophen (TYLENOL PO) Take 1,000 mg by mouth Every 4 (Four) Hours As Needed.    Krissy Hodgson MD  albuterol (PROVENTIL) (2.5 MG/3ML) 0.083% nebulizer solution Inhale 2.5 mg Every 6 (Six) Hours As Needed. 2/3/15   Krissy Hodgson MD  aluminum-magnesium hydroxide-simethicone (MAALOX/MYLANTA) 200-200-20 MG/5ML suspension Take 15 mL by mouth Every 6 (Six) Hours As Needed for Indigestion or Heartburn.    Krissy Hodgson MD  meclizine (ANTIVERT) 25 MG tablet Take 25 mg by mouth 3 (Three) Times a Day As Needed. 7/30/15   Krissy Hodgson MD  polyethylene glycol (MIRALAX) powder Take 17 g by mouth Daily As Needed. 11/9/16   Krissy Hodgson MD  promethazine (PHENERGAN) 25 MG tablet Take 25 mg by mouth Every 6 (Six) Hours As Needed for Nausea or Vomiting.    Krissy Hodgson MD  rizatriptan MLT (MAXALT-MLT) 10 MG disintegrating tablet Take 10 mg by mouth 1 (One) Time As Needed for Migraine. May repeat in 2 hours if needed    Krissy Hodgson MD  tiZANidine (ZANAFLEX) 4 MG tablet Take 4 mg by mouth Every 8 (Eight) Hours As Needed for Muscle Spasms.    Krissy Hodgson MD  acetaminophen  (TYLENOL) 325 MG tablet Take 650 mg by mouth Every 6 (Six) Hours As Needed for Mild Pain .  1/20/21  Krissy Hodgson MD  Calcium Carbonate ([employer] Calcium) 1500 (600 Ca) MG tablet Take 600 mg by mouth Daily.  1/20/21  Krissy Hodgson MD  metoprolol succinate XL (TOPROL-XL) 25 MG 24 hr tablet TAKE 1 TABLET BY MOUTH EVERY DAY  Patient taking differently: 25 mg. 11/17/20 1/20/21  Jose Levi MD  ondansetron ODT (ZOFRAN-ODT) 4 MG disintegrating tablet 4 mg Every 8 (Eight) Hours As Needed. 1/6/21 1/20/21  Krissy Hodgson MD      Pre-Procedure Notes  H&P Performed  [x]  Yes []  No       []  N/A    Indications:  []  ACS <= 24 HRS  []  ACS >24 HRS  []  New Onset Angina <= 2 mos  []  Worsening Angina  []  Resuscitated Cardiac Arrest  []  Angina on Exertion:  []  Suspected CAD  []  Valvular Disease  []  Pericardial Disease  []  Cardiac Arrythmia  []  Cardiomyopathy  []  LV Dysfunction  []  Syncope  []  Post Cardiac Transplant  []  Eval. For Exercise Clearance  []  Other  []  Pre-Operative Evaluation  If Pre-Op Eval:  Evaluation for Surgery Type:  []  Cardiac Surgery   []  Non-Cardiac Surgery  Functional Capacity:  []  <4 METS  []  >=4 METS w/o symptoms  []  >= 4 METS with symptoms  []  Unknown  Surgical Risk:  []  Low  []  Intermediate  []  High Risk: Vascular  []  High Risk Non-Vascular    Risks, Benefits, & Complications Discussed:  [x]  Yes  []  No  []  N/A    Questions Answered:  [x]  Yes  []  No  []  N/A    Consent Obtained:  [x]  Yes  []  No  []  N/A    CHF: [x]  Yes  []  No  If Yes:  Newly Diagnosed?  []  Yes  [x]  No  If Yes:  HF Type:  []  Diastolic  []  Systolic  []  Unknown      Jose Levi MD  1/20/2021  12:55 EST  Electronically signed by Jose Levi MD, 01/20/21, 12:55 PM EST.                OTHER:         Assessment & Plan     Principal Problem:    Humeral fracture      [[[[[[[[[[[[[[[[[[[[[[  Impression  ============  -Mechanical fall and humerus  fracture.  Status post humerus intramedullary nail/ellie insertion on the left side 5/25/2025.     - Status post TAVR valve replacement (bioprosthetic 3/18/2021.  Patient had nonobstructive coronary artery disease on cardiac cath 1/20/2021.     - Hypertension dyslipidemia diabetes COPD obstructive sleep apnea.  Hypothyroidism rheumatoid arthritis hiatal hernia chronic depression bipolar disorder GERD IBS chronic migraine vertigo.     - History of compression lumbar fracture.     - History of HIT.  History of DVT and upper extremity clot.     - Status post cholecystectomy right hand surgery and bladder reconstruction.     - History of smoking.     - Significant exogenous obesity.     - History of allergy to sotalol.     Transesophageal echo 3/25/2021 normal LV systolic function     Carotid Dopplers 9/13/2019 normal.  Echocardiogram 2/11/2020 is revealing severe aortic stenosis  Lexiscan Cardiolite 2/28/2023 negative for ischemia EF of 86%  Echo 10/14/2023 reveals normal LV systolic function with concentric LVH, dilated RV and left atrium, normal bioprosthetic aortic valve mild MR and TR   ============  Plan  ===========  Patient had mechanical fall without any cardiac symptoms.  Patient has humerus fracture.  Patient is being considered for surgery.  Patient does not have any angina pectoris or congestive heart failure.  Status post bioprosthetic aortic valve replacement for aortic valve stenosis.  EKG showed sinus rhythm.    Status post humerus intramedullary nail/ellie insertion on the left side.  Patient did not have any perioperative cardiovascular problems.    Echocardiogram showed normal left ventricle function.  TAVR valve is functioning normally    Renal dysfunction  BUN/creatinine 37/1.16: 5/26/2025.    Medications were reviewed and updated.    Reviewed and updated 5/26/2025.    Dr. Levi primary cardiologist will see the patient tomorrow.    Further plan will depend on patient's  progress    ]]]]]]]]]]]]]]]]]]]]]]]]]]                    Kaz Arnett MD  05/26/25  07:07 EDT

## 2025-05-26 NOTE — ANESTHESIA POSTPROCEDURE EVALUATION
Patient: Claudia Rust    Procedure Summary       Date: 05/25/25 Room / Location: [facility] OR 04 / [facility] MAIN OR    Anesthesia Start: 1543 Anesthesia Stop: 1749    Procedure: HUMERUS INTRAMEDULLARY NAIL/JONO INSERTION LEFT (Left: Arm Upper) Diagnosis:     Surgeons: Rufus Peralta MD Provider: Zana Sykes MD    Anesthesia Type: general ASA Status: 4            Anesthesia Type: general    Vitals  Vitals Value Taken Time   /62 05/25/25 18:44   Temp 97.8 °F (36.6 °C) 05/25/25 17:50   Pulse 80 05/25/25 19:04   Resp 19 05/25/25 18:35   SpO2 84 % 05/25/25 19:03   Vitals shown include unfiled device data.        Post Anesthesia Care and Evaluation    Patient location during evaluation: PACU  Patient participation: complete - patient participated  Level of consciousness: awake  Pain scale: See nurse's notes for pain score.  Pain management: adequate    Airway patency: patent  Anesthetic complications: No anesthetic complications  PONV Status: none  Cardiovascular status: acceptable  Respiratory status: acceptable and spontaneous ventilation  Hydration status: acceptable    Comments: Patient seen and examined postoperatively; vital signs stable; SpO2 greater than or equal to 90%; cardiopulmonary status stable; nausea/vomiting adequately controlled; pain adequately controlled; no apparent anesthesia complications; patient discharged from anesthesia care when discharge criteria were met

## 2025-05-26 NOTE — PHARMACY PATIENT ASSISTANCE
Transitions of Care (test claim):    Drug Eliquis:   Covered/PA Required: Covered without PA  Copay Per Month: $0  Is the medication eligible for a trial card/copay card? N/A    Please note that when it states medication is eligible for a trial card that this only means that the medication has a free trial available. This does not assess if the patient has already utilized the once in a lifetime free trial.     This test claim coverage is only valid on the date the note is published. Copay/coverage could vary depending on patient coverage at a later date.  Additionally this test claim is for the sole purpose of a price check not a clinical recommendation.     For billing questions please call [facility] at ext: [phone]  For M2B questions please call [facility] at ext: [phone]    Cara LopezD

## 2025-05-26 NOTE — PLAN OF CARE
Goal Outcome Evaluation:   Patient a&ox4, pain controlled, weaned down from 8 L high flow NC to 5 L NC, propped up arms on pillows and added ice to left shoulder per Dr. Peralta. Active ROM performed with PT, unable to get out of bed due to weakness/lethargy. Call light within reach, bed in low position, family at bedside. Plan of care on going.

## 2025-05-26 NOTE — PROGRESS NOTES
Orthopaedic Progress Note     Pain well controlled. Dressing clean and dry, worked with PT did poorly. Drowsy but able to arouse and conversational. On 7 L decreased from 10     Hgb: 10.8   Vitals: p 80, 124/65, 94% on 7 L     NAD  Alert  Respirations are nonlabored on O2 uses at baseline at night   LUE   Dressing clean and dry  In sling   AIN PIN ULNAR nerve intact   Swelling about the hand and at her wedding rings which she requested that we did not cut off in the OR.   Sensation is intact distally   Foot is wwp   Compartments are soft and compressible     74 year old obese female with left proximal third humeral shaft fracture   POD 1 left humerus IMN   -WBAT LUE   -to chair if able  -pain control   -ice and elevation   -PT / OT   -dvt ppx as she is obese and has demonstrated she will have a difficult time mobilizing   -discussed with her the potential for cutting off her rings if her swelling at hand is not improved.   -discussed coming out of the sling while she is in bed to allow for elevation of the left upper extremity.   -dc and follow up info placed in the chart.   -please call/chat  with any questions or concerns.    Rufus Peralta MD   [facility]   [phone] - [place] Office  [phone] - [place] Office

## 2025-05-26 NOTE — THERAPY EVALUATION
"Patient Name: Claudia Rust  : 1950    MRN: [id number]                              Today's Date: 2025       Admit Date: 2025    Visit Dx:     ICD-10-CM ICD-9-CM   1. Closed displaced comminuted fracture of shaft of left humerus, initial encounter  S42.352A 812.21   2. Hypokalemia  E87.6 276.8   3. Acute kidney injury  N17.9 584.9   4. Fall, initial encounter  W19.XXXA E888.9     Patient Active Problem List   Diagnosis    Obesity, morbid, BMI 50 or higher    Dyspnea on exertion    Abnormal nuclear stress test    Nonrheumatic aortic valve stenosis    Prediabetes    Dyslipidemia    Essential hypertension    Acute UTI    Bipolar disorder    COPD with hypoxia    Hyperlipidemia, unspecified    Gastroesophageal reflux disease    Rheumatoid arthritis    Aortic stenosis, severe    Chronic diastolic heart failure    Aortic valve stenosis    S/P AVR (aortic valve replacement)    COVID    Atypical pneumonia    Pulmonary hypertension    Acute respiratory failure    Multifocal pneumonia    Multiple tracheobronchial mucus plugs    Pneumonia, unspecified organism    Encounter for Hemoccult screening    Humeral fracture     Past Medical History:   Diagnosis Date    Acute congestive heart failure     Allergies     Anxiety     Arthritis     Asthma     Bilateral leg edema     Bipolar 1 disorder     Bulging lumbar disc     X3    Cancer     states had \"cancerous tumor during pregnancy and had to have hysterectomy\"    Cataract     bilateral    Cellulitis 2021    bilateral legs    Compression fracture of vertebral column     LUMBAR    COPD (chronic obstructive pulmonary disease)     Delayed emergence from anesthesia     Depression     Diabetes mellitus     Dyslipidemia     Dyspnea on exertion     Fibromyalgia     GERD (gastroesophageal reflux disease)     Hiatal hernia     Hyperlipidemia     Hypertension     Hypothyroid     HYPOTHYROID    IBS (irritable bowel syndrome)     Migraines     Morbid obesity     Neuropathy  "    Panic attacks     Peripheral edema     PONV (postoperative nausea and vomiting)     Poor mobility     rolling walker    Prediabetes     PVD (peripheral vascular disease)     Rheumatoid aortitis     Sleep apnea     cpap    Spinal stenosis     Vertigo     Vitamin D deficiency      Past Surgical History:   Procedure Laterality Date    AORTIC VALVE REPAIR/REPLACEMENT N/A 3/18/2021    Procedure: AORTIC VALVE REPLACEMENT;  Surgeon: Olaf Mcgill MD;  Location: [facility] CVOR;  Service: Cardiothoracic;  Laterality: N/A;    BRONCHOSCOPY N/A 3/25/2021    Procedure: BRONCHOSCOPY AT BEDSIDE WITH BRONCHIOALVEOLAR LAVAGE;  Surgeon: Glenn Reyes MD;  Location: [facility] ENDOSCOPY;  Service: Pulmonary;  Laterality: N/A;  PNA    BRONCHOSCOPY N/A 10/16/2023    Procedure: BRONCHOSCOPY with bronchial washing;  Surgeon: Glenn Reyes MD;  Location: [facility] ENDOSCOPY;  Service: Pulmonary;  Laterality: N/A;  post op: pneumonia    CARDIAC CATHETERIZATION  2009    CARDIAC CATHETERIZATION N/A 8/14/2019    Procedure: Left Heart Cath;  Surgeon: Jose Levi MD;  Location: [facility] CATH INVASIVE LOCATION;  Service: Cardiovascular    CARDIAC CATHETERIZATION N/A 8/14/2019    Procedure: Right Heart Cath;  Surgeon: Jose Levi MD;  Location: [facility] CATH INVASIVE LOCATION;  Service: Cardiovascular    CARDIAC CATHETERIZATION N/A 8/14/2019    Procedure: Aortic root aortogram;  Surgeon: Jose Levi MD;  Location: [facility] CATH INVASIVE LOCATION;  Service: Cardiovascular    CARDIAC CATHETERIZATION N/A 1/20/2021    Procedure: Left Heart Cath;  Surgeon: Jose Levi MD;  Location: [facility] CATH INVASIVE LOCATION;  Service: Cardiovascular;  Laterality: N/A;    COLONOSCOPY      COLONOSCOPY N/A 2/14/2025    Procedure: COLONOSCOPY WITH POLYPECTOMY X 4;  Surgeon: Segun Cleveland MD;  Location: [facility] ENDOSCOPY;  Service: Gastroenterology;  Laterality: N/A;  POST- POLYPS X 4, DIVERTICULOSIS, INTERNAL  "HEMORRHOIDS    CYSTOCELE REPAIR  1984    ELBOW ARTHROPLASTY  2011    ENDOSCOPY      FOOT SURGERY      GALLBLADDER SURGERY  2002    TONSILLECTOMY      VAGINAL HYSTERECTOMY  1972      General Information       Row Name 05/26/25 0855          Physical Therapy Time and Intention    Document Type evaluation  -AO     Mode of Treatment physical therapy  -AO       Row Name 05/26/25 0855          General Information    Patient Profile Reviewed yes  -AO     Prior Level of Function --  Previously MOD I w/ household mobility w/ Rollator walker, scooter for community distances, some assist for ADLs (has paid caregiver 5 days/week for 10 hours per day while spouse is at work),  drives, no other falls  -AO     Existing Precautions/Restrictions fall;oxygen therapy device and L/min  WBAT LUE  -AO     Barriers to Rehab medically complex  -AO       Row Name 05/26/25 0855          Living Environment    Current Living Arrangements home  -AO     People in Home spouse  Spouse works full-time, pt has paid caregiver 5 days/week for 10 hours per day  -AO       Row Name 05/26/25 0855          Home Main Entrance    Number of Stairs, Main Entrance none  -AO       Row Name 05/26/25 0855          Stairs Within Home, Primary    Number of Stairs, Within Home, Primary none  -AO       Row Name 05/26/25 0855          Cognition    Orientation Status (Cognition) oriented x 4  oriented x 4 but very groggy/lethargic, frequently dozing off, and, reporting, \"I've had way too much medication\"  -AO       Row Name 05/26/25 0855          Safety Issues/Impairments Affecting Functional Mobility    Impairments Affecting Function (Mobility) balance;endurance/activity tolerance;pain;postural/trunk control;range of motion (ROM);shortness of breath;strength  -AO               User Key  (r) = Recorded By, (t) = Taken By, (c) = Cosigned By      Initials Name Provider Type    AO Susan Gilmore, PT Physical Therapist                   Mobility       Row Name " 05/26/25 0855          Bed Mobility    Comment, (Bed Mobility) Pt declined  -AO       Row Name 05/26/25 0855          Transfers    Comment, (Transfers) Pt declined  -AO       Row Name 05/26/25 0855          Bed-Chair Transfer    Bed-Chair [place] (Transfers) not tested  -AO       Row Name 05/26/25 0855          Sit-Stand Transfer    Sit-Stand [place] (Transfers) not tested  -AO       Row Name 05/26/25 0855          Gait/Stairs (Locomotion)    [place] Level (Gait) not tested  -AO     Patient was able to Ambulate no, other medical factors prevent ambulation  -AO     Reason Patient was unable to Ambulate Excessive Weakness  -AO       Row Name 05/26/25 0855          Mobility    Extremity Weight-bearing Status left upper extremity  -AO     Left Upper Extremity (Weight-bearing Status) weight-bearing as tolerated (WBAT)  -AO               User Key  (r) = Recorded By, (t) = Taken By, (c) = Cosigned By      Initials Name Provider Type    Susan Foley, PT Physical Therapist                   Obj/Interventions       Row Name 05/26/25 0855          Range of Motion Comprehensive    General Range of Motion lower extremity range of motion deficits identified;upper extremity range of motion deficits identified  -AO     Comment, General Range of Motion RUE AROM impaired >75% by weakness, LUE in sling (did not test this date 2*/2 pt refusal), BLE AROM impaired ~75% by weakness  -AO       Row Name 05/26/25 0855          Strength Comprehensive (MMT)    General Manual Muscle Testing (MMT) Assessment upper extremity strength deficits identified;lower extremity strength deficits identified  -AO     Comment, General Manual Muscle Testing (MMT) Assessment B ankle plantarflexion/dorsiflexion: 3/5, B hip flexion: 2-/5, B knee flexion/extension: 2/5  -AO       Row Name 05/26/25 0855          Motor Skills    Therapeutic Exercise other (see comments)  Pt complete 1x10 supine ankle pumps, hip abduction/adduction, heel  slides, and SLR, requiring active assist vs. PROM secondary to excessive fatigue/weakness/lethargy  -AO       Row Name 05/26/25 0855          Balance    Balance Assessment sitting static balance;sitting dynamic balance  -AO     Static Sitting Balance unable to assess  -AO     Dynamic Sitting Balance unable to assess  -AO       Row Name 05/26/25 0855          Sensory Assessment (Somatosensory)    Sensory Assessment (Somatosensory) sensation intact  -AO               User Key  (r) = Recorded By, (t) = Taken By, (c) = Cosigned By      Initials Name Provider Type    AO Susan Gilmore, PT Physical Therapist                   Goals/Plan       Row Name 05/26/25 0855          Bed Mobility Goal 1 (PT)    Activity/Assistive Device (Bed Mobility Goal 1, PT) bed mobility activities, all  -AO     [place] Level/Cues Needed (Bed Mobility Goal 1, PT) minimum assist (75% or more patient effort)  -AO     Time Frame (Bed Mobility Goal 1, PT) long term goal (LTG);2 weeks  -AO     Progress/Outcomes (Bed Mobility Goal 1, PT) goal not met  -AO       Row Name 05/26/25 0855          Transfer Goal 1 (PT)    Activity/Assistive Device (Transfer Goal 1, PT) transfers, all;walker, rolling  -AO     [place] Level/Cues Needed (Transfer Goal 1, PT) minimum assist (75% or more patient effort)  -AO     Time Frame (Transfer Goal 1, PT) long term goal (LTG)  -AO     Progress/Outcome (Transfer Goal 1, PT) goal not met  -AO       Row Name 05/26/25 0855          Gait Training Goal 1 (PT)    Activity/Assistive Device (Gait Training Goal 1, PT) gait (walking locomotion);walker, rolling  -AO     [place] Level (Gait Training Goal 1, PT) minimum assist (75% or more patient effort)  -AO     Distance (Gait Training Goal 1, PT) 15 ft  -AO     Time Frame (Gait Training Goal 1, PT) long term goal (LTG);2 weeks  -AO     Progress/Outcome (Gait Training Goal 1, PT) goal not met  -AO       Row Name 05/26/25 0855          Therapy Assessment/Plan (PT)     "Planned Therapy Interventions (PT) balance training;bed mobility training;gait training;home exercise program;neuromuscular re-education;patient/family education;ROM (range of motion);strengthening;transfer training  -AO               User Key  (r) = Recorded By, (t) = Taken By, (c) = Cosigned By      Initials Name Provider Type    AO Susan Gilmore, PT Physical Therapist                   Clinical Impression       Row Name 05/26/25 0855          Pain    Pretreatment Pain Rating 2/10  -AO     Posttreatment Pain Rating 3/10  -AO     Pain Location extremity  -AO     Pain Side/Orientation left;upper  -AO     Pain Management Interventions nursing notified;exercise or physical activity utilized  -AO     Response to Pain Interventions activity participation with increased pain  -AO       Row Name 05/26/25 0855          Plan of Care Review    Plan of Care Reviewed With patient  -AO     Progress no change  -AO     Outcome Evaluation Pt is a 75 y/o F POD #1 L humerus intramedullary nailing/ellie insertion (Dr. Peralta) on 5/25/2025 secondary to L proximal third humeral shaft fx with WBAT LUE orders. Pt with hx of KARLY, chronic hypoxemic respiratory failure on 2L 02 at night, obesity, CKD stage III, transaminitis, vertigo, and constipation. Pt oriented x 4 during eval, however, very lethargic/groggy, frequently knodding off to sleep requiring verbal/tactile stimulation to awake and reports, \"I've just had too much medication\". At baseline, pt lives in a [facility] w/ 0 RUDY w/  who works full-time (has paid caregiver 5 days/week, 10 hours per day while spouse is at work). Pt reports she was previously MOD I w/ household mobility w/ Rollator walker, scooter for community distances (borrows from stores, does not own), some assist for ADLs from paid caregiver,  drives, no other falls. During eval, pt agreeable to supine ther-ex and MMT, declines repositioning in bed or sitting EOB this date. Pt presents with excessive " weakness/lethargy this date, unable to hold cup to drink from right hand (is right-handed at baseline), able to complete ankle pumps independently but required active assist vs. PROM for all additional supine BLE exercises 2*/2 weakness/fatigue/lethargy and with difficulty staying awake throughout session. Pt also w/ SOA w/ SP02 87-92% on 7L 02. Pt is far from baseline and high risk for falls, recommending SNF at d/c and plan to see 5x/week at [facility] for progression of mobility. PPE: gloves  -AO       Row Name 05/26/25 0855          Therapy Assessment/Plan (PT)    Rehab Potential (PT) fair  -AO     Criteria for Skilled Interventions Met (PT) yes;meets criteria;skilled treatment is necessary  -AO     Therapy Frequency (PT) 5 times/wk  -AO     Predicted Duration of Therapy Intervention (PT) until d/c  -AO       Row Name 05/26/25 0855          Vital Signs    Recovery Time Sp02 87%-92% on 7L 02 throughout session, all other vitals WNL  -AO       Row Name 05/26/25 0855          Positioning and Restraints    Pre-Treatment Position in bed  -AO     Post Treatment Position bed  -AO     In Bed notified nsg;supine;call light within reach;encouraged to call for assist;exit alarm on  -AO               User Key  (r) = Recorded By, (t) = Taken By, (c) = Cosigned By      Initials Name Provider Type    Susan Foley, PT Physical Therapist                   Outcome Measures       Row Name 05/26/25 0855 05/26/25 0832       How much help from another person do you currently need...    Turning from your back to your side while in flat bed without using bedrails? 2  -AO 2  -SL    Moving from lying on back to sitting on the side of a flat bed without bedrails? 1  -AO 2  -SL    Moving to and from a bed to a chair (including a wheelchair)? 1  -AO 2  -SL    Standing up from a chair using your arms (e.g., wheelchair, bedside chair)? 1  -AO 2  -SL    Climbing 3-5 steps with a railing? 1  -AO 1  -SL    To walk in hospital room? 1  -AO 1  -SL     AM-PAC 6 Clicks Score (PT) 7  -AO 10  -SL    Highest Level of Mobility Goal Bed Activities/Dependent Transfer-2  -AO Move to Chair/Commode-4  -SL      Row Name 05/26/25 0855          Functional Assessment    Outcome Measure Options AM-PAC 6 Clicks Basic Mobility (PT)  -AO               User Key  (r) = Recorded By, (t) = Taken By, (c) = Cosigned By      Initials Name Provider Type    AO Susan Gilmore, PT Physical Therapist    Jolene Lyn, RN Registered Nurse                                 Physical Therapy Education       Title: PT OT SLP Therapies (In Progress)       Topic: Physical Therapy (In Progress)       Point: Mobility training (In Progress)       Learning Progress Summary            Patient Acceptance, E,TB, NR by AO at 5/26/2025 0944                      Point: Home exercise program (In Progress)       Learning Progress Summary            Patient Acceptance, E,TB, NR by AO at 5/26/2025 0944                      Point: Body mechanics (Not Started)       Learner Progress:  Not documented in this visit.              Point: Precautions (In Progress)       Learning Progress Summary            Patient Acceptance, E,TB, NR by AO at 5/26/2025 0944                                      User Key       Initials Effective Dates Name Provider Type Discipline    AO 06/16/21 -  Susan Gilmore, PT Physical Therapist PT                  PT Recommendation and Plan  Planned Therapy Interventions (PT): balance training, bed mobility training, gait training, home exercise program, neuromuscular re-education, patient/family education, ROM (range of motion), strengthening, transfer training  Progress: no change  Outcome Evaluation: Pt is a 75 y/o F POD #1 L humerus intramedullary nailing/ellie insertion (Dr. Peralta) on 5/25/2025 secondary to L proximal third humeral shaft fx with WBAT LUE orders. Pt with hx of KARLY, chronic hypoxemic respiratory failure on 2L 02 at night, obesity, CKD stage III, transaminitis,  "vertigo, and constipation. Pt oriented x 4 during eval, however, very lethargic/groggy, frequently knodding off to sleep requiring verbal/tactile stimulation to awake and reports, \"I've just had too much medication\". At baseline, pt lives in a H w/ 0 RUDY w/  who works full-time (has paid caregiver 5 days/week, 10 hours per day while spouse is at work). Pt reports she was previously MOD I w/ household mobility w/ Rollator walker, scooter for community distances (borrows from stores, does not own), some assist for ADLs from paid caregiver,  drives, no other falls. During eval, pt agreeable to supine ther-ex and MMT, declines repositioning in bed or sitting EOB this date. Pt presents with excessive weakness/lethargy this date, unable to hold cup to drink from right hand (is right-handed at baseline), able to complete ankle pumps independently but required active assist vs. PROM for all additional supine BLE exercises 2*/2 weakness/fatigue/lethargy and with difficulty staying awake throughout session. Pt also w/ SOA w/ SP02 87-92% on 7L 02. Pt is far from baseline and high risk for falls, recommending SNF at d/c and plan to see 5x/week at [facility] for progression of mobility. PPE: gloves     Time Calculation:   PT Evaluation Complexity  History, PT Evaluation Complexity: 3 or more personal factors and/or comorbidities  Examination of Body Systems (PT Eval Complexity): total of 3 or more elements  Clinical Presentation (PT Evaluation Complexity): evolving  Clinical Decision Making (PT Evaluation Complexity): moderate complexity  Overall Complexity (PT Evaluation Complexity): moderate complexity     PT Charges       Row Name 05/26/25 0946             Time Calculation    Start Time 0855  -AO      Stop Time 0923  -AO      Time Calculation (min) 28 min  -AO      PT Received On 05/26/25  -AO      PT - Next Appointment 05/27/25  -AO      PT Goal Re-Cert Due Date 06/09/25  -AO         Time Calculation- PT    Total " Timed Code Minutes- PT 8 minute(s)  -AO                User Key  (r) = Recorded By, (t) = Taken By, (c) = Cosigned By      Initials Name Provider Type    AO Susan Gilmore, PT Physical Therapist                  Therapy Charges for Today       Code Description Service Date Service Provider Modifiers Qty    59372822653 HC PT EVAL MOD COMPLEXITY 4 5/26/2025 Susan Gilmore, PT GP 1    07932044815 HC PT THER PROC EA 15 MIN 5/26/2025 Susan Gilmore, PT GP 1            PT G-Codes  Outcome Measure Options: AM-PAC 6 Clicks Basic Mobility (PT)  AM-PAC 6 Clicks Score (PT): 7  PT Discharge Summary  Anticipated Discharge Disposition (PT): skilled nursing facility    Susan Gilmore, PT  5/26/2025

## 2025-05-26 NOTE — CONSULTS
"[facility]: [facility]         CONSULT NOTE    Patient Identification:  Claudia Rust  74 y.o.  female  1950  [id number]            Requesting physician: Attending physician    Reason for Consultation: Hypoxia      History of Present Illness:  74-year-old female admitted on 5/24/2025 status post fall mildly displaced fracture of proximal humeral diaphysis status post intramedullary nail ellie on 5/25/2025 oxygen requirement pulmonary consult was requested      Assessment:    Acute hypoxic respiratory insufficiency  Proximal humeral fracture status post intramedullary nail/ellie 5/25/2025    COPD  Bronchoscopy October 2023  History of respiratory failure requiring intubation in March 2021  Aortic valve stenosis  Status post TAVR  2D echo May 2025 EF 61%, RVSP less than 35 mmHg  Obstructive sleep apnea    Hx of Catheter associated DVT right upper extremity subclavian, axillary, and brachial  Upper arm left DVT  DM  Hx of Ecoli UTI  Oral candidiasis  HTN  JERROD  DM2  Chronic anemia  HLD       Recommendations:  Chest x-ray    Oxygen titration currently requiring 7 L  Bronchodilators budesonide and DuoNeb  Mucomyst nebulized  She will benefit from DVT prophylaxis she has a history of heparin allergy we will ask pharmacy to order either Eliquis or Xarelto if okay with surgery        Review of Sytems:  Review of Systems   Respiratory:  Positive for cough and shortness of breath. Negative for wheezing and stridor.    Cardiovascular:  Negative for chest pain, palpitations and leg swelling.       Past Medical History:  Past Medical History:   Diagnosis Date    Acute congestive heart failure     Allergies     Anxiety     Arthritis     Asthma     Bilateral leg edema     Bipolar 1 disorder     Bulging lumbar disc     X3    Cancer     states had \"cancerous tumor during pregnancy and had to have hysterectomy\"    Cataract     bilateral    Cellulitis 03/2021    bilateral legs    Compression fracture of vertebral column     " LUMBAR    COPD (chronic obstructive pulmonary disease)     Delayed emergence from anesthesia     Depression     Diabetes mellitus     Dyslipidemia     Dyspnea on exertion     Fibromyalgia     GERD (gastroesophageal reflux disease)     Hiatal hernia     Hyperlipidemia     Hypertension     Hypothyroid     HYPOTHYROID    IBS (irritable bowel syndrome)     Migraines     Morbid obesity     Neuropathy     Panic attacks     Peripheral edema     PONV (postoperative nausea and vomiting)     Poor mobility     rolling walker    Prediabetes     PVD (peripheral vascular disease)     Rheumatoid aortitis     Sleep apnea     cpap    Spinal stenosis     Vertigo     Vitamin D deficiency        Past Surgical History:  Past Surgical History:   Procedure Laterality Date    AORTIC VALVE REPAIR/REPLACEMENT N/A 3/18/2021    Procedure: AORTIC VALVE REPLACEMENT;  Surgeon: Olaf Mcgill MD;  Location: [facility] CVOR;  Service: Cardiothoracic;  Laterality: N/A;    BRONCHOSCOPY N/A 3/25/2021    Procedure: BRONCHOSCOPY AT BEDSIDE WITH BRONCHIOALVEOLAR LAVAGE;  Surgeon: Glenn Reyes MD;  Location: [facility] ENDOSCOPY;  Service: Pulmonary;  Laterality: N/A;  PNA    BRONCHOSCOPY N/A 10/16/2023    Procedure: BRONCHOSCOPY with bronchial washing;  Surgeon: Glenn Reyes MD;  Location: [facility] ENDOSCOPY;  Service: Pulmonary;  Laterality: N/A;  post op: pneumonia    CARDIAC CATHETERIZATION  2009    CARDIAC CATHETERIZATION N/A 8/14/2019    Procedure: Left Heart Cath;  Surgeon: Jose Levi MD;  Location: [facility] CATH INVASIVE LOCATION;  Service: Cardiovascular    CARDIAC CATHETERIZATION N/A 8/14/2019    Procedure: Right Heart Cath;  Surgeon: Jose Levi MD;  Location: [facility] CATH INVASIVE LOCATION;  Service: Cardiovascular    CARDIAC CATHETERIZATION N/A 8/14/2019    Procedure: Aortic root aortogram;  Surgeon: Jose Levi MD;  Location: [facility] CATH INVASIVE LOCATION;  Service: Cardiovascular    CARDIAC CATHETERIZATION N/A  1/20/2021    Procedure: Left Heart Cath;  Surgeon: Jose Levi MD;  Location: [facility] CATH INVASIVE LOCATION;  Service: Cardiovascular;  Laterality: N/A;    COLONOSCOPY      COLONOSCOPY N/A 2/14/2025    Procedure: COLONOSCOPY WITH POLYPECTOMY X 4;  Surgeon: Segun Cleveland MD;  Location: [facility] ENDOSCOPY;  Service: Gastroenterology;  Laterality: N/A;  POST- POLYPS X 4, DIVERTICULOSIS, INTERNAL HEMORRHOIDS    CYSTOCELE REPAIR  1984    ELBOW ARTHROPLASTY  2011    ENDOSCOPY      FOOT SURGERY      GALLBLADDER SURGERY  2002    TONSILLECTOMY      VAGINAL HYSTERECTOMY  1972        Home Meds:  Medications Prior to Admission   Medication Sig Dispense Refill Last Dose/Taking    Acetaminophen 325 MG capsule Take 650 mg by mouth Every 4 (Four) Hours As Needed.   Taking As Needed    albuterol (ACCUNEB) 0.63 MG/3ML nebulizer solution Take 3 mL by nebulization Every 6 (Six) Hours As Needed for Wheezing (Dx: J 44.9 (COPD), R 06.09 (dyspnea on exertion)). 30 each 0 Taking As Needed    B Complex Vitamins (VITAMIN B COMPLEX) capsule capsule Take 1 capsule by mouth Daily. LAST DOSE 3/4   5/23/2025 Morning    bumetanide (BUMEX) 1 MG tablet Take 1 tablet by mouth Daily. Noon.   5/23/2025 Noon    bumetanide (BUMEX) 2 MG tablet Take 1 tablet by mouth Daily. 30 tablet 0 5/23/2025 Morning    Calcium-Magnesium-Vitamin D (CALCIUM 1200+D3 PO) Take 1 capsule by mouth Daily.   5/23/2025 Morning    cholecalciferol (Cholecalciferol) 25 MCG (1000 UT) tablet Take 1 tablet by mouth Every Morning. LAST DOSE 3/4   5/23/2025 Morning    docusate sodium (COLACE) 100 MG capsule Take 1 capsule by mouth Every Night.   5/23/2025 Evening    famotidine (PEPCID) 40 MG tablet Take 1 tablet by mouth At Night As Needed for Indigestion.   Taking As Needed    Farxiga 5 MG tablet tablet Take 1 tablet by mouth Every Morning.   5/23/2025 Morning    FLUoxetine (PROzac) 20 MG capsule Take 3 capsules by mouth Daily.   5/23/2025 Morning    gabapentin  (NEURONTIN) 400 MG capsule Take 1 capsule by mouth 3 (Three) Times a Day.   5/23/2025 Evening    levothyroxine (SYNTHROID, LEVOTHROID) 137 MCG tablet Take 1 tablet by mouth Daily.   5/23/2025 Morning    loratadine (CLARITIN) 10 MG tablet Take 1 tablet by mouth Daily.   5/23/2025 Morning    losartan (Cozaar) 25 MG tablet Take 1 tablet by mouth Daily. 90 tablet 3 5/23/2025 Morning    meclizine (ANTIVERT) 25 MG tablet Take 1 tablet by mouth 3 (Three) Times a Day As Needed.   Taking As Needed    Melatonin 10 MG capsule Take 2 capsules by mouth Every Night.   5/23/2025 Evening    metOLazone (ZAROXOLYN) 2.5 MG tablet Take 1 tablet by mouth Every Other Day.   5/22/2025    metoprolol succinate XL (TOPROL-XL) 25 MG 24 hr tablet Take 0.5 tablets by mouth Daily. 90 tablet 3 5/23/2025 Morning    Multiple Vitamins-Minerals (MULTIVITAMIN WITH MINERALS) tablet tablet Take 1 tablet by mouth Daily.   5/23/2025 Morning    ondansetron (ZOFRAN) 4 MG tablet Take 1 tablet by mouth Every 8 (Eight) Hours As Needed for Nausea or Vomiting.   Taking As Needed    pantoprazole (PROTONIX) 40 MG EC tablet Take 1 tablet by mouth Every Evening.   5/23/2025 Evening    rizatriptan MLT (MAXALT-MLT) 10 MG disintegrating tablet Take 1 tablet by mouth 1 (One) Time As Needed for Migraine. May repeat in 2 hours if needed   Taking As Needed    simvastatin (ZOCOR) 20 MG tablet Take 1 tablet by mouth Every Night.   5/23/2025 Evening    tiZANidine (ZANAFLEX) 2 MG tablet Take 1 tablet by mouth Every 8 (Eight) Hours As Needed.   Taking As Needed    vitamin B-12 (CYANOCOBALAMIN) 1000 MCG tablet Take 1 tablet by mouth Daily.   5/23/2025 Morning    fluticasone (FLONASE) 50 MCG/ACT nasal spray Administer 2 sprays into the nostril(s) as directed by provider Daily. (Patient taking differently: Administer 2 sprays into the nostril(s) as directed by provider Daily As Needed for Rhinitis or Allergies.)          Allergies:  Allergies   Allergen Reactions    Adhesive Tape  "Unknown (See Comments)     hives    Benadryl [Diphenhydramine] Other (See Comments)     Pt and  say she cannot take. \"will cause stroke\"    Butorphanol Other (See Comments)     Chest pain difficulty breathing throat swelling, STADAL    Garlic Other (See Comments)     Chest pain difficulty breathing throat swells    Heparin Other (See Comments)     HIT +    Tetanus-Diphtheria Toxoids Td Other (See Comments)     Dizziness,  vomiting    Aloe Itching    Bee Venom Other (See Comments)     Swelling eyes and lips hand swelling    Latex Itching    Macadamia Nut Oil Other (See Comments)     Chest pain difficulty breathing throat swelling    Tuberculin, Ppd Unknown (See Comments)     reactor    Wasp Venom Other (See Comments)     Swelling eyes lips and hand       Social History:   Social History     Socioeconomic History    Marital status:    Tobacco Use    Smoking status: Former     Current packs/day: 0.00     Types: Cigarettes     Quit date: 2022     Years since quitting: 3.4     Passive exposure: Never    Smokeless tobacco: Never    Tobacco comments:     decrease now and do not smoke dos   Vaping Use    Vaping status: Former   Substance and Sexual Activity    Alcohol use: Not Currently     Comment: very rare    Drug use: Never    Sexual activity: Not Currently       Family History:  Family History   Problem Relation Age of Onset    Ovarian cancer Mother     Lung cancer Mother     Esophageal cancer Mother     Hypertension Father     Diabetes Maternal Grandmother        Physical Exam:  /65   Pulse 88   Temp 98.3 °F (36.8 °C) (Axillary)   Resp 20   Ht 162.6 cm (64\")   Wt (!) 137 kg (302 lb)   SpO2 96%   BMI 51.84 kg/m²  Body mass index is 51.84 kg/m². 96% (!) 137 kg (302 lb)  Physical Exam  Cardiovascular:      Heart sounds: Murmur heard.      No gallop.   Pulmonary:      Effort: No respiratory distress.      Breath sounds: No stridor. Rhonchi and rales present. No wheezing.   Chest:      Chest " wall: No tenderness.         LABS:  Lab Results   Component Value Date    CALCIUM 8.7 05/26/2025    PHOS 4.5 05/24/2025     Results from last 7 days   Lab Units 05/26/25  0357 05/25/25  0027 05/24/25 0319 05/23/25  2258   MAGNESIUM mg/dL  --   --  2.4 2.3   SODIUM mmol/L 141 142 138 137   POTASSIUM mmol/L 3.6 4.1 3.6 2.7*   CHLORIDE mmol/L 103 100 97* 96*   CO2 mmol/L 30.6* 30.0* 30.1* 29.2*   BUN mg/dL 37* 44* 52* 51*   CREATININE mg/dL 1.16* 1.34* 1.63* 1.63*   GLUCOSE mg/dL 159* 147* 156* 133*   CALCIUM mg/dL 8.7 8.6 8.7 9.0   WBC 10*3/mm3 15.18* 12.67* 16.63* 10.97*   HEMOGLOBIN g/dL 10.8* 11.1* 11.9* 12.0   PLATELETS 10*3/mm3 195 229 281 256   ALT (SGPT) U/L 66* 144* 132*  --    AST (SGOT) U/L 47* 103* 214*  --      Lab Results   Component Value Date    CKTOTAL 131 10/20/2023    TROPONINT 9 10/14/2023                         Results from last 7 days   Lab Units 05/24/25 0319 05/23/25  2258   INR  1.18* 1.16*         Lab Results   Component Value Date    TSH 0.580 10/15/2023     Estimated Creatinine Clearance: 58.8 mL/min (A) (by C-G formula based on SCr of 1.16 mg/dL (H)).         Imaging:  Imaging Results (Last 24 Hours)       Procedure Component Value Units Date/Time    FL < 1 Hour [[id number]] Resulted: 05/25/25 1739     Updated: 05/25/25 1739    Narrative:      This procedure was auto-finalized with no dictation required.    XR Humerus Left [[id number]] Resulted: 05/25/25 1738     Updated: 05/25/25 1738    Narrative:      This procedure was auto-finalized with no dictation required.              Current Meds:   SCHEDULE  atorvastatin, 10 mg, Oral, Daily  ceFAZolin, 2,000 mg, Intravenous, Q8H  docusate sodium, 100 mg, Oral, Nightly  FLUoxetine, 60 mg, Oral, Daily  gabapentin, 300 mg, Oral, TID  ipratropium-albuterol, 3 mL, Nebulization, 4x Daily - RT  levothyroxine, 137 mcg, Oral, Q AM  metoprolol succinate XL, 12.5 mg, Oral, Daily  pantoprazole, 40 mg, Oral, Nightly  polyethylene glycol, 17 g, Oral,  Daily  sodium chloride, 10 mL, Intravenous, Q12H      Infusions     PRNs    senna-docusate sodium **AND** polyethylene glycol **AND** bisacodyl **AND** bisacodyl    Calcium Replacement - Follow Nurse / BPA Driven Protocol    HYDROmorphone    HYDROmorphone    Magnesium Standard Dose Replacement - Follow Nurse / BPA Driven Protocol    meclizine    morphine    nitroglycerin    ondansetron    ondansetron ODT    oxyCODONE    oxyCODONE    Phosphorus Replacement - Follow Nurse / BPA Driven Protocol    Potassium Replacement - Follow Nurse / BPA Driven Protocol    prochlorperazine    [COMPLETED] Insert Peripheral IV **AND** sodium chloride    sodium chloride    sodium chloride        Glenn Reyes MD  5/26/2025  09:43 EDT      Much of this encounter note is an electronic transcription/translation of spoken language to printed text using Dragon Software.

## 2025-05-26 NOTE — PROGRESS NOTES
[facility] MEDICINE SERVICE  DAILY PROGRESS NOTE    NAME: Claudia Rust  : 1950  MRN: [id number]      LOS: 0 days     PROVIDER OF SERVICE: Osman Pepper MD    Chief Complaint: Humeral fracture    Subjective:     Interval History:  History taken from: patient    Doing well this morning. Denies new acute complaints.        Review of Systems:   Review of Systems   All other systems reviewed and are negative.      Objective:     Vital Signs  Temp:  [97.4 °F (36.3 °C)-98.4 °F (36.9 °C)] 98.3 °F (36.8 °C)  Heart Rate:  [70-90] 80  Resp:  [10-25] 21  BP: (107-142)/(46-80) 124/65  Flow (L/min) (Oxygen Therapy):  [4-15] 7   Body mass index is 51.84 kg/m².    Physical Exam  Physical Exam  Constitutional:       Appearance: She is obese.   HENT:      Head: Normocephalic.   Cardiovascular:      Rate and Rhythm: Normal rate and regular rhythm.   Pulmonary:      Effort: Pulmonary effort is normal.      Breath sounds: Normal breath sounds.      Comments: Coarse breath sounds  Abdominal:      General: Bowel sounds are normal.      Palpations: Abdomen is soft.      Tenderness: There is no abdominal tenderness.   Musculoskeletal:         General: No swelling.   Neurological:      Mental Status: She is alert. Mental status is at baseline.      Comments: Somewhat confused and easily redirected            Diagnostic Data    Results from last 7 days   Lab Units 25  0357   WBC 10*3/mm3 15.18*   HEMOGLOBIN g/dL 10.8*   HEMATOCRIT % 35.1   PLATELETS 10*3/mm3 195   GLUCOSE mg/dL 159*   CREATININE mg/dL 1.16*   BUN mg/dL 37*   SODIUM mmol/L 141   POTASSIUM mmol/L 3.6   AST (SGOT) U/L 47*   ALT (SGPT) U/L 66*   ALK PHOS U/L 112   BILIRUBIN mg/dL 0.4   ANION GAP mmol/L 7.4       Adult Transthoracic Echo Limited W/ Cont if Necessary Per Protocol  Addendum Date: 2025    Left ventricular ejection fraction appears to be 61 - 65%.   Left ventricular diastolic function was not assessed.   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    Estimated right ventricular systolic pressure from tricuspid regurgitation is normal (<35 mmHg).   Imaging is limited by left arm being in sling.     XR ELBOW 2 VIEW LEFT  Result Date: 5/24/2025  Impression: 1.No definite acute osseous abnormality of the elbow. 2.Fracture proximal shaft of the humerus. Electronically Signed: Roverto Elizabeth MD  5/24/2025 1:02 PM EDT  Workstation ID: [id number]            Assessment:   Left humeral fracture  Obstructive sleep apnea  Chronic hypoxemic respiratory failure  Massive obesity  CKD stage III  Transaminitis  Constipation      Plan: Symptomatic pain control.      Appreciate cardiology input.      Labs reviewed, improvement in LFTS and renal function.  Continue CPAP during night given obesity and pain medication.      Active and Resolved Problems  Active Hospital Problems    Diagnosis  POA    **Humeral fracture [S42.309A]  Yes      Resolved Hospital Problems   No resolved problems to display.       VTE Prophylaxis:  No VTE prophylaxis order currently exists.             Disposition Planning:     Barriers to Discharge: Pain control and discharge planning  Anticipated Date of Discharge: 5/27  Place of Discharge: home      Time: 30 minutes     Code Status and Medical Interventions: CPR (Attempt to Resuscitate); Full Support   Ordered at: 05/24/25 0116     Code Status (Patient has no pulse and is not breathing):    CPR (Attempt to Resuscitate)     Medical Interventions (Patient has pulse or is breathing):    Full Support       Signature: Electronically signed by Osman Pepper MD, 05/26/25, 11:08 EDT.  [facility], [place], operated by [facility] Hospitalist Team

## 2025-05-26 NOTE — PLAN OF CARE
"Goal Outcome Evaluation:  Plan of Care Reviewed With: patient        Progress: no change  Outcome Evaluation: Pt is a 73 y/o F POD #1 L humerus intramedullary nailing/ellie insertion (Dr. Peralta) on 5/25/2025 secondary to L proximal third humeral shaft fx with WBAT LUE orders. Pt with hx of KARLY, chronic hypoxemic respiratory failure on 2L 02 at night, obesity, CKD stage III, transaminitis, vertigo, and constipation. Pt oriented x 4 during eval, however, very lethargic/groggy, frequently knodding off to sleep requiring verbal/tactile stimulation to awake and reports, \"I've just had too much medication\". At baseline, pt lives in a [facility] w/ 0 RUDY w/  who works full-time (has paid caregiver 5 days/week, 10 hours per day while spouse is at work). Pt reports she was previously MOD I w/ household mobility w/ Rollator walker, scooter for community distances (borrows from stores, does not own), some assist for ADLs from paid caregiver,  drives, no other falls. During eval, pt agreeable to supine ther-ex and MMT, declines repositioning in bed or sitting EOB this date. Pt presents with excessive weakness/lethargy this date, unable to hold cup to drink from right hand (is right-handed at baseline), able to complete ankle pumps independently but required active assist vs. PROM for all additional supine BLE exercises 2*/2 weakness/fatigue/lethargy and with difficulty staying awake throughout session. Pt also w/ SOA w/ SP02 87-92% on 7L 02. Pt is far from baseline and high risk for falls, recommending SNF at d/c and plan to see 5x/week at [facility] for progression of mobility. PPE: gloves    Anticipated Discharge Disposition (PT): skilled nursing facility                        "

## 2025-05-26 NOTE — DISCHARGE INSTRUCTIONS
[facility]   Discharge Instructions  Rufus Peralta MD    [phone]    INCISION CARE  Wash your hands prior to dressing changes  The postoperative dressings should be taken off starting on postoperative day #3. New dry dressings can then be placed on the incisions. Dressings should be changed every 3 days. The sling may be left off or put back on for comfort at your discretion. But I do strongly recommend weaning from the sling as soon as possible and beginning to work on elbow, hand, wrist, shoulder ROM as tolerated   No creams or ointments to the incisions until 6 weeks post op.  May remove dressing once the incision is completely free of drainage. But need to wait at least a week after surgery.  Do not touch or pick at the incision  Check incision every day and notify surgeon immediately if any of the following signs or symptoms are seen:  Increase in redness  Increase in swelling around the incision and of the entire extremity  Increase in pain  NEW drainage or oozing from the incision  Pulling apart of the edges of the incision  Increase in overall body temperature (greater than 100.4 degrees)  Your surgeon will instruct you regarding suture or staple removal. In general, this happens at the 2-week post op appointment.    ACTIVITIES  Exercises:  Physical therapy will be needed after surgery. Once some bone healing has occurred, it will be reasonable to start therapy. Regaining shoulder motion usually requires formal physical therapy.   Elevate the affected arm most of the day during the first week post operatively.   Fairly extensive arm and hand swelling is expected and completely normal after this surgery. Gravity pulls the swelling from the fracture down into the lower arm and hand. This is nothing to worry about.    Use cold packs for 20-30 minutes approximately 5 times per day.  Your arm was placed into a postoperative sling after surgery. It is okay to come out of the sling to work on gentle  range of motion exercises at the hand,wrist, elbow and shoulder.   Activities of Daily Living:     No tub baths, hot tubs, or swimming pools for 8 weeks.  May shower and let water run over the incision on postoperative day #10 if there is no drainage and the wound is well healing. A new dry dressing can be applied after showering.      Restrictions  Weight: You are allowed to use the arm for weight bearing as it was fixed with an intramedullary ellie.   Driving: Many patients have questions about when it is safe to return to driving. The answer is that this is extremely variable. It depends on the extent of the surgery, as well as how quickly you heal. Until you can safely navigate the steering wheel, and are off of all narcotics, driving is not permitted. Your surgeon cannot “clear” you to return to driving, only you can make the decision when you feel it is safe.     Medications  Anticoagulants: After upper extremity surgery most patients do not require an anticoagulant unless you have another injury that will be keeping you from mobilizing. Lower extremity surgery typically does require use of an anticoagulation medicine.   IF YOU HAD LOWER EXTREMITY SURGERY AND ARE NOT DISCHARGED HOME WITH ANY ANTICOAGULANT MEDICINE YOU SHOULD TAKE ASPIRIN 81 mg TWICE DAILY FOR 42 DAYS POSTOPERATIVELY.  If you are discharged home with an anticoagulant such as Aspirin, Xarelto, Eliquis, Coumadin, or Lovenox, follow these simple instructions:   Notify surgeon immediately if any luke bleeding is noted in the urine, stool, emesis, or from the nose or the incision. Blood in the stool will often appear as black rather than red. Blood in urine may appear as pink. Blood in emesis may appear as brown/black like coffee grounds.  You will need to apply pressure for longer periods of time to any cuts or abrasions to stop bleeding  Avoid alcohol while taking anticoagulants  Most anticoagulants are to be taken for 30 days postoperatively.  After this time, you may stop using them unless instructed otherwise.   If you were already taking an anticoagulant (commonly Aspirin, Coumadin, or Plavix) you will likely be resuming your normal dose postoperatively and will be continuing that medication at the discretion of the prescribing physician.  Stool Softeners: You will be at greater risk of constipation after surgery due to being less mobile and the pain medications.  Take stool softeners as needed. Over the counter Colace 100 mg 1-2 capsules twice daily can be taken.  If stools become too loose or too frequent, please decreases the dosage or stop the stool softener.  If constipation occurs despite use of stool softeners, you are to continue the stool softeners and add a laxative (Milk of Magnesia 1 ounce daily as needed)  Drink plenty of fluids, and eat fruits and vegetables during your recovery time. Getting up and mobilizing will help the bowels to recover their regular function, as will weaning off of all narcotics when the pain becomes tolerable.  Pain Medications utilized after surgery are narcotics. This is some general information about these medications.  CLASSIFICATION: Pain medications are called Opioids and are narcotics  LEGALITIES: It is illegal to share narcotics with others  DRIVING: it is illegal to drive while under the influence of narcotics. Doing so is a DUI.  POTENTIAL SIDE EFFECTS: nausea, vomiting, itching, dizziness, drowsiness, dry mouth, constipation, and difficulty urinating.  POTENTIAL ADVERSE EFFECTS:  Opioid tolerance can develop with use of pain medications and this simply means that it requires more and more of the medication to control pain. However, this is seen more in patients that use opioids for longer periods of time.  Opioid dependence can develop with use of Opioids. People with opioid dependence will experience withdrawal symptoms upon cessation of the medication.  Opioid addiction can develop with use of Opioids.  The incidence of this is very unlikely in patients who take the medications as ordered and stop the medications as instructed.  Opioid overdose can be dangerous, but is unlikely when the medication is taken as ordered and stopped when ordered. It is important not to mix opioids with alcohol as this can lead to over sedation and respiratory difficulty.  DOSAGE:  After the initial surgical pain begins to resolve, you may begin to decrease the pain medication. By the end of a few weeks, you should be off of pain medications.  Refills will not be given by the office during evening hours, on weekends, or after 6 weeks post-op. You are responsible for weaning off of pain medication. You can increase the time between narcotic pills, taking one every 4 then 6 then 8 hours and so on.  To seek refills on pain medications during the initial 6-week post-operative period, you must call the office to request the refill. The office will then notify you when to  the prescription. DO NOT wait until you are out of the medication to request a refill. Prescriptions will not be filled over the weekend and depending on the schedule, it may take a couple days for the prescription to be available. Someone will have to pick the prescription in person at the office.    FOLLOW-UP VISITS  You will need to follow up in the office with your surgeon in 2 weeks, or as instructed elsewhere in your discharge paperwork. Please call this number [phone] to schedule this appointment. If you are going to an [facility] facility, they will arrange for you to follow up in the office.   If you have any concerns or suspected complications prior to your follow up visit, please call the office. Do not wait until your appointment time if you suspect complications. These will need to be addressed in the office promptly.      Rufus Peralta MD    Orthopaedic Surgery  [facility]

## 2025-05-26 NOTE — PLAN OF CARE
Goal Outcome Evaluation:  Patient's pain controlled with alternation of po and iv pain meds, patient with hob elevated and left upper extremity on pillow, dressing intact to left shoulder with sling in place

## 2025-05-27 LAB
ANION GAP SERPL CALCULATED.3IONS-SCNC: 10.3 MMOL/L (ref 5–15)
BUN SERPL-MCNC: 34 MG/DL (ref 8–23)
BUN/CREAT SERPL: 37 (ref 7–25)
CALCIUM SPEC-SCNC: 8.8 MG/DL (ref 8.6–10.5)
CHLORIDE SERPL-SCNC: 108 MMOL/L (ref 98–107)
CO2 SERPL-SCNC: 27.7 MMOL/L (ref 22–29)
CREAT SERPL-MCNC: 0.92 MG/DL (ref 0.57–1)
DEPRECATED RDW RBC AUTO: 51.8 FL (ref 37–54)
EGFRCR SERPLBLD CKD-EPI 2021: 65.5 ML/MIN/1.73
ERYTHROCYTE [DISTWIDTH] IN BLOOD BY AUTOMATED COUNT: 14.2 % (ref 12.3–15.4)
GLUCOSE SERPL-MCNC: 185 MG/DL (ref 65–99)
HCT VFR BLD AUTO: 35 % (ref 34–46.6)
HGB BLD-MCNC: 10.5 G/DL (ref 12–15.9)
MCH RBC QN AUTO: 29.8 PG (ref 26.6–33)
MCHC RBC AUTO-ENTMCNC: 30 G/DL (ref 31.5–35.7)
MCV RBC AUTO: 99.4 FL (ref 79–97)
NT-PROBNP SERPL-MCNC: 4722 PG/ML (ref 0–900)
PLATELET # BLD AUTO: 202 10*3/MM3 (ref 140–450)
PMV BLD AUTO: 11 FL (ref 6–12)
POTASSIUM SERPL-SCNC: 4.2 MMOL/L (ref 3.5–5.2)
RBC # BLD AUTO: 3.52 10*6/MM3 (ref 3.77–5.28)
SODIUM SERPL-SCNC: 146 MMOL/L (ref 136–145)
WBC NRBC COR # BLD AUTO: 13.72 10*3/MM3 (ref 3.4–10.8)

## 2025-05-27 PROCEDURE — 85027 COMPLETE CBC AUTOMATED: CPT | Performed by: STUDENT IN AN ORGANIZED HEALTH CARE EDUCATION/TRAINING PROGRAM

## 2025-05-27 PROCEDURE — 97166 OT EVAL MOD COMPLEX 45 MIN: CPT | Performed by: OCCUPATIONAL THERAPIST

## 2025-05-27 PROCEDURE — 94799 UNLISTED PULMONARY SVC/PX: CPT

## 2025-05-27 PROCEDURE — 80048 BASIC METABOLIC PNL TOTAL CA: CPT | Performed by: STUDENT IN AN ORGANIZED HEALTH CARE EDUCATION/TRAINING PROGRAM

## 2025-05-27 PROCEDURE — 25010000002 BUMETANIDE PER 0.5 MG: Performed by: STUDENT IN AN ORGANIZED HEALTH CARE EDUCATION/TRAINING PROGRAM

## 2025-05-27 PROCEDURE — 99232 SBSQ HOSP IP/OBS MODERATE 35: CPT | Performed by: INTERNAL MEDICINE

## 2025-05-27 PROCEDURE — 94664 DEMO&/EVAL PT USE INHALER: CPT

## 2025-05-27 PROCEDURE — 97530 THERAPEUTIC ACTIVITIES: CPT

## 2025-05-27 PROCEDURE — 83880 ASSAY OF NATRIURETIC PEPTIDE: CPT | Performed by: NURSE PRACTITIONER

## 2025-05-27 RX ORDER — BUMETANIDE 0.25 MG/ML
2 INJECTION, SOLUTION INTRAMUSCULAR; INTRAVENOUS ONCE
Status: COMPLETED | OUTPATIENT
Start: 2025-05-27 | End: 2025-05-27

## 2025-05-27 RX ORDER — BUMETANIDE 1 MG/1
2 TABLET ORAL
Status: DISCONTINUED | OUTPATIENT
Start: 2025-05-27 | End: 2025-06-01 | Stop reason: HOSPADM

## 2025-05-27 RX ADMIN — IPRATROPIUM BROMIDE AND ALBUTEROL SULFATE 3 ML: .5; 3 SOLUTION RESPIRATORY (INHALATION) at 11:30

## 2025-05-27 RX ADMIN — IPRATROPIUM BROMIDE AND ALBUTEROL SULFATE 3 ML: .5; 3 SOLUTION RESPIRATORY (INHALATION) at 15:30

## 2025-05-27 RX ADMIN — IPRATROPIUM BROMIDE AND ALBUTEROL SULFATE 3 ML: .5; 3 SOLUTION RESPIRATORY (INHALATION) at 19:03

## 2025-05-27 RX ADMIN — OXYCODONE 10 MG: 5 TABLET ORAL at 04:42

## 2025-05-27 RX ADMIN — BUDESONIDE 0.5 MG: 0.5 SUSPENSION RESPIRATORY (INHALATION) at 19:13

## 2025-05-27 RX ADMIN — DOCUSATE SODIUM 100 MG: 100 CAPSULE, LIQUID FILLED ORAL at 20:36

## 2025-05-27 RX ADMIN — BUMETANIDE 2 MG: 0.25 INJECTION INTRAMUSCULAR; INTRAVENOUS at 15:29

## 2025-05-27 RX ADMIN — LEVOTHYROXINE SODIUM 137 MCG: 0.11 TABLET ORAL at 04:42

## 2025-05-27 RX ADMIN — GABAPENTIN 300 MG: 300 CAPSULE ORAL at 08:25

## 2025-05-27 RX ADMIN — ACETYLCYSTEINE 2 ML: 200 SOLUTION ORAL; RESPIRATORY (INHALATION) at 07:06

## 2025-05-27 RX ADMIN — ATORVASTATIN CALCIUM 10 MG: 10 TABLET ORAL at 08:25

## 2025-05-27 RX ADMIN — ACETYLCYSTEINE 2 ML: 200 SOLUTION ORAL; RESPIRATORY (INHALATION) at 19:04

## 2025-05-27 RX ADMIN — METOPROLOL SUCCINATE 12.5 MG: 25 TABLET, EXTENDED RELEASE ORAL at 08:25

## 2025-05-27 RX ADMIN — BUMETANIDE 2 MG: 1 TABLET ORAL at 13:22

## 2025-05-27 RX ADMIN — IPRATROPIUM BROMIDE AND ALBUTEROL SULFATE 3 ML: .5; 3 SOLUTION RESPIRATORY (INHALATION) at 07:06

## 2025-05-27 RX ADMIN — APIXABAN 2.5 MG: 2.5 TABLET, FILM COATED ORAL at 20:36

## 2025-05-27 RX ADMIN — PANTOPRAZOLE SODIUM 40 MG: 40 TABLET, DELAYED RELEASE ORAL at 20:36

## 2025-05-27 RX ADMIN — GABAPENTIN 300 MG: 300 CAPSULE ORAL at 15:29

## 2025-05-27 RX ADMIN — APIXABAN 2.5 MG: 2.5 TABLET, FILM COATED ORAL at 08:25

## 2025-05-27 RX ADMIN — GABAPENTIN 300 MG: 300 CAPSULE ORAL at 20:36

## 2025-05-27 RX ADMIN — Medication 10 ML: at 08:26

## 2025-05-27 RX ADMIN — BUMETANIDE 2 MG: 0.25 INJECTION INTRAMUSCULAR; INTRAVENOUS at 09:50

## 2025-05-27 RX ADMIN — Medication 10 ML: at 20:36

## 2025-05-27 RX ADMIN — BISACODYL 5 MG: 5 TABLET, COATED ORAL at 18:14

## 2025-05-27 RX ADMIN — FLUOXETINE 60 MG: 20 CAPSULE ORAL at 08:25

## 2025-05-27 RX ADMIN — BUDESONIDE 0.5 MG: 0.5 SUSPENSION RESPIRATORY (INHALATION) at 07:15

## 2025-05-27 NOTE — PROGRESS NOTES
[facility] MEDICINE SERVICE  DAILY PROGRESS NOTE    NAME: Claudia Rust  : 1950  MRN: [id number]      LOS: 1 day     PROVIDER OF SERVICE: Osman Pepper MD    Chief Complaint: Humeral fracture    Subjective:     Interval History:  History taken from: patient    Doing well this morning. Denies new acute complaints.        Review of Systems:   Review of Systems   All other systems reviewed and are negative.      Objective:     Vital Signs  Temp:  [98.8 °F (37.1 °C)-99.5 °F (37.5 °C)] 99.5 °F (37.5 °C)  Heart Rate:  [] 92  Resp:  [20-39] 28  BP: (136-139)/(72-74) 136/72  Flow (L/min) (Oxygen Therapy):  [6-10] 10   Body mass index is 51.84 kg/m².    Physical Exam  Physical Exam  Constitutional:       Appearance: She is obese.   HENT:      Head: Normocephalic.   Cardiovascular:      Rate and Rhythm: Normal rate and regular rhythm.   Pulmonary:      Effort: Pulmonary effort is normal.      Breath sounds: Normal breath sounds.      Comments: Coarse breath sounds  Abdominal:      General: Bowel sounds are normal.      Palpations: Abdomen is soft.      Tenderness: There is no abdominal tenderness.   Musculoskeletal:         General: No swelling.   Neurological:      Mental Status: She is alert. Mental status is at baseline.      Comments: Somewhat confused and easily redirected            Diagnostic Data    Results from last 7 days   Lab Units 25  1003 25  1549 25  0357   WBC 10*3/mm3 13.72*  --  15.18*   HEMOGLOBIN g/dL 10.5*  --  10.8*   HEMATOCRIT % 35.0  --  35.1   PLATELETS 10*3/mm3 202  --  195   GLUCOSE mg/dL 185*  --  159*   CREATININE mg/dL 0.92  --  1.16*   BUN mg/dL 34*  --  37*   SODIUM mmol/L 146*  --  141   POTASSIUM mmol/L 4.2   < > 3.6   AST (SGOT) U/L  --   --  47*   ALT (SGPT) U/L  --   --  66*   ALK PHOS U/L  --   --  112   BILIRUBIN mg/dL  --   --  0.4   ANION GAP mmol/L 10.3  --  7.4    < > = values in this interval not displayed.       XR Chest 1 View  Result  Date: 5/26/2025  Impression: 1. Cardiomegaly with pulmonary vascular congestion. 2. Left basilar airspace disease which could be atelectasis or pneumonia Electronically Signed: Haile White  5/26/2025 3:56 PM EDT  Workstation ID: [id number]            Assessment:   Left humeral fracture  Obstructive sleep apnea  Acute on Chronic hypoxemic respiratory failure  Acute CHF exacerbation  Massive obesity  CKD stage III  Transaminitis  Constipation      Plan:     CHF with respiratory failure 2/2 pulmonary edema. 2mg Bumex IV this AM followed by additional Bumex dose this afternoon. Weak O2 aggressively.    Appreciate cardiology input.      Labs reviewed    I anticipate patient will require less than 30 days of skilled rehab.    Active and Resolved Problems  Active Hospital Problems    Diagnosis  POA    **Humeral fracture [S42.309A]  Yes      Resolved Hospital Problems   No resolved problems to display.       VTE Prophylaxis:  Pharmacologic VTE prophylaxis orders are present.             Disposition Planning:     Barriers to Discharge: O2 requirement, CHF, Pain control and discharge planning  Anticipated Date of Discharge: 5/29  Place of Discharge: home      Time: 30 minutes     Code Status and Medical Interventions: CPR (Attempt to Resuscitate); Full Support   Ordered at: 05/24/25 0116     Code Status (Patient has no pulse and is not breathing):    CPR (Attempt to Resuscitate)     Medical Interventions (Patient has pulse or is breathing):    Full Support       Signature: Electronically signed by Osman Pepper MD, 05/27/25, 13:49 EDT.  [facility] Hospitalist Team

## 2025-05-27 NOTE — PLAN OF CARE
Pt  presents with functional mobility impairments which indicate the need for skilled intervention. Tolerating session today without incident. Pt on 10L high-flow O2, telemetry and Purewick.  Pt lethargic an drowsy this date with multiple cues to keep eyes open throughout session.  Pt is a very shallow mouth breather whose O2 sats ranged between 85-86% supine with HOB elevated despite cues for DB/PLB exercises.  O2 sats increased to 93% when sitting EOB.  Max A x 2 for bed mobility and initially required Mod/Max A for sitting balance secondary to significant posterior lean.  Able to progress to Min/Mod A for sitting balance with tactile cues to lean forward.  Attempted sit to stand but pt unable to clear bottom off EOB to fully stand.  Recommend sukumar lift for transfer this date with [facility] staff- and spoke at length with RN regarding pt's inability to stand to mobilize to chair at this time and importance to mobilize to chair via Sukumar lift.   Will continue to follow and progress as tolerated.

## 2025-05-27 NOTE — PLAN OF CARE
Goal Outcome Evaluation:   Patient a&ox4, lethargic, MD aware. 10 L humidified high flow nasal cannula, no c/o pain. [facility]. PT recommending SNF. Call light within reach, bed in low position. Plan of care on going.

## 2025-05-27 NOTE — DISCHARGE PLACEMENT REQUEST
"Claudia Mckoy (74 y.o. Female)       Date of Birth   1950    Social Security Number       Address   [address] [place] [address] [place] [address]    Home Phone   [phone]    MRN   [id number]       Roman Catholic   Episcopal    Marital Status                               Admission Date   5/23/2025    Admission Type   Emergency    Admitting Provider   Osman Pepper MD    Attending Provider   Osman Pepper MD    Department, Room/Bed   [facility] SURGICAL INPATIENT, 4116/1       Discharge Date       Discharge Disposition       Discharge Destination                                 Attending Provider: Osman Pepper MD    Allergies: Adhesive Tape, Benadryl [Diphenhydramine], Butorphanol, Garlic, Heparin, Tetanus-diphtheria Toxoids Td, Aloe, Bee Venom, Latex, Macadamia Nut Oil, Tuberculin, Ppd, Wasp Venom    Isolation: None   Infection: None   Code Status: CPR    Ht: 162.6 cm (64\")   Wt: 137 kg (302 lb)    Admission Cmt: None   Principal Problem: Humeral fracture [S42.309A]                   Active Insurance as of 5/23/2025       Primary Coverage       Payor Plan Insurance Group Employer/Plan Group    [employer] MEDICARE REPLACEMENT UHC MEDICARE ADVANTAGE SNP PPO [id number]       Payor Plan Address Payor Plan Phone Number Payor Plan Fax Number Effective Dates    [address] [address]   1/1/2025 - None Entered    [facility] [id number]         Subscriber Name Subscriber Birth Date Member ID       CLAUDIA MCKOY LA 1950 [id number]               Secondary Coverage       Payor Plan Insurance Group Employer/Plan Group    [facility] COMMUNITY PLAN OF [place] [facility] COMMUNITY PLAN PATHWAYS IN        Payor Plan Address Payor Plan Phone Number Payor Plan Fax Number Effective Dates    [address]   7/1/2024 - None Entered    [facility] [phone]         Subscriber Name Subscriber Birth Date Member ID       CLAUDIA MCKOY LA 1950 [id number]                     Emergency Contacts  "       (Rel.) Home Phone Work Phone Mobile Phone    Isma Rust (Spouse) [phone] -- [phone]    OVIDIO THURMAN (Daughter) [phone] -- [phone]    Mara Baker (Daughter) -- -- [phone]

## 2025-05-27 NOTE — THERAPY EVALUATION
"Patient Name: Claudia Rust  : 1950    MRN: [id number]                              Today's Date: 2025       Admit Date: 2025    Visit Dx:     ICD-10-CM ICD-9-CM   1. Closed displaced comminuted fracture of shaft of left humerus, initial encounter  S42.352A 812.21   2. Hypokalemia  E87.6 276.8   3. Acute kidney injury  N17.9 584.9   4. Fall, initial encounter  W19.XXXA E888.9     Patient Active Problem List   Diagnosis    Obesity, morbid, BMI 50 or higher    Dyspnea on exertion    Abnormal nuclear stress test    Nonrheumatic aortic valve stenosis    Prediabetes    Dyslipidemia    Essential hypertension    Acute UTI    Bipolar disorder    COPD with hypoxia    Hyperlipidemia, unspecified    Gastroesophageal reflux disease    Rheumatoid arthritis    Aortic stenosis, severe    Chronic diastolic heart failure    Aortic valve stenosis    S/P AVR (aortic valve replacement)    COVID    Atypical pneumonia    Pulmonary hypertension    Acute respiratory failure    Multifocal pneumonia    Multiple tracheobronchial mucus plugs    Pneumonia, unspecified organism    Encounter for Hemoccult screening    Humeral fracture     Past Medical History:   Diagnosis Date    Acute congestive heart failure     Allergies     Anxiety     Arthritis     Asthma     Bilateral leg edema     Bipolar 1 disorder     Bulging lumbar disc     X3    Cancer     states had \"cancerous tumor during pregnancy and had to have hysterectomy\"    Cataract     bilateral    Cellulitis 2021    bilateral legs    Compression fracture of vertebral column     LUMBAR    COPD (chronic obstructive pulmonary disease)     Delayed emergence from anesthesia     Depression     Diabetes mellitus     Dyslipidemia     Dyspnea on exertion     Fibromyalgia     GERD (gastroesophageal reflux disease)     Hiatal hernia     Hyperlipidemia     Hypertension     Hypothyroid     HYPOTHYROID    IBS (irritable bowel syndrome)     Migraines     Morbid obesity     Neuropathy  "    Panic attacks     Peripheral edema     PONV (postoperative nausea and vomiting)     Poor mobility     rolling walker    Prediabetes     PVD (peripheral vascular disease)     Rheumatoid aortitis     Sleep apnea     cpap    Spinal stenosis     Vertigo     Vitamin D deficiency      Past Surgical History:   Procedure Laterality Date    AORTIC VALVE REPAIR/REPLACEMENT N/A 3/18/2021    Procedure: AORTIC VALVE REPLACEMENT;  Surgeon: Olaf Mcgill MD;  Location: [facility];  Service: Cardiothoracic;  Laterality: N/A;    BRONCHOSCOPY N/A 3/25/2021    Procedure: BRONCHOSCOPY AT BEDSIDE WITH BRONCHIOALVEOLAR LAVAGE;  Surgeon: Glenn Reyes MD;  Location: [facility] ENDOSCOPY;  Service: Pulmonary;  Laterality: N/A;  PNA    BRONCHOSCOPY N/A 10/16/2023    Procedure: BRONCHOSCOPY with bronchial washing;  Surgeon: Glenn Reyes MD;  Location: [facility] ENDOSCOPY;  Service: Pulmonary;  Laterality: N/A;  post op: pneumonia    CARDIAC CATHETERIZATION  2009    CARDIAC CATHETERIZATION N/A 8/14/2019    Procedure: Left Heart Cath;  Surgeon: Jose Levi MD;  Location: [facility] CATH INVASIVE LOCATION;  Service: Cardiovascular    CARDIAC CATHETERIZATION N/A 8/14/2019    Procedure: Right Heart Cath;  Surgeon: Jose Levi MD;  Location: [facility] CATH INVASIVE LOCATION;  Service: Cardiovascular    CARDIAC CATHETERIZATION N/A 8/14/2019    Procedure: Aortic root aortogram;  Surgeon: Jose Levi MD;  Location: [facility] CATH INVASIVE LOCATION;  Service: Cardiovascular    CARDIAC CATHETERIZATION N/A 1/20/2021    Procedure: Left Heart Cath;  Surgeon: Jose Levi MD;  Location: [facility] CATH INVASIVE LOCATION;  Service: Cardiovascular;  Laterality: N/A;    COLONOSCOPY      COLONOSCOPY N/A 2/14/2025    Procedure: COLONOSCOPY WITH POLYPECTOMY X 4;  Surgeon: Segun Cleveland MD;  Location: [facility] ENDOSCOPY;  Service: Gastroenterology;  Laterality: N/A;  POST- POLYPS X 4, DIVERTICULOSIS, INTERNAL  HEMORRHOIDS    CYSTOCELE REPAIR  1984    ELBOW ARTHROPLASTY  2011    ENDOSCOPY      FOOT SURGERY      GALLBLADDER SURGERY  2002    TONSILLECTOMY      VAGINAL HYSTERECTOMY  1972      General Information       Row Name 05/27/25 0925          OT Time and Intention    Document Type evaluation  -DT     Mode of Treatment occupational therapy  -DT       Row Name 05/27/25 1039 05/27/25 0925       General Information    Patient Profile Reviewed -- yes  -DT    Prior Level of Function -- min assist:;ADL's;independent:;all household mobility  Pt reports she uses a rollator at home. Caregiver 5X/wk for approx 10hrs/day while spouse is at work who performs home mgmt & assists pt with LB dressing, bra fasteners. Pt has walk-in shower with seat. Uses scooter in store. Wears 2L O2.  -DT    Existing Precautions/Restrictions -- fall;oxygen therapy device and L/min;other (see comments);left  Left UE sling. LUE = WBAT per physician.  -DT    Barriers to Rehab medically complex;previous functional deficit  -DT --      Row Name 05/27/25 1039          Living Environment    Current Living Arrangements home  -DT     People in Home spouse  Also has caregiver 5x/wk X10 hours per day when spouse is at work.  -DT       Row Name 05/27/25 1039          Home Main Entrance    Number of Stairs, Main Entrance none  -DT       Row Name 05/27/25 1039          Stairs Within Home, Primary    Number of Stairs, Within Home, Primary none  -DT       Row Name 05/27/25 1039          Cognition    Orientation Status (Cognition) oriented x 4;other (see comments)  Pt lethargic requiring v.c. to attempt to keep eyes open & awake.  -DT       Row Name 05/27/25 1039          Safety Issues/Impairments Affecting Functional Mobility    Safety Issues Affecting Function (Mobility) friction/shear risk  -DT     Impairments Affecting Function (Mobility) balance;endurance/activity tolerance;pain;postural/trunk control;range of motion (ROM);shortness of breath;strength  -DT                User Key  (r) = Recorded By, (t) = Taken By, (c) = Cosigned By      Initials Name Provider Type    DT Lorna Diaz, OT Occupational Therapist                     Mobility/ADL's       Row Name 05/27/25 1047          Bed Mobility    Bed Mobility bed mobility (all) activities  -DT     All Activities, Webb (Bed Mobility) maximum assist (25% patient effort);2 person assist  -DT     Bed Mobility, Safety Issues decreased use of arms for pushing/pulling;decreased use of legs for bridging/pushing  -DT     Assistive Device (Bed Mobility) bed rails;head of bed elevated;repositioning sheet  -DT       Row Name 05/27/25 1047          Transfers    Transfers sit-stand transfer  -DT     Comment, (Transfers) Attempted sit<>stand, but unable to get pt's bottom off of bed. Pt c/o pain in bilateral feet stating she has plantar fascitis.  -DT       Row Name 05/27/25 1047          Bed-Chair Transfer    Bed-Chair Webb (Transfers) not tested;other (see comments)  Recommended kell lift transfer to nursing to get pt up to recliner. Pt's O2 sat inc to 93% sitting EOB vs 85% when lying in bed on 10L.  -DT       Row Name 05/27/25 1047          Functional Mobility    Patient was able to Ambulate no, other medical factors prevent ambulation  -DT     Reason Patient was unable to Ambulate Excessive Weakness  -DT       Row Name 05/27/25 1047          Activities of Daily Living    BADL Assessment/Intervention feeding  -DT       Row Name 05/27/25 1047          Mobility    Extremity Weight-bearing Status left upper extremity  -DT     Left Upper Extremity (Weight-bearing Status) weight-bearing as tolerated (WBAT)  -DT       Row Name 05/27/25 1047          Self-Feeding Assessment/Training    Webb Level (Feeding) feeding skills;maximum assist (25% patient effort);prepare tray/open items;liquids to mouth  -DT     Position (Feeding) sitting up in bed  -DT               User Key  (r) = Recorded By, (t) = Taken By,  (c) = Cosigned By      Initials Name Provider Type    DT Lorna Diaz, OT Occupational Therapist                   Obj/Interventions       Row Name 05/27/25 1050          Range of Motion Comprehensive    General Range of Motion upper extremity range of motion deficits identified  -DT     Comment, General Range of Motion LUE in sling. Pt demo approx 50% AROM of left hand/wrist. Limited by inc edema. RUE PROM= WFL; RUE = approx 75% AROM deficit  -DT       Row Name 05/27/25 1050          Strength Comprehensive (MMT)    General Manual Muscle Testing (MMT) Assessment upper extremity strength deficits identified;lower extremity strength deficits identified  -DT     Comment, General Manual Muscle Testing (MMT) Assessment RUE= 2-/5. (dominant hand) Pt unable to push call light with either hand. Recommended paddle call bell for pt to nursing  -DT       Row Name 05/27/25 1050          Motor Skills    Motor Skills functional endurance  -DT     Functional Endurance poor  -DT       Row Name 05/27/25 1050          Balance    Balance Assessment sitting static balance  -DT     Static Sitting Balance moderate assist  -DT     Position, Sitting Balance sitting edge of bed  -DT               User Key  (r) = Recorded By, (t) = Taken By, (c) = Cosigned By      Initials Name Provider Type    DT Lorna Diaz, OT Occupational Therapist                   Goals/Plan       Row Name 05/27/25 1107          Transfer Goal 1 (OT)    Activity/Assistive Device (Transfer Goal 1, OT) transfers, all  -DT     Ray Level/Cues Needed (Transfer Goal 1, OT) moderate assist (50-74% patient effort)  -DT     Time Frame (Transfer Goal 1, OT) long term goal (LTG);2 weeks  -DT       Row Name 05/27/25 1107          Toileting Goal 1 (OT)    Ray Level/Cues Needed (Toileting Goal 1, OT) moderate assist (50-74% patient effort)  -DT     Time Frame (Toileting Goal 1, OT) long term goal (LTG);2 weeks  -DT       Row Name 05/27/25 1107           Grooming Goal 1 (OT)    Activity/Device (Grooming Goal 1, OT) grooming skills, all  -DT     [place] (Grooming Goal 1, OT) minimum assist (75% or more patient effort)  -DT     Time Frame (Grooming Goal 1, OT) 2 weeks;long term goal (LTG)  -DT       Row Name 05/27/25 1101          Self-Feeding Goal 1 (OT)    Activity/Device (Self-Feeding Goal 1, OT) self-feeding skills, all  -DT     [place] Level/Cues Needed (Self-Feeding Goal 1, OT) minimum assist (75% or more patient effort)  -DT     Time Frame (Self-Feeding Goal 1, OT) 2 weeks;long term goal (LTG)  -DT       Row Name 05/27/25 110          Therapy Assessment/Plan (OT)    Planned Therapy Interventions (OT) activity tolerance training;BADL retraining;functional balance retraining;neuromuscular control/coordination retraining;occupation/activity based interventions;adaptive equipment training;passive ROM/stretching;patient/caregiver education/training;orthotic fabrication/fitting/training;ROM/therapeutic exercise;strengthening exercise;transfer/mobility retraining;manual therapy/joint mobilization  -DT               User Key  (r) = Recorded By, (t) = Taken By, (c) = Cosigned By      Initials Name Provider Type    DT Lorna Diaz, OT Occupational Therapist                   Clinical Impression       Row Name 05/27/25 1055          Pain Assessment    Pretreatment Pain Rating 5/10  -DT     Posttreatment Pain Rating 5/10  -DT     Pain Location extremity  -DT     Pain Side/Orientation left  -DT     Pain Management Interventions exercise or physical activity utilized;nursing notified;positioning techniques utilized;premedicated for activity;activity modification encouraged;other (see comments)  LUE sling adjusted & repositioned to inc comfort  -DT     Response to Pain Interventions activity participation with tolerable pain  -DT       Row Name 05/27/25 1055          Plan of Care Review    Plan of Care Reviewed With patient  -DT     Outcome  Evaluation Pt is a 73 y/o F POD #2 L humerus intramedullary nailing/ellie insertion (Dr. Peralta) on 5/25/2025 secondary to L proximal third humeral shaft fx with WBAT LUE orders. A note has been sent to the orthopedic physician to clarify LUE ROM parameters. Pt with hx of KARLY, chronic hypoxemic respiratory failure on 2L 02 at night, obesity, CKD stage III, transaminitis, vertigo, and constipation. At baseline, pt lives in a [facility] w/ 0 RUDY w/  who works full-time (has paid caregiver 5 days/week, 10 hours per day while spouse is at work). Pt reports she was previously MOD I w/ household mobility w/ Rollator walker, scooter for community distances (borrows from stores, does not own), some assist for ADLs from paid caregiver (LB dressing, drying off after showering, fastening bra). Upon eval, pt is lethargic & drowsy requiring v.c. to encourage her to keep her eyes open. She also is mouth breathing shallowly requiring v.c. for PLB. O2 sats 85-86% on 10L O2 when lying in bed. O2 inc to 93% when sitting EOB. She presents with dec BUE AROM, dec sitting balance, generalized weakness, inc lethargy, inc pain & poor activity tolerance with inc O2 use & dec O2 sats. Attempted to stand pt, but unable to get pt off of the bed. Recommending kell lift at this time. Pt requiring assist for all BADLs & mobility is functioning significantly below her baseline status. Pt also unable to push call light button. Recommended a paddle call bell for pt to nursing staff. OT will continue to follow for treatment & recommends SNF upon discharge.  -DT       Row Name 05/27/25 1053          Therapy Assessment/Plan (OT)    Rehab Potential (OT) good  -DT     Criteria for Skilled Therapeutic Interventions Met (OT) yes;meets criteria;skilled treatment is necessary  -DT     Therapy Frequency (OT) 5 times/wk  -DT     Predicted Duration of Therapy Intervention (OT) until d/c  -DT       Row Name 05/27/25 1056          Therapy Plan Review/Discharge  Plan (OT)    Anticipated Discharge Disposition (OT) skilled nursing facility  -DT       Row Name 05/27/25 1054          Vital Signs    Pre SpO2 (%) 85  -DT     O2 Delivery Pre Treatment supplemental O2  10L  -DT     Intra SpO2 (%) 93  -DT     O2 Delivery Intra Treatment supplemental O2  10L (sitting EOB & with v.c. for PLB.  -DT     Post SpO2 (%) 86  -DT     O2 Delivery Post Treatment supplemental O2  10L  -DT       Row Name 05/27/25 1054          Positioning and Restraints    Pre-Treatment Position in bed  -DT     Post Treatment Position bed  -DT     In Bed notified nsg;fowlers;call light within reach;encouraged to call for assist;exit alarm on;side rails up x3;LUE elevated  -DT               User Key  (r) = Recorded By, (t) = Taken By, (c) = Cosigned By      Initials Name Provider Type    Lorna Little, OT Occupational Therapist                   Outcome Measures       Row Name 05/27/25 0825          How much help from another person do you currently need...    Turning from your back to your side while in flat bed without using bedrails? 2  -SL     Moving from lying on back to sitting on the side of a flat bed without bedrails? 2  -SL     Moving to and from a bed to a chair (including a wheelchair)? 1  -SL     Standing up from a chair using your arms (e.g., wheelchair, bedside chair)? 1  -SL     Climbing 3-5 steps with a railing? 1  -SL     To walk in hospital room? 1  -SL     AM-PAC 6 Clicks Score (PT) 8  -SL     Highest Level of Mobility Goal Sit at Edge of Bed-3  -SL               User Key  (r) = Recorded By, (t) = Taken By, (c) = Cosigned By      Initials Name Provider Type    Jolene Lyn, RN Registered Nurse                    Occupational Therapy Education       Title: PT OT SLP Therapies (In Progress)       Topic: Occupational Therapy (Done)       Point: ADL training (Done)       Learning Progress Summary            Patient Acceptance, ROBERTO JAFFE,NR by DT at 5/27/2025 1106    Comment: Role of  OT, goals & POC, safety prec, LUE wrist/digit AROM for edema mgmt, elevation of LUE.                      Point: Home exercise program (Done)       Learning Progress Summary            Patient Acceptance, E, VU,NR by DT at 5/27/2025 1108    Comment: Role of OT, goals & POC, safety prec, LUE wrist/digit AROM for edema mgmt, elevation of LUE.                      Point: Precautions (Done)       Learning Progress Summary            Patient Acceptance, E, VU,NR by DT at 5/27/2025 1108    Comment: Role of OT, goals & POC, safety prec, LUE wrist/digit AROM for edema mgmt, elevation of LUE.                      Point: Body mechanics (Done)       Learning Progress Summary            Patient Acceptance, E, VU,NR by DT at 5/27/2025 1108    Comment: Role of OT, goals & POC, safety prec, LUE wrist/digit AROM for edema mgmt, elevation of LUE.                                      User Key       Initials Effective Dates Name Provider Type Discipline    DT 07/11/23 -  Lorna Diaz OT Occupational Therapist OT                  OT Recommendation and Plan  Planned Therapy Interventions (OT): activity tolerance training, BADL retraining, functional balance retraining, neuromuscular control/coordination retraining, occupation/activity based interventions, adaptive equipment training, passive ROM/stretching, patient/caregiver education/training, orthotic fabrication/fitting/training, ROM/therapeutic exercise, strengthening exercise, transfer/mobility retraining, manual therapy/joint mobilization  Therapy Frequency (OT): 5 times/wk  Plan of Care Review  Plan of Care Reviewed With: patient  Outcome Evaluation: Pt is a 75 y/o F POD #2 L humerus intramedullary nailing/ellie insertion (Dr. Peralta) on 5/25/2025 secondary to L proximal third humeral shaft fx with WBAT LUE orders. A note has been sent to the orthopedic physician to clarify LUE ROM parameters. Pt with hx of KARLY, chronic hypoxemic respiratory failure on 2L 02 at night,  obesity, CKD stage III, transaminitis, vertigo, and constipation. At baseline, pt lives in a H w/ 0 RUDY w/  who works full-time (has paid caregiver 5 days/week, 10 hours per day while spouse is at work). Pt reports she was previously MOD I w/ household mobility w/ Rollator walker, scooter for community distances (borrows from stores, does not own), some assist for ADLs from paid caregiver (LB dressing, drying off after showering, fastening bra). Upon eval, pt is lethargic & drowsy requiring v.c. to encourage her to keep her eyes open. She also is mouth breathing shallowly requiring v.c. for PLB. O2 sats 85-86% on 10L O2 when lying in bed. O2 inc to 93% when sitting EOB. She presents with dec BUE AROM, dec sitting balance, generalized weakness, inc lethargy, inc pain & poor activity tolerance with inc O2 use & dec O2 sats. Attempted to stand pt, but unable to get pt off of the bed. Recommending kell lift at this time. Pt requiring assist for all BADLs & mobility is functioning significantly below her baseline status. Pt also unable to push call light button. Recommended a paddle call bell for pt to nursing staff. OT will continue to follow for treatment & recommends SNF upon discharge.     Time Calculation:         Time Calculation- OT       Row Name 05/27/25 1109             Time Calculation- OT    OT Start Time 0835  -DT      OT Stop Time 0901  -DT      OT Time Calculation (min) 26 min  -DT      OT Received On 05/27/25  -DT      OT - Next Appointment 05/28/25  -DT      OT Goal Re-Cert Due Date 06/10/25  -DT                User Key  (r) = Recorded By, (t) = Taken By, (c) = Cosigned By      Initials Name Provider Type    Lorna Little, OT Occupational Therapist                           Lorna Diaz, OT  5/27/2025   05-Nov-2017

## 2025-05-27 NOTE — THERAPY TREATMENT NOTE
"Subjective: Pt agreeable to therapeutic plan of care.    Objective:     Precautions -   Falls, WBAT LUE, O2     Bed mobility -  Max A x 2     Sitting balance:  Initially required Mod/Max A than slowly improved to Min/Mod A with tactile cues to lean forward   Transfers -  unable to clear bottom off EOB despite Max A x 2.  Pt with c/o B feet pain due to plantar fascitis  Ambulation -  not attempted       Vitals: WNL    Pain: 5 VAS Location: L arm   Intervention for pain: Repositioned, Increased Activity, and Therapeutic Presence    Education: Provided education on the importance of mobility in the acute care setting, Verbal/Tactile Cues, and Energy conservation strategies    Assessment: Claudia Rust presents with functional mobility impairments which indicate the need for skilled intervention. Tolerating session today without incident. Pt on 10L high-flow O2, telemetry and Purewick.  Pt lethargic an drowsy this date with multiple cues to keep eyes open throughout session.  Pt is a very shallow mouth breather whose O2 sats ranged between 85-86% supine with HOB elevated despite cues for DB/PLB exercises.  O2 sats increased to 93% when sitting EOB.  Max A x 2 for bed mobility and initially required Mod/Max A for sitting balance secondary to significant posterior lean.  Able to progress to Min/Mod A for sitting balance with tactile cues to lean forward.  Attempted sit to stand but pt unable to clear bottom off EOB to fully stand.  Recommend kell lift for transfer this date with [facility] staff- and spoke at length with RN regarding pt's inability to stand to mobilize to chair at this time and importance to mobilize to chair via Kell lift.   Will continue to follow and progress as tolerated.     Plan/Recommendations:   If medically appropriate, Moderate Intensity Therapy recommended post-acute care. This is recommended as therapy feels the patient would require 3-4 days per week and wouldn't tolerate \"3 hour daily\" rehab " intensity. SNF would be the preferred choice. If the patient does not agree to SNF, arrange HH or OP depending on home bound status. If patient is medically complex, consider LTACH. Pt requires no DME at discharge.     Pt desires Skilled Rehab placement at discharge. Pt cooperative; agreeable to therapeutic recommendations and plan of care.       Post-Tx Position: Supine with HOB Elevated, Alarms activated, and Call light and personal items within reach  PPE: gloves    Therapy Charges for Today       Code Description Service Date Service Provider Modifiers Qty    [id number]  PT THERAPEUTIC ACT EA 15 MIN 5/27/2025 Paulo Montoya, PT GP 2           PT Charges       Row Name 05/27/25 1334             Time Calculation    Start Time 0835  -AM      Stop Time 0901  -AM      Time Calculation (min) 26 min  -AM      PT Received On 05/27/25  -AM      PT - Next Appointment 05/28/25  -AM         Time Calculation- PT    Total Timed Code Minutes- PT 26 minute(s)  -AM                User Key  (r) = Recorded By, (t) = Taken By, (c) = Cosigned By      Initials Name Provider Type    AM Paulo Montoya, PT Physical Therapist

## 2025-05-27 NOTE — PROGRESS NOTES
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, axillary, and brachial  Upper arm left DVT  DM  Hx of Ecoli UTI  Oral candidiasis  HTN  JERROD  DM2  Chronic anemia  HLD        Recommendations:     Oxygen titration currently requiring 7 L  Bronchodilators budesonide and DuoNeb  Mucomyst nebulized  Eliquis for DVT prophylaxis              LOS: 1 day     Subjective         Objective     Vital signs for last 24 hours:  Vitals:    05/27/25 0714 05/27/25 0715 05/27/25 0720 05/27/25 0807   BP:    136/72   BP Location:    Right arm   Patient Position:    Lying   Pulse: 98 98 98 100   Resp: 26 26 26 28   Temp:    99.5 °F (37.5 °C)   TempSrc:    Oral   SpO2: 93% 93% 94% (!) 86%   Weight:       Height:           Intake/Output last 3 shifts:  I/O last 3 completed shifts:  In: 600 [P.O.:600]  Out: 2200 [Urine:2200]  Intake/Output this shift:  No intake/output data recorded.      Radiology  Imaging Results (Last 24 Hours)       Procedure Component Value Units Date/Time    XR Chest 1 View [[id number]] Collected: 05/26/25 1554     Updated: 05/26/25 1558    Narrative:      XR CHEST 1 VW    Date of Exam: 5/26/2025 12:55 PM EDT    Indication: Hypoxia    Comparison: 10/14/2023    Findings:  Status post aortic valve replacement. Cardiomegaly. Prominence of the central pulmonary vessels. Airspace disease in the left lung base. Atelectasis in the mid to lower right lung      Impression:      Impression:    1. Cardiomegaly with pulmonary vascular congestion.  2. Left basilar airspace disease which could be atelectasis or  pneumonia      Electronically Signed: Haile White    5/26/2025 3:56 PM EDT    Workstation ID: [id number]            Labs:  Results from last 7 days   Lab Units 05/26/25  0357   WBC 10*3/mm3 15.18*   HEMOGLOBIN g/dL 10.8*   HEMATOCRIT % 35.1   PLATELETS 10*3/mm3 195     Results from last 7 days   Lab Units 05/26/25  1549 05/26/25  0357   SODIUM mmol/L  --  141   POTASSIUM mmol/L 4.3 3.6   CHLORIDE mmol/L  --  103   CO2 mmol/L  --  30.6*   BUN mg/dL  --  37*   CREATININE mg/dL  --  1.16*   CALCIUM mg/dL  --  8.7   BILIRUBIN mg/dL  --  0.4   ALK PHOS U/L  --  112   ALT (SGPT) U/L  --  66*   AST (SGOT) U/L  --  47*   GLUCOSE mg/dL  --  159*         Results from last 7 days   Lab Units 05/26/25  0357 05/25/25  0027 05/24/25  0319   ALBUMIN g/dL 3.3* 3.7 3.8             Results from last 7 days   Lab Units 05/24/25  0319   MAGNESIUM mg/dL 2.4     Results from last 7 days   Lab Units 05/24/25  0319 05/23/25  2258   INR  1.18* 1.16*   APTT seconds  --  29.1               Meds:   SCHEDULE  acetylcysteine, 2 mL, Nebulization, BID - RT  apixaban, 2.5 mg, Oral, Q12H  atorvastatin, 10 mg, Oral, Daily  budesonide, 0.5 mg, Nebulization, BID - RT  bumetanide, 2 mg, Intravenous, Once  bumetanide, 2 mg, Oral, BID Diuretics  docusate sodium, 100 mg, Oral, Nightly  FLUoxetine, 60 mg, Oral, Daily  gabapentin, 300 mg, Oral, TID  ipratropium-albuterol, 3 mL, Nebulization, 4x Daily - RT  levothyroxine, 137 mcg, Oral, Q AM  metoprolol succinate XL, 12.5 mg, Oral, Daily  pantoprazole, 40 mg, Oral, Nightly  polyethylene glycol, 17 g, Oral, Daily  sodium chloride, 10 mL, Intravenous, Q12H      Infusions  Pharmacy Consult - Pharmacy to dose,       PRNs    senna-docusate sodium **AND** polyethylene glycol **AND** bisacodyl **AND** bisacodyl    Calcium Replacement - Follow Nurse / BPA Driven Protocol    HYDROmorphone    HYDROmorphone    Magnesium Standard Dose Replacement - Follow Nurse / BPA Driven Protocol    meclizine    morphine     nitroglycerin    ondansetron    ondansetron ODT    oxyCODONE    oxyCODONE    Pharmacy Consult - Pharmacy to dose    Phosphorus Replacement - Follow Nurse / BPA Driven Protocol    Potassium Replacement - Follow Nurse / BPA Driven Protocol    prochlorperazine    [COMPLETED] Insert Peripheral IV **AND** sodium chloride    sodium chloride    sodium chloride    Physical Exam:  Physical Exam  Cardiovascular:      Heart sounds: Murmur heard.      No gallop.   Pulmonary:      Effort: No respiratory distress.      Breath sounds: No stridor. Rhonchi and rales present. No wheezing.   Chest:      Chest wall: No tenderness.         ROS  Review of Systems   Respiratory:  Positive for cough and shortness of breath. Negative for wheezing and stridor.    Cardiovascular:  Positive for chest pain and leg swelling. Negative for palpitations.             Total critical care time spent with patient greater than: 45 Minutes

## 2025-05-27 NOTE — PLAN OF CARE
Goal Outcome Evaluation:   Pain controlled with PO pain meds. Pt very lethargic but easily aroused. Turning Pt Q2. Call light within reach, plan of care on going.

## 2025-05-27 NOTE — PROGRESS NOTES
Cardiology Progress Note    Patient Identification:  Name: Claudia Rust  Age: 74 y.o.  Sex: female  :  1950  MRN: [id number]                 Follow Up / Chief Complaint:  Pre op Evaluation, Valvular disease   Chief Complaint   Patient presents with    Arm Injury       Interval History:  Patient presented with mechanical fall with left arm injury underwent IMN of left humerus     NP NOTE:  Patient seen and examined.  Patient sitting up in bed, very drowsy but responds.  Patient denies any chest pain but is short of breath and coughing.  CXR from yesterday showing possible pulmonary congestion vs pneumonia.  Pulmonary on board.  IV bumex given this morning.  Patient is on 10L HF oxygen sats 89-90%   Will check BNP level    Electronically signed by SARA Moore, 25, 11:16 AM EDT.    Cardiology attending addendum :    I have personally performed a face-to-face diagnostic evaluation, physical exam and reviewed data on this patient.  I have reviewed documentation done by me and nurse practitioner  and corrected as needed.  And agree with the different components of documentation.Greater than 50% of the time spent in the care of this patient was provided by attending consultant/me.          Subjective: Patient seen and examined.  Chart reviewed.  Labs reviewed.  Discussed with RN taking care of patient.  Patient is in sinus rhythm.    Objective:  2025: Sodium 146 potassium 4.2 BUN 34 creatinine 0.92 glucose 185 WBC 13.72 hemoglobin 10.5    History of present illness:        Ms. Claudia Rust has of PMH      #Valvular heart disease with moderate to severe aortic stenosis  Cardiac cath 2021 revealing nonobstructive CAD  Patient underwent #23 magna pericardial prosthesis AVR 3/18/2021  Transesophageal echo 3/25/2021 normal LV systolic function  # Hyperlipidemia,    # Hypertension  # COPD, KARLY on CPAP  # Prediabetes  #History of HIT, history of DVT RUE   # Hypothyroidism, Rheumatoid Arthritis,  spinal stenosis, hiatal hernia, chronic depression, bipolar, GERD, IBS, chronic migraine, vertigo, herniated disc, compression lumbar fractures  #Allergies/intolerance to Stadol  # Hysterectomy, bladder reconstruction, cholecystectomy, right hand surgery  # Family history of strokes and grandfather.  Mother's cancer, father on  # Active cigarette smoker trying to quit         Presented with mechanical fall and left arm injury underwent surgery for IM nailing of left humerus         Review of records reveals:  Echocardiogram which is revealing severe aortic stenosis on 2/11/2020 ,Underwent cardiac cath 1/20/2021 which revealed no obstructive disease,underwent aortic valve replacement 3/18/2021 with #23 magna pericardial prosthesis.       Carotid Dopplers 9/13/2019 normal.  Echocardiogram 2/11/2020 is revealing severe aortic stenosis  Work-up here on 3/16/2021 revealed proBNP 733, normal CMP, A1c 5.7, normal CBC, chest x-ray with no acute abnormality.  Lexiscan Cardiolite 2/28/2023 negative for ischemia EF of 86%  Echo 10/14/2023 reveals normal LV systolic function with concentric LVH, dilated RV and left atrium, normal bioprosthetic aortic valve mild MR and TR        Labs 11/7/2023 reveal lipid profile with cholesterol 131, triglycerides 141, HDL 38, LDL 68.  CMP with a creatinine of 1.12 and EGFR 52.        Assessment:        Fall and left humerus fracture s/p surgery    Bradycardia  Valvular heart disease, AS, s/p AVR with #23 magna pericardial prosthesis 3/18/2021     Acute on chronic chronic congestive heart failure  due to valvular heart disease and aortic stenosis hypertensive cardiovascular disease essential hypertension/hypertensive cardiovascular disease      Dyslipidemia     diabetes  Morbid obesity with BMI over 50  KARLY  Cigarette smoker  TCP HIT positive on 3/26/2021, + RUE DVT  previously on warfarin  Diabetes  CKD     Plan:    Diuresis as tolerated  Monitor urine output, electrolytes, renal function and  "telemetry closely  Continue medical management with Eliquis, simvastatin/atorvastatin, Bumex, metoprolol as tolerated  Follow-up with nephrology      Copied text in this portion of the note has been reviewed and is accurate as of 5/27/2025    Past Medical History:  Past Medical History:   Diagnosis Date    Acute congestive heart failure     Allergies     Anxiety     Arthritis     Asthma     Bilateral leg edema     Bipolar 1 disorder     Bulging lumbar disc     X3    Cancer     states had \"cancerous tumor during pregnancy and had to have hysterectomy\"    Cataract     bilateral    Cellulitis 03/2021    bilateral legs    Compression fracture of vertebral column     LUMBAR    COPD (chronic obstructive pulmonary disease)     Delayed emergence from anesthesia     Depression     Diabetes mellitus     Dyslipidemia     Dyspnea on exertion     Fibromyalgia     GERD (gastroesophageal reflux disease)     Hiatal hernia     Hyperlipidemia     Hypertension     Hypothyroid     HYPOTHYROID    IBS (irritable bowel syndrome)     Migraines     Morbid obesity     Neuropathy     Panic attacks     Peripheral edema     PONV (postoperative nausea and vomiting)     Poor mobility     rolling walker    Prediabetes     PVD (peripheral vascular disease)     Rheumatoid aortitis     Sleep apnea     cpap    Spinal stenosis     Vertigo     Vitamin D deficiency      Past Surgical History:  Past Surgical History:   Procedure Laterality Date    AORTIC VALVE REPAIR/REPLACEMENT N/A 3/18/2021    Procedure: AORTIC VALVE REPLACEMENT;  Surgeon: Olaf Mcgill MD;  Location: [facility] CVOR;  Service: Cardiothoracic;  Laterality: N/A;    BRONCHOSCOPY N/A 3/25/2021    Procedure: BRONCHOSCOPY AT BEDSIDE WITH BRONCHIOALVEOLAR LAVAGE;  Surgeon: Glenn Reyes MD;  Location: [facility] ENDOSCOPY;  Service: Pulmonary;  Laterality: N/A;  PNA    BRONCHOSCOPY N/A 10/16/2023    Procedure: BRONCHOSCOPY with bronchial washing;  Surgeon: Glenn Reyes MD;  Location: [facility] " ENDOSCOPY;  Service: Pulmonary;  Laterality: N/A;  post op: pneumonia    CARDIAC CATHETERIZATION  2009    CARDIAC CATHETERIZATION N/A 8/14/2019    Procedure: Left Heart Cath;  Surgeon: Jose Levi MD;  Location: [facility] CATH INVASIVE LOCATION;  Service: Cardiovascular    CARDIAC CATHETERIZATION N/A 8/14/2019    Procedure: Right Heart Cath;  Surgeon: Jose Levi MD;  Location: [facility] CATH INVASIVE LOCATION;  Service: Cardiovascular    CARDIAC CATHETERIZATION N/A 8/14/2019    Procedure: Aortic root aortogram;  Surgeon: Jose Levi MD;  Location: [facility] CATH INVASIVE LOCATION;  Service: Cardiovascular    CARDIAC CATHETERIZATION N/A 1/20/2021    Procedure: Left Heart Cath;  Surgeon: Jose Levi MD;  Location: [facility] CATH INVASIVE LOCATION;  Service: Cardiovascular;  Laterality: N/A;    COLONOSCOPY      COLONOSCOPY N/A 2/14/2025    Procedure: COLONOSCOPY WITH POLYPECTOMY X 4;  Surgeon: Segun Cleveland MD;  Location: [facility] ENDOSCOPY;  Service: Gastroenterology;  Laterality: N/A;  POST- POLYPS X 4, DIVERTICULOSIS, INTERNAL HEMORRHOIDS    CYSTOCELE REPAIR  1984    ELBOW ARTHROPLASTY  2011    ENDOSCOPY      FOOT SURGERY      GALLBLADDER SURGERY  2002    HUMERUS IM NAILING/RODDING Left 5/25/2025    Procedure: HUMERUS INTRAMEDULLARY NAIL/JONO INSERTION LEFT;  Surgeon: Rufus Peralta MD;  Location: [facility] MAIN OR;  Service: Orthopedics;  Laterality: Left;    TONSILLECTOMY      VAGINAL HYSTERECTOMY  1972        Social History:   Social History     Tobacco Use    Smoking status: Former     Current packs/day: 0.00     Types: Cigarettes     Quit date: 2022     Years since quitting: 3.4     Passive exposure: Never    Smokeless tobacco: Never    Tobacco comments:     decrease now and do not smoke dos   Substance Use Topics    Alcohol use: Not Currently     Comment: very rare      Family History:  Family History   Problem Relation Age of Onset    Ovarian cancer  "Mother     Lung cancer Mother     Esophageal cancer Mother     Hypertension Father     Diabetes Maternal Grandmother           Allergies:  Allergies   Allergen Reactions    Adhesive Tape Unknown (See Comments)     hives    Benadryl [Diphenhydramine] Other (See Comments)     Pt and  say she cannot take. \"will cause stroke\"    Butorphanol Other (See Comments)     Chest pain difficulty breathing throat swelling, STADAL    Garlic Other (See Comments)     Chest pain difficulty breathing throat swells    Heparin Other (See Comments)     HIT +    Tetanus-Diphtheria Toxoids Td Other (See Comments)     Dizziness,  vomiting    Aloe Itching    Bee Venom Other (See Comments)     Swelling eyes and lips hand swelling    Latex Itching    Macadamia Nut Oil Other (See Comments)     Chest pain difficulty breathing throat swelling    Tuberculin, Ppd Unknown (See Comments)     reactor    Wasp Venom Other (See Comments)     Swelling eyes lips and hand     Scheduled Meds:  acetylcysteine, 2 mL, BID - RT  apixaban, 2.5 mg, Q12H  atorvastatin, 10 mg, Daily  budesonide, 0.5 mg, BID - RT  bumetanide, 2 mg, BID Diuretics  docusate sodium, 100 mg, Nightly  FLUoxetine, 60 mg, Daily  gabapentin, 300 mg, TID  ipratropium-albuterol, 3 mL, 4x Daily - RT  levothyroxine, 137 mcg, Q AM  metoprolol succinate XL, 12.5 mg, Daily  pantoprazole, 40 mg, Nightly  polyethylene glycol, 17 g, Daily  sodium chloride, 10 mL, Q12H          Review of Systems:   ROS  Review of Systems   Constitution: Negative for chills and fever.   Cardiovascular: Negative for chest pain and palpitations.   Respiratory: Negative for cough and hemoptysis.    Gastrointestinal: Negative for nausea.        Constitutional:  Temp:  [98.8 °F (37.1 °C)-99.5 °F (37.5 °C)] 99.5 °F (37.5 °C)  Heart Rate:  [] 95  Resp:  [20-39] 28  BP: (136-139)/(72-74) 136/72    Physical Exam   /72 (BP Location: Right arm, Patient Position: Lying)   Pulse 95   Temp 99.5 °F (37.5 °C) " "(Oral)   Resp 28   Ht 162.6 cm (64\")   Wt (!) 137 kg (302 lb)   SpO2 95%   BMI 51.84 kg/m²   General:  Appears in no acute distress  Eyes: Sclera is anicteric,  conjunctiva is clear   HEENT:  No JVD. Thyroid not visibly enlarged. No mucosal pallor or cyanosis  Respiratory: Respirations increased, bibasilar crackles  Cardiovascular: S1,S2 Regular rate and rhythm.  1+ edema  Gastrointestinal: Abdomen nondistended.  Musculoskeletal:  No abnormal movements  Extremities: No digital clubbing or cyanosis  Skin: Color pink.   Neuro: Alert and awake.    INTAKE AND OUTPUT:    Intake/Output Summary (Last 24 hours) at 5/27/2025 1545  Last data filed at 5/27/2025 1321  Gross per 24 hour   Intake 238 ml   Output 2000 ml   Net -1762 ml       Cardiographics  Telemetry: Reviewed and interpreted by me reveals sinus rhythm    ECG:   ECG 12 Lead Pre-Op / Pre-Procedure   Final Result   HEART RATE=78  bpm   RR Togryeij=831  ms   GA Ayrwmfkp=844  ms   P Horizontal Axis=39  deg   P Front Axis=44  deg   QRSD Interval=95  ms   QT Vhwhitdu=296  ms   QOpX=883  ms   QRS Axis=73  deg   T Wave Axis=71  deg   - OTHERWISE NORMAL ECG -   Sinus rhythm   Low voltage, precordial leads   When compared with ECG of 14-Oct-2023 00:05:27,   No significant change   Electronically Signed By: Kaz Arnett (IAIN) 2025-05-25 22:19:23   Date and Time of Study:2025-05-24 15:26:20      Telemetry Scan   Final Result      Telemetry Scan   Final Result      Telemetry Scan   Final Result      Telemetry Scan   Final Result      Telemetry Scan   Final Result      Telemetry Scan   Final Result      Telemetry Scan   Final Result      Telemetry Scan   Final Result      Telemetry Scan   Final Result      Telemetry Scan   Final Result      Telemetry Scan   Final Result      Telemetry Scan   Final Result      Telemetry Scan   Final Result        I have personally reviewed EKG    Echocardiogram: Results for orders placed during the hospital encounter of " 25    Adult Transthoracic Echo Limited W/ Cont if Necessary Per Protocol    Interpretation Summary    Left ventricular ejection fraction appears to be 61 - 65%.    Left ventricular diastolic function was not assessed.    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    Estimated right ventricular systolic pressure from tricuspid regurgitation is normal (<35 mmHg).    Imaging is limited by left arm being in sling.      STRESS TEST  Results for orders placed during the hospital encounter of 23    Stress Test With Myocardial Perfusion One Day    Interpretation Summary  LEXISCAN CARDIOLITE REPORT    DATE OF PROCEDURE: 2023    INDICATION FOR PROCEDURE: Numbness and tingling of left hand, dyspnea on exertion, chronic CHF, AVR, edema, dyslipidemia, hypertension, obesity with BMI over 45    PROCEDURE PERFORMED: Lexiscan Cardiolite    PROCEDURE COMMENTS:    After informed consent was obtained.  Patient's resting heart rate was 68 bpm, resting blood pressure was 144/82, resting EKG revealed sinus rhythm with rate of 68 bpm.  Patient was given 0.4 mg of regadenosine for stress testing.  There was no significant change in heart rate, blood pressure, symptoms with regadenoson injection.  Patient tolerated procedure well.  Complications were none.    NUCLEAR IMAGIN.  There was  uniform uptake of Cardiolite both in resting and post stress images, no ischemia seen.  2.  Gated images reveal  normal LV size and contractility, LVEF of 86%.    CONCLUSION:  1.  Lexiscan Cardiolite with normal perfusion, negative for ischemia.  2.  Normal wall motion.  LVEF of 86%.    RECOMMENDATIONS:    Clinical correlation recommended.      Jose Levi MD  23  20:24 EST      HEART CATHETERIZATION  Results for orders placed during the hospital encounter of 21    Cardiac Catheterization/Vascular Study    Narrative  Table formatting from the original result was not included.  2021      Heart Cath  Report    NAME:              Claudia Rust  :                1950  AGE/SEX:        70 y.o. female  MRN:                [id number]        Procedures Performed    1. Bilateral selective coronary arteriogram    :   Jose Levi MD    Vascular Access Site: Femoral    Indication for procedure: 70-year-old with severe critical  aortic stenosis and history of CHF, obesity, dyslipidemia, prediabetes      Procedure Note    After discussing the risks, benefits, and alternatives of the procedure, informed consent was obtained.  Moderate conscious sedation was given utilizing IV Versed and fentanyl administered by RN with continuous EKG oximetry and hemodynamic monitoring supervised by me throughout the entire case, conscious sedation time was 30 minutes.  I was present with the patient for the duration of moderate sedation and supervised staff who had no other duties and monitored the patient for the entire procedure patient had Moise 2-3 sedation scale. the vascular access site was prepped and draped in the usual sterile fashion.  2% lidocaine was used for local anesthesia. Appropriate landmarks were assessed.  A 6 Upper sorbian short sheath was inserted in the artery using the modified Seldinger technique.    Selective coronary angiography was performed with JL4 and JR4 diagnostic catheters. Left ventriculogram  was not performed because patient has severe aortic stenosis..  All exchanges were performed over the wire.  No specimens were removed.  There were no apparent acute or early complications.  The patient tolerated the procedure well and was transferred to the recovery area in stable condition.    Artery hemostasis will be achieved with  manual pressure in OPCV.      Complications:  None  Blood Loss: minimal    Hemodynamics    Pressures    Ao:    138/62 mmHg      Coronary Angiography    Left Main :  The left main   is without disease    Left Anterior Descending : Is tortuous vessel, without  disease    Left Circumflex : Tortuous vessel ends up with 3 branches which are tortuous without disease    Right Coronary Artery :  The right coronary artery   dominant vessel without disease    Dominance:  []  Left  [x]  Right  []  Co-Dominant        Impression:    1. No obstructive CAD    Recommendations:    1. Aortic valve replacement evaluation by CT VS.  Patient had echo showing severe critical aortic stenosis        I sincerely appreciate the opportunity to participate in your patient's care. Please feel free to contact me anytime if I can be of assistance in this or any other way.      Pertinent History    Past Medical History:  Diagnosis Date   Acute congestive heart failure (CMS/[facility])   Asthma   Bipolar 1 disorder (CMS/[facility])   COPD (chronic obstructive pulmonary disease) (CMS/[facility])   Depression   Dyslipidemia   Fibromyalgia   Hypertension   Hypothyroid   IBS (irritable bowel syndrome)   Migraines   Morbid obesity (CMS/[facility])   Neuropathy   Spinal stenosis   Vertigo    Past Surgical History:  Procedure Laterality Date   CARDIAC CATHETERIZATION  2009   CARDIAC CATHETERIZATION N/A 8/14/2019  Procedure: Left Heart Cath;  Surgeon: Jose Levi MD;  Location: [facility] CATH INVASIVE LOCATION;  Service: Cardiovascular   CARDIAC CATHETERIZATION N/A 8/14/2019  Procedure: Right Heart Cath;  Surgeon: Jose Levi MD;  Location: [facility] CATH INVASIVE LOCATION;  Service: Cardiovascular   CARDIAC CATHETERIZATION N/A 8/14/2019  Procedure: Aortic root aortogram;  Surgeon: Jose Levi MD;  Location: [facility] CATH INVASIVE LOCATION;  Service: Cardiovascular   CYSTOCELE REPAIR  1984   ELBOW ARTHROPLASTY  2011   FOOT SURGERY   GALLBLADDER SURGERY  2002   VAGINAL HYSTERECTOMY  1972    Prior to Admission medications  Medication Sig Start Date End Date Taking? Authorizing Provider  aspirin 81 MG tablet Take 81 mg by mouth Every Night. 2/3/15  Yes Provider, MD Krsisy  B Complex Vitamins (VITAMIN B  COMPLEX) capsule capsule Take 1 capsule by mouth Daily.   Yes Krissy Hodgson MD  Calcium Carbonate (CALCIUM 500 PO) Take 1,500 mg by mouth Daily.   Yes Krissy Hodgson MD  Cholecalciferol (VITAMIN D-1000 MAX ST) 1000 units tablet Take 2,000 Units by mouth Daily. 9/16/16  Yes Krissy Hodgson MD  diphenhydrAMINE (BENADRYL) 50 MG capsule Take 50 mg by mouth every night at bedtime.   Yes Krissy Hodgson MD  FLUoxetine (PROzac) 60 MG tablet Take 60 mg by mouth Daily. 4/19/19  Yes Krissy Hodgson MD  fluticasone (FLONASE) 50 MCG/ACT nasal spray 2 sprays into the nostril(s) as directed by provider 2 (two) times a day.   Yes Krissy Hodgson MD  furosemide (LASIX) 40 MG tablet Take 40 mg by mouth 2 (Two) Times a Day. 2/5/15  Yes Krissy Hodgson MD  gabapentin (NEURONTIN) 600 MG tablet Take 400 mg by mouth 3 (Three) Times a Day. 7/30/19  Yes Krissy Hodgson MD  HYDROcodone-acetaminophen (NORCO)  MG per tablet Take 1 tablet by mouth Daily. Noon an prm   Yes Krissy Hodgson MD  levothyroxine (SYNTHROID, LEVOTHROID) 100 MCG tablet Take 100 mcg by mouth Daily. 7/30/15  Yes Krissy Hodgson MD  metoprolol succinate XL (TOPROL-XL) 25 MG 24 hr tablet Take 25 mg by mouth Daily.   Yes Krissy Hodgson MD  Multiple Vitamins-Minerals (MULTIVITAMIN WITH MINERALS) tablet tablet Take 1 tablet by mouth Daily.   Yes Krissy Hodgson MD  ondansetron (ZOFRAN) 4 MG tablet Take 4 mg by mouth Every 8 (Eight) Hours As Needed for Nausea or Vomiting.   Yes Krissy Hodgson MD  pantoprazole (PROTONIX) 40 MG EC tablet Take 40 mg by mouth Daily.   Yes Krissy Hodgson MD  potassium chloride (KLOR-CON) 20 MEQ CR tablet Take 20 mEq by mouth 2 (Two) Times a Day. 7/30/15  Yes Provider, Historical, MD  simvastatin (ZOCOR) 20 MG tablet Take 20 mg by mouth Daily. 2/5/15  Yes Krissy Hodgson MD  Acetaminophen (TYLENOL PO) Take 1,000 mg by mouth Every 4 (Four) Hours As  Needed.    Krissy Hodgson MD  albuterol (PROVENTIL) (2.5 MG/3ML) 0.083% nebulizer solution Inhale 2.5 mg Every 6 (Six) Hours As Needed. 2/3/15   Krissy Hodsgon MD  aluminum-magnesium hydroxide-simethicone (MAALOX/MYLANTA) 200-200-20 MG/5ML suspension Take 15 mL by mouth Every 6 (Six) Hours As Needed for Indigestion or Heartburn.    Krissy Hodgson MD  meclizine (ANTIVERT) 25 MG tablet Take 25 mg by mouth 3 (Three) Times a Day As Needed. 7/30/15   Krissy Hodgson MD  polyethylene glycol (MIRALAX) powder Take 17 g by mouth Daily As Needed. 11/9/16   Krissy Hodgson MD  promethazine (PHENERGAN) 25 MG tablet Take 25 mg by mouth Every 6 (Six) Hours As Needed for Nausea or Vomiting.    Krissy Hodgson MD  rizatriptan MLT (MAXALT-MLT) 10 MG disintegrating tablet Take 10 mg by mouth 1 (One) Time As Needed for Migraine. May repeat in 2 hours if needed    Krissy Hodgson MD  tiZANidine (ZANAFLEX) 4 MG tablet Take 4 mg by mouth Every 8 (Eight) Hours As Needed for Muscle Spasms.    Krissy Hodgson MD  acetaminophen (TYLENOL) 325 MG tablet Take 650 mg by mouth Every 6 (Six) Hours As Needed for Mild Pain .  1/20/21  Krissy Hodgson MD  Calcium Carbonate ([employer] Calcium) 1500 (600 Ca) MG tablet Take 600 mg by mouth Daily.  1/20/21  Krissy Hodgson MD  metoprolol succinate XL (TOPROL-XL) 25 MG 24 hr tablet TAKE 1 TABLET BY MOUTH EVERY DAY  Patient taking differently: 25 mg. 11/17/20 1/20/21  Jose Levi MD  ondansetron ODT (ZOFRAN-ODT) 4 MG disintegrating tablet 4 mg Every 8 (Eight) Hours As Needed. 1/6/21 1/20/21  Krissy Hodgson MD      Pre-Procedure Notes  H&P Performed  [x]  Yes []  No       []  N/A    Indications:  []  ACS <= 24 HRS  []  ACS >24 HRS  []  New Onset Angina <= 2 mos  []  Worsening Angina  []  Resuscitated Cardiac Arrest  []  Angina on Exertion:  []  Suspected CAD  []  Valvular Disease  []  Pericardial Disease  []  Cardiac Arrythmia  []   Cardiomyopathy  []  LV Dysfunction  []  Syncope  []  Post Cardiac Transplant  []  Eval. For Exercise Clearance  []  Other  []  Pre-Operative Evaluation  If Pre-Op Eval:  Evaluation for Surgery Type:  []  Cardiac Surgery   []  Non-Cardiac Surgery  Functional Capacity:  []  <4 METS  []  >=4 METS w/o symptoms  []  >= 4 METS with symptoms  []  Unknown  Surgical Risk:  []  Low  []  Intermediate  []  High Risk: Vascular  []  High Risk Non-Vascular    Risks, Benefits, & Complications Discussed:  [x]  Yes  []  No  []  N/A    Questions Answered:  [x]  Yes  []  No  []  N/A    Consent Obtained:  [x]  Yes  []  No  []  N/A    CHF: [x]  Yes  []  No  If Yes:  Newly Diagnosed?  []  Yes  [x]  No  If Yes:  HF Type:  []  Diastolic  []  Systolic  []  Unknown      Jose Levi MD  1/20/2021  12:55 EST  Electronically signed by Jose Levi MD, 01/20/21, 12:55 PM EST.      Lab Review   I have reviewed the labs      Results from last 7 days   Lab Units 05/24/25  0319   MAGNESIUM mg/dL 2.4     Results from last 7 days   Lab Units 05/27/25  1003   SODIUM mmol/L 146*   POTASSIUM mmol/L 4.2   BUN mg/dL 34*   CREATININE mg/dL 0.92   CALCIUM mg/dL 8.8         Results from last 7 days   Lab Units 05/27/25  1003 05/26/25  0357 05/25/25  0027   WBC 10*3/mm3 13.72* 15.18* 12.67*   HEMOGLOBIN g/dL 10.5* 10.8* 11.1*   HEMATOCRIT % 35.0 35.1 35.9   PLATELETS 10*3/mm3 202 195 229     Results from last 7 days   Lab Units 05/24/25  0319 05/23/25  2258   INR  1.18* 1.16*   APTT seconds  --  29.1       RADIOLOGY:  Imaging Results (Last 24 Hours)       Procedure Component Value Units Date/Time    XR Chest 1 View [[id number]] Collected: 05/26/25 1554     Updated: 05/26/25 1558    Narrative:      XR CHEST 1 VW    Date of Exam: 5/26/2025 12:55 PM EDT    Indication: Hypoxia    Comparison: 10/14/2023    Findings:  Status post aortic valve replacement. Cardiomegaly. Prominence of the central pulmonary vessels. Airspace disease in the left  "lung base. Atelectasis in the mid to lower right lung      Impression:      Impression:    1. Cardiomegaly with pulmonary vascular congestion.  2. Left basilar airspace disease which could be atelectasis or pneumonia      Electronically Signed: Haile White    5/26/2025 3:56 PM EDT    Workstation ID: [id number]                  )5/27/2025  Jose Levi MD      [facility] Dragon/Transcription:   \"Dictated utilizing Dragon dictation\".   "

## 2025-05-27 NOTE — PLAN OF CARE
Goal Outcome Evaluation:  Plan of Care Reviewed With: patient           Outcome Evaluation: Pt is a 73 y/o F POD #2 L humerus intramedullary nailing/ellie insertion (Dr. Peralta) on 5/25/2025 secondary to L proximal third humeral shaft fx with WBAT LUE orders. A note has been sent to the orthopedic physician to clarify LUE ROM parameters. Pt with hx of KARLY, chronic hypoxemic respiratory failure on 2L 02 at night, obesity, CKD stage III, transaminitis, vertigo, and constipation. At baseline, pt lives in a H w/ 0 RUDY w/  who works full-time (has paid caregiver 5 days/week, 10 hours per day while spouse is at work). Pt reports she was previously MOD I w/ household mobility w/ Rollator walker, scooter for community distances (borrows from stores, does not own), some assist for ADLs from paid caregiver (LB dressing, drying off after showering, fastening bra). Upon eval, pt is lethargic & drowsy requiring v.c. to encourage her to keep her eyes open. She also is mouth breathing shallowly requiring v.c. for PLB. O2 sats 85-86% on 10L O2 when lying in bed. O2 inc to 93% when sitting EOB. She presents with dec BUE AROM, dec sitting balance, generalized weakness, inc lethargy, inc pain & poor activity tolerance with inc O2 use & dec O2 sats. Attempted to stand pt, but unable to get pt off of the bed. Recommending kell lift at this time. Pt requiring assist for all BADLs & mobility is functioning significantly below her baseline status. Pt also unable to push call light button. Recommended a paddle call bell for pt to nursing staff. OT will continue to follow for treatment & recommends SNF upon discharge.    Anticipated Discharge Disposition (OT): skilled nursing facility

## 2025-05-27 NOTE — CASE MANAGEMENT/SOCIAL WORK
Discharge Planning Assessment   Victor Manuel     Patient Name: Claudia Rust  MRN: [id number]  Today's Date: 5/27/2025    Admit Date: 5/23/2025    Plan: ID Plan: Referral to [facility] snf skilled pending acceptance.  Will require precert closer to discharge. PASRR  appproved.  on 10L high flow 02   Discharge Needs Assessment       Row Name 05/27/25 1648       Living Environment    People in Home spouse    Name(s) of People in Home Isma    Current Living Arrangements apartment    Potentially Unsafe Housing Conditions none    In the past 12 months has the electric, gas, oil, or water company threatened to shut off services in your home? No    Primary Care Provided by self;spouse/significant other;other (see comments)  caregivers    Provides Primary Care For no one    Family Caregiver if Needed spouse    Family Caregiver Names Isma    Quality of Family Relationships helpful;involved;supportive    Able to Return to Prior Arrangements yes       Resource/Environmental Concerns    Resource/Environmental Concerns none    Transportation Concerns none       Transportation Needs    In the past 12 months, has lack of transportation kept you from medical appointments or from getting medications? no    In the past 12 months, has lack of transportation kept you from meetings, work, or from getting things needed for daily living? No       Food Insecurity    Within the past 12 months, you worried that your food would run out before you got the money to buy more. Never true    Within the past 12 months, the food you bought just didn't last and you didn't have money to get more. Never true       Transition Planning    Patient/Family Anticipates Transition to home with family    Patient/Family Anticipated Services at Transition skilled nursing    Transportation Anticipated family or friend will provide       Discharge Needs Assessment    Readmission Within the Last 30 Days no previous admission in last 30 days     Current Outpatient/Agency/Support Group other (see comments)  caregivers  5 days a week 10 hours a day per patient    Equipment Currently Used at Home rollator;glucometer    Concerns to be Addressed care coordination/care conferences;discharge planning    Do you want help finding or keeping work or a job? I do not need or want help    Do you want help with school or training? For example, starting or completing job training or getting a high school diploma, GED or equivalent No    Anticipated Changes Related to Illness none    Equipment Needed After Discharge none    Outpatient/Agency/Support Group Needs skilled nursing facility    Discharge Facility/Level of Care Needs nursing facility, skilled    Provided Post Acute Provider List? Yes    Post Acute Provider List Nursing Home    Delivered To Patient    Method of Delivery In person    Patient's Choice of Community Agency(s) [facility], [facility]                   Discharge Plan       Row Name 05/27/25 8384       Plan    Plan DC Plan: Referral to [facility] snf skilled pending acceptance.  Will require precert closer to discharge. PASRR  appproved.  on 10L high flow 02    Patient/Family in Agreement with Plan yes    Plan Comments Patient lives at home with spouse and reports she has caregivers 5 days a week for 10 hours each day Patient does not drive. Family will transport at discharge. Patient performs ADL with assist of caregivers.  PCP and pharmacy confirmed. Denies financial assistance needs for medication and/or food. PT and OT recommend snf, patient and  now agreeable.  Choices are [facility] and [facility].  Referral sent to [facility] pending acceptance.  Will required precert.  PASRR  approved.  Dc barriers: 10 L HF 02.                  Continued Care and Services - Admitted Since 5/23/2025       Destination       Service Provider Request Status Services Address Phone Fax Patient  Preferred    [facility] Pending - Request Sent -- [address] [place] [id number] [phone] --                  Expected Discharge Date and Time       Expected Discharge Date Expected Discharge Time    May 28, 2025            Demographic Summary       Row Name 05/27/25 1647       General Information    Admission Type inpatient    Arrived From emergency department    Referral Source admission list    Reason for Consult discharge planning    Preferred Language English       Contact Information    Permission Granted to Share Info With                    Functional Status       Row Name 05/27/25 1647       Functional Status    Usual Activity Tolerance moderate    Current Activity Tolerance fair       Functional Status, IADL    Medications assistive person    Meal Preparation assistive person    Housekeeping assistive person    Laundry assistive person    Shopping assistive person    If for any reason you need help with day-to-day activities such as bathing, preparing meals, shopping, managing finances, etc., do you get the help you need? I get all the help I need       Mental Status    General Appearance WDL WDL       Mental Status Summary    Recent Changes in Mental Status/Cognitive Functioning no changes                      Marichuy Griffiths RN    SIPS 1  [email or web address]  Office [phone]  Cell [phone]

## 2025-05-28 ENCOUNTER — APPOINTMENT (OUTPATIENT)
Dept: GENERAL RADIOLOGY | Facility: HOSPITAL | Age: 75
End: 2025-05-28
Payer: MEDICARE

## 2025-05-28 LAB
ANION GAP SERPL CALCULATED.3IONS-SCNC: 11.5 MMOL/L (ref 5–15)
BUN SERPL-MCNC: 42.1 MG/DL (ref 8–23)
BUN/CREAT SERPL: 39.7 (ref 7–25)
CALCIUM SPEC-SCNC: 9 MG/DL (ref 8.6–10.5)
CHLORIDE SERPL-SCNC: 106 MMOL/L (ref 98–107)
CO2 SERPL-SCNC: 30.5 MMOL/L (ref 22–29)
CREAT SERPL-MCNC: 1.06 MG/DL (ref 0.57–1)
DEPRECATED RDW RBC AUTO: 51.1 FL (ref 37–54)
EGFRCR SERPLBLD CKD-EPI 2021: 55.2 ML/MIN/1.73
ERYTHROCYTE [DISTWIDTH] IN BLOOD BY AUTOMATED COUNT: 14.5 % (ref 12.3–15.4)
GLUCOSE SERPL-MCNC: 185 MG/DL (ref 65–99)
HCT VFR BLD AUTO: 32.9 % (ref 34–46.6)
HGB BLD-MCNC: 10.2 G/DL (ref 12–15.9)
MCH RBC QN AUTO: 29.9 PG (ref 26.6–33)
MCHC RBC AUTO-ENTMCNC: 31 G/DL (ref 31.5–35.7)
MCV RBC AUTO: 96.5 FL (ref 79–97)
NT-PROBNP SERPL-MCNC: 4279 PG/ML (ref 0–900)
PLATELET # BLD AUTO: 198 10*3/MM3 (ref 140–450)
PMV BLD AUTO: 10.7 FL (ref 6–12)
POTASSIUM SERPL-SCNC: 3.8 MMOL/L (ref 3.5–5.2)
RBC # BLD AUTO: 3.41 10*6/MM3 (ref 3.77–5.28)
SODIUM SERPL-SCNC: 148 MMOL/L (ref 136–145)
WBC NRBC COR # BLD AUTO: 13.82 10*3/MM3 (ref 3.4–10.8)

## 2025-05-28 PROCEDURE — 97110 THERAPEUTIC EXERCISES: CPT

## 2025-05-28 PROCEDURE — 97530 THERAPEUTIC ACTIVITIES: CPT

## 2025-05-28 PROCEDURE — 85027 COMPLETE CBC AUTOMATED: CPT | Performed by: STUDENT IN AN ORGANIZED HEALTH CARE EDUCATION/TRAINING PROGRAM

## 2025-05-28 PROCEDURE — 25010000002 BUMETANIDE PER 0.5 MG: Performed by: STUDENT IN AN ORGANIZED HEALTH CARE EDUCATION/TRAINING PROGRAM

## 2025-05-28 PROCEDURE — 97112 NEUROMUSCULAR REEDUCATION: CPT

## 2025-05-28 PROCEDURE — 71045 X-RAY EXAM CHEST 1 VIEW: CPT

## 2025-05-28 PROCEDURE — 94799 UNLISTED PULMONARY SVC/PX: CPT

## 2025-05-28 PROCEDURE — 99232 SBSQ HOSP IP/OBS MODERATE 35: CPT | Performed by: INTERNAL MEDICINE

## 2025-05-28 PROCEDURE — 97535 SELF CARE MNGMENT TRAINING: CPT

## 2025-05-28 PROCEDURE — 94761 N-INVAS EAR/PLS OXIMETRY MLT: CPT

## 2025-05-28 PROCEDURE — 83880 ASSAY OF NATRIURETIC PEPTIDE: CPT | Performed by: STUDENT IN AN ORGANIZED HEALTH CARE EDUCATION/TRAINING PROGRAM

## 2025-05-28 PROCEDURE — 94664 DEMO&/EVAL PT USE INHALER: CPT

## 2025-05-28 PROCEDURE — 80048 BASIC METABOLIC PNL TOTAL CA: CPT | Performed by: STUDENT IN AN ORGANIZED HEALTH CARE EDUCATION/TRAINING PROGRAM

## 2025-05-28 RX ORDER — NYSTATIN 100000 [USP'U]/ML
5 SUSPENSION ORAL 4 TIMES DAILY
Status: DISPENSED | OUTPATIENT
Start: 2025-05-28 | End: 2025-05-30

## 2025-05-28 RX ORDER — MULTIVITAMIN WITH IRON
1000 TABLET ORAL DAILY
Status: DISCONTINUED | OUTPATIENT
Start: 2025-05-28 | End: 2025-06-01 | Stop reason: HOSPADM

## 2025-05-28 RX ORDER — FAMOTIDINE 20 MG/1
40 TABLET, FILM COATED ORAL NIGHTLY PRN
Status: DISCONTINUED | OUTPATIENT
Start: 2025-05-28 | End: 2025-06-01 | Stop reason: HOSPADM

## 2025-05-28 RX ORDER — BUMETANIDE 0.25 MG/ML
2 INJECTION, SOLUTION INTRAMUSCULAR; INTRAVENOUS DAILY
Status: DISCONTINUED | OUTPATIENT
Start: 2025-05-28 | End: 2025-05-29

## 2025-05-28 RX ORDER — CHOLECALCIFEROL (VITAMIN D3) 25 MCG
1000 TABLET ORAL DAILY
Status: DISCONTINUED | OUTPATIENT
Start: 2025-05-28 | End: 2025-06-01 | Stop reason: HOSPADM

## 2025-05-28 RX ORDER — SUMATRIPTAN SUCCINATE 25 MG/1
25 TABLET ORAL
Status: COMPLETED | OUTPATIENT
Start: 2025-05-28 | End: 2025-05-31

## 2025-05-28 RX ADMIN — OXYCODONE 10 MG: 5 TABLET ORAL at 20:44

## 2025-05-28 RX ADMIN — NYSTATIN 500000 UNITS: 100000 SUSPENSION ORAL at 20:43

## 2025-05-28 RX ADMIN — IPRATROPIUM BROMIDE AND ALBUTEROL SULFATE 3 ML: .5; 3 SOLUTION RESPIRATORY (INHALATION) at 11:10

## 2025-05-28 RX ADMIN — Medication 1000 MCG: at 15:53

## 2025-05-28 RX ADMIN — IPRATROPIUM BROMIDE AND ALBUTEROL SULFATE 3 ML: .5; 3 SOLUTION RESPIRATORY (INHALATION) at 18:30

## 2025-05-28 RX ADMIN — PANTOPRAZOLE SODIUM 40 MG: 40 TABLET, DELAYED RELEASE ORAL at 20:40

## 2025-05-28 RX ADMIN — SUMATRIPTAN SUCCINATE 25 MG: 25 TABLET ORAL at 15:53

## 2025-05-28 RX ADMIN — BUDESONIDE 0.5 MG: 0.5 SUSPENSION RESPIRATORY (INHALATION) at 18:25

## 2025-05-28 RX ADMIN — GABAPENTIN 300 MG: 300 CAPSULE ORAL at 20:40

## 2025-05-28 RX ADMIN — APIXABAN 2.5 MG: 2.5 TABLET, FILM COATED ORAL at 20:40

## 2025-05-28 RX ADMIN — ATORVASTATIN CALCIUM 10 MG: 10 TABLET ORAL at 08:18

## 2025-05-28 RX ADMIN — FLUOXETINE 60 MG: 20 CAPSULE ORAL at 08:17

## 2025-05-28 RX ADMIN — NYSTATIN 500000 UNITS: 100000 SUSPENSION ORAL at 17:52

## 2025-05-28 RX ADMIN — IPRATROPIUM BROMIDE AND ALBUTEROL SULFATE 3 ML: .5; 3 SOLUTION RESPIRATORY (INHALATION) at 06:55

## 2025-05-28 RX ADMIN — METOPROLOL SUCCINATE 12.5 MG: 25 TABLET, EXTENDED RELEASE ORAL at 08:17

## 2025-05-28 RX ADMIN — GABAPENTIN 300 MG: 300 CAPSULE ORAL at 08:18

## 2025-05-28 RX ADMIN — OXYCODONE 5 MG: 5 TABLET ORAL at 15:53

## 2025-05-28 RX ADMIN — DOCUSATE SODIUM 100 MG: 100 CAPSULE, LIQUID FILLED ORAL at 20:40

## 2025-05-28 RX ADMIN — ACETYLCYSTEINE 2 ML: 200 SOLUTION ORAL; RESPIRATORY (INHALATION) at 18:35

## 2025-05-28 RX ADMIN — BUMETANIDE 2 MG: 0.25 INJECTION INTRAMUSCULAR; INTRAVENOUS at 13:29

## 2025-05-28 RX ADMIN — APIXABAN 2.5 MG: 2.5 TABLET, FILM COATED ORAL at 08:17

## 2025-05-28 RX ADMIN — Medication 10 ML: at 08:17

## 2025-05-28 RX ADMIN — Medication 1000 UNITS: at 15:53

## 2025-05-28 RX ADMIN — GABAPENTIN 300 MG: 300 CAPSULE ORAL at 15:53

## 2025-05-28 RX ADMIN — BUMETANIDE 2 MG: 1 TABLET ORAL at 08:17

## 2025-05-28 RX ADMIN — Medication 10 ML: at 20:41

## 2025-05-28 RX ADMIN — OXYCODONE 5 MG: 5 TABLET ORAL at 03:54

## 2025-05-28 RX ADMIN — ACETYLCYSTEINE 2 ML: 200 SOLUTION ORAL; RESPIRATORY (INHALATION) at 06:50

## 2025-05-28 RX ADMIN — BUDESONIDE 0.5 MG: 0.5 SUSPENSION RESPIRATORY (INHALATION) at 07:00

## 2025-05-28 RX ADMIN — POLYETHYLENE GLYCOL 3350 17 G: 17 POWDER, FOR SOLUTION ORAL at 08:16

## 2025-05-28 RX ADMIN — LEVOTHYROXINE SODIUM 137 MCG: 0.11 TABLET ORAL at 05:11

## 2025-05-28 NOTE — PLAN OF CARE
Goal Outcome Evaluation:   No requests for pain meds. Lethargic but easily aroused. Turning Q2. Call light within reach, plan of care on going.

## 2025-05-28 NOTE — THERAPY TREATMENT NOTE
Subjective: Pt agreeable to therapeutic plan of care.  Cognition: arousal/alertness: Alert and Attentive    Objective:     Precautions - Falls, WBAT LUE, O2     Bed Mobility: Max-A and Assist x 2   Functional Transfers: Max-A and Assist x 2, unable to come to full standing  Balance: sitting EOB and unsupported CGA and Min-A  Functional Ambulation: N/A or Not attempted.    Feeding: Mod-A  ADL Position: supine with HOB elevated  ADL Comments: Pt noted with difficulty raising RUE to bring drink to mouth, requiring mod A    Grooming: Max-A  ADL Position: edge of bed sitting  ADL Comments: hair hygiene    Therapeutic Exercise - 10 Reps B UE digits, AROM unsupported sitting / EOB, pt with significant edema in LUE    Vitals: WNL, 7L O2 NC     Pain: 10 VAS Location: migraine   Interventions for pain: Repositioned, RN provided medication, and Therapeutic Presence  Education: Provided education on the importance of mobility in the acute care setting, Verbal/Tactile Cues, ADL training, and Transfer Training    Assessment: Claudia TOVAR Walker presents with ADL impairments affecting function including balance, endurance / activity tolerance, pain, postural / trunk control, range of motion (ROM), and strength. Pt lethargic upon room entry, reports of migraine. Pt noted with increased alertness with exertion. Appears to be self limiting this date, inconsistent effort. Pt is WBAT in LUE, however observed with difficulty raising RUE to drink from cup requiring mod/max assist. Pt requires max A x2 to complete bed mobility and x2 STS, unable to come to full standing. Demonstrated functioning below baseline abilities indicate the need for continued skilled intervention while inpatient. Tolerating session today without incident. Will continue to follow and progress as tolerated.     Plan/Recommendations:   Moderate Intensity Therapy recommended post-acute care. This is recommended as therapy feels the patient would require 3-4 days per week  "and wouldn't tolerate \"3 hour daily\" rehab intensity. SNF would be the preferred choice. If the patient does not agree to SNF, arrange HH or OP depending on home bound status. If patient is medically complex, consider LTACH.    Pt desires Skilled Rehab placement at discharge. Pt cooperative; agreeable to therapeutic recommendations and plan of care.     Modified Garden: N/A = No pre-op stroke/TIA    Post-Tx Position: Supine with HOB Elevated, Alarms activated, and Call light and personal items within reach, spouse in room  PPE: gloves    Therapy Charges for Today       Code Description Service Date Service Provider Modifiers Qty    91979267495 HC OT SELF CARE/MGMT/TRAIN EA 15 MIN 5/28/2025 Emmanuel Clinton OT GO 1    [id number]  OT THERAPEUTIC ACT EA 15 MIN 5/28/2025 Emmanuel Clinton OT GO 1    [id number]  OT THER PROC EA 15 MIN 5/28/2025 Emmanuel Clinton, OT GO 1           Time Calculation- OT       Row Name 05/28/25 1618             Time Calculation- OT    OT Start Time 1535  -SP      OT Stop Time 1602  -SP      OT Time Calculation (min) 27 min  -SP      Total Timed Code Minutes- OT 27 minute(s)  -SP      OT Received On 05/28/25  -SP      OT - Next Appointment 05/29/25  -SP                User Key  (r) = Recorded By, (t) = Taken By, (c) = Cosigned By      Initials Name Provider Type    SP Emmanuel Clinton, OT Occupational Therapist                   "

## 2025-05-28 NOTE — PLAN OF CARE
Goal Outcome Evaluation:      Patient doing well, pain treated per the MAR. Patient able to work with PT/OT this shift. Patient complains of migraine, medication re-ordered. Patient has call light within reach and is able to make needs known.

## 2025-05-28 NOTE — PROGRESS NOTES
Cardiology Progress Note    Patient Identification:  Name: Claudia Rust  Age: 74 y.o.  Sex: female  :  1950  MRN: [id number]                 Follow Up / Chief Complaint:  Pre op Evaluation, Valvular disease   Chief Complaint   Patient presents with    Arm Injury       Interval History:  Patient presented with mechanical fall with left arm injury underwent IMN of left humerus     NP NOTE:  Patient seen with Dr. Levi this morning.  Patient sitting up in bed. Down to 6L HF oxygen from 10 L yesterday.  Patient has significant swelling in her left hand.   Continue IV diuretics for now. Monitor output and renal function closely.     Electronically signed by SARA Moore, 25, 11:19 AM EDT.      Cardiology attending addendum :    I have personally performed a face-to-face diagnostic evaluation, physical exam and reviewed data on this patient.  I have reviewed documentation done by me and nurse practitioner  and corrected as needed.  And agree with the different components of documentation.Greater than 50% of the time spent in the care of this patient was provided by attending consultant/me.          Subjective: Patient seen and examined.  Chart reviewed.  Labs reviewed.  Discussed with RN taking care of patient.  Patient denies any chest pain.  Was on 10 L of oxygen yesterday has come down to 6 L today.    Objective:  2025: Sodium 146 potassium 4.2 BUN 34 creatinine 0.92 glucose 185 WBC 13.72 hemoglobin 10.5  pro bnp 4722  2025: pro bnp 4279 sodium 148, bun 42 creatinine 1.06 glucose 185 WBC 13.82 hgb 10.2 CXR pending     History of present illness:        Ms. Claudia Rust has of [facility]      #Valvular heart disease with moderate to severe aortic stenosis  Cardiac cath 2021 revealing nonobstructive CAD  Patient underwent #23 magna pericardial prosthesis AVR 3/18/2021  Transesophageal echo 3/25/2021 normal LV systolic function  # Hyperlipidemia,    # Hypertension  # COPD, KARLY on CPAP  #  Prediabetes  #History of HIT, history of DVT RUE   # Hypothyroidism, Rheumatoid Arthritis, spinal stenosis, hiatal hernia, chronic depression, bipolar, GERD, IBS, chronic migraine, vertigo, herniated disc, compression lumbar fractures  #Allergies/intolerance to Stadol  # Hysterectomy, bladder reconstruction, cholecystectomy, right hand surgery  # Family history of strokes and grandfather.  Mother's cancer, father on  # Active cigarette smoker trying to quit         Presented with mechanical fall and left arm injury underwent surgery for IM nailing of left humerus         Review of records reveals:  Echocardiogram which is revealing severe aortic stenosis on 2/11/2020 ,Underwent cardiac cath 1/20/2021 which revealed no obstructive disease,underwent aortic valve replacement 3/18/2021 with #23 magna pericardial prosthesis.       Carotid Dopplers 9/13/2019 normal.  Echocardiogram 2/11/2020 is revealing severe aortic stenosis  Work-up here on 3/16/2021 revealed proBNP 733, normal CMP, A1c 5.7, normal CBC, chest x-ray with no acute abnormality.  Lexiscan Cardiolite 2/28/2023 negative for ischemia EF of 86%  Echo 10/14/2023 reveals normal LV systolic function with concentric LVH, dilated RV and left atrium, normal bioprosthetic aortic valve mild MR and TR        Labs 11/7/2023 reveal lipid profile with cholesterol 131, triglycerides 141, HDL 38, LDL 68.  CMP with a creatinine of 1.12 and EGFR 52.        Assessment:        Fall and left humerus fracture s/p surgery    Bradycardia  Valvular heart disease, AS, s/p AVR with #23 magna pericardial prosthesis 3/18/2021     Acute on chronic chronic congestive heart failure  due to valvular heart disease and aortic stenosis hypertensive cardiovascular disease essential hypertension/hypertensive cardiovascular disease      Dyslipidemia     diabetes  Morbid obesity with BMI over 50  KARLY  Cigarette smoker  TCP HIT positive on 3/26/2021, + RUE DVT  previously on  "warfarin  Diabetes  CKD     Plan:    Will continue IV diuretics as tolerated  Monitor urine output, electrolytes, renal function and telemetry closely.  Continue medical management with Eliquis, simvastatin/atorvastatin, Bumex and metoprolol as tolerated  Follow-up with nephrology  Patient is on anticoagulation for DVT  Monitor hemoglobin      Copied text in this portion of the note has been reviewed and is accurate as of 5/28/2025    Past Medical History:  Past Medical History:   Diagnosis Date    Acute congestive heart failure     Allergies     Anxiety     Arthritis     Asthma     Bilateral leg edema     Bipolar 1 disorder     Bulging lumbar disc     X3    Cancer     states had \"cancerous tumor during pregnancy and had to have hysterectomy\"    Cataract     bilateral    Cellulitis 03/2021    bilateral legs    Compression fracture of vertebral column     LUMBAR    COPD (chronic obstructive pulmonary disease)     Delayed emergence from anesthesia     Depression     Diabetes mellitus     Dyslipidemia     Dyspnea on exertion     Fibromyalgia     GERD (gastroesophageal reflux disease)     Hiatal hernia     Hyperlipidemia     Hypertension     Hypothyroid     HYPOTHYROID    IBS (irritable bowel syndrome)     Migraines     Morbid obesity     Neuropathy     Panic attacks     Peripheral edema     PONV (postoperative nausea and vomiting)     Poor mobility     rolling walker    Prediabetes     PVD (peripheral vascular disease)     Rheumatoid aortitis     Sleep apnea     cpap    Spinal stenosis     Vertigo     Vitamin D deficiency      Past Surgical History:  Past Surgical History:   Procedure Laterality Date    AORTIC VALVE REPAIR/REPLACEMENT N/A 3/18/2021    Procedure: AORTIC VALVE REPLACEMENT;  Surgeon: Olaf Mcgill MD;  Location: [facility];  Service: Cardiothoracic;  Laterality: N/A;    BRONCHOSCOPY N/A 3/25/2021    Procedure: BRONCHOSCOPY AT BEDSIDE WITH BRONCHIOALVEOLAR LAVAGE;  Surgeon: Glenn Reyes MD;  Location: " [facility] ENDOSCOPY;  Service: Pulmonary;  Laterality: N/A;  PNA    BRONCHOSCOPY N/A 10/16/2023    Procedure: BRONCHOSCOPY with bronchial washing;  Surgeon: Glenn Reyes MD;  Location: [facility] ENDOSCOPY;  Service: Pulmonary;  Laterality: N/A;  post op: pneumonia    CARDIAC CATHETERIZATION  2009    CARDIAC CATHETERIZATION N/A 8/14/2019    Procedure: Left Heart Cath;  Surgeon: Jose Levi MD;  Location: [facility] CATH INVASIVE LOCATION;  Service: Cardiovascular    CARDIAC CATHETERIZATION N/A 8/14/2019    Procedure: Right Heart Cath;  Surgeon: Jose Levi MD;  Location: [facility] CATH INVASIVE LOCATION;  Service: Cardiovascular    CARDIAC CATHETERIZATION N/A 8/14/2019    Procedure: Aortic root aortogram;  Surgeon: Jose Levi MD;  Location: [facility] CATH INVASIVE LOCATION;  Service: Cardiovascular    CARDIAC CATHETERIZATION N/A 1/20/2021    Procedure: Left Heart Cath;  Surgeon: Jose Levi MD;  Location: [facility] CATH INVASIVE LOCATION;  Service: Cardiovascular;  Laterality: N/A;    COLONOSCOPY      COLONOSCOPY N/A 2/14/2025    Procedure: COLONOSCOPY WITH POLYPECTOMY X 4;  Surgeon: Segun Cleveland MD;  Location: [facility] ENDOSCOPY;  Service: Gastroenterology;  Laterality: N/A;  POST- POLYPS X 4, DIVERTICULOSIS, INTERNAL HEMORRHOIDS    CYSTOCELE REPAIR  1984    ELBOW ARTHROPLASTY  2011    ENDOSCOPY      FOOT SURGERY      GALLBLADDER SURGERY  2002    HUMERUS IM NAILING/RODDING Left 5/25/2025    Procedure: HUMERUS INTRAMEDULLARY NAIL/JONO INSERTION LEFT;  Surgeon: Rufus Peralta MD;  Location: [facility] MAIN OR;  Service: Orthopedics;  Laterality: Left;    TONSILLECTOMY      VAGINAL HYSTERECTOMY  1972        Social History:   Social History     Tobacco Use    Smoking status: Former     Current packs/day: 0.00     Types: Cigarettes     Quit date: 2022     Years since quitting: 3.4     Passive exposure: Never    Smokeless tobacco: Never    Tobacco comments:     decrease now  "and do not smoke dos   Substance Use Topics    Alcohol use: Not Currently     Comment: very rare      Family History:  Family History   Problem Relation Age of Onset    Ovarian cancer Mother     Lung cancer Mother     Esophageal cancer Mother     Hypertension Father     Diabetes Maternal Grandmother           Allergies:  Allergies   Allergen Reactions    Adhesive Tape Unknown (See Comments)     hives    Benadryl [Diphenhydramine] Other (See Comments)     Pt and  say she cannot take. \"will cause stroke\"    Butorphanol Other (See Comments)     Chest pain difficulty breathing throat swelling, STADAL    Garlic Other (See Comments)     Chest pain difficulty breathing throat swells    Heparin Other (See Comments)     HIT +    Tetanus-Diphtheria Toxoids Td Other (See Comments)     Dizziness,  vomiting    Aloe Itching    Bee Venom Other (See Comments)     Swelling eyes and lips hand swelling    Latex Itching    Macadamia Nut Oil Other (See Comments)     Chest pain difficulty breathing throat swelling    Tuberculin, Ppd Unknown (See Comments)     reactor    Wasp Venom Other (See Comments)     Swelling eyes lips and hand     Scheduled Meds:  acetylcysteine, 2 mL, BID - RT  apixaban, 2.5 mg, Q12H  atorvastatin, 10 mg, Daily  budesonide, 0.5 mg, BID - RT  bumetanide, 2 mg, BID Diuretics  docusate sodium, 100 mg, Nightly  FLUoxetine, 60 mg, Daily  gabapentin, 300 mg, TID  ipratropium-albuterol, 3 mL, 4x Daily - RT  levothyroxine, 137 mcg, Q AM  metoprolol succinate XL, 12.5 mg, Daily  nystatin, 5 mL, 4x Daily  pantoprazole, 40 mg, Nightly  polyethylene glycol, 17 g, Daily  sodium chloride, 10 mL, Q12H          Review of Systems:   ROS  Review of Systems   Constitution: Negative for chills and fever.   Cardiovascular: Negative for chest pain and palpitations.   Respiratory: Negative for cough and hemoptysis.    Gastrointestinal: Negative for nausea.        Constitutional:  Temp:  [98.4 °F (36.9 °C)-99.9 °F (37.7 °C)] 98.4 " "°F (36.9 °C)  Heart Rate:  [91-99] 93  Resp:  [23-28] 27  BP: (135-146)/(69-76) 135/69    Physical Exam   /69 (BP Location: Right arm, Patient Position: Lying)   Pulse 93   Temp 98.4 °F (36.9 °C) (Oral)   Resp 27   Ht 162.6 cm (64\")   Wt (!) 137 kg (302 lb)   SpO2 94%   BMI 51.84 kg/m²   General:  Appears in no acute distress  Eyes: Sclera is anicteric,  conjunctiva is clear   HEENT:  No JVD. Thyroid not visibly enlarged. No mucosal pallor or cyanosis  Respiratory: Respirations increased, bibasilar crackles  Cardiovascular: S1,S2 Regular rate and rhythm.  1+ edema  Gastrointestinal: Abdomen nondistended.  Musculoskeletal:  No abnormal movements  Extremities: No digital clubbing or cyanosis  Skin: Color pink.   Neuro: Alert and awake.    INTAKE AND OUTPUT:    Intake/Output Summary (Last 24 hours) at 5/28/2025 1238  Last data filed at 5/28/2025 1031  Gross per 24 hour   Intake 358 ml   Output 3150 ml   Net -2792 ml       Cardiographics  Telemetry: Reviewed and interpreted by me reveals sinus rhythm    ECG:   ECG 12 Lead Pre-Op / Pre-Procedure   Final Result   HEART RATE=78  bpm   RR Nlwohzpk=496  ms   WA Aoiefiil=539  ms   P Horizontal Axis=39  deg   P Front Axis=44  deg   QRSD Interval=95  ms   QT Lsrjdpff=564  ms   IYiW=708  ms   QRS Axis=73  deg   T Wave Axis=71  deg   - OTHERWISE NORMAL ECG -   Sinus rhythm   Low voltage, precordial leads   When compared with ECG of 14-Oct-2023 00:05:27,   No significant change   Electronically Signed By: Kaz Arnett (IAIN) 2025-05-25 22:19:23   Date and Time of Study:2025-05-24 15:26:20      Telemetry Scan   Final Result      Telemetry Scan   Final Result      Telemetry Scan   Final Result      Telemetry Scan   Final Result      Telemetry Scan   Final Result      Telemetry Scan   Final Result      Telemetry Scan   Final Result      Telemetry Scan   Final Result      Telemetry Scan   Final Result      Telemetry Scan   Final Result      Telemetry Scan   Final " Result      Telemetry Scan   Final Result      Telemetry Scan   Final Result      Telemetry Scan   Final Result      Telemetry Scan   Final Result      Telemetry Scan   Final Result        I have personally reviewed EKG    Echocardiogram: Results for orders placed during the hospital encounter of 25    Adult Transthoracic Echo Limited W/ Cont if Necessary Per Protocol    Interpretation Summary    Left ventricular ejection fraction appears to be 61 - 65%.    Left ventricular diastolic function was not assessed.    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    Estimated right ventricular systolic pressure from tricuspid regurgitation is normal (<35 mmHg).    Imaging is limited by left arm being in sling.      STRESS TEST  Results for orders placed during the hospital encounter of 23    Stress Test With Myocardial Perfusion One Day    Interpretation Summary  LEXISCAN CARDIOLITE REPORT    DATE OF PROCEDURE: 2023    INDICATION FOR PROCEDURE: Numbness and tingling of left hand, dyspnea on exertion, chronic CHF, AVR, edema, dyslipidemia, hypertension, obesity with BMI over 45    PROCEDURE PERFORMED: Lexiscan Cardiolite    PROCEDURE COMMENTS:    After informed consent was obtained.  Patient's resting heart rate was 68 bpm, resting blood pressure was 144/82, resting EKG revealed sinus rhythm with rate of 68 bpm.  Patient was given 0.4 mg of regadenosine for stress testing.  There was no significant change in heart rate, blood pressure, symptoms with regadenoson injection.  Patient tolerated procedure well.  Complications were none.    NUCLEAR IMAGIN.  There was  uniform uptake of Cardiolite both in resting and post stress images, no ischemia seen.  2.  Gated images reveal  normal LV size and contractility, LVEF of 86%.    CONCLUSION:  1.  Lexiscan Cardiolite with normal perfusion, negative for ischemia.  2.  Normal wall motion.  LVEF of 86%.    RECOMMENDATIONS:    Clinical correlation recommended.      Jose  Rigo Levi MD  23  20:24 EST      HEART CATHETERIZATION  Results for orders placed during the hospital encounter of 21    Cardiac Catheterization/Vascular Study    Narrative  Table formatting from the original result was not included.  2021      Heart Cath Report    NAME:              Claudia Rust  :                1950  AGE/SEX:        70 y.o. female  MRN:                [id number]        Procedures Performed    1. Bilateral selective coronary arteriogram    :   Jose Levi MD    Vascular Access Site: Femoral    Indication for procedure: 70-year-old with severe critical  aortic stenosis and history of CHF, obesity, dyslipidemia, prediabetes      Procedure Note    After discussing the risks, benefits, and alternatives of the procedure, informed consent was obtained.  Moderate conscious sedation was given utilizing IV Versed and fentanyl administered by RN with continuous EKG oximetry and hemodynamic monitoring supervised by me throughout the entire case, conscious sedation time was 30 minutes.  I was present with the patient for the duration of moderate sedation and supervised staff who had no other duties and monitored the patient for the entire procedure patient had Moise 2-3 sedation scale. the vascular access site was prepped and draped in the usual sterile fashion.  2% lidocaine was used for local anesthesia. Appropriate landmarks were assessed.  A 6 [place] short sheath was inserted in the artery using the modified Seldinger technique.    Selective coronary angiography was performed with JL4 and JR4 diagnostic catheters. Left ventriculogram  was not performed because patient has severe aortic stenosis..  All exchanges were performed over the wire.  No specimens were removed.  There were no apparent acute or early complications.  The patient tolerated the procedure well and was transferred to the recovery area in stable condition.    Artery hemostasis  will be achieved with  manual pressure in OPCV.      Complications:  None  Blood Loss: minimal    Hemodynamics    Pressures    Ao:    138/62 mmHg      Coronary Angiography    Left Main :  The left main   is without disease    Left Anterior Descending : Is tortuous vessel, without disease    Left Circumflex : Tortuous vessel ends up with 3 branches which are tortuous without disease    Right Coronary Artery :  The right coronary artery   dominant vessel without disease    Dominance:  []  Left  [x]  Right  []  Co-Dominant        Impression:    1. No obstructive CAD    Recommendations:    1. Aortic valve replacement evaluation by CT VS.  Patient had echo showing severe critical aortic stenosis        I sincerely appreciate the opportunity to participate in your patient's care. Please feel free to contact me anytime if I can be of assistance in this or any other way.      Pertinent History    Past Medical History:  Diagnosis Date   Acute congestive heart failure (CMS/[facility])   Asthma   Bipolar 1 disorder (CMS/[facility])   COPD (chronic obstructive pulmonary disease) (CMS/[facility])   Depression   Dyslipidemia   Fibromyalgia   Hypertension   Hypothyroid   IBS (irritable bowel syndrome)   Migraines   Morbid obesity (CMS/[facility])   Neuropathy   Spinal stenosis   Vertigo    Past Surgical History:  Procedure Laterality Date   CARDIAC CATHETERIZATION  2009   CARDIAC CATHETERIZATION N/A 8/14/2019  Procedure: Left Heart Cath;  Surgeon: Jose Levi MD;  Location: [facility] CATH INVASIVE LOCATION;  Service: Cardiovascular   CARDIAC CATHETERIZATION N/A 8/14/2019  Procedure: Right Heart Cath;  Surgeon: Jose Levi MD;  Location: [facility] CATH INVASIVE LOCATION;  Service: Cardiovascular   CARDIAC CATHETERIZATION N/A 8/14/2019  Procedure: Aortic root aortogram;  Surgeon: Jose Levi MD;  Location: [facility] CATH INVASIVE LOCATION;  Service: Cardiovascular   CYSTOCELE REPAIR  1984   ELBOW ARTHROPLASTY  2011   FOOT  SURGERY   GALLBLADDER SURGERY  2002   VAGINAL HYSTERECTOMY  1972    Prior to Admission medications  Medication Sig Start Date End Date Taking? Authorizing Provider  aspirin 81 MG tablet Take 81 mg by mouth Every Night. 2/3/15  Yes Krissy Hodgson MD  B Complex Vitamins (VITAMIN B COMPLEX) capsule capsule Take 1 capsule by mouth Daily.   Yes Krissy Hodgson MD  Calcium Carbonate (CALCIUM 500 PO) Take 1,500 mg by mouth Daily.   Yes Krissy Hodgson MD  Cholecalciferol (VITAMIN D-1000 MAX ST) 1000 units tablet Take 2,000 Units by mouth Daily. 9/16/16  Yes Krissy Hodgson MD  diphenhydrAMINE (BENADRYL) 50 MG capsule Take 50 mg by mouth every night at bedtime.   Yes Krissy Hodgson MD  FLUoxetine (PROzac) 60 MG tablet Take 60 mg by mouth Daily. 4/19/19  Yes Krissy Hodgson MD  fluticasone (FLONASE) 50 MCG/ACT nasal spray 2 sprays into the nostril(s) as directed by provider 2 (two) times a day.   Yes Krissy Hodgson MD  furosemide (LASIX) 40 MG tablet Take 40 mg by mouth 2 (Two) Times a Day. 2/5/15  Yes Krissy Hodgson MD  gabapentin (NEURONTIN) 600 MG tablet Take 400 mg by mouth 3 (Three) Times a Day. 7/30/19  Yes Krissy Hodgson MD  HYDROcodone-acetaminophen (NORCO)  MG per tablet Take 1 tablet by mouth Daily. Noon an prm   Yes Krissy Hodgson MD  levothyroxine (SYNTHROID, LEVOTHROID) 100 MCG tablet Take 100 mcg by mouth Daily. 7/30/15  Yes Krissy Hodgson MD  metoprolol succinate XL (TOPROL-XL) 25 MG 24 hr tablet Take 25 mg by mouth Daily.   Yes Krissy Hodgson MD  Multiple Vitamins-Minerals (MULTIVITAMIN WITH MINERALS) tablet tablet Take 1 tablet by mouth Daily.   Yes Krissy Hodgson MD  ondansetron (ZOFRAN) 4 MG tablet Take 4 mg by mouth Every 8 (Eight) Hours As Needed for Nausea or Vomiting.   Yes Krissy Hodgson MD  pantoprazole (PROTONIX) 40 MG EC tablet Take 40 mg by mouth Daily.   Yes Krissy Hodgson MD  potassium  chloride (KLOR-CON) 20 MEQ CR tablet Take 20 mEq by mouth 2 (Two) Times a Day. 7/30/15  Yes Krissy Hodgson MD  simvastatin (ZOCOR) 20 MG tablet Take 20 mg by mouth Daily. 2/5/15  Yes Krissy Hodgson MD  Acetaminophen (TYLENOL PO) Take 1,000 mg by mouth Every 4 (Four) Hours As Needed.    Krissy Hodgson MD  albuterol (PROVENTIL) (2.5 MG/3ML) 0.083% nebulizer solution Inhale 2.5 mg Every 6 (Six) Hours As Needed. 2/3/15   Krissy Hodgson MD  aluminum-magnesium hydroxide-simethicone (MAALOX/MYLANTA) 200-200-20 MG/5ML suspension Take 15 mL by mouth Every 6 (Six) Hours As Needed for Indigestion or Heartburn.    Krissy Hodgson MD  meclizine (ANTIVERT) 25 MG tablet Take 25 mg by mouth 3 (Three) Times a Day As Needed. 7/30/15   Krissy Hodgson MD  polyethylene glycol (MIRALAX) powder Take 17 g by mouth Daily As Needed. 11/9/16   Krissy Hodgson MD  promethazine (PHENERGAN) 25 MG tablet Take 25 mg by mouth Every 6 (Six) Hours As Needed for Nausea or Vomiting.    Krissy Hodgson MD  rizatriptan MLT (MAXALT-MLT) 10 MG disintegrating tablet Take 10 mg by mouth 1 (One) Time As Needed for Migraine. May repeat in 2 hours if needed    Krissy Hodgson MD  tiZANidine (ZANAFLEX) 4 MG tablet Take 4 mg by mouth Every 8 (Eight) Hours As Needed for Muscle Spasms.    Krissy Hodgson MD  acetaminophen (TYLENOL) 325 MG tablet Take 650 mg by mouth Every 6 (Six) Hours As Needed for Mild Pain .  1/20/21  Krissy Hodgson MD  Calcium Carbonate ([employer] Calcium) 1500 (600 Ca) MG tablet Take 600 mg by mouth Daily.  1/20/21  Krissy Hodgson MD  metoprolol succinate XL (TOPROL-XL) 25 MG 24 hr tablet TAKE 1 TABLET BY MOUTH EVERY DAY  Patient taking differently: 25 mg. 11/17/20 1/20/21  Jose Levi MD  ondansetron ODT (ZOFRAN-ODT) 4 MG disintegrating tablet 4 mg Every 8 (Eight) Hours As Needed. 1/6/21 1/20/21  Provider, MD Krissy      Pre-Procedure Notes  H&P  Performed  [x]  Yes []  No       []  N/A    Indications:  []  ACS <= 24 HRS  []  ACS >24 HRS  []  New Onset Angina <= 2 mos  []  Worsening Angina  []  Resuscitated Cardiac Arrest  []  Angina on Exertion:  []  Suspected CAD  []  Valvular Disease  []  Pericardial Disease  []  Cardiac Arrythmia  []  Cardiomyopathy  []  LV Dysfunction  []  Syncope  []  Post Cardiac Transplant  []  Eval. For Exercise Clearance  []  Other  []  Pre-Operative Evaluation  If Pre-Op Eval:  Evaluation for Surgery Type:  []  Cardiac Surgery   []  Non-Cardiac Surgery  Functional Capacity:  []  <4 METS  []  >=4 METS w/o symptoms  []  >= 4 METS with symptoms  []  Unknown  Surgical Risk:  []  Low  []  Intermediate  []  High Risk: Vascular  []  High Risk Non-Vascular    Risks, Benefits, & Complications Discussed:  [x]  Yes  []  No  []  N/A    Questions Answered:  [x]  Yes  []  No  []  N/A    Consent Obtained:  [x]  Yes  []  No  []  N/A    CHF: [x]  Yes  []  No  If Yes:  Newly Diagnosed?  []  Yes  [x]  No  If Yes:  HF Type:  []  Diastolic  []  Systolic  []  Unknown      Jose Levi MD  1/20/2021  12:55 EST  Electronically signed by Jose Levi MD, 01/20/21, 12:55 PM EST.      Lab Review   I have reviewed the labs      Results from last 7 days   Lab Units 05/24/25  0319   MAGNESIUM mg/dL 2.4     Results from last 7 days   Lab Units 05/28/25  0349   SODIUM mmol/L 148*   POTASSIUM mmol/L 3.8   BUN mg/dL 42.1*   CREATININE mg/dL 1.06*   CALCIUM mg/dL 9.0         Results from last 7 days   Lab Units 05/28/25  0349 05/27/25  1003 05/26/25  0357   WBC 10*3/mm3 13.82* 13.72* 15.18*   HEMOGLOBIN g/dL 10.2* 10.5* 10.8*   HEMATOCRIT % 32.9* 35.0 35.1   PLATELETS 10*3/mm3 198 202 195     Results from last 7 days   Lab Units 05/24/25  0319 05/23/25  2258   INR  1.18* 1.16*   APTT seconds  --  29.1       RADIOLOGY:  Imaging Results (Last 24 Hours)       Procedure Component Value Units Date/Time    XR Chest 1 View [[id number]] Collected:  "05/28/25 1145     Updated: 05/28/25 1148    Narrative:      XR CHEST 1 VW    Date of Exam: 5/28/2025 10:57 AM EDT    Indication: hypoxia evaluation    Comparison: AP chest 5/26/2025.    Findings:  Moderate generalized cardiomediastinal enlargement with median sternotomy and cardiac valve replacement, similar to prior. Central pulmonary vasculature is enlarged and indistinct. Fine interstitial thickening is present in both lungs. More confluent   alveolar opacity suggested in the retrocardiac left lower lobe, as before. No pneumothorax or acute osseous abnormality.      Impression:      Impression:  No significant change. Features suggestive of generalized interstitial edema with more confluent edema, atelectasis or pneumonia in the left lower lobe.  Stable cardiac enlargement.      Electronically Signed: Yue Miller MD    5/28/2025 11:46 AM EDT    Workstation ID: [id number]                  )5/28/2025  Jose Levi MD      [facility] ExactFlat/Transcription:   \"Dictated utilizing Dragon dictation\".   "

## 2025-05-28 NOTE — PROGRESS NOTES
[facility] MEDICINE SERVICE  DAILY PROGRESS NOTE    NAME: Claudia Rust  : 1950  MRN: [id number]      LOS: 2 days     PROVIDER OF SERVICE: Osman Pepper MD    Chief Complaint: Humeral fracture    Subjective:     Interval History:  History taken from: patient    Doing well this morning. Denies new acute complaints.        Review of Systems:   Review of Systems   All other systems reviewed and are negative.      Objective:     Vital Signs  Temp:  [98.4 °F (36.9 °C)-99.9 °F (37.7 °C)] 98.4 °F (36.9 °C)  Heart Rate:  [91-99] 95  Resp:  [23-28] 27  BP: (131-146)/(69-77) 131/77  Flow (L/min) (Oxygen Therapy):  [9.5-10] 9.5   Body mass index is 51.84 kg/m².    Physical Exam  Physical Exam  Constitutional:       Appearance: She is obese.   HENT:      Head: Normocephalic.   Cardiovascular:      Rate and Rhythm: Normal rate and regular rhythm.   Pulmonary:      Effort: Pulmonary effort is normal.      Breath sounds: Normal breath sounds.      Comments: Coarse breath sounds  Abdominal:      General: Bowel sounds are normal.      Palpations: Abdomen is soft.      Tenderness: There is no abdominal tenderness.   Musculoskeletal:         General: No swelling.   Neurological:      Mental Status: She is alert. Mental status is at baseline.      Comments: Somewhat confused and easily redirected            Diagnostic Data    Results from last 7 days   Lab Units 25  0349 25  1549 25  0357   WBC 10*3/mm3 13.82*   < > 15.18*   HEMOGLOBIN g/dL 10.2*   < > 10.8*   HEMATOCRIT % 32.9*   < > 35.1   PLATELETS 10*3/mm3 198   < > 195   GLUCOSE mg/dL 185*   < > 159*   CREATININE mg/dL 1.06*   < > 1.16*   BUN mg/dL 42.1*   < > 37*   SODIUM mmol/L 148*   < > 141   POTASSIUM mmol/L 3.8   < > 3.6   AST (SGOT) U/L  --   --  47*   ALT (SGPT) U/L  --   --  66*   ALK PHOS U/L  --   --  112   BILIRUBIN mg/dL  --   --  0.4   ANION GAP mmol/L 11.5   < > 7.4    < > = values in this interval not displayed.       XR Chest 1  View  Result Date: 5/28/2025  Impression: No significant change. Features suggestive of generalized interstitial edema with more confluent edema, atelectasis or pneumonia in the left lower lobe. Stable cardiac enlargement. Electronically Signed: Yue Miller MD  5/28/2025 11:46 AM EDT  Workstation ID: [id number]            Assessment:   Left humeral fracture  Obstructive sleep apnea  Acute on Chronic hypoxemic respiratory failure  Acute CHF exacerbation  Massive obesity  CKD stage III  Transaminitis  Constipation      Plan:     CHF with respiratory failure 2/2 pulmonary edema. Continued IV Bumex... down from 10L to 6L this AM with Bumex. Wean aggressively.    Appreciate cardiology input.      Labs reviewed. BNP very elevated.    I anticipate patient will require less than 30 days of skilled rehab.    Active and Resolved Problems  Active Hospital Problems    Diagnosis  POA    **Humeral fracture [S42.309A]  Yes      Resolved Hospital Problems   No resolved problems to display.       VTE Prophylaxis:  Pharmacologic VTE prophylaxis orders are present.             Disposition Planning:     Barriers to Discharge: O2 requirement, CHF, Pain control and discharge planning  Anticipated Date of Discharge: 5/30  Place of Discharge: home      Time: 30 minutes     Code Status and Medical Interventions: CPR (Attempt to Resuscitate); Full Support   Ordered at: 05/24/25 0116     Code Status (Patient has no pulse and is not breathing):    CPR (Attempt to Resuscitate)     Medical Interventions (Patient has pulse or is breathing):    Full Support       Signature: Electronically signed by Osman Pepper MD, 05/28/25, 14:43 EDT.  [facility] Hospitalist Team

## 2025-05-28 NOTE — PLAN OF CARE
"Assessment: Claudia Rust presents with functional mobility impairments which indicate the need for skilled intervention. Pt requires encouragement and education on importance of mobility while admitted. Unable to come to full stand from EOB, despite Max Ax2. Will continue to follow and progress as tolerated.     Plan/Recommendations:   If medically appropriate, Moderate Intensity Therapy recommended post-acute care. This is recommended as therapy feels the patient would require 3-4 days per week and wouldn't tolerate \"3 hour daily\" rehab intensity. SNF would be the preferred choice. If the patient does not agree to SNF, arrange HH or OP depending on home bound status. If patient is medically complex, consider LTACH. Pt requires no DME at discharge.     Pt desires Skilled Rehab placement at discharge. Pt cooperative; agreeable to therapeutic recommendations and plan of care.     "

## 2025-05-28 NOTE — THERAPY TREATMENT NOTE
"Subjective: Pt agreeable to therapeutic plan of care.    Objective:     Precautions - LUE WBAT     Bed mobility - Supine<>sitting Max Ax2    Balance - Unsupported sitting at EOB initially requiring Mod A. Pt able to progress to SBA, with intermittent lateral and posterior LOB, requiring Min A and cueing to lean forward to correct.     Transfers - x2 STS from elevated EOB Max Ax2 using RW. Pt unable to come to full stand, but able to briefly clear hips. Unable to utilize LUE on RW.     Ambulation - 0 feet N/A or Not attempted.    Therapeutic Exercise - 10 Reps B LE AROM unsupported sitting / EOB    Vitals: WNL on 7L    Pain: 9 VAS Location: LUE  Intervention for pain: Repositioned, Increased Activity, and Therapeutic Presence    Education: Provided education on the importance of mobility in the acute care setting, Verbal/Tactile Cues, Transfer Training, and WB'ing status    Assessment: Claudia Rust presents with functional mobility impairments which indicate the need for skilled intervention. Pt requires encouragement and education on importance of mobility while admitted. Unable to come to full stand from EOB, despite Max Ax2. Will continue to follow and progress as tolerated.     Plan/Recommendations:   If medically appropriate, Moderate Intensity Therapy recommended post-acute care. This is recommended as therapy feels the patient would require 3-4 days per week and wouldn't tolerate \"3 hour daily\" rehab intensity. SNF would be the preferred choice. If the patient does not agree to SNF, arrange HH or OP depending on home bound status. If patient is medically complex, consider LTACH. Pt requires no DME at discharge.     Pt desires Skilled Rehab placement at discharge. Pt cooperative; agreeable to therapeutic recommendations and plan of care.         Basic Mobility 6-click:  Rollin = Total, A lot = 2, A little = 3; 4 = None  Supine>Sit:   1 = Total, A lot = 2, A little = 3; 4 = None   Sit>Stand with " arms:  1 = Total, A lot = 2, A little = 3; 4 = None  Bed>Chair:   1 = Total, A lot = 2, A little = 3; 4 = None  Ambulate in room:  1 = Total, A lot = 2, A little = 3; 4 = None  3-5 Steps with railin = Total, A lot = 2, A little = 3; 4 = None  Score: 8    Modified [place]: N/A = No pre-op stroke/TIA    Post-Tx Position: Supine with HOB Elevated, Alarms activated, and Call light and personal items within reach  PPE: gloves    Therapy Charges for Today       Code Description Service Date Service Provider Modifiers Qty    17179878430 HC PT THERAPEUTIC ACT EA 15 MIN 2025 Cristhian Cosme, LEIF GP 1    06570472941 HC PT NEUROMUSC RE EDUCATION EA 15 MIN 2025 Cristhian Cosme, LEIF GP 1    77896494995 HC PT THER PROC EA 15 MIN 2025 Cristhian Cosme, LEIF GP 1           PT Charges       Row Name 25 1613             Time Calculation    Start Time 1536  -UN      Stop Time 1603  -UN      Time Calculation (min) 27 min  -UN      PT Received On 25  -UN      PT - Next Appointment 25  -UN         Time Calculation- PT    Total Timed Code Minutes- PT 27 minute(s)  -UN                User Key  (r) = Recorded By, (t) = Taken By, (c) = Cosigned By      Initials Name Provider Type    UN Cristhian Cosme PTA Physical Therapist Assistant

## 2025-05-28 NOTE — PLAN OF CARE
"Assessment: Claudia Rust presents with ADL impairments affecting function including balance, endurance / activity tolerance, pain, postural / trunk control, range of motion (ROM), and strength. Pt lethargic upon room entry, reports of migraine. Pt noted with increased alertness with exertion. Appears to be self limiting this date, inconsistent effort. Pt is WBAT in LUE, however observed with difficulty raising RUE to drink from cup requiring mod/max assist. Pt requires max A x2 to complete bed mobility and x2 STS, unable to come to full standing. Demonstrated functioning below baseline abilities indicate the need for continued skilled intervention while inpatient. Tolerating session today without incident. Will continue to follow and progress as tolerated.      Plan/Recommendations:   Moderate Intensity Therapy recommended post-acute care. This is recommended as therapy feels the patient would require 3-4 days per week and wouldn't tolerate \"3 hour daily\" rehab intensity. SNF would be the preferred choice. If the patient does not agree to SNF, arrange HH or OP depending on home bound status. If patient is medically complex, consider LTACH.  "

## 2025-05-28 NOTE — PROGRESS NOTES
Orthopaedic Progress Note     Pain well controlled. Dressing clean and dry swelling improving about the hand. On O2.     Hgb: 10.2   Vitals: p 86, 115/69, 94% on 8 L     NAD  Alert  Respirations are nonlabored on O2 uses at baseline at night   LUE   Dressing clean and dry  In sling   AIN PIN ULNAR nerve intact   Swelling about the hand and at her wedding rings which she requested that we did not cut off in the OR.   Sensation is intact distally   Foot is wwp   Compartments are soft and compressible     74 year old obese female with left proximal third humeral shaft fracture   POD 2 left humerus IMN   -WBAT LUE   -to chair if able  -pain control   -ice and elevation   -PT / OT   -dvt ppx as she is obese and has demonstrated she will have a difficult time mobilizing   -discussed with her the potential for cutting off her rings if her swelling at hand is not improved.   -discussed coming out of the sling while she is in bed to allow for elevation of the left upper extremity.   -dc and follow up info placed in the chart.   -please call/chat  with any questions or concerns.    Rufus Peralta MD   [facility]   [phone] - [place] [facility]  [phone] - [place] [facility]

## 2025-05-28 NOTE — PROGRESS NOTES
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, axillary, and brachial  Upper arm left DVT  DM  Hx of Ecoli UTI  Oral candidiasis  HTN  JERROD  DM2  Chronic anemia  HLD        Recommendations:     Oxygen titration currently requiring 7 L  Bronchodilators budesonide and DuoNeb  Mucomyst nebulized  Eliquis for DVT prophylaxis              LOS: 2 days     Subjective         Objective     Vital signs for last 24 hours:  Vitals:    05/28/25 0659 05/28/25 0700 05/28/25 0704 05/28/25 0755   BP:    135/69   BP Location:    Right arm   Patient Position:    Lying   Pulse: 95 93 91 98   Resp: 28 28 28 25   Temp:    98.4 °F (36.9 °C)   TempSrc:    Oral   SpO2: 93% 93% 94%    Weight:       Height:           Intake/Output last 3 shifts:  I/O last 3 completed shifts:  In: 238 [P.O.:238]  Out: 3300 [Urine:3300]  Intake/Output this shift:  No intake/output data recorded.      Radiology  Imaging Results (Last 24 Hours)       ** No results found for the last 24 hours. **            Labs:  Results from last 7 days   Lab Units 05/28/25  0349   WBC 10*3/mm3 13.82*   HEMOGLOBIN g/dL 10.2*   HEMATOCRIT % 32.9*   PLATELETS 10*3/mm3 198     Results from last 7 days   Lab Units 05/28/25  0349 05/26/25  1549 05/26/25  0357   SODIUM mmol/L 148*   < > 141   POTASSIUM mmol/L 3.8   < > 3.6   CHLORIDE mmol/L 106   < > 103   CO2 mmol/L 30.5*   < > 30.6*   BUN mg/dL 42.1*   < > 37*   CREATININE mg/dL 1.06*   < > 1.16*   CALCIUM mg/dL 9.0   < > 8.7   BILIRUBIN mg/dL  --   --  0.4   ALK PHOS U/L  --   --  112   ALT (SGPT) U/L  --   --  66*   AST  (SGOT) U/L  --   --  47*   GLUCOSE mg/dL 185*   < > 159*    < > = values in this interval not displayed.         Results from last 7 days   Lab Units 05/26/25  0357 05/25/25  0027 05/24/25  0319   ALBUMIN g/dL 3.3* 3.7 3.8             Results from last 7 days   Lab Units 05/24/25  0319   MAGNESIUM mg/dL 2.4     Results from last 7 days   Lab Units 05/24/25  0319 05/23/25  2258   INR  1.18* 1.16*   APTT seconds  --  29.1               Meds:   SCHEDULE  acetylcysteine, 2 mL, Nebulization, BID - RT  apixaban, 2.5 mg, Oral, Q12H  atorvastatin, 10 mg, Oral, Daily  budesonide, 0.5 mg, Nebulization, BID - RT  bumetanide, 2 mg, Oral, BID Diuretics  docusate sodium, 100 mg, Oral, Nightly  FLUoxetine, 60 mg, Oral, Daily  gabapentin, 300 mg, Oral, TID  ipratropium-albuterol, 3 mL, Nebulization, 4x Daily - RT  levothyroxine, 137 mcg, Oral, Q AM  metoprolol succinate XL, 12.5 mg, Oral, Daily  pantoprazole, 40 mg, Oral, Nightly  polyethylene glycol, 17 g, Oral, Daily  sodium chloride, 10 mL, Intravenous, Q12H      Infusions  Pharmacy Consult - Pharmacy to dose,       PRNs    senna-docusate sodium **AND** polyethylene glycol **AND** bisacodyl **AND** bisacodyl    Calcium Replacement - Follow Nurse / BPA Driven Protocol    HYDROmorphone    HYDROmorphone    Magnesium Standard Dose Replacement - Follow Nurse / BPA Driven Protocol    meclizine    morphine    nitroglycerin    ondansetron    ondansetron ODT    oxyCODONE    oxyCODONE    Pharmacy Consult - Pharmacy to dose    Phosphorus Replacement - Follow Nurse / BPA Driven Protocol    Potassium Replacement - Follow Nurse / BPA Driven Protocol    prochlorperazine    [COMPLETED] Insert Peripheral IV **AND** sodium chloride    sodium chloride    sodium chloride    Physical Exam:  Physical Exam  Cardiovascular:      Heart sounds: Murmur heard.      No gallop.   Pulmonary:      Effort: No respiratory distress.      Breath sounds: No stridor. Rhonchi and rales present. No wheezing.   Chest:       Chest wall: No tenderness.         ROS  Review of Systems   Respiratory:  Positive for cough and shortness of breath. Negative for wheezing and stridor.    Cardiovascular:  Positive for chest pain and leg swelling. Negative for palpitations.             Total critical care time spent with patient greater than: 45 Minutes

## 2025-05-29 LAB
ANION GAP SERPL CALCULATED.3IONS-SCNC: 12.2 MMOL/L (ref 5–15)
BUN SERPL-MCNC: 43.1 MG/DL (ref 8–23)
BUN/CREAT SERPL: 45.9 (ref 7–25)
CALCIUM SPEC-SCNC: 8.7 MG/DL (ref 8.6–10.5)
CHLORIDE SERPL-SCNC: 100 MMOL/L (ref 98–107)
CO2 SERPL-SCNC: 31.8 MMOL/L (ref 22–29)
CREAT SERPL-MCNC: 0.94 MG/DL (ref 0.57–1)
DEPRECATED RDW RBC AUTO: 52.5 FL (ref 37–54)
EGFRCR SERPLBLD CKD-EPI 2021: 63.8 ML/MIN/1.73
ERYTHROCYTE [DISTWIDTH] IN BLOOD BY AUTOMATED COUNT: 14.4 % (ref 12.3–15.4)
GLUCOSE SERPL-MCNC: 155 MG/DL (ref 65–99)
HCT VFR BLD AUTO: 35.1 % (ref 34–46.6)
HGB BLD-MCNC: 10.5 G/DL (ref 12–15.9)
MCH RBC QN AUTO: 29.5 PG (ref 26.6–33)
MCHC RBC AUTO-ENTMCNC: 29.9 G/DL (ref 31.5–35.7)
MCV RBC AUTO: 98.6 FL (ref 79–97)
PLATELET # BLD AUTO: 189 10*3/MM3 (ref 140–450)
PMV BLD AUTO: 10.7 FL (ref 6–12)
POTASSIUM SERPL-SCNC: 3.5 MMOL/L (ref 3.5–5.2)
POTASSIUM SERPL-SCNC: 3.6 MMOL/L (ref 3.5–5.2)
RBC # BLD AUTO: 3.56 10*6/MM3 (ref 3.77–5.28)
SODIUM SERPL-SCNC: 144 MMOL/L (ref 136–145)
WBC NRBC COR # BLD AUTO: 12.22 10*3/MM3 (ref 3.4–10.8)

## 2025-05-29 PROCEDURE — 97110 THERAPEUTIC EXERCISES: CPT

## 2025-05-29 PROCEDURE — 25010000002 ONDANSETRON PER 1 MG: Performed by: ORTHOPAEDIC SURGERY

## 2025-05-29 PROCEDURE — 94761 N-INVAS EAR/PLS OXIMETRY MLT: CPT

## 2025-05-29 PROCEDURE — 84132 ASSAY OF SERUM POTASSIUM: CPT | Performed by: STUDENT IN AN ORGANIZED HEALTH CARE EDUCATION/TRAINING PROGRAM

## 2025-05-29 PROCEDURE — 94799 UNLISTED PULMONARY SVC/PX: CPT

## 2025-05-29 PROCEDURE — 80048 BASIC METABOLIC PNL TOTAL CA: CPT | Performed by: STUDENT IN AN ORGANIZED HEALTH CARE EDUCATION/TRAINING PROGRAM

## 2025-05-29 PROCEDURE — 97535 SELF CARE MNGMENT TRAINING: CPT

## 2025-05-29 PROCEDURE — 99232 SBSQ HOSP IP/OBS MODERATE 35: CPT | Performed by: INTERNAL MEDICINE

## 2025-05-29 PROCEDURE — 85027 COMPLETE CBC AUTOMATED: CPT | Performed by: STUDENT IN AN ORGANIZED HEALTH CARE EDUCATION/TRAINING PROGRAM

## 2025-05-29 PROCEDURE — 97530 THERAPEUTIC ACTIVITIES: CPT

## 2025-05-29 PROCEDURE — 94664 DEMO&/EVAL PT USE INHALER: CPT

## 2025-05-29 PROCEDURE — 25010000002 BUMETANIDE PER 0.5 MG: Performed by: STUDENT IN AN ORGANIZED HEALTH CARE EDUCATION/TRAINING PROGRAM

## 2025-05-29 RX ORDER — BUMETANIDE 0.25 MG/ML
2.5 INJECTION, SOLUTION INTRAMUSCULAR; INTRAVENOUS 2 TIMES DAILY
Status: DISCONTINUED | OUTPATIENT
Start: 2025-05-29 | End: 2025-05-31

## 2025-05-29 RX ORDER — POTASSIUM CHLORIDE 1.5 G/1.58G
40 POWDER, FOR SOLUTION ORAL EVERY 4 HOURS
Status: COMPLETED | OUTPATIENT
Start: 2025-05-29 | End: 2025-05-29

## 2025-05-29 RX ORDER — POTASSIUM CHLORIDE 1500 MG/1
40 TABLET, EXTENDED RELEASE ORAL EVERY 4 HOURS
Status: COMPLETED | OUTPATIENT
Start: 2025-05-29 | End: 2025-05-29

## 2025-05-29 RX ORDER — METOLAZONE 2.5 MG/1
2.5 TABLET ORAL DAILY
Status: DISCONTINUED | OUTPATIENT
Start: 2025-05-29 | End: 2025-06-01 | Stop reason: HOSPADM

## 2025-05-29 RX ORDER — OXYCODONE HYDROCHLORIDE 5 MG/1
5 TABLET ORAL EVERY 6 HOURS PRN
Refills: 0 | Status: COMPLETED | OUTPATIENT
Start: 2025-05-29 | End: 2025-05-30

## 2025-05-29 RX ADMIN — Medication 10 ML: at 20:12

## 2025-05-29 RX ADMIN — GABAPENTIN 300 MG: 300 CAPSULE ORAL at 20:12

## 2025-05-29 RX ADMIN — NYSTATIN 500000 UNITS: 100000 SUSPENSION ORAL at 08:50

## 2025-05-29 RX ADMIN — APIXABAN 2.5 MG: 2.5 TABLET, FILM COATED ORAL at 08:52

## 2025-05-29 RX ADMIN — NYSTATIN 500000 UNITS: 100000 SUSPENSION ORAL at 20:10

## 2025-05-29 RX ADMIN — IPRATROPIUM BROMIDE AND ALBUTEROL SULFATE 3 ML: .5; 3 SOLUTION RESPIRATORY (INHALATION) at 18:39

## 2025-05-29 RX ADMIN — POTASSIUM CHLORIDE 40 MEQ: 1.5 POWDER, FOR SOLUTION ORAL at 08:53

## 2025-05-29 RX ADMIN — Medication 1000 UNITS: at 08:53

## 2025-05-29 RX ADMIN — Medication 1000 MCG: at 08:52

## 2025-05-29 RX ADMIN — ONDANSETRON 4 MG: 2 INJECTION, SOLUTION INTRAMUSCULAR; INTRAVENOUS at 10:17

## 2025-05-29 RX ADMIN — POTASSIUM CHLORIDE 40 MEQ: 1500 TABLET, EXTENDED RELEASE ORAL at 20:10

## 2025-05-29 RX ADMIN — POTASSIUM CHLORIDE 40 MEQ: 1.5 POWDER, FOR SOLUTION ORAL at 06:17

## 2025-05-29 RX ADMIN — PANTOPRAZOLE SODIUM 40 MG: 40 TABLET, DELAYED RELEASE ORAL at 20:10

## 2025-05-29 RX ADMIN — Medication 10 ML: at 08:54

## 2025-05-29 RX ADMIN — DOCUSATE SODIUM 100 MG: 100 CAPSULE, LIQUID FILLED ORAL at 20:10

## 2025-05-29 RX ADMIN — IPRATROPIUM BROMIDE AND ALBUTEROL SULFATE 3 ML: .5; 3 SOLUTION RESPIRATORY (INHALATION) at 15:40

## 2025-05-29 RX ADMIN — OXYCODONE 10 MG: 5 TABLET ORAL at 03:50

## 2025-05-29 RX ADMIN — APIXABAN 2.5 MG: 2.5 TABLET, FILM COATED ORAL at 20:10

## 2025-05-29 RX ADMIN — IPRATROPIUM BROMIDE AND ALBUTEROL SULFATE 3 ML: .5; 3 SOLUTION RESPIRATORY (INHALATION) at 06:45

## 2025-05-29 RX ADMIN — ACETYLCYSTEINE 2 ML: 200 SOLUTION ORAL; RESPIRATORY (INHALATION) at 18:39

## 2025-05-29 RX ADMIN — OXYCODONE 10 MG: 5 TABLET ORAL at 09:40

## 2025-05-29 RX ADMIN — METOLAZONE 2.5 MG: 2.5 TABLET ORAL at 12:10

## 2025-05-29 RX ADMIN — METOPROLOL SUCCINATE 12.5 MG: 25 TABLET, EXTENDED RELEASE ORAL at 08:52

## 2025-05-29 RX ADMIN — OXYCODONE 5 MG: 5 TABLET ORAL at 22:11

## 2025-05-29 RX ADMIN — BUDESONIDE 0.5 MG: 0.5 SUSPENSION RESPIRATORY (INHALATION) at 18:39

## 2025-05-29 RX ADMIN — BUMETANIDE 2.5 MG: 0.25 INJECTION INTRAMUSCULAR; INTRAVENOUS at 20:49

## 2025-05-29 RX ADMIN — IPRATROPIUM BROMIDE AND ALBUTEROL SULFATE 3 ML: .5; 3 SOLUTION RESPIRATORY (INHALATION) at 11:00

## 2025-05-29 RX ADMIN — FLUOXETINE 60 MG: 20 CAPSULE ORAL at 08:52

## 2025-05-29 RX ADMIN — POTASSIUM CHLORIDE 40 MEQ: 1500 TABLET, EXTENDED RELEASE ORAL at 16:14

## 2025-05-29 RX ADMIN — LEVOTHYROXINE SODIUM 137 MCG: 0.11 TABLET ORAL at 06:21

## 2025-05-29 RX ADMIN — BISACODYL 10 MG: 10 SUPPOSITORY RECTAL at 08:51

## 2025-05-29 RX ADMIN — ATORVASTATIN CALCIUM 10 MG: 10 TABLET ORAL at 08:53

## 2025-05-29 RX ADMIN — BUDESONIDE 0.5 MG: 0.5 SUSPENSION RESPIRATORY (INHALATION) at 06:50

## 2025-05-29 RX ADMIN — GABAPENTIN 300 MG: 300 CAPSULE ORAL at 16:14

## 2025-05-29 RX ADMIN — NYSTATIN 500000 UNITS: 100000 SUSPENSION ORAL at 12:10

## 2025-05-29 RX ADMIN — BUMETANIDE 2.5 MG: 0.25 INJECTION INTRAMUSCULAR; INTRAVENOUS at 08:51

## 2025-05-29 RX ADMIN — ACETYLCYSTEINE 2 ML: 200 SOLUTION ORAL; RESPIRATORY (INHALATION) at 06:40

## 2025-05-29 RX ADMIN — NYSTATIN 500000 UNITS: 100000 SUSPENSION ORAL at 17:50

## 2025-05-29 RX ADMIN — GABAPENTIN 300 MG: 300 CAPSULE ORAL at 08:52

## 2025-05-29 RX ADMIN — POLYETHYLENE GLYCOL 3350 17 G: 17 POWDER, FOR SOLUTION ORAL at 08:50

## 2025-05-29 NOTE — THERAPY TREATMENT NOTE
"Subjective: Pt agreeable to therapeutic plan of care. \"I need to poop\".     Objective:     Precautions - NWB LUE    Bed mobility - Rolling L/R Max A using bedrails. Dep for pericare while rolling multiple times.     Transfers - N/A or Not attempted.    Ambulation - 0 feet N/A or Not attempted.    Therapeutic Exercise - 15 Reps B LE AAROM lying supine    Vitals: Desaturates with activity while on 7L  Lowest oxygen saturation reading 82% while having BM on bedpan. Pt desats to mid 80s while rolling. Maintains low 90s while resting in semifowlers position.     Pain: 8 Schneider-Baker Location: LUE, generalized  Intervention for pain: Repositioned, Increased Activity, and Therapeutic Presence    Education: Provided education on the importance of mobility in the acute care setting and Verbal/Tactile Cues    Assessment: Claudia Rust presents with functional mobility impairments which indicate the need for skilled intervention. Pt had BM during session and was dependent for pericare. Pt desats with minimal activity on 7L. Use of IS reviewed and encouraged. Will continue to follow and progress as tolerated.     Plan/Recommendations:   If medically appropriate, Moderate Intensity Therapy recommended post-acute care. This is recommended as therapy feels the patient would require 3-4 days per week and wouldn't tolerate \"3 hour daily\" rehab intensity. SNF would be the preferred choice. If the patient does not agree to SNF, arrange HH or OP depending on home bound status. If patient is medically complex, consider LTACH. Pt requires no DME at discharge.     Pt desires Skilled Rehab placement at discharge. Pt cooperative; agreeable to therapeutic recommendations and plan of care.         Basic Mobility 6-click:  Rollin = Total, A lot = 2, A little = 3; 4 = None  Supine>Sit:   1 = Total, A lot = 2, A little = 3; 4 = None   Sit>Stand with arms:  1 = Total, A lot = 2, A little = 3; 4 = None  Bed>Chair:   1 = Total, A lot = " 2, A little = 3; 4 = None  Ambulate in room:  1 = Total, A lot = 2, A little = 3; 4 = None  3-5 Steps with railin = Total, A lot = 2, A little = 3; 4 = None  Score: 8    Modified [place]: N/A = No pre-op stroke/TIA    Post-Tx Position: Supine with HOB Elevated, Alarms activated, and Call light and personal items within reach  PPE: gloves    Therapy Charges for Today       Code Description Service Date Service Provider Modifiers Qty    25330007817 HC PT THERAPEUTIC ACT EA 15 MIN 2025 Cristhian Cosme, LEIF GP 1    05932091854 HC PT NEUROMUSC RE EDUCATION EA 15 MIN 2025 Cristhian Cosme, LEIF GP 1    67121733893 HC PT THER PROC EA 15 MIN 2025 Cristhian Cosme, LEIF GP 1    66175344492 HC PT THERAPEUTIC ACT EA 15 MIN 2025 Cristhian Cosme, LEIF GP 2    45734302630 HC PT THER PROC EA 15 MIN 2025 Cristhian Cosme, PTA GP 1           PT Charges       Row Name 25 1454             Time Calculation    Start Time 1405  -UN      Stop Time 1436  -UN      Time Calculation (min) 31 min  -UN      PT Received On 25  -UN      PT - Next Appointment 25  -UN         Time Calculation- PT    Total Timed Code Minutes- PT 31 minute(s)  -UN                User Key  (r) = Recorded By, (t) = Taken By, (c) = Cosigned By      Initials Name Provider Type    Cristhian Smith PTA Physical Therapist Assistant

## 2025-05-29 NOTE — PLAN OF CARE
"Assessment: Claudia TOVAR Walker presents with ADL impairments affecting function including balance, endurance / activity tolerance, grasp, pain, range of motion (ROM), shortness of breath, and strength. Pt alert and attentive, reports pain, however does not rate. Max A to roll L/R supine in bed for dependent A nelson-care 2/2 BM. OT facilitated gentle PROM LUE, VC for encouragement for AAROM. Demonstrated functioning below baseline abilities indicate the need for continued skilled intervention while inpatient. Tolerating session today without incident. Will continue to follow and progress as tolerated.      Plan/Recommendations:   Moderate Intensity Therapy recommended post-acute care. This is recommended as therapy feels the patient would require 3-4 days per week and wouldn't tolerate \"3 hour daily\" rehab intensity. SNF would be the preferred choice. If the patient does not agree to SNF, arrange HH or OP depending on home bound status. If patient is medically complex, consider LTACH.  "

## 2025-05-29 NOTE — PLAN OF CARE
Goal Outcome Evaluation:   Patient a&ox4, 8 L high flow humidified nasal cannula, Q2T, pain controlled, ice on shoulder, call light within reach, bed in low position. Plan of care on going.

## 2025-05-29 NOTE — PLAN OF CARE
"Assessment: Claudia Rust presents with functional mobility impairments which indicate the need for skilled intervention. Pt had BM during session and was dependent for pericare. Pt desats with minimal activity on 7L. Use of IS reviewed and encouraged. Will continue to follow and progress as tolerated.     Plan/Recommendations:   If medically appropriate, Moderate Intensity Therapy recommended post-acute care. This is recommended as therapy feels the patient would require 3-4 days per week and wouldn't tolerate \"3 hour daily\" rehab intensity. SNF would be the preferred choice. If the patient does not agree to SNF, arrange HH or OP depending on home bound status. If patient is medically complex, consider LTACH. Pt requires no DME at discharge.     Pt desires Skilled Rehab placement at discharge. Pt cooperative; agreeable to therapeutic recommendations and plan of care.     "

## 2025-05-29 NOTE — PLAN OF CARE
Goal Outcome Evaluation:      Patient c/o pain on left shoulder, medicated per MAR. Elevated arm in pillows. Skin is warm to touch. Oxygen on 8L high flow, oxygen saturation in the mid 90s.  Left fingers swollen, but warm to touch, good cap refill. Plan of care is ongoing.

## 2025-05-29 NOTE — PROGRESS NOTES
Cardiology Progress Note    Patient Identification:  Name: Claudia Rust  Age: 74 y.o.  Sex: female  :  1950  MRN: [id number]                 Follow Up / Chief Complaint:  Pre op Evaluation, Valvular disease   Chief Complaint   Patient presents with    Arm Injury       Interval History:  Patient presented with mechanical fall with left arm injury underwent IMN of left humerus      Patient sitting up in bed. Down to 6L HF oxygen from 10 L yesterday.  Patient has significant swelling in her left hand.   Continue IV diuretics for now. Monitor output and renal function closely.         Subjective: Patient seen and examined.  Chart reviewed.  Labs reviewed.  Discussed with RN taking care of patient.  Patient denies any chest pain.  Is complaining of constipation.  Continues to be on oxygen    Objective:  2025: Sodium 146 potassium 4.2 BUN 34 creatinine 0.92 glucose 185 WBC 13.72 hemoglobin 10.5  pro bnp 4722  2025: pro bnp 4279 sodium 148, bun 42 creatinine 1.06 glucose 185 WBC 13.82 hgb 10.2   2025: Sodium 144, BUN 43.1, creatinine 0.94, glucose 155, WBC 12.22, hemoglobin 10.5    History of present illness:        Ms. Claudia Rust has of PMH      #Valvular heart disease with moderate to severe aortic stenosis  Cardiac cath 2021 revealing nonobstructive CAD  Patient underwent #23 magna pericardial prosthesis AVR 3/18/2021  Transesophageal echo 3/25/2021 normal LV systolic function  # Hyperlipidemia,    # Hypertension  # COPD, KARLY on CPAP  # Prediabetes  #History of HIT, history of DVT RUE   # Hypothyroidism, Rheumatoid Arthritis, spinal stenosis, hiatal hernia, chronic depression, bipolar, GERD, IBS, chronic migraine, vertigo, herniated disc, compression lumbar fractures  #Allergies/intolerance to Stadol  # Hysterectomy, bladder reconstruction, cholecystectomy, right hand surgery  # Family history of strokes and grandfather.  Mother's cancer, father on  # Active cigarette smoker trying to  quit         Presented with mechanical fall and left arm injury underwent surgery for IM nailing of left humerus         Review of records reveals:  Echocardiogram which is revealing severe aortic stenosis on 2/11/2020 ,Underwent cardiac cath 1/20/2021 which revealed no obstructive disease,underwent aortic valve replacement 3/18/2021 with #23 magna pericardial prosthesis.       Carotid Dopplers 9/13/2019 normal.  Echocardiogram 2/11/2020 is revealing severe aortic stenosis  Work-up here on 3/16/2021 revealed proBNP 733, normal CMP, A1c 5.7, normal CBC, chest x-ray with no acute abnormality.  Lexiscan Cardiolite 2/28/2023 negative for ischemia EF of 86%  Echo 10/14/2023 reveals normal LV systolic function with concentric LVH, dilated RV and left atrium, normal bioprosthetic aortic valve mild MR and TR        Labs 11/7/2023 reveal lipid profile with cholesterol 131, triglycerides 141, HDL 38, LDL 68.  CMP with a creatinine of 1.12 and EGFR 52.        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  Is hemodynamically stable.  Continue medical management with Eliquis, atorvastatin, IV Bumex, metoprolol as tolerated  Monitor renal function, urine output, electrolytes, telemetry and hemodynamics closely.  Follow-up with nephrology for  Patient is on anticoagulation for DVT  Monitor hemoglobin      Copied text in this portion of the note has been reviewed and is accurate as of 5/29/2025    Past  "Medical History:  Past Medical History:   Diagnosis Date    Acute congestive heart failure     Allergies     Anxiety     Arthritis     Asthma     Bilateral leg edema     Bipolar 1 disorder     Bulging lumbar disc     X3    Cancer     states had \"cancerous tumor during pregnancy and had to have hysterectomy\"    Cataract     bilateral    Cellulitis 03/2021    bilateral legs    Compression fracture of vertebral column     LUMBAR    COPD (chronic obstructive pulmonary disease)     Delayed emergence from anesthesia     Depression     Diabetes mellitus     Dyslipidemia     Dyspnea on exertion     Fibromyalgia     GERD (gastroesophageal reflux disease)     Hiatal hernia     Hyperlipidemia     Hypertension     Hypothyroid     HYPOTHYROID    IBS (irritable bowel syndrome)     Migraines     Morbid obesity     Neuropathy     Panic attacks     Peripheral edema     PONV (postoperative nausea and vomiting)     Poor mobility     rolling walker    Prediabetes     PVD (peripheral vascular disease)     Rheumatoid aortitis     Sleep apnea     cpap    Spinal stenosis     Vertigo     Vitamin D deficiency      Past Surgical History:  Past Surgical History:   Procedure Laterality Date    AORTIC VALVE REPAIR/REPLACEMENT N/A 3/18/2021    Procedure: AORTIC VALVE REPLACEMENT;  Surgeon: Olaf Mcgill MD;  Location: [facility] CVOR;  Service: Cardiothoracic;  Laterality: N/A;    BRONCHOSCOPY N/A 3/25/2021    Procedure: BRONCHOSCOPY AT BEDSIDE WITH BRONCHIOALVEOLAR LAVAGE;  Surgeon: Glenn Reyes MD;  Location: [facility] ENDOSCOPY;  Service: Pulmonary;  Laterality: N/A;  PNA    BRONCHOSCOPY N/A 10/16/2023    Procedure: BRONCHOSCOPY with bronchial washing;  Surgeon: Glenn Reyes MD;  Location: [facility] ENDOSCOPY;  Service: Pulmonary;  Laterality: N/A;  post op: pneumonia    CARDIAC CATHETERIZATION  2009    CARDIAC CATHETERIZATION N/A 8/14/2019    Procedure: Left Heart Cath;  Surgeon: Jose Levi MD;  Location: [facility] CATH INVASIVE LOCATION; "  Service: Cardiovascular    CARDIAC CATHETERIZATION N/A 8/14/2019    Procedure: Right Heart Cath;  Surgeon: Jose Levi MD;  Location: [facility] CATH INVASIVE LOCATION;  Service: Cardiovascular    CARDIAC CATHETERIZATION N/A 8/14/2019    Procedure: Aortic root aortogram;  Surgeon: Jose Levi MD;  Location: [facility] CATH INVASIVE LOCATION;  Service: Cardiovascular    CARDIAC CATHETERIZATION N/A 1/20/2021    Procedure: Left Heart Cath;  Surgeon: Jose Levi MD;  Location: [facility] CATH INVASIVE LOCATION;  Service: Cardiovascular;  Laterality: N/A;    COLONOSCOPY      COLONOSCOPY N/A 2/14/2025    Procedure: COLONOSCOPY WITH POLYPECTOMY X 4;  Surgeon: Segun Cleveland MD;  Location: [facility] ENDOSCOPY;  Service: Gastroenterology;  Laterality: N/A;  POST- POLYPS X 4, DIVERTICULOSIS, INTERNAL HEMORRHOIDS    CYSTOCELE REPAIR  1984    ELBOW ARTHROPLASTY  2011    ENDOSCOPY      FOOT SURGERY      GALLBLADDER SURGERY  2002    HUMERUS IM NAILING/RODDING Left 5/25/2025    Procedure: HUMERUS INTRAMEDULLARY NAIL/JONO INSERTION LEFT;  Surgeon: Rufus Peralta MD;  Location: [facility] MAIN OR;  Service: Orthopedics;  Laterality: Left;    TONSILLECTOMY      VAGINAL HYSTERECTOMY  1972        Social History:   Social History     Tobacco Use    Smoking status: Former     Current packs/day: 0.00     Types: Cigarettes     Quit date: 2022     Years since quitting: 3.4     Passive exposure: Never    Smokeless tobacco: Never    Tobacco comments:     decrease now and do not smoke dos   Substance Use Topics    Alcohol use: Not Currently     Comment: very rare      Family History:  Family History   Problem Relation Age of Onset    Ovarian cancer Mother     Lung cancer Mother     Esophageal cancer Mother     Hypertension Father     Diabetes Maternal Grandmother           Allergies:  Allergies   Allergen Reactions    Adhesive Tape Unknown (See Comments)     hives    Benadryl [Diphenhydramine] Other (See  "Comments)     Pt and  say she cannot take. \"will cause stroke\"    Butorphanol Other (See Comments)     Chest pain difficulty breathing throat swelling, STADAL    Garlic Other (See Comments)     Chest pain difficulty breathing throat swells    Heparin Other (See Comments)     HIT +    Tetanus-Diphtheria Toxoids Td Other (See Comments)     Dizziness,  vomiting    Aloe Itching    Bee Venom Other (See Comments)     Swelling eyes and lips hand swelling    Latex Itching    Macadamia Nut Oil Other (See Comments)     Chest pain difficulty breathing throat swelling    Tuberculin, Ppd Unknown (See Comments)     reactor    Wasp Venom Other (See Comments)     Swelling eyes lips and hand     Scheduled Meds:  acetylcysteine, 2 mL, BID - RT  apixaban, 2.5 mg, Q12H  atorvastatin, 10 mg, Daily  budesonide, 0.5 mg, BID - RT  bumetanide, 2.5 mg, BID  [Held by provider] bumetanide, 2 mg, BID Diuretics  cholecalciferol, 1,000 Units, Daily  docusate sodium, 100 mg, Nightly  FLUoxetine, 60 mg, Daily  gabapentin, 300 mg, TID  ipratropium-albuterol, 3 mL, 4x Daily - RT  levothyroxine, 137 mcg, Q AM  metoprolol succinate XL, 12.5 mg, Daily  nystatin, 5 mL, 4x Daily  pantoprazole, 40 mg, Nightly  polyethylene glycol, 17 g, Daily  potassium chloride, 40 mEq, Q4H  sodium chloride, 10 mL, Q12H  vitamin B-12, 1,000 mcg, Daily          Review of Systems:   ROS        Constitutional:  Temp:  [97.3 °F (36.3 °C)-99.2 °F (37.3 °C)] 98.6 °F (37 °C)  Heart Rate:  [78-96] 80  Resp:  [17-29] 22  BP: (115-134)/(69-82) 129/82    Physical Exam   /82 (BP Location: Right arm, Patient Position: Lying)   Pulse 80   Temp 98.6 °F (37 °C) (Oral)   Resp 22   Ht 162.6 cm (64\")   Wt (!) 137 kg (302 lb)   SpO2 93%   BMI 51.84 kg/m²   General:  Appears frail and chronically ill  Eyes: Sclera is anicteric,  conjunctiva is clear   HEENT:  No JVD. Thyroid not visibly enlarged. No mucosal pallor or cyanosis  Respiratory: Respirations increased, bibasilar " crackles  Cardiovascular: S1,S2 Regular rate and rhythm.  Trace edema present  Gastrointestinal: Abdomen nondistended.  Musculoskeletal:  No abnormal movements  Extremities: No digital clubbing or cyanosis  Skin: Color pink.   Neuro: Alert and awake.    INTAKE AND OUTPUT:    Intake/Output Summary (Last 24 hours) at 5/29/2025 0816  Last data filed at 5/29/2025 0350  Gross per 24 hour   Intake 485 ml   Output 2900 ml   Net -2415 ml       Cardiographics  Telemetry: Reviewed and interpreted by me reveals sinus rhythm    ECG:   ECG 12 Lead Pre-Op / Pre-Procedure   Final Result   HEART RATE=78  bpm   RR Wvblgwuq=108  ms   WY Qzlwredl=759  ms   P Horizontal Axis=39  deg   P Front Axis=44  deg   QRSD Interval=95  ms   QT Jhtwsrhy=163  ms   DDuU=728  ms   QRS Axis=73  deg   T Wave Axis=71  deg   - OTHERWISE NORMAL ECG -   Sinus rhythm   Low voltage, precordial leads   When compared with ECG of 14-Oct-2023 00:05:27,   No significant change   Electronically Signed By: Kaz Arnett ([facility]) 2025-05-25 22:19:23   Date and Time of Study:2025-05-24 15:26:20      Telemetry Scan   Final Result      Telemetry Scan   Final Result      Telemetry Scan   Final Result      Telemetry Scan   Final Result      Telemetry Scan   Final Result      Telemetry Scan   Final Result      Telemetry Scan   Final Result      Telemetry Scan   Final Result      Telemetry Scan   Final Result      Telemetry Scan   Final Result      Telemetry Scan   Final Result      Telemetry Scan   Final Result      Telemetry Scan   Final Result      Telemetry Scan   Final Result      Telemetry Scan   Final Result      Telemetry Scan   Final Result      Telemetry Scan   Final Result      Telemetry Scan   Final Result        I have personally reviewed EKG    Echocardiogram: Results for orders placed during the hospital encounter of 05/23/25    Adult Transthoracic Echo Limited W/ Cont if Necessary Per Protocol    Interpretation Summary    Left ventricular ejection fraction  appears to be 61 - 65%.    Left ventricular diastolic function was not assessed.    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    Estimated right ventricular systolic pressure from tricuspid regurgitation is normal (<35 mmHg).    Imaging is limited by left arm being in sling.      STRESS TEST  Results for orders placed during the hospital encounter of 23    Stress Test With Myocardial Perfusion One Day    Interpretation Summary  LEXISCAN CARDIOLITE REPORT    DATE OF PROCEDURE: 2023    INDICATION FOR PROCEDURE: Numbness and tingling of left hand, dyspnea on exertion, chronic CHF, AVR, edema, dyslipidemia, hypertension, obesity with BMI over 45    PROCEDURE PERFORMED: Lexiscan Cardiolite    PROCEDURE COMMENTS:    After informed consent was obtained.  Patient's resting heart rate was 68 bpm, resting blood pressure was 144/82, resting EKG revealed sinus rhythm with rate of 68 bpm.  Patient was given 0.4 mg of regadenosine for stress testing.  There was no significant change in heart rate, blood pressure, symptoms with regadenoson injection.  Patient tolerated procedure well.  Complications were none.    NUCLEAR IMAGIN.  There was  uniform uptake of Cardiolite both in resting and post stress images, no ischemia seen.  2.  Gated images reveal  normal LV size and contractility, LVEF of 86%.    CONCLUSION:  1.  Lexiscan Cardiolite with normal perfusion, negative for ischemia.  2.  Normal wall motion.  LVEF of 86%.    RECOMMENDATIONS:    Clinical correlation recommended.      Jose Levi MD  23  20:24 EST      HEART CATHETERIZATION  Results for orders placed during the hospital encounter of 21    Cardiac Catheterization/Vascular Study    Narrative  Table formatting from the original result was not included.  2021      Heart Cath Report    NAME:              Claudia TOVAR Barrera  :                1950  AGE/SEX:        70 y.o. female  MRN:                [id number]        Procedures  Performed    1. Bilateral selective coronary arteriogram    :   Jose Levi MD    Vascular Access Site: Femoral    Indication for procedure: 70-year-old with severe critical  aortic stenosis and history of CHF, obesity, dyslipidemia, prediabetes      Procedure Note    After discussing the risks, benefits, and alternatives of the procedure, informed consent was obtained.  Moderate conscious sedation was given utilizing IV Versed and fentanyl administered by RN with continuous EKG oximetry and hemodynamic monitoring supervised by me throughout the entire case, conscious sedation time was 30 minutes.  I was present with the patient for the duration of moderate sedation and supervised staff who had no other duties and monitored the patient for the entire procedure patient had Moise 2-3 sedation scale. the vascular access site was prepped and draped in the usual sterile fashion.  2% lidocaine was used for local anesthesia. Appropriate landmarks were assessed.  A 6 Japanese short sheath was inserted in the artery using the modified Seldinger technique.    Selective coronary angiography was performed with JL4 and JR4 diagnostic catheters. Left ventriculogram  was not performed because patient has severe aortic stenosis..  All exchanges were performed over the wire.  No specimens were removed.  There were no apparent acute or early complications.  The patient tolerated the procedure well and was transferred to the recovery area in stable condition.    Artery hemostasis will be achieved with  manual pressure in OPCV.      Complications:  None  Blood Loss: minimal    Hemodynamics    Pressures    Ao:    138/62 mmHg      Coronary Angiography    Left Main :  The left main   is without disease    Left Anterior Descending : Is tortuous vessel, without disease    Left Circumflex : Tortuous vessel ends up with 3 branches which are tortuous without disease    Right Coronary Artery :  The right coronary artery    dominant vessel without disease    Dominance:  []  Left  [x]  Right  []  Co-Dominant        Impression:    1. No obstructive CAD    Recommendations:    1. Aortic valve replacement evaluation by CT VS.  Patient had echo showing severe critical aortic stenosis        I sincerely appreciate the opportunity to participate in your patient's care. Please feel free to contact me anytime if I can be of assistance in this or any other way.      Pertinent History    Past Medical History:  Diagnosis Date   Acute congestive heart failure (CMS/[facility])   Asthma   Bipolar 1 disorder (CMS/[facility])   COPD (chronic obstructive pulmonary disease) (CMS/[facility])   Depression   Dyslipidemia   Fibromyalgia   Hypertension   Hypothyroid   IBS (irritable bowel syndrome)   Migraines   Morbid obesity (CMS/[facility])   Neuropathy   Spinal stenosis   Vertigo    Past Surgical History:  Procedure Laterality Date   CARDIAC CATHETERIZATION  2009   CARDIAC CATHETERIZATION N/A 8/14/2019  Procedure: Left Heart Cath;  Surgeon: Jose Levi MD;  Location: [facility] CATH INVASIVE LOCATION;  Service: Cardiovascular   CARDIAC CATHETERIZATION N/A 8/14/2019  Procedure: Right Heart Cath;  Surgeon: Jose Levi MD;  Location: [facility] CATH INVASIVE LOCATION;  Service: Cardiovascular   CARDIAC CATHETERIZATION N/A 8/14/2019  Procedure: Aortic root aortogram;  Surgeon: Jose Levi MD;  Location: [facility] CATH INVASIVE LOCATION;  Service: Cardiovascular   CYSTOCELE REPAIR  1984   ELBOW ARTHROPLASTY  2011   FOOT SURGERY   GALLBLADDER SURGERY  2002   VAGINAL HYSTERECTOMY  1972    Prior to Admission medications  Medication Sig Start Date End Date Taking? Authorizing Provider  aspirin 81 MG tablet Take 81 mg by mouth Every Night. 2/3/15  Yes Krissy Hodgson MD  B Complex Vitamins (VITAMIN B COMPLEX) capsule capsule Take 1 capsule by mouth Daily.   Yes Krissy Hodgson MD  Calcium Carbonate (CALCIUM 500 PO) Take 1,500 mg by mouth Daily.   Yes  Krissy Hodgson MD  Cholecalciferol (VITAMIN D-1000 MAX ST) 1000 units tablet Take 2,000 Units by mouth Daily. 9/16/16  Yes Krissy Hodgson MD  diphenhydrAMINE (BENADRYL) 50 MG capsule Take 50 mg by mouth every night at bedtime.   Yes Krissy Hodgson MD  FLUoxetine (PROzac) 60 MG tablet Take 60 mg by mouth Daily. 4/19/19  Yes Krissy Hodgson MD  fluticasone (FLONASE) 50 MCG/ACT nasal spray 2 sprays into the nostril(s) as directed by provider 2 (two) times a day.   Yes Krissy Hodgson MD  furosemide (LASIX) 40 MG tablet Take 40 mg by mouth 2 (Two) Times a Day. 2/5/15  Yes Krissy Hodgson MD  gabapentin (NEURONTIN) 600 MG tablet Take 400 mg by mouth 3 (Three) Times a Day. 7/30/19  Yes Krissy Hodgson MD  HYDROcodone-acetaminophen (NORCO)  MG per tablet Take 1 tablet by mouth Daily. Noon an prm   Yes Krissy Hodgson MD  levothyroxine (SYNTHROID, LEVOTHROID) 100 MCG tablet Take 100 mcg by mouth Daily. 7/30/15  Yes Krissy Hodgson MD  metoprolol succinate XL (TOPROL-XL) 25 MG 24 hr tablet Take 25 mg by mouth Daily.   Yes Krissy Hodgson MD  Multiple Vitamins-Minerals (MULTIVITAMIN WITH MINERALS) tablet tablet Take 1 tablet by mouth Daily.   Yes Krissy Hodgson MD  ondansetron (ZOFRAN) 4 MG tablet Take 4 mg by mouth Every 8 (Eight) Hours As Needed for Nausea or Vomiting.   Yes Krissy Hodgson MD  pantoprazole (PROTONIX) 40 MG EC tablet Take 40 mg by mouth Daily.   Yes Krissy Hodgson MD  potassium chloride (KLOR-CON) 20 MEQ CR tablet Take 20 mEq by mouth 2 (Two) Times a Day. 7/30/15  Yes Krissy Hodgson MD  simvastatin (ZOCOR) 20 MG tablet Take 20 mg by mouth Daily. 2/5/15  Yes Krissy Hodgson MD  Acetaminophen (TYLENOL PO) Take 1,000 mg by mouth Every 4 (Four) Hours As Needed.    Krissy Hodgson MD  albuterol (PROVENTIL) (2.5 MG/3ML) 0.083% nebulizer solution Inhale 2.5 mg Every 6 (Six) Hours As Needed. 2/3/15    Krissy Hodgson MD  aluminum-magnesium hydroxide-simethicone (MAALOX/MYLANTA) 200-200-20 MG/5ML suspension Take 15 mL by mouth Every 6 (Six) Hours As Needed for Indigestion or Heartburn.    Krissy Hodgson MD  meclizine (ANTIVERT) 25 MG tablet Take 25 mg by mouth 3 (Three) Times a Day As Needed. 7/30/15   Krissy Hodgson MD  polyethylene glycol (MIRALAX) powder Take 17 g by mouth Daily As Needed. 11/9/16   Krissy Hodgson MD  promethazine (PHENERGAN) 25 MG tablet Take 25 mg by mouth Every 6 (Six) Hours As Needed for Nausea or Vomiting.    Krissy Hodgson MD  rizatriptan MLT (MAXALT-MLT) 10 MG disintegrating tablet Take 10 mg by mouth 1 (One) Time As Needed for Migraine. May repeat in 2 hours if needed    Krissy Hodgson MD  tiZANidine (ZANAFLEX) 4 MG tablet Take 4 mg by mouth Every 8 (Eight) Hours As Needed for Muscle Spasms.    Krissy Hodgson MD  acetaminophen (TYLENOL) 325 MG tablet Take 650 mg by mouth Every 6 (Six) Hours As Needed for Mild Pain .  1/20/21  Krissy Hodgson MD  Calcium Carbonate ([employer] Calcium) 1500 (600 Ca) MG tablet Take 600 mg by mouth Daily.  1/20/21  Krissy Hodgson MD  metoprolol succinate XL (TOPROL-XL) 25 MG 24 hr tablet TAKE 1 TABLET BY MOUTH EVERY DAY  Patient taking differently: 25 mg. 11/17/20 1/20/21  Jose Levi MD  ondansetron ODT (ZOFRAN-ODT) 4 MG disintegrating tablet 4 mg Every 8 (Eight) Hours As Needed. 1/6/21 1/20/21  Krissy Hodgson MD      Pre-Procedure Notes  H&P Performed  [x]  Yes []  No       []  N/A    Indications:  []  ACS <= 24 HRS  []  ACS >24 HRS  []  New Onset Angina <= 2 mos  []  Worsening Angina  []  Resuscitated Cardiac Arrest  []  Angina on Exertion:  []  Suspected CAD  []  Valvular Disease  []  Pericardial Disease  []  Cardiac Arrythmia  []  Cardiomyopathy  []  LV Dysfunction  []  Syncope  []  Post Cardiac Transplant  []  Eval. For Exercise Clearance  []  Other  []  Pre-Operative  Evaluation  If Pre-Op Eval:  Evaluation for Surgery Type:  []  Cardiac Surgery   []  Non-Cardiac Surgery  Functional Capacity:  []  <4 METS  []  >=4 METS w/o symptoms  []  >= 4 METS with symptoms  []  Unknown  Surgical Risk:  []  Low  []  Intermediate  []  High Risk: Vascular  []  High Risk Non-Vascular    Risks, Benefits, & Complications Discussed:  [x]  Yes  []  No  []  N/A    Questions Answered:  [x]  Yes  []  No  []  N/A    Consent Obtained:  [x]  Yes  []  No  []  N/A    CHF: [x]  Yes  []  No  If Yes:  Newly Diagnosed?  []  Yes  [x]  No  If Yes:  HF Type:  []  Diastolic  []  Systolic  []  Unknown      Jose Levi MD  1/20/2021  12:55 EST  Electronically signed by Jose Levi MD, 01/20/21, 12:55 PM EST.      Lab Review   I have reviewed the labs      Results from last 7 days   Lab Units 05/24/25  0319   MAGNESIUM mg/dL 2.4     Results from last 7 days   Lab Units 05/29/25  0315   SODIUM mmol/L 144   POTASSIUM mmol/L 3.5   BUN mg/dL 43.1*   CREATININE mg/dL 0.94   CALCIUM mg/dL 8.7         Results from last 7 days   Lab Units 05/29/25  0315 05/28/25  0349 05/27/25  1003   WBC 10*3/mm3 12.22* 13.82* 13.72*   HEMOGLOBIN g/dL 10.5* 10.2* 10.5*   HEMATOCRIT % 35.1 32.9* 35.0   PLATELETS 10*3/mm3 189 198 202     Results from last 7 days   Lab Units 05/24/25  0319 05/23/25  2258   INR  1.18* 1.16*   APTT seconds  --  29.1       RADIOLOGY:  Imaging Results (Last 24 Hours)       Procedure Component Value Units Date/Time    XR Chest 1 View [[id number]] Collected: 05/28/25 1145     Updated: 05/28/25 1148    Narrative:      XR CHEST 1 VW    Date of Exam: 5/28/2025 10:57 AM EDT    Indication: hypoxia evaluation    Comparison: AP chest 5/26/2025.    Findings:  Moderate generalized cardiomediastinal enlargement with median sternotomy and cardiac valve replacement, similar to prior. Central pulmonary vasculature is enlarged and indistinct. Fine interstitial thickening is present in both lungs. More  "confluent   alveolar opacity suggested in the retrocardiac left lower lobe, as before. No pneumothorax or acute osseous abnormality.      Impression:      Impression:  No significant change. Features suggestive of generalized interstitial edema with more confluent edema, atelectasis or pneumonia in the left lower lobe.  Stable cardiac enlargement.      Electronically Signed: Yue Miller MD    5/28/2025 11:46 AM EDT    Workstation ID: [id number]                  )5/29/2025  Jose Levi MD      [facility] Dragon/Transcription:   \"Dictated utilizing Dragon dictation\".   "

## 2025-05-29 NOTE — PROGRESS NOTES
[facility] MEDICINE SERVICE  DAILY PROGRESS NOTE    NAME: Claudia Rust  : 1950  MRN: [id number]      LOS: 3 days     PROVIDER OF SERVICE: Osman Pepper MD    Chief Complaint: Humeral fracture    Subjective:     Interval History:  History taken from: patient    Initially unchanged.  7 to 8 L this morning.        Review of Systems:   Review of Systems   All other systems reviewed and are negative.      Objective:     Vital Signs  Temp:  [97.3 °F (36.3 °C)-99.2 °F (37.3 °C)] 98.1 °F (36.7 °C)  Heart Rate:  [78-96] 81  Resp:  [17-29] 20  BP: (115-134)/(69-82) 131/69  Flow (L/min) (Oxygen Therapy):  [8] 8   Body mass index is 51.84 kg/m².    Physical Exam  Physical Exam  Constitutional:       Appearance: She is obese.   HENT:      Head: Normocephalic.   Cardiovascular:      Rate and Rhythm: Normal rate and regular rhythm.   Pulmonary:      Effort: Pulmonary effort is normal.      Breath sounds: Normal breath sounds.      Comments: Coarse breath sounds  Abdominal:      General: Bowel sounds are normal.      Palpations: Abdomen is soft.      Tenderness: There is no abdominal tenderness.   Musculoskeletal:         General: No swelling.   Neurological:      Mental Status: She is alert. Mental status is at baseline.      Comments: Somewhat confused and easily redirected            Diagnostic Data    Results from last 7 days   Lab Units 25  0315 25  1549 25  0357   WBC 10*3/mm3 12.22*   < > 15.18*   HEMOGLOBIN g/dL 10.5*   < > 10.8*   HEMATOCRIT % 35.1   < > 35.1   PLATELETS 10*3/mm3 189   < > 195   GLUCOSE mg/dL 155*   < > 159*   CREATININE mg/dL 0.94   < > 1.16*   BUN mg/dL 43.1*   < > 37*   SODIUM mmol/L 144   < > 141   POTASSIUM mmol/L 3.5   < > 3.6   AST (SGOT) U/L  --   --  47*   ALT (SGPT) U/L  --   --  66*   ALK PHOS U/L  --   --  112   BILIRUBIN mg/dL  --   --  0.4   ANION GAP mmol/L 12.2   < > 7.4    < > = values in this interval not displayed.       XR Chest 1 View  Result Date:  5/28/2025  Impression: No significant change. Features suggestive of generalized interstitial edema with more confluent edema, atelectasis or pneumonia in the left lower lobe. Stable cardiac enlargement. Electronically Signed: Yue Miller MD  5/28/2025 11:46 AM EDT  Workstation ID: [id number]            Assessment:   Left humeral fracture  Obstructive sleep apnea  Acute on Chronic hypoxemic respiratory failure  Acute CHF exacerbation  Massive obesity  CKD stage III  Transaminitis  Constipation      Plan:     CHF with respiratory failure 2/2 pulmonary edema.  She is at 8 L this morning.  I have increased the Bumex to 2.5 mg twice daily IV, I will give her a dose of metolazone daily to reduce her pulmonary edema.    Appreciate cardiology input.      Labs reviewed. BNP very elevated.    I anticipate patient will require less than 30 days of skilled rehab.    Active and Resolved Problems  Active Hospital Problems    Diagnosis  POA    **Humeral fracture [S42.309A]  Yes      Resolved Hospital Problems   No resolved problems to display.       VTE Prophylaxis:  Pharmacologic VTE prophylaxis orders are present.             Disposition Planning:     Barriers to Discharge: O2 requirement, CHF, Pain control and discharge planning  Anticipated Date of Discharge: 6/1  Place of Discharge: home      Time: 30 minutes     Code Status and Medical Interventions: CPR (Attempt to Resuscitate); Full Support   Ordered at: 05/24/25 0116     Code Status (Patient has no pulse and is not breathing):    CPR (Attempt to Resuscitate)     Medical Interventions (Patient has pulse or is breathing):    Full Support       Signature: Electronically signed by Osman Pepper MD, 05/29/25, 11:42 EDT.  [facility] Hospitalist Team

## 2025-05-29 NOTE — THERAPY TREATMENT NOTE
"Subjective: Pt agreeable to therapeutic plan of care.  Cognition: arousal/alertness: Alert and Attentive    Objective:     Precautions - falls, WBAT LUE     Bed Mobility: Max-A, rolling L/F  Functional Ambulation: N/A or Not attempted.    Toileting: Dependent  ADL Position: supine  ADL Comments: Nelson-care    Therapeutic Exercise - 10 Reps L Lower Extremity PROM, progressing to AAROM with verbal cueing for encouragement, supine w/ HOB elevated     Vitals: Desaturates, presents on 7L O2 NC, satting at 91% SpO2 upon room entry, desats with exertion 83% SpO2 with good pleth. VC for PLB, maintains mid 80s with exertion, resting 90% SpO2.     Pain: 8 Schneider-Baker Location: generalized, LUE  Interventions for pain: Repositioned, Increased Activity, and Therapeutic Presence  Education: Provided education on the importance of mobility in the acute care setting, Verbal/Tactile Cues, and ADL training    Assessment: Claudia TOVAR Walker presents with ADL impairments affecting function including balance, endurance / activity tolerance, grasp, pain, range of motion (ROM), shortness of breath, and strength. Pt alert and attentive, reports pain, however does not rate. Max A to roll L/R supine in bed for dependent A nelson-care 2/2 BM. OT facilitated gentle PROM LUE, VC for encouragement for AAROM. Demonstrated functioning below baseline abilities indicate the need for continued skilled intervention while inpatient. Tolerating session today without incident. Will continue to follow and progress as tolerated.     Plan/Recommendations:   Moderate Intensity Therapy recommended post-acute care. This is recommended as therapy feels the patient would require 3-4 days per week and wouldn't tolerate \"3 hour daily\" rehab intensity. SNF would be the preferred choice. If the patient does not agree to SNF, arrange HH or OP depending on home bound status. If patient is medically complex, consider LTACH.    Pt desires Skilled Rehab placement at discharge. " Pt cooperative; agreeable to therapeutic recommendations and plan of care.     Modified [place]: N/A = No pre-op stroke/TIA    Post-Tx Position: Supine with HOB Elevated, Alarms activated, and Call light and personal items within reach  PPE: gloves    Therapy Charges for Today       Code Description Service Date Service Provider Modifiers Qty    65791872196 HC OT SELF CARE/MGMT/TRAIN EA 15 MIN 5/28/2025 Emmanuel Clinton, OT GO 1    68657126550 HC OT THERAPEUTIC ACT EA 15 MIN 5/28/2025 Emmanuel Clinton, OT GO 1    80883069691 HC OT THER PROC EA 15 MIN 5/28/2025 Emmanuel Clinton, OT GO 1    96763461297 HC OT SELF CARE/MGMT/TRAIN EA 15 MIN 5/29/2025 Emmanuel Cilnton, OT GO 2    25671919915 HC OT THER PROC EA 15 MIN 5/29/2025 Emmanuel Clinton, OT GO 1           Time Calculation- OT       Row Name 05/29/25 1443             Time Calculation- OT    OT Start Time 1405  -SP      OT Stop Time 1436  -SP      OT Time Calculation (min) 31 min  -SP      Total Timed Code Minutes- OT 31 minute(s)  -SP      OT Received On 05/29/25  -SP      OT - Next Appointment 05/30/25  -SP                User Key  (r) = Recorded By, (t) = Taken By, (c) = Cosigned By      Initials Name Provider Type    SP Emmanuel Clinton, OT Occupational Therapist

## 2025-05-29 NOTE — PROGRESS NOTES
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, axillary, and brachial  Upper arm left DVT  DM  Hx of Ecoli UTI  Oral candidiasis  HTN  JERROD  DM2  Chronic anemia  HLD        Recommendations:     Oxygen titration currently requiring 6 L  Bronchodilators budesonide and DuoNeb  Mucomyst nebulized  Eliquis for DVT prophylaxis              LOS: 3 days     Subjective         Objective     Vital signs for last 24 hours:  Vitals:    05/29/25 0645 05/29/25 0649 05/29/25 0650 05/29/25 0654   BP:       BP Location:       Patient Position:       Pulse: 80 81 80 80   Resp: 24 26 22 22   Temp:       TempSrc:       SpO2: 92% 92% 93% 93%   Weight:       Height:           Intake/Output last 3 shifts:  I/O last 3 completed shifts:  In: 485 [P.O.:485]  Out: 2900 [Urine:2900]  Intake/Output this shift:  No intake/output data recorded.      Radiology  Imaging Results (Last 24 Hours)       Procedure Component Value Units Date/Time    XR Chest 1 View [[id number]] Collected: 05/28/25 1145     Updated: 05/28/25 1148    Narrative:      XR CHEST 1 VW    Date of Exam: 5/28/2025 10:57 AM EDT    Indication: hypoxia evaluation    Comparison: AP chest 5/26/2025.    Findings:  Moderate generalized cardiomediastinal enlargement with median sternotomy and cardiac valve replacement, similar to prior. Central pulmonary vasculature is enlarged and indistinct. Fine interstitial thickening is present in both lungs. More confluent   alveolar opacity suggested in the retrocardiac left lower lobe, as before. No pneumothorax or acute osseous  abnormality.      Impression:      Impression:  No significant change. Features suggestive of generalized interstitial edema with more confluent edema, atelectasis or pneumonia in the left lower lobe.  Stable cardiac enlargement.      Electronically Signed: Yue Miller MD    5/28/2025 11:46 AM EDT    Workstation ID: [id number]            Labs:  Results from last 7 days   Lab Units 05/29/25  0315   WBC 10*3/mm3 12.22*   HEMOGLOBIN g/dL 10.5*   HEMATOCRIT % 35.1   PLATELETS 10*3/mm3 189     Results from last 7 days   Lab Units 05/29/25  0315 05/26/25  1549 05/26/25  0357   SODIUM mmol/L 144   < > 141   POTASSIUM mmol/L 3.5   < > 3.6   CHLORIDE mmol/L 100   < > 103   CO2 mmol/L 31.8*   < > 30.6*   BUN mg/dL 43.1*   < > 37*   CREATININE mg/dL 0.94   < > 1.16*   CALCIUM mg/dL 8.7   < > 8.7   BILIRUBIN mg/dL  --   --  0.4   ALK PHOS U/L  --   --  112   ALT (SGPT) U/L  --   --  66*   AST (SGOT) U/L  --   --  47*   GLUCOSE mg/dL 155*   < > 159*    < > = values in this interval not displayed.         Results from last 7 days   Lab Units 05/26/25  0357 05/25/25  0027 05/24/25  0319   ALBUMIN g/dL 3.3* 3.7 3.8             Results from last 7 days   Lab Units 05/24/25  0319   MAGNESIUM mg/dL 2.4     Results from last 7 days   Lab Units 05/24/25  0319 05/23/25  2258   INR  1.18* 1.16*   APTT seconds  --  29.1               Meds:   SCHEDULE  acetylcysteine, 2 mL, Nebulization, BID - RT  apixaban, 2.5 mg, Oral, Q12H  atorvastatin, 10 mg, Oral, Daily  budesonide, 0.5 mg, Nebulization, BID - RT  bumetanide, 2.5 mg, Intravenous, BID  [Held by provider] bumetanide, 2 mg, Oral, BID Diuretics  cholecalciferol, 1,000 Units, Oral, Daily  docusate sodium, 100 mg, Oral, Nightly  FLUoxetine, 60 mg, Oral, Daily  gabapentin, 300 mg, Oral, TID  ipratropium-albuterol, 3 mL, Nebulization, 4x Daily - RT  levothyroxine, 137 mcg, Oral, Q AM  metoprolol succinate XL, 12.5 mg, Oral, Daily  nystatin, 5 mL, Swish & Spit, 4x Daily  pantoprazole, 40 mg,  Oral, Nightly  polyethylene glycol, 17 g, Oral, Daily  potassium chloride, 40 mEq, Oral, Q4H  sodium chloride, 10 mL, Intravenous, Q12H  vitamin B-12, 1,000 mcg, Oral, Daily      Infusions  Pharmacy Consult - Pharmacy to dose,       PRNs    senna-docusate sodium **AND** polyethylene glycol **AND** bisacodyl **AND** bisacodyl    Calcium Replacement - Follow Nurse / BPA Driven Protocol    famotidine    HYDROmorphone    HYDROmorphone    Magnesium Standard Dose Replacement - Follow Nurse / BPA Driven Protocol    meclizine    morphine    nitroglycerin    ondansetron    ondansetron ODT    oxyCODONE    oxyCODONE    Pharmacy Consult - Pharmacy to dose    Phosphorus Replacement - Follow Nurse / BPA Driven Protocol    Potassium Replacement - Follow Nurse / BPA Driven Protocol    prochlorperazine    [COMPLETED] Insert Peripheral IV **AND** sodium chloride    sodium chloride    sodium chloride    SUMAtriptan    Physical Exam:  Physical Exam  Cardiovascular:      Heart sounds: Murmur heard.      No gallop.   Pulmonary:      Effort: No respiratory distress.      Breath sounds: No stridor. Rhonchi and rales present. No wheezing.   Chest:      Chest wall: No tenderness.         ROS  Review of Systems   Respiratory:  Positive for cough and shortness of breath. Negative for wheezing and stridor.    Cardiovascular:  Positive for chest pain and leg swelling. Negative for palpitations.             Total critical care time spent with patient greater than: 45 Minutes

## 2025-05-30 LAB
ANION GAP SERPL CALCULATED.3IONS-SCNC: 11.2 MMOL/L (ref 5–15)
BUN SERPL-MCNC: 41.4 MG/DL (ref 8–23)
BUN/CREAT SERPL: 40.6 (ref 7–25)
CALCIUM SPEC-SCNC: 8.5 MG/DL (ref 8.6–10.5)
CHLORIDE SERPL-SCNC: 94 MMOL/L (ref 98–107)
CO2 SERPL-SCNC: 28.8 MMOL/L (ref 22–29)
CREAT SERPL-MCNC: 1.02 MG/DL (ref 0.57–1)
DEPRECATED RDW RBC AUTO: 48.3 FL (ref 37–54)
EGFRCR SERPLBLD CKD-EPI 2021: 57.8 ML/MIN/1.73
ERYTHROCYTE [DISTWIDTH] IN BLOOD BY AUTOMATED COUNT: 13.9 % (ref 12.3–15.4)
GLUCOSE SERPL-MCNC: 127 MG/DL (ref 65–99)
HCT VFR BLD AUTO: 33.6 % (ref 34–46.6)
HGB BLD-MCNC: 10.7 G/DL (ref 12–15.9)
MCH RBC QN AUTO: 30.1 PG (ref 26.6–33)
MCHC RBC AUTO-ENTMCNC: 31.8 G/DL (ref 31.5–35.7)
MCV RBC AUTO: 94.4 FL (ref 79–97)
PLATELET # BLD AUTO: 194 10*3/MM3 (ref 140–450)
PMV BLD AUTO: 11 FL (ref 6–12)
POTASSIUM SERPL-SCNC: 3.9 MMOL/L (ref 3.5–5.2)
RBC # BLD AUTO: 3.56 10*6/MM3 (ref 3.77–5.28)
SODIUM SERPL-SCNC: 134 MMOL/L (ref 136–145)
WBC NRBC COR # BLD AUTO: 13.42 10*3/MM3 (ref 3.4–10.8)

## 2025-05-30 PROCEDURE — 94761 N-INVAS EAR/PLS OXIMETRY MLT: CPT

## 2025-05-30 PROCEDURE — 80048 BASIC METABOLIC PNL TOTAL CA: CPT | Performed by: STUDENT IN AN ORGANIZED HEALTH CARE EDUCATION/TRAINING PROGRAM

## 2025-05-30 PROCEDURE — 97110 THERAPEUTIC EXERCISES: CPT

## 2025-05-30 PROCEDURE — 85027 COMPLETE CBC AUTOMATED: CPT | Performed by: STUDENT IN AN ORGANIZED HEALTH CARE EDUCATION/TRAINING PROGRAM

## 2025-05-30 PROCEDURE — 94664 DEMO&/EVAL PT USE INHALER: CPT

## 2025-05-30 PROCEDURE — 25010000002 BUMETANIDE PER 0.5 MG: Performed by: STUDENT IN AN ORGANIZED HEALTH CARE EDUCATION/TRAINING PROGRAM

## 2025-05-30 PROCEDURE — 94799 UNLISTED PULMONARY SVC/PX: CPT

## 2025-05-30 PROCEDURE — 99232 SBSQ HOSP IP/OBS MODERATE 35: CPT | Performed by: INTERNAL MEDICINE

## 2025-05-30 PROCEDURE — 97530 THERAPEUTIC ACTIVITIES: CPT

## 2025-05-30 RX ORDER — ACETAMINOPHEN 325 MG/1
650 TABLET ORAL EVERY 6 HOURS PRN
Status: DISCONTINUED | OUTPATIENT
Start: 2025-05-30 | End: 2025-06-01 | Stop reason: HOSPADM

## 2025-05-30 RX ORDER — OXYCODONE HYDROCHLORIDE 5 MG/1
5 TABLET ORAL EVERY 6 HOURS PRN
Status: DISCONTINUED | OUTPATIENT
Start: 2025-05-30 | End: 2025-06-01 | Stop reason: HOSPADM

## 2025-05-30 RX ADMIN — ACETYLCYSTEINE 2 ML: 200 SOLUTION ORAL; RESPIRATORY (INHALATION) at 20:19

## 2025-05-30 RX ADMIN — Medication 1000 UNITS: at 08:39

## 2025-05-30 RX ADMIN — BUDESONIDE 0.5 MG: 0.5 SUSPENSION RESPIRATORY (INHALATION) at 20:14

## 2025-05-30 RX ADMIN — ACETAMINOPHEN 650 MG: 325 TABLET ORAL at 09:10

## 2025-05-30 RX ADMIN — METOPROLOL SUCCINATE 12.5 MG: 25 TABLET, EXTENDED RELEASE ORAL at 08:39

## 2025-05-30 RX ADMIN — NYSTATIN 500000 UNITS: 100000 SUSPENSION ORAL at 08:39

## 2025-05-30 RX ADMIN — Medication 10 ML: at 08:47

## 2025-05-30 RX ADMIN — IPRATROPIUM BROMIDE AND ALBUTEROL SULFATE 3 ML: .5; 3 SOLUTION RESPIRATORY (INHALATION) at 20:19

## 2025-05-30 RX ADMIN — PANTOPRAZOLE SODIUM 40 MG: 40 TABLET, DELAYED RELEASE ORAL at 21:42

## 2025-05-30 RX ADMIN — LEVOTHYROXINE SODIUM 137 MCG: 0.11 TABLET ORAL at 06:16

## 2025-05-30 RX ADMIN — Medication 1000 MCG: at 08:39

## 2025-05-30 RX ADMIN — Medication 10 ML: at 21:42

## 2025-05-30 RX ADMIN — APIXABAN 2.5 MG: 2.5 TABLET, FILM COATED ORAL at 21:42

## 2025-05-30 RX ADMIN — BUMETANIDE 2.5 MG: 0.25 INJECTION INTRAMUSCULAR; INTRAVENOUS at 21:41

## 2025-05-30 RX ADMIN — IPRATROPIUM BROMIDE AND ALBUTEROL SULFATE 3 ML: .5; 3 SOLUTION RESPIRATORY (INHALATION) at 06:30

## 2025-05-30 RX ADMIN — BUDESONIDE 0.5 MG: 0.5 SUSPENSION RESPIRATORY (INHALATION) at 06:35

## 2025-05-30 RX ADMIN — NYSTATIN 500000 UNITS: 100000 SUSPENSION ORAL at 18:04

## 2025-05-30 RX ADMIN — OXYCODONE 5 MG: 5 TABLET ORAL at 13:12

## 2025-05-30 RX ADMIN — OXYCODONE 5 MG: 5 TABLET ORAL at 21:41

## 2025-05-30 RX ADMIN — GABAPENTIN 300 MG: 300 CAPSULE ORAL at 16:18

## 2025-05-30 RX ADMIN — GABAPENTIN 300 MG: 300 CAPSULE ORAL at 08:39

## 2025-05-30 RX ADMIN — NYSTATIN 500000 UNITS: 100000 SUSPENSION ORAL at 13:12

## 2025-05-30 RX ADMIN — DOCUSATE SODIUM 100 MG: 100 CAPSULE, LIQUID FILLED ORAL at 21:41

## 2025-05-30 RX ADMIN — GABAPENTIN 300 MG: 300 CAPSULE ORAL at 21:41

## 2025-05-30 RX ADMIN — IPRATROPIUM BROMIDE AND ALBUTEROL SULFATE 3 ML: .5; 3 SOLUTION RESPIRATORY (INHALATION) at 11:09

## 2025-05-30 RX ADMIN — APIXABAN 2.5 MG: 2.5 TABLET, FILM COATED ORAL at 08:39

## 2025-05-30 RX ADMIN — OXYCODONE 5 MG: 5 TABLET ORAL at 06:17

## 2025-05-30 RX ADMIN — IPRATROPIUM BROMIDE AND ALBUTEROL SULFATE 3 ML: .5; 3 SOLUTION RESPIRATORY (INHALATION) at 14:58

## 2025-05-30 RX ADMIN — FLUOXETINE 60 MG: 20 CAPSULE ORAL at 08:39

## 2025-05-30 RX ADMIN — ATORVASTATIN CALCIUM 10 MG: 10 TABLET ORAL at 08:39

## 2025-05-30 RX ADMIN — ACETYLCYSTEINE 2 ML: 200 SOLUTION ORAL; RESPIRATORY (INHALATION) at 06:40

## 2025-05-30 RX ADMIN — BUMETANIDE 2.5 MG: 0.25 INJECTION INTRAMUSCULAR; INTRAVENOUS at 08:39

## 2025-05-30 NOTE — PLAN OF CARE
"Assessment: Claudia Rust presents with functional mobility impairments which indicate the need for skilled intervention. Tolerating session today without incident. Will continue to follow and progress as tolerated.     Plan/Recommendations:   If medically appropriate, Moderate Intensity Therapy recommended post-acute care. This is recommended as therapy feels the patient would require 3-4 days per week and wouldn't tolerate \"3 hour daily\" rehab intensity. SNF would be the preferred choice. If the patient does not agree to SNF, arrange HH or OP depending on home bound status. If patient is medically complex, consider LTACH. Pt requires no DME at discharge.     Pt desires Skilled Rehab placement at discharge. Pt cooperative; agreeable to therapeutic recommendations and plan of care.     "

## 2025-05-30 NOTE — SIGNIFICANT NOTE
Case Management/Social Work    Patient Name:  Claudia Rust  YOB: 1950  MRN: [id number]  Admit Date:  5/23/2025 05/30/25 1203   Post Acute Pre-Cert Documentation   Request Submitted by Facility - Type: Hospital   Post-Acute Authorization Type Submitted: SNF   Date Post Acute Pre-Cert Inititated per Facility 05/30/25   Verification from Payer Yes   Date Post Acute Pre-Cert Completed 05/30/25   Accepting Facility [facility] Discharge Date Requested 05/30/25   All Clinicals Submitted? Yes   Had Accepting Facility at Time of Submission Yes   Response Received from Insurance? Approval   Response Communicated to:    Authorization Number: [id number]   Post Acute Pre-Cert Initiated Comment CME SUBMITTED SNF PRECERT FOR [facility] [place] AND [facility] PORTAL. PORTAL AUTH ID [id number]. SNF PRECERT AUTO APPROVED. VALID 5/31-6/3. CM MADE AWARE.           Electronically signed by:  ELIAS Preston  05/30/25 12:04 EDT    Victor Hugo Calhoun  Case Management Extender  [address] [address]  P: [phone]  F: [phone]

## 2025-05-30 NOTE — THERAPY TREATMENT NOTE
"Subjective: Pt agreeable to therapeutic plan of care.    Objective:     Precautions - NWB LUE    Bed mobility - N/A or Not attempted.    Transfers - N/A or Not attempted.    Ambulation - 0 feet N/A or Not attempted.    Therapeutic Exercise - 20 Reps B PROSPER LUDWIG lying supine    Vitals: WNL on 4L    Education: Provided education on the importance of mobility in the acute care setting and Verbal/Tactile Cues    Assessment: Claudia Rust presents with functional mobility impairments which indicate the need for skilled intervention. Tolerating session today without incident. Will continue to follow and progress as tolerated.     Plan/Recommendations:   If medically appropriate, Moderate Intensity Therapy recommended post-acute care. This is recommended as therapy feels the patient would require 3-4 days per week and wouldn't tolerate \"3 hour daily\" rehab intensity. SNF would be the preferred choice. If the patient does not agree to SNF, arrange HH or OP depending on home bound status. If patient is medically complex, consider LTACH. Pt requires no DME at discharge.     Pt desires Skilled Rehab placement at discharge. Pt cooperative; agreeable to therapeutic recommendations and plan of care.         Basic Mobility 6-click:  Rollin = Total, A lot = 2, A little = 3; 4 = None  Supine>Sit:   1 = Total, A lot = 2, A little = 3; 4 = None   Sit>Stand with arms:  1 = Total, A lot = 2, A little = 3; 4 = None  Bed>Chair:   1 = Total, A lot = 2, A little = 3; 4 = None  Ambulate in room:  1 = Total, A lot = 2, A little = 3; 4 = None  3-5 Steps with railin = Total, A lot = 2, A little = 3; 4 = None  Score: 8    Modified [place]: N/A = No pre-op stroke/TIA    Post-Tx Position: Supine with HOB Elevated, Alarms activated, and Call light and personal items within reach  PPE: gloves    Therapy Charges for Today       Code Description Service Date Service Provider Modifiers Qty    78966198422  PT THERAPEUTIC ACT EA 15 MIN " 5/29/2025 Cristhian Cosme PTA GP 2    83063249475 HC PT THER PROC EA 15 MIN 5/29/2025 Cristhian Cosme, LEIF GP 1    24126385162 HC PT THERAPEUTIC ACT EA 15 MIN 5/30/2025 Cristhian Cosme, LEIF GP 1    32431527594 HC PT THER PROC EA 15 MIN 5/30/2025 Cristhian Cosme, LEIF GP 1           PT Charges       Row Name 05/30/25 1609             Time Calculation    Start Time 1438  -UN      Stop Time 1455  -UN      Time Calculation (min) 17 min  -UN      PT Received On 05/30/25  -UN      PT - Next Appointment 05/31/25  -UN         Time Calculation- PT    Total Timed Code Minutes- PT 17 minute(s)  -UN                User Key  (r) = Recorded By, (t) = Taken By, (c) = Cosigned By      Initials Name Provider Type    UN Cristhian Cosme PTA Physical Therapist Assistant

## 2025-05-30 NOTE — CASE MANAGEMENT/SOCIAL WORK
Continued Stay Note  [facility]     Patient Name: Claudia Rust  MRN: [id number]  Today's Date: 5/30/2025    Admit Date: 5/23/2025    Plan: DcPlan: Accepted to [facility] snf skilled. Precert approved. Valid 5/31-6/3/25.. PASRR approved. Pharmacy updated. confirm bed availability for Saturday 5.31.25   Discharge Plan       Row Name 05/30/25 1728       Plan    Plan DcPlan: Accepted to [facility] snf skilled. Precert approved. Valid 5/31-6/3/25.. PASRR approved. Pharmacy updated. confirm bed availability for Saturday 5.31.25    Plan Comments DC barriers: HF 02 : Accepted to [facility] snf skilled. Precert approved. Valid 5/31-6/3/25.. PASRR approved. pharmacy updated.  Confirm bed availability                      Expected Discharge Date and Time       Expected Discharge Date Expected Discharge Time    May 31, 2025             Marichuy Griffiths RN    SIPS 1  [email or web address]  Office [phone]  Cell [phone]

## 2025-05-30 NOTE — PROGRESS NOTES
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, axillary, and brachial  Upper arm left DVT  DM  Hx of Ecoli UTI  Oral candidiasis  HTN  JERROD  DM2  Chronic anemia  HLD        Recommendations:     Oxygen titration currently requiring 6 L  Bronchodilators budesonide and DuoNeb  Mucomyst nebulized  Eliquis for DVT prophylaxis              LOS: 4 days     Subjective         Objective     Vital signs for last 24 hours:  Vitals:    05/30/25 0640 05/30/25 0643 05/30/25 0827 05/30/25 0956   BP:   121/69    BP Location:   Right arm    Patient Position:   Lying    Pulse: 79 78 78 81   Resp: 20 20 16    Temp:   97.6 °F (36.4 °C)    TempSrc:   Oral    SpO2: 92% 92% 94% 94%   Weight:       Height:           Intake/Output last 3 shifts:  I/O last 3 completed shifts:  In: 365 [P.O.:365]  Out: 3950 [Urine:3950]  Intake/Output this shift:  I/O this shift:  In: 240 [P.O.:240]  Out: -       Radiology  Imaging Results (Last 24 Hours)       ** No results found for the last 24 hours. **            Labs:  Results from last 7 days   Lab Units 05/30/25  0141   WBC 10*3/mm3 13.42*   HEMOGLOBIN g/dL 10.7*   HEMATOCRIT % 33.6*   PLATELETS 10*3/mm3 194     Results from last 7 days   Lab Units 05/30/25  0141 05/26/25  1549 05/26/25  0357   SODIUM mmol/L 134*   < > 141   POTASSIUM mmol/L 3.9   < > 3.6   CHLORIDE mmol/L 94*   < > 103   CO2 mmol/L 28.8   < > 30.6*   BUN mg/dL 41.4*   < > 37*   CREATININE mg/dL 1.02*   < > 1.16*   CALCIUM mg/dL 8.5*   < > 8.7   BILIRUBIN mg/dL  --   --  0.4   ALK PHOS U/L  --   --  112   ALT (SGPT) U/L  --    --  66*   AST (SGOT) U/L  --   --  47*   GLUCOSE mg/dL 127*   < > 159*    < > = values in this interval not displayed.         Results from last 7 days   Lab Units 05/26/25  0357 05/25/25  0027 05/24/25  0319   ALBUMIN g/dL 3.3* 3.7 3.8             Results from last 7 days   Lab Units 05/24/25  0319   MAGNESIUM mg/dL 2.4     Results from last 7 days   Lab Units 05/24/25  0319 05/23/25  2258   INR  1.18* 1.16*   APTT seconds  --  29.1               Meds:   SCHEDULE  acetylcysteine, 2 mL, Nebulization, BID - RT  apixaban, 2.5 mg, Oral, Q12H  atorvastatin, 10 mg, Oral, Daily  budesonide, 0.5 mg, Nebulization, BID - RT  bumetanide, 2.5 mg, Intravenous, BID  [Held by provider] bumetanide, 2 mg, Oral, BID Diuretics  cholecalciferol, 1,000 Units, Oral, Daily  docusate sodium, 100 mg, Oral, Nightly  FLUoxetine, 60 mg, Oral, Daily  gabapentin, 300 mg, Oral, TID  ipratropium-albuterol, 3 mL, Nebulization, 4x Daily - RT  levothyroxine, 137 mcg, Oral, Q AM  [Held by provider] metOLazone, 2.5 mg, Oral, Daily  metoprolol succinate XL, 12.5 mg, Oral, Daily  nystatin, 5 mL, Swish & Spit, 4x Daily  pantoprazole, 40 mg, Oral, Nightly  polyethylene glycol, 17 g, Oral, Daily  sodium chloride, 10 mL, Intravenous, Q12H  vitamin B-12, 1,000 mcg, Oral, Daily      Infusions  Pharmacy Consult - Pharmacy to dose,       PRNs    acetaminophen    senna-docusate sodium **AND** polyethylene glycol **AND** bisacodyl **AND** bisacodyl    Calcium Replacement - Follow Nurse / BPA Driven Protocol    famotidine    HYDROmorphone    Magnesium Standard Dose Replacement - Follow Nurse / BPA Driven Protocol    meclizine    morphine    nitroglycerin    ondansetron    ondansetron ODT    oxyCODONE    Pharmacy Consult - Pharmacy to dose    Phosphorus Replacement - Follow Nurse / BPA Driven Protocol    Potassium Replacement - Follow Nurse / BPA Driven Protocol    prochlorperazine    [COMPLETED] Insert Peripheral IV **AND** sodium chloride    sodium chloride     sodium chloride    SUMAtriptan    Physical Exam:  Physical Exam  Cardiovascular:      Heart sounds: Murmur heard.      No gallop.   Pulmonary:      Effort: No respiratory distress.      Breath sounds: No stridor. Rhonchi and rales present. No wheezing.   Chest:      Chest wall: No tenderness.         ROS  Review of Systems   Respiratory:  Positive for cough and shortness of breath. Negative for wheezing and stridor.    Cardiovascular:  Positive for chest pain and leg swelling. Negative for palpitations.             Total critical care time spent with patient greater than: 45 Minutes

## 2025-05-30 NOTE — PROGRESS NOTES
Cardiology Progress Note    Patient Identification:  Name: Claudia Rust  Age: 74 y.o.  Sex: female  :  1950  MRN: [id number]                 Follow Up / Chief Complaint:  Pre op Evaluation, Valvular disease   Chief Complaint   Patient presents with    Arm Injury       Interval History:  Patient presented with mechanical fall with left arm injury underwent IMN of left humerus      Patient sitting up in bed. Down to 6L HF oxygen from 10 L yesterday.  Patient has significant swelling in her left hand.   Continue IV diuretics for now. Monitor output and renal function closely.         Subjective: Patient seen and examined.  Chart reviewed.  Labs reviewed.  Discussed with RN taking care of patient.  Patient denies any chest pain.  Is complaining of constipation.  Continues to be on oxygen    Objective:  2025: Sodium 146 potassium 4.2 BUN 34 creatinine 0.92 glucose 185 WBC 13.72 hemoglobin 10.5  pro bnp 4722  2025: pro bnp 4279 sodium 148, bun 42 creatinine 1.06 glucose 185 WBC 13.82 hgb 10.2   2025: Sodium 144, BUN 43.1, creatinine 0.94, glucose 155, WBC 12.22, hemoglobin 10.5  2025: Sodium 134, BUN 41, creatinine 1.02, glucose 127, WBC 13.4, hemoglobin 10.7    History of present illness:        Ms. Claudia Rust has of PMH      #Valvular heart disease with moderate to severe aortic stenosis  Cardiac cath 2021 revealing nonobstructive CAD  Patient underwent #23 magna pericardial prosthesis AVR 3/18/2021  Transesophageal echo 3/25/2021 normal LV systolic function  # Hyperlipidemia,    # Hypertension  # COPD, KARLY on CPAP  # Prediabetes  #History of HIT, history of DVT RUE   # Hypothyroidism, Rheumatoid Arthritis, spinal stenosis, hiatal hernia, chronic depression, bipolar, GERD, IBS, chronic migraine, vertigo, herniated disc, compression lumbar fractures  #Allergies/intolerance to Stadol  # Hysterectomy, bladder reconstruction, cholecystectomy, right hand surgery  # Family history of  strokes and grandfather.  Mother's cancer, father on  # Active cigarette smoker trying to quit         Presented with mechanical fall and left arm injury underwent surgery for IM nailing of left humerus         Review of records reveals:  Echocardiogram which is revealing severe aortic stenosis on 2/11/2020 ,Underwent cardiac cath 1/20/2021 which revealed no obstructive disease,underwent aortic valve replacement 3/18/2021 with #23 magna pericardial prosthesis.       Carotid Dopplers 9/13/2019 normal.  Echocardiogram 2/11/2020 is revealing severe aortic stenosis  Work-up here on 3/16/2021 revealed proBNP 733, normal CMP, A1c 5.7, normal CBC, chest x-ray with no acute abnormality.  Lexiscan Cardiolite 2/28/2023 negative for ischemia EF of 86%  Echo 10/14/2023 reveals normal LV systolic function with concentric LVH, dilated RV and left atrium, normal bioprosthetic aortic valve mild MR and TR        Labs 11/7/2023 reveal lipid profile with cholesterol 131, triglycerides 141, HDL 38, LDL 68.  CMP with a creatinine of 1.12 and EGFR 52.        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  Patient is on anticoagulation for DVT  Monitor hemoglobin.  Will follow-up as  "needed.  Please call me back if I can be of any further assistance with      Copied text in this portion of the note has been reviewed and is accurate as of 5/30/2025    Past Medical History:  Past Medical History:   Diagnosis Date    Acute congestive heart failure     Allergies     Anxiety     Arthritis     Asthma     Bilateral leg edema     Bipolar 1 disorder     Bulging lumbar disc     X3    Cancer     states had \"cancerous tumor during pregnancy and had to have hysterectomy\"    Cataract     bilateral    Cellulitis 03/2021    bilateral legs    Compression fracture of vertebral column     LUMBAR    COPD (chronic obstructive pulmonary disease)     Delayed emergence from anesthesia     Depression     Diabetes mellitus     Dyslipidemia     Dyspnea on exertion     Fibromyalgia     GERD (gastroesophageal reflux disease)     Hiatal hernia     Hyperlipidemia     Hypertension     Hypothyroid     HYPOTHYROID    IBS (irritable bowel syndrome)     Migraines     Morbid obesity     Neuropathy     Panic attacks     Peripheral edema     PONV (postoperative nausea and vomiting)     Poor mobility     rolling walker    Prediabetes     PVD (peripheral vascular disease)     Rheumatoid aortitis     Sleep apnea     cpap    Spinal stenosis     Vertigo     Vitamin D deficiency      Past Surgical History:  Past Surgical History:   Procedure Laterality Date    AORTIC VALVE REPAIR/REPLACEMENT N/A 3/18/2021    Procedure: AORTIC VALVE REPLACEMENT;  Surgeon: Olaf Mcgill MD;  Location: [facility] CVOR;  Service: Cardiothoracic;  Laterality: N/A;    BRONCHOSCOPY N/A 3/25/2021    Procedure: BRONCHOSCOPY AT BEDSIDE WITH BRONCHIOALVEOLAR LAVAGE;  Surgeon: Glenn Reyes MD;  Location: [facility] ENDOSCOPY;  Service: Pulmonary;  Laterality: N/A;  PNA    BRONCHOSCOPY N/A 10/16/2023    Procedure: BRONCHOSCOPY with bronchial washing;  Surgeon: Glenn Reyes MD;  Location: [facility] ENDOSCOPY;  Service: Pulmonary;  Laterality: N/A;  post op: pneumonia    CARDIAC " CATHETERIZATION  2009    CARDIAC CATHETERIZATION N/A 8/14/2019    Procedure: Left Heart Cath;  Surgeon: Jose Levi MD;  Location: [facility] CATH INVASIVE LOCATION;  Service: Cardiovascular    CARDIAC CATHETERIZATION N/A 8/14/2019    Procedure: Right Heart Cath;  Surgeon: Jose Levi MD;  Location: [facility] CATH INVASIVE LOCATION;  Service: Cardiovascular    CARDIAC CATHETERIZATION N/A 8/14/2019    Procedure: Aortic root aortogram;  Surgeon: Jose Levi MD;  Location: [facility] CATH INVASIVE LOCATION;  Service: Cardiovascular    CARDIAC CATHETERIZATION N/A 1/20/2021    Procedure: Left Heart Cath;  Surgeon: Jose Levi MD;  Location: [facility] CATH INVASIVE LOCATION;  Service: Cardiovascular;  Laterality: N/A;    COLONOSCOPY      COLONOSCOPY N/A 2/14/2025    Procedure: COLONOSCOPY WITH POLYPECTOMY X 4;  Surgeon: Segun Cleveland MD;  Location: [facility] ENDOSCOPY;  Service: Gastroenterology;  Laterality: N/A;  POST- POLYPS X 4, DIVERTICULOSIS, INTERNAL HEMORRHOIDS    CYSTOCELE REPAIR  1984    ELBOW ARTHROPLASTY  2011    ENDOSCOPY      FOOT SURGERY      GALLBLADDER SURGERY  2002    HUMERUS IM NAILING/RODDING Left 5/25/2025    Procedure: HUMERUS INTRAMEDULLARY NAIL/JONO INSERTION LEFT;  Surgeon: Rufus Peralta MD;  Location: [facility] MAIN OR;  Service: Orthopedics;  Laterality: Left;    TONSILLECTOMY      VAGINAL HYSTERECTOMY  1972        Social History:   Social History     Tobacco Use    Smoking status: Former     Current packs/day: 0.00     Types: Cigarettes     Quit date: 2022     Years since quitting: 3.4     Passive exposure: Never    Smokeless tobacco: Never    Tobacco comments:     decrease now and do not smoke dos   Substance Use Topics    Alcohol use: Not Currently     Comment: very rare      Family History:  Family History   Problem Relation Age of Onset    Ovarian cancer Mother     Lung cancer Mother     Esophageal cancer Mother     Hypertension Father   "   Diabetes Maternal Grandmother           Allergies:  Allergies   Allergen Reactions    Adhesive Tape Unknown (See Comments)     hives    Benadryl [Diphenhydramine] Other (See Comments)     Pt and  say she cannot take. \"will cause stroke\"    Butorphanol Other (See Comments)     Chest pain difficulty breathing throat swelling, STADAL    Garlic Other (See Comments)     Chest pain difficulty breathing throat swells    Heparin Other (See Comments)     HIT +    Tetanus-Diphtheria Toxoids Td Other (See Comments)     Dizziness,  vomiting    Aloe Itching    Bee Venom Other (See Comments)     Swelling eyes and lips hand swelling    Latex Itching    Macadamia Nut Oil Other (See Comments)     Chest pain difficulty breathing throat swelling    Tuberculin, Ppd Unknown (See Comments)     reactor    Wasp Venom Other (See Comments)     Swelling eyes lips and hand     Scheduled Meds:  acetylcysteine, 2 mL, BID - RT  apixaban, 2.5 mg, Q12H  atorvastatin, 10 mg, Daily  budesonide, 0.5 mg, BID - RT  bumetanide, 2.5 mg, BID  [Held by provider] bumetanide, 2 mg, BID Diuretics  cholecalciferol, 1,000 Units, Daily  docusate sodium, 100 mg, Nightly  FLUoxetine, 60 mg, Daily  gabapentin, 300 mg, TID  ipratropium-albuterol, 3 mL, 4x Daily - RT  levothyroxine, 137 mcg, Q AM  metOLazone, 2.5 mg, Daily  metoprolol succinate XL, 12.5 mg, Daily  nystatin, 5 mL, 4x Daily  pantoprazole, 40 mg, Nightly  polyethylene glycol, 17 g, Daily  sodium chloride, 10 mL, Q12H  vitamin B-12, 1,000 mcg, Daily          Review of Systems:   ROS        Constitutional:  Temp:  [97.6 °F (36.4 °C)-98.3 °F (36.8 °C)] 97.6 °F (36.4 °C)  Heart Rate:  [76-84] 76  Resp:  [15-26] 26  BP: (108-128)/(53-70) 121/69    Physical Exam   /69 (BP Location: Right arm, Patient Position: Lying)   Pulse 76   Temp 97.6 °F (36.4 °C) (Oral)   Resp 26   Ht 162.6 cm (64\")   Wt (!) 137 kg (302 lb)   SpO2 92%   BMI 51.84 kg/m²   General:  Appears frail and chronically " ill  Eyes: Sclera is anicteric,  conjunctiva is clear   HEENT:  No JVD. Thyroid not visibly enlarged. No mucosal pallor or cyanosis  Respiratory: Respirations increased, bibasilar crackles  Cardiovascular: S1,S2 Regular rate and rhythm.  Trace edema present  Gastrointestinal: Abdomen nondistended.  Musculoskeletal:  No abnormal movements  Extremities: No digital clubbing or cyanosis  Skin: Color pink.   Neuro: Alert and awake.    INTAKE AND OUTPUT:    Intake/Output Summary (Last 24 hours) at 5/30/2025 1231  Last data filed at 5/30/2025 0956  Gross per 24 hour   Intake 480 ml   Output 2350 ml   Net -1870 ml       Cardiographics  Telemetry: Reviewed and interpreted by me reveals sinus rhythm    ECG:   ECG 12 Lead Pre-Op / Pre-Procedure   Final Result   HEART RATE=78  bpm   RR Itytqbpd=161  ms   MD Wzfhquue=210  ms   P Horizontal Axis=39  deg   P Front Axis=44  deg   QRSD Interval=95  ms   QT Tcexgwsr=047  ms   EGqK=204  ms   QRS Axis=73  deg   T Wave Axis=71  deg   - OTHERWISE NORMAL ECG -   Sinus rhythm   Low voltage, precordial leads   When compared with ECG of 14-Oct-2023 00:05:27,   No significant change   Electronically Signed By: Kaz Arnett (IAIN) 2025-05-25 22:19:23   Date and Time of Study:2025-05-24 15:26:20      Telemetry Scan   Final Result      Telemetry Scan   Final Result      Telemetry Scan   Final Result      Telemetry Scan   Final Result      Telemetry Scan   Final Result      Telemetry Scan   Final Result      Telemetry Scan   Final Result      Telemetry Scan   Final Result      Telemetry Scan   Final Result      Telemetry Scan   Final Result      Telemetry Scan   Final Result      Telemetry Scan   Final Result      Telemetry Scan   Final Result      Telemetry Scan   Final Result      Telemetry Scan   Final Result      Telemetry Scan   Final Result      Telemetry Scan   Final Result      Telemetry Scan   Final Result      Telemetry Scan   Final Result      Telemetry Scan   Final Result       Telemetry Scan   Final Result      Telemetry Scan   Final Result        I have personally reviewed EKG    Echocardiogram: Results for orders placed during the hospital encounter of 25    Adult Transthoracic Echo Limited W/ Cont if Necessary Per Protocol    Interpretation Summary    Left ventricular ejection fraction appears to be 61 - 65%.    Left ventricular diastolic function was not assessed.    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    Estimated right ventricular systolic pressure from tricuspid regurgitation is normal (<35 mmHg).    Imaging is limited by left arm being in sling.      STRESS TEST  Results for orders placed during the hospital encounter of 23    Stress Test With Myocardial Perfusion One Day    Interpretation Summary  LEXISCAN CARDIOLITE REPORT    DATE OF PROCEDURE: 2023    INDICATION FOR PROCEDURE: Numbness and tingling of left hand, dyspnea on exertion, chronic CHF, AVR, edema, dyslipidemia, hypertension, obesity with BMI over 45    PROCEDURE PERFORMED: Lexiscan Cardiolite    PROCEDURE COMMENTS:    After informed consent was obtained.  Patient's resting heart rate was 68 bpm, resting blood pressure was 144/82, resting EKG revealed sinus rhythm with rate of 68 bpm.  Patient was given 0.4 mg of regadenosine for stress testing.  There was no significant change in heart rate, blood pressure, symptoms with regadenoson injection.  Patient tolerated procedure well.  Complications were none.    NUCLEAR IMAGIN.  There was  uniform uptake of Cardiolite both in resting and post stress images, no ischemia seen.  2.  Gated images reveal  normal LV size and contractility, LVEF of 86%.    CONCLUSION:  1.  Lexiscan Cardiolite with normal perfusion, negative for ischemia.  2.  Normal wall motion.  LVEF of 86%.    RECOMMENDATIONS:    Clinical correlation recommended.      Jose Levi MD  23  20:24 EST      HEART CATHETERIZATION  Results for orders placed during the hospital  encounter of 21    Cardiac Catheterization/Vascular Study    Narrative  Table formatting from the original result was not included.  2021      Heart Cath Report    NAME:              Claudia Rust  :                1950  AGE/SEX:        70 y.o. female  MRN:                [id number]        Procedures Performed    1. Bilateral selective coronary arteriogram    :   Jose Levi MD    Vascular Access Site: Femoral    Indication for procedure: 70-year-old with severe critical  aortic stenosis and history of CHF, obesity, dyslipidemia, prediabetes      Procedure Note    After discussing the risks, benefits, and alternatives of the procedure, informed consent was obtained.  Moderate conscious sedation was given utilizing IV Versed and fentanyl administered by RN with continuous EKG oximetry and hemodynamic monitoring supervised by me throughout the entire case, conscious sedation time was 30 minutes.  I was present with the patient for the duration of moderate sedation and supervised staff who had no other duties and monitored the patient for the entire procedure patient had Moise 2-3 sedation scale. the vascular access site was prepped and draped in the usual sterile fashion.  2% lidocaine was used for local anesthesia. Appropriate landmarks were assessed.  A 6 Greek short sheath was inserted in the artery using the modified Seldinger technique.    Selective coronary angiography was performed with JL4 and JR4 diagnostic catheters. Left ventriculogram  was not performed because patient has severe aortic stenosis..  All exchanges were performed over the wire.  No specimens were removed.  There were no apparent acute or early complications.  The patient tolerated the procedure well and was transferred to the recovery area in stable condition.    Artery hemostasis will be achieved with  manual pressure in OPCV.      Complications:  None  Blood Loss:  minimal    Hemodynamics    Pressures    Ao:    138/62 mmHg      Coronary Angiography    Left Main :  The left main   is without disease    Left Anterior Descending : Is tortuous vessel, without disease    Left Circumflex : Tortuous vessel ends up with 3 branches which are tortuous without disease    Right Coronary Artery :  The right coronary artery   dominant vessel without disease    Dominance:  []  Left  [x]  Right  []  Co-Dominant        Impression:    1. No obstructive CAD    Recommendations:    1. Aortic valve replacement evaluation by CT VS.  Patient had echo showing severe critical aortic stenosis        I sincerely appreciate the opportunity to participate in your patient's care. Please feel free to contact me anytime if I can be of assistance in this or any other way.      Pertinent History    Past Medical History:  Diagnosis Date   Acute congestive heart failure (CMS/[facility])   Asthma   Bipolar 1 disorder (CMS/[facility])   COPD (chronic obstructive pulmonary disease) (CMS/[facility])   Depression   Dyslipidemia   Fibromyalgia   Hypertension   Hypothyroid   IBS (irritable bowel syndrome)   Migraines   Morbid obesity (CMS/[facility])   Neuropathy   Spinal stenosis   Vertigo    Past Surgical History:  Procedure Laterality Date   CARDIAC CATHETERIZATION  2009   CARDIAC CATHETERIZATION N/A 8/14/2019  Procedure: Left Heart Cath;  Surgeon: Jose Levi MD;  Location: [facility] CATH INVASIVE LOCATION;  Service: Cardiovascular   CARDIAC CATHETERIZATION N/A 8/14/2019  Procedure: Right Heart Cath;  Surgeon: Jose Levi MD;  Location: [facility] CATH INVASIVE LOCATION;  Service: Cardiovascular   CARDIAC CATHETERIZATION N/A 8/14/2019  Procedure: Aortic root aortogram;  Surgeon: Jose Levi MD;  Location: [facility] CATH INVASIVE LOCATION;  Service: Cardiovascular   CYSTOCELE REPAIR  1984   ELBOW ARTHROPLASTY  2011   FOOT SURGERY   GALLBLADDER SURGERY  2002   VAGINAL HYSTERECTOMY  1972    Prior to Admission  medications  Medication Sig Start Date End Date Taking? Authorizing Provider  aspirin 81 MG tablet Take 81 mg by mouth Every Night. 2/3/15  Yes Krissy Hodgson MD  B Complex Vitamins (VITAMIN B COMPLEX) capsule capsule Take 1 capsule by mouth Daily.   Yes Krissy Hodgson MD  Calcium Carbonate (CALCIUM 500 PO) Take 1,500 mg by mouth Daily.   Yes Krissy Hodgson MD  Cholecalciferol (VITAMIN D-1000 MAX ST) 1000 units tablet Take 2,000 Units by mouth Daily. 9/16/16  Yes Krissy Hodgson MD  diphenhydrAMINE (BENADRYL) 50 MG capsule Take 50 mg by mouth every night at bedtime.   Yes Krissy Hodgson MD  FLUoxetine (PROzac) 60 MG tablet Take 60 mg by mouth Daily. 4/19/19  Yes Krissy Hodgson MD  fluticasone (FLONASE) 50 MCG/ACT nasal spray 2 sprays into the nostril(s) as directed by provider 2 (two) times a day.   Yes Krissy Hodgson MD  furosemide (LASIX) 40 MG tablet Take 40 mg by mouth 2 (Two) Times a Day. 2/5/15  Yes Krissy Hodgson MD  gabapentin (NEURONTIN) 600 MG tablet Take 400 mg by mouth 3 (Three) Times a Day. 7/30/19  Yes Krissy Hodgson MD  HYDROcodone-acetaminophen (NORCO)  MG per tablet Take 1 tablet by mouth Daily. Noon an prm   Yes Krissy Hodgson MD  levothyroxine (SYNTHROID, LEVOTHROID) 100 MCG tablet Take 100 mcg by mouth Daily. 7/30/15  Yes Krissy Hodgson MD  metoprolol succinate XL (TOPROL-XL) 25 MG 24 hr tablet Take 25 mg by mouth Daily.   Yes Krissy Hodgson MD  Multiple Vitamins-Minerals (MULTIVITAMIN WITH MINERALS) tablet tablet Take 1 tablet by mouth Daily.   Yes Krissy Hodgson MD  ondansetron (ZOFRAN) 4 MG tablet Take 4 mg by mouth Every 8 (Eight) Hours As Needed for Nausea or Vomiting.   Yes Krissy Hodgson MD  pantoprazole (PROTONIX) 40 MG EC tablet Take 40 mg by mouth Daily.   Yes Krissy Hodgson MD  potassium chloride (KLOR-CON) 20 MEQ CR tablet Take 20 mEq by mouth 2 (Two) Times a Day. 7/30/15  Yes  Krissy Hodgson MD  simvastatin (ZOCOR) 20 MG tablet Take 20 mg by mouth Daily. 2/5/15  Yes Krissy Hodgson MD  Acetaminophen (TYLENOL PO) Take 1,000 mg by mouth Every 4 (Four) Hours As Needed.    Krissy Hodgson MD  albuterol (PROVENTIL) (2.5 MG/3ML) 0.083% nebulizer solution Inhale 2.5 mg Every 6 (Six) Hours As Needed. 2/3/15   Krissy Hodgson MD  aluminum-magnesium hydroxide-simethicone (MAALOX/MYLANTA) 200-200-20 MG/5ML suspension Take 15 mL by mouth Every 6 (Six) Hours As Needed for Indigestion or Heartburn.    Krissy Hodgson MD  meclizine (ANTIVERT) 25 MG tablet Take 25 mg by mouth 3 (Three) Times a Day As Needed. 7/30/15   Krissy Hodgson MD  polyethylene glycol (MIRALAX) powder Take 17 g by mouth Daily As Needed. 11/9/16   Krissy Hodgson MD  promethazine (PHENERGAN) 25 MG tablet Take 25 mg by mouth Every 6 (Six) Hours As Needed for Nausea or Vomiting.    Krissy Hodgson MD  rizatriptan MLT (MAXALT-MLT) 10 MG disintegrating tablet Take 10 mg by mouth 1 (One) Time As Needed for Migraine. May repeat in 2 hours if needed    Krissy Hodgson MD  tiZANidine (ZANAFLEX) 4 MG tablet Take 4 mg by mouth Every 8 (Eight) Hours As Needed for Muscle Spasms.    Krissy Hodgson MD  acetaminophen (TYLENOL) 325 MG tablet Take 650 mg by mouth Every 6 (Six) Hours As Needed for Mild Pain .  1/20/21  Krissy Hodgson MD  Calcium Carbonate ([employer] Calcium) 1500 (600 Ca) MG tablet Take 600 mg by mouth Daily.  1/20/21  Krissy Hodgson MD  metoprolol succinate XL (TOPROL-XL) 25 MG 24 hr tablet TAKE 1 TABLET BY MOUTH EVERY DAY  Patient taking differently: 25 mg. 11/17/20 1/20/21  Jose Levi MD  ondansetron ODT (ZOFRAN-ODT) 4 MG disintegrating tablet 4 mg Every 8 (Eight) Hours As Needed. 1/6/21 1/20/21  Krissy Hodgson MD      Pre-Procedure Notes  H&P Performed  [x]  Yes []  No       []  N/A    Indications:  []  ACS <= 24 HRS  []  ACS >24 HRS  []   "New Onset Angina <= 2 mos  []  Worsening Angina  []  Resuscitated Cardiac Arrest  []  Angina on Exertion:  []  Suspected CAD  []  Valvular Disease  []  Pericardial Disease  []  Cardiac Arrythmia  []  Cardiomyopathy  []  LV Dysfunction  []  Syncope  []  Post Cardiac Transplant  []  Eval. For Exercise Clearance  []  Other  []  Pre-Operative Evaluation  If Pre-Op Eval:  Evaluation for Surgery Type:  []  Cardiac Surgery   []  Non-Cardiac Surgery  Functional Capacity:  []  <4 METS  []  >=4 METS w/o symptoms  []  >= 4 METS with symptoms  []  Unknown  Surgical Risk:  []  Low  []  Intermediate  []  High Risk: Vascular  []  High Risk Non-Vascular    Risks, Benefits, & Complications Discussed:  [x]  Yes  []  No  []  N/A    Questions Answered:  [x]  Yes  []  No  []  N/A    Consent Obtained:  [x]  Yes  []  No  []  N/A    CHF: [x]  Yes  []  No  If Yes:  Newly Diagnosed?  []  Yes  [x]  No  If Yes:  HF Type:  []  Diastolic  []  Systolic  []  Unknown      Jose Levi MD  1/20/2021  12:55 EST  Electronically signed by Jose Levi MD, 01/20/21, 12:55 PM EST.      Lab Review   I have reviewed the labs      Results from last 7 days   Lab Units 05/24/25  0319   MAGNESIUM mg/dL 2.4     Results from last 7 days   Lab Units 05/30/25  0141   SODIUM mmol/L 134*   POTASSIUM mmol/L 3.9   BUN mg/dL 41.4*   CREATININE mg/dL 1.02*   CALCIUM mg/dL 8.5*         Results from last 7 days   Lab Units 05/30/25  0141 05/29/25  0315 05/28/25  0349   WBC 10*3/mm3 13.42* 12.22* 13.82*   HEMOGLOBIN g/dL 10.7* 10.5* 10.2*   HEMATOCRIT % 33.6* 35.1 32.9*   PLATELETS 10*3/mm3 194 189 198     Results from last 7 days   Lab Units 05/24/25  0319 05/23/25  2258   INR  1.18* 1.16*   APTT seconds  --  29.1       RADIOLOGY:  Imaging Results (Last 24 Hours)       ** No results found for the last 24 hours. **                  )5/30/2025  MD DANIEL Lamas/Transcription:   \"Dictated utilizing Dragon dictation\".   "

## 2025-05-30 NOTE — PROGRESS NOTES
[facility] MEDICINE SERVICE  DAILY PROGRESS NOTE    NAME: Claudia Rust  : 1950  MRN: [id number]      LOS: 4 days     PROVIDER OF SERVICE: Osman Pepper MD    Chief Complaint: Humeral fracture    Subjective:     Interval History:  History taken from: patient    To 4 L from 8 L.  She feels improved.        Review of Systems:   Review of Systems   All other systems reviewed and are negative.      Objective:     Vital Signs  Temp:  [97.6 °F (36.4 °C)-98.3 °F (36.8 °C)] 97.6 °F (36.4 °C)  Heart Rate:  [78-84] 81  Resp:  [15-26] 16  BP: (108-128)/(53-70) 121/69  Flow (L/min) (Oxygen Therapy):  [6-8] 6   Body mass index is 51.84 kg/m².    Physical Exam  Physical Exam  Constitutional:       Appearance: She is obese.   HENT:      Head: Normocephalic.   Cardiovascular:      Rate and Rhythm: Normal rate and regular rhythm.   Pulmonary:      Effort: Pulmonary effort is normal.      Breath sounds: Normal breath sounds.   Abdominal:      General: Bowel sounds are normal.      Palpations: Abdomen is soft.      Tenderness: There is no abdominal tenderness.   Musculoskeletal:         General: No swelling.   Neurological:      Mental Status: She is alert. Mental status is at baseline.            Diagnostic Data    Results from last 7 days   Lab Units 25  0141 25  1549 25  0357   WBC 10*3/mm3 13.42*   < > 15.18*   HEMOGLOBIN g/dL 10.7*   < > 10.8*   HEMATOCRIT % 33.6*   < > 35.1   PLATELETS 10*3/mm3 194   < > 195   GLUCOSE mg/dL 127*   < > 159*   CREATININE mg/dL 1.02*   < > 1.16*   BUN mg/dL 41.4*   < > 37*   SODIUM mmol/L 134*   < > 141   POTASSIUM mmol/L 3.9   < > 3.6   AST (SGOT) U/L  --   --  47*   ALT (SGPT) U/L  --   --  66*   ALK PHOS U/L  --   --  112   BILIRUBIN mg/dL  --   --  0.4   ANION GAP mmol/L 11.2   < > 7.4    < > = values in this interval not displayed.       XR Chest 1 View  Result Date: 2025  Impression: No significant change. Features suggestive of generalized  interstitial edema with more confluent edema, atelectasis or pneumonia in the left lower lobe. Stable cardiac enlargement. Electronically Signed: Yue Miller MD  5/28/2025 11:46 AM EDT  Workstation ID: [id number]            Assessment:   Left humeral fracture  Obstructive sleep apnea  Acute on Chronic hypoxemic respiratory failure  Acute CHF exacerbation  Massive obesity  CKD stage III  Transaminitis  Constipation      Plan:     CHF with respiratory failure 2/2 pulmonary edema.  Improved with the IV Bumex 2.5 mg twice daily plus the metolazone given yesterday.  Weaned down to 50% this morning from 8 L to 4 L.  She is feeling symptomatically improved.  Continue    Appreciate cardiology input.  Pulmonology following as well.    Labs reviewed.  Monitoring electrolytes closely.    I anticipate patient will require less than 30 days of skilled rehab.    Active and Resolved Problems  Active Hospital Problems    Diagnosis  POA    **Humeral fracture [S42.309A]  Yes      Resolved Hospital Problems   No resolved problems to display.       VTE Prophylaxis:  Pharmacologic VTE prophylaxis orders are present.             Disposition Planning:     Barriers to Discharge: O2 requirement, CHF, Pain control and discharge planning  Anticipated Date of Discharge: 6/1  Place of Discharge: home      Time: 30 minutes     Code Status and Medical Interventions: CPR (Attempt to Resuscitate); Full Support   Ordered at: 05/24/25 0116     Code Status (Patient has no pulse and is not breathing):    CPR (Attempt to Resuscitate)     Medical Interventions (Patient has pulse or is breathing):    Full Support       Signature: Electronically signed by Osman Pepper MD, 05/30/25, 10:55 EDT.  [facility] Hospitalist Team

## 2025-05-30 NOTE — DISCHARGE PLACEMENT REQUEST
"Claudia Mckoy (74 y.o. Female)       Date of Birth   1950    Social Security Number       Address   [address] [place] [address] [place] [address]    Home Phone   [phone]    MRN   [id number]       Hinduism   Gnosticism    Marital Status                               Admission Date   5/23/2025    Admission Type   Emergency    Admitting Provider   Osman Pepper MD    Attending Provider   Osman Pepper MD    Department, Room/Bed   [facility] SURGICAL INPATIENT, 4116/1       Discharge Date       Discharge Disposition       Discharge Destination                                 Attending Provider: Osman Pepper MD    Allergies: Adhesive Tape, Benadryl [Diphenhydramine], Butorphanol, Garlic, Heparin, Tetanus-diphtheria Toxoids Td, Aloe, Bee Venom, Latex, Macadamia Nut Oil, Tuberculin, Ppd, Wasp Venom    Isolation: None   Infection: None   Code Status: CPR    Ht: 162.6 cm (64\")   Wt: 137 kg (302 lb)    Admission Cmt: None   Principal Problem: Humeral fracture [S42.309A]                   Active Insurance as of 5/23/2025       Primary Coverage       Payor Plan Insurance Group Employer/Plan Group    [employer] MEDICARE REPLACEMENT UHC MEDICARE ADVANTAGE SNP PPO [id number]       Payor Plan Address Payor Plan Phone Number Payor Plan Fax Number Effective Dates    [address] [address]   1/1/2025 - None Entered    [facility] [id number]         Subscriber Name Subscriber Birth Date Member ID       CLAUDIA MCKOY LA 1950 [id number]               Secondary Coverage       Payor Plan Insurance Group Employer/Plan Group    [facility] COMMUNITY PLAN OF IN [facility] COMMUNITY PLAN PATHWAYS IN        Payor Plan Address Payor Plan Phone Number Payor Plan Fax Number Effective Dates    [address]   7/1/2024 - None Entered    [facility] [phone]         Subscriber Name Subscriber Birth Date Member ID       CLAUDIA MCKOY LA 1950 [id number]                     Emergency Contacts  " "      (Rel.) Home Phone Work Phone Mobile Phone    Isma Rust (Spouse) [phone] -- [phone]    OVIDIO THURMAN (Daughter) [phone] -- [phone]    Mara Baker (Daughter) -- -- [phone]                 History & Physical        Brian Zavala MD at 25 0021              [facility] Medicine Services  History & Physical    Patient Name: Claudia Rust  : 1950  MRN: [id number]  Primary Care Physician:  Davis Boateng NP  Date of admission: 2025  Date and Time of Service: 2025 at 0021    Subjective      Chief Complaint: Mechanical fall    History of Present Illness: Claudia Rust is a 74 y.o. female with a CMH of chronic vertigo, CHF, hypertension, hyperlipidemia, depression, GERD, fibromyalgia, hypothyroidism, who presented to [facility] on 2025 with  Mechanical fall.Patient fell while using her rollator and landed on her left arm, resulting in significant pain and inability to move the arm. She also hit her head but denies loss of consciousness, denies chest pain or SOB. Reports mild right-sided neck pain. No other injuries noted.    In the ED, BP soft, afebrile, labs remarkable for potassium of 2.7, creatinine of 1.63 (1.3), CBC showing WBC of 10.9, hemoglobin and platelets normal, x-ray of the left humerus showing mild given the mildly displaced fracture of the proximal humeral diaphysis.  He has been cervical spine negative for any acute acute process.  Patient given potassium, hydromorphone, Zofran before, admitted to medicine team further inpatient management.    Review of Systems negative unless mentioned above    Personal History     Past Medical History:   Diagnosis Date    Acute congestive heart failure     Allergies     Anxiety     Arthritis     Asthma     Bilateral leg edema     Bipolar 1 disorder     Bulging lumbar disc     X3    Cancer     states had \"cancerous tumor during pregnancy and had to have hysterectomy\" "    Cataract     bilateral    Cellulitis 03/2021    bilateral legs    Compression fracture of vertebral column     LUMBAR    COPD (chronic obstructive pulmonary disease)     Delayed emergence from anesthesia     Depression     Depression     Diabetes mellitus     Dyslipidemia     Dyspnea on exertion     Fibromyalgia     GERD (gastroesophageal reflux disease)     Hiatal hernia     Hyperlipidemia     Hypertension     Hypothyroid     HYPOTHYROID    IBS (irritable bowel syndrome)     Migraines     Morbid obesity     Neuropathy     Panic attacks     Peripheral edema     PONV (postoperative nausea and vomiting)     Poor mobility     rolling walker    Prediabetes     PVD (peripheral vascular disease)     Rheumatoid aortitis     Sleep apnea     cpap    Spinal stenosis     Spinal stenosis     Vertigo     Vertigo     Vitamin D deficiency        Past Surgical History:   Procedure Laterality Date    AORTIC VALVE REPAIR/REPLACEMENT N/A 3/18/2021    Procedure: AORTIC VALVE REPLACEMENT;  Surgeon: Olaf Mcgill MD;  Location: [facility] CVOR;  Service: Cardiothoracic;  Laterality: N/A;    BRONCHOSCOPY N/A 3/25/2021    Procedure: BRONCHOSCOPY AT BEDSIDE WITH BRONCHIOALVEOLAR LAVAGE;  Surgeon: Glenn Reyes MD;  Location: [facility] ENDOSCOPY;  Service: Pulmonary;  Laterality: N/A;  PNA    BRONCHOSCOPY N/A 10/16/2023    Procedure: BRONCHOSCOPY with bronchial washing;  Surgeon: Glenn Reyes MD;  Location: [facility] ENDOSCOPY;  Service: Pulmonary;  Laterality: N/A;  post op: pneumonia    CARDIAC CATHETERIZATION  2009    CARDIAC CATHETERIZATION N/A 8/14/2019    Procedure: Left Heart Cath;  Surgeon: Jose Levi MD;  Location: [facility] CATH INVASIVE LOCATION;  Service: Cardiovascular    CARDIAC CATHETERIZATION N/A 8/14/2019    Procedure: Right Heart Cath;  Surgeon: Jose Levi MD;  Location: [facility] CATH INVASIVE LOCATION;  Service: Cardiovascular    CARDIAC CATHETERIZATION N/A 8/14/2019    Procedure: Aortic root aortogram;   Surgeon: Jose Levi MD;  Location: [facility] CATH INVASIVE LOCATION;  Service: Cardiovascular    CARDIAC CATHETERIZATION N/A 1/20/2021    Procedure: Left Heart Cath;  Surgeon: Jose Levi MD;  Location: [facility] CATH INVASIVE LOCATION;  Service: Cardiovascular;  Laterality: N/A;    COLONOSCOPY      COLONOSCOPY N/A 2/14/2025    Procedure: COLONOSCOPY WITH POLYPECTOMY X 4;  Surgeon: Segun Cleveland MD;  Location: [facility] ENDOSCOPY;  Service: Gastroenterology;  Laterality: N/A;  POST- POLYPS X 4, DIVERTICULOSIS, INTERNAL HEMORRHOIDS    CYSTOCELE REPAIR  1984    ELBOW ARTHROPLASTY  2011    ENDOSCOPY      FOOT SURGERY      GALLBLADDER SURGERY  2002    TONSILLECTOMY      VAGINAL HYSTERECTOMY  1972       Family History: family history includes Diabetes in her maternal grandmother; Esophageal cancer in her mother; Hypertension in her father; Lung cancer in her mother; Ovarian cancer in her mother. Otherwise pertinent FHx was reviewed and not pertinent to current issue.    Social History:  reports that she quit smoking about 3 years ago. Her smoking use included cigarettes. She has never been exposed to tobacco smoke. She has never used smokeless tobacco. She reports that she does not currently use alcohol. She reports that she does not use drugs.    Home Medications:  Prior to Admission Medications       Prescriptions Last Dose Informant Patient Reported? Taking?    Acetaminophen 325 MG capsule   Yes No    Take 650 mg by mouth Every 4 (Four) Hours As Needed.    albuterol (ACCUNEB) 0.63 MG/3ML nebulizer solution   No No    Take 3 mL by nebulization Every 6 (Six) Hours As Needed for Wheezing (Dx: J 44.9 (COPD), R 06.09 (dyspnea on exertion)).    B Complex Vitamins (VITAMIN B COMPLEX) capsule capsule  Self Yes No    Take 1 capsule by mouth Daily. LAST DOSE 3/4    budesonide-formoterol (SYMBICORT) 160-4.5 MCG/ACT inhaler   No No    Inhale 2 puffs 2 (Two) Times a Day.    bumetanide (BUMEX) 1 MG  tablet   Yes No    Take 1 tablet by mouth Daily.    bumetanide (BUMEX) 2 MG tablet   No No    Take 1 tablet by mouth Daily.    Calcium-Magnesium-Vitamin D (CALCIUM 1200+D3 PO)   Yes No    Take 1 capsule by mouth Daily.    cholecalciferol (Cholecalciferol) 25 MCG (1000 UT) tablet  Self Yes No    Take 2 tablets by mouth Every Night. LAST DOSE 3/4    diphenhydrAMINE (BENADRYL) 50 MG capsule   Yes No    Take 1 capsule by mouth At Night As Needed for Itching.    famotidine (PEPCID) 40 MG tablet   Yes No    Take 1 tablet by mouth every night at bedtime.    Farxiga 5 MG tablet tablet   Yes No    Take 1 tablet by mouth Every Morning.    FLUoxetine (PROzac) 20 MG capsule   Yes No    Take 3 capsules by mouth Daily.    fluticasone (FLONASE) 50 MCG/ACT nasal spray   Yes No    Administer 2 sprays into the nostril(s) as directed by provider Daily.    Patient taking differently:  Administer 2 sprays into the nostril(s) as directed by provider Daily As Needed.    gabapentin (NEURONTIN) 400 MG capsule   Yes No    Take 1 capsule by mouth 3 (Three) Times a Day.    levothyroxine (SYNTHROID, LEVOTHROID) 137 MCG tablet   Yes No    Take 1 tablet by mouth Daily.    losartan (Cozaar) 25 MG tablet   No No    Take 1 tablet by mouth Daily.    meclizine (ANTIVERT) 25 MG tablet  Self Yes No    Take 1 tablet by mouth 3 (Three) Times a Day As Needed.    Melatonin 10 MG capsule   Yes No    Take 2 capsules by mouth Every Night.    metOLazone (ZAROXOLYN) 2.5 MG tablet   Yes No    Take 1 tablet by mouth Every Other Day.    metoprolol succinate XL (TOPROL-XL) 25 MG 24 hr tablet   No No    Take 0.5 tablets by mouth Daily.    Multiple Vitamins-Minerals (MULTIVITAMIN WITH MINERALS) tablet tablet  Self Yes No    Take 1 tablet by mouth Daily.    ondansetron (ZOFRAN) 4 MG tablet   Yes No    Take 1 tablet by mouth Every 8 (Eight) Hours As Needed for Nausea or Vomiting.    pantoprazole (PROTONIX) 40 MG EC tablet  Self Yes No    Take 1 tablet by mouth Every  Evening.    rizatriptan MLT (MAXALT-MLT) 10 MG disintegrating tablet  Self Yes No    Take 1 tablet by mouth 1 (One) Time As Needed for Migraine. May repeat in 2 hours if needed    simvastatin (ZOCOR) 20 MG tablet   Yes No    Take 1 tablet by mouth Every Night.    tiZANidine (ZANAFLEX) 2 MG tablet   Yes No    Take 1 tablet by mouth Every 8 (Eight) Hours As Needed.    vitamin B-12 (CYANOCOBALAMIN) 1000 MCG tablet   Yes No    Take 1 tablet by mouth Daily.              Allergies:  Allergies   Allergen Reactions    Adhesive Tape Unknown (See Comments)     hives    Butorphanol Other (See Comments)     Chest pain difficulty breathing throat swelling, STADAL    Garlic Other (See Comments)     Chest pain difficulty breathing throat swells    Heparin Other (See Comments)     HIT +    Tetanus-Diphtheria Toxoids Td Other (See Comments)     Dizziness,  vomiting    Aloe Itching    Bee Venom Other (See Comments)     Swelling eyes and lips hand swelling    Latex Itching    Macadamia Nut Oil Other (See Comments)     Chest pain difficulty breathing throat swelling    Tuberculin, Ppd Unknown (See Comments)     reactor    Wasp Venom Other (See Comments)     Swelling eyes lips and hand       Objective      Vitals:   Temp:  [97.8 °F (36.6 °C)] 97.8 °F (36.6 °C)  Heart Rate:  [] 56  Resp:  [20] 20  BP: ()/() 96/53  Body mass index is 51.84 kg/m².  Physical Exam  General: Awake alert [place] x 3, conversational  HEENT: Atraumatic normocephalic  Cardiac: Heart normal, rhythm regular  Respiratory: Clear to auscultation on exam  Abdomen: Soft, nontender, no rebound or guarding  Extremities: Edema present on the bilateral lower extremities, no purulence or drainage or open wounds noted, mild erythema noted right above the ankles  General: Awake alert oriented x 3, conversational, moves all extremities, normal speech    Diagnostic Data:  Lab Results (last 24 hours)       Procedure Component Value Units Date/Time    Magnesium  [[id number]] Collected: 05/23/25 2258    Specimen: Blood Updated: 05/24/25 0012    Basic Metabolic Panel [[id number]]  (Abnormal) Collected: 05/23/25 2258    Specimen: Blood Updated: 05/23/25 2327     Glucose 133 mg/dL      BUN 51 mg/dL      Creatinine 1.63 mg/dL      Sodium 137 mmol/L      Potassium 2.7 mmol/L      Chloride 96 mmol/L      CO2 29.2 mmol/L      Calcium 9.0 mg/dL      BUN/Creatinine Ratio 31.3     Anion Gap 11.8 mmol/L      eGFR 33.0 mL/min/1.73     Narrative:      GFR Categories in Chronic Kidney Disease (CKD)              GFR Category          GFR (mL/min/1.73)    Interpretation  G1                    90 or greater        Normal or high (1)  G2                    60-89                Mild decrease (1)  G3a                   45-59                Mild to moderate decrease  G3b                   30-44                Moderate to severe decrease  G4                    15-29                Severe decrease  G5                    14 or less           Kidney failure    (1)In the absence of evidence of kidney disease, neither GFR category G1 or G2 fulfill the criteria for CKD.    eGFR calculation 2021 CKD-EPI creatinine equation, which does not include race as a factor    Extra Tubes [[id number]] Collected: 05/23/25 2258    Specimen: Blood, Venous Line Updated: 05/23/25 2315    Narrative:      The following orders were created for panel order Extra Tubes.  Procedure                               Abnormality         Status                     ---------                               -----------         ------                     Gold Top - SST[[id number]]                                   Final result                 Please view results for these tests on the individual orders.    Gold Top - SST [[id number]] Collected: 05/23/25 2258    Specimen: Blood Updated: 05/23/25 2315     Extra Tube Hold for add-ons.     Comment: Auto resulted.       aPTT [[id number]]  (Normal) Collected: 05/23/25 2258    Specimen: Blood  Updated: 05/23/25 2313     PTT 29.1 seconds     Protime-INR [[id number]]  (Abnormal) Collected: 05/23/25 2258    Specimen: Blood Updated: 05/23/25 2313     Protime 14.7 Seconds      INR 1.16    CBC & Differential [[id number]]  (Abnormal) Collected: 05/23/25 2258    Specimen: Blood Updated: 05/23/25 2304    Narrative:      The following orders were created for panel order CBC & Differential.  Procedure                               Abnormality         Status                     ---------                               -----------         ------                     CBC Auto Differential[[id number]]        Abnormal            Final result                 Please view results for these tests on the individual orders.    CBC Auto Differential [[id number]]  (Abnormal) Collected: 05/23/25 2258    Specimen: Blood Updated: 05/23/25 2304     WBC 10.97 10*3/mm3      RBC 4.06 10*6/mm3      Hemoglobin 12.0 g/dL      Hematocrit 37.6 %      MCV 92.6 fL      MCH 29.6 pg      MCHC 31.9 g/dL      RDW 14.0 %      RDW-SD 47.5 fl      MPV 9.8 fL      Platelets 256 10*3/mm3      Neutrophil % 55.0 %      Lymphocyte % 32.1 %      Monocyte % 10.0 %      Eosinophil % 1.6 %      Basophil % 0.4 %      Immature Grans % 0.9 %      Neutrophils, Absolute 6.03 10*3/mm3      Lymphocytes, Absolute 3.52 10*3/mm3      Monocytes, Absolute 1.10 10*3/mm3      Eosinophils, Absolute 0.18 10*3/mm3      Basophils, Absolute 0.04 10*3/mm3      Immature Grans, Absolute 0.10 10*3/mm3      nRBC 0.0 /100 WBC              Imaging Results (Last 24 Hours)       Procedure Component Value Units Date/Time    CT Head Without Contrast [[id number]] Collected: 05/24/25 0004     Updated: 05/24/25 0010    Narrative:      CT HEAD WO CONTRAST, CT CERVICAL SPINE WO CONTRAST    Date of Exam: 5/23/2025 11:58 PM EDT    Indication: fall.    Comparison: 2/15/2020.    Technique: Axial CT images were obtained of the head and cervical spine without contrast administration.  Coronal  reconstructions were performed.  Automated exposure control and iterative reconstruction methods were used.      Findings:  Head: There is no evidence of hemorrhage. There is no mass effect or midline shift.    There is no extracerebral collection.    Ventricles are normal in size and configuration for patient's stated age.      Posterior fossa is within normal limits.    Calvarium and skull base appear intact.   Visualized sinuses show no air fluid levels. Visualized orbits are unremarkable.    Cervical: No evidence of fracture or compression deformity. No evidence of spondylolysis.  No significant spondylolisthesis. No evidence of focal lesions. The prevertebral soft tissues appear unremarkable. Surrounding soft tissues appear within normal   limits. Mild to moderate multilevel degenerative changes are present throughout the spine. The lung apices appear clear.        Impression:      Impression:  1.No acute intracranial process.  2.No acute osseous abnormality of the cervical spine.          Electronically Signed: Tana Suthreland MD    5/24/2025 12:08 AM EDT    Workstation ID: [id number]    CT Cervical Spine Without Contrast [[id number]] Collected: 05/24/25 0004     Updated: 05/24/25 0010    Narrative:      CT HEAD WO CONTRAST, CT CERVICAL SPINE WO CONTRAST    Date of Exam: 5/23/2025 11:58 PM EDT    Indication: fall.    Comparison: 2/15/2020.    Technique: Axial CT images were obtained of the head and cervical spine without contrast administration.  Coronal reconstructions were performed.  Automated exposure control and iterative reconstruction methods were used.      Findings:  Head: There is no evidence of hemorrhage. There is no mass effect or midline shift.    There is no extracerebral collection.    Ventricles are normal in size and configuration for patient's stated age.      Posterior fossa is within normal limits.    Calvarium and skull base appear intact.   Visualized sinuses show no air fluid levels.  Visualized orbits are unremarkable.    Cervical: No evidence of fracture or compression deformity. No evidence of spondylolysis.  No significant spondylolisthesis. No evidence of focal lesions. The prevertebral soft tissues appear unremarkable. Surrounding soft tissues appear within normal   limits. Mild to moderate multilevel degenerative changes are present throughout the spine. The lung apices appear clear.        Impression:      Impression:  1.No acute intracranial process.  2.No acute osseous abnormality of the cervical spine.          Electronically Signed: Tana Sutherland MD    5/24/2025 12:08 AM EDT    Workstation ID: [id number]    XR Humerus Left [[id number]] Collected: 05/23/25 2336     Updated: 05/23/25 2340    Narrative:      XR HUMERUS LEFT    Date of Exam: 5/23/2025 11:15 PM EDT    Indication: fall    Comparison: None available.    Findings:  There is a mildly comminuted mildly displaced fracture of the proximal humeral diaphysis. The remaining portions of the humerus and the elbow joint appears grossly unremarkable.      Impression:      Impression:  Mildly comminuted mildly displaced fracture of the proximal humeral diaphysis.          Electronically Signed: Tana Sutherland MD    5/23/2025 11:38 PM EDT    Workstation ID: [id number]              Assessment & Plan    Claudia Rust is a 74 y.o. female with a CMH of CHF, hypertension, hyperlipidemia, depression, GERD, fibromyalgia, hypothyroidism, who presented to [facility] on 5/23/2025 with  Mechanical fall.    Assessments  #Mechanical Fall  #Left Humeral Fracture  #Chronic vertigo  #Congestive Heart Failure (LVEF 55-60%), Concentric Left Ventricular Hypertrophy  #JERROD on CKD  #Hypertension  #Hyperlipidemia  #GERD  #Fibromyalgia  #Depression  #Hypothyroidism    Plan  -Consult orthopedic surgery for evaluation and management of left humeral fracture.    -Pain management with Dilaudid 0.5 mg IV every 4 hours as needed; resume home gabapentin.    -Hold  losartan, SGLT2 inhibitors, and diuretics for now given JERROD and underlying CKD.    -Monitor renal function closely; if creatinine fails to improve, consider nephrology consult. Bladder scan to rule out urinary retention as a contributor to JERROD.    -Continue metoprolol, statins, Synthroid, and PPI for GERD, meclizine as needed for chronic vertigo    -Resume other home medications once fully reconciled by pharmacy and deemed medically appropriate.    -Follow a.m. labs for renal function, electrolytes, and medication effects.    VTE Prophylaxis:  No VTE prophylaxis order currently exists.      CODE STATUS:  Full      I discussed the patient's findings and my recommendations with patient.      This document has been electronically signed by Brian Zavala MD on May 24, 2025 00:21 EDT   [facility] Hospitalist Team     Electronically signed by Brian Zavala MD at 05/24/25 0151          Operative/Procedure Notes (all)        Rufus Peralta MD at 05/25/25 1618  Version 1 of 1         HUMERUS INTRAMEDULLARY NAIL/JONO INSERTION  Progress Note    Claudia Rust  5/25/2025    Pre-op Diagnosis:   Left proximal third humeral shaft fracture        Post-Op Diagnosis Codes:   Left proximal third humeral shaft fracture     Procedure(s):      Procedure(s):  HUMERUS INTRAMEDULLARY NAIL/JONO INSERTION LEFT              Surgeon(s):  Rufus Peralta MD    Anesthesia: General    Staff:   Circulator: Segun Barraza RN  Scrub Person: Kateryna Rojas  Vendor Representative: Jovon Scott  Assistant: Heather Durbin CSA  Assistant: Heather Durbin CSA    Estimated Blood Loss: 100ml    Urine Voided: * No values recorded between 5/25/2025  3:43 PM and 5/25/2025  5:37 PM *    Specimens:                None      Drains:   External Urinary Catheter (Active)   Daily Indications Daily output 05/25/25 1200   Site Assessment Clean;Skin intact 05/25/25 1200   Application/Removal skin care provided 05/25/25 0020   Collection  Container Wall suction 25 1200   Securement Method Securing device 25 0020   Output (mL) 700 mL 25 0500       Findings: left proximal third humeral shaft fracture       Complications: none evident     Assistant: Heather Durbin CSA  was responsible for performing the following activities: Retraction, Suction, Irrigation, Suturing, Closing, and Placing Dressing and their skilled assistance was necessary for the success of this case.    Rufus Peralta MD     Date: 2025  Time: 17:50 EDT          Electronically signed by Rufus Peralta MD at 25 175          Physician Progress Notes (last 48 hours)        Osman Pepper MD at 25 1055              [facility] MEDICINE SERVICE  DAILY PROGRESS NOTE    NAME: Claudia Rust  : 1950  MRN: [id number]      LOS: 4 days     PROVIDER OF SERVICE: Osman Pepper MD    Chief Complaint: Humeral fracture    Subjective:     Interval History:  History taken from: patient    To 4 L from 8 L.  She feels improved.        Review of Systems:   Review of Systems   All other systems reviewed and are negative.      Objective:     Vital Signs  Temp:  [97.6 °F (36.4 °C)-98.3 °F (36.8 °C)] 97.6 °F (36.4 °C)  Heart Rate:  [78-84] 81  Resp:  [15-26] 16  BP: (108-128)/(53-70) 121/69  Flow (L/min) (Oxygen Therapy):  [6-8] 6   Body mass index is 51.84 kg/m².    Physical Exam  Physical Exam  Constitutional:       Appearance: She is obese.   HENT:      Head: Normocephalic.   Cardiovascular:      Rate and Rhythm: Normal rate and regular rhythm.   Pulmonary:      Effort: Pulmonary effort is normal.      Breath sounds: Normal breath sounds.   Abdominal:      General: Bowel sounds are normal.      Palpations: Abdomen is soft.      Tenderness: There is no abdominal tenderness.   Musculoskeletal:         General: No swelling.   Neurological:      Mental Status: She is alert. Mental status is at baseline.            Diagnostic Data    Results from last 7  days   Lab Units 05/30/25  0141 05/26/25  1549 05/26/25  0357   WBC 10*3/mm3 13.42*   < > 15.18*   HEMOGLOBIN g/dL 10.7*   < > 10.8*   HEMATOCRIT % 33.6*   < > 35.1   PLATELETS 10*3/mm3 194   < > 195   GLUCOSE mg/dL 127*   < > 159*   CREATININE mg/dL 1.02*   < > 1.16*   BUN mg/dL 41.4*   < > 37*   SODIUM mmol/L 134*   < > 141   POTASSIUM mmol/L 3.9   < > 3.6   AST (SGOT) U/L  --   --  47*   ALT (SGPT) U/L  --   --  66*   ALK PHOS U/L  --   --  112   BILIRUBIN mg/dL  --   --  0.4   ANION GAP mmol/L 11.2   < > 7.4    < > = values in this interval not displayed.       XR Chest 1 View  Result Date: 5/28/2025  Impression: No significant change. Features suggestive of generalized interstitial edema with more confluent edema, atelectasis or pneumonia in the left lower lobe. Stable cardiac enlargement. Electronically Signed: Yue Miller MD  5/28/2025 11:46 AM EDT  Workstation ID: [id number]            Assessment:   Left humeral fracture  Obstructive sleep apnea  Acute on Chronic hypoxemic respiratory failure  Acute CHF exacerbation  Massive obesity  CKD stage III  Transaminitis  Constipation      Plan:     CHF with respiratory failure 2/2 pulmonary edema.  Improved with the IV Bumex 2.5 mg twice daily plus the metolazone given yesterday.  Weaned down to 50% this morning from 8 L to 4 L.  She is feeling symptomatically improved.  Continue    Appreciate cardiology input.  Pulmonology following as well.    Labs reviewed.  Monitoring electrolytes closely.    I anticipate patient will require less than 30 days of skilled rehab.    Active and Resolved Problems  Active Hospital Problems    Diagnosis  POA    **Humeral fracture [S42.309A]  Yes      Resolved Hospital Problems   No resolved problems to display.       VTE Prophylaxis:  Pharmacologic VTE prophylaxis orders are present.             Disposition Planning:     Barriers to Discharge: O2 requirement, CHF, Pain control and discharge planning  Anticipated Date of Discharge:    Place of Discharge: home      Time: 30 minutes     Code Status and Medical Interventions: CPR (Attempt to Resuscitate); Full Support   Ordered at: 25 0116     Code Status (Patient has no pulse and is not breathing):    CPR (Attempt to Resuscitate)     Medical Interventions (Patient has pulse or is breathing):    Full Support       Signature: Electronically signed by Osman Pepper MD, 25, 10:55 EDT.  [facility] Hospitalist Team     Electronically signed by Osman Pepper MD at 25 1057       Osman Pepper MD at 25 1142              [facility] MEDICINE SERVICE  DAILY PROGRESS NOTE    NAME: Claudia Rust  : 1950  MRN: [id number]      LOS: 3 days     PROVIDER OF SERVICE: Osman Pepper MD    Chief Complaint: Humeral fracture    Subjective:     Interval History:  History taken from: patient    Initially unchanged.  7 to 8 L this morning.        Review of Systems:   Review of Systems   All other systems reviewed and are negative.      Objective:     Vital Signs  Temp:  [97.3 °F (36.3 °C)-99.2 °F (37.3 °C)] 98.1 °F (36.7 °C)  Heart Rate:  [78-96] 81  Resp:  [17-29] 20  BP: (115-134)/(69-82) 131/69  Flow (L/min) (Oxygen Therapy):  [8] 8   Body mass index is 51.84 kg/m².    Physical Exam  Physical Exam  Constitutional:       Appearance: She is obese.   HENT:      Head: Normocephalic.   Cardiovascular:      Rate and Rhythm: Normal rate and regular rhythm.   Pulmonary:      Effort: Pulmonary effort is normal.      Breath sounds: Normal breath sounds.      Comments: Coarse breath sounds  Abdominal:      General: Bowel sounds are normal.      Palpations: Abdomen is soft.      Tenderness: There is no abdominal tenderness.   Musculoskeletal:         General: No swelling.   Neurological:      Mental Status: She is alert. Mental status is at baseline.      Comments: Somewhat confused and easily redirected            Diagnostic Data    Results from last 7 days   Lab Units  05/29/25  0315 05/26/25  1549 05/26/25  0357   WBC 10*3/mm3 12.22*   < > 15.18*   HEMOGLOBIN g/dL 10.5*   < > 10.8*   HEMATOCRIT % 35.1   < > 35.1   PLATELETS 10*3/mm3 189   < > 195   GLUCOSE mg/dL 155*   < > 159*   CREATININE mg/dL 0.94   < > 1.16*   BUN mg/dL 43.1*   < > 37*   SODIUM mmol/L 144   < > 141   POTASSIUM mmol/L 3.5   < > 3.6   AST (SGOT) U/L  --   --  47*   ALT (SGPT) U/L  --   --  66*   ALK PHOS U/L  --   --  112   BILIRUBIN mg/dL  --   --  0.4   ANION GAP mmol/L 12.2   < > 7.4    < > = values in this interval not displayed.       XR Chest 1 View  Result Date: 5/28/2025  Impression: No significant change. Features suggestive of generalized interstitial edema with more confluent edema, atelectasis or pneumonia in the left lower lobe. Stable cardiac enlargement. Electronically Signed: Yue Miller MD  5/28/2025 11:46 AM EDT  Workstation ID: [id number]            Assessment:   Left humeral fracture  Obstructive sleep apnea  Acute on Chronic hypoxemic respiratory failure  Acute CHF exacerbation  Massive obesity  CKD stage III  Transaminitis  Constipation      Plan:     CHF with respiratory failure 2/2 pulmonary edema.  She is at 8 L this morning.  I have increased the Bumex to 2.5 mg twice daily IV, I will give her a dose of metolazone daily to reduce her pulmonary edema.    Appreciate cardiology input.      Labs reviewed. BNP very elevated.    I anticipate patient will require less than 30 days of skilled rehab.    Active and Resolved Problems  Active Hospital Problems    Diagnosis  POA    **Humeral fracture [S42.309A]  Yes      Resolved Hospital Problems   No resolved problems to display.       VTE Prophylaxis:  Pharmacologic VTE prophylaxis orders are present.             Disposition Planning:     Barriers to Discharge: O2 requirement, CHF, Pain control and discharge planning  Anticipated Date of Discharge: 6/1  Place of Discharge: home      Time: 30 minutes     Code Status and Medical Interventions:  CPR (Attempt to Resuscitate); Full Support   Ordered at: 05/24/25 0116     Code Status (Patient has no pulse and is not breathing):    CPR (Attempt to Resuscitate)     Medical Interventions (Patient has pulse or is breathing):    Full Support       Signature: Electronically signed by Osman Pepper MD, 05/29/25, 11:42 EDT.  [facility], [place], operated by [facility] Hospitalist Team     Electronically signed by Osman Pepper MD at 05/29/25 1143       Glenn Reyes MD at 05/29/25 0851          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, axillary, and brachial  Upper arm left DVT  DM  Hx of Ecoli UTI  Oral candidiasis  HTN  JERROD  DM2  Chronic anemia  HLD        Recommendations:     Oxygen titration currently requiring 6 L  Bronchodilators budesonide and DuoNeb  Mucomyst nebulized  Eliquis for DVT prophylaxis              LOS: 3 days     Subjective         Objective     Vital signs for last 24 hours:  Vitals:    05/29/25 0645 05/29/25 0649 05/29/25 0650 05/29/25 0654   BP:       BP Location:       Patient Position:       Pulse: 80 81 80 80   Resp: 24 26 22 22   Temp:       TempSrc:       SpO2: 92% 92% 93% 93%   Weight:       Height:           Intake/Output last 3 shifts:  I/O last 3 completed shifts:  In: 485 [P.O.:485]  Out: 2900 [Urine:2900]  Intake/Output this shift:  No intake/output data recorded.      Radiology  Imaging Results (Last 24 Hours)       Procedure Component Value Units Date/Time    XR Chest 1 View [[id number]] Collected: 05/28/25 1145     Updated: 05/28/25 1148    Narrative:      XR CHEST 1 VW    Date of Exam: 5/28/2025  10:57 AM EDT    Indication: hypoxia evaluation    Comparison: AP chest 5/26/2025.    Findings:  Moderate generalized cardiomediastinal enlargement with median sternotomy and cardiac valve replacement, similar to prior. Central pulmonary vasculature is enlarged and indistinct. Fine interstitial thickening is present in both lungs. More confluent   alveolar opacity suggested in the retrocardiac left lower lobe, as before. No pneumothorax or acute osseous abnormality.      Impression:      Impression:  No significant change. Features suggestive of generalized interstitial edema with more confluent edema, atelectasis or pneumonia in the left lower lobe.  Stable cardiac enlargement.      Electronically Signed: Yue Miller MD    5/28/2025 11:46 AM EDT    Workstation ID: [id number]            Labs:  Results from last 7 days   Lab Units 05/29/25 0315   WBC 10*3/mm3 12.22*   HEMOGLOBIN g/dL 10.5*   HEMATOCRIT % 35.1   PLATELETS 10*3/mm3 189     Results from last 7 days   Lab Units 05/29/25  0315 05/26/25  1549 05/26/25  0357   SODIUM mmol/L 144   < > 141   POTASSIUM mmol/L 3.5   < > 3.6   CHLORIDE mmol/L 100   < > 103   CO2 mmol/L 31.8*   < > 30.6*   BUN mg/dL 43.1*   < > 37*   CREATININE mg/dL 0.94   < > 1.16*   CALCIUM mg/dL 8.7   < > 8.7   BILIRUBIN mg/dL  --   --  0.4   ALK PHOS U/L  --   --  112   ALT (SGPT) U/L  --   --  66*   AST (SGOT) U/L  --   --  47*   GLUCOSE mg/dL 155*   < > 159*    < > = values in this interval not displayed.         Results from last 7 days   Lab Units 05/26/25  0357 05/25/25  0027 05/24/25  0319   ALBUMIN g/dL 3.3* 3.7 3.8             Results from last 7 days   Lab Units 05/24/25  0319   MAGNESIUM mg/dL 2.4     Results from last 7 days   Lab Units 05/24/25  0319 05/23/25  2258   INR  1.18* 1.16*   APTT seconds  --  29.1               Meds:   SCHEDULE  acetylcysteine, 2 mL, Nebulization, BID - RT  apixaban, 2.5 mg, Oral, Q12H  atorvastatin, 10 mg, Oral, Daily  budesonide, 0.5 mg,  Nebulization, BID - RT  bumetanide, 2.5 mg, Intravenous, BID  [Held by provider] bumetanide, 2 mg, Oral, BID Diuretics  cholecalciferol, 1,000 Units, Oral, Daily  docusate sodium, 100 mg, Oral, Nightly  FLUoxetine, 60 mg, Oral, Daily  gabapentin, 300 mg, Oral, TID  ipratropium-albuterol, 3 mL, Nebulization, 4x Daily - RT  levothyroxine, 137 mcg, Oral, Q AM  metoprolol succinate XL, 12.5 mg, Oral, Daily  nystatin, 5 mL, Swish & Spit, 4x Daily  pantoprazole, 40 mg, Oral, Nightly  polyethylene glycol, 17 g, Oral, Daily  potassium chloride, 40 mEq, Oral, Q4H  sodium chloride, 10 mL, Intravenous, Q12H  vitamin B-12, 1,000 mcg, Oral, Daily      Infusions  Pharmacy Consult - Pharmacy to dose,       PRNs    senna-docusate sodium **AND** polyethylene glycol **AND** bisacodyl **AND** bisacodyl    Calcium Replacement - Follow Nurse / BPA Driven Protocol    famotidine    HYDROmorphone    HYDROmorphone    Magnesium Standard Dose Replacement - Follow Nurse / BPA Driven Protocol    meclizine    morphine    nitroglycerin    ondansetron    ondansetron ODT    oxyCODONE    oxyCODONE    Pharmacy Consult - Pharmacy to dose    Phosphorus Replacement - Follow Nurse / BPA Driven Protocol    Potassium Replacement - Follow Nurse / BPA Driven Protocol    prochlorperazine    [COMPLETED] Insert Peripheral IV **AND** sodium chloride    sodium chloride    sodium chloride    SUMAtriptan    Physical Exam:  Physical Exam  Cardiovascular:      Heart sounds: Murmur heard.      No gallop.   Pulmonary:      Effort: No respiratory distress.      Breath sounds: No stridor. Rhonchi and rales present. No wheezing.   Chest:      Chest wall: No tenderness.         ROS  Review of Systems   Respiratory:  Positive for cough and shortness of breath. Negative for wheezing and stridor.    Cardiovascular:  Positive for chest pain and leg swelling. Negative for palpitations.             Total critical care time spent with patient greater than: 45  Minutes    Electronically signed by Glenn Reyes MD at 25       Jose Levi MD at 25 0816            Cardiology Progress Note    Patient Identification:  Name: Claudia Rust  Age: 74 y.o.  Sex: female  :  1950  MRN: [id number]                 Follow Up / Chief Complaint:  Pre op Evaluation, Valvular disease   Chief Complaint   Patient presents with    Arm Injury       Interval History:  Patient presented with mechanical fall with left arm injury underwent IMN of left humerus      Patient sitting up in bed. Down to 6L HF oxygen from 10 L yesterday.  Patient has significant swelling in her left hand.   Continue IV diuretics for now. Monitor output and renal function closely.         Subjective: Patient seen and examined.  Chart reviewed.  Labs reviewed.  Discussed with RN taking care of patient.  Patient denies any chest pain.  Is complaining of constipation.  Continues to be on oxygen    Objective:  2025: Sodium 146 potassium 4.2 BUN 34 creatinine 0.92 glucose 185 WBC 13.72 hemoglobin 10.5  pro bnp 4722  2025: pro bnp 4279 sodium 148, bun 42 creatinine 1.06 glucose 185 WBC 13.82 hgb 10.2   2025: Sodium 144, BUN 43.1, creatinine 0.94, glucose 155, WBC 12.22, hemoglobin 10.5    History of present illness:        Ms. Claudia Rust has of [facility]      #Valvular heart disease with moderate to severe aortic stenosis  Cardiac cath 2021 revealing nonobstructive CAD  Patient underwent #23 magna pericardial prosthesis AVR 3/18/2021  Transesophageal echo 3/25/2021 normal LV systolic function  # Hyperlipidemia,    # Hypertension  # COPD, KARLY on CPAP  # Prediabetes  #History of HIT, history of DVT RUE   # Hypothyroidism, Rheumatoid Arthritis, spinal stenosis, hiatal hernia, chronic depression, bipolar, GERD, IBS, chronic migraine, vertigo, herniated disc, compression lumbar fractures  #Allergies/intolerance to Stadol  # Hysterectomy, bladder reconstruction, cholecystectomy,  right hand surgery  # Family history of strokes and grandfather.  Mother's cancer, father on  # Active cigarette smoker trying to quit         Presented with mechanical fall and left arm injury underwent surgery for IM nailing of left humerus         Review of records reveals:  Echocardiogram which is revealing severe aortic stenosis on 2/11/2020 ,Underwent cardiac cath 1/20/2021 which revealed no obstructive disease,underwent aortic valve replacement 3/18/2021 with #23 magna pericardial prosthesis.       Carotid Dopplers 9/13/2019 normal.  Echocardiogram 2/11/2020 is revealing severe aortic stenosis  Work-up here on 3/16/2021 revealed proBNP 733, normal CMP, A1c 5.7, normal CBC, chest x-ray with no acute abnormality.  Lexiscan Cardiolite 2/28/2023 negative for ischemia EF of 86%  Echo 10/14/2023 reveals normal LV systolic function with concentric LVH, dilated RV and left atrium, normal bioprosthetic aortic valve mild MR and TR        Labs 11/7/2023 reveal lipid profile with cholesterol 131, triglycerides 141, HDL 38, LDL 68.  CMP with a creatinine of 1.12 and EGFR 52.        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  Is hemodynamically stable.  Continue medical management with Eliquis, atorvastatin, IV Bumex, metoprolol as tolerated  Monitor renal function, urine output, electrolytes, telemetry and hemodynamics closely.  Follow-up with nephrology for  Patient is on anticoagulation for  "DVT  Monitor hemoglobin      Copied text in this portion of the note has been reviewed and is accurate as of 5/29/2025    Past Medical History:  Past Medical History:   Diagnosis Date    Acute congestive heart failure     Allergies     Anxiety     Arthritis     Asthma     Bilateral leg edema     Bipolar 1 disorder     Bulging lumbar disc     X3    Cancer     states had \"cancerous tumor during pregnancy and had to have hysterectomy\"    Cataract     bilateral    Cellulitis 03/2021    bilateral legs    Compression fracture of vertebral column     LUMBAR    COPD (chronic obstructive pulmonary disease)     Delayed emergence from anesthesia     Depression     Diabetes mellitus     Dyslipidemia     Dyspnea on exertion     Fibromyalgia     GERD (gastroesophageal reflux disease)     Hiatal hernia     Hyperlipidemia     Hypertension     Hypothyroid     HYPOTHYROID    IBS (irritable bowel syndrome)     Migraines     Morbid obesity     Neuropathy     Panic attacks     Peripheral edema     PONV (postoperative nausea and vomiting)     Poor mobility     rolling walker    Prediabetes     PVD (peripheral vascular disease)     Rheumatoid aortitis     Sleep apnea     cpap    Spinal stenosis     Vertigo     Vitamin D deficiency      Past Surgical History:  Past Surgical History:   Procedure Laterality Date    AORTIC VALVE REPAIR/REPLACEMENT N/A 3/18/2021    Procedure: AORTIC VALVE REPLACEMENT;  Surgeon: Olaf Mcgill MD;  Location: [facility] CVOR;  Service: Cardiothoracic;  Laterality: N/A;    BRONCHOSCOPY N/A 3/25/2021    Procedure: BRONCHOSCOPY AT BEDSIDE WITH BRONCHIOALVEOLAR LAVAGE;  Surgeon: Glenn Reyes MD;  Location: [facility] ENDOSCOPY;  Service: Pulmonary;  Laterality: N/A;  PNA    BRONCHOSCOPY N/A 10/16/2023    Procedure: BRONCHOSCOPY with bronchial washing;  Surgeon: Glenn Reyes MD;  Location: [facility] ENDOSCOPY;  Service: Pulmonary;  Laterality: N/A;  post op: pneumonia    CARDIAC CATHETERIZATION  2009    CARDIAC CATHETERIZATION " N/A 8/14/2019    Procedure: Left Heart Cath;  Surgeon: Jose Levi MD;  Location: [facility] CATH INVASIVE LOCATION;  Service: Cardiovascular    CARDIAC CATHETERIZATION N/A 8/14/2019    Procedure: Right Heart Cath;  Surgeon: Jose Levi MD;  Location: [facility] CATH INVASIVE LOCATION;  Service: Cardiovascular    CARDIAC CATHETERIZATION N/A 8/14/2019    Procedure: Aortic root aortogram;  Surgeon: Jose Levi MD;  Location: [facility] CATH INVASIVE LOCATION;  Service: Cardiovascular    CARDIAC CATHETERIZATION N/A 1/20/2021    Procedure: Left Heart Cath;  Surgeon: Jose Levi MD;  Location: [facility] CATH INVASIVE LOCATION;  Service: Cardiovascular;  Laterality: N/A;    COLONOSCOPY      COLONOSCOPY N/A 2/14/2025    Procedure: COLONOSCOPY WITH POLYPECTOMY X 4;  Surgeon: Segun Cleveland MD;  Location: [facility] ENDOSCOPY;  Service: Gastroenterology;  Laterality: N/A;  POST- POLYPS X 4, DIVERTICULOSIS, INTERNAL HEMORRHOIDS    CYSTOCELE REPAIR  1984    ELBOW ARTHROPLASTY  2011    ENDOSCOPY      FOOT SURGERY      GALLBLADDER SURGERY  2002    HUMERUS IM NAILING/RODDING Left 5/25/2025    Procedure: HUMERUS INTRAMEDULLARY NAIL/JONO INSERTION LEFT;  Surgeon: Rufus Peralta MD;  Location: [facility] MAIN OR;  Service: Orthopedics;  Laterality: Left;    TONSILLECTOMY      VAGINAL HYSTERECTOMY  1972        Social History:   Social History     Tobacco Use    Smoking status: Former     Current packs/day: 0.00     Types: Cigarettes     Quit date: 2022     Years since quitting: 3.4     Passive exposure: Never    Smokeless tobacco: Never    Tobacco comments:     decrease now and do not smoke dos   Substance Use Topics    Alcohol use: Not Currently     Comment: very rare      Family History:  Family History   Problem Relation Age of Onset    Ovarian cancer Mother     Lung cancer Mother     Esophageal cancer Mother     Hypertension Father     Diabetes Maternal Grandmother      "      Allergies:  Allergies   Allergen Reactions    Adhesive Tape Unknown (See Comments)     hives    Benadryl [Diphenhydramine] Other (See Comments)     Pt and  say she cannot take. \"will cause stroke\"    Butorphanol Other (See Comments)     Chest pain difficulty breathing throat swelling, STADAL    Garlic Other (See Comments)     Chest pain difficulty breathing throat swells    Heparin Other (See Comments)     HIT +    Tetanus-Diphtheria Toxoids Td Other (See Comments)     Dizziness,  vomiting    Aloe Itching    Bee Venom Other (See Comments)     Swelling eyes and lips hand swelling    Latex Itching    Macadamia Nut Oil Other (See Comments)     Chest pain difficulty breathing throat swelling    Tuberculin, Ppd Unknown (See Comments)     reactor    Wasp Venom Other (See Comments)     Swelling eyes lips and hand     Scheduled Meds:  acetylcysteine, 2 mL, BID - RT  apixaban, 2.5 mg, Q12H  atorvastatin, 10 mg, Daily  budesonide, 0.5 mg, BID - RT  bumetanide, 2.5 mg, BID  [Held by provider] bumetanide, 2 mg, BID Diuretics  cholecalciferol, 1,000 Units, Daily  docusate sodium, 100 mg, Nightly  FLUoxetine, 60 mg, Daily  gabapentin, 300 mg, TID  ipratropium-albuterol, 3 mL, 4x Daily - RT  levothyroxine, 137 mcg, Q AM  metoprolol succinate XL, 12.5 mg, Daily  nystatin, 5 mL, 4x Daily  pantoprazole, 40 mg, Nightly  polyethylene glycol, 17 g, Daily  potassium chloride, 40 mEq, Q4H  sodium chloride, 10 mL, Q12H  vitamin B-12, 1,000 mcg, Daily          Review of Systems:   ROS        Constitutional:  Temp:  [97.3 °F (36.3 °C)-99.2 °F (37.3 °C)] 98.6 °F (37 °C)  Heart Rate:  [78-96] 80  Resp:  [17-29] 22  BP: (115-134)/(69-82) 129/82    Physical Exam   /82 (BP Location: Right arm, Patient Position: Lying)   Pulse 80   Temp 98.6 °F (37 °C) (Oral)   Resp 22   Ht 162.6 cm (64\")   Wt (!) 137 kg (302 lb)   SpO2 93%   BMI 51.84 kg/m²   General:  Appears frail and chronically ill  Eyes: Sclera is anicteric,  " conjunctiva is clear   HEENT:  No JVD. Thyroid not visibly enlarged. No mucosal pallor or cyanosis  Respiratory: Respirations increased, bibasilar crackles  Cardiovascular: S1,S2 Regular rate and rhythm.  Trace edema present  Gastrointestinal: Abdomen nondistended.  Musculoskeletal:  No abnormal movements  Extremities: No digital clubbing or cyanosis  Skin: Color pink.   Neuro: Alert and awake.    INTAKE AND OUTPUT:    Intake/Output Summary (Last 24 hours) at 5/29/2025 0816  Last data filed at 5/29/2025 0350  Gross per 24 hour   Intake 485 ml   Output 2900 ml   Net -2415 ml       Cardiographics  Telemetry: Reviewed and interpreted by me reveals sinus rhythm    ECG:   ECG 12 Lead Pre-Op / Pre-Procedure   Final Result   HEART RATE=78  bpm   RR Mfcfteek=160  ms   LA Pciwewdj=433  ms   P Horizontal Axis=39  deg   P Front Axis=44  deg   QRSD Interval=95  ms   QT Wogqutkq=376  ms   GVzV=309  ms   QRS Axis=73  deg   T Wave Axis=71  deg   - OTHERWISE NORMAL ECG -   Sinus rhythm   Low voltage, precordial leads   When compared with ECG of 14-Oct-2023 00:05:27,   No significant change   Electronically Signed By: Kaz Arnett (IAIN) 2025-05-25 22:19:23   Date and Time of Study:2025-05-24 15:26:20      Telemetry Scan   Final Result      Telemetry Scan   Final Result      Telemetry Scan   Final Result      Telemetry Scan   Final Result      Telemetry Scan   Final Result      Telemetry Scan   Final Result      Telemetry Scan   Final Result      Telemetry Scan   Final Result      Telemetry Scan   Final Result      Telemetry Scan   Final Result      Telemetry Scan   Final Result      Telemetry Scan   Final Result      Telemetry Scan   Final Result      Telemetry Scan   Final Result      Telemetry Scan   Final Result      Telemetry Scan   Final Result      Telemetry Scan   Final Result      Telemetry Scan   Final Result        I have personally reviewed EKG    Echocardiogram: Results for orders placed during the hospital encounter  of 25    Adult Transthoracic Echo Limited W/ Cont if Necessary Per Protocol    Interpretation Summary    Left ventricular ejection fraction appears to be 61 - 65%.    Left ventricular diastolic function was not assessed.    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    Estimated right ventricular systolic pressure from tricuspid regurgitation is normal (<35 mmHg).    Imaging is limited by left arm being in sling.      STRESS TEST  Results for orders placed during the hospital encounter of 23    Stress Test With Myocardial Perfusion One Day    Interpretation Summary  LEXISCAN CARDIOLITE REPORT    DATE OF PROCEDURE: 2023    INDICATION FOR PROCEDURE: Numbness and tingling of left hand, dyspnea on exertion, chronic CHF, AVR, edema, dyslipidemia, hypertension, obesity with BMI over 45    PROCEDURE PERFORMED: Lexiscan Cardiolite    PROCEDURE COMMENTS:    After informed consent was obtained.  Patient's resting heart rate was 68 bpm, resting blood pressure was 144/82, resting EKG revealed sinus rhythm with rate of 68 bpm.  Patient was given 0.4 mg of regadenosine for stress testing.  There was no significant change in heart rate, blood pressure, symptoms with regadenoson injection.  Patient tolerated procedure well.  Complications were none.    NUCLEAR IMAGIN.  There was  uniform uptake of Cardiolite both in resting and post stress images, no ischemia seen.  2.  Gated images reveal  normal LV size and contractility, LVEF of 86%.    CONCLUSION:  1.  Lexiscan Cardiolite with normal perfusion, negative for ischemia.  2.  Normal wall motion.  LVEF of 86%.    RECOMMENDATIONS:    Clinical correlation recommended.      Jose Levi MD  23  20:24 EST      HEART CATHETERIZATION  Results for orders placed during the hospital encounter of 21    Cardiac Catheterization/Vascular Study    Narrative  Table formatting from the original result was not included.  2021      Heart Cath  Report    NAME:              Claudia Rust  :                1950  AGE/SEX:        70 y.o. female  MRN:                [id number]        Procedures Performed    1. Bilateral selective coronary arteriogram    :   Jose Levi MD    Vascular Access Site: Femoral    Indication for procedure: 70-year-old with severe critical  aortic stenosis and history of CHF, obesity, dyslipidemia, prediabetes      Procedure Note    After discussing the risks, benefits, and alternatives of the procedure, informed consent was obtained.  Moderate conscious sedation was given utilizing IV Versed and fentanyl administered by RN with continuous EKG oximetry and hemodynamic monitoring supervised by me throughout the entire case, conscious sedation time was 30 minutes.  I was present with the patient for the duration of moderate sedation and supervised staff who had no other duties and monitored the patient for the entire procedure patient had Moise 2-3 sedation scale. the vascular access site was prepped and draped in the usual sterile fashion.  2% lidocaine was used for local anesthesia. Appropriate landmarks were assessed.  A 6 Bulgarian short sheath was inserted in the artery using the modified Seldinger technique.    Selective coronary angiography was performed with JL4 and JR4 diagnostic catheters. Left ventriculogram  was not performed because patient has severe aortic stenosis..  All exchanges were performed over the wire.  No specimens were removed.  There were no apparent acute or early complications.  The patient tolerated the procedure well and was transferred to the recovery area in stable condition.    Artery hemostasis will be achieved with  manual pressure in OPCV.      Complications:  None  Blood Loss: minimal    Hemodynamics    Pressures    Ao:    138/62 mmHg      Coronary Angiography    Left Main :  The left main   is without disease    Left Anterior Descending : Is tortuous vessel, without  disease    Left Circumflex : Tortuous vessel ends up with 3 branches which are tortuous without disease    Right Coronary Artery :  The right coronary artery   dominant vessel without disease    Dominance:  []  Left  [x]  Right  []  Co-Dominant        Impression:    1. No obstructive CAD    Recommendations:    1. Aortic valve replacement evaluation by CT VS.  Patient had echo showing severe critical aortic stenosis        I sincerely appreciate the opportunity to participate in your patient's care. Please feel free to contact me anytime if I can be of assistance in this or any other way.      Pertinent History    Past Medical History:  Diagnosis Date   Acute congestive heart failure (CMS/[facility])   Asthma   Bipolar 1 disorder (CMS/[facility])   COPD (chronic obstructive pulmonary disease) (CMS/[facility])   Depression   Dyslipidemia   Fibromyalgia   Hypertension   Hypothyroid   IBS (irritable bowel syndrome)   Migraines   Morbid obesity (CMS/[facility])   Neuropathy   Spinal stenosis   Vertigo    Past Surgical History:  Procedure Laterality Date   CARDIAC CATHETERIZATION  2009   CARDIAC CATHETERIZATION N/A 8/14/2019  Procedure: Left Heart Cath;  Surgeon: Jose Levi MD;  Location: [facility] CATH INVASIVE LOCATION;  Service: Cardiovascular   CARDIAC CATHETERIZATION N/A 8/14/2019  Procedure: Right Heart Cath;  Surgeon: Jose Levi MD;  Location: [facility] CATH INVASIVE LOCATION;  Service: Cardiovascular   CARDIAC CATHETERIZATION N/A 8/14/2019  Procedure: Aortic root aortogram;  Surgeon: Jose Levi MD;  Location: [facility] CATH INVASIVE LOCATION;  Service: Cardiovascular   CYSTOCELE REPAIR  1984   ELBOW ARTHROPLASTY  2011   FOOT SURGERY   GALLBLADDER SURGERY  2002   VAGINAL HYSTERECTOMY  1972    Prior to Admission medications  Medication Sig Start Date End Date Taking? Authorizing Provider  aspirin 81 MG tablet Take 81 mg by mouth Every Night. 2/3/15  Yes Provider, MD Krissy  B Complex Vitamins (VITAMIN B  COMPLEX) capsule capsule Take 1 capsule by mouth Daily.   Yes Krissy Hodgson MD  Calcium Carbonate (CALCIUM 500 PO) Take 1,500 mg by mouth Daily.   Yes Krissy Hodgson MD  Cholecalciferol (VITAMIN D-1000 MAX ST) 1000 units tablet Take 2,000 Units by mouth Daily. 9/16/16  Yes Krissy Hodgson MD  diphenhydrAMINE (BENADRYL) 50 MG capsule Take 50 mg by mouth every night at bedtime.   Yes Krissy Hodgson MD  FLUoxetine (PROzac) 60 MG tablet Take 60 mg by mouth Daily. 4/19/19  Yes Krissy Hodgson MD  fluticasone (FLONASE) 50 MCG/ACT nasal spray 2 sprays into the nostril(s) as directed by provider 2 (two) times a day.   Yes Krissy Hodgson MD  furosemide (LASIX) 40 MG tablet Take 40 mg by mouth 2 (Two) Times a Day. 2/5/15  Yes Krissy Hodgson MD  gabapentin (NEURONTIN) 600 MG tablet Take 400 mg by mouth 3 (Three) Times a Day. 7/30/19  Yes Krissy Hodgson MD  HYDROcodone-acetaminophen (NORCO)  MG per tablet Take 1 tablet by mouth Daily. Noon an prm   Yes Krissy Hodgson MD  levothyroxine (SYNTHROID, LEVOTHROID) 100 MCG tablet Take 100 mcg by mouth Daily. 7/30/15  Yes Krissy Hodgson MD  metoprolol succinate XL (TOPROL-XL) 25 MG 24 hr tablet Take 25 mg by mouth Daily.   Yes Krissy Hodgson MD  Multiple Vitamins-Minerals (MULTIVITAMIN WITH MINERALS) tablet tablet Take 1 tablet by mouth Daily.   Yes Krissy Hodgson MD  ondansetron (ZOFRAN) 4 MG tablet Take 4 mg by mouth Every 8 (Eight) Hours As Needed for Nausea or Vomiting.   Yes Krissy Hodgson MD  pantoprazole (PROTONIX) 40 MG EC tablet Take 40 mg by mouth Daily.   Yes Krissy Hodgson MD  potassium chloride (KLOR-CON) 20 MEQ CR tablet Take 20 mEq by mouth 2 (Two) Times a Day. 7/30/15  Yes Provider, Historical, MD  simvastatin (ZOCOR) 20 MG tablet Take 20 mg by mouth Daily. 2/5/15  Yes Krissy Hodgson MD  Acetaminophen (TYLENOL PO) Take 1,000 mg by mouth Every 4 (Four) Hours As  Needed.    Krissy Hodgson MD  albuterol (PROVENTIL) (2.5 MG/3ML) 0.083% nebulizer solution Inhale 2.5 mg Every 6 (Six) Hours As Needed. 2/3/15   Krissy Hodgson MD  aluminum-magnesium hydroxide-simethicone (MAALOX/MYLANTA) 200-200-20 MG/5ML suspension Take 15 mL by mouth Every 6 (Six) Hours As Needed for Indigestion or Heartburn.    Krissy Hodgson MD  meclizine (ANTIVERT) 25 MG tablet Take 25 mg by mouth 3 (Three) Times a Day As Needed. 7/30/15   Krissy Hodgson MD  polyethylene glycol (MIRALAX) powder Take 17 g by mouth Daily As Needed. 11/9/16   Krissy Hodgson MD  promethazine (PHENERGAN) 25 MG tablet Take 25 mg by mouth Every 6 (Six) Hours As Needed for Nausea or Vomiting.    Krissy Hodgson MD  rizatriptan MLT (MAXALT-MLT) 10 MG disintegrating tablet Take 10 mg by mouth 1 (One) Time As Needed for Migraine. May repeat in 2 hours if needed    Krissy Hodgson MD  tiZANidine (ZANAFLEX) 4 MG tablet Take 4 mg by mouth Every 8 (Eight) Hours As Needed for Muscle Spasms.    Krissy Hodgson MD  acetaminophen (TYLENOL) 325 MG tablet Take 650 mg by mouth Every 6 (Six) Hours As Needed for Mild Pain .  1/20/21  Krissy Hodgson MD  Calcium Carbonate ([employer] Calcium) 1500 (600 Ca) MG tablet Take 600 mg by mouth Daily.  1/20/21  Krissy Hodgson MD  metoprolol succinate XL (TOPROL-XL) 25 MG 24 hr tablet TAKE 1 TABLET BY MOUTH EVERY DAY  Patient taking differently: 25 mg. 11/17/20 1/20/21  Jose Levi MD  ondansetron ODT (ZOFRAN-ODT) 4 MG disintegrating tablet 4 mg Every 8 (Eight) Hours As Needed. 1/6/21 1/20/21  Krissy Hodgson MD      Pre-Procedure Notes  H&P Performed  [x]  Yes []  No       []  N/A    Indications:  []  ACS <= 24 HRS  []  ACS >24 HRS  []  New Onset Angina <= 2 mos  []  Worsening Angina  []  Resuscitated Cardiac Arrest  []  Angina on Exertion:  []  Suspected CAD  []  Valvular Disease  []  Pericardial Disease  []  Cardiac Arrythmia  []   Cardiomyopathy  []  LV Dysfunction  []  Syncope  []  Post Cardiac Transplant  []  Eval. For Exercise Clearance  []  Other  []  Pre-Operative Evaluation  If Pre-Op Eval:  Evaluation for Surgery Type:  []  Cardiac Surgery   []  Non-Cardiac Surgery  Functional Capacity:  []  <4 METS  []  >=4 METS w/o symptoms  []  >= 4 METS with symptoms  []  Unknown  Surgical Risk:  []  Low  []  Intermediate  []  High Risk: Vascular  []  High Risk Non-Vascular    Risks, Benefits, & Complications Discussed:  [x]  Yes  []  No  []  N/A    Questions Answered:  [x]  Yes  []  No  []  N/A    Consent Obtained:  [x]  Yes  []  No  []  N/A    CHF: [x]  Yes  []  No  If Yes:  Newly Diagnosed?  []  Yes  [x]  No  If Yes:  HF Type:  []  Diastolic  []  Systolic  []  Unknown      Jose Levi MD  1/20/2021  12:55 EST  Electronically signed by Jose Levi MD, 01/20/21, 12:55 PM EST.      Lab Review   I have reviewed the labs      Results from last 7 days   Lab Units 05/24/25  0319   MAGNESIUM mg/dL 2.4     Results from last 7 days   Lab Units 05/29/25  0315   SODIUM mmol/L 144   POTASSIUM mmol/L 3.5   BUN mg/dL 43.1*   CREATININE mg/dL 0.94   CALCIUM mg/dL 8.7         Results from last 7 days   Lab Units 05/29/25  0315 05/28/25  0349 05/27/25  1003   WBC 10*3/mm3 12.22* 13.82* 13.72*   HEMOGLOBIN g/dL 10.5* 10.2* 10.5*   HEMATOCRIT % 35.1 32.9* 35.0   PLATELETS 10*3/mm3 189 198 202     Results from last 7 days   Lab Units 05/24/25  0319 05/23/25  2258   INR  1.18* 1.16*   APTT seconds  --  29.1       RADIOLOGY:  Imaging Results (Last 24 Hours)       Procedure Component Value Units Date/Time    XR Chest 1 View [[id number]] Collected: 05/28/25 1145     Updated: 05/28/25 1148    Narrative:      XR CHEST 1 VW    Date of Exam: 5/28/2025 10:57 AM EDT    Indication: hypoxia evaluation    Comparison: AP chest 5/26/2025.    Findings:  Moderate generalized cardiomediastinal enlargement with median sternotomy and cardiac valve replacement,  "similar to prior. Central pulmonary vasculature is enlarged and indistinct. Fine interstitial thickening is present in both lungs. More confluent   alveolar opacity suggested in the retrocardiac left lower lobe, as before. No pneumothorax or acute osseous abnormality.      Impression:      Impression:  No significant change. Features suggestive of generalized interstitial edema with more confluent edema, atelectasis or pneumonia in the left lower lobe.  Stable cardiac enlargement.      Electronically Signed: Yue Miller MD    5/28/2025 11:46 AM EDT    Workstation ID: [id number]                  )5/29/2025  Jose Levi MD      EMR Dragon/Transcription:   \"Dictated utilizing Dragon dictation\".     Electronically signed by Jose Levi MD at 05/29/25 1619       Rufus Peralta MD at 05/28/25 1949          Orthopaedic Progress Note     Pain well controlled. Dressing clean and dry swelling improving about the hand. On O2.     Hgb: 10.2   Vitals: p 86, 115/69, 94% on 8 L     NAD  Alert  Respirations are nonlabored on O2 uses at baseline at night   LUE   Dressing clean and dry  In sling   AIN PIN ULNAR nerve intact   Swelling about the hand and at her wedding rings which she requested that we did not cut off in the OR.   Sensation is intact distally   Foot is wwp   Compartments are soft and compressible     74 year old obese female with left proximal third humeral shaft fracture   POD 2 left humerus IMN   -WBAT LUE   -to chair if able  -pain control   -ice and elevation   -PT / OT   -dvt ppx as she is obese and has demonstrated she will have a difficult time mobilizing   -discussed with her the potential for cutting off her rings if her swelling at hand is not improved.   -discussed coming out of the sling while she is in bed to allow for elevation of the left upper extremity.   -dc and follow up info placed in the chart.   -please call/chat  with any questions or concerns.    Rufus PICHARDO " MD Kathryn   [facility]   [phone] - [place] Office  [phone] - [place] [facility]      Electronically signed by Rufus Peralta MD at 25 [phone]       Osman Pepper MD at 25 [phone]              [facility] MEDICINE SERVICE  DAILY PROGRESS NOTE    NAME: Claudia Rust  : 1950  MRN: [id number]      LOS: 2 days     PROVIDER OF SERVICE: Osman Pepper MD    Chief Complaint: Humeral fracture    Subjective:     Interval History:  History taken from: patient    Doing well this morning. Denies new acute complaints.        Review of Systems:   Review of Systems   All other systems reviewed and are negative.      Objective:     Vital Signs  Temp:  [98.4 °F (36.9 °C)-99.9 °F (37.7 °C)] 98.4 °F (36.9 °C)  Heart Rate:  [91-99] 95  Resp:  [23-28] 27  BP: (131-146)/(69-77) 131/77  Flow (L/min) (Oxygen Therapy):  [9.5-10] 9.5   Body mass index is 51.84 kg/m².    Physical Exam  Physical Exam  Constitutional:       Appearance: She is obese.   HENT:      Head: Normocephalic.   Cardiovascular:      Rate and Rhythm: Normal rate and regular rhythm.   Pulmonary:      Effort: Pulmonary effort is normal.      Breath sounds: Normal breath sounds.      Comments: Coarse breath sounds  Abdominal:      General: Bowel sounds are normal.      Palpations: Abdomen is soft.      Tenderness: There is no abdominal tenderness.   Musculoskeletal:         General: No swelling.   Neurological:      Mental Status: She is alert. Mental status is at baseline.      Comments: Somewhat confused and easily redirected            Diagnostic Data    Results from last 7 days   Lab Units 25  0349 25  1549 25  0350   WBC 10*3/mm3 13.82*   < > 15.18*   HEMOGLOBIN g/dL 10.2*   < > 10.8*   HEMATOCRIT % 32.9*   < > 35.1   PLATELETS 10*3/mm3 198   < > 195   GLUCOSE mg/dL 185*   < > 159*   CREATININE mg/dL 1.06*   < > 1.16*   BUN mg/dL 42.1*   < > 37*   SODIUM mmol/L 148*   < > 141   POTASSIUM  mmol/L 3.8   < > 3.6   AST (SGOT) U/L  --   --  47*   ALT (SGPT) U/L  --   --  66*   ALK PHOS U/L  --   --  112   BILIRUBIN mg/dL  --   --  0.4   ANION GAP mmol/L 11.5   < > 7.4    < > = values in this interval not displayed.       XR Chest 1 View  Result Date: 5/28/2025  Impression: No significant change. Features suggestive of generalized interstitial edema with more confluent edema, atelectasis or pneumonia in the left lower lobe. Stable cardiac enlargement. Electronically Signed: Yue Miller MD  5/28/2025 11:46 AM EDT  Workstation ID: [id number]            Assessment:   Left humeral fracture  Obstructive sleep apnea  Acute on Chronic hypoxemic respiratory failure  Acute CHF exacerbation  Massive obesity  CKD stage III  Transaminitis  Constipation      Plan:     CHF with respiratory failure 2/2 pulmonary edema. Continued IV Bumex... down from 10L to 6L this AM with Bumex. Wean aggressively.    Appreciate cardiology input.      Labs reviewed. BNP very elevated.    I anticipate patient will require less than 30 days of skilled rehab.    Active and Resolved Problems  Active Hospital Problems    Diagnosis  POA    **Humeral fracture [S42.309A]  Yes      Resolved Hospital Problems   No resolved problems to display.       VTE Prophylaxis:  Pharmacologic VTE prophylaxis orders are present.             Disposition Planning:     Barriers to Discharge: O2 requirement, CHF, Pain control and discharge planning  Anticipated Date of Discharge: 5/30  Place of Discharge: home      Time: 30 minutes     Code Status and Medical Interventions: CPR (Attempt to Resuscitate); Full Support   Ordered at: 05/24/25 0116     Code Status (Patient has no pulse and is not breathing):    CPR (Attempt to Resuscitate)     Medical Interventions (Patient has pulse or is breathing):    Full Support       Signature: Electronically signed by Osman Pepper MD, 05/28/25, 14:43 EDT.  [facility] Hospitalist Team     Electronically signed by Evgeny  Osman JAFFE MD at 25 1841       Consult Notes (last 48 hours)  Notes from 25 1158 through 25 1158   No notes of this type exist for this encounter.       Nutrition Notes (most recent note)    No notes exist for this encounter.       Speech Language Pathology Notes (most recent note)    No notes exist for this encounter.       Susan Gilmore, PT   Physical Therapist  Physical Therapy  Therapy Evaluation     Signed  Date of Service:  25  Creation Time:  25     Signed        Expand All Collapse All  Patient Name: Claudia Rust                     : 1950                        MRN: [id number]                              Today's Date: 2025                                   Admit Date: 2025                        Visit Dx:   Visit Diagnosis       ICD-10-CM ICD-9-CM   1. Closed displaced comminuted fracture of shaft of left humerus, initial encounter  S42.352A 812.21   2. Hypokalemia  E87.6 276.8   3. Acute kidney injury  N17.9 584.9   4. Fall, initial encounter  W19.XXXA E888.9         Problem List       Patient Active Problem List   Diagnosis    Obesity, morbid, BMI 50 or higher    Dyspnea on exertion    Abnormal nuclear stress test    Nonrheumatic aortic valve stenosis    Prediabetes    Dyslipidemia    Essential hypertension    Acute UTI    Bipolar disorder    COPD with hypoxia    Hyperlipidemia, unspecified    Gastroesophageal reflux disease    Rheumatoid arthritis    Aortic stenosis, severe    Chronic diastolic heart failure    Aortic valve stenosis    S/P AVR (aortic valve replacement)    COVID    Atypical pneumonia    Pulmonary hypertension    Acute respiratory failure    Multifocal pneumonia    Multiple tracheobronchial mucus plugs    Pneumonia, unspecified organism    Encounter for Hemoccult screening    Humeral fracture         Medical History        Past Medical History:   Diagnosis Date    Acute congestive heart failure      Allergies      Anxiety       "Arthritis      Asthma      Bilateral leg edema      Bipolar 1 disorder      Bulging lumbar disc       X3    Cancer       states had \"cancerous tumor during pregnancy and had to have hysterectomy\"    Cataract       bilateral    Cellulitis 03/2021     bilateral legs    Compression fracture of vertebral column       LUMBAR    COPD (chronic obstructive pulmonary disease)      Delayed emergence from anesthesia      Depression      Diabetes mellitus      Dyslipidemia      Dyspnea on exertion      Fibromyalgia      GERD (gastroesophageal reflux disease)      Hiatal hernia      Hyperlipidemia      Hypertension      Hypothyroid       HYPOTHYROID    IBS (irritable bowel syndrome)      Migraines      Morbid obesity      Neuropathy      Panic attacks      Peripheral edema      PONV (postoperative nausea and vomiting)      Poor mobility       rolling walker    Prediabetes      PVD (peripheral vascular disease)      Rheumatoid aortitis      Sleep apnea       cpap    Spinal stenosis      Vertigo      Vitamin D deficiency           Surgical History         Past Surgical History:   Procedure Laterality Date    AORTIC VALVE REPAIR/REPLACEMENT N/A 3/18/2021     Procedure: AORTIC VALVE REPLACEMENT;  Surgeon: Olaf Mcgill MD;  Location: [facility];  Service: Cardiothoracic;  Laterality: N/A;    BRONCHOSCOPY N/A 3/25/2021     Procedure: BRONCHOSCOPY AT BEDSIDE WITH BRONCHIOALVEOLAR LAVAGE;  Surgeon: Glenn Reyes MD;  Location: [facility];  Service: Pulmonary;  Laterality: N/A;  PNA    BRONCHOSCOPY N/A 10/16/2023     Procedure: BRONCHOSCOPY with bronchial washing;  Surgeon: Glenn Reyes MD;  Location: [facility];  Service: Pulmonary;  Laterality: N/A;  post op: pneumonia    CARDIAC CATHETERIZATION   2009    CARDIAC CATHETERIZATION N/A 8/14/2019     Procedure: Left Heart Cath;  Surgeon: Jose Levi MD;  Location: [facility] CATH INVASIVE LOCATION;  Service: Cardiovascular    CARDIAC CATHETERIZATION N/A 8/14/2019 "     Procedure: Right Heart Cath;  Surgeon: Jose Levi MD;  Location: [facility] CATH INVASIVE LOCATION;  Service: Cardiovascular    CARDIAC CATHETERIZATION N/A 8/14/2019     Procedure: Aortic root aortogram;  Surgeon: Jose Levi MD;  Location: [facility] CATH INVASIVE LOCATION;  Service: Cardiovascular    CARDIAC CATHETERIZATION N/A 1/20/2021     Procedure: Left Heart Cath;  Surgeon: Jose Levi MD;  Location: [facility] CATH INVASIVE LOCATION;  Service: Cardiovascular;  Laterality: N/A;    COLONOSCOPY        COLONOSCOPY N/A 2/14/2025     Procedure: COLONOSCOPY WITH POLYPECTOMY X 4;  Surgeon: Segun Cleveland MD;  Location: [facility] ENDOSCOPY;  Service: Gastroenterology;  Laterality: N/A;  POST- POLYPS X 4, DIVERTICULOSIS, INTERNAL HEMORRHOIDS    CYSTOCELE REPAIR   1984    ELBOW ARTHROPLASTY   2011    ENDOSCOPY        FOOT SURGERY        GALLBLADDER SURGERY   2002    TONSILLECTOMY        VAGINAL HYSTERECTOMY   1972           General Information         Row Name 05/26/25 0855                 Physical Therapy Time and Intention     Document Type evaluation  -AO       Mode of Treatment physical therapy  -AO          Row Name 05/26/25 0855                 General Information     Patient Profile Reviewed yes  -AO       Prior Level of Function --  Previously MOD I w/ household mobility w/ Rollator walker, scooter for community distances, some assist for ADLs (has paid caregiver 5 days/week for 10 hours per day while spouse is at work),  drives, no other falls  -AO       Existing Precautions/Restrictions fall;oxygen therapy device and L/min  WBAT LUE  -AO       Barriers to Rehab medically complex  -AO          Row Name 05/26/25 0855                 Living Environment     Current Living Arrangements home  -AO       People in Home spouse  Spouse works full-time, pt has paid caregiver 5 days/week for 10 hours per day  -AO          Row Name 05/26/25 0855                 Home Main  "Entrance     Number of Stairs, Main Entrance none  -AO          Row Name 05/26/25 0855                 Stairs Within Home, Primary     Number of Stairs, Within Home, Primary none  -AO          Row Name 05/26/25 0855                 Cognition     Orientation Status (Cognition) oriented x 4  oriented x 4 but very groggy/lethargic, frequently dozing off, and, reporting, \"I've had way too much medication\"  -AO          Row Name 05/26/25 0855                 Safety Issues/Impairments Affecting Functional Mobility     Impairments Affecting Function (Mobility) balance;endurance/activity tolerance;pain;postural/trunk control;range of motion (ROM);shortness of breath;strength  -AO                       User Key  (r) = Recorded By, (t) = Taken By, (c) = Cosigned By        Initials Name Provider Type     Susan Foley, PT Physical Therapist                            Mobility         Row Name 05/26/25 0855                 Bed Mobility     Comment, (Bed Mobility) Pt declined  -AO          Row Name 05/26/25 0855                 Transfers     Comment, (Transfers) Pt declined  -AO          Row Name 05/26/25 0855                 Bed-Chair Transfer     Bed-Chair [place] (Transfers) not tested  -AO          Row Name 05/26/25 0855                 Sit-Stand Transfer     Sit-Stand [place] (Transfers) not tested  -AO          Row Name 05/26/25 0855                 Gait/Stairs (Locomotion)     [place] Level (Gait) not tested  -AO       Patient was able to Ambulate no, other medical factors prevent ambulation  -AO       Reason Patient was unable to Ambulate Excessive Weakness  -AO          Row Name 05/26/25 0855                 Mobility     Extremity Weight-bearing Status left upper extremity  -AO       Left Upper Extremity (Weight-bearing Status) weight-bearing as tolerated (WBAT)  -AO                       User Key  (r) = Recorded By, (t) = Taken By, (c) = Cosigned By        Initials Name Provider Type     NAI Gilmore" Susan TOVAR, PT Physical Therapist                            Obj/Interventions         Row Name 05/26/25 0855                 Range of Motion Comprehensive     General Range of Motion lower extremity range of motion deficits identified;upper extremity range of motion deficits identified  -AO       Comment, General Range of Motion RUE AROM impaired >75% by weakness, LUE in sling (did not test this date 2*/2 pt refusal), BLE AROM impaired ~75% by weakness  -AO          Row Name 05/26/25 0855                 Strength Comprehensive (MMT)     General Manual Muscle Testing (MMT) Assessment upper extremity strength deficits identified;lower extremity strength deficits identified  -AO       Comment, General Manual Muscle Testing (MMT) Assessment B ankle plantarflexion/dorsiflexion: 3/5, B hip flexion: 2-/5, B knee flexion/extension: 2/5  -AO          Row Name 05/26/25 0855                 Motor Skills     Therapeutic Exercise other (see comments)  Pt complete 1x10 supine ankle pumps, hip abduction/adduction, heel slides, and SLR, requiring active assist vs. PROM secondary to excessive fatigue/weakness/lethargy  -AO          Row Name 05/26/25 0855                 Balance     Balance Assessment sitting static balance;sitting dynamic balance  -AO       Static Sitting Balance unable to assess  -AO       Dynamic Sitting Balance unable to assess  -AO          Row Name 05/26/25 0855                 Sensory Assessment (Somatosensory)     Sensory Assessment (Somatosensory) sensation intact  -AO                       User Key  (r) = Recorded By, (t) = Taken By, (c) = Cosigned By        Initials Name Provider Type     AO Susan Gilmore, PT Physical Therapist                            Goals/Plan         Row Name 05/26/25 0855                 Bed Mobility Goal 1 (PT)     Activity/Assistive Device (Bed Mobility Goal 1, PT) bed mobility activities, all  -AO       [place] Level/Cues Needed (Bed Mobility Goal 1, PT) minimum assist  (75% or more patient effort)  -AO       Time Frame (Bed Mobility Goal 1, PT) long term goal (LTG);2 weeks  -AO       Progress/Outcomes (Bed Mobility Goal 1, PT) goal not met  -AO          Row Name 05/26/25 0855                 Transfer Goal 1 (PT)     Activity/Assistive Device (Transfer Goal 1, PT) transfers, all;walker, rolling  -AO       [place] Level/Cues Needed (Transfer Goal 1, PT) minimum assist (75% or more patient effort)  -AO       Time Frame (Transfer Goal 1, PT) long term goal (LTG)  -AO       Progress/Outcome (Transfer Goal 1, PT) goal not met  -AO          Row Name 05/26/25 0855                 Gait Training Goal 1 (PT)     Activity/Assistive Device (Gait Training Goal 1, PT) gait (walking locomotion);walker, rolling  -AO       [place] Level (Gait Training Goal 1, PT) minimum assist (75% or more patient effort)  -AO       Distance (Gait Training Goal 1, PT) 15 ft  -AO       Time Frame (Gait Training Goal 1, PT) long term goal (LTG);2 weeks  -AO       Progress/Outcome (Gait Training Goal 1, PT) goal not met  -AO          Row Name 05/26/25 0855                 Therapy Assessment/Plan (PT)     Planned Therapy Interventions (PT) balance training;bed mobility training;gait training;home exercise program;neuromuscular re-education;patient/family education;ROM (range of motion);strengthening;transfer training  -AO                    User Key  (r) = Recorded By, (t) = Taken By, (c) = Cosigned By        Initials Name Provider Type     AO Susan Gilmore, PT Physical Therapist                         Clinical Impression         Row Name 05/26/25 0855                 Pain     Pretreatment Pain Rating 2/10  -AO       Posttreatment Pain Rating 3/10  -AO       Pain Location extremity  -AO       Pain Side/Orientation left;upper  -AO       Pain Management Interventions nursing notified;exercise or physical activity utilized  -AO       Response to Pain Interventions activity participation with increased pain   "-AO          Row Name 05/26/25 0855                 Plan of Care Review     Plan of Care Reviewed With patient  -AO       Progress no change  -AO       Outcome Evaluation Pt is a 75 y/o F POD #1 L humerus intramedullary nailing/ellie insertion (Dr. Peralta) on 5/25/2025 secondary to L proximal third humeral shaft fx with WBAT LUE orders. Pt with hx of KARLY, chronic hypoxemic respiratory failure on 2L 02 at night, obesity, CKD stage III, transaminitis, vertigo, and constipation. Pt oriented x 4 during eval, however, very lethargic/groggy, frequently knodding off to sleep requiring verbal/tactile stimulation to awake and reports, \"I've just had too much medication\". At baseline, pt lives in a [facility] w/ 0 RUDY w/  who works full-time (has paid caregiver 5 days/week, 10 hours per day while spouse is at work). Pt reports she was previously MOD I w/ household mobility w/ Rollator walker, scooter for community distances (borrows from stores, does not own), some assist for ADLs from paid caregiver,  drives, no other falls. During eval, pt agreeable to supine ther-ex and MMT, declines repositioning in bed or sitting EOB this date. Pt presents with excessive weakness/lethargy this date, unable to hold cup to drink from right hand (is right-handed at baseline), able to complete ankle pumps independently but required active assist vs. PROM for all additional supine BLE exercises 2*/2 weakness/fatigue/lethargy and with difficulty staying awake throughout session. Pt also w/ SOA w/ SP02 87-92% on 7L 02. Pt is far from baseline and high risk for falls, recommending SNF at d/c and plan to see 5x/week at [facility] for progression of mobility. PPE: gloves  -AO          Row Name 05/26/25 0855                 Therapy Assessment/Plan (PT)     Rehab Potential (PT) fair  -AO       Criteria for Skilled Interventions Met (PT) yes;meets criteria;skilled treatment is necessary  -AO       Therapy Frequency (PT) 5 times/wk  -AO       Predicted " Duration of Therapy Intervention (PT) until d/c  -AO          Row Name 05/26/25 0855                 Vital Signs     Recovery Time Sp02 87%-92% on 7L 02 throughout session, all other vitals WNL  -AO          Row Name 05/26/25 0855                 Positioning and Restraints     Pre-Treatment Position in bed  -AO       Post Treatment Position bed  -AO       In Bed notified nsg;supine;call light within reach;encouraged to call for assist;exit alarm on  -AO                       User Key  (r) = Recorded By, (t) = Taken By, (c) = Cosigned By        Initials Name Provider Type     Susan Foley, PT Physical Therapist                            Outcome Measures         Row Name 05/26/25 0855 05/26/25 0832            How much help from another person do you currently need...     Turning from your back to your side while in flat bed without using bedrails? 2  -AO 2  -SL     Moving from lying on back to sitting on the side of a flat bed without bedrails? 1  -AO 2  -SL     Moving to and from a bed to a chair (including a wheelchair)? 1  -AO 2  -SL     Standing up from a chair using your arms (e.g., wheelchair, bedside chair)? 1  -AO 2  -SL     Climbing 3-5 steps with a railing? 1  -AO 1  -SL     To walk in hospital room? 1  -AO 1  -SL     AM-PAC 6 Clicks Score (PT) 7  -AO 10  -SL     Highest Level of Mobility Goal Bed Activities/Dependent Transfer-2  -AO Move to Chair/Commode-4  -SL        Row Name 05/26/25 0855                 Functional Assessment     Outcome Measure Options AM-PAC 6 Clicks Basic Mobility (PT)  -AO                       User Key  (r) = Recorded By, (t) = Taken By, (c) = Cosigned By        Initials Name Provider Type     Susan Foley, PT Physical Therapist     SL Jolene De La Torre, RN Registered Nurse                          Physical Therapy Education            Title: PT OT SLP Therapies (In Progress)         Topic: Physical Therapy (In Progress)         Point: Mobility training (In Progress)      "    Learning Progress Summary             Patient Acceptance, E,TB, NR by AO at 5/26/2025 0944                            Point: Home exercise program (In Progress)         Learning Progress Summary             Patient Acceptance, E,TB, NR by AO at 5/26/2025 0944                            Point: Body mechanics (Not Started)         Learner Progress:  Not documented in this visit.                  Point: Precautions (In Progress)         Learning Progress Summary             Patient Acceptance, E,TB, NR by AO at 5/26/2025 0944                                                User Key         Initials Effective Dates Name Provider Type Discipline     AO 06/16/21 -  Susan Gilmore, PT Physical Therapist PT                          PT Recommendation and Plan  Planned Therapy Interventions (PT): balance training, bed mobility training, gait training, home exercise program, neuromuscular re-education, patient/family education, ROM (range of motion), strengthening, transfer training  Progress: no change  Outcome Evaluation: Pt is a 75 y/o F POD #1 L humerus intramedullary nailing/ellie insertion (Dr. Peralta) on 5/25/2025 secondary to L proximal third humeral shaft fx with WBAT LUE orders. Pt with hx of KARLY, chronic hypoxemic respiratory failure on 2L 02 at night, obesity, CKD stage III, transaminitis, vertigo, and constipation. Pt oriented x 4 during eval, however, very lethargic/groggy, frequently knodding off to sleep requiring verbal/tactile stimulation to awake and reports, \"I've just had too much medication\". At baseline, pt lives in a [facility] w/ 0 RUDY w/  who works full-time (has paid caregiver 5 days/week, 10 hours per day while spouse is at work). Pt reports she was previously MOD I w/ household mobility w/ Rollator walker, scooter for community distances (borrows from stores, does not own), some assist for ADLs from paid caregiver,  drives, no other falls. During eval, pt agreeable to supine ther-ex and " MMT, declines repositioning in bed or sitting EOB this date. Pt presents with excessive weakness/lethargy this date, unable to hold cup to drink from right hand (is right-handed at baseline), able to complete ankle pumps independently but required active assist vs. PROM for all additional supine BLE exercises 2*/2 weakness/fatigue/lethargy and with difficulty staying awake throughout session. Pt also w/ SOA w/ SP02 87-92% on 7L 02. Pt is far from baseline and high risk for falls, recommending SNF at d/c and plan to see 5x/week at [facility] for progression of mobility. PPE: gloves      Time Calculation:   PT Evaluation Complexity  History, PT Evaluation Complexity: 3 or more personal factors and/or comorbidities  Examination of Body Systems (PT Eval Complexity): total of 3 or more elements  Clinical Presentation (PT Evaluation Complexity): evolving  Clinical Decision Making (PT Evaluation Complexity): moderate complexity  Overall Complexity (PT Evaluation Complexity): moderate complexity       PT Charges         Row Name 05/26/25 0946                       Time Calculation     Start Time 0855  -AO         Stop Time 0923  -AO         Time Calculation (min) 28 min  -AO         PT Received On 05/26/25  -AO         PT - Next Appointment 05/27/25  -AO         PT Goal Re-Cert Due Date 06/09/25  -AO                 Time Calculation- PT     Total Timed Code Minutes- PT 8 minute(s)  -AO                      User Key  (r) = Recorded By, (t) = Taken By, (c) = Cosigned By        Initials Name Provider Type     AO Susan Gilmore, PT Physical Therapist                       Therapy Charges for Today         Code Description Service Date Service Provider Modifiers Qty     09062344973 HC PT EVAL MOD COMPLEXITY 4 5/26/2025 Susan Gilmore, PT GP 1     38213092029 HC PT THER PROC EA 15 MIN 5/26/2025 Susan Gilmore, PT GP 1                PT G-Codes  Outcome Measure Options: AM-PAC 6 Clicks Basic Mobility (PT)  AM-PAC 6 Clicks Score  (PT): 7  PT Discharge Summary  Anticipated Discharge Disposition (PT): skilled nursing facility     Susan Gilmore, PT               5/26/2025                                    Emmanuel Clinton, ANITA   Occupational Therapist  Occupational Therapy  Therapy Treatment Note     Signed  Date of Service:  05/29/25 1452  Creation Time:  05/29/25 1452     Signed        Subjective: Pt agreeable to therapeutic plan of care.  Cognition: arousal/alertness: Alert and Attentive     Objective:      Precautions - falls, WBAT LUE      Bed Mobility: Max-A, rolling L/F  Functional Ambulation: N/A or Not attempted.     Toileting: Dependent  ADL Position: supine  ADL Comments: Dawn-care     Therapeutic Exercise - 10 Reps L Lower Extremity PROM, progressing to AAROM with verbal cueing for encouragement, supine w/ HOB elevated      Vitals: Desaturates, presents on 7L O2 NC, satting at 91% SpO2 upon room entry, desats with exertion 83% SpO2 with good pleth. VC for PLB, maintains mid 80s with exertion, resting 90% SpO2.      Pain: 8 Schneider-Baker Location: generalized, LUE  Interventions for pain: Repositioned, Increased Activity, and Therapeutic Presence  Education: Provided education on the importance of mobility in the acute care setting, Verbal/Tactile Cues, and ADL training     Assessment: Claudia TOVAR Walker presents with ADL impairments affecting function including balance, endurance / activity tolerance, grasp, pain, range of motion (ROM), shortness of breath, and strength. Pt alert and attentive, reports pain, however does not rate. Max A to roll L/R supine in bed for dependent A dawn-care 2/2 BM. OT facilitated gentle PROM LUE, VC for encouragement for AAROM. Demonstrated functioning below baseline abilities indicate the need for continued skilled intervention while inpatient. Tolerating session today without incident. Will continue to follow and progress as tolerated.      Plan/Recommendations:   Moderate Intensity Therapy recommended  "post-acute care. This is recommended as therapy feels the patient would require 3-4 days per week and wouldn't tolerate \"3 hour daily\" rehab intensity. SNF would be the preferred choice. If the patient does not agree to SNF, arrange HH or OP depending on home bound status. If patient is medically complex, consider LTACH.     Pt desires Skilled Rehab placement at discharge. Pt cooperative; agreeable to therapeutic recommendations and plan of care.      Modified Bruno: N/A = No pre-op stroke/TIA     Post-Tx Position: Supine with HOB Elevated, Alarms activated, and Call light and personal items within reach  PPE: gloves     Therapy Charges for Today         Code Description Service Date Service Provider Modifiers Qty     35514556261 HC OT SELF CARE/MGMT/TRAIN EA 15 MIN 5/28/2025 Emmanuel Clinton, OT GO 1     [id number]  OT THERAPEUTIC ACT EA 15 MIN 5/28/2025 Emmanuel Clinton, OT GO 1     [id number]  OT THER PROC EA 15 MIN 5/28/2025 Emmanuel Clinton, OT GO 1     [id number]  OT SELF CARE/MGMT/TRAIN EA 15 MIN 5/29/2025 Emmanuel Clinton, OT GO 2     [id number]  OT THER PROC EA 15 MIN 5/29/2025 Emmanuel Clinton, OT GO 1               Time Calculation- OT         Row Name 05/29/25 1443                       Time Calculation- OT     OT Start Time 1405  -SP         OT Stop Time 1436  -SP         OT Time Calculation (min) 31 min  -SP         Total Timed Code Minutes- OT 31 minute(s)  -SP         OT Received On 05/29/25  -SP         OT - Next Appointment 05/30/25  -SP                      User Key  (r) = Recorded By, (t) = Taken By, (c) = Cosigned By        Initials Name Provider Type     SP Emmanuel Clinton, ANITA Occupational Therapist                                    Cristhian Cosme, PTA   Physical Therapist Assistant  Physical Therapy  Therapy Treatment Note     Signed  Date of Service:  05/29/25 1455  Creation Time:  05/29/25 1455     Signed        Subjective: Pt agreeable to therapeutic plan of care. \"I " "need to poop\".      Objective:      Precautions - NWB LUE     Bed mobility - Rolling L/R Max A using bedrails. Dep for pericare while rolling multiple times.      Transfers - N/A or Not attempted.     Ambulation - 0 feet N/A or Not attempted.     Therapeutic Exercise - 15 Reps B LE AAROM lying supine     Vitals: Desaturates with activity while on 7L  Lowest oxygen saturation reading 82% while having BM on bedpan. Pt desats to mid 80s while rolling. Maintains low 90s while resting in semifowlers position.      Pain: 8 Schneider-Baker Location: LUE, generalized  Intervention for pain: Repositioned, Increased Activity, and Therapeutic Presence     Education: Provided education on the importance of mobility in the acute care setting and Verbal/Tactile Cues     Assessment: Claudia Rust presents with functional mobility impairments which indicate the need for skilled intervention. Pt had BM during session and was dependent for pericare. Pt desats with minimal activity on 7L. Use of IS reviewed and encouraged. Will continue to follow and progress as tolerated.      Plan/Recommendations:   If medically appropriate, Moderate Intensity Therapy recommended post-acute care. This is recommended as therapy feels the patient would require 3-4 days per week and wouldn't tolerate \"3 hour daily\" rehab intensity. SNF would be the preferred choice. If the patient does not agree to SNF, arrange HH or OP depending on home bound status. If patient is medically complex, consider LTACH. Pt requires no DME at discharge.      Pt desires Skilled Rehab placement at discharge. Pt cooperative; agreeable to therapeutic recommendations and plan of care.            Basic Mobility 6-click:  Rollin = Total, A lot = 2, A little = 3; 4 = None  Supine>Sit:                      1 = Total, A lot = 2, A little = 3; 4 = None   Sit>Stand with arms:       1 = Total, A lot = 2, A little = 3; 4 = None  Bed>Chair:                      " 1 = Total, A lot = 2, A little = 3; 4 = None  Ambulate in room:           1 = Total, A lot = 2, A little = 3; 4 = None  3-5 Steps with railin = Total, A lot = 2, A little = 3; 4 = None  Score: 8     Modified Bruno: N/A = No pre-op stroke/TIA     Post-Tx Position: Supine with HOB Elevated, Alarms activated, and Call light and personal items within reach  PPE: gloves     Therapy Charges for Today         Code Description Service Date Service Provider Modifiers Qty     44799794511 HC PT THERAPEUTIC ACT EA 15 MIN 2025 Cristhian Cosme, LEIF GP 1     49108261807 HC PT NEUROMUSC RE EDUCATION EA 15 MIN 2025 Cristhian Cosme, LEIF GP 1     [id number] HC PT THER PROC EA 15 MIN 2025 Cristhian Cosme, PTA GP 1     45568746205 HC PT THERAPEUTIC ACT EA 15 MIN 2025 Cristhian Cosme PTA GP 2     [id number] HC PT THER PROC EA 15 MIN 2025 Cristhian Cosme, PTA GP 1               PT Charges         Row Name 25 1454                       Time Calculation     Start Time 1405  -UN         Stop Time 1436  -UN         Time Calculation (min) 31 min  -UN         PT Received On 25  -UN         PT - Next Appointment 25  -UN                 Time Calculation- PT     Total Timed Code Minutes- PT 31 minute(s)  -UN                      User Key  (r) = Recorded By, (t) = Taken By, (c) = Cosigned By        Initials Name Provider Type     UN Cristhian Cosme PTA Physical Therapist Assistant

## 2025-05-30 NOTE — PLAN OF CARE
Goal Outcome Evaluation:      A&ox4. Q2 turn. Ex cath. Sling on. Pain controlled. Plan of care ongoing.

## 2025-05-30 NOTE — PLAN OF CARE
Goal Outcome Evaluation:  Plan of Care Reviewed With: patient           Outcome Evaluation: Pt able to make needs known. Pt bedrest. Pt complaints of pain treatec per MAR. VSS. Call light in reach. Plan of care ongoing.

## 2025-05-31 VITALS
OXYGEN SATURATION: 96 % | RESPIRATION RATE: 26 BRPM | DIASTOLIC BLOOD PRESSURE: 63 MMHG | HEART RATE: 72 BPM | TEMPERATURE: 97.9 F | HEIGHT: 64 IN | WEIGHT: 283.07 LBS | BODY MASS INDEX: 48.33 KG/M2 | SYSTOLIC BLOOD PRESSURE: 108 MMHG

## 2025-05-31 LAB
ANION GAP SERPL CALCULATED.3IONS-SCNC: 12.1 MMOL/L (ref 5–15)
BUN SERPL-MCNC: 42.2 MG/DL (ref 8–23)
BUN/CREAT SERPL: 41.4 (ref 7–25)
CALCIUM SPEC-SCNC: 8.9 MG/DL (ref 8.6–10.5)
CHLORIDE SERPL-SCNC: 95 MMOL/L (ref 98–107)
CO2 SERPL-SCNC: 28.9 MMOL/L (ref 22–29)
CREAT SERPL-MCNC: 1.02 MG/DL (ref 0.57–1)
DEPRECATED RDW RBC AUTO: 46.5 FL (ref 37–54)
EGFRCR SERPLBLD CKD-EPI 2021: 57.8 ML/MIN/1.73
ERYTHROCYTE [DISTWIDTH] IN BLOOD BY AUTOMATED COUNT: 13.7 % (ref 12.3–15.4)
GLUCOSE SERPL-MCNC: 155 MG/DL (ref 65–99)
HCT VFR BLD AUTO: 33 % (ref 34–46.6)
HGB BLD-MCNC: 10.4 G/DL (ref 12–15.9)
MCH RBC QN AUTO: 29.5 PG (ref 26.6–33)
MCHC RBC AUTO-ENTMCNC: 31.5 G/DL (ref 31.5–35.7)
MCV RBC AUTO: 93.8 FL (ref 79–97)
PLATELET # BLD AUTO: 201 10*3/MM3 (ref 140–450)
PMV BLD AUTO: 11.2 FL (ref 6–12)
POTASSIUM SERPL-SCNC: 3.2 MMOL/L (ref 3.5–5.2)
RBC # BLD AUTO: 3.52 10*6/MM3 (ref 3.77–5.28)
SODIUM SERPL-SCNC: 136 MMOL/L (ref 136–145)
WBC NRBC COR # BLD AUTO: 12.79 10*3/MM3 (ref 3.4–10.8)

## 2025-05-31 PROCEDURE — 94664 DEMO&/EVAL PT USE INHALER: CPT

## 2025-05-31 PROCEDURE — 94799 UNLISTED PULMONARY SVC/PX: CPT

## 2025-05-31 PROCEDURE — 80048 BASIC METABOLIC PNL TOTAL CA: CPT | Performed by: STUDENT IN AN ORGANIZED HEALTH CARE EDUCATION/TRAINING PROGRAM

## 2025-05-31 PROCEDURE — 99232 SBSQ HOSP IP/OBS MODERATE 35: CPT | Performed by: INTERNAL MEDICINE

## 2025-05-31 PROCEDURE — 25010000002 BUMETANIDE PER 0.5 MG: Performed by: STUDENT IN AN ORGANIZED HEALTH CARE EDUCATION/TRAINING PROGRAM

## 2025-05-31 PROCEDURE — 85027 COMPLETE CBC AUTOMATED: CPT | Performed by: STUDENT IN AN ORGANIZED HEALTH CARE EDUCATION/TRAINING PROGRAM

## 2025-05-31 PROCEDURE — 94761 N-INVAS EAR/PLS OXIMETRY MLT: CPT

## 2025-05-31 RX ORDER — POTASSIUM CHLORIDE 1500 MG/1
40 TABLET, EXTENDED RELEASE ORAL EVERY 4 HOURS
Status: COMPLETED | OUTPATIENT
Start: 2025-05-31 | End: 2025-05-31

## 2025-05-31 RX ORDER — POLYETHYLENE GLYCOL 3350 17 G/17G
17 POWDER, FOR SOLUTION ORAL DAILY
Qty: 30 PACKET | Refills: 0 | Status: SHIPPED | OUTPATIENT
Start: 2025-06-01

## 2025-05-31 RX ORDER — BUMETANIDE 1 MG/1
1 TABLET ORAL DAILY
Status: DISCONTINUED | OUTPATIENT
Start: 2025-05-31 | End: 2025-06-01 | Stop reason: HOSPADM

## 2025-05-31 RX ORDER — OXYCODONE HYDROCHLORIDE 5 MG/1
5 TABLET ORAL EVERY 6 HOURS PRN
Qty: 20 TABLET | Refills: 0 | Status: SHIPPED | OUTPATIENT
Start: 2025-05-31

## 2025-05-31 RX ADMIN — SUMATRIPTAN SUCCINATE 25 MG: 25 TABLET ORAL at 20:21

## 2025-05-31 RX ADMIN — FLUOXETINE 60 MG: 20 CAPSULE ORAL at 09:05

## 2025-05-31 RX ADMIN — PANTOPRAZOLE SODIUM 40 MG: 40 TABLET, DELAYED RELEASE ORAL at 20:21

## 2025-05-31 RX ADMIN — METOPROLOL SUCCINATE 12.5 MG: 25 TABLET, EXTENDED RELEASE ORAL at 09:05

## 2025-05-31 RX ADMIN — POLYETHYLENE GLYCOL 3350 17 G: 17 POWDER, FOR SOLUTION ORAL at 09:06

## 2025-05-31 RX ADMIN — GABAPENTIN 300 MG: 300 CAPSULE ORAL at 16:07

## 2025-05-31 RX ADMIN — LEVOTHYROXINE SODIUM 137 MCG: 0.11 TABLET ORAL at 04:53

## 2025-05-31 RX ADMIN — DOCUSATE SODIUM 100 MG: 100 CAPSULE, LIQUID FILLED ORAL at 20:21

## 2025-05-31 RX ADMIN — Medication 10 ML: at 09:06

## 2025-05-31 RX ADMIN — POTASSIUM CHLORIDE 40 MEQ: 1500 TABLET, EXTENDED RELEASE ORAL at 12:20

## 2025-05-31 RX ADMIN — BUMETANIDE 2.5 MG: 0.25 INJECTION INTRAMUSCULAR; INTRAVENOUS at 09:06

## 2025-05-31 RX ADMIN — Medication 10 ML: at 20:24

## 2025-05-31 RX ADMIN — IPRATROPIUM BROMIDE AND ALBUTEROL SULFATE 3 ML: .5; 3 SOLUTION RESPIRATORY (INHALATION) at 20:05

## 2025-05-31 RX ADMIN — Medication 1000 UNITS: at 09:05

## 2025-05-31 RX ADMIN — ATORVASTATIN CALCIUM 10 MG: 10 TABLET ORAL at 09:05

## 2025-05-31 RX ADMIN — METOLAZONE 2.5 MG: 2.5 TABLET ORAL at 09:06

## 2025-05-31 RX ADMIN — BUDESONIDE 0.5 MG: 0.5 SUSPENSION RESPIRATORY (INHALATION) at 08:11

## 2025-05-31 RX ADMIN — IPRATROPIUM BROMIDE AND ALBUTEROL SULFATE 3 ML: .5; 3 SOLUTION RESPIRATORY (INHALATION) at 08:03

## 2025-05-31 RX ADMIN — OXYCODONE 5 MG: 5 TABLET ORAL at 14:16

## 2025-05-31 RX ADMIN — ACETAMINOPHEN 650 MG: 325 TABLET ORAL at 12:20

## 2025-05-31 RX ADMIN — OXYCODONE 5 MG: 5 TABLET ORAL at 07:30

## 2025-05-31 RX ADMIN — Medication 1000 MCG: at 09:05

## 2025-05-31 RX ADMIN — GABAPENTIN 300 MG: 300 CAPSULE ORAL at 20:22

## 2025-05-31 RX ADMIN — GABAPENTIN 300 MG: 300 CAPSULE ORAL at 09:05

## 2025-05-31 RX ADMIN — POTASSIUM CHLORIDE 40 MEQ: 1500 TABLET, EXTENDED RELEASE ORAL at 09:06

## 2025-05-31 RX ADMIN — APIXABAN 2.5 MG: 2.5 TABLET, FILM COATED ORAL at 20:22

## 2025-05-31 RX ADMIN — APIXABAN 2.5 MG: 2.5 TABLET, FILM COATED ORAL at 09:05

## 2025-05-31 RX ADMIN — IPRATROPIUM BROMIDE AND ALBUTEROL SULFATE 3 ML: .5; 3 SOLUTION RESPIRATORY (INHALATION) at 15:45

## 2025-05-31 RX ADMIN — BUDESONIDE 0.5 MG: 0.5 SUSPENSION RESPIRATORY (INHALATION) at 20:09

## 2025-05-31 NOTE — PROGRESS NOTES
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, axillary, and brachial  Upper arm left DVT  DM  Hx of Ecoli UTI  Oral candidiasis  HTN  JERROD  DM2  Chronic anemia  HLD        Recommendations:     Oxygen titration currently requiring 6 L  Bronchodilators budesonide and DuoNeb  Mucomyst nebulized  Eliquis for DVT prophylaxis              LOS: 5 days     Subjective         Objective     Vital signs for last 24 hours:  Vitals:    05/31/25 0809 05/31/25 0811 05/31/25 0814 05/31/25 0833   BP:    108/63   BP Location:    Right arm   Patient Position:    Lying   Pulse: 74 73 74    Resp: 16 17 12 21   Temp:    97.9 °F (36.6 °C)   TempSrc:    Axillary   SpO2: 95% 97% 96%    Weight:       Height:           Intake/Output last 3 shifts:  I/O last 3 completed shifts:  In: 720 [P.O.:720]  Out: 2800 [Urine:2800]  Intake/Output this shift:  I/O this shift:  In: 240 [P.O.:240]  Out: -       Radiology  Imaging Results (Last 24 Hours)       ** No results found for the last 24 hours. **            Labs:  Results from last 7 days   Lab Units 05/31/25  0202   WBC 10*3/mm3 12.79*   HEMOGLOBIN g/dL 10.4*   HEMATOCRIT % 33.0*   PLATELETS 10*3/mm3 201     Results from last 7 days   Lab Units 05/31/25  0202 05/26/25  1549 05/26/25  0357   SODIUM mmol/L 136   < > 141   POTASSIUM mmol/L 3.2*   < > 3.6   CHLORIDE mmol/L 95*   < > 103   CO2 mmol/L 28.9   < > 30.6*   BUN mg/dL 42.2*   < > 37*   CREATININE mg/dL 1.02*   < > 1.16*   CALCIUM mg/dL 8.9   < > 8.7   BILIRUBIN mg/dL  --   --  0.4   ALK PHOS U/L  --   --  112   ALT (SGPT) U/L  --    --  66*   AST (SGOT) U/L  --   --  47*   GLUCOSE mg/dL 155*   < > 159*    < > = values in this interval not displayed.         Results from last 7 days   Lab Units 05/26/25  0357 05/25/25  0027   ALBUMIN g/dL 3.3* 3.7                                   Meds:   SCHEDULE  apixaban, 2.5 mg, Oral, Q12H  atorvastatin, 10 mg, Oral, Daily  budesonide, 0.5 mg, Nebulization, BID - RT  bumetanide, 2.5 mg, Intravenous, BID  [Held by provider] bumetanide, 2 mg, Oral, BID Diuretics  cholecalciferol, 1,000 Units, Oral, Daily  docusate sodium, 100 mg, Oral, Nightly  FLUoxetine, 60 mg, Oral, Daily  gabapentin, 300 mg, Oral, TID  ipratropium-albuterol, 3 mL, Nebulization, 4x Daily - RT  levothyroxine, 137 mcg, Oral, Q AM  metOLazone, 2.5 mg, Oral, Daily  metoprolol succinate XL, 12.5 mg, Oral, Daily  pantoprazole, 40 mg, Oral, Nightly  polyethylene glycol, 17 g, Oral, Daily  potassium chloride ER, 40 mEq, Oral, Q4H  sodium chloride, 10 mL, Intravenous, Q12H  vitamin B-12, 1,000 mcg, Oral, Daily      Infusions       PRNs    acetaminophen    senna-docusate sodium **AND** polyethylene glycol **AND** bisacodyl **AND** bisacodyl    Calcium Replacement - Follow Nurse / BPA Driven Protocol    famotidine    HYDROmorphone    Magnesium Standard Dose Replacement - Follow Nurse / BPA Driven Protocol    meclizine    morphine    nitroglycerin    ondansetron    ondansetron ODT    oxyCODONE    Phosphorus Replacement - Follow Nurse / BPA Driven Protocol    Potassium Replacement - Follow Nurse / BPA Driven Protocol    prochlorperazine    [COMPLETED] Insert Peripheral IV **AND** sodium chloride    sodium chloride    sodium chloride    SUMAtriptan    Physical Exam:  Physical Exam  Cardiovascular:      Heart sounds: Murmur heard.      No gallop.   Pulmonary:      Effort: No respiratory distress.      Breath sounds: No stridor. Rhonchi and rales present. No wheezing.   Chest:      Chest wall: No tenderness.         ROS  Review of Systems   Respiratory:   Positive for cough and shortness of breath. Negative for wheezing and stridor.    Cardiovascular:  Positive for chest pain and leg swelling. Negative for palpitations.             Total critical care time spent with patient greater than: 45 Minutes

## 2025-05-31 NOTE — CASE MANAGEMENT/SOCIAL WORK
"Physicians Statement of Medical Necessity for  Ambulance Transportation    GENERAL INFORMATION     Name: Claudia Rust  YOB: 1950  Medicare #: [id number]   Transport Date: 5/31/25 (Valid for round trips this date, or for scheduled repetitive trips for 60 days from the date signed below.)  Origin: [facility]  Destination: [facility]  Is the Patient's stay covered under Medicare Part A (PPS/DRG?)Yes  Closest appropriate facility? Yes  If this a hosp-hosp transfer? No  Is this a hospice patient? No    MEDICAL NECESSITY QUESTIONAIRE    Ambulance Transportation is medically necessary only if other means of transportation are contraindicated or would be potentially harmful to the patient.  To meet this requirement, the patient must be either \"bed confined\" or suffer from a condition such that transport by means other than an ambulance is contraindicated by the patient's condition.  The following questions must be answered by the healthcare professional signing below for this form to be valid:     1) Describe the MEDICAL CONDITION (physical and/or mental) of this patient AT THE TIME OF AMBULANCE TRANSPORT that requires the patient to be transported in an ambulance, and why transport by other means is contraindicated by the patient's condition: 137kg, 4L high flow NC. Patient requires max assist to roll in bed. Transfers/ambulation not attempted by therapy. Facility unable to transport on weekends. Patient going to room #306. Nurse called report to [place] at facility.   Past Medical History:   Diagnosis Date    Acute congestive heart failure     Allergies     Anxiety     Arthritis     Asthma     Bilateral leg edema     Bipolar 1 disorder     Bulging lumbar disc     X3    Cancer     states had \"cancerous tumor during pregnancy and had to have hysterectomy\"    Cataract     bilateral    Cellulitis 03/2021    bilateral legs    Compression fracture of vertebral column     LUMBAR    COPD (chronic " obstructive pulmonary disease)     Delayed emergence from anesthesia     Depression     Diabetes mellitus     Dyslipidemia     Dyspnea on exertion     Fibromyalgia     GERD (gastroesophageal reflux disease)     Hiatal hernia     Hyperlipidemia     Hypertension     Hypothyroid     HYPOTHYROID    IBS (irritable bowel syndrome)     Migraines     Morbid obesity     Neuropathy     Panic attacks     Peripheral edema     PONV (postoperative nausea and vomiting)     Poor mobility     rolling walker    Prediabetes     PVD (peripheral vascular disease)     Rheumatoid aortitis     Sleep apnea     cpap    Spinal stenosis     Vertigo     Vitamin D deficiency       Past Surgical History:   Procedure Laterality Date    AORTIC VALVE REPAIR/REPLACEMENT N/A 3/18/2021    Procedure: AORTIC VALVE REPLACEMENT;  Surgeon: Olaf Mcgill MD;  Location: [facility] CVOR;  Service: Cardiothoracic;  Laterality: N/A;    BRONCHOSCOPY N/A 3/25/2021    Procedure: BRONCHOSCOPY AT BEDSIDE WITH BRONCHIOALVEOLAR LAVAGE;  Surgeon: Glenn Reyes MD;  Location: [facility] ENDOSCOPY;  Service: Pulmonary;  Laterality: N/A;  PNA    BRONCHOSCOPY N/A 10/16/2023    Procedure: BRONCHOSCOPY with bronchial washing;  Surgeon: Glenn Reyes MD;  Location: [facility] ENDOSCOPY;  Service: Pulmonary;  Laterality: N/A;  post op: pneumonia    CARDIAC CATHETERIZATION  2009    CARDIAC CATHETERIZATION N/A 8/14/2019    Procedure: Left Heart Cath;  Surgeon: Jose Levi MD;  Location: [facility] CATH INVASIVE LOCATION;  Service: Cardiovascular    CARDIAC CATHETERIZATION N/A 8/14/2019    Procedure: Right Heart Cath;  Surgeon: Jose Levi MD;  Location: [facility] CATH INVASIVE LOCATION;  Service: Cardiovascular    CARDIAC CATHETERIZATION N/A 8/14/2019    Procedure: Aortic root aortogram;  Surgeon: Jose Levi MD;  Location: [facility] CATH INVASIVE LOCATION;  Service: Cardiovascular    CARDIAC CATHETERIZATION N/A 1/20/2021    Procedure: Left Heart Cath;   "Surgeon: Jose Levi MD;  Location: [facility] CATH INVASIVE LOCATION;  Service: Cardiovascular;  Laterality: N/A;    COLONOSCOPY      COLONOSCOPY N/A 2/14/2025    Procedure: COLONOSCOPY WITH POLYPECTOMY X 4;  Surgeon: Segun Cleveland MD;  Location: [facility] ENDOSCOPY;  Service: Gastroenterology;  Laterality: N/A;  POST- POLYPS X 4, DIVERTICULOSIS, INTERNAL HEMORRHOIDS    CYSTOCELE REPAIR  1984    ELBOW ARTHROPLASTY  2011    ENDOSCOPY      FOOT SURGERY      GALLBLADDER SURGERY  2002    HUMERUS IM NAILING/RODDING Left 5/25/2025    Procedure: HUMERUS INTRAMEDULLARY NAIL/JONO INSERTION LEFT;  Surgeon: Rufus Peralta MD;  Location: [facility] MAIN OR;  Service: Orthopedics;  Laterality: Left;    TONSILLECTOMY      VAGINAL HYSTERECTOMY  1972      2) Is this patient \"bed confined\" as defined below?Yes   To be \"bed confined\" the patient must satisfy all three of the following criteria:  (1) unable to get up from bed without assistance; AND (2) unable to ambulate;  AND (3) unable to sit in a chair or wheelchair.  3) Can this patient safely be transported by car or wheelchair van (I.e., may safely sit during transport, without an attendant or monitoring?)No   4. In addition to completing questions 1-3 above, please check any of the following conditions that apply*:          *Note: supporting documentation for any boxes checked must be maintained in the patient's medical records Medical attendant required, Requires oxygen - unable to self administer, Unable to tolerate seated position for time needed to transport, and Morbid obesity requires additional personnel/equipment to safely handle patient      SIGNATURE OF PHYSICIAN OR OTHER AUTHORIZED HEALTHCARE PROFESSIONAL    I certify that the above information is true and correct based on my evaluation of this patient, and represent that the patient requires transport by ambulance and that other forms of transport are contraindicated.  I understand that this " information will be used by the Centers for Medicare and Medicaid Services (CMS) to support the determiniation of medical necessity for ambulance services, and I represent that I have personal knowledge of the patient's condition at the time of transport.    x   If this box is checked, I also certify that the patient is physically or mentally incapable of signing the ambulance service's claim form and that the institution with which I am affiliated has furnished care, services or assistance to the patient.  My signature below is made on behalf of the patient pursuant to 42 .36(b)(4). In accordance with 42 .37, the specific reason(s) that the patient is physically or mentally incapable of signing the claim for is as follows:     Signature of Physician or Healthcare Professional Nieves Houston RN,  Date/Time:   5/31/25 5049     (For Scheduled repetitive transport, this form is not valid for transports performed more than 60 days after this date).                      Nieves Houston RN                                                                                                                      --------------------------------------------------------------------------------------------  Printed Name and Credentials of Physician or Authorized Healthcare Professional     *Form must be signed by patient's attending physician for scheduled, repetitive transports,.  For non-repetitive ambulance transports, if unable to obtain the signature of the attending physician, any of the following may sign (please select below):     Physician  Clinical Nurse Specialist  Registered Nurse x    Physician Assistant  Discharge Planner  Licensed Practical Nurse     Nurse Practitioner   x

## 2025-05-31 NOTE — CASE MANAGEMENT/SOCIAL WORK
Continued Stay Note  ISAAK Rush     Patient Name: Claudia Rust  MRN: [id number]  Today's Date: 5/31/2025    Admit Date: 5/23/2025    Plan: DC Plan: [facility] - Accepted. PASRR approved. Pre-cert approved 5/31-6/3.   Discharge Plan       Row Name 05/31/25 1522       Plan    Plan DC Plan: [facility] - Accepted. PASRR approved. Pre-cert approved 5/31-6/3.    Plan Comments CM confirmed with facility liaison that patient's bed is available today. She will be going to room 306. Nurse notified. Facility unable to transport on weekends. [facility] requested for transport.                  Nieves Houston RN     Office: [phone]  Fax: [phone]

## 2025-05-31 NOTE — PLAN OF CARE
Goal Outcome Evaluation: Patient resting between care with minimal complaints of pain. See MAR for intervention. Continuing to monitor edema in upper and lower extremities, see MAR for interventions. Monitor I&O, see flowsheet. Patient refused SCD and elevation of lower extremities. Decreasing 02 from 5L to 2.5L this shift. Care on going.

## 2025-05-31 NOTE — PROGRESS NOTES
CARDIOLOGY PROGRESS NOTE:    Claudia Rust  74 y.o.  female  1950  [id number]      Referring Provider: Hospitalist    Reason for follow-up: Preop evaluation valvular heart disease     Patient Care Team:  Davis Boateng NP as PCP - General (Family Medicine)  Jose Levi MD as Consulting Physician (Cardiology)  Yovani Lerma MD as Consulting Physician (Nephrology)  Mary Hunt APRN as Nurse Practitioner (Nurse Practitioner)  Edmar Bear MD as Consulting Physician (Cardiology)    Subjective .  Patient doing well without any symptoms    Objective lying in bed comfortably     Review of Systems   Constitutional: Negative for malaise/fatigue.   Cardiovascular:  Negative for chest pain, dyspnea on exertion, leg swelling and palpitations.   Respiratory:  Negative for cough and shortness of breath.    Gastrointestinal:  Negative for abdominal pain, nausea and vomiting.   Neurological:  Negative for dizziness, headaches, light-headedness, numbness and weakness.   All other systems reviewed and are negative.      Allergies: Adhesive tape; Benadryl [diphenhydramine]; Butorphanol; Garlic; Heparin; Tetanus-diphtheria toxoids td; Aloe; Bee venom; Latex; Macadamia nut oil; Tuberculin, ppd; and Wasp venom    Scheduled Meds:apixaban, 2.5 mg, Oral, Q12H  atorvastatin, 10 mg, Oral, Daily  budesonide, 0.5 mg, Nebulization, BID - RT  bumetanide, 2.5 mg, Intravenous, BID  [Held by provider] bumetanide, 2 mg, Oral, BID Diuretics  cholecalciferol, 1,000 Units, Oral, Daily  docusate sodium, 100 mg, Oral, Nightly  FLUoxetine, 60 mg, Oral, Daily  gabapentin, 300 mg, Oral, TID  ipratropium-albuterol, 3 mL, Nebulization, 4x Daily - RT  levothyroxine, 137 mcg, Oral, Q AM  metOLazone, 2.5 mg, Oral, Daily  metoprolol succinate XL, 12.5 mg, Oral, Daily  pantoprazole, 40 mg, Oral, Nightly  polyethylene glycol, 17 g, Oral, Daily  sodium chloride, 10 mL, Intravenous, Q12H  vitamin B-12, 1,000 mcg, Oral,  "Daily      Continuous Infusions:   PRN Meds:.  acetaminophen    senna-docusate sodium **AND** polyethylene glycol **AND** bisacodyl **AND** bisacodyl    Calcium Replacement - Follow Nurse / BPA Driven Protocol    famotidine    HYDROmorphone    Magnesium Standard Dose Replacement - Follow Nurse / BPA Driven Protocol    meclizine    morphine    nitroglycerin    ondansetron    ondansetron ODT    oxyCODONE    Phosphorus Replacement - Follow Nurse / BPA Driven Protocol    Potassium Replacement - Follow Nurse / BPA Driven Protocol    prochlorperazine    [COMPLETED] Insert Peripheral IV **AND** sodium chloride    sodium chloride    sodium chloride    SUMAtriptan        VITAL SIGNS  Vitals:    05/31/25 0833 05/31/25 1200 05/31/25 1545 05/31/25 1550   BP: 108/63      BP Location: Right arm      Patient Position: Lying      Pulse:  77 73 72   Resp: 21  15 26   Temp: 97.9 °F (36.6 °C)      TempSrc: Axillary      SpO2:  91% 91% 96%   Weight:       Height:           Flowsheet Rows      Flowsheet Row First Filed Value   Admission Height 162.6 cm (64\") Documented at 05/23/2025 2227   Admission Weight 137 kg (302 lb 0.5 oz) Documented at 05/23/2025 2227             TELEMETRY: Atrial fibrillation with controlled ventricular response    Physical Exam:  Constitutional:       Appearance: Well-developed.   Eyes:      General: No scleral icterus.     Conjunctiva/sclera: Conjunctivae normal.   HENT:      Head: Normocephalic and atraumatic.   Neck:      Vascular: No carotid bruit or JVD.   Pulmonary:      Effort: Pulmonary effort is normal.      Breath sounds: Normal breath sounds. No wheezing. No rales.   Cardiovascular:      Normal rate. Irregularly irregular rhythm.   Pulses:     Intact distal pulses.   Abdominal:      General: Bowel sounds are normal.      Palpations: Abdomen is soft.   Musculoskeletal:      Cervical back: Normal range of motion and neck supple. Skin:     General: Skin is warm and dry.      Findings: No rash. "   Neurological:      Mental Status: Alert.          Results Review:   I reviewed the patient's new clinical results.  Lab Results (last 24 hours)       Procedure Component Value Units Date/Time    Basic Metabolic Panel [[id number]]  (Abnormal) Collected: 05/31/25 0202    Specimen: Blood from Hand, Right Updated: 05/31/25 0231     Glucose 155 mg/dL      BUN 42.2 mg/dL      Creatinine 1.02 mg/dL      Sodium 136 mmol/L      Potassium 3.2 mmol/L      Chloride 95 mmol/L      CO2 28.9 mmol/L      Calcium 8.9 mg/dL      BUN/Creatinine Ratio 41.4     Anion Gap 12.1 mmol/L      eGFR 57.8 mL/min/1.73     Narrative:      GFR Categories in Chronic Kidney Disease (CKD)              GFR Category          GFR (mL/min/1.73)    Interpretation  G1                    90 or greater        Normal or high (1)  G2                    60-89                Mild decrease (1)  G3a                   45-59                Mild to moderate decrease  G3b                   30-44                Moderate to severe decrease  G4                    15-29                Severe decrease  G5                    14 or less           Kidney failure    (1)In the absence of evidence of kidney disease, neither GFR category G1 or G2 fulfill the criteria for CKD.    eGFR calculation 2021 CKD-EPI creatinine equation, which does not include race as a factor    CBC (No Diff) [[id number]]  (Abnormal) Collected: 05/31/25 0202    Specimen: Blood from Hand, Right Updated: 05/31/25 0214     WBC 12.79 10*3/mm3      RBC 3.52 10*6/mm3      Hemoglobin 10.4 g/dL      Hematocrit 33.0 %      MCV 93.8 fL      MCH 29.5 pg      MCHC 31.5 g/dL      RDW 13.7 %      RDW-SD 46.5 fl      MPV 11.2 fL      Platelets 201 10*3/mm3             Imaging Results (Last 24 Hours)       ** No results found for the last 24 hours. **            EKG      I personally viewed and interpreted the patient's EKG/Telemetry data:    ECHOCARDIOGRAM:  Results for orders placed during the hospital encounter of  25    Adult Transthoracic Echo Limited W/ Cont if Necessary Per Protocol    Interpretation Summary    Left ventricular ejection fraction appears to be 61 - 65%.    Left ventricular diastolic function was not assessed.    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    Estimated right ventricular systolic pressure from tricuspid regurgitation is normal (<35 mmHg).    Imaging is limited by left arm being in sling.       STRESS MYOVIEW:  Results for orders placed during the hospital encounter of 23    Stress Test With Myocardial Perfusion One Day    Interpretation Summary  LEXISCAN CARDIOLITE REPORT    DATE OF PROCEDURE: 2023    INDICATION FOR PROCEDURE: Numbness and tingling of left hand, dyspnea on exertion, chronic CHF, AVR, edema, dyslipidemia, hypertension, obesity with BMI over 45    PROCEDURE PERFORMED: Lexiscan Cardiolite    PROCEDURE COMMENTS:    After informed consent was obtained.  Patient's resting heart rate was 68 bpm, resting blood pressure was 144/82, resting EKG revealed sinus rhythm with rate of 68 bpm.  Patient was given 0.4 mg of regadenosine for stress testing.  There was no significant change in heart rate, blood pressure, symptoms with regadenoson injection.  Patient tolerated procedure well.  Complications were none.    NUCLEAR IMAGIN.  There was  uniform uptake of Cardiolite both in resting and post stress images, no ischemia seen.  2.  Gated images reveal  normal LV size and contractility, LVEF of 86%.    CONCLUSION:  1.  Lexiscan Cardiolite with normal perfusion, negative for ischemia.  2.  Normal wall motion.  LVEF of 86%.    RECOMMENDATIONS:    Clinical correlation recommended.      Jose Levi MD  23  20:24 EST       CARDIAC CATHETERIZATION:  Results for orders placed during the hospital encounter of 21    Cardiac Catheterization/Vascular Study    Narrative  Table formatting from the original result was not included.  2021      Heart Cath  Report    NAME:              Claudia Rust  :                1950  AGE/SEX:        70 y.o. female  MRN:                [id number]        Procedures Performed    1. Bilateral selective coronary arteriogram    :   Jose Levi MD    Vascular Access Site: Femoral    Indication for procedure: 70-year-old with severe critical  aortic stenosis and history of CHF, obesity, dyslipidemia, prediabetes      Procedure Note    After discussing the risks, benefits, and alternatives of the procedure, informed consent was obtained.  Moderate conscious sedation was given utilizing IV Versed and fentanyl administered by RN with continuous EKG oximetry and hemodynamic monitoring supervised by me throughout the entire case, conscious sedation time was 30 minutes.  I was present with the patient for the duration of moderate sedation and supervised staff who had no other duties and monitored the patient for the entire procedure patient had Moise 2-3 sedation scale. the vascular access site was prepped and draped in the usual sterile fashion.  2% lidocaine was used for local anesthesia. Appropriate landmarks were assessed.  A 6 Czech short sheath was inserted in the artery using the modified Seldinger technique.    Selective coronary angiography was performed with JL4 and JR4 diagnostic catheters. Left ventriculogram  was not performed because patient has severe aortic stenosis..  All exchanges were performed over the wire.  No specimens were removed.  There were no apparent acute or early complications.  The patient tolerated the procedure well and was transferred to the recovery area in stable condition.    Artery hemostasis will be achieved with  manual pressure in OPCV.      Complications:  None  Blood Loss: minimal    Hemodynamics    Pressures    Ao:    138/62 mmHg      Coronary Angiography    Left Main :  The left main   is without disease    Left Anterior Descending : Is tortuous vessel, without  disease    Left Circumflex : Tortuous vessel ends up with 3 branches which are tortuous without disease    Right Coronary Artery :  The right coronary artery   dominant vessel without disease    Dominance:  []  Left  [x]  Right  []  Co-Dominant        Impression:    1. No obstructive CAD    Recommendations:    1. Aortic valve replacement evaluation by CT VS.  Patient had echo showing severe critical aortic stenosis        I sincerely appreciate the opportunity to participate in your patient's care. Please feel free to contact me anytime if I can be of assistance in this or any other way.      Pertinent History    Past Medical History:  Diagnosis Date   Acute congestive heart failure (CMS/[facility])   Asthma   Bipolar 1 disorder (CMS/[facility])   COPD (chronic obstructive pulmonary disease) (CMS/[facility])   Depression   Dyslipidemia   Fibromyalgia   Hypertension   Hypothyroid   IBS (irritable bowel syndrome)   Migraines   Morbid obesity (CMS/[facility])   Neuropathy   Spinal stenosis   Vertigo    Past Surgical History:  Procedure Laterality Date   CARDIAC CATHETERIZATION  2009   CARDIAC CATHETERIZATION N/A 8/14/2019  Procedure: Left Heart Cath;  Surgeon: Jose Levi MD;  Location: [facility] CATH INVASIVE LOCATION;  Service: Cardiovascular   CARDIAC CATHETERIZATION N/A 8/14/2019  Procedure: Right Heart Cath;  Surgeon: Jose Levi MD;  Location: [facility] CATH INVASIVE LOCATION;  Service: Cardiovascular   CARDIAC CATHETERIZATION N/A 8/14/2019  Procedure: Aortic root aortogram;  Surgeon: Jose Levi MD;  Location: [facility] CATH INVASIVE LOCATION;  Service: Cardiovascular   CYSTOCELE REPAIR  1984   ELBOW ARTHROPLASTY  2011   FOOT SURGERY   GALLBLADDER SURGERY  2002   VAGINAL HYSTERECTOMY  1972    Prior to Admission medications  Medication Sig Start Date End Date Taking? Authorizing Provider  aspirin 81 MG tablet Take 81 mg by mouth Every Night. 2/3/15  Yes Provider, MD Krissy  B Complex Vitamins (VITAMIN B  COMPLEX) capsule capsule Take 1 capsule by mouth Daily.   Yes Krissy Hodgson MD  Calcium Carbonate (CALCIUM 500 PO) Take 1,500 mg by mouth Daily.   Yes Krissy Hodgson MD  Cholecalciferol (VITAMIN D-1000 MAX ST) 1000 units tablet Take 2,000 Units by mouth Daily. 9/16/16  Yes Krissy Hodgson MD  diphenhydrAMINE (BENADRYL) 50 MG capsule Take 50 mg by mouth every night at bedtime.   Yes Krissy Hodgson MD  FLUoxetine (PROzac) 60 MG tablet Take 60 mg by mouth Daily. 4/19/19  Yes Krissy Hodgson MD  fluticasone (FLONASE) 50 MCG/ACT nasal spray 2 sprays into the nostril(s) as directed by provider 2 (two) times a day.   Yes Krissy Hodgson MD  furosemide (LASIX) 40 MG tablet Take 40 mg by mouth 2 (Two) Times a Day. 2/5/15  Yes Krissy Hodgson MD  gabapentin (NEURONTIN) 600 MG tablet Take 400 mg by mouth 3 (Three) Times a Day. 7/30/19  Yes Krissy Hodgson MD  HYDROcodone-acetaminophen (NORCO)  MG per tablet Take 1 tablet by mouth Daily. Noon an prm   Yes Krissy Hodgson MD  levothyroxine (SYNTHROID, LEVOTHROID) 100 MCG tablet Take 100 mcg by mouth Daily. 7/30/15  Yes Krissy Hodgson MD  metoprolol succinate XL (TOPROL-XL) 25 MG 24 hr tablet Take 25 mg by mouth Daily.   Yes Krissy Hodgson MD  Multiple Vitamins-Minerals (MULTIVITAMIN WITH MINERALS) tablet tablet Take 1 tablet by mouth Daily.   Yes Krissy Hodgson MD  ondansetron (ZOFRAN) 4 MG tablet Take 4 mg by mouth Every 8 (Eight) Hours As Needed for Nausea or Vomiting.   Yes Krissy Hodgson MD  pantoprazole (PROTONIX) 40 MG EC tablet Take 40 mg by mouth Daily.   Yes Krissy Hodgson MD  potassium chloride (KLOR-CON) 20 MEQ CR tablet Take 20 mEq by mouth 2 (Two) Times a Day. 7/30/15  Yes Provider, Historical, MD  simvastatin (ZOCOR) 20 MG tablet Take 20 mg by mouth Daily. 2/5/15  Yes Krissy Hodgson MD  Acetaminophen (TYLENOL PO) Take 1,000 mg by mouth Every 4 (Four) Hours As  Needed.    Krissy Hodgson MD  albuterol (PROVENTIL) (2.5 MG/3ML) 0.083% nebulizer solution Inhale 2.5 mg Every 6 (Six) Hours As Needed. 2/3/15   Krissy Hodgson MD  aluminum-magnesium hydroxide-simethicone (MAALOX/MYLANTA) 200-200-20 MG/5ML suspension Take 15 mL by mouth Every 6 (Six) Hours As Needed for Indigestion or Heartburn.    Krissy Hodgson MD  meclizine (ANTIVERT) 25 MG tablet Take 25 mg by mouth 3 (Three) Times a Day As Needed. 7/30/15   Krissy Hodgson MD  polyethylene glycol (MIRALAX) powder Take 17 g by mouth Daily As Needed. 11/9/16   Krissy Hodgson MD  promethazine (PHENERGAN) 25 MG tablet Take 25 mg by mouth Every 6 (Six) Hours As Needed for Nausea or Vomiting.    Krissy Hodgson MD  rizatriptan MLT (MAXALT-MLT) 10 MG disintegrating tablet Take 10 mg by mouth 1 (One) Time As Needed for Migraine. May repeat in 2 hours if needed    Krissy Hodgson MD  tiZANidine (ZANAFLEX) 4 MG tablet Take 4 mg by mouth Every 8 (Eight) Hours As Needed for Muscle Spasms.    Krissy Hodgson MD  acetaminophen (TYLENOL) 325 MG tablet Take 650 mg by mouth Every 6 (Six) Hours As Needed for Mild Pain .  1/20/21  Krissy Hodgson MD  Calcium Carbonate ([employer] Calcium) 1500 (600 Ca) MG tablet Take 600 mg by mouth Daily.  1/20/21  Krissy Hodgson MD  metoprolol succinate XL (TOPROL-XL) 25 MG 24 hr tablet TAKE 1 TABLET BY MOUTH EVERY DAY  Patient taking differently: 25 mg. 11/17/20 1/20/21  Jose Levi MD  ondansetron ODT (ZOFRAN-ODT) 4 MG disintegrating tablet 4 mg Every 8 (Eight) Hours As Needed. 1/6/21 1/20/21  Krissy Hodgson MD      Pre-Procedure Notes  H&P Performed  [x]  Yes []  No       []  N/A    Indications:  []  ACS <= 24 HRS  []  ACS >24 HRS  []  New Onset Angina <= 2 mos  []  Worsening Angina  []  Resuscitated Cardiac Arrest  []  Angina on Exertion:  []  Suspected CAD  []  Valvular Disease  []  Pericardial Disease  []  Cardiac Arrythmia  []   Cardiomyopathy  []  LV Dysfunction  []  Syncope  []  Post Cardiac Transplant  []  Eval. For Exercise Clearance  []  Other  []  Pre-Operative Evaluation  If Pre-Op Eval:  Evaluation for Surgery Type:  []  Cardiac Surgery   []  Non-Cardiac Surgery  Functional Capacity:  []  <4 METS  []  >=4 METS w/o symptoms  []  >= 4 METS with symptoms  []  Unknown  Surgical Risk:  []  Low  []  Intermediate  []  High Risk: Vascular  []  High Risk Non-Vascular    Risks, Benefits, & Complications Discussed:  [x]  Yes  []  No  []  N/A    Questions Answered:  [x]  Yes  []  No  []  N/A    Consent Obtained:  [x]  Yes  []  No  []  N/A    CHF: [x]  Yes  []  No  If Yes:  Newly Diagnosed?  []  Yes  [x]  No  If Yes:  HF Type:  []  Diastolic  []  Systolic  []  Unknown      Jose Levi MD  1/20/2021  12:55 EST  Electronically signed by Jose Levi MD, 01/20/21, 12:55 PM EST.       OTHER:         Assessment & Plan     Status post fall/humerus fracture  Patient presented with fall and had a humerus fracture and status post surgery and stable    Valvular heart disease  Patient had moderate to severe aortic stenosis and is stable without any symptoms    Coronary artery disease  Patient has nonobstructive disease with normal V systolic function    Hypertension  Patient blood pressure currently stable on medical therapy    Hyperlipidemia  Patient is on statins and the lipid levels are well within normal limits    Chronic renal insufficiency  Patient has chronic renal sufficiency and is followed by the nephrologist.        Edmar Bear MD  05/31/25  18:25 EDT

## 2025-06-02 NOTE — CASE MANAGEMENT/SOCIAL WORK
Case Management Discharge Note      Final Note: [facility] SKILLED    Provided Post Acute Provider List?: Yes  Post Acute Provider List: Nursing Home  Delivered To: Patient  Method of Delivery: In person    Selected Continued Care - Discharged on 5/31/2025 Admission date: 5/23/2025 - Discharge disposition: Rehab Facility or Unit (DC - External)      Destination Coordination complete.      Service Provider Services Address Phone Fax Patient Preferred    [facility] [address] [address] [phone] --                    Transportation Services  Ambulance: [facility]    Final Discharge Disposition Code: 03 - skilled nursing facility (SNF)   Form was left downstairs at the  for

## 2025-06-02 NOTE — OP NOTE
Humerus IMN Operative Note  Rufus Peralta MD    [phone]    PATIENT NAME: Claudia Rust  MRN: [id number]  : 1950 AGE: 74 y.o. GENDER: female  DATE OF OPERATION: 2025  PREOPERATIVE DIAGNOSIS: left proximal third humeral shaft fracture   POSTOPERATIVE DIAGNOSIS: As above   OPERATION PERFORMED: intramedullary nailing of the Left Humeral shaft   SURGEON: Rufus Peralta MD  Circulator: Segun Barraza RN  Scrub Person: Kateryna Rojas  Vendor Representative: Jovon Scott  Assistant: Heather Durbin CSA  ANESTHESIA: General  ASSISTANT: Heather Durbin CSA. This case would not have been possible without another set of skilled surgical hands for retraction, use of instrumentation, and general assistance.  This assistance was vital to the success of the case.   ESTIMATED BLOOD LOSS: 100cc  SPONGE AND NEEDLE COUNT: Correct  INDICATIONS: This patient suffered a left proximal third humerus shaft fracture. She is morbidly obese with a BMI of 48.59. Hawkins bracing was simply not possible due to her body habitus. A discussion of operative versus nonoperative treatment was had with the patient. I discussed with her secondary to her body habitus and her breast pushing on the fracture that she would be at high risk for nonunion. They elected to undergo intramedullary nailing of their humerus fracture. The risks of surgery were discussed and included the risk of anesthesia, infection, damage to neurovascular structures, implant loosening/fialure, fracture, hardware prominence, malunion/nonunion, the need for further procedures, medical complications, and others. No guarantees were made. The patient wished to proceed with surgery and a surgical consent was signed.  COMPONENTS:   Carrillo humeral IMN 7 mm x 230   Proximal interlocks x 2  Single distal interlock    PERTINENT FINDINGS: Humeral Fracture    DETAILS OF PROCEDURE:  The patient was met in the preoperative area. The site was marked. The consent  and H&P were reviewed. The patient was then wheeled back to the operative suite and transferred to the operative table. The patient underwent anesthesia. Excess hair along the incision was removed with clippers. Surgical alcohol was used to thoroughly clean the operative area. The arm was prepped and draped in the normal sterile fashion which included ChloraPrep and multiple layers of sterile draping.  A surgical timeout was performed in which administration of preoperative antibiotics as well as the surgical site were confirmed.    A small incision was made about the anterior lateral corner of the acromion.  Dissection was then carried down to the rotator cuff.  Its fibers were split in line.  Montalvo scissors were then used to further dilate this area.  Cannulated all was then used to open the proximal aspect of the humerus. A ball-tipped guidewire was then passed into the humerus and passed the fracture site using traction and manual manipulation on the outside of the arm.  Fracture was then further reduced with manual manipulation and rotation.  Sequential reamers were then passed over the ball-tipped guidewire until adequate feedback was felt from the reamer.  The humeral nail was then measured.  A nail smaller than the final reamer was inserted gently over the ball tip wire while fracture reduction was maintained with manual traction and appropriate rotation.  Nail was fully seated proximally.  The nail was then locked with 2 percutaneous proximal interlocks placed through the jig screws were placed of appropriate length.  Then confirmed under fluoroscopy.  Rotation and fracture length alignment were then further assessed and deemed to be anatomic and the nail was then locked with a single distal interlock from anterior lateral to posterior Via perfect [place] technique. Small incision was made and blunt dissection done with a hemostat to ensure no neurovascular structures were entrapped within the path of the  screw. Secondary to difficulty with patient's body habitus and maintaining appropriate fracture reduction the screw was not placed anterior to posterior.    Fluoroscopy confirmed again adequate length alignment rotation of the fracture site which was deemed anatomic.  All screws were deemed to be placed through the nail.  .   All wounds were irrigated with normal saline  The rotator interval was closed with 0 Vicryl sutureSubcutaneous tissue closed with 2-0 Vicryl The skin was then closedWith 2-0 nylon.  An analgesic cocktail was injected about the soft tissues with care to avoid any neurological structures.  A sterile dressing was then applied.  The patient was awoken from anesthesia and moved over to the bed.  The patient was then taken the PACU in good condition.    Postoperatively the patient will be weightbearing as tolerated about the left upper extremity.  She will work with physical therapy.  To be placed on DVT prophylaxis with Eliquis 2.5 mg twice daily  She will receive 24 hours of antibiotics with 2 g of Ancef every 8 hours  She will follow-up with myself in 2 weeks for radiographs and wound check.      Rufus Peralta MD  Orthopaedic Surgery  [facility]  [phone]

## 2025-06-03 NOTE — DISCHARGE SUMMARY
"             [facility]  Discharge Summary    Date of Service: 2025  Patient Name: Claudia Rust  : 1950  MRN: [id number]    Date of Admission: 2025  Discharge Diagnosis: Humeral fracture  Date of Discharge: 2025  Primary Care Physician: Davis Boateng NP      Presenting Problem:   Hypokalemia [E87.6]  Humeral fracture [S42.309A]  Acute kidney injury [N17.9]  Fall, initial encounter [W19.XXXA]  Closed displaced comminuted fracture of shaft of left humerus, initial encounter [S42.352A]    Active and Resolved Hospital Problems:  Active Hospital Problems    Diagnosis POA    **Humeral fracture [S42.309A] Yes      Resolved Hospital Problems   No resolved problems to display.         Hospital Course     HPI:    \"Claudia Rust is a 74 y.o. female with a CMH of chronic vertigo, CHF, hypertension, hyperlipidemia, depression, GERD, fibromyalgia, hypothyroidism, who presented to [facility] on 2025 with  Mechanical fall.Patient fell while using her rollator and landed on her left arm, resulting in significant pain and inability to move the arm. She also hit her head but denies loss of consciousness, denies chest pain or SOB. Reports mild right-sided neck pain. No other injuries noted.     In the ED, BP soft, afebrile, labs remarkable for potassium of 2.7, creatinine of 1.63 (1.3), CBC showing WBC of 10.9, hemoglobin and platelets normal, x-ray of the left humerus showing mild given the mildly displaced fracture of the proximal humeral diaphysis.  He has been cervical spine negative for any acute acute process.  Patient given potassium, hydromorphone, Zofran before, admitted to medicine team further inpatient management.\"    Hospital Course:    Patient hospitalized with left humeral fracture after fall. Underwent left humeral intramedullary nailing with Dr. Peralta on . Postoperative course was complicated by acute CHF exacerbation with associated acute hypoxic " respiratory failure. Patient diuresed over the course of several days towards her baseline respiratory status before transition back to her home PO diuretic regimen.        DISCHARGE Follow Up Recommendations for labs and diagnostics:     PCP  Cardiology as scheduled        Day of Discharge     Vital Signs:       Physical Exam:  Physical Exam  Constitutional:       Appearance: Normal appearance.   Cardiovascular:      Rate and Rhythm: Normal rate and regular rhythm.      Pulses: Normal pulses.      Heart sounds: Normal heart sounds.   Pulmonary:      Effort: Pulmonary effort is normal. No respiratory distress.      Breath sounds: Normal breath sounds.   Abdominal:      General: Abdomen is flat.      Palpations: Abdomen is soft.   Neurological:      Mental Status: She is alert.            Pertinent  and/or Most Recent Results     LAB RESULTS:      Lab 05/31/25  0202 05/30/25  0141 05/29/25  0315 05/28/25  0349 05/27/25  1003   WBC 12.79* 13.42* 12.22* 13.82* 13.72*   HEMOGLOBIN 10.4* 10.7* 10.5* 10.2* 10.5*   HEMATOCRIT 33.0* 33.6* 35.1 32.9* 35.0   PLATELETS 201 194 189 198 202   MCV 93.8 94.4 98.6* 96.5 99.4*         Lab 05/31/25  0202 05/30/25  0141 05/29/25  1453 05/29/25  0315 05/28/25  0349 05/27/25  1003   SODIUM 136 134*  --  144 148* 146*   POTASSIUM 3.2* 3.9 3.6 3.5 3.8 4.2   CHLORIDE 95* 94*  --  100 106 108*   CO2 28.9 28.8  --  31.8* 30.5* 27.7   ANION GAP 12.1 11.2  --  12.2 11.5 10.3   BUN 42.2* 41.4*  --  43.1* 42.1* 34*   CREATININE 1.02* 1.02*  --  0.94 1.06* 0.92   EGFR 57.8* 57.8*  --  63.8 55.2* 65.5   GLUCOSE 155* 127*  --  155* 185* 185*   CALCIUM 8.9 8.5*  --  8.7 9.0 8.8             Lab 05/28/25  0349 05/27/25  1003   PROBNP 4,279.0* 4,722.0*                 Brief Urine Lab Results       None          Microbiology Results (last 10 days)       ** No results found for the last 240 hours. **            XR Chest 1 View  Result Date: 5/28/2025  Impression: Impression: No significant change.  Features suggestive of generalized interstitial edema with more confluent edema, atelectasis or pneumonia in the left lower lobe. Stable cardiac enlargement. Electronically Signed: Yue Miller MD  5/28/2025 11:46 AM EDT  Workstation ID: [id number]    XR Chest 1 View  Result Date: 5/26/2025  Impression: Impression: 1. Cardiomegaly with pulmonary vascular congestion. 2. Left basilar airspace disease which could be atelectasis or pneumonia Electronically Signed: Haile White  5/26/2025 3:56 PM EDT  Workstation ID: [id number]    Adult Transthoracic Echo Limited W/ Cont if Necessary Per Protocol  Addendum Date: 5/25/2025    Left ventricular ejection fraction appears to be 61 - 65%.   Left ventricular diastolic function was not assessed.   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   Estimated right ventricular systolic pressure from tricuspid regurgitation is normal (<35 mmHg).   Imaging is limited by left arm being in sling.     XR ELBOW 2 VIEW LEFT  Result Date: 5/24/2025  Impression: Impression: 1.No definite acute osseous abnormality of the elbow. 2.Fracture proximal shaft of the humerus. Electronically Signed: Roverto Elizabeth MD  5/24/2025 1:02 PM EDT  Workstation ID: [id number]    CT Head Without Contrast  Result Date: 5/24/2025  Impression: Impression: 1.No acute intracranial process. 2.No acute osseous abnormality of the cervical spine. Electronically Signed: Tana Sutherland MD  5/24/2025 12:08 AM EDT  Workstation ID: [id number]    CT Cervical Spine Without Contrast  Result Date: 5/24/2025  Impression: Impression: 1.No acute intracranial process. 2.No acute osseous abnormality of the cervical spine. Electronically Signed: Tana Sutherland MD  5/24/2025 12:08 AM EDT  Workstation ID: [id number]    XR Humerus Left  Result Date: 5/23/2025  Impression: Impression: Mildly comminuted mildly displaced fracture of the proximal humeral diaphysis. Electronically Signed: Tana Sutherland MD  5/23/2025 11:38 PM EDT  Workstation ID: [id number]      Results for  orders placed during the hospital encounter of 08/29/24    Doppler Ankle Brachial Index Single Level CAR    Interpretation Summary    Right Conclusion: The right ÓSCAR is normal.    Left Conclusion: The left ÓSCAR is normal.      Results for orders placed during the hospital encounter of 08/29/24    Doppler Ankle Brachial Index Single Level CAR    Interpretation Summary    Right Conclusion: The right ÓSCAR is normal.    Left Conclusion: The left ÓSCAR is normal.      Results for orders placed during the hospital encounter of 05/23/25    Adult Transthoracic Echo Limited W/ Cont if Necessary Per Protocol    Interpretation Summary    Left ventricular ejection fraction appears to be 61 - 65%.    Left ventricular diastolic function was not assessed.    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    Estimated right ventricular systolic pressure from tricuspid regurgitation is normal (<35 mmHg).    Imaging is limited by left arm being in sling.      Labs Pending at Discharge:  Pending Results       None            Procedures Performed  Procedure(s):  HUMERUS INTRAMEDULLARY NAIL/JONO INSERTION LEFT         Consults:   Consults       Date and Time Order Name Status Description    5/25/2025  1:53 PM Inpatient Pulmonology Consult Completed               Discharge Details        Discharge Medications        New Medications        Instructions Start Date   apixaban 2.5 MG tablet tablet  Commonly known as: ELIQUIS   2.5 mg, Oral, Every 12 Hours Scheduled      oxyCODONE 5 MG immediate release tablet  Commonly known as: ROXICODONE   5 mg, Oral, Every 6 Hours PRN      polyethylene glycol 17 g packet  Commonly known as: MIRALAX   17 g, Oral, Daily             Continue These Medications        Instructions Start Date   Acetaminophen 325 MG capsule   650 mg, Every 4 Hours PRN      albuterol 0.63 MG/3ML nebulizer solution  Commonly known as: ACCUNEB   3 mL, Nebulization, Every 6 Hours PRN      bumetanide 2 MG tablet  Commonly known as: BUMEX   2 mg, Oral,  Daily      bumetanide 1 MG tablet  Commonly known as: BUMEX   1 mg, Oral, Daily, Noon.       CALCIUM 1200+D3 PO   1 capsule, Daily      cholecalciferol 25 MCG (1000 UT) tablet  Commonly known as: VITAMIN D3   1,000 Units, Every Morning      docusate sodium 100 MG capsule  Commonly known as: COLACE   100 mg, Nightly      famotidine 40 MG tablet  Commonly known as: PEPCID   40 mg, Nightly PRN      Farxiga 5 MG tablet tablet  Generic drug: dapagliflozin   5 mg, Every Morning      FLUoxetine 20 MG capsule  Commonly known as: PROzac   60 mg, Daily      fluticasone 50 MCG/ACT nasal spray  Commonly known as: FLONASE   2 sprays, Daily      gabapentin 400 MG capsule  Commonly known as: NEURONTIN   1 capsule, 3 Times Daily      levothyroxine 137 MCG tablet  Commonly known as: SYNTHROID, LEVOTHROID   1 tablet, Daily      loratadine 10 MG tablet  Commonly known as: CLARITIN   10 mg, Daily      meclizine 25 MG tablet  Commonly known as: ANTIVERT   25 mg, 3 Times Daily PRN      Melatonin 10 MG capsule   20 mg, Nightly      metOLazone 2.5 MG tablet  Commonly known as: ZAROXOLYN   1 tablet, Every 48 Hours Scheduled      metoprolol succinate XL 25 MG 24 hr tablet  Commonly known as: TOPROL-XL   12.5 mg, Oral, Daily      multivitamin with minerals tablet tablet   1 tablet, Daily      ondansetron 4 MG tablet  Commonly known as: ZOFRAN   4 mg, Every 8 Hours PRN      pantoprazole 40 MG EC tablet  Commonly known as: PROTONIX   40 mg, Every Evening      rizatriptan MLT 10 MG disintegrating tablet  Commonly known as: MAXALT-MLT   10 mg, Once As Needed      simvastatin 20 MG tablet  Commonly known as: ZOCOR   20 mg, Nightly      tiZANidine 2 MG tablet  Commonly known as: ZANAFLEX   2 mg, Every 8 Hours PRN      vitamin b complex capsule capsule   1 capsule, Daily      vitamin B-12 1000 MCG tablet  Commonly known as: CYANOCOBALAMIN   1,000 mcg, Daily             Stop These Medications      losartan 25 MG tablet  Commonly known as: Cozaar    "           Allergies   Allergen Reactions    Adhesive Tape Unknown (See Comments)     hives    Benadryl [Diphenhydramine] Other (See Comments)     Pt and  say she cannot take. \"will cause stroke\"    Butorphanol Other (See Comments)     Chest pain difficulty breathing throat swelling, STADAL    Garlic Other (See Comments)     Chest pain difficulty breathing throat swells    Heparin Other (See Comments)     HIT +    Tetanus-Diphtheria Toxoids Td Other (See Comments)     Dizziness,  vomiting    Aloe Itching    Bee Venom Other (See Comments)     Swelling eyes and lips hand swelling    Latex Itching    Macadamia Nut Oil Other (See Comments)     Chest pain difficulty breathing throat swelling    Tuberculin, Ppd Unknown (See Comments)     reactor    Wasp Venom Other (See Comments)     Swelling eyes lips and hand         Discharge Disposition:   Rehab Facility or Unit (DC - External)    Diet:  Hospital:No active diet order        Discharge Activity:         CODE STATUS:  Code Status and Medical Interventions: CPR (Attempt to Resuscitate); Full Support   Ordered at: 05/24/25 0116     Code Status (Patient has no pulse and is not breathing):    CPR (Attempt to Resuscitate)     Medical Interventions (Patient has pulse or is breathing):    Full Support         Future Appointments   Date Time Provider Department Center   7/25/2025 10:15 AM [facility]   7/30/2025 10:15 AM Jose Levi MD [facility]           Time spent on Discharge including face to face service:  57 minutes    Signature: Electronically signed by Osman Pepper MD, 06/02/25, 22:17 EDT.  [facility] Hospitalist Team    "

## 2025-08-13 ENCOUNTER — OFFICE VISIT (OUTPATIENT)
Dept: CARDIOLOGY | Facility: CLINIC | Age: 75
End: 2025-08-13
Payer: MEDICARE

## 2025-08-13 VITALS
SYSTOLIC BLOOD PRESSURE: 88 MMHG | WEIGHT: 287 LBS | HEART RATE: 57 BPM | OXYGEN SATURATION: 96 % | HEIGHT: 64 IN | DIASTOLIC BLOOD PRESSURE: 66 MMHG | BODY MASS INDEX: 49 KG/M2

## 2025-08-13 DIAGNOSIS — I35.0 NONRHEUMATIC AORTIC VALVE STENOSIS: Chronic | ICD-10-CM

## 2025-08-13 DIAGNOSIS — I95.9 ACUTE HYPOTENSION: ICD-10-CM

## 2025-08-13 DIAGNOSIS — Z09 HOSPITAL DISCHARGE FOLLOW-UP: Primary | ICD-10-CM

## 2025-08-13 DIAGNOSIS — I27.20 PULMONARY HYPERTENSION: ICD-10-CM

## 2025-08-13 DIAGNOSIS — Z95.2 S/P AVR (AORTIC VALVE REPLACEMENT): ICD-10-CM

## 2025-08-13 DIAGNOSIS — I50.32 CHRONIC DIASTOLIC HEART FAILURE: ICD-10-CM

## 2025-08-13 RX ORDER — LOSARTAN POTASSIUM 25 MG/1
25 TABLET ORAL DAILY
COMMUNITY
End: 2025-08-13

## 2025-08-13 RX ORDER — DOXYCYCLINE 100 MG/1
1 CAPSULE ORAL EVERY 12 HOURS SCHEDULED
COMMUNITY
Start: 2025-08-05

## 2025-08-13 RX ORDER — OXYCODONE AND ACETAMINOPHEN 5; 325 MG/1; MG/1
1 TABLET ORAL EVERY 8 HOURS PRN
COMMUNITY
Start: 2025-07-28

## 2025-08-13 RX ORDER — OMEPRAZOLE 20 MG/1
20 CAPSULE, DELAYED RELEASE ORAL DAILY
COMMUNITY
Start: 2025-07-27

## 2025-08-19 ENCOUNTER — TELEPHONE (OUTPATIENT)
Dept: CARDIOLOGY | Facility: CLINIC | Age: 75
End: 2025-08-19
Payer: MEDICARE

## 2025-08-25 ENCOUNTER — HOSPITAL ENCOUNTER (EMERGENCY)
Facility: HOSPITAL | Age: 75
Discharge: HOME | End: 2025-08-25
Admitting: EMERGENCY MEDICINE
Payer: MEDICARE

## 2025-08-25 ENCOUNTER — APPOINTMENT (OUTPATIENT)
Dept: GENERAL RADIOLOGY | Facility: HOSPITAL | Age: 75
End: 2025-08-25
Payer: MEDICARE

## 2025-08-25 ENCOUNTER — TELEPHONE (OUTPATIENT)
Dept: CARDIOLOGY | Facility: CLINIC | Age: 75
End: 2025-08-25
Payer: MEDICARE

## 2025-08-25 VITALS
WEIGHT: 286.6 LBS | HEART RATE: 70 BPM | HEIGHT: 64 IN | OXYGEN SATURATION: 95 % | SYSTOLIC BLOOD PRESSURE: 121 MMHG | DIASTOLIC BLOOD PRESSURE: 62 MMHG | RESPIRATION RATE: 18 BRPM | BODY MASS INDEX: 48.93 KG/M2 | TEMPERATURE: 98.1 F

## 2025-08-25 DIAGNOSIS — Z91.81 HISTORY OF RECENT FALL: ICD-10-CM

## 2025-08-25 DIAGNOSIS — R60.0 PERIPHERAL EDEMA: ICD-10-CM

## 2025-08-25 DIAGNOSIS — M25.551 RIGHT HIP PAIN: ICD-10-CM

## 2025-08-25 DIAGNOSIS — F41.9 ANXIETY: ICD-10-CM

## 2025-08-25 DIAGNOSIS — G89.29 OTHER CHRONIC PAIN: ICD-10-CM

## 2025-08-25 DIAGNOSIS — I10 HYPERTENSION, UNSPECIFIED TYPE: ICD-10-CM

## 2025-08-25 DIAGNOSIS — E87.6 HYPOKALEMIA: Primary | ICD-10-CM

## 2025-08-25 LAB
ALBUMIN SERPL-MCNC: 3.2 G/DL (ref 3.5–5.2)
ALBUMIN/GLOB SERPL: 1 G/DL
ALP SERPL-CCNC: 121 U/L (ref 39–117)
ALT SERPL W P-5'-P-CCNC: 9 U/L (ref 1–33)
ANION GAP SERPL CALCULATED.3IONS-SCNC: 13.8 MMOL/L (ref 5–15)
APTT PPP: 32.7 SECONDS (ref 22.7–35.4)
AST SERPL-CCNC: 18 U/L (ref 1–32)
BASOPHILS # BLD AUTO: 0.05 10*3/MM3 (ref 0–0.2)
BASOPHILS NFR BLD AUTO: 0.5 % (ref 0–1.5)
BILIRUB SERPL-MCNC: 0.3 MG/DL (ref 0–1.2)
BILIRUB UR QL STRIP: NEGATIVE
BUN SERPL-MCNC: 18.7 MG/DL (ref 8–23)
BUN/CREAT SERPL: 15.8 (ref 7–25)
CALCIUM SPEC-SCNC: 8.5 MG/DL (ref 8.6–10.5)
CHLORIDE SERPL-SCNC: 100 MMOL/L (ref 98–107)
CLARITY UR: CLEAR
CO2 SERPL-SCNC: 26.2 MMOL/L (ref 22–29)
COLOR UR: YELLOW
CREAT SERPL-MCNC: 1.18 MG/DL (ref 0.57–1)
DEPRECATED RDW RBC AUTO: 49.8 FL (ref 37–54)
EGFRCR SERPLBLD CKD-EPI 2021: 48.6 ML/MIN/1.73
EOSINOPHIL # BLD AUTO: 0.2 10*3/MM3 (ref 0–0.4)
EOSINOPHIL NFR BLD AUTO: 2.1 % (ref 0.3–6.2)
ERYTHROCYTE [DISTWIDTH] IN BLOOD BY AUTOMATED COUNT: 14.7 % (ref 12.3–15.4)
GEN 5 1HR TROPONIN T REFLEX: 17 NG/L
GLOBULIN UR ELPH-MCNC: 3.2 GM/DL
GLUCOSE SERPL-MCNC: 99 MG/DL (ref 65–99)
GLUCOSE UR STRIP-MCNC: NEGATIVE MG/DL
HCT VFR BLD AUTO: 38.9 % (ref 34–46.6)
HGB BLD-MCNC: 11.8 G/DL (ref 12–15.9)
HGB UR QL STRIP.AUTO: NEGATIVE
IMM GRANULOCYTES # BLD AUTO: 0.02 10*3/MM3 (ref 0–0.05)
IMM GRANULOCYTES NFR BLD AUTO: 0.2 % (ref 0–0.5)
INR PPP: 1.23 (ref 0.9–1.1)
KETONES UR QL STRIP: NEGATIVE
LEUKOCYTE ESTERASE UR QL STRIP.AUTO: NEGATIVE
LIPASE SERPL-CCNC: 22 U/L (ref 13–60)
LYMPHOCYTES # BLD AUTO: 2.64 10*3/MM3 (ref 0.7–3.1)
LYMPHOCYTES NFR BLD AUTO: 28.4 % (ref 19.6–45.3)
MAGNESIUM SERPL-MCNC: 2.1 MG/DL (ref 1.6–2.4)
MCH RBC QN AUTO: 28 PG (ref 26.6–33)
MCHC RBC AUTO-ENTMCNC: 30.3 G/DL (ref 31.5–35.7)
MCV RBC AUTO: 92.4 FL (ref 79–97)
MONOCYTES # BLD AUTO: 1.09 10*3/MM3 (ref 0.1–0.9)
MONOCYTES NFR BLD AUTO: 11.7 % (ref 5–12)
NEUTROPHILS NFR BLD AUTO: 5.31 10*3/MM3 (ref 1.7–7)
NEUTROPHILS NFR BLD AUTO: 57.1 % (ref 42.7–76)
NITRITE UR QL STRIP: NEGATIVE
NRBC BLD AUTO-RTO: 0 /100 WBC (ref 0–0.2)
NT-PROBNP SERPL-MCNC: 934 PG/ML (ref 0–900)
PH UR STRIP.AUTO: 8.5 [PH] (ref 5–8)
PLATELET # BLD AUTO: 238 10*3/MM3 (ref 140–450)
PMV BLD AUTO: 10.5 FL (ref 6–12)
POTASSIUM SERPL-SCNC: 3.1 MMOL/L (ref 3.5–5.2)
PROT SERPL-MCNC: 6.4 G/DL (ref 6–8.5)
PROT UR QL STRIP: NEGATIVE
PROTHROMBIN TIME: 15.4 SECONDS (ref 11.7–14.2)
RBC # BLD AUTO: 4.21 10*6/MM3 (ref 3.77–5.28)
SODIUM SERPL-SCNC: 140 MMOL/L (ref 136–145)
SP GR UR STRIP: 1.01 (ref 1–1.03)
TROPONIN T % DELTA: -15
TROPONIN T NUMERIC DELTA: -3 NG/L
TROPONIN T SERPL HS-MCNC: 20 NG/L
TSH SERPL DL<=0.05 MIU/L-ACNC: 3.31 UIU/ML (ref 0.27–4.2)
UROBILINOGEN UR QL STRIP: ABNORMAL
WBC NRBC COR # BLD AUTO: 9.31 10*3/MM3 (ref 3.4–10.8)

## 2025-08-25 PROCEDURE — 80053 COMPREHEN METABOLIC PANEL: CPT | Performed by: OCCUPATIONAL THERAPIST

## 2025-08-25 PROCEDURE — 25810000003 SODIUM CHLORIDE 0.9 % SOLUTION: Performed by: OCCUPATIONAL THERAPIST

## 2025-08-25 PROCEDURE — 36415 COLL VENOUS BLD VENIPUNCTURE: CPT

## 2025-08-25 PROCEDURE — 96375 TX/PRO/DX INJ NEW DRUG ADDON: CPT

## 2025-08-25 PROCEDURE — 25010000002 POTASSIUM CHLORIDE 10 MEQ/100ML SOLUTION: Performed by: OCCUPATIONAL THERAPIST

## 2025-08-25 PROCEDURE — 85730 THROMBOPLASTIN TIME PARTIAL: CPT | Performed by: OCCUPATIONAL THERAPIST

## 2025-08-25 PROCEDURE — 71045 X-RAY EXAM CHEST 1 VIEW: CPT

## 2025-08-25 PROCEDURE — 25010000002 ONDANSETRON PER 1 MG: Performed by: OCCUPATIONAL THERAPIST

## 2025-08-25 PROCEDURE — 83735 ASSAY OF MAGNESIUM: CPT | Performed by: OCCUPATIONAL THERAPIST

## 2025-08-25 PROCEDURE — 84443 ASSAY THYROID STIM HORMONE: CPT | Performed by: OCCUPATIONAL THERAPIST

## 2025-08-25 PROCEDURE — 84484 ASSAY OF TROPONIN QUANT: CPT | Performed by: OCCUPATIONAL THERAPIST

## 2025-08-25 PROCEDURE — 25010000002 MORPHINE PER 10 MG: Performed by: OCCUPATIONAL THERAPIST

## 2025-08-25 PROCEDURE — 99284 EMERGENCY DEPT VISIT MOD MDM: CPT

## 2025-08-25 PROCEDURE — 81003 URINALYSIS AUTO W/O SCOPE: CPT | Performed by: OCCUPATIONAL THERAPIST

## 2025-08-25 PROCEDURE — 83880 ASSAY OF NATRIURETIC PEPTIDE: CPT | Performed by: OCCUPATIONAL THERAPIST

## 2025-08-25 PROCEDURE — 73502 X-RAY EXAM HIP UNI 2-3 VIEWS: CPT

## 2025-08-25 PROCEDURE — 85610 PROTHROMBIN TIME: CPT | Performed by: OCCUPATIONAL THERAPIST

## 2025-08-25 PROCEDURE — 96365 THER/PROPH/DIAG IV INF INIT: CPT

## 2025-08-25 PROCEDURE — 93005 ELECTROCARDIOGRAM TRACING: CPT | Performed by: OCCUPATIONAL THERAPIST

## 2025-08-25 PROCEDURE — 83690 ASSAY OF LIPASE: CPT | Performed by: OCCUPATIONAL THERAPIST

## 2025-08-25 PROCEDURE — 85025 COMPLETE CBC W/AUTO DIFF WBC: CPT | Performed by: OCCUPATIONAL THERAPIST

## 2025-08-25 RX ORDER — ONDANSETRON 2 MG/ML
4 INJECTION INTRAMUSCULAR; INTRAVENOUS ONCE
Status: COMPLETED | OUTPATIENT
Start: 2025-08-25 | End: 2025-08-25

## 2025-08-25 RX ORDER — POTASSIUM CHLORIDE 1500 MG/1
40 TABLET, EXTENDED RELEASE ORAL ONCE
Status: DISCONTINUED | OUTPATIENT
Start: 2025-08-25 | End: 2025-08-25

## 2025-08-25 RX ORDER — POTASSIUM CHLORIDE 7.45 MG/ML
10 INJECTION INTRAVENOUS ONCE
Status: COMPLETED | OUTPATIENT
Start: 2025-08-25 | End: 2025-08-25

## 2025-08-25 RX ADMIN — ONDANSETRON 4 MG: 2 INJECTION, SOLUTION INTRAMUSCULAR; INTRAVENOUS at 17:56

## 2025-08-25 RX ADMIN — POTASSIUM CHLORIDE 10 MEQ: 7.46 INJECTION, SOLUTION INTRAVENOUS at 16:21

## 2025-08-25 RX ADMIN — SODIUM CHLORIDE 250 ML: 0.9 INJECTION, SOLUTION INTRAVENOUS at 16:45

## 2025-08-25 RX ADMIN — MORPHINE SULFATE 4 MG: 4 INJECTION, SOLUTION INTRAMUSCULAR; INTRAVENOUS at 17:56

## 2025-08-26 ENCOUNTER — TELEPHONE (OUTPATIENT)
Dept: CARDIOLOGY | Facility: CLINIC | Age: 75
End: 2025-08-26
Payer: MEDICARE

## 2025-08-26 LAB
QT INTERVAL: 459 MS
QTC INTERVAL: 494 MS

## 2025-08-27 ENCOUNTER — HOSPITAL ENCOUNTER (EMERGENCY)
Facility: HOSPITAL | Age: 75
Discharge: HOME | End: 2025-08-27
Attending: EMERGENCY MEDICINE | Admitting: EMERGENCY MEDICINE
Payer: MEDICARE

## 2025-08-27 VITALS
RESPIRATION RATE: 20 BRPM | HEART RATE: 64 BPM | HEIGHT: 64 IN | DIASTOLIC BLOOD PRESSURE: 57 MMHG | TEMPERATURE: 97.7 F | OXYGEN SATURATION: 91 % | SYSTOLIC BLOOD PRESSURE: 114 MMHG | WEIGHT: 286.6 LBS | BODY MASS INDEX: 48.93 KG/M2

## 2025-08-27 DIAGNOSIS — S61.411A LACERATION OF RIGHT HAND WITHOUT FOREIGN BODY, INITIAL ENCOUNTER: ICD-10-CM

## 2025-08-27 DIAGNOSIS — S81.812A LACERATION OF LEFT LOWER EXTREMITY, INITIAL ENCOUNTER: Primary | ICD-10-CM

## 2025-08-27 PROCEDURE — 99283 EMERGENCY DEPT VISIT LOW MDM: CPT

## 2025-08-27 PROCEDURE — 25010000002 LIDOCAINE 1% - EPINEPHRINE 1:100000 1 %-1:100000 SOLUTION: Performed by: EMERGENCY MEDICINE

## 2025-08-27 RX ORDER — CEPHALEXIN 500 MG/1
1000 CAPSULE ORAL ONCE
Status: COMPLETED | OUTPATIENT
Start: 2025-08-27 | End: 2025-08-27

## 2025-08-27 RX ORDER — CEPHALEXIN 500 MG/1
1000 CAPSULE ORAL 3 TIMES DAILY
Qty: 18 CAPSULE | Refills: 0 | Status: SHIPPED | OUTPATIENT
Start: 2025-08-27

## 2025-08-27 RX ORDER — LIDOCAINE HYDROCHLORIDE AND EPINEPHRINE 10; 10 MG/ML; UG/ML
20 INJECTION, SOLUTION INFILTRATION; PERINEURAL ONCE
Status: COMPLETED | OUTPATIENT
Start: 2025-08-27 | End: 2025-08-27

## 2025-08-27 RX ORDER — HYDROCODONE BITARTRATE AND ACETAMINOPHEN 5; 325 MG/1; MG/1
1 TABLET ORAL EVERY 6 HOURS PRN
Qty: 10 TABLET | Refills: 0 | Status: SHIPPED | OUTPATIENT
Start: 2025-08-27

## 2025-08-27 RX ADMIN — CEPHALEXIN 1000 MG: 500 CAPSULE ORAL at 12:17

## 2025-08-27 RX ADMIN — LIDOCAINE HYDROCHLORIDE,EPINEPHRINE BITARTRATE 20 ML: 10; .01 INJECTION, SOLUTION INFILTRATION; PERINEURAL at 10:30

## (undated) DEVICE — SUT PROLN 3/0 V7 D/A 36IN 8976H

## (undated) DEVICE — SUT SILK 2 SUTUPAK TIE 60IN SA8H 2STRAND

## (undated) DEVICE — LOCKING SCREW, FULLY THREADED
Type: IMPLANTABLE DEVICE | Site: HUMERUS | Status: NON-FUNCTIONAL
Removed: 2025-05-25

## (undated) DEVICE — SUT PROLN 4/0 V5 36IN 8935H

## (undated) DEVICE — ST PERFUS M/

## (undated) DEVICE — CATH DIAG IMPULSE FR4 6F 100CM

## (undated) DEVICE — 28 FR STRAIGHT – SOFT PVC CATHETER: Brand: PVC THORACIC CATHETERS

## (undated) DEVICE — SUT PROLN 5/0 V5 36IN 8934H

## (undated) DEVICE — TD CATHETER, 8F, 5 LUMEN, 110 CM, RA/RA, HEPARIN COATED, LF: Brand: ICU MEDICAL

## (undated) DEVICE — 28 FR RIGHT ANGLE – SOFT PVC CATHETER: Brand: PVC THORACIC CATHETERS

## (undated) DEVICE — GLV SURG SENSICARE PI ORTHO SZ8.5 LF STRL

## (undated) DEVICE — CONTAINER,SPECIMEN,OR STERILE,4OZ: Brand: MEDLINE

## (undated) DEVICE — PK TRY HEART CATH 50

## (undated) DEVICE — GLV SURG BIOGEL LTX PF 7 1/2

## (undated) DEVICE — ELECTRD BLD EZ CLN STD 6.5IN

## (undated) DEVICE — TP UMB COTN 1/8X36 U12T

## (undated) DEVICE — SUT SILK 2/0 SH CR8 18IN CR8 C012D

## (undated) DEVICE — UNDERGLV SURG BIOGEL/PI PF SYNTH SURG SZ8.5 BLU 50/BX

## (undated) DEVICE — CANN AORT ROOT DLP VNT/8IN 14G 7F

## (undated) DEVICE — PRESSURE TUBING: Brand: TRUWAVE

## (undated) DEVICE — SNAR POLYP HOTSNARE/BRAIDED OVL/MINI 7F 2.8X10MM 230CM 1P/U

## (undated) DEVICE — DRAPE,U/ SHT,SPLIT,PLAS,STERIL: Brand: MEDLINE

## (undated) DEVICE — BLOOD TRANSFUSION FILTER: Brand: HAEMONETICS

## (undated) DEVICE — TOWEL,OR,DSP,ST,WHITE,DLX,4/PK,20PK/CS: Brand: MEDLINE

## (undated) DEVICE — PINNACLE INTRODUCER SHEATH: Brand: PINNACLE

## (undated) DEVICE — SUT ETHLN 2/0 PS 18IN 585H

## (undated) DEVICE — GUIDE WIRE, BALL-TIPPED, STERILE

## (undated) DEVICE — C-ARM: Brand: UNBRANDED

## (undated) DEVICE — IRRIGATOR BULB ASEPTO 60CC STRL

## (undated) DEVICE — DRAPE SHEET ULTRAGARD: Brand: MEDLINE

## (undated) DEVICE — TEMP PACING WIRE: Brand: MYO/WIRE

## (undated) DEVICE — CATH DIAG IMPULSE FL4 6F 100CM

## (undated) DEVICE — SUT SILK 2/0 TIES 18IN A185H

## (undated) DEVICE — CABL BIPOL W/ALLGTR CLIP/SM 12FT

## (undated) DEVICE — CATH DIAG IMPULSE PIG .056 6F 110CM

## (undated) DEVICE — THE STERILE LIGHT HANDLE COVER IS USED WITH STERIS SURGICAL LIGHTING AND VISUALIZATION SYSTEMS.

## (undated) DEVICE — DRILL, AO, STERILE

## (undated) DEVICE — BOOT SUT XRAY DETECT STD YEL/BLU CA/50

## (undated) DEVICE — ELECTRD DEFIB M/FUNC PROPADZ STRL 2PK

## (undated) DEVICE — SKIN PREP TRAY W/CHG: Brand: MEDLINE INDUSTRIES, INC.

## (undated) DEVICE — BNDG,ELSTC,MATRIX,STRL,4"X5YD,LF,HOOK&LP: Brand: MEDLINE

## (undated) DEVICE — SUT SILK 0 CT1 CR8 18IN C021D

## (undated) DEVICE — DRSNG GZ CURAD XEROFORM NONADHR OVERWRAP 5X9IN

## (undated) DEVICE — Device

## (undated) DEVICE — GW DIAG EMERALD HEPCOAT MOVE JTIP STD .035 3MM 150CM

## (undated) DEVICE — ANTIBACTERIAL UNDYED BRAIDED (POLYGLACTIN 910), SYNTHETIC ABSORBABLE SUTURE: Brand: COATED VICRYL

## (undated) DEVICE — SOL IRR H2O BTL 1000ML STRL

## (undated) DEVICE — PK ATS CUST W CARDIOTOMY RESEVOIR

## (undated) DEVICE — SPNG GZ WOVN 4X4IN 12PLY 10/BX STRL

## (undated) DEVICE — RADIFOCUS OBTURATOR: Brand: RADIFOCUS

## (undated) DEVICE — DRSNG WND BORDR/ADHS NONADHR/GZ LF 4X4IN STRL

## (undated) DEVICE — SUT PROLN 5/0 RB2 D/A 30IN 8710H

## (undated) DEVICE — BAPTIST FLOYD BRONCHOSCOPY: Brand: MEDLINE INDUSTRIES, INC.

## (undated) DEVICE — TRAP WIDEEYE POLYP

## (undated) DEVICE — SUT PDS 0 CT-1 Z340H

## (undated) DEVICE — PK OPN HEART WHT WRP 50

## (undated) DEVICE — TUBING, SUCTION, 1/4" X 12', STRAIGHT: Brand: MEDLINE

## (undated) DEVICE — GW WWIJ35260 WHOLEY WIRE V04: Brand: WHOLEY™

## (undated) DEVICE — ST TOURNI COMPL A/ 7IN

## (undated) DEVICE — EACH LANGSTON DUAL LUMEN CATHETER IS INDICATED FOR DELIVERY OF CONTRAST MEDIUM IN ANGIOGRAPHIC STUDIES AND FOR SIMULTANEOUS PRESSURE MEASUREMENT FROM TWO SITES. THIS TYPE OF PRESSURE MEASUREMENT IS USEFUL IN DETERMINING TRANSVALVULAR, INTRAVASCULAR AND INTRAVENTRICULAR PRESSURE GRADIENTS.: Brand: LANGSTON® DUAL LUMEN CATHETER

## (undated) DEVICE — SPONGE,DISSECTOR,ROUND CHERRY,XR,ST,5/PK: Brand: MEDLINE

## (undated) DEVICE — 3M™ IOBAN™ 2 ANTIMICROBIAL INCISE DRAPE 6650EZ: Brand: IOBAN™ 2

## (undated) DEVICE — PK PERFUS CUST W/CARDIOPLEGIA

## (undated) DEVICE — SUT SILK 4/0 TIES 18IN A183H

## (undated) DEVICE — SYS PERFUS SEP PLATLT W TIPS CUST

## (undated) DEVICE — THE STERILE CAMERA HANDLE COVER IS FOR USE WITH THE STERIS SURGICAL LIGHTING AND VISUALIZATION SYSTEMS.

## (undated) DEVICE — PK ENDO GI 50

## (undated) DEVICE — GOWN,SIRUS,POLYRNF,BRTHSLV,XL,30/CS: Brand: MEDLINE

## (undated) DEVICE — SWAN-GANZ TRUE SIZE THERMODILUTION "S" TIP: Brand: SWAN-GANZ TRUE SIZE

## (undated) DEVICE — GAUZE,SPONGE,4"X4",32PLY,XRAY,STRL,LF: Brand: MEDLINE

## (undated) DEVICE — COUNT NDL MAG XLG

## (undated) DEVICE — SUT PROLN 4/0 V7 36IN 8975H

## (undated) DEVICE — SOL ISO/ALC RUB 70PCT 4OZ

## (undated) DEVICE — SUT VIC COAT PLS ANTIBAC TP1 27IN

## (undated) DEVICE — HEMOCONCENTRATOR PERFUS LPS06

## (undated) DEVICE — KT SURG TURNOVER 050

## (undated) DEVICE — CANN ART EOPA 3D NV W/CONN 20F

## (undated) DEVICE — SUCTION CANISTER, 1500CC,SAFELINER: Brand: DEROYAL

## (undated) DEVICE — BG BLD SYS

## (undated) DEVICE — DRSNG WND GZ PAD BORDERED 4X8IN STRL

## (undated) DEVICE — SOL NACL 0.9PCT 1000ML

## (undated) DEVICE — PK EXTREM 50

## (undated) DEVICE — SHEET, DRAPE, SPLIT, STERILE: Brand: MEDLINE

## (undated) DEVICE — CONNECT Y INTERSEPT W/LL 3/8 X 3/8 X 3/8IN

## (undated) DEVICE — BNDG,ELSTC,MATRIX,STRL,6"X5YD,LF,HOOK&LP: Brand: MEDLINE

## (undated) DEVICE — GUIDE WIRE, SMOOTH-TIPPED, STERILE

## (undated) DEVICE — CANN RETRGR STYLET RSCP 15F

## (undated) DEVICE — SUT PROLN 2/0 V5 48IN D9694

## (undated) DEVICE — SENSR CERBRL O2 PK/2

## (undated) DEVICE — APPL CHLORAPREP HI/LITE 26ML ORNG

## (undated) DEVICE — K-WIRE, STERILE
Type: IMPLANTABLE DEVICE | Site: HUMERUS | Status: NON-FUNCTIONAL
Removed: 2025-05-25

## (undated) DEVICE — SOL IRR NACL 0.9PCT BT 1000ML